# Patient Record
Sex: FEMALE | Race: WHITE | Employment: OTHER | URBAN - METROPOLITAN AREA
[De-identification: names, ages, dates, MRNs, and addresses within clinical notes are randomized per-mention and may not be internally consistent; named-entity substitution may affect disease eponyms.]

---

## 2020-12-03 ENCOUNTER — TRANSFERRED RECORDS (OUTPATIENT)
Dept: HEALTH INFORMATION MANAGEMENT | Facility: CLINIC | Age: 65
End: 2020-12-03

## 2020-12-04 ENCOUNTER — TRANSFERRED RECORDS (OUTPATIENT)
Dept: HEALTH INFORMATION MANAGEMENT | Facility: CLINIC | Age: 65
End: 2020-12-04

## 2020-12-07 ENCOUNTER — TRANSCRIBE ORDERS (OUTPATIENT)
Dept: OTHER | Age: 65
End: 2020-12-07

## 2020-12-10 ENCOUNTER — TRANSCRIBE ORDERS (OUTPATIENT)
Dept: OTHER | Age: 65
End: 2020-12-10

## 2020-12-10 ENCOUNTER — DOCUMENTATION ONLY (OUTPATIENT)
Dept: ONCOLOGY | Facility: CLINIC | Age: 65
End: 2020-12-10

## 2020-12-10 DIAGNOSIS — N83.8 OVARIAN MASS: Primary | ICD-10-CM

## 2020-12-10 DIAGNOSIS — J90 PLEURAL EFFUSION: ICD-10-CM

## 2020-12-10 NOTE — PROGRESS NOTES
Action    Action Taken 12/10/20:    CT & US from [facility] resolved (Reports in Epic) to PACS.    -Faxed request to [facility] for recs  3:15 PM    -12/11/20: Records from [facility] rec'd, sent to HIM for urgent upload. Records included patient demographics. Added pt's address & phone - [facility] Recs now viewable to Epic/CE.  8:42 AM       RECORDS STATUS - ALL OTHER DIAGNOSIS      RECORDS RECEIVED FROM:    DATE RECEIVED:    NOTES STATUS DETAILS   OFFICE NOTE from referring provider [facility] - [facility]: 12/3/20   OFFICE NOTE from medical oncologist     DISCHARGE SUMMARY from hospital     DISCHARGE REPORT from the ER     OPERATIVE REPORT     MEDICATION LIST     CLINICAL TRIAL TREATMENTS TO DATE     LABS     PATHOLOGY REPORTS     ANYTHING RELATED TO DIAGNOSIS     GENONOMIC TESTING     TYPE:     IMAGING (NEED IMAGES & REPORT)     CT SCANS PACS 12/4/20: [facility], Report in CE   MRI     MAMMO     ULTRASOUND PACS 12/3/20: [facility], Report in CE   PET

## 2020-12-14 NOTE — TELEPHONE ENCOUNTER
Oncology/Surgical Oncology Referral Request:     Specialty Requested: Medical Oncology     Referring Provider: Frieda Butler MD    Referring Clinic/Organization: [facility]     Records location: Faxed - Media tab/Scanned     Requested Provider (if specified): Not Specified

## 2020-12-16 ENCOUNTER — PRE VISIT (OUTPATIENT)
Dept: ONCOLOGY | Facility: CLINIC | Age: 65
End: 2020-12-16

## 2020-12-16 ENCOUNTER — VIRTUAL VISIT (OUTPATIENT)
Dept: ONCOLOGY | Facility: CLINIC | Age: 65
End: 2020-12-16
Attending: PHYSICIAN ASSISTANT
Payer: MEDICAID

## 2020-12-16 DIAGNOSIS — N83.8 OVARIAN MASS: ICD-10-CM

## 2020-12-16 DIAGNOSIS — J90 PLEURAL EFFUSION: ICD-10-CM

## 2020-12-16 PROCEDURE — 99204 OFFICE O/P NEW MOD 45 MIN: CPT | Mod: 95 | Performed by: OBSTETRICS & GYNECOLOGY

## 2020-12-16 PROCEDURE — 999N001193 HC VIDEO/TELEPHONE VISIT; NO CHARGE

## 2020-12-16 RX ORDER — AMITRIPTYLINE HYDROCHLORIDE 100 MG/1
50 TABLET ORAL AT BEDTIME
COMMUNITY
Start: 2020-09-01 | End: 2021-06-10

## 2020-12-16 RX ORDER — ONDANSETRON 4 MG/1
TABLET, FILM COATED ORAL
Status: ON HOLD | COMMUNITY
Start: 2020-06-05 | End: 2021-01-13

## 2020-12-16 RX ORDER — SUMATRIPTAN 100 MG/1
100 TABLET, FILM COATED ORAL
COMMUNITY
Start: 2020-07-06 | End: 2021-07-02

## 2020-12-16 RX ORDER — VALACYCLOVIR HYDROCHLORIDE 1 G/1
1000 TABLET, FILM COATED ORAL PRN
COMMUNITY
Start: 2020-12-03

## 2020-12-16 NOTE — PROGRESS NOTES
"Taran Regalado is a 65 year old female who is being evaluated via a billable video visit.      The patient has been notified of following:     \"This video visit will be conducted via a call between you and your physician/provider. We have found that certain health care needs can be provided without the need for an in-person physical exam.  This service lets us provide the care you need with a video conversation.  If a prescription is necessary we can send it directly to your pharmacy.  If lab work is needed we can place an order for that and you can then stop by our lab to have the test done at a later time.    Video visits are billed at different rates depending on your insurance coverage.  Please reach out to your insurance provider with any questions.    If during the course of the call the physician/provider feels a video visit is not appropriate, you will not be charged for this service.\"    Patient has given verbal consent for Video visit? Yes  How would you like to obtain your AVS? Mail a copy  If you are dropped from the video visit, the video invite should be resent to: Text to cell phone: [phone]  Will anyone else be joining your video visit? No      I have reviewed and updated the patient's allergies and medication list.    Concerns: none  Refills: none    Vitals - Patient Reported  Weight (Patient Reported): 68 kg (150 lb)  Height (Patient Reported): 182.9 cm (6')  BMI (Based on Pt Reported Ht/Wt): 20.34  Pain Score: Moderate Pain (4)  Pain Loc: Abdomen      Sherita Hodge CMA                            Consult Notes on Referred Patient    Date: 2020       Dr. Frieda Parrish  [address] [place] [address]       RE: Taran Regalado  : 1955  GRACY: 2020    Dear Dr. Frieda Parrish:    I had the pleasure of seeing your patient Taran Regalado here at the Gynecologic Cancer Clinic at the [facility] on 2020.  As you know she is a very pleasant " 65 year old woman with a recent diagnosis of carcinomatosis and elevated .  Given these findings she was subsequently sent to the Gynecologic Cancer Clinic for new patient consultation.   G3, P3, 3 prior vaginal deliveries, went through menopause in her 40s, has not had any postmenopausal bleeding.  Started to have some abdominal distention over the last month, lost about 25 pounds of weight.  CT scan showed carcinomatosis, possible pleural effusions.  CA-125 is 3098.  She has reduced appetite.  Normal urinary and bowel function.           Past Medical History:  Migraines and restless legs syndrome.         Past Surgical History:  Tubal Ligation and appendectomy.         Health Maintenance:  Health Maintenance Due   Topic Date Due     PREVENTIVE CARE VISIT  1955     ADVANCE CARE PLANNING  1955     MAMMO SCREENING  1955     COLORECTAL CANCER SCREENING  1966     HIV SCREENING  1971     HEPATITIS C SCREENING  1974     PAP  1977     DTAP/TDAP/TD IMMUNIZATION (1 - Tdap) 1981     LIPID  2001     ZOSTER IMMUNIZATION (1 of 2) 2006     PHQ-2  2020     INFLUENZA VACCINE (1) 2020             Current Medications:     has a current medication list which includes the following prescription(s): amitriptyline, ondansetron, sumatriptan, and valacyclovir.       Allergies:     [unfilled]        Social History:     Social History     Tobacco Use     Smoking status: Former Smoker     Smokeless tobacco: Never Used   Substance Use Topics     Alcohol use: Not on file       History   Drug Use Not on file           Family History:   Patient is adopted.  Her biologic mother  of a female cancer at age 36, she does not know which kind.         Physical Exam:     There were no vitals taken for this visit.  There is no height or weight on file to calculate BMI.    General Appearance: healthy and alert, no distress      Psychiatric: appropriate mood and affect                                    Assessment:    Taran Regalado is a 65 year old woman with a new diagnosis of carcinomatosis and elevated .     A total of 45 minutes was spent with the patient, 40 minutes of which were spent in counseling the patient and/or treatment planning.      1.  Carcinomatosis.   2.  Pelvic mass.   3.  Pleural effusions.   4.  CA-125 of 3098.      I had a long discussion with the patient, this is almost certainly ovarian, appears to be advanced ovarian cancer.  We discussed pathophysiology of those as well as treatment.  She lives in [place].  We will have her come down early next week for CT of the chest to further evaluate for chest disease, and then depending on that, probably tap the pleural effusions.  If there is no solid chest disease, we might proceed with a diagnostic laparoscopy to get diagnosis and then possible primary cytoreduction versus neoadjuvant chemotherapy followed by interval cytoreduction.  The patient agrees with this plan.  Will also consider enrolling her in [facility] and/or research programs and studies.  Patient is very appreciative of her care.  All questions were answered.           Thank you for allowing us to participate in the care of your patient.         Sincerely,    Bolivar Juarez MD, MS    Department of Obstetrics and Gynecology   Division of Gynecologic Oncology   [facility]  Phone: [phone]    CC  No care team member to display  PONCHO HODGES      Phone visit 45 minutes.

## 2020-12-23 ENCOUNTER — ANCILLARY PROCEDURE (OUTPATIENT)
Dept: CT IMAGING | Facility: CLINIC | Age: 65
End: 2020-12-23
Attending: OBSTETRICS & GYNECOLOGY
Payer: MEDICAID

## 2020-12-23 ENCOUNTER — ONCOLOGY VISIT (OUTPATIENT)
Dept: ONCOLOGY | Facility: CLINIC | Age: 65
End: 2020-12-23
Attending: OBSTETRICS & GYNECOLOGY
Payer: MEDICAID

## 2020-12-23 VITALS
TEMPERATURE: 97.5 F | DIASTOLIC BLOOD PRESSURE: 77 MMHG | HEART RATE: 85 BPM | SYSTOLIC BLOOD PRESSURE: 155 MMHG | WEIGHT: 157 LBS | RESPIRATION RATE: 18 BRPM | OXYGEN SATURATION: 99 %

## 2020-12-23 DIAGNOSIS — N83.8 OVARIAN MASS: ICD-10-CM

## 2020-12-23 DIAGNOSIS — J90 PLEURAL EFFUSION: ICD-10-CM

## 2020-12-23 DIAGNOSIS — R18.0 MALIGNANT ASCITES (H): ICD-10-CM

## 2020-12-23 DIAGNOSIS — R19.00 PELVIC MASS: Primary | ICD-10-CM

## 2020-12-23 DIAGNOSIS — R19.00 PELVIC MASS: ICD-10-CM

## 2020-12-23 DIAGNOSIS — C56.9 OVARIAN CANCER, UNSPECIFIED LATERALITY (H): ICD-10-CM

## 2020-12-23 LAB
ALBUMIN SERPL-MCNC: 3.2 G/DL (ref 3.4–5)
ALP SERPL-CCNC: 116 U/L (ref 40–150)
ALT SERPL W P-5'-P-CCNC: 12 U/L (ref 0–50)
ANION GAP SERPL CALCULATED.3IONS-SCNC: 6 MMOL/L (ref 3–14)
AST SERPL W P-5'-P-CCNC: 22 U/L (ref 0–45)
BASOPHILS # BLD AUTO: 0 10E9/L (ref 0–0.2)
BASOPHILS NFR BLD AUTO: 0.5 %
BILIRUB SERPL-MCNC: 0.3 MG/DL (ref 0.2–1.3)
BUN SERPL-MCNC: 12 MG/DL (ref 7–30)
CALCIUM SERPL-MCNC: 9.5 MG/DL (ref 8.5–10.1)
CHLORIDE SERPL-SCNC: 104 MMOL/L (ref 94–109)
CO2 SERPL-SCNC: 28 MMOL/L (ref 20–32)
CREAT SERPL-MCNC: 0.65 MG/DL (ref 0.52–1.04)
DIFFERENTIAL METHOD BLD: ABNORMAL
EOSINOPHIL # BLD AUTO: 0.1 10E9/L (ref 0–0.7)
EOSINOPHIL NFR BLD AUTO: 1 %
ERYTHROCYTE [DISTWIDTH] IN BLOOD BY AUTOMATED COUNT: 14.5 % (ref 10–15)
GFR SERPL CREATININE-BSD FRML MDRD: >90 ML/MIN/{1.73_M2}
GLUCOSE SERPL-MCNC: 100 MG/DL (ref 70–99)
HCT VFR BLD AUTO: 40.5 % (ref 35–47)
HGB BLD-MCNC: 12.5 G/DL (ref 11.7–15.7)
IMM GRANULOCYTES # BLD: 0 10E9/L (ref 0–0.4)
IMM GRANULOCYTES NFR BLD: 0.3 %
LYMPHOCYTES # BLD AUTO: 0.9 10E9/L (ref 0.8–5.3)
LYMPHOCYTES NFR BLD AUTO: 16.2 %
MCH RBC QN AUTO: 27.7 PG (ref 26.5–33)
MCHC RBC AUTO-ENTMCNC: 30.9 G/DL (ref 31.5–36.5)
MCV RBC AUTO: 90 FL (ref 78–100)
MONOCYTES # BLD AUTO: 0.5 10E9/L (ref 0–1.3)
MONOCYTES NFR BLD AUTO: 9.1 %
NEUTROPHILS # BLD AUTO: 4.2 10E9/L (ref 1.6–8.3)
NEUTROPHILS NFR BLD AUTO: 72.9 %
NRBC # BLD AUTO: 0 10*3/UL
NRBC BLD AUTO-RTO: 0 /100
PLATELET # BLD AUTO: 482 10E9/L (ref 150–450)
POTASSIUM SERPL-SCNC: 4.6 MMOL/L (ref 3.4–5.3)
PROT SERPL-MCNC: 8.3 G/DL (ref 6.8–8.8)
RBC # BLD AUTO: 4.52 10E12/L (ref 3.8–5.2)
SODIUM SERPL-SCNC: 138 MMOL/L (ref 133–144)
WBC # BLD AUTO: 5.8 10E9/L (ref 4–11)

## 2020-12-23 PROCEDURE — 99215 OFFICE O/P EST HI 40 MIN: CPT | Performed by: OBSTETRICS & GYNECOLOGY

## 2020-12-23 PROCEDURE — 80053 COMPREHEN METABOLIC PANEL: CPT | Performed by: PATHOLOGY

## 2020-12-23 PROCEDURE — G0463 HOSPITAL OUTPT CLINIC VISIT: HCPCS | Performed by: OBSTETRICS & GYNECOLOGY

## 2020-12-23 PROCEDURE — 85025 COMPLETE CBC W/AUTO DIFF WBC: CPT | Performed by: PATHOLOGY

## 2020-12-23 PROCEDURE — 36415 COLL VENOUS BLD VENIPUNCTURE: CPT | Performed by: PATHOLOGY

## 2020-12-23 PROCEDURE — 71260 CT THORAX DX C+: CPT | Mod: GC | Performed by: RADIOLOGY

## 2020-12-23 RX ORDER — OXYCODONE HYDROCHLORIDE 5 MG/1
5 TABLET ORAL EVERY 6 HOURS PRN
Qty: 30 TABLET | Refills: 0 | Status: SHIPPED | OUTPATIENT
Start: 2020-12-23 | End: 2020-12-26

## 2020-12-23 RX ORDER — IOPAMIDOL 755 MG/ML
80 INJECTION, SOLUTION INTRAVASCULAR ONCE
Status: COMPLETED | OUTPATIENT
Start: 2020-12-23 | End: 2020-12-23

## 2020-12-23 RX ORDER — CEFAZOLIN SODIUM 2 G/50ML
2 SOLUTION INTRAVENOUS
Status: CANCELLED | OUTPATIENT
Start: 2020-12-23

## 2020-12-23 RX ORDER — OXYCODONE HYDROCHLORIDE 5 MG/1
5 TABLET ORAL EVERY 6 HOURS PRN
Qty: 30 TABLET | Refills: 0 | Status: SHIPPED | OUTPATIENT
Start: 2020-12-23 | End: 2020-12-23 | Stop reason: ALTCHOICE

## 2020-12-23 RX ORDER — CEFAZOLIN SODIUM 1 G/50ML
1 INJECTION, SOLUTION INTRAVENOUS SEE ADMIN INSTRUCTIONS
Status: CANCELLED | OUTPATIENT
Start: 2020-12-23

## 2020-12-23 RX ADMIN — IOPAMIDOL 80 ML: 755 INJECTION, SOLUTION INTRAVASCULAR at 09:53

## 2020-12-23 ASSESSMENT — PAIN SCALES - GENERAL: PAINLEVEL: EXTREME PAIN (8)

## 2020-12-23 NOTE — PROGRESS NOTES
SURGERY, BLOOD and HYSTERECTOMY FORMS COMPLETED and scanned to HIM    Patient given teaching material that will be reviewed via the telephone at a later date.     Shawna Hinojosa RN

## 2020-12-23 NOTE — Clinical Note
12/23/2020         RE: Taran Regalado  [address] [place] [id number]        Dear Colleague,    Thank you for referring your patient, Taran Regalado, to the [facility]. Please see a copy of my visit note below.    No notes on file    Again, thank you for allowing me to participate in the care of your patient.        Sincerely,        Bolivar Juarez MD

## 2020-12-23 NOTE — NURSING NOTE
Oncology Rooming Note    December 23, 2020 2:31 PM   Taran Regalado is a 65 year old female who presents for:    Chief Complaint   Patient presents with     RECHECK     Pelvic Mass     Initial Vitals: BP (!) 155/77   Pulse 85   Temp 97.5  F (36.4  C) (Oral)   Resp 18   Wt 71.2 kg (157 lb)   SpO2 99%  Estimated body mass index is 20.37 kg/m  as calculated from the following:    Height as of 1/10/21: 1.829 m (6').    Weight as of 1/13/21: 68.1 kg (150 lb 3.2 oz). There is no height or weight on file to calculate BSA.  Extreme Pain (8) Comment: Data Unavailable   No LMP recorded. Patient is postmenopausal.  Allergies reviewed: Yes  Medications reviewed: Yes    Medications: Medication refills not needed today.  Pharmacy name entered into [facility]: [facility] [id number] [place] [address] [address]    Clinical concerns: jose Siu, CMA

## 2020-12-24 ENCOUNTER — TELEPHONE (OUTPATIENT)
Dept: ONCOLOGY | Facility: CLINIC | Age: 65
End: 2020-12-24

## 2020-12-24 ENCOUNTER — PATIENT OUTREACH (OUTPATIENT)
Dept: ONCOLOGY | Facility: CLINIC | Age: 65
End: 2020-12-24

## 2020-12-24 DIAGNOSIS — Z11.59 ENCOUNTER FOR SCREENING FOR OTHER VIRAL DISEASES: Primary | ICD-10-CM

## 2020-12-24 NOTE — PROGRESS NOTES
"Patient daughter reached out to RN to discuss concern over patients symptoms.     Patient took oxycodone last night with no relief. Patient stating she has 8/10 and at times 9/10 pain.     RN was attempting to get paracentesis for patient but soonest appointment at any location was 12/31    RN and daughter discuss pain management options but ultimately it was recommended the patient go to ED for urgent paracentesis due to outside scan reading \" Large volume ascites\" on 12/4.     RN and daughter discussed this at length as it is not ideal to go to the ED for this. However, in the setting of not being able to get patient what she needs in the outpatient setting in a timely fashion it is our only option.     RN answered and addressed all the daughters questions to the best of her ability.     Daughter appreciative of the RN time.     Shawna Hinojosa RN    "

## 2020-12-24 NOTE — TELEPHONE ENCOUNTER
She would like to cancel her paracentesis and COVID test prior because it is so close to the surgery and the pain medication is helping. Message sent to RN.    Surgery is scheduled with Dr. Juarez on 1/7 at [facility].  Scheduled per availability.    H&P: to be completed by PAC.  PAC: 12/30, virtual at 10:15 AM     Additional appointments:   NO ADDITIONAL APPOINTMENTS NEEDED AT THIS TIME    COVID-19 test: 1/4, 11 AM at [facility]  Post-op: 1/27, 11 AM  as a virtual visit .    The RN completed the education regarding the surgery.     Patient will receive surgery arrival and start time from PAC.    The surgery packet was provided by the RN during appointment.    Patient will complete COVID-19 test that was scheduled by surgical coordinator 2-4 days prior to surgery.     I called the patient and was able to confirm the scheduled information above.

## 2020-12-24 NOTE — TELEPHONE ENCOUNTER
FUTURE VISIT INFORMATION      SURGERY INFORMATION:    Date: 1/7/21    Location: [facility] OR    Surgeon:  Bolivar Juarez MD    Anesthesia Type:  General    Procedure: Diagnsotic laparoscopy, biopsies, possible HYSTERECTOMY, TOTAL, ABDOMINAL, WITH BILATERAL SALPINGO-OOPHORECTOMY,  DEBULKING, possible bowel resection, possible stoma    Consult: OV 12/23/20    RECORDS REQUESTED FROM:       Primary Care Provider: Frieda Parrish PA - [facility]

## 2020-12-26 ENCOUNTER — HOSPITAL ENCOUNTER (EMERGENCY)
Facility: CLINIC | Age: 65
Discharge: HOME | End: 2020-12-26
Attending: STUDENT IN AN ORGANIZED HEALTH CARE EDUCATION/TRAINING PROGRAM | Admitting: STUDENT IN AN ORGANIZED HEALTH CARE EDUCATION/TRAINING PROGRAM
Payer: MEDICAID

## 2020-12-26 VITALS
SYSTOLIC BLOOD PRESSURE: 123 MMHG | HEIGHT: 72 IN | WEIGHT: 156.8 LBS | OXYGEN SATURATION: 96 % | TEMPERATURE: 97.8 F | HEART RATE: 79 BPM | BODY MASS INDEX: 21.24 KG/M2 | RESPIRATION RATE: 16 BRPM | DIASTOLIC BLOOD PRESSURE: 72 MMHG

## 2020-12-26 DIAGNOSIS — R14.0 ABDOMINAL DISTENSION: ICD-10-CM

## 2020-12-26 LAB
ALBUMIN FLD-MCNC: 2.5 G/DL
ALBUMIN SERPL-MCNC: 2.9 G/DL (ref 3.4–5)
ALP SERPL-CCNC: 127 U/L (ref 40–150)
ALT SERPL W P-5'-P-CCNC: 11 U/L (ref 0–50)
ANION GAP SERPL CALCULATED.3IONS-SCNC: 5 MMOL/L (ref 3–14)
APPEARANCE FLD: NORMAL
AST SERPL W P-5'-P-CCNC: 24 U/L (ref 0–45)
BASOPHILS # BLD AUTO: 0 10E9/L (ref 0–0.2)
BASOPHILS NFR BLD AUTO: 0.9 %
BILIRUB SERPL-MCNC: 0.4 MG/DL (ref 0.2–1.3)
BUN SERPL-MCNC: 17 MG/DL (ref 7–30)
CALCIUM SERPL-MCNC: 9.2 MG/DL (ref 8.5–10.1)
CHLORIDE SERPL-SCNC: 104 MMOL/L (ref 94–109)
CO2 SERPL-SCNC: 28 MMOL/L (ref 20–32)
COLOR FLD: YELLOW
CREAT SERPL-MCNC: 0.68 MG/DL (ref 0.52–1.04)
DIFFERENTIAL METHOD BLD: ABNORMAL
EOSINOPHIL # BLD AUTO: 0.1 10E9/L (ref 0–0.7)
EOSINOPHIL NFR BLD AUTO: 2.8 %
ERYTHROCYTE [DISTWIDTH] IN BLOOD BY AUTOMATED COUNT: 14.5 % (ref 10–15)
GFR SERPL CREATININE-BSD FRML MDRD: >90 ML/MIN/{1.73_M2}
GLUCOSE SERPL-MCNC: 121 MG/DL (ref 70–99)
GRAM STN SPEC: NORMAL
HCT VFR BLD AUTO: 41.7 % (ref 35–47)
HGB BLD-MCNC: 12.9 G/DL (ref 11.7–15.7)
IMM GRANULOCYTES # BLD: 0 10E9/L (ref 0–0.4)
IMM GRANULOCYTES NFR BLD: 0.2 %
LYMPHOCYTES # BLD AUTO: 0.8 10E9/L (ref 0.8–5.3)
LYMPHOCYTES NFR BLD AUTO: 18.4 %
LYMPHOCYTES NFR FLD MANUAL: 48 %
MCH RBC QN AUTO: 27.9 PG (ref 26.5–33)
MCHC RBC AUTO-ENTMCNC: 30.9 G/DL (ref 31.5–36.5)
MCV RBC AUTO: 90 FL (ref 78–100)
MONOCYTES # BLD AUTO: 0.5 10E9/L (ref 0–1.3)
MONOCYTES NFR BLD AUTO: 10.5 %
NEUTROPHILS # BLD AUTO: 3.1 10E9/L (ref 1.6–8.3)
NEUTROPHILS NFR BLD AUTO: 67.2 %
NEUTS BAND NFR FLD MANUAL: 3 %
NRBC # BLD AUTO: 0 10*3/UL
NRBC BLD AUTO-RTO: 0 /100
OTHER CELLS FLD MANUAL: 49 %
PLATELET # BLD AUTO: 419 10E9/L (ref 150–450)
POTASSIUM SERPL-SCNC: 3.7 MMOL/L (ref 3.4–5.3)
PROT FLD-MCNC: 5.1 G/DL
PROT SERPL-MCNC: 7.9 G/DL (ref 6.8–8.8)
RBC # BLD AUTO: 4.63 10E12/L (ref 3.8–5.2)
SODIUM SERPL-SCNC: 137 MMOL/L (ref 133–144)
SPECIMEN SOURCE FLD: NORMAL
SPECIMEN SOURCE: NORMAL
WBC # BLD AUTO: 4.6 10E9/L (ref 4–11)
WBC # FLD AUTO: 442 /UL

## 2020-12-26 PROCEDURE — 99285 EMERGENCY DEPT VISIT HI MDM: CPT | Mod: 25 | Performed by: STUDENT IN AN ORGANIZED HEALTH CARE EDUCATION/TRAINING PROGRAM

## 2020-12-26 PROCEDURE — 88112 CYTOPATH CELL ENHANCE TECH: CPT | Mod: TC | Performed by: STUDENT IN AN ORGANIZED HEALTH CARE EDUCATION/TRAINING PROGRAM

## 2020-12-26 PROCEDURE — 88305 TISSUE EXAM BY PATHOLOGIST: CPT | Mod: TC | Performed by: STUDENT IN AN ORGANIZED HEALTH CARE EDUCATION/TRAINING PROGRAM

## 2020-12-26 PROCEDURE — 82042 OTHER SOURCE ALBUMIN QUAN EA: CPT | Performed by: STUDENT IN AN ORGANIZED HEALTH CARE EDUCATION/TRAINING PROGRAM

## 2020-12-26 PROCEDURE — 88305 TISSUE EXAM BY PATHOLOGIST: CPT | Mod: 26 | Performed by: PATHOLOGY

## 2020-12-26 PROCEDURE — 88112 CYTOPATH CELL ENHANCE TECH: CPT | Mod: 26 | Performed by: PATHOLOGY

## 2020-12-26 PROCEDURE — 80053 COMPREHEN METABOLIC PANEL: CPT | Performed by: EMERGENCY MEDICINE

## 2020-12-26 PROCEDURE — 89051 BODY FLUID CELL COUNT: CPT | Performed by: STUDENT IN AN ORGANIZED HEALTH CARE EDUCATION/TRAINING PROGRAM

## 2020-12-26 PROCEDURE — 88341 IMHCHEM/IMCYTCHM EA ADD ANTB: CPT | Mod: 26 | Performed by: PATHOLOGY

## 2020-12-26 PROCEDURE — 999N001014 HC STATISTIC CYTO WRIGHT STAIN TC: Performed by: STUDENT IN AN ORGANIZED HEALTH CARE EDUCATION/TRAINING PROGRAM

## 2020-12-26 PROCEDURE — 76705 ECHO EXAM OF ABDOMEN: CPT | Mod: 26 | Performed by: STUDENT IN AN ORGANIZED HEALTH CARE EDUCATION/TRAINING PROGRAM

## 2020-12-26 PROCEDURE — 88341 IMHCHEM/IMCYTCHM EA ADD ANTB: CPT | Mod: TC | Performed by: STUDENT IN AN ORGANIZED HEALTH CARE EDUCATION/TRAINING PROGRAM

## 2020-12-26 PROCEDURE — 87015 SPECIMEN INFECT AGNT CONCNTJ: CPT | Performed by: STUDENT IN AN ORGANIZED HEALTH CARE EDUCATION/TRAINING PROGRAM

## 2020-12-26 PROCEDURE — 49083 ABD PARACENTESIS W/IMAGING: CPT | Performed by: STUDENT IN AN ORGANIZED HEALTH CARE EDUCATION/TRAINING PROGRAM

## 2020-12-26 PROCEDURE — 85025 COMPLETE CBC W/AUTO DIFF WBC: CPT | Performed by: EMERGENCY MEDICINE

## 2020-12-26 PROCEDURE — 87205 SMEAR GRAM STAIN: CPT | Performed by: STUDENT IN AN ORGANIZED HEALTH CARE EDUCATION/TRAINING PROGRAM

## 2020-12-26 PROCEDURE — 999N001018 HC STATISTIC H-CELL BLOCK W/STAIN: Performed by: STUDENT IN AN ORGANIZED HEALTH CARE EDUCATION/TRAINING PROGRAM

## 2020-12-26 PROCEDURE — 88342 IMHCHEM/IMCYTCHM 1ST ANTB: CPT | Mod: TC | Performed by: STUDENT IN AN ORGANIZED HEALTH CARE EDUCATION/TRAINING PROGRAM

## 2020-12-26 PROCEDURE — 84157 ASSAY OF PROTEIN OTHER: CPT | Performed by: STUDENT IN AN ORGANIZED HEALTH CARE EDUCATION/TRAINING PROGRAM

## 2020-12-26 PROCEDURE — 88342 IMHCHEM/IMCYTCHM 1ST ANTB: CPT | Mod: 26 | Performed by: PATHOLOGY

## 2020-12-26 PROCEDURE — 87070 CULTURE OTHR SPECIMN AEROBIC: CPT | Performed by: STUDENT IN AN ORGANIZED HEALTH CARE EDUCATION/TRAINING PROGRAM

## 2020-12-26 PROCEDURE — 250N000013 HC RX MED GY IP 250 OP 250 PS 637: Performed by: STUDENT IN AN ORGANIZED HEALTH CARE EDUCATION/TRAINING PROGRAM

## 2020-12-26 PROCEDURE — 76705 ECHO EXAM OF ABDOMEN: CPT | Performed by: STUDENT IN AN ORGANIZED HEALTH CARE EDUCATION/TRAINING PROGRAM

## 2020-12-26 RX ORDER — OXYCODONE AND ACETAMINOPHEN 5; 325 MG/1; MG/1
1 TABLET ORAL ONCE
Status: COMPLETED | OUTPATIENT
Start: 2020-12-26 | End: 2020-12-26

## 2020-12-26 RX ADMIN — OXYCODONE HYDROCHLORIDE AND ACETAMINOPHEN 1 TABLET: 5; 325 TABLET ORAL at 16:55

## 2020-12-26 ASSESSMENT — MIFFLIN-ST. JEOR: SCORE: 1373.24

## 2020-12-26 NOTE — ED TRIAGE NOTES
Patient presents ambulatory to triage with c/o abdominal pain. Patient reports abdominal pain that is not controlled with prescribed oxycodone. Daughter reports patient is scheduled for surgery r/t ovarian cancer on 1/7 and the soonest patient can get in for paracentesis is 12/31.

## 2020-12-26 NOTE — DISCHARGE INSTRUCTIONS
You were seen here for paracentesis. Return to the ED if you have dizziness, lightheadedness, excessive bleeding from the site, or any other concerning symptoms. Otherwise follow up as scheduled with Dr. Juarez of gynecology.

## 2020-12-26 NOTE — ED PROVIDER NOTES
ED Provider Note    History     Chief Complaint   Patient presents with     Abdominal Pain     HPI  Taran Regalado is a 65 year old female with PMH notable for recently diagnosed ovarian cancer who presents to the ED with abdominal distension.     Patient had a recent diagnosis of ovarian cancer earlier this month and is from [facility] and is down here to stay with her daughter while she is seeing the Gyn onc specialist.  She says that she has had worsening abdominal distention over the past month and has a difficult time eating as well as refractory pain due to the abdominal distention.  She denies any chest pain or shortness of breath.  She says that the abdominal pain is diffuse and not worse or better with anything.  It is gradual and constant in nature.  She describes it as dull.  She has had no difficulties with bowel movements.  She does have a paracentesis scheduled on 31 December but does not feel like she can wait that long.  She is only able to eat a few bites before she is very bloated.    Past Medical History  No past medical history on file.  No past surgical history on file.       oxyCODONE (ROXICODONE) 5 MG tablet       amitriptyline (ELAVIL) 100 MG tablet       ondansetron (ZOFRAN) 4 MG tablet       SUMAtriptan (IMITREX) 100 MG tablet       valACYclovir (VALTREX) 1000 mg tablet      No Known Allergies  Past medical history, past surgical history, medications, and allergies were reviewed with the patient. Additional pertinent items: None    Family History  No family history on file.  Family history was reviewed with the patient. Additional pertinent items: None    Social History  Social History     Tobacco Use     Smoking status: Former Smoker     Smokeless tobacco: Never Used   Substance Use Topics     Alcohol use: Not on file     Drug use: Not on file      Social history was reviewed with the patient. Additional pertinent items: None    Review of Systems  A complete review of systems  was performed with pertinent positives and negatives noted in the HPI, and all other systems negative.     Physical Exam   BP: 101/78  Pulse: 104  Temp: 98  F (36.7  C)  Resp: 18  Height: 182.9 cm (6')  Weight: 71.1 kg (156 lb 12.8 oz)(standing scale)  SpO2: 95 %    Physical Exam  General: no acute distress. Appears stated age.   HENT: MMM, no oropharyngeal lesions  Eyes: PERRL, normal sclerae  Neck: non-tender, supple  Cardio: reg rate. Regular rhythm. Extremities well perfused  Resp: Normal work of breathing, normal respiratory rate.  Chest/Back: no visual signs of trauma, no CVA tenderness  Abdomen: mild diffuse tenderness, globally distended, no rebound, no guarding  Neuro: alert and fully oriented. CN II-XII grossly intact. Grossly normal strength and sensation in all extremities.   MSK: no deformities. Grossly normal ROM in extremities.   Integumentary/Skin: no rash visualized, normal color  Psych: normal affect, normal behavior    ED Course      [facility]     -Paracentesis    Date/Time: 12/26/2020 6:00 PM  Performed by: Donald Rose MD  Authorized by: Donald Rose MD       PRE-PROCEDURE DETAILS     Procedure purpose:  Therapeutic    ANESTHESIA (see MAR for exact dosages):     Anesthesia method:  Local infiltration    Local anesthetic:  Lidocaine 1% w/o epi    PROCEDURE DETAILS     Needle gauge:  18    Ultrasound guidance: yes      Puncture site:  L lower quadrant    Fluid removed amount:  3L    Fluid appearance:  Karuna    Dressing:  Adhesive bandage    PROCEDURE   Patient Tolerance:  Patient tolerated the procedure well with no immediate complications        Results for orders placed during the hospital encounter of 12/26/20   POC US ABDOMEN LIMITED    Impression Limited Bedside Abdominal Ultrasound, performed and interpreted by me.     Indication: Abdominal swelling. Evaluate for Free fluid.     With the patient in Trendelenburg, the RUQ, LUQ, (including the  paracolic gutters) views were examined for free fluid. With the patient in reverse Trendelenburg, the super pubic view was examined for free fluid.     Findings: Free fluid present in LLQ    IMPRESSION: Free fluid present as noted above.                 Labs Ordered and Resulted from Time of ED Arrival Up to the Time of Departure from the ED   CBC WITH PLATELETS DIFFERENTIAL - Abnormal; Notable for the following components:       Result Value    MCHC 30.9 (*)     All other components within normal limits   COMPREHENSIVE METABOLIC PANEL - Abnormal; Notable for the following components:    Glucose 121 (*)     Albumin 2.9 (*)     All other components within normal limits   CYTOLOGY NON GYN   CELL COUNT WITH DIFFERENTIAL FLUID   GRAM STAIN     POC US ABDOMEN LIMITED   Final Result   Limited Bedside Abdominal Ultrasound, performed and interpreted by me.       Indication: Abdominal swelling. Evaluate for Free fluid.       With the patient in Trendelenburg, the RUQ, LUQ, (including the paracolic gutters) views were examined for free fluid. With the patient in reverse Trendelenburg, the super pubic view was examined for free fluid.       Findings: Free fluid present in LLQ      IMPRESSION: Free fluid present as noted above.                Assessments & Plan (with Medical Decision Making)     Taran Regalado is a 65 year old female with recent diagnosis of ovarian cancer presents today with abdominal distention secondary to ascites.    She has no other signs of infection and her vitals are stable so I do not think that she has spontaneous bacterial peritonitis.  I spoke with the gynecology oncology team and gave they gave the okay to perform paracentesis and asked for cytology.    Her CBC is within normal limits and she does not have a history of bleeding.  Paracentesis was done and was uncomplicated and 3 L of ascitic fluid was withdrawn.  She did not have any hemodynamic compromise and was walking around without any  lightheadedness so she did not have any albumin replacement done.  She will follow-up with her gynecology oncology doctor as an outpatient and studies on the ascitic fluid was sent.    Impression:  Abdominal distension    Donald Rose MD  Emergency Medicine     Donald Rose MD  12/26/20 1813

## 2020-12-26 NOTE — ED AVS SNAPSHOT
[facility] Emergency Department  [address] [address]  Phone: [phone]                                    Taran Regalado   MRN: [id number]    Department: [facility] Emergency Department   Date of Visit: 12/26/2020           After Visit Summary Signature Page    I have received my discharge instructions, and my questions have been answered. I have discussed any challenges I see with this plan with the nurse or doctor.    ..........................................................................................................................................  Patient/Patient Representative Signature      ..........................................................................................................................................  Patient Representative Print Name and Relationship to Patient    ..................................................               ................................................  Date                                   Time    ..........................................................................................................................................  Reviewed by Signature/Title    ...................................................              ..............................................  Date                                               Time          22EPIC Rev 08/18

## 2020-12-28 ENCOUNTER — PATIENT OUTREACH (OUTPATIENT)
Dept: ONCOLOGY | Facility: CLINIC | Age: 65
End: 2020-12-28

## 2020-12-28 DIAGNOSIS — R18.0 MALIGNANT ASCITES (H): ICD-10-CM

## 2020-12-28 DIAGNOSIS — R19.00 PELVIC MASS: ICD-10-CM

## 2020-12-28 LAB
SARS-COV-2 RNA SPEC QL NAA+PROBE: NORMAL
SPECIMEN SOURCE: NORMAL

## 2020-12-28 PROCEDURE — U0003 INFECTIOUS AGENT DETECTION BY NUCLEIC ACID (DNA OR RNA); SEVERE ACUTE RESPIRATORY SYNDROME CORONAVIRUS 2 (SARS-COV-2) (CORONAVIRUS DISEASE [COVID-19]), AMPLIFIED PROBE TECHNIQUE, MAKING USE OF HIGH THROUGHPUT TECHNOLOGIES AS DESCRIBED BY CMS-2020-01-R: HCPCS | Performed by: OBSTETRICS & GYNECOLOGY

## 2020-12-28 RX ORDER — OXYCODONE HYDROCHLORIDE 5 MG/1
5 TABLET ORAL EVERY 6 HOURS PRN
Qty: 40 TABLET | Refills: 0 | Status: SHIPPED | OUTPATIENT
Start: 2020-12-28 | End: 2020-12-31

## 2020-12-28 NOTE — TELEPHONE ENCOUNTER
Patient was recently in the ED for relief of abdominal fluid.     Patient had a paracentesis that removed 3L of ascites.     Patient is feeling better but does not feel that they removed all the fluid.     Patient encouraged to keep outpatient paracentesis scheduled for 1/31.    Patient asking for a refill of Oxycodone. She has 7 tablets left. She is needing them on schedule and they are helping.     If approved would like refill sent to [facility].     MD will be updated once out of OR.     Shawna Hinojosa RN

## 2020-12-29 DIAGNOSIS — Z11.59 ENCOUNTER FOR SCREENING FOR OTHER VIRAL DISEASES: Primary | ICD-10-CM

## 2020-12-29 LAB
LABORATORY COMMENT REPORT: NORMAL
SARS-COV-2 RNA SPEC QL NAA+PROBE: NEGATIVE
SPECIMEN SOURCE: NORMAL

## 2020-12-30 ENCOUNTER — TELEPHONE (OUTPATIENT)
Dept: MEDSURG UNIT | Facility: CLINIC | Age: 65
End: 2020-12-30

## 2020-12-30 ENCOUNTER — ANESTHESIA EVENT (OUTPATIENT)
Dept: SURGERY | Facility: CLINIC | Age: 65
End: 2020-12-30
Payer: MEDICAID

## 2020-12-30 ENCOUNTER — VIRTUAL VISIT (OUTPATIENT)
Dept: SURGERY | Facility: CLINIC | Age: 65
End: 2020-12-30
Payer: MEDICAID

## 2020-12-30 ENCOUNTER — PRE VISIT (OUTPATIENT)
Dept: SURGERY | Facility: CLINIC | Age: 65
End: 2020-12-30

## 2020-12-30 VITALS — BODY MASS INDEX: 21.13 KG/M2 | HEIGHT: 72 IN | WEIGHT: 156 LBS

## 2020-12-30 DIAGNOSIS — Z01.818 PRE-OP EXAMINATION: Primary | ICD-10-CM

## 2020-12-30 DIAGNOSIS — R19.00 PELVIC MASS: ICD-10-CM

## 2020-12-30 PROCEDURE — 99203 OFFICE O/P NEW LOW 30 MIN: CPT | Mod: 95 | Performed by: PHYSICIAN ASSISTANT

## 2020-12-30 SDOH — HEALTH STABILITY: MENTAL HEALTH: HOW OFTEN DO YOU HAVE A DRINK CONTAINING ALCOHOL?: NOT ASKED

## 2020-12-30 SDOH — HEALTH STABILITY: MENTAL HEALTH: HOW MANY STANDARD DRINKS CONTAINING ALCOHOL DO YOU HAVE ON A TYPICAL DAY?: NOT ASKED

## 2020-12-30 SDOH — HEALTH STABILITY: MENTAL HEALTH: HOW OFTEN DO YOU HAVE 6 OR MORE DRINKS ON ONE OCCASION?: NOT ASKED

## 2020-12-30 ASSESSMENT — ENCOUNTER SYMPTOMS: SEIZURES: 0

## 2020-12-30 ASSESSMENT — MIFFLIN-ST. JEOR: SCORE: 1369.61

## 2020-12-30 ASSESSMENT — LIFESTYLE VARIABLES: TOBACCO_USE: 1

## 2020-12-30 ASSESSMENT — PAIN SCALES - GENERAL: PAINLEVEL: EXTREME PAIN (8)

## 2020-12-30 NOTE — ANESTHESIA PREPROCEDURE EVALUATION
Anesthesia Pre-Procedure Evaluation    Patient: Taran Regalado   MRN:     [id number] Gender:   female   Age:    65 year old :      1955        Preoperative Diagnosis: Pelvic mass [R19.00]   Procedure(s):  Diagnsotic laparoscopy, biopsies, possible  HYSTERECTOMY, TOTAL, ABDOMINAL, WITH BILATERAL SALPINGO-OOPHORECTOMY,  DEBULKING, possible bowel resection, possible stoma     LABS:  CBC:   Lab Results   Component Value Date    WBC 4.6 2020    WBC 5.8 2020    HGB 12.9 2020    HGB 12.5 2020    HCT 41.7 2020    HCT 40.5 2020     2020     (H) 2020     BMP:   Lab Results   Component Value Date     2020     2020    POTASSIUM 3.7 2020    POTASSIUM 4.6 2020    CHLORIDE 104 2020    CHLORIDE 104 2020    CO2 28 2020    CO2 28 2020    BUN 17 2020    BUN 12 2020    CR 0.68 2020    CR 0.65 2020     (H) 2020     (H) 2020     COAGS: No results found for: PTT, INR, FIBR  POC: No results found for: BGM, HCG, HCGS  OTHER:   Lab Results   Component Value Date    HORACIO 9.2 2020    ALBUMIN 2.9 (L) 2020    PROTTOTAL 7.9 2020    ALT 11 2020    AST 24 2020    ALKPHOS 127 2020    BILITOTAL 0.4 2020        Preop Vitals    BP Readings from Last 3 Encounters:   20 123/72   20 (!) 155/77    Pulse Readings from Last 3 Encounters:   20 79   20 85      Resp Readings from Last 3 Encounters:   20 16   20 18    SpO2 Readings from Last 3 Encounters:   20 96%   20 99%      Temp Readings from Last 1 Encounters:   20 97.8  F (36.6  C) (Oral)    Ht Readings from Last 1 Encounters:   20 1.829 m (6')      Wt Readings from Last 1 Encounters:   20 70.8 kg (156 lb)    Estimated body mass index is 21.16 kg/m  as calculated from the following:    Height as of this encounter: 1.829  m (6').    Weight as of this encounter: 70.8 kg (156 lb).     LDA:        Past Medical History:   Diagnosis Date     Migraine      Pelvic mass      Restless legs syndrome (RLS)       Past Surgical History:   Procedure Laterality Date     APPENDECTOMY       ARTHROSCPY KNEE SURGICAL DEBRIDEMENT SHAVING ARTICULAR CARTILAGE Right      DEBRIDEMENT LEFT UPPER EXTREMITY  2016     EYE SURGERY       LASIK       TUBAL LIGATION        No Known Allergies     Anesthesia Evaluation     . Pt has had prior anesthetic. Type: General           ROS/MED HX    ENT/Pulmonary:     (+)tobacco use (quit 2018 - 20 year pack history), Past use 0.5 packs/day  , . .    Neurologic:     (+)migraines, other neuro RLS   (-) seizures   Cardiovascular:  - neg cardiovascular ROS   (+) ----. : . . . :. . No previous cardiac testing       METS/Exercise Tolerance:  >4 METS   Hematologic:  - neg hematologic  ROS      (-) history of blood clots, anemia and History of Transfusion   Musculoskeletal:   (+)  other musculoskeletal- ostepenia      GI/Hepatic:     (+) Other GI/Hepatic pelvic mass     (-) GERD   Renal/Genitourinary:  - ROS Renal section negative       Endo:  - neg endo ROS       Psychiatric:     (+) psychiatric history other (comment)      Infectious Disease:   (+) Other Infectious Disease history of cold sores      Malignancy:   (+) Malignancy History of Other  Patient found to have carcinomatosis, pleural effusion and pelvic mass on CT scan. She has undergone paracentesis on 12/26/20 and has another one scheduled tomorrow 12/31/20.         Other:    (+) no H/O Chronic Pain,no other significant disability                        PHYSICAL EXAM:   Mental Status/Neuro: A/A/O   Airway: Facies: Feasible  Mallampati: II  Mouth/Opening: Full  TM distance: > 6 cm  Neck ROM: Full   Respiratory: Auscultation: CTAB     Resp. Rate: Normal     Resp. Effort: Normal      CV: Rhythm: Regular  Rate: Age appropriate  Heart: Normal Sounds  Edema: None   Comments:       Dental: Normal Dentition                Assessment:   ASA SCORE: 3    H&P: History and physical reviewed and following examination; no interval change.    NPO Status: NPO Appropriate     Plan:   Anes. Type:  General   Pre-Medication: None   Induction:  IV (Standard)   Airway: ETT; Oral   Access/Monitoring: PIV; 2nd PIV   Maintenance: Balanced     Postop Plan:   Postop Pain: Opioids  Postop Sedation/Airway: Not planned     PONV Management:   Adult Risk Factors: Female, Postop Opioids   Prevention: Ondansetron, Dexamethasone     CONSENT: Direct conversation   Plan and risks discussed with: Patient                   PAC Discussion and Assessment    ASA Classification: 3  Case is suitable for: [place]  Anesthetic techniques and relevant risks discussed: GA  Invasive monitoring and risk discussed:   Types:   Possibility and Risk of blood transfusion discussed:   NPO instructions given:   Additional anesthetic preparation and risks discussed:   Needs early admission to pre-op area:   Other:     PAC Resident/NP Anesthesia Assessment:  Taran is a 65 year old woman who is scheduled for Diagnsotic laparoscopy, biopsies, possible, HYSTERECTOMY, TOTAL, ABDOMINAL, WITH BILATERAL SALPINGO-OOPHORECTOMY,  DEBULKING, possible bowel resection, possible stoma on 1/7/21 by Dr. Juarez in treatment of pelvic mass.  PAC referral for risk assessment and optimization for anesthesia with comorbid conditions of former smoker, migraines, RLS, history of cold sores, insomnia, osteopenia:    Pre-operative considerations:  1.  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 She denies any cardiac symptoms.  High risk surgery with 0.9% (RCRI #) risk of major adverse cardiac event. The patient has no cardiac diagnosis and denies any symptoms with good METS. No further testing indicated.     2.  Pulm:  Airway feasible.  INA risk: Low  ~ Former smoker - 20  year pack history and quit in 2018.   ~ Pleural effusions seen on CT scans - patient denies any shortness of breath or respiratory symptoms.       3. Neuro: Migraine with aura - it starts in her left eye but hasn't had any issues in years. Hold imitrex DOS and PRN zofran.   ~ RLS - on elavil for insomnia.     4. GI:  Risk of PONV score = 3.  If > 2, anti-emetic intervention recommended.    5. : Pelvic mass, carcinomatosis - patient has paracentesis scheduled for tomorrow. She will continue pain medications as needed for discomfort. Procedure as above.     6. Psych: insomnia - continue elavil    7. Musculoskeletal: osteopenia - considreation for careful positioning to minimize discomfort.     8. ID: History of cold sores - PRN valtrex. No ongoing issues.     VTE risk: 4.5% (age, cancer, daughter with factor V leiden and history of DVT)    **Please refer to the physical examination documented by the anesthesiologist in the anesthesia record on the day of surgery*    Patient is optimized and is acceptable candidate for the proposed procedure.  No further diagnostic evaluation is needed.     For further details of assessment, testing, and physical exam please see H and P completed on same date.    Holley Ramos PA-C          Mid-Level Provider/Resident: Holley Ramos PA-C  Date: 12/30/20  Time:     Attending Anesthesiologist Anesthesia Assessment:        Anesthesiologist:   Date:   Time:   Pass/Fail:   Disposition:     PAC Pharmacist Assessment:        Pharmacist:   Date:   Time:    Holley Ramos PA-C     I have seen and evaluated the patient myself and agree with the above assessment and plan.  I have explained the risks/benefits of anesthesia to the patient who understands and agrees to proceed.    Azra Matute MD  Staff Anesthesiologist  Pager [phone]

## 2020-12-30 NOTE — H&P
Pre-Operative H & P     CC:  Preoperative exam to assess for increased cardiopulmonary risk while undergoing surgery and anesthesia.    Date of Encounter: 12/30/2020  Primary Care Physician:  Bolivar Juarez     Reason for visit: pre operative examination, Pelvic mass    HPI  Taran Regalado is a 65 year old female who presents for pre-operative H & P in preparation for Diagnsotic laparoscopy, biopsies, possible, HYSTERECTOMY, TOTAL, ABDOMINAL, WITH BILATERAL SALPINGO-OOPHORECTOMY,  DEBULKING, possible bowel resection, possible stoma with Dr. Juarez on 1/7/21 at [facility].     The patient is a 65-year-old woman with a past medical history significant for migraines, former smoker, restless leg syndrome, cold sores, insomnia and osteopenia.  The patient went to see her primary care on December 3, 2020 for abdominal pain.  She reported that earlier in October she had suffered a bout of food poisoning which resulted in diarrhea, weight loss, nausea and abdominal bloating.  Her symptoms had persisted and she now presented to have further follow-up.  CT abdomen pelvis was obtained on the next day and showed peritoneal carcinomatosis, pleural effusions and a liver lesion.  Given these findings she was referred to Dr. Juarez and seen on 12/16/20. They discussed treatment options and the patient is now scheduled for the procedure as above. She did have a follow up CT scan of the chest and abdominal US which showed increase in her pleural effusions and free fluid in the LLQ. She presented to the ED on 12/26/20 for abdominal distention and underwent a paracentesis for drainage of 3L of fluid. She is scheduled for another paracentesis tomorrow.     History is obtained from the patient, daughter and chart review    Past Medical History  Past Medical History:   Diagnosis Date     History of cold sores      Insomnia      Migraine      Osteopenia      Pelvic mass       Peritoneal carcinomatosis (H)      Restless legs syndrome (RLS)        Past Surgical History  Past Surgical History:   Procedure Laterality Date     APPENDECTOMY       ARTHROSCPY KNEE SURGICAL DEBRIDEMENT SHAVING ARTICULAR CARTILAGE Right      DEBRIDEMENT LEFT UPPER EXTREMITY  2016     LASIK       TUBAL LIGATION         Hx of Blood transfusions/reactions: denies     Hx of abnormal bleeding or anti-platelet use: none    Menstrual history: No LMP recorded. Patient is postmenopausal.:     Steroid use in the last year: none    Personal or FH with difficulty with Anesthesia:  denies    Prior to Admission Medications  Current Outpatient Medications   Medication Sig Dispense Refill     amitriptyline (ELAVIL) 100 MG tablet TAKE 1 TABLET (100 MG) BY MOUTH EVERY NIGHT AT BEDTIME       oxyCODONE (ROXICODONE) 5 MG tablet Take 1 tablet (5 mg) by mouth every 6 hours as needed for pain 40 tablet 0     SUMAtriptan (IMITREX) 100 MG tablet Take 100 mg by mouth at onset of headache        ondansetron (ZOFRAN) 4 MG tablet TAKE 1 TABLET (4 MG) BY MOUTH AS NEEDED FOR NAUSEA       valACYclovir (VALTREX) 1000 mg tablet Take 1,000 mg by mouth as needed          Allergies  No Known Allergies    Social History  Social History     Socioeconomic History     Marital status:      Spouse name: Not on file     Number of children: Not on file     Years of education: Not on file     Highest education level: Not on file   Occupational History     Not on file   Social Needs     Financial resource strain: Not on file     Food insecurity     Worry: Not on file     Inability: Not on file     Transportation needs     Medical: Not on file     Non-medical: Not on file   Tobacco Use     Smoking status: Former Smoker     Packs/day: 0.50     Years: 40.00     Pack years: 20.00     Quit date:      Years since quittin.9     Smokeless tobacco: Never Used   Substance and Sexual Activity     Alcohol use: Not Currently     Drug use: Never     Sexual  activity: Not on file   Lifestyle     Physical activity     Days per week: Not on file     Minutes per session: Not on file     Stress: Not on file   Relationships     Social connections     Talks on phone: Not on file     Gets together: Not on file     Attends Restorationism service: Not on file     Active member of club or organization: Not on file     Attends meetings of clubs or organizations: Not on file     Relationship status: Not on file     Intimate partner violence     Fear of current or ex partner: Not on file     Emotionally abused: Not on file     Physically abused: Not on file     Forced sexual activity: Not on file   Other Topics Concern     Not on file   Social History Narrative     Not on file       Family History  Family History   Adopted: Yes   Problem Relation Age of Onset     Cancer Mother 36     Factor V Leiden deficiency Daughter      Deep Vein Thrombosis Daughter      Diabetes Type 1 Daughter        Review of Systems    ROS/MED HX    ENT/Pulmonary:     (+)tobacco use (quit 2018 - 20 year pack history), Past use 0.5 packs/day  , . .    Neurologic:     (+)migraines, other neuro RLS   (-) seizures   Cardiovascular:  - neg cardiovascular ROS   (+) ----. : . . . :. . No previous cardiac testing       METS/Exercise Tolerance:  >4 METS   Hematologic:  - neg hematologic  ROS      (-) history of blood clots, anemia and History of Transfusion   Musculoskeletal:   (+)  other musculoskeletal- ostepenia      GI/Hepatic:     (+) Other GI/Hepatic pelvic mass     (-) GERD   Renal/Genitourinary:  - ROS Renal section negative       Endo:  - neg endo ROS       Psychiatric:     (+) psychiatric history other (comment)      Infectious Disease:   (+) Other Infectious Disease history of cold sores      Malignancy:   (+) Malignancy History of Other  Patient found to have carcinomatosis, pleural effusion and pelvic mass on CT scan. She has undergone paracentesis on 12/26/20 and has another one scheduled tomorrow 12/31/20.  "        Other:    (+) no H/O Chronic Pain,no other significant disability        The complete review of systems is negative other than noted in the HPI or here.                         156 lbs 0 oz  6' 0\"   Body mass index is 21.16 kg/m .       Physical Exam  Constitutional: Awake, alert, cooperative, no apparent distress, and appears stated age.  Eyes: Glasses  HENT: Normocephalic  Respiratory: non labored breathing     Neurologic: Awake, alert, oriented to name, place and time.   Neuropsychiatric: Calm, cooperative. Normal affect.     Labs: (personally reviewed)    LABS:  CBC:   Lab Results   Component Value Date    WBC 4.6 12/26/2020    WBC 5.8 12/23/2020    HGB 12.9 12/26/2020    HGB 12.5 12/23/2020    HCT 41.7 12/26/2020    HCT 40.5 12/23/2020     12/26/2020     (H) 12/23/2020     BMP:   Lab Results   Component Value Date     12/26/2020     12/23/2020    POTASSIUM 3.7 12/26/2020    POTASSIUM 4.6 12/23/2020    CHLORIDE 104 12/26/2020    CHLORIDE 104 12/23/2020    CO2 28 12/26/2020    CO2 28 12/23/2020    BUN 17 12/26/2020    BUN 12 12/23/2020    CR 0.68 12/26/2020    CR 0.65 12/23/2020     (H) 12/26/2020     (H) 12/23/2020     COAGS: No results found for: PTT, INR, FIBR  POC: No results found for: BGM, HCG, HCGS  OTHER:   Lab Results   Component Value Date    HORACIO 9.2 12/26/2020    ALBUMIN 2.9 (L) 12/26/2020    PROTTOTAL 7.9 12/26/2020    ALT 11 12/26/2020    AST 24 12/26/2020    ALKPHOS 127 12/26/2020    BILITOTAL 0.4 12/26/2020          EKG: Not indicated    CT 12/4/20  IMPRESSION:    Peritoneal carcinomatosis with masslike peritoneal thickening in the lower pelvis which may indicate an adnexal or ovarian primary malignancy. Large volume ascites.    Bilateral pleural effusions. There is potential subtle pleural nodularity in the right hemithorax which could indicate metastatic disease.    Indeterminate 1 cm lesion in the right hepatic lobe suspicious for a metastatic " lesion.    CT CHEST W CONTRAST  12/23/2020 9:58 AM  IMPRESSION:   1. There are few scattered small sub-6 mm pulmonary nodules which are  indeterminate without prior comparisons available. There are a few  slightly larger perifissural nodules which are technically  indeterminate in the setting of malignancy although presumed lymphatic  in nature and of unlikely clinical significance. Attention on  follow-up is recommended.  2. Small to moderate left and small right pleural effusions are  increased in size from prior. No convincing evidence for pleural  nodularity.  3. Partially visualized large volume ascites and peritoneal nodularity  in the upper abdomen similar to 12/4/2020 outside CT    US abdomen 12/26/20    IMPRESSION: Free fluid present as noted above.    The patient's records and results personally reviewed by this provider.     Outside records reviewed from: care everywhere     ASSESSMENT and PLAN  Taran is a 65 year old woman who is scheduled for Diagnsotic laparoscopy, biopsies, possible, HYSTERECTOMY, TOTAL, ABDOMINAL, WITH BILATERAL SALPINGO-OOPHORECTOMY,  DEBULKING, possible bowel resection, possible stoma on 1/7/21 by Dr. Juarez in treatment of pelvic mass.  PAC referral for risk assessment and optimization for anesthesia with comorbid conditions of former smoker, migraines, RLS, history of cold sores, insomnia, osteopenia:    Pre-operative considerations:  1.  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 She denies any cardiac symptoms.  High risk surgery with 0.9% (RCRI #) risk of major adverse cardiac event. The patient has no cardiac diagnosis and denies any symptoms with good METS. No further testing indicated.     2.  Pulm:  Airway feasible.  INA risk: Low  ~ Former smoker - 20 year pack history and quit in 2018.   ~ Pleural effusions seen on CT scans - patient denies any shortness of breath or respiratory  symptoms.       3. Neuro: Migraine with aura - it starts in her left eye but hasn't had any issues in years. Hold imitrex DOS and PRN zofran.   ~ RLS - on elavil for insomnia.     4. GI:  Risk of PONV score = 3.  If > 2, anti-emetic intervention recommended.    5. : Pelvic mass, carcinomatosis - patient has paracentesis scheduled for tomorrow. She will continue pain medications as needed for discomfort. Procedure as above.     6. Psych: insomnia - continue elavil    7. Musculoskeletal: osteopenia - considreation for careful positioning to minimize discomfort.     8. ID: History of cold sores - PRN valtrex. No ongoing issues.     VTE risk: 4.5% (age, cancer, daughter with factor V leiden and history of DVT)    **Please refer to the physical examination documented by the anesthesiologist in the anesthesia record on the day of surgery*    The patient is optimized for their procedure. AVS with information on surgery time/arrival time, meds and NPO status given by nursing staff.          Video-Visit Details    Type of service:  Video Visit    Patient verbally consented to video service today: YES      Video Start Time: 10:00  Video End Time (time video stopped): 10:17    Originating Location (pt. Location): Home    Distant Location (provider location):  [facility] PREOPERATIVE ASSESSMENT CENTER     Mode of Communication:  Video Conference via Papi Ramos PA-C  Preoperative Assessment Center  [facility]  Clinic and Surgery Center  Phone: [phone]  Fax: [phone]

## 2020-12-30 NOTE — PATIENT INSTRUCTIONS
Preparing for Your Surgery      Name:  Taran Regalado   MRN:  [id number]   :  1955   Today's Date:  2020       Arriving for surgery:  Surgery date:  21  Arrival time:  5:30 am    Restrictions due to COVID 19:  No visitors are allowed at this time.    [facility] parking is available for anyone with mobility limitations or disabilities.  ([place]  24 hours/ 7 days a week; [facility]  7 am- 3:30 pm, Mon- Fri)    Please come to:  [facility] [place] [address]  [address] [place]  [address]     -    Please proceed to the Surgery Lounge on the 3rd floor. [phone]?     - ?If you are in need of directions, wheelchair or escort please stop at the Information Desk in the lobby.  Inform the information person that you are here for surgery; a wheelchair and escort will be provided to the Surgery Lounge .?     What can I eat or drink?  -  You may eat and drink normally for up to 8 hours before your surgery. (Until 11:30 pm 21)  -  You may have clear liquids until 2 hours before surgery. (Until 5:30 am)    Examples of clear liquids:  Water  Clear broth  Juices (apple, white grape, white cranberry  and cider) without pulp  Noncarbonated, powder based beverages  (lemonade and Paramjit-Aid)  Sodas (Sprite, 7-Up, ginger ale and seltzer)  Coffee or tea (without milk or cream)  Gatorade    -  No Alcohol for at least 24 hours before surgery     Which medicines can I take?  Hold Aspirin for 7 days before surgery.   Hold Multivitamins for 7 days before surgery.  Hold Supplements for 7 days before surgery.  Hold Ibuprofen (Advil, Motrin) for 1 day before surgery--unless otherwise directed by surgeon.  Hold Naproxen (Aleve) for 4 days before surgery.    -  DO NOT take these medications the day of surgery:  Sumatriptan (Imitrex),     -  PLEASE TAKE these medications the day of surgery:  Oxycodone if needed  Ondansetron (Zofran) if needed    How do I prepare myself?  - Please take 2  showers before surgery using Scrubcare or Hibiclens soap.    Use this soap only from the neck to your toes.     Leave the soap on your skin for one minute--then rinse thoroughly.      You may use your own shampoo and conditioner; no other hair products.   - Please remove all jewelry and body piercings.  - No lotions, deodorants or fragrance.  - No makeup or fingernail polish.   - Bring your ID and insurance card.    - All patients are required to have a Covid-19 test within 4 days of surgery/procedure.      -Patients will be contacted by the [facility] scheduling team within 1 week of surgery to make an appointment.      - Patients may call the Scheduling team at [phone] if they have not been scheduled within 4 days of  surgery.      ALL PATIENTS GOING HOME THE SAME DAY OF SURGERY ARE REQUIRED TO HAVE A RESPONSIBLE ADULT TO DRIVE AND BE IN ATTENDANCE WITH THEM FOR 24 HOURS FOLLOWING SURGERY     Questions or Concerns:    - For any questions regarding the day of surgery or your hospital stay, please contact the Pre Admission Nursing Office at [phone].       - If you have health changes between today and your surgery please call your surgeon.       For questions after surgery please call your surgeons office.     AFTER YOUR SURGERY  Breathing exercises   Breathing exercises help you recover faster. Take deep breaths and let the air out slowly. This will:     Help you wake up after surgery.    Help prevent complications like pneumonia.  Preventing complications will help you go home sooner.   We may give you a breathing device (incentive spirometer) to encourage you to breathe deeply.   Nausea and vomiting   You may feel sick to your stomach after surgery; if so, let your nurse know.    Pain control:  After surgery, you may have pain. Our goal is to help you manage your pain. Pain medicine will help you feel comfortable enough to do activities that will help you heal.  These activities may include  breathing exercises, walking and physical therapy.   To help your health care team treat your pain we will ask: 1) If you have pain  2) where it is located 3) describe your pain in your words  Methods of pain control include medications given by mouth, vein or by nerve block for some surgeries.  Sequential Compression Device (SCD):  You may need to wear SCD S (also called pneumo boots)on your legs or feet. These are wraps connected to a machine that pumps in air and releases it. The repeated pumping helps prevent blood clots from forming.

## 2020-12-30 NOTE — PROGRESS NOTES
"Taran Regalado is a 65 year old female who is being evaluated via a billable video visit.      The patient has been notified of following:     \"This video visit will be conducted via a call between you and your physician/provider. We have found that certain health care needs can be provided without the need for an in-person physical exam.  This service lets us provide the care you need with a video conversation.  If a prescription is necessary we can send it directly to your pharmacy.  If lab work is needed we can place an order for that and you can then stop by our lab to have the test done at a later time.    Video visits are billed at different rates depending on your insurance coverage.  Please reach out to your insurance provider with any questions.    If during the course of the call the physician/provider feels a video visit is not appropriate, you will not be charged for this service.\"    Patient has given verbal consent for Video visit? Yes  How would you like to obtain your AVS? Plovghhart  If you are dropped from the video visit, the video invite should be resent to: Other e-mail: [email or web address]  Will anyone else be joining your video visit? No      Momo Rivera          "

## 2020-12-30 NOTE — TELEPHONE ENCOUNTER
Pre-Procedure Negative COVID Test Results on 12/28/2020    Results Reviewed  The patient has a negative COVID test result within the required timeframe for the scheduled procedure.     No COVID pre-call needed.     Terry Dowell RN

## 2020-12-31 ENCOUNTER — HOSPITAL ENCOUNTER (OUTPATIENT)
Facility: CLINIC | Age: 65
Discharge: HOME | End: 2020-12-31
Admitting: RADIOLOGY
Payer: MEDICAID

## 2020-12-31 ENCOUNTER — HOSPITAL ENCOUNTER (OUTPATIENT)
Dept: ULTRASOUND IMAGING | Facility: CLINIC | Age: 65
End: 2020-12-31
Attending: OBSTETRICS & GYNECOLOGY
Payer: MEDICAID

## 2020-12-31 VITALS
HEART RATE: 75 BPM | RESPIRATION RATE: 16 BRPM | SYSTOLIC BLOOD PRESSURE: 114 MMHG | OXYGEN SATURATION: 95 % | DIASTOLIC BLOOD PRESSURE: 67 MMHG | TEMPERATURE: 98.6 F

## 2020-12-31 DIAGNOSIS — R19.00 PELVIC MASS: ICD-10-CM

## 2020-12-31 DIAGNOSIS — R18.0 MALIGNANT ASCITES (H): ICD-10-CM

## 2020-12-31 LAB
ABO + RH BLD: NORMAL
ABO + RH BLD: NORMAL
BACTERIA SPEC CULT: NO GROWTH
BLD GP AB SCN SERPL QL: NORMAL
BLOOD BANK CMNT PATIENT-IMP: NORMAL
COPATH REPORT: NORMAL
INR PPP: 1.04 (ref 0.86–1.14)
SPECIMEN EXP DATE BLD: NORMAL
SPECIMEN SOURCE: NORMAL

## 2020-12-31 PROCEDURE — 36415 COLL VENOUS BLD VENIPUNCTURE: CPT | Performed by: PATHOLOGY

## 2020-12-31 PROCEDURE — 999N000154 HC STATISTIC RADIOLOGY XRAY, US, CT, MAR, NM

## 2020-12-31 PROCEDURE — 85610 PROTHROMBIN TIME: CPT | Performed by: PHYSICIAN ASSISTANT

## 2020-12-31 PROCEDURE — 86850 RBC ANTIBODY SCREEN: CPT | Performed by: PATHOLOGY

## 2020-12-31 PROCEDURE — 86901 BLOOD TYPING SEROLOGIC RH(D): CPT | Performed by: PATHOLOGY

## 2020-12-31 PROCEDURE — 86900 BLOOD TYPING SEROLOGIC ABO: CPT | Performed by: PATHOLOGY

## 2020-12-31 PROCEDURE — 49083 ABD PARACENTESIS W/IMAGING: CPT

## 2020-12-31 PROCEDURE — 36415 COLL VENOUS BLD VENIPUNCTURE: CPT

## 2020-12-31 RX ORDER — LIDOCAINE HYDROCHLORIDE 10 MG/ML
10 INJECTION, SOLUTION EPIDURAL; INFILTRATION; INTRACAUDAL; PERINEURAL ONCE
Status: COMPLETED | OUTPATIENT
Start: 2020-12-31 | End: 2020-12-31

## 2020-12-31 RX ORDER — DEXTROSE MONOHYDRATE 25 G/50ML
25-50 INJECTION, SOLUTION INTRAVENOUS
Status: DISCONTINUED | OUTPATIENT
Start: 2020-12-31 | End: 2020-12-31 | Stop reason: HOSPADM

## 2020-12-31 RX ORDER — NICOTINE POLACRILEX 4 MG
15-30 LOZENGE BUCCAL
Status: DISCONTINUED | OUTPATIENT
Start: 2020-12-31 | End: 2020-12-31 | Stop reason: HOSPADM

## 2020-12-31 RX ORDER — ALBUMIN (HUMAN) 12.5 G/50ML
12.5 SOLUTION INTRAVENOUS ONCE
Status: DISCONTINUED | OUTPATIENT
Start: 2020-12-31 | End: 2020-12-31 | Stop reason: HOSPADM

## 2020-12-31 RX ORDER — LIDOCAINE 40 MG/G
CREAM TOPICAL
Status: DISCONTINUED | OUTPATIENT
Start: 2020-12-31 | End: 2020-12-31 | Stop reason: HOSPADM

## 2020-12-31 RX ADMIN — LIDOCAINE HYDROCHLORIDE 10 ML: 10 INJECTION, SOLUTION EPIDURAL; INFILTRATION; INTRACAUDAL; PERINEURAL at 12:31

## 2020-12-31 NOTE — PROGRESS NOTES
Care Suites Discharge Nursing Note    Patient Information  Name: Taran Regalado  Age: 65 year old    Discharge Education:  Discharge instructions reviewed: Yes  Additional education/resources provided: n/a  Patient/patient representative verbalizes understanding: Yes  Patient discharging on new medications: No  Medication education completed: N/A    Discharge Plans:   Discharge location: home  Discharge ride contacted: Yes  Approximate discharge time: 1255    Discharge Criteria:  Discharge criteria met and vital signs stable: Yes  Ambulatory, declines food or drink, sipping on own Gatorade, denies pain or lightheadedness. Discharged to daughter's home per ambulatory with daughter driving, in stable condition.     Patient Belongs:  Patient belongings returned to patient: Yes    Marleni Clements RN

## 2020-12-31 NOTE — PROGRESS NOTES
Care Suites Admission Nursing Note    Patient Information  Name: Taran Regalado  Age: 65 year old  Reason for admission: paracentesis  Care Suites arrival time: 1100    Visitor Information  Name: n/a  Informed of visitor restrictions: Yes  1 visitor allowed per patient   Visitor must screen negative for COVID symptoms   Visitor must wear a mask  Waiting rooms closed to visitors    Patient Admission/Assessment   Pre-procedure assessment complete: Yes  If abnormal assessment/labs, provider notified: N/A  NPO: Yes  Medications held per instructions/orders: Yes  Consent: deferred  If applicable, pregnancy test status: deferred  Patient oriented to room: Yes  Education/questions answered: Yes  Plan/other: as planned    Discharge Planning  Discharge name/phone number: daughter waiting by door   Overnight post sedation caregiver: n/a  Discharge location: [place]    Marleni Clements RN

## 2020-12-31 NOTE — IR NOTE
ULTRASOUND GUIDED PARACENTESIS   12/31/2020 12:46 PM     HISTORY:  Malignant ascites    PROCEDURE:   Informed consent was obtained from the patient prior to the procedure with discussion including the possible risks of bleeding, infection and organ injury . Using 5 mL of 1% lidocaine for local anesthesia, sterile technique, and sonographic guidance with permanent image documentation, I placed an 8F paracentesis catheter into the peritoneal fluid collection. This was used to aspirate 900 mL of yellow, serous fluid in vacuum bottles, and some of this was sent for any laboratory studies that had been ordered. There were no immediate complications.  Intravenous albumen replacement was performed according to protocol.    IMPRESSION:  Ultrasound guided paracentesis.

## 2020-12-31 NOTE — PROGRESS NOTES
Care Suites Procedure Nursing Note    Patient Information  Name: Taran Regalado  Age: 65 year old    Procedure  Procedure: paracentesis  Procedure start time: 1230  Procedure complete time: 1242  Concerns/abnormal assessment: none  If abnormal assessment, provider notified: N/A  Plan/Other: observe until discharge  Pt tolerated left paracentesis well. 900 ml yellow fluid removed per Dr. Batres. Denies any c/o.    Marleni Clements RN

## 2020-12-31 NOTE — DISCHARGE INSTRUCTIONS

## 2020-12-31 NOTE — PROGRESS NOTES
PATIENT/VISITOR WELLNESS SCREENING    Step 1 Patient Screening    1. In the last month, have you been in contact with someone who was confirmed or suspected to have Coronavirus/COVID-19? No    2. Do you have the following symptoms?  Fever/Chills? No   Cough? No   Shortness of breath? No   New loss of taste or smell? No  Sore throat? No  Muscle or body aches? No  Headaches? No  Fatigue? No  Vomiting or diarrhea? No    NO SYMPTOM(S)    ACTIONS:  1. Standard rooming process  2. Provider to assess per normal protocol  3. Implement precautions as needed and per guidelines     POSITIVE SYMPTOM(S)  If positive for ANY of the following symptoms: fever, cough, shortness of breath, rash    ACTION:  1. Continue to have the patient wear a mask   2. Room patient as soon as possible  3. Don appropriate PPE when entering room  4. Provider evaluation

## 2021-01-04 DIAGNOSIS — Z11.59 ENCOUNTER FOR SCREENING FOR OTHER VIRAL DISEASES: ICD-10-CM

## 2021-01-04 PROCEDURE — U0005 INFEC AGEN DETEC AMPLI PROBE: HCPCS | Performed by: OBSTETRICS & GYNECOLOGY

## 2021-01-04 PROCEDURE — U0003 INFECTIOUS AGENT DETECTION BY NUCLEIC ACID (DNA OR RNA); SEVERE ACUTE RESPIRATORY SYNDROME CORONAVIRUS 2 (SARS-COV-2) (CORONAVIRUS DISEASE [COVID-19]), AMPLIFIED PROBE TECHNIQUE, MAKING USE OF HIGH THROUGHPUT TECHNOLOGIES AS DESCRIBED BY CMS-2020-01-R: HCPCS | Performed by: OBSTETRICS & GYNECOLOGY

## 2021-01-05 LAB
LABORATORY COMMENT REPORT: NORMAL
SARS-COV-2 RNA SPEC QL NAA+PROBE: NEGATIVE
SARS-COV-2 RNA SPEC QL NAA+PROBE: NORMAL
SPECIMEN SOURCE: NORMAL
SPECIMEN SOURCE: NORMAL

## 2021-01-07 ENCOUNTER — TRANSFERRED RECORDS (OUTPATIENT)
Dept: HEALTH INFORMATION MANAGEMENT | Facility: CLINIC | Age: 66
End: 2021-01-07

## 2021-01-07 ENCOUNTER — HOSPITAL ENCOUNTER (OUTPATIENT)
Facility: CLINIC | Age: 66
Discharge: HOME | End: 2021-01-07
Attending: OBSTETRICS & GYNECOLOGY | Admitting: OBSTETRICS & GYNECOLOGY
Payer: MEDICAID

## 2021-01-07 ENCOUNTER — ANESTHESIA (OUTPATIENT)
Dept: SURGERY | Facility: CLINIC | Age: 66
End: 2021-01-07
Payer: MEDICAID

## 2021-01-07 VITALS
DIASTOLIC BLOOD PRESSURE: 72 MMHG | HEART RATE: 73 BPM | BODY MASS INDEX: 20.08 KG/M2 | OXYGEN SATURATION: 96 % | TEMPERATURE: 97.7 F | SYSTOLIC BLOOD PRESSURE: 126 MMHG | RESPIRATION RATE: 14 BRPM | HEIGHT: 72 IN | WEIGHT: 148.26 LBS

## 2021-01-07 DIAGNOSIS — R19.00 PELVIC MASS: ICD-10-CM

## 2021-01-07 DIAGNOSIS — R19.00 PELVIC MASS: Primary | ICD-10-CM

## 2021-01-07 LAB
ABO + RH BLD: NORMAL
ABO + RH BLD: NORMAL
B-HCG SERPL-ACNC: 4 IU/L (ref 0–5)
BLD GP AB SCN SERPL QL: NORMAL
BLOOD BANK CMNT PATIENT-IMP: NORMAL
GLUCOSE BLDC GLUCOMTR-MCNC: 100 MG/DL (ref 70–99)
SPECIMEN EXP DATE BLD: NORMAL

## 2021-01-07 PROCEDURE — 88341 IMHCHEM/IMCYTCHM EA ADD ANTB: CPT | Mod: TC | Performed by: OBSTETRICS & GYNECOLOGY

## 2021-01-07 PROCEDURE — 88341 IMHCHEM/IMCYTCHM EA ADD ANTB: CPT | Mod: 26 | Performed by: PATHOLOGY

## 2021-01-07 PROCEDURE — 86901 BLOOD TYPING SEROLOGIC RH(D): CPT | Performed by: ANESTHESIOLOGY

## 2021-01-07 PROCEDURE — 272N000001 HC OR GENERAL SUPPLY STERILE: Performed by: OBSTETRICS & GYNECOLOGY

## 2021-01-07 PROCEDURE — 88342 IMHCHEM/IMCYTCHM 1ST ANTB: CPT | Mod: 26 | Performed by: PATHOLOGY

## 2021-01-07 PROCEDURE — 999N000141 HC STATISTIC PRE-PROCEDURE NURSING ASSESSMENT: Performed by: OBSTETRICS & GYNECOLOGY

## 2021-01-07 PROCEDURE — 88305 TISSUE EXAM BY PATHOLOGIST: CPT | Mod: 26 | Performed by: PATHOLOGY

## 2021-01-07 PROCEDURE — 250N000011 HC RX IP 250 OP 636: Performed by: NURSE ANESTHETIST, CERTIFIED REGISTERED

## 2021-01-07 PROCEDURE — 88342 IMHCHEM/IMCYTCHM 1ST ANTB: CPT | Mod: TC | Performed by: OBSTETRICS & GYNECOLOGY

## 2021-01-07 PROCEDURE — 88305 TISSUE EXAM BY PATHOLOGIST: CPT | Mod: TC | Performed by: OBSTETRICS & GYNECOLOGY

## 2021-01-07 PROCEDURE — 49321 LAPAROSCOPY BIOPSY: CPT | Mod: XS | Performed by: OBSTETRICS & GYNECOLOGY

## 2021-01-07 PROCEDURE — 250N000011 HC RX IP 250 OP 636: Performed by: OBSTETRICS & GYNECOLOGY

## 2021-01-07 PROCEDURE — 360N000076 HC SURGERY LEVEL 3, PER MIN: Performed by: OBSTETRICS & GYNECOLOGY

## 2021-01-07 PROCEDURE — 999N001017 HC STATISTIC GLUCOSE BY METER IP

## 2021-01-07 PROCEDURE — 710N000012 HC RECOVERY PHASE 2, PER MINUTE: Performed by: OBSTETRICS & GYNECOLOGY

## 2021-01-07 PROCEDURE — 370N000017 HC ANESTHESIA TECHNICAL FEE, PER MIN: Performed by: OBSTETRICS & GYNECOLOGY

## 2021-01-07 PROCEDURE — 250N000011 HC RX IP 250 OP 636: Performed by: ANESTHESIOLOGY

## 2021-01-07 PROCEDURE — 88331 PATH CONSLTJ SURG 1 BLK 1SPC: CPT | Mod: TC | Performed by: OBSTETRICS & GYNECOLOGY

## 2021-01-07 PROCEDURE — 250N000009 HC RX 250

## 2021-01-07 PROCEDURE — 258N000003 HC RX IP 258 OP 636: Performed by: ANESTHESIOLOGY

## 2021-01-07 PROCEDURE — 250N000011 HC RX IP 250 OP 636

## 2021-01-07 PROCEDURE — 250N000013 HC RX MED GY IP 250 OP 250 PS 637

## 2021-01-07 PROCEDURE — 250N000009 HC RX 250: Performed by: NURSE ANESTHETIST, CERTIFIED REGISTERED

## 2021-01-07 PROCEDURE — 250N000013 HC RX MED GY IP 250 OP 250 PS 637: Performed by: OBSTETRICS & GYNECOLOGY

## 2021-01-07 PROCEDURE — 84702 CHORIONIC GONADOTROPIN TEST: CPT | Performed by: OBSTETRICS & GYNECOLOGY

## 2021-01-07 PROCEDURE — 86900 BLOOD TYPING SEROLOGIC ABO: CPT | Performed by: ANESTHESIOLOGY

## 2021-01-07 PROCEDURE — 710N000010 HC RECOVERY PHASE 1, LEVEL 2, PER MIN: Performed by: OBSTETRICS & GYNECOLOGY

## 2021-01-07 PROCEDURE — 250N000025 HC SEVOFLURANE, PER MIN: Performed by: OBSTETRICS & GYNECOLOGY

## 2021-01-07 PROCEDURE — 86850 RBC ANTIBODY SCREEN: CPT | Performed by: ANESTHESIOLOGY

## 2021-01-07 PROCEDURE — 250N000011 HC RX IP 250 OP 636: Performed by: STUDENT IN AN ORGANIZED HEALTH CARE EDUCATION/TRAINING PROGRAM

## 2021-01-07 PROCEDURE — 58661 LAPAROSCOPY REMOVE ADNEXA: CPT | Mod: GC | Performed by: OBSTETRICS & GYNECOLOGY

## 2021-01-07 PROCEDURE — 36415 COLL VENOUS BLD VENIPUNCTURE: CPT | Performed by: ANESTHESIOLOGY

## 2021-01-07 PROCEDURE — 258N000003 HC RX IP 258 OP 636

## 2021-01-07 PROCEDURE — 36415 COLL VENOUS BLD VENIPUNCTURE: CPT | Performed by: OBSTETRICS & GYNECOLOGY

## 2021-01-07 PROCEDURE — 88331 PATH CONSLTJ SURG 1 BLK 1SPC: CPT | Mod: 26 | Performed by: PATHOLOGY

## 2021-01-07 RX ORDER — NALOXONE HYDROCHLORIDE 0.4 MG/ML
0.2 INJECTION, SOLUTION INTRAMUSCULAR; INTRAVENOUS; SUBCUTANEOUS
Status: DISCONTINUED | OUTPATIENT
Start: 2021-01-07 | End: 2021-01-07 | Stop reason: HOSPADM

## 2021-01-07 RX ORDER — OXYCODONE HYDROCHLORIDE 5 MG/1
5 TABLET ORAL EVERY 6 HOURS PRN
Status: ON HOLD | COMMUNITY
End: 2021-01-07

## 2021-01-07 RX ORDER — KETOROLAC TROMETHAMINE 30 MG/ML
30 INJECTION, SOLUTION INTRAMUSCULAR; INTRAVENOUS EVERY 6 HOURS PRN
Status: DISCONTINUED | OUTPATIENT
Start: 2021-01-07 | End: 2021-01-07 | Stop reason: HOSPADM

## 2021-01-07 RX ORDER — LIDOCAINE 40 MG/G
CREAM TOPICAL
Status: DISCONTINUED | OUTPATIENT
Start: 2021-01-07 | End: 2021-01-07 | Stop reason: HOSPADM

## 2021-01-07 RX ORDER — PROPOFOL 10 MG/ML
INJECTION, EMULSION INTRAVENOUS PRN
Status: DISCONTINUED | OUTPATIENT
Start: 2021-01-07 | End: 2021-01-07

## 2021-01-07 RX ORDER — DEXAMETHASONE SODIUM PHOSPHATE 4 MG/ML
INJECTION, SOLUTION INTRA-ARTICULAR; INTRALESIONAL; INTRAMUSCULAR; INTRAVENOUS; SOFT TISSUE PRN
Status: DISCONTINUED | OUTPATIENT
Start: 2021-01-07 | End: 2021-01-07

## 2021-01-07 RX ORDER — FENTANYL CITRATE 50 UG/ML
INJECTION, SOLUTION INTRAMUSCULAR; INTRAVENOUS PRN
Status: DISCONTINUED | OUTPATIENT
Start: 2021-01-07 | End: 2021-01-07

## 2021-01-07 RX ORDER — AMOXICILLIN 250 MG
1-2 CAPSULE ORAL 2 TIMES DAILY
Qty: 30 TABLET | Refills: 0 | Status: ON HOLD | OUTPATIENT
Start: 2021-01-07 | End: 2021-01-13

## 2021-01-07 RX ORDER — HYDROCODONE BITARTRATE AND ACETAMINOPHEN 5; 325 MG/1; MG/1
1 TABLET ORAL EVERY 6 HOURS PRN
Qty: 8 TABLET | Refills: 0 | Status: SHIPPED | OUTPATIENT
Start: 2021-01-07 | End: 2021-02-01

## 2021-01-07 RX ORDER — LIDOCAINE HYDROCHLORIDE 20 MG/ML
INJECTION, SOLUTION INFILTRATION; PERINEURAL PRN
Status: DISCONTINUED | OUTPATIENT
Start: 2021-01-07 | End: 2021-01-07

## 2021-01-07 RX ORDER — ONDANSETRON 4 MG/1
4 TABLET, ORALLY DISINTEGRATING ORAL EVERY 30 MIN PRN
Status: DISCONTINUED | OUTPATIENT
Start: 2021-01-07 | End: 2021-01-07 | Stop reason: HOSPADM

## 2021-01-07 RX ORDER — NALOXONE HYDROCHLORIDE 0.4 MG/ML
0.4 INJECTION, SOLUTION INTRAMUSCULAR; INTRAVENOUS; SUBCUTANEOUS
Status: DISCONTINUED | OUTPATIENT
Start: 2021-01-07 | End: 2021-01-07 | Stop reason: HOSPADM

## 2021-01-07 RX ORDER — CEFAZOLIN SODIUM 1 G/3ML
1 INJECTION, POWDER, FOR SOLUTION INTRAMUSCULAR; INTRAVENOUS SEE ADMIN INSTRUCTIONS
Status: DISCONTINUED | OUTPATIENT
Start: 2021-01-07 | End: 2021-01-07 | Stop reason: HOSPADM

## 2021-01-07 RX ORDER — FENTANYL CITRATE 50 UG/ML
25-50 INJECTION, SOLUTION INTRAMUSCULAR; INTRAVENOUS
Status: DISCONTINUED | OUTPATIENT
Start: 2021-01-07 | End: 2021-01-07 | Stop reason: HOSPADM

## 2021-01-07 RX ORDER — CEFAZOLIN SODIUM 2 G/100ML
2 INJECTION, SOLUTION INTRAVENOUS
Status: COMPLETED | OUTPATIENT
Start: 2021-01-07 | End: 2021-01-07

## 2021-01-07 RX ORDER — HYDROMORPHONE HYDROCHLORIDE 1 MG/ML
.3-.5 INJECTION, SOLUTION INTRAMUSCULAR; INTRAVENOUS; SUBCUTANEOUS EVERY 5 MIN PRN
Status: DISCONTINUED | OUTPATIENT
Start: 2021-01-07 | End: 2021-01-07 | Stop reason: HOSPADM

## 2021-01-07 RX ORDER — SODIUM CHLORIDE, SODIUM LACTATE, POTASSIUM CHLORIDE, CALCIUM CHLORIDE 600; 310; 30; 20 MG/100ML; MG/100ML; MG/100ML; MG/100ML
INJECTION, SOLUTION INTRAVENOUS CONTINUOUS
Status: DISCONTINUED | OUTPATIENT
Start: 2021-01-07 | End: 2021-01-07 | Stop reason: HOSPADM

## 2021-01-07 RX ORDER — ACETAMINOPHEN 325 MG/1
975 TABLET ORAL ONCE
Status: DISCONTINUED | OUTPATIENT
Start: 2021-01-07 | End: 2021-01-07 | Stop reason: HOSPADM

## 2021-01-07 RX ORDER — ONDANSETRON 2 MG/ML
4 INJECTION INTRAMUSCULAR; INTRAVENOUS EVERY 30 MIN PRN
Status: DISCONTINUED | OUTPATIENT
Start: 2021-01-07 | End: 2021-01-07 | Stop reason: HOSPADM

## 2021-01-07 RX ORDER — IBUPROFEN 600 MG/1
600 TABLET, FILM COATED ORAL EVERY 6 HOURS PRN
Qty: 30 TABLET | Refills: 0 | Status: ON HOLD | OUTPATIENT
Start: 2021-01-07 | End: 2021-02-06

## 2021-01-07 RX ORDER — LABETALOL HYDROCHLORIDE 5 MG/ML
10 INJECTION, SOLUTION INTRAVENOUS
Status: DISCONTINUED | OUTPATIENT
Start: 2021-01-07 | End: 2021-01-07 | Stop reason: HOSPADM

## 2021-01-07 RX ORDER — HYDROCODONE BITARTRATE AND ACETAMINOPHEN 5; 325 MG/1; MG/1
1 TABLET ORAL EVERY 6 HOURS PRN
Status: DISCONTINUED | OUTPATIENT
Start: 2021-01-07 | End: 2021-01-07 | Stop reason: HOSPADM

## 2021-01-07 RX ORDER — IBUPROFEN 200 MG
600 TABLET ORAL
Status: DISCONTINUED | OUTPATIENT
Start: 2021-01-07 | End: 2021-01-07 | Stop reason: CLARIF

## 2021-01-07 RX ORDER — ONDANSETRON 2 MG/ML
INJECTION INTRAMUSCULAR; INTRAVENOUS PRN
Status: DISCONTINUED | OUTPATIENT
Start: 2021-01-07 | End: 2021-01-07

## 2021-01-07 RX ORDER — ACETAMINOPHEN 325 MG/1
650 TABLET ORAL EVERY 6 HOURS PRN
Qty: 50 TABLET | Refills: 0 | Status: SHIPPED | OUTPATIENT
Start: 2021-01-07 | End: 2022-09-21

## 2021-01-07 RX ORDER — OXYCODONE HYDROCHLORIDE 5 MG/1
5 TABLET ORAL EVERY 6 HOURS PRN
Qty: 6 TABLET | Refills: 0 | Status: SHIPPED | OUTPATIENT
Start: 2021-01-07 | End: 2021-01-07

## 2021-01-07 RX ADMIN — KETOROLAC TROMETHAMINE 30 MG: 30 INJECTION, SOLUTION INTRAMUSCULAR at 11:30

## 2021-01-07 RX ADMIN — ONDANSETRON 4 MG: 2 INJECTION INTRAMUSCULAR; INTRAVENOUS at 10:57

## 2021-01-07 RX ADMIN — ACETAMINOPHEN 975 MG: 325 TABLET, FILM COATED ORAL at 11:29

## 2021-01-07 RX ADMIN — LIDOCAINE HYDROCHLORIDE 100 MG: 20 INJECTION, SOLUTION INFILTRATION; PERINEURAL at 07:33

## 2021-01-07 RX ADMIN — FENTANYL CITRATE 50 MCG: 50 INJECTION, SOLUTION INTRAMUSCULAR; INTRAVENOUS at 11:15

## 2021-01-07 RX ADMIN — ROCURONIUM BROMIDE 40 MG: 10 INJECTION INTRAVENOUS at 07:34

## 2021-01-07 RX ADMIN — FENTANYL CITRATE 50 MCG: 50 INJECTION, SOLUTION INTRAMUSCULAR; INTRAVENOUS at 08:36

## 2021-01-07 RX ADMIN — ROCURONIUM BROMIDE 10 MG: 10 INJECTION INTRAVENOUS at 09:09

## 2021-01-07 RX ADMIN — FENTANYL CITRATE 125 MCG: 50 INJECTION, SOLUTION INTRAMUSCULAR; INTRAVENOUS at 07:33

## 2021-01-07 RX ADMIN — FENTANYL CITRATE 25 MCG: 50 INJECTION, SOLUTION INTRAMUSCULAR; INTRAVENOUS at 10:56

## 2021-01-07 RX ADMIN — FENTANYL CITRATE 25 MCG: 50 INJECTION, SOLUTION INTRAMUSCULAR; INTRAVENOUS at 08:24

## 2021-01-07 RX ADMIN — DEXAMETHASONE SODIUM PHOSPHATE 4 MG: 4 INJECTION, SOLUTION INTRA-ARTICULAR; INTRALESIONAL; INTRAMUSCULAR; INTRAVENOUS; SOFT TISSUE at 08:03

## 2021-01-07 RX ADMIN — MIDAZOLAM 1 MG: 1 INJECTION INTRAMUSCULAR; INTRAVENOUS at 07:28

## 2021-01-07 RX ADMIN — SUGAMMADEX 200 MG: 100 INJECTION, SOLUTION INTRAVENOUS at 09:54

## 2021-01-07 RX ADMIN — HYDROCODONE BITARTRATE AND ACETAMINOPHEN 1 TABLET: 5; 325 TABLET ORAL at 12:20

## 2021-01-07 RX ADMIN — Medication 1 G: at 09:43

## 2021-01-07 RX ADMIN — ONDANSETRON 4 MG: 2 INJECTION INTRAMUSCULAR; INTRAVENOUS at 09:45

## 2021-01-07 RX ADMIN — PROPOFOL 100 MG: 10 INJECTION, EMULSION INTRAVENOUS at 07:33

## 2021-01-07 RX ADMIN — SODIUM CHLORIDE, POTASSIUM CHLORIDE, SODIUM LACTATE AND CALCIUM CHLORIDE: 600; 310; 30; 20 INJECTION, SOLUTION INTRAVENOUS at 07:32

## 2021-01-07 RX ADMIN — Medication 2 G: at 07:43

## 2021-01-07 RX ADMIN — PHENYLEPHRINE HYDROCHLORIDE 100 MCG: 10 INJECTION INTRAVENOUS at 07:36

## 2021-01-07 RX ADMIN — ROCURONIUM BROMIDE 20 MG: 10 INJECTION INTRAVENOUS at 08:10

## 2021-01-07 RX ADMIN — ROCURONIUM BROMIDE 10 MG: 10 INJECTION INTRAVENOUS at 08:38

## 2021-01-07 RX ADMIN — SODIUM CHLORIDE, POTASSIUM CHLORIDE, SODIUM LACTATE AND CALCIUM CHLORIDE: 600; 310; 30; 20 INJECTION, SOLUTION INTRAVENOUS at 09:56

## 2021-01-07 RX ADMIN — PROPOFOL 30 MG: 10 INJECTION, EMULSION INTRAVENOUS at 09:54

## 2021-01-07 ASSESSMENT — MIFFLIN-ST. JEOR: SCORE: 1334.5

## 2021-01-07 NOTE — ANESTHESIA PROCEDURE NOTES
Airway   Date/Time: 1/7/2021 7:37 AM   Patient location during procedure: OR    Staff -   Performed By: CRNA    Consent for Airway   Urgency: elective    Indications and Patient Condition  Indications for airway management: angelica-procedural  Induction type:intravenousMask difficulty assessment: 1 - vent by mask    Final Airway Details  Final airway type: endotracheal airway  Successful airway:ETT - single  Endotracheal Airway Details   ETT size (mm): 7.5  Cuffed: yes  Successful intubation technique: direct laryngoscopy  Grade View of Cords: 1  Adjucts: stylet  Measured from: gums/teeth  Secured with: commercial tube sanches  Bite block used: None    Post intubation assessment   Placement verified by: capnometry   Number of attempts at approach: 1  Number of other approaches attempted: 0  Secured with:commercial tube sanches  Ease of procedure: easy  Dentition: Intact

## 2021-01-07 NOTE — ANESTHESIA POSTPROCEDURE EVALUATION
Anesthesia POST Procedure Evaluation    Patient: Taran Regalado   MRN:     [id number] Gender:   female   Age:    65 year old :      1955        Preoperative Diagnosis: Pelvic mass [R19.00]   Procedure(s):  Diagnsotic laparoscopy, biopsies   Postop Comments: No value filed.     Anesthesia Type: General       Disposition: Outpatient   Postop Pain Control: Uneventful            Sign Out: Well controlled pain   PONV: No   Neuro/Psych: Uneventful            Sign Out: Acceptable/Baseline neuro status   Airway/Respiratory: Uneventful            Sign Out: Acceptable/Baseline resp. status   CV/Hemodynamics: Uneventful            Sign Out: Acceptable CV status   Other NRE: NONE   DID A NON-ROUTINE EVENT OCCUR? No         Last Anesthesia Record Vitals:  CRNA VITALS  2021 0940 - 2021 1040      2021             Resp Rate (observed):  (!) 1          Last PACU Vitals:  Vitals Value Taken Time   /74 21 1145   Temp 36.5  C (97.7  F) 21 1145   Pulse 72 21 1145   Resp 16 21 1145   SpO2 93 % 21 1145   Temp src     NIBP     Pulse     SpO2     Resp     Temp     Ht Rate     Temp 2           Electronically Signed By: Azra Matute MD, 2021, 2:43 PM

## 2021-01-07 NOTE — ANESTHESIA CARE TRANSFER NOTE
Patient: Taran Regalado    Procedure(s):  Diagnsotic laparoscopy, biopsies    Diagnosis: Pelvic mass [R19.00]  Diagnosis Additional Information: No value filed.    Anesthesia Type:   General     Note:  Airway :Nasal Cannula  Patient transferred to:PACU  Comments: Pt extubated in the OR without incident or complications. Pt VSS upon arrival to the PACU. Pt has no c/o pain/N/V. Pt care report given to receiving RN, and all questions answered. Handoff Report: Identifed the Patient, Identified the Reponsible Provider, Reviewed the pertinent medical history, Discussed the surgical course, Reviewed Intra-OP anesthesia mangement and issues during anesthesia, Set expectations for post-procedure period and Allowed opportunity for questions and acknowledgement of understanding      Vitals: (Last set prior to Anesthesia Care Transfer)    CRNA VITALS  1/7/2021 0940 - 1/7/2021 1014      1/7/2021             Resp Rate (observed):  (!) 1                Electronically Signed By: JERICHO Porras CRNA  January 7, 2021  10:14 AM

## 2021-01-07 NOTE — OP NOTE
[facility] Gynecologic Operative Note   Taran Regalado  [id number]  1/7/2021    Preoperative Diagnosis:   - Pelvic mass  - Carcinomatosis  - Pleural effusions  - CA-125 of 3098     Postoperative Diagnosis:   - Same     Procedure: Diagnsotic laparoscopy, biopsies     Surgeon: Bolivar Juarez MD  Assistant(s): Richa Petit MD, PGY-1    Anesthesia: General endotracheal     Complications: none apparent     EBL: 10 mL   IVF: 1000 mL crystalloid   UOP: 60 mL clear yellow urine     Specimens:  - Peritoneal biopsies - Bionet only  - Peritoneal biopsies - Frozen  - Peritoneal biopsies  - Left distal fallopian tube    Findings:   - Exam under anesthesia revealed small anterior cervix, significant pelvic mass palpated prominently at the apex of the vagina. Abdomen diffusely hard and nodular.  - Laparoscopic survey revealed no trauma on entry. Copious yellow ascitic fluid aspirated, roughly 250 mL. Diffuse nodularity of small bowel, peritoneum, liver edge, and bilateral hemidiaphragms. Plaques of nodular tissue on peritoneum and hemidiaphragms. Omentum not visualized due to extensive disease. Vesicouterine adhesions present. Masses of multiple cyst-like structures in bilateral adnexa.   - High grade carcinoma on frozen.    Indications: Taran Regalado is a 65 year old female who was referred to Dr. Juarez for a recent diagnosis of carcinomatosis and elevated . A diagnostic laparoscopy with possible biopsies and possible DANAE, BSO, debulking, possible bowel resection, and possible stoma was recommended. All risks, benefits and alternatives were discussed and written informed consent was obtained.     Procedure: The patient was taken to the operating room where she was placed in the dorsal lithotomy position with feet in yellow fin stirrups. General endotracheal anesthesia was administered. An exam under anesthesia was performed with the findings above. The patient was then prepped and draped in the usual sterile  fashion. An 11-blade scalpel was used to make a 12 mm vertical incision superior the umbilicus and S-retractors and electrocautery were used to separate the subcutaneous tissue. The fascia was grasped with Kocher forceps tented up. The scalpel was used to incise the fascia. Two 0-vicryl sutures were placed on either side of the fascia and tagged. A 10 mm port was placed and CO2 gas was attached to the port. Pneumoperitoneum was achieved with good tympany of the abdomen. The 10 mm scope was placed in the port and laparoscopic survey performed, with the findings described above. Attention was turned to the LLQ of the abdomen where a site 2 cm medial and 2 cm superior to the anterior superior iliac crest was noted to be free of major blood vessels and a 5 mm horizontal skin incision made. A 5 mm port was placed under visualization within the abdomen. A 5 mm port was placed in the RLQ of the abdomen with similar technique under visualization. Copious yellow ascitic fluid was aspirated from the pelvis. A blunt grasper was used to take biopsies of the peritoneal nodularities. 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 The left fallopian tube was found to have multiple cyst-like structures. The Ligasure was used to cauterize and cut the distal left fallopian tube in a stepwise fashion, which was then removed and sent to pathology. A final visual sweep of the pelvis and abdomen was performed, with confirmation of hemostasis. The ports were removed under direct visualization, the pneumoperitoneum expelled, and the fascia of the 10 mm port site closed using a running stitch of 0-vicryl.The skin incisions were closed using 3-0 monocryl and Steri-strips. Straight catheterization was performed to empty the bladder.    Instrument, sponge, and needle counts were correct times 2. The patient was extubated in the operating room and transferred to the PACU in stable  condition.          Bolivar Juarez MD, MS    Department of Obstetrics and Gynecology   Division of Gynecologic Oncology   [facility]  Phone: [phone]

## 2021-01-07 NOTE — BRIEF OP NOTE
[facility]     Brief Operative Note    Pre-operative diagnosis: Pelvic mass [R19.00]  Post-operative diagnosis Same as pre-operative diagnosis    Procedure: Procedure(s):  Diagnsotic laparoscopy, biopsies     Surgeon: Surgeon(s) and Role:     * Bolivar Juarez MD - Primary     * Richa Petit MD - Resident - Assisting     Anesthesia: General   Estimated blood loss: 10 mL  UOP: 60 mL yellow urine at end of procedure via straight catheter  Drains: None    Specimens:   ID Type Source Tests Collected by Time Destination   1 : PERITONEAL BIOPSIES-BIONET ONLY Tissue Peritoneum OR DOCUMENTATION ONLY Bolivar Juarez MD 1/7/2021  9:17 AM    A : PERITONEAL BIOPSIES-FROZEN Tissue Peritoneum SURGICAL PATHOLOGY EXAM Bolivar Juarez MD 1/7/2021  8:45 AM    B : PERITONEAL BIOPSIES Tissue Peritoneum SURGICAL PATHOLOGY EXAM Bolivar Juarez MD 1/7/2021  8:51 AM    C : left distal fallopian tube Tissue Fallopian Tube, Left SURGICAL PATHOLOGY EXAM Bolivar Juarez MD 1/7/2021  9:10 AM      Findings:     - Exam under anesthesia revealed small anterior cervix, significant pelvic mass palpated prominently at the apex of the vagina. Abdomen diffusely hard and nodular.  - Laparoscopic survey revealed no trauma on entry. Copious yellow ascitic fluid aspirated, roughly 250 mL. Diffuse nodularity of small bowel, peritoneum, liver edge, and bilateral hemidiaphragms. Plaques of nodular tissue on peritoneum and hemidiaphragms. Omentum not visualized due to extensive disease. Vesicouterine adhesions present. Masses of multiple cyst-like structures in bilateral adnexa.   - High grade carcinoma on frozen.    Complications: None.    Richa Petit MD  Gynecologic Oncology, PGY-1  01/07/2021, 10:11 AM

## 2021-01-07 NOTE — OR NURSING
NICK aware patient has met PACU/phase I discharge criteria NICK gave okay for patient to progress to phase II and will complete sign-out.

## 2021-01-07 NOTE — ANESTHESIA PROCEDURE NOTES
Airway   Date/Time: 1/7/2021 7:37 AM   Patient location during procedure: OR    Staff -   Anesthesiologist:  Azra Matute MD  CRNA: Adryan Levi APRN CRNA  Other Anesthesia Staff: Maria L Grace  Performed By: SRNA    Consent for Airway   Urgency: elective    Indications and Patient Condition  Indications for airway management: angelica-procedural  Induction type:intravenousMask difficulty assessment: 1 - vent by mask    Final Airway Details  Final airway type: endotracheal airway  Successful airway:ETT - single  Endotracheal Airway Details   ETT size (mm): 7.5  Cuffed: yes  Cuff volume (mL): 10  Successful intubation technique: direct laryngoscopy  Grade View of Cords: 1  Adjucts: stylet  Measured from: lips  Secured at (cm): 21  Secured with: pink tape    Post intubation assessment   Placement verified by: capnometry, equal breath sounds and chest rise   Number of attempts at approach: 1  Secured with:pink tape  Ease of procedure: easy  Dentition: Intact and Unchanged

## 2021-01-07 NOTE — OR NURSING
Spoke to GYN Onc resident vasyl Ardnt to give Tordal instead of Ibuprofen. MD aware that patient voided and will come to bedside on 3c to see and speak to her.

## 2021-01-07 NOTE — DISCHARGE INSTRUCTIONS
[facility], [place]  Same-Day Surgery   Adult Discharge Orders & Instructions     For 24 hours after surgery    1. Get plenty of rest.  A responsible adult must stay with you for at least 24 hours after you leave the hospital.   2. Do not drive or use heavy equipment.  If you have weakness or tingling, don't drive or use heavy equipment until this feeling goes away.  3. Do not drink alcohol.  4. Avoid strenuous or risky activities.  Ask for help when climbing stairs.   5. You may feel lightheaded.  IF so, sit for a few minutes before standing.  Have someone help you get up.   6. If you have nausea (feel sick to your stomach): Drink only clear liquids such as apple juice, ginger ale, broth or 7-Up.  Rest may also help.  Be sure to drink enough fluids.  Move to a regular diet as you feel able.  7. You may have a slight fever. Call the doctor if your fever is over 100 F (37.7 C) (taken under the tongue) or lasts longer than 24 hours.  8. You may have a dry mouth, a sore throat, muscle aches or trouble sleeping.  These should go away after 24 hours.  9. Do not make important or legal decisions.   Call your doctor for any of the followin.  Signs of infection (fever, growing tenderness at the surgery site, a large amount of drainage or bleeding, severe pain, foul-smelling drainage, redness, swelling).    2. It has been over 8 to 10 hours since surgery and you are still not able to urinate (pass water).        To contact a doctor, call Dr Juarez at the Gynecology/Oncology clinic at [phone] or:    X   [phone] and ask for the resident on call for Gynecology/Oncology (answered 24 hours a day)  X       Tips for taking pain medications  To get the best pain relief possible , remember these points:      Take pain medications as directed, before pain becomes severe      Pain medication can upset your stomach: taking it with food may help      Constipation is a common side effect  of pain medication. Drink plenty of  Fluids      Eat foods high in fiber. Take a stool softener  if recommended by your doctor or  Pharmacist.        Do not drink alcohol, drive or operate machinery while taking pain medications.    Ask about other ways to control pain, such as with heat, ice or relaxation.   Emergency Department:    [facility]: [phone]       (TTY for hearing impaired: [phone])

## 2021-01-07 NOTE — PROGRESS NOTES
Gynecologic Oncology Postoperative Check Note  1/7/2021    S: Patient reports she is doing well postoperatively. Pain is well controlled with toradol and PO pain meds. Ambulating without pain. Voiding spontaneously. Tolerating crackers and water without nausea or vomiting. Denies chest pain, shortness of breath, dizziness, or other concerns at this time.    O:  Vitals:    01/07/21 1100 01/07/21 1130 01/07/21 1145 01/07/21 1200   BP: 105/84 116/72 124/74 126/72   BP Location:   Right arm    Pulse: 75 75 72 73   Resp: 13 16 16 14   Temp: 97.2  F (36.2  C) 96.8  F (36  C) 97.7  F (36.5  C)    TempSrc:   Oral    SpO2: 95% 93% 93% 96%   Weight:       Height:           Gen: NAD  Cardio: Well-perfused  Resp: Breathing comfortably on room air  Abdomen: Soft, appropriately tender, 3 incisions covered with steri-strips. LLQ steris with small amount of dry blood, other two incisions clean/dry/intact.  Extremities: Non-tender, no edema    A: 65 year old POD#0 s/p diagnostic laparoscopy with peritoneal biopsies.    Dz: peritoneal carcinomatosis, elevated . High grade carcinoma on frozen.  FEN: advance as tolerated  Pain: tylenol, ibuprofen, norco PRN  GI: tolerating PO without nausea/vomiting  : S/p ha, voiding spontaneously    Dispo: To home    Richa Petit MD  Gynecologic Oncology, PGY-1  01/07/2021, 12:19 PM   Pager: [phone]

## 2021-01-07 NOTE — OR NURSING
MDA aware patient has met PACU discharge criteria. MDA would like to see patient in PACU, patient taken to bathroom. Patient assisted with ambulation into bathroom.

## 2021-01-08 ENCOUNTER — PATIENT OUTREACH (OUTPATIENT)
Dept: ONCOLOGY | Facility: CLINIC | Age: 66
End: 2021-01-08

## 2021-01-08 DIAGNOSIS — Z11.59 ENCOUNTER FOR SCREENING FOR OTHER VIRAL DISEASES: Primary | ICD-10-CM

## 2021-01-08 DIAGNOSIS — R18.0 MALIGNANT ASCITES (H): Primary | ICD-10-CM

## 2021-01-08 RX ORDER — OXYCODONE HYDROCHLORIDE 5 MG/1
5 TABLET ORAL EVERY 6 HOURS PRN
Qty: 30 TABLET | Refills: 0 | Status: ON HOLD | OUTPATIENT
Start: 2021-01-08 | End: 2021-01-13

## 2021-01-08 NOTE — PROGRESS NOTES
Patient daughter reached out to RN with many questions and concerns.     These are some but not all of the question:  1. MG versus Bemdji vs U for chemotherapy  2. What does chemotherapy consist of and look like  3. Insurance  4. Pain medication management  5. Paracentesis    RN addressed all the above along with other concerns to the best if her ability.      Daughter very appreciative of RN time    More than 40 minutes spent on above

## 2021-01-09 ENCOUNTER — TELEPHONE (OUTPATIENT)
Dept: ONCOLOGY | Facility: CLINIC | Age: 66
End: 2021-01-09

## 2021-01-09 ENCOUNTER — HEALTH MAINTENANCE LETTER (OUTPATIENT)
Age: 66
End: 2021-01-09

## 2021-01-09 NOTE — TELEPHONE ENCOUNTER
"Starting at 0200 was vomiting multiple times until 7:30 am.  She took a zofran and was able to stop vomiting. She normally doesn't eat much but ate a lot yesterday.  Otherwise appetite has been poor previously.  Is having some abdominal cramping, describes it like \"labor pains\".   Hasn't been able to drink some fluids since then. Per daughter hasn't been able to pass gas or have a bowel movement since at least surgery on Thursday.     Pt has been taking tylenol, oxycodone, ibuprofen for pain control.  She is taking senna-s since surgery.  Taking norco 1 tab every 6 hours.      Spoke with daughter since pt lay down to take a nap and doesn't want to wake her.    - Discussed ambulation, zofran, fluids, can try to miralax as well  - discussed concern for ileus, but concern for bowel obstruction too  - asked daughter to wake patient and see if she is able to tolerate clear liquids. If vomiting persists or isn't passing gas to come to ED to be evaluated.    Amy Schumer, MD  Ob/Gyn PGY-4  01/09/21 11:09 AM    "

## 2021-01-10 ENCOUNTER — DOCUMENTATION ONLY (OUTPATIENT)
Dept: ONCOLOGY | Facility: CLINIC | Age: 66
End: 2021-01-10

## 2021-01-10 ENCOUNTER — HOSPITAL ENCOUNTER (INPATIENT)
Facility: CLINIC | Age: 66
LOS: 3 days | Discharge: HOME | End: 2021-01-13
Attending: EMERGENCY MEDICINE | Admitting: OBSTETRICS & GYNECOLOGY
Payer: MEDICAID

## 2021-01-10 ENCOUNTER — APPOINTMENT (OUTPATIENT)
Dept: CT IMAGING | Facility: CLINIC | Age: 66
End: 2021-01-10
Attending: EMERGENCY MEDICINE
Payer: MEDICAID

## 2021-01-10 DIAGNOSIS — Z20.822 COVID-19 RULED OUT BY LABORATORY TESTING: ICD-10-CM

## 2021-01-10 DIAGNOSIS — R19.00 PELVIC MASS: ICD-10-CM

## 2021-01-10 DIAGNOSIS — C56.9 OVARIAN CANCER, UNSPECIFIED LATERALITY (H): ICD-10-CM

## 2021-01-10 DIAGNOSIS — R11.2 NON-INTRACTABLE VOMITING WITH NAUSEA, UNSPECIFIED VOMITING TYPE: Primary | ICD-10-CM

## 2021-01-10 DIAGNOSIS — R18.8 OTHER ASCITES: ICD-10-CM

## 2021-01-10 LAB
ALBUMIN SERPL-MCNC: 2.8 G/DL (ref 3.4–5)
ALP SERPL-CCNC: 156 U/L (ref 40–150)
ALT SERPL W P-5'-P-CCNC: 12 U/L (ref 0–50)
ANION GAP SERPL CALCULATED.3IONS-SCNC: 6 MMOL/L (ref 3–14)
AST SERPL W P-5'-P-CCNC: 16 U/L (ref 0–45)
BASOPHILS # BLD AUTO: 0 10E9/L (ref 0–0.2)
BASOPHILS NFR BLD AUTO: 0.6 %
BILIRUB SERPL-MCNC: 0.4 MG/DL (ref 0.2–1.3)
BUN SERPL-MCNC: 9 MG/DL (ref 7–30)
CALCIUM SERPL-MCNC: 9.2 MG/DL (ref 8.5–10.1)
CHLORIDE SERPL-SCNC: 100 MMOL/L (ref 94–109)
CO2 SERPL-SCNC: 29 MMOL/L (ref 20–32)
CREAT SERPL-MCNC: 0.6 MG/DL (ref 0.52–1.04)
DIFFERENTIAL METHOD BLD: ABNORMAL
EOSINOPHIL # BLD AUTO: 0 10E9/L (ref 0–0.7)
EOSINOPHIL NFR BLD AUTO: 0.3 %
ERYTHROCYTE [DISTWIDTH] IN BLOOD BY AUTOMATED COUNT: 14.6 % (ref 10–15)
GFR SERPL CREATININE-BSD FRML MDRD: >90 ML/MIN/{1.73_M2}
GLUCOSE SERPL-MCNC: 91 MG/DL (ref 70–99)
HCT VFR BLD AUTO: 42.3 % (ref 35–47)
HGB BLD-MCNC: 12.9 G/DL (ref 11.7–15.7)
IMM GRANULOCYTES # BLD: 0 10E9/L (ref 0–0.4)
IMM GRANULOCYTES NFR BLD: 0.3 %
LABORATORY COMMENT REPORT: NORMAL
LACTATE BLD-SCNC: 1 MMOL/L (ref 0.7–2)
LYMPHOCYTES # BLD AUTO: 0.7 10E9/L (ref 0.8–5.3)
LYMPHOCYTES NFR BLD AUTO: 11.3 %
MCH RBC QN AUTO: 26.8 PG (ref 26.5–33)
MCHC RBC AUTO-ENTMCNC: 30.5 G/DL (ref 31.5–36.5)
MCV RBC AUTO: 88 FL (ref 78–100)
MONOCYTES # BLD AUTO: 0.5 10E9/L (ref 0–1.3)
MONOCYTES NFR BLD AUTO: 7.8 %
NEUTROPHILS # BLD AUTO: 4.9 10E9/L (ref 1.6–8.3)
NEUTROPHILS NFR BLD AUTO: 79.7 %
NRBC # BLD AUTO: 0 10*3/UL
NRBC BLD AUTO-RTO: 0 /100
PLATELET # BLD AUTO: 480 10E9/L (ref 150–450)
POTASSIUM SERPL-SCNC: 4 MMOL/L (ref 3.4–5.3)
PROT SERPL-MCNC: 7.7 G/DL (ref 6.8–8.8)
RBC # BLD AUTO: 4.81 10E12/L (ref 3.8–5.2)
SARS-COV-2 RNA RESP QL NAA+PROBE: NEGATIVE
SODIUM SERPL-SCNC: 135 MMOL/L (ref 133–144)
SPECIMEN SOURCE: NORMAL
WBC # BLD AUTO: 6.2 10E9/L (ref 4–11)

## 2021-01-10 PROCEDURE — 99285 EMERGENCY DEPT VISIT HI MDM: CPT | Performed by: EMERGENCY MEDICINE

## 2021-01-10 PROCEDURE — 250N000011 HC RX IP 250 OP 636: Performed by: RADIOLOGY

## 2021-01-10 PROCEDURE — 96376 TX/PRO/DX INJ SAME DRUG ADON: CPT | Performed by: EMERGENCY MEDICINE

## 2021-01-10 PROCEDURE — 96374 THER/PROPH/DIAG INJ IV PUSH: CPT | Performed by: EMERGENCY MEDICINE

## 2021-01-10 PROCEDURE — 99285 EMERGENCY DEPT VISIT HI MDM: CPT | Mod: 25 | Performed by: EMERGENCY MEDICINE

## 2021-01-10 PROCEDURE — 120N000002 HC R&B MED SURG/OB UMMC

## 2021-01-10 PROCEDURE — 250N000011 HC RX IP 250 OP 636: Performed by: EMERGENCY MEDICINE

## 2021-01-10 PROCEDURE — 74177 CT ABD & PELVIS W/CONTRAST: CPT

## 2021-01-10 PROCEDURE — 74177 CT ABD & PELVIS W/CONTRAST: CPT | Mod: 26 | Performed by: RADIOLOGY

## 2021-01-10 PROCEDURE — 258N000003 HC RX IP 258 OP 636: Performed by: EMERGENCY MEDICINE

## 2021-01-10 PROCEDURE — 96361 HYDRATE IV INFUSION ADD-ON: CPT | Performed by: EMERGENCY MEDICINE

## 2021-01-10 PROCEDURE — 250N000013 HC RX MED GY IP 250 OP 250 PS 637: Performed by: STUDENT IN AN ORGANIZED HEALTH CARE EDUCATION/TRAINING PROGRAM

## 2021-01-10 PROCEDURE — 83605 ASSAY OF LACTIC ACID: CPT | Performed by: EMERGENCY MEDICINE

## 2021-01-10 PROCEDURE — 99223 1ST HOSP IP/OBS HIGH 75: CPT | Mod: AI | Performed by: OBSTETRICS & GYNECOLOGY

## 2021-01-10 PROCEDURE — 85025 COMPLETE CBC W/AUTO DIFF WBC: CPT | Performed by: EMERGENCY MEDICINE

## 2021-01-10 PROCEDURE — C9803 HOPD COVID-19 SPEC COLLECT: HCPCS | Performed by: EMERGENCY MEDICINE

## 2021-01-10 PROCEDURE — U0005 INFEC AGEN DETEC AMPLI PROBE: HCPCS | Performed by: EMERGENCY MEDICINE

## 2021-01-10 PROCEDURE — 258N000003 HC RX IP 258 OP 636: Performed by: STUDENT IN AN ORGANIZED HEALTH CARE EDUCATION/TRAINING PROGRAM

## 2021-01-10 PROCEDURE — U0003 INFECTIOUS AGENT DETECTION BY NUCLEIC ACID (DNA OR RNA); SEVERE ACUTE RESPIRATORY SYNDROME CORONAVIRUS 2 (SARS-COV-2) (CORONAVIRUS DISEASE [COVID-19]), AMPLIFIED PROBE TECHNIQUE, MAKING USE OF HIGH THROUGHPUT TECHNOLOGIES AS DESCRIBED BY CMS-2020-01-R: HCPCS | Performed by: EMERGENCY MEDICINE

## 2021-01-10 PROCEDURE — 80053 COMPREHEN METABOLIC PANEL: CPT | Performed by: EMERGENCY MEDICINE

## 2021-01-10 RX ORDER — AMITRIPTYLINE HYDROCHLORIDE 100 MG/1
100 TABLET ORAL AT BEDTIME
Status: DISCONTINUED | OUTPATIENT
Start: 2021-01-10 | End: 2021-01-13 | Stop reason: HOSPADM

## 2021-01-10 RX ORDER — ONDANSETRON 2 MG/ML
4 INJECTION INTRAMUSCULAR; INTRAVENOUS EVERY 6 HOURS PRN
Status: DISCONTINUED | OUTPATIENT
Start: 2021-01-10 | End: 2021-01-13 | Stop reason: HOSPADM

## 2021-01-10 RX ORDER — NALOXONE HYDROCHLORIDE 0.4 MG/ML
0.4 INJECTION, SOLUTION INTRAMUSCULAR; INTRAVENOUS; SUBCUTANEOUS
Status: DISCONTINUED | OUTPATIENT
Start: 2021-01-10 | End: 2021-01-13 | Stop reason: HOSPADM

## 2021-01-10 RX ORDER — ONDANSETRON 2 MG/ML
4 INJECTION INTRAMUSCULAR; INTRAVENOUS EVERY 30 MIN PRN
Status: COMPLETED | OUTPATIENT
Start: 2021-01-10 | End: 2021-01-10

## 2021-01-10 RX ORDER — ONDANSETRON 4 MG/1
4 TABLET, ORALLY DISINTEGRATING ORAL EVERY 6 HOURS PRN
Status: DISCONTINUED | OUTPATIENT
Start: 2021-01-10 | End: 2021-01-13 | Stop reason: HOSPADM

## 2021-01-10 RX ORDER — SODIUM CHLORIDE 9 MG/ML
INJECTION, SOLUTION INTRAVENOUS CONTINUOUS
Status: DISCONTINUED | OUTPATIENT
Start: 2021-01-10 | End: 2021-01-10

## 2021-01-10 RX ORDER — NALOXONE HYDROCHLORIDE 0.4 MG/ML
0.2 INJECTION, SOLUTION INTRAMUSCULAR; INTRAVENOUS; SUBCUTANEOUS
Status: DISCONTINUED | OUTPATIENT
Start: 2021-01-10 | End: 2021-01-13 | Stop reason: HOSPADM

## 2021-01-10 RX ORDER — SODIUM CHLORIDE 9 MG/ML
INJECTION, SOLUTION INTRAVENOUS CONTINUOUS
Status: DISCONTINUED | OUTPATIENT
Start: 2021-01-10 | End: 2021-01-12

## 2021-01-10 RX ORDER — OXYCODONE HYDROCHLORIDE 5 MG/1
5-10 TABLET ORAL EVERY 4 HOURS PRN
Status: DISCONTINUED | OUTPATIENT
Start: 2021-01-10 | End: 2021-01-13 | Stop reason: HOSPADM

## 2021-01-10 RX ORDER — PROCHLORPERAZINE MALEATE 5 MG
10 TABLET ORAL EVERY 6 HOURS PRN
Status: DISCONTINUED | OUTPATIENT
Start: 2021-01-10 | End: 2021-01-12

## 2021-01-10 RX ORDER — HYDROMORPHONE HYDROCHLORIDE 1 MG/ML
0.3 INJECTION, SOLUTION INTRAMUSCULAR; INTRAVENOUS; SUBCUTANEOUS
Status: DISCONTINUED | OUTPATIENT
Start: 2021-01-10 | End: 2021-01-13 | Stop reason: HOSPADM

## 2021-01-10 RX ORDER — PROCHLORPERAZINE 25 MG
25 SUPPOSITORY, RECTAL RECTAL EVERY 12 HOURS PRN
Status: DISCONTINUED | OUTPATIENT
Start: 2021-01-10 | End: 2021-01-12

## 2021-01-10 RX ORDER — ACETAMINOPHEN 650 MG/1
650 SUPPOSITORY RECTAL EVERY 4 HOURS PRN
Status: DISCONTINUED | OUTPATIENT
Start: 2021-01-10 | End: 2021-01-13 | Stop reason: HOSPADM

## 2021-01-10 RX ORDER — ACETAMINOPHEN 325 MG/1
650 TABLET ORAL EVERY 4 HOURS PRN
Status: DISCONTINUED | OUTPATIENT
Start: 2021-01-10 | End: 2021-01-13 | Stop reason: HOSPADM

## 2021-01-10 RX ORDER — IOPAMIDOL 755 MG/ML
93 INJECTION, SOLUTION INTRAVASCULAR ONCE
Status: COMPLETED | OUTPATIENT
Start: 2021-01-10 | End: 2021-01-10

## 2021-01-10 RX ADMIN — ONDANSETRON 4 MG: 2 INJECTION INTRAMUSCULAR; INTRAVENOUS at 14:50

## 2021-01-10 RX ADMIN — ONDANSETRON 4 MG: 2 INJECTION INTRAMUSCULAR; INTRAVENOUS at 17:45

## 2021-01-10 RX ADMIN — SODIUM CHLORIDE: 9 INJECTION, SOLUTION INTRAVENOUS at 16:28

## 2021-01-10 RX ADMIN — SODIUM CHLORIDE: 9 INJECTION, SOLUTION INTRAVENOUS at 23:23

## 2021-01-10 RX ADMIN — AMITRIPTYLINE HYDROCHLORIDE 100 MG: 100 TABLET, FILM COATED ORAL at 23:23

## 2021-01-10 RX ADMIN — SODIUM CHLORIDE 1000 ML: 9 INJECTION, SOLUTION INTRAVENOUS at 14:50

## 2021-01-10 RX ADMIN — IOPAMIDOL 93 ML: 755 INJECTION, SOLUTION INTRAVENOUS at 15:44

## 2021-01-10 RX ADMIN — ONDANSETRON 4 MG: 2 INJECTION INTRAMUSCULAR; INTRAVENOUS at 16:28

## 2021-01-10 ASSESSMENT — ENCOUNTER SYMPTOMS
EYE REDNESS: 0
NECK STIFFNESS: 0
COLOR CHANGE: 0
CONSTIPATION: 1
ADENOPATHY: 0
DIFFICULTY URINATING: 0
FEVER: 0
CHILLS: 0
VOMITING: 1
ABDOMINAL PAIN: 1
NAUSEA: 1
BRUISES/BLEEDS EASILY: 0
CONFUSION: 0
SHORTNESS OF BREATH: 0
POLYDIPSIA: 0
ARTHRALGIAS: 0
HEADACHES: 0

## 2021-01-10 ASSESSMENT — MIFFLIN-ST. JEOR
SCORE: 1357.82
SCORE: 1348.75

## 2021-01-10 NOTE — ED TRIAGE NOTES
Triage notes provided by daughter and by pt. Pt arrived ambulatory to triage w/ c/o worsening abdominal pain as well as n/v. Hx of lap surgery on 1/7/2021 to remove on possible malignancies. Pt's daughter reports pt had projectile vomiting Friday and subsequently unable to tolerate any PO intake. Pt reports pain to epigastrium radiating downwards to top of pelvis. Pt also reports increase in migraines. Afebrile. Reports worsening jaundice.

## 2021-01-10 NOTE — ED NOTES
Bed: ED12  Expected date: 1/10/21  Expected time:   Means of arrival:   Comments:  Taran Regalado; 65-year-old woman with recently diagnosed ovarian cancer status post laparoscopy, biopsy, paracentesis.  Presenting with nausea/vomiting, no bowel movement or flatulence.  Concern for obstruction.  Sent to ED by GYN/ONC service.     Tony Clemens MD  01/10/21 5764

## 2021-01-10 NOTE — ED PROVIDER NOTES
"    [place] EMERGENCY DEPARTMENT ([facility])  1/10/21  History     Chief Complaint   Patient presents with     Abdominal Pain     The history is provided by the patient, medical records and a relative (daughter).     Taran Regalado is a 65 year old female with a medical history signficant for migraine, osteopenia, hypercholesterolemia, insomnia, peritoneal carcinomatosis s/p diagnostic laparoscopy with peritoneal biopsies (1/7/2021) with worsening post-operative nausea and vomiting.  Patient's daughter reports patient was able to eat and drink after surgery. She states now her nausea has become \"out of control.\" Daughter reports last night patient was dry-heaving and gagging at the thought of taking her medications. She states today patient is unable to hold anything down. Patient reports abdominal pain but states this is chronic, mild, diffuse, nonradiating, and constant. She took oxycodone at 3 am and this allowed her to be able to sleep. Patient had a very small BM this morning. She has been unable to pass gas since 1/6/2021. Patient denies fevers, cough, or shortness of breath.    I have reviewed the Medications, Allergies, Past Medical and Surgical History, and Social History in the [employer] system.  PAST MEDICAL HISTORY:   Past Medical History:   Diagnosis Date     History of cold sores      Insomnia      Migraine      Osteopenia      Pelvic mass      Peritoneal carcinomatosis (H)      Restless legs syndrome (RLS)        PAST SURGICAL HISTORY:   Past Surgical History:   Procedure Laterality Date     APPENDECTOMY       ARTHROSCPY KNEE SURGICAL DEBRIDEMENT SHAVING ARTICULAR CARTILAGE Right      DEBRIDEMENT LEFT UPPER EXTREMITY  2016     LAPAROSCOPY DIAGNOSTIC (GYN) Bilateral 1/7/2021    Procedure: Diagnsotic laparoscopy, biopsies;  Surgeon: Bolivar Juarez MD;  Location: [facility] OR     LASIK       TUBAL LIGATION         Past medical history, past surgical history, medications, and allergies were " reviewed with the patient. Additional pertinent items: None    FAMILY HISTORY:   Family History   Adopted: Yes   Problem Relation Age of Onset     Cancer Mother 36     Factor V Leiden deficiency Daughter      Deep Vein Thrombosis Daughter      Diabetes Type 1 Daughter        SOCIAL HISTORY:   Social History     Tobacco Use     Smoking status: Former Smoker     Packs/day: 0.50     Years: 40.00     Pack years: 20.00     Quit date: 2018     Years since quitting: 3.0     Smokeless tobacco: Never Used   Substance Use Topics     Alcohol use: Not Currently     Social history was reviewed with the patient. Additional pertinent items: None      Patient's Medications   New Prescriptions    No medications on file   Previous Medications    ACETAMINOPHEN (TYLENOL) 325 MG TABLET    Take 2 tablets (650 mg) by mouth every 6 hours as needed for mild pain    AMITRIPTYLINE (ELAVIL) 100 MG TABLET    TAKE 1 TABLET (100 MG) BY MOUTH EVERY NIGHT AT BEDTIME    HYDROCODONE-ACETAMINOPHEN (NORCO) 5-325 MG TABLET    Take 1 tablet by mouth every 6 hours as needed for severe pain    IBUPROFEN (ADVIL/MOTRIN) 600 MG TABLET    Take 1 tablet (600 mg) by mouth every 6 hours as needed for other (mild and/or inflammatory pain)    ONDANSETRON (ZOFRAN) 4 MG TABLET    TAKE 1 TABLET (4 MG) BY MOUTH AS NEEDED FOR NAUSEA    OXYCODONE (ROXICODONE) 5 MG TABLET    Take 1 tablet (5 mg) by mouth every 6 hours as needed for pain    SENNA-DOCUSATE (SENOKOT-S/PERICOLACE) 8.6-50 MG TABLET    Take 1-2 tablets by mouth 2 times daily    SUMATRIPTAN (IMITREX) 100 MG TABLET    Take 100 mg by mouth at onset of headache     VALACYCLOVIR (VALTREX) 1000 MG TABLET    Take 1,000 mg by mouth as needed    Modified Medications    No medications on file   Discontinued Medications    No medications on file        No Known Allergies     Review of Systems   Constitutional: Negative for chills and fever.   HENT: Negative for congestion.    Eyes: Negative for redness.   Respiratory:  Negative for shortness of breath.    Cardiovascular: Negative for chest pain.   Gastrointestinal: Positive for abdominal pain, constipation, nausea and vomiting.   Endocrine: Negative for polydipsia and polyuria.   Genitourinary: Negative for difficulty urinating.   Musculoskeletal: Negative for arthralgias and neck stiffness.   Skin: Negative for color change.   Neurological: Negative for headaches.   Hematological: Negative for adenopathy. Does not bruise/bleed easily.   Psychiatric/Behavioral: Negative for confusion.   All other systems reviewed and are negative.    A complete review of systems was performed with pertinent positives and negatives noted in the HPI, and all other systems negative.    Physical Exam   BP: 135/74  Pulse: 81  Temp: 97.7  F (36.5  C)  Resp: 16  Height: 182.9 cm (6')  Weight: 69.6 kg (153 lb 6.4 oz)  SpO2: 95 %      Physical Exam  Vitals signs and nursing note reviewed.   Constitutional:       General: She is not in acute distress.     Appearance: Normal appearance. She is not diaphoretic.   HENT:      Head: Normocephalic and atraumatic.      Mouth/Throat:      Pharynx: No oropharyngeal exudate.   Eyes:      General: No scleral icterus.     Extraocular Movements: Extraocular movements intact.      Conjunctiva/sclera: Conjunctivae normal.   Neck:      Musculoskeletal: Normal range of motion and neck supple.   Cardiovascular:      Rate and Rhythm: Normal rate.      Pulses: Normal pulses.      Heart sounds: Normal heart sounds.   Pulmonary:      Effort: Pulmonary effort is normal. No respiratory distress.      Breath sounds: Normal breath sounds. No wheezing.   Abdominal:      General: There is distension ( mild).      Palpations: Abdomen is soft.      Tenderness: There is abdominal tenderness ( mild, diffuse).   Musculoskeletal:         General: No tenderness.   Skin:     General: Skin is warm.      Findings: No rash.   Neurological:      General: No focal deficit present.      Mental  Status: She is alert and oriented to person, place, and time.      Coordination: Coordination normal.   Psychiatric:         Mood and Affect: Mood normal.         Behavior: Behavior normal.         Thought Content: Thought content normal.         Judgment: Judgment normal.         ED Course   2:40 PM  The patient was seen and examined by Tony Clemens MD in Room ED12.   ED Course as of Abel 11 1601   Sun Abel 10, 2021   2015 ED Boarder Status          Procedures                           Results for orders placed or performed during the hospital encounter of 01/10/21 (from the past 24 hour(s))   CBC with platelets differential   Result Value Ref Range    WBC 6.2 4.0 - 11.0 10e9/L    RBC Count 4.81 3.8 - 5.2 10e12/L    Hemoglobin 12.9 11.7 - 15.7 g/dL    Hematocrit 42.3 35.0 - 47.0 %    MCV 88 78 - 100 fl    MCH 26.8 26.5 - 33.0 pg    MCHC 30.5 (L) 31.5 - 36.5 g/dL    RDW 14.6 10.0 - 15.0 %    Platelet Count 480 (H) 150 - 450 10e9/L    Diff Method Automated Method     % Neutrophils 79.7 %    % Lymphocytes 11.3 %    % Monocytes 7.8 %    % Eosinophils 0.3 %    % Basophils 0.6 %    % Immature Granulocytes 0.3 %    Nucleated RBCs 0 0 /100    Absolute Neutrophil 4.9 1.6 - 8.3 10e9/L    Absolute Lymphocytes 0.7 (L) 0.8 - 5.3 10e9/L    Absolute Monocytes 0.5 0.0 - 1.3 10e9/L    Absolute Eosinophils 0.0 0.0 - 0.7 10e9/L    Absolute Basophils 0.0 0.0 - 0.2 10e9/L    Abs Immature Granulocytes 0.0 0 - 0.4 10e9/L    Absolute Nucleated RBC 0.0    Comprehensive metabolic panel   Result Value Ref Range    Sodium 135 133 - 144 mmol/L    Potassium 4.0 3.4 - 5.3 mmol/L    Chloride 100 94 - 109 mmol/L    Carbon Dioxide 29 20 - 32 mmol/L    Anion Gap 6 3 - 14 mmol/L    Glucose 91 70 - 99 mg/dL    Urea Nitrogen 9 7 - 30 mg/dL    Creatinine 0.60 0.52 - 1.04 mg/dL    GFR Estimate >90 >60 mL/min/[1.73_m2]    GFR Estimate If Black >90 >60 mL/min/[1.73_m2]    Calcium 9.2 8.5 - 10.1 mg/dL    Bilirubin Total 0.4 0.2 - 1.3 mg/dL    Albumin 2.8  (L) 3.4 - 5.0 g/dL    Protein Total 7.7 6.8 - 8.8 g/dL    Alkaline Phosphatase 156 (H) 40 - 150 U/L    ALT 12 0 - 50 U/L    AST 16 0 - 45 U/L   Lactic acid whole blood   Result Value Ref Range    Lactic Acid 1.0 0.7 - 2.0 mmol/L   CT Abdomen Pelvis w Contrast    Narrative    Abdomen pelvis CT with contrast    INDICATION: Recent surgery, no flatulence or bowel movements,  nausea/vomiting, concern for obstruction    Contrast: 93 mL intravenous Isovue-370    COMPARISON: Outside CT 12/4/2020    FINDINGS: Moderate bilateral pleural effusions are increased from  previous. No significant interstitial edema. There is mild bibasilar  atelectasis associated with effusions.  Within the abdomen and pelvis, extensive abdominal ascites. Scattered  liver hypodensities approximately 1 cm and under for the most part are  slightly increased. There is vague density medially in segment 6 which  is unchanged (series 5 image 129). Spleen appears normal. Portal vein,  SMV and splenic vein are all patent. The gallbladder appears grossly  normal. Diverticulosis of the colon. Subcentimeter left renal cyst  appears similar. Bilateral adrenal glands appear normal. Pancreas  appears grossly normal. Fluid-filled adnexal masses an irregular  appearance of uterus with possible masses appear similar. Interim  peritoneal biopsies. Possible left-sided peritoneal deposit (series 5  image 253) there is approximately 19 mm, similar to previous. Other  soft tissue nodules in the abdomen are noted.  Bones show degenerative changes in the spine. No suspicious sclerotic  or lytic/destructive bony lesions. Advanced L4-5 degenerative disc  disease and moderate L2-3 and L5-S1 degenerative disc disease also  noted. No evidence of small bowel distention. A large amount of  retained fecal material within the colon, suggestive of constipation.  No suspicious air collections.      Impression    IMPRESSION: Extensive ascites which is probably malignant.  Scattered  liver hypodensities of indeterminate etiology comment cannot exclude  metastatic disease. Diverticulosis. Fluid-filled adnexal masses and  irregular appearance of uterus, which may represent primary neoplasm.  Multiple peritoneal nodules. Large amount of fecal material in the  colon with no evidence of small bowel obstruction.    LILIAM GARIBAY MD     Medications   0.9% sodium chloride BOLUS (0 mLs Intravenous Stopped 1/10/21 1622)     Followed by   sodium chloride 0.9% infusion ( Intravenous New Bag 1/10/21 1628)   ondansetron (ZOFRAN) injection 4 mg (4 mg Intravenous Given 1/10/21 1628)   sodium chloride (PF) 0.9% PF flush 73 mL (73 mLs Intravenous Given 1/10/21 1544)   iopamidol (ISOVUE-370) solution 93 mL (93 mLs Intravenous Given 1/10/21 1544)             Assessments & Plan (with Medical Decision Making)     Taran Regalado is a  65-year-old woman with history ovarian cancer research recent surgery presenting with nonbloody, nonbilious vomiting, nausea, and abdominal pain.  Differential diagnosis: Small bowel obstruction, volvulus, dehydration, electrode normalities.    After thorough physical exam patient appears to be no acute distress.  I will treat her nausea with IV Zofran and obtain laboratory studies and CT abdomen/pelvis and consult GYN/oncology service.  She and her daughter agree with plan.    Laboratory studies returned with no leukocytosis, WBC is normal at 6,200.  There is no anemia, hemoglobin is normal at 12.9.  There is thrombocytosis with platelets of 480,000.  Electrolytes show no evidence of dehydration, creatinine is normal 0.6.  LFTs are normal other than minimally elevated alkaline phosphatase.  Lactic acid is normal at 1.0.  I reviewed patient's CT abdomen/pelvis and I read the radiology report; no evidence of small or large bowel obstruction, however, patient does have ascites with possible metastatic disease noted on the scan there is also a large amount of fecal  material in the colon..  Patient was seen and evaluated by GYN/oncology team and they will admit her to their service for observation.  She and her family agree with plan.    This part of the medical record was transcribed by Jaelyn Haq, Medical Scribe, from a dictation done by Tony Clemens MD.     I have reviewed the nursing notes.    I have reviewed the findings, diagnosis, plan and need for follow up with the patient.    New Prescriptions    No medications on file       Final diagnoses:   Non-intractable vomiting with nausea, unspecified vomiting type     I, Edie Laboy, am serving as a trained medical scribe to document services personally performed by Tony Clemens MD, based on the provider's statements to me.      I, Tony Clemens MD, was physically present and have reviewed and verified the accuracy of this note documented by Edie Laboy.     1/10/2021   [facility] EMERGENCY DEPARTMENT     Tony Clemens MD  01/10/21 1707       Alex Weston MD  01/11/21 0701

## 2021-01-10 NOTE — ED NOTES
Emergency Department Patient Sign-out       Brief HPI:  This is a 65 year old female signed out to me by Dr. Clemens.  See initial ED Provider note for details of the presentation.       Significant Events prior to my assuming care:   CT Abd/Pelvis:  IMPRESSION: Extensive ascites which is probably malignant. Scattered  liver hypodensities of indeterminate etiology comment cannot exclude  metastatic disease. Diverticulosis. Fluid-filled adnexal masses and  irregular appearance of uterus, which may represent primary neoplasm.  Multiple peritoneal nodules. Large amount of fecal material in the  colon with no evidence of small bowel obstruction.    Exam:   Patient Vitals for the past 24 hrs:   BP Temp Temp src Pulse Resp SpO2 Height Weight   01/10/21 1620 133/88 -- -- 77 -- 98 % -- --   01/10/21 1530 -- -- -- -- -- 94 % -- --   01/10/21 1515 -- -- -- -- -- 98 % -- --   01/10/21 1500 -- -- -- -- -- 95 % -- --   01/10/21 1445 -- -- -- -- -- 94 % -- --   01/10/21 1433 135/74 97.7  F (36.5  C) Oral 81 16 95 % 1.829 m (6') 69.6 kg (153 lb 6.4 oz)           ED RESULTS:   Results for orders placed or performed during the hospital encounter of 01/10/21 (from the past 24 hour(s))   CBC with platelets differential     Status: Abnormal    Collection Time: 01/10/21  2:44 PM   Result Value Ref Range    WBC 6.2 4.0 - 11.0 10e9/L    RBC Count 4.81 3.8 - 5.2 10e12/L    Hemoglobin 12.9 11.7 - 15.7 g/dL    Hematocrit 42.3 35.0 - 47.0 %    MCV 88 78 - 100 fl    MCH 26.8 26.5 - 33.0 pg    MCHC 30.5 (L) 31.5 - 36.5 g/dL    RDW 14.6 10.0 - 15.0 %    Platelet Count 480 (H) 150 - 450 10e9/L    Diff Method Automated Method     % Neutrophils 79.7 %    % Lymphocytes 11.3 %    % Monocytes 7.8 %    % Eosinophils 0.3 %    % Basophils 0.6 %    % Immature Granulocytes 0.3 %    Nucleated RBCs 0 0 /100    Absolute Neutrophil 4.9 1.6 - 8.3 10e9/L    Absolute Lymphocytes 0.7 (L) 0.8 - 5.3 10e9/L    Absolute Monocytes 0.5 0.0 - 1.3 10e9/L    Absolute  Eosinophils 0.0 0.0 - 0.7 10e9/L    Absolute Basophils 0.0 0.0 - 0.2 10e9/L    Abs Immature Granulocytes 0.0 0 - 0.4 10e9/L    Absolute Nucleated RBC 0.0    Comprehensive metabolic panel     Status: Abnormal    Collection Time: 01/10/21  2:44 PM   Result Value Ref Range    Sodium 135 133 - 144 mmol/L    Potassium 4.0 3.4 - 5.3 mmol/L    Chloride 100 94 - 109 mmol/L    Carbon Dioxide 29 20 - 32 mmol/L    Anion Gap 6 3 - 14 mmol/L    Glucose 91 70 - 99 mg/dL    Urea Nitrogen 9 7 - 30 mg/dL    Creatinine 0.60 0.52 - 1.04 mg/dL    GFR Estimate >90 >60 mL/min/[1.73_m2]    GFR Estimate If Black >90 >60 mL/min/[1.73_m2]    Calcium 9.2 8.5 - 10.1 mg/dL    Bilirubin Total 0.4 0.2 - 1.3 mg/dL    Albumin 2.8 (L) 3.4 - 5.0 g/dL    Protein Total 7.7 6.8 - 8.8 g/dL    Alkaline Phosphatase 156 (H) 40 - 150 U/L    ALT 12 0 - 50 U/L    AST 16 0 - 45 U/L   Lactic acid whole blood     Status: None    Collection Time: 01/10/21  2:44 PM   Result Value Ref Range    Lactic Acid 1.0 0.7 - 2.0 mmol/L   CT Abdomen Pelvis w Contrast     Status: None    Collection Time: 01/10/21  3:58 PM    Narrative    Abdomen pelvis CT with contrast    INDICATION: Recent surgery, no flatulence or bowel movements,  nausea/vomiting, concern for obstruction    Contrast: 93 mL intravenous Isovue-370    COMPARISON: Outside CT 12/4/2020    FINDINGS: Moderate bilateral pleural effusions are increased from  previous. No significant interstitial edema. There is mild bibasilar  atelectasis associated with effusions.  Within the abdomen and pelvis, extensive abdominal ascites. Scattered  liver hypodensities approximately 1 cm and under for the most part are  slightly increased. There is vague density medially in segment 6 which  is unchanged (series 5 image 129). Spleen appears normal. Portal vein,  SMV and splenic vein are all patent. The gallbladder appears grossly  normal. Diverticulosis of the colon. Subcentimeter left renal cyst  appears similar. Bilateral adrenal  glands appear normal. Pancreas  appears grossly normal. Fluid-filled adnexal masses an irregular  appearance of uterus with possible masses appear similar. Interim  peritoneal biopsies. Possible left-sided peritoneal deposit (series 5  image 253) there is approximately 19 mm, similar to previous. Other  soft tissue nodules in the abdomen are noted.  Bones show degenerative changes in the spine. No suspicious sclerotic  or lytic/destructive bony lesions. Advanced L4-5 degenerative disc  disease and moderate L2-3 and L5-S1 degenerative disc disease also  noted. No evidence of small bowel distention. A large amount of  retained fecal material within the colon, suggestive of constipation.  No suspicious air collections.      Impression    IMPRESSION: Extensive ascites which is probably malignant. Scattered  liver hypodensities of indeterminate etiology comment cannot exclude  metastatic disease. Diverticulosis. Fluid-filled adnexal masses and  irregular appearance of uterus, which may represent primary neoplasm.  Multiple peritoneal nodules. Large amount of fecal material in the  colon with no evidence of small bowel obstruction.    LILIAM GARIBAY MD       ED MEDICATIONS:   Medications   0.9% sodium chloride BOLUS (0 mLs Intravenous Stopped 1/10/21 1622)     Followed by   sodium chloride 0.9% infusion ( Intravenous New Bag 1/10/21 1628)   ondansetron (ZOFRAN) injection 4 mg (4 mg Intravenous Given 1/10/21 1628)   sodium chloride (PF) 0.9% PF flush 73 mL (73 mLs Intravenous Given 1/10/21 1544)   iopamidol (ISOVUE-370) solution 93 mL (93 mLs Intravenous Given 1/10/21 1544)         Impression:    ICD-10-CM    1. Non-intractable vomiting with nausea, unspecified vomiting type  R11.2        Plan:    Pending admission to GynOnc service.        MD Enrico Alfaro William A, MD  01/10/21 4472

## 2021-01-10 NOTE — DISCHARGE SUMMARY
Gynecologic Oncology Discharge Summary    Taran Regalado  [id number]    Admit Date: 1/10/2021  Discharge Date: 1/13/2021  Admitting Provider: Bolivar Juarez MD  Discharge Provider: Fatmata Monae MD    Admission Dx:   - High grade carcinoma   - Carcinomatosis   - Pelvic mass   - Nausea  - Vomiting  - Constipation  - Severe malnutrition in the context of acute on chronic illness  - Osteoporosis  - Migraines    Discharge Dx:  - High grade carcinoma  - Carcinomatosis   - Pelvic mass   - Nausea/Vomiting, improved  - Constipation  - Severe malnutrition in the context of acute on chronic illness  - Osteoporosis  - Migraines      Patient Active Problem List   Diagnosis     Pelvic mass     Non-intractable vomiting with nausea, unspecified vomiting type     Ovarian cancer, unspecified laterality (H)       Procedures:   therapeutic paracentesis    Prior to Admission Medications:  Medications Prior to Admission   Medication Sig Dispense Refill Last Dose     acetaminophen (TYLENOL) 325 MG tablet Take 2 tablets (650 mg) by mouth every 6 hours as needed for mild pain 50 tablet 0 1/9/2021 at 1700     amitriptyline (ELAVIL) 100 MG tablet TAKE 1 TABLET (100 MG) BY MOUTH EVERY NIGHT AT BEDTIME   1/9/2021 at PM     ibuprofen (ADVIL/MOTRIN) 600 MG tablet Take 1 tablet (600 mg) by mouth every 6 hours as needed for other (mild and/or inflammatory pain) 30 tablet 0 Past Week     SUMAtriptan (IMITREX) 100 MG tablet Take 100 mg by mouth at onset of headache    1/10/2021 at AM     HYDROcodone-acetaminophen (NORCO) 5-325 MG tablet Take 1 tablet by mouth every 6 hours as needed for severe pain 8 tablet 0 Unknown     valACYclovir (VALTREX) 1000 mg tablet Take 1,000 mg by mouth as needed    More than a month     [DISCONTINUED] ondansetron (ZOFRAN) 4 MG tablet TAKE 1 TABLET (4 MG) BY MOUTH AS NEEDED FOR NAUSEA   1/10/2021     [DISCONTINUED] oxyCODONE (ROXICODONE) 5 MG tablet Take 1 tablet (5 mg) by mouth every 6 hours as needed for pain  30 tablet 0 1/10/2021 at 0300     [DISCONTINUED] senna-docusate (SENOKOT-S/PERICOLACE) 8.6-50 MG tablet Take 1-2 tablets by mouth 2 times daily 30 tablet 0 Past Week       Discharge Medications:   FabricioTaran Taylor   Lolita Medication Instructions FARHANA:[id number]    Printed on:01/13/21 1034   Medication Information                      acetaminophen (TYLENOL) 325 MG tablet  Take 2 tablets (650 mg) by mouth every 6 hours as needed for mild pain             amitriptyline (ELAVIL) 100 MG tablet  TAKE 1 TABLET (100 MG) BY MOUTH EVERY NIGHT AT BEDTIME             HYDROcodone-acetaminophen (NORCO) 5-325 MG tablet  Take 1 tablet by mouth every 6 hours as needed for severe pain             ibuprofen (ADVIL/MOTRIN) 600 MG tablet  Take 1 tablet (600 mg) by mouth every 6 hours as needed for other (mild and/or inflammatory pain)             OLANZapine zydis (ZYPREXA) 5 MG ODT  Take 1 tablet (5 mg) by mouth At Bedtime             ondansetron (ZOFRAN-ODT) 4 MG ODT tab  Take 1 tablet (4 mg) by mouth every 6 hours as needed for nausea or vomiting             polyethylene glycol (MIRALAX) 17 GM/Dose powder  Take 17 g by mouth 2 times daily             prochlorperazine (COMPAZINE) 10 MG tablet  Take 1 tablet (10 mg) by mouth every 6 hours as needed for nausea or vomiting             senna-docusate (SENOKOT-S/PERICOLACE) 8.6-50 MG tablet  Take 2 tablets by mouth 2 times daily             SUMAtriptan (IMITREX) 100 MG tablet  Take 100 mg by mouth at onset of headache              valACYclovir (VALTREX) 1000 mg tablet  Take 1,000 mg by mouth as needed                Consultations:  None    Brief History of Illness:  This patient is a 65 year old female with high grade carcinoma of likely gynecologic origin who is POD#3 s/p diagnostic laparoscopy with peritoneal biopsies who presents with worsening post operative nausea and vomiting.     The first evening after surgery she felt overall well, and was able to tolerate some food. Since, then  things have been getting worse. Her daughter called in for her yesterday for concern for nausea and vomiting multiple times even after taking Zofran. Wasn't able to tolerate much food. Pain was there, mostly located at incision, initially tolerable with medications. Today, nausea has been persistent, she has not vomited but only able to eat 1 orange slice and take sips of water. Has not passed gas since surgery. She had a very small bowel movement yesterday. Has been unable to take pain medications as she is too nauseous. She is having abdominal pain since not being able to tolerate medications and is mostly laying still. She has still been able to ambulate on the stairs a few times every day. She has also been dehydrated which is causing migraines. She has had to take Imitrex the last 2 days. Current nausea does not feel like nausea from her migraines. She has had surgery before and previously had difficulty with pain but not nausea. Also does not feel like she is full like previously when she had ascites. Denies fever/chills, dysuria, CP, SOB.     Hospital Course:  Dz:   - Preoperative diagnosis was peritoneal carcinomatosis, elevated , bilateral pelvic masses, abdominal ascites.  Frozen section at the time of the surgery showed high grade carcinoma. Final pathology showed malignancy in the peritoneal biopsies of Mullerian origin. Therefore, decision was made to start the patient on chemotherapy- carbo/taxol. Patient tolerated chemotherapy without significant difficulty. Noted some increase in her restless legs. She will follow-up with Dr Juarez in the gyn onc clinic as scheduled. She will follow up on 1/15 for scheduled paracentesis as needed.  FEN:   - She was maintained on IVF until POD#1, when her diet was slowly advanced.  By discharge, she was tolerating small amounts of a regular diet without vomiting and able to maintain her hydration without IVF supplementation. She will continue small frequent  meals and drink plenty of fluids. She will attempt to drink protein shakes.  Pain:   - Her pain was initially controlled with tylenol, oxycodone PRN and IV dilaudid PRN. Once tolerating PO pain meds, she was transitioned to a PO pain regimen.  Her pain was well controlled on this and she was discharged home with these medications.  CV:   - She has no history of CV issues.  Her vital signs were stable while in house and she had no acute CV issues.  PULM:   - She has no history of pulmonary issues. She is a former smoker. She had no acute pulmonary issues while in house.  HEME:   - Hemoglobin was 12.9 on admission and decreased to 10.6 likely dilutional. She had no acute heme issues while in house.  GI:   - She was given zofran, compazine, and zyprexa for nausea on admission. She was constipated and was given an enema and senna S. Miralax and mag citrate were ordered but patient refused. She had small bowel movements prior to discharge. She agreed to continue senna S and miralax at time of discharge.  She had extensive ascites, Medicine evaluated patient for a paracentesis but they were unable to drain any fluid.   :    - She had no acute  issues while in house.  ID:   - The patient was AF during her hospitalization.    ENDO:   - No issues  PSYCH/NEURO:   - History of migraines. Given Imitrex PRN. CT Head completed due to increase of HA and persistent nausea Impression: Chronic sinusitis of the right maxillary and right sphenoid sinuses. Incidental presumed calcified meningioma in the right frontal convexity without significant mass effect. No suspicious intracranial enhancing lesion.  PPX:    - She was given SCDs and lovenox during her hospital course.  She tolerated these prophylactic interventions without incident.  They were discontinued at the time of her discharge.      Discharge Instructions and Follow up:  Ms. Taran Regalado was discharged from the hospital with follow up for     Discharge Diet:  Regular  Discharge Activity: Activity as tolerated  Discharge Follow up: 1/15/21 Paracentesis, 1/27/20 Dr Juarez    Discharge Disposition:  Discharged to home    Discharge Staff: Dr Dov England DNP, CNP  1/13/2021 11:02 AM

## 2021-01-10 NOTE — H&P
Gynecology Oncology History and Physical    Taran Regalado MRN# [id number]   Age: 65 year old YOB: 1955     Date of Admission:  1/10/2020             Chief Complaint:   Nausea, vomiting         History of Present Illness:   This patient is a 65 year old female with high grade carcinoma of likely gynecologic origin who is POD#3 s/p diagnostic laparoscopy with peritoneal biopsies presents with worsening post operative nausea and vomiting.    The first evening after surgery she felt overall well, and was able to tolerate some food. Since, then things have been getting worse. Her daughter called in for her yesterday for concern for nausea and vomiting multiple times even after taking Zofran. Wasn't able to tolerate much food. Pain was there, mostly located at incision, initially tolerable with medications. Today, nausea has been persistent, she has not vomited but only able to eat 1 orange slice and take sips of water. Has not passed gas since surgery. She had a very small bowel movement yesterday. Has been unable to take pain medications as she is too nauseous. She is having abdominal pain since not being able to tolerate medications and is mostly laying still. She has still been able to ambulate on the stairs a few times every day. She has also been dehydrated which is causing migraines. She has had to take Imitrex the last 2 days. Current nausea does not feel like nausea from her migraines. She has had surgery before and previously had difficulty with pain but not nausea. Also does not feel like she is full like previously when she had ascites. Denies fever/chills, dysuria, CP, SOB.     Seen in ED after 2L bolus fluid and IV zofran, patient feels like she is even more full, and nausea is not improved. Pain is tolerable currently especially without movement.             Cancer Treatment History:   12/16/2020: Presented with abdominal distention, 25lb weight loss, and CTAP with carcinomatosis, liver  lesion, and bilateral pleural effusions, elevated  3098.     12/31/2020: Paracentesis 900cc. Ascites suspicious for malignancy,     1/7/2020: Diagnostic laparoscopy, peritoneal biopsies. Frozen: High grade carcinoma. Findings: Diffuse nodularity of small bowel, peritoneum, liver edge, and bilateral hemidiaphragms. Plaques of nodular tissue on peritoneum and hemidiaphragms. Omentum not visualized due to extensive disease. Vesicouterine adhesions present. Masses of multiple cyst-like structures in bilateral adnexa           Past Medical History:     Past Medical History:   Diagnosis Date     History of cold sores      Insomnia      Migraine      Osteopenia      Pelvic mass      Peritoneal carcinomatosis (H)      Restless legs syndrome (RLS)                   Past Surgical History:      Past Surgical History:   Procedure Laterality Date     APPENDECTOMY       ARTHROSCPY KNEE SURGICAL DEBRIDEMENT SHAVING ARTICULAR CARTILAGE Right      DEBRIDEMENT LEFT UPPER EXTREMITY  2016     LAPAROSCOPY DIAGNOSTIC (GYN) Bilateral 1/7/2021    Procedure: Diagnsotic laparoscopy, biopsies;  Surgeon: Bolivar Juarez MD;  Location: [facility] OR     LASIK       TUBAL LIGATION              Social History:     Social History     Tobacco Use     Smoking status: Former Smoker     Packs/day: 0.50     Years: 40.00     Pack years: 20.00     Quit date: 2018     Years since quitting: 3.0     Smokeless tobacco: Never Used   Substance Use Topics     Alcohol use: Not Currently            Family History:     Family History   Adopted: Yes   Problem Relation Age of Onset     Cancer Mother 36     Factor V Leiden deficiency Daughter      Deep Vein Thrombosis Daughter      Diabetes Type 1 Daughter             Allergies:     No Known Allergies         Medications:     Current Facility-Administered Medications   Medication     ondansetron (ZOFRAN) injection 4 mg     sodium chloride 0.9% infusion     Current Outpatient Medications   Medication Sig      acetaminophen (TYLENOL) 325 MG tablet Take 2 tablets (650 mg) by mouth every 6 hours as needed for mild pain     oxyCODONE (ROXICODONE) 5 MG tablet Take 1 tablet (5 mg) by mouth every 6 hours as needed for pain     amitriptyline (ELAVIL) 100 MG tablet TAKE 1 TABLET (100 MG) BY MOUTH EVERY NIGHT AT BEDTIME     HYDROcodone-acetaminophen (NORCO) 5-325 MG tablet Take 1 tablet by mouth every 6 hours as needed for severe pain     ibuprofen (ADVIL/MOTRIN) 600 MG tablet Take 1 tablet (600 mg) by mouth every 6 hours as needed for other (mild and/or inflammatory pain)     ondansetron (ZOFRAN) 4 MG tablet TAKE 1 TABLET (4 MG) BY MOUTH AS NEEDED FOR NAUSEA     senna-docusate (SENOKOT-S/PERICOLACE) 8.6-50 MG tablet Take 1-2 tablets by mouth 2 times daily     SUMAtriptan (IMITREX) 100 MG tablet Take 100 mg by mouth at onset of headache      valACYclovir (VALTREX) 1000 mg tablet Take 1,000 mg by mouth as needed             Review of Systems:   Systemic           no fever; no chills; + appetite changes  Skin           no rashes, or lesions  Eye           no changes in vision   Pulmonary    no cough; no shortness of breath  Cardiovascular    no chest pain; no palpitations  Gastrointestinal    no diarrhea;+ constipation; + abdominal pain; + changes in bowel  habits; no blood in stool  Genitourinary   no urinary frequency; no urinary urgency; no dysuria; no pain; no abnormal vaginal discharge; no abnormal vaginal bleeding  Musculoskeletal    no myalgias; no arthralgias; no back pain  Endocrine    no hot flashes; no heat/cold intolerance         Neurological   no tremor; no numbness and tingling; + headaches; +  sleeping         Physical Exam:     Vitals:    01/10/21 1433   BP: 135/74   Pulse: 81   Resp: 16   Temp: 97.7  F (36.5  C)   TempSrc: Oral   SpO2: 95%   Weight: 69.6 kg (153 lb 6.4 oz)   Height: 1.829 m (6')     General: Sitting in bed, appears well overall, mildly tired, NAD  CV: RRR  Resp: CTAB anterior lung fields    Abdomen/GI: Soft, significantly distended, firm, dull to percussion. Nontender throughout. Laparoscopic incisions with steri-strips in place, appear intact, no drainage  : Deferred   Extremities: Nontender, no edema          Labs and Imaging:     Results for orders placed or performed during the hospital encounter of 01/10/21 (from the past 24 hour(s))   CBC with platelets differential   Result Value Ref Range    WBC 6.2 4.0 - 11.0 10e9/L    RBC Count 4.81 3.8 - 5.2 10e12/L    Hemoglobin 12.9 11.7 - 15.7 g/dL    Hematocrit 42.3 35.0 - 47.0 %    MCV 88 78 - 100 fl    MCH 26.8 26.5 - 33.0 pg    MCHC 30.5 (L) 31.5 - 36.5 g/dL    RDW 14.6 10.0 - 15.0 %    Platelet Count 480 (H) 150 - 450 10e9/L    Diff Method Automated Method     % Neutrophils 79.7 %    % Lymphocytes 11.3 %    % Monocytes 7.8 %    % Eosinophils 0.3 %    % Basophils 0.6 %    % Immature Granulocytes 0.3 %    Nucleated RBCs 0 0 /100    Absolute Neutrophil 4.9 1.6 - 8.3 10e9/L    Absolute Lymphocytes 0.7 (L) 0.8 - 5.3 10e9/L    Absolute Monocytes 0.5 0.0 - 1.3 10e9/L    Absolute Eosinophils 0.0 0.0 - 0.7 10e9/L    Absolute Basophils 0.0 0.0 - 0.2 10e9/L    Abs Immature Granulocytes 0.0 0 - 0.4 10e9/L    Absolute Nucleated RBC 0.0    Comprehensive metabolic panel   Result Value Ref Range    Sodium 135 133 - 144 mmol/L    Potassium 4.0 3.4 - 5.3 mmol/L    Chloride 100 94 - 109 mmol/L    Carbon Dioxide 29 20 - 32 mmol/L    Anion Gap 6 3 - 14 mmol/L    Glucose 91 70 - 99 mg/dL    Urea Nitrogen 9 7 - 30 mg/dL    Creatinine 0.60 0.52 - 1.04 mg/dL    GFR Estimate >90 >60 mL/min/[1.73_m2]    GFR Estimate If Black >90 >60 mL/min/[1.73_m2]    Calcium 9.2 8.5 - 10.1 mg/dL    Bilirubin Total 0.4 0.2 - 1.3 mg/dL    Albumin 2.8 (L) 3.4 - 5.0 g/dL    Protein Total 7.7 6.8 - 8.8 g/dL    Alkaline Phosphatase 156 (H) 40 - 150 U/L    ALT 12 0 - 50 U/L    AST 16 0 - 45 U/L   Lactic acid whole blood   Result Value Ref Range    Lactic Acid 1.0 0.7 - 2.0 mmol/L        CTAP  FINDINGS: Moderate bilateral pleural effusions are increased from  previous. No significant interstitial edema. There is mild bibasilar  atelectasis associated with effusions.  Within the abdomen and pelvis, extensive abdominal ascites. Scattered  liver hypodensities approximately 1 cm and under for the most part are  slightly increased. There is vague density medially in segment 6 which  is unchanged (series 5 image 129). Spleen appears normal. Portal vein,  SMV and splenic vein are all patent. The gallbladder appears grossly  normal. Diverticulosis of the colon. Subcentimeter left renal cyst  appears similar. Bilateral adrenal glands appear normal. Pancreas  appears grossly normal. Fluid-filled adnexal masses an irregular  appearance of uterus with possible masses appear similar. Interim  peritoneal biopsies. Possible left-sided peritoneal deposit (series 5  image 253) there is approximately 19 mm, similar to previous. Other  soft tissue nodules in the abdomen are noted.  Bones show degenerative changes in the spine. No suspicious sclerotic  or lytic/destructive bony lesions. Advanced L4-5 degenerative disc  disease and moderate L2-3 and L5-S1 degenerative disc disease also  noted. No evidence of small bowel distention. A large amount of  retained fecal material within the colon, suggestive of constipation.  No suspicious air collections.                                                                      IMPRESSION: Extensive ascites which is probably malignant. Scattered  liver hypodensities of indeterminate etiology comment cannot exclude  metastatic disease. Diverticulosis. Fluid-filled adnexal masses and  irregular appearance of uterus, which may represent primary neoplasm.  Multiple peritoneal nodules. Large amount of fecal material in the  colon with no evidence of small bowel obstruction.            Assessment and Plan:   Assessment: 65 year old with high grade carcinoma of likely gynecologic  mely who presents POD#3 s/p diagnostic laparoscopy with peritoneal biopsies presents with worsening post operative nausea and vomiting. Imaging consistent with large ascites and constipation, no evidence of obstruction. Following discussion with patient of treatment options including possible paracentesis and enema in ED then re-evaluation of symptoms, versus planned admission overnight for paracentesis, treatment of constipation, IV fluids, anti-emetics and slowly advancing diet. She prefers not to stay inpatient if possible due to concern for COVID and inconveniencing family. Will attempt paracentesis and enema now in ED, and follow up symptoms of nausea vomiting. If still unimproved, or unable to perform treatments in ED, plan to admit overnight to observation.      Plan:  Dz: High grade carcinoma. CTAP 12/4: peritoneal carcinomatosis with mass-like peritoneal thickening in lower pelvis, large volume ascites, b/l pleural effusions, pleural nodularity of R hemithorax, 1cm lesion in R hepatic lobe suspicious for metastasis. Para 12/26 3L, 12/31 900ml. 1/7: Dx lsc, peritoneal biopsies, Frozen: High grade carcinoma. CTAP 1/10: Extensive ascites, scattered liver hypodensities, fluid filled adnexal masses and irregular appearance of uterus, large stool burden in colon, no evidence of obstruction.   FEN: Low PO intake - ADAT. Dehydration - S/p 2L NS bolus.   Pain: Currently manageable, IV pain meds now vs tylenol, oxy prn if tolerating PO.  Heme: Hgb 12.9  CV: NI  Pulm: Former smoker.   GI:IV Zofran prn. If admitted consider addition of compazine.   : NI  ID: NI  Endocrine: NI  Psych/Neuro: Migraines - Imitrex prn (held), RLS, Osteopenia  PPX: SCDs  Dispo:  Pending treatment of ascites, nausea, vomiting, and constipation - today vs. Tomorrow.     Discussed with Dr. Darling.     Mimi Mccullough MD  Gyn Oncology Resident  [phone]  1/10/2021 1:11 PM        I spent a total of 70 minutes bedside and on the  inpatient unit today managing the care of Taran Regalado. I reviewed labs, imaging, previous notes.     1. Carcinoma: Suspect advanced ovarian cancer given elevated  but we have not confirmed histology. Will contact pathology to see if we can rush pathology. If ovarian cancer, plan is for neoadjuvant chemotherapy. Would consider starting this as an inpatient given delay of initiating therapy and high symptoms burden.     2. Ascites: Plan paracentesis today    3. N/V: Suspcect this is multifactorial. No obvious postoperative injury. No obvious obstruction on imaging, although possible ileus. Could also be related to disease burden and ascites. Also with large stool burden.   -Cnt zofran, compazine.   -Maintenance IVF  -Plan enema today.   -Add olanzapine    4. Ppx: Lovenox ppx after paracentesis. High risk for DVT/PE    -Fatmata Monae MD  Gynecologic Oncology  Pager [phone]

## 2021-01-11 LAB
ALBUMIN SERPL-MCNC: 2.7 G/DL (ref 3.4–5)
ALP SERPL-CCNC: 134 U/L (ref 40–150)
ALT SERPL W P-5'-P-CCNC: 11 U/L (ref 0–50)
ANION GAP SERPL CALCULATED.3IONS-SCNC: 12 MMOL/L (ref 3–14)
AST SERPL W P-5'-P-CCNC: 18 U/L (ref 0–45)
BILIRUB SERPL-MCNC: 0.9 MG/DL (ref 0.2–1.3)
BUN SERPL-MCNC: 6 MG/DL (ref 7–30)
CALCIUM SERPL-MCNC: 8.8 MG/DL (ref 8.5–10.1)
CHLORIDE SERPL-SCNC: 107 MMOL/L (ref 94–109)
CO2 SERPL-SCNC: 20 MMOL/L (ref 20–32)
COPATH REPORT: NORMAL
CREAT SERPL-MCNC: 0.54 MG/DL (ref 0.52–1.04)
ERYTHROCYTE [DISTWIDTH] IN BLOOD BY AUTOMATED COUNT: 14.7 % (ref 10–15)
GFR SERPL CREATININE-BSD FRML MDRD: >90 ML/MIN/{1.73_M2}
GLUCOSE SERPL-MCNC: 73 MG/DL (ref 70–99)
HCT VFR BLD AUTO: 38.8 % (ref 35–47)
HGB BLD-MCNC: 12.2 G/DL (ref 11.7–15.7)
INR PPP: 1.14 (ref 0.86–1.14)
MAGNESIUM SERPL-MCNC: 1.9 MG/DL (ref 1.6–2.3)
MCH RBC QN AUTO: 27.9 PG (ref 26.5–33)
MCHC RBC AUTO-ENTMCNC: 31.4 G/DL (ref 31.5–36.5)
MCV RBC AUTO: 89 FL (ref 78–100)
PLATELET # BLD AUTO: 438 10E9/L (ref 150–450)
POTASSIUM SERPL-SCNC: 3.5 MMOL/L (ref 3.4–5.3)
PROT SERPL-MCNC: 7 G/DL (ref 6.8–8.8)
RBC # BLD AUTO: 4.37 10E12/L (ref 3.8–5.2)
SODIUM SERPL-SCNC: 139 MMOL/L (ref 133–144)
WBC # BLD AUTO: 5.9 10E9/L (ref 4–11)

## 2021-01-11 PROCEDURE — 120N000002 HC R&B MED SURG/OB UMMC

## 2021-01-11 PROCEDURE — 85610 PROTHROMBIN TIME: CPT | Performed by: STUDENT IN AN ORGANIZED HEALTH CARE EDUCATION/TRAINING PROGRAM

## 2021-01-11 PROCEDURE — 258N000003 HC RX IP 258 OP 636: Performed by: STUDENT IN AN ORGANIZED HEALTH CARE EDUCATION/TRAINING PROGRAM

## 2021-01-11 PROCEDURE — 250N000013 HC RX MED GY IP 250 OP 250 PS 637: Performed by: STUDENT IN AN ORGANIZED HEALTH CARE EDUCATION/TRAINING PROGRAM

## 2021-01-11 PROCEDURE — 250N000013 HC RX MED GY IP 250 OP 250 PS 637: Performed by: NURSE PRACTITIONER

## 2021-01-11 PROCEDURE — 85027 COMPLETE CBC AUTOMATED: CPT | Performed by: STUDENT IN AN ORGANIZED HEALTH CARE EDUCATION/TRAINING PROGRAM

## 2021-01-11 PROCEDURE — 76705 ECHO EXAM OF ABDOMEN: CPT | Performed by: PEDIATRICS

## 2021-01-11 PROCEDURE — 258N000003 HC RX IP 258 OP 636: Performed by: NURSE PRACTITIONER

## 2021-01-11 PROCEDURE — 80053 COMPREHEN METABOLIC PANEL: CPT | Performed by: STUDENT IN AN ORGANIZED HEALTH CARE EDUCATION/TRAINING PROGRAM

## 2021-01-11 PROCEDURE — 36415 COLL VENOUS BLD VENIPUNCTURE: CPT | Performed by: STUDENT IN AN ORGANIZED HEALTH CARE EDUCATION/TRAINING PROGRAM

## 2021-01-11 PROCEDURE — 250N000011 HC RX IP 250 OP 636: Performed by: STUDENT IN AN ORGANIZED HEALTH CARE EDUCATION/TRAINING PROGRAM

## 2021-01-11 PROCEDURE — 83735 ASSAY OF MAGNESIUM: CPT | Performed by: STUDENT IN AN ORGANIZED HEALTH CARE EDUCATION/TRAINING PROGRAM

## 2021-01-11 RX ORDER — OLANZAPINE 2.5 MG/1
2.5 TABLET, FILM COATED ORAL
Status: DISCONTINUED | OUTPATIENT
Start: 2021-01-11 | End: 2021-01-11

## 2021-01-11 RX ORDER — SUMATRIPTAN 100 MG/1
100 TABLET, FILM COATED ORAL ONCE
Status: COMPLETED | OUTPATIENT
Start: 2021-01-11 | End: 2021-01-11

## 2021-01-11 RX ORDER — POLYETHYLENE GLYCOL 3350 17 G/17G
17 POWDER, FOR SOLUTION ORAL DAILY
Status: DISCONTINUED | OUTPATIENT
Start: 2021-01-11 | End: 2021-01-12

## 2021-01-11 RX ORDER — AMOXICILLIN 250 MG
2 CAPSULE ORAL 2 TIMES DAILY
Status: DISCONTINUED | OUTPATIENT
Start: 2021-01-11 | End: 2021-01-13 | Stop reason: HOSPADM

## 2021-01-11 RX ORDER — OLANZAPINE 2.5 MG/1
2.5 TABLET, FILM COATED ORAL AT BEDTIME
Status: DISCONTINUED | OUTPATIENT
Start: 2021-01-11 | End: 2021-01-12

## 2021-01-11 RX ORDER — BISACODYL 10 MG
10 SUPPOSITORY, RECTAL RECTAL DAILY PRN
Status: DISCONTINUED | OUTPATIENT
Start: 2021-01-11 | End: 2021-01-13 | Stop reason: HOSPADM

## 2021-01-11 RX ADMIN — PROCHLORPERAZINE EDISYLATE 10 MG: 5 INJECTION INTRAMUSCULAR; INTRAVENOUS at 06:04

## 2021-01-11 RX ADMIN — SODIUM PHOSPHATE 1 ENEMA: 7; 19 ENEMA RECTAL at 17:42

## 2021-01-11 RX ADMIN — SODIUM CHLORIDE: 9 INJECTION, SOLUTION INTRAVENOUS at 08:37

## 2021-01-11 RX ADMIN — SUMATRIPTAN SUCCINATE 100 MG: 100 TABLET ORAL at 22:34

## 2021-01-11 RX ADMIN — DOCUSATE SODIUM AND SENNOSIDES 2 TABLET: 8.6; 5 TABLET, FILM COATED ORAL at 20:21

## 2021-01-11 RX ADMIN — AMITRIPTYLINE HYDROCHLORIDE 100 MG: 100 TABLET, FILM COATED ORAL at 22:34

## 2021-01-11 RX ADMIN — PROCHLORPERAZINE EDISYLATE 10 MG: 5 INJECTION INTRAMUSCULAR; INTRAVENOUS at 15:46

## 2021-01-11 RX ADMIN — SODIUM CHLORIDE: 9 INJECTION, SOLUTION INTRAVENOUS at 20:25

## 2021-01-11 RX ADMIN — OLANZAPINE 2.5 MG: 2.5 TABLET, FILM COATED ORAL at 20:21

## 2021-01-11 RX ADMIN — SUMATRIPTAN SUCCINATE 100 MG: 100 TABLET ORAL at 03:19

## 2021-01-11 RX ADMIN — ONDANSETRON 4 MG: 2 INJECTION INTRAMUSCULAR; INTRAVENOUS at 03:18

## 2021-01-11 ASSESSMENT — ACTIVITIES OF DAILY LIVING (ADL)
ADLS_ACUITY_SCORE: 17

## 2021-01-11 NOTE — PROGRESS NOTES
"CLINICAL NUTRITION SERVICES - ASSESSMENT NOTE     Nutrition Prescription    RECOMMENDATIONS FOR MDs/PROVIDERS TO ORDER:  None at this time     Malnutrition Status:    Severe malnutrition in the context of acute on chronic illness    Recommendations already ordered by Registered Dietitian (RD):  Boost Breeze Gove at HS snack    Future/Additional Recommendations:  Monitor PO intake and N/V.   Monitor tolerance of Boost Breeze.      REASON FOR ASSESSMENT  Taran Regalado is a/an 65 year old female assessed by the dietitian for Admission Nutrition Risk Screen for positive    PMH: high grade carcinoma of likely gynecologic origin who is HD#2 admitted with nausea and vomiting    NUTRITION HISTORY  Taran reports that she hasn't eaten anything the past 2 days due to N/V.     Per chart review- Pt w/ biopsies on 1/7 and had post-op N/V. She was able to eat and drink after but nausea has been worsening. Per MD note, suspect nausea and vomiting multifactorial due to extensive ascites and postoperative constipation.     CURRENT NUTRITION ORDERS  Diet: Regular  Intake/Tolerance: Has had ice chips.     LABS  Labs reviewed    MEDICATIONS  Medications reviewed  Na chloride 0.9% infusion @ 75 mL/hr   Zofran, compazine PRN- no improvement per chart review    ANTHROPOMETRICS  Height: 182.9 cm (6' 0\")  Most Recent Weight: 68.7 kg (151 lb 6.4 oz)    IBW: 72.7 kg (94%)   BMI: Normal BMI  Weight History: Weight loss of 2.5 kg (3.5%) over the past 3 weeks.   Wt Readings from Last 15 Encounters:   01/10/21 68.7 kg (151 lb 6.4 oz)   01/07/21 67.3 kg (148 lb 4.2 oz)   12/30/20 70.8 kg (156 lb)   12/26/20 71.1 kg (156 lb 12.8 oz)   12/23/20 71.2 kg (157 lb)     Dosing Weight: 69 kg (dry weight)     ASSESSED NUTRITION NEEDS  Estimated Energy Needs: 6615-1680 kcals/day (25 - 30 kcals/kg)  Justification: Maintenance  Estimated Protein Needs:  grams protein/day (1.2 - 1.5 grams of pro/kg)  Justification: Increased needs 2/2 " carcinoma  Estimated Fluid Needs: (1 mL/kcal)   Justification: Maintenance    PHYSICAL FINDINGS  See malnutrition section below.    MALNUTRITION  % Intake: </= 50% for >/= 5 days (severe)  % Weight Loss: Up to 5% in 1 month (non-severe)  Subcutaneous Fat Loss: None observed  Muscle Loss: Temporal, Upper arm (bicep, tricep), Lower arm  (forearm), Upper leg (quadricep, hamstring) and Posterior calf: moderate   Fluid Accumulation/Edema: None noted  Malnutrition Diagnosis: Severe malnutrition in the context of acute on chronic illness    NUTRITION DIAGNOSIS  Inadequate oral intake related to decreased appetite 2/2 N/V as evidenced by pt report, chart review, and 3.5% weight loss over the past 3 weeks .       INTERVENTIONS  Implementation  Nutrition Education: Discussed current PO intake/appetite and role of RD. Discussed trying small bites of bland foods like crackers, fruit ice, and bananas. Also discussed trying Boost Breeze.   Medical food supplement therapy: Boost Breeze trial     Goals  Patient to consume % of nutritionally adequate meal trays TID, or the equivalent with supplements/snacks.     Monitoring/Evaluation  Progress toward goals will be monitored and evaluated per protocol.    Brinda Arroyo, RD, LD  7C RD pager [phone]

## 2021-01-11 NOTE — PHARMACY-ADMISSION MEDICATION HISTORY
Admission Medication History Completed by Pharmacy    See [facility] Admission Navigator for allergy information, preferred outpatient pharmacy, prior to admission medications and immunization status.     Medication History Sources:     Patient    Sure Scripts        Changes made to PTA medication list (reason):    Added: None    Deleted: None    Changed: None    Additional Information:    Patient knew her medication names, strengths, frequencies, and the last time each was taken.     Patient stated that she is not taking the Norco yet and  shows that it was filled 1/7/2021.    Patient denied the use of any other prescription or over the counter medications.     Prior to Admission medications    Medication Sig Last Dose Taking? Auth Provider   acetaminophen (TYLENOL) 325 MG tablet Take 2 tablets (650 mg) by mouth every 6 hours as needed for mild pain 1/9/2021 at 1700 Yes Bolivar Juarez MD   amitriptyline (ELAVIL) 100 MG tablet TAKE 1 TABLET (100 MG) BY MOUTH EVERY NIGHT AT BEDTIME 1/9/2021 at PM Yes Reported, Patient   ibuprofen (ADVIL/MOTRIN) 600 MG tablet Take 1 tablet (600 mg) by mouth every 6 hours as needed for other (mild and/or inflammatory pain) Past Week Yes Bolivar Juarez MD   ondansetron (ZOFRAN) 4 MG tablet TAKE 1 TABLET (4 MG) BY MOUTH AS NEEDED FOR NAUSEA 1/10/2021 Yes Reported, Patient   oxyCODONE (ROXICODONE) 5 MG tablet Take 1 tablet (5 mg) by mouth every 6 hours as needed for pain 1/10/2021 at 0300 Yes Bolivar Juarez MD   senna-docusate (SENOKOT-S/PERICOLACE) 8.6-50 MG tablet Take 1-2 tablets by mouth 2 times daily Past Week Yes Bolivar Juarez MD   SUMAtriptan (IMITREX) 100 MG tablet Take 100 mg by mouth at onset of headache  1/10/2021 at AM Yes Reported, Patient   HYDROcodone-acetaminophen (NORCO) 5-325 MG tablet Take 1 tablet by mouth every 6 hours as needed for severe pain Unknown  Carlita England APRN CNP   valACYclovir (VALTREX) 1000 mg tablet Take 1,000 mg by  mouth as needed  More than a month  Reported, Patient       Date completed: 01/10/21    Medication history completed by: Matthew Wren

## 2021-01-11 NOTE — PROGRESS NOTES
Gynecology Oncology Progress Note    HD#2 with nausea and vomiting    24 hour events:   - Admitted to hospital    Subjective:   Patient reports continued nausea this morning, no improvement with Zofran. Had an episode of diarrhea overnight. No flatus. Tolerating ice chips. Pain is well controlled.     Objective:   Vitals:    01/10/21 1620 01/10/21 2000 01/10/21 2206 01/10/21 2221   BP: 133/88 131/74 131/74 136/72   BP Location:    Left arm   Pulse: 77  77 85   Resp:   18 18   Temp:    98.7  F (37.1  C)   TempSrc:    Oral   SpO2: 98%  98% 95%   Weight:    68.7 kg (151 lb 6.4 oz)   Height:    1.829 m (6')       General: NAD, appears comfortable in bed  CV: RRR  Resp: Non-labored breathing on room air  Abdomen: Abdomen firm due to tumor burden, moderately distended and dull to percussion. Tender to palpation of right side of abdomen.   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 She is POD#4 s/p diagnostic laparoscopy with peritoneal biopsies. Imaging consistent with large ascites and constipation, no evidence of obstruction. Suspect nausea and vomiting multifactorial due to extensive ascites and postoperative constipation.       Plan:    Dz: High grade carcinoma. CTAP 12/4: peritoneal carcinomatosis with mass-like peritoneal thickening in lower pelvis, large volume ascites, b/l pleural effusions, pleural nodularity of R hemithorax, 1cm lesion in R hepatic lobe suspicious for metastasis. Para 12/26 3L, 12/31 900ml. 1/7: Dx lsc, peritoneal biopsies, Frozen: High grade carcinoma. CTAP 1/10: Extensive ascites, scattered liver hypodensities, fluid filled adnexal  masses and irregular appearance of uterus, large stool burden in colon, no evidence of obstruction. Awaiting final pathology.   FEN: Regular diet, s/p 2L NS bolus in ED. mIVF at 125 ml/hr. .   Pain: Tylenol, Oxycodone, Dilaudid prn  Heme: NI stable postoperative hemoglobin  CV: NI  Pulm: NI  GI: Nausea and vomiting as above, Zofran and Compazine prn. Enema ordered for constipation, will begin PO bowel regimen when tolerating PO. Plan to consult procedure medicine team today for paracentesis for symptom management.   : NI  ID: NI, no evidence of infection  Endocrine: NI  Psych/Neuro: Migraines - Imitrex prn (held)  PPX: SCDs, will add Lovenox if remains admitted tomorrow after paracentesis.  Dispo: Pending clinical improvement.         Fany Doshi MD  Ob/Gyn PGY-4    See attestation from progress note today

## 2021-01-11 NOTE — PROGRESS NOTES
Admitted/transferred from: ED  2 RN full  skin assessment completed by Terry Mayer, RN and Yulia Chao, BETH.  Skin assessment finding: 3 lap sites on abdomen w/ steri strips   Interventions/actions: None at this time     Will continue to monitor.

## 2021-01-11 NOTE — PLAN OF CARE
VSS. Imitrex given for migraine overnight, now resolved. Intermittent nausea relieved with IV Zofran and IV Compazine. Pt refused to have 2300 enema. Did end up having episode of diarrhea overnight. On regular diet, only taking in ice. IVMF @ 125 into PIV. Voiding spontaneously. Lap sites on abdomen C/D/I. UAL in room. Possible paracentesis today.

## 2021-01-11 NOTE — PLAN OF CARE
Afebrile, VSS on RA.  Alert and oriented x4.  Denies pain.  Nausea managed with compazine Zofran does not work for Pt.  [place] ordered but pt. Is not ready to do it.  The last time I checked with Pt. About doing the enema, pt. Stated she was going to take a nap and she would let me knnow when she is ready to do it.  Lap sites on abdomen CDI.  LR infusing @ 75 ml/hr.  Voiding.  Up independently in room.  Continue plan of care.

## 2021-01-12 PROBLEM — C56.9 OVARIAN CANCER, UNSPECIFIED LATERALITY (H): Status: ACTIVE | Noted: 2021-01-12

## 2021-01-12 LAB
ANION GAP SERPL CALCULATED.3IONS-SCNC: 9 MMOL/L (ref 3–14)
BUN SERPL-MCNC: 6 MG/DL (ref 7–30)
CALCIUM SERPL-MCNC: 8.6 MG/DL (ref 8.5–10.1)
CHLORIDE SERPL-SCNC: 108 MMOL/L (ref 94–109)
CO2 SERPL-SCNC: 23 MMOL/L (ref 20–32)
CREAT SERPL-MCNC: 0.48 MG/DL (ref 0.52–1.04)
ERYTHROCYTE [DISTWIDTH] IN BLOOD BY AUTOMATED COUNT: 14.7 % (ref 10–15)
GFR SERPL CREATININE-BSD FRML MDRD: >90 ML/MIN/{1.73_M2}
GLUCOSE SERPL-MCNC: 85 MG/DL (ref 70–99)
HCT VFR BLD AUTO: 35.2 % (ref 35–47)
HGB BLD-MCNC: 10.8 G/DL (ref 11.7–15.7)
MAGNESIUM SERPL-MCNC: 1.7 MG/DL (ref 1.6–2.3)
MCH RBC QN AUTO: 27 PG (ref 26.5–33)
MCHC RBC AUTO-ENTMCNC: 30.7 G/DL (ref 31.5–36.5)
MCV RBC AUTO: 88 FL (ref 78–100)
PLATELET # BLD AUTO: 419 10E9/L (ref 150–450)
POTASSIUM SERPL-SCNC: 3.3 MMOL/L (ref 3.4–5.3)
POTASSIUM SERPL-SCNC: 3.8 MMOL/L (ref 3.4–5.3)
RBC # BLD AUTO: 4 10E12/L (ref 3.8–5.2)
SODIUM SERPL-SCNC: 140 MMOL/L (ref 133–144)
WBC # BLD AUTO: 5.7 10E9/L (ref 4–11)

## 2021-01-12 PROCEDURE — 99233 SBSQ HOSP IP/OBS HIGH 50: CPT | Mod: GC | Performed by: OBSTETRICS & GYNECOLOGY

## 2021-01-12 PROCEDURE — 36415 COLL VENOUS BLD VENIPUNCTURE: CPT | Performed by: NURSE PRACTITIONER

## 2021-01-12 PROCEDURE — 250N000013 HC RX MED GY IP 250 OP 250 PS 637: Performed by: STUDENT IN AN ORGANIZED HEALTH CARE EDUCATION/TRAINING PROGRAM

## 2021-01-12 PROCEDURE — 93005 ELECTROCARDIOGRAM TRACING: CPT

## 2021-01-12 PROCEDURE — 258N000003 HC RX IP 258 OP 636: Performed by: OBSTETRICS & GYNECOLOGY

## 2021-01-12 PROCEDURE — 250N000013 HC RX MED GY IP 250 OP 250 PS 637: Performed by: OBSTETRICS & GYNECOLOGY

## 2021-01-12 PROCEDURE — 250N000011 HC RX IP 250 OP 636: Performed by: STUDENT IN AN ORGANIZED HEALTH CARE EDUCATION/TRAINING PROGRAM

## 2021-01-12 PROCEDURE — 80048 BASIC METABOLIC PNL TOTAL CA: CPT | Performed by: STUDENT IN AN ORGANIZED HEALTH CARE EDUCATION/TRAINING PROGRAM

## 2021-01-12 PROCEDURE — 258N000003 HC RX IP 258 OP 636: Performed by: NURSE PRACTITIONER

## 2021-01-12 PROCEDURE — 250N000013 HC RX MED GY IP 250 OP 250 PS 637: Performed by: NURSE PRACTITIONER

## 2021-01-12 PROCEDURE — 83735 ASSAY OF MAGNESIUM: CPT | Performed by: STUDENT IN AN ORGANIZED HEALTH CARE EDUCATION/TRAINING PROGRAM

## 2021-01-12 PROCEDURE — 250N000011 HC RX IP 250 OP 636: Performed by: NURSE PRACTITIONER

## 2021-01-12 PROCEDURE — 36415 COLL VENOUS BLD VENIPUNCTURE: CPT | Performed by: STUDENT IN AN ORGANIZED HEALTH CARE EDUCATION/TRAINING PROGRAM

## 2021-01-12 PROCEDURE — 85027 COMPLETE CBC AUTOMATED: CPT | Performed by: STUDENT IN AN ORGANIZED HEALTH CARE EDUCATION/TRAINING PROGRAM

## 2021-01-12 PROCEDURE — 93010 ELECTROCARDIOGRAM REPORT: CPT | Performed by: INTERNAL MEDICINE

## 2021-01-12 PROCEDURE — 84132 ASSAY OF SERUM POTASSIUM: CPT | Performed by: NURSE PRACTITIONER

## 2021-01-12 PROCEDURE — 3E03305 INTRODUCTION OF OTHER ANTINEOPLASTIC INTO PERIPHERAL VEIN, PERCUTANEOUS APPROACH: ICD-10-PCS | Performed by: OBSTETRICS & GYNECOLOGY

## 2021-01-12 PROCEDURE — 999N000128 HC STATISTIC PERIPHERAL IV START W/O US GUIDANCE

## 2021-01-12 PROCEDURE — 120N000002 HC R&B MED SURG/OB UMMC

## 2021-01-12 PROCEDURE — 250N000011 HC RX IP 250 OP 636: Performed by: OBSTETRICS & GYNECOLOGY

## 2021-01-12 RX ORDER — LORAZEPAM 2 MG/ML
.5-1 INJECTION INTRAMUSCULAR EVERY 6 HOURS PRN
Status: CANCELLED
Start: 2021-01-12

## 2021-01-12 RX ORDER — ALBUTEROL SULFATE 0.83 MG/ML
2.5 SOLUTION RESPIRATORY (INHALATION)
Status: CANCELLED
Start: 2021-01-12

## 2021-01-12 RX ORDER — SODIUM CHLORIDE 9 MG/ML
1000 INJECTION, SOLUTION INTRAVENOUS CONTINUOUS PRN
Status: CANCELLED
Start: 2021-01-12

## 2021-01-12 RX ORDER — OLANZAPINE 5 MG/1
5 TABLET, ORALLY DISINTEGRATING ORAL AT BEDTIME
Status: DISCONTINUED | OUTPATIENT
Start: 2021-01-12 | End: 2021-01-12

## 2021-01-12 RX ORDER — OLANZAPINE 5 MG/1
5 TABLET, ORALLY DISINTEGRATING ORAL EVERY 8 HOURS PRN
Status: DISCONTINUED | OUTPATIENT
Start: 2021-01-12 | End: 2021-01-12

## 2021-01-12 RX ORDER — METHYLPREDNISOLONE SODIUM SUCCINATE 125 MG/2ML
125 INJECTION, POWDER, LYOPHILIZED, FOR SOLUTION INTRAMUSCULAR; INTRAVENOUS
Status: CANCELLED
Start: 2021-01-12

## 2021-01-12 RX ORDER — LORAZEPAM 0.5 MG/1
.5-1 TABLET ORAL EVERY 6 HOURS PRN
Status: DISCONTINUED | OUTPATIENT
Start: 2021-01-12 | End: 2021-01-13 | Stop reason: HOSPADM

## 2021-01-12 RX ORDER — ALBUTEROL SULFATE 0.83 MG/ML
2.5 SOLUTION RESPIRATORY (INHALATION)
Status: DISCONTINUED | OUTPATIENT
Start: 2021-01-12 | End: 2021-01-13

## 2021-01-12 RX ORDER — MAGNESIUM CARB/ALUMINUM HYDROX 105-160MG
296 TABLET,CHEWABLE ORAL ONCE
Status: COMPLETED | OUTPATIENT
Start: 2021-01-12 | End: 2021-01-12

## 2021-01-12 RX ORDER — ALBUTEROL SULFATE 90 UG/1
1-2 AEROSOL, METERED RESPIRATORY (INHALATION)
Status: CANCELLED
Start: 2021-01-12

## 2021-01-12 RX ORDER — ALBUTEROL SULFATE 90 UG/1
1-2 AEROSOL, METERED RESPIRATORY (INHALATION)
Status: DISCONTINUED | OUTPATIENT
Start: 2021-01-12 | End: 2021-01-13

## 2021-01-12 RX ORDER — LORAZEPAM 2 MG/ML
.5-1 INJECTION INTRAMUSCULAR EVERY 6 HOURS PRN
Status: DISCONTINUED | OUTPATIENT
Start: 2021-01-12 | End: 2021-01-13 | Stop reason: HOSPADM

## 2021-01-12 RX ORDER — MEPERIDINE HYDROCHLORIDE 25 MG/ML
25 INJECTION INTRAMUSCULAR; INTRAVENOUS; SUBCUTANEOUS EVERY 30 MIN PRN
Status: CANCELLED
Start: 2021-01-12

## 2021-01-12 RX ORDER — DEXAMETHASONE 4 MG/1
8 TABLET ORAL EVERY MORNING
Status: DISCONTINUED | OUTPATIENT
Start: 2021-01-13 | End: 2021-01-13 | Stop reason: HOSPADM

## 2021-01-12 RX ORDER — POLYETHYLENE GLYCOL 3350 17 G/17G
17 POWDER, FOR SOLUTION ORAL 2 TIMES DAILY
Status: DISCONTINUED | OUTPATIENT
Start: 2021-01-12 | End: 2021-01-13 | Stop reason: HOSPADM

## 2021-01-12 RX ORDER — DIPHENHYDRAMINE HYDROCHLORIDE 50 MG/ML
50 INJECTION INTRAMUSCULAR; INTRAVENOUS
Status: CANCELLED
Start: 2021-01-12

## 2021-01-12 RX ORDER — DEXAMETHASONE SODIUM PHOSPHATE 4 MG/ML
20 INJECTION, SOLUTION INTRA-ARTICULAR; INTRALESIONAL; INTRAMUSCULAR; INTRAVENOUS; SOFT TISSUE ONCE
Status: COMPLETED | OUTPATIENT
Start: 2021-01-12 | End: 2021-01-12

## 2021-01-12 RX ORDER — EPINEPHRINE 1 MG/ML
0.3 INJECTION, SOLUTION INTRAMUSCULAR; SUBCUTANEOUS EVERY 5 MIN PRN
Status: CANCELLED
Start: 2021-01-12

## 2021-01-12 RX ORDER — OLANZAPINE 5 MG/1
10 TABLET, ORALLY DISINTEGRATING ORAL AT BEDTIME
Status: DISCONTINUED | OUTPATIENT
Start: 2021-01-12 | End: 2021-01-13

## 2021-01-12 RX ORDER — OLANZAPINE 5 MG/1
5 TABLET ORAL EVERY 8 HOURS
Status: DISCONTINUED | OUTPATIENT
Start: 2021-01-13 | End: 2021-01-12

## 2021-01-12 RX ORDER — DIPHENHYDRAMINE HYDROCHLORIDE 50 MG/ML
50 INJECTION INTRAMUSCULAR; INTRAVENOUS
Status: DISCONTINUED | OUTPATIENT
Start: 2021-01-12 | End: 2021-01-13

## 2021-01-12 RX ORDER — EPINEPHRINE 1 MG/ML
0.3 INJECTION, SOLUTION, CONCENTRATE INTRAVENOUS EVERY 5 MIN PRN
Status: DISCONTINUED | OUTPATIENT
Start: 2021-01-12 | End: 2021-01-13

## 2021-01-12 RX ORDER — SUMATRIPTAN 100 MG/1
100 TABLET, FILM COATED ORAL ONCE
Status: COMPLETED | OUTPATIENT
Start: 2021-01-12 | End: 2021-01-12

## 2021-01-12 RX ORDER — DIPHENHYDRAMINE HCL 25 MG
50 CAPSULE ORAL ONCE
Status: CANCELLED
Start: 2021-01-12

## 2021-01-12 RX ORDER — PROCHLORPERAZINE MALEATE 10 MG
10 TABLET ORAL EVERY 6 HOURS PRN
Status: CANCELLED
Start: 2021-01-12

## 2021-01-12 RX ORDER — LORAZEPAM 0.5 MG/1
.5-1 TABLET ORAL EVERY 6 HOURS PRN
Status: CANCELLED
Start: 2021-01-12

## 2021-01-12 RX ORDER — SODIUM CHLORIDE 9 MG/ML
1000 INJECTION, SOLUTION INTRAVENOUS CONTINUOUS PRN
Status: DISCONTINUED | OUTPATIENT
Start: 2021-01-12 | End: 2021-01-13

## 2021-01-12 RX ORDER — PROCHLORPERAZINE MALEATE 5 MG
10 TABLET ORAL EVERY 6 HOURS PRN
Status: DISCONTINUED | OUTPATIENT
Start: 2021-01-12 | End: 2021-01-12

## 2021-01-12 RX ORDER — PROCHLORPERAZINE MALEATE 5 MG
10 TABLET ORAL EVERY 6 HOURS
Status: DISCONTINUED | OUTPATIENT
Start: 2021-01-12 | End: 2021-01-12

## 2021-01-12 RX ORDER — DIPHENHYDRAMINE HCL 50 MG
50 CAPSULE ORAL ONCE
Status: COMPLETED | OUTPATIENT
Start: 2021-01-12 | End: 2021-01-12

## 2021-01-12 RX ORDER — POTASSIUM CHLORIDE 7.45 MG/ML
10 INJECTION INTRAVENOUS
Status: ACTIVE | OUTPATIENT
Start: 2021-01-12 | End: 2021-01-12

## 2021-01-12 RX ORDER — DEXAMETHASONE SODIUM PHOSPHATE 10 MG/ML
20 INJECTION, SOLUTION INTRAMUSCULAR; INTRAVENOUS ONCE
Status: CANCELLED
Start: 2021-01-12

## 2021-01-12 RX ORDER — DEXAMETHASONE 4 MG/1
8 TABLET ORAL EVERY MORNING
Status: CANCELLED | OUTPATIENT
Start: 2021-01-13

## 2021-01-12 RX ORDER — METHYLPREDNISOLONE SODIUM SUCCINATE 125 MG/2ML
125 INJECTION, POWDER, LYOPHILIZED, FOR SOLUTION INTRAMUSCULAR; INTRAVENOUS
Status: DISCONTINUED | OUTPATIENT
Start: 2021-01-12 | End: 2021-01-13

## 2021-01-12 RX ORDER — MEPERIDINE HYDROCHLORIDE 25 MG/ML
25 INJECTION INTRAMUSCULAR; INTRAVENOUS; SUBCUTANEOUS EVERY 30 MIN PRN
Status: DISCONTINUED | OUTPATIENT
Start: 2021-01-12 | End: 2021-01-13

## 2021-01-12 RX ORDER — OLANZAPINE 5 MG/1
5 TABLET ORAL AT BEDTIME
Status: DISCONTINUED | OUTPATIENT
Start: 2021-01-12 | End: 2021-01-12

## 2021-01-12 RX ADMIN — CARBOPLATIN 900 MG: 10 INJECTION, SOLUTION INTRAVENOUS at 18:16

## 2021-01-12 RX ADMIN — MAGNESIUM CITRATE 296 ML: 1.75 LIQUID ORAL at 14:41

## 2021-01-12 RX ADMIN — PROCHLORPERAZINE EDISYLATE 10 MG: 5 INJECTION INTRAMUSCULAR; INTRAVENOUS at 08:27

## 2021-01-12 RX ADMIN — DOCUSATE SODIUM 286 ML: 50 LIQUID ORAL at 09:54

## 2021-01-12 RX ADMIN — SODIUM CHLORIDE: 9 INJECTION, SOLUTION INTRAVENOUS at 07:15

## 2021-01-12 RX ADMIN — FAMOTIDINE 20 MG: 20 INJECTION, SOLUTION INTRAVENOUS at 13:58

## 2021-01-12 RX ADMIN — DEXAMETHASONE SODIUM PHOSPHATE 20 MG: 4 INJECTION, SOLUTION INTRA-ARTICULAR; INTRALESIONAL; INTRAMUSCULAR; INTRAVENOUS; SOFT TISSUE at 13:58

## 2021-01-12 RX ADMIN — DOCUSATE SODIUM AND SENNOSIDES 2 TABLET: 8.6; 5 TABLET, FILM COATED ORAL at 08:27

## 2021-01-12 RX ADMIN — AMITRIPTYLINE HYDROCHLORIDE 100 MG: 100 TABLET, FILM COATED ORAL at 22:04

## 2021-01-12 RX ADMIN — OLANZAPINE 10 MG: 5 TABLET, ORALLY DISINTEGRATING ORAL at 22:04

## 2021-01-12 RX ADMIN — PROCHLORPERAZINE EDISYLATE 10 MG: 5 INJECTION INTRAMUSCULAR; INTRAVENOUS at 20:34

## 2021-01-12 RX ADMIN — ENOXAPARIN SODIUM 40 MG: 40 INJECTION SUBCUTANEOUS at 13:50

## 2021-01-12 RX ADMIN — PACLITAXEL 327 MG: 6 INJECTION, SOLUTION INTRAVENOUS at 14:59

## 2021-01-12 RX ADMIN — DIPHENHYDRAMINE HYDROCHLORIDE 50 MG: 50 CAPSULE ORAL at 13:50

## 2021-01-12 RX ADMIN — PROCHLORPERAZINE MALEATE 10 MG: 5 TABLET ORAL at 13:50

## 2021-01-12 RX ADMIN — LORAZEPAM 0.5 MG: 0.5 TABLET ORAL at 18:09

## 2021-01-12 RX ADMIN — SUMATRIPTAN SUCCINATE 100 MG: 100 TABLET ORAL at 23:00

## 2021-01-12 RX ADMIN — FOSAPREPITANT 150 MG: 150 INJECTION, POWDER, LYOPHILIZED, FOR SOLUTION INTRAVENOUS at 13:58

## 2021-01-12 RX ADMIN — DOCUSATE SODIUM AND SENNOSIDES 2 TABLET: 8.6; 5 TABLET, FILM COATED ORAL at 20:39

## 2021-01-12 ASSESSMENT — ACTIVITIES OF DAILY LIVING (ADL)
ADLS_ACUITY_SCORE: 17

## 2021-01-12 NOTE — PROGRESS NOTES
CLINICAL NUTRITION SERVICES - BRIEF NOTE     Nutrition Prescription    Recommendations already ordered by Registered Dietitian (RD):  -Discontinued Boost Breeze - pt dislikes  -Ordered Magic Cup: Vanilla @ 2pm & Chocolate @ 8pm per pt preference    Future/Additional Recommendations:  Monitor tolerance     EVALUATION OF THE PROGRESS TOWARD GOALS   Diet: Regular + Boost Breeze @ HS (peach)    Intake: Still struggling with N/V - compazine and zofran not providing benefit, but pt reports another antiemetic will be trialed. Ate some bites of breakfast this morning. Pt having back pain at time of RD visit.    INTERVENTIONS  Implementation  See above. Encouraged PO as able.     Monitoring/Evaluation  Progress toward goals will be monitored and evaluated per protocol.    Yulia Aguila RD, LD  Pager: [phone]

## 2021-01-12 NOTE — PLAN OF CARE
BP (!) 142/75 (BP Location: Left arm)   Pulse 91   Temp 99.3  F (37.4  C) (Oral)   Resp 16   Ht 1.829 m (6')   Wt 68.7 kg (151 lb 6.4 oz)   SpO2 94%   BMI 20.53 kg/m   AVSS on RA. Patient denies pain. Patient c/o mild nausea but declined any intervention, stating that she is fine. She does not need anything right now. She also went on to say that is is early and she wanted to be left alone to sleep. Urine Output - has not voided yet this shift. Nutrition -regular diet is ordered but she has not had anything to eat or drink this shift . Pt has appeared to be sleeping most of this this shift.

## 2021-01-12 NOTE — PROGRESS NOTES
Gynecology Oncology Progress Note    HD#2 with nausea and vomiting    24 hour events:   - Medicine procedural team unable to perform bedside paracentesis    Subjective:   Patient reports continued nausea this morning, but has had no vomiting since admission. Was able to tolerate a small amount of food and sips of water yesterday. Had another small bowel movement yesterday evening. Pain is well controlled.     Objective:   Vitals:    01/10/21 2221 01/11/21 0727 01/11/21 1524 01/11/21 2219   BP: 136/72 (!) 152/74 129/78 (!) 142/75   BP Location: Left arm Left arm Left arm Left arm   Pulse: 85 79 101 91   Resp: 18 18 20 16   Temp: 98.7  F (37.1  C) 97.5  F (36.4  C) 98  F (36.7  C) 99.3  F (37.4  C)   TempSrc: Oral Oral Oral Oral   SpO2: 95% 94% 95% 94%   Weight: 68.7 kg (151 lb 6.4 oz)      Height: 1.829 m (6')          General: NAD, appears comfortable in bed  CV: RRR  Resp: Non-labored breathing on room air  Abdomen: Abdomen firm due to tumor burden, moderately distended and dull to percussion. Tender to palpation of right side of abdomen.   Incision: Laparoscopic incisions with steri-strips in place, appear intact, no drainage  Extremities: warm, well-perfused, nontender, no edema    24 hours: In - 547 mL IVF, 690 mL PO// OUT - 200mL UOP + 4 unmeasured voids, 1 stool  Since MN: IN - -- ml IVF // OUT -  -- mL UOP    New labs/imaging  CBC RESULTS:   Recent Labs   Lab Test 01/12/21  0531   WBC 5.7   RBC 4.00   HGB 10.8*   HCT 35.2   MCV 88   MCH 27.0   MCHC 30.7*   RDW 14.7           Last Comprehensive Metabolic Panel:  Sodium   Date Value Ref Range Status   01/12/2021 140 133 - 144 mmol/L Final     Potassium   Date Value Ref Range Status   01/12/2021 3.3 (L) 3.4 - 5.3 mmol/L Final     Chloride   Date Value Ref Range Status   01/12/2021 108 94 - 109 mmol/L Final     Carbon Dioxide   Date Value Ref Range Status   01/12/2021 23 20 - 32 mmol/L Final     Anion Gap   Date Value Ref Range Status   01/12/2021 9 3 - 14  mmol/L Final     Glucose   Date Value Ref Range Status   01/12/2021 85 70 - 99 mg/dL Final     Urea Nitrogen   Date Value Ref Range Status   01/12/2021 6 (L) 7 - 30 mg/dL Final     Creatinine   Date Value Ref Range Status   01/12/2021 0.48 (L) 0.52 - 1.04 mg/dL Final     GFR Estimate   Date Value Ref Range Status   01/12/2021 >90 >60 mL/min/[1.73_m2] Final     Comment:     Non  GFR Calc  Starting 12/18/2018, serum creatinine based estimated GFR (eGFR) will be   calculated using the Chronic Kidney Disease Epidemiology Collaboration   (CKD-EPI) equation.       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 She is POD#5 s/p diagnostic laparoscopy with peritoneal biopsies. Imaging consistent with large ascites and constipation, no evidence of obstruction. Suspect nausea and vomiting multifactorial due to extensive ascites and postoperative constipation.     Plan:    Dz: High grade carcinoma. CTAP 12/4: peritoneal carcinomatosis with mass-like peritoneal thickening in lower pelvis, large volume ascites, b/l pleural effusions, pleural nodularity of R hemithorax, 1cm lesion in R hepatic lobe suspicious for metastasis. Para 12/26 3L, 12/31 900ml. 1/7: Dx lsc, peritoneal biopsies, Frozen: High grade carcinoma of Mullerian origin. CTAP 1/10: Extensive ascites, scattered liver hypodensities, fluid filled adnexal masses and irregular appearance of uterus, large stool burden in colon, no evidence of obstruction. Will discuss initiating carbo/taxol chemotherapy with patient's primary oncologist today while patient is in house.  FEN: Regular diet, s/p 2L NS bolus in ED. mIVF at 75 ml/hr, discontinue when patient taking adequate PO. HypoK>ERP ordered.  Pain: Tylenol, Oxycodone, Dilaudid prn  Heme: NI stable postoperative hemoglobin  CV: NI  Pulm:  NI  GI: Nausea and vomiting as above, Zofran and Compazine prn. S/p enema with minimal stool output. Senna, Miralax, and Dulcolax suppository ordered. Procedure medicine team unable to perform paracentesis yesterday, consider IR consult today.  : NI  ID: NI, no evidence of infection  Endocrine: NI  Psych/Neuro: Migraines - Imitrex prn (held)  PPX: SCDs, will add Lovenox after paracentesis.  Dispo: Pending clinical improvement.     Fany Doshi MD  Ob/Gyn PGY-4        I spent a total of 55 minutes bedside and on the inpatient unit today managing the care of Taran Regalado. Over 50% of my time on the unit was spent counseling the patient and/or coordinating care regarding advanced ovarian cancer, nausea. See note for details.       1. High grade serous ovarian cancer: Pathology reviewed with patient today. Needs neoadjuvant therapy. Discussed hospital administration of chemo. Reviewed risks/benefits. Plan Carbo AUC 6, taxol 175 mg/m2. PLan per primary gyn onc is no avastin    -Carbo/taxol today with standard pre-meds    2. Nausea; multifactorial. Likely related to constipation and carcinomatosis. Zofran was not helpful. Plan compazine, olanzapine. Add ativan today plus pre-meds (steroids) from chemo. Could also try phenergen. If no relief consider CT head to rule out intracranial mets    3. Ascites: hold on para, chemo will likely affect this within a week or so    lovenox ppx while in house    Fatmata Monae MD  Gynecologic Oncology  Pager [phone]     -Fatmata Monae MD  Gynecologic Oncology  Pager [phone]

## 2021-01-12 NOTE — CONSULTS
Limited point of care abdominal ultrasound to evaluate for paracentesis.     Indication: Ascites, suspected     Views:   Hepatorenal: Adequate  RLQ: Adequate  Perisplenic: Adequate  LLQ: Adequate  Suprapubic: Adequate     Findings:  Hepatorenal: Small ascites  Perisplenic: Small ascites  RLQ: Trace Ascites  LLQ: Small ascites  Suprapubic: No fluid around bladder     I was asked to perform a focused abdominal ultrasound exclusively to determine whether paracentesis could be performed. After evaluating the areas where a paracentesis would be safe it was determined that while a paracentesis might be possible, there was overall small amount of ascites, and the patient was unsure how much benefit to her nausea would come from small volume removal. Please contact us if symptoms change and more fluid is suspected.     Yossi Nur MD  Internal Medicine Procedure Service  P

## 2021-01-12 NOTE — PLAN OF CARE
HR slightly tachy, all other VSS. Pt reports migraine, relief with Imitrex x1 & rest. Pt reports nausea, little relief with IV Compazine x1, MD aware, scheduled Zyprexa started tonight. Reg diet, only tolerated half of soup and x2 popsicles. BS faint, pt reports passing gas, x1 fleet enema with only mucous/liquid return, no stool. Pt up independently, amb in nazario x1, voiding fine per pt report.

## 2021-01-12 NOTE — PLAN OF CARE
VSS. Pt up ad andriy. Voids spont, not saving. Enema given without much result. Scheduled compazine given for nausea. Plan to start chemo this afternoon. New IV placed with brisk blood return noted. Cont. POC.

## 2021-01-12 NOTE — PROGRESS NOTES
DATE/TIME  (DOT-TD, DOT-NOW) CHEMO CHECK ACTIVITY (REGIMEN & DOSE CHECK, DAY, DOSE #, NAME OF CHEMO #1)  CHEMO DRUG #2  CHEMO DRUG #3 NAME OF RN #1 (USE DOT-ME HERE) NAME OF RN#2 (2ND RN TO LOG IN SEPARATELY)   1/12/2021 11:09 AM Regimen and dose check cycle 1 day 1 Taxol Carboplatin  Venkata Holt, RN PETAR MEDELLIN RN

## 2021-01-12 NOTE — PROVIDER NOTIFICATION
DATE/TIME  (DOT-TD, DOT-NOW) CHEMO CHECK ACTIVITY (REGIMEN & DOSE CHECK, DAY, DOSE #, NAME OF CHEMO #1)  CHEMO DRUG #2  CHEMO DRUG #3 NAME OF RN #1 (USE DOT-ME HERE) NAME OF RN#2 (2ND RN TO LOG IN SEPARATELY)   1/12/2021  11:21 AM   Chemo regimen and dose check taxol carboplatin  PETAR MEDELLIN, RN

## 2021-01-13 ENCOUNTER — APPOINTMENT (OUTPATIENT)
Dept: CT IMAGING | Facility: CLINIC | Age: 66
End: 2021-01-13
Payer: MEDICAID

## 2021-01-13 ENCOUNTER — VIRTUAL VISIT (OUTPATIENT)
Dept: ONCOLOGY | Facility: CLINIC | Age: 66
End: 2021-01-13
Attending: OBSTETRICS & GYNECOLOGY
Payer: MEDICAID

## 2021-01-13 VITALS
TEMPERATURE: 97.4 F | HEIGHT: 72 IN | HEART RATE: 84 BPM | DIASTOLIC BLOOD PRESSURE: 84 MMHG | WEIGHT: 150.2 LBS | OXYGEN SATURATION: 94 % | BODY MASS INDEX: 20.34 KG/M2 | SYSTOLIC BLOOD PRESSURE: 159 MMHG | RESPIRATION RATE: 17 BRPM

## 2021-01-13 DIAGNOSIS — C56.9 OVARIAN CANCER, UNSPECIFIED LATERALITY (H): ICD-10-CM

## 2021-01-13 DIAGNOSIS — C56.9 OVARIAN CANCER, UNSPECIFIED LATERALITY (H): Primary | ICD-10-CM

## 2021-01-13 LAB
ANION GAP SERPL CALCULATED.3IONS-SCNC: 7 MMOL/L (ref 3–14)
BASOPHILS # BLD AUTO: 0 10E9/L (ref 0–0.2)
BASOPHILS NFR BLD AUTO: 0 %
BUN SERPL-MCNC: 6 MG/DL (ref 7–30)
CALCIUM SERPL-MCNC: 8.5 MG/DL (ref 8.5–10.1)
CHLORIDE SERPL-SCNC: 106 MMOL/L (ref 94–109)
CO2 SERPL-SCNC: 26 MMOL/L (ref 20–32)
CREAT SERPL-MCNC: 0.51 MG/DL (ref 0.52–1.04)
DIFFERENTIAL METHOD BLD: ABNORMAL
EOSINOPHIL # BLD AUTO: 0 10E9/L (ref 0–0.7)
EOSINOPHIL NFR BLD AUTO: 0 %
ERYTHROCYTE [DISTWIDTH] IN BLOOD BY AUTOMATED COUNT: 14.4 % (ref 10–15)
GFR SERPL CREATININE-BSD FRML MDRD: >90 ML/MIN/{1.73_M2}
GLUCOSE SERPL-MCNC: 152 MG/DL (ref 70–99)
HCT VFR BLD AUTO: 34.1 % (ref 35–47)
HGB BLD-MCNC: 10.6 G/DL (ref 11.7–15.7)
IMM GRANULOCYTES # BLD: 0 10E9/L (ref 0–0.4)
IMM GRANULOCYTES NFR BLD: 0.8 %
INTERPRETATION ECG - MUSE: NORMAL
LYMPHOCYTES # BLD AUTO: 0.4 10E9/L (ref 0.8–5.3)
LYMPHOCYTES NFR BLD AUTO: 14.7 %
MAGNESIUM SERPL-MCNC: 2 MG/DL (ref 1.6–2.3)
MCH RBC QN AUTO: 26.8 PG (ref 26.5–33)
MCHC RBC AUTO-ENTMCNC: 31.1 G/DL (ref 31.5–36.5)
MCV RBC AUTO: 86 FL (ref 78–100)
MONOCYTES # BLD AUTO: 0.1 10E9/L (ref 0–1.3)
MONOCYTES NFR BLD AUTO: 2 %
NEUTROPHILS # BLD AUTO: 2.1 10E9/L (ref 1.6–8.3)
NEUTROPHILS NFR BLD AUTO: 82.5 %
PLATELET # BLD AUTO: 405 10E9/L (ref 150–450)
POTASSIUM SERPL-SCNC: 3.2 MMOL/L (ref 3.4–5.3)
RBC # BLD AUTO: 3.96 10E12/L (ref 3.8–5.2)
SODIUM SERPL-SCNC: 140 MMOL/L (ref 133–144)
WBC # BLD AUTO: 2.5 10E9/L (ref 4–11)

## 2021-01-13 PROCEDURE — 99214 OFFICE O/P EST MOD 30 MIN: CPT | Mod: 95 | Performed by: OBSTETRICS & GYNECOLOGY

## 2021-01-13 PROCEDURE — 80048 BASIC METABOLIC PNL TOTAL CA: CPT | Performed by: STUDENT IN AN ORGANIZED HEALTH CARE EDUCATION/TRAINING PROGRAM

## 2021-01-13 PROCEDURE — 99233 SBSQ HOSP IP/OBS HIGH 50: CPT | Mod: GC | Performed by: OBSTETRICS & GYNECOLOGY

## 2021-01-13 PROCEDURE — 250N000013 HC RX MED GY IP 250 OP 250 PS 637: Performed by: STUDENT IN AN ORGANIZED HEALTH CARE EDUCATION/TRAINING PROGRAM

## 2021-01-13 PROCEDURE — 250N000013 HC RX MED GY IP 250 OP 250 PS 637: Performed by: OBSTETRICS & GYNECOLOGY

## 2021-01-13 PROCEDURE — 85004 AUTOMATED DIFF WBC COUNT: CPT | Performed by: STUDENT IN AN ORGANIZED HEALTH CARE EDUCATION/TRAINING PROGRAM

## 2021-01-13 PROCEDURE — 70470 CT HEAD/BRAIN W/O & W/DYE: CPT

## 2021-01-13 PROCEDURE — 83735 ASSAY OF MAGNESIUM: CPT | Performed by: STUDENT IN AN ORGANIZED HEALTH CARE EDUCATION/TRAINING PROGRAM

## 2021-01-13 PROCEDURE — 85027 COMPLETE CBC AUTOMATED: CPT | Performed by: STUDENT IN AN ORGANIZED HEALTH CARE EDUCATION/TRAINING PROGRAM

## 2021-01-13 PROCEDURE — 36415 COLL VENOUS BLD VENIPUNCTURE: CPT | Performed by: STUDENT IN AN ORGANIZED HEALTH CARE EDUCATION/TRAINING PROGRAM

## 2021-01-13 PROCEDURE — 999N001193 HC VIDEO/TELEPHONE VISIT; NO CHARGE

## 2021-01-13 PROCEDURE — 70470 CT HEAD/BRAIN W/O & W/DYE: CPT | Mod: 26 | Performed by: STUDENT IN AN ORGANIZED HEALTH CARE EDUCATION/TRAINING PROGRAM

## 2021-01-13 PROCEDURE — 250N000011 HC RX IP 250 OP 636: Performed by: RADIOLOGY

## 2021-01-13 PROCEDURE — 250N000012 HC RX MED GY IP 250 OP 636 PS 637: Performed by: OBSTETRICS & GYNECOLOGY

## 2021-01-13 RX ORDER — ONDANSETRON 4 MG/1
4 TABLET, ORALLY DISINTEGRATING ORAL EVERY 6 HOURS PRN
Qty: 20 TABLET | Refills: 0 | Status: SHIPPED | OUTPATIENT
Start: 2021-01-13 | End: 2021-06-10

## 2021-01-13 RX ORDER — OLANZAPINE 5 MG/1
5 TABLET, ORALLY DISINTEGRATING ORAL AT BEDTIME
Qty: 30 TABLET | Refills: 0 | Status: ON HOLD | OUTPATIENT
Start: 2021-01-13 | End: 2021-02-05

## 2021-01-13 RX ORDER — IOPAMIDOL 755 MG/ML
75 INJECTION, SOLUTION INTRAVASCULAR ONCE
Status: COMPLETED | OUTPATIENT
Start: 2021-01-13 | End: 2021-01-13

## 2021-01-13 RX ORDER — PROCHLORPERAZINE MALEATE 10 MG
10 TABLET ORAL EVERY 6 HOURS PRN
Qty: 30 TABLET | Refills: 0 | Status: SHIPPED | OUTPATIENT
Start: 2021-01-13 | End: 2021-02-06

## 2021-01-13 RX ORDER — POTASSIUM CHLORIDE 750 MG/1
40 TABLET, EXTENDED RELEASE ORAL ONCE
Status: COMPLETED | OUTPATIENT
Start: 2021-01-13 | End: 2021-01-13

## 2021-01-13 RX ORDER — POLYETHYLENE GLYCOL 3350 17 G/17G
17 POWDER, FOR SOLUTION ORAL 2 TIMES DAILY
Qty: 510 G | Refills: 0 | Status: SHIPPED | OUTPATIENT
Start: 2021-01-13 | End: 2021-02-07

## 2021-01-13 RX ORDER — OLANZAPINE 5 MG/1
5 TABLET, ORALLY DISINTEGRATING ORAL AT BEDTIME
Status: DISCONTINUED | OUTPATIENT
Start: 2021-01-13 | End: 2021-01-13 | Stop reason: HOSPADM

## 2021-01-13 RX ORDER — PROCHLORPERAZINE MALEATE 5 MG
10 TABLET ORAL EVERY 6 HOURS
Status: DISCONTINUED | OUTPATIENT
Start: 2021-01-13 | End: 2021-01-13 | Stop reason: HOSPADM

## 2021-01-13 RX ORDER — PROCHLORPERAZINE MALEATE 10 MG
10 TABLET ORAL EVERY 6 HOURS PRN
Qty: 30 TABLET | Refills: 0 | Status: SHIPPED | OUTPATIENT
Start: 2021-01-13 | End: 2021-01-13

## 2021-01-13 RX ORDER — OLANZAPINE 5 MG/1
5 TABLET, ORALLY DISINTEGRATING ORAL AT BEDTIME
Qty: 30 TABLET | Refills: 0 | Status: SHIPPED | OUTPATIENT
Start: 2021-01-13 | End: 2021-01-13

## 2021-01-13 RX ORDER — AMOXICILLIN 250 MG
2 CAPSULE ORAL 2 TIMES DAILY
Qty: 30 TABLET | Refills: 0 | Status: SHIPPED | OUTPATIENT
Start: 2021-01-13 | End: 2021-02-06

## 2021-01-13 RX ORDER — ONDANSETRON 4 MG/1
4 TABLET, ORALLY DISINTEGRATING ORAL EVERY 6 HOURS PRN
Qty: 20 TABLET | Refills: 0 | Status: SHIPPED | OUTPATIENT
Start: 2021-01-13 | End: 2021-01-13

## 2021-01-13 RX ADMIN — IOPAMIDOL 75 ML: 755 INJECTION, SOLUTION INTRAVENOUS at 09:05

## 2021-01-13 RX ADMIN — POTASSIUM CHLORIDE 40 MEQ: 750 TABLET, EXTENDED RELEASE ORAL at 08:51

## 2021-01-13 RX ADMIN — DEXAMETHASONE 8 MG: 4 TABLET ORAL at 08:49

## 2021-01-13 ASSESSMENT — ACTIVITIES OF DAILY LIVING (ADL)
ADLS_ACUITY_SCORE: 17

## 2021-01-13 ASSESSMENT — MIFFLIN-ST. JEOR: SCORE: 1343.3

## 2021-01-13 NOTE — Clinical Note
"    1/13/2021         RE: Taran Regalado  [address] [facility] [address]        Dear Colleague,    Thank you for referring your patient, Taran Regalado, to the [facility]. Please see a copy of my visit note below.    Taran is a 65 year old who is being evaluated via a billable video visit.      How would you like to obtain your AVS? MyChart  If the video visit is dropped, the invitation should be resent by: Text to cell phone: [phone]  Will anyone else be joining your video visit? No  {If patient encounters technical issues they should call [phone] :[id number]}    Video Start Time: {video visit start/end time for provider to select:152948}  Video-Visit Details    Type of service:  Video Visit    Video End Time:{video visit start/end time for provider to select:152948}    Originating Location (pt. Location): {video visit patient location:764550::\"Home\"}    Distant Location (provider location):  [facility]     Platform used for Video Visit: {Virtual Visit Platforms:689252::\"[employer]\"}        Again, thank you for allowing me to participate in the care of your patient.        Sincerely,        Bolivar Juarez MD  "

## 2021-01-13 NOTE — LETTER
Date:February 12, 2021      Patient was self referred, no letter generated. Do not send.        [facility] Health Information

## 2021-01-13 NOTE — PLAN OF CARE
Patient denies nausea or pain, passing gas, no stool, refused laxatives this am. Tolerating regular diet, voiding spont, potassium is 3.2, replacement given, does not have to wait for recheck per Carlita England NP.Head CT done this am,discharge instructions reviewed with patient and discharged home.

## 2021-01-13 NOTE — PLAN OF CARE
Hypertensive, not within notifying parameters, OVSS on room air. Up ad andriy. Aox4 - reports had hallucinations while sleeping, pt thinks due to medication. Denies pain. Tolerating regular diet. Reports passing flatus, no BM overnight. Voids spontaneously with adequate UOP. PIV saline locked. Continue with POC.

## 2021-01-13 NOTE — LETTER
2021      RE: Taran Regalado  [address]  [facility] [address]       Taran is a 65 year old who is being evaluated via a billable video visit.      How would you like to obtain your AVS? MyChart  If the video visit is dropped, the invitation should be resent by: Text to cell phone: [phone]  Will anyone else be joining your video visit? No  {If patient encounters technical issues they should call [phone] :[phone]}    Phone visit: 25 minutes                Follow Up Notes on Referred Patient    Date: 2021       Dr. Bolivar Juarez MD  [address] [place] [id number]       RE: Taran Regalado  : 1955  GRACY: 2021        Past Medical History:    Past Medical History:   Diagnosis Date     History of cold sores      Insomnia      Migraine      Osteopenia      Pelvic mass      Peritoneal carcinomatosis (H)      Restless legs syndrome (RLS)          Past Surgical History:    Past Surgical History:   Procedure Laterality Date     APPENDECTOMY       ARTHROSCPY KNEE SURGICAL DEBRIDEMENT SHAVING ARTICULAR CARTILAGE Right      DEBRIDEMENT LEFT UPPER EXTREMITY  2016     LAPAROSCOPY DIAGNOSTIC (GYN) Bilateral 2021    Procedure: Diagnsotic laparoscopy, biopsies;  Surgeon: Bolivar Juarez MD;  [facility]       TUBAL LIGATION           Health Maintenance Due   Topic Date Due     PREVENTIVE CARE VISIT  1955     ADVANCE CARE PLANNING  1955     MAMMO SCREENING  1955     COLORECTAL CANCER SCREENING  1966     HIV SCREENING  1971     HEPATITIS C SCREENING  1974     DTAP/TDAP/TD IMMUNIZATION (1 - Tdap) 1981     LIPID  2001     ZOSTER IMMUNIZATION (1 of 2) 2006     INFLUENZA VACCINE (1) 2020     PHQ-2  2021       Current Medications:     Current Outpatient Medications   Medication Sig Dispense Refill     acetaminophen (TYLENOL) 325 MG tablet Take 2 tablets (650 mg) by mouth every 6 hours as needed for  mild pain 50 tablet 0     amitriptyline (ELAVIL) 100 MG tablet TAKE 1 TABLET (100 MG) BY MOUTH EVERY NIGHT AT BEDTIME       HYDROcodone-acetaminophen (NORCO) 5-325 MG tablet Take 1 tablet by mouth every 6 hours as needed for severe pain 8 tablet 0     ibuprofen (ADVIL/MOTRIN) 600 MG tablet Take 1 tablet (600 mg) by mouth every 6 hours as needed for other (mild and/or inflammatory pain) 30 tablet 0     OLANZapine zydis (ZYPREXA) 5 MG ODT Take 1 tablet (5 mg) by mouth At Bedtime 30 tablet 0     ondansetron (ZOFRAN-ODT) 4 MG ODT tab Take 1 tablet (4 mg) by mouth every 6 hours as needed for nausea or vomiting 20 tablet 0     polyethylene glycol (MIRALAX) 17 GM/Dose powder Take 17 g by mouth 2 times daily 510 g 0     prochlorperazine (COMPAZINE) 10 MG tablet Take 1 tablet (10 mg) by mouth every 6 hours as needed for nausea or vomiting 30 tablet 0     senna-docusate (SENOKOT-S/PERICOLACE) 8.6-50 MG tablet Take 2 tablets by mouth 2 times daily 30 tablet 0     SUMAtriptan (IMITREX) 100 MG tablet Take 100 mg by mouth at onset of headache        valACYclovir (VALTREX) 1000 mg tablet Take 1,000 mg by mouth as needed            Allergies:      No Known Allergies     Social History:     Social History     Tobacco Use     Smoking status: Former Smoker     Packs/day: 0.50     Years: 40.00     Pack years: 20.00     Quit date: 2018     Years since quitting: 3.0     Smokeless tobacco: Never Used   Substance Use Topics     Alcohol use: Not Currently       History   Drug Use Unknown         Family History:     The patient's family history is notable for ***.    Family History   Adopted: Yes   Problem Relation Age of Onset     Cancer Mother 36     Factor V Leiden deficiency Daughter      Deep Vein Thrombosis Daughter      Diabetes Type 1 Daughter          Physical Exam:     There were no vitals taken for this visit.  There is no height or weight on file to calculate BMI.    General Appearance: healthy and alert, no distress      HEENT:  no thyromegaly, no palpable nodules or masses        Cardiovascular: regular rate and rhythm, no gallops, rubs or murmurs     Respiratory: lungs clear, no rales, rhonchi or wheezes, normal diaphragmatic excursion    Musculoskeletal: extremities non tender and without edema    Skin: no lesions or rashes     Neurological: normal gait, no gross defects     Psychiatric: appropriate mood and affect                               Hematological: normal cervical, supraclavicular and inguinal lymph nodes     Gastrointestinal:       abdomen soft, non-tender, non-distended, no organomegaly or masses    Genitourinary: External genitalia and urethral meatus appears normal.  Vagina is smooth without nodularity or masses.  Cervix appears normal and without lesions.  Bimanual exam reveal no masses, nodularity or fullness.  Recto-vaginal exam confirms these findings.      Assessment:    Taran Regalado is a 65 year old woman with a diagnosis of ***.     A total of *** minutes was spent with the patient, *** minutes of which were spent in counseling the patient and/or treatment planning.      Plan:     1.)   ***     2.) Genetic risk factors were assessed and the patient does not meet the qualifications for a referral.      3.) Labs and/or tests ordered include:  ***.     4.) Health maintenance issues addressed today include ***.      CC  Patient Care Team:  Shira Juarez MD as PCP - General (Gynecologic Oncology)  SHIRA JUAREZ. MD Larry

## 2021-01-13 NOTE — PLAN OF CARE
BPs elevated: 140s-150s/80s-90. All other VSS. Pt denies pain. Chemo completed, tolerated well besides worsening nausea. Reg diet, poor intake. Pt reports increased nausea, Ativan x1, Compazine x1, & Zyprexa x1 with little relief. BS hypoactive, pt reports passing gas less often, still no BM, pt refused suppository tonight. Pt up independently, voiding frequently.

## 2021-01-13 NOTE — PROGRESS NOTES
Taran is a 65 year old who is being evaluated via a billable video visit.      How would you like to obtain your AVS? MyChart  If the video visit is dropped, the invitation should be resent by: Text to cell phone: [phone]  Will anyone else be joining your video visit? No      Phone visit: 25 minutes                Follow Up Notes on Referred Patient    Date: 2021       Dr. Bolivar Juarez MD  [address] [place] [id number]       RE: Taran Regalado  : 1955  GRACY: 2021      Taran Regalado is a 65 year old woman with a diagnosis of stage IV Ovarian Cancer. She is here today for follow up and disease management. She has received her first cycle of carbo and taxol yesterday as an inpatient. She has been doing well since discharge, her pain is better controlled, no nausea, vomiting, fever and chills. Normal urinary and bowel function.        Oncology history:      2020: I had the pleasure of seeing your patient Taran Regalado here at the Gynecologic Cancer Clinic at the [facility] ER on 2020.  As you know she is a very pleasant 65 year old woman with a recent diagnosis of carcinomatosis and elevated .  Given these findings she was subsequently sent to the Gynecologic Cancer Clinic for new patient consultation.   G3, P3, 3 prior vaginal deliveries, went through menopause in her 40s, has not had any postmenopausal bleeding.  Started to have some abdominal distention over the last month, lost about 25 pounds of weight.  CT scan showed carcinomatosis, possible pleural effusions. CA-125 is 3098.  She has reduced appetite.  Normal urinary and bowel function  Past Medical History include: Migraines and restless legs syndrome.         12/3/2020: US Pelvic: IMPRESSION: Limited examination due to acoustic windows. Possible left adnexal mass. A CT scan of the abdomen and pelvis with contrast is recommended for further assessment.  2020: CT A/P:  IMPRESSION:    Peritoneal carcinomatosis with masslike peritoneal thickening in the lower pelvis which may indicate an adnexal or ovarian primary malignancy. Large volume ascites. Bilateral pleural effusions. There is potential subtle pleural nodularity in the right hemithorax which could indicate metastatic disease.  Indeterminate 1 cm lesion in the right hepatic lobe suspicious for a metastatic lesion.    12/16/2020: Presented to [facility] with abdominal distention, 25lb weight loss, and CTAP with carcinomatosis, elevated  3098.   12/23/2020: CT Chest: IMPRESSION:   1. There are few scattered small sub-6 mm pulmonary nodules which are indeterminate without prior comparisons available. There are a few  slightly larger perifissural nodules which are technically  indeterminate in the setting of malignancy although presumed lymphatic in nature and of unlikely clinical significance. Attention on follow-up is recommended.  2. Small to moderate left and small right pleural effusions are increased in size from prior. No convincing evidence for pleural nodularity.  3. Partially visualized large volume ascites and peritoneal nodularity in the upper abdomen similar to 12/4/2020 outside CT   12/26/2020: ED for abdominal distension; 3 L ascites drained with paracentesis    Pelvic US: Findings: Free fluid present in LLQ   12/31/2020: US Paracentesis: 900 mL ascites drained  1/7/2021: Diagnotic laparoscopy, biopsies  Pathology: FINAL DIAGNOSIS:   A. PERITONEUM, BIOPSIES:   - Positive for high grade carcinoma, consistent with metastatic carcinoma of Mullerian origin.  1/10-1/13/2021: Hospital admission for postoperative non-intractable vomiting and nausea.   1/10/2021: CT A/P: IMPRESSION: Extensive ascites which is probably malignant. Scattered liver hypodensities of indeterminate etiology comment cannot exclude metastatic disease. Diverticulosis. Fluid-filled adnexal masses and irregular appearance of uterus, which may  represent primary neoplasm. Multiple peritoneal nodules. Large amount of fecal material in the colon with no evidence of small bowel obstruction.     1/12/2021: Cycle #1 Paclitaxel 175 mg/m2 Carboplatin AUC 6 inpatient      Past Medical History:    Past Medical History:   Diagnosis Date     History of cold sores      Insomnia      Migraine      Osteopenia      Pelvic mass      Peritoneal carcinomatosis (H)      Restless legs syndrome (RLS)          Past Surgical History:    Past Surgical History:   Procedure Laterality Date     APPENDECTOMY       ARTHROSCPY KNEE SURGICAL DEBRIDEMENT SHAVING ARTICULAR CARTILAGE Right      DEBRIDEMENT LEFT UPPER EXTREMITY  2016     LAPAROSCOPY DIAGNOSTIC (GYN) Bilateral 1/7/2021    Procedure: Diagnsotic laparoscopy, biopsies;  Surgeon: Bolivar Juarez MD;  Location:  OR     [facility]       TUBAL LIGATION           Health Maintenance Due   Topic Date Due     PREVENTIVE CARE VISIT  1955     ADVANCE CARE PLANNING  1955     MAMMO SCREENING  1955     COLORECTAL CANCER SCREENING  11/11/1966     HIV SCREENING  11/11/1971     HEPATITIS C SCREENING  11/11/1974     DTAP/TDAP/TD IMMUNIZATION (1 - Tdap) 11/11/1981     LIPID  11/11/2001     ZOSTER IMMUNIZATION (1 of 2) 11/11/2006     INFLUENZA VACCINE (1) 09/01/2020     PHQ-2  01/01/2021       Current Medications:     Current Outpatient Medications   Medication Sig Dispense Refill     acetaminophen (TYLENOL) 325 MG tablet Take 2 tablets (650 mg) by mouth every 6 hours as needed for mild pain 50 tablet 0     amitriptyline (ELAVIL) 100 MG tablet TAKE 1 TABLET (100 MG) BY MOUTH EVERY NIGHT AT BEDTIME       HYDROcodone-acetaminophen (NORCO) 5-325 MG tablet Take 1 tablet by mouth every 6 hours as needed for severe pain 8 tablet 0     ibuprofen (ADVIL/MOTRIN) 600 MG tablet Take 1 tablet (600 mg) by mouth every 6 hours as needed for other (mild and/or inflammatory pain) 30 tablet 0     OLANZapine zydis (ZYPREXA) 5 MG ODT Take 1  tablet (5 mg) by mouth At Bedtime 30 tablet 0     ondansetron (ZOFRAN-ODT) 4 MG ODT tab Take 1 tablet (4 mg) by mouth every 6 hours as needed for nausea or vomiting 20 tablet 0     polyethylene glycol (MIRALAX) 17 GM/Dose powder Take 17 g by mouth 2 times daily 510 g 0     prochlorperazine (COMPAZINE) 10 MG tablet Take 1 tablet (10 mg) by mouth every 6 hours as needed for nausea or vomiting 30 tablet 0     senna-docusate (SENOKOT-S/PERICOLACE) 8.6-50 MG tablet Take 2 tablets by mouth 2 times daily 30 tablet 0     SUMAtriptan (IMITREX) 100 MG tablet Take 100 mg by mouth at onset of headache        valACYclovir (VALTREX) 1000 mg tablet Take 1,000 mg by mouth as needed            Allergies:      No Known Allergies     Social History:     Social History     Tobacco Use     Smoking status: Former Smoker     Packs/day: 0.50     Years: 40.00     Pack years: 20.00     Quit date: 2018     Years since quitting: 3.0     Smokeless tobacco: Never Used   Substance Use Topics     Alcohol use: Not Currently       History   Drug Use Unknown         Family History:       Family History   Adopted: Yes   Problem Relation Age of Onset     Cancer Mother 36     Factor V Leiden deficiency Daughter      Deep Vein Thrombosis Daughter      Diabetes Type 1 Daughter          Physical Exam:     There were no vitals taken for this visit.  There is no height or weight on file to calculate BMI.    General: alert and oriented                Psychiatric:      appropriate mood and affect. Mentation appears normal, affect normal/bright, judgement and insight intact, normal speech              Assessment:     Taran Regalado is a 65 year old woman with a diagnosis of stage IV Ovarian Cancer. She is here today for follow up and disease management. In light of the recent COVID19 pandemic, and in accordance with our group and national guidelines this patients care has been reviewed and it is felt that presenting for her visit would be more likely to  increase rather than decrease her relative risks. Therefore this visit was conducted by telephone.       30 minutes spent on the date of the encounter doing chart review, history and exam, documentation, and further activities as noted above.           Plan:      1.)       We will proceed with 3 cycles of carbo AUC6 and taxol 175mg/m2 q 3 weeks. Plan is for CT CAP after Cycle #3 and MD follow up. At Cycle #3, I will order CT and oral contrast to have this completed three weeks post chemo.      2.)        Genetic risk factors were assessed and the patient does qualify for a genetic evaluation which will be completed as part of the Precision Medicine study. Message sent to Minh Donovan for follow up.       Addendum: Patient will take part in the Precision Medicine study. Will place genetics referral today.      3.)        Labs and/or tests ordered include:  Chemo labs, .                4.)        Health maintenance issues addressed today include annual health maintenance and non-gynecologic issues with PCP.       Shira Estrada MD, MS    Department of Obstetrics and Gynecology   Division of Gynecologic Oncology   [facility]  Phone: [phone]    CC  Patient Care Team:  Shira Estrada MD as PCP - General (Gynecologic Oncology)  SHIRA ESTRADA

## 2021-01-13 NOTE — PROGRESS NOTES
Gynecology Oncology Progress Note    HD#4 with nausea and vomiting, advanced ovarian cancer    24 hour events:   - Received cycle #1 carbo/taxol    Subjective:   Patient reports her nausea improved significantly overnight with IV compazine and Zyprexa. Denies abdominal pain. Had a small bowel movement this morning. She reports her abdomen feels full and bloated. Headache resolved.     Objective:   Vitals:    01/12/21 1750 01/12/21 1828 01/12/21 2200 01/13/21 0329   BP: (!) 156/90 (!) 150/86 (!) 145/67 (!) 144/74   BP Location: Right arm Right arm Left arm Left arm   Pulse: 76 80 77 77   Resp: 16 18 18 16   Temp: 97.1  F (36.2  C)  97.1  F (36.2  C)    TempSrc: Oral  Oral    SpO2: 92% 92% 95% 90%   Weight:       Height:           General: NAD, appears comfortable in bed  CV: RRR  Resp: Non-labored breathing on room air  Abdomen: Abdomen firm due to tumor burden,significantly distended and dull to percussion. Non-tender to palpation.  Incision: Laparoscopic incisions with steri-strips in place, appear intact, no drainage  Extremities: warm, well-perfused, nontender, no edema    24 hours: In - 600 mL IVF, 680 mL PO// OUT - no UOP measured, 16 voids, 1 stool    Labs:  CBC, BMP, Mg pending    Assessment:   65 year old with high grade carcinoma of likely gynecologic origin who is HD#4 admitted with nausea and vomiting. She is POD#6 s/p diagnostic laparoscopy with peritoneal biopsies. Imaging consistent with large ascites and constipation, no evidence of obstruction. Suspect nausea and vomiting multifactorial due to extensive ascites, carcinomatosis, and postoperative constipation, somewhat improved this morning.     Plan:    Dz: High grade carcinoma. CTAP 12/4: peritoneal carcinomatosis with mass-like peritoneal thickening in lower pelvis, large volume ascites, b/l pleural effusions, pleural nodularity of R hemithorax, 1cm lesion in R hepatic lobe suspicious for metastasis. Para 12/26 3L, 12/31 900ml. 1/7: Dx lsc,  peritoneal biopsies, Frozen: High grade carcinoma of Mullerian origin. CTAP 1/10: Extensive ascites, scattered liver hypodensities, fluid filled adnexal masses and irregular appearance of uterus, large stool burden in colon, no evidence of obstruction. Received cycle #1 of carbo/taxol on 1/12.   FEN: Regular diet, HypoK>ERP ordered.  Pain: Tylenol, Oxycodone, Dilaudid prn  Heme: NI stable postoperative hemoglobin  CV: NI  Pulm: NI  GI: Nausea and vomiting as above, IV compazine, zyprexa, and ativan ordered. Nausea improved this AM. Consider head imaging today to rule out intracranial mets or pathology if persistent nausea.   - In regards to patient's constipation, she is s/p enema x2 with minimal stool ouput. Senna, Miralax, magnesium citrate and Dulcolax suppository ordered, patient refusing Miralax and suppository overnight.   : NI  ID: NI, no evidence of infection  Endocrine: NI  Psych/Neuro: Migraines - Imitrex prn  PPX: SCDs, Lovenox ppx  Dispo: Pending clinical improvement.     Fany Doshi MD  Ob/Gyn PGY-4      I spent a total of 35 minutes bedside and on the inpatient unit today managing the care of Taran Regalado. Over 50% of my time on the unit was spent counseling the patient and/or coordinating care regarding ovarian cancer, nausea, HA. See note for details.     Tolerated chemo well yesterday with some AMS that resolved  Plan CT head if patient is agreeable to r/o intracranial mets given ongoing low grade nausea and worsening HA/migraines  Remainder of chemo as outpatient. Likely with discharge today    -Fatmata Monae MD  Gynecologic Oncology  Pager [phone]

## 2021-01-14 ENCOUNTER — PATIENT OUTREACH (OUTPATIENT)
Dept: ONCOLOGY | Facility: CLINIC | Age: 66
End: 2021-01-14

## 2021-01-14 NOTE — PROGRESS NOTES
Follow Up Notes on Referred Patient    Date: 2020       Dr. Ye Vaughn MD  No address on file       RE: Taran Regalado  : 1955  GRACY: 2020    Dear Dr. Referred Self:    Taran Regalado is a 65 year old woman with a diagnosis of carcinomatosis and elevated . No new symptoms since I have talked to her.    Past Medical History:    Past Medical History:   Diagnosis Date     History of cold sores      Insomnia      Migraine      Osteopenia      Pelvic mass      Peritoneal carcinomatosis (H)      Restless legs syndrome (RLS)          Past Surgical History:    Past Surgical History:   Procedure Laterality Date     APPENDECTOMY       ARTHROSCPY KNEE SURGICAL DEBRIDEMENT SHAVING ARTICULAR CARTILAGE Right      DEBRIDEMENT LEFT UPPER EXTREMITY  2016     LAPAROSCOPY DIAGNOSTIC (GYN) Bilateral 2021    Procedure: Diagnsotic laparoscopy, biopsies;  Surgeon: Bolivar Juarez MD;  Location: [facility] OR     LASIK       TUBAL LIGATION           Health Maintenance Due   Topic Date Due     PREVENTIVE CARE VISIT  1955     ADVANCE CARE PLANNING  1955     MAMMO SCREENING  1955     COLORECTAL CANCER SCREENING  1966     HIV SCREENING  1971     HEPATITIS C SCREENING  1974     DTAP/TDAP/TD IMMUNIZATION (1 - Tdap) 1981     LIPID  2001     ZOSTER IMMUNIZATION (1 of 2) 2006     INFLUENZA VACCINE (1) 2020     PHQ-2  2021       Current Medications:     Current Outpatient Medications   Medication Sig Dispense Refill     amitriptyline (ELAVIL) 100 MG tablet TAKE 1 TABLET (100 MG) BY MOUTH EVERY NIGHT AT BEDTIME       SUMAtriptan (IMITREX) 100 MG tablet Take 100 mg by mouth at onset of headache        valACYclovir (VALTREX) 1000 mg tablet Take 1,000 mg by mouth as needed        acetaminophen (TYLENOL) 325 MG tablet Take 2 tablets (650 mg) by mouth every 6 hours as needed for mild pain 50 tablet 0     HYDROcodone-acetaminophen (NORCO)  5-325 MG tablet Take 1 tablet by mouth every 6 hours as needed for severe pain 8 tablet 0     ibuprofen (ADVIL/MOTRIN) 600 MG tablet Take 1 tablet (600 mg) by mouth every 6 hours as needed for other (mild and/or inflammatory pain) 30 tablet 0     OLANZapine zydis (ZYPREXA) 5 MG ODT Take 1 tablet (5 mg) by mouth At Bedtime 30 tablet 0     ondansetron (ZOFRAN-ODT) 4 MG ODT tab Take 1 tablet (4 mg) by mouth every 6 hours as needed for nausea or vomiting 20 tablet 0     polyethylene glycol (MIRALAX) 17 GM/Dose powder Take 17 g by mouth 2 times daily 510 g 0     prochlorperazine (COMPAZINE) 10 MG tablet Take 1 tablet (10 mg) by mouth every 6 hours as needed for nausea or vomiting 30 tablet 0     senna-docusate (SENOKOT-S/PERICOLACE) 8.6-50 MG tablet Take 2 tablets by mouth 2 times daily 30 tablet 0         Allergies:      No Known Allergies     Social History:     Social History     Tobacco Use     Smoking status: Former Smoker     Packs/day: 0.50     Years: 40.00     Pack years: 20.00     Quit date: 2018     Years since quitting: 3.0     Smokeless tobacco: Never Used   Substance Use Topics     Alcohol use: Not Currently       History   Drug Use Unknown         Family History:         Family History   Adopted: Yes   Problem Relation Age of Onset     Cancer Mother 36     Factor V Leiden deficiency Daughter      Deep Vein Thrombosis Daughter      Diabetes Type 1 Daughter          Physical Exam:     BP (!) 155/77   Pulse 85   Temp 97.5  F (36.4  C) (Oral)   Resp 18   Wt 71.2 kg (157 lb)   SpO2 99%   There is no height or weight on file to calculate BMI.    General Appearance: healthy and alert, no distress     Musculoskeletal: extremities non tender and without edema    Skin: no lesions or rashes     Neurological: normal gait, no gross defects     Psychiatric: appropriate mood and affect                               Hematological: normal cervical, supraclavicular and inguinal lymph nodes     Gastrointestinal:        abdomen soft, non-tender, distended, no organomegaly    Genitourinary: External genitalia and urethral meatus appears normal.  Vagina is smooth without nodularity or masses.  Cervix appears normal and without lesions.  Bimanual exam reveal fixed pelvic mass.  Recto-vaginal exam confirms these findings.      Assessment:    Taran Regalado is a 65 year old woman with a diagnosis of carcinomatosis and elevated .     A total of 40 minutes was spent with the patient, 30 minutes of which were spent in counseling the patient and/or treatment planning.      1.  Carcinomatosis.   2.  Pelvic mass.   3.  Pleural effusions.   4.  CA-125 of 3098.   5.  Precision Medicine Program     I had a long discussion with the patient, this is almost certainly ovarian, appears to be advanced ovarian cancer.  We discussed pathophysiology of those as well as treatment. The CT of the chest does not demonstrate definitive solid chest disease, but is highly suspicious for it given also the pleural effusions. We will plan to proceed with a diagnostic laparoscopy to get diagnosis and then possible primary cytoreduction versus neoadjuvant chemotherapy followed by interval cytoreduction if she can not be cytoreduced primarily to RO. We will have her see my colleagues in anesthesia for preoperative optimization. The patient agrees with this plan.  Will also will her in Precision Medicine and/or research programs and studies.  Patient is very appreciative of her care.  All questions were answered.         Risks, benefits and alternatives to proceed discussed in detail with the patient. Risks include but are not limited to bleeding, infection, possible injury to surrounding organs including bowel, bladder, ureter, need for second procedure/surgery related to complications from first procedure, postoperative medical complications such as cardiopulmonary events, lymphedema, lymphocyst, thromboembolic events. Consent for surgery, blood  transfusion signed.  Will arrange appropriate preoperative blood work, CXR, EKG. Patient also advised on need for postoperative surveillance and/or adjuvant therapy. Questions answered.         Bolivar Juarez MD, MS    Department of Obstetrics and Gynecology   Division of Gynecologic Oncology   [facility]  Phone: [phone]         CC  Patient Care Team:  Bolivar Juarez MD as PCP - General (Gynecologic Oncology)  SELF, REFERRED

## 2021-01-14 NOTE — PROGRESS NOTES
Post hospital call not needed. Patient had visit with MD within 24 hours of discharge.     Shawna Hinojosa RN

## 2021-01-15 DIAGNOSIS — Z11.59 ENCOUNTER FOR SCREENING FOR OTHER VIRAL DISEASES: Primary | ICD-10-CM

## 2021-01-18 ENCOUNTER — PATIENT OUTREACH (OUTPATIENT)
Dept: ONCOLOGY | Facility: CLINIC | Age: 66
End: 2021-01-18

## 2021-01-18 DIAGNOSIS — C56.9 OVARIAN CANCER, UNSPECIFIED LATERALITY (H): Primary | ICD-10-CM

## 2021-01-18 NOTE — PROGRESS NOTES
Spoke to Kate about her chemotherapy plan and post chemo bone pain.  Patient is feeling better but days 2-5 her bone pain is very bad - once on oxycodone seems to help but wondering if a higher dose oxycodone might be available day 2-5?  I explained that this might need to be addressed with Dr. Juarez.  I will be emailing them information on IR port placement and getting her set up for total of 3 cycles, CT scan, follow up with Dr. Juarez with a tentative 4th cycle set up.  Will have [facility] scheduling reach out to them with these appointments.

## 2021-01-19 ENCOUNTER — TELEPHONE (OUTPATIENT)
Dept: ONCOLOGY | Facility: CLINIC | Age: 66
End: 2021-01-19

## 2021-01-19 NOTE — TELEPHONE ENCOUNTER
" spoke with the patient's daughter Edie today. She did inform me that they are in the process of getting insurance for the patient.      did explain the waiver the patient would need to sign and that she would need to pay up front for the cost of the treatment/labs, if she does not have insurance by the time of her appointment on 2/5/2021. Ashleyr also informed her that  would need to submit a letter stating the treatment is urgent/life threatening versus waiting until she has insurance.    Edie did say that the patient is undecided about having the port placed at this time, she is going to think about it more.    The family is concerned that waiting until 2/5/2021 would harm the patient, as the patient would be due 2/2/2021 for Cycle 2. Edie stated that they would go anywhere to have the chemo, the patient is currently living in [place] with Edie and her family.      did give Edie the Consumer Price Line number to contact, for information regarding the cost of the services that would be due, without insurance.     Edie would like a call to discuss the slight delay in receiving chemo a few days later then \"what the treatment plan\" would be. Also to discuss the letter from Dr. Juarez if the patient is unable to get insurance.     Please call Edie at [phone] with any updates regarding letter if needed from  and if the appointments scheduled on 2/5/2021 at [place] will need to be cancelled.     sent a message to Ramona Viera RN, GynOnc.  also spoke with Ramon [facility] [place] .    Anabell Lowe  [facility]  Cancer Center   [phone]      "

## 2021-01-20 ENCOUNTER — PATIENT OUTREACH (OUTPATIENT)
Dept: ONCOLOGY | Facility: CLINIC | Age: 66
End: 2021-01-20

## 2021-01-20 NOTE — PROGRESS NOTES
Left message for Edie (Rosalinda's daughter) in regards to her mom not having current insurance.  I explained I would leave her infusion appointment on 2/5 set up in [place] until they tell me otherwise.  Explained that infusion appointments are usually thousands of dollars so if they need help with getting her set up for insurance I could reach out to our financial team to see how we could help.  Waiting for call back.

## 2021-01-21 ENCOUNTER — PATIENT OUTREACH (OUTPATIENT)
Dept: ONCOLOGY | Facility: CLINIC | Age: 66
End: 2021-01-21

## 2021-01-21 NOTE — PROGRESS NOTES
RN reached out to patients daughter to further discuss MyChart.     RN answered all the patients questions and concerns to the best of her ability.     Shawna Hinojosa RN

## 2021-01-22 RX ORDER — OXYCODONE HYDROCHLORIDE 5 MG/1
5 TABLET ORAL EVERY 6 HOURS PRN
Qty: 40 TABLET | Refills: 0 | Status: SHIPPED | OUTPATIENT
Start: 2021-01-22 | End: 2021-01-25

## 2021-01-27 ENCOUNTER — VIRTUAL VISIT (OUTPATIENT)
Dept: ONCOLOGY | Facility: CLINIC | Age: 66
End: 2021-01-27
Attending: OBSTETRICS & GYNECOLOGY
Payer: MEDICAID

## 2021-01-27 DIAGNOSIS — C56.9 OVARIAN CANCER, UNSPECIFIED LATERALITY (H): Primary | ICD-10-CM

## 2021-01-27 PROCEDURE — 99215 OFFICE O/P EST HI 40 MIN: CPT | Mod: 95 | Performed by: OBSTETRICS & GYNECOLOGY

## 2021-01-27 PROCEDURE — 999N001193 HC VIDEO/TELEPHONE VISIT; NO CHARGE

## 2021-01-27 NOTE — PROGRESS NOTES
Taran is a 65 year old who is being evaluated via a billable video visit.      How would you like to obtain your AVS? MyChart  If the video visit is dropped, the invitation should be resent by: Text to cell phone: [phone]  Will anyone else be joining your video visit? No      Vitals - Patient Reported  Weight (Patient Reported): 64.9 kg (143 lb)  Height (Patient Reported): 182.9 cm (6')  BMI (Based on Pt Reported Ht/Wt): 19.39  Pain Score: No Pain (0)      I have reviewed and updated patient's allergy and medication list.    Concerns: NONE  Refills: NONE      Bertha Montana CMA    Phone visit:    40 minutes.                Follow Up Notes on Referred Patient    Date: 2021       Dr. Bolivar Juarez MD  [address] [place] [id number]       RE: Taran Regalado  : 1955  GRACY: 2021    Taran Regalado is a 65 year old woman with a diagnosis of stage IV Ovarian Cancer. She is here today for follow up and disease management. She has received her first cycle of carbo and taxol yesterday as an inpatient. She has been doing well since discharge, her pain is better controlled, no nausea, vomiting, fever and chills. Normal urinary and bowel function.          Oncology history:      2020: I had the pleasure of seeing your patient Taran Regalado here at the Gynecologic Cancer Clinic at the [facility] on 2020.  As you know she is a very pleasant 65 year old woman with a recent diagnosis of carcinomatosis and elevated .  Given these findings she was subsequently sent to the Gynecologic Cancer Clinic for new patient consultation.   G3, P3, 3 prior vaginal deliveries, went through menopause in her 40s, has not had any postmenopausal bleeding.  Started to have some abdominal distention over the last month, lost about 25 pounds of weight.  CT scan showed carcinomatosis, possible pleural effusions. CA-125 is 3098.  She has reduced appetite.  Normal urinary  and bowel function  Past Medical History include: Migraines and restless legs syndrome.         12/3/2020: US Pelvic: IMPRESSION: Limited examination due to acoustic windows. Possible left adnexal mass. A CT scan of the abdomen and pelvis with contrast is recommended for further assessment.  12/4/2020: CT A/P: IMPRESSION:    Peritoneal carcinomatosis with masslike peritoneal thickening in the lower pelvis which may indicate an adnexal or ovarian primary malignancy. Large volume ascites. Bilateral pleural effusions. There is potential subtle pleural nodularity in the right hemithorax which could indicate metastatic disease.  Indeterminate 1 cm lesion in the right hepatic lobe suspicious for a metastatic lesion.    12/16/2020: [facility], 25lb weight loss, and CTAP with carcinomatosis, elevated  3098.   12/23/2020: CT Chest: IMPRESSION:   1. There are few scattered small sub-6 mm pulmonary nodules which are indeterminate without prior comparisons available. There are a few  slightly larger perifissural nodules which are technically  indeterminate in the setting of malignancy although presumed lymphatic in nature and of unlikely clinical significance. Attention on follow-up is recommended.  2. Small to moderate left and small right pleural effusions are increased in size from prior. No convincing evidence for pleural nodularity.  3. Partially visualized large volume ascites and peritoneal nodularity in the upper abdomen similar to 12/4/2020 outside CT   12/26/2020: ED for abdominal distension; 3 L ascites drained with paracentesis    Pelvic US: Findings: Free fluid present in LLQ   12/31/2020: US Paracentesis: 900 mL ascites drained  1/7/2021: Diagnotic laparoscopy, biopsies  Pathology: FINAL DIAGNOSIS:   A. PERITONEUM, BIOPSIES:   - Positive for high grade carcinoma, consistent with metastatic carcinoma of Mullerian origin.  1/10-1/13/2021: Hospital admission for postoperative  non-intractable vomiting and nausea.   1/10/2021: CT A/P: IMPRESSION: Extensive ascites which is probably malignant. Scattered liver hypodensities of indeterminate etiology comment cannot exclude metastatic disease. Diverticulosis. Fluid-filled adnexal masses and irregular appearance of uterus, which may represent primary neoplasm. Multiple peritoneal nodules. Large amount of fecal material in the colon with no evidence of small bowel obstruction.     1/12/2021: Cycle #1 Paclitaxel 175 mg/m2 Carboplatin AUC 6 inpatient    Past Medical History:    Past Medical History:   Diagnosis Date     History of cold sores      Insomnia      Migraine      Osteopenia      Pelvic mass      Peritoneal carcinomatosis (H)      Restless legs syndrome (RLS)          Past Surgical History:    Past Surgical History:   Procedure Laterality Date     APPENDECTOMY       ARTHROSCPY KNEE SURGICAL DEBRIDEMENT SHAVING ARTICULAR CARTILAGE Right      DEBRIDEMENT LEFT UPPER EXTREMITY  2016     LAPAROSCOPY DIAGNOSTIC (GYN) Bilateral 1/7/2021    Procedure: Diagnsotic laparoscopy, biopsies;  Surgeon: Bolivar Juarez MD;  [facility]       TUBAL LIGATION           Health Maintenance Due   Topic Date Due     PREVENTIVE CARE VISIT  1955     ADVANCE CARE PLANNING  1955     MAMMO SCREENING  1955     COLORECTAL CANCER SCREENING  11/11/1966     HIV SCREENING  11/11/1971     HEPATITIS C SCREENING  11/11/1974     DTAP/TDAP/TD IMMUNIZATION (1 - Tdap) 11/11/1981     LIPID  11/11/2001     ZOSTER IMMUNIZATION (1 of 2) 11/11/2006     INFLUENZA VACCINE (1) 09/01/2020     PHQ-2  01/01/2021       Current Medications:     Current Outpatient Medications   Medication Sig Dispense Refill     acetaminophen (TYLENOL) 325 MG tablet Take 2 tablets (650 mg) by mouth every 6 hours as needed for mild pain 50 tablet 0     amitriptyline (ELAVIL) 100 MG tablet TAKE 1 TABLET (100 MG) BY MOUTH EVERY NIGHT AT BEDTIME       ibuprofen (ADVIL/MOTRIN)  600 MG tablet Take 1 tablet (600 mg) by mouth every 6 hours as needed for other (mild and/or inflammatory pain) 30 tablet 0     iohexol (OMNIPAQUE) 140 MG/ML solution for oral use Mix entire bottle (50 ml) of contrast with 600 ml (20 ounces) of water and drink half 2 hrs prior to CT scan and half 1 hr prior to scan 140 mL 0     OLANZapine zydis (ZYPREXA) 5 MG ODT Take 1 tablet (5 mg) by mouth At Bedtime 30 tablet 0     ondansetron (ZOFRAN-ODT) 4 MG ODT tab Take 1 tablet (4 mg) by mouth every 6 hours as needed for nausea or vomiting 20 tablet 0     polyethylene glycol (MIRALAX) 17 GM/Dose powder Take 17 g by mouth 2 times daily 510 g 0     prochlorperazine (COMPAZINE) 10 MG tablet Take 1 tablet (10 mg) by mouth every 6 hours as needed for nausea or vomiting 30 tablet 0     senna-docusate (SENOKOT-S/PERICOLACE) 8.6-50 MG tablet Take 2 tablets by mouth 2 times daily 30 tablet 0     SUMAtriptan (IMITREX) 100 MG tablet Take 100 mg by mouth at onset of headache        valACYclovir (VALTREX) 1000 mg tablet Take 1,000 mg by mouth as needed        HYDROcodone-acetaminophen (NORCO) 5-325 MG tablet Take 1 tablet by mouth every 6 hours as needed for severe pain 8 tablet 0         Allergies:      No Known Allergies     Social History:     Social History     Tobacco Use     Smoking status: Former Smoker     Packs/day: 0.50     Years: 40.00     Pack years: 20.00     Quit date: 2018     Years since quitting: 3.0     Smokeless tobacco: Never Used   Substance Use Topics     Alcohol use: Not Currently       History   Drug Use Unknown         Family History:       Family History   Adopted: Yes   Problem Relation Age of Onset     Cancer Mother 36     Factor V Leiden deficiency Daughter      Deep Vein Thrombosis Daughter      Diabetes Type 1 Daughter          Physical Exam:     There were no vitals taken for this visit.  There is no height or weight on file to calculate BMI.    General: alert and  oriented                Psychiatric:      appropriate mood and affect. Mentation appears normal, affect normal/bright, judgement and insight intact, normal speech              Assessment:     Taran Regalado is a 65 year old woman with a diagnosis of stage IV Ovarian Cancer. She is here today for follow up and disease management. In light of the recent COVID19 pandemic, and in accordance with our group and national guidelines this patients care has been reviewed and it is felt that presenting for her visit would be more likely to increase rather than decrease her relative risks. Therefore this visit was conducted by telephone.       40 minutes spent on the date of the encounter doing chart review, history and exam, documentation, and further activities as noted above.           Plan:      1.)       We will proceed with 3 cycles of carbo AUC6 and taxol 175mg/m2 q 3 weeks. Plan is for CT CAP after Cycle #3 and MD follow up. At Cycle #3, I will order CT and oral contrast to have this completed three weeks post chemo.      2.)        Genetic risk factors were assessed and the patient does qualify for a genetic evaluation which will be completed as part of the Precision Medicine study. Message sent to Minh Donovan for follow up.       Addendum: Patient will take part in the Precision Medicine study. Will place genetics referral today.      3.)        Labs and/or tests ordered include:  Chemo labs, .                4.)        Health maintenance issues addressed today include annual health maintenance and non-gynecologic issues with PCP.        Shira Estrada MD, MS    Department of Obstetrics and Gynecology   Division of Gynecologic Oncology   [facility]  Phone: [phone]         CC  Patient Care Team:  Shira Estrada MD as PCP - General (Gynecologic Oncology)  Ramona Viera RN as Specialty Care Coordinator (Gynecologic Oncology)  SHIRA ESTRADA

## 2021-01-27 NOTE — Clinical Note
"    1/27/2021         RE: Taran Regalado  [address] [facility] [address]        Dear Colleague,    Thank you for referring your patient, Taran Regalado, to the [facility]. Please see a copy of my visit note below.    Taran is a 65 year old who is being evaluated via a billable video visit.      How would you like to obtain your AVS? MyChart  If the video visit is dropped, the invitation should be resent by: Text to cell phone: [phone]  Will anyone else be joining your video visit? No  {If patient encounters technical issues they should call [phone] :[id number]}    Vitals - Patient Reported  Weight (Patient Reported): 64.9 kg (143 lb)  Height (Patient Reported): 182.9 cm (6')  BMI (Based on Pt Reported Ht/Wt): 19.39  Pain Score: No Pain (0)      I have reviewed and updated patient's allergy and medication list.    Concerns: NONE  Refills: NONE      Bertha Montana CMA    Video Start Time: {video visit start/end time for provider to select:438226}  Video-Visit Details    Type of service:  Video Visit    Video End Time:{video visit start/end time for provider to select:152948}    Originating Location (pt. Location): {video visit patient location:906837::\"Home\"}    Distant Location (provider location):  [facility]     Platform used for Video Visit: {Virtual Visit Platforms:651714::\"Mobvoi\"}        Again, thank you for allowing me to participate in the care of your patient.        Sincerely,        Bolivar Juarez MD  "

## 2021-01-27 NOTE — LETTER
2021      RE: Taran Regalado  [address]  [facility] [address]       Taran is a 65 year old who is being evaluated via a billable video visit.      How would you like to obtain your AVS? MyChart  If the video visit is dropped, the invitation should be resent by: Text to cell phone: [phone]  Will anyone else be joining your video visit? No  {If patient encounters technical issues they should call [phone] :150956}    Vitals - Patient Reported  Weight (Patient Reported): 64.9 kg (143 lb)  Height (Patient Reported): 182.9 cm (6')  BMI (Based on Pt Reported Ht/Wt): 19.39  Pain Score: No Pain (0)      I have reviewed and updated patient's allergy and medication list.    Concerns: NONE  Refills: NONE      Bertha Montana CMA    Phone visit:    40 minutes.                Follow Up Notes on Referred Patient    Date: 2021       Dr. Bolivar Juarez MD  [address] [place] [id number]       RE: Taran Regalado  : 1955  GRACY: 2021    Taran Regalado is a 65 year old woman with a diagnosis of stage IV Ovarian Cancer. She is here today for follow up and disease management. She has received her first cycle of carbo and taxol yesterday as an inpatient. She has been doing well since discharge, her pain is better controlled, no nausea, vomiting, fever and chills. Normal urinary and bowel function.          Oncology history:      2020: I had the pleasure of seeing your patient Taran Regalado here at the Gynecologic Cancer Clinic at the [facility] on 2020.  As you know she is a very pleasant 65 year old woman with a recent diagnosis of carcinomatosis and elevated .  Given these findings she was subsequently sent to the Gynecologic Cancer Clinic for new patient consultation.   G3, P3, 3 prior vaginal deliveries, went through menopause in her 40s, has not had any postmenopausal bleeding.  Started to have some abdominal distention over the last  month, lost about 25 pounds of weight.  CT scan showed carcinomatosis, possible pleural effusions. CA-125 is 3098.  She has reduced appetite.  Normal urinary and bowel function  Past Medical History include: Migraines and restless legs syndrome.         12/3/2020: US Pelvic: IMPRESSION: Limited examination due to acoustic windows. Possible left adnexal mass. A CT scan of the abdomen and pelvis with contrast is recommended for further assessment.  12/4/2020: CT A/P: IMPRESSION:    Peritoneal carcinomatosis with masslike peritoneal thickening in the lower pelvis which may indicate an adnexal or ovarian primary malignancy. Large volume ascites. Bilateral pleural effusions. There is potential subtle pleural nodularity in the right hemithorax which could indicate metastatic disease.  Indeterminate 1 cm lesion in the right hepatic lobe suspicious for a metastatic lesion.    [facility]16[facility]2020: [facility], 25lb weight loss, and CTAP with carcinomatosis, elevated  3098.   12/23/2020: CT Chest: IMPRESSION:   1. There are few scattered small sub-6 mm pulmonary nodules which are indeterminate without prior comparisons available. There are a few  slightly larger perifissural nodules which are technically  indeterminate in the setting of malignancy although presumed lymphatic in nature and of unlikely clinical significance. Attention on follow-up is recommended.  2. Small to moderate left and small right pleural effusions are increased in size from prior. No convincing evidence for pleural nodularity.  3. Partially visualized large volume ascites and peritoneal nodularity in the upper abdomen similar to 12/4/2020 outside CT   12/26/2020: ED for abdominal distension; 3 L ascites drained with paracentesis    Pelvic US: Findings: Free fluid present in LLQ   12/31/2020: US Paracentesis: 900 mL ascites drained  1/7/2021: Diagnotic laparoscopy, biopsies  Pathology: FINAL DIAGNOSIS:   A. PERITONEUM,  BIOPSIES:   - Positive for high grade carcinoma, consistent with metastatic carcinoma of Mullerian origin.  1/10-1/13/2021: Hospital admission for postoperative non-intractable vomiting and nausea.   1/10/2021: CT A/P: IMPRESSION: Extensive ascites which is probably malignant. Scattered liver hypodensities of indeterminate etiology comment cannot exclude metastatic disease. Diverticulosis. Fluid-filled adnexal masses and irregular appearance of uterus, which may represent primary neoplasm. Multiple peritoneal nodules. Large amount of fecal material in the colon with no evidence of small bowel obstruction.     1/12/2021: Cycle #1 Paclitaxel 175 mg/m2 Carboplatin AUC 6 inpatient    Past Medical History:    Past Medical History:   Diagnosis Date     History of cold sores      Insomnia      Migraine      Osteopenia      Pelvic mass      Peritoneal carcinomatosis (H)      Restless legs syndrome (RLS)          Past Surgical History:    Past Surgical History:   Procedure Laterality Date     APPENDECTOMY       ARTHROSCPY KNEE SURGICAL DEBRIDEMENT SHAVING ARTICULAR CARTILAGE Right      DEBRIDEMENT LEFT UPPER EXTREMITY  2016     LAPAROSCOPY DIAGNOSTIC (GYN) Bilateral 1/7/2021    Procedure: Diagnsotic laparoscopy, biopsies;  Surgeon: Bolivar Juarez MD;  Location: [facility]     LASIK       TUBAL LIGATION           Health Maintenance Due   Topic Date Due     PREVENTIVE CARE VISIT  1955     ADVANCE CARE PLANNING  1955     MAMMO SCREENING  1955     COLORECTAL CANCER SCREENING  11/11/1966     HIV SCREENING  11/11/1971     HEPATITIS C SCREENING  11/11/1974     DTAP/TDAP/TD IMMUNIZATION (1 - Tdap) 11/11/1981     LIPID  11/11/2001     ZOSTER IMMUNIZATION (1 of 2) 11/11/2006     INFLUENZA VACCINE (1) 09/01/2020     PHQ-2  01/01/2021       Current Medications:     Current Outpatient Medications   Medication Sig Dispense Refill     acetaminophen (TYLENOL) 325 MG tablet Take 2 tablets (650 mg) by mouth every 6 hours as  needed for mild pain 50 tablet 0     amitriptyline (ELAVIL) 100 MG tablet TAKE 1 TABLET (100 MG) BY MOUTH EVERY NIGHT AT BEDTIME       ibuprofen (ADVIL/MOTRIN) 600 MG tablet Take 1 tablet (600 mg) by mouth every 6 hours as needed for other (mild and/or inflammatory pain) 30 tablet 0     iohexol (OMNIPAQUE) 140 MG/ML solution for oral use Mix entire bottle (50 ml) of contrast with 600 ml (20 ounces) of water and drink half 2 hrs prior to CT scan and half 1 hr prior to scan 140 mL 0     OLANZapine zydis (ZYPREXA) 5 MG ODT Take 1 tablet (5 mg) by mouth At Bedtime 30 tablet 0     ondansetron (ZOFRAN-ODT) 4 MG ODT tab Take 1 tablet (4 mg) by mouth every 6 hours as needed for nausea or vomiting 20 tablet 0     polyethylene glycol (MIRALAX) 17 GM/Dose powder Take 17 g by mouth 2 times daily 510 g 0     prochlorperazine (COMPAZINE) 10 MG tablet Take 1 tablet (10 mg) by mouth every 6 hours as needed for nausea or vomiting 30 tablet 0     senna-docusate (SENOKOT-S/PERICOLACE) 8.6-50 MG tablet Take 2 tablets by mouth 2 times daily 30 tablet 0     SUMAtriptan (IMITREX) 100 MG tablet Take 100 mg by mouth at onset of headache        valACYclovir (VALTREX) 1000 mg tablet Take 1,000 mg by mouth as needed        HYDROcodone-acetaminophen (NORCO) 5-325 MG tablet Take 1 tablet by mouth every 6 hours as needed for severe pain 8 tablet 0         Allergies:      No Known Allergies     Social History:     Social History     Tobacco Use     Smoking status: Former Smoker     Packs/day: 0.50     Years: 40.00     Pack years: 20.00     Quit date: 2018     Years since quitting: 3.0     Smokeless tobacco: Never Used   Substance Use Topics     Alcohol use: Not Currently       History   Drug Use Unknown         Family History:       Family History   Adopted: Yes   Problem Relation Age of Onset     Cancer Mother 36     Factor V Leiden deficiency Daughter      Deep Vein Thrombosis Daughter      Diabetes Type 1 Daughter          Physical Exam:      There were no vitals taken for this visit.  There is no height or weight on file to calculate BMI.    General: alert and oriented                Psychiatric:      appropriate mood and affect. Mentation appears normal, affect normal/bright, judgement and insight intact, normal speech              Assessment:     Taran Regalado is a 65 year old woman with a diagnosis of stage IV Ovarian Cancer. She is here today for follow up and disease management. In light of the recent COVID19 pandemic, and in accordance with our group and national guidelines this patients care has been reviewed and it is felt that presenting for her visit would be more likely to increase rather than decrease her relative risks. Therefore this visit was conducted by telephone.       40 minutes spent on the date of the encounter doing chart review, history and exam, documentation, and further activities as noted above.           Plan:      1.)       We will proceed with 3 cycles of carbo AUC6 and taxol 175mg/m2 q 3 weeks. Plan is for CT CAP after Cycle #3 and MD follow up. At Cycle #3, I will order CT and oral contrast to have this completed three weeks post chemo.      2.)        Genetic risk factors were assessed and the patient does qualify for a genetic evaluation which will be completed as part of the Precision Medicine study. Message sent to Minh Donovan for follow up.       Addendum: Patient will take part in the Precision Medicine study. Will place genetics referral today.      3.)        Labs and/or tests ordered include:  Chemo labs, .                4.)        Health maintenance issues addressed today include annual health maintenance and non-gynecologic issues with PCP.        Bolivar Juarez MD, MS    Department of Obstetrics and Gynecology   Division of Gynecologic Oncology   [facility]  Phone: [phone]         CC  Patient Care Team:  Bolivar Juarez MD as PCP - General  (Gynecologic Oncology)  Ramona Viera, RN as Specialty Care Coordinator (Gynecologic Oncology)  SHIRA ESTRADA MD

## 2021-01-29 ENCOUNTER — TELEPHONE (OUTPATIENT)
Dept: ONCOLOGY | Facility: CLINIC | Age: 66
End: 2021-01-29

## 2021-01-29 DIAGNOSIS — C56.9 OVARIAN CANCER, UNSPECIFIED LATERALITY (H): ICD-10-CM

## 2021-01-29 LAB
SARS-COV-2 RNA RESP QL NAA+PROBE: NORMAL
SPECIMEN SOURCE: NORMAL

## 2021-01-29 PROCEDURE — U0003 INFECTIOUS AGENT DETECTION BY NUCLEIC ACID (DNA OR RNA); SEVERE ACUTE RESPIRATORY SYNDROME CORONAVIRUS 2 (SARS-COV-2) (CORONAVIRUS DISEASE [COVID-19]), AMPLIFIED PROBE TECHNIQUE, MAKING USE OF HIGH THROUGHPUT TECHNOLOGIES AS DESCRIBED BY CMS-2020-01-R: HCPCS | Performed by: OBSTETRICS & GYNECOLOGY

## 2021-01-29 PROCEDURE — U0005 INFEC AGEN DETEC AMPLI PROBE: HCPCS | Performed by: OBSTETRICS & GYNECOLOGY

## 2021-01-29 PROCEDURE — 99207 PR NO CHARGE LOS: CPT

## 2021-01-29 NOTE — PROGRESS NOTES
Taran is a 65 year old who is being evaluated via a billable telephone visit.      What phone number would you like to be contacted at? [phone]   How would you like to obtain your AVS? Lora Salomon CMA 2021  10:22 AM     Phone call duration: 22 minutes                Follow Up Notes on Referred Patient    Date: 2021         RE: Taran Regalado  : 1955  GRACY: 2021        Taran Regalado is a 65 year old woman with a diagnosis of stage IV Ovarian Cancer. She is here today for follow up and disease management. In light of the recent COVID19 pandemic, and in accordance with our group and national guidelines this patients care has been reviewed and it is felt that presenting for her visit would be more likely to increase rather than decrease her relative risks. Therefore this visit was conducted by telephone.     Oncology history:     2020: I had the pleasure of seeing your patient Taran Regalado here at the Gynecologic Cancer Clinic at the [facility] on 2020.  As you know she is a very pleasant 65 year old woman with a recent diagnosis of carcinomatosis and elevated .  Given these findings she was subsequently sent to the Gynecologic Cancer Clinic for new patient consultation.   G3, P3, 3 prior vaginal deliveries, went through menopause in her 40s, has not had any postmenopausal bleeding.  Started to have some abdominal distention over the last month, lost about 25 pounds of weight.  CT scan showed carcinomatosis, possible pleural effusions. CA-125 is 3098.  She has reduced appetite.  Normal urinary and bowel function  Past Medical History include: Migraines and restless legs syndrome.       12/3/2020: US Pelvic: IMPRESSION: Limited examination due to acoustic windows. Possible left adnexal mass. A CT scan of the abdomen and pelvis with contrast is recommended for further assessment.  2020: CT A/P: IMPRESSION:     Peritoneal carcinomatosis with masslike peritoneal thickening in the lower pelvis which may indicate an adnexal or ovarian primary malignancy. Large volume ascites. Bilateral pleural effusions. There is potential subtle pleural nodularity in the right hemithorax which could indicate metastatic disease.  Indeterminate 1 cm lesion in the right hepatic lobe suspicious for a metastatic lesion.    12/16/2020: Presented to [facility] with abdominal distention, 25lb weight loss, and CTAP with carcinomatosis, elevated  3098.   12/23/2020: CT Chest: IMPRESSION:   1. There are few scattered small sub-6 mm pulmonary nodules which are indeterminate without prior comparisons available. There are a few  slightly larger perifissural nodules which are technically  indeterminate in the setting of malignancy although presumed lymphatic in nature and of unlikely clinical significance. Attention on follow-up is recommended.  2. Small to moderate left and small right pleural effusions are increased in size from prior. No convincing evidence for pleural nodularity.  3. Partially visualized large volume ascites and peritoneal nodularity in the upper abdomen similar to 12/4/2020 outside CT   12/26/2020: ED for abdominal distension; 3 L ascites drained with paracentesis    Pelvic US: Findings: Free fluid present in LLQ   12/31/2020: US Paracentesis: 900 mL ascites drained  1/7/2021: Diagnotic laparoscopy, biopsies  Pathology: FINAL DIAGNOSIS:   A. PERITONEUM, BIOPSIES:   - Positive for high grade carcinoma, consistent with metastatic carcinoma of Mullerian origin.  1/10-1/13/2021: Hospital admission for postoperative non-intractable vomiting and nausea.   1/10/2021: CT A/P: IMPRESSION: Extensive ascites which is probably malignant. Scattered liver hypodensities of indeterminate etiology comment cannot exclude metastatic disease. Diverticulosis. Fluid-filled adnexal masses and irregular appearance of uterus, which may represent primary  neoplasm. Multiple peritoneal nodules. Large amount of fecal material in the colon with no evidence of small bowel obstruction.    1/12/2021: Cycle #1 Paclitaxel 175 mg/m2 Carboplatin AUC 6 inpatient    1/13/2021: CT Head: Impression:  1. Chronic sinusitis of the right maxillary and right sphenoid sinuses.  2. Incidental presumed calcified meningioma in the right frontal  convexity without significant mass effect.  3. No suspicious intracranial enhancing lesion.    2/1/2021: Cycle #2 Paclitaxel 175 mg/m2 Carboplatin AUC 6,  pending         Today Justen comes to the clinic ahead of her next chemotherapy cycle. She is using Norco 5mg for restless leg one tablet per night. She states that the restless leg is ongoing bilaterally and she has been managed for this issue for years with her PCP. She was taking Amitriptyline 100 mg at night for restless leg for a few years prior to treatment but states that this is not helping anymore. Her primary care prescribes this. She denies neuropathy symptoms. She has Zofran and Compazine at home to use as need for nausea but has not required this yet. She had nausea after the second day of treatment after her first treatment. Her appetite is stable but mildly reduced. She is working on getting in high protein. She is trying to drink 64 oz of water per day. She is having a bowel movement daily. She has Senna and Miralax to use as needed. She states that her migraines are intermittent. Last was three weeks ago. She has Imitrex for prn migraines. She denies bone or muscle discomfort but will use Claritin as needed for this. She started Trazodone Saturday night for sleep and was able to stay asleep longer but disliked having multiple dreams. She is staying active at home. She does not want a portacath at this time. She denies any vaginal bleeding, no changes in her bowel or bladder habits, no emesis, no lower extremity edema, and no difficulties eating. She denies any abdominal  discomfort/bloating, no fevers or chills, and no chest pain or shortness of breath.          Review of Systems:    Systemic           no weight changes; no fever; no chills; no night sweats; no appetite changes  Skin           no rashes, or lesions  Eye           no irritation; no changes in vision  Allen-Laryngeal           no dysphagia; no hoarseness   Pulmonary    no cough; no shortness of breath  Cardiovascular    no chest pain; no palpitations  Gastrointestinal    no diarrhea; no constipation; no abdominal pain; no changes in bowel  habits; no blood in stool  Genitourinary   no urinary frequency; no urinary urgency; no dysuria; no pain; no abnormal vaginal discharge; no abnormal vaginal bleeding  Breast   no breast discharge; no breast changes; no breast pain  Musculoskeletal    no myalgias; no arthralgias; no back pain  Psychiatric           no depressed mood; no anxiety    Hematologic           no tender lymph nodes; no noticeable swellings or lumps   Endocrine    no hot flashes; no heat/cold intolerance         Neurological   no tremor; + numbness and tingling; no headaches; no difficulty sleeping      Past Medical History:    Past Medical History:   Diagnosis Date     History of cold sores      Insomnia      Migraine      Osteopenia      Pelvic mass      Peritoneal carcinomatosis (H)      Restless legs syndrome (RLS)          Past Surgical History:    Past Surgical History:   Procedure Laterality Date     APPENDECTOMY       ARTHROSCPY KNEE SURGICAL DEBRIDEMENT SHAVING ARTICULAR CARTILAGE Right      DEBRIDEMENT LEFT UPPER EXTREMITY  2016     LAPAROSCOPY DIAGNOSTIC (GYN) Bilateral 1/7/2021    Procedure: Diagnsotic laparoscopy, biopsies;  Surgeon: Bolivar Juarez MD;  Location: [facility]       TUBAL LIGATION           Health Maintenance Due   Topic Date Due     PREVENTIVE CARE VISIT  1955     ADVANCE CARE PLANNING  1955     MAMMO SCREENING  1955     COLORECTAL CANCER SCREENING   11/11/1966     HIV SCREENING  11/11/1971     HEPATITIS C SCREENING  11/11/1974     DTAP/TDAP/TD IMMUNIZATION (1 - Tdap) 11/11/1981     LIPID  11/11/2001     ZOSTER IMMUNIZATION (1 of 2) 11/11/2006     INFLUENZA VACCINE (1) 09/01/2020     PHQ-2  01/01/2021       Current Medications:     Current Outpatient Medications   Medication Sig Dispense Refill     acetaminophen (TYLENOL) 325 MG tablet Take 2 tablets (650 mg) by mouth every 6 hours as needed for mild pain 50 tablet 0     ibuprofen (ADVIL/MOTRIN) 600 MG tablet Take 1 tablet (600 mg) by mouth every 6 hours as needed for other (mild and/or inflammatory pain) 30 tablet 0     OLANZapine zydis (ZYPREXA) 5 MG ODT Take 1 tablet (5 mg) by mouth At Bedtime 30 tablet 0     ondansetron (ZOFRAN-ODT) 4 MG ODT tab Take 1 tablet (4 mg) by mouth every 6 hours as needed for nausea or vomiting 20 tablet 0     polyethylene glycol (MIRALAX) 17 GM/Dose powder Take 17 g by mouth 2 times daily 510 g 0     prochlorperazine (COMPAZINE) 10 MG tablet Take 1 tablet (10 mg) by mouth every 6 hours as needed for nausea or vomiting 30 tablet 0     senna-docusate (SENOKOT-S/PERICOLACE) 8.6-50 MG tablet Take 2 tablets by mouth 2 times daily 30 tablet 0     SUMAtriptan (IMITREX) 100 MG tablet Take 100 mg by mouth at onset of headache        valACYclovir (VALTREX) 1000 mg tablet Take 1,000 mg by mouth as needed        amitriptyline (ELAVIL) 100 MG tablet TAKE 1 TABLET (100 MG) BY MOUTH EVERY NIGHT AT BEDTIME       iohexol (OMNIPAQUE) 140 MG/ML solution for oral use Mix entire bottle (50 ml) of contrast with 600 ml (20 ounces) of water and drink half 2 hrs prior to CT scan and half 1 hr prior to scan (Patient not taking: Reported on 2/1/2021) 140 mL 0         Allergies:      No Known Allergies     Social History:     Social History     Tobacco Use     Smoking status: Former Smoker     Packs/day: 0.50     Years: 40.00     Pack years: 20.00     Quit date: 2018     Years since quitting: 3.0      Smokeless tobacco: Never Used   Substance Use Topics     Alcohol use: Not Currently       History   Drug Use Unknown         Family History:     The patient's family history is notable for     Family History   Adopted: Yes   Problem Relation Age of Onset     Cancer Mother 36     Factor V Leiden deficiency Daughter      Deep Vein Thrombosis Daughter      Diabetes Type 1 Daughter          Physical Exam:     /76 (BP Location: Right arm, Patient Position: Sitting, Cuff Size: Adult Regular)   Pulse 78   Temp 98.3  F (36.8  C) (Oral)   Resp 16   Wt 65.9 kg (145 lb 4.8 oz)   SpO2 97%   BMI 19.71 kg/m    Body mass index is 19.71 kg/m .       Respiratory: No audible wheeze, cough.      Psychiatric: appropriate mood and affect. Mentation appears normal, affect normal/bright, judgement and insight intact, normal speech           Assessment:    Taran Regalado is a 65 year old woman with a diagnosis of stage IV Ovarian Cancer. She is here today for follow up and disease management. In light of the recent COVID19 pandemic, and in accordance with our group and national guidelines this patients care has been reviewed and it is felt that presenting for her visit would be more likely to increase rather than decrease her relative risks. Therefore this visit was conducted by telephone.      30 minutes spent on the date of the encounter doing chart review, history and exam, documentation, and further activities as noted above.         Plan:     1.)       Ok to proceed with planned treatment once labs are WNL. Reviewed signs and symptoms for when she should contact the clinic or seek additional care. Patient to contact the clinic with any questions or concerns in the interim. Discontinued IV benadryl from her treatment plan with hopes to reduce her restless leg symptoms. I have advised her to reach out to prescribing provider of Amitriptyline to discuss next steps for better management of her RL given this is an ongoing  issue and has been managed for multiple years. Encouraged high protein diet and 64 oz of water per day. Encouraged her to stay active during the day. Reqeust sent to scheduling to get her next cycle set up. Plan is for CT CAP after Cycle #3 and MD follow up. At Cycle #3, I will order CT and oral contrast to have this completed three weeks post chemo.      2.) Genetic risk factors were assessed and the patient does qualify for a genetic evaluation which will be completed as part of the Precision Medicine study. Message sent to Minh Donovan for follow up.      Addendum: Patient will take part in the Precision Medicine study. Will place genetics referral today.     3.) Labs and/or tests ordered include:  Chemo labs, .     4.) Health maintenance issues addressed today include annual health maintenance and non-gynecologic issues with PCP.        Marta Lao, MSN, [facility]-BC  Women's Health Nurse Practitioner  Division of Gynecologic Oncology  [facility]        CC  Patient Care Team:  Bolivar Juarez MD as PCP - General (Gynecologic Oncology)  Bolivar Juarez MD as Assigned Cancer Care Provider  Ramona Viera RN as Specialty Care Coordinator (Gynecologic Oncology)

## 2021-01-29 NOTE — TELEPHONE ENCOUNTER
Notifying patient that Dr. Juarez has verbally explained that due to her condition and new diagnosis of Metastatic Ovarian Cancer that her treatment cannot be delayed.  This is considered an Emergent/ Urgent treatment and must not be postponed.

## 2021-01-30 ENCOUNTER — NURSE TRIAGE (OUTPATIENT)
Dept: NURSING | Facility: CLINIC | Age: 66
End: 2021-01-30

## 2021-01-30 LAB
LABORATORY COMMENT REPORT: NORMAL
SARS-COV-2 RNA RESP QL NAA+PROBE: NEGATIVE
SPECIMEN SOURCE: NORMAL

## 2021-01-30 NOTE — TELEPHONE ENCOUNTER
S: Can't sleep.  B: Oncology HX Ovarian carcinoma. Over the last 4 nights has been unable to sleep.  Falls asleep around 0300 then awakens around 0500 or 0600.  Tries to nap during the day is unable. Recently disconinued  Amatrixaline for sleep & restless syndrome and was started on Requip.  Per her PCP Dr. LAZARO Parrish with [facility] Requip has not helped with sleep.    A: Advised to call PCP on call.  R: Call writer back if unable to reach PCP on call.    Addendum 5:14 pm  Daughter unable to get a hold of on call provider.  Writer will On call resident for gynecologic oncology call patient at home.    Addendum 6:00 pm  Writer paged on call family medicine provider Dr. Gary to call patient at home.     Marleni Mcclain RN MAN   Triage Nurse Advisor [facility]    Additional Information    Health Information question, no triage required and triager able to answer question    Protocols used: INFORMATION ONLY CALL-A-    COVID 19 Nurse Triage Plan/Patient Instructions    Please be aware that novel coronavirus (COVID-19) may be circulating in the community. If you develop symptoms such as fever, cough, or SOB or if you have concerns about the presence of another infection including coronavirus (COVID-19), please contact your health care provider or visit [email or web address].     Disposition/Instructions    Home care recommended. Follow home care protocol based instructions.    Thank you for taking steps to prevent the spread of this virus.  o Limit your contact with others.  o Wear a simple mask to cover your cough.  o Wash your hands well and often.    Resources    [facility]: About COVID-19: [email or web address]    CDC: What to Do If You're Sick: www.cdc.gov/coronavirus/2019-ncov/about/steps-when-sick.html    CDC: Ending Home Isolation: www.cdc.gov/coronavirus/2019-ncov/hcp/disposition-in-home-patients.html     CDC: Caring for Someone:  www.cdc.gov/coronavirus/2019-ncov/if-you-are-sick/care-for-someone.html     [facility]: Interim Guidance for Hospital Discharge to Home: www.health.[email or web address].[place]/diseases/coronavirus/hcp/hospdischarge.pdf    [facility] clinical trials (COVID-19 research studies): [email or web address].[email or web address]-clinical-trials     Below are the COVID-19 hotlines at the [place] Department of Health ([facility]). Interpreters are available.   o For health questions: Call 132-539-5044 or 1-285.474.8056 (7 a.m. to 7 p.m.)  o For questions about schools and childcare: Call 726-771-6940 or 1-851.656.3853 (7 a.m. to 7 p.m.)

## 2021-02-01 ENCOUNTER — INFUSION THERAPY VISIT (OUTPATIENT)
Dept: ONCOLOGY | Facility: CLINIC | Age: 66
End: 2021-02-01
Attending: OBSTETRICS & GYNECOLOGY
Payer: MEDICAID

## 2021-02-01 ENCOUNTER — APPOINTMENT (OUTPATIENT)
Dept: LAB | Facility: CLINIC | Age: 66
End: 2021-02-01
Attending: OBSTETRICS & GYNECOLOGY
Payer: MEDICAID

## 2021-02-01 VITALS
WEIGHT: 145.3 LBS | OXYGEN SATURATION: 97 % | BODY MASS INDEX: 19.71 KG/M2 | RESPIRATION RATE: 16 BRPM | SYSTOLIC BLOOD PRESSURE: 121 MMHG | DIASTOLIC BLOOD PRESSURE: 76 MMHG | HEART RATE: 78 BPM | TEMPERATURE: 98.3 F

## 2021-02-01 DIAGNOSIS — C56.9 OVARIAN CANCER, UNSPECIFIED LATERALITY (H): Primary | ICD-10-CM

## 2021-02-01 LAB
ALBUMIN SERPL-MCNC: 3.4 G/DL (ref 3.4–5)
ALP SERPL-CCNC: 155 U/L (ref 40–150)
ALT SERPL W P-5'-P-CCNC: 30 U/L (ref 0–50)
ANION GAP SERPL CALCULATED.3IONS-SCNC: 7 MMOL/L (ref 3–14)
AST SERPL W P-5'-P-CCNC: 26 U/L (ref 0–45)
BASOPHILS # BLD AUTO: 0 10E9/L (ref 0–0.2)
BASOPHILS NFR BLD AUTO: 0.5 %
BILIRUB SERPL-MCNC: 0.2 MG/DL (ref 0.2–1.3)
BUN SERPL-MCNC: 15 MG/DL (ref 7–30)
CALCIUM SERPL-MCNC: 9.2 MG/DL (ref 8.5–10.1)
CANCER AG125 SERPL-ACNC: 936 U/ML (ref 0–30)
CHLORIDE SERPL-SCNC: 107 MMOL/L (ref 94–109)
CO2 SERPL-SCNC: 27 MMOL/L (ref 20–32)
CREAT SERPL-MCNC: 0.52 MG/DL (ref 0.52–1.04)
DIFFERENTIAL METHOD BLD: ABNORMAL
EOSINOPHIL # BLD AUTO: 0 10E9/L (ref 0–0.7)
EOSINOPHIL NFR BLD AUTO: 0.5 %
ERYTHROCYTE [DISTWIDTH] IN BLOOD BY AUTOMATED COUNT: 17.1 % (ref 10–15)
GFR SERPL CREATININE-BSD FRML MDRD: >90 ML/MIN/{1.73_M2}
GLUCOSE SERPL-MCNC: 111 MG/DL (ref 70–99)
HCT VFR BLD AUTO: 38.2 % (ref 35–47)
HGB BLD-MCNC: 12 G/DL (ref 11.7–15.7)
IMM GRANULOCYTES # BLD: 0 10E9/L (ref 0–0.4)
IMM GRANULOCYTES NFR BLD: 0.2 %
LYMPHOCYTES # BLD AUTO: 1.2 10E9/L (ref 0.8–5.3)
LYMPHOCYTES NFR BLD AUTO: 29.9 %
MAGNESIUM SERPL-MCNC: 2.2 MG/DL (ref 1.6–2.3)
MCH RBC QN AUTO: 27.7 PG (ref 26.5–33)
MCHC RBC AUTO-ENTMCNC: 31.4 G/DL (ref 31.5–36.5)
MCV RBC AUTO: 88 FL (ref 78–100)
MONOCYTES # BLD AUTO: 0.5 10E9/L (ref 0–1.3)
MONOCYTES NFR BLD AUTO: 12.1 %
NEUTROPHILS # BLD AUTO: 2.3 10E9/L (ref 1.6–8.3)
NEUTROPHILS NFR BLD AUTO: 56.8 %
NRBC # BLD AUTO: 0 10*3/UL
NRBC BLD AUTO-RTO: 0 /100
PLATELET # BLD AUTO: 251 10E9/L (ref 150–450)
POTASSIUM SERPL-SCNC: 3.6 MMOL/L (ref 3.4–5.3)
PROT SERPL-MCNC: 8.2 G/DL (ref 6.8–8.8)
RBC # BLD AUTO: 4.33 10E12/L (ref 3.8–5.2)
SODIUM SERPL-SCNC: 141 MMOL/L (ref 133–144)
WBC # BLD AUTO: 4.1 10E9/L (ref 4–11)

## 2021-02-01 PROCEDURE — 83735 ASSAY OF MAGNESIUM: CPT | Performed by: OBSTETRICS & GYNECOLOGY

## 2021-02-01 PROCEDURE — 96367 TX/PROPH/DG ADDL SEQ IV INF: CPT

## 2021-02-01 PROCEDURE — 96413 CHEMO IV INFUSION 1 HR: CPT

## 2021-02-01 PROCEDURE — 96375 TX/PRO/DX INJ NEW DRUG ADDON: CPT

## 2021-02-01 PROCEDURE — 250N000009 HC RX 250: Performed by: OBSTETRICS & GYNECOLOGY

## 2021-02-01 PROCEDURE — 250N000013 HC RX MED GY IP 250 OP 250 PS 637: Performed by: OBSTETRICS & GYNECOLOGY

## 2021-02-01 PROCEDURE — 86304 IMMUNOASSAY TUMOR CA 125: CPT | Mod: TEL | Performed by: OBSTETRICS & GYNECOLOGY

## 2021-02-01 PROCEDURE — 85025 COMPLETE CBC W/AUTO DIFF WBC: CPT | Performed by: OBSTETRICS & GYNECOLOGY

## 2021-02-01 PROCEDURE — 250N000011 HC RX IP 250 OP 636: Performed by: OBSTETRICS & GYNECOLOGY

## 2021-02-01 PROCEDURE — 96417 CHEMO IV INFUS EACH ADDL SEQ: CPT

## 2021-02-01 PROCEDURE — 80053 COMPREHEN METABOLIC PANEL: CPT | Performed by: OBSTETRICS & GYNECOLOGY

## 2021-02-01 PROCEDURE — 99214 OFFICE O/P EST MOD 30 MIN: CPT | Performed by: OBSTETRICS & GYNECOLOGY

## 2021-02-01 PROCEDURE — 96415 CHEMO IV INFUSION ADDL HR: CPT

## 2021-02-01 PROCEDURE — 258N000003 HC RX IP 258 OP 636: Performed by: OBSTETRICS & GYNECOLOGY

## 2021-02-01 RX ORDER — ALBUTEROL SULFATE 90 UG/1
1-2 AEROSOL, METERED RESPIRATORY (INHALATION)
Status: CANCELLED
Start: 2021-02-01

## 2021-02-01 RX ORDER — HEPARIN SODIUM (PORCINE) LOCK FLUSH IV SOLN 100 UNIT/ML 100 UNIT/ML
5 SOLUTION INTRAVENOUS
Status: DISCONTINUED | OUTPATIENT
Start: 2021-02-01 | End: 2021-02-01 | Stop reason: HOSPADM

## 2021-02-01 RX ORDER — LORAZEPAM 2 MG/ML
1 INJECTION INTRAMUSCULAR EVERY 6 HOURS PRN
Status: CANCELLED
Start: 2021-02-01

## 2021-02-01 RX ORDER — HEPARIN SODIUM,PORCINE 10 UNIT/ML
5 VIAL (ML) INTRAVENOUS
Status: DISCONTINUED | OUTPATIENT
Start: 2021-02-01 | End: 2021-02-01 | Stop reason: HOSPADM

## 2021-02-01 RX ORDER — DIPHENHYDRAMINE HCL 25 MG
50 CAPSULE ORAL ONCE
Status: CANCELLED
Start: 2021-02-01

## 2021-02-01 RX ORDER — DIPHENHYDRAMINE HCL 25 MG
50 CAPSULE ORAL ONCE
Status: COMPLETED | OUTPATIENT
Start: 2021-02-01 | End: 2021-02-01

## 2021-02-01 RX ORDER — SODIUM CHLORIDE 9 MG/ML
1000 INJECTION, SOLUTION INTRAVENOUS CONTINUOUS PRN
Status: CANCELLED
Start: 2021-02-01

## 2021-02-01 RX ORDER — PALONOSETRON 0.05 MG/ML
0.25 INJECTION, SOLUTION INTRAVENOUS ONCE
Status: CANCELLED
Start: 2021-02-01

## 2021-02-01 RX ORDER — ALBUTEROL SULFATE 0.83 MG/ML
2.5 SOLUTION RESPIRATORY (INHALATION)
Status: CANCELLED | OUTPATIENT
Start: 2021-02-01

## 2021-02-01 RX ORDER — PALONOSETRON 0.05 MG/ML
0.25 INJECTION, SOLUTION INTRAVENOUS ONCE
Status: COMPLETED | OUTPATIENT
Start: 2021-02-01 | End: 2021-02-01

## 2021-02-01 RX ORDER — METHYLPREDNISOLONE SODIUM SUCCINATE 125 MG/2ML
125 INJECTION, POWDER, LYOPHILIZED, FOR SOLUTION INTRAMUSCULAR; INTRAVENOUS
Status: CANCELLED
Start: 2021-02-01

## 2021-02-01 RX ORDER — EPINEPHRINE 1 MG/ML
0.3 INJECTION, SOLUTION INTRAMUSCULAR; SUBCUTANEOUS EVERY 5 MIN PRN
Status: CANCELLED | OUTPATIENT
Start: 2021-02-01

## 2021-02-01 RX ORDER — HEPARIN SODIUM,PORCINE 10 UNIT/ML
5 VIAL (ML) INTRAVENOUS
Status: CANCELLED | OUTPATIENT
Start: 2021-02-01

## 2021-02-01 RX ORDER — DIPHENHYDRAMINE HYDROCHLORIDE 50 MG/ML
50 INJECTION INTRAMUSCULAR; INTRAVENOUS
Status: CANCELLED
Start: 2021-02-01

## 2021-02-01 RX ORDER — NALOXONE HYDROCHLORIDE 0.4 MG/ML
.1-.4 INJECTION, SOLUTION INTRAMUSCULAR; INTRAVENOUS; SUBCUTANEOUS
Status: CANCELLED | OUTPATIENT
Start: 2021-02-01

## 2021-02-01 RX ORDER — HEPARIN SODIUM (PORCINE) LOCK FLUSH IV SOLN 100 UNIT/ML 100 UNIT/ML
5 SOLUTION INTRAVENOUS
Status: CANCELLED | OUTPATIENT
Start: 2021-02-01

## 2021-02-01 RX ORDER — MEPERIDINE HYDROCHLORIDE 25 MG/ML
25 INJECTION INTRAMUSCULAR; INTRAVENOUS; SUBCUTANEOUS EVERY 30 MIN PRN
Status: CANCELLED | OUTPATIENT
Start: 2021-02-01

## 2021-02-01 RX ADMIN — SODIUM CHLORIDE 250 ML: 9 INJECTION, SOLUTION INTRAVENOUS at 12:41

## 2021-02-01 RX ADMIN — PACLITAXEL 327 MG: 6 INJECTION, SOLUTION INTRAVENOUS at 13:08

## 2021-02-01 RX ADMIN — CARBOPLATIN 850 MG: 10 INJECTION, SOLUTION INTRAVENOUS at 16:14

## 2021-02-01 RX ADMIN — FAMOTIDINE 40 MG: 10 INJECTION INTRAVENOUS at 12:44

## 2021-02-01 RX ADMIN — DEXAMETHASONE SODIUM PHOSPHATE 20 MG: 10 INJECTION, SOLUTION INTRAMUSCULAR; INTRAVENOUS at 12:49

## 2021-02-01 RX ADMIN — PALONOSETRON 0.25 MG: 0.05 INJECTION, SOLUTION INTRAVENOUS at 12:41

## 2021-02-01 RX ADMIN — DIPHENHYDRAMINE HYDROCHLORIDE 50 MG: 25 CAPSULE ORAL at 12:31

## 2021-02-01 ASSESSMENT — PAIN SCALES - GENERAL: PAINLEVEL: NO PAIN (0)

## 2021-02-01 NOTE — NURSING NOTE
Chief Complaint   Patient presents with     Telephone     OVARIAN CANCER      Blood Draw     Labs drawn from placed PIV by RN in lab. Vitals taken. Checked into next appointment.      Labs drawn by venipuncture by RN in lab.  Vital signs taken.  Pt checked in to next appointment.    Marilia Olguin RN

## 2021-02-01 NOTE — PROGRESS NOTES
Infusion Nursing Note:  Taran Regalado presents today for cycle 2 day 1 Taxol, Carboplatin.    Patient seen by provider today: Yes: Telephone visit with Marta Lao NP   present during visit today: Not Applicable.    Note: Pt arrives feeling well, ok to proceed with treatment.      Intravenous Access:  Peripheral IV placed.    Treatment Conditions:  Lab Results   Component Value Date    HGB 12.0 02/01/2021     Lab Results   Component Value Date    WBC 4.1 02/01/2021      Lab Results   Component Value Date    ANEU 2.3 02/01/2021     Lab Results   Component Value Date     02/01/2021      Lab Results   Component Value Date     02/01/2021                   Lab Results   Component Value Date    POTASSIUM 3.6 02/01/2021           Lab Results   Component Value Date    MAG 2.2 02/01/2021            Lab Results   Component Value Date    CR 0.52 02/01/2021                   Lab Results   Component Value Date    HORACIO 9.2 02/01/2021                Lab Results   Component Value Date    BILITOTAL 0.2 02/01/2021           Lab Results   Component Value Date    ALBUMIN 3.4 02/01/2021                    Lab Results   Component Value Date    ALT 30 02/01/2021           Lab Results   Component Value Date    AST 26 02/01/2021       Results reviewed, labs MET treatment parameters, ok to proceed with treatment.      Post Infusion Assessment:  Patient tolerated infusion without incident.  Blood return noted pre and post infusion.  Site patent and intact, free from redness, edema or discomfort.  No evidence of extravasations.  Access discontinued per protocol.       Discharge Plan:   Patient declined prescription refills.  Discharge instructions reviewed with: Patient.  Patient and/or family verbalized understanding of discharge instructions and all questions answered.  AVS to patient via [employer].  Patient will return in 3 weeks for next appointment.   Patient discharged in stable condition accompanied by:  self.  Departure Mode: Ambulatory.    Lisbeth Hastings RN

## 2021-02-01 NOTE — LETTER
2021         RE: Taran Regalado  [address]  [place] [address]        Dear Colleague,    Thank you for referring your patient, Taran Regalado, to the [facility]. Please see a copy of my visit note below.    Taran is a 65 year old who is being evaluated via a billable telephone visit.      What phone number would you like to be contacted at? [phone]   How would you like to obtain your AVS? Lora Salomon, RU 2021  10:22 AM     Phone call duration: 22 minutes                Follow Up Notes on Referred Patient    Date: 2021         RE: Taran Regalado  : 1955  GRACY: 2021        Taran Regalado is a 65 year old woman with a diagnosis of stage IV Ovarian Cancer. She is here today for follow up and disease management. In light of the recent COVID19 pandemic, and in accordance with our group and national guidelines this patients care has been reviewed and it is felt that presenting for her visit would be more likely to increase rather than decrease her relative risks. Therefore this visit was conducted by telephone.     Oncology history:     2020: I had the pleasure of seeing your patient Taran Regalado here at the Gynecologic Cancer Clinic at the [facility] on 2020.  As you know she is a very pleasant 65 year old woman with a recent diagnosis of carcinomatosis and elevated .  Given these findings she was subsequently sent to the Gynecologic Cancer Clinic for new patient consultation.   G3, P3, 3 prior vaginal deliveries, went through menopause in her 40s, has not had any postmenopausal bleeding.  Started to have some abdominal distention over the last month, lost about 25 pounds of weight.  CT scan showed carcinomatosis, possible pleural effusions. CA-125 is 3098.  She has reduced appetite.  Normal urinary and bowel function  Past Medical History include: Migraines and restless legs  syndrome.       12/3/2020: US Pelvic: IMPRESSION: Limited examination due to acoustic windows. Possible left adnexal mass. A CT scan of the abdomen and pelvis with contrast is recommended for further assessment.  12/4/2020: CT A/P: IMPRESSION:    Peritoneal carcinomatosis with masslike peritoneal thickening in the lower pelvis which may indicate an adnexal or ovarian primary malignancy. Large volume ascites. Bilateral pleural effusions. There is potential subtle pleural nodularity in the right hemithorax which could indicate metastatic disease.  Indeterminate 1 cm lesion in the right hepatic lobe suspicious for a metastatic lesion.    [facility] 25lb weight [facility], and CTAP with carcinomatosis, elevated  3098.   12/23/2020: CT Chest: IMPRESSION:   1. There are few scattered small sub-6 mm pulmonary nodules which are indeterminate without prior comparisons available. There are a few  slightly larger perifissural nodules which are technically  indeterminate in the setting of malignancy although presumed lymphatic in nature and of unlikely clinical significance. Attention on follow-up is recommended.  2. Small to moderate left and small right pleural effusions are increased in size from prior. No convincing evidence for pleural nodularity.  3. Partially visualized large volume ascites and peritoneal nodularity in the upper abdomen similar to 12/4/2020 outside CT   12/26/2020: ED for abdominal distension; 3 L ascites drained with paracentesis    Pelvic US: Findings: Free fluid present in LLQ   12/31/2020: US Paracentesis: 900 mL ascites drained  1/7/2021: Diagnotic laparoscopy, biopsies  Pathology: FINAL DIAGNOSIS:   A. PERITONEUM, BIOPSIES:   - Positive for high grade carcinoma, consistent with metastatic carcinoma of Mullerian origin.  1/10-1/13/2021: Hospital admission for postoperative non-intractable vomiting and nausea.   1/10/2021: CT A/P: IMPRESSION: Extensive  ascites which is probably malignant. Scattered liver hypodensities of indeterminate etiology comment cannot exclude metastatic disease. Diverticulosis. Fluid-filled adnexal masses and irregular appearance of uterus, which may represent primary neoplasm. Multiple peritoneal nodules. Large amount of fecal material in the colon with no evidence of small bowel obstruction.    1/12/2021: Cycle #1 Paclitaxel 175 mg/m2 Carboplatin AUC 6 inpatient    1/13/2021: CT Head: Impression:  1. Chronic sinusitis of the right maxillary and right sphenoid sinuses.  2. Incidental presumed calcified meningioma in the right frontal  convexity without significant mass effect.  3. No suspicious intracranial enhancing lesion.    2/1/2021: Cycle #2 Paclitaxel 175 mg/m2 Carboplatin AUC 6,  pending         Today Justen comes to the clinic ahead of her next chemotherapy cycle. She is using Norco 5mg for restless leg one tablet per night. She states that the restless leg is ongoing bilaterally and she has been managed for this issue for years with her PCP. She was taking Amitriptyline 100 mg at night for restless leg for a few years prior to treatment but states that this is not helping anymore. Her primary care prescribes this. She denies neuropathy symptoms. She has Zofran and Compazine at home to use as need for nausea but has not required this yet. She had nausea after the second day of treatment after her first treatment. Her appetite is stable but mildly reduced. She is working on getting in high protein. She is trying to drink 64 oz of water per day. She is having a bowel movement daily. She has Senna and Miralax to use as needed. She states that her migraines are intermittent. Last was three weeks ago. She has Imitrex for prn migraines. She denies bone or muscle discomfort but will use Claritin as needed for this. She started Trazodone Saturday night for sleep and was able to stay asleep longer but disliked having multiple dreams.  She is staying active at home. She does not want a portacath at this time. She denies any vaginal bleeding, no changes in her bowel or bladder habits, no emesis, no lower extremity edema, and no difficulties eating. She denies any abdominal discomfort/bloating, no fevers or chills, and no chest pain or shortness of breath.          Review of Systems:    Systemic           no weight changes; no fever; no chills; no night sweats; no appetite changes  Skin           no rashes, or lesions  Eye           no irritation; no changes in vision  Allen-Laryngeal           no dysphagia; no hoarseness   Pulmonary    no cough; no shortness of breath  Cardiovascular    no chest pain; no palpitations  Gastrointestinal    no diarrhea; no constipation; no abdominal pain; no changes in bowel  habits; no blood in stool  Genitourinary   no urinary frequency; no urinary urgency; no dysuria; no pain; no abnormal vaginal discharge; no abnormal vaginal bleeding  Breast   no breast discharge; no breast changes; no breast pain  Musculoskeletal    no myalgias; no arthralgias; no back pain  Psychiatric           no depressed mood; no anxiety    Hematologic           no tender lymph nodes; no noticeable swellings or lumps   Endocrine    no hot flashes; no heat/cold intolerance         Neurological   no tremor; + numbness and tingling; no headaches; no difficulty sleeping      Past Medical History:    Past Medical History:   Diagnosis Date     History of cold sores      Insomnia      Migraine      Osteopenia      Pelvic mass      Peritoneal carcinomatosis (H)      Restless legs syndrome (RLS)          Past Surgical History:    Past Surgical History:   Procedure Laterality Date     APPENDECTOMY       ARTHROSCPY KNEE SURGICAL DEBRIDEMENT SHAVING ARTICULAR CARTILAGE Right      DEBRIDEMENT LEFT UPPER EXTREMITY  2016     LAPAROSCOPY DIAGNOSTIC (GYN) Bilateral 1/7/2021    Procedure: Diagnsotic laparoscopy, biopsies;  Surgeon: Bolivar Juarez MD;   Location: [facility]     LASIK       TUBAL LIGATION           Health Maintenance Due   Topic Date Due     PREVENTIVE CARE VISIT  1955     ADVANCE CARE PLANNING  1955     MAMMO SCREENING  1955     COLORECTAL CANCER SCREENING  11/11/1966     HIV SCREENING  11/11/1971     HEPATITIS C SCREENING  11/11/1974     DTAP/TDAP/TD IMMUNIZATION (1 - Tdap) 11/11/1981     LIPID  11/11/2001     ZOSTER IMMUNIZATION (1 of 2) 11/11/2006     INFLUENZA VACCINE (1) 09/01/2020     PHQ-2  01/01/2021       Current Medications:     Current Outpatient Medications   Medication Sig Dispense Refill     acetaminophen (TYLENOL) 325 MG tablet Take 2 tablets (650 mg) by mouth every 6 hours as needed for mild pain 50 tablet 0     ibuprofen (ADVIL/MOTRIN) 600 MG tablet Take 1 tablet (600 mg) by mouth every 6 hours as needed for other (mild and/or inflammatory pain) 30 tablet 0     OLANZapine zydis (ZYPREXA) 5 MG ODT Take 1 tablet (5 mg) by mouth At Bedtime 30 tablet 0     ondansetron (ZOFRAN-ODT) 4 MG ODT tab Take 1 tablet (4 mg) by mouth every 6 hours as needed for nausea or vomiting 20 tablet 0     polyethylene glycol (MIRALAX) 17 GM/Dose powder Take 17 g by mouth 2 times daily 510 g 0     prochlorperazine (COMPAZINE) 10 MG tablet Take 1 tablet (10 mg) by mouth every 6 hours as needed for nausea or vomiting 30 tablet 0     senna-docusate (SENOKOT-S/PERICOLACE) 8.6-50 MG tablet Take 2 tablets by mouth 2 times daily 30 tablet 0     SUMAtriptan (IMITREX) 100 MG tablet Take 100 mg by mouth at onset of headache        valACYclovir (VALTREX) 1000 mg tablet Take 1,000 mg by mouth as needed        amitriptyline (ELAVIL) 100 MG tablet TAKE 1 TABLET (100 MG) BY MOUTH EVERY NIGHT AT BEDTIME       iohexol (OMNIPAQUE) 140 MG/ML solution for oral use Mix entire bottle (50 ml) of contrast with 600 ml (20 ounces) of water and drink half 2 hrs prior to CT scan and half 1 hr prior to scan (Patient not taking: Reported on 2/1/2021) 140 mL 0          Allergies:      No Known Allergies     Social History:     Social History     Tobacco Use     Smoking status: Former Smoker     Packs/day: 0.50     Years: 40.00     Pack years: 20.00     Quit date: 2018     Years since quitting: 3.0     Smokeless tobacco: Never Used   Substance Use Topics     Alcohol use: Not Currently       History   Drug Use Unknown         Family History:     The patient's family history is notable for     Family History   Adopted: Yes   Problem Relation Age of Onset     Cancer Mother 36     Factor V Leiden deficiency Daughter      Deep Vein Thrombosis Daughter      Diabetes Type 1 Daughter          Physical Exam:     /76 (BP Location: Right arm, Patient Position: Sitting, Cuff Size: Adult Regular)   Pulse 78   Temp 98.3  F (36.8  C) (Oral)   Resp 16   Wt 65.9 kg (145 lb 4.8 oz)   SpO2 97%   BMI 19.71 kg/m    Body mass index is 19.71 kg/m .       Respiratory: No audible wheeze, cough.      Psychiatric: appropriate mood and affect. Mentation appears normal, affect normal/bright, judgement and insight intact, normal speech           Assessment:    Taran Regalado is a 65 year old woman with a diagnosis of stage IV Ovarian Cancer. She is here today for follow up and disease management. In light of the recent COVID19 pandemic, and in accordance with our group and national guidelines this patients care has been reviewed and it is felt that presenting for her visit would be more likely to increase rather than decrease her relative risks. Therefore this visit was conducted by telephone.      30 minutes spent on the date of the encounter doing chart review, history and exam, documentation, and further activities as noted above.         Plan:     1.)       Ok to proceed with planned treatment once labs are WNL. Reviewed signs and symptoms for when she should contact the clinic or seek additional care. Patient to contact the clinic with any questions or concerns in the interim.  Discontinued IV benadryl from her treatment plan with hopes to reduce her restless leg symptoms. I have advised her to reach out to prescribing provider of Amitriptyline to discuss next steps for better management of her RL given this is an ongoing issue and has been managed for multiple years. Encouraged high protein diet and 64 oz of water per day. Encouraged her to stay active during the day. Reqeust sent to scheduling to get her next cycle set up. Plan is for CT CAP after Cycle #3 and MD follow up. At Cycle #3, I will order CT and oral contrast to have this completed three weeks post chemo.      2.) Genetic risk factors were assessed and the patient does qualify for a genetic evaluation which will be completed as part of the Precision Medicine study. Message sent to Minh Donovan for follow up.      Addendum: Patient will take part in the Precision Medicine study. Will place genetics referral today.     3.) Labs and/or tests ordered include:  Chemo labs, .     4.) Health maintenance issues addressed today include annual health maintenance and non-gynecologic issues with PCP.        Marta Lao, MSN, [facility]-BC  Women's Health Nurse Practitioner  Division of Gynecologic Oncology  [facility]        CC  Patient Care Team:  Bolivar Juarez MD as PCP - General (Gynecologic Oncology)  Bolivar Juarez MD as Assigned Cancer Care Provider  Ramona Viera RN as Specialty Care Coordinator (Gynecologic Oncology)

## 2021-02-01 NOTE — PATIENT INSTRUCTIONS
Contact Numbers:   [facility] Main Line: [phone]    Call triage to speak with triage if you are experiencing chills and/or temperature greater than or equal to 100.5, uncontrolled nausea/vomiting, diarrhea, constipation, dizziness, shortness of breath, chest pain, bleeding, unexplained bruising, or any new/concerning symptoms, questions/concerns.     If you are having any concerning symptoms or wish to speak to a provider before your next infusion visit, please call your care coordinator or triage to notify them so we can adequately serve you.     If you need a refill on a medication or narcotic prescription, please call triage or your care coordinator before your infusion appointment.                 February 2021 Sunday Monday Tuesday Wednesday Thursday Friday Saturday        1    LAB PERIPHERAL  10:15 AM   (15 min.)   [facility] LAB DRAW   [facility]    TELEPHONE VISIT RETURN  10:50 AM   (40 min.)   Marta Lao APRN CNP   [facility] ONC INFUSION 360  12:30 PM   (360 min.)    ONCOLOGY INFUSION   [facility] 2     3     4     5     6       7     8     9     10     11     12     13       14     15     16     17     18     19     20       21     22     23     24     25     26     27       28                                               March 2021 Sunday Monday Tuesday Wednesday Thursday Friday Saturday        1     2     3     4     5     6       7     8     9     10     11     12     13       14     15     16     17     18     19     20       21     22     23     24     25     26     27       28     29     30     31                                    Lab Results:  Recent Results (from the past 12 hour(s))   CBC with platelets differential    Collection Time: 02/01/21 11:02 AM   Result Value Ref Range    WBC 4.1 4.0 - 11.0 10e9/L    RBC Count 4.33 3.8 - 5.2 10e12/L    Hemoglobin 12.0 11.7 - 15.7 g/dL     Hematocrit 38.2 35.0 - 47.0 %    MCV 88 78 - 100 fl    MCH 27.7 26.5 - 33.0 pg    MCHC 31.4 (L) 31.5 - 36.5 g/dL    RDW 17.1 (H) 10.0 - 15.0 %    Platelet Count 251 150 - 450 10e9/L    Diff Method Automated Method     % Neutrophils 56.8 %    % Lymphocytes 29.9 %    % Monocytes 12.1 %    % Eosinophils 0.5 %    % Basophils 0.5 %    % Immature Granulocytes 0.2 %    Nucleated RBCs 0 0 /100    Absolute Neutrophil 2.3 1.6 - 8.3 10e9/L    Absolute Lymphocytes 1.2 0.8 - 5.3 10e9/L    Absolute Monocytes 0.5 0.0 - 1.3 10e9/L    Absolute Eosinophils 0.0 0.0 - 0.7 10e9/L    Absolute Basophils 0.0 0.0 - 0.2 10e9/L    Abs Immature Granulocytes 0.0 0 - 0.4 10e9/L    Absolute Nucleated RBC 0.0    Comprehensive metabolic panel    Collection Time: 02/01/21 11:02 AM   Result Value Ref Range    Sodium 141 133 - 144 mmol/L    Potassium 3.6 3.4 - 5.3 mmol/L    Chloride 107 94 - 109 mmol/L    Carbon Dioxide 27 20 - 32 mmol/L    Anion Gap 7 3 - 14 mmol/L    Glucose 111 (H) 70 - 99 mg/dL    Urea Nitrogen 15 7 - 30 mg/dL    Creatinine 0.52 0.52 - 1.04 mg/dL    GFR Estimate >90 >60 mL/min/[1.73_m2]    GFR Estimate If Black >90 >60 mL/min/[1.73_m2]    Calcium 9.2 8.5 - 10.1 mg/dL    Bilirubin Total 0.2 0.2 - 1.3 mg/dL    Albumin 3.4 3.4 - 5.0 g/dL    Protein Total 8.2 6.8 - 8.8 g/dL    Alkaline Phosphatase 155 (H) 40 - 150 U/L    ALT 30 0 - 50 U/L    AST 26 0 - 45 U/L   Magnesium    Collection Time: 02/01/21 11:02 AM   Result Value Ref Range    Magnesium 2.2 1.6 - 2.3 mg/dL

## 2021-02-03 ENCOUNTER — HOSPITAL ENCOUNTER (INPATIENT)
Facility: CLINIC | Age: 66
LOS: 3 days | Discharge: HOME | End: 2021-02-06
Attending: EMERGENCY MEDICINE | Admitting: HOSPITALIST
Payer: MEDICAID

## 2021-02-03 ENCOUNTER — APPOINTMENT (OUTPATIENT)
Dept: GENERAL RADIOLOGY | Facility: CLINIC | Age: 66
End: 2021-02-03
Attending: EMERGENCY MEDICINE
Payer: MEDICAID

## 2021-02-03 ENCOUNTER — APPOINTMENT (OUTPATIENT)
Dept: CT IMAGING | Facility: CLINIC | Age: 66
End: 2021-02-03
Attending: EMERGENCY MEDICINE
Payer: MEDICAID

## 2021-02-03 ENCOUNTER — APPOINTMENT (OUTPATIENT)
Dept: CARDIOLOGY | Facility: CLINIC | Age: 66
End: 2021-02-03
Attending: EMERGENCY MEDICINE
Payer: MEDICAID

## 2021-02-03 ENCOUNTER — PATIENT OUTREACH (OUTPATIENT)
Dept: ONCOLOGY | Facility: CLINIC | Age: 66
End: 2021-02-03

## 2021-02-03 DIAGNOSIS — C56.9 OVARIAN CANCER, UNSPECIFIED LATERALITY (H): ICD-10-CM

## 2021-02-03 DIAGNOSIS — I26.99 OTHER ACUTE PULMONARY EMBOLISM WITHOUT ACUTE COR PULMONALE (H): ICD-10-CM

## 2021-02-03 DIAGNOSIS — R11.2 NON-INTRACTABLE VOMITING WITH NAUSEA, UNSPECIFIED VOMITING TYPE: Primary | ICD-10-CM

## 2021-02-03 DIAGNOSIS — Z11.52 ENCOUNTER FOR SCREENING LABORATORY TESTING FOR SEVERE ACUTE RESPIRATORY SYNDROME CORONAVIRUS 2 (SARS-COV-2): ICD-10-CM

## 2021-02-03 DIAGNOSIS — I48.91 ATRIAL FIBRILLATION WITH RAPID VENTRICULAR RESPONSE (H): ICD-10-CM

## 2021-02-03 LAB
ALBUMIN SERPL-MCNC: 3.5 G/DL (ref 3.4–5)
ALP SERPL-CCNC: 144 U/L (ref 40–150)
ALT SERPL W P-5'-P-CCNC: 43 U/L (ref 0–50)
ANION GAP SERPL CALCULATED.3IONS-SCNC: 4 MMOL/L (ref 3–14)
APTT PPP: 30 SEC (ref 22–37)
AST SERPL W P-5'-P-CCNC: 26 U/L (ref 0–45)
BASOPHILS # BLD AUTO: 0 10E9/L (ref 0–0.2)
BASOPHILS NFR BLD AUTO: 0.4 %
BILIRUB SERPL-MCNC: 0.9 MG/DL (ref 0.2–1.3)
BUN SERPL-MCNC: 18 MG/DL (ref 7–30)
CALCIUM SERPL-MCNC: 9.5 MG/DL (ref 8.5–10.1)
CHLORIDE SERPL-SCNC: 100 MMOL/L (ref 94–109)
CO2 SERPL-SCNC: 30 MMOL/L (ref 20–32)
CREAT SERPL-MCNC: 0.86 MG/DL (ref 0.52–1.04)
DIFFERENTIAL METHOD BLD: ABNORMAL
EOSINOPHIL # BLD AUTO: 0 10E9/L (ref 0–0.7)
EOSINOPHIL NFR BLD AUTO: 0 %
ERYTHROCYTE [DISTWIDTH] IN BLOOD BY AUTOMATED COUNT: 18.4 % (ref 10–15)
GFR SERPL CREATININE-BSD FRML MDRD: 71 ML/MIN/{1.73_M2}
GLUCOSE SERPL-MCNC: 121 MG/DL (ref 70–99)
HCT VFR BLD AUTO: 42.2 % (ref 35–47)
HGB BLD-MCNC: 13.2 G/DL (ref 11.7–15.7)
IMM GRANULOCYTES # BLD: 0 10E9/L (ref 0–0.4)
IMM GRANULOCYTES NFR BLD: 0.4 %
INR PPP: 1.06 (ref 0.86–1.14)
INTERPRETATION ECG - MUSE: NORMAL
INTERPRETATION ECG - MUSE: NORMAL
LABORATORY COMMENT REPORT: NORMAL
LYMPHOCYTES # BLD AUTO: 0.9 10E9/L (ref 0.8–5.3)
LYMPHOCYTES NFR BLD AUTO: 17.2 %
MAGNESIUM SERPL-MCNC: 2.1 MG/DL (ref 1.6–2.3)
MCH RBC QN AUTO: 27.7 PG (ref 26.5–33)
MCHC RBC AUTO-ENTMCNC: 31.3 G/DL (ref 31.5–36.5)
MCV RBC AUTO: 89 FL (ref 78–100)
MONOCYTES # BLD AUTO: 0.3 10E9/L (ref 0–1.3)
MONOCYTES NFR BLD AUTO: 5.7 %
NEUTROPHILS # BLD AUTO: 4 10E9/L (ref 1.6–8.3)
NEUTROPHILS NFR BLD AUTO: 76.3 %
NRBC # BLD AUTO: 0 10*3/UL
NRBC BLD AUTO-RTO: 0 /100
NT-PROBNP SERPL-MCNC: 1154 PG/ML (ref 0–900)
PLATELET # BLD AUTO: 240 10E9/L (ref 150–450)
POTASSIUM SERPL-SCNC: 3.2 MMOL/L (ref 3.4–5.3)
POTASSIUM SERPL-SCNC: 3.5 MMOL/L (ref 3.4–5.3)
PROT SERPL-MCNC: 8.6 G/DL (ref 6.8–8.8)
RADIOLOGIST FLAGS: ABNORMAL
RBC # BLD AUTO: 4.77 10E12/L (ref 3.8–5.2)
SARS-COV-2 RNA RESP QL NAA+PROBE: NEGATIVE
SODIUM SERPL-SCNC: 135 MMOL/L (ref 133–144)
SPECIMEN SOURCE: NORMAL
TROPONIN I SERPL-MCNC: <0.015 UG/L (ref 0–0.04)
UFH PPP CHRO-ACNC: 0.41 IU/ML
WBC # BLD AUTO: 5.2 10E9/L (ref 4–11)

## 2021-02-03 PROCEDURE — 85610 PROTHROMBIN TIME: CPT | Performed by: EMERGENCY MEDICINE

## 2021-02-03 PROCEDURE — 93005 ELECTROCARDIOGRAM TRACING: CPT

## 2021-02-03 PROCEDURE — 250N000013 HC RX MED GY IP 250 OP 250 PS 637: Performed by: EMERGENCY MEDICINE

## 2021-02-03 PROCEDURE — C9803 HOPD COVID-19 SPEC COLLECT: HCPCS | Performed by: EMERGENCY MEDICINE

## 2021-02-03 PROCEDURE — 250N000011 HC RX IP 250 OP 636: Performed by: STUDENT IN AN ORGANIZED HEALTH CARE EDUCATION/TRAINING PROGRAM

## 2021-02-03 PROCEDURE — 85027 COMPLETE CBC AUTOMATED: CPT | Performed by: EMERGENCY MEDICINE

## 2021-02-03 PROCEDURE — B24BZZ4 ULTRASONOGRAPHY OF HEART WITH AORTA, TRANSESOPHAGEAL: ICD-10-PCS | Performed by: STUDENT IN AN ORGANIZED HEALTH CARE EDUCATION/TRAINING PROGRAM

## 2021-02-03 PROCEDURE — 71045 X-RAY EXAM CHEST 1 VIEW: CPT

## 2021-02-03 PROCEDURE — 85025 COMPLETE CBC W/AUTO DIFF WBC: CPT | Performed by: EMERGENCY MEDICINE

## 2021-02-03 PROCEDURE — 99223 1ST HOSP IP/OBS HIGH 75: CPT | Mod: 25 | Performed by: INTERNAL MEDICINE

## 2021-02-03 PROCEDURE — 85730 THROMBOPLASTIN TIME PARTIAL: CPT | Performed by: EMERGENCY MEDICINE

## 2021-02-03 PROCEDURE — 36415 COLL VENOUS BLD VENIPUNCTURE: CPT | Performed by: STUDENT IN AN ORGANIZED HEALTH CARE EDUCATION/TRAINING PROGRAM

## 2021-02-03 PROCEDURE — 96365 THER/PROPH/DIAG IV INF INIT: CPT | Mod: 59 | Performed by: EMERGENCY MEDICINE

## 2021-02-03 PROCEDURE — 85520 HEPARIN ASSAY: CPT | Performed by: EMERGENCY MEDICINE

## 2021-02-03 PROCEDURE — 99223 1ST HOSP IP/OBS HIGH 75: CPT | Mod: AI | Performed by: HOSPITALIST

## 2021-02-03 PROCEDURE — 96376 TX/PRO/DX INJ SAME DRUG ADON: CPT | Performed by: EMERGENCY MEDICINE

## 2021-02-03 PROCEDURE — 250N000011 HC RX IP 250 OP 636: Performed by: EMERGENCY MEDICINE

## 2021-02-03 PROCEDURE — U0003 INFECTIOUS AGENT DETECTION BY NUCLEIC ACID (DNA OR RNA); SEVERE ACUTE RESPIRATORY SYNDROME CORONAVIRUS 2 (SARS-COV-2) (CORONAVIRUS DISEASE [COVID-19]), AMPLIFIED PROBE TECHNIQUE, MAKING USE OF HIGH THROUGHPUT TECHNOLOGIES AS DESCRIBED BY CMS-2020-01-R: HCPCS | Performed by: EMERGENCY MEDICINE

## 2021-02-03 PROCEDURE — 80053 COMPREHEN METABOLIC PANEL: CPT | Performed by: EMERGENCY MEDICINE

## 2021-02-03 PROCEDURE — 258N000003 HC RX IP 258 OP 636: Performed by: EMERGENCY MEDICINE

## 2021-02-03 PROCEDURE — 96368 THER/DIAG CONCURRENT INF: CPT | Performed by: EMERGENCY MEDICINE

## 2021-02-03 PROCEDURE — 99285 EMERGENCY DEPT VISIT HI MDM: CPT | Mod: 25 | Performed by: EMERGENCY MEDICINE

## 2021-02-03 PROCEDURE — 96366 THER/PROPH/DIAG IV INF ADDON: CPT | Performed by: EMERGENCY MEDICINE

## 2021-02-03 PROCEDURE — 93010 ELECTROCARDIOGRAM REPORT: CPT | Performed by: INTERNAL MEDICINE

## 2021-02-03 PROCEDURE — 71045 X-RAY EXAM CHEST 1 VIEW: CPT | Mod: 26

## 2021-02-03 PROCEDURE — 83880 ASSAY OF NATRIURETIC PEPTIDE: CPT | Performed by: EMERGENCY MEDICINE

## 2021-02-03 PROCEDURE — 93306 TTE W/DOPPLER COMPLETE: CPT | Mod: 26 | Performed by: STUDENT IN AN ORGANIZED HEALTH CARE EDUCATION/TRAINING PROGRAM

## 2021-02-03 PROCEDURE — 93005 ELECTROCARDIOGRAM TRACING: CPT | Mod: 76 | Performed by: EMERGENCY MEDICINE

## 2021-02-03 PROCEDURE — 83735 ASSAY OF MAGNESIUM: CPT | Performed by: EMERGENCY MEDICINE

## 2021-02-03 PROCEDURE — 93010 ELECTROCARDIOGRAM REPORT: CPT | Mod: 76 | Performed by: EMERGENCY MEDICINE

## 2021-02-03 PROCEDURE — 99207 PR CDG-MDM COMPONENT: MEETS MODERATE - UP CODED: CPT | Performed by: HOSPITALIST

## 2021-02-03 PROCEDURE — 93306 TTE W/DOPPLER COMPLETE: CPT

## 2021-02-03 PROCEDURE — 96375 TX/PRO/DX INJ NEW DRUG ADDON: CPT | Performed by: EMERGENCY MEDICINE

## 2021-02-03 PROCEDURE — 250N000013 HC RX MED GY IP 250 OP 250 PS 637: Performed by: STUDENT IN AN ORGANIZED HEALTH CARE EDUCATION/TRAINING PROGRAM

## 2021-02-03 PROCEDURE — 71275 CT ANGIOGRAPHY CHEST: CPT

## 2021-02-03 PROCEDURE — 71275 CT ANGIOGRAPHY CHEST: CPT | Mod: 26

## 2021-02-03 PROCEDURE — 93010 ELECTROCARDIOGRAM REPORT: CPT | Performed by: EMERGENCY MEDICINE

## 2021-02-03 PROCEDURE — 99151 MOD SED SAME PHYS/QHP <5 YRS: CPT | Performed by: EMERGENCY MEDICINE

## 2021-02-03 PROCEDURE — 120N000003 HC R&B IMCU UMMC

## 2021-02-03 PROCEDURE — 36415 COLL VENOUS BLD VENIPUNCTURE: CPT | Performed by: EMERGENCY MEDICINE

## 2021-02-03 PROCEDURE — 99291 CRITICAL CARE FIRST HOUR: CPT | Mod: 25 | Performed by: EMERGENCY MEDICINE

## 2021-02-03 PROCEDURE — 84484 ASSAY OF TROPONIN QUANT: CPT | Performed by: EMERGENCY MEDICINE

## 2021-02-03 PROCEDURE — U0005 INFEC AGEN DETEC AMPLI PROBE: HCPCS | Performed by: EMERGENCY MEDICINE

## 2021-02-03 PROCEDURE — 84132 ASSAY OF SERUM POTASSIUM: CPT | Performed by: STUDENT IN AN ORGANIZED HEALTH CARE EDUCATION/TRAINING PROGRAM

## 2021-02-03 PROCEDURE — 93005 ELECTROCARDIOGRAM TRACING: CPT | Performed by: EMERGENCY MEDICINE

## 2021-02-03 RX ORDER — NALOXONE HYDROCHLORIDE 0.4 MG/ML
0.4 INJECTION, SOLUTION INTRAMUSCULAR; INTRAVENOUS; SUBCUTANEOUS
Status: DISCONTINUED | OUTPATIENT
Start: 2021-02-03 | End: 2021-02-06 | Stop reason: HOSPADM

## 2021-02-03 RX ORDER — PROCHLORPERAZINE MALEATE 10 MG
10 TABLET ORAL EVERY 6 HOURS PRN
Status: DISCONTINUED | OUTPATIENT
Start: 2021-02-03 | End: 2021-02-06 | Stop reason: HOSPADM

## 2021-02-03 RX ORDER — AMIODARONE HYDROCHLORIDE 200 MG/1
400 TABLET ORAL 2 TIMES DAILY
Status: DISCONTINUED | OUTPATIENT
Start: 2021-02-04 | End: 2021-02-05

## 2021-02-03 RX ORDER — POLYETHYLENE GLYCOL 3350 17 G/17G
17 POWDER, FOR SOLUTION ORAL 2 TIMES DAILY
Status: DISCONTINUED | OUTPATIENT
Start: 2021-02-03 | End: 2021-02-06 | Stop reason: HOSPADM

## 2021-02-03 RX ORDER — OXYCODONE HYDROCHLORIDE 5 MG/1
5 TABLET ORAL ONCE
Status: COMPLETED | OUTPATIENT
Start: 2021-02-03 | End: 2021-02-03

## 2021-02-03 RX ORDER — ONDANSETRON 4 MG/1
4 TABLET, ORALLY DISINTEGRATING ORAL EVERY 6 HOURS PRN
Status: DISCONTINUED | OUTPATIENT
Start: 2021-02-04 | End: 2021-02-06 | Stop reason: HOSPADM

## 2021-02-03 RX ORDER — ROPINIROLE 0.25 MG/1
TABLET, FILM COATED ORAL AT BEDTIME
COMMUNITY
End: 2021-02-19

## 2021-02-03 RX ORDER — IOPAMIDOL 755 MG/ML
55 INJECTION, SOLUTION INTRAVASCULAR ONCE
Status: COMPLETED | OUTPATIENT
Start: 2021-02-03 | End: 2021-02-03

## 2021-02-03 RX ORDER — OXYCODONE HYDROCHLORIDE 5 MG/1
5-10 TABLET ORAL EVERY 6 HOURS PRN
Status: DISCONTINUED | OUTPATIENT
Start: 2021-02-03 | End: 2021-02-04

## 2021-02-03 RX ORDER — NALOXONE HYDROCHLORIDE 0.4 MG/ML
0.2 INJECTION, SOLUTION INTRAMUSCULAR; INTRAVENOUS; SUBCUTANEOUS
Status: DISCONTINUED | OUTPATIENT
Start: 2021-02-03 | End: 2021-02-06 | Stop reason: HOSPADM

## 2021-02-03 RX ORDER — ACETAMINOPHEN 325 MG/1
650 TABLET ORAL EVERY 6 HOURS PRN
Status: DISCONTINUED | OUTPATIENT
Start: 2021-02-03 | End: 2021-02-06 | Stop reason: HOSPADM

## 2021-02-03 RX ORDER — AMITRIPTYLINE HYDROCHLORIDE 50 MG/1
100 TABLET ORAL AT BEDTIME
Status: DISCONTINUED | OUTPATIENT
Start: 2021-02-03 | End: 2021-02-06 | Stop reason: HOSPADM

## 2021-02-03 RX ORDER — ONDANSETRON 2 MG/ML
4 INJECTION INTRAMUSCULAR; INTRAVENOUS EVERY 6 HOURS PRN
Status: DISCONTINUED | OUTPATIENT
Start: 2021-02-04 | End: 2021-02-06 | Stop reason: HOSPADM

## 2021-02-03 RX ORDER — HEPARIN SODIUM 10000 [USP'U]/100ML
0-5000 INJECTION, SOLUTION INTRAVENOUS CONTINUOUS
Status: DISCONTINUED | OUTPATIENT
Start: 2021-02-03 | End: 2021-02-03

## 2021-02-03 RX ORDER — LIDOCAINE 40 MG/G
CREAM TOPICAL
Status: DISCONTINUED | OUTPATIENT
Start: 2021-02-03 | End: 2021-02-06 | Stop reason: HOSPADM

## 2021-02-03 RX ORDER — HYDROMORPHONE HYDROCHLORIDE 1 MG/ML
0.5 INJECTION, SOLUTION INTRAMUSCULAR; INTRAVENOUS; SUBCUTANEOUS ONCE
Status: COMPLETED | OUTPATIENT
Start: 2021-02-03 | End: 2021-02-03

## 2021-02-03 RX ORDER — SODIUM CHLORIDE 9 MG/ML
INJECTION, SOLUTION INTRAVENOUS CONTINUOUS
Status: DISCONTINUED | OUTPATIENT
Start: 2021-02-03 | End: 2021-02-04

## 2021-02-03 RX ORDER — HEPARIN SODIUM 10000 [USP'U]/100ML
0-5000 INJECTION, SOLUTION INTRAVENOUS CONTINUOUS
Status: DISCONTINUED | OUTPATIENT
Start: 2021-02-03 | End: 2021-02-04

## 2021-02-03 RX ORDER — OLANZAPINE 5 MG/1
5 TABLET, ORALLY DISINTEGRATING ORAL AT BEDTIME
Status: DISCONTINUED | OUTPATIENT
Start: 2021-02-03 | End: 2021-02-06 | Stop reason: HOSPADM

## 2021-02-03 RX ORDER — HYDROMORPHONE HCL IN WATER/PF 6 MG/30 ML
0.2 PATIENT CONTROLLED ANALGESIA SYRINGE INTRAVENOUS ONCE
Status: COMPLETED | OUTPATIENT
Start: 2021-02-03 | End: 2021-02-03

## 2021-02-03 RX ORDER — ROPINIROLE 0.25 MG/1
0.5 TABLET, FILM COATED ORAL AT BEDTIME
Status: DISCONTINUED | OUTPATIENT
Start: 2021-02-03 | End: 2021-02-06 | Stop reason: HOSPADM

## 2021-02-03 RX ORDER — HYDROMORPHONE HCL IN WATER/PF 6 MG/30 ML
0.2 PATIENT CONTROLLED ANALGESIA SYRINGE INTRAVENOUS ONCE
Status: DISCONTINUED | OUTPATIENT
Start: 2021-02-03 | End: 2021-02-03

## 2021-02-03 RX ORDER — AMOXICILLIN 250 MG
2 CAPSULE ORAL 2 TIMES DAILY
Status: DISCONTINUED | OUTPATIENT
Start: 2021-02-03 | End: 2021-02-06 | Stop reason: HOSPADM

## 2021-02-03 RX ORDER — OXYCODONE HYDROCHLORIDE 5 MG/1
5 TABLET ORAL EVERY 6 HOURS PRN
Status: ON HOLD | COMMUNITY
End: 2021-02-06

## 2021-02-03 RX ADMIN — IOPAMIDOL 55 ML: 755 INJECTION, SOLUTION INTRAVENOUS at 16:53

## 2021-02-03 RX ADMIN — OXYCODONE HYDROCHLORIDE 5 MG: 5 TABLET ORAL at 21:06

## 2021-02-03 RX ADMIN — HEPARIN SODIUM 1200 UNITS/HR: 10000 INJECTION, SOLUTION INTRAVENOUS at 17:29

## 2021-02-03 RX ADMIN — OLANZAPINE 5 MG: 5 TABLET, ORALLY DISINTEGRATING ORAL at 22:27

## 2021-02-03 RX ADMIN — HYDROMORPHONE HYDROCHLORIDE 0.5 MG: 1 INJECTION, SOLUTION INTRAMUSCULAR; INTRAVENOUS; SUBCUTANEOUS at 21:06

## 2021-02-03 RX ADMIN — HEPARIN SODIUM 800 UNITS/HR: 10000 INJECTION, SOLUTION INTRAVENOUS at 16:02

## 2021-02-03 RX ADMIN — AMIODARONE HYDROCHLORIDE 150 MG: 1.5 INJECTION, SOLUTION INTRAVENOUS at 17:24

## 2021-02-03 RX ADMIN — HYDROMORPHONE HYDROCHLORIDE 0.2 MG: 0.2 INJECTION, SOLUTION INTRAMUSCULAR; INTRAVENOUS; SUBCUTANEOUS at 19:43

## 2021-02-03 RX ADMIN — AMITRIPTYLINE HYDROCHLORIDE 100 MG: 50 TABLET, FILM COATED ORAL at 22:26

## 2021-02-03 RX ADMIN — ROPINIROLE HYDROCHLORIDE 0.5 MG: 0.25 TABLET, FILM COATED ORAL at 22:26

## 2021-02-03 RX ADMIN — DOCUSATE SODIUM 50 MG AND SENNOSIDES 8.6 MG 2 TABLET: 8.6; 5 TABLET, FILM COATED ORAL at 22:27

## 2021-02-03 RX ADMIN — SODIUM CHLORIDE 1000 ML: 9 INJECTION, SOLUTION INTRAVENOUS at 15:45

## 2021-02-03 RX ADMIN — SODIUM CHLORIDE 1000 ML: 900 INJECTION, SOLUTION INTRAVENOUS at 16:35

## 2021-02-03 RX ADMIN — SODIUM CHLORIDE: 9 INJECTION, SOLUTION INTRAVENOUS at 17:57

## 2021-02-03 RX ADMIN — HYDROMORPHONE HYDROCHLORIDE 0.2 MG: 0.2 INJECTION, SOLUTION INTRAMUSCULAR; INTRAVENOUS; SUBCUTANEOUS at 17:57

## 2021-02-03 ASSESSMENT — ENCOUNTER SYMPTOMS
DIFFICULTY URINATING: 0
SHORTNESS OF BREATH: 1
FEVER: 0
CONFUSION: 0
HEADACHES: 0
ARTHRALGIAS: 0
NECK STIFFNESS: 0
COLOR CHANGE: 0
ABDOMINAL PAIN: 0
EYE REDNESS: 0

## 2021-02-03 ASSESSMENT — MIFFLIN-ST. JEOR
SCORE: 1331.51
SCORE: 1342.4

## 2021-02-03 NOTE — CONSULTS
Cardiac Electrophysiology consultation      Reason For Consultation:      HPI:  This is a pleasant 63 year old female with a recent diagnosis of Stage IV ovarian cancer that is presenting to the hospital with chest discomfort and shortness of breath.  She has already begun treatment for cancer and has been dealing with decreased appetite, anxiety, restlessness and poor PO intake.  Yesterday evening, she noted some shortness of breath and chest pain.  Her daughter brought her to the hospital this evening where she was noted to be in atrial fibrillation with rapid ventricular response and hypotensive.  EP is consulted to help with acute management.    She is alert and oriented on presentation to her room.  She reports feeling better with slower heart rates and when laying down and sitting.  Shortness of breath is worsened by movement.  In addition, she reports lightheadedness since yesterday evening.  She has not had syncope.  No prior cardiac history.    Past Medical History:      Past Medical History:   Diagnosis Date     History of cold sores      Insomnia      Migraine      Osteopenia      Pelvic mass      Peritoneal carcinomatosis (H)      Restless legs syndrome (RLS)        Past Surgical  History:      Past Surgical History:   Procedure Laterality Date     APPENDECTOMY       ARTHROSCPY KNEE SURGICAL DEBRIDEMENT SHAVING ARTICULAR CARTILAGE Right      DEBRIDEMENT LEFT UPPER EXTREMITY  2016     LAPAROSCOPY DIAGNOSTIC (GYN) Bilateral 1/7/2021    Procedure: Diagnsotic laparoscopy, biopsies;  Surgeon: Bolivar Juarez MD;  Location: [facility] OR     LASIK       TUBAL LIGATION         Family History:      Family History   Adopted: Yes   Problem Relation Age of Onset     Cancer Mother 36     Factor V Leiden deficiency Daughter      Deep Vein Thrombosis Daughter      Diabetes Type 1 Daughter        Past Medical History:      Social History     Socioeconomic History     Marital status:      Spouse name: Not on  file     Number of children: Not on file     Years of education: Not on file     Highest education level: Not on file   Occupational History     Not on file   Social Needs     Financial resource strain: Not on file     Food insecurity     Worry: Not on file     Inability: Not on file     Transportation needs     Medical: Not on file     Non-medical: Not on file   Tobacco Use     Smoking status: Former Smoker     Packs/day: 0.50     Years: 40.00     Pack years: 20.00     Quit date: 2018     Years since quitting: 3.0     Smokeless tobacco: Never Used   Substance and Sexual Activity     Alcohol use: Not Currently     Drug use: Never     Sexual activity: Not on file   Lifestyle     Physical activity     Days per week: Not on file     Minutes per session: Not on file     Stress: Not on file   Relationships     Social connections     Talks on phone: Not on file     Gets together: Not on file     Attends Tenriism service: Not on file     Active member of club or organization: Not on file     Attends meetings of clubs or organizations: Not on file     Relationship status: Not on file     Intimate partner violence     Fear of current or ex partner: Not on file     Emotionally abused: Not on file     Physically abused: Not on file     Forced sexual activity: Not on file   Other Topics Concern     Not on file   Social History Narrative     Not on file       Past Medical History:    A complete review of systems is negative except as noted above in the HPI.    Physical Exam:   Vitals: BP 99/62   Pulse 94   Temp 98.1  F (36.7  C) (Oral)   Ht 1.829 m (6')   Wt 68 kg (150 lb)   SpO2 95%   BMI 20.34 kg/m    Gen: Thin and frail although no distress  HEENT: NC and AT  Neck:  Supple without JVD  Cardiovascular: Tachycardia with intermittent irregularity  Abd: +BS non distended  Neuro: A&OX4  Skin:Warm and well perfused         Relevant labs, imaging, medications and procedures were reviewed.    Assessment and Recommendations:    This is a 65 year old female with Stage IV ovarian cancer presenting to the hospital with 20 hours of chest discomfort, respiratory distress and found to be in atrial fibrillation.    Atrial Fibrillation may be a driving factor of her symptoms although we agree that a CT/PE should be performed to rule out PE.  She has been started on anticoagulation.    By the time she was seen in the ED, her atrial fibrillation had settled and appeared to be predominantly sinus tachycardia with short burst of atrial fibrillation.  I do not thing her hypotension is being driven by tachcyardia as she is still hypotensive with heart rates in the 110BPM range.  A bedside echo did not demonstrate RV strain or pericardial effusion and her AF is likely secondary to underlying vs acute illness.      Given current sinus rhythm, we would recommend a quick load of amiodarone with 150mg followed by an oral load to prevent atrial fibrillation.  After the IV load, she should receive 400mg twice daily for two weeks followed by 200mg.  Whether this is driven by acute illness, PE, dehydration, her AF will otherwise likely recur.  Please order liver and thyroid enzymes to be performed in the next day.    We would recommend anticoagulation given risk for thrombus formation.  Her [id number] is only 1 for female sex although her underlying malignancy will increase her risk of thromboembolic events.     We are happy to follow along while inpatient and will scheduled Ms. Regalado for outpatient follow up.    Vish Fajardo MD  EP Fellow    I very much appreciated the opportunity to see and assess Taran Regalado in the hospital with CV EP Fellow Dr Fajardo . Review of Chest CT indicates PE as was suspected in the consult text.  I agree with the note above which summarizes my findings and current recommendations.  Please do not hesitate to contact my office if you have any questions or concerns.      Andrea Sears MD  Cardiac Arrhythmia  Service  [facility]  [phone]

## 2021-02-03 NOTE — ED PROVIDER NOTES
ED Provider Note  [facility]      History     Chief Complaint   Patient presents with     Chest Pain     Shortness of Breath     The history is provided by the patient, medical records and a relative (Daughter).     Taran Regalado is a 65 year old female with a medical history significant for stage 4 ovarian cancer on chemotherapy (Taxol, Carboplatin) who presents to the Emergency Department for evaluation of chest pain and shortness of breath that started last night. She states that she has been having a hard time taking a deep breath and having left to center chest pain, describing it as feeling almost like a pinched nerve. Laying down flat makes the pain worse. She notes that she has not experienced these symptoms before. She denies a fever. She reports 2nd round of chemo was on Monday, 2 days ago. She denies a history of atrial fibrillation.     Past Medical History:   Diagnosis Date     History of cold sores      Insomnia      Migraine      Osteopenia      Pelvic mass      Peritoneal carcinomatosis (H)      Restless legs syndrome (RLS)        Past Surgical History:   Procedure Laterality Date     APPENDECTOMY       ARTHROSCPY KNEE SURGICAL DEBRIDEMENT SHAVING ARTICULAR CARTILAGE Right      DEBRIDEMENT LEFT UPPER EXTREMITY  2016     LAPAROSCOPY DIAGNOSTIC (GYN) Bilateral 1/7/2021    Procedure: Diagnsotic laparoscopy, biopsies;  Surgeon: Bolivar Juarez MD;  Location:  OR     [facility]       TUBAL LIGATION         Family History   Adopted: Yes   Problem Relation Age of Onset     Cancer Mother 36     Factor V Leiden deficiency Daughter      Deep Vein Thrombosis Daughter      Diabetes Type 1 Daughter        Social History     Tobacco Use     Smoking status: Former Smoker     Packs/day: 0.50     Years: 40.00     Pack years: 20.00     Quit date: 2018     Years since quitting: 3.0     Smokeless tobacco: Never Used   Substance Use Topics     Alcohol use: Not Currently       Current  Facility-Administered Medications   Medication     acetaminophen (TYLENOL) tablet 650 mg     amiodarone (PACERONE) tablet 400 mg     amitriptyline (ELAVIL) tablet 100 mg     HYDROmorphone (DILAUDID) tablet 2 mg     influenza recomb quadrivalent PF (FLUBLOK) injection 0.5 mL     lidocaine (LMX4) cream     lidocaine 1 % 0.1-1 mL     melatonin tablet 1 mg     naloxone (NARCAN) injection 0.2 mg    Or     naloxone (NARCAN) injection 0.4 mg    Or     naloxone (NARCAN) injection 0.2 mg    Or     naloxone (NARCAN) injection 0.4 mg     OLANZapine zydis (zyPREXA) ODT tab 5 mg     ondansetron (ZOFRAN-ODT) ODT tab 4 mg    Or     ondansetron (ZOFRAN) injection 4 mg     oxyCODONE (ROXICODONE) tablet 5-10 mg     Patient is already receiving anticoagulation with heparin, enoxaparin (LOVENOX), warfarin (COUMADIN)  or other anticoagulant medication     polyethylene glycol (MIRALAX) Packet 17 g     prochlorperazine (COMPAZINE) tablet 10 mg     rivaroxaban ANTICOAGULANT (XARELTO) tablet 15 mg     rOPINIRole (REQUIP) tablet 0.5 mg     senna-docusate (SENOKOT-S/PERICOLACE) 8.6-50 MG per tablet 2 tablet     sodium chloride (PF) 0.9% PF flush 3 mL     sodium chloride (PF) 0.9% PF flush 3 mL      No Known Allergies    Past Medical History  Past Medical History:   Diagnosis Date     History of cold sores      Insomnia      Migraine      Osteopenia      Pelvic mass      Peritoneal carcinomatosis (H)      Restless legs syndrome (RLS)      Past Surgical History:   Procedure Laterality Date     APPENDECTOMY       ARTHROSCPY KNEE SURGICAL DEBRIDEMENT SHAVING ARTICULAR CARTILAGE Right      DEBRIDEMENT LEFT UPPER EXTREMITY  2016     LAPAROSCOPY DIAGNOSTIC (GYN) Bilateral 1/7/2021    Procedure: Diagnsotic laparoscopy, biopsies;  Surgeon: Bolivar Juarez MD;  Location: [facility] OR     LASIK       TUBAL LIGATION            OLANZapine zydis (ZYPREXA) 5 MG ODT      No Known Allergies  Family History  Family History   Adopted: Yes   Problem Relation Age  of Onset     Cancer Mother 36     Factor V Leiden deficiency Daughter      Deep Vein Thrombosis Daughter      Diabetes Type 1 Daughter      Social History   Social History     Tobacco Use     Smoking status: Former Smoker     Packs/day: 0.50     Years: 40.00     Pack years: 20.00     Quit date: 2018     Years since quitting: 3.0     Smokeless tobacco: Never Used   Substance Use Topics     Alcohol use: Not Currently     Drug use: Never      Past medical history, past surgical history, medications, allergies, family history, and social history were reviewed with the patient. No additional pertinent items.       Review of Systems   Constitutional: Negative for fever.   HENT: Negative for congestion.    Eyes: Negative for redness.   Respiratory: Positive for shortness of breath.    Cardiovascular: Positive for chest pain.   Gastrointestinal: Negative for abdominal pain.   Genitourinary: Negative for difficulty urinating.   Musculoskeletal: Negative for arthralgias and neck stiffness.   Skin: Negative for color change.   Neurological: Negative for headaches.   Psychiatric/Behavioral: Negative for confusion.   All other systems reviewed and are negative.    A complete review of systems was performed with pertinent positives and negatives noted in the HPI, and all other systems negative.    Physical Exam   BP: (!) 87/48  Pulse: 73  Temp: 98.1  F (36.7  C)  Resp: 18  Height: 182.9 cm (6')  Weight: 68 kg (150 lb)  SpO2: 96 %  Physical Exam  Vitals signs and nursing note reviewed.   Constitutional:       General: She is not in acute distress.     Appearance: She is well-developed. She is not ill-appearing, toxic-appearing or diaphoretic.      Comments: Patient is awake and alert, she is mentating normally.  She is protecting her airway without difficulty and is able to speak in complete sentences.   HENT:      Head: Normocephalic and atraumatic.      Mouth/Throat:      Lips: Pink.      Mouth: Mucous membranes are moist.       Pharynx: Oropharynx is clear. No oropharyngeal exudate.   Eyes:      General: Lids are normal. No scleral icterus.     Extraocular Movements: Extraocular movements intact.      Right eye: No nystagmus.      Left eye: No nystagmus.      Conjunctiva/sclera: Conjunctivae normal.      Pupils: Pupils are equal, round, and reactive to light.   Neck:      Musculoskeletal: Normal range of motion and neck supple. No erythema or neck rigidity.      Thyroid: No thyromegaly.      Vascular: No JVD.      Trachea: No tracheal deviation.   Cardiovascular:      Rate and Rhythm: Tachycardia present. Rhythm regularly irregular.      Pulses: Normal pulses.      Heart sounds: Normal heart sounds. No murmur. No friction rub. No gallop.    Pulmonary:      Effort: Pulmonary effort is normal. No respiratory distress.      Breath sounds: Normal breath sounds.   Abdominal:      General: Bowel sounds are normal. There is no distension.      Palpations: Abdomen is soft. There is no mass.      Tenderness: There is no abdominal tenderness. There is no guarding or rebound.   Musculoskeletal: Normal range of motion.         General: No tenderness.      Right lower leg: No edema.      Left lower leg: No edema.   Lymphadenopathy:      Cervical: No cervical adenopathy.   Skin:     General: Skin is warm and dry.      Capillary Refill: Capillary refill takes less than 2 seconds.      Coloration: Skin is not pale.      Findings: No erythema or rash.   Neurological:      Mental Status: She is alert and oriented to person, place, and time.      Cranial Nerves: No cranial nerve deficit.      Sensory: No sensory deficit.      Motor: Motor function is intact.   Psychiatric:         Mood and Affect: Mood and affect normal.         Speech: Speech normal.         Behavior: Behavior normal.         ED Course       3:31 PM  The patient was seen and examined by Sulaiman Grossman MD in Room ED06.     Procedures             EKG Interpretation:      Interpreted by  Sulaiman Grossman MD  Time reviewed: 1537  Symptoms at time of EKG: tachycardia, chest pain   Rhythm: atrial fibrillation - rapid  Rate: 145  Ectopy: premature ventricular contractions (unifocal)  Conduction: normal  ST Segments/ T Waves: No acute ischemic changes  Q Waves: nonspecific  Comparison to prior: Now a-fib    Clinical Impression: atrial fibrillation (new onset)           EKG Interpretation:      Interpreted by Sulaiman Grossman MD  Time reviewed:1620   Symptoms at time of EKG: tachycardia with rhythm changes   Rhythm: Sinus tachycardia with runs of a-fib vs PSVCs  Rate: 114  Conduction: Normal  ST Segments/ T Waves: No ST-T wave changes and No acute ischemic changes  Q Waves: None  Comparison to prior: Now with sinus tach    Clinical Impression: Sinus tachycardia with runs of a-fib vs PSVCs               Critical Care Addendum    My initial assessment, based on my review of nursing observations, review of vital signs, focused history, physical exam, review of cardiac rhythm monitor and 12 lead ECG analysis, established that Taran Regalado has a critical arrhythmia, which requires immediate intervention, and therefore she is critically ill.     After the initial assessment, the care team initiated multiple lab tests, initiated IV fluid administration, initiated medication therapy with Heparin, amiodarone and consulted with EP to provide stabilization care. Due to the critical nature of this patient, I reassessed nursing observations, vital signs, physical exam, review of cardiac rhythm monitor, 12 lead ECG analysis, mental status and respiratory status multiple times prior to her disposition.     Time also spent performing documentation, discussion with family to obtain medical information for decision making, reviewing test results, discussion with consultants and coordination of care.     Critical care time (excluding teaching time and procedures): 60 minutes.       Labs Ordered and  Resulted from Time of ED Arrival Up to the Time of Departure from the ED   CBC WITH PLATELETS DIFFERENTIAL - Abnormal; Notable for the following components:       Result Value    MCHC 31.3 (*)     RDW 18.4 (*)     All other components within normal limits   COMPREHENSIVE METABOLIC PANEL - Abnormal; Notable for the following components:    Potassium 3.2 (*)     Glucose 121 (*)     All other components within normal limits   MAGNESIUM   INR   PARTIAL THROMBOPLASTIN TIME   TROPONIN I   SARS-COV-2 (COVID-19) VIRUS RT-PCR   HEPARIN UNFRACTIONATED ANTI XA LEVEL   IP ASSIGN PROVIDER TEAM TO TREATMENT TEAM     US Lower Extremity Venous Duplex Bilateral   Final Result   IMPRESSION:   No evidence of deep venous thrombosis in either lower extremity.      I have personally reviewed the examination and initial interpretation   and I agree with the findings.      LILIAM GARIBAY MD      XR Chest Port 1 View   Final Result   Impression: Moderate-sized left and small right pleural effusion.       I have personally reviewed the examination and initial interpretation   and I agree with the findings.      MICHELLE AMBRIZ MD      CT Chest Pulmonary Embolism w Contrast   Final Result   Abnormal   IMPRESSION:    1. Exam is positive for acute pulmonary embolismNo evidence of right   heart strain or increased right heart pressures.    2. Moderate to large left and small right pleural effusion, unchanged.   3. Bilateral lower lobe and lingular atelectasis.      [Urgent Result: Pulmonary embolus      Finding was identified on 2/3/2021 5:08 PM.       Kemar Grossman MD was contacted by Dr. Nils Wise at 2/3/2021 5:16 PM   and verbalized understanding of the urgent finding.        I have personally reviewed the examination and initial interpretation   and I agree with the findings.      MICHELLE AMBRIZ MD      Echocardiogram Complete   Final Result        Recent Results (from the past 4320 hour(s))   Echocardiogram Complete     [facility]    [id number]  [id number]  [id number]  [id number]^JOHNNIENILAY^DHRUV^[facility],[place]  Echocardiography Laboratory  [address] [place] [id number]     Name: CAROLINE DALAL  MRN: [id number]  : 1955  Study Date: 2021 04:12 PM  Age: 65 yrs  Gender: Female  Patient Location: [facility]  Reason For Study: Atrial Fibrillation  Ordering Physician: DHRUV MEYERS  Performed By: Cindi Montana RDCS     BSA: 1.9 m2  Height: 72 in  Weight: 150 lb  HR: 103  BP: 86/61 mmHg  _____________________________________________________________________________  __        Procedure  Complete Portable Echo Adult. Technically difficult study. Poor acoustic  windows. The patient's rhythm is sinus tachycardia.  _____________________________________________________________________________  __        Interpretation Summary  Technically difficult study. Poor acoustic windows. The patient's rhythm is  sinus tachycardia.  Borderline (EF 50-55%) reduced left ventricular function is present. Mild  diffuse hypokinesis is present. The inferolateral and inferior walls are not  optimally visualized.  Global right ventricular function is normal. The right ventricle is normal  size.  No significant valvular abnormalities.  A left pleural effusion is present.  There is no prior study for direct comparison.  _____________________________________________________________________________  __        Left Ventricle  Left ventricular wall thickness is normal. Left ventricular size is normal.  Borderline (EF 50-55%) reduced left ventricular function is present. Left  ventricular diastolic function is indeterminate. Mild diffuse hypokinesis is  present. The inferolateral and inferior walls are not optimally visualized.     Right Ventricle  Global right ventricular function is normal. The right ventricle is normal  size.     Atria  Mild left atrial enlargement is present.     Mitral  Valve  The mitral valve is normal. Trace mitral insufficiency is present.        Aortic Valve  The valve leaflets are not well visualized. On Doppler interrogation, there is  no significant stenosis or regurgitation.     Tricuspid Valve  The tricuspid valve is normal. Trace tricuspid insufficiency is present. The  right ventricular systolic pressure is approximated at 17.9 mmHg plus the  right atrial pressure.     Pulmonic Valve  The valve leaflets are not well visualized. Trace pulmonic insufficiency is  present.     Vessels  Sinuses of Valsalva 3.7 cm. IVC diameter <2.1 cm collapsing >50% with sniff  suggests a normal RA pressure of 3 mmHg.     Pericardium  No pericardial effusion is present.        Miscellaneous  A left pleural effusion is present.     Compared to Previous Study  There is no prior study for direct comparison.  _____________________________________________________________________________  __     MMode/2D Measurements & Calculations  IVSd: 0.76 cm  LVIDd: 4.0 cm  LVIDs: 3.1 cm  LVPWd: 1.0 cm  FS: 23.8 %  LV mass(C)d: 109.6 grams  LV mass(C)dI: 58.1 grams/m2  Ao root diam: 3.7 cm  LVOT diam: 2.0 cm  LVOT area: 3.1 cm2  RWT: 0.51  TAPSE: 3.1 cm        Doppler Measurements & Calculations  MV E max paige: 66.9 cm/sec  MV A max paige: 64.0 cm/sec  MV E/A: 1.0  MV dec time: 0.24 sec  TR max paige: 211.7 cm/sec  TR max P.9 mmHg     E/E' av.5  Lateral E/e': 5.6  Medial E/e': 9.3     _____________________________________________________________________________  __           Report approved by: Bina Szymanski 2021 04:57 PM          Assessments & Plan (with Medical Decision Making)     This patient had presented to the emergency department with complaints of pleuritic chest pain that had some component suggestive of pericarditis as it seemed to worsen when she laid flat.  She was noted at presentation to be tachycardic with a narrow complex irregular rhythm on monitor and to be hypotensive, but  mentating normally and in no acute distress.  Twelve-lead EKG demonstrated rapid atrial fibrillation.  IV was established and fluid bolus was initiated.  I did not discern a large pericardial effusion on quick bedside ultrasound.  I ordered formal cardiac echo and consulted with electrophysiology as I was reticent to initiate rate control with her low blood pressures, but also was unsure of the chronicity of her atrial fibrillation and risk of stroke with electrocardioversion and the patient that was clinically looking well other than her hypotension.  I was also concerned about her atrial fib being provoked by other pathology, especially pulmonary embolism so I did order a CT scan.  She was started on heparin at low intensity for her atrial fibrillation and cardioversion pads were placed in case we had to progress to rapid electrocardioversion if she deteriorated.  Electrophysiology fellow evaluated the patient and went to speak to staff.  It was noted that with fluid hydration patient seemed to have episodes of sinus tachycardia in the low 100s but then would go back into atrial fibrillation.  On average, heart rate seem to decrease in blood pressure began to increase.  150 mg of amiodarone was recommended by electrophysiology as an attempt at rhythm control.  CT scan came back demonstrating pulmonary embolism which most likely has provoked her atrial fibrillation.  Cardiac echo had demonstrated no signs of right-sided failure or strain and no pericardial effusion of clinical significance.  Heparin drip was increased to high intensity dosing with the help of pharmacy and I spoke with the gynecology oncology service.  They recommended the patient be admitted to internal medicine given her atrial fibrillation and current medical complexity.  I was then able to speak to the triage hospitalist and patient will be admitted to the internal medicine service for further treatment and evaluation.    I have reviewed the  nursing notes. I have reviewed the findings, diagnosis, plan and need for follow up with the patient.    Current Discharge Medication List          Final diagnoses:   Other acute pulmonary embolism without acute cor pulmonale (H)   Atrial fibrillation with rapid ventricular response (H)   I, Serafin Rose, am serving as a trained medical scribe to document services personally performed by Sulaiman Grossman MD, based on the provider's statements to me.      ISulaiman MD, was physically present and have reviewed and verified the accuracy of this note documented by Serafin Rose.     --  Sulaiman Grossman  [facility] EMERGENCY DEPARTMENT  2/3/2021     Sulaiman Grossman MD  02/05/21 1121

## 2021-02-04 ENCOUNTER — TRANSCRIBE ORDERS (OUTPATIENT)
Dept: OTHER | Age: 66
End: 2021-02-04

## 2021-02-04 ENCOUNTER — APPOINTMENT (OUTPATIENT)
Dept: ULTRASOUND IMAGING | Facility: CLINIC | Age: 66
End: 2021-02-04
Attending: STUDENT IN AN ORGANIZED HEALTH CARE EDUCATION/TRAINING PROGRAM
Payer: MEDICAID

## 2021-02-04 DIAGNOSIS — C56.9 OVARIAN CANCER, UNSPECIFIED LATERALITY (H): Primary | ICD-10-CM

## 2021-02-04 DIAGNOSIS — C56.9 OVARIAN CANCER, UNSPECIFIED LATERALITY (H): ICD-10-CM

## 2021-02-04 LAB
ALBUMIN SERPL-MCNC: 2.8 G/DL (ref 3.4–5)
ALP SERPL-CCNC: 109 U/L (ref 40–150)
ALT SERPL W P-5'-P-CCNC: 32 U/L (ref 0–50)
ANION GAP SERPL CALCULATED.3IONS-SCNC: 7 MMOL/L (ref 3–14)
AST SERPL W P-5'-P-CCNC: 22 U/L (ref 0–45)
BASOPHILS # BLD AUTO: 0 10E9/L (ref 0–0.2)
BASOPHILS NFR BLD AUTO: 0.7 %
BILIRUB SERPL-MCNC: 0.6 MG/DL (ref 0.2–1.3)
BUN SERPL-MCNC: 12 MG/DL (ref 7–30)
CALCIUM SERPL-MCNC: 8.5 MG/DL (ref 8.5–10.1)
CHLORIDE SERPL-SCNC: 105 MMOL/L (ref 94–109)
CO2 SERPL-SCNC: 26 MMOL/L (ref 20–32)
CREAT SERPL-MCNC: 0.55 MG/DL (ref 0.52–1.04)
DIFFERENTIAL METHOD BLD: ABNORMAL
EOSINOPHIL # BLD AUTO: 0 10E9/L (ref 0–0.7)
EOSINOPHIL NFR BLD AUTO: 1 %
ERYTHROCYTE [DISTWIDTH] IN BLOOD BY AUTOMATED COUNT: 17.7 % (ref 10–15)
GFR SERPL CREATININE-BSD FRML MDRD: >90 ML/MIN/{1.73_M2}
GLUCOSE SERPL-MCNC: 92 MG/DL (ref 70–99)
HCT VFR BLD AUTO: 35.7 % (ref 35–47)
HGB BLD-MCNC: 11 G/DL (ref 11.7–15.7)
IMM GRANULOCYTES # BLD: 0 10E9/L (ref 0–0.4)
IMM GRANULOCYTES NFR BLD: 0.3 %
INTERPRETATION ECG - MUSE: NORMAL
LACTATE BLD-SCNC: 1.3 MMOL/L (ref 0.7–2)
LYMPHOCYTES # BLD AUTO: 1.1 10E9/L (ref 0.8–5.3)
LYMPHOCYTES NFR BLD AUTO: 36.1 %
MCH RBC QN AUTO: 28.1 PG (ref 26.5–33)
MCHC RBC AUTO-ENTMCNC: 30.8 G/DL (ref 31.5–36.5)
MCV RBC AUTO: 91 FL (ref 78–100)
MONOCYTES # BLD AUTO: 0.1 10E9/L (ref 0–1.3)
MONOCYTES NFR BLD AUTO: 3.4 %
NEUTROPHILS # BLD AUTO: 1.7 10E9/L (ref 1.6–8.3)
NEUTROPHILS NFR BLD AUTO: 58.5 %
NRBC # BLD AUTO: 0 10*3/UL
NRBC BLD AUTO-RTO: 0 /100
PLATELET # BLD AUTO: 171 10E9/L (ref 150–450)
POTASSIUM SERPL-SCNC: 3.2 MMOL/L (ref 3.4–5.3)
PROT SERPL-MCNC: 6.8 G/DL (ref 6.8–8.8)
RBC # BLD AUTO: 3.91 10E12/L (ref 3.8–5.2)
SODIUM SERPL-SCNC: 138 MMOL/L (ref 133–144)
TSH SERPL DL<=0.005 MIU/L-ACNC: 1.03 MU/L (ref 0.4–4)
UFH PPP CHRO-ACNC: 0.41 IU/ML
UFH PPP CHRO-ACNC: 0.43 IU/ML
WBC # BLD AUTO: 3 10E9/L (ref 4–11)

## 2021-02-04 PROCEDURE — 93970 EXTREMITY STUDY: CPT

## 2021-02-04 PROCEDURE — 36415 COLL VENOUS BLD VENIPUNCTURE: CPT | Performed by: STUDENT IN AN ORGANIZED HEALTH CARE EDUCATION/TRAINING PROGRAM

## 2021-02-04 PROCEDURE — 120N000003 HC R&B IMCU UMMC

## 2021-02-04 PROCEDURE — 80053 COMPREHEN METABOLIC PANEL: CPT | Performed by: STUDENT IN AN ORGANIZED HEALTH CARE EDUCATION/TRAINING PROGRAM

## 2021-02-04 PROCEDURE — 250N000013 HC RX MED GY IP 250 OP 250 PS 637: Performed by: STUDENT IN AN ORGANIZED HEALTH CARE EDUCATION/TRAINING PROGRAM

## 2021-02-04 PROCEDURE — 250N000011 HC RX IP 250 OP 636: Performed by: EMERGENCY MEDICINE

## 2021-02-04 PROCEDURE — 36415 COLL VENOUS BLD VENIPUNCTURE: CPT | Performed by: HOSPITALIST

## 2021-02-04 PROCEDURE — 250N000011 HC RX IP 250 OP 636: Performed by: STUDENT IN AN ORGANIZED HEALTH CARE EDUCATION/TRAINING PROGRAM

## 2021-02-04 PROCEDURE — 84443 ASSAY THYROID STIM HORMONE: CPT | Performed by: STUDENT IN AN ORGANIZED HEALTH CARE EDUCATION/TRAINING PROGRAM

## 2021-02-04 PROCEDURE — 93970 EXTREMITY STUDY: CPT | Mod: 26 | Performed by: RADIOLOGY

## 2021-02-04 PROCEDURE — 83605 ASSAY OF LACTIC ACID: CPT | Performed by: HOSPITALIST

## 2021-02-04 PROCEDURE — 85025 COMPLETE CBC W/AUTO DIFF WBC: CPT | Performed by: STUDENT IN AN ORGANIZED HEALTH CARE EDUCATION/TRAINING PROGRAM

## 2021-02-04 PROCEDURE — 85520 HEPARIN ASSAY: CPT | Performed by: HOSPITALIST

## 2021-02-04 PROCEDURE — 99233 SBSQ HOSP IP/OBS HIGH 50: CPT | Mod: GC | Performed by: PEDIATRICS

## 2021-02-04 PROCEDURE — 85520 HEPARIN ASSAY: CPT | Performed by: STUDENT IN AN ORGANIZED HEALTH CARE EDUCATION/TRAINING PROGRAM

## 2021-02-04 PROCEDURE — 258N000003 HC RX IP 258 OP 636: Performed by: STUDENT IN AN ORGANIZED HEALTH CARE EDUCATION/TRAINING PROGRAM

## 2021-02-04 RX ORDER — POTASSIUM CHLORIDE 1.5 G/1.58G
40 POWDER, FOR SOLUTION ORAL ONCE
Status: COMPLETED | OUTPATIENT
Start: 2021-02-04 | End: 2021-02-04

## 2021-02-04 RX ORDER — HYDROMORPHONE HYDROCHLORIDE 1 MG/ML
0.5 INJECTION, SOLUTION INTRAMUSCULAR; INTRAVENOUS; SUBCUTANEOUS
Status: DISCONTINUED | OUTPATIENT
Start: 2021-02-04 | End: 2021-02-04

## 2021-02-04 RX ORDER — OXYCODONE HYDROCHLORIDE 5 MG/1
5-10 TABLET ORAL EVERY 4 HOURS PRN
Status: DISCONTINUED | OUTPATIENT
Start: 2021-02-04 | End: 2021-02-06 | Stop reason: HOSPADM

## 2021-02-04 RX ORDER — HYDROMORPHONE HYDROCHLORIDE 2 MG/1
2 TABLET ORAL
Status: DISCONTINUED | OUTPATIENT
Start: 2021-02-04 | End: 2021-02-05

## 2021-02-04 RX ADMIN — AMIODARONE HYDROCHLORIDE 400 MG: 200 TABLET ORAL at 08:42

## 2021-02-04 RX ADMIN — AMITRIPTYLINE HYDROCHLORIDE 100 MG: 50 TABLET, FILM COATED ORAL at 22:19

## 2021-02-04 RX ADMIN — PROCHLORPERAZINE MALEATE 10 MG: 10 TABLET ORAL at 18:43

## 2021-02-04 RX ADMIN — DOCUSATE SODIUM 50 MG AND SENNOSIDES 8.6 MG 2 TABLET: 8.6; 5 TABLET, FILM COATED ORAL at 20:20

## 2021-02-04 RX ADMIN — OXYCODONE HYDROCHLORIDE 5 MG: 5 TABLET ORAL at 10:23

## 2021-02-04 RX ADMIN — HYDROMORPHONE HYDROCHLORIDE 2 MG: 2 TABLET ORAL at 17:02

## 2021-02-04 RX ADMIN — DOCUSATE SODIUM 50 MG AND SENNOSIDES 8.6 MG 2 TABLET: 8.6; 5 TABLET, FILM COATED ORAL at 08:42

## 2021-02-04 RX ADMIN — APIXABAN 10 MG: 5 TABLET, FILM COATED ORAL at 20:21

## 2021-02-04 RX ADMIN — ROPINIROLE HYDROCHLORIDE 0.5 MG: 0.25 TABLET, FILM COATED ORAL at 22:19

## 2021-02-04 RX ADMIN — HYDROMORPHONE HYDROCHLORIDE 0.5 MG: 1 INJECTION, SOLUTION INTRAMUSCULAR; INTRAVENOUS; SUBCUTANEOUS at 07:27

## 2021-02-04 RX ADMIN — AMIODARONE HYDROCHLORIDE 1 MG/MIN: 50 INJECTION, SOLUTION INTRAVENOUS at 18:53

## 2021-02-04 RX ADMIN — POTASSIUM CHLORIDE 40 MEQ: 1.5 POWDER, FOR SOLUTION ORAL at 22:54

## 2021-02-04 RX ADMIN — OLANZAPINE 5 MG: 5 TABLET, ORALLY DISINTEGRATING ORAL at 22:20

## 2021-02-04 RX ADMIN — HYDROMORPHONE HYDROCHLORIDE 0.5 MG: 1 INJECTION, SOLUTION INTRAMUSCULAR; INTRAVENOUS; SUBCUTANEOUS at 00:43

## 2021-02-04 RX ADMIN — AMIODARONE HYDROCHLORIDE 1 MG/MIN: 50 INJECTION, SOLUTION INTRAVENOUS at 22:54

## 2021-02-04 RX ADMIN — HEPARIN SODIUM 1200 UNITS/HR: 10000 INJECTION, SOLUTION INTRAVENOUS at 08:39

## 2021-02-04 RX ADMIN — AMIODARONE HYDROCHLORIDE 400 MG: 200 TABLET ORAL at 00:39

## 2021-02-04 ASSESSMENT — ACTIVITIES OF DAILY LIVING (ADL)
ADLS_ACUITY_SCORE: 18
ADLS_ACUITY_SCORE: 17
ADLS_ACUITY_SCORE: 17
ADLS_ACUITY_SCORE: 18
ADLS_ACUITY_SCORE: 17
ADLS_ACUITY_SCORE: 18

## 2021-02-04 NOTE — CONSULTS
[facility]  Gynecology Oncology Consult    Taran Regalado MRN# [id number]   Age: 65 year old YOB: 1955     Date of Admission:  2/3/2021    Primary care provider: Bolivar Juarez             Reason for consult   Stage IV serous ovarian cancer, now with new PE and Afib         History of Present Illness:   Taran Regalado is a 65 year old female who has a history of stage IV serous ovarian cancer and is now admitted to medicine for a new PE and atrial fibrillation with RVR. She had a diagnostic laparoscopy with biopsies on 1/7/2021 with Dr. Juarez. She was then admitted 1/10-1/13 for postoperative nausea and vomiting. After her first cycle of chemo, she had some neuropathy symptoms that improved after 5 days. She tolerated her second cycle of chemo on 2/1/21 without difficulty. She was overall feeling well and had no SOB.     Two days ago she then developed pain in her left upper chest that was worse with taking a deep breath. She continues to have this pain now. She doesn't have SOB but notes that she isn't taking deep breaths. Prior to this episode she was not having any SOB or chest pain. She has been eating and drinking without nausea or vomiting. Denies abdominal pain, constipation, diarrhea, LE edema, or fevers. She was ambulating normally without difficulty at home.             Cancer Treatment History:     12/3/2020: US Pelvic: Limited examination due to acoustic windows. Possible left adnexal mass.   12/4/2020: CT A/P: Peritoneal carcinomatosis with masslike peritoneal thickening in the lower pelvis which may indicate an adnexal or ovarian primary malignancy. Large volume ascites. Bilateral pleural effusions. There is potential subtle pleural nodularity in the right hemithorax which could indicate metastatic disease.  Indeterminate 1 cm lesion in the right hepatic lobe suspicious for a metastatic lesion.      12/16/2020: Presented to GYN ONC with  abdominal distention, reduced appetite, 25lb weight loss, and CTAP with carcinomatosis, elevated  3098.     12/23/2020: CT Chest:   1. There are few scattered small sub-6 mm pulmonary nodules which are indeterminate without prior comparisons available. There are a few slightly larger perifissural nodules which are technically indeterminate in the setting of malignancy although presumed lymphatic in nature and of unlikely clinical significance. Attention on follow-up is recommended.  2. Small to moderate left and small right pleural effusions are increased in size from prior. No convincing evidence for pleural nodularity.  3. Partially visualized large volume ascites and peritoneal nodularity in the upper abdomen similar to 12/4/2020 outside CT     12/26/2020: ED for abdominal distension; 3 L ascites drained with paracentesis    Pelvic US: Findings: Free fluid present in LLQ     12/31/2020: US Paracentesis: 900 mL ascites drained    1/7/2021: Diagnotic laparoscopy, biopsies. Pathology:   - Positive for high grade carcinoma, consistent with metastatic carcinoma of Mullerian origin.    1/10-1/13/2021: Hospital admission for postoperative non-intractable vomiting and nausea.     1/10/2021: CT A/P: Extensive ascites which is probably malignant. Scattered liver hypodensities of indeterminate etiology comment cannot exclude metastatic disease. Diverticulosis. Fluid-filled adnexal masses and irregular appearance of uterus, which may represent primary neoplasm. Multiple peritoneal nodules. Large amount of fecal material in the colon with no evidence of small bowel obstruction.     1/12/2021: Cycle #1 Paclitaxel 175 mg/m2 Carboplatin AUC 6 inpatient     1/13/2021: CT Head:   1. Chronic sinusitis of the right maxillary and right sphenoid sinuses.  2. Incidental presumed calcified meningioma in the right frontal convexity without significant mass effect.  3. No suspicious intracranial enhancing lesion.     2/1/2021: Cycle  #2 Paclitaxel 175 mg/m2 Carboplatin AUC 6, : 936.           Past Medical History:     Past Medical History:   Diagnosis Date     History of cold sores      Insomnia      Migraine      Osteopenia      Pelvic mass      Peritoneal carcinomatosis (H)      Restless legs syndrome (RLS)             Past Surgical History:      Past Surgical History:   Procedure Laterality Date     APPENDECTOMY       ARTHROSCPY KNEE SURGICAL DEBRIDEMENT SHAVING ARTICULAR CARTILAGE Right      DEBRIDEMENT LEFT UPPER EXTREMITY  2016     LAPAROSCOPY DIAGNOSTIC (GYN) Bilateral 1/7/2021    Procedure: Diagnsotic laparoscopy, biopsies;  Surgeon: Bolivar Juarez MD;  Location: [facility] OR     LASIK       TUBAL LIGATION              Social History:     Social History     Tobacco Use     Smoking status: Former Smoker     Packs/day: 0.50     Years: 40.00     Pack years: 20.00     Quit date: 2018     Years since quitting: 3.0     Smokeless tobacco: Never Used   Substance Use Topics     Alcohol use: Not Currently            Family History:     Family History   Adopted: Yes   Problem Relation Age of Onset     Cancer Mother 36     Factor V Leiden deficiency Daughter      Deep Vein Thrombosis Daughter      Diabetes Type 1 Daughter             Allergies:   No Known Allergies         Medications:     Current Facility-Administered Medications   Medication     acetaminophen (TYLENOL) tablet 650 mg     amiodarone (PACERONE) tablet 400 mg     amitriptyline (ELAVIL) tablet 100 mg     heparin 25,000 units in 0.45% NaCl 250 mL ANTICOAGULANT  infusion     HYDROmorphone (PF) (DILAUDID) injection 0.5 mg     influenza recomb quadrivalent PF (FLUBLOK) injection 0.5 mL     lidocaine (LMX4) cream     lidocaine 1 % 0.1-1 mL     melatonin tablet 1 mg     naloxone (NARCAN) injection 0.2 mg    Or     naloxone (NARCAN) injection 0.4 mg    Or     naloxone (NARCAN) injection 0.2 mg    Or     naloxone (NARCAN) injection 0.4 mg     OLANZapine zydis (zyPREXA) ODT tab 5 mg      ondansetron (ZOFRAN-ODT) ODT tab 4 mg    Or     ondansetron (ZOFRAN) injection 4 mg     oxyCODONE (ROXICODONE) tablet 5-10 mg     Patient is already receiving anticoagulation with heparin, enoxaparin (LOVENOX), warfarin (COUMADIN)  or other anticoagulant medication     polyethylene glycol (MIRALAX) Packet 17 g     prochlorperazine (COMPAZINE) tablet 10 mg     rOPINIRole (REQUIP) tablet 0.5 mg     senna-docusate (SENOKOT-S/PERICOLACE) 8.6-50 MG per tablet 2 tablet     sodium chloride (PF) 0.9% PF flush 3 mL     sodium chloride (PF) 0.9% PF flush 3 mL            Review of Systems:   CONSTITUTIONAL:  negative for  fevers and chills  RESPIRATORY:  positive for  pleuritic pain  negative for  dyspnea  CARDIOVASCULAR:  positive for  chest pain  negative for  edema  GASTROINTESTINAL:  negative for nausea, vomiting, change in bowel habits and abdominal pain  GENITOURINARY:  negative for frequency, dysuria and nocturia  HEMATOLOGIC/LYMPHATIC: negative for swelling/edema  NEURO: negative for weakness or dizziness         Physical Exam:     Vitals:    02/04/21 0321 02/04/21 0728 02/04/21 0900 02/04/21 1058   BP: 96/70 107/79 93/63 (!) 86/69   BP Location: Right arm   Left arm   Pulse: 80 92 86 87   Resp: 20 16  16   Temp:  99  F (37.2  C)  98.4  F (36.9  C)   TempSrc:  Oral  Oral   SpO2:  94%  96%   Weight:       Height:         General: NAD, laying comfortably in bed  CV: regular rate, some premature beats, no m/g/r  Resp: CTAB  Abdomen: slightly firm LLQ, otherwise soft, nontender, nondistended  : deferred  Extremities: WWP, no edema, no calf tenderness         Data:     Results for orders placed or performed during the hospital encounter of 02/03/21 (from the past 24 hour(s))   EKG 12-lead, tracing only   Result Value Ref Range    Interpretation ECG Click View Image link to view waveform and result    Magnesium   Result Value Ref Range    Magnesium 2.1 1.6 - 2.3 mg/dL   CBC with platelets differential   Result Value Ref  Range    WBC 5.2 4.0 - 11.0 10e9/L    RBC Count 4.77 3.8 - 5.2 10e12/L    Hemoglobin 13.2 11.7 - 15.7 g/dL    Hematocrit 42.2 35.0 - 47.0 %    MCV 89 78 - 100 fl    MCH 27.7 26.5 - 33.0 pg    MCHC 31.3 (L) 31.5 - 36.5 g/dL    RDW 18.4 (H) 10.0 - 15.0 %    Platelet Count 240 150 - 450 10e9/L    Diff Method Automated Method     % Neutrophils 76.3 %    % Lymphocytes 17.2 %    % Monocytes 5.7 %    % Eosinophils 0.0 %    % Basophils 0.4 %    % Immature Granulocytes 0.4 %    Nucleated RBCs 0 0 /100    Absolute Neutrophil 4.0 1.6 - 8.3 10e9/L    Absolute Lymphocytes 0.9 0.8 - 5.3 10e9/L    Absolute Monocytes 0.3 0.0 - 1.3 10e9/L    Absolute Eosinophils 0.0 0.0 - 0.7 10e9/L    Absolute Basophils 0.0 0.0 - 0.2 10e9/L    Abs Immature Granulocytes 0.0 0 - 0.4 10e9/L    Absolute Nucleated RBC 0.0    INR   Result Value Ref Range    INR 1.06 0.86 - 1.14   Comprehensive metabolic panel   Result Value Ref Range    Sodium 135 133 - 144 mmol/L    Potassium 3.2 (L) 3.4 - 5.3 mmol/L    Chloride 100 94 - 109 mmol/L    Carbon Dioxide 30 20 - 32 mmol/L    Anion Gap 4 3 - 14 mmol/L    Glucose 121 (H) 70 - 99 mg/dL    Urea Nitrogen 18 7 - 30 mg/dL    Creatinine 0.86 0.52 - 1.04 mg/dL    GFR Estimate 71 >60 mL/min/[1.73_m2]    GFR Estimate If Black 82 >60 mL/min/[1.73_m2]    Calcium 9.5 8.5 - 10.1 mg/dL    Bilirubin Total 0.9 0.2 - 1.3 mg/dL    Albumin 3.5 3.4 - 5.0 g/dL    Protein Total 8.6 6.8 - 8.8 g/dL    Alkaline Phosphatase 144 40 - 150 U/L    ALT 43 0 - 50 U/L    AST 26 0 - 45 U/L   Partial thromboplastin time   Result Value Ref Range    PTT 30 22 - 37 sec   Troponin I   Result Value Ref Range    Troponin I ES <0.015 0.000 - 0.045 ug/L   EKG 12 lead   Result Value Ref Range    Interpretation ECG Click View Image link to view waveform and result    Echocardiogram Complete    Narrative    [id number]  [id number]  [id number]  [id number]^MIRA^DHRUV^[facility]  Laboratory  [address] [place] [address]     Name: CAROLINE DALAL  MRN: [id number]  : 1955  Study Date: 2021 04:12 PM  Age: 65 yrs  Gender: Female  Patient Location: [facility]  Reason For Study: Atrial Fibrillation  Ordering Physician: DHRUV MEYERS  Performed By: Cindi Montana RDCS     BSA: 1.9 m2  Height: 72 in  Weight: 150 lb  HR: 103  BP: 86/61 mmHg  _____________________________________________________________________________  __        Procedure  Complete Portable Echo Adult. Technically difficult study. Poor acoustic  windows. The patient's rhythm is sinus tachycardia.  _____________________________________________________________________________  __        Interpretation Summary  Technically difficult study. Poor acoustic windows. The patient's rhythm is  sinus tachycardia.  Borderline (EF 50-55%) reduced left ventricular function is present. Mild  diffuse hypokinesis is present. The inferolateral and inferior walls are not  optimally visualized.  Global right ventricular function is normal. The right ventricle is normal  size.  No significant valvular abnormalities.  A left pleural effusion is present.  There is no prior study for direct comparison.  _____________________________________________________________________________  __        Left Ventricle  Left ventricular wall thickness is normal. Left ventricular size is normal.  Borderline (EF 50-55%) reduced left ventricular function is present. Left  ventricular diastolic function is indeterminate. Mild diffuse hypokinesis is  present. The inferolateral and inferior walls are not optimally visualized.     Right Ventricle  Global right ventricular function is normal. The right ventricle is normal  size.     Atria  Mild left atrial enlargement is present.     Mitral Valve  The mitral valve is normal. Trace mitral insufficiency is present.        Aortic Valve  The valve leaflets are not well visualized. On Doppler  interrogation, there is  no significant stenosis or regurgitation.     Tricuspid Valve  The tricuspid valve is normal. Trace tricuspid insufficiency is present. The  right ventricular systolic pressure is approximated at 17.9 mmHg plus the  right atrial pressure.     Pulmonic Valve  The valve leaflets are not well visualized. Trace pulmonic insufficiency is  present.     Vessels  Sinuses of Valsalva 3.7 cm. IVC diameter <2.1 cm collapsing >50% with sniff  suggests a normal RA pressure of 3 mmHg.     Pericardium  No pericardial effusion is present.        Miscellaneous  A left pleural effusion is present.     Compared to Previous Study  There is no prior study for direct comparison.  _____________________________________________________________________________  __     MMode/2D Measurements & Calculations  IVSd: 0.76 cm  LVIDd: 4.0 cm  LVIDs: 3.1 cm  LVPWd: 1.0 cm  FS: 23.8 %  LV mass(C)d: 109.6 grams  LV mass(C)dI: 58.1 grams/m2  Ao root diam: 3.7 cm  LVOT diam: 2.0 cm  LVOT area: 3.1 cm2  RWT: 0.51  TAPSE: 3.1 cm        Doppler Measurements & Calculations  MV E max paige: 66.9 cm/sec  MV A max paige: 64.0 cm/sec  MV E/A: 1.0  MV dec time: 0.24 sec  TR max paige: 211.7 cm/sec  TR max P.9 mmHg     E/E' av.5  Lateral E/e': 5.6  Medial E/e': 9.3     _____________________________________________________________________________  __           Report approved by: Bina Szymanski 2021 04:57 PM      CT Chest Pulmonary Embolism w Contrast   Result Value Ref Range    Radiologist flags Pulmonary embolus (Urgent)     Narrative    EXAM: CTA pulmonary angiogram, 2/3/2021 5:00 PM    HISTORY: PE suspected, high prob    COMPARISON: 2020 CT chest.    TECHNIQUE: Volumetric CT images obtained through the chest with  contrast. Coronal and axial MIP reformatted images obtained.  Three-dimensional (3D) post-processed angiographic images were  reconstructed, archived to PACS and used in interpretation of this  study.      CONTRAST: iopamidol (ISOVUE-370) solution 55 mL ml isovue 370 IV.    FINDINGS:     Vascular: There is good contrast opacification of the pulmonary  arterial vasculature. Multiple segmental emboli within the proximal  right lower lobe pulmonary artery. No paradoxical intraventricular  septal bowing. Proximal pulmonary artery is normal caliber. No  discernible contrast reflux into the IVC.    The central tracheobronchial tree is patent. Heart size is normal.  Aortic root is normal caliber. Mild biapical scarring. Moderate size  bilateral pleural effusions, left greater than right, with overlying  compressive atelectasis in the medial left lower lobe and the lingula.  No pneumothorax. Normal three-vessel aortic arch. No axillary  adenopathy. No mediastinal adenopathy. Mildly patulous esophagus.     There is ascites in the upper abdomen. Likely partially visualized  colon along the tip of the liver.. No suspicious osseous lesions.      Impression    IMPRESSION:   1. Exam is positive for acute pulmonary embolismNo evidence of right  heart strain or increased right heart pressures.   2. Moderate to large left and small right pleural effusion, unchanged.  3. Bilateral lower lobe and lingular atelectasis.    [Urgent Result: Pulmonary embolus    Finding was identified on 2/3/2021 5:08 PM.     Kemar Grossman MD was contacted by Dr. Nils Wise at 2/3/2021 5:16 PM  and verbalized understanding of the urgent finding.      I have personally reviewed the examination and initial interpretation  and I agree with the findings.    MICHELLE AMBRIZ MD   XR Chest Port 1 View    Narrative    Exam: XR CHEST PORT 1 VW, 2/3/2021 5:12 PM    Indication: chest pain, soa    Comparison: 2/3/2021 chest CT    Findings:   Upright, AP view of the chest. Midline trachea. Left heart border is  silhouetted against moderate sized left-sided pleural effusion with  small degree of overlying compressive atelectasis. . Small right  pleural effusion  No focal airspace consolidation or pneumothorax.  Osseous structures and upper abdomen are unremarkable.      Impression    Impression: Moderate-sized left and small right pleural effusion.     I have personally reviewed the examination and initial interpretation  and I agree with the findings.    MICHELLE AMBRIZ MD   Asymptomatic SARS-CoV-2 COVID-19 Virus (Coronavirus) by PCR    Specimen: Nasopharyngeal   Result Value Ref Range    SARS-CoV-2 Virus Specimen Source Nasopharyngeal     SARS-CoV-2 PCR Result NEGATIVE     SARS-CoV-2 PCR Comment       Testing was performed using the Xpert Xpress SARS-CoV-2 Assay on the Cepheid Gene-Xpert   Instrument Systems. Additional information about this Emergency Use Authorization (EUA)   assay can be found via the Lab Guide.     Heparin Unfractionated Anti Xa Level   Result Value Ref Range    Heparin Unfractionated Anti Xa Level 0.41 IU/mL   NT proBNP inpatient and ED   Result Value Ref Range    N-Terminal Pro BNP Inpatient 1,154 (H) 0 - 900 pg/mL   Potassium   Result Value Ref Range    Potassium 3.5 3.4 - 5.3 mmol/L   EKG 12-lead, complete   Result Value Ref Range    Interpretation ECG Click View Image link to view waveform and result    Heparin Unfractionated Anti Xa Level   Result Value Ref Range    Heparin Unfractionated Anti Xa Level 0.43 IU/mL   CBC with platelets differential   Result Value Ref Range    WBC 3.0 (L) 4.0 - 11.0 10e9/L    RBC Count 3.91 3.8 - 5.2 10e12/L    Hemoglobin 11.0 (L) 11.7 - 15.7 g/dL    Hematocrit 35.7 35.0 - 47.0 %    MCV 91 78 - 100 fl    MCH 28.1 26.5 - 33.0 pg    MCHC 30.8 (L) 31.5 - 36.5 g/dL    RDW 17.7 (H) 10.0 - 15.0 %    Platelet Count 171 150 - 450 10e9/L    Diff Method Automated Method     % Neutrophils 58.5 %    % Lymphocytes 36.1 %    % Monocytes 3.4 %    % Eosinophils 1.0 %    % Basophils 0.7 %    % Immature Granulocytes 0.3 %    Nucleated RBCs 0 0 /100    Absolute Neutrophil 1.7 1.6 - 8.3 10e9/L    Absolute Lymphocytes 1.1 0.8 -  5.3 10e9/L    Absolute Monocytes 0.1 0.0 - 1.3 10e9/L    Absolute Eosinophils 0.0 0.0 - 0.7 10e9/L    Absolute Basophils 0.0 0.0 - 0.2 10e9/L    Abs Immature Granulocytes 0.0 0 - 0.4 10e9/L    Absolute Nucleated RBC 0.0    Comprehensive metabolic panel   Result Value Ref Range    Sodium 138 133 - 144 mmol/L    Potassium 3.2 (L) 3.4 - 5.3 mmol/L    Chloride 105 94 - 109 mmol/L    Carbon Dioxide 26 20 - 32 mmol/L    Anion Gap 7 3 - 14 mmol/L    Glucose 92 70 - 99 mg/dL    Urea Nitrogen 12 7 - 30 mg/dL    Creatinine 0.55 0.52 - 1.04 mg/dL    GFR Estimate >90 >60 mL/min/[1.73_m2]    GFR Estimate If Black >90 >60 mL/min/[1.73_m2]    Calcium 8.5 8.5 - 10.1 mg/dL    Bilirubin Total 0.6 0.2 - 1.3 mg/dL    Albumin 2.8 (L) 3.4 - 5.0 g/dL    Protein Total 6.8 6.8 - 8.8 g/dL    Alkaline Phosphatase 109 40 - 150 U/L    ALT 32 0 - 50 U/L    AST 22 0 - 45 U/L   TSH with free T4 reflex   Result Value Ref Range    TSH 1.03 0.40 - 4.00 mU/L   Heparin Unfractionated Anti Xa Level   Result Value Ref Range    Heparin Unfractionated Anti Xa Level 0.41 IU/mL   Lactic acid level STAT   Result Value Ref Range    Lactate for Sepsis Protocol 1.3 0.7 - 2.0 mmol/L             Assessment and Recommendations:   Assessment: 65 year old female with stage IV serous ovarian cancer admitted to medicine for a new PE and atrial fibrillation with RVR. She is 4 weeks post op from a diagnostic laparoscopy with biopsies. She is now S/p 2 cycles of carbo/taxol, last 2/1.     # stage IV serous ovarian cancer  # bilateral pleural effusions  She has recovered well post-operatively. She is currently receiving chemotherapy, next cycle scheduled for 2/26/21. Her pleural effusions are likely malignant, they appeared stable on CT yesterday. As the patient is asymptomatic, no indication for thoracentesis at this time. Her chemotherapy will likely improve there effusions as well. She does have follow up scheduled in the oncology clinic on 2/26/21.     # multiple  segmental pulmonary emboli in the proximal RLL  Patient risk factors include her malignancy and recent surgery. She also has a family history of factor V Leiden. Agree with therapeutic anticoagulation per medicine team and we will follow up on LE US. We would recommend transition to lovenox or a DOAC for outpatient treatment. She will likely need anticoagulation through the duration of her cancer treatment.    # atrial fibrillation  Rate appears to be improving, appreciate medicine and cardiology cares    Gyn onc will continue to follow. Please contact us with any questions: gyn onc pager: [phone]      Zeb Gerber MD  OB/GYN PGY-1  02/04/21 9:08 AM

## 2021-02-04 NOTE — H&P
[facility]     History and Physical - [employer] [facility]        Date of Admission:  2/3/2021    Assessment & Plan   Taran Regalado is a 65 year old female admitted on 2/3/2021. She has a history of stage 4 ovarian carcinoma and carcinomatosis (dx 12/2020) on Taxol,Carboplatin and is admitted for pulmonary embolus and new atrial fibrillation with rapid ventricular response.     # Pulmonary emboli, R segmental   Presented with 1 day h/o pleuritic chest pain, hypotension (80/40s) found to have multiple segmental emboli within the proximal right lower lobe pulmonary artery. BP improving s/p 1L IVF + amiodarone IV load to 104/64, rates down to 70-80s, no hypoxia. TTE without R heart strain, trop negative although BNP elevated to 1150. Suspect 2/2 to malignancy, does report family h/o Factor V leiden.   - high intensity heparin drip for tonight  - gyn/onc consult placed  - dilaudid IV 0.5 mg q3h prn for pleuritic pain   - Bilateral LE US + doppler, will hold on upper extremities for now    # Afib with RVR  Rates 140s, then sinus tachy with bursts of afib, suspect 2/2 to PEs. Cardiology EP saw pt in ED, was loaded on IV amiodarone with recs to transition to orals. TTE revealed 50-55% EF, mild diffuse hypokinesis, R global function and RV size is normal.   - continue telemetry  - anticoagulation as above  - start amiodarone 400 mg BID for 2 weeks (2/17) followed by 200 mg daily  - LFTs and TSH in AM  - EP cardiology following, appreciate assistance     # Ovarian carcinoma   # Bilateral pleural effusions  High grade carcinoma. CTAP 12/4: peritoneal carcinomatosis with mass-like peritoneal thickening in lower pelvis, large volume ascites, b/l pleural effusions, pleural nodularity of R hemithorax, 1cm lesion in R hepatic lobe suspicious for metastasis. Para 12/26 3L, 12/31 900ml. 1/7: Dx lsc, peritoneal biopsies, Frozen: High grade carcinoma. Moderate/large L pleural  effusion, small R but appears unchanged from prior. Does not appear to have been tapped before. Suspect malignant and would expect re-accumulation.  -No respiratory distress so will hold on thoracentesis tongiht   - continue PTA zyprexa at bedtime , zofran and compazine PRN  - continue PTA oxycodone 5-10 mg q6h  - continue PTA miralax + senna    # Restless leg syndrome- continue PTA ropinirole   #Insomnia - continue PTA amitriptyline  #Migraines - hold PTA imitrex     Diet:  Regular  Fluids: mIVF @ 75mL/hr to end at 2/4 0600  DVT Prophylaxis: Heparin   Hickey Catheter: not present  Code Status:  FULL - discussed w/ pt at bedside       Disposition Plan   [place] [facility]    The patient's care was discussed with the Attending Physician, Dr. Edmonds.    Amber Paul MD  [facility]   Contact information available via [facility] Paging/Directory  Please see sign in/sign out for up to date coverage information  ______________________________________________________________________    Chief Complaint   Chest pain     History is obtained from the patient    History of Present Illness   Taran Regalado is a 65 year old female who has a history of stage 4 ovarian carcinoma, RLS, insomnia who presented with chest pain.     Pt reports she developed sudden onset chest pain 2/2 evening that started in her bilateral collarbone area and then localized to her left sternum that was sharp, 6/10, and worsening with deep breaths and laying flat. She was unable to sleep at all. She took up to 30 mg oxycodone without relief so decided to present to EMS after she called her daughter. She denies overt SOB stating that her breathing is only limited 2/2 pain. She does endorse intermittent dizziness, no syncope, no cough/palpitations. No prior MI, HTN, DM, or CHF.     No h/o clots/bleeding but her and her daughter report multiple family members with factor V leiden  mutation.    In ED, she was given 1L IVF, cardiology EP was consulted for Afib w/ RVR (rates 140s). CT chest positive for segmental pulmonary emboli. Started on heparin drip.     Review of Systems    Since starting chemo she endorses anorexia, anxiety, restlessness and poor PO intake, generalized body/bone pain. Otherwise ROS negative. Last BM this AM.     Past Medical History    I have reviewed this patient's medical history and updated it with pertinent information if needed.   Past Medical History:   Diagnosis Date     History of cold sores      Insomnia      Migraine      Osteopenia      Pelvic mass      Peritoneal carcinomatosis (H)      Restless legs syndrome (RLS)       Past Surgical History   I have reviewed this patient's surgical history and updated it with pertinent information if needed.  Past Surgical History:   Procedure Laterality Date     APPENDECTOMY       ARTHROSCPY KNEE SURGICAL DEBRIDEMENT SHAVING ARTICULAR CARTILAGE Right      DEBRIDEMENT LEFT UPPER EXTREMITY  2016     LAPAROSCOPY DIAGNOSTIC (GYN) Bilateral 1/7/2021    Procedure: Diagnsotic laparoscopy, biopsies;  Surgeon: Bolivar Juarez MD;  Location: [facility] OR     LASIK       TUBAL LIGATION        Social History   I have reviewed this patient's social history and updated it with pertinent information if needed. Taran Regalado  reports that she quit smoking about 3 years ago. She has a 20.00 pack-year smoking history. She has never used smokeless tobacco. She reports previous alcohol use. She reports that she does not use drugs.    Family History   I have reviewed this patient's family history and updated it with pertinent information if needed.  Family History   Adopted: Yes   Problem Relation Age of Onset     Cancer Mother 36     Factor V Leiden deficiency Daughter      Deep Vein Thrombosis Daughter      Diabetes Type 1 Daughter        Prior to Admission Medications   Prior to Admission Medications   Prescriptions Last Dose Informant  Patient Reported? Taking?   OLANZapine zydis (ZYPREXA) 5 MG ODT 2/2/2021 at Unknown time Daughter No Yes   Sig: Take 1 tablet (5 mg) by mouth At Bedtime   SUMAtriptan (IMITREX) 100 MG tablet PRN Daughter Yes Yes   Sig: Take 100 mg by mouth at onset of headache    acetaminophen (TYLENOL) 325 MG tablet PRN Daughter No Yes   Sig: Take 2 tablets (650 mg) by mouth every 6 hours as needed for mild pain   amitriptyline (ELAVIL) 100 MG tablet 2/2/2021 at Unknown time Daughter Yes Yes   Sig: TAKE 1 TABLET (100 MG) BY MOUTH EVERY NIGHT AT BEDTIME   ibuprofen (ADVIL/MOTRIN) 600 MG tablet PRN Daughter No Yes   Sig: Take 1 tablet (600 mg) by mouth every 6 hours as needed for other (mild and/or inflammatory pain)   ondansetron (ZOFRAN-ODT) 4 MG ODT tab PRN Daughter No Yes   Sig: Take 1 tablet (4 mg) by mouth every 6 hours as needed for nausea or vomiting   oxyCODONE (ROXICODONE) 5 MG tablet 2/3/2021 at Unknown time Daughter Yes Yes   Sig: Take 5 mg by mouth every 6 hours as needed for severe pain   polyethylene glycol (MIRALAX) 17 GM/Dose powder 2/2/2021 at Unknown time Daughter No Yes   Sig: Take 17 g by mouth 2 times daily   prochlorperazine (COMPAZINE) 10 MG tablet 2/3/2021 at Unknown time Daughter No Yes   Sig: Take 1 tablet (10 mg) by mouth every 6 hours as needed for nausea or vomiting   rOPINIRole (REQUIP) 0.25 MG tablet 2/2/2021 at Unknown time Daughter Yes Yes   Sig: Take by mouth At Bedtime Take 1 tablet 1-3 hour before bedtime. Increase every 2 days by 1 pill until at 1 mg   senna-docusate (SENOKOT-S/PERICOLACE) 8.6-50 MG tablet 2/3/2021 x1 at Unknown time Daughter No Yes   Sig: Take 2 tablets by mouth 2 times daily   valACYclovir (VALTREX) 1000 mg tablet PRN Daughter Yes Yes   Sig: Take 1,000 mg by mouth as needed       Facility-Administered Medications: None     Allergies   No Known Allergies    Physical Exam   Vital Signs: Temp: 98.1  F (36.7  C) Temp src: Oral BP: 94/68 Pulse: 85   Resp: 18 SpO2: 92 % O2 Device:  None (Room air)    Weight: 150 lbs 0 oz    Constitutional: awake, alert, NAD, cachetic appearing, pleasant  Eyes: PERRLA  ENT: MMM, no lymphadenopathy  Respiratory: R lung clear, L lung with significantly decreased basilar lungs sounds and crackles to approximately mid-scapula, L apex clear  Cardiovascular: irregularly irregular rhythm, regular rate, no murmurs  GI: soft, non-tender to palpation, well healed incisions  Skin: warm, +2 distal pulses, no lesions/rashes,   Musculoskeletal: no lower extremity edema/erythema/tenderness  Neurologic: AxO x3, moving all extremities spontaneously, tearful throughout exam     Data   Data reviewed today: I reviewed all medications, new labs and imaging results over the last 24 hours.

## 2021-02-04 NOTE — PROGRESS NOTES
[facility]     Progress Note - [facility]        Date of Admission:  2/3/2021    Assessment & Plan       Taran Regalado is a 65 year old female admitted on 2/3/2021. She has a history of stage IV ovarian carcinoma and carcinomatosis (dx 12/2020) on Taxol, carboplatin and is admitted for PE and new a-fib with RVR.     # Pulmonary emboli, R segmental   Presented with 1 day h/o pleuritic chest pain, hypotension (80/40s) found to have multiple segmental emboli within the proximal right lower lobe pulmonary artery. BP improving s/p 1L IVF + amiodarone IV load to 104/64, rates down to 70-80s, no hypoxia. TTE without R heart strain, trop negative although BNP elevated to 1150. Suspect 2/2 to malignancy/volume overload in setting of pleural effusions, does report family h/o Factor V leiden.   - continue high intensity heparin drip for now, will plan to transition to lovenox/DOAC  - oxycodone 5 mg q6h and dilaudid IV 0.5 mg q3h prn for pleuritic pain   - Bilateral LE US + doppler     # Afib with RVR  Rates 140s, then sinus tachy with bursts of afib, suspect 2/2 to PEs. Cardiology EP saw pt in ED, was loaded on IV amiodarone with recs to transition to orals. TTE revealed 50-55% EF, mild diffuse hypokinesis, R global function and RV size is normal. Now rate controlled and converted to sinus arrhythmia.   - continue telemetry  - anticoagulation as above  - continue amiodarone 400 mg BID for 2 weeks (2/17) followed by 200 mg daily per EP cards  - LFTs and TSH wnl  - EP cardiology following, appreciate assistance      # Stage IV ovarian carcinoma with peritoneal carcinomatosis  # Malignant ascites    # Bilateral pleural effusions  High grade carcinoma. CTAP 12/4: peritoneal carcinomatosis with mass-like peritoneal thickening in lower pelvis, large volume ascites, b/l pleural effusions, pleural nodularity of R hemithorax, 1cm lesion in R hepatic lobe suspicious for  metastasis. Para 12/26 3L, 12/31 900ml. 1/7: Dx lsc, peritoneal biopsies, Frozen: High grade carcinoma. 1st cycle chemo with paclitaxel and carboplatin 1/12, 2nd cycle 2/1. Moderate/large L pleural effusion, small R but appears unchanged from prior. Does not appear to have been tapped before. Suspect malignant and would expect re-accumulation.   - Per gyn/onc, no indication for thora at this time  - continue PTA zyprexa at bedtime, zofran and compazine PRN  - continue PTA oxycodone 5-10 mg q6h  - continue PTA miralax + senna     # Restless leg syndrome - continue PTA ropinirole  # Insomnia - continue PTA amitriptyline  # Migraines - hold PTA imitrex       Diet: Regular Diet Adult    Fluids: none  DVT Prophylaxis: heparin gtt as above   Hickey Catheter: not present  Code Status: Full Code           Disposition Plan   Expected discharge: 2 - 3 days, recommended to prior living arrangement once anticoagulation plan established, discuss with onc.  Entered: Brinda Clark MD 02/04/2021, 6:55 AM       The patient's care was discussed with the Attending Physician, Dr. Cisneros.    Brinda Clark MD  [address]   Please see sign in/sign out for up to date coverage information  ______________________________________________________________________    Interval History   Patient reports continued chest pain with deep inspiration and has thus been taking shallow breaths. States the pain started 2 nights ago. She is sitting up in bed and states that she is concerned her pain will not be adequately controlled once she leaves the hospital. She has never previously been diagnosed with a-fib. Notes that she did feel somewhat lightheaded yesterday in the ED which has since improved.     Data reviewed today: I reviewed all medications, new labs and imaging results over the last 24 hours.       Physical Exam   Vital Signs: Temp: 98.8  F (37.1  C) Temp src: Oral BP: 96/70  Pulse: 80   Resp: 20 SpO2: 95 % O2 Device: None (Room air)    Weight: 147 lbs 9.6 oz  General Appearance: Awake, alert, sitting up in bed and anxious-appearing   Respiratory: no respiratory distress on RA, decreased breath sounds at bases bilaterally   Cardiovascular: irregular rhythm, no murmurs appreciated   GI: soft, non-distended, non-tender  Skin: warm and dry  Other: Extremities warm and well-perfused, trace LE edema      Data   Recent Labs   Lab 02/03/21  2214 02/03/21  1548 02/01/21  1102   WBC  --  5.2 4.1   HGB  --  13.2 12.0   MCV  --  89 88   PLT  --  240 251   INR  --  1.06  --    NA  --  135 141   POTASSIUM 3.5 3.2* 3.6   CHLORIDE  --  100 107   CO2  --  30 27   BUN  --  18 15   CR  --  0.86 0.52   ANIONGAP  --  4 7   HORACIO  --  9.5 9.2   GLC  --  121* 111*   ALBUMIN  --  3.5 3.4   PROTTOTAL  --  8.6 8.2   BILITOTAL  --  0.9 0.2   ALKPHOS  --  144 155*   ALT  --  43 30   AST  --  26 26   TROPI  --  <0.015  --

## 2021-02-04 NOTE — PROGRESS NOTES
Admission          2/3/2021  10:00 PM  -----------------------------------------------------------  Reason for admission: Chest pain, SOB, A fib RVR  Primary team notified of pt arrival.  Admitted from: Home/ED  Via: stretcher  Accompanied by: RN  Belongings: Placed in closet/bedside  Admission Profile: complete  Teaching: orientation to unit and call light- call light within reach, call don't fall, use of console, meal times, when to call for the RN, and enforced importance of safety   Access: PIV x2. Heparin gtt @ 1,200 unit(s)/hr and NS @ 125 mL/hr  Telemetry:Placed on pt  Ht./Wt.: complete  Code Status verified on armband: yes  2 RN Skin Assessment Completed By: Holly GOSS  Med Rec completed: yes - completed by pharmacy in ED  Bed surface reassessed with algorithm and charted: no  New bed surface ordered: no    Pt status: Alert. Flat affect. Heparin gtt and NS infusing. EKG done - sinus tach w/ sinus arrhythmia. PM meds given.

## 2021-02-04 NOTE — PLAN OF CARE
Neuro: A&Ox4. Intermittently flat affect. Slept for most of shift.   Cardiac: Sinus arrhythmia 80-90's. Soft BP's 's/60-70's, MAPs consistently in the 70's. Afebrile.   Respiratory: Sating 90's on RA. Dyspneic w/ activity.   GI/: Did not void overnight. Last BM 2/2, senna and Miralax taken PTA and to be continued while inpatient.   Diet/appetite: Regular diet ordered, poor appetite. Pt states she has lost around 25 lbs within the last month.   Activity:  SBA to bed from stretcher. Independently repositions self in bed.   Pain: Frequent chest pain/discomfort, PRN IV Dilaudid given with improvement. Pt c/o overall body pain/bone pain.   Skin: 2 RN skin check complete - no new deficits.   LDA's: PIV x2. Heparin gtt titrated per RN-managed protocol.     Plan: Continue with POC. Notify primary team with changes.

## 2021-02-04 NOTE — PROGRESS NOTES
CLINICAL NUTRITION SERVICES - ASSESSMENT NOTE     Nutrition Prescription    RECOMMENDATIONS FOR MDs/PROVIDERS TO ORDER:  Recommend starting biotene   Encourage oral intake, consider discussion of nutrition support for patient pending ability to take PO   --May consider appetite stimulant such as remeron, megace, marinol as medically appropriate     Malnutrition Status:    Non-severe malnutrition in the context of acute on chronic illness, malnutrition may be more severe but unable to obtain more detailed history/fully complete NFPE     Recommendations already ordered by Registered Dietitian (RD):  None at this time    Future/Additional Recommendations:  Monitor tolerance of oral intake, willingness to take PO    If enteral nutrition support is needed: Nutren 1.5 @ 55 mL/hr to provide 1980 kcals (30 kcal/kg/day), 90 g PRO (1.3 g/kg/day), 1003 mL H2O, 232 g CHO and no fiber daily.         REASON FOR ASSESSMENT  Taran Regalado is a/an 65 year old female assessed by the dietitian for Admission Nutrition Risk Screen for positive (loss of 24-33 lbs and poor appetite)     CLINICAL HISTORY   history of stage 4 ovarian carcinoma and carcinomatosis (dx 12/2020) on Taxol,Carboplatin and is admitted for pulmonary embolus and new atrial fibrillation with rapid ventricular response.     NUTRITION HISTORY  Patient was laying in bed at time of visit. Reported she does not want to eat anything and is recently having problems with dry mouth. She had not heard of biotene spray/previously used.   When asked how eating was going at home since recent admission she reported good but when asked more specifically about foods she was eating patient provided no detail and stated food. She was not using supplements at home and reported last time she was in the hospital she did not like any of the supplements. Previous RD note 1/12/21 pt seemed to like magic cup but patient did not it ordered.     Hx struggling with nausea/vomiting- did not  "report, reported she was tired.     CURRENT NUTRITION ORDERS  Diet: Regular  Intake/Tolerance: 50%     LABS  K+ 3.2 (L)    MEDICATIONS  Miralax, senokot     ANTHROPOMETRICS  Height: 182.9 cm (6' 0\")  Most Recent Weight: 67 kg (147 lb 9.6 oz)    IBW: 72.7 kg  BMI: Normal BMI  Weight History:   Wt Readings from Last 15 Encounters:   02/03/21 67 kg (147 lb 9.6 oz)   02/01/21 65.9 kg (145 lb 4.8 oz)   01/13/21 68.1 kg (150 lb 3.2 oz)   01/07/21 67.3 kg (148 lb 4.2 oz)   12/30/20 70.8 kg (156 lb)   12/26/20 71.1 kg (156 lb 12.8 oz)   12/23/20 71.2 kg (157 lb)   7.3% weight loss in 2 months     Dosing Weight: 65.9 kg    ASSESSED NUTRITION NEEDS  Estimated Energy Needs: 2145-1495 kcals/day (30 - 35 kcals/kg)  Justification: Increased needs, recommend minimum of 1650 kcal/day (25 kcal/kg) due to increased needs  Estimated Protein Needs:  grams protein/day (1.2 - 1.5 grams of pro/kg)  Justification: Increased needs  Estimated Fluid Needs: 6419-6064 mL/day (25 - 30 mL/kg)   Justification: Maintenance    PHYSICAL FINDINGS  See malnutrition section below.     MALNUTRITION  % Intake: < 75% for >/= 3 months (non-severe), likely more severe but unable to quantify   % Weight Loss: > 7.5% in 3 months (severe)  Subcutaneous Fat Loss: Facial region:  Mild (visual assessment only)   Muscle Loss: Temporal:  mild and Lower arm  (forearm):  Mild (visual assessment only)   Fluid Accumulation/Edema: Does not meet criteria  Malnutrition Diagnosis: Non-severe malnutrition in the context of acute on chronic illness, malnutrition may be more severe but unable to obtain more detailed history/fully complete NFPE     NUTRITION DIAGNOSIS  Inadequate oral intake related to decreased appetite and difficult eating 2/2 dry mouth as evidenced by weight loss of >7% in two months     INTERVENTIONS  Implementation  Nutrition Education: Patient declined   Collaboration with other providers - 6B rounds     Goals  Patient to consume % of " nutritionally adequate meal trays TID, or the equivalent with supplements/snacks.     Monitoring/Evaluation  Progress toward goals will be monitored and evaluated per protocol.    Cherelle Soria RD, [facility]  6B pager: [phone]

## 2021-02-04 NOTE — PROVIDER NOTIFICATION
Notified patient back to AFIB rates 120's. Patient asymptomatic.     Vital signs:  Temp: 97.7  F (36.5  C) Temp src: Oral BP: 105/85 Pulse: 133   Resp: 16 SpO2: 96 % O2 Device: None (Room air)   Height: 182.9 cm (6') Weight: 67 kg (147 lb 9.6 oz)  Estimated body mass index is 20.02 kg/m  as calculated from the following:    Height as of this encounter: 1.829 m (6').    Weight as of this encounter: 67 kg (147 lb 9.6 oz).

## 2021-02-04 NOTE — PROVIDER NOTIFICATION
Ariella palma notifying MD that pt's telemetry displaying wandering atrial pacemaker/sinus arrhythmia. BPs 80's/60's when pt first arrived to unit around 2130, at 2300 's/70's. Previous EKG's did not show either of those rhythms. MD aware and developing a plan.

## 2021-02-04 NOTE — PHARMACY-ADMISSION MEDICATION HISTORY
Admission Medication History Completed by Pharmacy    See [facility] Admission Navigator for allergy information, preferred outpatient pharmacy, prior to admission medications and immunization status.     Medication History Sources:     Patient, daughter and Surescripts    Changes made to PTA medication list (reason):    Added: ropinirole and oxycodone    Deleted: Omnipaque    Changed: None    Additional Information:    Patient is currently taking ropinirole 0.5 mg at bedtime and is to increase every 2 days until 1 mg. Started on 1/27/21.    Recently restarted her amitriptyline 100 mg at bedtime last night.    Prior to Admission medications    Medication Sig Last Dose Taking? Auth Provider   acetaminophen (TYLENOL) 325 MG tablet Take 2 tablets (650 mg) by mouth every 6 hours as needed for mild pain PRN Yes Bolivar Juarez MD   amitriptyline (ELAVIL) 100 MG tablet TAKE 1 TABLET (100 MG) BY MOUTH EVERY NIGHT AT BEDTIME 2/2/2021 at Unknown time Yes Reported, Patient   ibuprofen (ADVIL/MOTRIN) 600 MG tablet Take 1 tablet (600 mg) by mouth every 6 hours as needed for other (mild and/or inflammatory pain) PRN Yes Bolivar Juarez MD   OLANZapine zydis (ZYPREXA) 5 MG ODT Take 1 tablet (5 mg) by mouth At Bedtime 2/2/2021 at Unknown time Yes Bolivar Juarez MD   ondansetron (ZOFRAN-ODT) 4 MG ODT tab Take 1 tablet (4 mg) by mouth every 6 hours as needed for nausea or vomiting PRN Yes Bolivar Juarez MD   oxyCODONE (ROXICODONE) 5 MG tablet Take 5 mg by mouth every 6 hours as needed for severe pain 2/3/2021 at Unknown time Yes Unknown, Entered By History   polyethylene glycol (MIRALAX) 17 GM/Dose powder Take 17 g by mouth 2 times daily 2/2/2021 at Unknown time Yes Carlita England APRN CNP   prochlorperazine (COMPAZINE) 10 MG tablet Take 1 tablet (10 mg) by mouth every 6 hours as needed for nausea or vomiting 2/3/2021 at Unknown time Yes Bolivar Juarez MD   rOPINIRole (REQUIP) 0.25 MG tablet Take by  mouth At Bedtime Take 1 tablet 1-3 hour before bedtime. Increase every 2 days by 1 pill until at 1 mg 2/2/2021 at Unknown time Yes Unknown, Entered By History   senna-docusate (SENOKOT-S/PERICOLACE) 8.6-50 MG tablet Take 2 tablets by mouth 2 times daily 2/3/2021 x1 at Unknown time Yes Carlita England APRN CNP   SUMAtriptan (IMITREX) 100 MG tablet Take 100 mg by mouth at onset of headache  PRN Yes Reported, Patient   valACYclovir (VALTREX) 1000 mg tablet Take 1,000 mg by mouth as needed  PRN Yes Reported, Patient       Date completed: 02/03/21    Medication history completed by: Aletha Washington

## 2021-02-05 LAB
ALBUMIN SERPL-MCNC: 2.6 G/DL (ref 3.4–5)
ALP SERPL-CCNC: 110 U/L (ref 40–150)
ALT SERPL W P-5'-P-CCNC: 24 U/L (ref 0–50)
ANION GAP SERPL CALCULATED.3IONS-SCNC: 4 MMOL/L (ref 3–14)
AST SERPL W P-5'-P-CCNC: 20 U/L (ref 0–45)
BILIRUB SERPL-MCNC: 0.5 MG/DL (ref 0.2–1.3)
BUN SERPL-MCNC: 7 MG/DL (ref 7–30)
CALCIUM SERPL-MCNC: 8.7 MG/DL (ref 8.5–10.1)
CHLORIDE SERPL-SCNC: 105 MMOL/L (ref 94–109)
CO2 SERPL-SCNC: 26 MMOL/L (ref 20–32)
CREAT SERPL-MCNC: 0.56 MG/DL (ref 0.52–1.04)
ERYTHROCYTE [DISTWIDTH] IN BLOOD BY AUTOMATED COUNT: 17 % (ref 10–15)
GFR SERPL CREATININE-BSD FRML MDRD: >90 ML/MIN/{1.73_M2}
GLUCOSE SERPL-MCNC: 107 MG/DL (ref 70–99)
HCT VFR BLD AUTO: 35.3 % (ref 35–47)
HGB BLD-MCNC: 11.2 G/DL (ref 11.7–15.7)
MCH RBC QN AUTO: 28.2 PG (ref 26.5–33)
MCHC RBC AUTO-ENTMCNC: 31.7 G/DL (ref 31.5–36.5)
MCV RBC AUTO: 89 FL (ref 78–100)
PLATELET # BLD AUTO: 171 10E9/L (ref 150–450)
POTASSIUM SERPL-SCNC: 3.4 MMOL/L (ref 3.4–5.3)
POTASSIUM SERPL-SCNC: 4 MMOL/L (ref 3.4–5.3)
PROT SERPL-MCNC: 7 G/DL (ref 6.8–8.8)
RBC # BLD AUTO: 3.97 10E12/L (ref 3.8–5.2)
SODIUM SERPL-SCNC: 136 MMOL/L (ref 133–144)
WBC # BLD AUTO: 2.4 10E9/L (ref 4–11)

## 2021-02-05 PROCEDURE — 99232 SBSQ HOSP IP/OBS MODERATE 35: CPT | Mod: GC | Performed by: PEDIATRICS

## 2021-02-05 PROCEDURE — 250N000013 HC RX MED GY IP 250 OP 250 PS 637: Performed by: PEDIATRICS

## 2021-02-05 PROCEDURE — 250N000013 HC RX MED GY IP 250 OP 250 PS 637: Performed by: STUDENT IN AN ORGANIZED HEALTH CARE EDUCATION/TRAINING PROGRAM

## 2021-02-05 PROCEDURE — 80053 COMPREHEN METABOLIC PANEL: CPT | Performed by: STUDENT IN AN ORGANIZED HEALTH CARE EDUCATION/TRAINING PROGRAM

## 2021-02-05 PROCEDURE — 36415 COLL VENOUS BLD VENIPUNCTURE: CPT | Performed by: STUDENT IN AN ORGANIZED HEALTH CARE EDUCATION/TRAINING PROGRAM

## 2021-02-05 PROCEDURE — 258N000003 HC RX IP 258 OP 636: Performed by: STUDENT IN AN ORGANIZED HEALTH CARE EDUCATION/TRAINING PROGRAM

## 2021-02-05 PROCEDURE — 85027 COMPLETE CBC AUTOMATED: CPT | Performed by: STUDENT IN AN ORGANIZED HEALTH CARE EDUCATION/TRAINING PROGRAM

## 2021-02-05 PROCEDURE — 120N000003 HC R&B IMCU UMMC

## 2021-02-05 PROCEDURE — 36415 COLL VENOUS BLD VENIPUNCTURE: CPT | Performed by: PEDIATRICS

## 2021-02-05 PROCEDURE — 84132 ASSAY OF SERUM POTASSIUM: CPT | Performed by: PEDIATRICS

## 2021-02-05 PROCEDURE — 250N000011 HC RX IP 250 OP 636: Performed by: STUDENT IN AN ORGANIZED HEALTH CARE EDUCATION/TRAINING PROGRAM

## 2021-02-05 RX ORDER — POTASSIUM CHLORIDE 1.5 G/1.58G
40 POWDER, FOR SOLUTION ORAL ONCE
Status: COMPLETED | OUTPATIENT
Start: 2021-02-05 | End: 2021-02-05

## 2021-02-05 RX ORDER — GABAPENTIN 300 MG/1
300 CAPSULE ORAL 2 TIMES DAILY
Status: DISCONTINUED | OUTPATIENT
Start: 2021-02-05 | End: 2021-02-06 | Stop reason: HOSPADM

## 2021-02-05 RX ORDER — OLANZAPINE 5 MG/1
TABLET, ORALLY DISINTEGRATING ORAL
Qty: 30 TABLET | Refills: 0 | Status: SHIPPED | OUTPATIENT
Start: 2021-02-05 | End: 2021-02-20

## 2021-02-05 RX ORDER — AMIODARONE HYDROCHLORIDE 200 MG/1
400 TABLET ORAL 2 TIMES DAILY
Status: DISCONTINUED | OUTPATIENT
Start: 2021-02-05 | End: 2021-02-06 | Stop reason: HOSPADM

## 2021-02-05 RX ADMIN — ACETAMINOPHEN 650 MG: 325 TABLET, FILM COATED ORAL at 04:19

## 2021-02-05 RX ADMIN — POTASSIUM CHLORIDE 40 MEQ: 1.5 POWDER, FOR SOLUTION ORAL at 05:55

## 2021-02-05 RX ADMIN — OLANZAPINE 5 MG: 5 TABLET, ORALLY DISINTEGRATING ORAL at 19:37

## 2021-02-05 RX ADMIN — GABAPENTIN 300 MG: 300 CAPSULE ORAL at 19:36

## 2021-02-05 RX ADMIN — PROCHLORPERAZINE MALEATE 10 MG: 10 TABLET ORAL at 16:52

## 2021-02-05 RX ADMIN — RIVAROXABAN 15 MG: 15 TABLET, FILM COATED ORAL at 17:27

## 2021-02-05 RX ADMIN — ROPINIROLE HYDROCHLORIDE 0.5 MG: 0.25 TABLET, FILM COATED ORAL at 19:36

## 2021-02-05 RX ADMIN — APIXABAN 10 MG: 5 TABLET, FILM COATED ORAL at 07:47

## 2021-02-05 RX ADMIN — DOCUSATE SODIUM 50 MG AND SENNOSIDES 8.6 MG 1 TABLET: 8.6; 5 TABLET, FILM COATED ORAL at 19:36

## 2021-02-05 RX ADMIN — DOCUSATE SODIUM 50 MG AND SENNOSIDES 8.6 MG 2 TABLET: 8.6; 5 TABLET, FILM COATED ORAL at 07:47

## 2021-02-05 RX ADMIN — AMITRIPTYLINE HYDROCHLORIDE 100 MG: 50 TABLET, FILM COATED ORAL at 19:37

## 2021-02-05 RX ADMIN — AMIODARONE HYDROCHLORIDE 400 MG: 200 TABLET ORAL at 19:35

## 2021-02-05 RX ADMIN — PROCHLORPERAZINE MALEATE 10 MG: 10 TABLET ORAL at 07:46

## 2021-02-05 RX ADMIN — OXYCODONE HYDROCHLORIDE 10 MG: 5 TABLET ORAL at 05:31

## 2021-02-05 RX ADMIN — OXYCODONE HYDROCHLORIDE 10 MG: 5 TABLET ORAL at 23:24

## 2021-02-05 RX ADMIN — AMIODARONE HYDROCHLORIDE 0.5 MG/MIN: 50 INJECTION, SOLUTION INTRAVENOUS at 05:31

## 2021-02-05 ASSESSMENT — ACTIVITIES OF DAILY LIVING (ADL)
ADLS_ACUITY_SCORE: 19
ADLS_ACUITY_SCORE: 17
ADLS_ACUITY_SCORE: 19
ADLS_ACUITY_SCORE: 19

## 2021-02-05 ASSESSMENT — MIFFLIN-ST. JEOR: SCORE: 1336

## 2021-02-05 NOTE — PROGRESS NOTES
[facility]     Progress Note - Rodrigo 3 Service        Date of Admission:  2/3/2021    Assessment & Plan    Taran Regalado is a 65 year old female admitted on 2/3/2021. She has a history of stage IV ovarian carcinoma and carcinomatosis (dx 12/2020) on Taxol, carboplatin and is admitted for PE and new a-fib with RVR.      Changes today:  - Switch apixaban to rivaroxaban   - discharge gtt and restart PO amiodarone 400 mg BID  - discharge dilaudid prns   - Start gabapentin 300 mg TID for RLS    # Pulmonary emboli, R segmental   Presented with 1 day h/o pleuritic chest pain, hypotension (80/40s) found to have multiple segmental emboli within the proximal right lower lobe pulmonary artery. BP improving s/p 1L IVF + amiodarone IV load to 104/64, rates down to 70-80s, no hypoxia. TTE without R heart strain, trop negative although BNP elevated to 1150. Suspect 2/2 to malignancy/volume overload in setting of pleural effusions, does report family h/o Factor V leiden.   - started apixaban 2/4 evening, per pharmacy Xarelto is better covered by pt's insurance so switched to rivaroxaban 2/5 with plan for 15 mg BID for 21 days then transitioned to once daily   - oxycodone 5 mg q4h prn for pleuritic pain      # Afib with RVR  Rates 140s, then sinus tachy with bursts of afib, suspect 2/2 to PEs. Cardiology EP saw pt in ED, was loaded on IV amiodarone with recs to transition to orals. TTE revealed 50-55% EF, mild diffuse hypokinesis, R global function and RV size is normal. Went back into a-fib with RVR 2/4 evening requiring amio gtt. Now rate controlled and converted to sinus arrhythmia.   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 CTAP 12/4: peritoneal carcinomatosis with mass-like peritoneal thickening in lower pelvis, large volume ascites, b/l pleural effusions, pleural nodularity of R hemithorax, 1cm lesion in R hepatic lobe suspicious for metastasis. Para 12/26 3L, 12/31 900ml. 1/7: Dx lsc, peritoneal biopsies, Frozen: High grade carcinoma. 1st cycle chemo with paclitaxel and carboplatin 1/12, 2nd cycle 2/1. Moderate/large L pleural effusion, small R but appears unchanged from prior. Does not appear to have been tapped before. Suspect malignant and would expect re-accumulation.   - Per gyn/onc, no indication for thora at this time  - continue PTA zyprexa at bedtime, zofran and compazine PRN  - continue PTA oxycodone 5-10 mg q6h  - continue PTA miralax + senna     # Restless leg syndrome, insomnia - continue PTA ropinirole, amitriptyline; start gabapentin 300 mg TID     # Migraines - hold PTA imitrex       Diet: Regular Diet Adult    Fluids: none  DVT Prophylaxis: heparin gtt as above   Hickey Catheter: not present  Code Status: Full Code           Disposition Plan   Expected discharge: Tomorrow, recommended to prior living arrangement once adequate pain management/ tolerating PO medications and a-fib controllled.  Entered: Brinda Clark MD 02/05/2021, 2:58 PM       The patient's care was discussed with the Attending Physician, Dr. Cisneros.    Brinda Clark MD  [address]   Please see sign in/sign out for up to date coverage information  ______________________________________________________________________    Interval History   Went back into a-fib yesterday evening requiring amio gtt. Early this AM converted to sinus with rates in 80s. Today her main concern is her restless legs which kept her up all night. Pleuritic chest pain has improved significantly. Denies SOB.     Data reviewed today: I reviewed all medications,  new labs and imaging results over the last 24 hours.       Physical Exam   Vital Signs: Temp: 97.7  F (36.5  C) Temp src: Oral BP: (!) 132/90 Pulse: 66   Resp: 16 SpO2: 100 % O2 Device: None (Room air)    Weight: 148 lbs 9.44 oz  General Appearance: Awake, alert, sitting up in bed and anxious-appearing, fidgety  Respiratory: no respiratory distress on RA, decreased breath sounds at bases bilaterally   Cardiovascular: RRR, no murmurs appreciated   Skin: warm and dry  Other: Extremities warm and well-perfused, trace LE edema      Data   Recent Labs   Lab 02/05/21  1000 02/05/21  0438 02/04/21  0726 02/03/21  1548 02/03/21  1548   WBC  --  2.4* 3.0*  --  5.2   HGB  --  11.2* 11.0*  --  13.2   MCV  --  89 91  --  89   PLT  --  171 171  --  240   INR  --   --   --   --  1.06   NA  --  136 138  --  135   POTASSIUM 4.0 3.4 3.2*   < > 3.2*   CHLORIDE  --  105 105  --  100   CO2  --  26 26  --  30   BUN  --  7 12  --  18   CR  --  0.56 0.55  --  0.86   ANIONGAP  --  4 7  --  4   HORACIO  --  8.7 8.5  --  9.5   GLC  --  107* 92  --  121*   ALBUMIN  --  2.6* 2.8*  --  3.5   PROTTOTAL  --  7.0 6.8  --  8.6   BILITOTAL  --  0.5 0.6  --  0.9   ALKPHOS  --  110 109  --  144   ALT  --  24 32  --  43   AST  --  20 22  --  26   TROPI  --   --   --   --  <0.015    < > = values in this interval not displayed.

## 2021-02-05 NOTE — PLAN OF CARE
Neuro: A&Ox4.   Cardiac: a fib RVR converted to SR at 00:12 this AM. Amiodarone drip decreased to 30ml/h at midnight. Denies chest pain.  Respiratory: Sating >95% on RA  GI/: Adequate urine output. No BM this shift.  Diet/appetite: Tolerating regular diet. Poor appetite. Takes compazine before meals at home.  Activity:  Stand by assist up to bathroom.  Pain: Denies chest pain. PRN oxycodone and tylenol for BLE pain.  Skin: No new deficits noted.  LDA's: PIV x2    Amiodarone @ 30ml/h    Plan: Continue with POC. Notify primary team with changes.

## 2021-02-05 NOTE — PROGRESS NOTES
Gynecology Oncology Progress Note  02/05/21    HD#3 admitted for new PE and Afib    Disease:   Stage IV serous ovarian cancer    Subjective: Patient is doing well this morning.  Pain is well controlled.  Tolerating PO, passing flatus, voiding spontaneously, and ambulating without issues. Denies SOB, nausea/vomiting, fevers/chills, dizziness. She doesn't have any questions for us.    Objective:   Patient Vitals for the past 24 hrs:   BP Temp Temp src Pulse Resp SpO2 Weight   02/05/21 0800 114/78 -- -- -- -- -- --   02/05/21 0742 96/78 97.8  F (36.6  C) Oral 85 16 99 % --   02/05/21 0600 119/88 -- -- 80 16 97 % --   02/05/21 0400 109/75 98.4  F (36.9  C) Oral 81 16 97 % --   02/05/21 0200 118/82 -- -- 87 -- 96 % --   02/05/21 0056 -- -- -- 87 -- -- --   02/05/21 0012 -- -- -- 84 -- -- --   02/05/21 0000 -- -- -- -- -- -- 67.4 kg (148 lb 9.4 oz)   02/04/21 2347 121/75 98.8  F (37.1  C) Oral 104 17 98 % --   02/04/21 2227 114/87 -- -- 141 -- 95 % --   02/04/21 2222 114/87 -- -- 150 -- 98 % --   02/04/21 2215 (!) 108/92 -- -- 132 -- 98 % --   02/04/21 2200 104/74 -- -- 144 -- 98 % --   02/04/21 2145 117/77 -- -- 165 -- 97 % --   02/04/21 2130 114/73 -- -- 137 -- 96 % --   02/04/21 2115 101/70 -- -- 145 -- 97 % --   02/04/21 2100 117/64 -- -- 113 -- 95 % --   02/04/21 2045 105/83 -- -- -- -- 94 % --   02/04/21 2030 102/74 -- -- 121 -- 96 % --   02/04/21 2015 99/75 -- -- 131 -- 96 % --   02/04/21 2000 (!) 87/63 -- -- 126 -- 97 % --   02/04/21 1945 109/60 -- -- 98 -- (!) 83 % --   02/04/21 1930 (!) 118/99 -- -- 108 -- 96 % --   02/04/21 1915 96/78 97.7  F (36.5  C) Oral 135 16 96 % --   02/04/21 1859 102/68 -- -- 147 -- -- --   02/04/21 1851 96/83 97.7  F (36.5  C) Oral 123 16 -- --   02/04/21 1543 105/85 97.7  F (36.5  C) Oral 133 16 95 % --   02/04/21 1058 (!) 86/69 98.4  F (36.9  C) Oral 87 16 96 % --     General: NAD, sitting up in bed  CV: well perfused  Resp: breathing comfortably on room air, no cough  Extremities:   well-perfused    I/O last 3 completed shifts:  In: 1407 [P.O.:1100; I.V.:307]  Out: -     Assessment:  65 year old female who has a history of stage IV serous ovarian cancer and is now admitted to medicine for a new PE and atrial fibrillation with RVR. Appreciate medicine cares.     Dz: Stage IV serous ovarian cancer. Dx laparoscopy w/ biopsies (1/7/21). S/p 2 cycles Carbo/Taxol (last 2/1). She will follow up an an outpatient for her next chemo, scheduled for 2/26/2021.   FEN: Regular diet  Pain: dilaudid IV 0.5 q3h, oxycodone 5-10mg q6h  Heme: New acute R subsegmental PE, high intensity heparin gtt. LE Dopplers neg. Plan to transition to lovenox or oral anticoagulant for outpatient anticoagulation  CV: new onset A-fib with RVR. S/p cardiology in ED. Amiodarone IV 150mg> 400mg BID for 2 weeks> 200mg daily. TTE w/o R heart strain, EF 50-55%, mild diffuse hypokinesis, trop neg, BNP 1154.  Pulm: PE as above, moderate L pleural effusion  Dispo: per primary care team    Gyn onc will sign off, please contact us if further questions arise.     Zeb Gerber MD  OB/GYN PGY-1  02/05/21 9:16 AM  Gyn onc pager: [phone]    I have examined this patient personally with the team and agree with the above findings and plan.  She appears acutely stable and her primary concerns now are arrhythmia related,  Will sign off pending oncology concerns.    Jemal Juarez

## 2021-02-05 NOTE — PROVIDER NOTIFICATION
Time of notification: 21:30  Provider notified: Rodrigo  Patient status: afib RVR with HR up to 160.   Temp:  [97.7  F (36.5  C)-99  F (37.2  C)] 98.8  F (37.1  C)  Pulse:  [] 87  Resp:  [16-20] 17  BP: ()/(60-99) 121/75  SpO2:  [83 %-98 %] 98 %  Orders received: Potassium replacement ordered. Amiodarone rate decrease ordered at 2300, MD told RN to wait to decrease. MD approved rate decrease at 00:14. Amiodarone decreased from 60ml/h to 30ml/h at 00:14.    Pt. Converted from afib RVR to SR at 00:12

## 2021-02-05 NOTE — PLAN OF CARE
Temp: 97.6  F (36.4  C) Temp src: Oral BP: 117/81 Pulse: 88   Resp: 18 SpO2: 94 % O2 Device: None (Room air)       Patient A&Ox4, can make needs known. VSS, sinus rhythm. Amio gtt stopped at 1130, PO amio will restart tonight. Up to bathroom with SBA. Voiding adequate amounts of urine, no BM this shift-stool softeners given. Poor appetite, but tolerating regular diet. Nausea intermittent- PRN compazine given this am. Denies pain- oxy and tylenol available. No skin issues noted. PIV SL. Plan to discharge tomorrow if remains vitally stable. Will follow up with onc as outpatient.

## 2021-02-05 NOTE — PLAN OF CARE
Neuro: A&Ox4.   Cardiac: sinus with PAC this morning, then started AFIB at 160 with rates up to 160. Amiodarone drip started at 1800, patient does not have any chest pain.   Respiratory: Sating 100 on RA.  GI/: Adequate urine output. BM X1 ,patient independent up to bathroom  Diet/appetite: Tolerating reg diet.Normally takes compazine before meals.   Activity:  Up ad andriy, up to chair and in halls.  Pain: At acceptable level on current regimen.   Skin: No new deficits noted.  LDA's: R and L PIV    Plan: Continue with POC. Notify primary team with changes.

## 2021-02-06 ENCOUNTER — MYC REFILL (OUTPATIENT)
Dept: ONCOLOGY | Facility: CLINIC | Age: 66
End: 2021-02-06

## 2021-02-06 VITALS
HEART RATE: 78 BPM | DIASTOLIC BLOOD PRESSURE: 73 MMHG | RESPIRATION RATE: 16 BRPM | SYSTOLIC BLOOD PRESSURE: 108 MMHG | BODY MASS INDEX: 19.47 KG/M2 | HEIGHT: 72 IN | OXYGEN SATURATION: 99 % | TEMPERATURE: 97.6 F | WEIGHT: 143.74 LBS

## 2021-02-06 DIAGNOSIS — R19.00 PELVIC MASS: ICD-10-CM

## 2021-02-06 DIAGNOSIS — C56.9 OVARIAN CANCER, UNSPECIFIED LATERALITY (H): ICD-10-CM

## 2021-02-06 LAB
ANION GAP SERPL CALCULATED.3IONS-SCNC: 2 MMOL/L (ref 3–14)
BUN SERPL-MCNC: 6 MG/DL (ref 7–30)
CALCIUM SERPL-MCNC: 9 MG/DL (ref 8.5–10.1)
CHLORIDE SERPL-SCNC: 105 MMOL/L (ref 94–109)
CO2 SERPL-SCNC: 30 MMOL/L (ref 20–32)
CREAT SERPL-MCNC: 0.59 MG/DL (ref 0.52–1.04)
ERYTHROCYTE [DISTWIDTH] IN BLOOD BY AUTOMATED COUNT: 17.2 % (ref 10–15)
GFR SERPL CREATININE-BSD FRML MDRD: >90 ML/MIN/{1.73_M2}
GLUCOSE SERPL-MCNC: 95 MG/DL (ref 70–99)
HCT VFR BLD AUTO: 32 % (ref 35–47)
HGB BLD-MCNC: 10 G/DL (ref 11.7–15.7)
MCH RBC QN AUTO: 27.9 PG (ref 26.5–33)
MCHC RBC AUTO-ENTMCNC: 31.3 G/DL (ref 31.5–36.5)
MCV RBC AUTO: 89 FL (ref 78–100)
PLATELET # BLD AUTO: 180 10E9/L (ref 150–450)
POTASSIUM SERPL-SCNC: 4 MMOL/L (ref 3.4–5.3)
RBC # BLD AUTO: 3.59 10E12/L (ref 3.8–5.2)
SODIUM SERPL-SCNC: 136 MMOL/L (ref 133–144)
WBC # BLD AUTO: 2.2 10E9/L (ref 4–11)

## 2021-02-06 PROCEDURE — 99239 HOSP IP/OBS DSCHRG MGMT >30: CPT | Performed by: PEDIATRICS

## 2021-02-06 PROCEDURE — 999N001117 HC STATISTIC FOUNDATION ONE GENE PANEL: Mod: GT | Performed by: OBSTETRICS & GYNECOLOGY

## 2021-02-06 PROCEDURE — 250N000013 HC RX MED GY IP 250 OP 250 PS 637: Performed by: STUDENT IN AN ORGANIZED HEALTH CARE EDUCATION/TRAINING PROGRAM

## 2021-02-06 PROCEDURE — 80048 BASIC METABOLIC PNL TOTAL CA: CPT | Performed by: STUDENT IN AN ORGANIZED HEALTH CARE EDUCATION/TRAINING PROGRAM

## 2021-02-06 PROCEDURE — 85027 COMPLETE CBC AUTOMATED: CPT | Performed by: EMERGENCY MEDICINE

## 2021-02-06 PROCEDURE — 36415 COLL VENOUS BLD VENIPUNCTURE: CPT | Performed by: EMERGENCY MEDICINE

## 2021-02-06 PROCEDURE — 36415 COLL VENOUS BLD VENIPUNCTURE: CPT | Performed by: STUDENT IN AN ORGANIZED HEALTH CARE EDUCATION/TRAINING PROGRAM

## 2021-02-06 RX ORDER — CYCLOBENZAPRINE HCL 5 MG
5 TABLET ORAL ONCE
Status: COMPLETED | OUTPATIENT
Start: 2021-02-06 | End: 2021-02-06

## 2021-02-06 RX ORDER — OXYCODONE HYDROCHLORIDE 5 MG/1
5-10 TABLET ORAL EVERY 4 HOURS PRN
Qty: 28 TABLET | Refills: 0 | Status: SHIPPED | OUTPATIENT
Start: 2021-02-06 | End: 2021-02-13

## 2021-02-06 RX ORDER — AMIODARONE HYDROCHLORIDE 200 MG/1
TABLET ORAL
Qty: 74 TABLET | Refills: 0 | Status: SHIPPED | OUTPATIENT
Start: 2021-02-06 | End: 2021-02-26

## 2021-02-06 RX ADMIN — PROCHLORPERAZINE MALEATE 10 MG: 10 TABLET ORAL at 07:54

## 2021-02-06 RX ADMIN — DOCUSATE SODIUM 50 MG AND SENNOSIDES 8.6 MG 2 TABLET: 8.6; 5 TABLET, FILM COATED ORAL at 07:54

## 2021-02-06 RX ADMIN — RIVAROXABAN 15 MG: 15 TABLET, FILM COATED ORAL at 07:54

## 2021-02-06 RX ADMIN — AMIODARONE HYDROCHLORIDE 400 MG: 200 TABLET ORAL at 07:54

## 2021-02-06 RX ADMIN — GABAPENTIN 300 MG: 300 CAPSULE ORAL at 07:54

## 2021-02-06 RX ADMIN — CYCLOBENZAPRINE HYDROCHLORIDE 5 MG: 5 TABLET, FILM COATED ORAL at 06:32

## 2021-02-06 ASSESSMENT — MIFFLIN-ST. JEOR: SCORE: 1314

## 2021-02-06 ASSESSMENT — ACTIVITIES OF DAILY LIVING (ADL)
ADLS_ACUITY_SCORE: 17

## 2021-02-06 NOTE — PLAN OF CARE
Major Shift Events:  A&OX4 . Continued to complain for restless leg pain that did not improved with Requip and gabapentin . Slept after midnight . Hemodynamically stable . Afebrile. Tele NSR/ST , started on oral amiodarone.  . On RA . Able to keep her saturation>94% .   Plan: possible discharge to home today .   For vital signs and complete assessments, please see documentation flowsheets.

## 2021-02-06 NOTE — DISCHARGE INSTRUCTIONS
Your admitted to the hospital for shortness of breath, we did many tests and found that you had a blood clot in the lungs ( pulmonary embolism), this is not uncommon in patients who have a diagnosis of cancer.  We started you on a blood thinner called rivaroxaban to prevent blood clots in the lungs.  Be aware than anyone who takes a blood thinner should not take medications like ibuprofen or naproxen. Stop taking your ibuprofen.    We also found that you had small amounts of fluid on your lungs which is also likely related to your cancer, this fluid is typically sent for testing, but because of your blood thinner meds will not be doing testing at this time.      We also found you had an irregular heart rate called atrial fibrillation or A. Fib, and we started a medication to slow your heart rate down called amiodarone. People with Afib have had a history of a blood clot are usually on a blood thinner for the rest of their lives. Do NOT stop taking your blood thinner without first talking to your doctor

## 2021-02-06 NOTE — PLAN OF CARE
DISCHARGE                         2/6/2021 12:04 PM  ----------------------------------------------------------------------------  Discharged to: Home  Via: private transportation  Accompanied by: Family  Discharge Instructions: regular diet, activity as tolerated, medications, follow up appointments, when to call the MD, aftercare instructions.  Prescriptions: To be filled by [facility] pharmacy; medication list reviewed & sent with pt  Follow Up Appointments: arranged; information given  Belongings: All sent with pt  IV: d/c'd  Telemetry: d/c'd  Pt exhibits understanding of above discharge instructions; all questions answered.    Discharge Paperwork: Signed, copied, and sent home with patient.

## 2021-02-07 ENCOUNTER — MYC REFILL (OUTPATIENT)
Dept: ONCOLOGY | Facility: CLINIC | Age: 66
End: 2021-02-07

## 2021-02-07 ENCOUNTER — PATIENT OUTREACH (OUTPATIENT)
Dept: CARE COORDINATION | Facility: CLINIC | Age: 66
End: 2021-02-07

## 2021-02-07 DIAGNOSIS — C56.9 OVARIAN CANCER, UNSPECIFIED LATERALITY (H): ICD-10-CM

## 2021-02-08 PROBLEM — Z51.11 ENCOUNTER FOR ANTINEOPLASTIC CHEMOTHERAPY: Status: ACTIVE | Noted: 2021-02-08

## 2021-02-08 RX ORDER — PROCHLORPERAZINE MALEATE 10 MG
10 TABLET ORAL EVERY 6 HOURS PRN
Qty: 30 TABLET | Refills: 0 | Status: SHIPPED | OUTPATIENT
Start: 2021-02-08 | End: 2021-02-20

## 2021-02-08 RX ORDER — POLYETHYLENE GLYCOL 3350 17 G/17G
17 POWDER, FOR SOLUTION ORAL 2 TIMES DAILY
Qty: 510 G | Refills: 0 | Status: SHIPPED | OUTPATIENT
Start: 2021-02-08 | End: 2021-06-10

## 2021-02-08 RX ORDER — AMOXICILLIN 250 MG
2 CAPSULE ORAL 2 TIMES DAILY
Qty: 30 TABLET | Refills: 0 | Status: SHIPPED | OUTPATIENT
Start: 2021-02-08 | End: 2021-06-10

## 2021-02-08 NOTE — PROGRESS NOTES
Taran is a 65 year old who is being evaluated via a billable video visit.      How would you like to obtain your AVS? MyChart  If the video visit is dropped, the invitation should be resent by: Text to cell phone: [phone]  Will anyone else be joining your video visit? No      Vitals - Patient Reported  Weight (Patient Reported): 67.6 kg (149 lb)  Height (Patient Reported): 182.9 cm (6')  BMI (Based on Pt Reported Ht/Wt): 20.21  Pain Score: No Pain (0)      I have reviewed and updated patient's allergy and medication list.    Concerns: NEED REFILLS  Refills: RIVAROXABAN, PROCHLORPERAZINE, OLANZAPINE, AND OXYCODONE      Bertha Montana CMA    Video-Visit Details    Type of service:  Video Visit    Video Start Time: 8:37 am    Video End Time:9:07 am    Originating Location (pt. 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 She is currently undergoing neoadjuvant chemotherapy.  She is here today for follow up and disease management. In light of the recent COVID19 pandemic, and in accordance with our group and national guidelines this patients care has been reviewed and it is felt that presenting for her visit would be more likely to increase rather than decrease her relative risks. Therefore this visit was conducted by video.      Oncology History:  12/3/2020: US Pelvic: IMPRESSION: Limited examination due to acoustic windows. Possible left adnexal mass. A CT scan of the abdomen and pelvis with contrast is recommended for further assessment.    2020: CT A/P:   IMPRESSION:    Peritoneal  carcinomatosis with masslike peritoneal thickening in the lower pelvis which may indicate an adnexal or ovarian primary malignancy. Large volume ascites. Bilateral pleural effusions. There is potential subtle pleural nodularity in the right hemithorax which could indicate metastatic disease.  Indeterminate 1 cm lesion in the right hepatic lobe suspicious for a metastatic lesion.      12/16/2020: Presented to [facility] with abdominal distention, 25lb weight loss, and CTAP with carcinomatosis, elevated  3098.     12/23/2020: CT Chest: IMPRESSION:   1. There are few scattered small sub-6 mm pulmonary nodules which are indeterminate without prior comparisons available. There are a few  slightly larger perifissural nodules which are technically  indeterminate in the setting of malignancy although presumed lymphatic in nature and of unlikely clinical significance. Attention on follow-up is recommended.  2. Small to moderate left and small right pleural effusions are increased in size from prior. No convincing evidence for pleural nodularity.  3. Partially visualized large volume ascites and peritoneal nodularity in the upper abdomen similar to 12/4/2020 outside CT     12/26/2020: ED for abdominal distension; 3 L ascites drained with paracentesis    Pelvic US: Findings: Free fluid present in LLQ     12/31/2020: US Paracentesis: 900 mL ascites drained    1/7/2021: Diagnotic laparoscopy, biopsies  Pathology: FINAL DIAGNOSIS:   A. PERITONEUM, BIOPSIES:   - Positive for high grade carcinoma, consistent with metastatic carcinoma of Mullerian origin.    1/10-1/13/2021: Hospital admission for postoperative non-intractable vomiting and nausea.     1/10/2021: CT A/P: IMPRESSION: Extensive ascites which is probably malignant. Scattered liver hypodensities of indeterminate etiology comment cannot exclude metastatic disease. Diverticulosis. Fluid-filled adnexal masses and irregular appearance of uterus, which may represent primary  neoplasm. Multiple peritoneal nodules. Large amount of fecal material in the colon with no evidence of small bowel obstruction.    Plan: Paclitaxel 175 mg/m2 and carboplatin AUC 6 x 3 cycles followed by a CT CAP and visit with Dr. Juarez.     1/12/2021: Cycle 1 paclitaxel and carboplatin while inpatient     1/13/2021: CT Head: Impression:  1. Chronic sinusitis of the right maxillary and right sphenoid sinuses.  2. Incidental presumed calcified meningioma in the right frontal  convexity without significant mass effect.  3. No suspicious intracranial enhancing lesion.     2/1/2021: Cycle 2 paclitaxel and carboplatin.  936.    2/3-2/5/21: Admission [facility] for afib w/ RVR and new PE    2/26/21: Cycle 3 paclitaxel and carboplatin planned.  Deferred due to thrombocytopenia.  pending.            Today she reports with her daughter Edie that she is feeling well overall.  She continues to struggle with restless leg symptoms.  Her PCP started her on gabapentin which has helped her sleep some nights but not every night.  About every other night she needs to take 2 oxycodone to help with her symptoms so she can sleep.  They are also concerned that she will have increased pain after chemo because this happened after her first 2 cycles.  She is not having pain now.  They are agreeable to meet with palliative to see if they can be a more centralized person to help them manage her symptoms.  She is also concerned about nausea.  After her first cycle of chemo she had severe nausea and was not able to eat or drink at all.  Since she has been taking compazine twice a day and zyprexa nightly to prevent nausea.  She has not had nausea recently and is afraid that it will return if stops taking the medication.  She is eating small frequent meals high in protein.  She does not like protein shakes.  She has been having lightheadedness, dizziness, low BPs.  As a result Edie called and talked patient's cardiologist about  stopping her amiodarone.  She stopped this about 6 days ago.  She has not noticed a difference in her dizziness since then.  She admits that she does not drink enough water.  She is drinking about one 20 oz Propel water a day but tries to drink two.  She also has one 8-12 oz regular Coke a day.  She will also occasionally drink milk.  She is having regular BMs daily with 2 Senna S twice a day.  She denies any vaginal bleeding, no changes in her bowel or bladder habits, no lower extremity edema, and no abdominal discomfort/bloating, no fevers or chills, and no chest pain or shortness of breath.             Review of Systems     Constitutional:  Negative for fever, chills, weight loss, weight gain, fatigue, decreased appetite, night sweats, recent stressors, height gain, height loss, post-operative complications, incisional pain, hallucinations, increased energy, hyperactivity and confused.   HENT:  Negative for ear pain, hearing loss, tinnitus, nosebleeds, trouble swallowing, hoarse voice, mouth sores, sore throat, ear discharge, tooth pain, gum tenderness, taste disturbance, smell disturbance, hearing aid, bleeding gums, dry mouth, sinus pain, sinus congestion and neck mass.    Eyes:  Negative for double vision, pain, redness, eye pain, decreased vision, eye watering, eye bulging, eye dryness, flashing lights, spots, floaters, strabismus, tunnel vision, jaundice and eye irritation.   Respiratory:   Negative for cough, hemoptysis, sputum production, shortness of breath, wheezing, sleep disturbances due to breathing, snores loudly, respiratory pain, dyspnea on exertion, cough disturbing sleep and postural dyspnea.    Cardiovascular:  Positive for hypotension and light-headedness. Negative for chest pain, dyspnea on exertion, palpitations, orthopnea, fingers/toes turn blue, hypertension, syncope, history of heart murmur, pacemaker, few scattered varicosities, leg pain, sleep disturbances due to breathing, exercise  intolerance and edema.   Gastrointestinal:  Negative for heartburn, nausea, vomiting, abdominal pain, diarrhea, constipation, blood in stool, melena, rectal pain, bloating, hemorrhoids, bowel incontinence, jaundice, rectal bleeding, coffee ground emesis and change in stool.   Genitourinary:  Negative for bladder incontinence, dysuria, urgency, hematuria, flank pain, vaginal discharge, difficulty urinating, genital sores, dyspareunia, decreased libido, nocturia, voiding less frequently, arousal difficulty, abnormal vaginal bleeding, excessive menstruation, menstrual changes, hot flashes, vaginal dryness and postmenopausal bleeding.   Musculoskeletal:  Negative for myalgias, back pain, joint swelling, arthralgias, stiffness, muscle cramps, neck pain, bone pain, muscle weakness and fracture.   Skin:  Negative for nail changes, itching, poor wound healing, rash, hair changes, skin changes, acne, warts, poor wound healing, scarring, flaky skin, Raynaud's phenomenon, sensitivity to sunlight and skin thickening.   Neurological:  Positive for dizziness and light-headedness. Negative for tingling, tremors, speech change, seizures, loss of consciousness, weakness, numbness, headaches, disturbances in coordination, memory loss, difficulty walking and paralysis.   Endo/Heme:  Negative for anemia, swollen glands and bruises/bleeds easily.   Psychiatric/Behavioral:  Negative for depression, hallucinations, memory loss, decreased concentration, mood swings and panic attacks.    Breast:  Negative for breast discharge, breast mass, breast pain and nipple retraction.   Endocrine:  Negative for altered temperature regulation, polyphagia, polydipsia, unwanted hair growth and change in facial hair.        Past Medical History:    Past Medical History:   Diagnosis Date     Atrial fibrillation with rapid ventricular response (H)      History of cold sores      Insomnia      Migraine      Osteopenia      Pelvic mass      Peritoneal  carcinomatosis (H)      Restless legs syndrome (RLS)          Past Surgical History:    Past Surgical History:   Procedure Laterality Date     APPENDECTOMY       ARTHROSCPY KNEE SURGICAL DEBRIDEMENT SHAVING ARTICULAR CARTILAGE Right      BIOPSY  January 2021    Biopsy to confirm ovarian cancer     DEBRIDEMENT LEFT UPPER EXTREMITY  2016     LAPAROSCOPY DIAGNOSTIC (GYN) Bilateral 1/7/2021    Procedure: Diagnsotic laparoscopy, biopsies;  Surgeon: Bolivar Juarez MD;  Location: [facility] OR     LASIK       TUBAL LIGATION           Health Maintenance Due   Topic Date Due     PREVENTIVE CARE VISIT  1955     ADVANCE CARE PLANNING  1955     COLORECTAL CANCER SCREENING  11/11/1966     HIV SCREENING  11/11/1971     HEPATITIS C SCREENING  11/11/1974     LIPID  11/11/2001     ZOSTER IMMUNIZATION (1 of 2) 11/11/2006     INFLUENZA VACCINE (1) 09/01/2020     MAMMO SCREENING  03/04/2021       Current Medications:     Current Outpatient Medications   Medication Sig Dispense Refill     acetaminophen (TYLENOL) 325 MG tablet Take 2 tablets (650 mg) by mouth every 6 hours as needed for mild pain 50 tablet 0     amitriptyline (ELAVIL) 100 MG tablet TAKE 1 TABLET (100 MG) BY MOUTH EVERY NIGHT AT BEDTIME       gabapentin (NEURONTIN) 600 MG tablet Take 1 tablet (600 mg) by mouth At Bedtime 30 tablet 0     OLANZapine zydis (ZYPREXA) 5 MG ODT Take 1 tablet (5 mg) by mouth At Bedtime 30 tablet 0     ondansetron (ZOFRAN-ODT) 4 MG ODT tab Take 1 tablet (4 mg) by mouth every 6 hours as needed for nausea or vomiting 20 tablet 0     polyethylene glycol (MIRALAX) 17 GM/Dose powder Take 17 g by mouth 2 times daily 510 g 0     prochlorperazine (COMPAZINE) 10 MG tablet Take 1 tablet (10 mg) by mouth every 6 hours as needed for nausea or vomiting 30 tablet 0     rivaroxaban ANTICOAGULANT (XARELTO ANTICOAGULANT) 20 MG TABS tablet Take 1 tablet (20 mg) by mouth daily (with dinner) 90 tablet 1     senna-docusate (SENOKOT-S/PERICOLACE) 8.6-50 MG  tablet Take 2 tablets by mouth 2 times daily 30 tablet 0     SUMAtriptan (IMITREX) 100 MG tablet Take 100 mg by mouth at onset of headache        valACYclovir (VALTREX) 1000 mg tablet Take 1,000 mg by mouth as needed        amiodarone (PACERONE) 200 MG tablet Take 2 tablets (400 mg) by mouth 2 times daily for 14 days, THEN 1 tablet (200 mg) daily for 14 days. 74 tablet 0     gabapentin (NEURONTIN) 300 MG capsule Take 300 mg by mouth           Allergies:      No Known Allergies     Social History:     Social History     Tobacco Use     Smoking status: Former Smoker     Packs/day: 0.50     Years: 40.00     Pack years: 20.00     Quit date:      Years since quitting: 3.1     Smokeless tobacco: Never Used   Substance Use Topics     Alcohol use: Not Currently       History   Drug Use Unknown         Family History:     The patient's family history is notable for:    Family History   Adopted: Yes   Problem Relation Age of Onset     Cancer Mother 36     Other Cancer Mother         Bio mother  of  a female cancer  at 36     Factor V Leiden deficiency Daughter      Deep Vein Thrombosis Daughter      Diabetes Type 1 Daughter      Diabetes Daughter      Hypertension Daughter          Physical Exam:     BP 98/62   Pulse 68   Temp 97.2  F (36.2  C) (Tympanic)   Resp 16   Wt 68.5 kg (151 lb 1.6 oz)   SpO2 98%   BMI 20.49 kg/m    Body mass index is 20.49 kg/m .      General Appearance: healthy and alert, no distress    Eyes:  Eyes grossly normal to inspection.  No discharge or erythema, or obvious scleral/conjunctival abnormalities.    Respiratory: No audible wheeze, cough, or visible cyanosis.  No visible retractions or increased work of breathing.     Musculoskeletal: extremities non tender and without edema    Skin: no lesions or rashes on visible skin    Neurological: normal gait, no gross defects     Psychiatric: appropriate mood and affect. Mentation appears normal, affect normal/bright, judgement and insight  intact, normal speech and appearance well-groomed                            The rest of a comprehensive physical examination is deferred due to PHE (public health emergency) video visit restrictions.    Lab Results   Component Value Date    WBC 3.3 02/26/2021     Lab Results   Component Value Date    RBC 3.55 02/26/2021     Lab Results   Component Value Date    HGB 10.4 02/26/2021     Lab Results   Component Value Date    HCT 32.0 02/26/2021     No components found for: MCT  Lab Results   Component Value Date    MCV 90 02/26/2021     Lab Results   Component Value Date    MCH 29.3 02/26/2021     Lab Results   Component Value Date    MCHC 32.5 02/26/2021     Lab Results   Component Value Date    RDW 21.3 02/26/2021     Lab Results   Component Value Date    PLT 88 02/26/2021     % Neutrophils   Date Value Ref Range Status   02/26/2021 46.1 % Final     % Lymphocytes   Date Value Ref Range Status   02/26/2021 47.0 % Final     % Monocytes   Date Value Ref Range Status   02/26/2021 3.5 % Final     % Eosinophils   Date Value Ref Range Status   02/26/2021 1.7 % Final     % Basophils   Date Value Ref Range Status   02/26/2021 1.7 % Final     % Immature Granulocytes   Date Value Ref Range Status   02/04/2021 0.3 % Final     Absolute Neutrophil   Date Value Ref Range Status   02/26/2021 1.5 (L) 1.6 - 8.3 10e9/L Final     Absolute Lymphocytes   Date Value Ref Range Status   02/26/2021 1.6 0.8 - 5.3 10e9/L Final     Absolute Monocytes   Date Value Ref Range Status   02/26/2021 0.1 0.0 - 1.3 10e9/L Final     Absolute Eosinophils   Date Value Ref Range Status   02/26/2021 0.1 0.0 - 0.7 10e9/L Final     Absolute Basophils   Date Value Ref Range Status   02/26/2021 0.1 0.0 - 0.2 10e9/L Final     Abs Immature Granulocytes   Date Value Ref Range Status   02/04/2021 0.0 0 - 0.4 10e9/L Final     Nucleated RBCs   Date Value Ref Range Status   02/04/2021 0 0 /100 Final     Absolute Nucleated RBC   Date Value Ref Range Status   02/04/2021  0.0  Final     Last Comprehensive Metabolic Panel:  Sodium   Date Value Ref Range Status   02/26/2021 140 133 - 144 mmol/L Final     Potassium   Date Value Ref Range Status   02/26/2021 3.7 3.4 - 5.3 mmol/L Final     Chloride   Date Value Ref Range Status   02/26/2021 109 94 - 109 mmol/L Final     Carbon Dioxide   Date Value Ref Range Status   02/26/2021 26 20 - 32 mmol/L Final     Anion Gap   Date Value Ref Range Status   02/26/2021 6 3 - 14 mmol/L Final     Glucose   Date Value Ref Range Status   02/26/2021 112 (H) 70 - 99 mg/dL Final     Urea Nitrogen   Date Value Ref Range Status   02/26/2021 16 7 - 30 mg/dL Final     Creatinine   Date Value Ref Range Status   02/26/2021 0.82 0.52 - 1.04 mg/dL Final     GFR Estimate   Date Value Ref Range Status   02/26/2021 75 >60 mL/min/[1.73_m2] Final     Comment:     Non  GFR Calc  Starting 12/18/2018, serum creatinine based estimated GFR (eGFR) will be   calculated using the Chronic Kidney Disease Epidemiology Collaboration   (CKD-EPI) equation.       Calcium   Date Value Ref Range Status   02/26/2021 8.7 8.5 - 10.1 mg/dL Final     Bilirubin Total   Date Value Ref Range Status   02/26/2021 0.2 0.2 - 1.3 mg/dL Final     Alkaline Phosphatase   Date Value Ref Range Status   02/26/2021 112 40 - 150 U/L Final     ALT   Date Value Ref Range Status   02/26/2021 34 0 - 50 U/L Final     AST   Date Value Ref Range Status   02/26/2021 27 0 - 45 U/L Final     Lab Results   Component Value Date    PROTTOTAL 7.2 02/26/2021     Lab Results   Component Value Date    ALBUMIN 3.3 02/26/2021     Magnesium   Date Value Ref Range Status   02/26/2021 2.1 1.6 - 2.3 mg/dL Final         Assessment:    Taran Regalado is a 65 year old woman with a diagnosis of 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 She is currently undergoing neoadjuvant chemotherapy.  She is here today for follow up and disease management. In light of the recent COVID19 pandemic, and in accordance  with our group and national guidelines this patients care has been reviewed and it is felt that presenting for her visit would be more likely to increase rather than decrease her relative risks. Therefore this visit was conducted by video.      82 minutes spent on the date of the encounter doing chart review, history and exam, documentation, and further activities as noted above.              Plan:     1.) Serous carcinoma:  Will defer chemo x 1 week due to thrombocytopenia.  OK for cycle 3 of chemo next week if labs are WDL.  RTC 3 weeks after cycle 3 for CT CAP and follow up with Dr. Juarez.  Reviewed signs and symptoms for when she should contact the clinic or seek additional care.  Patient to contact the clinic with any questions or concerns in the interim.        Genetic risk factors were assessed and she meets qualifications for referral.  This has been ordered and is scheduled to see them on 3/9/21.      Labs and/or tests ordered include:  CBC. CMP. Mag. .     2.) Health maintenance:  Issues addressed today include following up with PCP for annual health maintenance and non-gynecologic issues.     3.) Chemotherapy induced nausea: Discussed that antiemetics do work best to prevent nausea.  Would recommend that she try to taper off these about a week after she has received chemo to see if she is continuing to have nausea.  Most people do not need antiemetics continuously about a week after chemo.  Her total protein is normal at 7.2 and her albumin has increased to 3.3.  In addition her weight is stable.  Encouraged to continue eating small frequent meals high in protein.   Refills for her zyprexa and compazine sent to her home pharmacy.    4.) Restless legs: This has been improving with the gabapentin but she is still struggling.  Will refill 15 tabs of oxycodone to her home pharmacy and refer her to palliative care.    5.) Constipation:  Continue taking Senna S as she has been  doing.    6.) Dehydration: Discussed that her dizziness, lightheadedness, and low BP are likely from dehydration.  Encouraged at least 64 oz of water a day.  This can be Propel water if she does not like the taste of plain water.  Briefly, discussed that some of her other medications such as gabapentin and amitriptyline can also cause dizziness.    7.) Pulmonary embolus:  Continue taking Xarelto as she has been doing.  Refill sent to her home pharmacy for this.      Ella Murillo, DNP, APRN, FNP-C  Nurse Practitioner   Division of Gynecologic Oncology  Pager: [phone]     CC  Patient Care Team:  Shira Estrada MD as PCP - General (Gynecologic Oncology)  Shira Estrada MD as Assigned Cancer Care Provider  Ramona Viera RN as Specialty Care Coordinator (Gynecologic Oncology)  SHIRA ESTRADA

## 2021-02-08 NOTE — PROGRESS NOTES
[facility]: Post-Discharge Note  SITUATION                                                      Admission:    Admission Date: 02/03/21   Reason for Admission: Atrial fibrillation with rapid ventricular response  Discharge:   Discharge Date: 02/06/21  Discharge Diagnosis: Atrial fibrillation with rapid ventricular response    BACKGROUND                                                      Taran Regalado is a 65 year old female admitted on 2/3/2021. She has a history of stage 4 ovarian carcinoma and carcinomatosis (dx 12/2020) on Taxol,Carboplatin and is admitted for pulmonary embolus and new atrial fibrillation with rapid ventricular response.     ASSESSMENT      Discharge Assessment  Patient reports symptoms are: Improved  Does the patient have all of their medications?: Yes  Does patient know what their new medications are for?: Yes  Does patient have a follow-up appointment scheduled?: Yes  Does patient have any other questions or concerns?: No    Post-op  Did the patient have surgery or a procedure: No  Fever: No  Chills: No  Eating & Drinking: eating and drinking without complaints/concerns  Bowel Function: normal  Urinary Status: voiding without complaint/concerns        PLAN                                                      Outpatient Plan:  Follow up with primary care provider, Bolivar Juarez, within 7 days  to evaluate medication change, for hospital follow- up and regarding  new diagnosis. The following labs/tests are recommended: liver  function tests, pulmonary function tests, thyroid tests    Future Appointments   Date Time Provider Department Center   2/26/2021  7:45 AM  [facility] DRAW [facility]   2/26/2021  8:20 AM Ella Murillo APRN CNP [facility]   2/26/2021  9:30 AM  ONCOLOGY INFUSION [facility]           Meryl Owens

## 2021-02-09 ENCOUNTER — VIRTUAL VISIT (OUTPATIENT)
Dept: ONCOLOGY | Facility: CLINIC | Age: 66
End: 2021-02-09
Attending: NURSE PRACTITIONER
Payer: MEDICAID

## 2021-02-09 DIAGNOSIS — C56.9 OVARIAN CANCER, UNSPECIFIED LATERALITY (H): Primary | ICD-10-CM

## 2021-02-09 PROCEDURE — 99214 OFFICE O/P EST MOD 30 MIN: CPT | Mod: 95 | Performed by: NURSE PRACTITIONER

## 2021-02-09 NOTE — LETTER
2021         RE: Taran Regalado  [address]  [place] [address]        Dear Colleague,    Thank you for referring your patient, Taran Regalado, to the [facility]. Please see a copy of my visit note below.    Taran is a 65 year old who is being evaluated via a billable telephone visit.      What phone number would you like to be contacted at? [phone]  How would you like to obtain your AVS? 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 She is currently undergoing neoadjuvant chemotherapy.  She is presents today for an acute visit. In light of the recent COVID19 pandemic, and in accordance with our group and national guidelines this patients care has been reviewed and it is felt that presenting for her visit would be more likely to increase rather than decrease her relative risks. Therefore this visit was conducted by phone.      Oncology History:  12/3/2020: US Pelvic: IMPRESSION: Limited examination due to acoustic windows. Possible left adnexal mass. A CT scan of the abdomen and pelvis with contrast is recommended for further assessment.    2020: CT A/P:   IMPRESSION:    Peritoneal carcinomatosis with masslike peritoneal thickening in the lower pelvis which may indicate an adnexal or ovarian primary malignancy. Large volume ascites. Bilateral pleural effusions. There is potential subtle pleural nodularity in the right hemithorax  which could indicate metastatic disease.  Indeterminate 1 cm lesion in the right hepatic lobe suspicious for a metastatic lesion.      [facility]16[facility]2020: [facility] 25lb weight loss, and CTAP with carcinomatosis, elevated  3098.     12/23/2020: CT Chest: IMPRESSION:   1. There are few scattered small sub-6 mm pulmonary nodules which are indeterminate without prior comparisons available. There are a few  slightly larger perifissural nodules which are technically  indeterminate in the setting of malignancy although presumed lymphatic in nature and of unlikely clinical significance. Attention on follow-up is recommended.  2. Small to moderate left and small right pleural effusions are increased in size from prior. No convincing evidence for pleural nodularity.  3. Partially visualized large volume ascites and peritoneal nodularity in the upper abdomen similar to 12/4/2020 outside CT     12/26/2020: ED for abdominal distension; 3 L ascites drained with paracentesis    Pelvic US: Findings: Free fluid present in LLQ     12/31/2020: US Paracentesis: 900 mL ascites drained    1/7/2021: Diagnotic laparoscopy, biopsies  Pathology: FINAL DIAGNOSIS:   A. PERITONEUM, BIOPSIES:   - Positive for high grade carcinoma, consistent with metastatic carcinoma of Mullerian origin.    1/10-1/13/2021: Hospital admission for postoperative non-intractable vomiting and nausea.     1/10/2021: CT A/P: IMPRESSION: Extensive ascites which is probably malignant. Scattered liver hypodensities of indeterminate etiology comment cannot exclude metastatic disease. Diverticulosis. Fluid-filled adnexal masses and irregular appearance of uterus, which may represent primary neoplasm. Multiple peritoneal nodules. Large amount of fecal material in the colon with no evidence of small bowel obstruction.    Plan: Paclitaxel 175 mg/m2 and carboplatin AUC 6 x 3 cycles followed by a CT CAP and visit with   Larry.     1/12/2021: Cycle 1 paclitaxel and carboplatin while inpatient     1/13/2021: CT Head: Impression:  1. Chronic sinusitis of the right maxillary and right sphenoid sinuses.  2. Incidental presumed calcified meningioma in the right frontal  convexity without significant mass effect.  3. No suspicious intracranial enhancing lesion.     2/1/2021: Cycle 2 paclitaxel and carboplatin.  936.    2/3-2/5/21: Admission [facility] for afib w/ RVR and new PE              Today she reports with daughter Edie that since starting her chemotherapy her restless leg symptoms have not been controlled.  The symptoms have not changed but her medications are no longer working.  She denies pain or numbness, she just feels like she needs to move her legs.  They have been working with her PCP on this problem and switched from amitriptyline to Trazodone.  She did not like the side effects of the Trazodone so was switched back to amitriptyline and they added Requip.  This dose has since been increased several times without improvement.  Right now the only thing that has been helping her symptoms is taking 2 oxycodone at bedtime.  They would like her to try Mirapex which is something Edie uses for her restless legs.  They do not want to consider making adjustments to her chemo plan to attempt to control her symptoms.  They are wondering if she could take an iron supplement to see if that would help her symptoms.  She has been struggling with constipation since starting her chemo.            Review of Systems:    Systemic            no weight changes; no fever; no chills; no night sweats; no appetite changes  Skin           no rashes, or lesions  Eye           no irritation; no changes in vision  Allen-Laryngeal           no dysphagia; no hoarseness   Pulmonary    no cough; no shortness of breath  Cardiovascular    no chest pain; no palpitations  Gastrointestinal    no diarrhea; no constipation; no abdominal pain; no changes in  bowel habits; no blood in stool  Genitourinary   no urinary frequency; no urinary urgency; no dysuria; no pain; no abnormal vaginal discharge; no abnormal vaginal bleeding  Breast   no breast discharge; no breast changes; no breast pain  Musculoskeletal    no myalgias; no arthralgias; no back pain  Psychiatric           no depressed mood; no anxiety    Hematologic   no tender lymph nodes; no noticeable swellings or lumps   Endocrine    no hot flashes; no heat/cold intolerance         Neurological   no tremor; no numbness and tingling; no headaches; no difficulty sleeping      Past Medical History:    Past Medical History:   Diagnosis Date     History of cold sores      Insomnia      Migraine      Osteopenia      Pelvic mass      Peritoneal carcinomatosis (H)      Restless legs syndrome (RLS)          Past Surgical History:    Past Surgical History:   Procedure Laterality Date     APPENDECTOMY       ARTHROSCPY KNEE SURGICAL DEBRIDEMENT SHAVING ARTICULAR CARTILAGE Right      DEBRIDEMENT LEFT UPPER EXTREMITY  2016     LAPAROSCOPY DIAGNOSTIC (GYN) Bilateral 1/7/2021    Procedure: Diagnsotic laparoscopy, biopsies;  Surgeon: Bolivar Juarez MD;  Location: [facility] OR     LASIK       TUBAL LIGATION           Health Maintenance Due   Topic Date Due     PREVENTIVE CARE VISIT  1955     ADVANCE CARE PLANNING  1955     COLORECTAL CANCER SCREENING  11/11/1966     HIV SCREENING  11/11/1971     HEPATITIS C SCREENING  11/11/1974     DTAP/TDAP/TD IMMUNIZATION (1 - Tdap) 11/11/1981     LIPID  11/11/2001     ZOSTER IMMUNIZATION (1 of 2) 11/11/2006     INFLUENZA VACCINE (1) 09/01/2020     PHQ-2  01/01/2021     MAMMO SCREENING  03/04/2021       Current Medications:     Current Outpatient Medications   Medication Sig Dispense Refill     amiodarone (PACERONE) 200 MG tablet Take 2 tablets (400 mg) by mouth 2 times daily for 14 days, THEN 1 tablet (200 mg) daily for 14 days. 74 tablet 0     amitriptyline (ELAVIL) 100 MG tablet  TAKE 1 TABLET (100 MG) BY MOUTH EVERY NIGHT AT BEDTIME       OLANZapine zydis (ZYPREXA) 5 MG ODT TAKE 1 TABLET BY MOUTH AT BEDTIME 30 tablet 0     oxyCODONE (ROXICODONE) 5 MG tablet Take 1-2 tablets (5-10 mg) by mouth every 4 hours as needed for severe pain 28 tablet 0     polyethylene glycol (MIRALAX) 17 GM/Dose powder Take 17 g by mouth 2 times daily 510 g 0     prochlorperazine (COMPAZINE) 10 MG tablet Take 1 tablet (10 mg) by mouth every 6 hours as needed for nausea or vomiting 30 tablet 0     Rivaroxaban ANTICOAGULANT 15 & 20 MG TBPK Take 15 mg by mouth 2 times daily for 7 days, THEN 20 mg daily for 7 days. 44 each 0     rOPINIRole (REQUIP) 0.25 MG tablet Take by mouth At Bedtime Take 1 tablet 1-3 hour before bedtime. Increase every 2 days by 1 pill until at 1 mg       senna-docusate (SENOKOT-S/PERICOLACE) 8.6-50 MG tablet Take 2 tablets by mouth 2 times daily 30 tablet 0     SUMAtriptan (IMITREX) 100 MG tablet Take 100 mg by mouth at onset of headache        valACYclovir (VALTREX) 1000 mg tablet Take 1,000 mg by mouth as needed        acetaminophen (TYLENOL) 325 MG tablet Take 2 tablets (650 mg) by mouth every 6 hours as needed for mild pain (Patient not taking: Reported on 2/9/2021) 50 tablet 0     ondansetron (ZOFRAN-ODT) 4 MG ODT tab Take 1 tablet (4 mg) by mouth every 6 hours as needed for nausea or vomiting (Patient not taking: Reported on 2/9/2021) 20 tablet 0         Allergies:      No Known Allergies     Social History:     Social History     Tobacco Use     Smoking status: Former Smoker     Packs/day: 0.50     Years: 40.00     Pack years: 20.00     Quit date: 2018     Years since quitting: 3.1     Smokeless tobacco: Never Used   Substance Use Topics     Alcohol use: Not Currently       History   Drug Use Unknown         Family History:     The patient's family history is notable for:    Family History   Adopted: Yes   Problem Relation Age of Onset     Cancer Mother 36     Factor V Leiden deficiency  Daughter      Deep Vein Thrombosis Daughter      Diabetes Type 1 Daughter          Physical Exam:     There were no vitals taken for this visit.  There is no height or weight on file to calculate BMI.    Respiratory: No audible wheeze, cough.      Psychiatric: appropriate mood and affect. Mentation appears normal, affect normal/bright, judgement and insight intact, normal speech       The rest of a comprehensive physical examination is deferred due to PHE (public health emergency) phone visit restrictions.        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 She is currently undergoing neoadjuvant chemotherapy.  She is presents today for an acute visit. In light of the recent COVID19 pandemic, and in accordance with our group and national guidelines this patients care has been reviewed and it is felt that presenting for her visit would be more likely to increase rather than decrease her relative risks. Therefore this visit was conducted by phone.      45 minutes spent on the date of the encounter doing chart review, history and exam, documentation, and further activities as noted above.        No charge for visit.      Plan:     1.) Restless legs: Based on patient and daughter's description suspect that her symptoms are from restless leg and not a side effect from her chemo though the medications she is taking to control her side effects from chemo may be contributing to her restless leg symptoms (benadryl, compazine, decadron).  At this point they would like to continue with her current regimen until she has exhausted other options for her restless leg symptoms.  Discussed with patient and daughter that adding Mirapex and discontinuing Requip as well as titration these medications is something to discuss with her PCP.  They were under the impression that was the purpose of this visit.  Discussed that the intention of this visit  was to assess if her symptoms were related to her chemo and if adjustments in her treatment were needed.  Due to misunderstanding will not charge for this visit.  Reviewed that while her last hgb was slightly low at 10.0 this is likely from bone marrow suppression with her chemo and that iron is generally not needed unless someone has had bleeding.  She could try an iron supplement but she may have increased constipation and GI side effects from this if her iron isn't low.      2.) Serous carcinoma:  RTC on 2/26 for labs, virtual NP visit, and cycle 3 of chemo.  This is already scheduled.  Plan for a CT CAP and follow up with Dr. Juarez prior to cycle 4.  Reviewed signs and symptoms for when she should contact the clinic or seek additional care.  Patient to contact the clinic with any questions or concerns in the interim.        Genetic risk factors were assessed and she meets qualifications for referral.  This has been ordered but is awaiting scheduling.  Will message scheduling about arranging this.      Labs and/or tests ordered include:  None.     3.) Health maintenance:  Issues addressed today include following up with PCP for annual health maintenance and non-gynecologic issues.         Ella Murillo, DNP, APRN, FNP-C  Nurse Practitioner   Division of Gynecologic Oncology  Pager: [phone]     CC  Patient Care Team:  Bolivar Juarez MD as PCP - General (Gynecologic Oncology)  Ramona Viera RN as Specialty Care Coordinator (Gynecologic Oncology)

## 2021-02-09 NOTE — PROGRESS NOTES
Taran is a 65 year old who is being evaluated via a billable telephone visit.      What phone number would you like to be contacted at? [phone]  How would you like to obtain your AVS? 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 She is currently undergoing neoadjuvant chemotherapy.  She is presents today for an acute visit. In light of the recent COVID19 pandemic, and in accordance with our group and national guidelines this patients care has been reviewed and it is felt that presenting for her visit would be more likely to increase rather than decrease her relative risks. Therefore this visit was conducted by phone.      Oncology History:  12/3/2020: US Pelvic: IMPRESSION: Limited examination due to acoustic windows. Possible left adnexal mass. A CT scan of the abdomen and pelvis with contrast is recommended for further assessment.    2020: CT A/P:   IMPRESSION:    Peritoneal carcinomatosis with masslike peritoneal thickening in the lower pelvis which may indicate an adnexal or ovarian primary malignancy. Large volume ascites. Bilateral pleural effusions. There is potential subtle pleural nodularity in the right hemithorax which could indicate metastatic disease.  Indeterminate 1 cm lesion in the right hepatic lobe suspicious for a metastatic lesion.      2020: Presented to [facility] with abdominal distention, 25lb weight loss, and CTAP with carcinomatosis, elevated   3098.     12/23/2020: CT Chest: IMPRESSION:   1. There are few scattered small sub-6 mm pulmonary nodules which are indeterminate without prior comparisons available. There are a few  slightly larger perifissural nodules which are technically  indeterminate in the setting of malignancy although presumed lymphatic in nature and of unlikely clinical significance. Attention on follow-up is recommended.  2. Small to moderate left and small right pleural effusions are increased in size from prior. No convincing evidence for pleural nodularity.  3. Partially visualized large volume ascites and peritoneal nodularity in the upper abdomen similar to 12/4/2020 outside CT     12/26/2020: ED for abdominal distension; 3 L ascites drained with paracentesis    Pelvic US: Findings: Free fluid present in LLQ     12/31/2020: US Paracentesis: 900 mL ascites drained    1/7/2021: Diagnotic laparoscopy, biopsies  Pathology: FINAL DIAGNOSIS:   A. PERITONEUM, BIOPSIES:   - Positive for high grade carcinoma, consistent with metastatic carcinoma of Mullerian origin.    1/10-1/13/2021: Hospital admission for postoperative non-intractable vomiting and nausea.     1/10/2021: CT A/P: IMPRESSION: Extensive ascites which is probably malignant. Scattered liver hypodensities of indeterminate etiology comment cannot exclude metastatic disease. Diverticulosis. Fluid-filled adnexal masses and irregular appearance of uterus, which may represent primary neoplasm. Multiple peritoneal nodules. Large amount of fecal material in the colon with no evidence of small bowel obstruction.    Plan: Paclitaxel 175 mg/m2 and carboplatin AUC 6 x 3 cycles followed by a CT CAP and visit with Dr. Juarez.     1/12/2021: Cycle 1 paclitaxel and carboplatin while inpatient     1/13/2021: CT Head: Impression:  1. Chronic sinusitis of the right maxillary and right sphenoid sinuses.  2. Incidental presumed calcified meningioma in the right frontal  convexity without  significant mass effect.  3. No suspicious intracranial enhancing lesion.     2/1/2021: Cycle 2 paclitaxel and carboplatin.  936.    2/3-2/5/21: Admission [facility] for afib w/ RVR and new PE              Today she reports with daughter Edie that since starting her chemotherapy her restless leg symptoms have not been controlled.  The symptoms have not changed but her medications are no longer working.  She denies pain or numbness, she just feels like she needs to move her legs.  They have been working with her PCP on this problem and switched from amitriptyline to Trazodone.  She did not like the side effects of the Trazodone so was switched back to amitriptyline and they added Requip.  This dose has since been increased several times without improvement.  Right now the only thing that has been helping her symptoms is taking 2 oxycodone at bedtime.  They would like her to try Mirapex which is something Edie uses for her restless legs.  They do not want to consider making adjustments to her chemo plan to attempt to control her symptoms.  They are wondering if she could take an iron supplement to see if that would help her symptoms.  She has been struggling with constipation since starting her chemo.            Review of Systems:    Systemic            no weight changes; no fever; no chills; no night sweats; no appetite changes  Skin           no rashes, or lesions  Eye           no irritation; no changes in vision  Allen-Laryngeal           no dysphagia; no hoarseness   Pulmonary    no cough; no shortness of breath  Cardiovascular    no chest pain; no palpitations  Gastrointestinal    no diarrhea; no constipation; no abdominal pain; no changes in bowel habits; no blood in stool  Genitourinary   no urinary frequency; no urinary urgency; no dysuria; no pain; no abnormal vaginal discharge; no abnormal vaginal bleeding  Breast   no breast discharge; no breast changes; no breast pain  Musculoskeletal    no myalgias; no  arthralgias; no back pain  Psychiatric           no depressed mood; no anxiety    Hematologic   no tender lymph nodes; no noticeable swellings or lumps   Endocrine    no hot flashes; no heat/cold intolerance         Neurological   no tremor; no numbness and tingling; no headaches; no difficulty sleeping      Past Medical History:    Past Medical History:   Diagnosis Date     History of cold sores      Insomnia      Migraine      Osteopenia      Pelvic mass      Peritoneal carcinomatosis (H)      Restless legs syndrome (RLS)          Past Surgical History:    Past Surgical History:   Procedure Laterality Date     APPENDECTOMY       ARTHROSCPY KNEE SURGICAL DEBRIDEMENT SHAVING ARTICULAR CARTILAGE Right      DEBRIDEMENT LEFT UPPER EXTREMITY  2016     LAPAROSCOPY DIAGNOSTIC (GYN) Bilateral 1/7/2021    Procedure: Diagnsotic laparoscopy, biopsies;  Surgeon: Bolivar Juarez MD;  Location: [facility] OR     LASIK       TUBAL LIGATION           Health Maintenance Due   Topic Date Due     PREVENTIVE CARE VISIT  1955     ADVANCE CARE PLANNING  1955     COLORECTAL CANCER SCREENING  11/11/1966     HIV SCREENING  11/11/1971     HEPATITIS C SCREENING  11/11/1974     DTAP/TDAP/TD IMMUNIZATION (1 - Tdap) 11/11/1981     LIPID  11/11/2001     ZOSTER IMMUNIZATION (1 of 2) 11/11/2006     INFLUENZA VACCINE (1) 09/01/2020     PHQ-2  01/01/2021     MAMMO SCREENING  03/04/2021       Current Medications:     Current Outpatient Medications   Medication Sig Dispense Refill     amiodarone (PACERONE) 200 MG tablet Take 2 tablets (400 mg) by mouth 2 times daily for 14 days, THEN 1 tablet (200 mg) daily for 14 days. 74 tablet 0     amitriptyline (ELAVIL) 100 MG tablet TAKE 1 TABLET (100 MG) BY MOUTH EVERY NIGHT AT BEDTIME       OLANZapine zydis (ZYPREXA) 5 MG ODT TAKE 1 TABLET BY MOUTH AT BEDTIME 30 tablet 0     oxyCODONE (ROXICODONE) 5 MG tablet Take 1-2 tablets (5-10 mg) by mouth every 4 hours as needed for severe pain 28 tablet 0      polyethylene glycol (MIRALAX) 17 GM/Dose powder Take 17 g by mouth 2 times daily 510 g 0     prochlorperazine (COMPAZINE) 10 MG tablet Take 1 tablet (10 mg) by mouth every 6 hours as needed for nausea or vomiting 30 tablet 0     Rivaroxaban ANTICOAGULANT 15 & 20 MG TBPK Take 15 mg by mouth 2 times daily for 7 days, THEN 20 mg daily for 7 days. 44 each 0     rOPINIRole (REQUIP) 0.25 MG tablet Take by mouth At Bedtime Take 1 tablet 1-3 hour before bedtime. Increase every 2 days by 1 pill until at 1 mg       senna-docusate (SENOKOT-S/PERICOLACE) 8.6-50 MG tablet Take 2 tablets by mouth 2 times daily 30 tablet 0     SUMAtriptan (IMITREX) 100 MG tablet Take 100 mg by mouth at onset of headache        valACYclovir (VALTREX) 1000 mg tablet Take 1,000 mg by mouth as needed        acetaminophen (TYLENOL) 325 MG tablet Take 2 tablets (650 mg) by mouth every 6 hours as needed for mild pain (Patient not taking: Reported on 2/9/2021) 50 tablet 0     ondansetron (ZOFRAN-ODT) 4 MG ODT tab Take 1 tablet (4 mg) by mouth every 6 hours as needed for nausea or vomiting (Patient not taking: Reported on 2/9/2021) 20 tablet 0         Allergies:      No Known Allergies     Social History:     Social History     Tobacco Use     Smoking status: Former Smoker     Packs/day: 0.50     Years: 40.00     Pack years: 20.00     Quit date: 2018     Years since quitting: 3.1     Smokeless tobacco: Never Used   Substance Use Topics     Alcohol use: Not Currently       History   Drug Use Unknown         Family History:     The patient's family history is notable for:    Family History   Adopted: Yes   Problem Relation Age of Onset     Cancer Mother 36     Factor V Leiden deficiency Daughter      Deep Vein Thrombosis Daughter      Diabetes Type 1 Daughter          Physical Exam:     There were no vitals taken for this visit.  There is no height or weight on file to calculate BMI.    Respiratory: No audible wheeze, cough.      Psychiatric: appropriate  mood and affect. Mentation appears normal, affect normal/bright, judgement and insight intact, normal speech       The rest of a comprehensive physical examination is deferred due to [facility] (public health emergency) phone visit restrictions.        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 She is currently undergoing neoadjuvant chemotherapy.  She is presents today for an acute visit. In light of the recent COVID19 pandemic, and in accordance with our group and national guidelines this patients care has been reviewed and it is felt that presenting for her visit would be more likely to increase rather than decrease her relative risks. Therefore this visit was conducted by phone.      45 minutes spent on the date of the encounter doing chart review, history and exam, documentation, and further activities as noted above.        No charge for visit.      Plan:     1.) Restless legs: Based on patient and daughter's description suspect that her symptoms are from restless leg and not a side effect from her chemo though the medications she is taking to control her side effects from chemo may be contributing to her restless leg symptoms (benadryl, compazine, decadron).  At this point they would like to continue with her current regimen until she has exhausted other options for her restless leg symptoms.  Discussed with patient and daughter that adding Mirapex and discontinuing Requip as well as titration these medications is something to discuss with her PCP.  They were under the impression that was the purpose of this visit.  Discussed that the intention of this visit was to assess if her symptoms were related to her chemo and if adjustments in her treatment were needed.  Due to misunderstanding will not charge for this visit.  Reviewed that while her last hgb was slightly low at 10.0 this is likely from bone marrow suppression with her  chemo and that iron is generally not needed unless someone has had bleeding.  She could try an iron supplement but she may have increased constipation and GI side effects from this if her iron isn't low.      2.) Serous carcinoma:  RTC on 2/26 for labs, virtual NP visit, and cycle 3 of chemo.  This is already scheduled.  Plan for a CT CAP and follow up with Dr. Estrada prior to cycle 4.  Reviewed signs and symptoms for when she should contact the clinic or seek additional care.  Patient to contact the clinic with any questions or concerns in the interim.        Genetic risk factors were assessed and she meets qualifications for referral.  This has been ordered but is awaiting scheduling.  Will message scheduling about arranging this.      Labs and/or tests ordered include:  None.     3.) Health maintenance:  Issues addressed today include following up with PCP for annual health maintenance and non-gynecologic issues.         Ella Murillo, DNP, APRN, FNP-C  Nurse Practitioner   Division of Gynecologic Oncology  Pager: [phone]     CC  Patient Care Team:  Shira Estrada MD as PCP - General (Gynecologic Oncology)  Shira Estrada MD as Assigned Cancer Care Provider  Ramona Viera RN as Specialty Care Coordinator (Gynecologic Oncology)  SHIRA ESTRADA

## 2021-02-16 ENCOUNTER — NURSE TRIAGE (OUTPATIENT)
Dept: NURSING | Facility: CLINIC | Age: 66
End: 2021-02-16

## 2021-02-16 ENCOUNTER — OFFICE VISIT (OUTPATIENT)
Dept: URGENT CARE | Facility: URGENT CARE | Age: 66
End: 2021-02-16
Payer: MEDICAID

## 2021-02-16 VITALS
SYSTOLIC BLOOD PRESSURE: 98 MMHG | HEART RATE: 74 BPM | BODY MASS INDEX: 20.34 KG/M2 | DIASTOLIC BLOOD PRESSURE: 62 MMHG | OXYGEN SATURATION: 99 % | RESPIRATION RATE: 16 BRPM | WEIGHT: 150 LBS | TEMPERATURE: 98 F

## 2021-02-16 DIAGNOSIS — Z92.89 HISTORY OF RECENT HOSPITALIZATION: ICD-10-CM

## 2021-02-16 DIAGNOSIS — I95.9 HYPOTENSION, UNSPECIFIED HYPOTENSION TYPE: Primary | ICD-10-CM

## 2021-02-16 DIAGNOSIS — R42 DIZZINESS: ICD-10-CM

## 2021-02-16 DIAGNOSIS — Z86.711 HISTORY OF PULMONARY EMBOLISM: ICD-10-CM

## 2021-02-16 DIAGNOSIS — Z86.79 HISTORY OF ATRIAL FIBRILLATION: ICD-10-CM

## 2021-02-16 DIAGNOSIS — C56.9 OVARIAN CANCER, UNSPECIFIED LATERALITY (H): ICD-10-CM

## 2021-02-16 PROCEDURE — 99203 OFFICE O/P NEW LOW 30 MIN: CPT | Performed by: PHYSICIAN ASSISTANT

## 2021-02-16 NOTE — TELEPHONE ENCOUNTER
"Daughter Edie informs that Taran has low BP at 89/68. She is sitting right now, no dizziness. Feels lightheaded only when she tries to stand.  No fever. No palpitations. No chest pain.    She is seen for her oncology treatments with [facility], and used to have a PCP in [place]. Both are not able to address her BP concerns and Edie is asking for further advice. Patient currently lives in [place] with daughter Edie.    PLAN:  Per protocol, advised UC today (no clinic visits available at [place] location). Care advice reviewed, drink at least a glass of water and rest with legs elevated for 1 hour. AN UC opens at 5PM. Daughter verbalizes understanding. Can set up appointment with PCP after UC visit. Advised to call back with further questions/concerns.     Jessi Roberson RN/[place] Nurse Advisor      Additional Information    Negative: Systolic BP < 90 and feeling dizzy, lightheaded, or weak    Negative: Started suddenly after an allergic medicine, an allergic food, or bee sting    Negative: Shock suspected (e.g., cold/pale/clammy skin, too weak to stand, low BP, rapid pulse)    Negative: Difficult to awaken or acting confused  (e.g., disoriented, slurred speech)    Negative: Fainted    Negative: Chest pain    Negative: Bleeding (e.g., vomiting blood, rectal bleeding or tarry stools, severe vaginal bleeding)    Negative: Extra heart beats or heart is beating fast  (i.e., \"palpitations\")    Negative: Sounds like a life-threatening emergency to the triager    Negative: Systolic BP < 80 and NOT dizzy, lightheaded or weak (feels normal)    Negative: Abdominal pain    Negative: Major surgery in the past month    Negative: Fever > 100.5 F (38.1 C)    Negative: Drinking very little and has signs of dehydration (e.g., no urine > 12 hours, very dry mouth, very lightheaded)    Negative: Fall in systolic BP > 20 mm Hg from normal and feeling dizzy, lightheaded, or weak    Negative: Patient sounds very sick " or weak to the triager    Systolic BP < 90 and NOT dizzy, lightheaded or weak    Protocols used: LOW BLOOD PRESSURE-A-OH

## 2021-02-16 NOTE — PROGRESS NOTES
Chief Complaint   Patient presents with     Blood Pressure Check     Low blood pressure, pt hospitalized 2/3-2/6 A fib, [facility]         Differential Diagnosis:  Hypotension, anemia, cancer, PE, Atrial Fibrillation        ASSESSMENT:     ICD-10-CM    1. Hypotension, unspecified hypotension type  I95.9    2. Dizziness  R42 CARDIOLOGY EVAL ADULT REFERRAL   3. History of recent hospitalization  Z92.89    4. History of pulmonary embolism  Z86.711    5. History of atrial fibrillation  Z86.79    6. Ovarian cancer, unspecified laterality (H)  C56.9            PLAN: Patient with ovarian cancer, here for hypotension with lightheadedness, likely from the amiodarone. I feel she needs to establish care with a cardiologist. I do not feel comfortable or qualified to adjust the dose of it with very recent a fib/PE. I recommend the patient goes to the ER tonight for evaluation and IV fluids but she declines. I told her I could do basic labs/EKG but still would not change my recommendation and cardiology likely will want some specific additional labs. She declines at this time. Daughter states her mom's EKG has always been normal until recent hospitalization for PE/a fib and symptoms were chest pain and SHORTNESS OF BREATH. She has had neither of theses symptoms since discharge.Given referral and telephone number for cardiology to get in this week. We are not able to call as it is after hours. I apologized for her being told by triage that we were equipped to manage this.  I have discussed clinical findings with patient.  Symptomatic care is discussed.  Patient care instructions are discussed/given at the end of visit.   Lots of rest and fluids.  With regard to the restless leg syndrome I told her 1 week of the Gabapentin is not adequate time and to observe over the next week or two.    Clarisa Vinson PA-C      SUBJECTIVE:  64 yo female with ovarian cancer is here with her daughter for evaluation of dizziness present  since discharge from the hospital 2/6/2021 for PE and atrial fib. Did not require cardioversion. Symptoms upon admit on 2/3/2021 were chest pain and shortness of breath. She declines any chest pain or shortness of breath here tonight. Dizziness described as fainting sensation. Present since discharge and blood pressure has been low since discharge. Discharged on amiodarone and xarelto. Is not on any anti- hypertensive meds. No fever. Nursing triage line told her to come here to Urgent Care. Has appointment to establish care with a Primary on Friday, 2/19/2021.    Also on Gabapentin for 1 week for restless leg syndrome. No help. Chart reviewed- looks like she also tried requip for it in the past.      No Known Allergies    Past Medical History:   Diagnosis Date     History of cold sores      Insomnia      Migraine      Osteopenia      Pelvic mass      Peritoneal carcinomatosis (H)      Restless legs syndrome (RLS)             amiodarone (PACERONE) 200 MG tablet, Take 2 tablets (400 mg) by mouth 2 times daily for 14 days, THEN 1 tablet (200 mg) daily for 14 days.       amitriptyline (ELAVIL) 100 MG tablet, TAKE 1 TABLET (100 MG) BY MOUTH EVERY NIGHT AT BEDTIME       OLANZapine zydis (ZYPREXA) 5 MG ODT, TAKE 1 TABLET BY MOUTH AT BEDTIME       polyethylene glycol (MIRALAX) 17 GM/Dose powder, Take 17 g by mouth 2 times daily       prochlorperazine (COMPAZINE) 10 MG tablet, Take 1 tablet (10 mg) by mouth every 6 hours as needed for nausea or vomiting       Rivaroxaban ANTICOAGULANT 15 & 20 MG TBPK, Take 15 mg by mouth 2 times daily for 7 days, THEN 20 mg daily for 7 days.       rOPINIRole (REQUIP) 0.25 MG tablet, Take by mouth At Bedtime Take 1 tablet 1-3 hour before bedtime. Increase every 2 days by 1 pill until at 1 mg       senna-docusate (SENOKOT-S/PERICOLACE) 8.6-50 MG tablet, Take 2 tablets by mouth 2 times daily       SUMAtriptan (IMITREX) 100 MG tablet, Take 100 mg by mouth at onset of headache        valACYclovir  (VALTREX) 1000 mg tablet, Take 1,000 mg by mouth as needed        acetaminophen (TYLENOL) 325 MG tablet, Take 2 tablets (650 mg) by mouth every 6 hours as needed for mild pain (Patient not taking: Reported on 2/9/2021)       ondansetron (ZOFRAN-ODT) 4 MG ODT tab, Take 1 tablet (4 mg) by mouth every 6 hours as needed for nausea or vomiting (Patient not taking: Reported on 2/9/2021)    No current facility-administered medications on file prior to visit.       Social History     Tobacco Use     Smoking status: Former Smoker     Packs/day: 0.50     Years: 40.00     Pack years: 20.00     Quit date: 2018     Years since quitting: 3.1     Smokeless tobacco: Never Used   Substance Use Topics     Alcohol use: Not Currently       ROS:  CONSTITUTIONAL: Negative for fever.  RESP: Negative for SOB.  CV: Negative for chest pains.  NEUROLOGIC: as above.  SKIN: Negative for rash.  PSYCH: Normal mentation for age.    OBJECTIVE:  BP 98/62   Pulse 74   Temp 98  F (36.7  C) (Oral)   Resp 16   Wt 68 kg (150 lb)   SpO2 99%   BMI 20.34 kg/m    GENERAL APPEARANCE: Healthy, alert and no distress.  EYES:Conjunctiva/sclera clear.  NECK: Moving head and neck freely.  RESP: Lungs clear to auscultation - no rales, rhonchi or wheezes  CV: Good heart sounds, regular rate and rhythm, normal S1 S2, no murmur noted.  NEURO: Awake, alert    SKIN: No rashes  Radial pulse- regular, strong.    Clarisa Vinson PA-C

## 2021-02-17 NOTE — PROGRESS NOTES
Assessment & Plan     1. Hypotension, unspecified hypotension type  - stressed importance of getting enough fluids.  She is actively working on this with drinking more Propel and electrolyte replacement drinks.  Appetite has been good.  Change positions slowly.  Will hold off on ordering labs as she plans to have labs completed next week with Oncology    2. Dizziness  - as above.  Suspect due to lack of fluid intake.     3. PAF (paroxysmal atrial fibrillation) (H)  - stable. Followed by Cardiology  - rivaroxaban ANTICOAGULANT (XARELTO ANTICOAGULANT) 20 MG TABS tablet; Take 1 tablet (20 mg) by mouth daily (with dinner)  Dispense: 90 tablet; Refill: 1    4. Other acute pulmonary embolism without acute cor pulmonale (H)   - Xarelto     5. Ovarian cancer, unspecified laterality (H)  - Followed by GYN Oncology and will have lab work completed next week.     6. Restless legs syndrome  - warned of possible dizziness/lightheadedness as possible s/e with increased dose.   - gabapentin (NEURONTIN) 600 MG tablet; Take 1 tablet (600 mg) by mouth At Bedtime  Dispense: 30 tablet; Refill: 0    7. Atrial fibrillation with rapid ventricular response (H)  - rivaroxaban ANTICOAGULANT (XARELTO ANTICOAGULANT) 20 MG TABS tablet; Take 1 tablet (20 mg) by mouth daily (with dinner)  Dispense: 90 tablet; Refill: 1    Return in about 4 weeks (around 3/19/2021) for RLS.    Marta Okeefe, JERICHO [facility]   Taran is a 65 year old who presents for the following health issues     HPI     Taran Regalado is a 65 year old female with history of stage IV ovarian cancer with peritoneal carcinomatosis/ascites/pleural effusion, recent Pulmonary Embolism and Atrial Fibrillation who presents as a new patient to me with c/o dizziness and restless leg syndrome.      She was admitted to [facility] 2/3/21-2/6/21 with chest pain and dyspnea and was found to ahve new-onset atrial fibrillation and right segmental  pulmonary embolus. She was seen by EP (Dr. Sears) 2/3/21. Of note, she was hypotensive to 90s/60s at this time. As her A.fib with RVR occurred in in only 2 brief episodes, which had already resolved spontaneously.    Regarding ovarian cancer, she initially was seen by primary care in the [facility] system and on 12/3/20, reported abdominal bloating and unintentional weight loss. Pelvis US 12/3/20 showed possible left adnexal mass, resulting in CT A/P 12/4/20 which extensive peritoneal carcinomatosis and ascites as well as bilateral pleural effusions; labs showed elevated CA-125. She established care wt Dr. Juarez of Gynecologic Oncology 12/16/20. Peritoneal fluid from therapeutic paracentesis 12/26/20 showed cytology suspicous for malignancy. She underwent diagnostic laparosopy and peritoneal biopsy 1/7/21; peritoneal biopsy showed high-grade metastatic carcinoma of Mullerian origin. She has had several therapeutic paracenteses and began chemotherapy (paclitaxel/carboplatin) 1/12/21, and has completed 2 of 3 planned cycles to date.    She saw Cardiology yesterday who felt her dizziness and low blood pressures was due to inadequate fluid intake.  Cardiology recommended completing the Amiodarone load (completed today 2/19/21) and then have a follow-up 14 day Zio Patch in 2 months.  She will continue Xarelto for recent PE. .    She has been drinking Propel and has noticed some improvement in blood pressures.   States she notices the dizziness a few seconds after getting up.  She does admit she does not drink near enough fluids and is working on that currently.  She is scheduled to have lab work next week with Oncologist.  Appetite has been good.     RLS-- chronic problem.  Previously took Amitriptyline- once she started Chemo the RLS got worse.  She has tried Requip at multiple doses as well as Mirapax.  She has also tried Trazodone with weird dreams.  She is currently on Gabapentin 300 mg HS x 1 week and has only  had 1 good night of sleep.  She is taking Oxycodone 2 tabs when she has tried everything else and needs to sleep.  RLS only problemsome at night        Review of Systems   Constitutional: Positive for fatigue. Negative for chills and fever.   Respiratory: Negative for cough and shortness of breath.    Cardiovascular: Negative for chest pain.   Gastrointestinal: Negative for abdominal pain, constipation, nausea and vomiting.   Genitourinary: Negative for difficulty urinating, dysuria and frequency.   Neurological: Positive for dizziness, weakness and light-headedness.   Psychiatric/Behavioral: Positive for sleep disturbance.            Objective    /65   Pulse 72   Temp 96.4  F (35.8  C) (Tympanic)   Wt 67.6 kg (149 lb)   SpO2 100%   BMI 20.21 kg/m    Body mass index is 20.21 kg/m .  Physical Exam  Vitals signs reviewed.   Constitutional:       Appearance: She is well-developed.   HENT:      Head: Normocephalic.   Cardiovascular:      Rate and Rhythm: Normal rate and regular rhythm.   Pulmonary:      Effort: Pulmonary effort is normal.      Breath sounds: Normal breath sounds.   Abdominal:      General: Bowel sounds are normal.      Palpations: Abdomen is soft.      Tenderness: There is no abdominal tenderness.   Skin:     General: Skin is warm and dry.   Neurological:      Mental Status: She is alert and oriented to person, place, and time.   Psychiatric:         Mood and Affect: Mood normal.         Behavior: Behavior normal.

## 2021-02-18 ENCOUNTER — VIRTUAL VISIT (OUTPATIENT)
Dept: CARDIOLOGY | Facility: CLINIC | Age: 66
End: 2021-02-18
Payer: MEDICAID

## 2021-02-18 DIAGNOSIS — Z79.899 ENCOUNTER FOR MONITORING AMIODARONE THERAPY: ICD-10-CM

## 2021-02-18 DIAGNOSIS — G25.81 RESTLESS LEGS SYNDROME: ICD-10-CM

## 2021-02-18 DIAGNOSIS — I95.1 ORTHOSTATIC HYPOTENSION: Primary | ICD-10-CM

## 2021-02-18 DIAGNOSIS — I48.0 PAROXYSMAL ATRIAL FIBRILLATION (H): ICD-10-CM

## 2021-02-18 DIAGNOSIS — R42 DIZZINESS: ICD-10-CM

## 2021-02-18 DIAGNOSIS — I26.99 OTHER ACUTE PULMONARY EMBOLISM WITHOUT ACUTE COR PULMONALE (H): ICD-10-CM

## 2021-02-18 DIAGNOSIS — Z51.81 ENCOUNTER FOR MONITORING AMIODARONE THERAPY: ICD-10-CM

## 2021-02-18 PROCEDURE — 99215 OFFICE O/P EST HI 40 MIN: CPT | Mod: 95 | Performed by: INTERNAL MEDICINE

## 2021-02-18 PROCEDURE — 99417 PROLNG OP E/M EACH 15 MIN: CPT | Mod: 95 | Performed by: INTERNAL MEDICINE

## 2021-02-18 RX ORDER — GABAPENTIN 300 MG/1
300 CAPSULE ORAL
COMMUNITY
Start: 2021-02-12 | End: 2021-02-26

## 2021-02-18 NOTE — PROGRESS NOTES
"Justen is a 65 year old who is being evaluated via a billable video visit.        Video-Visit Details    Type of service:  Video Visit    Video Start Time: {video visit start/end time for provider to select:823874}    Video End Time:{video visit start/end time for provider to select:334445}    Originating Location (pt. Location): {video visit patient location:932489::\"Home\"}    Distant Location (provider location):  [facility]     Platform used for Video Visit: {Virtual Visit Platforms:018663::\"Well\"}  "

## 2021-02-18 NOTE — PROGRESS NOTES
"Taran Regalado is a 65 year old female who is being evaluated via a billable video visit.      The patient has been notified of following:   \"This video visit will be conducted via a call between you and your physician/provider. We have found that certain health care needs can be provided without the need for an in-person physical exam.  This service lets us provide the care you need with a video conversation.  If a prescription is necessary we can send it directly to your pharmacy.  If lab work is needed we can place an order for that and you can then stop by our lab to have the test done at a later time. Video visits are billed at different rates depending on your insurance coverage.  Please reach out to your insurance provider with any questions. If during the course of the call the physician/provider feels a video visit is not appropriate, you will not be charged for this service.\"    Patient has given verbal consent for Video visit? Yes    Video-Visit Details  Type of service:  Video Visit  Video start time: 3:41 PM  Video end time: 4:31 PM  Total video time: 50 minutes  Originating Location (pt. Location): Home  Distant Location (provider location):  Provider's home  Mode of Communication: Video Conference via [employer]        Chief complaint: lightheadedness, follow up of atrial fibrillation    HPI:   Taran Regalado is a 65 year old female with history of stage IV ovarian cancer with peritoneal carcinomatosis/ascites/pleural effusion who presents for follow up of atrial fibrillation and for evaluation of lightheadedness/hypotension.    She was admitted to [facility] 2/3/21-2/6/21 with chest pain and dyspnea and was found to ahve new-onset atrial fibrillation and right segmental pulmonary embolus. She was seen by EP (Dr. Sears) 2/3/21. Of note, she was hypotensive to 90s/60s at this time. As her A.fib with RVR occurred in in only 2 brief episodes, which had already resolved spontaneously, plan was made for an " amiodarone load to prevent atrial fibrillation. It was emphasize at that time that the most important measures for preventing recurrent A.fib with RVR were to manage her drivers of increased sympathetic tone, including PE and hypotension.     Regarding ovarian cancer, she initially was seen by primary care in the [facility] and on 12/3/20, reported abdominal bloating and unintentional weight loss. Pelvis US 12/3/20 showed possible left adnexal mass, resulting in CT A/P 12/4/20 which extensive peritoneal carcinomatosis and ascites as well as bilateral pleural effusions; labs showed elevated CA-125. She established care wt Dr. Juarez of Gynecologic Oncology 12/16/20. Peritoneal fluid from therapeutic paracentesis 12/26/20 showed cytology suspicous for malignancy. She underwent diagnostic laparosopy and peritoneal biopsy 1/7/21; peritoneal biopsy showed high-grade metastatic carcinoma of Mullerian origin. She has had several therapeutic paracenteses and began chemotherapy (paclitaxel/carboplatin) 1/12/21, and has completed 2 of 3 planned cycles to date.    She presented to urgent care for hypotension with lightheadedness 2/16/21. She reports that she has had low BPs at home since the time of hospital discharge, with home BPs ranging from 63/44 (while supine) to 118/71, average 80s-100s/60s. She reports orthostatic lightheadedness occurring only with standing up. She reports relatively normal PO food intake but poor PO fluid intake. She just started drinking Propel today and did have a BP of 120s/60s today. She reports that her lightheadedness has not really improved. Of note, she is scheduled to establish care with a PCP on 2/19/21. Most recent BPs today 126/64, 118/64.    She reports that she did not have any palptitations with the A.fib and was unaware that she was in it at all.    She is at the end of her amiodarone load (completd on 2/19/21.) Initial LFTs and TSH obtained in-house (2/4-2/5) were  unremarkable.     Current cardiac medications include: amiodarone 200 mg BID (loading to be completed 2/19, then planned to transition to 200 mg daily.) and rivaroxaban (for VTE.)     She denies any chest pain, dyspnea above baseline, PND, orthopnea, peripheral edema, palpitations, or syncope.   ROS is otherwise positive for chronic fatigue and restless legs.    PAST MEDICAL HISTORY:  Past Medical History:   Diagnosis Date     History of cold sores      Insomnia      Migraine      Osteopenia      Pelvic mass      Peritoneal carcinomatosis (H)      Restless legs syndrome (RLS)        CURRENT MEDICATIONS:  Current Outpatient Medications   Medication Sig Dispense Refill     acetaminophen (TYLENOL) 325 MG tablet Take 2 tablets (650 mg) by mouth every 6 hours as needed for mild pain (Patient not taking: Reported on 2/9/2021) 50 tablet 0     amiodarone (PACERONE) 200 MG tablet Take 2 tablets (400 mg) by mouth 2 times daily for 14 days, THEN 1 tablet (200 mg) daily for 14 days. 74 tablet 0     amitriptyline (ELAVIL) 100 MG tablet TAKE 1 TABLET (100 MG) BY MOUTH EVERY NIGHT AT BEDTIME       OLANZapine zydis (ZYPREXA) 5 MG ODT TAKE 1 TABLET BY MOUTH AT BEDTIME 30 tablet 0     ondansetron (ZOFRAN-ODT) 4 MG ODT tab Take 1 tablet (4 mg) by mouth every 6 hours as needed for nausea or vomiting (Patient not taking: Reported on 2/9/2021) 20 tablet 0     polyethylene glycol (MIRALAX) 17 GM/Dose powder Take 17 g by mouth 2 times daily 510 g 0     prochlorperazine (COMPAZINE) 10 MG tablet Take 1 tablet (10 mg) by mouth every 6 hours as needed for nausea or vomiting 30 tablet 0     Rivaroxaban ANTICOAGULANT 15 & 20 MG TBPK Take 15 mg by mouth 2 times daily for 7 days, THEN 20 mg daily for 7 days. 44 each 0     rOPINIRole (REQUIP) 0.25 MG tablet Take by mouth At Bedtime Take 1 tablet 1-3 hour before bedtime. Increase every 2 days by 1 pill until at 1 mg       senna-docusate (SENOKOT-S/PERICOLACE) 8.6-50 MG tablet Take 2 tablets by mouth  2 times daily 30 tablet 0     SUMAtriptan (IMITREX) 100 MG tablet Take 100 mg by mouth at onset of headache        valACYclovir (VALTREX) 1000 mg tablet Take 1,000 mg by mouth as needed          PAST SURGICAL HISTORY:  Past Surgical History:   Procedure Laterality Date     APPENDECTOMY       ARTHROSCPY KNEE SURGICAL DEBRIDEMENT SHAVING ARTICULAR CARTILAGE Right      DEBRIDEMENT LEFT UPPER EXTREMITY  2016     LAPAROSCOPY DIAGNOSTIC (GYN) Bilateral 1/7/2021    Procedure: Diagnsotic laparoscopy, biopsies;  Surgeon: Boliavr Juarez MD;  Location: [facility] OR     LASIK       TUBAL LIGATION         ALLERGIES:   No Known Allergies    FAMILY HISTORY:  Family History   Adopted: Yes   Problem Relation Age of Onset     Cancer Mother 36     Factor V Leiden deficiency Daughter      Deep Vein Thrombosis Daughter      Diabetes Type 1 Daughter      No family history of premature CAD or sudden death.    SOCIAL HISTORY:  Social History     Tobacco Use     Smoking status: Former Smoker     Packs/day: 0.50     Years: 40.00     Pack years: 20.00     Quit date: 2018     Years since quitting: 3.1     Smokeless tobacco: Never Used   Substance Use Topics     Alcohol use: Not Currently     Drug use: Never       ROS:   A comprehensive 14 point review of systems is negative other than as mentioned in HPI.    Exam:  CONSITUTIONAL: thin, chronically ill-appearing but in no acute distress  HEENT: no icterus, sclerae white  CV: no visible edema of visualized upper extremities, no gross JVD  CHEST: respirations nonlabored, no audible wheezing  NEURO: AA&Ox3, speech fluent/appropriate, motor grossly nonfocal  PSYCH: cooperative, affect appropriate  DERM: no rashes on visualized face/neck/upper extremities    The rest of a comprehensive physical examination is deferred due to PHE (public health emergency) video visit restrictions.    Labs:  Reviewed. Of note:  LFTs 2/5/21: ALT 24 AST 20 TBili 0.5   TSH 2/4/21: 1.03    Testing/Procedures:  I  personally visualized and interpreted:  ECG 2/3/21: sinus with blocked PACs, VR 78,  QRS 82 QTc 437.     TTE 2/3/21:   Borderline-reduced LV function, LVEF=50-55%.   Normal RV size and function.  No significant valvular abnormalities.   Normal PA pressure (18 mmHg+RAP 3 mmHg.)    Outside results of note:  Outside records from [facility] were obtained, and relevant results/notes have been incorporated into HPI.      Assessment and Plan:   1. Orthostatic hypotension/lightheadedness  In the setting of active malignancy with peritoneal carcinomatosis on chemotherapy and notably off any antihypertensive therapy, this is almost certainly a consequence of inadequate PO fluid and electrolyte intake. Notably, after taking fluids with electrolytes today, her BP is greatly improved. I emphasized the importance of maintaining adequate PO fluid and solute intake.    -strongly encouraged adequate PO fluid and electrolyte intake     2. Pulmonary embolus 2/2021 without cor pulmonale:  I did clarify with the patient and her daughter that this is her primary indication for anticoagulation.    -continue rivaroxaban 20 mg daily   -emphasized importance of establishing primary care, planned for tomorrow    3. Paroxysmal atrial fibrillation, new-onset:   CHADS2-VASc=3 (VTE++ female+)  With only 2 brief salvos of AF which were self-limiting and occurred in the setting of acute pulmonary embolus, it is unclear if/when AF will recur. Furthermore, her symptoms (chest pain and dyspnea) were more likely related to the pulmonary embolus than to AF in view of their timing though this is difficult to ascertain with certainty at this point. In any case, given her paucity of symptoms, clear cause for high sympathetic tone driving AV (PE), and the brief and self-limiting nature of AF, Taran does not have any compelling indications for rhythm control at this point. Certainly, it would be difficult at this point to justify the potential  consequences of long-term amiodarone therapy given all of these factors.  Since she has nearly completed her amiodarone load, it would be reasonable to finish this with the goal of potentially reducing the likelihood of recurrent AF (as is sometimes done in the treatment of postoperative AF.) Would plan to stop amiodarone after this and to check a 14-day ZioPatch monitor in ~2 months to reassess for occult recurrence of A.fib once amiodarone has cleared.    -rate control: not indicated currently and relatively contraindicated by intermittent hypotension; consider low-dose beta blocker in the future if indicated   -rhythm control: no indications at this point. Stop amiodarone after load completed (last dose 2/19/21 and stop on 2/20/21)   -thromboembolic prophylaxis: DOAC indicated for VTE, continue as above   -14-day ZioPatch in 2 months to reassess AF burden off amiodarone    Follow up in 3 months.    Chart review time: 44 minutes  Video time: 50 minutes  Total time spent: 94 minutes    The patient states understanding and is agreeable with plan.     Pepe Rdz MD  Cardiology    CC

## 2021-02-19 ENCOUNTER — OFFICE VISIT (OUTPATIENT)
Dept: FAMILY MEDICINE | Facility: CLINIC | Age: 66
End: 2021-02-19
Payer: MEDICAID

## 2021-02-19 VITALS
SYSTOLIC BLOOD PRESSURE: 109 MMHG | OXYGEN SATURATION: 100 % | WEIGHT: 149 LBS | TEMPERATURE: 96.4 F | HEART RATE: 72 BPM | BODY MASS INDEX: 20.21 KG/M2 | DIASTOLIC BLOOD PRESSURE: 65 MMHG

## 2021-02-19 DIAGNOSIS — I95.9 HYPOTENSION, UNSPECIFIED HYPOTENSION TYPE: Primary | ICD-10-CM

## 2021-02-19 DIAGNOSIS — G25.81 RESTLESS LEGS SYNDROME: ICD-10-CM

## 2021-02-19 DIAGNOSIS — I48.0 PAF (PAROXYSMAL ATRIAL FIBRILLATION) (H): ICD-10-CM

## 2021-02-19 DIAGNOSIS — I26.99 OTHER ACUTE PULMONARY EMBOLISM WITHOUT ACUTE COR PULMONALE (H): ICD-10-CM

## 2021-02-19 DIAGNOSIS — R42 DIZZINESS: ICD-10-CM

## 2021-02-19 DIAGNOSIS — I48.91 ATRIAL FIBRILLATION WITH RAPID VENTRICULAR RESPONSE (H): ICD-10-CM

## 2021-02-19 DIAGNOSIS — C56.9 OVARIAN CANCER, UNSPECIFIED LATERALITY (H): ICD-10-CM

## 2021-02-19 PROCEDURE — 99214 OFFICE O/P EST MOD 30 MIN: CPT | Performed by: NURSE PRACTITIONER

## 2021-02-19 RX ORDER — GABAPENTIN 600 MG/1
600 TABLET ORAL AT BEDTIME
Qty: 30 TABLET | Refills: 0 | Status: SHIPPED | OUTPATIENT
Start: 2021-02-19 | End: 2021-03-17

## 2021-02-19 ASSESSMENT — ENCOUNTER SYMPTOMS
NAUSEA: 0
COUGH: 0
FREQUENCY: 0
FATIGUE: 1
DIZZINESS: 1
CHILLS: 0
ABDOMINAL PAIN: 0
CONSTIPATION: 0
DYSURIA: 0
LIGHT-HEADEDNESS: 1
SLEEP DISTURBANCE: 1
FEVER: 0
WEAKNESS: 1
DIFFICULTY URINATING: 0
SHORTNESS OF BREATH: 0
VOMITING: 0

## 2021-02-19 ASSESSMENT — PAIN SCALES - GENERAL: PAINLEVEL: NO PAIN (0)

## 2021-02-19 NOTE — NURSING NOTE
Chief Complaint   Patient presents with     Blood Pressure Check     low blood pressure      Restless legs     Health Maintenance     order pended, PHQ2       Initial /65   Pulse 72   Temp 96.4  F (35.8  C) (Tympanic)   Wt 67.6 kg (149 lb)   SpO2 100%   BMI 20.21 kg/m   Estimated body mass index is 20.21 kg/m  as calculated from the following:    Height as of 2/3/21: 1.829 m (6').    Weight as of this encounter: 67.6 kg (149 lb).  Medication Reconciliation: complete  Aletha Resendiz, CMA

## 2021-02-20 ENCOUNTER — MYC REFILL (OUTPATIENT)
Dept: ONCOLOGY | Facility: CLINIC | Age: 66
End: 2021-02-20

## 2021-02-20 DIAGNOSIS — C56.9 OVARIAN CANCER, UNSPECIFIED LATERALITY (H): ICD-10-CM

## 2021-02-22 LAB
LAB SCANNED RESULT: NORMAL
LAB SCANNED RESULT: NORMAL

## 2021-02-22 RX ORDER — PROCHLORPERAZINE MALEATE 10 MG
10 TABLET ORAL EVERY 6 HOURS PRN
Qty: 30 TABLET | Refills: 0 | Status: SHIPPED | OUTPATIENT
Start: 2021-02-22 | End: 2021-02-26

## 2021-02-22 RX ORDER — OLANZAPINE 5 MG/1
5 TABLET, ORALLY DISINTEGRATING ORAL AT BEDTIME
Qty: 30 TABLET | Refills: 0 | Status: SHIPPED | OUTPATIENT
Start: 2021-02-22 | End: 2021-02-26

## 2021-02-23 DIAGNOSIS — C56.9 OVARIAN CANCER, UNSPECIFIED LATERALITY (H): ICD-10-CM

## 2021-02-23 RX ORDER — OXYCODONE HYDROCHLORIDE 5 MG/1
5-10 TABLET ORAL EVERY 4 HOURS PRN
Qty: 28 TABLET | Refills: 0 | Status: CANCELLED | OUTPATIENT
Start: 2021-02-23

## 2021-02-23 NOTE — TELEPHONE ENCOUNTER
Per [facility]: Could we get a refill of oxycodone for mom before her 3rd treatment Friday?  She uses them every four hours during the week after chemo. [facility] in [place]. Thanks!     Edie Moss)     Routed to NP for review    Shawna Hinojosa RN

## 2021-02-24 ENCOUNTER — PRE VISIT (OUTPATIENT)
Dept: ONCOLOGY | Facility: CLINIC | Age: 66
End: 2021-02-24

## 2021-02-24 NOTE — TELEPHONE ENCOUNTER
ONCOLOGY INTAKE: Records Information      APPT INFORMATION:  Referring provider:  Marta ECHAVARRIA CNP  Referring provider s clinic:  [facility]  Reason for visit/diagnosis:  Ovarian cancer,   Has patient been notified of appointment date and time?: Yes    RECORDS INFORMATION:  Were the records received with the referral (via [employer])? no    Has patient been seen for any external appt for this diagnosis? n/a    If yes, where? no    Has patient had any imaging or procedures outside of [facility] for this condition? n/a      If Yes, where? none    ADDITIONAL INFORMATION:  none

## 2021-02-25 ASSESSMENT — ENCOUNTER SYMPTOMS
LEG PAIN: 0
WEAKNESS: 0
TREMORS: 0
LOSS OF CONSCIOUSNESS: 0
SYNCOPE: 0
EXERCISE INTOLERANCE: 0
NUMBNESS: 0
LIGHT-HEADEDNESS: 1
SEIZURES: 0
HYPOTENSION: 1
DIZZINESS: 1
HEADACHES: 0
SLEEP DISTURBANCES DUE TO BREATHING: 0
DISTURBANCES IN COORDINATION: 0
HYPERTENSION: 0
PALPITATIONS: 0
ORTHOPNEA: 0
TINGLING: 0
PARALYSIS: 0
MEMORY LOSS: 0
SPEECH CHANGE: 0

## 2021-02-26 ENCOUNTER — VIRTUAL VISIT (OUTPATIENT)
Dept: ONCOLOGY | Facility: CLINIC | Age: 66
End: 2021-02-26
Attending: NURSE PRACTITIONER
Payer: MEDICAID

## 2021-02-26 ENCOUNTER — APPOINTMENT (OUTPATIENT)
Dept: LAB | Facility: CLINIC | Age: 66
End: 2021-02-26
Attending: NURSE PRACTITIONER
Payer: MEDICAID

## 2021-02-26 ENCOUNTER — INFUSION THERAPY VISIT (OUTPATIENT)
Dept: ONCOLOGY | Facility: CLINIC | Age: 66
End: 2021-02-26
Attending: OBSTETRICS & GYNECOLOGY
Payer: MEDICAID

## 2021-02-26 VITALS
HEART RATE: 68 BPM | RESPIRATION RATE: 16 BRPM | SYSTOLIC BLOOD PRESSURE: 98 MMHG | TEMPERATURE: 97.2 F | WEIGHT: 151.1 LBS | BODY MASS INDEX: 20.49 KG/M2 | OXYGEN SATURATION: 98 % | DIASTOLIC BLOOD PRESSURE: 62 MMHG

## 2021-02-26 DIAGNOSIS — R11.2 NON-INTRACTABLE VOMITING WITH NAUSEA, UNSPECIFIED VOMITING TYPE: ICD-10-CM

## 2021-02-26 DIAGNOSIS — Z51.11 ENCOUNTER FOR ANTINEOPLASTIC CHEMOTHERAPY: ICD-10-CM

## 2021-02-26 DIAGNOSIS — I48.0 PAF (PAROXYSMAL ATRIAL FIBRILLATION) (H): ICD-10-CM

## 2021-02-26 DIAGNOSIS — G89.3 CANCER ASSOCIATED PAIN: ICD-10-CM

## 2021-02-26 DIAGNOSIS — I48.91 ATRIAL FIBRILLATION WITH RAPID VENTRICULAR RESPONSE (H): ICD-10-CM

## 2021-02-26 DIAGNOSIS — G25.81 RESTLESS LEGS SYNDROME: ICD-10-CM

## 2021-02-26 DIAGNOSIS — C56.9 OVARIAN CANCER, UNSPECIFIED LATERALITY (H): Primary | ICD-10-CM

## 2021-02-26 LAB
ALBUMIN SERPL-MCNC: 3.3 G/DL (ref 3.4–5)
ALP SERPL-CCNC: 112 U/L (ref 40–150)
ALT SERPL W P-5'-P-CCNC: 34 U/L (ref 0–50)
ANION GAP SERPL CALCULATED.3IONS-SCNC: 6 MMOL/L (ref 3–14)
ANISOCYTOSIS BLD QL SMEAR: ABNORMAL
AST SERPL W P-5'-P-CCNC: 27 U/L (ref 0–45)
BASOPHILS # BLD AUTO: 0.1 10E9/L (ref 0–0.2)
BASOPHILS NFR BLD AUTO: 1.7 %
BILIRUB SERPL-MCNC: 0.2 MG/DL (ref 0.2–1.3)
BUN SERPL-MCNC: 16 MG/DL (ref 7–30)
CALCIUM SERPL-MCNC: 8.7 MG/DL (ref 8.5–10.1)
CANCER AG125 SERPL-ACNC: 129 U/ML (ref 0–30)
CHLORIDE SERPL-SCNC: 109 MMOL/L (ref 94–109)
CO2 SERPL-SCNC: 26 MMOL/L (ref 20–32)
CREAT SERPL-MCNC: 0.82 MG/DL (ref 0.52–1.04)
DACRYOCYTES BLD QL SMEAR: SLIGHT
DIFFERENTIAL METHOD BLD: ABNORMAL
EOSINOPHIL # BLD AUTO: 0.1 10E9/L (ref 0–0.7)
EOSINOPHIL NFR BLD AUTO: 1.7 %
ERYTHROCYTE [DISTWIDTH] IN BLOOD BY AUTOMATED COUNT: 21.3 % (ref 10–15)
GFR SERPL CREATININE-BSD FRML MDRD: 75 ML/MIN/{1.73_M2}
GLUCOSE SERPL-MCNC: 112 MG/DL (ref 70–99)
HCT VFR BLD AUTO: 32 % (ref 35–47)
HGB BLD-MCNC: 10.4 G/DL (ref 11.7–15.7)
LYMPHOCYTES # BLD AUTO: 1.6 10E9/L (ref 0.8–5.3)
LYMPHOCYTES NFR BLD AUTO: 47 %
MAGNESIUM SERPL-MCNC: 2.1 MG/DL (ref 1.6–2.3)
MCH RBC QN AUTO: 29.3 PG (ref 26.5–33)
MCHC RBC AUTO-ENTMCNC: 32.5 G/DL (ref 31.5–36.5)
MCV RBC AUTO: 90 FL (ref 78–100)
MONOCYTES # BLD AUTO: 0.1 10E9/L (ref 0–1.3)
MONOCYTES NFR BLD AUTO: 3.5 %
NEUTROPHILS # BLD AUTO: 1.5 10E9/L (ref 1.6–8.3)
NEUTROPHILS NFR BLD AUTO: 46.1 %
PLATELET # BLD AUTO: 88 10E9/L (ref 150–450)
PLATELET # BLD EST: ABNORMAL 10*3/UL
POIKILOCYTOSIS BLD QL SMEAR: SLIGHT
POTASSIUM SERPL-SCNC: 3.7 MMOL/L (ref 3.4–5.3)
PROT SERPL-MCNC: 7.2 G/DL (ref 6.8–8.8)
RBC # BLD AUTO: 3.55 10E12/L (ref 3.8–5.2)
SODIUM SERPL-SCNC: 140 MMOL/L (ref 133–144)
WBC # BLD AUTO: 3.3 10E9/L (ref 4–11)

## 2021-02-26 PROCEDURE — 36415 COLL VENOUS BLD VENIPUNCTURE: CPT

## 2021-02-26 PROCEDURE — 83735 ASSAY OF MAGNESIUM: CPT | Performed by: NURSE PRACTITIONER

## 2021-02-26 PROCEDURE — 85025 COMPLETE CBC W/AUTO DIFF WBC: CPT | Performed by: NURSE PRACTITIONER

## 2021-02-26 PROCEDURE — 86304 IMMUNOASSAY TUMOR CA 125: CPT | Performed by: NURSE PRACTITIONER

## 2021-02-26 PROCEDURE — 80053 COMPREHEN METABOLIC PANEL: CPT | Performed by: NURSE PRACTITIONER

## 2021-02-26 PROCEDURE — 99205 OFFICE O/P NEW HI 60 MIN: CPT | Mod: 95 | Performed by: NURSE PRACTITIONER

## 2021-02-26 RX ORDER — OXYCODONE HYDROCHLORIDE 5 MG/1
5 TABLET ORAL EVERY 6 HOURS PRN
Qty: 15 TABLET | Refills: 0 | Status: ON HOLD | OUTPATIENT
Start: 2021-02-26 | End: 2021-04-27

## 2021-02-26 RX ORDER — OLANZAPINE 5 MG/1
5 TABLET, ORALLY DISINTEGRATING ORAL AT BEDTIME
Qty: 30 TABLET | Refills: 0 | Status: SHIPPED | OUTPATIENT
Start: 2021-02-26 | End: 2021-04-05

## 2021-02-26 RX ORDER — PROCHLORPERAZINE MALEATE 10 MG
10 TABLET ORAL EVERY 6 HOURS PRN
Qty: 30 TABLET | Refills: 0 | Status: ON HOLD | OUTPATIENT
Start: 2021-02-26 | End: 2021-04-27

## 2021-02-26 RX ORDER — OXYCODONE HYDROCHLORIDE 5 MG/1
5 TABLET ORAL EVERY 6 HOURS PRN
Qty: 15 TABLET | Refills: 0 | Status: CANCELLED | OUTPATIENT
Start: 2021-02-26 | End: 2021-03-01

## 2021-02-26 RX ORDER — OXYCODONE HYDROCHLORIDE 5 MG/1
5 TABLET ORAL EVERY 6 HOURS PRN
Qty: 15 TABLET | Refills: 0 | Status: CANCELLED | OUTPATIENT
Start: 2021-02-26

## 2021-02-26 ASSESSMENT — ENCOUNTER SYMPTOMS
HALLUCINATIONS: 0
DYSURIA: 0
MYALGIAS: 0
NECK PAIN: 0
VOMITING: 0
HEMOPTYSIS: 0
EXERCISE INTOLERANCE: 0
SWOLLEN GLANDS: 0
MEMORY LOSS: 0
TINGLING: 0
BLOATING: 0
FEVER: 0
PANIC: 0
LEG PAIN: 0
COUGH DISTURBING SLEEP: 0
SMELL DISTURBANCE: 0
EYE PAIN: 0
MUSCLE CRAMPS: 0
WEAKNESS: 0
NUMBNESS: 0
HEARTBURN: 0
WEIGHT GAIN: 0
BACK PAIN: 0
SINUS CONGESTION: 0
WEIGHT LOSS: 0
BLOOD IN STOOL: 0
NAIL CHANGES: 0
FATIGUE: 0
DISTURBANCES IN COORDINATION: 0
LIGHT-HEADEDNESS: 1
MUSCLE WEAKNESS: 0
SLEEP DISTURBANCES DUE TO BREATHING: 0
RESPIRATORY PAIN: 0
POSTURAL DYSPNEA: 0
SINUS PAIN: 0
DECREASED APPETITE: 0
HYPOTENSION: 1
WHEEZING: 0
INCREASED ENERGY: 0
TROUBLE SWALLOWING: 0
FLANK PAIN: 0
BRUISES/BLEEDS EASILY: 0
SORE THROAT: 0
PARALYSIS: 0
HEMATURIA: 0
JAUNDICE: 0
SNORES LOUDLY: 0
BREAST MASS: 0
SEIZURES: 0
DYSPNEA ON EXERTION: 0
STIFFNESS: 0
PALPITATIONS: 0
SPEECH CHANGE: 0
NECK MASS: 0
RECTAL PAIN: 0
SKIN CHANGES: 0
DECREASED CONCENTRATION: 0
EYE REDNESS: 0
SHORTNESS OF BREATH: 0
NIGHT SWEATS: 0
DIARRHEA: 0
COUGH: 0
TASTE DISTURBANCE: 0
HOARSE VOICE: 0
SYNCOPE: 0
ORTHOPNEA: 0
NAUSEA: 0
ALTERED TEMPERATURE REGULATION: 0
CONSTIPATION: 0
POLYPHAGIA: 0
TREMORS: 0
CHILLS: 0
NERVOUS/ANXIOUS: 0
SPUTUM PRODUCTION: 0
DIZZINESS: 1
DEPRESSION: 0
POOR WOUND HEALING: 0
BREAST PAIN: 0
POLYDIPSIA: 0
DOUBLE VISION: 0
BOWEL INCONTINENCE: 0
EYE IRRITATION: 0
HYPERTENSION: 0
ARTHRALGIAS: 0
RECTAL BLEEDING: 0
INSOMNIA: 0
HEADACHES: 0
DIFFICULTY URINATING: 0
HOT FLASHES: 0
JOINT SWELLING: 0
EYE WATERING: 0
ABDOMINAL PAIN: 0
LOSS OF CONSCIOUSNESS: 0
DECREASED LIBIDO: 0

## 2021-02-26 NOTE — NURSING NOTE
Chief Complaint   Patient presents with     Video Visit     RETURN; OVARIAN CANCER     Blood Draw     Labs drawn via PIV By RN in lab. VS taken.      Labs drawn via peripheral IV. Vital signs taken. Checked into next appointment.   Ella Sanchez RN     Statement Selected

## 2021-02-26 NOTE — LETTER
2021         RE: Taran Regalado  [address] [facility] [address]        Dear Colleague,    Thank you for referring your patient, Taran Regalado, to the [facility]. Please see a copy of my visit note below.    Taran is a 65 year old who is being evaluated via a billable video visit.      How would you like to obtain your AVS? MyChart  If the video visit is dropped, the invitation should be resent by: Text to cell phone: [phone]  Will anyone else be joining your video visit? No      Vitals - Patient Reported  Weight (Patient Reported): 67.6 kg (149 lb)  Height (Patient Reported): 182.9 cm (6')  BMI (Based on Pt Reported Ht/Wt): 20.21  Pain Score: No Pain (0)      I have reviewed and updated patient's allergy and medication list.    Concerns: NEED REFILLS  Refills: RIVAROXABAN, PROCHLORPERAZINE, OLANZAPINE, AND OXYCODONE      Bertha Montana CMA    Video-Visit Details    Type of service:  Video Visit    Video Start Time: 8:37 am    Video End Time:9:07 am    Originating Location (pt. Location): Other [facility] lob    Distant Location (provider location):  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 She is currently undergoing neoadjuvant chemotherapy.  She is here today for follow up and disease management. In light of the recent COVID19 pandemic, and in accordance with our group and national guidelines this patients care has been reviewed and it is felt that presenting for her visit would be more likely to increase rather than decrease her relative risks. Therefore this visit was conducted by video.      Oncology  History:  12/3/2020: US Pelvic: IMPRESSION: Limited examination due to acoustic windows. Possible left adnexal mass. A CT scan of the abdomen and pelvis with contrast is recommended for further assessment.    12/4/2020: CT A/P:   IMPRESSION:    Peritoneal carcinomatosis with masslike peritoneal thickening in the lower pelvis which may indicate an adnexal or ovarian primary malignancy. Large volume ascites. Bilateral pleural effusions. There is potential subtle pleural nodularity in the right hemithorax which could indicate metastatic disease.  Indeterminate 1 cm lesion in the right hepatic lobe suspicious for a metastatic lesion.      [facility] 25[facility], and CTAP with carcinomatosis, elevated  3098.     12/23/2020: CT Chest: IMPRESSION:   1. There are few scattered small sub-6 mm pulmonary nodules which are indeterminate without prior comparisons available. There are a few  slightly larger perifissural nodules which are technically  indeterminate in the setting of malignancy although presumed lymphatic in nature and of unlikely clinical significance. Attention on follow-up is recommended.  2. Small to moderate left and small right pleural effusions are increased in size from prior. No convincing evidence for pleural nodularity.  3. Partially visualized large volume ascites and peritoneal nodularity in the upper abdomen similar to 12/4/2020 outside CT     12/26/2020: ED for abdominal distension; 3 L ascites drained with paracentesis    Pelvic US: Findings: Free fluid present in LLQ     12/31/2020: US Paracentesis: 900 mL ascites drained    1/7/2021: Diagnotic laparoscopy, biopsies  Pathology: FINAL DIAGNOSIS:   A. PERITONEUM, BIOPSIES:   - Positive for high grade carcinoma, consistent with metastatic carcinoma of Mullerian origin.    1/10-1/13/2021: Hospital admission for postoperative non-intractable vomiting and nausea.     1/10/2021: CT A/P: IMPRESSION:  Extensive ascites which is probably malignant. Scattered liver hypodensities of indeterminate etiology comment cannot exclude metastatic disease. Diverticulosis. Fluid-filled adnexal masses and irregular appearance of uterus, which may represent primary neoplasm. Multiple peritoneal nodules. Large amount of fecal material in the colon with no evidence of small bowel obstruction.    Plan: Paclitaxel 175 mg/m2 and carboplatin AUC 6 x 3 cycles followed by a CT CAP and visit with Dr. Juarez.     1/12/2021: Cycle 1 paclitaxel and carboplatin while inpatient     1/13/2021: CT Head: Impression:  1. Chronic sinusitis of the right maxillary and right sphenoid sinuses.  2. Incidental presumed calcified meningioma in the right frontal  convexity without significant mass effect.  3. No suspicious intracranial enhancing lesion.     2/1/2021: Cycle 2 paclitaxel and carboplatin.  936.    2/3-2/5/21: Admission [facility] for afib w/ RVR and new PE    2/26/21: Cycle 3 paclitaxel and carboplatin planned.  Deferred due to thrombocytopenia.  pending.            Today she reports with her daughter Edie that she is feeling well overall.  She continues to struggle with restless leg symptoms.  Her PCP started her on gabapentin which has helped her sleep some nights but not every night.  About every other night she needs to take 2 oxycodone to help with her symptoms so she can sleep.  They are also concerned that she will have increased pain after chemo because this happened after her first 2 cycles.  She is not having pain now.  They are agreeable to meet with palliative to see if they can be a more centralized person to help them manage her symptoms.  She is also concerned about nausea.  After her first cycle of chemo she had severe nausea and was not able to eat or drink at all.  Since she has been taking compazine twice a day and zyprexa nightly to prevent nausea.  She has not had nausea recently and is afraid that it will  return if stops taking the medication.  She is eating small frequent meals high in protein.  She does not like protein shakes.  She has been having lightheadedness, dizziness, low BPs.  As a result Edie called and talked patient's cardiologist about stopping her amiodarone.  She stopped this about 6 days ago.  She has not noticed a difference in her dizziness since then.  She admits that she does not drink enough water.  She is drinking about one 20 oz Propel water a day but tries to drink two.  She also has one 8-12 oz regular Coke a day.  She will also occasionally drink milk.  She is having regular BMs daily with 2 Senna S twice a day.  She denies any vaginal bleeding, no changes in her bowel or bladder habits, no lower extremity edema, and no abdominal discomfort/bloating, no fevers or chills, and no chest pain or shortness of breath.             Review of Systems     Constitutional:  Negative for fever, chills, weight loss, weight gain, fatigue, decreased appetite, night sweats, recent stressors, height gain, height loss, post-operative complications, incisional pain, hallucinations, increased energy, hyperactivity and confused.   HENT:  Negative for ear pain, hearing loss, tinnitus, nosebleeds, trouble swallowing, hoarse voice, mouth sores, sore throat, ear discharge, tooth pain, gum tenderness, taste disturbance, smell disturbance, hearing aid, bleeding gums, dry mouth, sinus pain, sinus congestion and neck mass.    Eyes:  Negative for double vision, pain, redness, eye pain, decreased vision, eye watering, eye bulging, eye dryness, flashing lights, spots, floaters, strabismus, tunnel vision, jaundice and eye irritation.   Respiratory:   Negative for cough, hemoptysis, sputum production, shortness of breath, wheezing, sleep disturbances due to breathing, snores loudly, respiratory pain, dyspnea on exertion, cough disturbing sleep and postural dyspnea.    Cardiovascular:  Positive for hypotension and  light-headedness. Negative for chest pain, dyspnea on exertion, palpitations, orthopnea, fingers/toes turn blue, hypertension, syncope, history of heart murmur, pacemaker, few scattered varicosities, leg pain, sleep disturbances due to breathing, exercise intolerance and edema.   Gastrointestinal:  Negative for heartburn, nausea, vomiting, abdominal pain, diarrhea, constipation, blood in stool, melena, rectal pain, bloating, hemorrhoids, bowel incontinence, jaundice, rectal bleeding, coffee ground emesis and change in stool.   Genitourinary:  Negative for bladder incontinence, dysuria, urgency, hematuria, flank pain, vaginal discharge, difficulty urinating, genital sores, dyspareunia, decreased libido, nocturia, voiding less frequently, arousal difficulty, abnormal vaginal bleeding, excessive menstruation, menstrual changes, hot flashes, vaginal dryness and postmenopausal bleeding.   Musculoskeletal:  Negative for myalgias, back pain, joint swelling, arthralgias, stiffness, muscle cramps, neck pain, bone pain, muscle weakness and fracture.   Skin:  Negative for nail changes, itching, poor wound healing, rash, hair changes, skin changes, acne, warts, poor wound healing, scarring, flaky skin, Raynaud's phenomenon, sensitivity to sunlight and skin thickening.   Neurological:  Positive for dizziness and light-headedness. Negative for tingling, tremors, speech change, seizures, loss of consciousness, weakness, numbness, headaches, disturbances in coordination, memory loss, difficulty walking and paralysis.   Endo/Heme:  Negative for anemia, swollen glands and bruises/bleeds easily.   Psychiatric/Behavioral:  Negative for depression, hallucinations, memory loss, decreased concentration, mood swings and panic attacks.    Breast:  Negative for breast discharge, breast mass, breast pain and nipple retraction.   Endocrine:  Negative for altered temperature regulation, polyphagia, polydipsia, unwanted hair growth and change in  facial hair.        Past Medical History:    Past Medical History:   Diagnosis Date     Atrial fibrillation with rapid ventricular response (H)      History of cold sores      Insomnia      Migraine      Osteopenia      Pelvic mass      Peritoneal carcinomatosis (H)      Restless legs syndrome (RLS)          Past Surgical History:    Past Surgical History:   Procedure Laterality Date     APPENDECTOMY       ARTHROSCPY KNEE SURGICAL DEBRIDEMENT SHAVING ARTICULAR CARTILAGE Right      BIOPSY  January 2021    Biopsy to confirm ovarian cancer     DEBRIDEMENT LEFT UPPER EXTREMITY  2016     LAPAROSCOPY DIAGNOSTIC (GYN) Bilateral 1/7/2021    Procedure: Diagnsotic laparoscopy, biopsies;  Surgeon: Bolivar Juarez MD;  Location: [facility]     LAS       TUBAL LIGATION           Health Maintenance Due   Topic Date Due     PREVENTIVE CARE VISIT  1955     ADVANCE CARE PLANNING  1955     COLORECTAL CANCER SCREENING  11/11/1966     HIV SCREENING  11/11/1971     HEPATITIS C SCREENING  11/11/1974     LIPID  11/11/2001     ZOSTER IMMUNIZATION (1 of 2) 11/11/2006     INFLUENZA VACCINE (1) 09/01/2020     MAMMO SCREENING  03/04/2021       Current Medications:     Current Outpatient Medications   Medication Sig Dispense Refill     acetaminophen (TYLENOL) 325 MG tablet Take 2 tablets (650 mg) by mouth every 6 hours as needed for mild pain 50 tablet 0     amitriptyline (ELAVIL) 100 MG tablet TAKE 1 TABLET (100 MG) BY MOUTH EVERY NIGHT AT BEDTIME       gabapentin (NEURONTIN) 600 MG tablet Take 1 tablet (600 mg) by mouth At Bedtime 30 tablet 0     OLANZapine zydis (ZYPREXA) 5 MG ODT Take 1 tablet (5 mg) by mouth At Bedtime 30 tablet 0     ondansetron (ZOFRAN-ODT) 4 MG ODT tab Take 1 tablet (4 mg) by mouth every 6 hours as needed for nausea or vomiting 20 tablet 0     polyethylene glycol (MIRALAX) 17 GM/Dose powder Take 17 g by mouth 2 times daily 510 g 0     prochlorperazine (COMPAZINE) 10 MG tablet Take 1 tablet (10 mg) by mouth  every 6 hours as needed for nausea or vomiting 30 tablet 0     rivaroxaban ANTICOAGULANT (XARELTO ANTICOAGULANT) 20 MG TABS tablet Take 1 tablet (20 mg) by mouth daily (with dinner) 90 tablet 1     senna-docusate (SENOKOT-S/PERICOLACE) 8.6-50 MG tablet Take 2 tablets by mouth 2 times daily 30 tablet 0     SUMAtriptan (IMITREX) 100 MG tablet Take 100 mg by mouth at onset of headache        valACYclovir (VALTREX) 1000 mg tablet Take 1,000 mg by mouth as needed        amiodarone (PACERONE) 200 MG tablet Take 2 tablets (400 mg) by mouth 2 times daily for 14 days, THEN 1 tablet (200 mg) daily for 14 days. 74 tablet 0     gabapentin (NEURONTIN) 300 MG capsule Take 300 mg by mouth           Allergies:      No Known Allergies     Social History:     Social History     Tobacco Use     Smoking status: Former Smoker     Packs/day: 0.50     Years: 40.00     Pack years: 20.00     Quit date:      Years since quitting: 3.1     Smokeless tobacco: Never Used   Substance Use Topics     Alcohol use: Not Currently       History   Drug Use Unknown         Family History:     The patient's family history is notable for:    Family History   Adopted: Yes   Problem Relation Age of Onset     Cancer Mother 36     Other Cancer Mother         Bio mother  of  a female cancer  at 36     Factor V Leiden deficiency Daughter      Deep Vein Thrombosis Daughter      Diabetes Type 1 Daughter      Diabetes Daughter      Hypertension Daughter          Physical Exam:     BP 98/62   Pulse 68   Temp 97.2  F (36.2  C) (Tympanic)   Resp 16   Wt 68.5 kg (151 lb 1.6 oz)   SpO2 98%   BMI 20.49 kg/m    Body mass index is 20.49 kg/m .      General Appearance: healthy and alert, no distress    Eyes:  Eyes grossly normal to inspection.  No discharge or erythema, or obvious scleral/conjunctival abnormalities.    Respiratory: No audible wheeze, cough, or visible cyanosis.  No visible retractions or increased work of breathing.      Musculoskeletal: extremities non tender and without edema    Skin: no lesions or rashes on visible skin    Neurological: normal gait, no gross defects     Psychiatric: appropriate mood and affect. Mentation appears normal, affect normal/bright, judgement and insight intact, normal speech and appearance well-groomed                            The rest of a comprehensive physical examination is deferred due to PHE (public health emergency) video visit restrictions.    Lab Results   Component Value Date    WBC 3.3 02/26/2021     Lab Results   Component Value Date    RBC 3.55 02/26/2021     Lab Results   Component Value Date    HGB 10.4 02/26/2021     Lab Results   Component Value Date    HCT 32.0 02/26/2021     No components found for: MCT  Lab Results   Component Value Date    MCV 90 02/26/2021     Lab Results   Component Value Date    MCH 29.3 02/26/2021     Lab Results   Component Value Date    MCHC 32.5 02/26/2021     Lab Results   Component Value Date    RDW 21.3 02/26/2021     Lab Results   Component Value Date    PLT 88 02/26/2021     % Neutrophils   Date Value Ref Range Status   02/26/2021 46.1 % Final     % Lymphocytes   Date Value Ref Range Status   02/26/2021 47.0 % Final     % Monocytes   Date Value Ref Range Status   02/26/2021 3.5 % Final     % Eosinophils   Date Value Ref Range Status   02/26/2021 1.7 % Final     % Basophils   Date Value Ref Range Status   02/26/2021 1.7 % Final     % Immature Granulocytes   Date Value Ref Range Status   02/04/2021 0.3 % Final     Absolute Neutrophil   Date Value Ref Range Status   02/26/2021 1.5 (L) 1.6 - 8.3 10e9/L Final     Absolute Lymphocytes   Date Value Ref Range Status   02/26/2021 1.6 0.8 - 5.3 10e9/L Final     Absolute Monocytes   Date Value Ref Range Status   02/26/2021 0.1 0.0 - 1.3 10e9/L Final     Absolute Eosinophils   Date Value Ref Range Status   02/26/2021 0.1 0.0 - 0.7 10e9/L Final     Absolute Basophils   Date Value Ref Range Status   02/26/2021  0.1 0.0 - 0.2 10e9/L Final     Abs Immature Granulocytes   Date Value Ref Range Status   02/04/2021 0.0 0 - 0.4 10e9/L Final     Nucleated RBCs   Date Value Ref Range Status   02/04/2021 0 0 /100 Final     Absolute Nucleated RBC   Date Value Ref Range Status   02/04/2021 0.0  Final     Last Comprehensive Metabolic Panel:  Sodium   Date Value Ref Range Status   02/26/2021 140 133 - 144 mmol/L Final     Potassium   Date Value Ref Range Status   02/26/2021 3.7 3.4 - 5.3 mmol/L Final     Chloride   Date Value Ref Range Status   02/26/2021 109 94 - 109 mmol/L Final     Carbon Dioxide   Date Value Ref Range Status   02/26/2021 26 20 - 32 mmol/L Final     Anion Gap   Date Value Ref Range Status   02/26/2021 6 3 - 14 mmol/L Final     Glucose   Date Value Ref Range Status   02/26/2021 112 (H) 70 - 99 mg/dL Final     Urea Nitrogen   Date Value Ref Range Status   02/26/2021 16 7 - 30 mg/dL Final     Creatinine   Date Value Ref Range Status   02/26/2021 0.82 0.52 - 1.04 mg/dL Final     GFR Estimate   Date Value Ref Range Status   02/26/2021 75 >60 mL/min/[1.73_m2] Final     Comment:     Non  GFR Calc  Starting 12/18/2018, serum creatinine based estimated GFR (eGFR) will be   calculated using the Chronic Kidney Disease Epidemiology Collaboration   (CKD-EPI) equation.       Calcium   Date Value Ref Range Status   02/26/2021 8.7 8.5 - 10.1 mg/dL Final     Bilirubin Total   Date Value Ref Range Status   02/26/2021 0.2 0.2 - 1.3 mg/dL Final     Alkaline Phosphatase   Date Value Ref Range Status   02/26/2021 112 40 - 150 U/L Final     ALT   Date Value Ref Range Status   02/26/2021 34 0 - 50 U/L Final     AST   Date Value Ref Range Status   02/26/2021 27 0 - 45 U/L Final     Lab Results   Component Value Date    PROTTOTAL 7.2 02/26/2021     Lab Results   Component Value Date    ALBUMIN 3.3 02/26/2021     Magnesium   Date Value Ref Range Status   02/26/2021 2.1 1.6 - 2.3 mg/dL Final         Assessment:    Taran Vazquez  Fabricio is a 65 year old woman with a diagnosis of 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 She is currently undergoing neoadjuvant chemotherapy.  She is here today for follow up and disease management. In light of the recent COVID19 pandemic, and in accordance with our group and national guidelines this patients care has been reviewed and it is felt that presenting for her visit would be more likely to increase rather than decrease her relative risks. Therefore this visit was conducted by video.      82 minutes spent on the date of the encounter doing chart review, history and exam, documentation, and further activities as noted above.              Plan:     1.) Serous carcinoma:  Will defer chemo x 1 week due to thrombocytopenia.  OK for cycle 3 of chemo next week if labs are WDL.  RTC 3 weeks after cycle 3 for CT CAP and follow up with Dr. Juarez.  Reviewed signs and symptoms for when she should contact the clinic or seek additional care.  Patient to contact the clinic with any questions or concerns in the interim.        Genetic risk factors were assessed and she meets qualifications for referral.  This has been ordered and is scheduled to see them on 3/9/21.      Labs and/or tests ordered include:  CBC. CMP. Mag. .     2.) Health maintenance:  Issues addressed today include following up with PCP for annual health maintenance and non-gynecologic issues.     3.) Chemotherapy induced nausea: Discussed that antiemetics do work best to prevent nausea.  Would recommend that she try to taper off these about a week after she has received chemo to see if she is continuing to have nausea.  Most people do not need antiemetics continuously about a week after chemo.  Her total protein is normal at 7.2 and her albumin has increased to 3.3.  In addition her weight is stable.  Encouraged to continue eating small frequent meals high in protein.   Refills for her zyprexa and compazine sent to her home  pharmacy.    4.) Restless legs: This has been improving with the gabapentin but she is still struggling.  Will refill 15 tabs of oxycodone to her home pharmacy and refer her to palliative care.    5.) Constipation:  Continue taking Senna S as she has been doing.    6.) Dehydration: Discussed that her dizziness, lightheadedness, and low BP are likely from dehydration.  Encouraged at least 64 oz of water a day.  This can be Propel water if she does not like the taste of plain water.  Briefly, discussed that some of her other medications such as gabapentin and amitriptyline can also cause dizziness.    7.) Pulmonary embolus:  Continue taking Xarelto as she has been doing.  Refill sent to her home pharmacy for this.      Ella Murillo, DNP, APRN, FNP-C  Nurse Practitioner   Division of Gynecologic Oncology  Pager: [phone]     CC  Patient Care Team:  Bolivar Juarez MD as PCP - General (Gynecologic Oncology)  Ramona Viera RN as Specialty Care Coordinator (Gynecologic Oncology)

## 2021-03-03 ENCOUNTER — TELEPHONE (OUTPATIENT)
Dept: ONCOLOGY | Facility: CLINIC | Age: 66
End: 2021-03-03

## 2021-03-03 NOTE — TELEPHONE ENCOUNTER
PRIOR AUTHORIZATION DENIED    Medication: OLANZAPINE ODT 5MG TAB-PA denied    Denial Date: 3/3/2021    Denial Rational:       Appeal Information:

## 2021-03-03 NOTE — TELEPHONE ENCOUNTER
Prior Authorization Retail Medication Request    Medication/Dose:   ICD code (if different than what is on RX):    Previously Tried and Failed:    Rationale:      Insurance Name:  Medicaid [place]  Insurance ID: [id number]      Pharmacy Information (if different than what is on RX)  Name:  [facility]  Phone:       [facility] Smokers Quitline ([id number])

## 2021-03-05 ENCOUNTER — APPOINTMENT (OUTPATIENT)
Dept: LAB | Facility: CLINIC | Age: 66
End: 2021-03-05
Attending: OBSTETRICS & GYNECOLOGY
Payer: MEDICAID

## 2021-03-05 ENCOUNTER — INFUSION THERAPY VISIT (OUTPATIENT)
Dept: ONCOLOGY | Facility: CLINIC | Age: 66
End: 2021-03-05
Attending: OBSTETRICS & GYNECOLOGY
Payer: MEDICAID

## 2021-03-05 VITALS
BODY MASS INDEX: 20.87 KG/M2 | WEIGHT: 153.9 LBS | SYSTOLIC BLOOD PRESSURE: 109 MMHG | DIASTOLIC BLOOD PRESSURE: 70 MMHG | OXYGEN SATURATION: 99 % | TEMPERATURE: 97.6 F | HEART RATE: 71 BPM | RESPIRATION RATE: 18 BRPM

## 2021-03-05 DIAGNOSIS — C56.9 OVARIAN CANCER, UNSPECIFIED LATERALITY (H): Primary | ICD-10-CM

## 2021-03-05 DIAGNOSIS — Z51.11 ENCOUNTER FOR ANTINEOPLASTIC CHEMOTHERAPY: ICD-10-CM

## 2021-03-05 LAB
ALBUMIN SERPL-MCNC: 3.4 G/DL (ref 3.4–5)
ALP SERPL-CCNC: 101 U/L (ref 40–150)
ALT SERPL W P-5'-P-CCNC: 29 U/L (ref 0–50)
ANION GAP SERPL CALCULATED.3IONS-SCNC: 7 MMOL/L (ref 3–14)
AST SERPL W P-5'-P-CCNC: 21 U/L (ref 0–45)
BASOPHILS # BLD AUTO: 0 10E9/L (ref 0–0.2)
BASOPHILS NFR BLD AUTO: 0.8 %
BILIRUB SERPL-MCNC: 0.2 MG/DL (ref 0.2–1.3)
BUN SERPL-MCNC: 16 MG/DL (ref 7–30)
CALCIUM SERPL-MCNC: 8.6 MG/DL (ref 8.5–10.1)
CHLORIDE SERPL-SCNC: 106 MMOL/L (ref 94–109)
CO2 SERPL-SCNC: 28 MMOL/L (ref 20–32)
CREAT SERPL-MCNC: 0.86 MG/DL (ref 0.52–1.04)
DIFFERENTIAL METHOD BLD: ABNORMAL
EOSINOPHIL # BLD AUTO: 0.1 10E9/L (ref 0–0.7)
EOSINOPHIL NFR BLD AUTO: 2.6 %
ERYTHROCYTE [DISTWIDTH] IN BLOOD BY AUTOMATED COUNT: 23.3 % (ref 10–15)
GFR SERPL CREATININE-BSD FRML MDRD: 71 ML/MIN/{1.73_M2}
GLUCOSE SERPL-MCNC: 107 MG/DL (ref 70–99)
HCT VFR BLD AUTO: 33.4 % (ref 35–47)
HGB BLD-MCNC: 10.6 G/DL (ref 11.7–15.7)
IMM GRANULOCYTES # BLD: 0 10E9/L (ref 0–0.4)
IMM GRANULOCYTES NFR BLD: 0 %
LYMPHOCYTES # BLD AUTO: 1.4 10E9/L (ref 0.8–5.3)
LYMPHOCYTES NFR BLD AUTO: 53 %
MAGNESIUM SERPL-MCNC: 2.2 MG/DL (ref 1.6–2.3)
MCH RBC QN AUTO: 29.4 PG (ref 26.5–33)
MCHC RBC AUTO-ENTMCNC: 31.7 G/DL (ref 31.5–36.5)
MCV RBC AUTO: 93 FL (ref 78–100)
MONOCYTES # BLD AUTO: 0.3 10E9/L (ref 0–1.3)
MONOCYTES NFR BLD AUTO: 10.9 %
NEUTROPHILS # BLD AUTO: 0.9 10E9/L (ref 1.6–8.3)
NEUTROPHILS NFR BLD AUTO: 32.7 %
NRBC # BLD AUTO: 0 10*3/UL
NRBC BLD AUTO-RTO: 0 /100
PLATELET # BLD AUTO: 287 10E9/L (ref 150–450)
PLATELET # BLD EST: ABNORMAL 10*3/UL
POTASSIUM SERPL-SCNC: 3.7 MMOL/L (ref 3.4–5.3)
PROT SERPL-MCNC: 7.4 G/DL (ref 6.8–8.8)
RBC # BLD AUTO: 3.6 10E12/L (ref 3.8–5.2)
SODIUM SERPL-SCNC: 141 MMOL/L (ref 133–144)
WBC # BLD AUTO: 2.7 10E9/L (ref 4–11)

## 2021-03-05 PROCEDURE — 36592 COLLECT BLOOD FROM PICC: CPT

## 2021-03-05 PROCEDURE — 80053 COMPREHEN METABOLIC PANEL: CPT | Performed by: NURSE PRACTITIONER

## 2021-03-05 PROCEDURE — 83735 ASSAY OF MAGNESIUM: CPT | Performed by: NURSE PRACTITIONER

## 2021-03-05 PROCEDURE — 85025 COMPLETE CBC W/AUTO DIFF WBC: CPT | Performed by: NURSE PRACTITIONER

## 2021-03-05 ASSESSMENT — PAIN SCALES - GENERAL: PAINLEVEL: NO PAIN (0)

## 2021-03-05 NOTE — NURSING NOTE
Chief Complaint   Patient presents with     Blood Draw     labs drawn via PIV by RN in lab      /70   Pulse 71   Temp 97.6  F (36.4  C)   Resp 18   Wt 69.8 kg (153 lb 14.4 oz)   SpO2 99%   BMI 20.87 kg/m      PIV placed to RFA  by RN in lab. Line flushed with normal saline. Pt tolerated well.  Checked in for next appointment.    Lian Morales RN on 3/5/2021 at 7:26 AM

## 2021-03-05 NOTE — PATIENT INSTRUCTIONS
Contact Numbers    [facility] Main Line (for Scheduling/Triage/After Hours Nurse Line): [phone]    [facility] call [facility] nurse [facility] or the after hours nurse line if you experience a temperature greater than or equal to 100.5, shaking chills, have uncontrolled nausea, vomiting and/or diarrhea, dizziness, lightheadedness, shortness of breath, chest pain, bleeding, unexplained bruising, or if you have any other new/concerning symptoms, questions or concerns.     If you are having any concerning symptoms or wish to speak to a provider before your next infusion visit, please call your care coordinator or triage to notify them so we can adequately serve you.     If you need any refills on medications (narcotics or other medications), please call before your infusion appointment.       March 2021 Sunday Monday Tuesday Wednesday Thursday Friday Saturday        1     2     3     4     5    LAB PERIPHERAL   7:30 AM   (15 min.)   [facility] LAB DRAW   [facility] ONC INFUSION 360   8:00 AM   (360 min.)    ONCOLOGY INFUSION   [facility] 6       7     8     9    VIDEO VISIT NEW  11:00 AM   (75 min.)   Lauren Ibanez GC   [facility] 10     11     12     13       14     15    LAB   1:30 PM   (15 min.)    LAB   [facility]    CT CHEST ABDOMEN PELVIS WWO   1:50 PM   (20 min.)   [email or web address]   [facility] [place] 16     17    TELEPHONE VISIT RETURN  10:00 AM   (20 min.)   Bolivar Juarez MD   [facility] 18     19    LAB PERIPHERAL  10:00 AM   (15 min.)   [facility] LAB DRAW   [facility] ONC INFUSION 360  10:30 AM   (360 min.)    ONCOLOGY INFUSION   [facility] 20       21     22     23     24     25     26     27       28     29     30     31                                April 2021       Sunday Monday Tuesday Wednesday Thursday Friday Saturday                       1     2     3       4     5     6     7     8     9     10       11     12     13     14     15     16     17       18     19     20     21     22     23     24       25     26     27     28     29     30                         Recent Results (from the past 24 hour(s))   CBC with platelets differential    Collection Time: 03/05/21  7:20 AM   Result Value Ref Range    WBC 2.7 (L) 4.0 - 11.0 10e9/L    RBC Count 3.60 (L) 3.8 - 5.2 10e12/L    Hemoglobin 10.6 (L) 11.7 - 15.7 g/dL    Hematocrit 33.4 (L) 35.0 - 47.0 %    MCV 93 78 - 100 fl    MCH 29.4 26.5 - 33.0 pg    MCHC 31.7 31.5 - 36.5 g/dL    RDW 23.3 (H) 10.0 - 15.0 %    Platelet Count 287 150 - 450 10e9/L    Diff Method Automated Method     % Neutrophils 32.7 %    % Lymphocytes 53.0 %    % Monocytes 10.9 %    % Eosinophils 2.6 %    % Basophils 0.8 %    % Immature Granulocytes 0.0 %    Nucleated RBCs 0 0 /100    Absolute Neutrophil 0.9 (L) 1.6 - 8.3 10e9/L    Absolute Lymphocytes 1.4 0.8 - 5.3 10e9/L    Absolute Monocytes 0.3 0.0 - 1.3 10e9/L    Absolute Eosinophils 0.1 0.0 - 0.7 10e9/L    Absolute Basophils 0.0 0.0 - 0.2 10e9/L    Abs Immature Granulocytes 0.0 0 - 0.4 10e9/L    Absolute Nucleated RBC 0.0     Platelet Estimate Confirming automated cell count    Comprehensive metabolic panel    Collection Time: 03/05/21  7:20 AM   Result Value Ref Range    Sodium 141 133 - 144 mmol/L    Potassium 3.7 3.4 - 5.3 mmol/L    Chloride 106 94 - 109 mmol/L    Carbon Dioxide 28 20 - 32 mmol/L    Anion Gap 7 3 - 14 mmol/L    Glucose 107 (H) 70 - 99 mg/dL    Urea Nitrogen 16 7 - 30 mg/dL    Creatinine 0.86 0.52 - 1.04 mg/dL    GFR Estimate 71 >60 mL/min/[1.73_m2]    GFR Estimate If Black 82 >60 mL/min/[1.73_m2]    Calcium 8.6 8.5 - 10.1 mg/dL    Bilirubin Total 0.2 0.2 - 1.3 mg/dL    Albumin 3.4 3.4 - 5.0 g/dL    Protein Total 7.4 6.8 - 8.8 g/dL    Alkaline Phosphatase 101 40 - 150 U/L    ALT 29  0 - 50 U/L    AST 21 0 - 45 U/L   Magnesium    Collection Time: 03/05/21  7:20 AM   Result Value Ref Range    Magnesium 2.2 1.6 - 2.3 mg/dL         Patient Education     Pegfilgrastim Solution for injection  What is this medicine?  PEGFILGRASTIM (PEG anne marie gra stim) is a long-acting granulocyte colony-stimulating factor that stimulates the growth of neutrophils, a type of white blood cell important in the body s fight against infection. It is used to reduce the incidence of fever and infection in patients with certain types of cancer who are receiving chemotherapy that affects the bone marrow, and to increase survival after being exposed to high doses of radiation.  This medicine may be used for other purposes; ask your health care provider or pharmacist if you have questions.  What should I tell my health care provider before I take this medicine?  They need to know if you have any of these conditions:    kidney disease    latex allergy    ongoing radiation therapy    sickle cell disease    skin reactions to acrylic adhesives (On-Body Injector only)    an unusual or allergic reaction to pegfilgrastim, filgrastim, other medicines, foods, dyes, or preservatives    pregnant or trying to get pregnant    breast-feeding  How should I use this medicine?  This medicine is for injection under the skin. If you get this medicine at home, you will be taught how to prepare and give the pre-filled syringe or how to use the On-body Injector. Refer to the patient Instructions for Use for detailed instructions. Use exactly as directed. Take your medicine at regular intervals. Do not take your medicine more often than directed.  It is important that you put your used needles and syringes in a special sharps container. Do not put them in a trash can. If you do not have a sharps container, call your pharmacist or healthcare provider to get one.  Talk to your pediatrician regarding the use of this medicine in children. While this drug  may be prescribed for selected conditions, precautions do apply.  Overdosage: If you think you have taken too much of this medicine contact a poison control center or emergency room at once.  NOTE: This medicine is only for you. Do not share this medicine with others.  What if I miss a dose?  It is important not to miss your dose. Call your doctor or health care professional if you miss your dose. If you miss a dose due to an On-body Injector failure or leakage, a new dose should be administered as soon as possible using a single prefilled syringe for manual use.  What may interact with this medicine?  Interactions have not been studied.  Give your health care provider a list of all the medicines, herbs, non-prescription drugs, or dietary supplements you use. Also tell them if you smoke, drink alcohol, or use illegal drugs. Some items may interact with your medicine.  What should I watch for while using this medicine?  You may need blood work done while you are taking this medicine.  If you are going to need a MRI, CT scan, or other procedure, tell your doctor that you are using this medicine (On-Body Injector only).  What side effects may I notice from receiving this medicine?  Side effects that you should report to your doctor or health care professional as soon as possible:    allergic reactions like skin rash, itching or hives, swelling of the face, lips, or tongue    dizziness    fever    pain, redness, or irritation at site where injected    pinpoint red spots on the skin    red or dark-brown urine    shortness of breath or breathing problems    stomach or side pain, or pain at the shoulder    swelling    tiredness    trouble passing urine or change in the amount of urine  Side effects that usually do not require medical attention (report to your doctor or health care professional if they continue or are bothersome):    bone pain    muscle pain  This list may not describe all possible side effects. Call your  doctor for medical advice about side effects. You may report side effects to FDA at 6-987-FDA-9525.  Where should I keep my medicine?  Keep out of the reach of children.  Store pre-filled syringes in a refrigerator between 2 and 8 degrees C (36 and 46 degrees F). Do not freeze. Keep in carton to protect from light. Throw away this medicine if it is left out of the refrigerator for more than 48 hours. Throw away any unused medicine after the expiration date.  NOTE: This sheet is a summary. It may not cover all possible information. If you have questions about this medicine, talk to your doctor, pharmacist, or health care provider.  NOTE:This sheet is a summary. It may not cover all possible information. If you have questions about this medicine, talk to your doctor, pharmacist, or health care provider. Copyright  2016 [employer]           Patient Education     Coping with Neutropenia   What is neutropenia?  Neutropenia means that you have fewer white blood cells than normal.   Your white blood cells protect you from infection. If your white blood cells become too low, your risk of infection increases.   Signs of an infection include:    Any temperature more than 100.4 F (38 C) (taken under the tongue)    Shaking chills    Pain when urinating    Pain when breathing    Cough or sore throat    A red, swollen or painful cut or wound, or fluid coming from a cut or wound.  How is neutropenia treated?  If you already have an infection, your care team may give you medicine to help fight it. If you do not have an infection, you may receive:     Neulasta (pegfilgrastim)    Neupogen (filgrastim)    Other: ________________________  These medicines can help your body make more white blood cells. Your care team will tell you if medicine is right for you. You will need a number of blood tests to see how well your treatment is working.  What else can I do to prevent infection?    Wash your hands often, including before meals and  after using the toilet. Hand washing is the best way to prevent infections. Remind visitors to wash their hands as well.    Take a warm shower each day and pat your skin dry.    Rinse your mouth with mild salt water four times a day.    Brush your teeth with a soft toothbrush after meals. Floss gently.    Take steps to prevent cuts or scrapes, which can lead to infection. For example: use an electric razor, be careful with sharp objects, wear gloves when possible and don't go barefoot.    If you have a cut or scrape, wash the area with soap and water, then cover it with a clean bandage until it heals.    Use lotion to prevent cracks in your skin.    Do not cut your cuticles. Use cream removers instead. Do not wear fake fingernails.    Try to prevent constipation (hard stools). Drink plenty of fluids. If you can, eat whole grains and fresh fruits and vegetables. Ask your care team if you should take a stool softener (such as Colace).    Wipe yourself from front to back after using the toilet.    Avoid:  ? People who have a cold or the flu  ? Young children who have recently received a live vaccine  ? Large crowds  ? Fresh plants, flowers, dried bryan and dirt.    Do not have surgery or dental work.    Do not have sex until your white blood cells are back to normal. Do not use enemas, suppositories, douches or tampons.    Ask others to clean up after pets.    Always talk to your cancer doctor before receiving shots (immunization) .  When should I call my care team?  Call your care team if:    Your temperature is more than 100.4 F (38 C) (taken under the tongue).    You have chills and are shaking.    You have pain when urinating (using the toilet), or you need to pee more often than normal.    You have itching or unusual drainage from your vagina.    You have pain when breathing, or you are having a hard time breathing.    You have a cough or sore throat.    You have soft white patches on your tongue or the sides of  "your mouth.    You see redness, swelling or fluid draining from a cut or wound.    You suddenly feel very weak and tired.  Comments:  ____________________________________  _______________________________________  _______________________________________  _______________________________________  _______________________________________  _______________________________________  Food Safety Guidelines  Buying food    Buy foods before the expiration date listed on food labels. (also called the \"use by\" date.)    Check that milk, apple cider, egg and cheese products have been pasteurized. This should be printed on their labels.    Buy fruits and vegetables with unbroken skins.  Preparing food    Check the labels on your foods. Throw away any foods that are past their \"use-by\" dates.    Wash your hands before making food.    Use soap and water to wash the surfaces you use to prepare food. Dry the surfaces well. Use only clean utensils.    Wash all raw fruits and vegetables.    Keep uncooked foods separate from cooked foods.    Use one cutting board (non-wood) for raw meats, another for fruits and vegetables, and a third for prepared foods. Wash cutting boards often. This will help prevent cross-contamination.    Thaw frozen food in the refrigerator or in cold water. Do not thaw at room temperature.    Cook raw meat well. The temperature inside the meat should stay at the following for at least 15 seconds:  ? 165 F (74 C) or higher for poultry (whole or ground chicken or turkey)  ? 155 F (68 C) or higher for ground meat (beef, pork) or ground fish  ? 145 F (63 C) for whole beef, pork or fish  Meals and snacks    Wash your hands before eating. Always use clean plates, glasses and utensils.    Avoid raw fish, raw meat, uncooked eggs, raw cookie dough and natural cheese. (This includes moldy cheese, such as blue cheese, as well as cheese made with unpasteurized milk).    Do not drink from a can or bottle. Wash the can or bottle " before opening it, and then pour the drink into a cup.  Storing leftovers    Throw out leftovers that have been at room temperature longer than two hours.    Throw out leftovers older than two days.    When reheating leftovers, the temperature inside should be at 165 F (74 C) for at least 15 seconds.    Refrigerated foods should be stored at of 40 F (4.5 C) or less.  Dining out    Ask staff to make sure there are no raw ingredients (such as raw eggs or meat) in your food.    Ask that egg dishes be fully cooked. Meat should be medium-well or well-done.    Avoid buffets and salad bars.  For informational purposes only. Not to replace the advice of your health care provider.   Copyright   2006 [facility]. All rights reserved. [employer] [id number] - REV 10/15..

## 2021-03-08 ENCOUNTER — TELEPHONE (OUTPATIENT)
Dept: ONCOLOGY | Facility: CLINIC | Age: 66
End: 2021-03-08

## 2021-03-09 ENCOUNTER — VIRTUAL VISIT (OUTPATIENT)
Dept: ONCOLOGY | Facility: CLINIC | Age: 66
End: 2021-03-09
Attending: GENETIC COUNSELOR, MS
Payer: MEDICAID

## 2021-03-09 DIAGNOSIS — Z78.9 FAMILY HISTORY UNKNOWN: ICD-10-CM

## 2021-03-09 DIAGNOSIS — C56.9 OVARIAN CANCER, UNSPECIFIED LATERALITY (H): Primary | ICD-10-CM

## 2021-03-09 PROCEDURE — 96040 HC GENETIC COUNSELING, EACH 30 MINUTES: CPT | Mod: GT | Performed by: GENETIC COUNSELOR, MS

## 2021-03-09 NOTE — PROGRESS NOTES
3/9/2021    Referring Provider:      Presenting Information:   I met with Taran Regalado today for genetic counseling at the Cancer Risk Management Program at the [facility] to discuss her personal history of ovarian cancer. She is here today to review this history and available genetic testing options. Taran is enrolled in the [facility] Ovarian Cancer    Personal History:  Taran is a 65 year old female.  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    Family History: (Please see scanned pedigree for detailed family history information)    Taran has three daughters, ages 36, 39, 41.  She has six grandchildren.      Taran was adopted and does not have known family history information    Her ethnicity is [place]/?.  There is no known Ashkenazi Evangelical ancestry on either side of her family. There is no reported consanguinity.     Discussion:    Taran's personal history of ovarian cancer is suggestive of a possible cancer susceptibility gene in the family. Given her diagnosis of ovarian cancer, we discussed Hereditary Breast and Ovarian Cancer syndrome caused by mutations in the BRCA1/2 genes.    We discussed the natural history and genetics of Hereditary Breast and Ovarian cancer (HBOC) syndrome, due to BRCA1 and BRCA2 gene mutations. A single mutation in one of the copies of BRCA1 or BRCA2 increases the risk for certain cancers, including breast and ovarian.  Men who carry a mutation in one of these genes are at increased risk for male breast cancer and prostate cancer.  The risk for pancreatic cancer and melanoma may also be slightly increased in some families.      Based on her personal history of ovarian cancer, Taran meets current National Comprehensive Cancer Network (NCCN) criteria for genetic testing of BRCA1 and BRCA2. This testing is medically necessary for the medical and surgical management of her ovarian  cancer.    Medical Management: Management guidelines are available for individuals who carry a pathogenic BRCA1 or BRCA2 gene mutation, developed by the National Comprehensive Cancer Network (NCCN). These recommendations may include:    additional cancer screening recommendations (i.e. mammogram and breast MRI, annual dermatologic exams, consideration of pancreatic cancer screening, etc.)     options for risk reducing surgeries for Taran and her relatives.      Targeted chemotherapies for patients who have certain cancers now, or who develop certain cancers in the future.     These recommendations and possible targeted chemotherapies will be discussed in detail once genetic testing is completed.     We discussed that there are additional genes that could cause increased risk of ovarian cancer, such as Davey syndrome. There are currently five genes known to cause Davey Syndrome: MLH1, MSH2, MSH6, PMS2, and EPCAM.  A single mutation in one of these genes increases the risk for colon, endometrial, ovarian, and stomach cancers.  Other cancers that can be seen in some families with Davey Syndrome include pancreatic, bladder, biliary tract, urothelial, small bowel, prostate, breast and brain cancers.    As many of the genes associated with hereditary ovarian cancer present with overlapping features in a family, it would be reasonable for Taran to consider panel genetic testing to analyze multiple genes at once.     A detailed handout regarding hereditary gynecologic cancer risk genes and the information discussed was provided to Taran and can be found in the after visit summary.  Topics included: inheritance pattern, cancer risks, cancer screening recommendations, and also risks, benefits and limitations of testing.    Taran is enrolled in the [facility] Ovarian Cancer, and will be receiving genetic analysis of additional genes related to ovarian cancer as part of this program.  In addition,  Taran elected to receive secondary results, including medically significant genes  unrelated to hereditary ovarian cancer. This study is being conducted by Bolivar Juarez MD, in the Department of Obstetrics, Gynecology and Women's Health at the [facility].      Verbal consent was obtained and genetic testing for BRCA1 and BRCA2 was sent to the [facility]. We discussed the benefits and limitations of pursuing additional genetic testing through the [facility] Ovarian Cancer. Taran expressed understanding, and has elected to receive all primary and secondary results with candidate variants/variants of uncertain clinical significance reported. Taran will be contacted once results are available.          Plan:  1. Today Taran elected to proceed with genetic testing for BRCA1 and BRCA2 through the [facility] of [facility].   2. This information should be available in 4-6 weeks, and Taran will be contacted once results are available.  3. Taran is enrolled in the [facility] Ovarian Cancer and will be receiving genetic analysis of other ovarian cancer-associated genes as part of this program.  Germline genetic test results from this study will be communicated, per patient preference, once available.      Face to face time (video visit): 20 minutes    Lauren Ibanez MS, [facility]  Licensed, Certified Genetic Counselor  [facility]  Phone: [phone]

## 2021-03-09 NOTE — LETTER
3/9/2021         [facility]: [facility]ado  [address] [facility] [address]        Dear Colleague,    Thank you for referring your patient, Taran Regalado, to the [facility]. Please see a copy of my visit note below.    3/9/2021    Referring Provider:      Presenting Information:   I met with Taran Regalado today for genetic counseling at the Cancer Risk Management Program at the [facility] to discuss her personal history of ovarian cancer. She is here today to review this history and available genetic testing options. Taran is enrolled in the [facility] in Ovarian Cancer    Personal History:  Taran is a 65 year old female.  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    Family History: (Please see scanned pedigree for detailed family history information)    Taran has three daughters, ages 36, 39, 41.  She has six grandchildren.      Taran was adopted and does not have known family history information    Her ethnicity is [place]/?.  There is no known Ashkenazi Shinto ancestry on either side of her family. There is no reported consanguinity.     Discussion:    Taran's personal history of ovarian cancer is suggestive of a possible cancer susceptibility gene in the family. Given her diagnosis of ovarian cancer, we discussed Hereditary Breast and Ovarian Cancer syndrome caused by mutations in the BRCA1/2 genes.    We discussed the natural history and genetics of Hereditary Breast and Ovarian cancer (HBOC) syndrome, due to BRCA1 and BRCA2 gene mutations. A single mutation in one of the copies of BRCA1 or BRCA2 increases the risk for certain cancers, including breast and ovarian.  Men who carry a mutation in one of these genes are at increased risk for male breast cancer and prostate cancer.  The risk for pancreatic cancer and melanoma may also be slightly increased in some families.      Based on  her personal history of ovarian cancer, Taran meets current National Comprehensive Cancer Network (NCCN) criteria for genetic testing of BRCA1 and BRCA2. This testing is medically necessary for the medical and surgical management of her ovarian cancer.    Medical Management: Management guidelines are available for individuals who carry a pathogenic BRCA1 or BRCA2 gene mutation, developed by the National Comprehensive Cancer Network (NCCN). These recommendations may include:    additional cancer screening recommendations (i.e. mammogram and breast MRI, annual dermatologic exams, consideration of pancreatic cancer screening, etc.)     options for risk reducing surgeries for Taran and her relatives.      Targeted chemotherapies for patients who have certain cancers now, or who develop certain cancers in the future.     These recommendations and possible targeted chemotherapies will be discussed in detail once genetic testing is completed.     We discussed that there are additional genes that could cause increased risk of ovarian cancer, such as Davey syndrome. There are currently five genes known to cause Davey Syndrome: MLH1, MSH2, MSH6, PMS2, and EPCAM.  A single mutation in one of these genes increases the risk for colon, endometrial, ovarian, and stomach cancers.  Other cancers that can be seen in some families with Davey Syndrome include pancreatic, bladder, biliary tract, urothelial, small bowel, prostate, breast and brain cancers.    As many of the genes associated with hereditary ovarian cancer present with overlapping features in a family, it would be reasonable for Taran to consider panel genetic testing to analyze multiple genes at once.     A detailed handout regarding hereditary gynecologic cancer risk genes and the information discussed was provided to Taran and can be found in the after visit summary.  Topics included: inheritance pattern, cancer risks, cancer screening recommendations, and also  risks, benefits and limitations of testing.    Taran is enrolled in the [facility] in Ovarian Cancer, and will be receiving genetic analysis of additional genes related to ovarian cancer as part of this program.  In addition, Taran elected to receive secondary results, including medically significant genes  unrelated to hereditary ovarian cancer. This study is being conducted by Bolivar Juarez MD, in the Department of Obstetrics, Gynecology and Women's Health at the [facility].      Verbal consent was obtained and genetic testing for BRCA1 and BRCA2 was sent to the [facility]. We discussed the benefits and limitations of pursuing additional genetic testing through the [facility] in Ovarian Cancer. Taran expressed understanding, and has elected to receive all primary and secondary results with candidate variants/variants of uncertain clinical significance reported. Taran will be contacted once results are available.          Plan:  1. Today Taran elected to proceed with genetic testing for BRCA1 and BRCA2 through the [facility] of [facility].   2. This information should be available in 4-6 weeks, and Taran will be contacted once results are available.  3. Taran is enrolled in the [facility] in Ovarian Cancer and will be receiving genetic analysis of other ovarian cancer-associated genes as part of this program.  Germline genetic test results from this study will be communicated, per patient preference, once available.      Face to face time (video visit): 20 minutes    Lauren Ibanez MS, [facility] [place] [facility]  Licensed, Certified Genetic Counselor  [facility]  Phone: [phone]

## 2021-03-11 NOTE — PATIENT INSTRUCTIONS
Assessing Cancer Risk  Only about 5-10% of cancers are thought to be due to an inherited cancer susceptibility gene.    These families often have:    Several people with the same or related types of cancer    Cancers diagnosed at a young age (before age 50)    Individuals with more than one primary cancer    Multiple generations of the family affected with cancer    Some people may be candidates for genetic testing of more than one gene.  For these families, genetic testing using a cancer panel may be offered.  These panels will test different genes known to increase the risk for breast, ovarian, uterine, and/or other cancers. All of the genes discussed below have published clinical management guidelines for individuals who are found to carry a mutation. The purpose of this handout is to serve as a brief summary of the genes analyzed by the panels used to inquire about hereditary breast and gynecologic cancer:  PATRIZIA, BRCA1, BRCA2, BRIP1, CDH1, CHEK2, MLH1, MSH2, MSH6, PMS2, EPCAM, PTEN, PALB2, RAD51C, RAD51D, and TP53.  ______________________________________________________________________________  Hereditary Breast and Ovarian Cancer Syndrome   (BRCA1 and BRCA2)  A single mutation in one of the copies of BRCA1 or BRCA2 increases the risk for breast and ovarian cancer, among others.  The risk for pancreatic cancer and melanoma may also be slightly increased in some families.  The chart below shows the chance that someone with a BRCA mutation would develop cancer in his or her lifetime1,2,3,4.        A person s ethnic background is also important to consider, as individuals of Ashkenazi Judaism ancestry have a higher chance of having a BRCA gene mutation.  There are three BRCA mutations that occur more frequently in this population.    Davey Syndrome   (MLH1, MSH2, MSH6, PMS2, and EPCAM)  Currently five genes are known to cause Davey Syndrome: MLH1, MSH2, MSH6, PMS2, and EPCAM.  A single mutation in one of the  Davey Syndrome genes increases the risk for colon, endometrial, ovarian, and stomach cancers.  Other cancers that occur less commonly in Davey Syndrome include urinary tract, skin, and brain cancers.  The chart below shows the chance that a person with Davey syndrome would develop cancer in his or her lifetime5.      *Cancer risk varies depending on Davey syndrome gene found    Cowden Syndrome   (PTEN)  Cowden syndrome is a hereditary condition that increases the risk for breast, thyroid, endometrial, colon, and kidney cancer.  Cowden syndrome is caused by a mutation in the PTEN gene.  A single mutation in one of the copies of PTEN causes Cowden syndrome and increases cancer risk.  The chart below shows the chance that someone with a PTEN mutation would develop cancer in their lifetime6,7.  Other benign features seen in some individuals with Cowden syndrome include benign skin lesions (facial papules, keratoses, lipomas), learning disability, autism, thyroid nodules, colon polyps, and larger head size.      *One recent study found breast cancer risk to be increased to 85%    Li-Fraumeni Syndrome   (TP53)  Li-Fraumeni Syndrome (LFS) is a cancer predisposition syndrome caused by a mutation in the TP53 gene. A single mutation in one of the copies of TP53 increases the risk for multiple cancers. Individuals with LFS are at an increased risk for developing cancer at a young age. The lifetime risk for development of a LFS-associated cancer is 50% by age 30 and 90% by age 60.   Core Cancers: Sarcomas, Breast, Brain, Lung, Leukemias/Lymphomas, Adrenocortical carcinomas  Other Cancers: Gastrointestinal, Thyroid, Skin, Genitourinary    Hereditary Diffuse Gastric Cancer   (CDH1)  Currently, one gene is known to cause hereditary diffuse gastric cancer (HDGC): CDH1.  Individuals with HDGC are at increased risk for diffuse gastric cancer and lobular breast cancer. Of people diagnosed with HDGC, 30-50% have a mutation in the CDH1  gene.  This suggests there are likely other genes that may cause HDGC that have not been identified yet.      Lifetime Cancer Risks    General Population HDGC    Diffuse Gastric  <1% ~80%   Breast 12% 39-52%         Additional Genes  PATRIZIA  PATRIZIA is a moderate-risk breast cancer gene. Women who have a mutation in PATRIZIA can have between a 2-4 fold increased risk for breast cancer compared to the general population8. PATRIZIA mutations have also been associated with increased risk for pancreatic cancer, however an estimate of this cancer risk is not well understood9. Individuals who inherit two PATRIZIA mutations have a condition called ataxia-telangiectasia (AT).  This rare autosomal recessive condition affects the nervous system and immune system, and is associated with progressive cerebellar ataxia beginning in childhood.  Individuals with ataxia-telangiectasia often have a weakened immune system and have an increased risk for childhood cancers.    PALB2  Mutations in PALB2 have been shown to increase the risk of breast cancer up to 33-58% in some families; where individuals fall within this risk range is dependent upon family [id number]. PALB2 mutations have also been associated with increased risk for pancreatic cancer, although this risk has not been quantified yet.  Individuals who inherit two PALB2 mutations--one from their mother and one from their father--have a condition called Fanconi Anemia.  This rare autosomal recessive condition is associated with short stature, developmental delay, bone marrow failure, and increased risk for childhood cancers.    CHEK2   CHEK2 is a moderate-risk breast cancer gene.  Women who have a mutation in CHEK2 have around a 2-fold increased risk for breast cancer compared to the general population, and this risk may be higher depending upon family history.11,12,13 Mutations in CHEK2 have also been shown to increase the risk of a number of other cancers, including colon and prostate, however  these cancer risks are currently not well understood.    BRIP1, RAD51C and RAD51D  Mutations in BRIP1, RAD51C, and RAD51D have been shown to increase the risk of ovarian cancer and possibly female breast cancer as well14,15 .       Lifetime Cancer Risk    General Population BRIP1 RAD51C RAD51D   Ovarian 1-2% ~5-8% ~5-9% ~7-15%           Inheritance  All of the cancer syndromes reviewed above are inherited in an autosomal dominant pattern.  This means that if a parent has a mutation, each of his or her children will have a 50% chance of inheriting that same mutation.  Therefore, each child--male or female--would have a 50% chance of being at increased risk for developing cancer.      Image obtained from Genetics Home Reference, 2013     Mutations in some genes can occur de kelley, which means that a person s mutation occurred for the first time in them and was not inherited from a parent.  Now that they have the mutation, however, it can be passed on to future generations.    Genetic Testing  Genetic testing involves a blood test and will look at the genetic information in the PATRIZIA, BRCA1, BRCA2, BRIP1, CDH1, CHEK2, MLH1, MSH2, MSH6, PMS2, EPCAM, PTEN, PALB2, RAD51C, RAD51D, and TP53 genes for any harmful mutations that are associated with increased cancer risk.  If possible, it is recommended that the person(s) who has had cancer be tested before other family members.  That person will give us the most useful information about whether or not a specific gene is associated with the cancer in the family.    Results  There are three possible results of genetic testing:    Positive--a harmful mutation was identified in one or more of the genes    Negative--no mutation was identified in any of the genes on this panel    Variant of unknown significance--a variation in one of the genes was identified, but it is unclear how this impacts cancer risk in the family    Advantages and Disadvantages   There are advantages and  disadvantages to genetic testing.    Advantages    May clarify your cancer risk    Can help you make medical decisions    May explain the cancers in your family    May give useful information to your family members (if you share your results)    Disadvantages    Possible negative emotional impact of learning about inherited cancer risk    Uncertainty in interpreting a negative test result in some situations    Possible genetic discrimination concerns (see below)    Genetic Information Nondiscrimination Act (SAMSON)  SAMSON is a federal law that protects individuals from health insurance or employment discrimination based on a genetic test result alone.  Although rare, there are currently no legal discrimination protections in terms of life insurance, long term care, or disability insurances.  Visit the [employer] website to learn more.    Reducing Cancer Risk  All of the genes described above have nationally recognized cancer screening guidelines that would be recommended for individuals who test positive.  In addition to increased cancer screening, surgeries may be offered or recommended to reduce cancer risk.  Recommendations are based upon an individual s genetic test result as well as their personal and family history of cancer.    Questions to Think About Regarding Genetic Testing:    What effect will the test result have on me and my relationship with my family members if I have an inherited gene mutation?  If I don t have a gene mutation?    Should I share my test results, and how will my family react to this news, which may also affect them?    Are my children ready to learn new information that may one day affect their own health?    Hereditary Cancer Resources    FORCE: Facing Our Risk of Cancer Empowered facingourrisk.org   Bright Pink bebrightpink.org   Li-Fraumeni Syndrome Association lfsassociation.org   PTEN World PTENworld.com   No stomach for cancer, Inc.  nostomachforcancer.org   Stomach cancer relief network Scrnet.org   Collaborative Group of the Americas on Inherited Colorectal Cancer (CGA) cgaicc.com    Cancer Care cancercare.org   American Cancer Society (ACS) cancer.org   National Cancer [place] (NCI) cancer.gov     Please call us if you have any questions or concerns.   Cancer Risk Management Program [phone] ([phone])  ? Salvatore Orellana, MS, [facility] [phone]  ? Jannet Woodard, MS, [facility]  [phone]  ? Lauren Ibanez, MS, [facility]  [phone]  ? Eliza No, MS, [facility] [phone]  ? Jossy Shanna, MS, [facility] [phone]  ? Estefania San, MS, [facility]  [phone]    References  1. Dawood A, Bonny PDP, Rolando S, Torres EUBANKS, Adelina JE, Aggie JL, Raad N, Oc H, Johnnie O, Dario A, Parris B, Indu P, Mangypsy S, Vanesa DM, Hill N, Karis E, Giancarlo H, Fox E, Yusuf J, Gronmicheal J, Roberto B, Rocio H, Thorlacius S, Eerola H, Dilan H, Quintin K, Darrel OP. Average risks of breast and ovarian cancer associated with BRCA1 or BRCA2 mutations detected in case series unselected for family history: a combined analysis of 222 studies. Am J Hum Bernarda. 2003;72:1117-30.  2. Darrin CHASE, Grace M, Catherine G.  BRCA1 and BRCA2 Hereditary Breast and Ovarian Cancer. Gene Reviews online. 2013.  3. Carson YC, Frank S, Sean G, Petit S. Breast cancer risk among male BRCA1 and BRCA2 mutation carriers. J Natl Cancer Inst. 2007;99:1811-4.  4. William VENEGAS, Louise I, Asif J, Chapo E, Wyatt ER, Ashli F. Risk of breast cancer in male BRCA2 carriers. J Med Bernarda. 2010;47:710-1.  5. National Comprehensive Cancer Network. Clinical practice guidelines in oncology, colorectal cancer screening. Available online (registration required). 2015.  6. Ruben LUU, Chloe J, Neri J, Amandeep LA, Harley MICHEL, Nayeli C. Lifetime cancer risks in individuals with germline PTEN mutations. Clin Cancer Res. 2012;18:400-7.  7. Lay DEE. Cowden Syndrome: A Critical Review of the  Clinical Literature. J Bernarda . 2009:18:13-27.  8. Jose A, Fab D, Callie S, Marta P, Hoang T, Jacques M, Chi B, Blue H, Yasmin R, Myrtle K, Lg L, William DG, Vanesa D, Tarun DF, Berny MR, The Breast Cancer Susceptibility Collaboration () & Shaista CHASE. PATRIZIA mutations that cause ataxia-telangiectasia are breast cancer susceptibility alleles. Nature Genetics. 2006;38:873-875  9. Arnold N , Dhruv Y, Stefany J, Humza L, Mahi GM , Citlaly ML, Gallinger S, Colunga AG, Syngal S, Solange ML, Vivek J , Carolann R, Renzo SZ, Evie JR, Terence VE, Gemma M, Vocelia B, Jennifer N, Sapphire RH, Myranda KW, and Devora AP. PATRIZIA mutations in patients with hereditary pancreatic cancer. Cancer Discover. 2012;2:41-46  10. Dawood SAAVEDRA, et al. Breast-Cancer Risk in Families with Mutations in PALB2. NEJM. 2014; 371(6):497-506.  11. CHEK2 Breast Cancer Case-Control Consortium. CHEK2*1100delC and susceptibility to breast cancer: A collaborative analysis involving 10,860 breast cancer cases and 9,065 controls from 10 studies. Am J Hum Bernarda, 74 (2004), pp. 5334-6800  12. Conrad T, Alexander S, Kiran K, et al. Spectrum of Mutations in BRCA1, BRCA2, CHEK2, and TP53 in Families at High Risk of Breast Cancer. CORIN. 2006;295(12):3803-3478.   13. Sarbjit C, Annabella D, Phyllis A, et al. Risk of breast cancer in women with a CHEK2 mutation with and without a family history of breast cancer. J Clin Oncol. 2011;29:8208-2146.  14. Lan H, Vivien E, Tali SJ, et al. Contribution of germline mutations in the RAD51B, RAD51C, and RAD51D genes to ovarian cancer in the population. J Clin Oncol. 2015;33(26):7321-9096. Doi:10.1200/JCO.2015.61.2408.  15. Chantell T, Paige MARTINEZ, Samir P, et al. Mutations in BRIP1 confer high risk of ovarian cancer. Nohemi Bernarda. 2011;43(11):6124-6059. doi:10.1038/ng.955.

## 2021-03-12 ENCOUNTER — APPOINTMENT (OUTPATIENT)
Dept: LAB | Facility: CLINIC | Age: 66
End: 2021-03-12
Attending: OBSTETRICS & GYNECOLOGY
Payer: MEDICAID

## 2021-03-12 ENCOUNTER — INFUSION THERAPY VISIT (OUTPATIENT)
Dept: ONCOLOGY | Facility: CLINIC | Age: 66
End: 2021-03-12
Attending: OBSTETRICS & GYNECOLOGY
Payer: MEDICAID

## 2021-03-12 VITALS
WEIGHT: 155.2 LBS | SYSTOLIC BLOOD PRESSURE: 107 MMHG | BODY MASS INDEX: 21.05 KG/M2 | TEMPERATURE: 98 F | RESPIRATION RATE: 17 BRPM | OXYGEN SATURATION: 99 % | HEART RATE: 64 BPM | DIASTOLIC BLOOD PRESSURE: 66 MMHG

## 2021-03-12 DIAGNOSIS — D70.1 CHEMOTHERAPY-INDUCED NEUTROPENIA (H): ICD-10-CM

## 2021-03-12 DIAGNOSIS — T45.1X5A CHEMOTHERAPY-INDUCED NEUTROPENIA (H): ICD-10-CM

## 2021-03-12 DIAGNOSIS — C56.9 OVARIAN CANCER, UNSPECIFIED LATERALITY (H): Primary | ICD-10-CM

## 2021-03-12 DIAGNOSIS — Z78.9 FAMILY HISTORY UNKNOWN: ICD-10-CM

## 2021-03-12 DIAGNOSIS — Z51.11 ENCOUNTER FOR ANTINEOPLASTIC CHEMOTHERAPY: ICD-10-CM

## 2021-03-12 LAB
ALBUMIN SERPL-MCNC: 3.4 G/DL (ref 3.4–5)
ALP SERPL-CCNC: 99 U/L (ref 40–150)
ALT SERPL W P-5'-P-CCNC: 25 U/L (ref 0–50)
ANION GAP SERPL CALCULATED.3IONS-SCNC: 8 MMOL/L (ref 3–14)
AST SERPL W P-5'-P-CCNC: 19 U/L (ref 0–45)
BASOPHILS # BLD AUTO: 0 10E9/L (ref 0–0.2)
BASOPHILS NFR BLD AUTO: 1.3 %
BILIRUB SERPL-MCNC: 0.2 MG/DL (ref 0.2–1.3)
BUN SERPL-MCNC: 17 MG/DL (ref 7–30)
CALCIUM SERPL-MCNC: 8.6 MG/DL (ref 8.5–10.1)
CHLORIDE SERPL-SCNC: 107 MMOL/L (ref 94–109)
CO2 SERPL-SCNC: 28 MMOL/L (ref 20–32)
CREAT SERPL-MCNC: 0.95 MG/DL (ref 0.52–1.04)
DIFFERENTIAL METHOD BLD: ABNORMAL
EOSINOPHIL # BLD AUTO: 0 10E9/L (ref 0–0.7)
EOSINOPHIL NFR BLD AUTO: 1.7 %
ERYTHROCYTE [DISTWIDTH] IN BLOOD BY AUTOMATED COUNT: 23.7 % (ref 10–15)
GFR SERPL CREATININE-BSD FRML MDRD: 63 ML/MIN/{1.73_M2}
GLUCOSE SERPL-MCNC: 123 MG/DL (ref 70–99)
HCT VFR BLD AUTO: 33.8 % (ref 35–47)
HGB BLD-MCNC: 10.5 G/DL (ref 11.7–15.7)
IMM GRANULOCYTES # BLD: 0 10E9/L (ref 0–0.4)
IMM GRANULOCYTES NFR BLD: 0.4 %
LYMPHOCYTES # BLD AUTO: 1.2 10E9/L (ref 0.8–5.3)
LYMPHOCYTES NFR BLD AUTO: 52.8 %
MAGNESIUM SERPL-MCNC: 2.1 MG/DL (ref 1.6–2.3)
MCH RBC QN AUTO: 30.3 PG (ref 26.5–33)
MCHC RBC AUTO-ENTMCNC: 31.1 G/DL (ref 31.5–36.5)
MCV RBC AUTO: 98 FL (ref 78–100)
MONOCYTES # BLD AUTO: 0.3 10E9/L (ref 0–1.3)
MONOCYTES NFR BLD AUTO: 13.3 %
NEUTROPHILS # BLD AUTO: 0.7 10E9/L (ref 1.6–8.3)
NEUTROPHILS NFR BLD AUTO: 30.5 %
NRBC # BLD AUTO: 0 10*3/UL
NRBC BLD AUTO-RTO: 0 /100
PLATELET # BLD AUTO: 241 10E9/L (ref 150–450)
POTASSIUM SERPL-SCNC: 3.8 MMOL/L (ref 3.4–5.3)
PROT SERPL-MCNC: 7.4 G/DL (ref 6.8–8.8)
RBC # BLD AUTO: 3.46 10E12/L (ref 3.8–5.2)
SODIUM SERPL-SCNC: 142 MMOL/L (ref 133–144)
WBC # BLD AUTO: 2.3 10E9/L (ref 4–11)

## 2021-03-12 PROCEDURE — 80053 COMPREHEN METABOLIC PANEL: CPT | Performed by: NURSE PRACTITIONER

## 2021-03-12 PROCEDURE — 250N000011 HC RX IP 250 OP 636: Performed by: NURSE PRACTITIONER

## 2021-03-12 PROCEDURE — 83735 ASSAY OF MAGNESIUM: CPT | Performed by: NURSE PRACTITIONER

## 2021-03-12 PROCEDURE — 96372 THER/PROPH/DIAG INJ SC/IM: CPT | Performed by: NURSE PRACTITIONER

## 2021-03-12 PROCEDURE — 85025 COMPLETE CBC W/AUTO DIFF WBC: CPT | Performed by: NURSE PRACTITIONER

## 2021-03-12 PROCEDURE — 999N001104 HC STATISTIC DNA ISOL HIGH PURITY: Performed by: OBSTETRICS & GYNECOLOGY

## 2021-03-12 RX ADMIN — FILGRASTIM-SNDZ 300 MCG: 300 INJECTION, SOLUTION INTRAVENOUS; SUBCUTANEOUS at 08:10

## 2021-03-12 ASSESSMENT — PAIN SCALES - GENERAL: PAINLEVEL: NO PAIN (0)

## 2021-03-12 NOTE — PROGRESS NOTES
Infusion Nursing Note:  Taran Regalado presents today for Cycle 3 Day 1 Paclitaxel, Carboplatin (Deferred r/t neutropenia) Filgrastim-sndz injection given.    Patient seen by provider today: No   present during visit today: Not Applicable.    Note: Jannie presents to infusion today stating she feels well. She denies any symptoms, concerns, or pain today. No recent fevers or signs of infection. ANC 0.7 today, does not meet treatment parameters for chemotherapy    TORB: Ella Murillo NP/ Candy Escobar RN 3/12/21 0742   - Defer chemo to next week, as early as Monday 3/15 will be OK  - Filgrastim- sndz injection should be given today and tomorrow    Patient did not meet criteria for an asymptomatic covid-19 PCR test in infusion today. Patient declined the covid-19 test.    Intravenous Access:  Peripheral IV placed.    Treatment Conditions:  Lab Results   Component Value Date    HGB 10.5 03/12/2021     Lab Results   Component Value Date    WBC 2.3 03/12/2021      Lab Results   Component Value Date    ANEU 0.7 03/12/2021     Lab Results   Component Value Date     03/12/2021      Lab Results   Component Value Date     03/12/2021                   Lab Results   Component Value Date    POTASSIUM 3.8 03/12/2021           Lab Results   Component Value Date    MAG 2.1 03/12/2021            Lab Results   Component Value Date    CR 0.95 03/12/2021                   Lab Results   Component Value Date    HORACIO 8.6 03/12/2021                Lab Results   Component Value Date    BILITOTAL 0.2 03/12/2021           Lab Results   Component Value Date    ALBUMIN 3.4 03/12/2021                    Lab Results   Component Value Date    ALT 25 03/12/2021           Lab Results   Component Value Date    AST 19 03/12/2021       Results reviewed, labs did NOT meet treatment parameters: See TORB.      Post Infusion Assessment:  Patient tolerated Neupogen injection without incident to RIGHT arm   Access discontinued per  protocol.       Discharge Plan:   Patient declined prescription refills.  AVS to patient via [employer].  Patient not scheduled for next appointment- needs Neupogen tomorrow and chemo next week- Monday 3/15 if possible (IB sent to care coordinator and schedulers--- Mira Hinojosa confirmed she is working on this)  Patient discharged in stable condition accompanied by: self.  Departure Mode: Ambulatory.  Face to Face time: 5.    Maria G Escobar RN

## 2021-03-12 NOTE — PATIENT INSTRUCTIONS
March 2021 Sunday Monday Tuesday Wednesday Thursday Friday Saturday        1     2     3     4     5    LAB PERIPHERAL   7:30 AM   (15 min.)   [facility]   [facility] ONC INFUSION 360   8:00 AM   (360 min.)    ONCOLOGY INFUSION   [facility] 6       7     8     9    VIDEO VISIT NEW  11:00 AM   (75 min.)   Lauren Ibanez GC   [facility] 10     11     12    LAB PERIPHERAL   6:45 AM   (15 min.)   [facility]   [facility]    [facility] ONC INFUSION 360   7:30 AM   (360 min.)    ONCOLOGY INFUSION   [facility] 13       14     15     16     17     18     19    LAB PERIPHERAL  10:00 AM   (15 min.)   [facility]   [facility] ONC INFUSION 360  10:30 AM   (360 min.)    ONCOLOGY INFUSION   [facility] 20       21     22     23     24     25     26     27       28     29     30     31 April 2021 Sunday Monday Tuesday Wednesday Thursday Friday Saturday                       1     2    LAB   1:45 PM   (15 min.)   [facility]   [facility] [place]    CT CHEST ABDOMEN PELVIS WWO   2:10 PM   (20 min.)   [facility]   [facility] [place] 3       4     5     6     7    TELEPHONE VISIT RETURN   5:00 PM   (20 min.)   Bolivar Juarez MD   [facility] 8     9    [facility] ONC INFUSION 360  10:00 AM   (360 min.)   [facility] ONCOL[facility]FUSION   [facility] 10       11     12     13     14     15     16     17       18     19     20     21     22     23     24       25     26     27     28     29     30                          Lab Results:  Recent Results (from the past 12 hour(s))   CBC with platelets differential    Collection Time: 03/12/21  6:44 AM   Result Value Ref Range     WBC 2.3 (L) 4.0 - 11.0 10e9/L    RBC Count 3.46 (L) 3.8 - 5.2 10e12/L    Hemoglobin 10.5 (L) 11.7 - 15.7 g/dL    Hematocrit 33.8 (L) 35.0 - 47.0 %    MCV 98 78 - 100 fl    MCH 30.3 26.5 - 33.0 pg    MCHC 31.1 (L) 31.5 - 36.5 g/dL    RDW 23.7 (H) 10.0 - 15.0 %    Platelet Count 241 150 - 450 10e9/L    Diff Method Automated Method     % Neutrophils 30.5 %    % Lymphocytes 52.8 %    % Monocytes 13.3 %    % Eosinophils 1.7 %    % Basophils 1.3 %    % Immature Granulocytes 0.4 %    Nucleated RBCs 0 0 /100    Absolute Neutrophil 0.7 (L) 1.6 - 8.3 10e9/L    Absolute Lymphocytes 1.2 0.8 - 5.3 10e9/L    Absolute Monocytes 0.3 0.0 - 1.3 10e9/L    Absolute Eosinophils 0.0 0.0 - 0.7 10e9/L    Absolute Basophils 0.0 0.0 - 0.2 10e9/L    Abs Immature Granulocytes 0.0 0 - 0.4 10e9/L    Absolute Nucleated RBC 0.0    Comprehensive metabolic panel    Collection Time: 03/12/21  6:44 AM   Result Value Ref Range    Sodium 142 133 - 144 mmol/L    Potassium 3.8 3.4 - 5.3 mmol/L    Chloride 107 94 - 109 mmol/L    Carbon Dioxide 28 20 - 32 mmol/L    Anion Gap 8 3 - 14 mmol/L    Glucose 123 (H) 70 - 99 mg/dL    Urea Nitrogen 17 7 - 30 mg/dL    Creatinine 0.95 0.52 - 1.04 mg/dL    GFR Estimate 63 >60 mL/min/[1.73_m2]    GFR Estimate If Black 73 >60 mL/min/[1.73_m2]    Calcium 8.6 8.5 - 10.1 mg/dL    Bilirubin Total 0.2 0.2 - 1.3 mg/dL    Albumin 3.4 3.4 - 5.0 g/dL    Protein Total 7.4 6.8 - 8.8 g/dL    Alkaline Phosphatase 99 40 - 150 U/L    ALT 25 0 - 50 U/L    AST 19 0 - 45 U/L   Magnesium    Collection Time: 03/12/21  6:44 AM   Result Value Ref Range    Magnesium 2.1 1.6 - 2.3 mg/dL       [facility] Triage and after hours / weekends / holidays:  [phone]    Please call the triage or after hours line if you experience a temperature greater than or equal to 100.5, shaking chills, have uncontrolled nausea, vomiting and/or diarrhea, dizziness, shortness of breath, chest pain, bleeding, unexplained bruising, or if you have any other  new/concerning symptoms, questions or concerns.      If you are having any concerning symptoms or wish to speak to a provider before your next infusion visit, please call your care coordinator or triage to notify them so we can adequately serve you.     If you need a refill on a narcotic prescription or other medication, please call before your infusion appointment.

## 2021-03-12 NOTE — NURSING NOTE
Chief Complaint   Patient presents with     Blood Draw     IV placed, blood drawn, vitals taken, checked into next appointment.     Labs drawn via IV placed by Myriam Seals MA   in right arm.    Myriam Seals MA

## 2021-03-13 ENCOUNTER — ONCOLOGY VISIT (OUTPATIENT)
Dept: ONCOLOGY | Facility: CLINIC | Age: 66
End: 2021-03-13
Attending: OBSTETRICS & GYNECOLOGY
Payer: MEDICAID

## 2021-03-13 VITALS
OXYGEN SATURATION: 100 % | RESPIRATION RATE: 18 BRPM | HEART RATE: 73 BPM | TEMPERATURE: 97.4 F | SYSTOLIC BLOOD PRESSURE: 100 MMHG | DIASTOLIC BLOOD PRESSURE: 63 MMHG

## 2021-03-13 DIAGNOSIS — T45.1X5A CHEMOTHERAPY-INDUCED NEUTROPENIA (H): ICD-10-CM

## 2021-03-13 DIAGNOSIS — C56.9 OVARIAN CANCER, UNSPECIFIED LATERALITY (H): Primary | ICD-10-CM

## 2021-03-13 DIAGNOSIS — D70.1 CHEMOTHERAPY-INDUCED NEUTROPENIA (H): ICD-10-CM

## 2021-03-13 PROCEDURE — 96372 THER/PROPH/DIAG INJ SC/IM: CPT | Performed by: NURSE PRACTITIONER

## 2021-03-13 PROCEDURE — 250N000011 HC RX IP 250 OP 636: Performed by: NURSE PRACTITIONER

## 2021-03-13 RX ADMIN — FILGRASTIM-SNDZ 300 MCG: 300 INJECTION, SOLUTION INTRAVENOUS; SUBCUTANEOUS at 09:33

## 2021-03-13 ASSESSMENT — PAIN SCALES - GENERAL: PAINLEVEL: NO PAIN (0)

## 2021-03-13 NOTE — PROGRESS NOTES
Infusion Nursing Note:  Taran Regalado presents today for Neupogen.    Patient seen by provider today: No   present during visit today: Not Applicable.    Note: Patient feels well today and has no new conerns.  Denies cough, fever or shortness of breath.    Post Infusion Assessment:  Patient tolerated injection without incident into left arm.       Discharge Plan:   Patient declined prescription refills.  Discharge instructions reviewed with: Patient.  Patient and/or family verbalized understanding of discharge instructions and all questions answered.  AVS to patient via [employer].  Patient will return 3/15/21 for next appointment.   Patient discharged in stable condition accompanied by: self.  Departure Mode: Ambulatory.    Myriam Shaver RN

## 2021-03-13 NOTE — LETTER
3/13/2021         RE: Taran Regalado  [address]  [place] [address]        Dear Colleague,    Thank you for referring your patient, Taran Regalado, to the [facility]. Please see a copy of my visit note below.    Infusion Nursing Note:  Taran Regalado presents today for Neupogen.    Patient seen by provider today: No   present during visit today: Not Applicable.    Note: Patient feels well today and has no new conerns.  Denies cough, fever or shortness of breath.    Post Infusion Assessment:  Patient tolerated injection without incident into left arm.       Discharge Plan:   Patient declined prescription refills.  Discharge instructions reviewed with: Patient.  Patient and/or family verbalized understanding of discharge instructions and all questions answered.  AVS to patient via [employer].  Patient will return 3/15/21 for next appointment.   Patient discharged in stable condition accompanied by: self.  Departure Mode: Ambulatory.    Myriam Shaver RN                           Again, thank you for allowing me to participate in the care of your patient.        Sincerely,        [facility] [place]

## 2021-03-15 ENCOUNTER — APPOINTMENT (OUTPATIENT)
Dept: LAB | Facility: CLINIC | Age: 66
End: 2021-03-15
Attending: OBSTETRICS & GYNECOLOGY
Payer: MEDICAID

## 2021-03-15 ENCOUNTER — INFUSION THERAPY VISIT (OUTPATIENT)
Dept: ONCOLOGY | Facility: CLINIC | Age: 66
End: 2021-03-15
Attending: OBSTETRICS & GYNECOLOGY
Payer: MEDICAID

## 2021-03-15 VITALS
DIASTOLIC BLOOD PRESSURE: 73 MMHG | RESPIRATION RATE: 16 BRPM | BODY MASS INDEX: 21.25 KG/M2 | WEIGHT: 156.7 LBS | OXYGEN SATURATION: 99 % | TEMPERATURE: 97.9 F | HEART RATE: 83 BPM | SYSTOLIC BLOOD PRESSURE: 118 MMHG

## 2021-03-15 DIAGNOSIS — T45.1X5A CHEMOTHERAPY-INDUCED NEUTROPENIA (H): ICD-10-CM

## 2021-03-15 DIAGNOSIS — D70.1 CHEMOTHERAPY-INDUCED NEUTROPENIA (H): ICD-10-CM

## 2021-03-15 DIAGNOSIS — Z51.11 ENCOUNTER FOR ANTINEOPLASTIC CHEMOTHERAPY: ICD-10-CM

## 2021-03-15 DIAGNOSIS — C56.9 OVARIAN CANCER, UNSPECIFIED LATERALITY (H): Primary | ICD-10-CM

## 2021-03-15 LAB
ALBUMIN SERPL-MCNC: 3.4 G/DL (ref 3.4–5)
ALP SERPL-CCNC: 127 U/L (ref 40–150)
ALT SERPL W P-5'-P-CCNC: 30 U/L (ref 0–50)
ANION GAP SERPL CALCULATED.3IONS-SCNC: 7 MMOL/L (ref 3–14)
AST SERPL W P-5'-P-CCNC: 25 U/L (ref 0–45)
BASOPHILS # BLD AUTO: 0.1 10E9/L (ref 0–0.2)
BASOPHILS NFR BLD AUTO: 1 %
BILIRUB SERPL-MCNC: 0.2 MG/DL (ref 0.2–1.3)
BUN SERPL-MCNC: 12 MG/DL (ref 7–30)
CALCIUM SERPL-MCNC: 8.8 MG/DL (ref 8.5–10.1)
CHLORIDE SERPL-SCNC: 107 MMOL/L (ref 94–109)
CO2 SERPL-SCNC: 26 MMOL/L (ref 20–32)
CREAT SERPL-MCNC: 0.84 MG/DL (ref 0.52–1.04)
DIFFERENTIAL METHOD BLD: ABNORMAL
EOSINOPHIL # BLD AUTO: 0 10E9/L (ref 0–0.7)
EOSINOPHIL NFR BLD AUTO: 0.6 %
ERYTHROCYTE [DISTWIDTH] IN BLOOD BY AUTOMATED COUNT: ABNORMAL % (ref 10–15)
GFR SERPL CREATININE-BSD FRML MDRD: 73 ML/MIN/{1.73_M2}
GLUCOSE SERPL-MCNC: 72 MG/DL (ref 70–99)
HCT VFR BLD AUTO: 37.3 % (ref 35–47)
HGB BLD-MCNC: 11.7 G/DL (ref 11.7–15.7)
IMM GRANULOCYTES # BLD: 0.2 10E9/L (ref 0–0.4)
IMM GRANULOCYTES NFR BLD: 2.7 %
LYMPHOCYTES # BLD AUTO: 1.3 10E9/L (ref 0.8–5.3)
LYMPHOCYTES NFR BLD AUTO: 20.3 %
MAGNESIUM SERPL-MCNC: 2 MG/DL (ref 1.6–2.3)
MCH RBC QN AUTO: 30.8 PG (ref 26.5–33)
MCHC RBC AUTO-ENTMCNC: 31.4 G/DL (ref 31.5–36.5)
MCV RBC AUTO: 98 FL (ref 78–100)
MONOCYTES # BLD AUTO: 0.6 10E9/L (ref 0–1.3)
MONOCYTES NFR BLD AUTO: 9.4 %
NEUTROPHILS # BLD AUTO: 4.2 10E9/L (ref 1.6–8.3)
NEUTROPHILS NFR BLD AUTO: 66 %
NRBC # BLD AUTO: 0 10*3/UL
NRBC BLD AUTO-RTO: 0 /100
PLATELET # BLD AUTO: 201 10E9/L (ref 150–450)
POTASSIUM SERPL-SCNC: 3.9 MMOL/L (ref 3.4–5.3)
PROT SERPL-MCNC: 7.5 G/DL (ref 6.8–8.8)
RBC # BLD AUTO: 3.8 10E12/L (ref 3.8–5.2)
SODIUM SERPL-SCNC: 140 MMOL/L (ref 133–144)
WBC # BLD AUTO: 6.3 10E9/L (ref 4–11)

## 2021-03-15 PROCEDURE — 96417 CHEMO IV INFUS EACH ADDL SEQ: CPT

## 2021-03-15 PROCEDURE — 96415 CHEMO IV INFUSION ADDL HR: CPT

## 2021-03-15 PROCEDURE — 250N000013 HC RX MED GY IP 250 OP 250 PS 637: Performed by: NURSE PRACTITIONER

## 2021-03-15 PROCEDURE — 999N000127 HC STATISTIC PERIPHERAL IV START W US GUIDANCE

## 2021-03-15 PROCEDURE — 250N000009 HC RX 250: Performed by: NURSE PRACTITIONER

## 2021-03-15 PROCEDURE — 250N000011 HC RX IP 250 OP 636: Performed by: NURSE PRACTITIONER

## 2021-03-15 PROCEDURE — 96375 TX/PRO/DX INJ NEW DRUG ADDON: CPT

## 2021-03-15 PROCEDURE — 258N000003 HC RX IP 258 OP 636: Performed by: NURSE PRACTITIONER

## 2021-03-15 PROCEDURE — 83735 ASSAY OF MAGNESIUM: CPT | Performed by: NURSE PRACTITIONER

## 2021-03-15 PROCEDURE — 85025 COMPLETE CBC W/AUTO DIFF WBC: CPT | Performed by: NURSE PRACTITIONER

## 2021-03-15 PROCEDURE — 80053 COMPREHEN METABOLIC PANEL: CPT | Performed by: NURSE PRACTITIONER

## 2021-03-15 PROCEDURE — 96413 CHEMO IV INFUSION 1 HR: CPT

## 2021-03-15 RX ORDER — HEPARIN SODIUM (PORCINE) LOCK FLUSH IV SOLN 100 UNIT/ML 100 UNIT/ML
5 SOLUTION INTRAVENOUS
Status: CANCELLED | OUTPATIENT
Start: 2021-03-15

## 2021-03-15 RX ORDER — DIPHENHYDRAMINE HCL 25 MG
50 CAPSULE ORAL ONCE
Status: COMPLETED | OUTPATIENT
Start: 2021-03-15 | End: 2021-03-15

## 2021-03-15 RX ORDER — LORATADINE 10 MG/1
10 TABLET ORAL DAILY PRN
COMMUNITY
End: 2022-09-21

## 2021-03-15 RX ORDER — NALOXONE HYDROCHLORIDE 0.4 MG/ML
.1-.4 INJECTION, SOLUTION INTRAMUSCULAR; INTRAVENOUS; SUBCUTANEOUS
Status: CANCELLED | OUTPATIENT
Start: 2021-03-15

## 2021-03-15 RX ORDER — SODIUM CHLORIDE 9 MG/ML
1000 INJECTION, SOLUTION INTRAVENOUS CONTINUOUS PRN
Status: CANCELLED
Start: 2021-03-15

## 2021-03-15 RX ORDER — METHYLPREDNISOLONE SODIUM SUCCINATE 125 MG/2ML
125 INJECTION, POWDER, LYOPHILIZED, FOR SOLUTION INTRAMUSCULAR; INTRAVENOUS
Status: CANCELLED
Start: 2021-03-15

## 2021-03-15 RX ORDER — PALONOSETRON 0.05 MG/ML
0.25 INJECTION, SOLUTION INTRAVENOUS ONCE
Status: COMPLETED | OUTPATIENT
Start: 2021-03-15 | End: 2021-03-15

## 2021-03-15 RX ORDER — ALBUTEROL SULFATE 90 UG/1
1-2 AEROSOL, METERED RESPIRATORY (INHALATION)
Status: CANCELLED
Start: 2021-03-15

## 2021-03-15 RX ORDER — DIPHENHYDRAMINE HYDROCHLORIDE 50 MG/ML
50 INJECTION INTRAMUSCULAR; INTRAVENOUS
Status: CANCELLED
Start: 2021-03-15

## 2021-03-15 RX ORDER — ALBUTEROL SULFATE 0.83 MG/ML
2.5 SOLUTION RESPIRATORY (INHALATION)
Status: CANCELLED | OUTPATIENT
Start: 2021-03-15

## 2021-03-15 RX ORDER — HEPARIN SODIUM,PORCINE 10 UNIT/ML
5 VIAL (ML) INTRAVENOUS
Status: CANCELLED | OUTPATIENT
Start: 2021-03-15

## 2021-03-15 RX ORDER — EPINEPHRINE 1 MG/ML
0.3 INJECTION, SOLUTION INTRAMUSCULAR; SUBCUTANEOUS EVERY 5 MIN PRN
Status: CANCELLED | OUTPATIENT
Start: 2021-03-15

## 2021-03-15 RX ORDER — LORAZEPAM 2 MG/ML
1 INJECTION INTRAMUSCULAR EVERY 6 HOURS PRN
Status: CANCELLED
Start: 2021-03-15

## 2021-03-15 RX ORDER — MEPERIDINE HYDROCHLORIDE 25 MG/ML
25 INJECTION INTRAMUSCULAR; INTRAVENOUS; SUBCUTANEOUS EVERY 30 MIN PRN
Status: CANCELLED | OUTPATIENT
Start: 2021-03-15

## 2021-03-15 RX ADMIN — CARBOPLATIN 600 MG: 10 INJECTION, SOLUTION INTRAVENOUS at 16:27

## 2021-03-15 RX ADMIN — DIPHENHYDRAMINE HYDROCHLORIDE 50 MG: 25 CAPSULE ORAL at 12:32

## 2021-03-15 RX ADMIN — FAMOTIDINE 40 MG: 10 INJECTION INTRAVENOUS at 12:40

## 2021-03-15 RX ADMIN — PALONOSETRON 0.25 MG: 0.05 INJECTION, SOLUTION INTRAVENOUS at 12:48

## 2021-03-15 RX ADMIN — PACLITAXEL 327 MG: 6 INJECTION, SOLUTION INTRAVENOUS at 13:23

## 2021-03-15 RX ADMIN — DEXAMETHASONE SODIUM PHOSPHATE 20 MG: 10 INJECTION, SOLUTION INTRAMUSCULAR; INTRAVENOUS at 12:51

## 2021-03-15 RX ADMIN — SODIUM CHLORIDE 250 ML: 9 INJECTION, SOLUTION INTRAVENOUS at 12:34

## 2021-03-15 ASSESSMENT — PAIN SCALES - GENERAL: PAINLEVEL: NO PAIN (0)

## 2021-03-15 NOTE — PATIENT INSTRUCTIONS
Contact Numbers  [facility]: [phone] (for symptom and scheduling needs)    Please call the [facility] Triage line if you experience a temperature greater than or equal to 100.4, shaking chills, have uncontrolled nausea, vomiting and/or diarrhea, dizziness, shortness of breath, chest pain, bleeding, unexplained bruising, or if you have any other new/concerning symptoms, questions or concerns.     If you are having any concerning symptoms or wish to speak to a provider before your next infusion visit, please call your care coordinator or triage to notify them so we can adequately serve you.     If you need a refill on a narcotic prescription or other medication, please call triage before your infusion appointment.

## 2021-03-15 NOTE — NURSING NOTE
Chief Complaint   Patient presents with     Blood Draw     Vitals, blood drawn and PIV placed by WENDY Johnson RN. Pt checked into appt.      LUCRETIA Childs LPN

## 2021-03-15 NOTE — PROGRESS NOTES
Infusion Nursing Note:  Taran Regalado presents today for Cycle 3 Day Taxol and Carboplatin.    Patient seen by provider today: No   present during visit today: Not Applicable.    Note: Patient feels well today.  She denies fever, cough, or shortness of breath.    Intravenous Access:  Peripheral IV placed by vascular access.    Treatment Conditions:  Lab Results   Component Value Date    HGB 11.7 03/15/2021     Lab Results   Component Value Date    WBC 6.3 03/15/2021      Lab Results   Component Value Date    ANEU 4.2 03/15/2021     Lab Results   Component Value Date     03/15/2021      Lab Results   Component Value Date     03/15/2021                   Lab Results   Component Value Date    POTASSIUM 3.9 03/15/2021           Lab Results   Component Value Date    MAG 2.0 03/15/2021            Lab Results   Component Value Date    CR 0.84 03/15/2021                   Lab Results   Component Value Date    HORACIO 8.8 03/15/2021                Lab Results   Component Value Date    BILITOTAL 0.2 03/15/2021           Lab Results   Component Value Date    ALBUMIN 3.4 03/15/2021                    Lab Results   Component Value Date    ALT 30 03/15/2021           Lab Results   Component Value Date    AST 25 03/15/2021       Results reviewed, labs MET treatment parameters, ok to proceed with treatment.      Post Infusion Assessment:  Patient tolerated infusion without incident.  Blood return noted pre and post infusion.  Site patent and intact, free from redness, edema or discomfort.  No evidence of extravasations.  Access discontinued per protocol.       Discharge Plan:   Patient declined prescription refills.  Discharge instructions reviewed with: Patient.  Patient and/or family verbalized understanding of discharge instructions and all questions answered.  Copy of AVS reviewed with patient and/or family.  Patient will return 3/16/21 for Fuphila and 4/9/21 for next chemotherapy appointment.  Patient  has scans and provider visit prior to next chemotherapy.  Next chemo appointment is scheduled 4 days late.  Inbasket sent to ANTONY Ceron to confirm that this schedule is OK.  Patient discharged in stable condition accompanied by: self.  Departure Mode: Ambulatory.    Myriam Shaevr RN

## 2021-03-16 ENCOUNTER — INFUSION THERAPY VISIT (OUTPATIENT)
Dept: INFUSION THERAPY | Facility: CLINIC | Age: 66
End: 2021-03-16
Payer: MEDICAID

## 2021-03-16 VITALS
HEART RATE: 63 BPM | TEMPERATURE: 98.3 F | SYSTOLIC BLOOD PRESSURE: 110 MMHG | DIASTOLIC BLOOD PRESSURE: 73 MMHG | RESPIRATION RATE: 16 BRPM | OXYGEN SATURATION: 99 %

## 2021-03-16 DIAGNOSIS — G25.81 RESTLESS LEGS SYNDROME: ICD-10-CM

## 2021-03-16 DIAGNOSIS — D70.1 CHEMOTHERAPY-INDUCED NEUTROPENIA (H): ICD-10-CM

## 2021-03-16 DIAGNOSIS — T45.1X5A CHEMOTHERAPY-INDUCED NEUTROPENIA (H): ICD-10-CM

## 2021-03-16 DIAGNOSIS — Z51.11 ENCOUNTER FOR ANTINEOPLASTIC CHEMOTHERAPY: Primary | ICD-10-CM

## 2021-03-16 DIAGNOSIS — C56.9 OVARIAN CANCER, UNSPECIFIED LATERALITY (H): ICD-10-CM

## 2021-03-16 PROCEDURE — 99207 PR NO CHARGE NURSE ONLY: CPT

## 2021-03-16 PROCEDURE — 96372 THER/PROPH/DIAG INJ SC/IM: CPT | Performed by: INTERNAL MEDICINE

## 2021-03-16 NOTE — TELEPHONE ENCOUNTER
Routing refill request to provider for review/approval because:  Drug not on the [facility] refill protocol     Dede LAMBERTN, RN

## 2021-03-16 NOTE — PROGRESS NOTES
Infusion Nursing Note:  Taran Regalado presents today for Fulphila.    Patient seen by provider today: No   present during visit today: Not Applicable.    Note: Assessment performed by Tiffani FOSTER RN prior to injection today. Patient denies symptoms/concerns following previous injection.    Intravenous Access:  No Intravenous access/labs at this visit.    Treatment Conditions:  Not Applicable.      Post Infusion Assessment:  Patient tolerated injection without incident.  Site patent and intact, free from redness, edema or discomfort.       Discharge Plan:   Patient discharged in stable condition accompanied by: .  Departure Mode: Ambulatory.    Eunice Rosas LPN

## 2021-03-17 ENCOUNTER — TELEPHONE (OUTPATIENT)
Dept: CONSULT | Facility: CLINIC | Age: 66
End: 2021-03-17

## 2021-03-17 RX ORDER — GABAPENTIN 600 MG/1
600 TABLET ORAL AT BEDTIME
Qty: 30 TABLET | Refills: 0 | Status: ON HOLD | OUTPATIENT
Start: 2021-03-17 | End: 2021-04-27

## 2021-03-17 NOTE — TELEPHONE ENCOUNTER
Notified Lolis, patient's daughter, that no prior authorization is required for Taran's genetic testing. Explained there's no option to guarantee coverage of testing upfront by insurance.    Provided Lolis with estimated out of pocket cost if testing were to be covered. However, Lolis was aware that insurance may not cover the cost of testing and would be responsible for the billed amount.     Edie expressed understanding and stated that unless we guarantee testing will be covered by insurance they don't want to proceed with testing.  I told Lolis that it is Medicaid's policy that no prior auth is required for genetic testing and we can not guarantee coverage of testing.  Lolis had no further questions.      Madina Delacruz, FAUSTO  Genomics Billing    [facility]   Molecular Diagnostic Lab  [address] [place] [address] [id number] [phone]

## 2021-03-19 LAB — COPATH REPORT: NORMAL

## 2021-03-24 NOTE — TELEPHONE ENCOUNTER
I spoke with Taran Danielle's daughter, and they are comfortable proceeding with the genetic testing. We will call them when results are available. Lolis had no further questions.     FAUSTO Dobbs   Genomics Billing   [facility]  Molecular Diagnostics Laboratory  [address] [place] [id number]  [email or web address]   Office: [phone]  Fax: [phone]

## 2021-03-26 DIAGNOSIS — C56.9 OVARIAN CANCER, UNSPECIFIED LATERALITY (H): Primary | ICD-10-CM

## 2021-03-26 DIAGNOSIS — Z78.9 FAMILY HISTORY UNKNOWN: ICD-10-CM

## 2021-03-26 PROCEDURE — G0452 MOLECULAR PATHOLOGY INTERPR: HCPCS | Mod: 26 | Performed by: PATHOLOGY

## 2021-03-26 PROCEDURE — 81162 BRCA1&2 GEN FULL SEQ DUP/DEL: CPT | Performed by: OBSTETRICS & GYNECOLOGY

## 2021-04-01 ENCOUNTER — MYC MEDICAL ADVICE (OUTPATIENT)
Dept: FAMILY MEDICINE | Facility: CLINIC | Age: 66
End: 2021-04-01

## 2021-04-01 DIAGNOSIS — G47.00 INSOMNIA, UNSPECIFIED TYPE: Primary | ICD-10-CM

## 2021-04-01 RX ORDER — TRAZODONE HYDROCHLORIDE 50 MG/1
50-100 TABLET, FILM COATED ORAL AT BEDTIME
Qty: 60 TABLET | Refills: 0 | Status: SHIPPED | OUTPATIENT
Start: 2021-04-01 | End: 2021-04-02

## 2021-04-02 ENCOUNTER — APPOINTMENT (OUTPATIENT)
Dept: LAB | Facility: CLINIC | Age: 66
End: 2021-04-02
Payer: COMMERCIAL

## 2021-04-02 ENCOUNTER — ANCILLARY PROCEDURE (OUTPATIENT)
Dept: CT IMAGING | Facility: CLINIC | Age: 66
End: 2021-04-02
Attending: OBSTETRICS & GYNECOLOGY
Payer: COMMERCIAL

## 2021-04-02 DIAGNOSIS — C56.9 OVARIAN CANCER, UNSPECIFIED LATERALITY (H): ICD-10-CM

## 2021-04-02 PROCEDURE — 74177 CT ABD & PELVIS W/CONTRAST: CPT | Mod: GC | Performed by: RADIOLOGY

## 2021-04-02 PROCEDURE — 71260 CT THORAX DX C+: CPT | Mod: GC | Performed by: RADIOLOGY

## 2021-04-02 RX ORDER — IOPAMIDOL 755 MG/ML
96 INJECTION, SOLUTION INTRAVASCULAR ONCE
Status: COMPLETED | OUTPATIENT
Start: 2021-04-02 | End: 2021-04-02

## 2021-04-02 RX ORDER — HYDROXYZINE HYDROCHLORIDE 25 MG/1
25-50 TABLET, FILM COATED ORAL
Qty: 60 TABLET | Refills: 0 | Status: SHIPPED | OUTPATIENT
Start: 2021-04-02 | End: 2021-04-12

## 2021-04-02 RX ADMIN — IOPAMIDOL 96 ML: 755 INJECTION, SOLUTION INTRAVASCULAR at 14:32

## 2021-04-04 NOTE — PROGRESS NOTES
Taran is a 65 year old who is being evaluated via a billable video visit.      How would you like to obtain your AVS? MyChart  If the video visit is dropped, the invitation should be resent by: Text to cell phone: [phone]  Will anyone else be joining your video visit? SOPHIA Gonzalez    Video Start Time: 10:25 AM  Video-Visit Details    Type of service:  Video Visit    Video End Time:11:00    Originating Location (pt. Location): Home    Distant Location (provider location):  [facility] CANCER [facility]     Platform used for Video Visit: [facility]    Palliative Care Outpatient Clinic Consultation Note    Patient:  Taran Regalado    Chief Complaint:   Taran Regalado 65 year old female who is presenting to the palliative medicine clinic today at the request of Ella ECHAVARRIA for a palliative care consultation secondary to ovarian cancer, symptom management.   The patient's primary care provider is:  Bolivar Juarez     History of Present Illness:  Taran Regalado is a 65 year old with ovarian cancer seen to establish palliative care.  Video visit performed due to coronavirus pandemic.  Her daughter Edie joins.    This patient's cancer history was reviewed as per the chart.  In brief, she was diagnosed in 2020 after developing abdominal distention, weight loss, and CT showing carcinomatosis with elevated CA-125.  Peritoneal biopsy positive for high-grade ovarian carcinoma. Started treatment with paclitaxel and carboplatin, has had 2 cycles.    Since starting chemotherapy, her restless leg syndrome and insomnia worsened.  Was previously on amitriptyline 100 mg nightly for restless legs and insomnia and this is controlled for several years, but both of these worsened since chemotherapy.  Says it has trouble quality sleep and staying asleep, trouble turning her mind off to go to sleep for the last several weeks.  Has been taking an oxycodone nightly to help her fall asleep,  "but not having any pain.  Has previously tried melatonin, trazodone-she did not like, it gave her strange dreams, felt more tired when she woke up.  Is now on gabapentin 600 mg at night which is working for the restless legs, continues to take the amitriptyline 100 mg.  Was on olanzapine 5 mg nightly for nausea with her first chemo cycle, but did not notice any change in her sleep.  Try taking one of her daughter's Ambien which worked well and she asks for more of this.  Also mentions recent hypotension, no falls.       Nausea is improved, no longer needing olanzapine or as needed Compazine.  Appetite has improved.  Weight has been about the same or a little up.  Taking senna 2 tabs twice a day for constipation.  Not really having pain, but feels \"miserable\" the week of chemo.  Tried taking oxycodone but wasn't effective.  Has difficulty explaining but feels achy and fatigued. +pins and needles in fingertips and bottoms of feet to ball of the foot.     Patient's Disease Understanding: Says is planned for 3 cycles, then will make decision about surgery.      Coping:  Feels she's doing well.  Mentions sometimes having anxious thoughts at night, before MRI    Social History  Living Situation: Her daughter + her  and children are living with her right now  Occupation: Previously worked in childcare  Social History     Tobacco Use     Smoking status: Former Smoker     Packs/day: 0.50     Years: 40.00     Pack years: 20.00     Quit date:      Years since quitting: 3.2     Smokeless tobacco: Never Used   Substance Use Topics     Alcohol use: Not Currently     Drug use: Never       Family History  Family History   Adopted: Yes   Problem Relation Age of Onset     Cancer Mother 36     Other Cancer Mother         Bio mother  of  a female cancer  at 36     Factor V Leiden deficiency Daughter      Deep Vein Thrombosis Daughter      Diabetes Type 1 Daughter      Diabetes Daughter      Hypertension Daughter  "     Advance Care Planning:  Advance Directive:   None on file    No Known Allergies  Current Outpatient Medications   Medication Sig Dispense Refill     acetaminophen (TYLENOL) 325 MG tablet Take 2 tablets (650 mg) by mouth every 6 hours as needed for mild pain 50 tablet 0     amitriptyline (ELAVIL) 100 MG tablet TAKE 1 TABLET (100 MG) BY MOUTH EVERY NIGHT AT BEDTIME       gabapentin (NEURONTIN) 600 MG tablet Take 1 tablet (600 mg) by mouth At Bedtime 30 tablet 0     hydrOXYzine (ATARAX) 25 MG tablet Take 1-2 tablets (25-50 mg) by mouth nightly as needed for anxiety (or insomnia) 60 tablet 0     loratadine (CLARITIN) 10 MG tablet Take 10 mg by mouth daily With neupogen       OLANZapine zydis (ZYPREXA) 5 MG ODT Take 1 tablet (5 mg) by mouth At Bedtime 30 tablet 0     ondansetron (ZOFRAN-ODT) 4 MG ODT tab Take 1 tablet (4 mg) by mouth every 6 hours as needed for nausea or vomiting 20 tablet 0     oxyCODONE (ROXICODONE) 5 MG tablet Take 1 tablet (5 mg) by mouth every 6 hours as needed for pain 15 tablet 0     polyethylene glycol (MIRALAX) 17 GM/Dose powder Take 17 g by mouth 2 times daily 510 g 0     prochlorperazine (COMPAZINE) 10 MG tablet Take 1 tablet (10 mg) by mouth every 6 hours as needed for nausea or vomiting 30 tablet 0     rivaroxaban ANTICOAGULANT (XARELTO ANTICOAGULANT) 20 MG TABS tablet Take 1 tablet (20 mg) by mouth daily (with dinner) 90 tablet 1     senna-docusate (SENOKOT-S/PERICOLACE) 8.6-50 MG tablet Take 2 tablets by mouth 2 times daily 30 tablet 0     SUMAtriptan (IMITREX) 100 MG tablet Take 100 mg by mouth at onset of headache        valACYclovir (VALTREX) 1000 mg tablet Take 1,000 mg by mouth as needed        Past Medical History:   Diagnosis Date     Atrial fibrillation with rapid ventricular response (H)      History of cold sores      Insomnia      Migraine      Osteopenia      Pelvic mass      Peritoneal carcinomatosis (H)      Restless legs syndrome (RLS)      Past Surgical History:    Procedure Laterality Date     APPENDECTOMY       ARTHROSCPY KNEE SURGICAL DEBRIDEMENT SHAVING ARTICULAR CARTILAGE Right      BIOPSY  January 2021    Biopsy to confirm ovarian cancer     DEBRIDEMENT LEFT UPPER EXTREMITY  2016     LAPAROSCOPY DIAGNOSTIC (GYN) Bilateral 1/7/2021    Procedure: Diagnsotic laparoscopy, biopsies;  Surgeon: Bolivar Juarez MD;  Location: [facility] OR     LASIK       TUBAL LIGATION         Palliative Symptom Review (0=no symptom/no concern, 1=mild, 2=moderate, 3=severe):      Pain: 0      Fatigue: 1      Nausea: 0      Constipation: 1      Diarrhea: 0      Depressive Symptoms: 0      Anxiety: 1      Drowsiness: 0      Poor Appetite: 0      Shortness of Breath: 0      Insomnia: 2      Other: 0      Overall (0 good/no concerns, 3 very poor):  1    No vitals due to video visit.     GENERAL: Comfortable-appearing, alert and no distress  EYES: Eyes grossly normal to inspection, conjunctivae and sclerae normal  Hearing intact.   Alopecia  RESP: no audible wheeze, cough, or visible cyanosis.  No increased work of breathing on RA.  Able to speak easily in complete sentences.  SKIN: no suspicious lesions or rashes on exposed skin  NEURO: Cranial nerves grossly intact, mentation intact and speech normal.  No tremor.   PSYCH: mentation appears normal, affect normal/bright and mood-congruent, judgement and insight intact.  Decreased speech output, lets her daughter answer most of the questions   The rest of a comprehensive physical examination is deferred due to PHE (public health emergency) video visit restrictions    Wt Readings from Last 10 Encounters:   03/15/21 71.1 kg (156 lb 11.2 oz)   03/12/21 70.4 kg (155 lb 3.2 oz)   03/05/21 69.8 kg (153 lb 14.4 oz)   02/26/21 68.5 kg (151 lb 1.6 oz)   02/19/21 67.6 kg (149 lb)   02/16/21 68 kg (150 lb)   02/06/21 65.2 kg (143 lb 11.8 oz)   02/01/21 65.9 kg (145 lb 4.8 oz)   01/13/21 68.1 kg (150 lb 3.2 oz)   01/07/21 67.3 kg (148 lb 4.2 oz)       Data  Reviewed:  LABS:   Recent Labs   Lab Test 03/15/21  1204 03/12/21  0644    142   POTASSIUM 3.9 3.8   CHLORIDE 107 107   CO2 26 28   ANIONGAP 7 8   GLC 72 123*   BUN 12 17   CR 0.84 0.95   HORACIO 8.8 8.6     GFR 73  Alb 3.4  WBC 6.3, Hb 11.7, Plt 201    IMAGING:   CT CAP 4/2/21  IMPRESSION:   In this patient with a history of metastatic serous ovarian cancer,  status post diagnostic laparoscopy and neoadjuvant chemotherapy:  1. Significant improvement in peritoneal carcinomatosis as discussed  above.  2. Resolution of previously seen scattered small hypoattenuating  lesions in the liver. This raises the concern for these lesions  representing metastatic disease, noting excellent response elsewhere  in abdomen and pelvis.  3. Bilateral pleural effusions have resolved.  4. Several small pulmonary nodules in the right lung measuring up to 5  mm. Indeterminate, but likely benign in the setting of their stability  with excellent response elsewhere. Continued attention on follow-up.    CT Head 1/13/21  Impression:  1. Chronic sinusitis of the right maxillary and right sphenoid  sinuses.  2. Incidental presumed calcified meningioma in the right frontal  convexity without significant mass effect.  3. No suspicious intracranial enhancing lesion.    [place]  reviewed and fills consistent with history.     Impressions, Recommendations & Counseling:  Palliative Performance Score:  80  Decision Making Capacity:  Intact    Taran Regalado is a 65 year old with ovarian cancer with peritoneal carcinomatosis getting chemo, with recent improvement on scans, seen for symptom management.  Having worsened insomnia in setting of chronic insomnia + RLS previously controlled on amitriptyline but was not as effective since starting chemo.  Also having body aches with chemo.    Insomnia - long-standing, chronically on amitriptyline but no longer effective.  Previously tried trazodone, melatonin.   -Recommended CBT-I as this is been a  "chronic problem would be the most effective treatment, she declines  - Recommended trial of mirtazapine-would start at 7.5 mg nightly, could go up to 15 mg nightly in 1 week.  This would also help with anxiety at night.  She is in agreement to try this  -Asked to have a supply of Ambien \"just in case\" - discussed concerns - not for chronic use, habit-forming, risk of falls with other sedating medications.  Will prescribe small supply, #14.  Do not take with oxycodone.   - Counseled to stop taking oxycodone at night if not having pain  - If amitriptyline no longer helping with sleep or RLS, would be reasonable to try weaning because of possible its contributing to her fatigue and constipation.  Recommend cutting to 50 mg for 2 weeks, then 25 mg for 2 weeks, then off.     RLS - continue gabapentin 600 mg at night.  We discussed if her neuropathy were worsening, could add a daytime dose as well.  Not bothersome to her right now    Muscle aches with chemo -recommended Tylenol 1000 mg 3 times daily scheduled, can add Claritin 10 mg daily starting the day before CSF shot    RTC in 6 weeks, will ask RN to follow-up next week re: mirtazapine effectiveness and dosing    Brinda Lacey MD  Palliative Medicine  Pager [phone]     65 minutes spent including reviewing record, review of above studies, above visit with patient, and documentation.     (This note was transcribed using voice recognition software. While I review and edit the transcription, I may miss errors, and the software sometimes does unexpected capitalizations and formatting that I miss. Please let me know of any serious mistranscriptions and I will addend this note.)      "

## 2021-04-05 ENCOUNTER — VIRTUAL VISIT (OUTPATIENT)
Dept: PALLIATIVE CARE | Facility: CLINIC | Age: 66
End: 2021-04-05
Attending: INTERNAL MEDICINE
Payer: COMMERCIAL

## 2021-04-05 DIAGNOSIS — F51.01 PRIMARY INSOMNIA: Primary | ICD-10-CM

## 2021-04-05 DIAGNOSIS — C56.9 OVARIAN CANCER, UNSPECIFIED LATERALITY (H): ICD-10-CM

## 2021-04-05 PROCEDURE — 99205 OFFICE O/P NEW HI 60 MIN: CPT | Mod: 95 | Performed by: INTERNAL MEDICINE

## 2021-04-05 RX ORDER — MIRTAZAPINE 7.5 MG/1
7.5 TABLET, FILM COATED ORAL AT BEDTIME
Qty: 30 TABLET | Refills: 3 | Status: SHIPPED | OUTPATIENT
Start: 2021-04-05 | End: 2021-04-09

## 2021-04-05 RX ORDER — ZOLPIDEM TARTRATE 5 MG/1
5 TABLET ORAL
Qty: 14 TABLET | Refills: 0 | Status: SHIPPED | OUTPATIENT
Start: 2021-04-05 | End: 2021-05-25

## 2021-04-05 NOTE — PATIENT INSTRUCTIONS
Thank you for seeing the Palliative Care Clinic today.      1.  Start mirtazapine 7.5 mg at bedtime  2.  Recommend a trial of weaning amitriptyline since you are taking the gabapentin now, would decrease to 50 mg x2 weeks, then 25 mg x 2 weeks, then off  3. Will prescribe a small supply of ambien - use sparingly, not for chronic use  4. For body aches after chemo - can take tylenol 1000 mg three times/day on a schedule, with claritin daily when you get stimulating-factor shots    Return to clinic in 6-8 weeks for a follow-up.      You can reach the Palliative Care Team during business hours at the following numbers:   -For the [facility], call [phone]  -To reach the palliative RN for questions or refills, call [phone]       To reach the Palliative Care Provider on-call After-hours or on holidays and weekends, call: [phone].  Please note that we are not able to provide pain medication refills on evenings or weekends.

## 2021-04-05 NOTE — LETTER
4/5/2021       RE: Taran Regalado  [address] [place] [address]     Dear Colleague,    Thank you for referring your patient, Taran Regalado, to the [facility] CLINIC at [facility]. Please see a copy of my visit note below.    Taran is a 65 year old who is being evaluated via a billable video visit.      How would you like to obtain your AVS? MyChart  If the video visit is dropped, the invitation should be resent by: Text to cell phone: [phone]  Will anyone else be joining your video visit? SOPHIA Gonzalez    Video-Visit Details    Type of service:  Video Visit    Video Start Time: 10:25 AM  Video End Time:11:00    Originating Location (pt. Location): Home    Distant Location (provider location):  [facility] CANCER [facility]     Platform used for Video Visit: [facility]    Palliative Care Outpatient Clinic Consultation Note    Patient:  Taran Regalado    Chief Complaint:   Taran Reglaado 65 year old female who is presenting to the palliative medicine clinic today at the request of Ella ECHAVARRIA for a palliative care consultation secondary to ovarian cancer, symptom management.   The patient's primary care provider is:  Bolivar Juarez     History of Present Illness:  Taran Regalado is a 65 year old with ovarian cancer seen to establish palliative care.  Video visit performed due to coronavirus pandemic.  Her daughter Edie joins.    This patient's cancer history was reviewed as per the chart.  In brief, she was diagnosed in 2020 after developing abdominal distention, weight loss, and CT showing carcinomatosis with elevated CA-125.  Peritoneal biopsy positive for high-grade ovarian carcinoma. Started treatment with paclitaxel and carboplatin, has had 2 cycles.    Since starting chemotherapy, her restless leg syndrome and insomnia worsened.  Was previously on amitriptyline 100 mg nightly  "for restless legs and insomnia and this is controlled for several years, but both of these worsened since chemotherapy.  Says it has trouble quality sleep and staying asleep, trouble turning her mind off to go to sleep for the last several weeks.  Has been taking an oxycodone nightly to help her fall asleep, but not having any pain.  Has previously tried melatonin, trazodone-she did not like, it gave her strange dreams, felt more tired when she woke up.  Is now on gabapentin 600 mg at night which is working for the restless legs, continues to take the amitriptyline 100 mg.  Was on olanzapine 5 mg nightly for nausea with her first chemo cycle, but did not notice any change in her sleep.  Try taking one of her daughter's Ambien which worked well and she asks for more of this.  Also mentions recent hypotension, no falls.       Nausea is improved, no longer needing olanzapine or as needed Compazine.  Appetite has improved.  Weight has been about the same or a little up.  Taking senna 2 tabs twice a day for constipation.  Not really having pain, but feels \"miserable\" the week of chemo.  Tried taking oxycodone but wasn't effective.  Has difficulty explaining but feels achy and fatigued. +pins and needles in fingertips and bottoms of feet to ball of the foot.     Patient's Disease Understanding: Says is planned for 3 cycles, then will make decision about surgery.      Coping:  Feels she's doing well.  Mentions sometimes having anxious thoughts at night, before MRI    Social History  Living Situation: Her daughter + her  and children are living with her right now  Occupation: Previously worked in childcare  Social History     Tobacco Use     Smoking status: Former Smoker     Packs/day: 0.50     Years: 40.00     Pack years: 20.00     Quit date: 2018     Years since quitting: 3.2     Smokeless tobacco: Never Used   Substance Use Topics     Alcohol use: Not Currently     Drug use: Never       Family History  Family " History   Adopted: Yes   Problem Relation Age of Onset     Cancer Mother 36     Other Cancer Mother         Bio mother  of  a female cancer  at 36     Factor V Leiden deficiency Daughter      Deep Vein Thrombosis Daughter      Diabetes Type 1 Daughter      Diabetes Daughter      Hypertension Daughter      Advance Care Planning:  Advance Directive:   None on file    No Known Allergies  Current Outpatient Medications   Medication Sig Dispense Refill     acetaminophen (TYLENOL) 325 MG tablet Take 2 tablets (650 mg) by mouth every 6 hours as needed for mild pain 50 tablet 0     amitriptyline (ELAVIL) 100 MG tablet TAKE 1 TABLET (100 MG) BY MOUTH EVERY NIGHT AT BEDTIME       gabapentin (NEURONTIN) 600 MG tablet Take 1 tablet (600 mg) by mouth At Bedtime 30 tablet 0     hydrOXYzine (ATARAX) 25 MG tablet Take 1-2 tablets (25-50 mg) by mouth nightly as needed for anxiety (or insomnia) 60 tablet 0     loratadine (CLARITIN) 10 MG tablet Take 10 mg by mouth daily With neupogen       OLANZapine zydis (ZYPREXA) 5 MG ODT Take 1 tablet (5 mg) by mouth At Bedtime 30 tablet 0     ondansetron (ZOFRAN-ODT) 4 MG ODT tab Take 1 tablet (4 mg) by mouth every 6 hours as needed for nausea or vomiting 20 tablet 0     oxyCODONE (ROXICODONE) 5 MG tablet Take 1 tablet (5 mg) by mouth every 6 hours as needed for pain 15 tablet 0     polyethylene glycol (MIRALAX) 17 GM/Dose powder Take 17 g by mouth 2 times daily 510 g 0     prochlorperazine (COMPAZINE) 10 MG tablet Take 1 tablet (10 mg) by mouth every 6 hours as needed for nausea or vomiting 30 tablet 0     rivaroxaban ANTICOAGULANT (XARELTO ANTICOAGULANT) 20 MG TABS tablet Take 1 tablet (20 mg) by mouth daily (with dinner) 90 tablet 1     senna-docusate (SENOKOT-S/PERICOLACE) 8.6-50 MG tablet Take 2 tablets by mouth 2 times daily 30 tablet 0     SUMAtriptan (IMITREX) 100 MG tablet Take 100 mg by mouth at onset of headache        valACYclovir (VALTREX) 1000 mg tablet Take 1,000 mg by mouth  as needed        Past Medical History:   Diagnosis Date     Atrial fibrillation with rapid ventricular response (H)      History of cold sores      Insomnia      Migraine      Osteopenia      Pelvic mass      Peritoneal carcinomatosis (H)      Restless legs syndrome (RLS)      Past Surgical History:   Procedure Laterality Date     APPENDECTOMY       ARTHROSCPY KNEE SURGICAL DEBRIDEMENT SHAVING ARTICULAR CARTILAGE Right      BIOPSY  January 2021    Biopsy to confirm ovarian cancer     DEBRIDEMENT LEFT UPPER EXTREMITY  2016     LAPAROSCOPY DIAGNOSTIC (GYN) Bilateral 1/7/2021    Procedure: Diagnsotic laparoscopy, biopsies;  Surgeon: Bolivar Juarez MD;  Location: [facility] OR     LASIK       TUBAL LIGATION         Palliative Symptom Review (0=no symptom/no concern, 1=mild, 2=moderate, 3=severe):      Pain: 0      Fatigue: 1      Nausea: 0      Constipation: 1      Diarrhea: 0      Depressive Symptoms: 0      Anxiety: 1      Drowsiness: 0      Poor Appetite: 0      Shortness of Breath: 0      Insomnia: 2      Other: 0      Overall (0 good/no concerns, 3 very poor):  1    No vitals due to video visit.     GENERAL: Comfortable-appearing, alert and no distress  EYES: Eyes grossly normal to inspection, conjunctivae and sclerae normal  Hearing intact.   Alopecia  RESP: no audible wheeze, cough, or visible cyanosis.  No increased work of breathing on RA.  Able to speak easily in complete sentences.  SKIN: no suspicious lesions or rashes on exposed skin  NEURO: Cranial nerves grossly intact, mentation intact and speech normal.  No tremor.   PSYCH: mentation appears normal, affect normal/bright and mood-congruent, judgement and insight intact.  Decreased speech output, lets her daughter answer most of the questions   The rest of a comprehensive physical examination is deferred due to PHE (public health emergency) video visit restrictions    Wt Readings from Last 10 Encounters:   03/15/21 71.1 kg (156 lb 11.2 oz)   03/12/21 70.4  kg (155 lb 3.2 oz)   03/05/21 69.8 kg (153 lb 14.4 oz)   02/26/21 68.5 kg (151 lb 1.6 oz)   02/19/21 67.6 kg (149 lb)   02/16/21 68 kg (150 lb)   02/06/21 65.2 kg (143 lb 11.8 oz)   02/01/21 65.9 kg (145 lb 4.8 oz)   01/13/21 68.1 kg (150 lb 3.2 oz)   01/07/21 67.3 kg (148 lb 4.2 oz)       Data Reviewed:  LABS:   Recent Labs   Lab Test 03/15/21  1204 03/12/21  0644    142   POTASSIUM 3.9 3.8   CHLORIDE 107 107   CO2 26 28   ANIONGAP 7 8   GLC 72 123*   BUN 12 17   CR 0.84 0.95   HORACIO 8.8 8.6     GFR 73  Alb 3.4  WBC 6.3, Hb 11.7, Plt 201    IMAGING:   CT CAP 4/2/21  IMPRESSION:   In this patient with a history of metastatic serous ovarian cancer,  status post diagnostic laparoscopy and neoadjuvant chemotherapy:  1. Significant improvement in peritoneal carcinomatosis as discussed  above.  2. Resolution of previously seen scattered small hypoattenuating  lesions in the liver. This raises the concern for these lesions  representing metastatic disease, noting excellent response elsewhere  in abdomen and pelvis.  3. Bilateral pleural effusions have resolved.  4. Several small pulmonary nodules in the right lung measuring up to 5  mm. Indeterminate, but likely benign in the setting of their stability  with excellent response elsewhere. Continued attention on follow-up.    CT Head 1/13/21  Impression:  1. Chronic sinusitis of the right maxillary and right sphenoid  sinuses.  2. Incidental presumed calcified meningioma in the right frontal  convexity without significant mass effect.  3. No suspicious intracranial enhancing lesion.    [place]  reviewed and fills consistent with history.     Impressions, Recommendations & Counseling:  Palliative Performance Score:  80  Decision Making Capacity:  Intact    Taran Regalado is a 65 year old with ovarian cancer with peritoneal carcinomatosis getting chemo, with recent improvement on scans, seen for symptom management.  Having worsened insomnia in setting of chronic  "insomnia + RLS previously controlled on amitriptyline but was not as effective since starting chemo.  Also having body aches with chemo.    Insomnia - long-standing, chronically on amitriptyline but no longer effective.  Previously tried trazodone, melatonin.   -Recommended CBT-I as this is been a chronic problem would be the most effective treatment, she declines  - Recommended trial of mirtazapine-would start at 7.5 mg nightly, could go up to 15 mg nightly in 1 week.  This would also help with anxiety at night.  She is in agreement to try this  -Asked to have a supply of Ambien \"just in case\" - discussed concerns - not for chronic use, habit-forming, risk of falls with other sedating medications.  Will prescribe small supply, #14.  Do not take with oxycodone.   - Counseled to stop taking oxycodone at night if not having pain  - If amitriptyline no longer helping with sleep or RLS, would be reasonable to try weaning because of possible its contributing to her fatigue and constipation.  Recommend cutting to 50 mg for 2 weeks, then 25 mg for 2 weeks, then off.     RLS - continue gabapentin 600 mg at night.  We discussed if her neuropathy were worsening, could add a daytime dose as well.  Not bothersome to her right now    Muscle aches with chemo -recommended Tylenol 1000 mg 3 times daily scheduled, can add Claritin 10 mg daily starting the day before CSF shot    RTC in 6 weeks, will ask RN to follow-up next week re: mirtazapine effectiveness and dosing    Brinda Lcaey MD  Palliative Medicine  Pager [phone]     65 minutes spent including reviewing record, review of above studies, above visit with patient, and documentation.     (This note was transcribed using voice recognition software. While I review and edit the transcription, I may miss errors, and the software sometimes does unexpected capitalizations and formatting that I miss. Please let me know of any serious mistranscriptions and I will addend this " note.)

## 2021-04-07 ENCOUNTER — VIRTUAL VISIT (OUTPATIENT)
Dept: ONCOLOGY | Facility: CLINIC | Age: 66
End: 2021-04-07
Attending: OBSTETRICS & GYNECOLOGY
Payer: COMMERCIAL

## 2021-04-07 DIAGNOSIS — C56.9 OVARIAN CANCER, UNSPECIFIED LATERALITY (H): Primary | ICD-10-CM

## 2021-04-07 PROCEDURE — 999N001193 HC VIDEO/TELEPHONE VISIT; NO CHARGE

## 2021-04-07 PROCEDURE — 99215 OFFICE O/P EST HI 40 MIN: CPT | Mod: 95 | Performed by: OBSTETRICS & GYNECOLOGY

## 2021-04-07 RX ORDER — CEFAZOLIN SODIUM 2 G/50ML
2 SOLUTION INTRAVENOUS
Status: CANCELLED | OUTPATIENT
Start: 2021-04-07

## 2021-04-07 RX ORDER — HEPARIN SODIUM 5000 [USP'U]/.5ML
5000 INJECTION, SOLUTION INTRAVENOUS; SUBCUTANEOUS
Status: CANCELLED | OUTPATIENT
Start: 2021-04-07

## 2021-04-07 RX ORDER — PHENAZOPYRIDINE HYDROCHLORIDE 200 MG/1
200 TABLET, FILM COATED ORAL ONCE
Status: CANCELLED | OUTPATIENT
Start: 2021-04-07 | End: 2021-04-07

## 2021-04-07 RX ORDER — CEFAZOLIN SODIUM 2 G/50ML
2 SOLUTION INTRAVENOUS SEE ADMIN INSTRUCTIONS
Status: CANCELLED | OUTPATIENT
Start: 2021-04-07

## 2021-04-07 NOTE — LETTER
2021         RE: Taran Regalado  [address] [facility] [address]        Dear Colleague,    Thank you for referring your patient, Taran Regalado, to the [facility] CAN[facility] CLINIC. Please see a copy of my visit note below.    Taran is a 65 year old who is being evaluated via a billable telephone visit.      What phone number would you like to be contacted at? [phone]  How would you like to obtain your AVS? Lora     I have reviewed and updated the patient's allergies and medication list.    Concerns: none  Refills: none     Vitals - Patient Reported  Weight (Patient Reported): 68 kg (150 lb)  Height (Patient Reported): 182.9 cm (6')  BMI (Based on Pt Reported Ht/Wt): 20.34  Pain Score: No Pain (0)    Sherita Hodge CMA        Phone call duration: 40 minutes                  Follow Up Notes on Referred Patient    Date: 2021       Dr. 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 She is currently undergoing neoadjuvant chemotherapy.  She is here today for follow up and disease management. In light of the recent COVID19 pandemic, and in accordance with our group and national guidelines this patients care has been reviewed and it is felt that presenting for her visit would be more likely to increase rather than decrease her relative risks. Therefore this visit was conducted by video.        Oncology History:  12/3/2020: US Pelvic: IMPRESSION: Limited examination due to acoustic windows. Possible left adnexal mass. A CT scan of the abdomen and pelvis with contrast is recommended for further assessment.     2020: CT A/P:   IMPRESSION:    Peritoneal carcinomatosis with masslike peritoneal thickening in the lower  pelvis which may indicate an adnexal or ovarian primary malignancy. Large volume ascites. Bilateral pleural effusions. There is potential subtle pleural nodularity in the right hemithorax which could indicate metastatic disease.  Indeterminate 1 cm lesion in the right hepatic lobe suspicious for a metastatic lesion.      12/16/2020: Presented to [facility] with abdominal distention, 25lb weight loss, and CTAP with carcinomatosis, elevated  3098.     12/23/2020: CT Chest: IMPRESSION:   1. There are few scattered small sub-6 mm pulmonary nodules which are indeterminate without prior comparisons available. There are a few  slightly larger perifissural nodules which are technically  indeterminate in the setting of malignancy although presumed lymphatic in nature and of unlikely clinical significance. Attention on follow-up is recommended.  2. Small to moderate left and small right pleural effusions are increased in size from prior. No convincing evidence for pleural nodularity.  3. Partially visualized large volume ascites and peritoneal nodularity in the upper abdomen similar to 12/4/2020 outside CT      12/26/2020: ED for abdominal distension; 3 L ascites drained with paracentesis    Pelvic US: Findings: Free fluid present in LLQ      12/31/2020: US Paracentesis: 900 mL ascites drained     1/7/2021: Diagnotic laparoscopy, biopsies  Pathology: FINAL DIAGNOSIS:   A. PERITONEUM, BIOPSIES:   - Positive for high grade carcinoma, consistent with metastatic carcinoma of Mullerian origin.     1/10-1/13/2021: Hospital admission for postoperative non-intractable vomiting and nausea.      1/10/2021: CT A/P: IMPRESSION: Extensive ascites which is probably malignant. Scattered liver hypodensities of indeterminate etiology comment cannot exclude metastatic disease. Diverticulosis. Fluid-filled adnexal masses and irregular appearance of uterus, which may represent primary neoplasm. Multiple peritoneal nodules. Large amount of fecal  material in the colon with no evidence of small bowel obstruction.     Plan: Paclitaxel 175 mg/m2 and carboplatin AUC 6 x 3 cycles followed by a CT CAP and visit with Dr. Juarez.     1/12/2021: Cycle 1 paclitaxel and carboplatin while inpatient     1/13/2021: CT Head: Impression:  1. Chronic sinusitis of the right maxillary and right sphenoid sinuses.  2. Incidental presumed calcified meningioma in the right frontal  convexity without significant mass effect.  3. No suspicious intracranial enhancing lesion.     2/1/2021: Cycle 2 paclitaxel and carboplatin.  936.     2/3-2/5/21: Admission [facility] for afib w/ RVR and new PE     2/26/21: Cycle 3 paclitaxel and carboplatin planned.  Deferred due to thrombocytopenia.  pending.    3/15/21: Cycle 3            Today she reports with her daughter Edie that she is feeling well overall.  She denies any vaginal bleeding, no changes in her bowel or bladder habits, no lower extremity edema, and no abdominal discomfort/bloating, no fevers or chills, and no chest pain or shortness of breath.        Past Medical History:    Past Medical History:   Diagnosis Date     Atrial fibrillation with rapid ventricular response (H)      History of cold sores      Insomnia      Migraine      Osteopenia      Pelvic mass      Peritoneal carcinomatosis (H)      Restless legs syndrome (RLS)          Past Surgical History:    Past Surgical History:   Procedure Laterality Date     APPENDECTOMY       ARTHROSCPY KNEE SURGICAL DEBRIDEMENT SHAVING ARTICULAR CARTILAGE Right      BIOPSY  January 2021    Biopsy to confirm ovarian cancer     DEBRIDEMENT LEFT UPPER EXTREMITY  2016     LAPAROSCOPY DIAGNOSTIC (GYN) Bilateral 1/7/2021    Procedure: Diagnsotic laparoscopy, biopsies;  Surgeon: Bolivar Juarez MD;  Location: [facility] OR     LASIK       TUBAL LIGATION           Health Maintenance Due   Topic Date Due     PREVENTIVE CARE VISIT  Never done     ADVANCE CARE PLANNING  Never done      Pneumococcal Vaccine: Pediatrics (0 to 5 Years) and At-Risk Patients (6 to 64 Years) (1 of 4 - PCV13) Never done     COLORECTAL CANCER SCREENING  Never done     HIV SCREENING  Never done     COVID-19 Vaccine (1) Never done     HEPATITIS C SCREENING  Never done     LIPID  Never done     ZOSTER IMMUNIZATION (1 of 2) Never done     INFLUENZA VACCINE (1) Never done     MAMMO SCREENING  03/04/2021       Current Medications:     Current Outpatient Medications   Medication Sig Dispense Refill     acetaminophen (TYLENOL) 325 MG tablet Take 2 tablets (650 mg) by mouth every 6 hours as needed for mild pain 50 tablet 0     amitriptyline (ELAVIL) 100 MG tablet TAKE 1 TABLET (100 MG) BY MOUTH EVERY NIGHT AT BEDTIME       gabapentin (NEURONTIN) 600 MG tablet Take 1 tablet (600 mg) by mouth At Bedtime 30 tablet 0     hydrOXYzine (ATARAX) 25 MG tablet Take 1-2 tablets (25-50 mg) by mouth nightly as needed for anxiety (or insomnia) 60 tablet 0     loratadine (CLARITIN) 10 MG tablet Take 10 mg by mouth daily With neupogen       mirtazapine (REMERON) 7.5 MG tablet Take 1 tablet (7.5 mg) by mouth At Bedtime 30 tablet 3     ondansetron (ZOFRAN-ODT) 4 MG ODT tab Take 1 tablet (4 mg) by mouth every 6 hours as needed for nausea or vomiting 20 tablet 0     oxyCODONE (ROXICODONE) 5 MG tablet Take 1 tablet (5 mg) by mouth every 6 hours as needed for pain 15 tablet 0     polyethylene glycol (MIRALAX) 17 GM/Dose powder Take 17 g by mouth 2 times daily 510 g 0     prochlorperazine (COMPAZINE) 10 MG tablet Take 1 tablet (10 mg) by mouth every 6 hours as needed for nausea or vomiting 30 tablet 0     rivaroxaban ANTICOAGULANT (XARELTO ANTICOAGULANT) 20 MG TABS tablet Take 1 tablet (20 mg) by mouth daily (with dinner) 90 tablet 1     senna-docusate (SENOKOT-S/PERICOLACE) 8.6-50 MG tablet Take 2 tablets by mouth 2 times daily 30 tablet 0     SUMAtriptan (IMITREX) 100 MG tablet Take 100 mg by mouth at onset of headache        valACYclovir (VALTREX)  1000 mg tablet Take 1,000 mg by mouth as needed        zolpidem (AMBIEN) 5 MG tablet Take 1 tablet (5 mg) by mouth nightly as needed for sleep 14 tablet 0         Allergies:      No Known Allergies     Social History:     Social History     Tobacco Use     Smoking status: Former Smoker     Packs/day: 0.50     Years: 40.00     Pack years: 20.00     Quit date:      Years since quitting: 3.2     Smokeless tobacco: Never Used   Substance Use Topics     Alcohol use: Not Currently       History   Drug Use Unknown         Family History:       Family History   Adopted: Yes   Problem Relation Age of Onset     Cancer Mother 36     Other Cancer Mother         Bio mother  of  a female cancer  at 36     Factor V Leiden deficiency Daughter      Deep Vein Thrombosis Daughter      Diabetes Type 1 Daughter      Diabetes Daughter      Hypertension Daughter          Physical Exam:     There were no vitals taken for this visit.  There is no height or weight on file to calculate BMI.    General Appearance:  healthy and alert, no distress                 Psychiatric:      appropriate mood and affect                                    Assessment:     Taran Regalado is a 65 year old woman with a diagnosis of carcinomatosis and elevated .      A total of 40 minutes was spent with the patient, 30 minutes of which were spent in counseling the patient and/or treatment planning.        1.  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   2.  BRCA 1 germline mutation.   3.  Precision Medicine Program  4. PE resolved on Xarelto     I had a long discussion with the patient about the positive treatment response on the recent CT scan. She will need to stop her Xarelto at least 24h before surgery. We will plan to proceed with interval cytoreduction, DANAE, BSO and debulking. We will have her see my colleagues in anesthesia for preoperative optimization. The patient agrees with this plan.  Will also will her in Precision Medicine  and/or research programs and studies.  Patient is very appreciative of her care.  All questions were answered.         Risks, benefits and alternatives to proceed discussed in detail with the patient. Risks include but are not limited to bleeding, infection, possible injury to surrounding organs including bowel, bladder, ureter, need for second procedure/surgery related to complications from first procedure, postoperative medical complications such as cardiopulmonary events, lymphedema, lymphocyst, thromboembolic events. Consent for surgery, blood transfusion signed.  Will arrange appropriate preoperative blood work, CXR, EKG. Patient also advised on need for postoperative surveillance and/or adjuvant therapy. Questions answered.           Bolivar Juarez MD, MS    Department of Obstetrics and Gynecology   Division of Gynecologic Oncology   [facility]  Phone: [phone]    CC  Patient Care Team:  Marta Okeefe APRN CNP as Assigned PCP  Marta Lao APRN CNP as Assigned OBGYN Provider  Pepe Rdz MD as Assigned Heart and Vascular Provider  Holley Ramos PA-C as Assigned Surgical Provider

## 2021-04-07 NOTE — PROGRESS NOTES
Taran is a 65 year old who is being evaluated via a billable telephone visit.      What phone number would you like to be contacted at? [phone]  How would you like to obtain your AVS? Lora     I have reviewed and updated the patient's allergies and medication list.    Concerns: none  Refills: none     Vitals - Patient Reported  Weight (Patient Reported): 68 kg (150 lb)  Height (Patient Reported): 182.9 cm (6')  BMI (Based on Pt Reported Ht/Wt): 20.34  Pain Score: No Pain (0)    Sherita Hodge CMA        Phone call duration: 40 minutes                  Follow Up Notes on Referred Patient    Date: 2021       Dr. 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 She is currently undergoing neoadjuvant chemotherapy.  She is here today for follow up and disease management. In light of the recent COVID19 pandemic, and in accordance with our group and national guidelines this patients care has been reviewed and it is felt that presenting for her visit would be more likely to increase rather than decrease her relative risks. Therefore this visit was conducted by video.        Oncology History:  12/3/2020: US Pelvic: IMPRESSION: Limited examination due to acoustic windows. Possible left adnexal mass. A CT scan of the abdomen and pelvis with contrast is recommended for further assessment.     2020: CT A/P:   IMPRESSION:    Peritoneal carcinomatosis with masslike peritoneal thickening in the lower pelvis which may indicate an adnexal or ovarian primary malignancy. Large volume ascites. Bilateral pleural effusions. There is potential subtle pleural nodularity in the right hemithorax which could indicate metastatic disease.  Indeterminate 1 cm lesion  in the right hepatic lobe suspicious for a metastatic lesion.      12/16/2020: Presented to [facility] with abdominal distention, 25lb weight loss, and CTAP with carcinomatosis, elevated  3098.     12/23/2020: CT Chest: IMPRESSION:   1. There are few scattered small sub-6 mm pulmonary nodules which are indeterminate without prior comparisons available. There are a few  slightly larger perifissural nodules which are technically  indeterminate in the setting of malignancy although presumed lymphatic in nature and of unlikely clinical significance. Attention on follow-up is recommended.  2. Small to moderate left and small right pleural effusions are increased in size from prior. No convincing evidence for pleural nodularity.  3. Partially visualized large volume ascites and peritoneal nodularity in the upper abdomen similar to 12/4/2020 outside CT      12/26/2020: ED for abdominal distension; 3 L ascites drained with paracentesis    Pelvic US: Findings: Free fluid present in LLQ      12/31/2020: US Paracentesis: 900 mL ascites drained     1/7/2021: Diagnotic laparoscopy, biopsies  Pathology: FINAL DIAGNOSIS:   A. PERITONEUM, BIOPSIES:   - Positive for high grade carcinoma, consistent with metastatic carcinoma of Mullerian origin.     1/10-1/13/2021: Hospital admission for postoperative non-intractable vomiting and nausea.      1/10/2021: CT A/P: IMPRESSION: Extensive ascites which is probably malignant. Scattered liver hypodensities of indeterminate etiology comment cannot exclude metastatic disease. Diverticulosis. Fluid-filled adnexal masses and irregular appearance of uterus, which may represent primary neoplasm. Multiple peritoneal nodules. Large amount of fecal material in the colon with no evidence of small bowel obstruction.     Plan: Paclitaxel 175 mg/m2 and carboplatin AUC 6 x 3 cycles followed by a CT CAP and visit with Dr. Juarez.     1/12/2021: Cycle 1 paclitaxel and carboplatin while  inpatient     1/13/2021: CT Head: Impression:  1. Chronic sinusitis of the right maxillary and right sphenoid sinuses.  2. Incidental presumed calcified meningioma in the right frontal  convexity without significant mass effect.  3. No suspicious intracranial enhancing lesion.     2/1/2021: Cycle 2 paclitaxel and carboplatin.  936.     2/3-2/5/21: Admission [facility] for afib w/ RVR and new PE     2/26/21: Cycle 3 paclitaxel and carboplatin planned.  Deferred due to thrombocytopenia.  pending.    3/15/21: Cycle 3            Today she reports with her daughter Edie that she is feeling well overall.  She denies any vaginal bleeding, no changes in her bowel or bladder habits, no lower extremity edema, and no abdominal discomfort/bloating, no fevers or chills, and no chest pain or shortness of breath.        Past Medical History:    Past Medical History:   Diagnosis Date     Atrial fibrillation with rapid ventricular response (H)      History of cold sores      Insomnia      Migraine      Osteopenia      Pelvic mass      Peritoneal carcinomatosis (H)      Restless legs syndrome (RLS)          Past Surgical History:    Past Surgical History:   Procedure Laterality Date     APPENDECTOMY       ARTHROSCPY KNEE SURGICAL DEBRIDEMENT SHAVING ARTICULAR CARTILAGE Right      BIOPSY  January 2021    Biopsy to confirm ovarian cancer     DEBRIDEMENT LEFT UPPER EXTREMITY  2016     LAPAROSCOPY DIAGNOSTIC (GYN) Bilateral 1/7/2021    Procedure: Diagnsotic laparoscopy, biopsies;  Surgeon: Bolivar Juarez MD;  Location: [facility]       TUBAL LIGATION           Health Maintenance Due   Topic Date Due     PREVENTIVE CARE VISIT  Never done     ADVANCE CARE PLANNING  Never done     Pneumococcal Vaccine: Pediatrics (0 to 5 Years) and At-Risk Patients (6 to 64 Years) (1 of 4 - PCV13) Never done     COLORECTAL CANCER SCREENING  Never done     HIV SCREENING  Never done     COVID-19 Vaccine (1) Never done     HEPATITIS C  SCREENING  Never done     LIPID  Never done     ZOSTER IMMUNIZATION (1 of 2) Never done     INFLUENZA VACCINE (1) Never done     MAMMO SCREENING  03/04/2021       Current Medications:     Current Outpatient Medications   Medication Sig Dispense Refill     acetaminophen (TYLENOL) 325 MG tablet Take 2 tablets (650 mg) by mouth every 6 hours as needed for mild pain 50 tablet 0     amitriptyline (ELAVIL) 100 MG tablet TAKE 1 TABLET (100 MG) BY MOUTH EVERY NIGHT AT BEDTIME       gabapentin (NEURONTIN) 600 MG tablet Take 1 tablet (600 mg) by mouth At Bedtime 30 tablet 0     hydrOXYzine (ATARAX) 25 MG tablet Take 1-2 tablets (25-50 mg) by mouth nightly as needed for anxiety (or insomnia) 60 tablet 0     loratadine (CLARITIN) 10 MG tablet Take 10 mg by mouth daily With neupogen       mirtazapine (REMERON) 7.5 MG tablet Take 1 tablet (7.5 mg) by mouth At Bedtime 30 tablet 3     ondansetron (ZOFRAN-ODT) 4 MG ODT tab Take 1 tablet (4 mg) by mouth every 6 hours as needed for nausea or vomiting 20 tablet 0     oxyCODONE (ROXICODONE) 5 MG tablet Take 1 tablet (5 mg) by mouth every 6 hours as needed for pain 15 tablet 0     polyethylene glycol (MIRALAX) 17 GM/Dose powder Take 17 g by mouth 2 times daily 510 g 0     prochlorperazine (COMPAZINE) 10 MG tablet Take 1 tablet (10 mg) by mouth every 6 hours as needed for nausea or vomiting 30 tablet 0     rivaroxaban ANTICOAGULANT (XARELTO ANTICOAGULANT) 20 MG TABS tablet Take 1 tablet (20 mg) by mouth daily (with dinner) 90 tablet 1     senna-docusate (SENOKOT-S/PERICOLACE) 8.6-50 MG tablet Take 2 tablets by mouth 2 times daily 30 tablet 0     SUMAtriptan (IMITREX) 100 MG tablet Take 100 mg by mouth at onset of headache        valACYclovir (VALTREX) 1000 mg tablet Take 1,000 mg by mouth as needed        zolpidem (AMBIEN) 5 MG tablet Take 1 tablet (5 mg) by mouth nightly as needed for sleep 14 tablet 0         Allergies:      No Known Allergies     Social History:     Social History      Tobacco Use     Smoking status: Former Smoker     Packs/day: 0.50     Years: 40.00     Pack years: 20.00     Quit date:      Years since quitting: 3.2     Smokeless tobacco: Never Used   Substance Use Topics     Alcohol use: Not Currently       History   Drug Use Unknown         Family History:       Family History   Adopted: Yes   Problem Relation Age of Onset     Cancer Mother 36     Other Cancer Mother         Bio mother  of  a female cancer  at 36     Factor V Leiden deficiency Daughter      Deep Vein Thrombosis Daughter      Diabetes Type 1 Daughter      Diabetes Daughter      Hypertension Daughter          Physical Exam:     There were no vitals taken for this visit.  There is no height or weight on file to calculate BMI.    General Appearance:  healthy and alert, no distress                 Psychiatric:      appropriate mood and affect                                    Assessment:     Taran Regalado is a 65 year old woman with a diagnosis of carcinomatosis and elevated .      A total of 40 minutes was spent with the patient, 30 minutes of which were spent in counseling the patient and/or treatment planning.        1.  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   2.  BRCA 1 germline mutation.   3.  Precision Medicine Program  4. PE resolved on Xarelto     I had a long discussion with the patient about the positive treatment response on the recent CT scan. She will need to stop her Xarelto at least 24h before surgery. We will plan to proceed with interval cytoreduction, DANAE, BSO and debulking. We will have her see my colleagues in anesthesia for preoperative optimization. The patient agrees with this plan.  Will also will her in Precision Medicine and/or research programs and studies.  Patient is very appreciative of her care.  All questions were answered.         Risks, benefits and alternatives to proceed discussed in detail with the patient. Risks include but are not limited  to bleeding, infection, possible injury to surrounding organs including bowel, bladder, ureter, need for second procedure/surgery related to complications from first procedure, postoperative medical complications such as cardiopulmonary events, lymphedema, lymphocyst, thromboembolic events. Consent for surgery, blood transfusion signed.  Will arrange appropriate preoperative blood work, CXR, EKG. Patient also advised on need for postoperative surveillance and/or adjuvant therapy. Questions answered.           Shira Estrada MD, MS    Department of Obstetrics and Gynecology   Division of Gynecologic Oncology   [facility]  Phone: [phone]      CC  Patient Care Team:  Shira Estrada MD as PCP - General (Gynecologic Oncology)  Shira Estrada MD as Assigned Cancer Care Provider  Marta Okeefe APRN CNP as Assigned PCP  Marta Lao APRN CNP as Assigned OBGYN Provider  Pepe Rdz MD as Assigned Heart and Vascular Provider  Holley Ramos PA-C as Assigned Surgical Provider  SHIRA ESTRADA

## 2021-04-08 ENCOUNTER — TELEPHONE (OUTPATIENT)
Dept: ONCOLOGY | Facility: CLINIC | Age: 66
End: 2021-04-08

## 2021-04-08 NOTE — TELEPHONE ENCOUNTER
Surgery is scheduled with Dr. Juarez on 5/6 at [place].  Scheduled per orders/availability.    H&P: to be completed by PAC: virtual visit on 4/22    Additional appointments:   NO ADDITIONAL APPOINTMENTS NEEDED AT THIS TIME    COVID-19 test: will be at [facility] or the [facility]    Post-op: 5/26 as a virtual visit .    The RN completed the education regarding the surgery.     Patient will receive surgery arrival and start time from PAC.    The surgery packet was sent via [employer].    Patient will complete COVID-19 test that was scheduled by surgical coordinator 2-4 days prior to surgery.     I called the patient and was able to confirm the scheduled information above.

## 2021-04-09 ENCOUNTER — MYC MEDICAL ADVICE (OUTPATIENT)
Dept: PALLIATIVE CARE | Facility: CLINIC | Age: 66
End: 2021-04-09

## 2021-04-09 DIAGNOSIS — Z11.59 ENCOUNTER FOR SCREENING FOR OTHER VIRAL DISEASES: ICD-10-CM

## 2021-04-09 DIAGNOSIS — F51.01 PRIMARY INSOMNIA: ICD-10-CM

## 2021-04-09 DIAGNOSIS — C56.9 OVARIAN CANCER, UNSPECIFIED LATERALITY (H): ICD-10-CM

## 2021-04-09 RX ORDER — MIRTAZAPINE 7.5 MG/1
15 TABLET, FILM COATED ORAL AT BEDTIME
Qty: 30 TABLET | Refills: 3 | COMMUNITY
Start: 2021-04-09 | End: 2021-04-13

## 2021-04-09 NOTE — TELEPHONE ENCOUNTER
FUTURE VISIT INFORMATION      SURGERY INFORMATION:    Date: 21    Location: [facility] OR    Surgeon:  Larry    Anesthesia Type:  general    Procedure: HYSTERECTOMY, TOTAL, ABDOMINAL, WITH BILATERAL SALPINGO-OOPHORECTOMY, omentectomy, NEOPLASM DEBULKING, possible bowel resection, possible stoma    Consult: 21    RECORDS REQUESTED FROM:       Primary Care Provider:Dr. Juarez    Most recent EKG+ Tracin.3.21    Most recent ECHO: 2.3.21

## 2021-04-09 NOTE — TELEPHONE ENCOUNTER
Surgery is rescheduled with Dr. Juarez on 4/19 at [place].  Recheduled per surgeon requesting sooner than 5/6 d/t Dx.    H&P: to be completed by PAC: virtual visit 4/13    Additional appointments:   NO ADDITIONAL APPOINTMENTS NEEDED AT THIS TIME    COVID-19 test: 4/17 at [place]    Post-op: 5/12 as a virtual visit .    The RN completed the education regarding the surgery.     Patient will receive surgery arrival and start time from PAC.    The surgery packet was sent via [employer].    Patient will complete COVID-19 test that was scheduled by surgical coordinator 2-4 days prior to surgery.     I sent a Metap[employer]art to confirm the information above, will call if the patient requests.Patient confirmed all via [employer].

## 2021-04-09 NOTE — TELEPHONE ENCOUNTER
-Daughter sent message - still having insomnia, has needed rescue ambien last 2 nights.  Will increase mirtazapine to 15 mg QHS.     Brinda Lacey MD  Palliative Medicine  Pager [phone]

## 2021-04-12 ENCOUNTER — MYC MEDICAL ADVICE (OUTPATIENT)
Dept: FAMILY MEDICINE | Facility: CLINIC | Age: 66
End: 2021-04-12

## 2021-04-12 ENCOUNTER — MYC MEDICAL ADVICE (OUTPATIENT)
Dept: PALLIATIVE CARE | Facility: CLINIC | Age: 66
End: 2021-04-12

## 2021-04-12 DIAGNOSIS — F51.01 PRIMARY INSOMNIA: Primary | ICD-10-CM

## 2021-04-12 RX ORDER — ZOLPIDEM TARTRATE 5 MG/1
5-10 TABLET ORAL
Qty: 10 TABLET | Refills: 0 | Status: ON HOLD | OUTPATIENT
Start: 2021-04-12 | End: 2021-04-19

## 2021-04-12 NOTE — PROGRESS NOTES
Preoperative Assessment Center Medication History Note    Medication history completed on April 12, 2021 by this writer. See Epic admission navigator for prior to admission medications. Operating room staff will still need to confirm medications and last dose information on day of surgery.     Medication history interview sources:  patient, patient's daughter, payor information.     Changes made to PTA medication list (reason)  Added: none  Deleted: hydroxyzine,   Changed: amitriptyline, claritin, miralax,     Additional medication history information (including reliability of information, actions taken by pharmacist):    -- No recent (within 30 days) course of antibiotics  -- No recent (within 30 days) course of systemic steroids  -- Patient declines being on any other prescription or over-the-counter medications    Prior to Admission medications    Medication Sig Last Dose Taking? Auth Provider   acetaminophen (TYLENOL) 325 MG tablet Take 2 tablets (650 mg) by mouth every 6 hours as needed for mild pain Taking Yes Bolivar Juarez MD   amitriptyline (ELAVIL) 100 MG tablet Take 50 mg by mouth At Bedtime  Taking Yes Reported, Patient   gabapentin (NEURONTIN) 600 MG tablet Take 1 tablet (600 mg) by mouth At Bedtime Taking Yes Marta Okeefe APRN CNP   loratadine (CLARITIN) 10 MG tablet Take 10 mg by mouth daily as needed (for bone pain related to neupogen)  Taking Yes Reported, Patient   mirtazapine (REMERON) 7.5 MG tablet Take 2 tablets (15 mg) by mouth At Bedtime Taking Yes Brinda Lacey MD   ondansetron (ZOFRAN-ODT) 4 MG ODT tab Take 1 tablet (4 mg) by mouth every 6 hours as needed for nausea or vomiting Taking Yes Bolivra Juarez MD   oxyCODONE (ROXICODONE) 5 MG tablet Take 1 tablet (5 mg) by mouth every 6 hours as needed for pain Taking Yes Ella Murillo APRN CNP   polyethylene glycol (MIRALAX) 17 GM/Dose powder Take 17 g by mouth 2 times daily  Patient taking differently: Take 17 g by mouth  daily as needed  Taking Yes Carlita England APRN CNP   rivaroxaban ANTICOAGULANT (XARELTO ANTICOAGULANT) 20 MG TABS tablet Take 1 tablet (20 mg) by mouth daily (with dinner)  Patient taking differently: Take 20 mg by mouth every morning  Taking Yes Ella Murillo APRN CNP   senna-docusate (SENOKOT-S/PERICOLACE) 8.6-50 MG tablet Take 2 tablets by mouth 2 times daily Taking Yes Carlita Engladn APRN CNP   SUMAtriptan (IMITREX) 100 MG tablet Take 100 mg by mouth at onset of headache  Taking Yes Reported, Patient   zolpidem (AMBIEN) 5 MG tablet Take 1 tablet (5 mg) by mouth nightly as needed for sleep  Patient taking differently: Take 5 mg by mouth At Bedtime  Taking Yes Brinda Lacey MD   prochlorperazine (COMPAZINE) 10 MG tablet Take 1 tablet (10 mg) by mouth every 6 hours as needed for nausea or vomiting  Patient not taking: Reported on 4/12/2021 Not Taking  Ella Murillo APRN CNP   valACYclovir (VALTREX) 1000 mg tablet Take 1,000 mg by mouth as needed  Not Taking  Reported, Patient            Medication history completed by: Raf Ricketts, [facility]

## 2021-04-12 NOTE — PHARMACY - PREOPERATIVE ASSESSMENT CENTER
Anticoagulation Note - Preoperative Assessment Center (PAC) Pharmacist     Patient was interviewed on April 12, 2021 as a part of PAC clinic appointment. The purpose of this note is to document the perioperative anticoagulation plan outlined by the providers caring for Taran Regalado.     Current Regimen  Anticoagulation Regimen as of April 12, 2021: rivaroxaban (Xarelto) 20 mg PO daily in the morning   Indication: PE 2/3/21, also diagnosed with paroxysmal afib at same hospitalization.   Prescriber:  Ella ECHAVARRIA, also follows with Dr. Juarez.   Expected Duration of therapy: undetermined.   Current medications that may interact with this include: none    Creatinine   Date Value Ref Range Status   03/15/2021 0.84 0.52 - 1.04 mg/dL Final       Perioperative plan  Taran Regalado is scheduled for HYSTERECTOMY, TOTAL, ABDOMINAL, WITH BILATERAL SALPINGO-OOPHORECTOMY, omentectomy, NEOPLASM DEBULKING, possible bowel resection, possible stoma on 4/19/21 with Dr. Juarez and the perioperative anticoagulation plan is take last dose of Xarelto on early AM of 4/17/21 (take between 6-7 AM).     Resumption of anticoagulation after procedure will be based on surgery team assessment of bleeding risks and complications.  This plan may require re-assessment and modification by her primary team in the perioperative setting depending on patients clinical situation.        Raf Ricketts RPH  April 12, 2021  11:50 AM

## 2021-04-13 ENCOUNTER — VIRTUAL VISIT (OUTPATIENT)
Dept: SURGERY | Facility: CLINIC | Age: 66
End: 2021-04-13
Payer: COMMERCIAL

## 2021-04-13 ENCOUNTER — PRE VISIT (OUTPATIENT)
Dept: SURGERY | Facility: CLINIC | Age: 66
End: 2021-04-13

## 2021-04-13 ENCOUNTER — ANESTHESIA EVENT (OUTPATIENT)
Dept: SURGERY | Facility: CLINIC | Age: 66
End: 2021-04-13
Payer: COMMERCIAL

## 2021-04-13 DIAGNOSIS — Z01.818 PRE-OP EVALUATION: Primary | ICD-10-CM

## 2021-04-13 PROCEDURE — 99215 OFFICE O/P EST HI 40 MIN: CPT | Mod: 95 | Performed by: PHYSICIAN ASSISTANT

## 2021-04-13 ASSESSMENT — LIFESTYLE VARIABLES: TOBACCO_USE: 0

## 2021-04-13 ASSESSMENT — PAIN SCALES - GENERAL: PAINLEVEL: NO PAIN (0)

## 2021-04-13 NOTE — PATIENT INSTRUCTIONS
Preparing for Your Surgery      Name:  Taran Regalado   MRN:  [id number]   :  1955   Today's Date:  2021       Arriving for surgery:  Surgery date:  21  Arrival time:  5:30 am    Restrictions due to COVID 19:  One consistent visitor per patient is allowed.  The visitor will be allowed in the pre-op area.  Visitors are asked to leave the building during the surgery.  No ill visitors.  All visitors must wear face mask.    [employer] parking is available for anyone with mobility limitations or disabilities.  ([place]  24 hours/ 7 days a week; [place]  7 am- 3:30 pm, Mon- Fri)    Please come to:     [facility] [address]  [address] [place] [address]     -    On arrival to hospital, you will be asked some screening questions and then directed to Patient Registration. Patient Registration will then direct you up to the 3rd floor Surgery Waiting Lounge.  [phone]?     - ?If you are in need of directions, wheelchair or escort please stop at the Information Desk in the lobby.  Inform the information person that you are here for surgery; a wheelchair and escort to [address] will be provided.?     What can I eat or drink?  -  You may eat and drink normally for up to 8 hours before your surgery. (Until 11:30 pm 21)  -  You may have clear liquids until 2 hours before surgery. (Until 5:30 am)    Examples of clear liquids:  Water  Clear broth  Juices (apple, white grape, white cranberry  and cider) without pulp  Noncarbonated, powder based beverages  (lemonade and Paramjit-Aid)  Sodas (Sprite, 7-Up, ginger ale and seltzer)  Coffee or tea (without milk or cream)  Gatorade    -  No Alcohol for at least 24 hours before surgery     Which medicines can I take?  Hold Aspirin for 7 days before surgery.   Hold Multivitamins for 7 days before surgery.  Hold Supplements for 7 days before surgery.  Hold Ibuprofen (Advil, Motrin) for 1 day before  surgery--unless otherwise directed by surgeon.  Hold Naproxen (Aleve) for 4 days before surgery.    Hold Rivaroxaban (Xarelto) for 48 hours prior to surgery. Take the last dose of Rivaroxaban (Xarelto) on 4-17-21 prior to 7 am, and then hold until surgery.    -  DO NOT take these medications the day of surgery:  Senna-Docusate (Senokot), Miralax, Sumatriptan (Imitrex)    -  PLEASE TAKE these medications the day of surgery:  Acetaminophen (Tylenol) if needed  Loratidine (Claritin) if needed  Ondansetron (Zofran) if needed  Oxycodone if needed    How do I prepare myself?  - Please take 2 showers before surgery using Scrubcare or Hibiclens soap.    Use this soap only from the neck to your toes.     Leave the soap on your skin for one minute--then rinse thoroughly.      You may use your own shampoo and conditioner; no other hair products.   - Please remove all jewelry and body piercings.  - No lotions, deodorants or fragrance.  - No makeup or fingernail polish.   - Bring your ID and insurance card.    - All patients are required to have a Covid-19 test within 4 days of surgery/procedure.      -Patients will be contacted by the [facility] scheduling team within 1 week of surgery to make an appointment.      - Patients may call the Scheduling team at [phone] if they have not been scheduled within 4 days of  surgery.      ALL PATIENTS GOING HOME THE SAME DAY OF SURGERY ARE REQUIRED TO HAVE A RESPONSIBLE ADULT TO DRIVE AND BE IN ATTENDANCE WITH THEM FOR 24 HOURS FOLLOWING SURGERY.    IF THE RESPONSIBLE ADULT IS REQUIRED FOR POST OP TEACHING THE POST OP RN WILL ASK THEM TO COME BACK TO THE RECOVERY AREA.    Questions or Concerns:    - For any questions regarding the day of surgery or your hospital stay, please contact the Pre Admission Nursing Office at [phone].       - If you have health changes between today and your surgery please call your surgeon.       For questions after surgery please call your  surgeons office.     AFTER YOUR SURGERY  Breathing exercises   Breathing exercises help you recover faster. Take deep breaths and let the air out slowly. This will:     Help you wake up after surgery.    Help prevent complications like pneumonia.  Preventing complications will help you go home sooner.   We may give you a breathing device (incentive spirometer) to encourage you to breathe deeply.   Nausea and vomiting   You may feel sick to your stomach after surgery; if so, let your nurse know.    Pain control:  After surgery, you may have pain. Our goal is to help you manage your pain. Pain medicine will help you feel comfortable enough to do activities that will help you heal.  These activities may include breathing exercises, walking and physical therapy.   To help your health care team treat your pain we will ask: 1) If you have pain  2) where it is located 3) describe your pain in your words  Methods of pain control include medications given by mouth, vein or by nerve block for some surgeries.  Sequential Compression Device (SCD):  You may need to wear SCD S (also called pneumo boots)on your legs or feet. These are wraps connected to a machine that pumps in air and releases it. The repeated pumping helps prevent blood clots from forming.

## 2021-04-13 NOTE — H&P (VIEW-ONLY)
Pre-Operative H & P         Video-Visit Details    Type of service:  Video Visit    Patient verbally consented to video service today: YES      Video Start Time: 1046  Video End Time (time video stopped): 1103    Originating Location (pt. Location): Home    Distant Location (provider location):  home    Mode of Communication:  Video Conference via Decisionlink        CC:  Preoperative exam to assess for increased cardiopulmonary risk while undergoing surgery and anesthesia.    Date of Encounter: 4/13/2021  Primary Care Physician:  Bolivar Juarez  associated diagnosis: ovarian cancer    HPI  Taran Regalado is a 65 year old female who presents for pre-operative H & P in preparation for HYSTERECTOMY, TOTAL, ABDOMINAL, WITH BILATERAL SALPINGO-OOPHORECTOMY, omentectomy, NEOPLASM DEBULKING, possible bowel resection, possible stoma with Dr. Juarez on 4/19/21 at [facility]. Patient is being evaluated for comorbid conditions of paroxysmal a fib, PE, migraines, RLS, insomnia      Ms. 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 She has been undergoing neoadjuvant chemotherapy. She has been following with Dr. Juarez. She is now scheduled for the above procedure.      History is obtained from the patient and chart review.      Past Medical History  Past Medical History:   Diagnosis Date     Atrial fibrillation with rapid ventricular response (H)      History of cold sores      Insomnia      Migraine      Osteopenia      Pelvic mass      Peritoneal carcinomatosis (H)      Restless legs syndrome (RLS)        Past Surgical History  Past Surgical History:   Procedure Laterality Date     APPENDECTOMY       ARTHROSCPY KNEE SURGICAL DEBRIDEMENT SHAVING ARTICULAR CARTILAGE Right      BIOPSY  January 2021    Biopsy to confirm ovarian cancer     DEBRIDEMENT LEFT UPPER EXTREMITY  2016     LAPAROSCOPY DIAGNOSTIC (GYN) Bilateral  1/7/2021    Procedure: Diagnsotic laparoscopy, biopsies;  Surgeon: Bolivar Juarez MD;  Location: [facility] OR     LASIK       TUBAL LIGATION         Hx of Blood transfusions/reactions: denies     Hx of abnormal bleeding or anti-platelet use: Xarelto    Menstrual history: No LMP recorded. Patient is postmenopausal.    Steroid use in the last year: denies    Personal or FH with difficulty with Anesthesia:  denies    Prior to Admission Medications  Current Outpatient Medications   Medication Sig Dispense Refill     acetaminophen (TYLENOL) 325 MG tablet Take 2 tablets (650 mg) by mouth every 6 hours as needed for mild pain 50 tablet 0     amitriptyline (ELAVIL) 100 MG tablet Take 50 mg by mouth At Bedtime        gabapentin (NEURONTIN) 600 MG tablet Take 1 tablet (600 mg) by mouth At Bedtime 30 tablet 0     loratadine (CLARITIN) 10 MG tablet Take 10 mg by mouth daily as needed (for bone pain related to neupogen)        ondansetron (ZOFRAN-ODT) 4 MG ODT tab Take 1 tablet (4 mg) by mouth every 6 hours as needed for nausea or vomiting 20 tablet 0     oxyCODONE (ROXICODONE) 5 MG tablet Take 1 tablet (5 mg) by mouth every 6 hours as needed for pain 15 tablet 0     polyethylene glycol (MIRALAX) 17 GM/Dose powder Take 17 g by mouth 2 times daily (Patient taking differently: Take 17 g by mouth daily as needed ) 510 g 0     rivaroxaban ANTICOAGULANT (XARELTO ANTICOAGULANT) 20 MG TABS tablet Take 1 tablet (20 mg) by mouth daily (with dinner) (Patient taking differently: Take 20 mg by mouth every morning ) 90 tablet 1     senna-docusate (SENOKOT-S/PERICOLACE) 8.6-50 MG tablet Take 2 tablets by mouth 2 times daily 30 tablet 0     SUMAtriptan (IMITREX) 100 MG tablet Take 100 mg by mouth at onset of headache        zolpidem (AMBIEN) 5 MG tablet Take 1 tablet (5 mg) by mouth nightly as needed for sleep (Patient taking differently: Take 5 mg by mouth At Bedtime ) 14 tablet 0     prochlorperazine (COMPAZINE) 10 MG tablet Take 1 tablet (10  mg) by mouth every 6 hours as needed for nausea or vomiting (Patient not taking: Reported on 4/12/2021) 30 tablet 0     valACYclovir (VALTREX) 1000 mg tablet Take 1,000 mg by mouth as needed        zolpidem (AMBIEN) 5 MG tablet Take 1-2 tablets (5-10 mg) by mouth nightly as needed for sleep 10 tablet 0       Allergies  No Known Allergies    Social History  Social History     Socioeconomic History     Marital status:      Spouse name: Not on file     Number of children: Not on file     Years of education: Not on file     Highest education level: Not on file   Occupational History     Not on file   Social Needs     Financial resource strain: Not on file     Food insecurity     Worry: Not on file     Inability: Not on file     Transportation needs     Medical: Not on file     Non-medical: Not on file   Tobacco Use     Smoking status: Former Smoker     Packs/day: 0.50     Years: 40.00     Pack years: 20.00     Quit date: 2018     Years since quitting: 3.2     Smokeless tobacco: Never Used   Substance and Sexual Activity     Alcohol use: Not Currently     Drug use: Never     Sexual activity: Not Currently     Partners: Male   Lifestyle     Physical activity     Days per week: Not on file     Minutes per session: Not on file     Stress: Not on file   Relationships     Social connections     Talks on phone: Not on file     Gets together: Not on file     Attends [employer]ism service: Not on file     Active member of club or organization: Not on file     Attends meetings of clubs or organizations: Not on file     Relationship status: Not on file     Intimate partner violence     Fear of current or ex partner: Not on file     Emotionally abused: Not on file     Physically abused: Not on file     Forced sexual activity: Not on file   Other Topics Concern     Parent/sibling w/ CABG, MI or angioplasty before 65F 55M? No   Social History Narrative     Not on file       Family History  Family History   Adopted: Yes   Problem  Relation Age of Onset     Cancer Mother 36     Other Cancer Mother         Bio mother  of  a female cancer  at 36     Factor V Leiden deficiency Daughter      Deep Vein Thrombosis Daughter      Diabetes Type 1 Daughter      Diabetes Daughter      Hypertension Daughter      Anesthesia Reaction No family hx of        ROS/MED HX  ENT/Pulmonary:    (-) tobacco use   Neurologic: Comment: RLS    (+) migraines,     Cardiovascular: Comment: Paroxysmal atrial fibrillation     (+) -----Taking blood thinners Previous cardiac testing   Echo: Date: 2021 Results:    Stress Test: Date: Results:    ECG Reviewed: Date: 2021 Results:    Cath: Date: Results:      METS/Exercise Tolerance: 4 - Raking leaves, gardening Comment: Goes for walks, can ascend >1 flight of stairs without LOPEZ   Hematologic:     (+) History of blood clots,  (-) history of blood transfusion   Musculoskeletal:  - neg musculoskeletal ROS     GI/Hepatic:  - neg GI/hepatic ROS     Renal/Genitourinary: Comment: Probable ovarian cancer      Endo:  - neg endo ROS     Psychiatric/Substance Use: Comment: insomnia      Infectious Disease:  - neg infectious disease ROS     Malignancy:   (+) Malignancy, History of Other.Other CA Active status post Chemo.    Other:            The complete review of systems is negative other than noted in the HPI or here.        0 lbs 0 oz  Data Unavailable   There is no height or weight on file to calculate BMI.       Physical Exam  Constitutional: Awake, alert, cooperative, no apparent distress, and appears stated age.  Respiratory: non labored breathing   Neuropsychiatric: Calm, cooperative. Normal affect.     Please refer to the physical examination documented by the anesthesiologist in the anesthesia record on the day of surgery    Labs: (personally reviewed)  Component      Latest Ref Rng & Units 3/15/2021   WBC      4.0 - 11.0 10e9/L 6.3   RBC Count      3.8 - 5.2 10e12/L 3.80   Hemoglobin      11.7 - 15.7 g/dL 11.7    Hematocrit      35.0 - 47.0 % 37.3   MCV      78 - 100 fl 98   MCH      26.5 - 33.0 pg 30.8   MCHC      31.5 - 36.5 g/dL 31.4 (L)   RDW      10.0 - 15.0 % Dimorphic population - unable to calculate   Platelet Count      150 - 450 10e9/L 201   Diff Method       Automated Method   % Neutrophils      % 66.0   % Lymphocytes      % 20.3   % Monocytes      % 9.4   % Eosinophils      % 0.6   % Basophils      % 1.0   % Immature Granulocytes      % 2.7   Nucleated RBCs      0 /100 0   Absolute Neutrophil      1.6 - 8.3 10e9/L 4.2   Absolute Lymphocytes      0.8 - 5.3 10e9/L 1.3   Absolute Monocytes      0.0 - 1.3 10e9/L 0.6   Absolute Eosinophils      0.0 - 0.7 10e9/L 0.0   Absolute Basophils      0.0 - 0.2 10e9/L 0.1   Abs Immature Granulocytes      0 - 0.4 10e9/L 0.2   Absolute Nucleated RBC       0.0   Sodium      133 - 144 mmol/L 140   Potassium      3.4 - 5.3 mmol/L 3.9   Chloride      94 - 109 mmol/L 107   Carbon Dioxide      20 - 32 mmol/L 26   Anion Gap      3 - 14 mmol/L 7   Glucose      70 - 99 mg/dL 72   Urea Nitrogen      7 - 30 mg/dL 12   Creatinine      0.52 - 1.04 mg/dL 0.84   GFR Estimate      >60 mL/min/1.73:m2 73   GFR Estimate If Black      >60 mL/min/1.73:m2 85   Calcium      8.5 - 10.1 mg/dL 8.8   Bilirubin Total      0.2 - 1.3 mg/dL 0.2   Albumin      3.4 - 5.0 g/dL 3.4   Protein Total      6.8 - 8.8 g/dL 7.5   Alkaline Phosphatase      40 - 150 U/L 127   ALT      0 - 50 U/L 30   AST      0 - 45 U/L 25     EKG 2/2021   SR with PACs      ECHO 2/2021    Interpretation Summary   Technically difficult study. Poor acoustic windows. The patient's rhythm is   sinus tachycardia.   Borderline (EF 50-55%) reduced left ventricular function is present. Mild   diffuse hypokinesis is present. The inferolateral and inferior walls are not   optimally visualized.   Global right ventricular function is normal. The right ventricle is normal   size.   No significant valvular abnormalities.   A left pleural effusion is  present.   There is no prior study for direct comparison.         ASSESSMENT and PLAN  Taran Regalado is a 65 year old female scheduled for HYSTERECTOMY, TOTAL, ABDOMINAL, WITH BILATERAL SALPINGO-OOPHORECTOMY, omentectomy, NEOPLASM DEBULKING, possible bowel resection, possible stoma on 4/19/21 by Dr. Juarez in treatment of ovarian cancer.  PAC referral for risk assessment and optimization for anesthesia with comorbid conditions of paroxysmal a fib, PE, migraines, RLS, insomnia:          Pre-operative considerations:     1.  Cardiac:  Functional status- METS 4. denies cardiac symptoms. intermediate risk surgery with 0.9% (RCRI #) risk of major adverse cardiac event.     ~h/o PAF. most recent EKG 2/2021 showed SR with PACs. using Eliquis- will hold 48 hours prior to surgery.    ~previous cardiac testing as above        2.  Pulm:   INA risk: low.    ~non smoker        3.  GI:  Risk of PONV score = 3.  If > 2, anti-emetic intervention recommended.          4. heme: h/p PE 2/3/21. has since resolved. We typically recommend waiting 3 months after PE for surgery. This surgery will be 2 1/2 months out.This is a time sensitive surgery. Per Dr. Juarez's note, hold Xarelto at least 24 hours prior to surgery. Recommend for 48 hour hold, but take last dose 4/17/21 before 7am. Also recommend restarting as soon as possible after surgery.   ~no h/o blood transfusions. T&S completed 1/2021- no antibodies. Will order repeat T&S to be completed in preop DOS     5. neuro: migraines using Imitrex PRN- denies recent migraines  ~RLS      6. Onc: 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 completing neoadjuvant chemotherapy. now with the above procedure now planned.             VTE risk: 4.5%       Patient is optimized and is acceptable candidate for the proposed procedure.  No further diagnostic evaluation is needed.          Patient discussed with Dr. Dinh    **Physical exam and vital signs not completed today as this  visit was scheduled as a virtual visit during Covid 19 pandemic. Physical exam should be completed the DOS in pre-op**    40 minutes were spent completing chart review, seeing the patient, reviewing labs and test results, discussing patient care with anesthesia and completing documentation       Maria D Rosen PA-C  Preoperative Assessment Center  [facility]  Phone: [phone]  Fax: [phone]

## 2021-04-13 NOTE — ANESTHESIA PREPROCEDURE EVALUATION
Anesthesia Pre-Procedure Evaluation    Patient: Taran Regalado   MRN: [id number] : 1955        Preoperative Diagnosis: Ovarian cancer, unspecified laterality (H) [C56.9]   Procedure : Procedure(s):  HYSTERECTOMY, TOTAL, ABDOMINAL, WITH BILATERAL SALPINGO-OOPHORECTOMY, omentectomy, NEOPLASM DEBULKING, possible bowel resection, possible stoma     Past Medical History:   Diagnosis Date     Atrial fibrillation with rapid ventricular response (H)      History of cold sores      Insomnia      Migraine      Osteopenia      Pelvic mass      Peritoneal carcinomatosis (H)      Restless legs syndrome (RLS)       Past Surgical History:   Procedure Laterality Date     APPENDECTOMY       ARTHROSCPY KNEE SURGICAL DEBRIDEMENT SHAVING ARTICULAR CARTILAGE Right      BIOPSY  2021    Biopsy to confirm ovarian cancer     DEBRIDEMENT LEFT UPPER EXTREMITY  2016     LAPAROSCOPY DIAGNOSTIC (GYN) Bilateral 2021    Procedure: Diagnsotic laparoscopy, biopsies;  Surgeon: Bolivar Juarez MD;  Location: [facility] OR     LASIK       TUBAL LIGATION        No Known Allergies   Social History     Tobacco Use     Smoking status: Former Smoker     Packs/day: 0.50     Years: 40.00     Pack years: 20.00     Quit date:      Years since quitting: 3.2     Smokeless tobacco: Never Used   Substance Use Topics     Alcohol use: Not Currently      Wt Readings from Last 1 Encounters:   03/15/21 71.1 kg (156 lb 11.2 oz)        Anesthesia Evaluation   Pt has had prior anesthetic. Type: General.    No history of anesthetic complications       ROS/MED HX  ENT/Pulmonary:    (-) tobacco use   Neurologic: Comment: RLS    (+) migraines,     Cardiovascular: Comment: Paroxysmal atrial fibrillation     (+) -----Taking blood thinners Previous cardiac testing   Echo: Date: 2021 Results:    Stress Test: Date: Results:    ECG Reviewed: Date: 2021 Results:    Cath: Date: Results:      METS/Exercise Tolerance: 4 - Raking leaves, gardening Comment:  Goes for walks, can ascend >1 flight of stairs without LOPEZ   Hematologic:     (+) History of blood clots,  (-) history of blood transfusion   Musculoskeletal:  - neg musculoskeletal ROS     GI/Hepatic:  - neg GI/hepatic ROS     Renal/Genitourinary: Comment: Probable ovarian cancer      Endo:  - neg endo ROS     Psychiatric/Substance Use: Comment: insomnia      Infectious Disease:  - neg infectious disease ROS     Malignancy:   (+) Malignancy, History of Other.Other CA metastatic high grade serous carcinoma, likely ovarian Active status post Chemo.    Other:            Physical Exam    Airway        Mallampati: II   TM distance: > 3 FB   Neck ROM: full   Mouth opening: > 3 cm    Respiratory Devices and Support         Dental  no notable dental history         Cardiovascular   cardiovascular exam normal          Pulmonary   pulmonary exam normal                OUTSIDE LABS:  CBC:   Lab Results   Component Value Date    WBC 6.3 03/15/2021    WBC 2.3 (L) 03/12/2021    HGB 11.7 03/15/2021    HGB 10.5 (L) 03/12/2021    HCT 37.3 03/15/2021    HCT 33.8 (L) 03/12/2021     03/15/2021     03/12/2021     BMP:   Lab Results   Component Value Date     03/15/2021     03/12/2021    POTASSIUM 3.9 03/15/2021    POTASSIUM 3.8 03/12/2021    CHLORIDE 107 03/15/2021    CHLORIDE 107 03/12/2021    CO2 26 03/15/2021    CO2 28 03/12/2021    BUN 12 03/15/2021    BUN 17 03/12/2021    CR 0.84 03/15/2021    CR 0.95 03/12/2021    GLC 72 03/15/2021     (H) 03/12/2021     COAGS:   Lab Results   Component Value Date    PTT 30 02/03/2021    INR 1.06 02/03/2021     POC:   Lab Results   Component Value Date     (H) 01/07/2021     HEPATIC:   Lab Results   Component Value Date    ALBUMIN 3.4 03/15/2021    PROTTOTAL 7.5 03/15/2021    ALT 30 03/15/2021    AST 25 03/15/2021    ALKPHOS 127 03/15/2021    BILITOTAL 0.2 03/15/2021     OTHER:   Lab Results   Component Value Date    LACT 1.0 01/10/2021    HORACIO 8.8 03/15/2021     MAG 2.0 03/15/2021    TSH 1.03 02/04/2021       Anesthesia Plan    ASA Status:  3   NPO Status:  NPO Appropriate    Anesthesia Type: General.     - Airway: ETT   Induction: Intravenous.   Maintenance: Balanced.   Techniques and Equipment:     - Lines/Monitors: 2nd IV     Consents    Anesthesia Plan(s) and associated risks, benefits, and realistic alternatives discussed. Questions answered and patient/representative(s) expressed understanding.     - Discussed with:  Patient      - Extended Intubation/Ventilatory Support Discussed: No.      - Patient is DNR/DNI Status: No    Use of blood products discussed: Yes.     - Discussed with: Patient.     - Consented: consented to blood products     Postoperative Care    Pain management: IV analgesics, Peripheral nerve block (Single Shot), Multi-modal analgesia.   PONV prophylaxis: Ondansetron (or other 5HT-3), Dexamethasone or Solumedrol     Comments:              PAC Discussion and Assessment    ASA Classification: 3  Case is suitable for: [place]  Anesthetic techniques and relevant risks discussed: GA                  PAC Resident/NP Anesthesia Assessment: Taran Regalado is a 65 year old female scheduled for HYSTERECTOMY, TOTAL, ABDOMINAL, WITH BILATERAL SALPINGO-OOPHORECTOMY, omentectomy, NEOPLASM DEBULKING, possible bowel resection, possible stoma on 4/19/21 by Dr. Juarez in treatment of ovarian cancer.  PAC referral for risk assessment and optimization for anesthesia with comorbid conditions of paroxysmal a fib, PE, migraines, RLS, insomnia:          Pre-operative considerations:     1.  Cardiac:  Functional status- METS 4. denies cardiac symptoms. intermediate risk surgery with 0.9% (RCRI #) risk of major adverse cardiac event.     ~h/o PAF. most recent EKG 2/2021 showed SR with PACs. using Eliquis- will hold 48 hours prior to surgery.    ~previous cardiac testing as above        2.  Pulm:   INA risk: low.    ~non smoker        3.  GI:  Risk of PONV score = 3.   If > 2, anti-emetic intervention recommended.          4. heme: h/p PE 2/3/21. has since resolved. We typically recommend waiting 3 months after PE for surgery. This surgery will be 2 1/2 months out.This is a time sensitive surgery. Per Dr. Juarez's note, hold Xarelto at least 24 hours prior to surgery. Recommend for 48 hour hold, but take last dose 4/17/21 before 7am. Also recommend restarting as soon as possible after surgery.   ~no h/o blood transfusions. T&S completed 1/2021- no antibodies. Will order repeat T&S to be completed in preop DOS     5. neuro: migraines using Imitrex PRN- denies recent migraines  ~RLS      6. Onc: 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 completing neoadjuvant chemotherapy. now with the above procedure now planned.             VTE risk: 4.5%       Patient is optimized and is acceptable candidate for the proposed procedure.  No further diagnostic evaluation is needed.          Patient discussed with Dr. Dinh    **Physical exam and vital signs not completed today as this visit was scheduled as a virtual visit during Covid 19 pandemic. Physical exam should be completed the DOS in pre-op**       **For further details of assessment, testing, and physical exam please see H and P completed on same date.               LADONNA Desouza PA-C

## 2021-04-13 NOTE — H&P
Pre-Operative H & P         Video-Visit Details    Type of service:  Video Visit    Patient verbally consented to video service today: YES      Video Start Time: 1046  Video End Time (time video stopped): 1103    Originating Location (pt. Location): Home    Distant Location (provider location):  home    Mode of Communication:  Video Conference via [employer]        CC:  Preoperative exam to assess for increased cardiopulmonary risk while undergoing surgery and anesthesia.    Date of Encounter: 4/13/2021  Primary Care Physician:  Bolivar Juarez  associated diagnosis: ovarian cancer    HPI  Taran Regalado is a 65 year old female who presents for pre-operative H & P in preparation for HYSTERECTOMY, TOTAL, ABDOMINAL, WITH BILATERAL SALPINGO-OOPHORECTOMY, omentectomy, NEOPLASM DEBULKING, possible bowel resection, possible stoma with Dr. Juarez on 4/19/21 at [facility]. Patient is being evaluated for comorbid conditions of paroxysmal a fib, PE, migraines, RLS, insomnia      Ms. 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 She has been undergoing neoadjuvant chemotherapy. She has been following with Dr. Juarez. She is now scheduled for the above procedure.      History is obtained from the patient and chart review.      Past Medical History  Past Medical History:   Diagnosis Date     Atrial fibrillation with rapid ventricular response (H)      History of cold sores      Insomnia      Migraine      Osteopenia      Pelvic mass      Peritoneal carcinomatosis (H)      Restless legs syndrome (RLS)        Past Surgical History  Past Surgical History:   Procedure Laterality Date     APPENDECTOMY       ARTHROSCPY KNEE SURGICAL DEBRIDEMENT SHAVING ARTICULAR CARTILAGE Right      BIOPSY  January 2021    Biopsy to confirm ovarian cancer     DEBRIDEMENT LEFT UPPER EXTREMITY  2016     LAPAROSCOPY DIAGNOSTIC (GYN) Bilateral  1/7/2021    Procedure: Diagnsotic laparoscopy, biopsies;  Surgeon: Bolivar Juarez MD;  Location: [facility] OR     LASIK       TUBAL LIGATION         Hx of Blood transfusions/reactions: denies     Hx of abnormal bleeding or anti-platelet use: Xarelto    Menstrual history: No LMP recorded. Patient is postmenopausal.    Steroid use in the last year: denies    Personal or FH with difficulty with Anesthesia:  denies    Prior to Admission Medications  Current Outpatient Medications   Medication Sig Dispense Refill     acetaminophen (TYLENOL) 325 MG tablet Take 2 tablets (650 mg) by mouth every 6 hours as needed for mild pain 50 tablet 0     amitriptyline (ELAVIL) 100 MG tablet Take 50 mg by mouth At Bedtime        gabapentin (NEURONTIN) 600 MG tablet Take 1 tablet (600 mg) by mouth At Bedtime 30 tablet 0     loratadine (CLARITIN) 10 MG tablet Take 10 mg by mouth daily as needed (for bone pain related to neupogen)        ondansetron (ZOFRAN-ODT) 4 MG ODT tab Take 1 tablet (4 mg) by mouth every 6 hours as needed for nausea or vomiting 20 tablet 0     oxyCODONE (ROXICODONE) 5 MG tablet Take 1 tablet (5 mg) by mouth every 6 hours as needed for pain 15 tablet 0     polyethylene glycol (MIRALAX) 17 GM/Dose powder Take 17 g by mouth 2 times daily (Patient taking differently: Take 17 g by mouth daily as needed ) 510 g 0     rivaroxaban ANTICOAGULANT (XARELTO ANTICOAGULANT) 20 MG TABS tablet Take 1 tablet (20 mg) by mouth daily (with dinner) (Patient taking differently: Take 20 mg by mouth every morning ) 90 tablet 1     senna-docusate (SENOKOT-S/PERICOLACE) 8.6-50 MG tablet Take 2 tablets by mouth 2 times daily 30 tablet 0     SUMAtriptan (IMITREX) 100 MG tablet Take 100 mg by mouth at onset of headache        zolpidem (AMBIEN) 5 MG tablet Take 1 tablet (5 mg) by mouth nightly as needed for sleep (Patient taking differently: Take 5 mg by mouth At Bedtime ) 14 tablet 0     prochlorperazine (COMPAZINE) 10 MG tablet Take 1 tablet (10  mg) by mouth every 6 hours as needed for nausea or vomiting (Patient not taking: Reported on 4/12/2021) 30 tablet 0     valACYclovir (VALTREX) 1000 mg tablet Take 1,000 mg by mouth as needed        zolpidem (AMBIEN) 5 MG tablet Take 1-2 tablets (5-10 mg) by mouth nightly as needed for sleep 10 tablet 0       Allergies  No Known Allergies    Social History  Social History     Socioeconomic History     Marital status:      Spouse name: Not on file     Number of children: Not on file     Years of education: Not on file     Highest education level: Not on file   Occupational History     Not on file   Social Needs     Financial resource strain: Not on file     Food insecurity     Worry: Not on file     Inability: Not on file     Transportation needs     Medical: Not on file     Non-medical: Not on file   Tobacco Use     Smoking status: Former Smoker     Packs/day: 0.50     Years: 40.00     Pack years: 20.00     Quit date: 2018     Years since quitting: 3.2     Smokeless tobacco: Never Used   Substance and Sexual Activity     Alcohol use: Not Currently     Drug use: Never     Sexual activity: Not Currently     Partners: Male   Lifestyle     Physical activity     Days per week: Not on file     Minutes per session: Not on file     Stress: Not on file   Relationships     Social connections     Talks on phone: Not on file     Gets together: Not on file     Attends Sikh service: Not on file     Active member of club or organization: Not on file     Attends meetings of clubs or organizations: Not on file     Relationship status: Not on file     Intimate partner violence     Fear of current or ex partner: Not on file     Emotionally abused: Not on file     Physically abused: Not on file     Forced sexual activity: Not on file   Other Topics Concern     Parent/sibling w/ CABG, MI or angioplasty before 65F 55M? No   Social History Narrative     Not on file       Family History  Family History   Adopted: Yes   Problem  Relation Age of Onset     Cancer Mother 36     Other Cancer Mother         Bio mother  of  a female cancer  at 36     Factor V Leiden deficiency Daughter      Deep Vein Thrombosis Daughter      Diabetes Type 1 Daughter      Diabetes Daughter      Hypertension Daughter      Anesthesia Reaction No family hx of        ROS/MED HX  ENT/Pulmonary:    (-) tobacco use   Neurologic: Comment: RLS    (+) migraines,     Cardiovascular: Comment: Paroxysmal atrial fibrillation     (+) -----Taking blood thinners Previous cardiac testing   Echo: Date: 2021 Results:    Stress Test: Date: Results:    ECG Reviewed: Date: 2021 Results:    Cath: Date: Results:      METS/Exercise Tolerance: 4 - Raking leaves, gardening Comment: Goes for walks, can ascend >1 flight of stairs without LOPEZ   Hematologic:     (+) History of blood clots,  (-) history of blood transfusion   Musculoskeletal:  - neg musculoskeletal ROS     GI/Hepatic:  - neg GI/hepatic ROS     Renal/Genitourinary: Comment: Probable ovarian cancer      Endo:  - neg endo ROS     Psychiatric/Substance Use: Comment: insomnia      Infectious Disease:  - neg infectious disease ROS     Malignancy:   (+) Malignancy, History of Other.Other CA Active status post Chemo.    Other:            The complete review of systems is negative other than noted in the HPI or here.        0 lbs 0 oz  Data Unavailable   There is no height or weight on file to calculate BMI.       Physical Exam  Constitutional: Awake, alert, cooperative, no apparent distress, and appears stated age.  Respiratory: non labored breathing   Neuropsychiatric: Calm, cooperative. Normal affect.     Please refer to the physical examination documented by the anesthesiologist in the anesthesia record on the day of surgery    Labs: (personally reviewed)  Component      Latest Ref Rng & Units 3/15/2021   WBC      4.0 - 11.0 10e9/L 6.3   RBC Count      3.8 - 5.2 10e12/L 3.80   Hemoglobin      11.7 - 15.7 g/dL 11.7    Hematocrit      35.0 - 47.0 % 37.3   MCV      78 - 100 fl 98   MCH      26.5 - 33.0 pg 30.8   MCHC      31.5 - 36.5 g/dL 31.4 (L)   RDW      10.0 - 15.0 % Dimorphic population - unable to calculate   Platelet Count      150 - 450 10e9/L 201   Diff Method       Automated Method   % Neutrophils      % 66.0   % Lymphocytes      % 20.3   % Monocytes      % 9.4   % Eosinophils      % 0.6   % Basophils      % 1.0   % Immature Granulocytes      % 2.7   Nucleated RBCs      0 /100 0   Absolute Neutrophil      1.6 - 8.3 10e9/L 4.2   Absolute Lymphocytes      0.8 - 5.3 10e9/L 1.3   Absolute Monocytes      0.0 - 1.3 10e9/L 0.6   Absolute Eosinophils      0.0 - 0.7 10e9/L 0.0   Absolute Basophils      0.0 - 0.2 10e9/L 0.1   Abs Immature Granulocytes      0 - 0.4 10e9/L 0.2   Absolute Nucleated RBC       0.0   Sodium      133 - 144 mmol/L 140   Potassium      3.4 - 5.3 mmol/L 3.9   Chloride      94 - 109 mmol/L 107   Carbon Dioxide      20 - 32 mmol/L 26   Anion Gap      3 - 14 mmol/L 7   Glucose      70 - 99 mg/dL 72   Urea Nitrogen      7 - 30 mg/dL 12   Creatinine      0.52 - 1.04 mg/dL 0.84   GFR Estimate      >60 mL/min/1.73:m2 73   GFR Estimate If Black      >60 mL/min/1.73:m2 85   Calcium      8.5 - 10.1 mg/dL 8.8   Bilirubin Total      0.2 - 1.3 mg/dL 0.2   Albumin      3.4 - 5.0 g/dL 3.4   Protein Total      6.8 - 8.8 g/dL 7.5   Alkaline Phosphatase      40 - 150 U/L 127   ALT      0 - 50 U/L 30   AST      0 - 45 U/L 25     EKG 2/2021   SR with PACs      ECHO 2/2021    Interpretation Summary   Technically difficult study. Poor acoustic windows. The patient's rhythm is   sinus tachycardia.   Borderline (EF 50-55%) reduced left ventricular function is present. Mild   diffuse hypokinesis is present. The inferolateral and inferior walls are not   optimally visualized.   Global right ventricular function is normal. The right ventricle is normal   size.   No significant valvular abnormalities.   A left pleural effusion is  present.   There is no prior study for direct comparison.         ASSESSMENT and PLAN  Taran Regalado is a 65 year old female scheduled for HYSTERECTOMY, TOTAL, ABDOMINAL, WITH BILATERAL SALPINGO-OOPHORECTOMY, omentectomy, NEOPLASM DEBULKING, possible bowel resection, possible stoma on 4/19/21 by Dr. Juarez in treatment of ovarian cancer.  PAC referral for risk assessment and optimization for anesthesia with comorbid conditions of paroxysmal a fib, PE, migraines, RLS, insomnia:          Pre-operative considerations:     1.  Cardiac:  Functional status- METS 4. denies cardiac symptoms. intermediate risk surgery with 0.9% (RCRI #) risk of major adverse cardiac event.     ~h/o PAF. most recent EKG 2/2021 showed SR with PACs. using Eliquis- will hold 48 hours prior to surgery.    ~previous cardiac testing as above        2.  Pulm:   INA risk: low.    ~non smoker        3.  GI:  Risk of PONV score = 3.  If > 2, anti-emetic intervention recommended.          4. heme: h/p PE 2/3/21. has since resolved. We typically recommend waiting 3 months after PE for surgery. This surgery will be 2 1/2 months out.This is a time sensitive surgery. Per Dr. Juarez's note, hold Xarelto at least 24 hours prior to surgery. Recommend for 48 hour hold, but take last dose 4/17/21 before 7am. Also recommend restarting as soon as possible after surgery.   ~no h/o blood transfusions. T&S completed 1/2021- no antibodies. Will order repeat T&S to be completed in preop DOS     5. neuro: migraines using Imitrex PRN- denies recent migraines  ~RLS      6. Onc: 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 completing neoadjuvant chemotherapy. now with the above procedure now planned.             VTE risk: 4.5%       Patient is optimized and is acceptable candidate for the proposed procedure.  No further diagnostic evaluation is needed.          Patient discussed with Dr. Dinh    **Physical exam and vital signs not completed today as this  visit was scheduled as a virtual visit during Covid 19 pandemic. Physical exam should be completed the DOS in pre-op**    40 minutes were spent completing chart review, seeing the patient, reviewing labs and test results, discussing patient care with anesthesia and completing documentation       Maria D Rosen PA-C  Preoperative Assessment Center  [facility]  Phone: [phone]  Fax: [phone]

## 2021-04-13 NOTE — PROGRESS NOTES
Justen is a 65 year old who is being evaluated via a billable video visit.      How would you like to obtain your AVS? MyChart    Will anyone else be joining your video visit? No    HPI         Review of Systems         Objective    Vitals - Patient Reported  Pain Score: No Pain (0)        Physical Exam     SALVATORE Marroquin LPN

## 2021-04-16 ENCOUNTER — PATIENT OUTREACH (OUTPATIENT)
Dept: ONCOLOGY | Facility: CLINIC | Age: 66
End: 2021-04-16

## 2021-04-16 NOTE — PROGRESS NOTES
Preoperative teaching completed via the telephone:      Relevant Diagnosis: Ovarian cancer     Teaching Topic: HYSTERECTOMY, TOTAL, ABDOMINAL, WITH BILATERAL SALPINGO-OOPHORECTOMY, omentectomy, NEOPLASM DEBULKING, possible bowel resection, possible stoma    Person(s) involved in teaching :  Patient  & daughter  Motivation Level:  Asks Questions:    Yes      Eager to Learn:     Yes     Cooperative:          Yes    Receptive (willing. Able to accept information):    Yes      Patient and those who are listed above demonstrates understanding of the following:   Reason for the appointment, diagnosis and treatment plan:   Yes   Knowledge of proper use of medications and conditions for which they are ordered (with special attention to potential side effects or drug interactions): Yes   Which situations necessitate calling provider and whom to contact: Yes         Nutritional needs and diet plan:  Yes      Pain management techniques:     Yes, Pain Scale   Diet:   Yes, [facility] Diet Instructions    Teaching Concerns addressed: Yes    Infection Prevention:  Patient and those who are listed above demonstrate understanding of the following:  Pre-Op CHG Bathing Instructions: Yes  Surgical procedure site care taught:   Yes   Signs and symptoms of infection taught: Yes       Instructional Materials Reviewed:  Hysterectomy Guidelines  Pain Assessment Tool   Home Care after Major Abdominal or Vaginal Surgery        Comments:  DONAL Hinojosa RN

## 2021-04-17 DIAGNOSIS — Z11.59 ENCOUNTER FOR SCREENING FOR OTHER VIRAL DISEASES: ICD-10-CM

## 2021-04-17 LAB
SARS-COV-2 RNA RESP QL NAA+PROBE: NORMAL
SPECIMEN SOURCE: NORMAL

## 2021-04-17 PROCEDURE — U0005 INFEC AGEN DETEC AMPLI PROBE: HCPCS | Performed by: OBSTETRICS & GYNECOLOGY

## 2021-04-17 PROCEDURE — U0003 INFECTIOUS AGENT DETECTION BY NUCLEIC ACID (DNA OR RNA); SEVERE ACUTE RESPIRATORY SYNDROME CORONAVIRUS 2 (SARS-COV-2) (CORONAVIRUS DISEASE [COVID-19]), AMPLIFIED PROBE TECHNIQUE, MAKING USE OF HIGH THROUGHPUT TECHNOLOGIES AS DESCRIBED BY CMS-2020-01-R: HCPCS | Performed by: OBSTETRICS & GYNECOLOGY

## 2021-04-19 ENCOUNTER — HOSPITAL ENCOUNTER (INPATIENT)
Facility: CLINIC | Age: 66
LOS: 8 days | Discharge: HOME | End: 2021-04-27
Attending: OBSTETRICS & GYNECOLOGY | Admitting: OBSTETRICS & GYNECOLOGY
Payer: COMMERCIAL

## 2021-04-19 ENCOUNTER — ANESTHESIA (OUTPATIENT)
Dept: SURGERY | Facility: CLINIC | Age: 66
End: 2021-04-19
Payer: COMMERCIAL

## 2021-04-19 ENCOUNTER — ANCILLARY PROCEDURE (OUTPATIENT)
Dept: ULTRASOUND IMAGING | Facility: CLINIC | Age: 66
End: 2021-04-19
Payer: COMMERCIAL

## 2021-04-19 DIAGNOSIS — C56.9 OVARIAN CANCER, UNSPECIFIED LATERALITY (H): ICD-10-CM

## 2021-04-19 DIAGNOSIS — G89.3 CANCER ASSOCIATED PAIN: ICD-10-CM

## 2021-04-19 DIAGNOSIS — I48.91 ATRIAL FIBRILLATION WITH RAPID VENTRICULAR RESPONSE (H): Primary | ICD-10-CM

## 2021-04-19 DIAGNOSIS — G25.81 RESTLESS LEGS SYNDROME: ICD-10-CM

## 2021-04-19 LAB
BASE DEFICIT BLDA-SCNC: 1.4 MMOL/L
BASOPHILS # BLD AUTO: 0 10E9/L (ref 0–0.2)
BASOPHILS NFR BLD AUTO: 1 %
CA-I BLD-MCNC: 4.7 MG/DL (ref 4.4–5.2)
DIFFERENTIAL METHOD BLD: ABNORMAL
EOSINOPHIL # BLD AUTO: 0.1 10E9/L (ref 0–0.7)
EOSINOPHIL NFR BLD AUTO: 4.1 %
ERYTHROCYTE [DISTWIDTH] IN BLOOD BY AUTOMATED COUNT: 19.3 % (ref 10–15)
ERYTHROCYTE [DISTWIDTH] IN BLOOD BY AUTOMATED COUNT: 19.9 % (ref 10–15)
GLUCOSE BLD-MCNC: 159 MG/DL (ref 70–99)
GLUCOSE BLDC GLUCOMTR-MCNC: 88 MG/DL (ref 70–99)
HCO3 BLD-SCNC: 23 MMOL/L (ref 21–28)
HCT VFR BLD AUTO: 29.6 % (ref 35–47)
HCT VFR BLD AUTO: 33.1 % (ref 35–47)
HGB BLD-MCNC: 10.2 G/DL (ref 11.7–15.7)
HGB BLD-MCNC: 10.5 G/DL (ref 11.7–15.7)
HGB BLD-MCNC: 9.6 G/DL (ref 11.7–15.7)
IMM GRANULOCYTES # BLD: 0 10E9/L (ref 0–0.4)
IMM GRANULOCYTES NFR BLD: 0.3 %
LACTATE BLD-SCNC: 1.1 MMOL/L (ref 0.7–2)
LYMPHOCYTES # BLD AUTO: 1.4 10E9/L (ref 0.8–5.3)
LYMPHOCYTES NFR BLD AUTO: 48.6 %
MCH RBC QN AUTO: 33 PG (ref 26.5–33)
MCH RBC QN AUTO: 34.2 PG (ref 26.5–33)
MCHC RBC AUTO-ENTMCNC: 31.7 G/DL (ref 31.5–36.5)
MCHC RBC AUTO-ENTMCNC: 32.4 G/DL (ref 31.5–36.5)
MCV RBC AUTO: 104 FL (ref 78–100)
MCV RBC AUTO: 105 FL (ref 78–100)
MONOCYTES # BLD AUTO: 0.4 10E9/L (ref 0–1.3)
MONOCYTES NFR BLD AUTO: 13.2 %
NEUTROPHILS # BLD AUTO: 1 10E9/L (ref 1.6–8.3)
NEUTROPHILS NFR BLD AUTO: 32.8 %
NRBC # BLD AUTO: 0 10*3/UL
NRBC BLD AUTO-RTO: 0 /100
O2/TOTAL GAS SETTING VFR VENT: 50 %
OXYHGB MFR BLD: 98 % (ref 92–100)
PCO2 BLD: 39 MM HG (ref 35–45)
PH BLD: 7.39 PH (ref 7.35–7.45)
PLATELET # BLD AUTO: 133 10E9/L (ref 150–450)
PLATELET # BLD AUTO: 141 10E9/L (ref 150–450)
PO2 BLD: 210 MM HG (ref 80–105)
POTASSIUM BLD-SCNC: 3.3 MMOL/L (ref 3.4–5.3)
POTASSIUM SERPL-SCNC: 3.8 MMOL/L (ref 3.4–5.3)
POTASSIUM SERPL-SCNC: 3.9 MMOL/L (ref 3.4–5.3)
RBC # BLD AUTO: 2.81 10E12/L (ref 3.8–5.2)
RBC # BLD AUTO: 3.18 10E12/L (ref 3.8–5.2)
SODIUM BLD-SCNC: 137 MMOL/L (ref 133–144)
WBC # BLD AUTO: 13.7 10E9/L (ref 4–11)
WBC # BLD AUTO: 3 10E9/L (ref 4–11)

## 2021-04-19 PROCEDURE — 250N000011 HC RX IP 250 OP 636: Performed by: STUDENT IN AN ORGANIZED HEALTH CARE EDUCATION/TRAINING PROGRAM

## 2021-04-19 PROCEDURE — 82803 BLOOD GASES ANY COMBINATION: CPT

## 2021-04-19 PROCEDURE — 82810 BLOOD GASES O2 SAT ONLY: CPT

## 2021-04-19 PROCEDURE — C9290 INJ, BUPIVACAINE LIPOSOME: HCPCS | Performed by: ANESTHESIOLOGY

## 2021-04-19 PROCEDURE — 84132 ASSAY OF SERUM POTASSIUM: CPT | Performed by: OBSTETRICS & GYNECOLOGY

## 2021-04-19 PROCEDURE — 710N000010 HC RECOVERY PHASE 1, LEVEL 2, PER MIN: Performed by: OBSTETRICS & GYNECOLOGY

## 2021-04-19 PROCEDURE — 88309 TISSUE EXAM BY PATHOLOGIST: CPT | Mod: TC | Performed by: OBSTETRICS & GYNECOLOGY

## 2021-04-19 PROCEDURE — 84132 ASSAY OF SERUM POTASSIUM: CPT

## 2021-04-19 PROCEDURE — 82330 ASSAY OF CALCIUM: CPT

## 2021-04-19 PROCEDURE — 88305 TISSUE EXAM BY PATHOLOGIST: CPT | Mod: TC | Performed by: OBSTETRICS & GYNECOLOGY

## 2021-04-19 PROCEDURE — 272N000001 HC OR GENERAL SUPPLY STERILE: Performed by: OBSTETRICS & GYNECOLOGY

## 2021-04-19 PROCEDURE — 250N000011 HC RX IP 250 OP 636: Performed by: OBSTETRICS & GYNECOLOGY

## 2021-04-19 PROCEDURE — 88331 PATH CONSLTJ SURG 1 BLK 1SPC: CPT | Mod: TC | Performed by: OBSTETRICS & GYNECOLOGY

## 2021-04-19 PROCEDURE — 258N000003 HC RX IP 258 OP 636: Performed by: NURSE ANESTHETIST, CERTIFIED REGISTERED

## 2021-04-19 PROCEDURE — 999N000141 HC STATISTIC PRE-PROCEDURE NURSING ASSESSMENT: Performed by: OBSTETRICS & GYNECOLOGY

## 2021-04-19 PROCEDURE — 85027 COMPLETE CBC AUTOMATED: CPT | Performed by: ANESTHESIOLOGY

## 2021-04-19 PROCEDURE — 250N000025 HC SEVOFLURANE, PER MIN: Performed by: OBSTETRICS & GYNECOLOGY

## 2021-04-19 PROCEDURE — 0DBL0ZZ EXCISION OF TRANSVERSE COLON, OPEN APPROACH: ICD-10-PCS | Performed by: OBSTETRICS & GYNECOLOGY

## 2021-04-19 PROCEDURE — 250N000009 HC RX 250: Performed by: OBSTETRICS & GYNECOLOGY

## 2021-04-19 PROCEDURE — 0DJD8ZZ INSPECTION OF LOWER INTESTINAL TRACT, VIA NATURAL OR ARTIFICIAL OPENING ENDOSCOPIC: ICD-10-PCS | Performed by: OBSTETRICS & GYNECOLOGY

## 2021-04-19 PROCEDURE — 36415 COLL VENOUS BLD VENIPUNCTURE: CPT | Performed by: ANESTHESIOLOGY

## 2021-04-19 PROCEDURE — 999N001017 HC STATISTIC GLUCOSE BY METER IP

## 2021-04-19 PROCEDURE — 88331 PATH CONSLTJ SURG 1 BLK 1SPC: CPT | Mod: 26 | Performed by: PATHOLOGY

## 2021-04-19 PROCEDURE — 86850 RBC ANTIBODY SCREEN: CPT | Performed by: OBSTETRICS & GYNECOLOGY

## 2021-04-19 PROCEDURE — 07BB0ZZ EXCISION OF MESENTERIC LYMPHATIC, OPEN APPROACH: ICD-10-PCS | Performed by: OBSTETRICS & GYNECOLOGY

## 2021-04-19 PROCEDURE — 250N000009 HC RX 250: Performed by: NURSE ANESTHETIST, CERTIFIED REGISTERED

## 2021-04-19 PROCEDURE — 0UT90ZZ RESECTION OF UTERUS, OPEN APPROACH: ICD-10-PCS | Performed by: OBSTETRICS & GYNECOLOGY

## 2021-04-19 PROCEDURE — 86901 BLOOD TYPING SEROLOGIC RH(D): CPT | Performed by: OBSTETRICS & GYNECOLOGY

## 2021-04-19 PROCEDURE — 84132 ASSAY OF SERUM POTASSIUM: CPT | Performed by: NURSE PRACTITIONER

## 2021-04-19 PROCEDURE — 82947 ASSAY GLUCOSE BLOOD QUANT: CPT

## 2021-04-19 PROCEDURE — 0DTU0ZZ RESECTION OF OMENTUM, OPEN APPROACH: ICD-10-PCS | Performed by: OBSTETRICS & GYNECOLOGY

## 2021-04-19 PROCEDURE — 999N000157 HC STATISTIC RCP TIME EA 10 MIN

## 2021-04-19 PROCEDURE — 120N000002 HC R&B MED SURG/OB UMMC

## 2021-04-19 PROCEDURE — P9041 ALBUMIN (HUMAN),5%, 50ML: HCPCS | Performed by: NURSE ANESTHETIST, CERTIFIED REGISTERED

## 2021-04-19 PROCEDURE — 370N000017 HC ANESTHESIA TECHNICAL FEE, PER MIN: Performed by: OBSTETRICS & GYNECOLOGY

## 2021-04-19 PROCEDURE — 86923 COMPATIBILITY TEST ELECTRIC: CPT | Performed by: OBSTETRICS & GYNECOLOGY

## 2021-04-19 PROCEDURE — 88307 TISSUE EXAM BY PATHOLOGIST: CPT | Mod: TC | Performed by: OBSTETRICS & GYNECOLOGY

## 2021-04-19 PROCEDURE — 0UTC0ZZ RESECTION OF CERVIX, OPEN APPROACH: ICD-10-PCS | Performed by: OBSTETRICS & GYNECOLOGY

## 2021-04-19 PROCEDURE — 36415 COLL VENOUS BLD VENIPUNCTURE: CPT | Performed by: OBSTETRICS & GYNECOLOGY

## 2021-04-19 PROCEDURE — 999N000015 HC STATISTIC ARTERIAL MONITORING DAILY

## 2021-04-19 PROCEDURE — 36415 COLL VENOUS BLD VENIPUNCTURE: CPT | Performed by: NURSE PRACTITIONER

## 2021-04-19 PROCEDURE — 88309 TISSUE EXAM BY PATHOLOGIST: CPT | Mod: 26 | Performed by: PATHOLOGY

## 2021-04-19 PROCEDURE — P9041 ALBUMIN (HUMAN),5%, 50ML: HCPCS | Performed by: ANESTHESIOLOGY

## 2021-04-19 PROCEDURE — 250N000011 HC RX IP 250 OP 636: Performed by: ANESTHESIOLOGY

## 2021-04-19 PROCEDURE — 258N000003 HC RX IP 258 OP 636: Performed by: OBSTETRICS & GYNECOLOGY

## 2021-04-19 PROCEDURE — 85025 COMPLETE CBC W/AUTO DIFF WBC: CPT | Performed by: ANESTHESIOLOGY

## 2021-04-19 PROCEDURE — 0UT20ZZ RESECTION OF BILATERAL OVARIES, OPEN APPROACH: ICD-10-PCS | Performed by: OBSTETRICS & GYNECOLOGY

## 2021-04-19 PROCEDURE — 0FB00ZZ EXCISION OF LIVER, OPEN APPROACH: ICD-10-PCS | Performed by: OBSTETRICS & GYNECOLOGY

## 2021-04-19 PROCEDURE — 88305 TISSUE EXAM BY PATHOLOGIST: CPT | Mod: 26 | Performed by: PATHOLOGY

## 2021-04-19 PROCEDURE — 88307 TISSUE EXAM BY PATHOLOGIST: CPT | Mod: 26 | Performed by: PATHOLOGY

## 2021-04-19 PROCEDURE — 250N000013 HC RX MED GY IP 250 OP 250 PS 637: Performed by: STUDENT IN AN ORGANIZED HEALTH CARE EDUCATION/TRAINING PROGRAM

## 2021-04-19 PROCEDURE — 0UT70ZZ RESECTION OF BILATERAL FALLOPIAN TUBES, OPEN APPROACH: ICD-10-PCS | Performed by: OBSTETRICS & GYNECOLOGY

## 2021-04-19 PROCEDURE — 86900 BLOOD TYPING SEROLOGIC ABO: CPT | Performed by: OBSTETRICS & GYNECOLOGY

## 2021-04-19 PROCEDURE — 84295 ASSAY OF SERUM SODIUM: CPT

## 2021-04-19 PROCEDURE — 250N000011 HC RX IP 250 OP 636: Performed by: NURSE ANESTHETIST, CERTIFIED REGISTERED

## 2021-04-19 PROCEDURE — 83605 ASSAY OF LACTIC ACID: CPT

## 2021-04-19 PROCEDURE — 250N000013 HC RX MED GY IP 250 OP 250 PS 637: Performed by: OBSTETRICS & GYNECOLOGY

## 2021-04-19 PROCEDURE — 360N000077 HC SURGERY LEVEL 4, PER MIN: Performed by: OBSTETRICS & GYNECOLOGY

## 2021-04-19 RX ORDER — PROPOFOL 10 MG/ML
INJECTION, EMULSION INTRAVENOUS PRN
Status: DISCONTINUED | OUTPATIENT
Start: 2021-04-19 | End: 2021-04-19

## 2021-04-19 RX ORDER — GABAPENTIN 600 MG/1
600 TABLET ORAL AT BEDTIME
Status: DISCONTINUED | OUTPATIENT
Start: 2021-04-19 | End: 2021-04-27 | Stop reason: HOSPADM

## 2021-04-19 RX ORDER — SIMETHICONE 80 MG
80 TABLET,CHEWABLE ORAL 4 TIMES DAILY PRN
Status: DISCONTINUED | OUTPATIENT
Start: 2021-04-19 | End: 2021-04-27 | Stop reason: HOSPADM

## 2021-04-19 RX ORDER — HYDROMORPHONE HYDROCHLORIDE 1 MG/ML
.3-.5 INJECTION, SOLUTION INTRAMUSCULAR; INTRAVENOUS; SUBCUTANEOUS EVERY 5 MIN PRN
Status: DISCONTINUED | OUTPATIENT
Start: 2021-04-19 | End: 2021-04-19 | Stop reason: HOSPADM

## 2021-04-19 RX ORDER — EPHEDRINE SULFATE 50 MG/ML
INJECTION, SOLUTION INTRAMUSCULAR; INTRAVENOUS; SUBCUTANEOUS PRN
Status: DISCONTINUED | OUTPATIENT
Start: 2021-04-19 | End: 2021-04-19

## 2021-04-19 RX ORDER — ONDANSETRON 4 MG/1
4 TABLET, ORALLY DISINTEGRATING ORAL EVERY 30 MIN PRN
Status: DISCONTINUED | OUTPATIENT
Start: 2021-04-19 | End: 2021-04-19 | Stop reason: HOSPADM

## 2021-04-19 RX ORDER — AMOXICILLIN 250 MG
2 CAPSULE ORAL 2 TIMES DAILY PRN
Status: DISCONTINUED | OUTPATIENT
Start: 2021-04-19 | End: 2021-04-21

## 2021-04-19 RX ORDER — NALOXONE HYDROCHLORIDE 0.4 MG/ML
0.4 INJECTION, SOLUTION INTRAMUSCULAR; INTRAVENOUS; SUBCUTANEOUS
Status: DISCONTINUED | OUTPATIENT
Start: 2021-04-19 | End: 2021-04-19 | Stop reason: HOSPADM

## 2021-04-19 RX ORDER — ONDANSETRON 2 MG/ML
4 INJECTION INTRAMUSCULAR; INTRAVENOUS EVERY 30 MIN PRN
Status: DISCONTINUED | OUTPATIENT
Start: 2021-04-19 | End: 2021-04-19 | Stop reason: HOSPADM

## 2021-04-19 RX ORDER — LIDOCAINE 40 MG/G
CREAM TOPICAL
Status: DISCONTINUED | OUTPATIENT
Start: 2021-04-19 | End: 2021-04-27 | Stop reason: HOSPADM

## 2021-04-19 RX ORDER — OXYCODONE HYDROCHLORIDE 5 MG/1
5-10 TABLET ORAL
Status: DISCONTINUED | OUTPATIENT
Start: 2021-04-19 | End: 2021-04-20

## 2021-04-19 RX ORDER — NALOXONE HYDROCHLORIDE 0.4 MG/ML
0.2 INJECTION, SOLUTION INTRAMUSCULAR; INTRAVENOUS; SUBCUTANEOUS
Status: DISCONTINUED | OUTPATIENT
Start: 2021-04-19 | End: 2021-04-19 | Stop reason: HOSPADM

## 2021-04-19 RX ORDER — SODIUM CHLORIDE, SODIUM LACTATE, POTASSIUM CHLORIDE, CALCIUM CHLORIDE 600; 310; 30; 20 MG/100ML; MG/100ML; MG/100ML; MG/100ML
INJECTION, SOLUTION INTRAVENOUS CONTINUOUS PRN
Status: DISCONTINUED | OUTPATIENT
Start: 2021-04-19 | End: 2021-04-19

## 2021-04-19 RX ORDER — HYDRALAZINE HYDROCHLORIDE 20 MG/ML
2.5-5 INJECTION INTRAMUSCULAR; INTRAVENOUS EVERY 10 MIN PRN
Status: DISCONTINUED | OUTPATIENT
Start: 2021-04-19 | End: 2021-04-19 | Stop reason: HOSPADM

## 2021-04-19 RX ORDER — KETOROLAC TROMETHAMINE 30 MG/ML
30 INJECTION, SOLUTION INTRAMUSCULAR; INTRAVENOUS EVERY 6 HOURS PRN
Status: DISCONTINUED | OUTPATIENT
Start: 2021-04-19 | End: 2021-04-21

## 2021-04-19 RX ORDER — LIDOCAINE HYDROCHLORIDE 20 MG/ML
INJECTION, SOLUTION INFILTRATION; PERINEURAL PRN
Status: DISCONTINUED | OUTPATIENT
Start: 2021-04-19 | End: 2021-04-19

## 2021-04-19 RX ORDER — SODIUM CHLORIDE, SODIUM LACTATE, POTASSIUM CHLORIDE, CALCIUM CHLORIDE 600; 310; 30; 20 MG/100ML; MG/100ML; MG/100ML; MG/100ML
INJECTION, SOLUTION INTRAVENOUS CONTINUOUS
Status: DISCONTINUED | OUTPATIENT
Start: 2021-04-19 | End: 2021-04-20

## 2021-04-19 RX ORDER — HEPARIN SODIUM 5000 [USP'U]/.5ML
5000 INJECTION, SOLUTION INTRAVENOUS; SUBCUTANEOUS
Status: COMPLETED | OUTPATIENT
Start: 2021-04-19 | End: 2021-04-19

## 2021-04-19 RX ORDER — ONDANSETRON 2 MG/ML
INJECTION INTRAMUSCULAR; INTRAVENOUS PRN
Status: DISCONTINUED | OUTPATIENT
Start: 2021-04-19 | End: 2021-04-19

## 2021-04-19 RX ORDER — FENTANYL CITRATE 50 UG/ML
25-50 INJECTION, SOLUTION INTRAMUSCULAR; INTRAVENOUS
Status: DISCONTINUED | OUTPATIENT
Start: 2021-04-19 | End: 2021-04-19 | Stop reason: HOSPADM

## 2021-04-19 RX ORDER — AMITRIPTYLINE HYDROCHLORIDE 100 MG/1
100 TABLET ORAL AT BEDTIME
Status: DISCONTINUED | OUTPATIENT
Start: 2021-04-20 | End: 2021-04-27 | Stop reason: HOSPADM

## 2021-04-19 RX ORDER — BISACODYL 10 MG
10 SUPPOSITORY, RECTAL RECTAL DAILY
Status: DISCONTINUED | OUTPATIENT
Start: 2021-04-19 | End: 2021-04-26

## 2021-04-19 RX ORDER — PHENYLEPHRINE HCL IN 0.9% NACL 50MG/250ML
.1-1 PLASTIC BAG, INJECTION (ML) INTRAVENOUS CONTINUOUS
Status: DISCONTINUED | OUTPATIENT
Start: 2021-04-19 | End: 2021-04-19 | Stop reason: HOSPADM

## 2021-04-19 RX ORDER — NALOXONE HYDROCHLORIDE 0.4 MG/ML
0.2 INJECTION, SOLUTION INTRAMUSCULAR; INTRAVENOUS; SUBCUTANEOUS
Status: DISCONTINUED | OUTPATIENT
Start: 2021-04-19 | End: 2021-04-27 | Stop reason: HOSPADM

## 2021-04-19 RX ORDER — DEXAMETHASONE SODIUM PHOSPHATE 4 MG/ML
INJECTION, SOLUTION INTRA-ARTICULAR; INTRALESIONAL; INTRAMUSCULAR; INTRAVENOUS; SOFT TISSUE PRN
Status: DISCONTINUED | OUTPATIENT
Start: 2021-04-19 | End: 2021-04-19

## 2021-04-19 RX ORDER — PHENAZOPYRIDINE HYDROCHLORIDE 200 MG/1
200 TABLET, FILM COATED ORAL ONCE
Status: COMPLETED | OUTPATIENT
Start: 2021-04-19 | End: 2021-04-19

## 2021-04-19 RX ORDER — SODIUM CHLORIDE, SODIUM LACTATE, POTASSIUM CHLORIDE, CALCIUM CHLORIDE 600; 310; 30; 20 MG/100ML; MG/100ML; MG/100ML; MG/100ML
INJECTION, SOLUTION INTRAVENOUS CONTINUOUS
Status: DISCONTINUED | OUTPATIENT
Start: 2021-04-19 | End: 2021-04-19 | Stop reason: HOSPADM

## 2021-04-19 RX ORDER — ZOLPIDEM TARTRATE 5 MG/1
5 TABLET ORAL
Status: DISCONTINUED | OUTPATIENT
Start: 2021-04-19 | End: 2021-04-21

## 2021-04-19 RX ORDER — MAGNESIUM HYDROXIDE 1200 MG/15ML
LIQUID ORAL PRN
Status: DISCONTINUED | OUTPATIENT
Start: 2021-04-19 | End: 2021-04-19 | Stop reason: HOSPADM

## 2021-04-19 RX ORDER — CEFAZOLIN SODIUM 2 G/100ML
2 INJECTION, SOLUTION INTRAVENOUS SEE ADMIN INSTRUCTIONS
Status: DISCONTINUED | OUTPATIENT
Start: 2021-04-19 | End: 2021-04-19 | Stop reason: HOSPADM

## 2021-04-19 RX ORDER — ALBUMIN, HUMAN INJ 5% 5 %
SOLUTION INTRAVENOUS CONTINUOUS PRN
Status: DISCONTINUED | OUTPATIENT
Start: 2021-04-19 | End: 2021-04-19

## 2021-04-19 RX ORDER — ALBUMIN, HUMAN INJ 5% 5 %
500 SOLUTION INTRAVENOUS ONCE
Status: COMPLETED | OUTPATIENT
Start: 2021-04-19 | End: 2021-04-19

## 2021-04-19 RX ORDER — ACETAMINOPHEN 325 MG/1
650 TABLET ORAL EVERY 6 HOURS
Status: DISCONTINUED | OUTPATIENT
Start: 2021-04-19 | End: 2021-04-27 | Stop reason: HOSPADM

## 2021-04-19 RX ORDER — AMOXICILLIN 250 MG
1 CAPSULE ORAL 2 TIMES DAILY PRN
Status: DISCONTINUED | OUTPATIENT
Start: 2021-04-19 | End: 2021-04-21

## 2021-04-19 RX ORDER — METHOCARBAMOL 500 MG/1
500 TABLET, FILM COATED ORAL 3 TIMES DAILY PRN
Status: DISCONTINUED | OUTPATIENT
Start: 2021-04-19 | End: 2021-04-27 | Stop reason: HOSPADM

## 2021-04-19 RX ORDER — CEFAZOLIN SODIUM 2 G/100ML
2 INJECTION, SOLUTION INTRAVENOUS
Status: DISCONTINUED | OUTPATIENT
Start: 2021-04-19 | End: 2021-04-19 | Stop reason: HOSPADM

## 2021-04-19 RX ORDER — MEPERIDINE HYDROCHLORIDE 25 MG/ML
12.5 INJECTION INTRAMUSCULAR; INTRAVENOUS; SUBCUTANEOUS EVERY 5 MIN PRN
Status: DISCONTINUED | OUTPATIENT
Start: 2021-04-19 | End: 2021-04-19 | Stop reason: HOSPADM

## 2021-04-19 RX ORDER — BUPIVACAINE HYDROCHLORIDE 2.5 MG/ML
INJECTION, SOLUTION EPIDURAL; INFILTRATION; INTRACAUDAL
Status: COMPLETED | OUTPATIENT
Start: 2021-04-19 | End: 2021-04-19

## 2021-04-19 RX ORDER — FENTANYL CITRATE 50 UG/ML
INJECTION, SOLUTION INTRAMUSCULAR; INTRAVENOUS PRN
Status: DISCONTINUED | OUTPATIENT
Start: 2021-04-19 | End: 2021-04-19

## 2021-04-19 RX ORDER — GLYCOPYRROLATE 0.2 MG/ML
INJECTION, SOLUTION INTRAMUSCULAR; INTRAVENOUS PRN
Status: DISCONTINUED | OUTPATIENT
Start: 2021-04-19 | End: 2021-04-19

## 2021-04-19 RX ORDER — NALOXONE HYDROCHLORIDE 0.4 MG/ML
0.4 INJECTION, SOLUTION INTRAMUSCULAR; INTRAVENOUS; SUBCUTANEOUS
Status: DISCONTINUED | OUTPATIENT
Start: 2021-04-19 | End: 2021-04-27 | Stop reason: HOSPADM

## 2021-04-19 RX ORDER — METOPROLOL TARTRATE 1 MG/ML
1-2 INJECTION, SOLUTION INTRAVENOUS EVERY 5 MIN PRN
Status: DISCONTINUED | OUTPATIENT
Start: 2021-04-19 | End: 2021-04-19 | Stop reason: HOSPADM

## 2021-04-19 RX ORDER — FLUMAZENIL 0.1 MG/ML
0.2 INJECTION, SOLUTION INTRAVENOUS
Status: DISCONTINUED | OUTPATIENT
Start: 2021-04-19 | End: 2021-04-19 | Stop reason: HOSPADM

## 2021-04-19 RX ADMIN — PHENYLEPHRINE HYDROCHLORIDE 100 MCG: 10 INJECTION INTRAVENOUS at 10:54

## 2021-04-19 RX ADMIN — Medication 5 MG: at 11:37

## 2021-04-19 RX ADMIN — ROCURONIUM BROMIDE 20 MG: 10 INJECTION INTRAVENOUS at 12:02

## 2021-04-19 RX ADMIN — SODIUM CHLORIDE, POTASSIUM CHLORIDE, SODIUM LACTATE AND CALCIUM CHLORIDE: 600; 310; 30; 20 INJECTION, SOLUTION INTRAVENOUS at 11:40

## 2021-04-19 RX ADMIN — ROCURONIUM BROMIDE 30 MG: 10 INJECTION INTRAVENOUS at 11:17

## 2021-04-19 RX ADMIN — SODIUM CHLORIDE, POTASSIUM CHLORIDE, SODIUM LACTATE AND CALCIUM CHLORIDE: 600; 310; 30; 20 INJECTION, SOLUTION INTRAVENOUS at 23:52

## 2021-04-19 RX ADMIN — SUGAMMADEX 200 MG: 100 INJECTION, SOLUTION INTRAVENOUS at 13:25

## 2021-04-19 RX ADMIN — MIDAZOLAM 2 MG: 1 INJECTION INTRAMUSCULAR; INTRAVENOUS at 07:26

## 2021-04-19 RX ADMIN — METHOCARBAMOL 500 MG: 500 TABLET, FILM COATED ORAL at 20:48

## 2021-04-19 RX ADMIN — FENTANYL CITRATE 50 MCG: 50 INJECTION, SOLUTION INTRAMUSCULAR; INTRAVENOUS at 14:46

## 2021-04-19 RX ADMIN — Medication 5 MG: at 08:09

## 2021-04-19 RX ADMIN — AMITRIPTYLINE HYDROCHLORIDE 50 MG: 25 TABLET, FILM COATED ORAL at 21:35

## 2021-04-19 RX ADMIN — PHENYLEPHRINE HYDROCHLORIDE 100 MCG: 10 INJECTION INTRAVENOUS at 08:53

## 2021-04-19 RX ADMIN — PHENYLEPHRINE HYDROCHLORIDE 100 MCG: 10 INJECTION INTRAVENOUS at 11:52

## 2021-04-19 RX ADMIN — ACETAMINOPHEN 650 MG: 325 TABLET, FILM COATED ORAL at 21:35

## 2021-04-19 RX ADMIN — ACETAMINOPHEN 650 MG: 325 TABLET, FILM COATED ORAL at 16:50

## 2021-04-19 RX ADMIN — FENTANYL CITRATE 100 MCG: 50 INJECTION, SOLUTION INTRAMUSCULAR; INTRAVENOUS at 07:36

## 2021-04-19 RX ADMIN — ALBUMIN HUMAN 500 ML: 0.05 INJECTION, SOLUTION INTRAVENOUS at 15:34

## 2021-04-19 RX ADMIN — PHENYLEPHRINE HYDROCHLORIDE 100 MCG: 10 INJECTION INTRAVENOUS at 09:43

## 2021-04-19 RX ADMIN — HYDROMORPHONE HYDROCHLORIDE 0.3 MG: 1 INJECTION, SOLUTION INTRAMUSCULAR; INTRAVENOUS; SUBCUTANEOUS at 14:02

## 2021-04-19 RX ADMIN — KETOROLAC TROMETHAMINE 30 MG: 30 INJECTION, SOLUTION INTRAMUSCULAR; INTRAVENOUS at 14:55

## 2021-04-19 RX ADMIN — AMITRIPTYLINE HYDROCHLORIDE 50 MG: 25 TABLET, FILM COATED ORAL at 21:09

## 2021-04-19 RX ADMIN — HYDROMORPHONE HYDROCHLORIDE 0.5 MG: 1 INJECTION, SOLUTION INTRAMUSCULAR; INTRAVENOUS; SUBCUTANEOUS at 12:04

## 2021-04-19 RX ADMIN — ROCURONIUM BROMIDE 10 MG: 10 INJECTION INTRAVENOUS at 12:27

## 2021-04-19 RX ADMIN — BUPIVACAINE HYDROCHLORIDE 40 ML: 2.5 INJECTION, SOLUTION EPIDURAL; INFILTRATION; INTRACAUDAL; PERINEURAL at 13:29

## 2021-04-19 RX ADMIN — PHENYLEPHRINE HYDROCHLORIDE 100 MCG: 10 INJECTION INTRAVENOUS at 13:24

## 2021-04-19 RX ADMIN — PHENYLEPHRINE HYDROCHLORIDE 100 MCG: 10 INJECTION INTRAVENOUS at 08:29

## 2021-04-19 RX ADMIN — PHENYLEPHRINE HYDROCHLORIDE 50 MCG: 10 INJECTION INTRAVENOUS at 11:01

## 2021-04-19 RX ADMIN — ROCURONIUM BROMIDE 30 MG: 10 INJECTION INTRAVENOUS at 09:50

## 2021-04-19 RX ADMIN — SODIUM CHLORIDE, POTASSIUM CHLORIDE, SODIUM LACTATE AND CALCIUM CHLORIDE: 600; 310; 30; 20 INJECTION, SOLUTION INTRAVENOUS at 07:26

## 2021-04-19 RX ADMIN — Medication 2 G: at 07:57

## 2021-04-19 RX ADMIN — FENTANYL CITRATE 50 MCG: 50 INJECTION, SOLUTION INTRAMUSCULAR; INTRAVENOUS at 15:05

## 2021-04-19 RX ADMIN — PHENYLEPHRINE HYDROCHLORIDE 200 MCG: 10 INJECTION INTRAVENOUS at 08:18

## 2021-04-19 RX ADMIN — Medication 5 MG: at 11:38

## 2021-04-19 RX ADMIN — PHENYLEPHRINE HYDROCHLORIDE 100 MCG: 10 INJECTION INTRAVENOUS at 13:10

## 2021-04-19 RX ADMIN — Medication 1 G: at 09:56

## 2021-04-19 RX ADMIN — HYDROMORPHONE HYDROCHLORIDE 0.5 MG: 1 INJECTION, SOLUTION INTRAMUSCULAR; INTRAVENOUS; SUBCUTANEOUS at 14:51

## 2021-04-19 RX ADMIN — PHENYLEPHRINE HYDROCHLORIDE 100 MCG: 10 INJECTION INTRAVENOUS at 10:53

## 2021-04-19 RX ADMIN — ROCURONIUM BROMIDE 50 MG: 10 INJECTION INTRAVENOUS at 07:38

## 2021-04-19 RX ADMIN — LIDOCAINE HYDROCHLORIDE 100 MG: 20 INJECTION, SOLUTION INFILTRATION; PERINEURAL at 07:36

## 2021-04-19 RX ADMIN — PHENYLEPHRINE HYDROCHLORIDE 150 MCG: 10 INJECTION INTRAVENOUS at 10:52

## 2021-04-19 RX ADMIN — BUPIVACAINE 20 ML: 13.3 INJECTION, SUSPENSION, LIPOSOMAL INFILTRATION at 13:29

## 2021-04-19 RX ADMIN — PHENYLEPHRINE HYDROCHLORIDE 100 MCG: 10 INJECTION INTRAVENOUS at 13:14

## 2021-04-19 RX ADMIN — PHENAZOPYRIDINE HYDROCHLORIDE 200 MG: 200 TABLET, FILM COATED ORAL at 06:50

## 2021-04-19 RX ADMIN — FENTANYL CITRATE 100 MCG: 50 INJECTION, SOLUTION INTRAMUSCULAR; INTRAVENOUS at 08:11

## 2021-04-19 RX ADMIN — PHENYLEPHRINE HYDROCHLORIDE 100 MCG: 10 INJECTION INTRAVENOUS at 08:39

## 2021-04-19 RX ADMIN — Medication 1 G: at 11:58

## 2021-04-19 RX ADMIN — DEXAMETHASONE SODIUM PHOSPHATE 6 MG: 4 INJECTION, SOLUTION INTRA-ARTICULAR; INTRALESIONAL; INTRAMUSCULAR; INTRAVENOUS; SOFT TISSUE at 08:02

## 2021-04-19 RX ADMIN — Medication 0.2 MCG/KG/MIN: at 11:29

## 2021-04-19 RX ADMIN — ALBUMIN (HUMAN): 12.5 SOLUTION INTRAVENOUS at 11:53

## 2021-04-19 RX ADMIN — PROPOFOL 100 MG: 10 INJECTION, EMULSION INTRAVENOUS at 07:37

## 2021-04-19 RX ADMIN — SODIUM CHLORIDE, POTASSIUM CHLORIDE, SODIUM LACTATE AND CALCIUM CHLORIDE: 600; 310; 30; 20 INJECTION, SOLUTION INTRAVENOUS at 10:46

## 2021-04-19 RX ADMIN — PHENYLEPHRINE HYDROCHLORIDE 50 MCG: 10 INJECTION INTRAVENOUS at 10:13

## 2021-04-19 RX ADMIN — ONDANSETRON 4 MG: 2 INJECTION INTRAMUSCULAR; INTRAVENOUS at 12:54

## 2021-04-19 RX ADMIN — GLYCOPYRROLATE 0.2 MG: 0.2 INJECTION, SOLUTION INTRAMUSCULAR; INTRAVENOUS at 08:09

## 2021-04-19 RX ADMIN — ROCURONIUM BROMIDE 30 MG: 10 INJECTION INTRAVENOUS at 08:45

## 2021-04-19 RX ADMIN — FENTANYL CITRATE 50 MCG: 50 INJECTION, SOLUTION INTRAMUSCULAR; INTRAVENOUS at 14:21

## 2021-04-19 RX ADMIN — HEPARIN SODIUM 5000 UNITS: 5000 INJECTION, SOLUTION INTRAVENOUS; SUBCUTANEOUS at 07:03

## 2021-04-19 RX ADMIN — FENTANYL CITRATE 50 MCG: 50 INJECTION, SOLUTION INTRAMUSCULAR; INTRAVENOUS at 14:17

## 2021-04-19 RX ADMIN — PHENYLEPHRINE HYDROCHLORIDE 150 MCG: 10 INJECTION INTRAVENOUS at 11:19

## 2021-04-19 RX ADMIN — PHENYLEPHRINE HYDROCHLORIDE 100 MCG: 10 INJECTION INTRAVENOUS at 09:53

## 2021-04-19 RX ADMIN — GABAPENTIN 600 MG: 600 TABLET, FILM COATED ORAL at 21:09

## 2021-04-19 RX ADMIN — SODIUM CHLORIDE, POTASSIUM CHLORIDE, SODIUM LACTATE AND CALCIUM CHLORIDE: 600; 310; 30; 20 INJECTION, SOLUTION INTRAVENOUS at 08:43

## 2021-04-19 RX ADMIN — PHENYLEPHRINE HYDROCHLORIDE 100 MCG: 10 INJECTION INTRAVENOUS at 11:41

## 2021-04-19 RX ADMIN — PHENYLEPHRINE HYDROCHLORIDE 100 MCG: 10 INJECTION INTRAVENOUS at 12:13

## 2021-04-19 RX ADMIN — OXYCODONE HYDROCHLORIDE 10 MG: 5 TABLET ORAL at 16:50

## 2021-04-19 RX ADMIN — PHENYLEPHRINE HYDROCHLORIDE 100 MCG: 10 INJECTION INTRAVENOUS at 10:11

## 2021-04-19 RX ADMIN — SODIUM CHLORIDE, POTASSIUM CHLORIDE, SODIUM LACTATE AND CALCIUM CHLORIDE: 600; 310; 30; 20 INJECTION, SOLUTION INTRAVENOUS at 14:10

## 2021-04-19 RX ADMIN — ALBUMIN (HUMAN): 12.5 SOLUTION INTRAVENOUS at 11:00

## 2021-04-19 RX ADMIN — PHENYLEPHRINE HYDROCHLORIDE 200 MCG: 10 INJECTION INTRAVENOUS at 13:36

## 2021-04-19 RX ADMIN — ROCURONIUM BROMIDE 20 MG: 10 INJECTION INTRAVENOUS at 09:38

## 2021-04-19 ASSESSMENT — ACTIVITIES OF DAILY LIVING (ADL): ADLS_ACUITY_SCORE: 18

## 2021-04-19 ASSESSMENT — MIFFLIN-ST. JEOR: SCORE: 1370

## 2021-04-19 NOTE — BRIEF OP NOTE
[facility]    Brief Operative Note    Pre-operative diagnosis: Ovarian cancer, unspecified laterality (H) [C56.9]  Post-operative diagnosis Same as pre-operative diagnosis    Procedure: Procedure(s):  HYSTERECTOMY, TOTAL, ABDOMINAL, WITH BILATERAL SALPINGO-OOPHORECTOMY, omentectomy, NEOPLASM DEBULKING,Proctoscocy, RO, Resection of liver nodules, diaphragm stripping, immobilization of liver and colon  Surgeon: Surgeon(s) and Role:     * Bolivar Juarez MD - Primary     * Lisbeth Olvera MD - Resident - Assisting     * Yvette Bustos MD - Fellow - Assisting  Anesthesia: Combined General with Block   Estimated blood loss: 400 ml  Drains:  Hickey catheter    Specimens:   ID Type Source Tests Collected by Time Destination   A : omentectomy Tissue Omentum SURGICAL PATHOLOGY EXAM Bolivar Juarez MD 4/19/2021  8:30 AM    B : Sigmoid nodule Tissue Large Intestine, Sigmoid SURGICAL PATHOLOGY EXAM Bolivar Juraez MD 4/19/2021  9:04 AM    C : Small bowel mesentary nodule Tissue Small Intestine, Ileum SURGICAL PATHOLOGY EXAM Bolivar Juarez MD 4/19/2021  9:05 AM    D : Uterus, Cervix, Bilateral Fallopian Tubes and ovaries Tissue Uterus, Cervix and Bilateral Fallopian Tubes SURGICAL PATHOLOGY EXAM Bolivar Juarez MD 4/19/2021  9:47 AM    E : small bowel mesenteric nodules Tissue Small Intestine, Ileum SURGICAL PATHOLOGY EXAM Bolivar Juarez MD 4/19/2021  9:58 AM    F : Splenic flexure transverse colon nodule  Tissue Large Intestine, Transverse SURGICAL PATHOLOGY EXAM Bolivar Juarez MD 4/19/2021 10:09 AM    G : Omentum Tissue Omentum SURGICAL PATHOLOGY EXAM Bolivar Juarez MD 4/19/2021 10:31 AM    H : Peritoneal Pancreatic nodule  Tissue Peritoneum SURGICAL PATHOLOGY EXAM Bolivar Juarez MD 4/19/2021 10:34 AM    I : Right Anjel diaphram peritoneum Tissue Other SURGICAL PATHOLOGY EXAM Bolivar Juarez MD 4/19/2021 11:03 AM     J : Right Liver Surface nodule Tissue Liver SURGICAL PATHOLOGY EXAM Bolivar Juarez MD 4/19/2021 11:17 AM    K : Left Lower Liver Edge Tissue Liver SURGICAL PATHOLOGY EXAM Bolivar Juarez MD 4/19/2021 11:20 AM    L : small bowel mesentary nodules #3 Tissue Small Intestine, Ileum SURGICAL PATHOLOGY EXAM Bolivar Juarez MD 4/19/2021 11:52 AM      Findings: normal appearing external female genitalia, small mobile uterus, small sub-centimeter areas of miliary disease in small bowel mesentery, small sub-centimeter serosal liver nodule, small 1cm perisplenic/splenic flexure enlarged lymph node. Moderate adhesive disease from uterus to rectosigmoid.     Complications: None.  Implants: * No implants in log *    Lisbeth Olvera MD MSc  OBGYN Resident, PGY2  April 19, 2021, 1:13 PM

## 2021-04-19 NOTE — ANESTHESIA POSTPROCEDURE EVALUATION
Patient: Tarna Regalado    Procedure(s):  HYSTERECTOMY, TOTAL, ABDOMINAL, WITH BILATERAL SALPINGO-OOPHORECTOMY, omentectomy, NEOPLASM DEBULKING,Proctoscocy, RO, Resection of liver nodules, diaphragm stripping, immobilization of liver and colon    Diagnosis:Ovarian cancer, unspecified laterality (H) [C56.9]  Diagnosis Additional Information: No value filed.    Anesthesia Type:  General    Note:  Disposition: Admission   Postop Pain Control: Uneventful            Sign Out: Well controlled pain   PONV: No   Neuro/Psych: Uneventful            Sign Out: Acceptable/Baseline neuro status   Airway/Respiratory: Uneventful            Sign Out: Acceptable/Baseline resp. status   CV/Hemodynamics: Uneventful            Sign Out: Acceptable CV status   Other NRE: NONE   DID A NON-ROUTINE EVENT OCCUR? No         Last vitals:  Vitals:    04/19/21 1342 04/19/21 1345 04/19/21 1400   BP: 99/60 115/68 107/70   Pulse: 80 79 79   Resp: 10 13 14   Temp:  35.5  C (95.9  F) 35.5  C (95.9  F)   SpO2: 100% 100% 100%       Last vitals prior to Anesthesia Care Transfer:  CRNA VITALS  4/19/2021 1301 - 4/19/2021 1401      4/19/2021             Resp Rate (observed):  (!) 1          Electronically Signed By: Martin Morejon MD  April 19, 2021  2:14 PM

## 2021-04-19 NOTE — OR NURSING
B/P: 70/43 (MAP 61), T: 97.6, P: 72, R: 10, Sa02 97% 2L NC    Patient with a couple low Blood pressure readings after pain medication doses. Denies dizziness, lightheadedness, CP or SOB.  Surgery has VS parameters ordered for notify if BP less then 80/40 since patient has baseline low BP's. Low Urine output from ha (see flowsheets). Notified Dr. Morejon ([facility]) who came to bedside and ordered Albumin 500ml and a stat CBC.     Albumin infusing. Vital signs improved   B/P: 85/49, T: 97.6, P: 68, R: 10    Addendum:   Hgb resulted at 9.6, Vital signs improved. Discussed with Dr. Morejon and vasyl to sent to inpatient unit and will place sign out when able.

## 2021-04-19 NOTE — PLAN OF CARE
POD 0. AVSS. A&O x 4. L PIV infusing LR at 125 mL/hr. R PIV SL. Midline and bilateral abdominal incisions covered with primapore and abdominal dressing. Mesh panties and peripad on. Oxycodone PRN for pain. Regular diet, tolerating well. Hickey is patent, adequate output. Not passing gas, LBM yesterday 4/18. Has not dangled at bedside. 2 RN skin assessment completed and no issues found.

## 2021-04-19 NOTE — ANESTHESIA PROCEDURE NOTES
Arterial Line Procedure Note  Pre-Procedure   Staff -        CRNA: Lorenzo Gross APRN CRNA       Performed By: CRNA       Location: OR       Procedure Start/Stop Times: 4/19/2021 8:56 AM and 4/19/2021 9:06 AM  Timeout:       Correct Patient: Yes        Correct Procedure: Yes        Correct Site: Yes        Correct Position: Yes   Procedure   Procedure: arterial line       Laterality: right       Insertion Site: radial.  Sterile Prep        Standard elements of sterile barrier followed       Skin prep: Chloraprep  Insertion/Injection        Technique: Seldinger Technique        Catheter Type/Size: 20 G, 1.75 in/4.5 cm quick cath (integral wire)  Narrative        Tegaderm dressing used.       Complications: None apparent,        Arterial waveform: Yes        IBP within 10% of NIBP: Yes

## 2021-04-19 NOTE — PROGRESS NOTES
Admitted/transferred from: PACU  2 RN full   skin assessment completed by Yissel Pryor, EBTH and Krys ARAUJO RN.  Skin assessment finding: skin intact, no problems   Interventions/actions: other will monitor     Will continue to monitor.

## 2021-04-19 NOTE — ANESTHESIA POSTPROCEDURE EVALUATION
Patient: Taran Regalado    Procedure(s):  HYSTERECTOMY, TOTAL, ABDOMINAL, WITH BILATERAL SALPINGO-OOPHORECTOMY, omentectomy, NEOPLASM DEBULKING,Proctoscocy, RO, Resection of liver nodules, diaphragm stripping, immobilization of liver and colon    Diagnosis:Ovarian cancer, unspecified laterality (H) [C56.9]  Diagnosis Additional Information: No value filed.    Anesthesia Type:  General    Note:  Disposition: Admission   Postop Pain Control: Uneventful            Sign Out: Well controlled pain   PONV: No   Neuro/Psych: Uneventful            Sign Out: Acceptable/Baseline neuro status   Airway/Respiratory: Uneventful            Sign Out: Acceptable/Baseline resp. status   CV/Hemodynamics: Uneventful            Sign Out: Acceptable CV status   Other NRE: NONE   DID A NON-ROUTINE EVENT OCCUR? No         Last vitals:  Vitals:    04/19/21 1600 04/19/21 1610 04/19/21 1627   BP: 102/63 96/61 (!) 87/50   Pulse: 80 75 71   Resp: 12 12 15   Temp: 36.4  C (97.6  F)  36.7  C (98  F)   SpO2: 100% 100% 100%       Last vitals prior to Anesthesia Care Transfer:  CRNA VITALS  4/19/2021 1301 - 4/19/2021 1401      4/19/2021             Resp Rate (observed):  (!) 1          Electronically Signed By: Martin Morejon MD  April 19, 2021  4:33 PM

## 2021-04-19 NOTE — ANESTHESIA PROCEDURE NOTES
TAP Procedure Note  Pre-Procedure   Staff -        Anesthesiologist:  Alfredo Herron MD       Resident/Fellow: Erik Ceja MD       Performed By: fellow       Location: OR       Pre-Anesthestic Checklist: patient identified, IV checked, site marked, risks and benefits discussed, informed consent, monitors and equipment checked, pre-op evaluation, at physician/surgeon's request and post-op pain management  Timeout:       Correct Patient: Yes        Correct Procedure: Yes        Correct Site: Yes        Correct Position: Yes        Correct Laterality: Yes        Site Marked: Yes  Procedure Documentation  Procedure: TAP       Diagnosis: POST OPERATIVE PAIN       Laterality: bilateral       Patient Position: supine       Patient Prep/Sterile Barriers: sterile gloves, mask       Skin prep: Chloraprep       Needle Type: short bevel       Needle Gauge: 21.        Needle Length (millimeters): 110        - Ultrasound guided       - Ultrasound used to identify targeted nerve, plexus, vascular marker, or fascial plane and place a needle adjacent to it in real-time       - Ultrasound was used to visualize the spread of anesthetic in close proximity to the above referenced structure       - A permanent image is entered into the patient's record.       - There were no apparent abnormal pathologic findings    Assessment/Narrative         The placement was negative for: blood aspirated, painful injection and site bleeding       Paresthesias: No.     Bolus given via needle..        Secured via.        Insertion/Infusion Method: Single Shot       Complications: none       Injection made incrementally with aspirations every 5 mL.    Medication(s) Administered   Bupivacaine 0.25% PF (Infiltration), 40 mL  Bupivacaine liposome (Exparel) 1.3% LA inj susp (Infiltration), 20 mL  Medication Administration Time: 4/19/2021 1:29 PM

## 2021-04-19 NOTE — ANESTHESIA CARE TRANSFER NOTE
Patient: Taran Regalado    Procedure(s):  HYSTERECTOMY, TOTAL, ABDOMINAL, WITH BILATERAL SALPINGO-OOPHORECTOMY, omentectomy, NEOPLASM DEBULKING,Proctoscocy, RO, Resection of liver nodules, diaphragm stripping, immobilization of liver and colon    Diagnosis: Ovarian cancer, unspecified laterality (H) [C56.9]  Diagnosis Additional Information: No value filed.    Anesthesia Type:   General     Note:    Oropharynx: oropharynx clear of all foreign objects  Level of Consciousness: awake  Oxygen Supplementation: nasal cannula and room air  Level of Supplemental Oxygen (L/min / FiO2): 3 LPM  Independent Airway: airway patency satisfactory and stable  Dentition: dentition unchanged  Vital Signs Stable: post-procedure vital signs reviewed and stable  Report to RN Given: handoff report given  Patient transferred to: PACU    Handoff Report: Identifed the Patient, Identified the Reponsible Provider, Reviewed the pertinent medical history, Discussed the surgical course, Reviewed Intra-OP anesthesia mangement and issues during anesthesia, Set expectations for post-procedure period and Allowed opportunity for questions and acknowledgement of understanding      Vitals: (Last set prior to Anesthesia Care Transfer)  CRNA VITALS  4/19/2021 1301 - 4/19/2021 1343      4/19/2021             Resp Rate (observed):  (!) 1        Electronically Signed By: JERIHCO Almendarez CRNA  April 19, 2021  1:43 PM

## 2021-04-19 NOTE — ANESTHESIA PROCEDURE NOTES
Airway       Patient location during procedure: OR       Procedure Start/Stop Times: 4/19/2021 7:36 AM and 4/19/2021 7:40 AM  Staff -        CRNA: Lorenzo Gross APRN CRNA       Performed By: CRNA  Consent for Airway        Urgency: elective  Indications and Patient Condition       Indications for airway management: angelica-procedural       Induction type:intravenous       Mask difficulty assessment: 1 - vent by mask    Final Airway Details       Final airway type: endotracheal airway       Successful airway: ETT - single  Endotracheal Airway Details        ETT size (mm): 7.0       Cuffed: yes       Successful intubation technique: direct laryngoscopy       DL Blade Type: Costa 2       Grade View of Cords: 1       Adjucts: stylet       Position: Right       Measured from: gums/teeth       Secured at (cm): 21       Bite block used: None    Post intubation assessment        Placement verified by: capnometry, equal breath sounds and chest rise        Number of attempts at approach: 1       Number of other approaches attempted: 0       Secured with: pink tape       Ease of procedure: easy       Dentition: Intact    Medication(s) Administered   Medication Administration Time: 4/19/2021 7:40 AM

## 2021-04-20 ENCOUNTER — APPOINTMENT (OUTPATIENT)
Dept: OCCUPATIONAL THERAPY | Facility: CLINIC | Age: 66
End: 2021-04-20
Attending: NURSE PRACTITIONER
Payer: COMMERCIAL

## 2021-04-20 LAB
ABO + RH BLD: NORMAL
ABO + RH BLD: NORMAL
ANION GAP SERPL CALCULATED.3IONS-SCNC: 7 MMOL/L (ref 3–14)
BLD GP AB SCN SERPL QL: NORMAL
BLD PROD TYP BPU: NORMAL
BLD PROD TYP BPU: NORMAL
BLD UNIT ID BPU: 0
BLOOD BANK CMNT PATIENT-IMP: NORMAL
BLOOD PRODUCT CODE: NORMAL
BPU ID: NORMAL
BUN SERPL-MCNC: 12 MG/DL (ref 7–30)
CALCIUM SERPL-MCNC: 8 MG/DL (ref 8.5–10.1)
CHLORIDE SERPL-SCNC: 106 MMOL/L (ref 94–109)
CO2 SERPL-SCNC: 25 MMOL/L (ref 20–32)
CREAT SERPL-MCNC: 0.76 MG/DL (ref 0.52–1.04)
CREAT SERPL-MCNC: 1.02 MG/DL (ref 0.52–1.04)
ERYTHROCYTE [DISTWIDTH] IN BLOOD BY AUTOMATED COUNT: 19 % (ref 10–15)
GFR SERPL CREATININE-BSD FRML MDRD: 58 ML/MIN/{1.73_M2}
GFR SERPL CREATININE-BSD FRML MDRD: 83 ML/MIN/{1.73_M2}
GLUCOSE SERPL-MCNC: 100 MG/DL (ref 70–99)
HCT VFR BLD AUTO: 24.3 % (ref 35–47)
HGB BLD-MCNC: 7.7 G/DL (ref 11.7–15.7)
HGB BLD-MCNC: 7.8 G/DL (ref 11.7–15.7)
HGB BLD-MCNC: 8.9 G/DL (ref 11.7–15.7)
MCH RBC QN AUTO: 34.7 PG (ref 26.5–33)
MCHC RBC AUTO-ENTMCNC: 32.1 G/DL (ref 31.5–36.5)
MCV RBC AUTO: 108 FL (ref 78–100)
NUM BPU REQUESTED: 1
PLATELET # BLD AUTO: 119 10E9/L (ref 150–450)
POTASSIUM SERPL-SCNC: 3.8 MMOL/L (ref 3.4–5.3)
RBC # BLD AUTO: 2.25 10E12/L (ref 3.8–5.2)
SODIUM SERPL-SCNC: 139 MMOL/L (ref 133–144)
SPECIMEN EXP DATE BLD: NORMAL
TRANSFUSION STATUS PATIENT QL: NORMAL
TRANSFUSION STATUS PATIENT QL: NORMAL
WBC # BLD AUTO: 4.5 10E9/L (ref 4–11)

## 2021-04-20 PROCEDURE — P9016 RBC LEUKOCYTES REDUCED: HCPCS | Performed by: OBSTETRICS & GYNECOLOGY

## 2021-04-20 PROCEDURE — 97530 THERAPEUTIC ACTIVITIES: CPT | Mod: GO | Performed by: OCCUPATIONAL THERAPIST

## 2021-04-20 PROCEDURE — 250N000013 HC RX MED GY IP 250 OP 250 PS 637: Performed by: STUDENT IN AN ORGANIZED HEALTH CARE EDUCATION/TRAINING PROGRAM

## 2021-04-20 PROCEDURE — 120N000002 HC R&B MED SURG/OB UMMC

## 2021-04-20 PROCEDURE — 250N000011 HC RX IP 250 OP 636: Performed by: STUDENT IN AN ORGANIZED HEALTH CARE EDUCATION/TRAINING PROGRAM

## 2021-04-20 PROCEDURE — 258N000003 HC RX IP 258 OP 636: Performed by: NURSE PRACTITIONER

## 2021-04-20 PROCEDURE — 82565 ASSAY OF CREATININE: CPT | Performed by: STUDENT IN AN ORGANIZED HEALTH CARE EDUCATION/TRAINING PROGRAM

## 2021-04-20 PROCEDURE — 258N000003 HC RX IP 258 OP 636: Performed by: OBSTETRICS & GYNECOLOGY

## 2021-04-20 PROCEDURE — 97165 OT EVAL LOW COMPLEX 30 MIN: CPT | Mod: GO | Performed by: OCCUPATIONAL THERAPIST

## 2021-04-20 PROCEDURE — 250N000013 HC RX MED GY IP 250 OP 250 PS 637: Performed by: OBSTETRICS & GYNECOLOGY

## 2021-04-20 PROCEDURE — 85027 COMPLETE CBC AUTOMATED: CPT | Performed by: STUDENT IN AN ORGANIZED HEALTH CARE EDUCATION/TRAINING PROGRAM

## 2021-04-20 PROCEDURE — 85018 HEMOGLOBIN: CPT | Performed by: STUDENT IN AN ORGANIZED HEALTH CARE EDUCATION/TRAINING PROGRAM

## 2021-04-20 PROCEDURE — 258N000003 HC RX IP 258 OP 636

## 2021-04-20 PROCEDURE — 80048 BASIC METABOLIC PNL TOTAL CA: CPT | Performed by: STUDENT IN AN ORGANIZED HEALTH CARE EDUCATION/TRAINING PROGRAM

## 2021-04-20 PROCEDURE — 99207 PR NO CHARGE LOS: CPT | Performed by: OBSTETRICS & GYNECOLOGY

## 2021-04-20 PROCEDURE — 36415 COLL VENOUS BLD VENIPUNCTURE: CPT | Performed by: STUDENT IN AN ORGANIZED HEALTH CARE EDUCATION/TRAINING PROGRAM

## 2021-04-20 PROCEDURE — 97535 SELF CARE MNGMENT TRAINING: CPT | Mod: GO | Performed by: OCCUPATIONAL THERAPIST

## 2021-04-20 PROCEDURE — 99024 POSTOP FOLLOW-UP VISIT: CPT | Mod: GC | Performed by: OBSTETRICS & GYNECOLOGY

## 2021-04-20 PROCEDURE — 250N000013 HC RX MED GY IP 250 OP 250 PS 637: Performed by: NURSE PRACTITIONER

## 2021-04-20 PROCEDURE — 258N000003 HC RX IP 258 OP 636: Performed by: STUDENT IN AN ORGANIZED HEALTH CARE EDUCATION/TRAINING PROGRAM

## 2021-04-20 RX ORDER — HYDROMORPHONE HYDROCHLORIDE 2 MG/1
2 TABLET ORAL
Status: DISCONTINUED | OUTPATIENT
Start: 2021-04-20 | End: 2021-04-27 | Stop reason: HOSPADM

## 2021-04-20 RX ORDER — SODIUM CHLORIDE 9 MG/ML
INJECTION, SOLUTION INTRAVENOUS
Status: COMPLETED
Start: 2021-04-20 | End: 2021-04-20

## 2021-04-20 RX ADMIN — MAGNESIUM HYDROXIDE 15 ML: 400 SUSPENSION ORAL at 09:36

## 2021-04-20 RX ADMIN — OXYCODONE HYDROCHLORIDE 10 MG: 5 TABLET ORAL at 06:13

## 2021-04-20 RX ADMIN — SODIUM CHLORIDE 500 ML: 9 INJECTION, SOLUTION INTRAVENOUS at 08:37

## 2021-04-20 RX ADMIN — KETOROLAC TROMETHAMINE 30 MG: 30 INJECTION, SOLUTION INTRAMUSCULAR; INTRAVENOUS at 07:47

## 2021-04-20 RX ADMIN — HYDROMORPHONE HYDROCHLORIDE 2 MG: 2 TABLET ORAL at 12:42

## 2021-04-20 RX ADMIN — AMITRIPTYLINE HYDROCHLORIDE 100 MG: 100 TABLET, FILM COATED ORAL at 21:32

## 2021-04-20 RX ADMIN — KETOROLAC TROMETHAMINE 30 MG: 30 INJECTION, SOLUTION INTRAMUSCULAR; INTRAVENOUS at 20:18

## 2021-04-20 RX ADMIN — DOCUSATE SODIUM 50 MG AND SENNOSIDES 8.6 MG 2 TABLET: 8.6; 5 TABLET, FILM COATED ORAL at 21:32

## 2021-04-20 RX ADMIN — METHOCARBAMOL 500 MG: 500 TABLET, FILM COATED ORAL at 14:08

## 2021-04-20 RX ADMIN — ACETAMINOPHEN 650 MG: 325 TABLET, FILM COATED ORAL at 03:39

## 2021-04-20 RX ADMIN — KETOROLAC TROMETHAMINE 30 MG: 30 INJECTION, SOLUTION INTRAMUSCULAR; INTRAVENOUS at 14:08

## 2021-04-20 RX ADMIN — ACETAMINOPHEN 650 MG: 325 TABLET, FILM COATED ORAL at 16:21

## 2021-04-20 RX ADMIN — SODIUM CHLORIDE, POTASSIUM CHLORIDE, SODIUM LACTATE AND CALCIUM CHLORIDE: 600; 310; 30; 20 INJECTION, SOLUTION INTRAVENOUS at 07:47

## 2021-04-20 RX ADMIN — METHOCARBAMOL 500 MG: 500 TABLET, FILM COATED ORAL at 06:13

## 2021-04-20 RX ADMIN — SODIUM CHLORIDE, POTASSIUM CHLORIDE, SODIUM LACTATE AND CALCIUM CHLORIDE 500 ML: 600; 310; 30; 20 INJECTION, SOLUTION INTRAVENOUS at 20:22

## 2021-04-20 RX ADMIN — ACETAMINOPHEN 650 MG: 325 TABLET, FILM COATED ORAL at 21:32

## 2021-04-20 RX ADMIN — ACETAMINOPHEN 650 MG: 325 TABLET, FILM COATED ORAL at 09:36

## 2021-04-20 RX ADMIN — GABAPENTIN 600 MG: 600 TABLET, FILM COATED ORAL at 21:32

## 2021-04-20 RX ADMIN — SODIUM CHLORIDE 500 ML: 9 INJECTION, SOLUTION INTRAVENOUS at 14:40

## 2021-04-20 RX ADMIN — ZOLPIDEM TARTRATE 5 MG: 5 TABLET ORAL at 21:35

## 2021-04-20 RX ADMIN — ENOXAPARIN SODIUM 40 MG: 100 INJECTION SUBCUTANEOUS at 20:17

## 2021-04-20 ASSESSMENT — ACTIVITIES OF DAILY LIVING (ADL)
ADLS_ACUITY_SCORE: 18
PREVIOUS_RESPONSIBILITIES: MEAL PREP;HOUSEKEEPING;LAUNDRY;SHOPPING;MEDICATION MANAGEMENT;FINANCES;DRIVING

## 2021-04-20 NOTE — DISCHARGE SUMMARY
Gynecologic Oncology Discharge Summary    Taran Regalado  [id number]    Admit Date: 4/19/2021  Discharge Date: 4/27/21  Admitting Provider: Bolivar Juarez MD  Discharge Provider: Teri Macias MD    Admission Dx:   - Ovarian cancer  - H/o atrial fibrillation  - H/o pulmonary embolism  - Migraines  - Restless leg syndrome, insomnia  - Anemia of chronic disease/Chemotherapy induced anemia    Discharge Dx:  - Ovarian cancer, s/p procedure below  - H/o atrial fibrillation  - H/o pulmonary embolism  - Migraines  - Restless leg syndrome, insomnia  - Lower extremity edema  - Acute blood loss anemia, concern for intraabdominal bleed, without ongoing bleeding  - Postoperative ileus, resolved  - Intraabdominal infection  - Anemia of chronic disease/Chemotherapy induced anemia    Patient Active Problem List   Diagnosis     Pelvic mass     Non-intractable vomiting with nausea, unspecified vomiting type     Ovarian cancer, unspecified laterality (H)     Atrial fibrillation with rapid ventricular response (H)     Other acute pulmonary embolism without acute cor pulmonale (H)     Encounter for antineoplastic chemotherapy     Restless legs syndrome     Chemotherapy-induced neutropenia (H)     Procedures:   - Exploratory laparotomy, total abdominal hysterectomy, bilateral salpingo-oophorectomy, omentectomy, debulking including resection of liver nodules, diaphragm stripping, proctoscopy  - pRBC transfusion x5, FFP transfusion x1  - Echocardiogram    Prior to Admission Medications:  Medications Prior to Admission   Medication Sig Dispense Refill Last Dose     amitriptyline (ELAVIL) 100 MG tablet Take 50 mg by mouth At Bedtime    4/18/2021 at Unknown time     polyethylene glycol (MIRALAX) 17 GM/Dose powder Take 17 g by mouth 2 times daily (Patient taking differently: Take 17 g by mouth daily as needed ) 510 g 0 Past Month at Unknown time     senna-docusate (SENOKOT-S/PERICOLACE) 8.6-50 MG tablet Take 2 tablets by mouth 2 times  daily 30 tablet 0 4/18/2021 at Unknown time     zolpidem (AMBIEN) 5 MG tablet Take 1 tablet (5 mg) by mouth nightly as needed for sleep (Patient taking differently: Take 5 mg by mouth At Bedtime ) 14 tablet 0 4/18/2021 at Unknown time     acetaminophen (TYLENOL) 325 MG tablet Take 2 tablets (650 mg) by mouth every 6 hours as needed for mild pain 50 tablet 0 More than a month at Unknown time     loratadine (CLARITIN) 10 MG tablet Take 10 mg by mouth daily as needed (for bone pain related to neupogen)    More than a month at Unknown time     ondansetron (ZOFRAN-ODT) 4 MG ODT tab Take 1 tablet (4 mg) by mouth every 6 hours as needed for nausea or vomiting 20 tablet 0 More than a month at Unknown time     SUMAtriptan (IMITREX) 100 MG tablet Take 100 mg by mouth at onset of headache    More than a month at Unknown time     valACYclovir (VALTREX) 1000 mg tablet Take 1,000 mg by mouth as needed    More than a month at Unknown time     [DISCONTINUED] gabapentin (NEURONTIN) 600 MG tablet Take 1 tablet (600 mg) by mouth At Bedtime 30 tablet 0 4/18/2021 at Unknown time     [DISCONTINUED] oxyCODONE (ROXICODONE) 5 MG tablet Take 1 tablet (5 mg) by mouth every 6 hours as needed for pain 15 tablet 0 4/18/2021 at Unknown time     [DISCONTINUED] prochlorperazine (COMPAZINE) 10 MG tablet Take 1 tablet (10 mg) by mouth every 6 hours as needed for nausea or vomiting (Patient not taking: Reported on 4/12/2021) 30 tablet 0 More than a month at Unknown time     [DISCONTINUED] rivaroxaban ANTICOAGULANT (XARELTO ANTICOAGULANT) 20 MG TABS tablet Take 1 tablet (20 mg) by mouth daily (with dinner) (Patient taking differently: Take 20 mg by mouth every morning ) 90 tablet 1 4/17/2021       Discharge Medications:     Review of your medicines      START taking      Dose / Directions   amoxicillin-clavulanate 875-125 MG tablet  Commonly known as: AUGMENTIN  Indication: Infection Within the Abdomen  Used for: Ovarian cancer, unspecified laterality  (H)      Dose: 1 tablet  Take 1 tablet by mouth every 12 hours for 6 days  Quantity: 12 tablet  Refills: 0     enoxaparin ANTICOAGULANT 40 MG/0.4ML syringe  Commonly known as: LOVENOX  Used for: Ovarian cancer, unspecified laterality (H)      Dose: 40 mg  Inject 0.4 mLs (40 mg) Subcutaneous every 24 hours for 14 days  Quantity: 5.6 mL  Refills: 0     HYDROmorphone 2 MG tablet  Commonly known as: DILAUDID  Used for: Ovarian cancer, unspecified laterality (H)      Dose: 1-2 mg  Take 0.5-1 tablets (1-2 mg) by mouth every 12 hours as needed for severe pain  Quantity: 8 tablet  Refills: 0     OLANZapine zydis 5 MG ODT  Commonly known as: zyPREXA  Used for: Ovarian cancer, unspecified laterality (H)      Dose: 5 mg  Take 1 tablet (5 mg) by mouth nightly as needed (insomnia)  Quantity: 14 tablet  Refills: 0        CONTINUE these medicines which may have CHANGED, or have new prescriptions. If we are uncertain of the size of tablets/capsules you have at home, strength may be listed as something that might have changed.      Dose / Directions   polyethylene glycol 17 GM/Dose powder  Commonly known as: MIRALAX  This may have changed:     when to take this    reasons to take this  Used for: Ovarian cancer, unspecified laterality (H)      Dose: 17 g  Take 17 g by mouth 2 times daily  Quantity: 510 g  Refills: 0     zolpidem 5 MG tablet  Commonly known as: Ambien  This may have changed: when to take this  Used for: Primary insomnia      Dose: 5 mg  Take 1 tablet (5 mg) by mouth nightly as needed for sleep  Quantity: 14 tablet  Refills: 0        CONTINUE these medicines which have NOT CHANGED      Dose / Directions   acetaminophen 325 MG tablet  Commonly known as: TYLENOL  Used for: Pelvic mass      Dose: 650 mg  Take 2 tablets (650 mg) by mouth every 6 hours as needed for mild pain  Quantity: 50 tablet  Refills: 0     amitriptyline 100 MG tablet  Commonly known as: ELAVIL      Dose: 50 mg  Take 50 mg by mouth At Bedtime  Refills:  0     gabapentin 600 MG tablet  Commonly known as: NEURONTIN  Used for: Restless legs syndrome      Dose: 600 mg  Take 1 tablet (600 mg) by mouth At Bedtime  Quantity: 60 tablet  Refills: 0     loratadine 10 MG tablet  Commonly known as: CLARITIN      Dose: 10 mg  Take 10 mg by mouth daily as needed (for bone pain related to neupogen)  Refills: 0     ondansetron 4 MG ODT tab  Commonly known as: ZOFRAN-ODT  Used for: Ovarian cancer, unspecified laterality (H)      Dose: 4 mg  Take 1 tablet (4 mg) by mouth every 6 hours as needed for nausea or vomiting  Quantity: 20 tablet  Refills: 0     prochlorperazine 10 MG tablet  Commonly known as: COMPAZINE  Used for: Ovarian cancer, unspecified laterality (H)      Dose: 10 mg  Take 1 tablet (10 mg) by mouth every 6 hours as needed for nausea or vomiting  Quantity: 30 tablet  Refills: 0     senna-docusate 8.6-50 MG tablet  Commonly known as: SENOKOT-S/PERICOLACE  Used for: Pelvic mass      Dose: 2 tablet  Take 2 tablets by mouth 2 times daily  Quantity: 30 tablet  Refills: 0     SUMAtriptan 100 MG tablet  Commonly known as: IMITREX      Dose: 100 mg  Take 100 mg by mouth at onset of headache  Refills: 0     valACYclovir 1000 mg tablet  Commonly known as: VALTREX      Dose: 1,000 mg  Take 1,000 mg by mouth as needed  Refills: 0        STOP taking    oxyCODONE 5 MG tablet  Commonly known as: ROXICODONE        rivaroxaban ANTICOAGULANT 20 MG Tabs tablet  Commonly known as: XARELTO ANTICOAGULANT              Where to get your medicines      These medications were sent to [facility] - [place], [place] - [address]  [address] [address]    Phone: [phone]     amoxicillin-clavulanate 875-125 MG tablet    enoxaparin ANTICOAGULANT 40 MG/0.4ML syringe    gabapentin 600 MG tablet    OLANZapine zydis 5 MG ODT    prochlorperazine 10 MG tablet     These medications were sent to 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 S/p 3C neoadjuvant carbo/taxol presenting for scheduled interval debulking.    Hospital Course:  Dz:   - Preoperative diagnosis was high grade serous ovarian cancer. Final pathology showed metastatic high grade serous carincoma. She will follow-up postoperatively for a care plan.  FEN:   - Her diet was slowly advanced. On POD#4, she reported nausea and vomiting. She was made NPO and given IVF supplementation. She had return of bowel function by POD#5 and her diet was slowly advanced. By discharge, she was tolerating a regular diet without nausea and vomiting and able to maintain her hydration without IVF supplementation.  Pain:   - She received a TAPs block. Her pain was initially treated with a combination of PO and IV meds. Once tolerating PO pain meds, she was transitioned to a PO pain regimen. Her pain was well controlled on this and she was discharged home with these medications.  CV:   - She has a history of atrial fibrillation with RVR but does not take home meds. Patient was persistently hypotensive w/orthostatic hypotension postoperatively that did not improve with fluid boluses and a unit of pRBC on POD#1. She underwent ECHO on POD#2 which showed a normal ejection fraction. Her hypotension was thought to be due to acute blood loss anemia as it ultimately resolved by POD#6 after she received multiple blood transfusions (see below) for an intraabdominal venous bleed. On discharge she was asymptomatic and was not hypotensive. See Heme.  PULM:   - She has a history of PE (see heme). She was initially given O2 supplementation in to maintain her O2 sats in the immediate postop period and was transitioned off of this without difficulty.  By discharge, her O2 sats were greater than 94% on RA.  She was encouraged to use her bedside IS while in house. See Heme.   HEME:   - Her preoperative Hgb was 10.5. Her hgb dropped to 7.7 postoperatively. She received 1u pRBCs with appropriate rise to 8.9 on POD#1. She was started on prophylactic Lovenox on POD#1 and transitioned to therapeutic Lovenox on POD#2 due to her history of PE. She was restarted on home Xarelto on POD#3. On POD#4 her hemoglobin downtrended 7. She received 2u of pRBC with poor response, with hemoglobin of 6.9. Given poor hemoglobin response along with orthostatic hypotension she underwent a CT-A which showed concern for intraperitoneal hemorrhage and active arterial bleeding. A stat IR consult did not find an active target for embolization, with the bleeding suspected to be venous in source. She received an additional 2u of pRBC, 1u of FFP, and her Xarelto was held. Her hemoglobin subsequently john to 9 on POD#5 and her abdominal exams remained stable. Her hemoglobin was stable and she was started on prophylactic lovenox on POD#7 with plan to transition to therapeutic dosing outpatient if clinically stable.    GI:   - She was made NPO prior to the procedure. On POD#0, her diet was advanced to regular. She had nausea/vomiting on POD#4 and was made NPO. Postoperative ileus was suspected. She had return of bowel function and diet was slowly advanced.  At the time of discharge, she was tolerating a regular diet without nausea and vomiting.  She will be discharged with a bowel regimen to prevent constipation in the postoperative period.  She had no acute GI issues while in house.  :    - A ha catheter was placed at the time of the surgery. The patient had low urine output on POD#1 requiring IVF bolus. Her creatine bumped from 0.76 to 1.02 on POD#1 then improved to 0.76. mIVF were discontinued evening of POD#1. She received additional IVF bolus secondary to hypotension. Once ambulating  unassisted with adequate UOP, the ha catheter was removed. Prior to discharge, the patient was voiding spontaneously without difficulty. She had no acute  issues while in house.  ID:   - The patient spiked a low grade fever of 100.7 on POD#7. Blood cultures were collected. UA was unremarkable. She was started on IV zosyn, which was continued for 24 hours when she was transitioned to augmentin. Fever thought to be secondary to infected hematoma. She will complete a 7 day course of antibiotics.    ENDO:   - No acute issues  PSYCH/NEURO:   - She has a history of restless leg syndrome and insomnia. Patient reports she is dependent on gabapentin, amitriptyline, and ambien to sleep since receiving chemotherapy. Ambien was discontinued and she was started on remeron 7.5 mg at HS. Patient reported no significant improvement with remeron. Palliative medicine was consulted on POD#7 and recommended starting zyprexa as well as outpatient CBT-I.  She is scheduled to follow up with [facility] sleep disorder clinic in June.  - The patient had a fall related to orthostasis on POD#4. She reported left ankle pain, which she has experienced in the past. XR demonstrated calcifications of the left ankle with soft tissue swelling of uncertain chronicity. Bilateral lower extremity dopplers demonstrated soft tissue swelling but no DVT. She was given MILA hose. PT were consulted and recommended outpatient PT.   PPX:    - She was given SCDs, IS, during her hospital course. She tolerated these prophylactic interventions without incident. She will continue on ppx lovenox and follow up 2 weeks post op for labs. If labs are stable patient to advance to therapeutic anticoagulation.    Discharge Instructions and Follow up:  Ms. Taran Regalado was discharged from the hospital with follow up for further treatment of her ovarian cancer.     Discharge Diet: Regular  Discharge Activity: Activity as tolerated  Discharge Follow up: scheduled for 5/3  labs 5/12 Dr Juarez    Discharge Disposition:  Discharged to home    Discharge Staff: MD Lisbeth Levi MD MSc  OBGYN Resident, PGY2  April 27, 2021, 4:15 PM    Provider Disclosure:   I agree with above History, Review of Systems, Physical exam and Plan. I have reviewed the content of the documentation and have edited it as needed. I have seen and personally performed the services documented here and the documentation accurately represents those services and the decisions I have made.     Electronically signed by:   Bolivar Juarez MD   Gynecologic Oncology   [facility]    PATHOLOGY DIAGNOSIS ADDENDUM:    FINAL DIAGNOSIS:     A. OMENTUM, BIOPSY:   - Omental adipose tissue with rare viable cells of metastatic high grade   serous carcinoma     C. NODULE, SMALL BOWEL MESENTERY, EXCISION:   - Fibroadipose tissue, positive for metastatic high grade serous carcinoma     D. UTERUS, CERVIX, BILATERAL FALLOPIAN TUBES AND OVARIES, HYSTERECTOMY   WITH BILATERAL SALPINGO-OOPHORECTOMY:   - Atrophic endometrium   - Uterine serosa with rare viable cells consistent with high grade serous   carcinoma   - Cervix with atrophic changes   - Viable cells consistent with high grade serous carcinoma present in the   right ovary, serosa of right   fallopian tube and right periadnexal soft tissue   - Left ovary with atrophic changes   - Left fallopian tube with a rare focus of serous tubal in-situ carcinoma   (STIC)     E. NODULES, SMALL BOWEL MESENTRY, EXCISION:   - Fibroadipose tissue with rare viable cells of metastatic high grade   serous carcinoma     F. NODULE, SPLENIC FLEXURE TRANSVERSE COLON, EXCISION:   - Fibroadipose tissue with rare viable cells of metastatic high grade   serous carcinoma   - Accessory splenule, negative for malignancy     G. OMENTUM, OMENTECTOMY:   - Omental adipose tissue with rare viable cells of metastatic high grade   serous carcinoma      Addendum  added for Bolivar Juarez on 06/15/2021 by Karli Rincon

## 2021-04-20 NOTE — PLAN OF CARE
POD# 1 XL. DANAE BSO omentectomy, debulking, resection of liver, nodule, diaphragm. Stripping, proctoscopy.    Alert and oriented, lethargic this am. Pain medication changed to po dilaudid. Prn IV toradol and po robaxin given. Pt always stated that pain is 7-8 and comfortable level for her. Not passing gas yet, + bowel sounds. Taking regular diet, tolerating it well. Denies nausea, fair appetite. VSS except the BP. Has been having soft BP's, received 500 ml NaCl bolus in my shift, 1U PRBC with not much difference with BP. BP also taken manually with the same result. On RA. OT consult requested and was seen this afternoon. Able to be up to hair with Ax1, dizziness,lightheadedness minimal per patient. Hickey with adequate urine volume. To reassess this afternoon if pt is safe to transfer to [facility] with assist of 1. Midline surgical incision with primapore with dried drainage-marked.   PLAN: Continue with the current plan of care. Monitor BP, VS, pain and urine output.

## 2021-04-20 NOTE — PROGRESS NOTES
04/20/21 1500   Quick Adds   Type of Visit Initial Occupational Therapy Evaluation   Living Environment   People in home alone   Current Living Arrangements house   Home Accessibility stairs to enter home;stairs within home   Number of Stairs, Main Entrance 1   Number of Stairs, Within Home, Primary 7   Living Environment Comments pt planning on living in her daughters house upon DISCH, daughter lives in a split level house.    Self-Care   Usual Activity Tolerance good   Current Activity Tolerance moderate   Regular Exercise No   Disability/Function   Hearing Difficulty or Deaf no   Wear Glasses or Blind no   Concentrating, Remembering or Making Decisions Difficulty no   Difficulty Communicating no   Difficulty Eating/Swallowing no   Walking or Climbing Stairs Difficulty no   Dressing/Bathing Difficulty no   Toileting issues no   Doing Errands Independently Difficulty (such as shopping) no   Change in Functional Status Since Onset of Current Illness/Injury yes   General Information   Referring Physician Carlita England   Patient/Family Therapy Goal Statement (OT) return to PLOF and go on vacation.    Additional Occupational Profile Info/Pertinent History of Current Problem POD#1 s/p XL, DANAE-BSO, omentectomy, debulking, resection of liver nodules, diaphragm stripping, procto    Existing Precautions/Restrictions abdominal;fall   General Observations and Info pt with supportive dasughter at bedside.    Cognitive Status Examination   Orientation Status orientation to person, place and time   Affect/Mental Status (Cognitive) WFL   Cognitive Status Comments no concerns.    Visual Perception   Visual Impairment/Limitations WFL   Sensory   Sensory Quick Adds No deficits were identified   Range of Motion Comprehensive   General Range of Motion no range of motion deficits identified   Strength Comprehensive (MMT)   General Manual Muscle Testing (MMT) Assessment other (see comments)   Comment, General Manual Muscle Testing  (MMT) Assessment overall deconditioning.    Coordination   Upper Extremity Coordination No deficits were identified   Bed Mobility   Bed Mobility supine-sit;sit-supine   Supine-Sit [place] (Bed Mobility)   (education required. )   Sit-Supine [place] (Bed Mobility)   (education required. )   Balance   Balance Assessment no deficits were identified   Activities of Daily Living   BADL Assessment/Intervention lower body dressing   Lower Body Dressing Assessment/Training   [place] Level (Lower Body Dressing)   (education required. )   Instrumental Activities of Daily Living (IADL)   Previous Responsibilities meal prep;housekeeping;laundry;shopping;medication management;finances;driving   IADL Comments daughter can assist as needed.    Clinical Impression   Criteria for Skilled Therapeutic Interventions Met (OT) yes   OT Diagnosis decreased ADL I   Assessment of Occupational Performance 3-5 Performance Deficits   Identified Performance Deficits dressing, bathing, toileting, home making, driving, leisure.    Planned Therapy Interventions (OT) ADL retraining;IADL retraining;bed mobility training;strengthening;transfer training;home program guidelines;progressive activity/exercise;risk factor education   Clinical Decision Making Complexity (OT) low complexity   Therapy Frequency (OT) 5x/week   Predicted Duration of Therapy 1 week   Risk & Benefits of therapy have been explained evaluation/treatment results reviewed;care plan/treatment goals reviewed;risks/benefits reviewed;current/potential barriers reviewed;participants voiced agreement with care plan;participants included;patient;daughter   Comment-Clinical Impression pt presents to OT with post surgical precautions and BP decrease limiting ADL I   OT Discharge Planning    OT Discharge Recommendation (DC Rec) Home with assist;Transitional Care Facility   OT Rationale for DC Rec pending progress.    OT Brief overview of current status  Pt min assist bed  mobility, CGA up to chair, BP low in 80's/40's with MAP in 60's, no orthostatic BP drop.    Total Evaluation Time (Minutes)   Total Evaluation Time (Minutes) 5

## 2021-04-20 NOTE — PLAN OF CARE
Assumed care of Patient from [id number]. VSS. Patient reports good pain control with scheduled tylenol and gabapentin. Patient reports not passing flatus, no complaints of nausea.  Pt ha intact and patent with adequate output. Incision and dressing clean and intact, abd binder on for comfort. Maintenance fluids maintained and capno continued within parameters. Pt refused ambulation tonight, wants to try in the morning. Pt able to self reposition in bed. Will continue plan of care.

## 2021-04-20 NOTE — PROGRESS NOTES
Gynecologic Oncology Postoperative Check Note  4/19/2021    S:   Patient reporting more pain since surgery. Feels like the meds are wearing off. Tolerated pudding without nausea or vomiting. Has not yet dangled or ambulated. Hickey catheter in place. No flatus or BM. Denies chest pain, shortness of breath.    O:  Vitals:    04/19/21 1627 04/19/21 1657 04/19/21 1727 04/19/21 1827   BP: (!) 87/50 95/58 (!) 88/45 97/50   BP Location: Right arm Right arm Right arm Right arm   Pulse: 71 77 75 77   Resp: 15 12 12 11   Temp: 98  F (36.7  C)      TempSrc: Oral      SpO2: 100% 97% 97% 95%   Weight:       Height:         Gen: A&O, resting in bed  Cardio: RRR  Resp: CTAB, no wheezes or crackles  Abdomen: Soft, appropriately tender to palpation  Incision: Bandage in place, minimal shadowing   Extremities: BLEs non-tender with SCDs in place     A: 64yo POD#0 s/p XL, DANAE-BSO, omentectomy, debulking, resection of liver nodules, diaphragm stripping, procto.     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 S/p 3C neoadjuvant carbo/taxol.  FEN: ADAT, LR 125cc/hr. Hypokalemia>ERP ordered.  Pain: S/p TAPs; Tylenol, Toradol, prn oxy. Will add robaxin.   Heme: Hgb 10.5> > 9.5. AM CBC ordered.  CV: Hx Afib w/ RVR, no meds  Pulm: Hx PE, PTA Xarelto [HELD]. Will resume in the coming days but will likely start with prophylactic then therapeutic lovenox.  GI: NI  : Hickey. Plan to remove POD#1.  ID: S/p preop abx  Endo: NI  Psych/Neuro/MSK:   #Migraines, sumatriptan prn [HELD]  #Restless legs/insomnia, gabapentin, amitriptyline. Patient reports she is dependent on ambien to sleep. Has been to many doctors and without it will not sleep at all. Will order at bedtime prn. Reviewed with RN to hold if any sign of sedation.   PPX: SCDs, IS  Dispo: Pending postop goals/recovery  Drains/Lines: dilcia KO MD  OB/GYN Resident, PGY-3  4/19/2021 7:16 PM

## 2021-04-20 NOTE — PROGRESS NOTES
Gynecology Oncology Progress Note  04/22/21    POD#3 s/p XL, DANAE-BSO, omentectomy, debulking, resection of liver nodules, diaphragm stripping, procto     Disease: Stage IV high grade serous ovarian cancer, BRCA 1.     24 hour events:   - Echocardiogram wnl  - Discontinue ambien, start remeron  - Hickey catheter removed    Subjective:   Feeling Ok this AM. Pain control improved. Spontaneously urinating. Tolerating PO. Has not yet passed flatus. Would like to go home today.     Objective:   Patient Vitals for the past 24 hrs:   BP Temp Temp src Pulse Resp SpO2   04/21/21 2213 105/57 98.9  F (37.2  C) Oral 79 18 96 %   04/21/21 1426 96/50 96.3  F (35.7  C) Oral 77 18 97 %   04/21/21 1339 97/48 -- -- 69 18 --   04/21/21 1114 (!) 84/48 -- -- 74 -- --   04/21/21 1110 (!) 73/42 -- -- 90 -- --   04/21/21 1106 (!) 87/52 -- -- 75 -- 98 %   04/21/21 0715 90/47 98.1  F (36.7  C) Oral 68 16 95 %     Gen: A&O, resting in bed  Cardio: RRR  Resp: CTAB, no wheezes or crackles  Abdomen: Soft, appropriately tender to palpation, mildy distended  Incision: C/D/I  Extremities: BLEs non-tender with SCDs in place     Intake/Output:  Last 24 hours//Since midnight  PO: none charted  IV: none charted  U: 1600//2x since midnight    Lines/Drains:   PIV, Hickey    New labs/imaging:  BMP  Recent Labs   Lab 04/21/21  0632 04/20/21  1834 04/20/21  0619 04/19/21  1707 04/19/21  1045     --  139  --  137   POTASSIUM 3.6  --  3.8 3.9 3.3*   CHLORIDE 108  --  106  --   --    HORACIO 8.3*  --  8.0*  --   --    CO2 25  --  25  --   --    BUN 12  --  12  --   --    CR 0.76 1.02 0.76  --   --    *  --  100*  --  159*     CBC  Recent Labs   Lab 04/21/21  0632 04/20/21  1834 04/20/21  1040 04/20/21  0619 04/19/21  1544 04/19/21  0623 04/19/21  0623   WBC 4.5  --   --  4.5 13.7*  --  3.0*   RBC 2.54*  --   --  2.25* 2.81*  --  3.18*   HGB 8.3* 8.9* 7.7* 7.8* 9.6*   < > 10.5*   HCT 26.2*  --   --  24.3* 29.6*  --  33.1*   *  --   --  108* 105*   --  104*   MCH 32.7  --   --  34.7* 34.2*  --  33.0   MCHC 31.7  --   --  32.1 32.4  --  31.7   RDW 22.9*  --   --  19.0* 19.3*  --  19.9*   *  --   --  119* 133*  --  141*    < > = values in this interval not displayed.     Assessment:   66yo POD#3 s/p XL, DANAE-BSO, omentectomy, debulking, resection of liver nodules, diaphragm stripping, procto for stage IV high grade serous ovarian cancer, BRCA 1, s/p 3C neoadjuvant carbo/taxol.    Active Problem list:  - Postoperative state  - Ovarian cancer  - H/o pulmonary embolism  - H/o Afib w/RVR  - Restless leg syndrome, insomnia    Plan:    #Postoperative state  - FEN: Regular diet.  - Pain: S/p TAPs. Scheduled Tylenol. Prn oxycodone, robaxin.   - S/p Hickey, voiding.  - Not yet passing flatus  - Ppx: SCDs, IS. See below for anticoagulation plan.    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 S/p 3C neoadjuvant carbo/taxol.  - Final pathology pending  - Will follow up outpatient for ongoing treatment    #H/o pulmonary embolism  - Home xarelto currently held  - Started prophylactic lovenox 4/20> therapeutic lovenox 4/21    #H/o afib w/RVR  #Postop hypotension  - Stable to improving  - Echo 4/21 wnl    #Restless leg syndrome, insomnia  - Continue home gabapentin, amitriptyline. Ambien discontinued and started remeron 4/21.    Lisbeth Olvera MD MSc  OBGYN Resident, PGY2  April 22, 2021, 7:35 AM    I saw and evaluated the patient. I agree with the findings and the plan of care as documented in Dr. Olvera 's note.   Awaiting bowel function  BPs improving, but still low. Echo reviewed and normal. Will resuscitated  Start xeralto tonight    Fatmata Monae MD  Gynecologic Oncology  Pager [phone]

## 2021-04-20 NOTE — PHARMACY-ADMISSION MEDICATION HISTORY
Admission Medication History Completed by Pharmacy    See [facility] Admission Navigator for allergy information, preferred outpatient pharmacy, prior to admission medications and immunization status.     Medication History Sources:     Pharmacist medication history completed 4/13    Changes made to PTA medication list (reason):    Added: None    Deleted: None    Changed: None    Additional Information:    None    Prior to Admission medications    Medication Sig Last Dose Taking? Auth Provider   amitriptyline (ELAVIL) 100 MG tablet Take 50 mg by mouth At Bedtime  4/18/2021 at Unknown time Yes Reported, Patient   gabapentin (NEURONTIN) 600 MG tablet Take 1 tablet (600 mg) by mouth At Bedtime 4/18/2021 at Unknown time Yes Marta Okeefe APRN CNP   oxyCODONE (ROXICODONE) 5 MG tablet Take 1 tablet (5 mg) by mouth every 6 hours as needed for pain 4/18/2021 at Unknown time Yes Ella Murillo APRN CNP   polyethylene glycol (MIRALAX) 17 GM/Dose powder Take 17 g by mouth 2 times daily  Patient taking differently: Take 17 g by mouth daily as needed  Past Month at Unknown time Yes Carlita England APRN CNP   senna-docusate (SENOKOT-S/PERICOLACE) 8.6-50 MG tablet Take 2 tablets by mouth 2 times daily 4/18/2021 at Unknown time Yes Carlita England APRN CNP   zolpidem (AMBIEN) 5 MG tablet Take 1 tablet (5 mg) by mouth nightly as needed for sleep  Patient taking differently: Take 5 mg by mouth At Bedtime  4/18/2021 at Unknown time Yes Brinda Lacey MD   acetaminophen (TYLENOL) 325 MG tablet Take 2 tablets (650 mg) by mouth every 6 hours as needed for mild pain More than a month at Unknown time  Bolivar Juarez MD   loratadine (CLARITIN) 10 MG tablet Take 10 mg by mouth daily as needed (for bone pain related to neupogen)  More than a month at Unknown time  Reported, Patient   ondansetron (ZOFRAN-ODT) 4 MG ODT tab Take 1 tablet (4 mg) by mouth every 6 hours as needed for nausea or vomiting More than a month at  Unknown time  Bolivar Juarez MD   prochlorperazine (COMPAZINE) 10 MG tablet Take 1 tablet (10 mg) by mouth every 6 hours as needed for nausea or vomiting  Patient not taking: Reported on 4/12/2021 More than a month at Unknown time  Ella Murillo APRN CNP   rivaroxaban ANTICOAGULANT (XARELTO ANTICOAGULANT) 20 MG TABS tablet Take 1 tablet (20 mg) by mouth daily (with dinner)  Patient taking differently: Take 20 mg by mouth every morning  4/17/2021  Ella Murillo APRN CNP   SUMAtriptan (IMITREX) 100 MG tablet Take 100 mg by mouth at onset of headache  More than a month at Unknown time  Reported, Patient   valACYclovir (VALTREX) 1000 mg tablet Take 1,000 mg by mouth as needed  More than a month at Unknown time  Reported, Patient       Date completed: 04/20/21    Medication history completed by: Marta Kamara [facility]

## 2021-04-20 NOTE — PROGRESS NOTES
Gynecology Oncology Progress Note  04/20/21    POD#1 s/p XL, DANAE-BSO, omentectomy, debulking, resection of liver nodules, diaphragm stripping, procto     Disease: Stage IV high grade serous ovarian cancer, BRCA 1.     24 hour events:   - Surgery  - Hypotension in PACU  - Pain meds since PACU: tylenol 650 x3, toradol 30 x1, oxycodone 10 x1, robaxin 500 x1    Subjective:   Patient is feeling okay.  Pain is moderate to poorly controlled. Drinking water and eating crackers and oatmeal w/o N/V. No flatus. Hickey catheter in place.    Objective:   Patient Vitals for the past 24 hrs:   BP Temp Temp src Pulse Resp SpO2   04/19/21 2222 93/49 98.6  F (37  C) Oral 73 19 94 %   04/19/21 1927 90/44 -- -- 80 12 94 %   04/19/21 1827 97/50 -- -- 77 11 95 %   04/19/21 1727 (!) 88/45 -- -- 75 12 97 %   04/19/21 1657 95/58 -- -- 77 12 97 %   04/19/21 1627 (!) 87/50 98  F (36.7  C) Oral 71 15 100 %   04/19/21 1610 96/61 -- -- 75 12 100 %   04/19/21 1600 102/63 97.6  F (36.4  C) Oral 80 12 100 %   04/19/21 1550 95/53 -- -- 77 10 100 %   04/19/21 1545 90/56 -- -- 78 12 99 %   04/19/21 1530 (!) 85/49 -- -- 68 10 97 %   04/19/21 1525 (!) 83/49 -- -- 72 10 99 %   04/19/21 1520 (!) 77/43 -- -- 73 10 97 %   04/19/21 1515 (!) 73/42 97.6  F (36.4  C) Oral 74 10 99 %   04/19/21 1500 98/68 96.6  F (35.9  C) -- 90 10 100 %   04/19/21 1445 94/73 95.9  F (35.5  C) -- 87 12 100 %   04/19/21 1430 92/55 95.9  F (35.5  C) -- 77 12 100 %   04/19/21 1415 109/69 95.5  F (35.3  C) Core 85 14 100 %   04/19/21 1400 107/70 95.9  F (35.5  C) Core 79 14 100 %   04/19/21 1345 115/68 95.9  F (35.5  C) Core 79 13 100 %   04/19/21 1342 99/60 -- -- 80 10 100 %   04/19/21 1333 111/41 96.1  F (35.6  C) Core 88 11 94 %     Gen: A&O, resting in bed  Cardio: RRR  Resp: CTAB, no wheezes or crackles  Abdomen: Soft, appropriately tender to palpation  Incision: Bandage in place, minimal shadowing   Extremities: BLEs non-tender with SCDs in place     Intake/Output:  Last 24  hours//Since midnight  PO: 50//400  IV: 5700//752  U: 845//700    Lines/Drains:   PIV, Hickey    New labs/imaging:  BMP  Recent Labs   Lab 04/19/21  1707 04/19/21  1045 04/19/21  0616   NA  --  137  --    POTASSIUM 3.9 3.3* 3.8   GLC  --  159*  --      CBC  Recent Labs   Lab 04/19/21  1544 04/19/21  1045 04/19/21  0623   WBC 13.7*  --  3.0*   RBC 2.81*  --  3.18*   HGB 9.6* 10.2* 10.5*   HCT 29.6*  --  33.1*   *  --  104*   MCH 34.2*  --  33.0   MCHC 32.4  --  31.7   RDW 19.3*  --  19.9*   *  --  141*     Assessment:   66yo POD#1 s/p XL, DANAE-BSO, omentectomy, debulking, resection of liver nodules, diaphragm stripping, procto for stage IV high grade serous ovarian cancer, BRCA 1, s/p 3C neoadjuvant carbo/taxol.    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 S/p 3C neoadjuvant carbo/taxol.  - Final pathology pending  - Will follow up outpatient for ongoing treatment    #Postoperative state  - FEN: Regular diet. mIVF at 125 ml/hr. Will discontinue fluids today once tolerating more PO.  - Pain: S/p TAPs. Scheduled Tylenol. Prn Toradol, oxycodone, robaxin.   - Hickey catheter in place, plan to remove today  - Ppx: SCDs, IS. See below for anticoagulation plan.    #H/o pulmonary embolism  - Home xarelto currently held  - Will likely start lovenox today at prophylactic dosing then transition to therapeutic dosing    #Restless leg syndrome, insomnia  - Continue home gabapentin, amitriptyline, ambien. Patient reports she is dependent on these medications to sleep.    Stu Okeefe, MS3    I was present with the medical student who participated in the service and in the documentation of this note.  I have verified the history and personally performed the physical exam and medical decision making, and have verified the content of the note, which accurately  reflect my assessment of the patient and the plan of care.    Lisbeth Olvera MD MSc  OBGYN Resident, PGY2  April 20, 2021, 6:13 AM    I saw and evaluated the patient. I agree with the findings and the plan of care as documented in Dr. Olvera 's note. Agree with plan as above. Plan repeat Hg today, transfusion if <8 or symptomatic given need for chemo postop.     Fatmata Monae MD  Gynecologic Oncology  Pager [phone]

## 2021-04-21 ENCOUNTER — APPOINTMENT (OUTPATIENT)
Dept: CARDIOLOGY | Facility: CLINIC | Age: 66
End: 2021-04-21
Attending: NURSE PRACTITIONER
Payer: COMMERCIAL

## 2021-04-21 ENCOUNTER — APPOINTMENT (OUTPATIENT)
Dept: OCCUPATIONAL THERAPY | Facility: CLINIC | Age: 66
End: 2021-04-21
Attending: OBSTETRICS & GYNECOLOGY
Payer: COMMERCIAL

## 2021-04-21 LAB
ANION GAP SERPL CALCULATED.3IONS-SCNC: 5 MMOL/L (ref 3–14)
BUN SERPL-MCNC: 12 MG/DL (ref 7–30)
CALCIUM SERPL-MCNC: 8.3 MG/DL (ref 8.5–10.1)
CHLORIDE SERPL-SCNC: 108 MMOL/L (ref 94–109)
CO2 SERPL-SCNC: 25 MMOL/L (ref 20–32)
CREAT SERPL-MCNC: 0.76 MG/DL (ref 0.52–1.04)
ERYTHROCYTE [DISTWIDTH] IN BLOOD BY AUTOMATED COUNT: 22.9 % (ref 10–15)
GFR SERPL CREATININE-BSD FRML MDRD: 82 ML/MIN/{1.73_M2}
GLUCOSE SERPL-MCNC: 108 MG/DL (ref 70–99)
HCT VFR BLD AUTO: 26.2 % (ref 35–47)
HGB BLD-MCNC: 8.3 G/DL (ref 11.7–15.7)
MCH RBC QN AUTO: 32.7 PG (ref 26.5–33)
MCHC RBC AUTO-ENTMCNC: 31.7 G/DL (ref 31.5–36.5)
MCV RBC AUTO: 103 FL (ref 78–100)
PLATELET # BLD AUTO: 122 10E9/L (ref 150–450)
POTASSIUM SERPL-SCNC: 3.6 MMOL/L (ref 3.4–5.3)
RBC # BLD AUTO: 2.54 10E12/L (ref 3.8–5.2)
SODIUM SERPL-SCNC: 138 MMOL/L (ref 133–144)
WBC # BLD AUTO: 4.5 10E9/L (ref 4–11)

## 2021-04-21 PROCEDURE — 93321 DOPPLER ECHO F-UP/LMTD STD: CPT | Mod: 26 | Performed by: STUDENT IN AN ORGANIZED HEALTH CARE EDUCATION/TRAINING PROGRAM

## 2021-04-21 PROCEDURE — 80048 BASIC METABOLIC PNL TOTAL CA: CPT | Performed by: STUDENT IN AN ORGANIZED HEALTH CARE EDUCATION/TRAINING PROGRAM

## 2021-04-21 PROCEDURE — 250N000013 HC RX MED GY IP 250 OP 250 PS 637: Performed by: OBSTETRICS & GYNECOLOGY

## 2021-04-21 PROCEDURE — 250N000013 HC RX MED GY IP 250 OP 250 PS 637: Performed by: NURSE PRACTITIONER

## 2021-04-21 PROCEDURE — 97535 SELF CARE MNGMENT TRAINING: CPT | Mod: GO

## 2021-04-21 PROCEDURE — 250N000011 HC RX IP 250 OP 636: Performed by: NURSE PRACTITIONER

## 2021-04-21 PROCEDURE — 36415 COLL VENOUS BLD VENIPUNCTURE: CPT | Performed by: STUDENT IN AN ORGANIZED HEALTH CARE EDUCATION/TRAINING PROGRAM

## 2021-04-21 PROCEDURE — 99024 POSTOP FOLLOW-UP VISIT: CPT | Mod: GC | Performed by: OBSTETRICS & GYNECOLOGY

## 2021-04-21 PROCEDURE — 93308 TTE F-UP OR LMTD: CPT | Mod: 26 | Performed by: STUDENT IN AN ORGANIZED HEALTH CARE EDUCATION/TRAINING PROGRAM

## 2021-04-21 PROCEDURE — 250N000011 HC RX IP 250 OP 636: Performed by: STUDENT IN AN ORGANIZED HEALTH CARE EDUCATION/TRAINING PROGRAM

## 2021-04-21 PROCEDURE — 99207 PR NO CHARGE LOS: CPT | Performed by: OBSTETRICS & GYNECOLOGY

## 2021-04-21 PROCEDURE — 250N000013 HC RX MED GY IP 250 OP 250 PS 637: Performed by: STUDENT IN AN ORGANIZED HEALTH CARE EDUCATION/TRAINING PROGRAM

## 2021-04-21 PROCEDURE — 85027 COMPLETE CBC AUTOMATED: CPT | Performed by: STUDENT IN AN ORGANIZED HEALTH CARE EDUCATION/TRAINING PROGRAM

## 2021-04-21 PROCEDURE — 93325 DOPPLER ECHO COLOR FLOW MAPG: CPT | Mod: 26 | Performed by: STUDENT IN AN ORGANIZED HEALTH CARE EDUCATION/TRAINING PROGRAM

## 2021-04-21 PROCEDURE — 120N000002 HC R&B MED SURG/OB UMMC

## 2021-04-21 PROCEDURE — 93321 DOPPLER ECHO F-UP/LMTD STD: CPT

## 2021-04-21 RX ORDER — MIRTAZAPINE 7.5 MG/1
7.5 TABLET, FILM COATED ORAL AT BEDTIME
Status: DISCONTINUED | OUTPATIENT
Start: 2021-04-21 | End: 2021-04-26

## 2021-04-21 RX ORDER — AMOXICILLIN 250 MG
2 CAPSULE ORAL 2 TIMES DAILY
Status: DISCONTINUED | OUTPATIENT
Start: 2021-04-21 | End: 2021-04-27 | Stop reason: HOSPADM

## 2021-04-21 RX ADMIN — ACETAMINOPHEN 650 MG: 325 TABLET, FILM COATED ORAL at 10:17

## 2021-04-21 RX ADMIN — ACETAMINOPHEN 650 MG: 325 TABLET, FILM COATED ORAL at 21:51

## 2021-04-21 RX ADMIN — KETOROLAC TROMETHAMINE 30 MG: 30 INJECTION, SOLUTION INTRAMUSCULAR; INTRAVENOUS at 04:46

## 2021-04-21 RX ADMIN — HYDROMORPHONE HYDROCHLORIDE 2 MG: 2 TABLET ORAL at 16:41

## 2021-04-21 RX ADMIN — ACETAMINOPHEN 650 MG: 325 TABLET, FILM COATED ORAL at 16:09

## 2021-04-21 RX ADMIN — DOCUSATE SODIUM 50 MG AND SENNOSIDES 8.6 MG 2 TABLET: 8.6; 5 TABLET, FILM COATED ORAL at 08:30

## 2021-04-21 RX ADMIN — ENOXAPARIN SODIUM 70 MG: 80 INJECTION SUBCUTANEOUS at 21:26

## 2021-04-21 RX ADMIN — HYDROMORPHONE HYDROCHLORIDE 2 MG: 2 TABLET ORAL at 21:26

## 2021-04-21 RX ADMIN — GABAPENTIN 600 MG: 600 TABLET, FILM COATED ORAL at 21:25

## 2021-04-21 RX ADMIN — DOCUSATE SODIUM 50 MG AND SENNOSIDES 8.6 MG 2 TABLET: 8.6; 5 TABLET, FILM COATED ORAL at 20:08

## 2021-04-21 RX ADMIN — HYDROMORPHONE HYDROCHLORIDE 2 MG: 2 TABLET ORAL at 13:18

## 2021-04-21 RX ADMIN — AMITRIPTYLINE HYDROCHLORIDE 100 MG: 100 TABLET, FILM COATED ORAL at 21:26

## 2021-04-21 RX ADMIN — HYDROMORPHONE HYDROCHLORIDE 2 MG: 2 TABLET ORAL at 08:39

## 2021-04-21 RX ADMIN — ACETAMINOPHEN 650 MG: 325 TABLET, FILM COATED ORAL at 04:45

## 2021-04-21 RX ADMIN — ENOXAPARIN SODIUM 70 MG: 80 INJECTION SUBCUTANEOUS at 10:53

## 2021-04-21 RX ADMIN — MIRTAZAPINE 7.5 MG: 7.5 TABLET, FILM COATED ORAL at 21:26

## 2021-04-21 ASSESSMENT — ACTIVITIES OF DAILY LIVING (ADL)
ADLS_ACUITY_SCORE: 18

## 2021-04-21 NOTE — PROGRESS NOTES
Gynecology Oncology Progress Note  04/21/21    POD#2 s/p XL, DANAE-BSO, omentectomy, debulking, resection of liver nodules, diaphragm stripping, procto     Disease: Stage IV high grade serous ovarian cancer, BRCA 1.     24 hour events:   - Hypotensive in evening, BP improved to 80-90/40-50s after 500 mL bolus  - Creatinine bump to 1.02, 500 mL bolus, UOP improved, mIVF discontinued  - Transfused 1u pRBC, hgb recheck 8.9  - Started prophylactic lovenox overnight  - s/p OT evaluation  - Pain meds past 24h: tylenol 650x3, dilaudid 2mg x1, toradol 30mg x3, robaxin 500mg x2    Subjective:   Patient is feeling okay. Pain is moderately controlled. Drinking water and tolerating regular diet-pancakes w/o N/V. No flatus. Hickey catheter in place.     Objective:   Patient Vitals for the past 24 hrs:   BP Temp Temp src Pulse Resp SpO2   04/20/21 2158 93/49 96.4  F (35.8  C) Oral 71 16 96 %   04/20/21 1700 (!) 86/51 97  F (36.1  C) Oral 71 16 100 %   04/20/21 1459 (!) 84/48 97.1  F (36.2  C) Oral 78 16 95 %   04/20/21 1439 (!) 80/56 -- -- -- -- --   04/20/21 1418 (!) 84/48 98.1  F (36.7  C) Oral 75 11 94 %   04/20/21 1313 91/40 98.3  F (36.8  C) Oral 76 19 95 %   04/20/21 1239 (!) 86/46 97.7  F (36.5  C) Oral 72 13 99 %   04/20/21 1220 (!) 87/45 98.5  F (36.9  C) Oral 71 12 93 %   04/20/21 1133 (!) 81/42 97.1  F (36.2  C) Oral 72 13 98 %   04/20/21 1042 (!) 79/48 -- -- 73 -- --   04/20/21 1041 (!) 83/43 -- -- 73 -- --   04/20/21 0942 (!) 75/41 -- -- -- -- --   04/20/21 0940 (!) 81/40 -- -- -- -- --   04/20/21 0749 (!) 79/44 -- -- -- -- --   04/20/21 0745 (!) 78/41 -- -- -- -- --   04/20/21 0743 (!) 76/37 -- -- 72 13 95 %     Gen: A&O, resting in bed  Cardio: RRR  Resp: CTAB, no wheezes or crackles  Abdomen: Soft, appropriately tender to palpation  Incision: Bandage in place, minimal shadowing  Extremities: BLEs non-tender with SCDs in place     Intake/Output:  Last 24 hours//Since midnight  PO: 1,360//0  IV: 1,315//0  U:  1,550//400    Lines/Drains:   PIV, Hickey, port    New labs/imaging:  BMP  Recent Labs   Lab 04/20/21  1834 04/20/21  0619 04/19/21  1707 04/19/21  1045 04/19/21  0616   NA  --  139  --  137  --    POTASSIUM  --  3.8 3.9 3.3* 3.8   CHLORIDE  --  106  --   --   --    HORACIO  --  8.0*  --   --   --    CO2  --  25  --   --   --    BUN  --  12  --   --   --    CR 1.02 0.76  --   --   --    GLC  --  100*  --  159*  --      CBC  Recent Labs   Lab 04/20/21  1834 04/20/21  1040 04/20/21  0619 04/19/21  1544 04/19/21  0623 04/19/21  0623   WBC  --   --  4.5 13.7*  --  3.0*   RBC  --   --  2.25* 2.81*  --  3.18*   HGB 8.9* 7.7* 7.8* 9.6*   < > 10.5*   HCT  --   --  24.3* 29.6*  --  33.1*   MCV  --   --  108* 105*  --  104*   MCH  --   --  34.7* 34.2*  --  33.0   MCHC  --   --  32.1 32.4  --  31.7   RDW  --   --  19.0* 19.3*  --  19.9*   PLT  --   --  119* 133*  --  141*    < > = values in this interval not displayed.     Assessment:   66yo POD#2 s/p XL, DANAE-BSO, omentectomy, debulking, resection of liver nodules, diaphragm stripping, procto for stage IV high grade serous ovarian cancer, BRCA 1, s/p 3C neoadjuvant carbo/taxol.     Active Problem list:  - Postoperative state  - Ovarian cancer  - H/o pulmonary embolism  - Restless leg syndrome, insomnia    Plan:    #Postoperative state  - FEN: Regular diet. mIVF stopped 4/20.   - Pain: S/p TAPs. Scheduled Tylenol. Prn Toradol, Dilaudid, Robaxin.   - Hickey catheter in place, plan to remove today given adequate UOP  - Ppx: SCDs, IS. See below for anticoagulation plan.    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 S/p 3C neoadjuvant carbo/taxol.  - Final pathology pending  - Will follow up outpatient for ongoing treatment    #H/o pulmonary embolism  - Home xarelto currently held  - Started prophylactic lovenox 4/20 with plan to transition to therapeutic dosing this evening  - Stop NSAIDs when on therapeutic lovenox  - Given ongoing hypotension and hx Afib w/  RVR, will order echocardiogram for today to ensure adequate cardiac output    #Restless leg syndrome, insomnia  - Continue home gabapentin, amitriptyline, ambien. Patient reports she is dependent on these medications to sleep.  - After review of palliative care notes, will transition from Ambien to Mirtazipine    Stu Okeefe, MS3    I was present with the medical student who participated in the service and in the documentation of this note.  I have verified the history and personally performed the physical exam and medical decision making, and have verified the content of the note, which accurately reflect my assessment of the patient and the plan of care.    Lisbeth Olvera MD MSc  OBGYN Resident, PGY2  April 21, 2021, 7:12 AM      I saw and evaluated the patient. I agree with the findings and the plan of care as documented in Dr. Olvera 's note.    Still with postop hypotension. REmainder of vitals stable. Some improvement with 1 U PRBC and 500cc bolus.   Now does seem well resuscitated so I do not think this is a volume issue.   May be related to anesthesia/narcotic effect.   Will consider echo today if persistent or symptomatic hypotension    Remainder of plan per resident note     Fatmata Monae MD  Gynecologic Oncology  Pager [phone]

## 2021-04-21 NOTE — DOWNTIME EVENT NOTE
The EMR was down for 2.5 hours on 4/21/2021.    Azra CLEARY RN was responsible for completing the paper charting during this time period.     The following information was re-entered into the system by Azra Palmer RN: MAR    The following information will remain in the paper chart: ARVIND Palmer RN  4/21/2021

## 2021-04-21 NOTE — PLAN OF CARE
POD 1. Soft Bps (normal for patient), OAVSS. A&O x 4, forgetful at times. L PIV SL. 500 mL Bolus of LR given at 2000 for low urine output. Hickey has adequate output now, not passing gas, no BM, PRN senna given. Ketorolac and Hydromorphone PRN for pain. PRN Zolpidem given. Midline and bilateral abdominal incisions covered with primapore with dried drainage. Patient was up in chair this afternoon, pivoted to bed assist x 1. Did not get up to walk today due to low BP. Regular diet, tolerating well. Blood transfusion done today, Hgb recheck 8.9

## 2021-04-21 NOTE — PROGRESS NOTES
Took over cares of pt @ 2300. Pt is AxO. Pain managed with Tylenol and Toradol overnight, Dilaudid available but not needed. Hickey in place, patent and with adequate UOP. No flatus reported from pt. Midline dressing in place, drainage unchanged. Tolerating diet. Hgb will be rechecked this am. Continue POC.

## 2021-04-21 NOTE — PROGRESS NOTES
A&O x3. Pt remains hypotensive but asymptomatic with positional changes. Hickey removed NOC, pt voiding without complication. Bowel sounds remain distant but active. -flatus per pt. She continues to report pain 8-9/10 despite use of PRN dilaudid and scheduled tylenol. Toradol DC'ed. However, pt continuously sleeping in room and appears comfortable even with movement. Baseline neuropathy in feet unchanged. Abdominal incisions PAT, well-approximated with old shadow drainage. Hgb recheck resulted at 8.3 this AM, no current s/s of active bleeding noted. Started on lovenox injections, will need to eductae pt on use as it's likely she will discontinue with injections. She remains hesitant to get up and walk, declined several times when offered. ECHO completed.

## 2021-04-22 LAB — COPATH REPORT: NORMAL

## 2021-04-22 PROCEDURE — 99024 POSTOP FOLLOW-UP VISIT: CPT | Mod: GC | Performed by: OBSTETRICS & GYNECOLOGY

## 2021-04-22 PROCEDURE — 250N000013 HC RX MED GY IP 250 OP 250 PS 637: Performed by: STUDENT IN AN ORGANIZED HEALTH CARE EDUCATION/TRAINING PROGRAM

## 2021-04-22 PROCEDURE — 258N000003 HC RX IP 258 OP 636: Performed by: STUDENT IN AN ORGANIZED HEALTH CARE EDUCATION/TRAINING PROGRAM

## 2021-04-22 PROCEDURE — 250N000013 HC RX MED GY IP 250 OP 250 PS 637: Performed by: NURSE PRACTITIONER

## 2021-04-22 PROCEDURE — 250N000011 HC RX IP 250 OP 636: Performed by: NURSE PRACTITIONER

## 2021-04-22 PROCEDURE — 250N000013 HC RX MED GY IP 250 OP 250 PS 637: Performed by: OBSTETRICS & GYNECOLOGY

## 2021-04-22 PROCEDURE — 99207 PR NO CHARGE LOS: CPT | Performed by: OBSTETRICS & GYNECOLOGY

## 2021-04-22 PROCEDURE — 120N000002 HC R&B MED SURG/OB UMMC

## 2021-04-22 RX ADMIN — ACETAMINOPHEN 650 MG: 325 TABLET, FILM COATED ORAL at 16:32

## 2021-04-22 RX ADMIN — RIVAROXABAN 20 MG: 20 TABLET, FILM COATED ORAL at 20:42

## 2021-04-22 RX ADMIN — DOCUSATE SODIUM 50 MG AND SENNOSIDES 8.6 MG 2 TABLET: 8.6; 5 TABLET, FILM COATED ORAL at 09:00

## 2021-04-22 RX ADMIN — ACETAMINOPHEN 650 MG: 325 TABLET, FILM COATED ORAL at 22:07

## 2021-04-22 RX ADMIN — HYDROMORPHONE HYDROCHLORIDE 2 MG: 2 TABLET ORAL at 20:45

## 2021-04-22 RX ADMIN — GABAPENTIN 600 MG: 600 TABLET, FILM COATED ORAL at 22:07

## 2021-04-22 RX ADMIN — AMITRIPTYLINE HYDROCHLORIDE 100 MG: 100 TABLET, FILM COATED ORAL at 22:07

## 2021-04-22 RX ADMIN — HYDROMORPHONE HYDROCHLORIDE 2 MG: 2 TABLET ORAL at 16:41

## 2021-04-22 RX ADMIN — SODIUM CHLORIDE 500 ML: 9 INJECTION, SOLUTION INTRAVENOUS at 14:43

## 2021-04-22 RX ADMIN — ACETAMINOPHEN 650 MG: 325 TABLET, FILM COATED ORAL at 04:24

## 2021-04-22 RX ADMIN — HYDROMORPHONE HYDROCHLORIDE 2 MG: 2 TABLET ORAL at 09:00

## 2021-04-22 RX ADMIN — ENOXAPARIN SODIUM 70 MG: 80 INJECTION SUBCUTANEOUS at 11:12

## 2021-04-22 RX ADMIN — ACETAMINOPHEN 650 MG: 325 TABLET, FILM COATED ORAL at 11:12

## 2021-04-22 RX ADMIN — MIRTAZAPINE 7.5 MG: 7.5 TABLET, FILM COATED ORAL at 22:07

## 2021-04-22 RX ADMIN — HYDROMORPHONE HYDROCHLORIDE 2 MG: 2 TABLET ORAL at 04:25

## 2021-04-22 RX ADMIN — DOCUSATE SODIUM 50 MG AND SENNOSIDES 8.6 MG 2 TABLET: 8.6; 5 TABLET, FILM COATED ORAL at 20:42

## 2021-04-22 ASSESSMENT — ACTIVITIES OF DAILY LIVING (ADL)
ADLS_ACUITY_SCORE: 18

## 2021-04-22 ASSESSMENT — MIFFLIN-ST. JEOR: SCORE: 1441.28

## 2021-04-22 NOTE — PROGRESS NOTES
Took over cares of pt @ 2300. Pt is AxO. Pain managed with scheduled Tylenol and PO Dilaudid. Pt is up ad andriy in room. Voiding, not saving. Denies passing flatus overnight. Dressing in place to midline incision, CDI. Abdominal binder on. Continue POC

## 2021-04-22 NOTE — PLAN OF CARE
OT/7C: Cancel. Pt attempted x 2 in AM w/ pt refusing. Upon PM check in, pt up on scale in nazario w/ nursing w/ B knee buckling w/ pt c/o of being lightheaded. Pt with BP of 76/47 per chart. Will reschedule.

## 2021-04-22 NOTE — PROVIDER NOTIFICATION
Notified Gyn Onc Team regarding patient's low BP and patient feeling dizzy. Patient's knees buckled while standing up on scale.     Bolus IV fluids ordered.

## 2021-04-22 NOTE — PROGRESS NOTES
Brief Progress Note    Notified by RN of orthostatic hypotension, 76/47, when getting up to get weighed this afternoon. Patient symptomatic with lightheadedness at that time. BP improves with rest. Patient denies any lightheadedness in bed, CP, SOB, or palpitations. Feels that she's been trying to orally hydrated. RN also notes that patient has a charted increased weight of ~20 lbs, net positive almost 6L for hospitalization, though I&O data not well documented recently.     Discussed with fellow, Dr. Bustos. Possible component of dysautonomia. Will give 500 ml bolus at this time. Encouraged use of compression stockings. Continued observation. Asked that I&Os be charted diligently.      Maria G Villarreal MD PGY-1   [facility] Gynecology Oncology   Gyn Onc Pager: [phone]  04/22/21 4:08 PM

## 2021-04-22 NOTE — PLAN OF CARE
Alert & Orientated. Hypotensive but within defined parameters. Other vitals stable.  Pain managed with scheduled tylenol and prn oral dilaudid. Up ad andriy. Voiding but not saving. Tolerating regular diet with no complaints of N&V. Abdominal midline incision with steri strips and dried drainage. Abdominal binder in place for comfort.  Demonstrated correct use of IS. Requires encouragement to ambulate in hallway. PIV S/L.      Passed gas but not bowel movement yet. Continue with POC

## 2021-04-22 NOTE — PLAN OF CARE
Time: 8058-5430    Reason for Admission: POD #2 XL, DANAE-BSO, omentectomy, debulking, resection of liver nodules, diaphragm stripping, procto.     Activity: SBA, pt ambulated to bathroom x2.     Neuro: A&O x4. Calm and cooperative.     GI/: Voiding spontaneously without difficulty. No BM this shift.     Diet: Regular, tolerating.    Incisions/Drains: Midline incision with liquid bandage, CDI.     IV Access: L PIV saline locked.     Vitals: VSS on RA    Pain: Pt reporting abdominal pain. PRN dilaudid given x2.     New changes this shift: None    Plan: Continue with plan of care.

## 2021-04-23 ENCOUNTER — APPOINTMENT (OUTPATIENT)
Dept: CT IMAGING | Facility: CLINIC | Age: 66
End: 2021-04-23
Attending: OBSTETRICS & GYNECOLOGY
Payer: COMMERCIAL

## 2021-04-23 ENCOUNTER — APPOINTMENT (OUTPATIENT)
Dept: OCCUPATIONAL THERAPY | Facility: CLINIC | Age: 66
End: 2021-04-23
Attending: OBSTETRICS & GYNECOLOGY
Payer: COMMERCIAL

## 2021-04-23 LAB
ANION GAP SERPL CALCULATED.3IONS-SCNC: 5 MMOL/L (ref 3–14)
BLD PROD TYP BPU: NORMAL
BLD UNIT ID BPU: 0
BLOOD PRODUCT CODE: NORMAL
BPU ID: NORMAL
BUN SERPL-MCNC: 15 MG/DL (ref 7–30)
CALCIUM SERPL-MCNC: 8.2 MG/DL (ref 8.5–10.1)
CHLORIDE SERPL-SCNC: 105 MMOL/L (ref 94–109)
CO2 SERPL-SCNC: 26 MMOL/L (ref 20–32)
CREAT SERPL-MCNC: 0.82 MG/DL (ref 0.52–1.04)
ERYTHROCYTE [DISTWIDTH] IN BLOOD BY AUTOMATED COUNT: 19.6 % (ref 10–15)
ERYTHROCYTE [DISTWIDTH] IN BLOOD BY AUTOMATED COUNT: 20.4 % (ref 10–15)
FIBRINOGEN PPP-MCNC: 413 MG/DL (ref 200–420)
GFR SERPL CREATININE-BSD FRML MDRD: 75 ML/MIN/{1.73_M2}
GLUCOSE SERPL-MCNC: 117 MG/DL (ref 70–99)
HCT VFR BLD AUTO: 21.6 % (ref 35–47)
HCT VFR BLD AUTO: 22 % (ref 35–47)
HGB BLD-MCNC: 6.9 G/DL (ref 11.7–15.7)
HGB BLD-MCNC: 7 G/DL (ref 11.7–15.7)
HGB BLD-MCNC: 7.1 G/DL (ref 11.7–15.7)
INR PPP: 1.39 (ref 0.86–1.14)
LACTATE BLD-SCNC: 1.2 MMOL/L (ref 0.7–2)
MCH RBC QN AUTO: 31.2 PG (ref 26.5–33)
MCH RBC QN AUTO: 33.3 PG (ref 26.5–33)
MCHC RBC AUTO-ENTMCNC: 31.8 G/DL (ref 31.5–36.5)
MCHC RBC AUTO-ENTMCNC: 31.9 G/DL (ref 31.5–36.5)
MCV RBC AUTO: 105 FL (ref 78–100)
MCV RBC AUTO: 98 FL (ref 78–100)
PLATELET # BLD AUTO: 120 10E9/L (ref 150–450)
PLATELET # BLD AUTO: 94 10E9/L (ref 150–450)
POTASSIUM SERPL-SCNC: 3.6 MMOL/L (ref 3.4–5.3)
RBC # BLD AUTO: 2.1 10E12/L (ref 3.8–5.2)
RBC # BLD AUTO: 2.21 10E12/L (ref 3.8–5.2)
SODIUM SERPL-SCNC: 136 MMOL/L (ref 133–144)
TRANSFUSION STATUS PATIENT QL: NORMAL
WBC # BLD AUTO: 3.9 10E9/L (ref 4–11)
WBC # BLD AUTO: 4.1 10E9/L (ref 4–11)

## 2021-04-23 PROCEDURE — 85384 FIBRINOGEN ACTIVITY: CPT | Performed by: STUDENT IN AN ORGANIZED HEALTH CARE EDUCATION/TRAINING PROGRAM

## 2021-04-23 PROCEDURE — 85027 COMPLETE CBC AUTOMATED: CPT | Performed by: STUDENT IN AN ORGANIZED HEALTH CARE EDUCATION/TRAINING PROGRAM

## 2021-04-23 PROCEDURE — 250N000011 HC RX IP 250 OP 636: Performed by: STUDENT IN AN ORGANIZED HEALTH CARE EDUCATION/TRAINING PROGRAM

## 2021-04-23 PROCEDURE — 250N000013 HC RX MED GY IP 250 OP 250 PS 637: Performed by: STUDENT IN AN ORGANIZED HEALTH CARE EDUCATION/TRAINING PROGRAM

## 2021-04-23 PROCEDURE — 97535 SELF CARE MNGMENT TRAINING: CPT | Mod: GO

## 2021-04-23 PROCEDURE — 86901 BLOOD TYPING SEROLOGIC RH(D): CPT | Performed by: OBSTETRICS & GYNECOLOGY

## 2021-04-23 PROCEDURE — 36415 COLL VENOUS BLD VENIPUNCTURE: CPT | Performed by: STUDENT IN AN ORGANIZED HEALTH CARE EDUCATION/TRAINING PROGRAM

## 2021-04-23 PROCEDURE — 99207 PR NO CHARGE LOS: CPT | Performed by: OBSTETRICS & GYNECOLOGY

## 2021-04-23 PROCEDURE — 250N000013 HC RX MED GY IP 250 OP 250 PS 637: Performed by: NURSE PRACTITIONER

## 2021-04-23 PROCEDURE — 250N000011 HC RX IP 250 OP 636: Performed by: OBSTETRICS & GYNECOLOGY

## 2021-04-23 PROCEDURE — 74174 CTA ABD&PLVS W/CONTRAST: CPT | Mod: 26 | Performed by: RADIOLOGY

## 2021-04-23 PROCEDURE — 85610 PROTHROMBIN TIME: CPT | Performed by: STUDENT IN AN ORGANIZED HEALTH CARE EDUCATION/TRAINING PROGRAM

## 2021-04-23 PROCEDURE — 86900 BLOOD TYPING SEROLOGIC ABO: CPT | Performed by: OBSTETRICS & GYNECOLOGY

## 2021-04-23 PROCEDURE — 250N000013 HC RX MED GY IP 250 OP 250 PS 637: Performed by: OBSTETRICS & GYNECOLOGY

## 2021-04-23 PROCEDURE — P9016 RBC LEUKOCYTES REDUCED: HCPCS | Performed by: OBSTETRICS & GYNECOLOGY

## 2021-04-23 PROCEDURE — 80048 BASIC METABOLIC PNL TOTAL CA: CPT | Performed by: STUDENT IN AN ORGANIZED HEALTH CARE EDUCATION/TRAINING PROGRAM

## 2021-04-23 PROCEDURE — 86923 COMPATIBILITY TEST ELECTRIC: CPT | Performed by: OBSTETRICS & GYNECOLOGY

## 2021-04-23 PROCEDURE — 86850 RBC ANTIBODY SCREEN: CPT | Performed by: OBSTETRICS & GYNECOLOGY

## 2021-04-23 PROCEDURE — 74174 CTA ABD&PLVS W/CONTRAST: CPT

## 2021-04-23 PROCEDURE — 120N000002 HC R&B MED SURG/OB UMMC

## 2021-04-23 PROCEDURE — 99024 POSTOP FOLLOW-UP VISIT: CPT | Mod: GC | Performed by: OBSTETRICS & GYNECOLOGY

## 2021-04-23 PROCEDURE — 83605 ASSAY OF LACTIC ACID: CPT | Performed by: STUDENT IN AN ORGANIZED HEALTH CARE EDUCATION/TRAINING PROGRAM

## 2021-04-23 PROCEDURE — 999N000128 HC STATISTIC PERIPHERAL IV START W/O US GUIDANCE

## 2021-04-23 PROCEDURE — 85018 HEMOGLOBIN: CPT | Performed by: STUDENT IN AN ORGANIZED HEALTH CARE EDUCATION/TRAINING PROGRAM

## 2021-04-23 RX ORDER — ONDANSETRON 2 MG/ML
4 INJECTION INTRAMUSCULAR; INTRAVENOUS EVERY 6 HOURS PRN
Status: DISCONTINUED | OUTPATIENT
Start: 2021-04-23 | End: 2021-04-27 | Stop reason: HOSPADM

## 2021-04-23 RX ORDER — PROCHLORPERAZINE 25 MG
25 SUPPOSITORY, RECTAL RECTAL EVERY 12 HOURS PRN
Status: DISCONTINUED | OUTPATIENT
Start: 2021-04-23 | End: 2021-04-27 | Stop reason: HOSPADM

## 2021-04-23 RX ORDER — POLYETHYLENE GLYCOL 3350 17 G/17G
17 POWDER, FOR SOLUTION ORAL DAILY
Status: DISCONTINUED | OUTPATIENT
Start: 2021-04-23 | End: 2021-04-24

## 2021-04-23 RX ORDER — IOPAMIDOL 755 MG/ML
105 INJECTION, SOLUTION INTRAVASCULAR ONCE
Status: COMPLETED | OUTPATIENT
Start: 2021-04-23 | End: 2021-04-23

## 2021-04-23 RX ORDER — PROCHLORPERAZINE MALEATE 5 MG
5 TABLET ORAL EVERY 6 HOURS PRN
Status: DISCONTINUED | OUTPATIENT
Start: 2021-04-23 | End: 2021-04-27 | Stop reason: HOSPADM

## 2021-04-23 RX ORDER — ONDANSETRON 4 MG/1
4 TABLET, ORALLY DISINTEGRATING ORAL EVERY 6 HOURS PRN
Status: DISCONTINUED | OUTPATIENT
Start: 2021-04-23 | End: 2021-04-27 | Stop reason: HOSPADM

## 2021-04-23 RX ADMIN — HYDROMORPHONE HYDROCHLORIDE 2 MG: 2 TABLET ORAL at 04:26

## 2021-04-23 RX ADMIN — ACETAMINOPHEN 650 MG: 325 TABLET, FILM COATED ORAL at 23:04

## 2021-04-23 RX ADMIN — ONDANSETRON 4 MG: 4 TABLET, ORALLY DISINTEGRATING ORAL at 18:22

## 2021-04-23 RX ADMIN — DOCUSATE SODIUM 50 MG AND SENNOSIDES 8.6 MG 2 TABLET: 8.6; 5 TABLET, FILM COATED ORAL at 23:04

## 2021-04-23 RX ADMIN — GABAPENTIN 600 MG: 600 TABLET, FILM COATED ORAL at 23:04

## 2021-04-23 RX ADMIN — IOPAMIDOL 105 ML: 755 INJECTION, SOLUTION INTRAVENOUS at 21:56

## 2021-04-23 RX ADMIN — ACETAMINOPHEN 650 MG: 325 TABLET, FILM COATED ORAL at 10:05

## 2021-04-23 RX ADMIN — MIRTAZAPINE 7.5 MG: 7.5 TABLET, FILM COATED ORAL at 23:06

## 2021-04-23 RX ADMIN — AMITRIPTYLINE HYDROCHLORIDE 100 MG: 100 TABLET, FILM COATED ORAL at 23:04

## 2021-04-23 RX ADMIN — DOCUSATE SODIUM 50 MG AND SENNOSIDES 8.6 MG 2 TABLET: 8.6; 5 TABLET, FILM COATED ORAL at 09:14

## 2021-04-23 RX ADMIN — HYDROMORPHONE HYDROCHLORIDE 2 MG: 2 TABLET ORAL at 16:33

## 2021-04-23 RX ADMIN — POLYETHYLENE GLYCOL 3350 17 G: 17 POWDER, FOR SOLUTION ORAL at 12:55

## 2021-04-23 RX ADMIN — ACETAMINOPHEN 650 MG: 325 TABLET, FILM COATED ORAL at 16:33

## 2021-04-23 RX ADMIN — HYDROMORPHONE HYDROCHLORIDE 2 MG: 2 TABLET ORAL at 01:33

## 2021-04-23 RX ADMIN — ACETAMINOPHEN 650 MG: 325 TABLET, FILM COATED ORAL at 04:26

## 2021-04-23 ASSESSMENT — ACTIVITIES OF DAILY LIVING (ADL)
ADLS_ACUITY_SCORE: 18
DEPENDENT_IADLS:: INDEPENDENT

## 2021-04-23 NOTE — PROGRESS NOTES
Brief Progress Note    Notified by RN that patient had an unwitnessed event of lightheadedness/orthostatic hypotension after getting up from toilet. Pt reports she started to feel very lightheaded after getting up from the bathroom, sat down on the ground. Denies any syncope or head injury. No pain at the time or any other symptoms. Frustrated by continued soft BP.    - Hgb dropped to 7 today, 1U pRBC ordered earlier this AM, hasn't been started yet. Abdominal exam remains benign; soft, mild distension, tympanitic, appropriately tender    - Encouraged PO hydration and compression stockings again. Net up 5L for admission, holding additional fluid bolus at this time  - Bedside commode ordered and now in room   - Reviewed importance of slow transitions to sitting and standing, having RN nearby when moving to commode  - Consider component of dysautonomia, can consider midodrine     Discussed with Gyn/Onc Fellow Dr. Bustos.      Maria G Villarreal MD PGY-1   [facility] Gynecology Oncology   Gyn Onc Pager: [phone]  04/23/21 8:33 AM

## 2021-04-23 NOTE — CONSULTS
Care Management Initial Consult    General Information  Assessment completed with: Patient,    Type of CM/SW Visit: Initial Assessment    Primary Care Provider verified and updated as needed: Yes   Readmission within the last 30 days: no previous admission in last 30 days   Reason for Consult: discharge planning  Advance Care Planning: Advance Care Planning Reviewed: no concerns identified       Communication Assessment  Patient's communication style: spoken language (English or Bilingual)    Hearing Difficulty or Deaf: no   Wear Glasses or Blind: no    Cognitive  Cognitive/Neuro/Behavioral: WDL  Level of Consciousness: alert  Arousal Level: opens eyes spontaneously  Orientation: oriented x 4     Best Language: 0 - No aphasia  Speech: logical, clear    Living Environment:   People in home: spouse     Current living Arrangements: house      Able to return to prior arrangements: yes    Family/Social Support:  Care provided by: self  Provides care for: no one  Marital Status:   , Children  Darian       Description of Support System: Supportive, Involved    Support Assessment: Adequate family and caregiver support    Current Resources:   Patient receiving home care services: No  Community Resources: None  Equipment currently used at home: none  Supplies currently used at home: None    Employment/Financial:  Employment Status: retired     Financial Concerns: No concerns identified   Referral to Financial Counselor: No    Lifestyle & Psychosocial Needs:     Socioeconomic History     Marital status:      Spouse name: Not on file     Number of children: Not on file     Years of education: Not on file     Highest education level: Not on file     Tobacco Use     Smoking status: Former Smoker     Packs/day: 0.50     Years: 40.00     Pack years: 20.00     Quit date: 2018     Years since quitting: 3.3     Smokeless tobacco: Never Used   Substance and Sexual Activity     Alcohol use: Not Currently     Drug use:  Never     Sexual activity: Not Currently     Partners: Male     Functional Status:  Prior to admission patient needed assistance:   Dependent ADLs:: Independent  Dependent IADLs:: Independent  Assesssment of Functional Status: Not at baseline with ADL Functioning    Mental Health Status:  Mental Health Status: No Current Concerns       Chemical Dependency Status:  Chemical Dependency Status: No Current Concerns        Values/Beliefs:  Spiritual, Cultural Beliefs, Baptism Practices, Values that affect care: yes         Additional Information:  SW spoke with pt in room to introduce self, role, and to discuss discharge planning. Pt confirmed that she resides in [place] with her spouse, however, has been staying at her daughter's home in [place], [place]. Pt shared that her daughter has a split level home and pt is staying downstairs. Pt needs to go down 7 steps of stairs to get to her bedroom and bathroom. Pt does not have a HCD and did not want to complete one. Prior to hospital stay, pt reports that she was independent and reported no to receiving services, utilizing equipments and/or supplies. Pt shared that her support system consists of her  and daughters. Pt is close to her children and share that they can help as needed. Pt resigned from her job in December and is retired.     SW discussed dispo planning with pt and she understands that at this time, the recs are home vs TCU. Pt would prefer to go home if able and shared that she can get assistance if needed.     SAYRA Aguayo, [facility]  Acute Care Float   [facility]  Phone: [phone]  Pager: [phone]

## 2021-04-23 NOTE — PROGRESS NOTES
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     24 hour events:   - Orthostatic hypotension, received 500 ml bolus, remains orthostatic  - Resumed xarelto    Subjective:   Patient just tried to get up to go to the bathroom and felt dizzy. Orthostatic BP per RN. Is frustrated waiting for a bedside commode, has refused to use bedpan. Abdominal pain is present with decent control. No nausea or vomiting. Tolerating PO. Passed gas x1 yesterday but not since. No bowel movement. Voiding adequately.    Objective:   Patient Vitals for the past 24 hrs:   BP Temp Temp src Pulse Resp SpO2 Weight   04/23/21 0325 106/63 96.8  F (36  C) Oral 84 16 93 % --   04/22/21 2200 97/51 97.1  F (36.2  C) Oral 86 16 98 % --   04/22/21 1843 99/59 -- -- -- -- -- --   04/22/21 1416 97/58 -- -- -- -- -- 77.9 kg (171 lb 12.8 oz)   04/22/21 1358 100/57 96.9  F (36.1  C) Oral 85 18 96 % --     Gen: A&O, resting in bed  Cardio: RRR  Resp: CTAB, no wheezes or crackles  Abdomen: Soft, appropriately tender to palpation, moderately distended and tympanic  Incision: C/D/I  Extremities: BLEs non-tender with SCDs in place     Intake/Output:  Last 24 hours//Since midnight  PO: 950//-  IV: None  U: 825+4x//-    Lines/Drains:   PIV    Labs/imaging:  BMP  Recent Labs   Lab 04/23/21  0502 04/21/21  0632 04/20/21  1834 04/20/21  0619 04/19/21  1707 04/19/21  1045    138  --  139  --  137   POTASSIUM 3.6 3.6  --  3.8 3.9 3.3*   CHLORIDE 105 108  --  106  --   --    HORACIO 8.2* 8.3*  --  8.0*  --   --    CO2 26 25  --  25  --   --    BUN 15 12  --  12  --   --    CR 0.82 0.76 1.02 0.76  --   --    * 108*  --  100*  --  159*     CBC  Recent Labs   Lab 04/23/21  0502 04/21/21  0632 04/20/21  1834 04/20/21  1040 04/20/21  0619 04/19/21  1544   WBC 3.9* 4.5  --   --  4.5 13.7*   RBC 2.10*  2.54*  --   --  2.25* 2.81*   HGB 7.0* 8.3* 8.9* 7.7* 7.8* 9.6*   HCT 22.0* 26.2*  --   --  24.3* 29.6*   * 103*  --   --  108* 105*   MCH 33.3* 32.7  --   --  34.7* 34.2*   MCHC 31.8 31.7  --   --  32.1 32.4   RDW 20.4* 22.9*  --   --  19.0* 19.3*   * 122*  --   --  119* 133*     Assessment:   66yo POD#4 s/p XL, DANAE-BSO, omentectomy, debulking, resection of liver nodules, diaphragm stripping, procto for stage IV high grade serous ovarian cancer, BRCA 1, s/p 3C neoadjuvant carbo/taxol.    Active Problem list:  - Postoperative state  - Ovarian cancer  - H/o pulmonary embolism  - H/o Afib w/RVR  - Restless leg syndrome, insomnia    Plan:    #Postoperative state  - FEN: Regular diet.  - Pain: S/p TAPs. Scheduled Tylenol. Prn PO dilaudid, robaxin.   - Heme: Hgb drop this AM to 7. With orthostatic BP and lightheadedness, will order 1u pRBC. Continue to monitor closely.  - S/p Hickey, voiding.  - GI: Passed gas x1, no BM.  - Ppx: SCDs, IS. See below for anticoagulation plan.    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 S/p 3C neoadjuvant carbo/taxol.  - Final pathology pending   - Will follow up outpatient for ongoing treatment    #H/o pulmonary embolism  - Home xarelto resumed POD#3 (s/p 1 day ppx lovenox>1 day therapeutic lovenox)    #H/o afib w/RVR  #Postop hypotension  - Episode of orthostatic hypotension POD#3 and again this AM. Hgb drop as above, ordered blood transfusion.  - Echo 4/21 wnl    #Restless leg syndrome, insomnia  - Continue home gabapentin, amitriptyline. Ambien discontinued and started remeron 4/21.    Van Christianson MD  OB/GYN Resident, PGY-3  4/23/2021 6:22 AM       I saw and evaluated the patient. I agree with the findings and the plan of care as documented in Dr. Christianson 's note.     Hg 7 this morning. Receivied 1U PRBC. Will recheck hg to ensure appropriate rise. If not, plan to hold xeralto tonight.   Still with some nausea and distention although passing  gas. May be developing an ileus    Fatmata Monae MD  Gynecologic Oncology  Pager [phone]

## 2021-04-23 NOTE — PLAN OF CARE
Orthostatic BP, OAVSS. A&O x 4, forgetful, bed alarm active. Patient had a fall at 0715, no one was in the room, she said she felt dizzy and assisted herself to the floor. Hgb was 7.0, replaced RBCs, recheck scheduled. Patient still getting light headed and dizzy when standing or sitting, did not get up with OT or PT due to BP dropping. Voids spont via commode assist x 1 with GB, adequate output, passing little to no gas, no BM today, refused suppository and started miralax. Abdominal incision with sterie strips, dried drainage. Dilaudid PRN for pain. L PIV SL. Regular diet, tolerating fair, feeling no appetite for lunch. Compression stockings were placed on both legs.

## 2021-04-23 NOTE — PROGRESS NOTES
Pt remains orthostatic with BP's. Pt called to use BR at this time. Ortho check done. Lying= 96/52 102  Sitting=77/41 106, pt stated she was dizzy. Attempted to stand, but unable to get BP due to pt feeling faint. Pt refused bedpan. BSC ordered. MD paged re: orthostatic BP. Orders received to obtain labs-CBC,basemet. Pt otherwise has slept between cares. Abd round, hypo bs, +gas. Abd inc with steristrips intact. Binder on. Pain managed with sched tylenol and oral dilaudid. Lungs clear, dim in bases. Bed alarm on for forgetfulness. PIV saline locked. Cont to monitor BP closely. Await am labs. Enc po fluids.    Addendum: Hgb =7.0 Pt will need a unit of PRBC -order received.

## 2021-04-23 NOTE — PROGRESS NOTES
SPIRITUAL HEALTH SERVICES  [facility]  REFERRAL SOURCE: LOS     Introduced spiritual health services. Pt declined visit.     PLAN: No further follow-up     Rev. Maria Esther Casillas MDiv, [facility]  Staff    Pager [phone]  * [facility] remains available 24/7 for emergent requests/referrals, either by having the switchboard page the on-call  or by entering an ASAP/STAT consult in Epic (this will also page the on-call ).*

## 2021-04-23 NOTE — PLAN OF CARE
Time: 4630-2122    Reason for Admission: POD #3 XL, DANAE-BSO, omentectomy, debulking, resection of liver nodules, diaphragm stripping, procto.     Activity: SBA, pt ambulated to bathroom x2. Bed alarm on for safety. Pt having orthostatic BP's and pt does not always call for help.     Neuro: A&O x4. Calm and cooperative.     GI/: Voiding spontaneously without difficulty. No BM this shift.     Diet: Regular, tolerating.    Incisions/Drains: Midline incision with liquid bandage, CDI.     IV Access: L PIV saline locked.     Vitals: Hypotensive but within parameters. VSS on RA    Pain: Pt reporting abdominal pain. PRN dilaudid given x2.     New changes this shift: None    Plan: Continue with plan of care.

## 2021-04-24 LAB
ABO + RH BLD: NORMAL
ABO + RH BLD: NORMAL
ANION GAP SERPL CALCULATED.3IONS-SCNC: 5 MMOL/L (ref 3–14)
BLD GP AB SCN SERPL QL: NORMAL
BLD PROD TYP BPU: NORMAL
BLD UNIT ID BPU: 0
BLD UNIT ID BPU: 0
BLOOD BANK CMNT PATIENT-IMP: NORMAL
BLOOD PRODUCT CODE: NORMAL
BLOOD PRODUCT CODE: NORMAL
BPU ID: NORMAL
BPU ID: NORMAL
BUN SERPL-MCNC: 11 MG/DL (ref 7–30)
CALCIUM SERPL-MCNC: 8.2 MG/DL (ref 8.5–10.1)
CHLORIDE SERPL-SCNC: 105 MMOL/L (ref 94–109)
CO2 SERPL-SCNC: 27 MMOL/L (ref 20–32)
CREAT SERPL-MCNC: 0.71 MG/DL (ref 0.52–1.04)
ERYTHROCYTE [DISTWIDTH] IN BLOOD BY AUTOMATED COUNT: 19.4 % (ref 10–15)
ERYTHROCYTE [DISTWIDTH] IN BLOOD BY AUTOMATED COUNT: 19.5 % (ref 10–15)
GFR SERPL CREATININE-BSD FRML MDRD: 90 ML/MIN/{1.73_M2}
GLUCOSE SERPL-MCNC: 102 MG/DL (ref 70–99)
HCT VFR BLD AUTO: 27.4 % (ref 35–47)
HCT VFR BLD AUTO: 28 % (ref 35–47)
HGB BLD-MCNC: 8.9 G/DL (ref 11.7–15.7)
HGB BLD-MCNC: 9 G/DL (ref 11.7–15.7)
INR PPP: 1.13 (ref 0.86–1.14)
MCH RBC QN AUTO: 30.5 PG (ref 26.5–33)
MCH RBC QN AUTO: 30.8 PG (ref 26.5–33)
MCHC RBC AUTO-ENTMCNC: 31.8 G/DL (ref 31.5–36.5)
MCHC RBC AUTO-ENTMCNC: 32.8 G/DL (ref 31.5–36.5)
MCV RBC AUTO: 94 FL (ref 78–100)
MCV RBC AUTO: 96 FL (ref 78–100)
NUM BPU REQUESTED: 5
PLATELET # BLD AUTO: 93 10E9/L (ref 150–450)
PLATELET # BLD AUTO: 98 10E9/L (ref 150–450)
POTASSIUM SERPL-SCNC: 4 MMOL/L (ref 3.4–5.3)
RBC # BLD AUTO: 2.92 10E12/L (ref 3.8–5.2)
RBC # BLD AUTO: 2.92 10E12/L (ref 3.8–5.2)
SODIUM SERPL-SCNC: 137 MMOL/L (ref 133–144)
SPECIMEN EXP DATE BLD: NORMAL
TRANSFUSION STATUS PATIENT QL: NORMAL
WBC # BLD AUTO: 4 10E9/L (ref 4–11)
WBC # BLD AUTO: 4.2 10E9/L (ref 4–11)

## 2021-04-24 PROCEDURE — 250N000013 HC RX MED GY IP 250 OP 250 PS 637: Performed by: STUDENT IN AN ORGANIZED HEALTH CARE EDUCATION/TRAINING PROGRAM

## 2021-04-24 PROCEDURE — 250N000013 HC RX MED GY IP 250 OP 250 PS 637: Performed by: OBSTETRICS & GYNECOLOGY

## 2021-04-24 PROCEDURE — 250N000011 HC RX IP 250 OP 636: Performed by: STUDENT IN AN ORGANIZED HEALTH CARE EDUCATION/TRAINING PROGRAM

## 2021-04-24 PROCEDURE — 80048 BASIC METABOLIC PNL TOTAL CA: CPT | Performed by: STUDENT IN AN ORGANIZED HEALTH CARE EDUCATION/TRAINING PROGRAM

## 2021-04-24 PROCEDURE — 85610 PROTHROMBIN TIME: CPT | Performed by: STUDENT IN AN ORGANIZED HEALTH CARE EDUCATION/TRAINING PROGRAM

## 2021-04-24 PROCEDURE — 999N001063 HC STATISTIC THAWING COMPONENT: Performed by: STUDENT IN AN ORGANIZED HEALTH CARE EDUCATION/TRAINING PROGRAM

## 2021-04-24 PROCEDURE — P9059 PLASMA, FRZ BETWEEN 8-24HOUR: HCPCS | Performed by: STUDENT IN AN ORGANIZED HEALTH CARE EDUCATION/TRAINING PROGRAM

## 2021-04-24 PROCEDURE — P9016 RBC LEUKOCYTES REDUCED: HCPCS | Performed by: OBSTETRICS & GYNECOLOGY

## 2021-04-24 PROCEDURE — 120N000002 HC R&B MED SURG/OB UMMC

## 2021-04-24 PROCEDURE — 99024 POSTOP FOLLOW-UP VISIT: CPT | Mod: GC | Performed by: OBSTETRICS & GYNECOLOGY

## 2021-04-24 PROCEDURE — 85027 COMPLETE CBC AUTOMATED: CPT | Performed by: STUDENT IN AN ORGANIZED HEALTH CARE EDUCATION/TRAINING PROGRAM

## 2021-04-24 PROCEDURE — 99207 PR NO CHARGE LOS: CPT | Performed by: OBSTETRICS & GYNECOLOGY

## 2021-04-24 PROCEDURE — 36415 COLL VENOUS BLD VENIPUNCTURE: CPT | Performed by: STUDENT IN AN ORGANIZED HEALTH CARE EDUCATION/TRAINING PROGRAM

## 2021-04-24 PROCEDURE — 250N000013 HC RX MED GY IP 250 OP 250 PS 637: Performed by: NURSE PRACTITIONER

## 2021-04-24 RX ORDER — POLYETHYLENE GLYCOL 3350 17 G/17G
17 POWDER, FOR SOLUTION ORAL 2 TIMES DAILY
Status: DISCONTINUED | OUTPATIENT
Start: 2021-04-24 | End: 2021-04-27 | Stop reason: HOSPADM

## 2021-04-24 RX ADMIN — ACETAMINOPHEN 650 MG: 325 TABLET, FILM COATED ORAL at 04:24

## 2021-04-24 RX ADMIN — ONDANSETRON 4 MG: 2 INJECTION INTRAMUSCULAR; INTRAVENOUS at 02:49

## 2021-04-24 RX ADMIN — PROCHLORPERAZINE MALEATE 5 MG: 5 TABLET ORAL at 04:24

## 2021-04-24 RX ADMIN — PROCHLORPERAZINE MALEATE 5 MG: 5 TABLET ORAL at 11:57

## 2021-04-24 RX ADMIN — METHOCARBAMOL 500 MG: 500 TABLET, FILM COATED ORAL at 08:29

## 2021-04-24 RX ADMIN — PROCHLORPERAZINE MALEATE 5 MG: 5 TABLET ORAL at 18:22

## 2021-04-24 RX ADMIN — ACETAMINOPHEN 650 MG: 325 TABLET, FILM COATED ORAL at 10:00

## 2021-04-24 RX ADMIN — MIRTAZAPINE 7.5 MG: 7.5 TABLET, FILM COATED ORAL at 21:17

## 2021-04-24 RX ADMIN — DOCUSATE SODIUM 50 MG AND SENNOSIDES 8.6 MG 2 TABLET: 8.6; 5 TABLET, FILM COATED ORAL at 19:39

## 2021-04-24 RX ADMIN — GABAPENTIN 600 MG: 600 TABLET, FILM COATED ORAL at 21:17

## 2021-04-24 RX ADMIN — POLYETHYLENE GLYCOL 3350 17 G: 17 POWDER, FOR SOLUTION ORAL at 19:40

## 2021-04-24 RX ADMIN — AMITRIPTYLINE HYDROCHLORIDE 100 MG: 100 TABLET, FILM COATED ORAL at 21:17

## 2021-04-24 RX ADMIN — DOCUSATE SODIUM 50 MG AND SENNOSIDES 8.6 MG 2 TABLET: 8.6; 5 TABLET, FILM COATED ORAL at 08:18

## 2021-04-24 RX ADMIN — ACETAMINOPHEN 650 MG: 325 TABLET, FILM COATED ORAL at 17:13

## 2021-04-24 RX ADMIN — POLYETHYLENE GLYCOL 3350 17 G: 17 POWDER, FOR SOLUTION ORAL at 08:18

## 2021-04-24 RX ADMIN — HYDROMORPHONE HYDROCHLORIDE 2 MG: 2 TABLET ORAL at 17:14

## 2021-04-24 ASSESSMENT — ACTIVITIES OF DAILY LIVING (ADL)
ADLS_ACUITY_SCORE: 18
ADLS_ACUITY_SCORE: 17
ADLS_ACUITY_SCORE: 18
ADLS_ACUITY_SCORE: 17

## 2021-04-24 ASSESSMENT — MIFFLIN-ST. JEOR: SCORE: 1406.81

## 2021-04-24 NOTE — PROGRESS NOTES
"Delayed entry due to pt cares. Evaluated pt at 1730.    Brief Progress Note    Went to check on patient    S:   Patient reports she is feeling \"fine\". She reports nausea has improved w compazine; no more emesis and she did pass stools. Ms. Regalado denies lightheadedness, dizziness, chest pain, SOB. Has been NPO. No flatus that she is aware of. Feels about the same amount of bloated, maybe slightly less. Patient asked about hesitations regarding NG tube she states she would want to know what her primary surgeon would do. She can't really think about food now. She would really like to leave the hospital and notes her back is hurting because of the bed. Ms. Regalado didn't feel like participating in physical therapy this morning but will tomorrow morning as she wants to do things that will move her towards discharge.      O:   Patient Vitals for the past 24 hrs:   BP Temp Temp src Pulse Resp SpO2 Weight   04/24/21 1407 115/65 97.1  F (36.2  C) Oral 79 16 95 % --   04/24/21 0959 -- -- -- -- -- -- 74.5 kg (164 lb 3.2 oz)   04/24/21 0632 110/56 98.2  F (36.8  C) Oral 80 16 96 % --   04/24/21 0530 103/54 98.3  F (36.8  C) Oral 82 16 95 % --   04/24/21 0430 113/56 98.3  F (36.8  C) Oral 82 16 96 % --   04/24/21 0420 115/59 97.4  F (36.3  C) Oral 82 16 93 % --   04/24/21 0353 116/63 97.4  F (36.3  C) Oral 85 16 94 % --   04/24/21 0342 108/60 98.3  F (36.8  C) Oral 87 18 97 % --   04/24/21 0256 118/66 97.7  F (36.5  C) Oral 85 16 95 % --   04/24/21 0220 125/66 98.2  F (36.8  C) Oral 88 16 92 % --   04/24/21 0154 116/64 98  F (36.7  C) Oral 93 16 97 % --   04/24/21 0113 113/61 98.6  F (37  C) Oral 87 16 96 % --   04/24/21 0013 104/59 99  F (37.2  C) Oral 94 16 98 % --   04/23/21 2314 105/57 -- -- 96 -- 97 % --   04/23/21 2228 114/48 98.5  F (36.9  C) Oral 97 16 97 % --   04/23/21 2152 107/52 99.2  F (37.3  C) Oral 96 16 98 % --   04/23/21 2105 100/59 99.3  F (37.4  C) Oral 98 16 96 % --   04/23/21 2032 113/66 -- -- 91 -- -- -- "   04/23/21 2027 109/68 -- -- 93 -- 96 % --   04/23/21 1957 92/55 -- -- 97 16 99 % --   04/23/21 1943 (!) 82/53 -- -- 110 16 -- --   04/23/21 1923 108/55 99.2  F (37.3  C) Oral 94 -- -- --   04/23/21 1852 106/57 99.5  F (37.5  C) Oral 89 -- 96 % --     Gen: A&O, resting in bed  Cardio: Mild tachycardia, regular rhythm   Resp: CTAB, no wheezes or crackles in anterior lung fields  Abdomen: Soft, appropriately tender to palpation, moderately distended and tympanic - stable from this morning exam   Incision: C/D/I  Extremities: BLEs non-tender with Jeancarlos hose in place. trace LE edema      A/P:   66yo POD#6 s/p XL, DANAE-BSO, omentectomy, debulking, resection of liver nodules, diaphragm stripping, procto for stage IV high grade serous ovarian cancer, BRCA 1, s/p 3C neoadjuvant carbo/taxol. Hemoglobin downtrended on POD#5, concern for slow intraabdominal bleeding in the setting of anticoagulation with Xarelto. S/p IR consult, no target for embolization. S/p 4u pRBC and 1u FFP; Hgb initially downtrended, but appropriate and stable over the last two hemoglobin checks. Remains vitally stable. Due to concern for evolving ileus, pt currently NPO, discussed the        Plan:    #Concern for ileus   - Currently nausea has been controlled. Discussed that the staff MDs generate similar plans and that Dr. Monae and Dr. Juarez manage thing based on evidence and best practice. So her primary surgeon would likely recommend an NG tube too in this circumstance when there is concern for ileus  - Discussed that our goal is to get patient out of the hospital as well and support her desire to do so, and discussed placement of the NG tube would likely help to expedite discharge and make her feel better; she prefers not to have it placed now since her symptoms have improved.   - Encouraged patient to get up with PT tomorrow now that she is feeling better from a hypotension/dizziness standpoint   - Continue to monitor; pt aware to tell us if  nausea symptoms return or worsen     #H/o afib w/RVR  #Postop hypotension  - Episode of orthostatic hypotension POD#3 and again this AM. Hgb drop as above, ordered blood transfusion.  - Echo 4/21 wnl     #Restless leg syndrome, insomnia  - Continue home gabapentin, amitriptyline. Ambien discontinued and started remeron 4/21.     Emili Rooney MD  Obstetrics and Gynecology, PGY-2  April 24, 2021 6:11 PM

## 2021-04-24 NOTE — PLAN OF CARE
Blood infusing at start of shift.  MD aware of CT results, talked with IR.  Ordered 1 FFP and 1 RBC overnight, and cont to monitor pt closely.  1 unit FFP given 0240, 1 unit RBC at 0400.  Pt tolerated infusions well.    AVSS.  Pt denies SOB.    Abd midline c/d/intact.  Abd binder in place.  No flatus, no BM.  Nausea continues - PRN zofran and compazine given.  PIV x2, SL between meds.    A&Ox4.  Forgetful at times, bed alarm ON for safety (and recent falls).  Assist 1-2 to BSComm overnight.  Denies dizziness when up.  BLE edema, compression stockings ON.  L ankle swollen, ice pack PRN.  Cont with POC.  Monitor AM labs closely.  Cont to monitor for s/s of bleeding.

## 2021-04-24 NOTE — PROGRESS NOTES
Went to assess Ms Regalado and to discuss more labs and plan for CTA. She notes that she is still somewhat nauseated but has not yet had emesis and nausea has not gotten worse. She is passing flatus, but thinks only once today. Still unable to have a bowel movement and wondering when this will happen. Denies fevers, chills, SOB and chest pain. Not currently lightheaded or dizzy.    Patient Vitals for the past 24 hrs:   BP Temp Temp src Pulse Resp SpO2   04/23/21 2105 100/59 99.3  F (37.4  C) Oral 98 16 96 %   04/23/21 2032 113/66 -- -- 91 -- --   04/23/21 2027 109/68 -- -- 93 -- 96 %   04/23/21 1957 92/55 -- -- 97 16 99 %   04/23/21 1943 (!) 82/53 -- -- 110 16 --   04/23/21 1923 108/55 99.2  F (37.3  C) Oral 94 -- --   04/23/21 1852 106/57 99.5  F (37.5  C) Oral 89 -- 96 %   04/23/21 1548 117/53 96.8  F (36  C) Oral 92 14 97 %   04/23/21 1145 109/67 98.1  F (36.7  C) Oral 94 12 97 %   04/23/21 0915 108/58 96.2  F (35.7  C) Oral 92 12 96 %   04/23/21 0842 102/61 97.6  F (36.4  C) Axillary 91 12 96 %   04/23/21 0819 90/60 96.6  F (35.9  C) -- 85 12 95 %   04/23/21 0715 93/53 96.6  F (35.9  C) Oral 96 16 95 %   04/23/21 0325 106/63 96.8  F (36  C) Oral 84 16 93 %   04/22/21 2200 97/51 97.1  F (36.2  C) Oral 86 16 98 %      CBC RESULTS:   Recent Labs   Lab Test 04/23/21  1601 04/23/21  0502   WBC  --  3.9*   RBC  --  2.10*   HGB 7.1* 7.0*   HCT  --  22.0*   MCV  --  105*   MCH  --  33.3*   MCHC  --  31.8   RDW  --  20.4*   PLT  --  120*       Gen: well-appearing, nontoxic  Card: regular rate and rhythm  Pulm: nonlabored breathing  Abd: soft, nontender, moderately distended and tympanic to percussion. No rebound or guarding. Incision clean, dry and intact  LE: Left ankle swollen with ice, trenton hose in place, no calf tendersness bilaterally, no pitting edema    Did discuss with patient that we are giving another transfusion due to less than expected rise in Hgb since previously draw. I am concerned there may be a slow  intraabdominal bleed. Xarelto held. Plan for 2u pRBC and at this time will go ahead with CTA to look for obvious bleeding.     Also discussed that nausea and slow ROBF consistent with early ileus and that she is at increased risk for this due to large surgery and possible intraabdominal bleeding. Plan NPO with sips/meds overnight and reassess in the AM or with ROBF    Left ankle swollen - patient thinks she rolled it when she was ambulating earlier today. Will continue with elevation and ice.     With permission from pt, did call her daughter Edie to give her an update. She was happy for the call and relieved for more imaging    Emili Lilly MD  Obstetrics and Gynecology, PGY-4  April 23, 2021 , 8:34 PM

## 2021-04-24 NOTE — PLAN OF CARE
POD # 4 s/p DAANE, BSO, omentectomy, Proctoscocy, RO, Resection of liver nodules, diaphragm stripping, immobilization of liver and colon.    Patient is alert, oriented x 4, forgetfull at times. Denies pain, intermittent nausea with relief after intervention, no emesis during the shift. Voiding spontaneously in commode, reports negative flatus, no BM. Stand by assist of 2 and gait belt. Pt declined to use bedpan. Left ankle is swollen and painful to touch, ice pack applied, MD notified. Pt reports feeling weak and lightheaded, MD aware. Hgb 7.1, first unit of PRBC given. No difficulties during transfusion.  Second PIV placed for IV contrast. Pt ready to go down to CT scan.     Update: Pt returned from CT scan at 22:15. Lab called to draw Lactic. Hgb 6.9, MD notified. Bedtime meds given. Second unit of PRBC started. Bed alarm on, continuous pulse oxymetry.

## 2021-04-24 NOTE — PROGRESS NOTES
S:  Patient is feeling tired, and wants to rest.  Feels dizzy upon standing up.  Has some nausea throughout the day.      O/A:  Vital Signs are stable, patient is reporting pain at a 6 out of 10 saying it is sharp and comes and goes.  Pain is being managed with tylenol and methocarbamol.  Voiding regularly with assistance x1 to the commode, no bowel movements, no gas.  Patient is NPO.  Treating nausea with prochlorperazine.  Hemoglobin levels are stable.       P:  Encouraging patient to consider a suppository to promote bowel movements.  Encourage ambulation.

## 2021-04-24 NOTE — PROGRESS NOTES
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     24 hour events:   - Remains orthostatic  - 2u pRBC, rise not appropriate, Xarelto held  - CTA with concern for active bleeding, per IR no target for embolization  - nausea    Subjective:   Patient is doing ok this morning, tired from being awake most of the night. Denies lightheadedness, dizziness, chest pain, SOB. Some nausea, no emesis. No flatus or BM    Objective:   Patient Vitals for the past 24 hrs:   BP Temp Temp src Pulse Resp SpO2   04/24/21 0632 110/56 98.2  F (36.8  C) Oral 80 16 96 %   04/24/21 0530 103/54 98.3  F (36.8  C) Oral 82 16 95 %   04/24/21 0430 113/56 98.3  F (36.8  C) Oral 82 16 96 %   04/24/21 0420 115/59 97.4  F (36.3  C) Oral 82 16 93 %   04/24/21 0353 116/63 97.4  F (36.3  C) Oral 85 16 94 %   04/24/21 0342 108/60 98.3  F (36.8  C) Oral 87 18 97 %   04/24/21 0256 118/66 97.7  F (36.5  C) Oral 85 16 95 %   04/24/21 0220 125/66 98.2  F (36.8  C) Oral 88 16 92 %   04/24/21 0154 116/64 98  F (36.7  C) Oral 93 16 97 %   04/24/21 0113 113/61 98.6  F (37  C) Oral 87 16 96 %   04/24/21 0013 104/59 99  F (37.2  C) Oral 94 16 98 %   04/23/21 2314 105/57 -- -- 96 -- 97 %   04/23/21 2228 114/48 98.5  F (36.9  C) Oral 97 16 97 %   04/23/21 2152 107/52 99.2  F (37.3  C) Oral 96 16 98 %   04/23/21 2105 100/59 99.3  F (37.4  C) Oral 98 16 96 %   04/23/21 2032 113/66 -- -- 91 -- --   04/23/21 2027 109/68 -- -- 93 -- 96 %   04/23/21 1957 92/55 -- -- 97 16 99 %   04/23/21 1943 (!) 82/53 -- -- 110 16 --   04/23/21 1923 108/55 99.2  F (37.3  C) Oral 94 -- --   04/23/21 1852 106/57 99.5  F (37.5  C) Oral 89 -- 96 %   04/23/21 1548 117/53 96.8  F (36  C) Oral 92 14 97 %   04/23/21 1145 109/67 98.1  F (36.7  C) Oral 94 12 97 %   04/23/21 0915 108/58 96.2  F (35.7  C) Oral 92 12 96 %     Gen: A&O,  resting in bed  Cardio: Mild tachycardia, regular rhythm  Resp: CTAB, no wheezes or crackles in anterior lung fields  Abdomen: Soft, appropriately tender to palpation, moderately distended and tympanic, unchanged from overnight  Incision: C/D/I  Extremities: BLEs non-tender with Jeancarlos hose in place. Trace LE edema    Intake/Output:  Last 24 hours//Since midnight  PO: 360//-  IV: none  Blood products: 693 // 798  U: 1875//325    Lines/Drains:   PIV    Labs/imaging:  BMP  Recent Labs   Lab 04/24/21  0814 04/23/21  0502 04/21/21  0632 04/20/21  1834 04/20/21  0619    136 138  --  139   POTASSIUM 4.0 3.6 3.6  --  3.8   CHLORIDE 105 105 108  --  106   HORACIO 8.2* 8.2* 8.3*  --  8.0*   CO2 27 26 25  --  25   BUN 11 15 12  --  12   CR 0.71 0.82 0.76 1.02 0.76   * 117* 108*  --  100*     CBC  Recent Labs   Lab 04/24/21  0814 04/23/21  2221 04/23/21  1601 04/23/21  0502 04/21/21  0632   WBC 4.0 4.1  --  3.9* 4.5   RBC 2.92* 2.21*  --  2.10* 2.54*   HGB 9.0* 6.9* 7.1* 7.0* 8.3*   HCT 27.4* 21.6*  --  22.0* 26.2*   MCV 94 98  --  105* 103*   MCH 30.8 31.2  --  33.3* 32.7   MCHC 32.8 31.9  --  31.8 31.7   RDW 19.4* 19.6*  --  20.4* 22.9*   PLT 93* 94*  --  120* 122*     CTA Abdomen Pelvis with Contrast  Narrative: Exam: Computed tomographic angiography of the abdomen and pelvis  without and with contrast, including 3D reformations dated 4/23/2021  10:10 PM    Clinical information:  Retroperitoneal hematoma suspected; concern for  postoperative intraabdominal bleed    Technique: Helical scans through the abdomen and pelvis obtained  before the administration of intravenous contrast media and following  the injection of contrast media  in the arterial phase. Source images  reviewed as well as 3D and multi-planar reconstructions.    Contrast: iopamidol (ISOVUE-370) solution 105 mL       DLP: 2676 mGy*cm    Comparison study: Abdominal CT 4/2/2021 gadolinium approximately 3  possible site of disease    Findings:      There  is a hyperdense collection in the lower peritoneal space  anterior to the bladder measuring 10.1 x 3.2 cm on image 32 of series  9. There is a small focus of active contrast extravasation into the  collection on image 401 of series 9 appears to arise from an inferior  epigastric artery branch vessel. There are small foci of gas adjacent  to the collection, likely postsurgical in nature. There is an  additional collection measuring 6.9 x 4.4 cm on image 373 of series 13  which contiguous with the larger collection and is abutting the  surgical bed. There is an focus of active contrast extravasation  within the second collection seen on image 332 of series 9. The second  focus of contrast extravasation appears to arise from the inferior  mesenteric artery branch vessel.    Postsurgical changes of hysterectomy and bilateral  salpingo-oophorectomy. There is diffuse fluid density abdominal  ascites and scattered foci of intraabdominal free air, likely  postoperative in nature. There is diffuse subcutaneous stranding of  the upper thighs and abdominal wall consistent with edema.    The abdominal aorta is normal in caliber.    The celiac axis, SMA and JOSE LUIS are patent. The renal arteries are patent  bilaterally.    The splenic vein, portal vein, SMV and renal veins are  patent.    The hepatic veins and IVC are patent.    The spleen, liver, adrenal glands, pancreas, kidneys and bones show no  focal abnormalities.    Lower thorax: Small bilateral pleural effusions with adjacent  compressive atelectasis  Impression: Impression:  1. Intraperitoneal hemorrhage anterior to the bladder with active  extravasation suspected to arise from a right internal epigastric  branch vessel. A second collection of intraperitoneal hemorrhage is  located to the left of the surgical bed with active extravasation  suspected to arise from an inferior mesenteric artery branch vessel.   2. Post surgical changes of hysterectomy and  bilateral  salpingo-oophorectomy.  3. Diffuse abdominal ascites. Extent of peritoneal metastatic disease  burden is not well assessed due to the presence of extensive  postsurgical changes. Interval follow up imaging is suggested.   4. Small bilateral pleural effusions.    [Critical Result: Intraperitoneal hemorrhage with active  extravasation. ]    Finding was identified on 4/23/2021 10:40 PM.     Dr. Lilly was contacted by Dr. Salmeron at 4/23/2021 11:00 PM and  verbalized understanding of the critical finding.       Assessment:   66yo POD#5 s/p XL, DANAE-BSO, omentectomy, debulking, resection of liver nodules, diaphragm stripping, procto for stage IV high grade serous ovarian cancer, BRCA 1, s/p 3C neoadjuvant carbo/taxol. Hemoglobin downtrending, concern for slow intraabdominal bleeding in the setting of anticoagulation with Xarelto. S/p IR consult, no target for embolization. Will plan to transfuse with goal Hgb >7.0. Vitally stable at this time. Also with concern for evolving ileus, pt currently NPO    Active Problem list:  - Postoperative state  - Ovarian cancer  - H/o pulmonary embolism  - H/o Afib w/RVR  - Restless leg syndrome, insomnia    Plan:    #Postoperative state  - FEN: NPO due to nausea and concern for developing ileus  - Pain: S/p TAPs. Scheduled Tylenol. Prn PO dilaudid, robaxin.   - Heme: Hgb drop this AM to 7. With orthostatic BP and lightheadedness, s/p 1u pRBC with inappropriate rise. CTA concerning for blush anterior to bladder, s/p IR who do not believe there no targets for embolization. Now s/p 4u pRBC and 1u FFP. Repeat labs this AM  - S/p Hickey, voiding.  - GI: Passed gas x1, no BM. As above, concern for developing ileus  - Ppx: SCDs, IS. See below for anticoagulation plan.    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 S/p 3C neoadjuvant carbo/taxol.  - Final pathology returned metastatic high grade serous carcinoma  - Will follow up outpatient for ongoing  treatment    #H/o pulmonary embolism  - Xarelto held due to concern for intraabdominal bleeding. If hemoglobin stabilizes will restart, likely transition to Heparin in the setting of bleeding.    #H/o afib w/RVR  #Postop hypotension  - Episode of orthostatic hypotension POD#3 and again this AM. Hgb drop as above, ordered blood transfusion.  - Echo 4/21 wnl    #Restless leg syndrome, insomnia  - Continue home gabapentin, amitriptyline. Ambien discontinued and started remeron 4/21.    Emili Lilly MD  Obstetrics and Gynecology, PGY-4  April 24, 2021 , 8:56 AM       I saw and evaluated the patient. I agree with the findings and the plan of care as documented in Dr. Lilly 's note.   Delayed postop intraabdominal bleeding after initation of anti-coagulation. Has responded well to transfusion (now s/p 4 Units of PRBC and 1 U FFP). Hg stable this morning. Does feel distended and has nausea, suspect evolving ileus from hematomas    -Follow Hg and transfuse as indicated  -No indication for reoperation  At this point  -Hold all anti-coag, may consider restarting ppx dose lovenox prior to discharge home  -NGT if worsening sxs or emesis    Remainder of plan per resident note      Fatmata Monae MD  Gynecologic Oncology  Pager [phone]

## 2021-04-24 NOTE — CONSULTS
INTERVENTIONAL RADIOLOGY CONSULT NOTE    Reason for referral:   Concern for active arterial extravasation on CTA    History/Assessment/Plan:   64yo F with PMHx of stage IV high grade serous ovarian cancer s/p radiation and POD#5 from exploratory laporotomy with DANAE-BSO, omentectomy, tumor debulking, liver nodule resection, and diaphragm stripping with persistent anemia despite transfusion in the setting of resuming [id number] xarelto (for previous pulmonary embolus in February 2021) noted to have area of concern for active arterial extravasation on 4/23/2021 CTA.    Upon review of the CTA, it appears there are two small hematomas in the pelvis anterior and superior to the bladder with tiny adjacent hyperdensities in each that appear similarly on the arterial and portal venous phase images without blooming or other change in characteristic. These most likely represent tortuous early filling veins. No evidence of active arterial extravasation. As such, there is no active target for IR to embolize at this time.      Labs:  Lab Results   Component Value Date    HGB 6.9 04/23/2021     Lab Results   Component Value Date    PLT 94 04/23/2021     Lab Results   Component Value Date    WBC 4.1 04/23/2021       Lab Results   Component Value Date    INR 1.39 04/23/2021       Lab Results   Component Value Date    PROTTOTAL 7.5 03/15/2021      Lab Results   Component Value Date    ALBUMIN 3.4 03/15/2021     Lab Results   Component Value Date    BILITOTAL 0.2 03/15/2021     No results found for: BILICONJ   Lab Results   Component Value Date    ALKPHOS 127 03/15/2021     Lab Results   Component Value Date    AST 25 03/15/2021     Lab Results   Component Value Date    ALT 30 03/15/2021       Lab Results   Component Value Date    CR 0.82 04/23/2021     Lab Results   Component Value Date    BUN 15 04/23/2021       Imaging reviewed:   4/23/2021 CTA abdomen and pelvis  4/2/2021 CT chest abdomen and pelvis      Discussed with   Amalia, IR staff.    Tad Jonas MD.   Diagnostic Radiology PGY-3  IR pass pager: [phone]

## 2021-04-24 NOTE — PROGRESS NOTES
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     24 hour events:   - Orthostatic hypotension resolved  - 4u pRBC, hemoglobin stable  - Xarelto held  - CTA with concern for active bleeding, per IR no target for embolization  - Nausea; currently NPO, concern for ileus  - Small volume bowel movement     Subjective:   Patient appears to be resting comfortably in bed. Per RN she had a hard time sleeping and desired sleeping aides as she normally takes ambien 10mg at home. Per RN, she denied nausea and had no emesis on RN's shift overnight.Last evening patient reported that she does not really feel hungry because trying not to think about food. Patient did not want to work with PT/OT yesterday but told team that she was willing to today     Objective:   Patient Vitals for the past 24 hrs:   BP Temp Temp src Pulse Resp SpO2 Weight   04/25/21 0732 106/59 98.8  F (37.1  C) Oral 81 16 -- --   04/24/21 2218 122/64 98.9  F (37.2  C) Oral 80 16 99 % --   04/24/21 1407 115/65 97.1  F (36.2  C) Oral 79 16 95 % --   04/24/21 0959 -- -- -- -- -- -- 74.5 kg (164 lb 3.2 oz)     Gen: A&O, resting in bed with sleeping mask and white noise   Cardio: well perfused    Resp: resting comfortably on room air   Abdomen: deferred   Extremities: BLEs  Jeancarlos hose in place. trace LE edema    Intake/Output:  Last 24 hours//Since midnight  PO: 150//---  IV: none  Blood products: 798 // --  U: 1375 // 200  Emesis: none over the last 24h  Stool: 1x     Lines/Drains:   PIV    Labs/imaging:  BMP  Recent Labs   Lab 04/24/21  0814 04/23/21  0502 04/21/21  0632 04/20/21  1834 04/20/21  0619    136 138  --  139   POTASSIUM 4.0 3.6 3.6  --  3.8   CHLORIDE 105 105 108  --  106   HORACIO 8.2* 8.2* 8.3*  --  8.0*   CO2 27 26 25  --  25   BUN 11 15 12  --  12   CR 0.71 0.82 0.76 1.02 0.76   GLC  102* 117* 108*  --  100*     CBC  Recent Labs   Lab 04/24/21  0814 04/23/21  2221 04/23/21  1601 04/23/21  0502 04/21/21  0632   WBC 4.0 4.1  --  3.9* 4.5   RBC 2.92* 2.21*  --  2.10* 2.54*   HGB 9.0* 6.9* 7.1* 7.0* 8.3*   HCT 27.4* 21.6*  --  22.0* 26.2*   MCV 94 98  --  105* 103*   MCH 30.8 31.2  --  33.3* 32.7   MCHC 32.8 31.9  --  31.8 31.7   RDW 19.4* 19.6*  --  20.4* 22.9*   PLT 93* 94*  --  120* 122*     CTA Abdomen Pelvis with Contrast  Narrative: Exam: Computed tomographic angiography of the abdomen and pelvis  without and with contrast, including 3D reformations dated 4/23/2021  10:10 PM    Clinical information:  Retroperitoneal hematoma suspected; concern for  postoperative intraabdominal bleed    Technique: Helical scans through the abdomen and pelvis obtained  before the administration of intravenous contrast media and following  the injection of contrast media  in the arterial phase. Source images  reviewed as well as 3D and multi-planar reconstructions.    Contrast: iopamidol (ISOVUE-370) solution 105 mL       DLP: 2676 mGy*cm    Comparison study: Abdominal CT 4/2/2021 gadolinium approximately 3  possible site of disease    Findings:      There is a hyperdense collection in the lower peritoneal space  anterior to the bladder measuring 10.1 x 3.2 cm on image 32 of series  9. There is a small focus of active contrast extravasation into the  collection on image 401 of series 9 appears to arise from an inferior  epigastric artery branch vessel. There are small foci of gas adjacent  to the collection, likely postsurgical in nature. There is an  additional collection measuring 6.9 x 4.4 cm on image 373 of series 13  which contiguous with the larger collection and is abutting the  surgical bed. There is an focus of active contrast extravasation  within the second collection seen on image 332 of series 9. The second  focus of contrast extravasation appears to arise from the inferior  mesenteric artery branch  vessel.    Postsurgical changes of hysterectomy and bilateral  salpingo-oophorectomy. There is diffuse fluid density abdominal  ascites and scattered foci of intraabdominal free air, likely  postoperative in nature. There is diffuse subcutaneous stranding of  the upper thighs and abdominal wall consistent with edema.    The abdominal aorta is normal in caliber.    The celiac axis, SMA and JOSE LUIS are patent. The renal arteries are patent  bilaterally.    The splenic vein, portal vein, SMV and renal veins are  patent.    The hepatic veins and IVC are patent.    The spleen, liver, adrenal glands, pancreas, kidneys and bones show no  focal abnormalities.    Lower thorax: Small bilateral pleural effusions with adjacent  compressive atelectasis  Impression: Impression:  1. Intraperitoneal hemorrhage anterior to the bladder with active  extravasation suspected to arise from a right internal epigastric  branch vessel. A second collection of intraperitoneal hemorrhage is  located to the left of the surgical bed with active extravasation  suspected to arise from an inferior mesenteric artery branch vessel.   2. Post surgical changes of hysterectomy and bilateral  salpingo-oophorectomy.  3. Diffuse abdominal ascites. Extent of peritoneal metastatic disease  burden is not well assessed due to the presence of extensive  postsurgical changes. Interval follow up imaging is suggested.   4. Small bilateral pleural effusions.    [Critical Result: Intraperitoneal hemorrhage with active  extravasation. ]    Finding was identified on 4/23/2021 10:40 PM.     Dr. Lilly was contacted by Dr. Salmeron at 4/23/2021 11:00 PM and  verbalized understanding of the critical finding.       Assessment:   64yo POD#6 s/p XL, DANAE-BSO, omentectomy, debulking, resection of liver nodules, diaphragm stripping, procto for stage IV high grade serous ovarian cancer, BRCA 1, s/p 3C neoadjuvant carbo/taxol. Hemoglobin downtrended on POD#5, concern for slow  intraabdominal bleeding in the setting of anticoagulation with Xarelto. S/p IR consult, no target for embolization. S/p 4u pRBC and 1u FFP; Hgb initially downtrended, but appropriate rise yesterday at 0800 and noon. Remains vitally stable. Also with concern for evolving ileus, pt currently NPO. Nausea improving with compazine.     Active Problem list:  - Postoperative state  - Ovarian cancer  - H/o pulmonary embolism  - H/o Afib w/RVR  - Restless leg syndrome, insomnia  - Concern for postoperative bleeding  - Concern for ileus     Plan:    #Nausea  - Discussed concern for ileus, with patient and recommendation for NG tube if continues to be nauseated/has emesis.  - However, nausea was less yesterday evening and improved this morning and patient did pass bowel movement yesterday morning. Has not passed flatus or BM since.   - Currently NPO.     #Postoperative state  - FEN: NPO due to nausea and concern for developing ileus; will start maintenance IVF today   - Pain: S/p TAPs. Scheduled Tylenol. Prn PO dilaudid, robaxin.   - Heme: Hemoglobin downtrended, received 2u pRBC, rita at 6.9 (4/23), thus received an additional 2u pRBC and 1u FFP on 4/23 and due to inappropriate rise in hgb, and intermittent hypotension, a CTA was ordered and showed intraperitoneal hemorrhage anterior to bladder with active extravasation from R internal epigastric branch vessel, and a second collection possibly from inferior mesenteric branch vessel. Thus, IR consulted who do not believe there no targets for embolization. After transfusions and holding anticoagulation, patient's hemoglobin john appropriately and her abdominal exam and pain remained stable. Suspected venous bleeding which had stabilized.   - Carefully monitor for signs/sxs of fluid overload and diurese as needed   - S/p Hickey, voiding.  - GI: See above.   - Ppx: SCDs, IS. See below for anticoagulation plan.    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  S/p 3C neoadjuvant carbo/taxol.  - Final pathology returned metastatic high grade serous carcinoma  - Will follow up outpatient for ongoing treatment    #H/o pulmonary embolism  - Xarelto held due to concern for intraabdominal bleeding.  - If hemoglobin stabilizes will restart lovenox versus transition to Heparin.     #H/o afib w/RVR  #Postop hypotension  - Episode of orthostatic hypotension POD#3 and morning of POD#4. Hgb drop as above, s/p transfusion x4u pRBC and 1u FFP  - Normotensive yesterday evening and this morning   - Echo 4/21 wnl    #Restless leg syndrome, insomnia  - Continue home gabapentin, amitriptyline. Ambien discontinued and started remeron 4/21.    Emili Rooney MD  Obstetrics and Gynecology, PGY-2  April 25, 2021, 6:56 AM

## 2021-04-24 NOTE — PROGRESS NOTES
Received call from Radiology about CTA with concern for active bleeding anterior to bladder. Urgent IR consult placed, spoke with IR who believe there is a hematoma but no evidence of ongoing bleeding and no target for embolization at this time.     Went to see Justen, she was trying to sleep. Denies lightheadedness or dizziness, no racing heart while in bed. No abdominal pain currently. Receiving third unit pRBC currently (started at 11PM)    Patient Vitals for the past 24 hrs:   BP Temp Temp src Pulse Resp SpO2   04/24/21 0013 104/59 99  F (37.2  C) Oral 94 16 98 %   04/23/21 2314 105/57 -- -- 96 -- 97 %   04/23/21 2228 114/48 98.5  F (36.9  C) Oral 97 16 97 %   04/23/21 2152 107/52 99.2  F (37.3  C) Oral 96 16 98 %   04/23/21 2105 100/59 99.3  F (37.4  C) Oral 98 16 96 %   04/23/21 2032 113/66 -- -- 91 -- --   04/23/21 2027 109/68 -- -- 93 -- 96 %   04/23/21 1957 92/55 -- -- 97 16 99 %   04/23/21 1943 (!) 82/53 -- -- 110 16 --   04/23/21 1923 108/55 99.2  F (37.3  C) Oral 94 -- --   04/23/21 1852 106/57 99.5  F (37.5  C) Oral 89 -- 96 %   04/23/21 1548 117/53 96.8  F (36  C) Oral 92 14 97 %   04/23/21 1145 109/67 98.1  F (36.7  C) Oral 94 12 97 %   04/23/21 0915 108/58 96.2  F (35.7  C) Oral 92 12 96 %   04/23/21 0842 102/61 97.6  F (36.4  C) Axillary 91 12 96 %   04/23/21 0819 90/60 96.6  F (35.9  C) -- 85 12 95 %   04/23/21 0715 93/53 96.6  F (35.9  C) Oral 96 16 95 %   04/23/21 0325 106/63 96.8  F (36  C) Oral 84 16 93 %      I/O last 3 completed shifts:  In: 1053.33 [P.O.:360]  Out: 1875 [Urine:1875]    300mL UOP in last 1 hours    Gen: Well-appearing, though tired  Card: Regular rate and rhythm  Pulm: nonlabored breathing  Abd: Abdomen soft and nontender, nondistended, no rebound or guarding    CBC RESULTS:   Recent Labs   Lab Test 04/23/21  2221   WBC 4.1   RBC 2.21*   HGB 6.9*   HCT 21.6*   MCV 98   MCH 31.2   MCHC 31.9   RDW 19.6*   PLT 94*     INR   Date Value Ref Range Status   04/23/2021 1.39 (H) 0.86  - 1.14 Final        Once this unit of pRBC is finished will plan for 1u FFP and additional 1u pRBC. Full labs in AM. Patient is currently 4.6L fluid positive since surgery and there are new pulmonary effusions on CTA. Patient is currently asymptomatic from this standpoint and SpO2 stable. Will continue to monitor closely for symptoms of fluid overload, anticipate diuresis will be needed over the weekend.     Patient discussed with Gyn Onc Fellow Dr Darling.    Emili Lilly MD  Obstetrics and Gynecology, PGY-4  April 24, 2021 , 12:20 AM

## 2021-04-24 NOTE — PLAN OF CARE
PT 7C: Cancel - PT orders acknowledged and appreciated. Pt not medically appropriate this AM, requiring blood. Pt also with poor sleep overnight. Will assess pt's medical status/needs on 4/25. Thank you for your referral.

## 2021-04-24 NOTE — PLAN OF CARE
POD 5. Soft Bps, OAVSS. A&O x 4, forgetful, bed alarm active. Hgb stable at 8.9. recheck scheduled for morning. Voids spont via commode assist x 1, adequate output, not passing gas, no BM today, refused suppository. Abdominal incision with adhesive strips, dried drainage. Dilaudid and Robaxin PRN for pain. R and L PIV SL. NPO ex ice chips and meds. Compression stockings were placed on both legs.

## 2021-04-24 NOTE — PLAN OF CARE
OT7C: CX: Pt refusing therapy x2 today. Pt refused comperm to be smoothed out on LEs when asked. Did not want therapist to touch/fix her comperm (bunched around ankles).

## 2021-04-25 ENCOUNTER — APPOINTMENT (OUTPATIENT)
Dept: ULTRASOUND IMAGING | Facility: CLINIC | Age: 66
End: 2021-04-25
Attending: OBSTETRICS & GYNECOLOGY
Payer: COMMERCIAL

## 2021-04-25 ENCOUNTER — APPOINTMENT (OUTPATIENT)
Dept: PHYSICAL THERAPY | Facility: CLINIC | Age: 66
End: 2021-04-25
Attending: OBSTETRICS & GYNECOLOGY
Payer: COMMERCIAL

## 2021-04-25 ENCOUNTER — APPOINTMENT (OUTPATIENT)
Dept: GENERAL RADIOLOGY | Facility: CLINIC | Age: 66
End: 2021-04-25
Attending: OBSTETRICS & GYNECOLOGY
Payer: COMMERCIAL

## 2021-04-25 ENCOUNTER — APPOINTMENT (OUTPATIENT)
Dept: OCCUPATIONAL THERAPY | Facility: CLINIC | Age: 66
End: 2021-04-25
Attending: OBSTETRICS & GYNECOLOGY
Payer: COMMERCIAL

## 2021-04-25 LAB
ANION GAP SERPL CALCULATED.3IONS-SCNC: 5 MMOL/L (ref 3–14)
BUN SERPL-MCNC: 11 MG/DL (ref 7–30)
CALCIUM SERPL-MCNC: 8.1 MG/DL (ref 8.5–10.1)
CHLORIDE SERPL-SCNC: 107 MMOL/L (ref 94–109)
CO2 SERPL-SCNC: 28 MMOL/L (ref 20–32)
CREAT SERPL-MCNC: 0.72 MG/DL (ref 0.52–1.04)
ERYTHROCYTE [DISTWIDTH] IN BLOOD BY AUTOMATED COUNT: 19.3 % (ref 10–15)
GFR SERPL CREATININE-BSD FRML MDRD: 88 ML/MIN/{1.73_M2}
GLUCOSE SERPL-MCNC: 85 MG/DL (ref 70–99)
HCT VFR BLD AUTO: 26.9 % (ref 35–47)
HGB BLD-MCNC: 8.7 G/DL (ref 11.7–15.7)
MCH RBC QN AUTO: 32.3 PG (ref 26.5–33)
MCHC RBC AUTO-ENTMCNC: 32.3 G/DL (ref 31.5–36.5)
MCV RBC AUTO: 100 FL (ref 78–100)
PLATELET # BLD AUTO: 92 10E9/L (ref 150–450)
POTASSIUM SERPL-SCNC: 4.2 MMOL/L (ref 3.4–5.3)
RBC # BLD AUTO: 2.69 10E12/L (ref 3.8–5.2)
SODIUM SERPL-SCNC: 140 MMOL/L (ref 133–144)
WBC # BLD AUTO: 3.7 10E9/L (ref 4–11)

## 2021-04-25 PROCEDURE — 97116 GAIT TRAINING THERAPY: CPT | Mod: GP

## 2021-04-25 PROCEDURE — 93970 EXTREMITY STUDY: CPT | Mod: 26 | Performed by: RADIOLOGY

## 2021-04-25 PROCEDURE — 93970 EXTREMITY STUDY: CPT

## 2021-04-25 PROCEDURE — 73610 X-RAY EXAM OF ANKLE: CPT | Mod: 26 | Performed by: RADIOLOGY

## 2021-04-25 PROCEDURE — 97161 PT EVAL LOW COMPLEX 20 MIN: CPT | Mod: GP

## 2021-04-25 PROCEDURE — 250N000013 HC RX MED GY IP 250 OP 250 PS 637: Performed by: STUDENT IN AN ORGANIZED HEALTH CARE EDUCATION/TRAINING PROGRAM

## 2021-04-25 PROCEDURE — 97530 THERAPEUTIC ACTIVITIES: CPT | Mod: GO | Performed by: OCCUPATIONAL THERAPIST

## 2021-04-25 PROCEDURE — 258N000003 HC RX IP 258 OP 636: Performed by: STUDENT IN AN ORGANIZED HEALTH CARE EDUCATION/TRAINING PROGRAM

## 2021-04-25 PROCEDURE — 73610 X-RAY EXAM OF ANKLE: CPT | Mod: LT

## 2021-04-25 PROCEDURE — 85027 COMPLETE CBC AUTOMATED: CPT | Performed by: STUDENT IN AN ORGANIZED HEALTH CARE EDUCATION/TRAINING PROGRAM

## 2021-04-25 PROCEDURE — 250N000013 HC RX MED GY IP 250 OP 250 PS 637: Performed by: OBSTETRICS & GYNECOLOGY

## 2021-04-25 PROCEDURE — 36415 COLL VENOUS BLD VENIPUNCTURE: CPT | Performed by: STUDENT IN AN ORGANIZED HEALTH CARE EDUCATION/TRAINING PROGRAM

## 2021-04-25 PROCEDURE — 250N000013 HC RX MED GY IP 250 OP 250 PS 637: Performed by: NURSE PRACTITIONER

## 2021-04-25 PROCEDURE — 97530 THERAPEUTIC ACTIVITIES: CPT | Mod: GP

## 2021-04-25 PROCEDURE — 80048 BASIC METABOLIC PNL TOTAL CA: CPT | Performed by: STUDENT IN AN ORGANIZED HEALTH CARE EDUCATION/TRAINING PROGRAM

## 2021-04-25 PROCEDURE — 120N000002 HC R&B MED SURG/OB UMMC

## 2021-04-25 RX ORDER — SODIUM CHLORIDE, SODIUM LACTATE, POTASSIUM CHLORIDE, CALCIUM CHLORIDE 600; 310; 30; 20 MG/100ML; MG/100ML; MG/100ML; MG/100ML
INJECTION, SOLUTION INTRAVENOUS CONTINUOUS
Status: DISCONTINUED | OUTPATIENT
Start: 2021-04-25 | End: 2021-04-26

## 2021-04-25 RX ORDER — HYDROXYZINE HYDROCHLORIDE 25 MG/1
50 TABLET, FILM COATED ORAL AT BEDTIME
Status: DISCONTINUED | OUTPATIENT
Start: 2021-04-25 | End: 2021-04-26

## 2021-04-25 RX ADMIN — METHOCARBAMOL 500 MG: 500 TABLET, FILM COATED ORAL at 23:25

## 2021-04-25 RX ADMIN — ACETAMINOPHEN 650 MG: 325 TABLET, FILM COATED ORAL at 01:49

## 2021-04-25 RX ADMIN — MIRTAZAPINE 7.5 MG: 7.5 TABLET, FILM COATED ORAL at 21:06

## 2021-04-25 RX ADMIN — GABAPENTIN 600 MG: 600 TABLET, FILM COATED ORAL at 21:06

## 2021-04-25 RX ADMIN — SODIUM CHLORIDE, POTASSIUM CHLORIDE, SODIUM LACTATE AND CALCIUM CHLORIDE: 600; 310; 30; 20 INJECTION, SOLUTION INTRAVENOUS at 08:17

## 2021-04-25 RX ADMIN — SODIUM CHLORIDE, POTASSIUM CHLORIDE, SODIUM LACTATE AND CALCIUM CHLORIDE: 600; 310; 30; 20 INJECTION, SOLUTION INTRAVENOUS at 19:47

## 2021-04-25 RX ADMIN — PROCHLORPERAZINE MALEATE 5 MG: 5 TABLET ORAL at 11:01

## 2021-04-25 RX ADMIN — POLYETHYLENE GLYCOL 3350 17 G: 17 POWDER, FOR SOLUTION ORAL at 08:17

## 2021-04-25 RX ADMIN — HYDROXYZINE HYDROCHLORIDE 50 MG: 25 TABLET, FILM COATED ORAL at 01:50

## 2021-04-25 RX ADMIN — DOCUSATE SODIUM 50 MG AND SENNOSIDES 8.6 MG 2 TABLET: 8.6; 5 TABLET, FILM COATED ORAL at 08:17

## 2021-04-25 RX ADMIN — ACETAMINOPHEN 650 MG: 325 TABLET, FILM COATED ORAL at 13:58

## 2021-04-25 RX ADMIN — HYDROMORPHONE HYDROCHLORIDE 2 MG: 2 TABLET ORAL at 01:50

## 2021-04-25 RX ADMIN — ACETAMINOPHEN 650 MG: 325 TABLET, FILM COATED ORAL at 19:49

## 2021-04-25 RX ADMIN — HYDROXYZINE HYDROCHLORIDE 50 MG: 25 TABLET, FILM COATED ORAL at 21:06

## 2021-04-25 RX ADMIN — ACETAMINOPHEN 650 MG: 325 TABLET, FILM COATED ORAL at 08:17

## 2021-04-25 RX ADMIN — AMITRIPTYLINE HYDROCHLORIDE 100 MG: 100 TABLET, FILM COATED ORAL at 21:06

## 2021-04-25 ASSESSMENT — ACTIVITIES OF DAILY LIVING (ADL)
ADLS_ACUITY_SCORE: 18
ADLS_ACUITY_SCORE: 17
ADLS_ACUITY_SCORE: 17
ADLS_ACUITY_SCORE: 18
ADLS_ACUITY_SCORE: 18
ADLS_ACUITY_SCORE: 17

## 2021-04-25 NOTE — PLAN OF CARE
POD # 4 s/p DANAE, BSO, omentectomy, Proctoscocy, RO, Resection of liver nodules, diaphragm stripping, immobilization of liver and colon.    Patient is alert, oriented x 4, pain well controlled with Tylenol and PO Dilaudid, intermittent nausea with relief after intervention, no emesis during the shift. Voiding spontaneously in BR, reports negative flatus, had one BM this evening. Stand by assist of 1 and gait belt. Pt feeling more stronger today, denies feeling lightheaded, no dizziness. Patient declined physical therapy today but would like to try tomorrow, Bed alarm on, call light in reach.

## 2021-04-25 NOTE — PLAN OF CARE
AVSS.  99% RA.  Pain well controlled with tylenol and oxycodone.  Requested sleep meds - atarax x1 (MD did not want to give Ambien).  Pt irritable at times.  Forgetful.  Impulsive getting OOB, bed alarm ON for safety.    Up with assist 1 to BR.  Voiding spont.    NPO x meds, ice.  Denies nausea overnight.  PIV x2 SL.    Denies flatus, no BM overnight.  Abd midline incision with steri strips, old dried drainage.  Binder ON.  LE edema, compression stockings ON.    Cont with POC.  Pt declined PT yesterday, but wants it today.

## 2021-04-25 NOTE — PROGRESS NOTES
04/25/21 1000   Quick Adds   Type of Visit Initial PT Evaluation   Living Environment   People in home child(montez), adult  (lives at her daughters)   Current Living Arrangements house   Home Accessibility stairs to enter home;stairs within home   Number of Stairs, Main Entrance 1   Stair Railings, Main Entrance railing on right side (ascending)   Number of Stairs, Within Home, Primary 7   Stair Railings, Within Home, Primary railing on left side (ascending)   Transportation Anticipated car, drives self   Living Environment Comments Pt lives with daughter in split-level home. Pt is retired.    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 Pt is IND in all self-cares   Disability/Function   Hearing Difficulty or Deaf no   Wear Glasses or Blind yes   Vision Management glasses for reading   Concentrating, Remembering or Making Decisions Difficulty no   Difficulty Communicating no   Difficulty Eating/Swallowing no   Walking or Climbing Stairs Difficulty no   Dressing/Bathing Difficulty no   Toileting issues no   Doing Errands Independently Difficulty (such as shopping) no   Fall history within last six months yes   Number of times patient has fallen within last six months 1   Change in Functional Status Since Onset of Current Illness/Injury yes   General Information   Onset of Illness/Injury or Date of Surgery 04/23/21  (date of PT orders)   Referring Physician Maria G Villarreal MD   Patient/Family Therapy Goals Statement (PT) to return to home    Pertinent History of Current Problem (include personal factors and/or comorbidities that impact the  POC) Pt is POD#6 s/p XL, DANAE-BSO, omentectomy, debulking, resection of liver nodules, diaphragm stripping, procto - per MD note   Existing Precautions/Restrictions abdominal;fall   Cognition   Orientation Status (Cognition) oriented x 4   Affect/Mental Status (Cognition) WFL  (pt is tangential in conversation )   Follows Commands (Cognition) WFL  (pt requires redirecting/ rephrasing of questions)   Integumentary/Edema   Integumentary/Edema other (describe)   Integumentary/Edema Comments L LE is slightly more swollen than R LE and pt reports pain in L calf, nurse notified    Range of Motion (ROM)   ROM Quick Adds ROM WFL   Strength Comprehensive (MMT)   General Manual Muscle Testing (MMT) Assessment other (see comments)   Comment, General Manual Muscle Testing (MMT) Assessment pt reports pain in L LE and is deconditioned    Bed Mobility   Bed Mobility other (see comments)   Comment (Bed Mobility) not formally assessed, per OT note pt is SBA   Transfers   Transfers sit-stand transfer   Sit-Stand Transfer   Sit-Stand [place] (Transfers) independent   Gait/Stairs (Locomotion)   [place] Level (Gait) independent   Pattern (Gait) other (see comments)   Negotiation (Stairs) handrail location   Handrail Location (Stairs) left side (ascending)   Comment (Gait/Stairs) pt has step through pattern for gait and ascending stairs, and step to pattern with descending stairs   Sensory Examination   Sensory Perception patient reports no sensory changes   Clinical Impression   Criteria for Skilled Therapeutic Intervention yes, treatment indicated   PT Diagnosis (PT) decreased endurance and strength   Influenced by the following impairments recent major surgery   Functional limitations due to impairments decreased endurance and strength   Clinical Presentation Stable/Uncomplicated   Clinical Presentation Rationale pt is below baseline, but is stable and has good Px for physical recovery with skilled PT services d/t good home  support from daughter    Clinical Decision Making (Complexity) low complexity   Therapy Frequency (PT) Daily   Predicted Duration of Therapy Intervention (days/wks) 1 week   Planned Therapy Interventions (PT) balance training;gait training;home exercise program;neuromuscular re-education;patient/family education;ROM (range of motion);stair training;strengthening;transfer training   Risk & Benefits of therapy have been explained evaluation/treatment results reviewed;care plan/treatment goals reviewed;risks/benefits reviewed;current/potential barriers reviewed;participants voiced agreement with care plan;participants included;patient   PT Discharge Planning    PT Discharge Recommendation (DC Rec) home with home care physical therapy   PT Rationale for DC Rec Pt would benefit from HHPT initially for home safety/set up with anticipated progression to OP PT for conditioning/strengthening   PT Brief overview of current status  Nusring: up A X 1 with gait belt and abdominal binder    Total Evaluation Time   Total Evaluation Time (Minutes) 5

## 2021-04-25 NOTE — PLAN OF CARE
POD 6. AVSS. A&O x 4, call light appropriate so bed alarm has stayed off. Voids spont assist stand-by, watching output closely, encouraged PO fluids, passing gas, 2 large BM today. Abdominal incision with adhesive strips, dried drainage. Dilaudid and Robaxin PRN for pain. R PIV infusing LR at 100 mL/hr. L PIV SL. Clear liquids, tolerating well, would like regular food. Compression stockings maintained on both legs. L ankle and foot swollen, XR taken today, awaiting results, US done as well, r/o sprain vs fracture vs DVT

## 2021-04-25 NOTE — OP NOTE
GYNECOLOGIC ONCOLOGY OPERATION NOTE        PATIENT: Taran Regalado  MRN: [id number]  DATE OF SURGERY: 4/19/2020     PREOPERATIVE DIAGNOSES:   Ovarian cancer s/p neoadjuvant chemotherapy     POSTOPERATIVE DIAGNOSES:      Same s/p complete cytoreduction     OPERATIVE PROCEDURES:   1. Exam under anesthesia  2. Exploratory laparotomy  3. Total abdominal hysterectomy   4. Bilateral salpingo-oophorectomy,   5. Total Omentectomy  6. Mobilization of colon and liver   7. Resection of liver nodules   8. Diaphragm stripping   9. Proctoscopy  10. Optimal tumor debulking to no gross residual disease      SURGEON: Bolivar Juarez MD      ASSISTANTS:      1. Yvette Bustos MD - Assisting     2. Lisbeth Olvera MD - Resident -       Assisting     ANESTHESIA: General endotracheal.      ESTIMATED BLOOD LOSS: 400  mL     TOTAL INTRAVENOUS FLUIDS: 3400mL crystalloid ; 500 ml albumin     TOTAL URINE OUTPUT: 370  mL, clear urine     TRANSFUSIONS: none     DRAIN: ha catheter      SPECIMENS REMOVED:   Specimens:       ID Type Source Tests Collected by Time Destination   A : omentectomy Tissue Omentum SURGICAL PATHOLOGY EXAM Bolivar Juarez MD 4/19/2021  8:30 AM     B : Sigmoid nodule Tissue Large Intestine, Sigmoid SURGICAL PATHOLOGY EXAM Bolivar Juarez MD 4/19/2021  9:04 AM     C : Small bowel mesentary nodule Tissue Small Intestine, Ileum SURGICAL PATHOLOGY EXAM Bolivar Juarez MD 4/19/2021  9:05 AM     D : Uterus, Cervix, Bilateral Fallopian Tubes and ovaries Tissue Uterus, Cervix and Bilateral Fallopian Tubes SURGICAL PATHOLOGY EXAM Bolivar Juarez MD 4/19/2021  9:47 AM     E : small bowel mesenteric nodules Tissue Small Intestine, Ileum SURGICAL PATHOLOGY EXAM Bolivar Juarez MD 4/19/2021  9:58 AM     F : Splenic flexure transverse colon nodule  Tissue Large Intestine, Transverse SURGICAL PATHOLOGY EXAM Bolivar Juarez MD 4/19/2021 10:09 AM     G : Omentum Tissue Omentum SURGICAL  PATHOLOGY EXAM Bolivar Juarez MD 4/19/2021 10:31 AM     H : Peritoneal Pancreatic nodule  Tissue Peritoneum SURGICAL PATHOLOGY EXAM Bolivar Juarez MD 4/19/2021 10:34 AM     I : Right Anjel diaphram peritoneum Tissue Other SURGICAL PATHOLOGY EXAM Bolivar Juarez MD 4/19/2021 11:03 AM     J : Right Liver Surface nodule Tissue Liver SURGICAL PATHOLOGY EXAM Bolivar Juarez MD 4/19/2021 11:17 AM     K : Left Lower Liver Edge Tissue Liver SURGICAL PATHOLOGY EXAM Bolivar Juarez MD 4/19/2021 11:20 AM     L : small bowel mesentary nodules #3 Tissue Small Intestine, Ileum SURGICAL PATHOLOGY EXAM Bolivar Juarez MD 4/19/2021 11:52          OPERATIVE FINDINGS:   Normal appearing external female genitalia, small mobile uterus, small sub-centimeter areas of miliary disease in small bowel mesentery, small sub-centimeter serosal liver nodule, small 1cm perisplenic/splenic flexure enlarged lymph node. Moderate adhesive disease from uterus to rectosigmoid. At the conclusion of the case, the debulking status was R0, no visible residual disease.      COMPLICATIONS: None noted.      CONDITION: Extubated, stable on transfer.      INDICATIONS FOR PROCEDURE: Taran Regalado is a 64 yo female  who initially presented with advanced stage serous ovarian carcinoma. She is s/p 3 cycles of neoadjuvant chemotherapy with good response to chemotherapy . She was determined to be good candidate for interval debulking surgery. Following a thorough discussion of the risks, benefits, indications and alternatives she consented to the above procedure.     OPERATIVE PROCEDURE IN DETAIL: The consent was reviewed with the patient in the preoperative setting and confirmed. She received antibiotic prophylaxis, Heparin for DVT prophylaxis, and had sequential compression devices applied to her calves. She was transferred to the operating room and placed on the operating room table in the supine position. General anesthesia was  administered in the usual manner with difficulty. She was placed in dorsal lithotomy position in [place] stirrups. Hickey catheter was placed under sterile technique. The patient was then prepped and draped in the standard fashion. A timeout was called at which point the patient's name, operative procedure and site was confirmed by the operative team.      Subsequently, attention was turned to the abdomen. The scalpel was used to incise the skin from 6 cm below and 6 cm above the umbilicus. This skin incision was carried through the subcutaneous layer with Bovie cautery. The fascia and peritoneum were incised with the scalpel. Entry was confirmed, the fascial and peritoneal incisions were extended superiorly and inferiorly with the Bovie cautery. Exploration of the pelvis and upper abdomen was performed with the findings described above. The incision was extended to the pubic symphysis and superiorly to the xyphoid process with visualization of underlying structures. The Bookwalter retractor was positioned. The small bowel was run without evidence of disease. There were small sub-centimeter areas of miliary disease in small and large bowel mesentery. The small bowel was packed up into the upper abdomen. A small omental  Biopsy was then obtained for research and sent to Pathology.    Attention was then turned to performing the hysterectomy. First, on the left, the round ligament was identified and transected with cautery. We then opened the retroperitoneum parallel to the infundibulopelvic ligament. The sigmoid colon was attached at the pelvic brim on the left, so these adhesions were taken down with sharp scissors.We then identified the ureter in the pelvis on the left.  The left ovary and fallopian tube were elevated. A window was then made in a nonvascular portion of the broad ligament and the IP ligament isolated, cut and ligated.  Attention was turned back to the right adnexa. On the right, a similar procedure  of transecting the round and peritoneum parallel to the infundibulopelvic ligament. We then identified the right ureter. The right ovary and fallopian tube were elevated. A window was then made in a nonvascular portion of the broad ligament and the IP ligament isolated,cut and ligated.  The bladder was carefully dissected off of the fundus of the uterus.  The Bovie cautery was then used to skeletonize the bilateral uterine vessels and then to develop the bladder flap to the level of the upper vagina. The uterine vessels were then clamped with Hemant clamp, cut and suture ligated. Straight Hemant clamps were then used to transect and suture ligate the cardinal ligament down to the level of the cervicovaginal junction. The bilateral uterosacral ligaments were then grasped with Farfan tenaculums and then cut with Chapis scissors. The corners of the vagina were then grasped and the scissors were then used to enter the vagina and amputate the cervix; the cervix, and uterus were then sent to Pathology. The anterior and posterior surfaces of the vaginal cuff were then grasped Kocher clamps.The vagina was then irrigated with Betadine. The vaginal cuff was closed with 0-Vicryl in an running locked fashion in one direction and imbricating second running layer. There was excellent reapproximation and hemostasis. Bilateral ureters were palpated at their insertion point in the bladder and noted to be lateral to the cuff closure.       Attention was then turned to full mobilization of the ascending and descending colon. We began by incising the peritoneum along the line of Toldt bilaterally. The splenic and hepatic flexure were fully mobilized using blunt dissection and Bovie cautery. The colon was visualized to be free of disease except for a small portion of the distal cecum. Attention was then turned to removing the residual omentum. The Impact Ligasure device was used to coagulate the isolated vascular pedicles and then  the omentum was sent to Pathology.     Upper abdomen was explored again. There were multiple tumor implants, all <1 cm on the surface of right hemidiaphragm. At this point, we decided to extend the incision superiorly upto the xiphisternum for better visualization.We then set up the Bookwalter retractor and started to mobilize the liver.The falciform ligament was resected and peritoneum open up along the anterior liver to the bilateral coronary ligaments. We then started to mobilize the lateral liver edge. We then needed to strip the right hemidiaphragm peritoneum to remove the numerous tumor nodules along its superior and right lateral surface until it was smooth. All  tumor nodules were removed with the diaphragm stripping. There At the conclusion of the case, the debulking status was R0 with no visible residual tumor. There was a small sub-centimeter serosal liver nodule which was also resected. On further exploration, we also noted a small 1cm perisplenic/splenic flexure enlarged lymph node on left side which was also resected.      The bowel was run in its entirety. There were few small and large bowel mesenteric nodules noted measuring 5 mm which were resected. Next, we performed proctoscopy and noted normal bowel integrity with no leak. At the conclusion of the case, the debulking status was R0 with no visible residual tumor.      Next we proceeded with closing the abdomen. The pelvis and abdomen were copiously irrigated with warm water. The bookwalter retractor was removed, and the bowel unpaked. All laparotomy sponges were removed. Fascia was closed with 0-Looped PDS in 2 segments. Subcutaneous tissue was copiously irrigated and hemostasis visualized. The subcutaneous tissue was closed in 2 layers: running layer of the meño's fascia and running subdermal layer. The skin was then closed with 3-0 Monocryl in a subcuticular fashion. Sterile dressing was applied.      The patient tolerated the procedure well.  Sponge, lap, and needle counts were correct x2. Instrument counts were correct x 1. She was extubated and taken to PACU in stable condition.     Bolivar Juarez MD, MS    Department of Obstetrics and Gynecology   Division of Gynecologic Oncology   [facility]  Phone: [phone]

## 2021-04-25 NOTE — PLAN OF CARE
S:  Patient is feeling better, small amounts of nausea, sore leg/ankle.  Pain aching in abdomen.      O/A:  Vital Signs are stable, patient is reporting pain at a 5 out of 10 saying it is aching.  Pain is being managed with tylenol.  Voiding regularly with assistance x1 to the bathroom, one soft bowel movements, no gas.  Patient is tolerating clear liquid diet.  Treating nausea with prochlorperazine.  Hemoglobin levels are stable.  Patient went to PT twice today and talked with OT.  Also went down for ultrasound and Xray for ankle.     P:  Continue to encourage ambulation, monitor pain in ankle/foot.  Continue to work with OT.

## 2021-04-26 ENCOUNTER — APPOINTMENT (OUTPATIENT)
Dept: PHYSICAL THERAPY | Facility: CLINIC | Age: 66
End: 2021-04-26
Attending: OBSTETRICS & GYNECOLOGY
Payer: COMMERCIAL

## 2021-04-26 ENCOUNTER — VIRTUAL VISIT (OUTPATIENT)
Dept: ONCOLOGY | Facility: CLINIC | Age: 66
End: 2021-04-26
Attending: GENETIC COUNSELOR, MS
Payer: COMMERCIAL

## 2021-04-26 ENCOUNTER — APPOINTMENT (OUTPATIENT)
Dept: OCCUPATIONAL THERAPY | Facility: CLINIC | Age: 66
End: 2021-04-26
Attending: OBSTETRICS & GYNECOLOGY
Payer: COMMERCIAL

## 2021-04-26 DIAGNOSIS — Z15.02 HEREDITARY BREAST AND OVARIAN CANCER SYNDROME ASSOCIATED WITH MUTATION IN BRCA1 GENE: Primary | ICD-10-CM

## 2021-04-26 DIAGNOSIS — C56.9 OVARIAN CANCER, UNSPECIFIED LATERALITY (H): ICD-10-CM

## 2021-04-26 DIAGNOSIS — Z15.01 HEREDITARY BREAST AND OVARIAN CANCER SYNDROME ASSOCIATED WITH MUTATION IN BRCA1 GENE: Primary | ICD-10-CM

## 2021-04-26 LAB
ALBUMIN UR-MCNC: NEGATIVE MG/DL
ANION GAP SERPL CALCULATED.3IONS-SCNC: 7 MMOL/L (ref 3–14)
APPEARANCE UR: CLEAR
BASOPHILS # BLD AUTO: 0 10E9/L (ref 0–0.2)
BASOPHILS NFR BLD AUTO: 0.3 %
BILIRUB UR QL STRIP: NEGATIVE
BUN SERPL-MCNC: 7 MG/DL (ref 7–30)
CALCIUM SERPL-MCNC: 8.2 MG/DL (ref 8.5–10.1)
CHLORIDE SERPL-SCNC: 106 MMOL/L (ref 94–109)
CO2 SERPL-SCNC: 26 MMOL/L (ref 20–32)
COLOR UR AUTO: NORMAL
CREAT SERPL-MCNC: 0.65 MG/DL (ref 0.52–1.04)
DIFFERENTIAL METHOD BLD: ABNORMAL
EOSINOPHIL # BLD AUTO: 0.1 10E9/L (ref 0–0.7)
EOSINOPHIL NFR BLD AUTO: 3.7 %
ERYTHROCYTE [DISTWIDTH] IN BLOOD BY AUTOMATED COUNT: 19 % (ref 10–15)
GFR SERPL CREATININE-BSD FRML MDRD: >90 ML/MIN/{1.73_M2}
GLUCOSE SERPL-MCNC: 84 MG/DL (ref 70–99)
GLUCOSE UR STRIP-MCNC: NEGATIVE MG/DL
HCT VFR BLD AUTO: 26.5 % (ref 35–47)
HGB BLD-MCNC: 8.5 G/DL (ref 11.7–15.7)
HGB UR QL STRIP: NEGATIVE
IMM GRANULOCYTES # BLD: 0 10E9/L (ref 0–0.4)
IMM GRANULOCYTES NFR BLD: 0.3 %
KETONES UR STRIP-MCNC: NEGATIVE MG/DL
LEUKOCYTE ESTERASE UR QL STRIP: NEGATIVE
LYMPHOCYTES # BLD AUTO: 0.7 10E9/L (ref 0.8–5.3)
LYMPHOCYTES NFR BLD AUTO: 17.7 %
MCH RBC QN AUTO: 32.6 PG (ref 26.5–33)
MCHC RBC AUTO-ENTMCNC: 32.1 G/DL (ref 31.5–36.5)
MCV RBC AUTO: 102 FL (ref 78–100)
MONOCYTES # BLD AUTO: 0.5 10E9/L (ref 0–1.3)
MONOCYTES NFR BLD AUTO: 12.4 %
NEUTROPHILS # BLD AUTO: 2.5 10E9/L (ref 1.6–8.3)
NEUTROPHILS NFR BLD AUTO: 65.6 %
NITRATE UR QL: NEGATIVE
NRBC # BLD AUTO: 0 10*3/UL
NRBC BLD AUTO-RTO: 0 /100
PH UR STRIP: 7 PH (ref 5–7)
PLATELET # BLD AUTO: 104 10E9/L (ref 150–450)
POTASSIUM SERPL-SCNC: 3.5 MMOL/L (ref 3.4–5.3)
RADIOLOGIST FLAGS: ABNORMAL
RBC # BLD AUTO: 2.61 10E12/L (ref 3.8–5.2)
RBC #/AREA URNS AUTO: <1 /HPF (ref 0–2)
SODIUM SERPL-SCNC: 139 MMOL/L (ref 133–144)
SOURCE: NORMAL
SP GR UR STRIP: 1 (ref 1–1.03)
SQUAMOUS #/AREA URNS AUTO: 0 /HPF (ref 0–1)
UROBILINOGEN UR STRIP-MCNC: NORMAL MG/DL (ref 0–2)
WBC # BLD AUTO: 3.7 10E9/L (ref 4–11)
WBC #/AREA URNS AUTO: 2 /HPF (ref 0–5)

## 2021-04-26 PROCEDURE — 81001 URINALYSIS AUTO W/SCOPE: CPT | Performed by: NURSE PRACTITIONER

## 2021-04-26 PROCEDURE — 97535 SELF CARE MNGMENT TRAINING: CPT | Mod: GO

## 2021-04-26 PROCEDURE — 120N000002 HC R&B MED SURG/OB UMMC

## 2021-04-26 PROCEDURE — 250N000013 HC RX MED GY IP 250 OP 250 PS 637: Performed by: STUDENT IN AN ORGANIZED HEALTH CARE EDUCATION/TRAINING PROGRAM

## 2021-04-26 PROCEDURE — 250N000013 HC RX MED GY IP 250 OP 250 PS 637: Performed by: OBSTETRICS & GYNECOLOGY

## 2021-04-26 PROCEDURE — 250N000013 HC RX MED GY IP 250 OP 250 PS 637: Performed by: NURSE PRACTITIONER

## 2021-04-26 PROCEDURE — 99223 1ST HOSP IP/OBS HIGH 75: CPT | Performed by: CLINICAL NURSE SPECIALIST

## 2021-04-26 PROCEDURE — 85004 AUTOMATED DIFF WBC COUNT: CPT | Performed by: STUDENT IN AN ORGANIZED HEALTH CARE EDUCATION/TRAINING PROGRAM

## 2021-04-26 PROCEDURE — 85027 COMPLETE CBC AUTOMATED: CPT | Performed by: STUDENT IN AN ORGANIZED HEALTH CARE EDUCATION/TRAINING PROGRAM

## 2021-04-26 PROCEDURE — 999N000069 HC STATISTIC GENETIC COUNSELING, < 16 MIN: Mod: GT | Performed by: GENETIC COUNSELOR, MS

## 2021-04-26 PROCEDURE — 97750 PHYSICAL PERFORMANCE TEST: CPT | Mod: GP

## 2021-04-26 PROCEDURE — 97530 THERAPEUTIC ACTIVITIES: CPT | Mod: GP

## 2021-04-26 PROCEDURE — 36415 COLL VENOUS BLD VENIPUNCTURE: CPT | Performed by: STUDENT IN AN ORGANIZED HEALTH CARE EDUCATION/TRAINING PROGRAM

## 2021-04-26 PROCEDURE — 85048 AUTOMATED LEUKOCYTE COUNT: CPT | Performed by: NURSE PRACTITIONER

## 2021-04-26 PROCEDURE — 87040 BLOOD CULTURE FOR BACTERIA: CPT | Performed by: NURSE PRACTITIONER

## 2021-04-26 PROCEDURE — 36415 COLL VENOUS BLD VENIPUNCTURE: CPT | Performed by: NURSE PRACTITIONER

## 2021-04-26 PROCEDURE — 99024 POSTOP FOLLOW-UP VISIT: CPT | Mod: GC | Performed by: OBSTETRICS & GYNECOLOGY

## 2021-04-26 PROCEDURE — 80048 BASIC METABOLIC PNL TOTAL CA: CPT | Performed by: STUDENT IN AN ORGANIZED HEALTH CARE EDUCATION/TRAINING PROGRAM

## 2021-04-26 PROCEDURE — 250N000011 HC RX IP 250 OP 636: Performed by: NURSE PRACTITIONER

## 2021-04-26 RX ORDER — PIPERACILLIN SODIUM, TAZOBACTAM SODIUM 3; .375 G/15ML; G/15ML
3.38 INJECTION, POWDER, LYOPHILIZED, FOR SOLUTION INTRAVENOUS EVERY 6 HOURS
Status: DISCONTINUED | OUTPATIENT
Start: 2021-04-26 | End: 2021-04-27

## 2021-04-26 RX ORDER — OLANZAPINE 5 MG/1
5 TABLET, ORALLY DISINTEGRATING ORAL
Status: DISCONTINUED | OUTPATIENT
Start: 2021-04-26 | End: 2021-04-27 | Stop reason: HOSPADM

## 2021-04-26 RX ADMIN — GABAPENTIN 600 MG: 600 TABLET, FILM COATED ORAL at 22:37

## 2021-04-26 RX ADMIN — OLANZAPINE 5 MG: 5 TABLET, ORALLY DISINTEGRATING ORAL at 22:37

## 2021-04-26 RX ADMIN — AMITRIPTYLINE HYDROCHLORIDE 100 MG: 100 TABLET, FILM COATED ORAL at 22:37

## 2021-04-26 RX ADMIN — ACETAMINOPHEN 650 MG: 325 TABLET, FILM COATED ORAL at 08:37

## 2021-04-26 RX ADMIN — PIPERACILLIN SODIUM AND TAZOBACTAM SODIUM 3.38 G: 3; .375 INJECTION, POWDER, LYOPHILIZED, FOR SOLUTION INTRAVENOUS at 23:56

## 2021-04-26 RX ADMIN — ENOXAPARIN SODIUM 40 MG: 100 INJECTION SUBCUTANEOUS at 13:46

## 2021-04-26 RX ADMIN — PIPERACILLIN SODIUM AND TAZOBACTAM SODIUM 3.38 G: 3; .375 INJECTION, POWDER, LYOPHILIZED, FOR SOLUTION INTRAVENOUS at 12:57

## 2021-04-26 RX ADMIN — PROCHLORPERAZINE MALEATE 5 MG: 5 TABLET ORAL at 08:44

## 2021-04-26 RX ADMIN — PROCHLORPERAZINE MALEATE 5 MG: 5 TABLET ORAL at 22:37

## 2021-04-26 RX ADMIN — PIPERACILLIN SODIUM AND TAZOBACTAM SODIUM 3.38 G: 3; .375 INJECTION, POWDER, LYOPHILIZED, FOR SOLUTION INTRAVENOUS at 18:27

## 2021-04-26 RX ADMIN — HYDROMORPHONE HYDROCHLORIDE 2 MG: 2 TABLET ORAL at 00:14

## 2021-04-26 RX ADMIN — ACETAMINOPHEN 650 MG: 325 TABLET, FILM COATED ORAL at 13:46

## 2021-04-26 RX ADMIN — ACETAMINOPHEN 650 MG: 325 TABLET, FILM COATED ORAL at 19:33

## 2021-04-26 ASSESSMENT — ACTIVITIES OF DAILY LIVING (ADL)
ADLS_ACUITY_SCORE: 17
ADLS_ACUITY_SCORE: 18

## 2021-04-26 NOTE — LETTER
"    Cancer Risk Management  Program Locations    [facility]  [facility]  Mailing Address  Cancer Risk Management Program  [address]  [place] [address]    New patient appointments  [phone]  May 6, 2021    Taran Regalado  [address] [place] [address]  [facility] [id number]      Dear Taran,      It was a pleasure speaking with you on 4/26/2021. Here is a copy of the progress note from our discussion. If you have any additional questions, please feel free to call.    Referring Provider: JERICHO Wilkinson CNP; Bolivar Juarez MD     Presenting Information:   Taran returned to the Cancer Risk Management Program (video visit) to discuss her BRCA1 and BRCA2 genetic testing results. BRCA1/2 testing was ordered from [facility]. This testing was done because of her personal history of ovarian cancer as part of the Precision Medicine Study.     Genetic Testing Results: POSITIVE  Taran is POSITIVE for a BRCA1 mutation.  This mutation is called [id number]. 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 We discussed the impact of this testing on Taran in detail.     BRCA1: [id number]; [id number] ([id number])    Of note, Taran is enrolled in the Precision Medicine Study.  Results from this study should be available in the next few weeks.        A copy of the test report can be found in the Laboratory tab, dated 3/26/2021, and named \"Hereditary Cancer BRCA1 and BRCA2\".     BRCA1 Cancer Risks:  Women with mutations in the BRCA1 gene have a:    60-90% lifetime risk of developing breast cancer. This is much greater than the general population breast cancer risk of 12%    39-58% lifetime risk of developing ovarian " cancer. Some studies suggest that this risk may be as high as 62%. This is much greater than the general population ovarian cancer risk of 1-2%    Men with mutations in BRCA1 have a:      Increased lifetime prostate cancer risk, including a higher likelihood for advanced or metastatic disease.      1.2% lifetime male breast cancer risk. This is greater than the general population risk of 0.1%.      Men and Women with mutations in BRCA1 have a:    Up to 5% lifetime risk of developing pancreatic cancer.    Increased lifetime risk of melanoma.     Also, though no exact lifetime risks are available at this time, there may be increased risks for other cancers.     Cancer Screening and Prevention:  The following screening is recommended for women who have a mutation in the BRCA1 gene, per current National Comprehensive Cancer Network (NCCN) guidelines.    Breast awareness starting at age 18    Clinical breast exams starting at age 25; repeated every 6-12 months    Annual breast MRI from age 25-29    Annual mammograms with consideration of tomosynthesis and breast MRI alternating every 6 months starting at age 30    Consider transvaginal ultrasound and a CA-125 blood test starting at age 30-35, though the benefits of this screening are unclear.    We discussed that prophylactic mastectomy is a surgical option, which reduces breast cancer risk by 90%. Chemoprevention with Tamoxifen can reduce breast cancer risk in some women. Surgical risk reduction options and Tamoxifen use should be discussed with Annae's physician.      We discussed the limitations of screening for ovarian cancer. Thus, prophylactic removal of the ovaries and fallopian tubes is an option and is recommended after women are finished planning their families or between 35 and 40.     Some data suggests that there may be an increased risk for serous uterine cancer in women with BRCA1 mutations. However, further research is needed in this area, and data is  currently limited. Women with BRCA1 mutations are encouraged to discuss the risks and benefits of hysterectomy at the time of oophorectomy with their provider before surgery.    Using oral contraceptives for five years or more has been shown to reduce ovarian cancer risk by around 50%.  The data are still inconclusive as to whether or not using oral contraceptives will increase breast cancer risk in BRCA1 mutation carriers.     Screening for pancreatic cancer is not offered on a routine basis, as the benefits of current screening methods are unknown. Pancreatic cancer screening may be considered based on family history, though. Screening for melanoma may also be considered.  Some chemotherapies for certain cancers may be more effective in individuals with BRCA1 mutations. Taran should discuss this with her physicians.     Of note, the above information is based on our current understanding of Taran's genetic findings. Taran is encouraged to reach out to me regularly regarding any pertinent updates to her personal and/or family history of cancer, as our understanding of the genetic findings in her family may change over time.     Implications for Family Members:  We reviewed that mutations in the BRCA1 gene are inherited in an autosomal dominant pattern. This means that each of Taran's children have a 50% chance of inheriting the same mutation.  I am happy to help her relatives connect with a genetic counselor in their area if they would like to discuss testing.    In very rare situations in which both parents have a mutation in the BRCA1 gene, their children each have a 25% risk for Fanconi anemia. Fanconi anemia is a rare condition with onset in childhood that often results in physical abnormalities, growth retardation, bone marrow failure, and increased risk for cancer. For individuals of childbearing age with BRCA1 mutations, genetic counseling and genetic testing may be advised for their  "partners.    Additional Testing Considerations:  Taran is enrolled in the Precision Medicine Program in Ovarian Cancer and will be receiving genetic analysis of other cancer-associated genes as part of this program. These results will most likely be available in the next couple of weeks. Screening and management recommendations for Taran and her relatives may change based on these results.     Plan:  1.  BRCA1/2 genetic test results were discussed today  2.  I will provide a \"Dear Relative\" letter for Taran to share with her family members.  3.  Taran will be contacted to review her remaining genetic test results as part of the Precision Medicine Study.      If Taran has additional questions in the meantime, I encouraged her to contact me directly at [phone].     Lauren Ibanez MS, [facility]  Licensed, Certified Genetic Counselor  [facility]                                "

## 2021-04-26 NOTE — PROGRESS NOTES
"4/26/2021    Referring Provider: JERICHO Wilkinson CNP; Bolivar Juarez MD     Presenting Information:   Taran returned to the Cancer Risk Management Program (video visit) to discuss her BRCA1 and BRCA2 genetic testing results. BRCA1/2 testing was ordered from [facility]. This testing was done because of her personal history of ovarian cancer as part of the Precision Medicine Study.     Genetic Testing Results: POSITIVE  Taran is POSITIVE for a BRCA1 mutation.  This mutation is called [id number]. 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 We discussed the impact of this testing on Taran in detail.     BRCA1: [id number]; [id number] ([id number])    Of note, Taran is enrolled in the Precision Medicine Study.  Results from this study should be available in the next few weeks.        A copy of the test report can be found in the Laboratory tab, dated 3/26/2021, and named \"Hereditary Cancer BRCA1 and BRCA2\".     BRCA1 Cancer Risks:  Women with mutations in the BRCA1 gene have a:    60-90% lifetime risk of developing breast cancer. This is much greater than the general population breast cancer risk of 12%    39-58% lifetime risk of developing ovarian cancer. Some studies suggest that this risk may be as high as 62%. This is much greater than the general population ovarian cancer risk of 1-2%    Men with mutations in BRCA1 have a:      Increased lifetime prostate cancer risk, including a higher likelihood for advanced or metastatic disease.      1.2% lifetime male breast cancer risk. This is greater than the general population risk of 0.1%.      Men and Women with mutations in BRCA1 have a:    Up to 5% lifetime risk of developing pancreatic cancer.    Increased lifetime risk of melanoma.     Also, though no exact lifetime risks are available at this time, there may be increased risks for other " cancers.     Cancer Screening and Prevention:  The following screening is recommended for women who have a mutation in the BRCA1 gene, per current National Comprehensive Cancer Network (NCCN) guidelines.    Breast awareness starting at age 18    Clinical breast exams starting at age 25; repeated every 6-12 months    Annual breast MRI from age 25-29    Annual mammograms with consideration of tomosynthesis and breast MRI alternating every 6 months starting at age 30    Consider transvaginal ultrasound and a CA-125 blood test starting at age 30-35, though the benefits of this screening are unclear.    We discussed that prophylactic mastectomy is a surgical option, which reduces breast cancer risk by 90%. Chemoprevention with Tamoxifen can reduce breast cancer risk in some women. Surgical risk reduction options and Tamoxifen use should be discussed with Annae's physician.      We discussed the limitations of screening for ovarian cancer. Thus, prophylactic removal of the ovaries and fallopian tubes is an option and is recommended after women are finished planning their families or between 35 and 40.     Some data suggests that there may be an increased risk for serous uterine cancer in women with BRCA1 mutations. However, further research is needed in this area, and data is currently limited. Women with BRCA1 mutations are encouraged to discuss the risks and benefits of hysterectomy at the time of oophorectomy with their provider before surgery.    Using oral contraceptives for five years or more has been shown to reduce ovarian cancer risk by around 50%.  The data are still inconclusive as to whether or not using oral contraceptives will increase breast cancer risk in BRCA1 mutation carriers.     Screening for pancreatic cancer is not offered on a routine basis, as the benefits of current screening methods are unknown. Pancreatic cancer screening may be considered based on family history, though. Screening for  "melanoma may also be considered.  Some chemotherapies for certain cancers may be more effective in individuals with BRCA1 mutations. Taran should discuss this with her physicians.     Of note, the above information is based on our current understanding of Taran's genetic findings. Taran is encouraged to reach out to me regularly regarding any pertinent updates to her personal and/or family history of cancer, as our understanding of the genetic findings in her family may change over time.     Implications for Family Members:  We reviewed that mutations in the BRCA1 gene are inherited in an autosomal dominant pattern. This means that each of Taran's children have a 50% chance of inheriting the same mutation.  I am happy to help her relatives connect with a genetic counselor in their area if they would like to discuss testing.    In very rare situations in which both parents have a mutation in the BRCA1 gene, their children each have a 25% risk for Fanconi anemia. Fanconi anemia is a rare condition with onset in childhood that often results in physical abnormalities, growth retardation, bone marrow failure, and increased risk for cancer. For individuals of childbearing age with BRCA1 mutations, genetic counseling and genetic testing may be advised for their partners.    Additional Testing Considerations:  Taran is enrolled in the Precision Medicine Program in Ovarian Cancer and will be receiving genetic analysis of other cancer-associated genes as part of this program. These results will most likely be available in the next couple of weeks. Screening and management recommendations for Taran and her relatives may change based on these results.     Plan:  1.  BRCA1/2 genetic test results were discussed today  2.  I will provide a \"Dear Relative\" letter for Taran to share with her family members.  3.  Taran will be contacted to review her remaining genetic test results as part of the Precision Medicine " Study.      If Defanny has additional questions in the meantime, I encouraged her to contact me directly at [phone].     Time spent face to face: 15 minutes    Lauren Ibanez MS, [facility]  Licensed, Certified Genetic Counselor  [facility]  Phone: [phone]

## 2021-04-26 NOTE — PLAN OF CARE
Physical Therapy Discharge Summary    Reason for therapy discharge:    All goals and outcomes met, no further needs identified.    Progress towards therapy goal(s). See goals on Care Plan in [facility] electronic health record for goal details.  Goals met    Therapy recommendation(s):    Continued therapy is recommended.  Rationale/Recommendations:  Recommending OP PT for continued post op conditioning and for follow-up on possible L ankle injury post fall on 4/23/21. Also provided recommendation for bracing/stability options for L ankle comfort at time of discharge. .

## 2021-04-26 NOTE — PROGRESS NOTES
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     24 hour events:   - Advanced to CLD  - L ankle XR, bilateral LE doppler  - PT eval, recommend home care PT    Subjective:   Justen is doing well this morning. Her pain is controlled. She has been tolerating a clear liquid diet. Denies nausea or vomiting. Reports a large BM yesterday and passing gas. Ambulating without dizziness. Does continue to have some left leg discomfort. Voiding spontaneously. No chest pain or shortness of breath.    Objective:   Patient Vitals for the past 24 hrs:   BP Temp Temp src Pulse Resp SpO2   04/25/21 2252 114/63 99.1  F (37.3  C) Oral 95 16 97 %   04/25/21 1526 114/55 99.1  F (37.3  C) Oral 91 16 97 %   04/25/21 1018 112/57 -- -- 94 -- 97 %   04/25/21 0732 106/59 98.8  F (37.1  C) Oral 81 16 --     Gen: A&O, resting in bed with sleeping mask and white noise   Cardio: Well perfused    Resp: Resting comfortably on room air   Abdomen: Soft, moderately distended. Steri strips in place.   Extremities: BLEs with Jeancarlos hose in place, 1+ edema L>R    Intake/Output:  Last 24 hours//Since midnight  PO: 910//-  IV: 1334//-    U: 2550+2x//400  Stool: 2x//-    Lines/Drains:   PIV    Labs/imaging:  BMP  Recent Labs   Lab 04/25/21  0626 04/24/21  0814 04/23/21  0502 04/21/21  0632    137 136 138   POTASSIUM 4.2 4.0 3.6 3.6   CHLORIDE 107 105 105 108   HORACIO 8.1* 8.2* 8.2* 8.3*   CO2 28 27 26 25   BUN 11 11 15 12   CR 0.72 0.71 0.82 0.76   GLC 85 102* 117* 108*     CBC  Recent Labs   Lab 04/25/21  0626 04/24/21  1216 04/24/21  0814 04/23/21  2221   WBC 3.7* 4.2 4.0 4.1   RBC 2.69* 2.92* 2.92* 2.21*   HGB 8.7* 8.9* 9.0* 6.9*   HCT 26.9* 28.0* 27.4* 21.6*    96 94 98   MCH 32.3 30.5 30.8 31.2   MCHC 32.3 31.8 32.8 31.9   RDW 19.3* 19.5* 19.4* 19.6*   PLT 92* 98* 93* 94*     US  Lower Extremity Venous Duplex Bilateral  Narrative: EXAMINATION: DOPPLER VENOUS ULTRASOUND OF BILATERAL LOWER EXTREMITIES,  4/25/2021 6:50 PM     COMPARISON: DVT ultrasound 2/4/2021.    HISTORY: Rule out DVT. Left greater than right edema.    TECHNIQUE: Gray-scale evaluation with compression, spectral flow and  color Doppler assessment of the deep venous system of both legs from  groin to knee, and then at the ankles.    FINDINGS:  In both lower extremities, the common femoral, femoral, popliteal and  posterior tibial veins demonstrate normal compressibility and blood  flow.    1 x 0.8 x 1.2 cm avascular hypoechoic cystic structure with posterior  acoustic enhancement located along the left lateral ankle. Soft tissue  edema of the bilateral distal lower extremities.  Impression: IMPRESSION:  1.  No evidence of deep venous thrombosis in either lower extremity.  2.  Small nonspecific avascular hypoechoic cystic structure along the  lateral aspect of the ankle, possibly representing hematoma or  ganglion cyst given location. If there is persistent clinical concern,  consider further evaluation with MRI.  3.  Bilateral distal lower extremity soft tissue edema, left greater  than right.    I have personally reviewed the examination and initial interpretation  and I agree with the findings.    CASA AGUILAR MD      Assessment:   66yo POD#7 s/p XL, DANAE-BSO, omentectomy, debulking, resection of liver nodules, diaphragm stripping, procto for stage IV high grade serous ovarian cancer, BRCA 1, s/p 3C neoadjuvant carbo/taxol. Hemoglobin downtrended on POD#5, concern for slow intraabdominal bleeding in the setting of anticoagulation with Xarelto. S/p IR consult, no target for embolization. S/p 4u pRBC and 1u FFP; Hgb initially downtrended, but most recently with appropriate rise, stable. Also with concern for evolving ileus, pt currently on a CLD.    Active Problem list:  - Postoperative state  - Ovarian cancer  - H/o  pulmonary embolism  - H/o Afib w/RVR  - Restless leg syndrome, insomnia  - Concern for postoperative bleeding  - Concern for ileus   - Left ankle pain, lower extremity edema    Plan:    #Nausea  #Concern for postop ileus  - Advanced to CLD 4/25. Nausea improved. Had a large BM yesterday. Possibly advance diet today given return of bowel function.   - Discontinue mIVF this AM as tolerating more PO.    #Concern for postoperative bleeding  Hemoglobin downtrended, received 2u pRBC, rita at 6.9 (4/23), thus received an additional 2u pRBC and 1u FFP on 4/23. Due to inappropriate rise in Hgb, and intermittent hypotension, a CTA was ordered and showed intraperitoneal hemorrhage anterior to bladder with active extravasation from R internal epigastric branch vessel, and a second collection possibly from inferior mesenteric branch vessel. IR was consulted who do not believe there no targets for embolization.   - Most recently appropriate, stable rise in Hgb following transfusion  - Repeat Hgb pending this AM    #Left ankle pain, LE edema  - Ankle XR 4/25, read pending  - B/l LE doppler with no DVT, b/l soft tissue edema, small cystic structure L ankle c/w hematoma vs ganglion cyst  - Continue to monitor closely  - MILA hose    #Postoperative state  - Pain: S/p TAPs. Scheduled Tylenol. Prn PO dilaudid, robaxin.   - Heme: See below.   - S/p Hickey, voiding.  - GI: Anti-emetics, stool softeners.   - Ppx: SCDs, IS. See below for anticoagulation plan.    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 S/p 3C neoadjuvant carbo/taxol.  - Final pathology returned metastatic high grade serous carcinoma  - Will follow up outpatient for ongoing treatment    #H/o pulmonary embolism  - Xarelto held due to concern for intraabdominal bleeding.  - If hemoglobin stabilizes will start lovenox, will likely start with ppx dosing     #H/o afib w/RVR  #Postop hypotension  - Echo 4/21 wnl   - Episode of orthostatic hypotension  POD#3 and morning of POD#4. Hgb drop as above, s/p transfusion x4u pRBC and 1u FFP.   - Normotensive yesterday evening and this morning    #Restless leg syndrome, insomnia  - Continue home gabapentin, amitriptyline. Ambien discontinued and started remeron 4/21.    Van Christianson MD  OB/GYN Resident, PGY-3  4/26/2021 6:24 AM     Provider Disclosure:   I agree with above History, Review of Systems, Physical exam and Plan. I have reviewed the content of the documentation and have edited it as needed. I have personally performed the services documented here and the documentation accurately represents those services and the decisions I have made.     Electronically signed by:   Teri Macias MD   Gynecologic Oncology   [facility]

## 2021-04-26 NOTE — CONSULTS
[facility]  Palliative Care Consultation Note    Patient: Taran Regalado  Date of Admission:  4/19/2021    Requesting Clinician / Team: Carlita England APRN CNP  Reason for consult: Symptom management    Recommendations:    Olanzapine 5 mg at bedtime PRN for insomina/anxiety    Discontinue mirtazapine as patient stopped it about a week prior to admission as it was not helpful    Highly encouraged CBT-I    Encouraged additional non-pharmacological sleep hygiene interventions    Attempt to minimize sleep interruptions tonight as able.    She is welcome to come and see palliative care in clinic and can call for an appointment to be arranged.     May also be helpful to consider if her main concern is insomnia working with the [facility] clinic     These recommendations have been discussed with primary team.      Thank you for the opportunity to participate in the care of this patient and family. Our team: will continue to follow.     During regular M-F work hours -- if you are not sure who specifically to contact -- please contact us by sending a text page to our team consult pager at [phone].    After regular work hours and on weekends/holidays, you can call our answering service at [phone]. Also, who's on call for us is available in [facility] Smart Web.       Assessments:  Taran Regalado is a 65 year old female with metastatic high grade serous ovarian cancer and presented for surgery for is and now is s/p XL, DANAE-BSO, omentectomy, debulking, resection of liver nodules, diaphragm stripping, and procto. Palliative care was consulted due to patients concerned regarding insomnia. Also has a past medical history of RLS, migraines, afib, and PE.     Today, the patient was seen for:  Insomnia  Metastatic high grade serious ovarian cancer  Anxiety     Prognosis, Goals, & Planning:      Functional Status just prior to hospitalization: 1 (Restricted in  physically strenuous activity but ambulatory and able to carry out work of a light or sedentary nature)      Prognosis, Goals, and/or Advance Care Planning were addressed today: Yes        Summary/Comments: treatment focused      Patient's decision making preferences: shared with support from loved ones          Patient has decision-making capacity today for complex decisions: Partial (needs assistance with complex decisions). Some difficulty today expressing a choice in plan for medications for her sleep and anxiety. Seems to appreciate daughters involvement and trust her to help with decision making at this time.             I have concerns about the patient/family's health literacy today: No           Patient has a completed Health Care Directive: No.       Code status: Full Code    Coping, Meaning, & Spirituality:   Mood, coping, and/or meaning in the context of serious illness were addressed today: Yes  Summary/Comments: she is doing good, does have a catastrophic thought process about sleep. Was also very anxious about the surgery.     Social:     Key family / caregivers: daughter- Edie    Occupational history: worked as a  provider, recently stopped working due to medical condition, does hope to work again    History of Present Illness:  History gathered today from: medical chart    Taran Regalado is a 65 year old female with metastatic high grade serous ovarian cancer and presented for surgery for is and now is s/p XL, DANAE-BSO, omentectomy, debulking, resection of liver nodules, diaphragm stripping, and procto. Palliative care was consulted due to patients concerned regarding insomnia. Also has a past medical history of RLS, migraines, afib, and PE.     Palliative care consulted 4/26 for insomnia     Key Palliative Symptom Data:  We are not managing pain in this patient  We are not managing dyspnea in this patient  We are not managing nausea in this patient  # Anxiety severity the last 12 hours:  moderate    Patient is on opioids: bowels not assessed today.    ROS:  Comprehensive ROS is reviewed and is negative except as here & per HPI:      Past Medical History:  Past Medical History:   Diagnosis Date     Atrial fibrillation with rapid ventricular response (H)      History of cold sores      Insomnia      Migraine      Osteopenia      Pelvic mass      Peritoneal carcinomatosis (H)      Restless legs syndrome (RLS)         Past Surgical History:  Past Surgical History:   Procedure Laterality Date     APPENDECTOMY       ARTHROSCPY KNEE SURGICAL DEBRIDEMENT SHAVING ARTICULAR CARTILAGE Right      BIOPSY  2021    Biopsy to confirm ovarian cancer     DEBRIDEMENT LEFT UPPER EXTREMITY  2016     HYSTERECTOMY TOTAL ABD, LUISITO SALPINGO-OOPHORECTOMY, NODE DISSECTION, TUMOR DEBULKING, COMBINED Bilateral 2021    Procedure: HYSTERECTOMY, TOTAL, ABDOMINAL, WITH BILATERAL SALPINGO-OOPHORECTOMY, omentectomy, NEOPLASM DEBULKING,Proctoscocy, RO, Resection of liver nodules, diaphragm stripping, immobilization of liver and colon;  Surgeon: Bolivar Juarez MD;  Location: [facility] OR     LAPAROSCOPY DIAGNOSTIC (GYN) Bilateral 2021    Procedure: Diagnsotic laparoscopy, biopsies;  Surgeon: Bolivar Juarez MD;  Location: [facility] OR     LASIK       TUBAL LIGATION           Family History:  Family History   Adopted: Yes   Problem Relation Age of Onset     Cancer Mother 36     Other Cancer Mother         Bio mother  of  a female cancer  at 36     Factor V Leiden deficiency Daughter      Deep Vein Thrombosis Daughter      Diabetes Type 1 Daughter      Diabetes Daughter      Hypertension Daughter      Anesthesia Reaction No family hx of         Allergies:  No Known Allergies     Medications:  I have reviewed this patient's medication profile and medications from this hospitalization.   Noted:  Acetaminophen 650 mg q6h  Amitriptyline 100 mg at bedtime  Gabapentin 600 mg at bedtime  Mirtazapine 7.5 mg at bedtime  Miralax  BID  Senna 2 tablets BID  Hydromorphone q3h PRN- x1  Methocarbamol 500 mg TID PRN- x1  Compazine PRN- x2    Physical Exam:  Vital Signs: Temp: 97  F (36.1  C) Temp src: Oral BP: 107/53 Pulse: 82   Resp: 16 SpO2: 96 % O2 Device: None (Room air)    Weight: 164 lbs 3.2 oz    Constitutional: Awake, alert, cooperative, no apparent distress  ENT: Normocephalic, without obvious abnormality, atramatic  Lungs: No increased work of breathing, good air exchange  Abdomen: midline incision with steri strips, non-distended  Musculoskeletal: No redness, warmth, or swelling of the joints.    Neurologic: Awake, alert, oriented to name, place and time.    Neuropsychiatric: guarded, fine mood  Skin: No rashes, erythema    Data reviewed:  Recent imaging reviewed, my comments on pertinents:     IMPRESSION:   1. Intraperitoneal hemorrhage anterior to the bladder with active   extravasation suspected to arise from a right internal epigastric   branch vessel. A second collection of intraperitoneal hemorrhage is   located to the left of the surgical bed with active extravasation   suspected to arise from an inferior mesenteric artery branch vessel.   2. Post surgical changes of hysterectomy and bilateral   salpingo-oophorectomy.   3. Diffuse abdominal ascites. Extent of peritoneal metastatic disease   burden is not well assessed due to the presence of extensive   postsurgical changes. Interval follow up imaging is suggested.   4. Small bilateral pleural effusions.     Recent lab data reviewed, my comments on pertinents:   Sodium 139  Potassium 3.5  Creatinine 0.65  GFR > 90  WBC 3.7  Hemoglobin 8.5  Platelets 104    JERICHO Ceja CNS  Palliative Care Consult Team  Pager: [phone]    80 minutes spent. This includes 45 minutes face to face with patient discussing current complex health conditions and symptom management. Coordination of care with the primary team, palliative  and SW, and RNCC regarding symptom management.

## 2021-04-26 NOTE — PROGRESS NOTES
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     24 hour events:   - Febrile to 100.7  - Started zosyn  - Palliative consult: discontinue remeron, start zyprexa  - Advanced to regular diet  - Low to borderline UOP  - Restlessness overnight    Subjective:   Justen is doing ok this morning. Did not sleep well last night. Denies abdominal pain. Tolerating a regular diet, states she ate pizza yesterday. No nausea or vomiting. Passing gas and had 2 BMs yesterday. Ambulating without dizziness. Voiding without issue. Denies fevers/chills, chest pain, shortness of breath. Reports worsening of her LLE edema overnight.    Objective:   Patient Vitals for the past 24 hrs:   BP Temp Temp src Pulse Resp SpO2   04/26/21 2203 112/56 97.9  F (36.6  C) Oral 82 18 97 %   04/26/21 1656 -- 99.2  F (37.3  C) Oral -- -- --   04/26/21 1356 107/53 -- -- 82 -- 96 %   04/26/21 1200 100/55 97  F (36.1  C) Oral 86 16 95 %   04/26/21 0710 113/61 100.7  F (38.2  C) Oral 84 16 93 %     Gen: A&O, resting in bed with sleeping mask and white noise   Cardio: Well perfused    Resp: Resting comfortably on room air   Abdomen: Soft, moderately distended. Steri strips in place.  Extremities: BLEs with MILA hose in place, 2+ edema LLE.    Intake/Output:  Last 24 hours//Since midnight  PO: 240//-  IV: 800//-    U: 1150//200  Stool: 1x//-    Lines/Drains:   PIV    Labs/imaging:  BMP  Recent Labs   Lab 04/26/21  0646 04/25/21  0626 04/24/21  0814 04/23/21  0502    140 137 136   POTASSIUM 3.5 4.2 4.0 3.6   CHLORIDE 106 107 105 105   HORACIO 8.2* 8.1* 8.2* 8.2*   CO2 26 28 27 26   BUN 7 11 11 15   CR 0.65 0.72 0.71 0.82   GLC 84 85 102* 117*     CBC  Recent Labs   Lab 04/26/21  0646 04/25/21  0626 04/24/21  1216 04/24/21  0814   WBC 3.7* 3.7* 4.2 4.0   RBC 2.61* 2.69* 2.92* 2.92*   HGB 8.5*  8.7* 8.9* 9.0*   HCT 26.5* 26.9* 28.0* 27.4*   * 100 96 94   MCH 32.6 32.3 30.5 30.8   MCHC 32.1 32.3 31.8 32.8   RDW 19.0* 19.3* 19.5* 19.4*   * 92* 98* 93*     Assessment:   66yo POD#8 s/p XL, DANAE-BSO, omentectomy, debulking, resection of liver nodules, diaphragm stripping, procto for stage IV high grade serous ovarian cancer, BRCA 1, s/p 3C neoadjuvant carbo/taxol. Postop course complicated by concern for intraabdominal bleed, s/p IR consult with no target for embolization. Hemoglobin now stable s/p 4u pRBC and 1u FFP. Also with concern for postop ileus, improving. Developed low grade fever POD#7.     Active Problem list:  - Postoperative state  - Ovarian cancer  - H/o pulmonary embolism  - H/o Afib w/RVR  - Restless leg syndrome, insomnia  - Concern for postoperative intraabdominal bleed  - Concern for postoperative ileus   - Left ankle pain, lower extremity edema  - Postop fever, uncertain source    Plan:    #Nausea  #Concern for postop ileus  - Advanced to regular diet 4/26. Continued bowel function.     #Concern for postoperative bleeding  Hemoglobin downtrended, received 2u pRBC, rita at 6.9 (4/23), thus received an additional 2u pRBC and 1u FFP on 4/23. Due to inappropriate rise in Hgb, and intermittent hypotension, a CTA was ordered and showed intraperitoneal hemorrhage anterior to bladder with active extravasation from R internal epigastric branch vessel and a second collection possibly from inferior mesenteric branch vessel. IR was consulted with no targets for embolization.   - Most recently stable Hgb, normal VS  - Repeat Hgb pending this AM    #Postop fever, uncertain source  - Tm/l 100.7 on 4/26  - UA wnl, BCx collected  - No localizing symptoms. Possible superimposed infection of intraperitoneal hematoma.   - D#2 zosyn. If remains afebrile, likely transition to PO Augmentin today.     #Left ankle pain, LE edema  - Ankle XR 4/25 calcifications and soft tissue swelling, chronicity  uncertain. Patient report history of ankle injury in the past and has worn a boot at home.   - B/l LE doppler with no DVT, b/l soft tissue edema, small cystic structure L ankle c/w hematoma vs ganglion cyst  - Ambulating without issue. Continue to monitor closely.  - Continue MILA hose    #H/o pulmonary embolism  - Xarelto held due to concern for intraabdominal bleeding.  - Started ppx Lovenox 4/26 with plan to increase to therapeutic dosing outpatient 2 weeks postop pending clinical stability    #H/o afib w/RVR  #Postop hypotension  - Echo 4/21 wnl   - Episode of orthostatic hypotension POD#3 and morning of POD#4. Hgb drop as above with concern for intraabdominal bleed, s/p transfusion x4u pRBC and 1u FFP.     #Restless leg syndrome, insomnia  - Continue home gabapentin, amitriptyline. Ambien discontinued and started remeron 4/21.  - Palliative consult 4/26: Recommend discontinuing remeron and starting zyprexa. Also recommend CBT-I in outpatient setting.    #Postoperative state  - Pain: S/p TAPs. Scheduled Tylenol. Prn PO dilaudid, robaxin.   - Heme: See above  - : S/p Hickey, voiding.  - GI: See above  - Ppx: SCDs, IS, Lovenox.    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 S/p 3C neoadjuvant carbo/taxol.  - Final pathology returned metastatic high grade serous carcinoma  - Will follow up outpatient for ongoing treatment    Van Christianson MD  OB/GYN Resident, PGY-3  4/27/2021 6:25 AM     Provider Disclosure:   I agree with above History, Review of Systems, Physical exam and Plan. I have reviewed the content of the documentation and have edited it as needed. I have seen and personally performed the services documented here and the documentation accurately represents those services and the decisions I have made.     Electronically signed by:   Bolivar Juarez MD   Gynecologic Oncology   [facility]

## 2021-04-26 NOTE — LETTER
"    4/26/2021         RE: Taran Regalado  [address] [address]        Dear Colleague,    Thank you for referring your patient, Taran Regalado, to the [facility]. Please see a copy of my visit note below.    4/26/2021    Referring Provider: JERICHO Wilkinson CNP; Bolivar Juarez MD     Presenting Information:   Taran returned to the Cancer Risk Management Program (video visit) to discuss her BRCA1 and BRCA2 genetic testing results. BRCA1/2 testing was ordered from [facility]. This testing was done because of her personal history of ovarian cancer as part of the Precision Medicine Study.     Genetic Testing Results: POSITIVE  Taran is POSITIVE for a BRCA1 mutation.  This mutation is called [id number]. 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 We discussed the impact of this testing on Taran in detail.     BRCA1: [id number]; [id number] ([id number])    Of note, Taran is enrolled in the Precision Medicine Study.  Results from this study should be available in the next few weeks.        A copy of the test report can be found in the Laboratory tab, dated 3/26/2021, and named \"Hereditary Cancer BRCA1 and BRCA2\".     BRCA1 Cancer Risks:  Women with mutations in the BRCA1 gene have a:    60-90% lifetime risk of developing breast cancer. This is much greater than the general population breast cancer risk of 12%    39-58% lifetime risk of developing ovarian cancer. Some studies suggest that this risk may be as high as 62%. This is much greater than the general population ovarian cancer risk of 1-2%    Men with mutations in BRCA1 have a:      Increased lifetime prostate cancer risk, including a higher likelihood for advanced or metastatic disease.      1.2% lifetime male breast cancer risk. This is greater than the general population risk of 0.1%.  "     Men and Women with mutations in BRCA1 have a:    Up to 5% lifetime risk of developing pancreatic cancer.    Increased lifetime risk of melanoma.     Also, though no exact lifetime risks are available at this time, there may be increased risks for other cancers.     Cancer Screening and Prevention:  The following screening is recommended for women who have a mutation in the BRCA1 gene, per current National Comprehensive Cancer Network (NCCN) guidelines.    Breast awareness starting at age 18    Clinical breast exams starting at age 25; repeated every 6-12 months    Annual breast MRI from age 25-29    Annual mammograms with consideration of tomosynthesis and breast MRI alternating every 6 months starting at age 30    Consider transvaginal ultrasound and a CA-125 blood test starting at age 30-35, though the benefits of this screening are unclear.    We discussed that prophylactic mastectomy is a surgical option, which reduces breast cancer risk by 90%. Chemoprevention with Tamoxifen can reduce breast cancer risk in some women. Surgical risk reduction options and Tamoxifen use should be discussed with Deeanne's physician.      We discussed the limitations of screening for ovarian cancer. Thus, prophylactic removal of the ovaries and fallopian tubes is an option and is recommended after women are finished planning their families or between 35 and 40.     Some data suggests that there may be an increased risk for serous uterine cancer in women with BRCA1 mutations. However, further research is needed in this area, and data is currently limited. Women with BRCA1 mutations are encouraged to discuss the risks and benefits of hysterectomy at the time of oophorectomy with their provider before surgery.    Using oral contraceptives for five years or more has been shown to reduce ovarian cancer risk by around 50%.  The data are still inconclusive as to whether or not using oral contraceptives will increase breast cancer risk  in BRCA1 mutation carriers.     Screening for pancreatic cancer is not offered on a routine basis, as the benefits of current screening methods are unknown. Pancreatic cancer screening may be considered based on family history, though. Screening for melanoma may also be considered.  Some chemotherapies for certain cancers may be more effective in individuals with BRCA1 mutations. Taran should discuss this with her physicians.     Of note, the above information is based on our current understanding of Taran's genetic findings. Taran is encouraged to reach out to me regularly regarding any pertinent updates to her personal and/or family history of cancer, as our understanding of the genetic findings in her family may change over time.     Implications for Family Members:  We reviewed that mutations in the BRCA1 gene are inherited in an autosomal dominant pattern. This means that each of Taran's children have a 50% chance of inheriting the same mutation.  I am happy to help her relatives connect with a genetic counselor in their area if they would like to discuss testing.    In very rare situations in which both parents have a mutation in the BRCA1 gene, their children each have a 25% risk for Fanconi anemia. Fanconi anemia is a rare condition with onset in childhood that often results in physical abnormalities, growth retardation, bone marrow failure, and increased risk for cancer. For individuals of childbearing age with BRCA1 mutations, genetic counseling and genetic testing may be advised for their partners.    Additional Testing Considerations:  Taran is enrolled in the Precision Medicine Program in Ovarian Cancer and will be receiving genetic analysis of other cancer-associated genes as part of this program. These results will most likely be available in the next couple of weeks. Screening and management recommendations for Taran and her relatives may change based on these results.     Plan:  1.   "BRCA1/2 genetic test results were discussed today  2.  I will provide a \"Dear Relative\" letter for Taran to share with her family members.  3.  Taran will be contacted to review her remaining genetic test results as part of the Precision Medicine Study.      If Taran has additional questions in the meantime, I encouraged her to contact me directly at [phone].     Time spent face to face: 15 minutes    Lauren Ibanez MS, [facility]  Licensed, Certified Genetic Counselor  [facility]  Phone: [phone]                Again, thank you for allowing me to participate in the care of your patient.        Sincerely,        Lauren Ibanez GC    "

## 2021-04-26 NOTE — PLAN OF CARE
POD#7 s/p XL, DANAE-BSO, omentectomy, debulking, resection of liver nodules, diaphragm stripping, procto     AVSS.  97% RA.   Some abd pain, dilaudid PO x1.  Complained of RLS at start of shift, but improved after pain meds.  Denies nausea, tolerating clears.  + BS, + BM yesterday.  Abd midline incision with steri-strips, old dried drainage.  Abd binder ON.  Pt forgetful and impulsive previous nights, bed alarm ON overnight.  LE edema, compression stockings on.  PIV x2, MIV@100/hr.  Up with assist 1 to BR, void spont.  Cont with POC.

## 2021-04-26 NOTE — PROGRESS NOTES
04/26/21 1400   Signing Clinician's Name / Credentials   Signing clinician's name / credentials KELSEY Hernandez   Dynamic Gait Index (Delroy and Martin Newport, 1995)   Gait Level Surface 3   Change in Gait Speed 3   Gait and Horizontal Head Turns 2   Gait with Vertical Head Turns 2   Gait and Pivot Turns 3   Step Over Obstacle 2   Step Around Obstacles 3   Steps 2   Total Dynamic Gait Index Score  (A score of 19 or less has been correlated to an increased risk of falls in community dwelling older adults, patients with vestibular disorders, and patients with MS.)   Total Score (out of 24) 20       Dynamic Gait Index (DGI):The DGI is a measure of balance during gait that is reliable and valid for the elderly and individuals with Parkinson's disease, MS, vestibular disorders, or s/p stroke. Gait assistive device used: None    Patient score: 20/24  Scores ?19/24 indicate an increased risk for falls according to Breann et al 2000  Minimal Detectable Change = 2.9 in community dwelling elderly according to Perry et al 2011    Assessment (rationale for performing, application to patient s function & care plan): To assess home safety and discharge recommendations. Also, pt with recent fall this admission.     Minutes billed as physical performance test: 14 minutes

## 2021-04-26 NOTE — PLAN OF CARE
Patient cares from 19:30 to 23:30  POD # 5 s/p DANAE, BSO, omentectomy, Proctoscocy, RO, Resection of liver nodules, diaphragm stripping, immobilization of liver and colon.    Patient is alert, oriented x 4, pain well controlled with Tylenol and PO Dilaudid, denies nausea. Reports positive  flatus, and + BM. Stand by assist of 1. PIV x 2 in place. Pt is voiding and ambulating without difficulty. Bed alarm on for overnight.

## 2021-04-26 NOTE — LETTER
Cancer Risk Management  Program Locations    [facility]  [facility]  [facility]  Mailing Address  Cancer Risk Management Program  [address]  [place] [address]    New patient appointments  [phone]  May 6, 2021    Taran Regalado  [address] [place] [place] [address]      Dear Taran,        Dear Relative:  The purpose of this letter is to inform you that your relative recently underwent genetic counseling and genetic testing due to the history of ovarian cacer.  The testing done through the [facility] ([facility]) identified a mutation in the BRCA1 gene.  Specifically, the mutation is called [id number] ([id number]).   A mutation (or change in the genetic code) causes a specific gene to stop working properly.  Ultimately, individuals who have a mutation in this BRCA1 gene have a diagnosis of hereditary breast and ovarian cancer syndrome.    BRCA1 mutations increase the lifetime risk of breast cancer up to 60-90%.  They also increase the lifetime risk for ovarian cancer up to 39-58%.  Men with this BRCA1 mutation are at increased risk for male breast cancer and prostate cancer.  Some families with BRCA1 mutations show increased risk of pancreatic cancer or melanoma.  In addition, both men and women have a 50% chance of passing this mutation on to each of their children.  If individuals are found to have a mutation in the BRCA1 gene, we would recommend increased cancer screening at younger ages.  Risk reduction surgeries are also available options that can prevent the development of certain cancers.    In rare situations in which both parents have a mutation in the BRCA1 gene, their children each may have a 25% risk for Fanconi anemia. Fanconi anemia is a  rare condition with onset in childhood.  Fanconi anemia often results in physical abnormalities, growth retardation, bone marrow failure, and increased risk for cancer.  For individuals of childbearing age with BRCA1 mutations, genetic counseling and genetic testing may be advised for their partners.    I understand that this may be surprising, unexpected, and even scary news.  Because this mutation has been identified in your family, you are at risk for having the same mutation.  As mentioned earlier, your children may also be at risk.    Scheduling a genetic counseling appointment does not mean you have to undergo genetic testing.  The decision to pursue such testing is a very personal one that is discussed in more detail during the session.  Indeed, much of cancer genetic counseling is providing valuable information to individuals who are impacted by genetic information such as this.    If you are interested in scheduling a genetic counseling appointment at [facility], please call [phone] to schedule an appointment. You can also find a genetic counselor close to you or at another health system at [facility]

## 2021-04-26 NOTE — PLAN OF CARE
Patient had a large incontinent liquid stool this morning on the way to the bathroom. Tolerating a regular diet. T max 100.7 this morning. Urine and blood cultures ordered, started on IV Zosyn. Daughter demonstrated giving patient a Lovenox injection, feels comfortable doing them at home. Mild pain managed with Tylenol. Left foot continues with mild swelling, is wearing compression stockings. Worked with PT/OT today.

## 2021-04-27 ENCOUNTER — APPOINTMENT (OUTPATIENT)
Dept: OCCUPATIONAL THERAPY | Facility: CLINIC | Age: 66
End: 2021-04-27
Attending: OBSTETRICS & GYNECOLOGY
Payer: COMMERCIAL

## 2021-04-27 VITALS
TEMPERATURE: 95.3 F | SYSTOLIC BLOOD PRESSURE: 114 MMHG | HEIGHT: 72 IN | BODY MASS INDEX: 22.24 KG/M2 | HEART RATE: 82 BPM | RESPIRATION RATE: 16 BRPM | DIASTOLIC BLOOD PRESSURE: 48 MMHG | OXYGEN SATURATION: 95 % | WEIGHT: 164.2 LBS

## 2021-04-27 LAB
ANION GAP SERPL CALCULATED.3IONS-SCNC: 3 MMOL/L (ref 3–14)
BASOPHILS # BLD AUTO: 0 10E9/L (ref 0–0.2)
BASOPHILS NFR BLD AUTO: 0.3 %
BLD PROD TYP BPU: NORMAL
BLD UNIT ID BPU: 0
BLOOD PRODUCT CODE: NORMAL
BPU ID: NORMAL
BUN SERPL-MCNC: 5 MG/DL (ref 7–30)
CALCIUM SERPL-MCNC: 8.5 MG/DL (ref 8.5–10.1)
CHLORIDE SERPL-SCNC: 109 MMOL/L (ref 94–109)
CO2 SERPL-SCNC: 27 MMOL/L (ref 20–32)
CREAT SERPL-MCNC: 0.68 MG/DL (ref 0.52–1.04)
DIFFERENTIAL METHOD BLD: ABNORMAL
EOSINOPHIL # BLD AUTO: 0.1 10E9/L (ref 0–0.7)
EOSINOPHIL NFR BLD AUTO: 2.1 %
ERYTHROCYTE [DISTWIDTH] IN BLOOD BY AUTOMATED COUNT: 18.6 % (ref 10–15)
ERYTHROCYTE [DISTWIDTH] IN BLOOD BY AUTOMATED COUNT: 18.9 % (ref 10–15)
GFR SERPL CREATININE-BSD FRML MDRD: >90 ML/MIN/{1.73_M2}
GLUCOSE SERPL-MCNC: 97 MG/DL (ref 70–99)
HCT VFR BLD AUTO: 25.6 % (ref 35–47)
HCT VFR BLD AUTO: 26.8 % (ref 35–47)
HGB BLD-MCNC: 8.2 G/DL (ref 11.7–15.7)
HGB BLD-MCNC: 8.4 G/DL (ref 11.7–15.7)
IMM GRANULOCYTES # BLD: 0 10E9/L (ref 0–0.4)
IMM GRANULOCYTES NFR BLD: 0.3 %
LYMPHOCYTES # BLD AUTO: 0.5 10E9/L (ref 0.8–5.3)
LYMPHOCYTES NFR BLD AUTO: 16.3 %
MCH RBC QN AUTO: 31.2 PG (ref 26.5–33)
MCH RBC QN AUTO: 32.3 PG (ref 26.5–33)
MCHC RBC AUTO-ENTMCNC: 31.3 G/DL (ref 31.5–36.5)
MCHC RBC AUTO-ENTMCNC: 32 G/DL (ref 31.5–36.5)
MCV RBC AUTO: 100 FL (ref 78–100)
MCV RBC AUTO: 101 FL (ref 78–100)
MONOCYTES # BLD AUTO: 0.3 10E9/L (ref 0–1.3)
MONOCYTES NFR BLD AUTO: 8.2 %
NEUTROPHILS # BLD AUTO: 2.4 10E9/L (ref 1.6–8.3)
NEUTROPHILS NFR BLD AUTO: 72.8 %
NRBC # BLD AUTO: 0 10*3/UL
NRBC BLD AUTO-RTO: 0 /100
PLATELET # BLD AUTO: 109 10E9/L (ref 150–450)
PLATELET # BLD AUTO: 110 10E9/L (ref 150–450)
POTASSIUM SERPL-SCNC: 3.3 MMOL/L (ref 3.4–5.3)
RBC # BLD AUTO: 2.54 10E12/L (ref 3.8–5.2)
RBC # BLD AUTO: 2.69 10E12/L (ref 3.8–5.2)
SODIUM SERPL-SCNC: 139 MMOL/L (ref 133–144)
TRANSFUSION STATUS PATIENT QL: NORMAL
TRANSFUSION STATUS PATIENT QL: NORMAL
WBC # BLD AUTO: 2.8 10E9/L (ref 4–11)
WBC # BLD AUTO: 3.3 10E9/L (ref 4–11)

## 2021-04-27 PROCEDURE — 97530 THERAPEUTIC ACTIVITIES: CPT | Mod: GO

## 2021-04-27 PROCEDURE — 99233 SBSQ HOSP IP/OBS HIGH 50: CPT | Performed by: CLINICAL NURSE SPECIALIST

## 2021-04-27 PROCEDURE — 250N000013 HC RX MED GY IP 250 OP 250 PS 637: Performed by: OBSTETRICS & GYNECOLOGY

## 2021-04-27 PROCEDURE — 85025 COMPLETE CBC W/AUTO DIFF WBC: CPT | Performed by: STUDENT IN AN ORGANIZED HEALTH CARE EDUCATION/TRAINING PROGRAM

## 2021-04-27 PROCEDURE — 250N000013 HC RX MED GY IP 250 OP 250 PS 637: Performed by: NURSE PRACTITIONER

## 2021-04-27 PROCEDURE — 99024 POSTOP FOLLOW-UP VISIT: CPT | Mod: GC | Performed by: OBSTETRICS & GYNECOLOGY

## 2021-04-27 PROCEDURE — 36415 COLL VENOUS BLD VENIPUNCTURE: CPT | Performed by: OBSTETRICS & GYNECOLOGY

## 2021-04-27 PROCEDURE — 250N000013 HC RX MED GY IP 250 OP 250 PS 637: Performed by: STUDENT IN AN ORGANIZED HEALTH CARE EDUCATION/TRAINING PROGRAM

## 2021-04-27 PROCEDURE — 85027 COMPLETE CBC AUTOMATED: CPT | Performed by: OBSTETRICS & GYNECOLOGY

## 2021-04-27 PROCEDURE — 36415 COLL VENOUS BLD VENIPUNCTURE: CPT | Performed by: STUDENT IN AN ORGANIZED HEALTH CARE EDUCATION/TRAINING PROGRAM

## 2021-04-27 PROCEDURE — 250N000011 HC RX IP 250 OP 636: Performed by: NURSE PRACTITIONER

## 2021-04-27 PROCEDURE — 97535 SELF CARE MNGMENT TRAINING: CPT | Mod: GO

## 2021-04-27 PROCEDURE — 80048 BASIC METABOLIC PNL TOTAL CA: CPT | Performed by: STUDENT IN AN ORGANIZED HEALTH CARE EDUCATION/TRAINING PROGRAM

## 2021-04-27 RX ORDER — OLANZAPINE 5 MG/1
5 TABLET, ORALLY DISINTEGRATING ORAL
Qty: 14 TABLET | Refills: 0 | Status: SHIPPED | OUTPATIENT
Start: 2021-04-27 | End: 2021-05-25

## 2021-04-27 RX ORDER — PROCHLORPERAZINE MALEATE 10 MG
10 TABLET ORAL EVERY 6 HOURS PRN
Qty: 30 TABLET | Refills: 0 | Status: SHIPPED | OUTPATIENT
Start: 2021-04-27 | End: 2021-05-10

## 2021-04-27 RX ORDER — OXYCODONE HYDROCHLORIDE 5 MG/1
5 TABLET ORAL EVERY 8 HOURS PRN
Qty: 6 TABLET | Refills: 0 | Status: SHIPPED | OUTPATIENT
Start: 2021-04-27 | End: 2021-04-27

## 2021-04-27 RX ORDER — HYDROMORPHONE HYDROCHLORIDE 2 MG/1
1-2 TABLET ORAL EVERY 12 HOURS PRN
Qty: 8 TABLET | Refills: 0 | Status: SHIPPED | OUTPATIENT
Start: 2021-04-27 | End: 2021-04-30

## 2021-04-27 RX ORDER — GABAPENTIN 600 MG/1
600 TABLET ORAL AT BEDTIME
Qty: 60 TABLET | Refills: 0 | Status: SHIPPED | OUTPATIENT
Start: 2021-04-27 | End: 2021-06-10

## 2021-04-27 RX ADMIN — HYDROMORPHONE HYDROCHLORIDE 2 MG: 2 TABLET ORAL at 00:39

## 2021-04-27 RX ADMIN — PROCHLORPERAZINE MALEATE 5 MG: 5 TABLET ORAL at 15:49

## 2021-04-27 RX ADMIN — ACETAMINOPHEN 650 MG: 325 TABLET, FILM COATED ORAL at 08:18

## 2021-04-27 RX ADMIN — PIPERACILLIN SODIUM AND TAZOBACTAM SODIUM 3.38 G: 3; .375 INJECTION, POWDER, LYOPHILIZED, FOR SOLUTION INTRAVENOUS at 06:10

## 2021-04-27 RX ADMIN — ACETAMINOPHEN 650 MG: 325 TABLET, FILM COATED ORAL at 13:51

## 2021-04-27 RX ADMIN — AMOXICILLIN AND CLAVULANATE POTASSIUM 1 TABLET: 875; 125 TABLET, FILM COATED ORAL at 08:18

## 2021-04-27 RX ADMIN — ENOXAPARIN SODIUM 40 MG: 100 INJECTION SUBCUTANEOUS at 13:51

## 2021-04-27 ASSESSMENT — ACTIVITIES OF DAILY LIVING (ADL)
ADLS_ACUITY_SCORE: 18

## 2021-04-27 NOTE — PLAN OF CARE
Patient has a lab draw at 4pm, if OK will discharge home later this afternoon. Patient has been afebrile today, started on a po antibiotic. Up al andriy, ambulated in halls with PT. Voiding, passing gas, having BM's, tolerating diet. Tylenol managing pain. Daughter demonstrated giving lovenox injections yesterday.

## 2021-04-27 NOTE — PROGRESS NOTES
[facility]  Palliative Care Daily Progress Note       Recommendations & Counseling       Discontinue olanzapine 5 mg at bedtime PRN for insomina/anxiety due to worsening RLS symptoms    Highly encouraged CBT-I    Encouraged additional non-pharmacological sleep hygiene interventions    Attempt to minimize interruptions at night as able.    She is welcome to come and see palliative care in clinic and can call for an appointment to be arranged at any time    May also be helpful to consider if her main concern is insomnia working with a team who specifically specializes in it, such as [facility] clinic           Assessments          Taran Regalado is a 65 year old female with metastatic high grade serous ovarian cancer and presented for surgery for is and now is s/p XL, DANAE-BSO, omentectomy, debulking, resection of liver nodules, diaphragm stripping, and procto. Palliative care was consulted due to patients concerned regarding insomnia. Also has a past medical history of RLS, migraines, afib, and PE.      Today, the patient was seen for:  Insomnia  Metastatic high grade serious ovarian cancer    Discussed with patient and daughter today role of palliative care further and the ways in which we can be helpful. Discussed her current concern is not responding to our therapies and it would be helpful at this point to get her into a more specialized team for insomnia. Patient and daughter worry that would mean she would need to go in and do a sleep test. I am not certain what their recommendations would be but likely one of their providers would conduct their own assessment and make recommendations about how to move forward.     Prognosis, Goals, or Advance Care Planning was addressed today with: Yes.    Hoping the treatments work and knows they said they got all of it they think (meaning they got all the cancer).    Mood, coping, and/or meaning in the  context of serious illness were addressed today: Yes.  Summary/Comments: struggling with insomnia, denies anxiety, feels her life is great.            Interval History:     Chart review/discussion with unit or clinical team members:   Notes reviewed, no acute events.    Per patient or family/caregivers today:   Was restlessness last night after taking olanzapine and RLS became worse, then went away at 3 am.     Key Palliative Symptoms:  We are not helping to manage these symptoms currently in this patient.               Review of Systems:     Besides above, ROS was reviewed and is unremarkable          Medications:     I have reviewed this patient's medication profile and medications during this hospitalization.    Acetaminophen 650 mg q6h  Amitriptyline 100 mg at bedtime  Gabapentin 600 mg at bedtime  Mirtazapine 7.5 mg at bedtime  Miralax BID  Senna 2 tablets BID  Hydromorphone q3h PRN  Methocarbamol 500 mg TID PRN  Compazine PRN    Olanzapine 5 mg at bedtime           Physical Exam:   Vitals were reviewed  Temp: 99  F (37.2  C) Temp src: Oral BP: 106/54 Pulse: 88   Resp: 18 SpO2: 93 % O2 Device: None (Room air)      Intake/Output Summary (Last 24 hours) at 4/27/2021 1029  Last data filed at 4/27/2021 0700  Gross per 24 hour   Intake 540 ml   Output 1850 ml   Net -1310 ml     Constitutional: Awake, alert, cooperative, no apparent distress  ENT: Normocephalic, without obvious abnormality, atramatic  Lungs: No increased work of breathing, good air exchange  Abdomen: midline incision with steri strips, non-distended  Musculoskeletal: No redness, warmth, or swelling of the joints.    Neurologic: Awake, alert, oriented to name, place and time.    Neuropsychiatric: guarded, fine mood  Skin: No rashes, erythema             Data Reviewed:     Reviewed recent pertinent imaging, comments:   IMPRESSION:   1. Intraperitoneal hemorrhage anterior to the bladder with active   extravasation suspected to arise from a right  internal epigastric   branch vessel. A second collection of intraperitoneal hemorrhage is   located to the left of the surgical bed with active extravasation   suspected to arise from an inferior mesenteric artery branch vessel.   2. Post surgical changes of hysterectomy and bilateral   salpingo-oophorectomy.   3. Diffuse abdominal ascites. Extent of peritoneal metastatic disease   burden is not well assessed due to the presence of extensive   postsurgical changes. Interval follow up imaging is suggested.   4. Small bilateral pleural effusions.     Reviewed recent labs, comments:   WBC 2.8  Hemoglobin 8.2  Platelets 110    JERICHO Ceja CNS  Palliative Care Consult Team  Pager: [phone]  35 minutes spent. This includes 25 minutes face to face with patient and on phone with daughter discussing current complex health conditions and symptom management. Coordination of care with the primary team, palliative  and SW regarding symptom management.

## 2021-04-27 NOTE — PLAN OF CARE
POD 8. AVSS. A&O x 4. Regular diet, tolerating well. BM today, voids spont, passing gas. Midline incision, CDI. Up ad andriy. AVS reviewed with patient and her daughter. Both state understanding. Ready to discharge.

## 2021-04-27 NOTE — PLAN OF CARE
"Assumed care of Patient from [id number]. VSS. Patient reports pain and was administered PRN dilaudid. Pt states having bad episodes of \"restless leg syndrome\" pt complaints of a hard time sleeping. Sx was notified and aware of the situation, no new orders at this time. Pt not complaining of any nausea. Patient reports passing flatus.  Pt voiding spontaneously overnight. Incision clean and intact. Up in room with standby assist. Legs elevated overnight. Pt able to self reposition in bed. Will continue plan of care.    "

## 2021-04-27 NOTE — PLAN OF CARE
POD 6. T max 99.2. OAVSS. A&O x 4. Voids spont assist stand-by, passing gas, 2 BM today. Abdominal incision with adhesive strips, dried drainage. Dilaudid and Robaxin PRN for pain. R PIV SL. Regular diet, tolerating well. Compression stockings maintained on both legs. L edema > R. L foot elevated and ice pack applied

## 2021-04-28 ENCOUNTER — TELEPHONE (OUTPATIENT)
Dept: FAMILY MEDICINE | Facility: CLINIC | Age: 66
End: 2021-04-28

## 2021-04-28 ENCOUNTER — PATIENT OUTREACH (OUTPATIENT)
Dept: ONCOLOGY | Facility: CLINIC | Age: 66
End: 2021-04-28

## 2021-04-28 NOTE — PROGRESS NOTES
RN reached out for post operative call.     Patient unavailable. Voicemail with call back number left.     Shawna Hinojosa RN

## 2021-04-28 NOTE — TELEPHONE ENCOUNTER
This pt was Discharged from [facility] on 4-27-21 for Chief complaint: Ovarian cancer, unspecified laterality (H)      Please note this information was received from either [place] or [facility] daily report with Marta ECHAVARRIA CNP identified as the PCP.    A follow-up visit has not been scheduled.    Please follow-up with patient accordingly.

## 2021-04-28 NOTE — PLAN OF CARE
Occupational Therapy Discharge Summary    Reason for therapy discharge:    Discharged to home.    Progress towards therapy goal(s). See goals on Care Plan in [facility] electronic health record for goal details.  Goals partially met.  Barriers to achieving goals:   discharge from facility.    Therapy recommendation(s):    No further therapy is recommended.

## 2021-04-29 ENCOUNTER — TELEPHONE (OUTPATIENT)
Dept: ONCOLOGY | Facility: CLINIC | Age: 66
End: 2021-04-29

## 2021-04-29 NOTE — TELEPHONE ENCOUNTER
FMLA(Family) forms received via docplanner from [facility].      Forms to be completed and put in folder for provider to approve.    Fax #:  [phone]      Chelsey Pedersen MA

## 2021-04-29 NOTE — TELEPHONE ENCOUNTER
Family FMLA forms filled out and put in providers folder for review and signature.      Chelseyjose Pedersen MA

## 2021-04-30 DIAGNOSIS — C56.9 OVARIAN CANCER, UNSPECIFIED LATERALITY (H): ICD-10-CM

## 2021-04-30 RX ORDER — HYDROMORPHONE HYDROCHLORIDE 2 MG/1
1-2 TABLET ORAL EVERY 12 HOURS PRN
Qty: 8 TABLET | Refills: 0 | Status: SHIPPED | OUTPATIENT
Start: 2021-04-30 | End: 2021-05-25

## 2021-04-30 NOTE — TELEPHONE ENCOUNTER
Patient daughter sent MyChart requesting Dilaudid refill.     RN was able to touch base with patient.     Patient denies any signs and symptoms of concern. Eating and drinking well.     Patient is moving around but is somewhat hindered by sprained ankle an injury that happened while inpatient.     Patient taking Tylenol on schedule and using Diluadid in the morning and at night only.     Patient has 2 Tablets left    RN and patient discussed pain and healing process.     RN routed to NP for review and approval.     Shawna Hinojosa RN

## 2021-05-02 LAB
BACTERIA SPEC CULT: NO GROWTH
BACTERIA SPEC CULT: NO GROWTH
SPECIMEN SOURCE: NORMAL
SPECIMEN SOURCE: NORMAL

## 2021-05-03 DIAGNOSIS — I48.91 ATRIAL FIBRILLATION WITH RAPID VENTRICULAR RESPONSE (H): ICD-10-CM

## 2021-05-03 PROCEDURE — 85025 COMPLETE CBC W/AUTO DIFF WBC: CPT | Performed by: OBSTETRICS & GYNECOLOGY

## 2021-05-03 PROCEDURE — 36415 COLL VENOUS BLD VENIPUNCTURE: CPT | Performed by: OBSTETRICS & GYNECOLOGY

## 2021-05-04 ENCOUNTER — PATIENT OUTREACH (OUTPATIENT)
Dept: ONCOLOGY | Facility: CLINIC | Age: 66
End: 2021-05-04

## 2021-05-04 LAB
BASOPHILS # BLD AUTO: 0 10E9/L (ref 0–0.2)
BASOPHILS NFR BLD AUTO: 0.5 %
DIFFERENTIAL METHOD BLD: ABNORMAL
EOSINOPHIL # BLD AUTO: 0.1 10E9/L (ref 0–0.7)
EOSINOPHIL NFR BLD AUTO: 2.9 %
ERYTHROCYTE [DISTWIDTH] IN BLOOD BY AUTOMATED COUNT: 17.4 % (ref 10–15)
HCT VFR BLD AUTO: 31.6 % (ref 35–47)
HGB BLD-MCNC: 10 G/DL (ref 11.7–15.7)
IMM GRANULOCYTES # BLD: 0 10E9/L (ref 0–0.4)
IMM GRANULOCYTES NFR BLD: 0.5 %
LYMPHOCYTES # BLD AUTO: 1 10E9/L (ref 0.8–5.3)
LYMPHOCYTES NFR BLD AUTO: 23.2 %
MCH RBC QN AUTO: 31.3 PG (ref 26.5–33)
MCHC RBC AUTO-ENTMCNC: 31.6 G/DL (ref 31.5–36.5)
MCV RBC AUTO: 99 FL (ref 78–100)
MONOCYTES # BLD AUTO: 0.4 10E9/L (ref 0–1.3)
MONOCYTES NFR BLD AUTO: 9.7 %
NEUTROPHILS # BLD AUTO: 2.6 10E9/L (ref 1.6–8.3)
NEUTROPHILS NFR BLD AUTO: 63.2 %
PLATELET # BLD AUTO: 189 10E9/L (ref 150–450)
RBC # BLD AUTO: 3.2 10E12/L (ref 3.8–5.2)
WBC # BLD AUTO: 4.1 10E9/L (ref 4–11)

## 2021-05-04 NOTE — PROGRESS NOTES
MG updated that needed order was placed. Sample from yesterday (5/3) will be run.     Patient updated on delay.     Shawna Hinojosa RN

## 2021-05-05 ENCOUNTER — VIRTUAL VISIT (OUTPATIENT)
Dept: ONCOLOGY | Facility: CLINIC | Age: 66
End: 2021-05-05
Attending: OBSTETRICS & GYNECOLOGY
Payer: COMMERCIAL

## 2021-05-05 ENCOUNTER — PATIENT OUTREACH (OUTPATIENT)
Dept: ONCOLOGY | Facility: CLINIC | Age: 66
End: 2021-05-05

## 2021-05-05 DIAGNOSIS — C56.9 OVARIAN CANCER, UNSPECIFIED LATERALITY (H): ICD-10-CM

## 2021-05-05 PROCEDURE — 99213 OFFICE O/P EST LOW 20 MIN: CPT | Mod: 95 | Performed by: OBSTETRICS & GYNECOLOGY

## 2021-05-05 NOTE — LETTER
2021         RE: Taran Regalado  [address]  [facility] [address]        Dear Colleague,    Thank you for referring your patient, Taran Regalado, to the [facility]. Please see a copy of my visit note below.    Taran is a 65 year old who is being evaluated via a billable video visit.      How would you like to obtain your AVS? MyChart  If the video visit is dropped, the invitation should be resent by: Text to cell phone: [phone]  Will anyone else be joining your video visit? No     Vitals - Patient Reported  Weight (Patient Reported): 68 kg (150 lb)  Height (Patient Reported): 182.9 cm (6')  BMI (Based on Pt Reported Ht/Wt): 20.34  Pain Score: No Pain (0)    .Julita Hughes, RU on 2021 at 1:43 PM        Taran is a 65 year old who is being evaluated via a billable video visit.      Date: 2021        Dr. 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 She is here today for follow up and disease management. In light of the recent COVID19 pandemic, and in accordance with our group and national guidelines this patients care has been reviewed and it is felt that presenting for her visit would be more likely to increase rather than decrease her relative risks. Therefore this visit was conducted by video.        Oncology History:  12/3/2020: US Pelvic: IMPRESSION: Limited examination due to acoustic windows. Possible left adnexal mass. A CT scan of the abdomen and pelvis with contrast is recommended for further assessment.     2020: CT A/P:   IMPRESSION:    Peritoneal carcinomatosis with masslike peritoneal thickening in the lower pelvis which may indicate an adnexal or ovarian primary  malignancy. Large volume ascites. Bilateral pleural effusions. There is potential subtle pleural nodularity in the right hemithorax which could indicate metastatic disease.  Indeterminate 1 cm lesion in the right hepatic lobe suspicious for a metastatic lesion.      12/16/2020: Presented to [facility] with abdominal distention, 25lb weight loss, and CTAP with carcinomatosis, elevated  3098.     12/23/2020: CT Chest: IMPRESSION:   1. There are few scattered small sub-6 mm pulmonary nodules which are indeterminate without prior comparisons available. There are a few  slightly larger perifissural nodules which are technically  indeterminate in the setting of malignancy although presumed lymphatic in nature and of unlikely clinical significance. Attention on follow-up is recommended.  2. Small to moderate left and small right pleural effusions are increased in size from prior. No convincing evidence for pleural nodularity.  3. Partially visualized large volume ascites and peritoneal nodularity in the upper abdomen similar to 12/4/2020 outside CT      12/26/2020: ED for abdominal distension; 3 L ascites drained with paracentesis    Pelvic US: Findings: Free fluid present in LLQ      12/31/2020: US Paracentesis: 900 mL ascites drained     1/7/2021: Diagnotic laparoscopy, biopsies  Pathology: FINAL DIAGNOSIS:   A. PERITONEUM, BIOPSIES:   - Positive for high grade carcinoma, consistent with metastatic carcinoma of Mullerian origin.     1/10-1/13/2021: Hospital admission for postoperative non-intractable vomiting and nausea.      1/10/2021: CT A/P: IMPRESSION: Extensive ascites which is probably malignant. Scattered liver hypodensities of indeterminate etiology comment cannot exclude metastatic disease. Diverticulosis. Fluid-filled adnexal masses and irregular appearance of uterus, which may represent primary neoplasm. Multiple peritoneal nodules. Large amount of fecal material in the colon with no evidence of small bowel  obstruction.     Plan: Paclitaxel 175 mg/m2 and carboplatin AUC 6 x 3 cycles followed by a CT CAP and visit with Dr. Juarez.     1/12/2021: Cycle 1 paclitaxel and carboplatin while inpatient     1/13/2021: CT Head: Impression:  1. Chronic sinusitis of the right maxillary and right sphenoid sinuses.  2. Incidental presumed calcified meningioma in the right frontal  convexity without significant mass effect.  3. No suspicious intracranial enhancing lesion.     2/1/2021: Cycle 2 paclitaxel and carboplatin.  936.     2/3-2/5/21: Admission [facility] for afib w/ RVR and new PE     2/26/21: Cycle 3 paclitaxel and carboplatin planned.  Deferred due to thrombocytopenia.  pending.     3/15/21: Cycle 3 paclitaxel and carboplatin given     4/19/21: Exploratory laparotomy, total abdominal hysterectomy, bilateral salpingo-oophorectomy, omentectomy, debulking including resection of liver nodules, diaphragm stripping, proctoscopy         Interval history:   Today she reports with her grand daughter Mary in the video visit.   She reports that she is recovering well from surgery, though she is still weak and tired. She denies any vaginal bleeding, no changes in her bowel or bladder habits, no abdominal pain/discomfort/bloating, no fevers or chills, and no chest pain or shortness of breath. She is able to get up and move around by her own, but her left ankle still hurts and it remains swollen.   She has good appetite and has good oral intake. No constipation, no headache, no low back pain. No signs of bleeding. She is still taking prophylactic lovenox subcutaneous, which will need a refill.            Past Medical History:  Past Medical History        Past Medical History:   Diagnosis Date     Atrial fibrillation with rapid ventricular response (H)       History of cold sores       Insomnia       Migraine       Osteopenia       Pelvic mass       Peritoneal carcinomatosis (H)       Restless legs syndrome (RLS)               Past Surgical History:  Past Surgical History         Past Surgical History:   Procedure Laterality Date     APPENDECTOMY         ARTHROSCPY KNEE SURGICAL DEBRIDEMENT SHAVING ARTICULAR CARTILAGE Right       BIOPSY   January 2021     Biopsy to confirm ovarian cancer     DEBRIDEMENT LEFT UPPER EXTREMITY   2016     HYSTERECTOMY TOTAL ABD, LUISITO SALPINGO-OOPHORECTOMY, NODE DISSECTION, TUMOR DEBULKING, COMBINED Bilateral 4/19/2021     Procedure: HYSTERECTOMY, TOTAL, ABDOMINAL, WITH BILATERAL SALPINGO-OOPHORECTOMY, omentectomy, NEOPLASM DEBULKING,Proctoscocy, RO, Resection of liver nodules, diaphragm stripping, immobilization of liver and colon;  Surgeon: Bolivar Juarez MD;  Location: [facility] OR     LAPAROSCOPY DIAGNOSTIC (GYN) Bilateral 1/7/2021     Procedure: Diagnsotic laparoscopy, biopsies;  Surgeon: Bolivar Juarez MD;  Location: [facility] OR     LASIK         TUBAL LIGATION                           Health Maintenance Due   Topic Date Due     PREVENTIVE CARE VISIT  Never done     ADVANCE CARE PLANNING  Never done     Pneumococcal Vaccine: Pediatrics (0 to 5 Years) and At-Risk Patients (6 to 64 Years) (1 of 4 - PCV13) Never done     COLORECTAL CANCER SCREENING  Never done     HIV SCREENING  Never done     COVID-19 Vaccine (1) Never done     HEPATITIS C SCREENING  Never done     LIPID  Never done     ZOSTER IMMUNIZATION (1 of 2) Never done     INFLUENZA VACCINE (1) Never done     MAMMO SCREENING  03/04/2021         Current Medications:       Current Outpatient Medications   Medication     acetaminophen (TYLENOL) 325 MG tablet     amitriptyline (ELAVIL) 100 MG tablet     enoxaparin ANTICOAGULANT (LOVENOX) 40 MG/0.4ML syringe     gabapentin (NEURONTIN) 600 MG tablet     HYDROmorphone (DILAUDID) 2 MG tablet     loratadine (CLARITIN) 10 MG tablet     polyethylene glycol (MIRALAX) 17 GM/Dose powder     prochlorperazine (COMPAZINE) 10 MG tablet     senna-docusate (SENOKOT-S/PERICOLACE) 8.6-50 MG tablet     SUMAtriptan  (IMITREX) 100 MG tablet     zolpidem (AMBIEN) 5 MG tablet     OLANZapine zydis (ZYPREXA) 5 MG ODT     ondansetron (ZOFRAN-ODT) 4 MG ODT tab     valACYclovir (VALTREX) 1000 mg tablet      No current facility-administered medications for this visit.                Allergies:   No Known Allergies      Social History:     Social History                Tobacco Use     Smoking status: Former Smoker       Packs/day: 0.50       Years: 40.00       Pack years: 20.00       Quit date:        Years since quitting: 3.2     Smokeless tobacco: Never Used   Substance Use Topics     Alcohol use: Not Currently             History   Drug Use Unknown            Family History:    Family History             Family History   Adopted: Yes   Problem Relation Age of Onset     Cancer Mother 36     Other Cancer Mother           Bio mother  of  a female cancer  at 36     Factor V Leiden deficiency Daughter       Deep Vein Thrombosis Daughter       Diabetes Type 1 Daughter       Diabetes Daughter       Hypertension Daughter                 Physical Exam:      There were no vitals taken for this visit.  There is no height or weight on file to calculate BMI.     General Appearance:  healthy and alert, no distress  Respiratory: no visible respiratory distress  Neuro: moves both arms freely without limitation       Psychiatric:      appropriate mood and affect                                     Assessment:     Taran Regalado is a 65 year old woman with metastatic ovarian high grade serous carcinoma, she is status post 3 cycles of neoadjuvant chemotherapy, then had exploratory laparotomy, total abdominal hysterectomy, bilateral salpingo-oophorectomy, omentectomy, debulking including resection of liver nodules, diaphragm stripping, proctoscopy on  by Dr Juarez.         1.  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   2.  BRCA 1 germline mutation.   3.  [facility] Program  4.  PE resolved on Lovenox (prophy)  5.   Left ankle injury     Patient is about 2 week post-op, and overall has recovered well after surgery. Denies any fever/chill, or other signs of infection, her strength is better, no further signs of bleeding after switched to lovenox on prophylactic dose. Lab showed Hgb back to 10 (was 8 at time of discharge), normal ANC. We will plan to initiate adjuvant chemotherapy in about 2 weeks, plan to give 3 more cycles of carboplatin + Taxol, then transition to Parp inhibitor for maintenance therapy given her BRCA1 germline mutation.      Given the bleeding complication post-op, will keep her at prophylactic dose of Lovenox for now, plan to increase to therapeutic dose in the next 1-2 weeks.      Recommend to seek for further evaluation of her left ankle injury at a local orthopaedic clinic.            Bolivar Juarez MD, MS    Department of Obstetrics and Gynecology   Division of Gynecologic Oncology   [facility]  Phone: [phone]       A total of 30 minutes was spent with the patient, 20 minutes of which were spent in counseling the patient and/or treatment planning.        Again, thank you for allowing me to participate in the care of your patient.        Sincerely,        Bolivar Juarez MD

## 2021-05-05 NOTE — PROGRESS NOTES
FMLA faxed to patients work    Fax [phone]    Paperwork returned to MD yellow folder with update    Shawna Hinojosa RN

## 2021-05-05 NOTE — PATIENT INSTRUCTIONS
Assessing Cancer Risk  Only about 5-10% of cancers are thought to be due to an inherited cancer susceptibility gene.    These families often have:    Several people with the same or related types of cancer    Cancers diagnosed at a young age (before age 50)    Individuals with more than one primary cancer    Multiple generations of the family affected with cancer    BRCA1 and BRCA2 Genes  We each inherit two copies of every gene in our bodies: one from our mother, and one from our father.  Each gene has a specific job to do.  When a gene has a mistake or  mutation  in it, it does not work like it should.  Everyone has two copies of BRCA1 and two copies of BRCA2.  A single mutation in one of the copies of BRCA1 or BRCA2 increases the risk for breast and ovarian cancer, among others.  The risk for pancreatic cancer and melanoma may also be slightly increased in some families.  The tables below list the chance that someone with a BRCA mutation would develop cancer in his or her lifetime1,2,3,4.      Women   Men    General Population BRCA +   General Population BRCA +   Breast 12% 40-85%  Breast <1% ~7%   Ovarian 1-2% 10-40%  Prostate 16% 20%     Inheriting a mutation does not mean a person will develop cancer, but it does significantly increase his or her risk above the general population risk.    A person s ethnic background is also important to consider, as individuals of Ashkenazi Shinto ancestry have a higher chance of having a BRCA gene mutation.  There are three BRCA mutations that occur more frequently in this population.     Genetic Testing  Genetic testing involves a simple blood test and will look at the genetic information in the BRCA1 and BRCA2 genes for any harmful mutations that are associated with increased cancer risk.  If possible, it is recommended that the person(s) who has had cancer be tested before other family members.  That person will give us the most useful information about whether or not  a specific gene is associated with the cancer in the family.     Results  There are three possible results of BRCA1 and BRCA2 genetic testing:    Positive--a harmful mutation was identified    Negative--no mutation was identified    Variant of unknown significance--a variation in one of the genes was identified, but it is unclear how this impacts cancer risk in the family    Advantages and Disadvantages  There are advantages and disadvantages to genetic testing of these genes.    Advantages    May clarify your cancer risk    Can help you make medical decisions    May explain the cancers in your family    May give useful information to your family members (if you share your results)    Disadvantages    Possible negative emotional impact of learning about inherited cancer risk    Uncertainty in interpreting a negative test result in some situations    Possible genetic discrimination concerns (see below)    Inheritance   BRCA1 and BRCA2 mutations are inherited in an autosomal dominant pattern.  This means that if a parent has a mutation, each of his or her children will have a 50% chance of inheriting that same mutation.  Therefore, each child--male or female--would have a 50% chance of being at increased risk for developing cancer.                                              Image obtained from Genetics Home Reference, 2013     Genetic Information Nondiscrimination Act (SAMSON)  SAMSON is a federal law that protects individuals from health insurance or employment discrimination based on a genetic test result alone.  Although rare, there are currently no legal protections in terms of life insurance, long term care, or disability insurances.  Visit the National Human Genome Research [place] at Genome.gov/71554545 to learn more.    Reducing Cancer Risk  Current screening recommendations for women with a BRCA mutation include1:    Breast:  o Breast awareness starting at age 18  o Clinical breast exams starting at age 25;  repeated every 6-12 months  o Annual breast MRI starting at age 25 (or possibly younger)  o Annual mammogram (consider tomosynthesis) and breast MRI at age 30 (for women with and without a breast cancer history)  o Consideration of preventative mastectomy    Ovarian:   o Consideration of transvaginal ultrasound and CA-125 blood test starting at age 30 (or possibly younger); repeated every 6 months  o Recommendation for surgery to remove the ovaries and fallopian tubes after child bearing or by age 35-40. Women with BRCA2 mutations may delay this surgery until ages 40-45, unless it is warranted to consider sooner based on family history.    Some data suggests that women with BRCA1 mutations may be at slightly higher risk for serous uterine cancer. Information is limited at this time, and further research is needed to better understand this risk. Women with BRCA1 mutations should discuss the risks and benefits of hysterectomy at the time of ovary removal.     Current screening recommendations for men with a BRCA mutation include1:    Breast:  o Self-breast exams starting at age 35  o Annual clinical breast exams starting at age 35    Prostate:   o Recommend prostate cancer screening starting at age 45 for BRCA2 carriers  o Consider prostate cancer screening starting at age 45 for BRCA1 carriers    Both men and women with BRCA mutations should talk to their doctor or genetic counselor about screening for pancreatic cancer and melanoma.  In addition, the age at which to start screening may be modified based on family history of young cancer.    Questions to Think About Regarding Genetic Testing    What effect will the test result have on me and my relationship with my family members if I have an inherited gene mutation?  If I don t have a gene mutation?    Should I share my test results, and how will my family react to this news, which may also affect them?    Are my children ready to learn new information that may one  day affect their own health?    Resources  FORCE: Facing Our Risk of Cancer Empowered facingourrisk.org   Bright Pink bebrightpink.org   American Cancer Society (ACS) cancer.org   National Cancer [place] (NCI) cancer.gov     Please call us if you have any questions or concerns.   Cancer Risk Management Program [phone]-CANCER ([phone])  ? Salvatore Orellana, MS [facility]  [phone]  ? Jannet Woodard, MS, [facility]  [phone]  ? Lauren Ibanez, MS, [facility]  [phone]  ? Eliza No, MS, [facility] [phone]  ? Jossy Otero, MS, [facility] [phone]  ? Estefania San, MS, [facility]  [phone]    References:  1. National Comprehensive Cancer Network. Clinical practice guidelines in oncology, colorectal cancer screening. Available online (registration required). 2019.

## 2021-05-05 NOTE — CONFIDENTIAL NOTE
Follow Up Notes on Referred Patient     Date: 2021        Dr. 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 She is here today for follow up and disease management. In light of the recent COVID19 pandemic, and in accordance with our group and national guidelines this patients care has been reviewed and it is felt that presenting for her visit would be more likely to increase rather than decrease her relative risks. Therefore this visit was conducted by video.        Oncology History:  12/3/2020: US Pelvic: IMPRESSION: Limited examination due to acoustic windows. Possible left adnexal mass. A CT scan of the abdomen and pelvis with contrast is recommended for further assessment.     2020: CT A/P:   IMPRESSION:    Peritoneal carcinomatosis with masslike peritoneal thickening in the lower pelvis which may indicate an adnexal or ovarian primary malignancy. Large volume ascites. Bilateral pleural effusions. There is potential subtle pleural nodularity in the right hemithorax which could indicate metastatic disease.  Indeterminate 1 cm lesion in the right hepatic lobe suspicious for a metastatic lesion.      2020: Presented to [facility] with abdominal distention, 25lb weight loss, and CTAP with carcinomatosis, elevated  3098.     2020: CT Chest: IMPRESSION:   1. There are few scattered small sub-6 mm pulmonary nodules which are indeterminate without prior comparisons available. There are a few  slightly larger perifissural nodules which are technically  indeterminate in the setting of malignancy although presumed lymphatic in nature and of unlikely clinical significance. Attention on follow-up is recommended.  2. Small to moderate  left and small right pleural effusions are increased in size from prior. No convincing evidence for pleural nodularity.  3. Partially visualized large volume ascites and peritoneal nodularity in the upper abdomen similar to 12/4/2020 outside CT      12/26/2020: ED for abdominal distension; 3 L ascites drained with paracentesis    Pelvic US: Findings: Free fluid present in LLQ      12/31/2020: US Paracentesis: 900 mL ascites drained     1/7/2021: Diagnotic laparoscopy, biopsies  Pathology: FINAL DIAGNOSIS:   A. PERITONEUM, BIOPSIES:   - Positive for high grade carcinoma, consistent with metastatic carcinoma of Mullerian origin.     1/10-1/13/2021: Hospital admission for postoperative non-intractable vomiting and nausea.      1/10/2021: CT A/P: IMPRESSION: Extensive ascites which is probably malignant. Scattered liver hypodensities of indeterminate etiology comment cannot exclude metastatic disease. Diverticulosis. Fluid-filled adnexal masses and irregular appearance of uterus, which may represent primary neoplasm. Multiple peritoneal nodules. Large amount of fecal material in the colon with no evidence of small bowel obstruction.     Plan: Paclitaxel 175 mg/m2 and carboplatin AUC 6 x 3 cycles followed by a CT CAP and visit with Dr. Juarez.     1/12/2021: Cycle 1 paclitaxel and carboplatin while inpatient     1/13/2021: CT Head: Impression:  1. Chronic sinusitis of the right maxillary and right sphenoid sinuses.  2. Incidental presumed calcified meningioma in the right frontal  convexity without significant mass effect.  3. No suspicious intracranial enhancing lesion.     2/1/2021: Cycle 2 paclitaxel and carboplatin.  936.     2/3-2/5/21: Admission [facility] for afib w/ RVR and new PE     2/26/21: Cycle 3 paclitaxel and carboplatin planned.  Deferred due to thrombocytopenia.  pending.     3/15/21: Cycle 3 paclitaxel and carboplatin given    4/19/21: Exploratory laparotomy, total abdominal hysterectomy,  bilateral salpingo-oophorectomy, omentectomy, debulking including resection of liver nodules, diaphragm stripping, proctoscopy         Interval history:   Today she reports with her grand daughter Mary in the video visit.   She reports that she is recovering well from surgery, though she is still weak and tired. She denies any vaginal bleeding, no changes in her bowel or bladder habits, no abdominal pain/discomfort/bloating, no fevers or chills, and no chest pain or shortness of breath. She is able to get up and move around by her own, but her left ankle still hurts and it remains swollen.   She has good appetite and has good oral intake. No constipation, no headache, no low back pain. No signs of bleeding. She is still taking prophylactic lovenox subcutaneous, which will need a refill.           Past Medical History:  Past Medical History:   Diagnosis Date     Atrial fibrillation with rapid ventricular response (H)      History of cold sores      Insomnia      Migraine      Osteopenia      Pelvic mass      Peritoneal carcinomatosis (H)      Restless legs syndrome (RLS)          Past Surgical History:  Past Surgical History:   Procedure Laterality Date     APPENDECTOMY       ARTHROSCPY KNEE SURGICAL DEBRIDEMENT SHAVING ARTICULAR CARTILAGE Right      BIOPSY  January 2021    Biopsy to confirm ovarian cancer     DEBRIDEMENT LEFT UPPER EXTREMITY  2016     HYSTERECTOMY TOTAL ABD, LUISITO SALPINGO-OOPHORECTOMY, NODE DISSECTION, TUMOR DEBULKING, COMBINED Bilateral 4/19/2021    Procedure: HYSTERECTOMY, TOTAL, ABDOMINAL, WITH BILATERAL SALPINGO-OOPHORECTOMY, omentectomy, NEOPLASM DEBULKING,Proctoscocy, RO, Resection of liver nodules, diaphragm stripping, immobilization of liver and colon;  Surgeon: Bolivar Juarez MD;  Location: [facility] OR     LAPAROSCOPY DIAGNOSTIC (GYN) Bilateral 1/7/2021    Procedure: Diagnsotic laparoscopy, biopsies;  Surgeon: Bolivar Juarez MD;  Location: [facility] OR     LASIK       TUBAL LIGATION                  Health Maintenance Due   Topic Date Due     PREVENTIVE CARE VISIT  Never done     ADVANCE CARE PLANNING  Never done     Pneumococcal Vaccine: Pediatrics (0 to 5 Years) and At-Risk Patients (6 to 64 Years) (1 of 4 - PCV13) Never done     COLORECTAL CANCER SCREENING  Never done     HIV SCREENING  Never done     COVID-19 Vaccine (1) Never done     HEPATITIS C SCREENING  Never done     LIPID  Never done     ZOSTER IMMUNIZATION (1 of 2) Never done     INFLUENZA VACCINE (1) Never done     MAMMO SCREENING  2021         Current Medications:   Current Outpatient Medications   Medication     acetaminophen (TYLENOL) 325 MG tablet     amitriptyline (ELAVIL) 100 MG tablet     enoxaparin ANTICOAGULANT (LOVENOX) 40 MG/0.4ML syringe     gabapentin (NEURONTIN) 600 MG tablet     HYDROmorphone (DILAUDID) 2 MG tablet     loratadine (CLARITIN) 10 MG tablet     polyethylene glycol (MIRALAX) 17 GM/Dose powder     prochlorperazine (COMPAZINE) 10 MG tablet     senna-docusate (SENOKOT-S/PERICOLACE) 8.6-50 MG tablet     SUMAtriptan (IMITREX) 100 MG tablet     zolpidem (AMBIEN) 5 MG tablet     OLANZapine zydis (ZYPREXA) 5 MG ODT     ondansetron (ZOFRAN-ODT) 4 MG ODT tab     valACYclovir (VALTREX) 1000 mg tablet     No current facility-administered medications for this visit.               Allergies:   No Known Allergies      Social History:     Social History            Tobacco Use     Smoking status: Former Smoker       Packs/day: 0.50       Years: 40.00       Pack years: 20.00       Quit date:        Years since quitting: 3.2     Smokeless tobacco: Never Used   Substance Use Topics     Alcohol use: Not Currently         History   Drug Use Unknown            Family History:    Family History         Family History   Adopted: Yes   Problem Relation Age of Onset     Cancer Mother 36     Other Cancer Mother           Bio mother  of  a female cancer  at 36     Factor V Leiden deficiency Daughter       Deep Vein  Thrombosis Daughter       Diabetes Type 1 Daughter       Diabetes Daughter       Hypertension Daughter                 Physical Exam:      There were no vitals taken for this visit.  There is no height or weight on file to calculate BMI.     General Appearance:  healthy and alert, no distress  Respiratory: no visible respiratory distress  Neuro: moves both arms freely without limitation       Psychiatric:      appropriate mood and affect                                    Assessment:     Taran Regalado is a 65 year old woman with metastatic ovarian high grade serous carcinoma, she is status post 3 cycles of neoadjuvant chemotherapy, then had exploratory laparotomy, total abdominal hysterectomy, bilateral salpingo-oophorectomy, omentectomy, debulking including resection of liver nodules, diaphragm stripping, proctoscopy on 4/19 by Dr Juarez.        1.  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   2.  BRCA 1 germline mutation.   3.  Precision Medicine Program  4.  PE resolved on Lovenox (prophy)  5.  Left ankle injury    Patient is about 2 week post-op, and overall has recovered well after surgery. Denies any fever/chill, or other signs of infection, her strength is better, no further signs of bleeding after switched to lovenox on prophylactic dose. Lab showed Hgb back to 10 (was 8 at time of discharge), normal ANC. We will plan to initiate adjuvant chemotherapy in about 2 weeks, plan to give 3 more cycles of carboplatin + Taxol, then transition to Parp inhibitor for maintenance therapy given her BRCA1 germline mutation.     Given the bleeding complication post-op, will keep her at prophylactic dose of Lovenox for now, plan to increase to therapeutic dose in the next 1-2 weeks.     Recommend to seek for further evaluation of her left ankle injury at a local orthopaedic clinic.         Patient was seen and discussed with Dr Juarez.     My Dowling  Hem/Onco Fellow       A total of 30 minutes was  spent with the patient, 20 minutes of which were spent in counseling the patient and/or treatment planning.

## 2021-05-05 NOTE — PROGRESS NOTES
Taran is a 65 year old who is being evaluated via a billable video visit.      Date: 2021        Dr. 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 She is here today for follow up and disease management. In light of the recent COVID19 pandemic, and in accordance with our group and national guidelines this patients care has been reviewed and it is felt that presenting for her visit would be more likely to increase rather than decrease her relative risks. Therefore this visit was conducted by video.        Oncology History:  12/3/2020: US Pelvic: IMPRESSION: Limited examination due to acoustic windows. Possible left adnexal mass. A CT scan of the abdomen and pelvis with contrast is recommended for further assessment.     2020: CT A/P:   IMPRESSION:    Peritoneal carcinomatosis with masslike peritoneal thickening in the lower pelvis which may indicate an adnexal or ovarian primary malignancy. Large volume ascites. Bilateral pleural effusions. There is potential subtle pleural nodularity in the right hemithorax which could indicate metastatic disease.  Indeterminate 1 cm lesion in the right hepatic lobe suspicious for a metastatic lesion.      2020: Presented to [facility] with abdominal distention, 25lb weight loss, and CTAP with carcinomatosis, elevated  3098.     2020: CT Chest: IMPRESSION:   1. There are few scattered small sub-6 mm pulmonary nodules which are indeterminate without prior comparisons available. There are a few  slightly larger perifissural nodules which are technically  indeterminate in the setting of malignancy although presumed lymphatic in nature and of unlikely clinical significance. Attention on follow-up  is recommended.  2. Small to moderate left and small right pleural effusions are increased in size from prior. No convincing evidence for pleural nodularity.  3. Partially visualized large volume ascites and peritoneal nodularity in the upper abdomen similar to 12/4/2020 outside CT      12/26/2020: ED for abdominal distension; 3 L ascites drained with paracentesis    Pelvic US: Findings: Free fluid present in LLQ      12/31/2020: US Paracentesis: 900 mL ascites drained     1/7/2021: Diagnotic laparoscopy, biopsies  Pathology: FINAL DIAGNOSIS:   A. PERITONEUM, BIOPSIES:   - Positive for high grade carcinoma, consistent with metastatic carcinoma of Mullerian origin.     1/10-1/13/2021: Hospital admission for postoperative non-intractable vomiting and nausea.      1/10/2021: CT A/P: IMPRESSION: Extensive ascites which is probably malignant. Scattered liver hypodensities of indeterminate etiology comment cannot exclude metastatic disease. Diverticulosis. Fluid-filled adnexal masses and irregular appearance of uterus, which may represent primary neoplasm. Multiple peritoneal nodules. Large amount of fecal material in the colon with no evidence of small bowel obstruction.     Plan: Paclitaxel 175 mg/m2 and carboplatin AUC 6 x 3 cycles followed by a CT CAP and visit with Dr. Juarez.     1/12/2021: Cycle 1 paclitaxel and carboplatin while inpatient     1/13/2021: CT Head: Impression:  1. Chronic sinusitis of the right maxillary and right sphenoid sinuses.  2. Incidental presumed calcified meningioma in the right frontal  convexity without significant mass effect.  3. No suspicious intracranial enhancing lesion.     2/1/2021: Cycle 2 paclitaxel and carboplatin.  936.     2/3-2/5/21: Admission [facility] for afib w/ RVR and new PE     2/26/21: Cycle 3 paclitaxel and carboplatin planned.  Deferred due to thrombocytopenia.  pending.     3/15/21: Cycle 3 paclitaxel and carboplatin given     4/19/21: Exploratory  laparotomy, total abdominal hysterectomy, bilateral salpingo-oophorectomy, omentectomy, debulking including resection of liver nodules, diaphragm stripping, proctoscopy         Interval history:   Today she reports with her grand daughter Mary in the video visit.   She reports that she is recovering well from surgery, though she is still weak and tired. She denies any vaginal bleeding, no changes in her bowel or bladder habits, no abdominal pain/discomfort/bloating, no fevers or chills, and no chest pain or shortness of breath. She is able to get up and move around by her own, but her left ankle still hurts and it remains swollen.   She has good appetite and has good oral intake. No constipation, no headache, no low back pain. No signs of bleeding. She is still taking prophylactic lovenox subcutaneous, which will need a refill.            Past Medical History:  Past Medical History   Past Medical History:   Diagnosis Date     Atrial fibrillation with rapid ventricular response (H)       History of cold sores       Insomnia       Migraine       Osteopenia       Pelvic mass       Peritoneal carcinomatosis (H)       Restless legs syndrome (RLS)              Past Surgical History:  Past Surgical History         Past Surgical History:   Procedure Laterality Date     APPENDECTOMY         ARTHROSCPY KNEE SURGICAL DEBRIDEMENT SHAVING ARTICULAR CARTILAGE Right       BIOPSY   January 2021     Biopsy to confirm ovarian cancer     DEBRIDEMENT LEFT UPPER EXTREMITY   2016     HYSTERECTOMY TOTAL ABD, LUISITO SALPINGO-OOPHORECTOMY, NODE DISSECTION, TUMOR DEBULKING, COMBINED Bilateral 4/19/2021     Procedure: HYSTERECTOMY, TOTAL, ABDOMINAL, WITH BILATERAL SALPINGO-OOPHORECTOMY, omentectomy, NEOPLASM DEBULKING,Proctoscocy, RO, Resection of liver nodules, diaphragm stripping, immobilization of liver and colon;  Surgeon: Bolivar Juarez MD;  Location: [facility] OR     LAPAROSCOPY DIAGNOSTIC (GYN) Bilateral 1/7/2021     Procedure: Diagnsotic  laparoscopy, biopsies;  Surgeon: Bolivar Juarez MD;  Location:  OR     [facility]         TUBAL LIGATION                           Health Maintenance Due   Topic Date Due     PREVENTIVE CARE VISIT  Never done     ADVANCE CARE PLANNING  Never done     Pneumococcal Vaccine: Pediatrics (0 to 5 Years) and At-Risk Patients (6 to 64 Years) (1 of 4 - PCV13) Never done     COLORECTAL CANCER SCREENING  Never done     HIV SCREENING  Never done     COVID-19 Vaccine (1) Never done     HEPATITIS C SCREENING  Never done     LIPID  Never done     ZOSTER IMMUNIZATION (1 of 2) Never done     INFLUENZA VACCINE (1) Never done     MAMMO SCREENING  03/04/2021         Current Medications:       Current Outpatient Medications   Medication     acetaminophen (TYLENOL) 325 MG tablet     amitriptyline (ELAVIL) 100 MG tablet     enoxaparin ANTICOAGULANT (LOVENOX) 40 MG/0.4ML syringe     gabapentin (NEURONTIN) 600 MG tablet     HYDROmorphone (DILAUDID) 2 MG tablet     loratadine (CLARITIN) 10 MG tablet     polyethylene glycol (MIRALAX) 17 GM/Dose powder     prochlorperazine (COMPAZINE) 10 MG tablet     senna-docusate (SENOKOT-S/PERICOLACE) 8.6-50 MG tablet     SUMAtriptan (IMITREX) 100 MG tablet     zolpidem (AMBIEN) 5 MG tablet     OLANZapine zydis (ZYPREXA) 5 MG ODT     ondansetron (ZOFRAN-ODT) 4 MG ODT tab     valACYclovir (VALTREX) 1000 mg tablet      No current facility-administered medications for this visit.                Allergies:   No Known Allergies      Social History:     Social History                Tobacco Use     Smoking status: Former Smoker       Packs/day: 0.50       Years: 40.00       Pack years: 20.00       Quit date: 2018       Years since quitting: 3.2     Smokeless tobacco: Never Used   Substance Use Topics     Alcohol use: Not Currently             History   Drug Use Unknown            Family History:    Family History             Family History   Adopted: Yes   Problem Relation Age of Onset     Cancer Mother 36      Other Cancer Mother           Bio mother  of  a female cancer  at 36     Factor V Leiden deficiency Daughter       Deep Vein Thrombosis Daughter       Diabetes Type 1 Daughter       Diabetes Daughter       Hypertension Daughter                 Physical Exam:      There were no vitals taken for this visit.  There is no height or weight on file to calculate BMI.     General Appearance:  healthy and alert, no distress  Respiratory: no visible respiratory distress  Neuro: moves both arms freely without limitation       Psychiatric:      appropriate mood and affect                                     Assessment:     Taran Regalado is a 65 year old woman with metastatic ovarian high grade serous carcinoma, she is status post 3 cycles of neoadjuvant chemotherapy, then had exploratory laparotomy, total abdominal hysterectomy, bilateral salpingo-oophorectomy, omentectomy, debulking including resection of liver nodules, diaphragm stripping, proctoscopy on  by Dr Juarez.         1.  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   2.  BRCA 1 germline mutation.   3.  Precision Medicine Program  4.  PE resolved on Lovenox (prophy)  5.  Left ankle injury     Patient is about 2 week post-op, and overall has recovered well after surgery. Denies any fever/chill, or other signs of infection, her strength is better, no further signs of bleeding after switched to lovenox on prophylactic dose. Lab showed Hgb back to 10 (was 8 at time of discharge), normal ANC. We will plan to initiate adjuvant chemotherapy in about 2 weeks, plan to give 3 more cycles of carboplatin + Taxol, then transition to Parp inhibitor for maintenance therapy given her BRCA1 germline mutation.      Given the bleeding complication post-op, will keep her at prophylactic dose of Lovenox for now, plan to increase to therapeutic dose in the next 1-2 weeks.      Recommend to seek for further evaluation of her left ankle injury at a local  orthopaedic clinic.            Bolivar Juarez MD, MS    Department of Obstetrics and Gynecology   Division of Gynecologic Oncology   [facility]  Phone: [phone]       A total of 30 minutes was spent with the patient, 20 minutes of which were spent in counseling the patient and/or treatment planning.

## 2021-05-05 NOTE — PROGRESS NOTES
Justen is a 65 year old who is being evaluated via a billable video visit.      How would you like to obtain your AVS? MyChart  If the video visit is dropped, the invitation should be resent by: Text to cell phone: [phone]  Will anyone else be joining your video visit? No     Vitals - Patient Reported  Weight (Patient Reported): 68 kg (150 lb)  Height (Patient Reported): 182.9 cm (6')  BMI (Based on Pt Reported Ht/Wt): 20.34  Pain Score: No Pain (0)    .Julita Hughes, RU on 5/5/2021 at 1:43 PM

## 2021-05-06 NOTE — TELEPHONE ENCOUNTER
[place] for family member paperwork completed, checked for accuracy, signed and faxed to  [phone] . A copy was made, sent to scanning and original mailed to patient at home address.    Successful transmission verified in Right Fax.        Kiana Carvalho CMA

## 2021-05-10 ENCOUNTER — TELEPHONE (OUTPATIENT)
Dept: URGENT CARE | Facility: URGENT CARE | Age: 66
End: 2021-05-10

## 2021-05-10 ENCOUNTER — ANCILLARY PROCEDURE (OUTPATIENT)
Dept: GENERAL RADIOLOGY | Facility: CLINIC | Age: 66
End: 2021-05-10
Attending: NURSE PRACTITIONER
Payer: COMMERCIAL

## 2021-05-10 ENCOUNTER — OFFICE VISIT (OUTPATIENT)
Dept: URGENT CARE | Facility: URGENT CARE | Age: 66
End: 2021-05-10
Payer: COMMERCIAL

## 2021-05-10 VITALS
BODY MASS INDEX: 21.4 KG/M2 | TEMPERATURE: 97.8 F | DIASTOLIC BLOOD PRESSURE: 72 MMHG | HEART RATE: 101 BPM | OXYGEN SATURATION: 98 % | WEIGHT: 157.8 LBS | SYSTOLIC BLOOD PRESSURE: 117 MMHG

## 2021-05-10 DIAGNOSIS — S82.52XA CLOSED AVULSION FRACTURE OF MEDIAL MALLEOLUS OF LEFT TIBIA, INITIAL ENCOUNTER: ICD-10-CM

## 2021-05-10 DIAGNOSIS — C56.9 OVARIAN CANCER, UNSPECIFIED LATERALITY (H): ICD-10-CM

## 2021-05-10 DIAGNOSIS — R11.0 CHEMOTHERAPY-INDUCED NAUSEA: Primary | ICD-10-CM

## 2021-05-10 DIAGNOSIS — S93.402D SPRAIN OF LEFT ANKLE, UNSPECIFIED LIGAMENT, SUBSEQUENT ENCOUNTER: ICD-10-CM

## 2021-05-10 DIAGNOSIS — S99.912D INJURY OF LEFT ANKLE, SUBSEQUENT ENCOUNTER: ICD-10-CM

## 2021-05-10 DIAGNOSIS — M25.572 PAIN IN JOINT INVOLVING ANKLE AND FOOT, LEFT: ICD-10-CM

## 2021-05-10 DIAGNOSIS — T45.1X5A CHEMOTHERAPY-INDUCED NAUSEA: Primary | ICD-10-CM

## 2021-05-10 DIAGNOSIS — M79.662 PAIN OF LEFT LOWER LEG: ICD-10-CM

## 2021-05-10 DIAGNOSIS — M79.672 LEFT FOOT PAIN: ICD-10-CM

## 2021-05-10 DIAGNOSIS — S82.832A CLOSED FRACTURE OF NECK OF LEFT FIBULA, INITIAL ENCOUNTER: Primary | ICD-10-CM

## 2021-05-10 DIAGNOSIS — F51.01 PRIMARY INSOMNIA: ICD-10-CM

## 2021-05-10 LAB — COPATH REPORT: NORMAL

## 2021-05-10 PROCEDURE — 99214 OFFICE O/P EST MOD 30 MIN: CPT | Performed by: NURSE PRACTITIONER

## 2021-05-10 PROCEDURE — 73590 X-RAY EXAM OF LOWER LEG: CPT | Mod: LT | Performed by: RADIOLOGY

## 2021-05-10 PROCEDURE — 73630 X-RAY EXAM OF FOOT: CPT | Mod: LT | Performed by: RADIOLOGY

## 2021-05-10 PROCEDURE — 73610 X-RAY EXAM OF ANKLE: CPT | Mod: LT | Performed by: RADIOLOGY

## 2021-05-10 RX ORDER — OLANZAPINE 5 MG/1
5 TABLET, ORALLY DISINTEGRATING ORAL
Qty: 14 TABLET | Refills: 0 | Status: CANCELLED | OUTPATIENT
Start: 2021-05-10

## 2021-05-10 RX ORDER — PROCHLORPERAZINE MALEATE 10 MG
10 TABLET ORAL EVERY 6 HOURS PRN
Qty: 30 TABLET | Refills: 0 | Status: CANCELLED | OUTPATIENT
Start: 2021-05-10

## 2021-05-10 RX ORDER — ZOLPIDEM TARTRATE 5 MG/1
5 TABLET ORAL
Qty: 14 TABLET | Refills: 0 | Status: CANCELLED | OUTPATIENT
Start: 2021-05-10

## 2021-05-10 RX ORDER — PROCHLORPERAZINE MALEATE 10 MG
10 TABLET ORAL EVERY 6 HOURS PRN
Qty: 30 TABLET | Refills: 0 | Status: SHIPPED | OUTPATIENT
Start: 2021-05-10 | End: 2021-07-09

## 2021-05-10 NOTE — PATIENT INSTRUCTIONS
Patient Education     Treating Ankle Sprains  Treatment will depend on how bad your sprain is. For a severe sprain, healing may take 3 months or more.  Right after your injury: Use R.I.C.E.    BIG: Rest: At first, keep weight off the ankle as much as you can. You may be given crutches to help you walk without putting weight on the ankle.    BIG: Ice: Put an ice pack on the ankle for 20 minutes. Remove the pack and wait at least 30 minutes. Repeat for up to 3 days. This helps reduce swelling.    BIG: Compression: To reduce swelling and keep the joint stable, you may need to wrap the ankle with an elastic bandage. For more severe sprains, you may need an ankle brace, a boot, or a cast.    BIG: Elevation: To reduce swelling, keep your ankle raised above your heart when you sit or lie down.  Medicine  Your healthcare provider may suggest oral nonsteroidal anti-inflammatory medicine (NSAIDs), such as ibuprofen. This relieves the pain and helps reduce swelling. Be sure to take your medicine as directed.  Exercises    After about 2 to 3 weeks, you may be given exercises to strengthen the ligaments and muscles in the ankle. Doing these exercises will help prevent another ankle sprain. Exercises may include standing on your toes and then on your heels and doing ankle curls.    Sit on the edge of a sturdy table or lie on your back.    Pull your toes toward you. Then point them away from you. Repeat for 2 to 3 minutes.  [employer] last reviewed this educational content on 1/1/2018 2000-2021 [employer]. All rights reserved. This information is not intended as a substitute for professional medical care. Always follow your healthcare professional's instructions.           Patient Education     Understanding Ankle Sprain    The ankle is the joint where the leg and foot meet. Bones are held in place by connective tissue called ligaments. When ankle ligaments are stretched to the point of pain and injury, it's  called an ankle sprain. A sprain can tear the ligaments. These tears can be very small but still cause pain. Ankle sprains are graded by the amount of ligament damage.     Grade 1 (mild). There is slight stretching and tiny tears to the ligament fibers. You may have mild ankle swelling and tenderness.    Grade 2 (moderate). This is a partial ligament tear and causes moderate ankle swelling and tenderness. There may be abnormal looseness when the healthcare provider moves your joint.    Grade 3 (severe). There is a complete tear to the ligament and a great deal of ankle swelling and tenderness. The ankle joint may be very unstable.  What causes an ankle sprain?  A sprain may occur when you twist your ankle or bend it too far. This can happen when you stumble or fall. Things that can make an ankle sprain more likely include:     Having had an ankle sprain before    Playing sports that involve running and jumping. Or playing contact sports such as football or hockey.    Wearing shoes that don t support your feet and ankles well    Having ankles with poor strength and flexibility  Symptoms of an ankle sprain  Symptoms may include:    Pain or soreness in the ankle    Swelling    Redness or bruising    Not being able to walk or put weight on the affected foot    Reduced range of motion in the ankle    A popping or tearing feeling at the time the sprain occurs    An abnormal or dislocated look to the ankle    Instability or too much range of motion in the ankle  Treatment for an ankle sprain  Treatment focuses on reducing pain and swelling, and preventing further injury. Treatments may include:     Resting the ankle. Avoid putting weight on it. This may mean using crutches until the sprain heals.    Prescription or over-the-counter medicines. These help reduce swelling and pain.    Cold packs. These help reduce pain and swelling.    Raising your ankle above your heart. This helps reduce swelling.    Wrapping the ankle with  an elastic bandage or ankle brace. This helps reduce swelling and gives some support to the ankle. In rare cases, you may need a cast or boot.    Stretching and other exercises. These improve flexibility and strength.    Heat packs. These may be recommended before doing ankle exercises.  Possible complications of an ankle sprain  An ankle that has been weakened by a sprain can be more likely to have repeated sprains afterward. Doing exercises to strengthen your ankle and improve balance can reduce your risk for repeated sprains. Other possible complications are long-term (chronic) pain or an ankle that remains unstable.   When to call your healthcare provider  Call your healthcare provider right away if you have any of these:    Fever of 100.4 F (38 C) or higher, or as directed by your provider    Chills    Pain, numbness, discoloration, or coldness in the foot or toes    Pain that gets worse    Symptoms that don t get better, or get worse    New symptoms  Jocelin last reviewed this educational content on 6/1/2019 2000-2021 [employer]. All rights reserved. This information is not intended as a substitute for professional medical care. Always follow your healthcare professional's instructions.           Patient Education     RICE     Rest an injury, elevate it, and use ice and compression as directed.   RICE stands for rest, ice, compression, and elevation. These can limit pain and swelling after an injury. RICE may be recommended to help treat breaks (fractures), sprains, strains, and bruises or bumps.   Home care  Here are the details of RICE:    Rest. Limit the use of the injured body part. This helps prevent further damage to the body part and gives it time to heal. In some cases, you may need a sling, brace, splint, or cast to help keep the body part still until it has healed.    Ice. Applying ice right after an injury helps relieve pain and swelling. To make an ice pack, put ice cubes in a  plastic bag that seals at the top. Wrap the bag in a clean, thin towel or cloth. Then place it over the injured area. Do this for 10 to 15 minutes every 3 to 4 hours. Continue for the next 1 to 3 days or until your symptoms improve. Never put ice directly on your skin. Don't ice an area longer than 15 minutes at a time.    Compression. Putting pressure on an injury helps reduce swelling and provides support. Wrap the injured area firmly with an elastic bandage or wrap. Make sure not to wrap the bandage too tightly or you will cut off blood flow to the injured area. If your bandage loosens, rewrap it.    Elevation. Keeping an injury raised or elevated above the level of your heart reduces swelling, pain, and throbbing. For instance, if you have a broken leg, it may help to rest your leg on several pillows when sitting or lying down. Try to keep the injured area elevated as often as possible.  Follow-up care  Follow up with your healthcare provider, or as advised.  When to seek medical advice  Call your healthcare provider right away if any of these occur:    Fever of 100.4 F (38 C) or higher, or as directed by your healthcare provider    Chills    Increased pain or swelling in the injured body part    Injured body part becomes cold, blue, numb, or tingly    Signs of infection. These include warmth in the skin, redness, drainage, or bad smell coming from the injured body part.  [employer]H last reviewed this educational content on 6/1/2018 2000-2021 [employer]. All rights reserved. This information is not intended as a substitute for professional medical care. Always follow your healthcare professional's instructions.

## 2021-05-10 NOTE — PROGRESS NOTES
Assessment & Plan     Closed fracture of neck of left fibula, initial encounter    Closed avulsion fracture of medial malleolus of left tibia, initial encounter    Sprain of left ankle, unspecified ligament, subsequent encounter    Injury of left ankle, subsequent encounter    Pain of left lower leg    - XR Tibia & Fibula Left 2 Views    Left foot pain    Pain in joint involving ankle and foot, left    - XR Ankle Left G/E 3 Views  - XR Foot Left G/E 3 Views       Reviewed xray images and results with patient during visit showing no obvious fracture or dislocation. Even though only ankle tender to palpation during exam, since pain is in foot to under knee, requests xrays of lower leg, ankle, and foot which are completed. Discussed lateral ankle sprain causing pain and swelling and healing can take 12 weeks. Recommended rest, ice, compress, elevate, tylenol as needed for pain unless otherwise contraindicated. She is fitted with ACE wrap during visit.     Follow-up with PCP if symptoms persist for 2 weeks, and sooner if symptoms worsen or new symptoms develop.     Discussed red flag symptoms which warrant immediate visit in emergency room    All questions were answered and patient and daughter verbalized understanding. AVS reviewed with patient.     Kath Adame, DNP, APRN, CNP 5/10/2021 1:45 PM  [facility] [place]    Addendum: see telephone note with fractures noted by radiologist urgent orthopedic consult entered and patient advised to go to orthopedic walk-in urgent care now for further evaluation and management and is agreeable.         Kirt Campuzano is a 65 year old female who presents to clinic today with her daughter for the following health issues:  Chief Complaint   Patient presents with     Musculoskeletal Problem     ankle - needs follow up x-ray     MS Injury/Pain    Onset of symptoms was 4/22/21.  Location: left ankle, left foot, left lateral leg just below knee  Context: The  injury happened by twisting ankle      Patient experienced immediate pain, immediate swelling  Course of symptoms is improving.    Severity moderate  Current and Associated symptoms: pain and swelling Pain, Swelling and Decreased range of motion  Denies  Bruising, Warmth and Redness  Aggravating Factors: walking, weight-bearing and movement  Therapies to improve symptoms include: heat and ice  This is the first time this type of problem has occurred for this patient.   She had xrays of left ankle 4/25 that didn't show fracture, but would like to have repeat xrays of her left lower leg to make sure there is no missed fracture now that swelling has improved before she starts another round of chemotherapy.     Problem list, Medication list, Allergies, and Medical history reviewed in EPIC.    ROS:  Review of systems negative except for noted above        Objective    /72   Pulse 101   Temp 97.8  F (36.6  C) (Oral)   Wt 71.6 kg (157 lb 12.8 oz)   SpO2 98%   BMI 21.40 kg/m    Physical Exam  Constitutional:       General: She is not in acute distress.     Appearance: She is not toxic-appearing or diaphoretic.   Pulmonary:      Effort: Pulmonary effort is normal. No respiratory distress.   Musculoskeletal:      Left ankle: She exhibits decreased range of motion and swelling. Tenderness. Lateral malleolus tenderness found.      Left lower leg: She exhibits no tenderness.      Left foot: Swelling present. No tenderness.      Comments: Mild swelling left leg below knee into foot. Tenderness with palpation lateral malleolus. Decreased ROM left ankle inversion and eversion. Foot and left leg nontender to palpation   Skin:     General: Skin is warm and dry.      Findings: No erythema.   Neurological:      Sensory: No sensory deficit.      Gait: Gait abnormal.      Comments: Walking favoring left leg        X-ray left ankle, foot, tibia/fibula was performed and reviewed independently by myself showing no obvious  fracture or dislocation  Radiologist impression:   Results for orders placed or performed in visit on 05/10/21   XR Ankle Left G/E 3 Views     Status: None (Preliminary result)    Narrative    LEFT TIBIA AND FIBULA TWO VIEWS, ANKLE LEFT THREE OR MORE VIEWS,  FOOT  LEFT THREE OR MORE VIEWS   5/10/2021 12:38 PM     HISTORY:  Twisted ankle 2 weeks ago, continued pain and swelling. Pain  of left lower leg.    FINDINGS: There are two knee loose bodies. Mild knee medial  compartment osteophytosis. Plantar calcaneal spurring. Old healed  fracture of the second metatarsal.      Impression    IMPRESSION:   1. Fracture of the fibular neck with mild hypertrophic callus  formation, and slight displacement.  2. Small distracted fracture at the inferior aspect of the medial  malleolus.  3. There are a few small fracture fragments at the inferior aspect of  the lateral malleolus.  4. There are small age-indeterminate avulsion fractures at the dorsal  aspects of the navicular and anterior talus.   XR Tibia & Fibula Left 2 Views     Status: None (Preliminary result)    Narrative    LEFT TIBIA AND FIBULA TWO VIEWS, ANKLE LEFT THREE OR MORE VIEWS,  FOOT  LEFT THREE OR MORE VIEWS   5/10/2021 12:38 PM     HISTORY:  Twisted ankle 2 weeks ago, continued pain and swelling. Pain  of left lower leg.    FINDINGS: There are two knee loose bodies. Mild knee medial  compartment osteophytosis. Plantar calcaneal spurring. Old healed  fracture of the second metatarsal.      Impression    IMPRESSION:   1. Fracture of the fibular neck with mild hypertrophic callus  formation, and slight displacement.  2. Small distracted fracture at the inferior aspect of the medial  malleolus.  3. There are a few small fracture fragments at the inferior aspect of  the lateral malleolus.  4. There are small age-indeterminate avulsion fractures at the dorsal  aspects of the navicular and anterior talus.   XR Foot Left G/E 3 Views     Status: None (Preliminary result)     Narrative    LEFT TIBIA AND FIBULA TWO VIEWS, ANKLE LEFT THREE OR MORE VIEWS,  FOOT  LEFT THREE OR MORE VIEWS   5/10/2021 12:38 PM     HISTORY:  Twisted ankle 2 weeks ago, continued pain and swelling. Pain  of left lower leg.    FINDINGS: There are two knee loose bodies. Mild knee medial  compartment osteophytosis. Plantar calcaneal spurring. Old healed  fracture of the second metatarsal.      Impression    IMPRESSION:   1. Fracture of the fibular neck with mild hypertrophic callus  formation, and slight displacement.  2. Small distracted fracture at the inferior aspect of the medial  malleolus.  3. There are a few small fracture fragments at the inferior aspect of  the lateral malleolus.  4. There are small age-indeterminate avulsion fractures at the dorsal  aspects of the navicular and anterior talus.

## 2021-05-10 NOTE — PROGRESS NOTES
"Taran is a 65 year old who is being evaluated via a billable video visit.      How would you like to obtain your AVS? MyChart     If the video visit is dropped, the invitation should be resent by: Text to cell phone: [phone]     Will anyone else be joining your video visit? No          Vitals - Patient Reported  Weight (Patient Reported): 68 kg (150 lb)  Height (Patient Reported): 182.9 cm (6' 0.01\")  BMI (Based on Pt Reported Ht/Wt): 20.34  Pain Score: No Pain (0)    Jarocho Pinedo LPN    Video-Visit Details    Type of service:  Video Visit    Video Start Time: 10:21 am    Video End Time:10:36 am    Originating Location (pt. Location): Other outside clinic    Distant Location (provider location):  [facility]     Platform used for Video Visit: [facility]      Gynecologic Oncology Return Visit Note    Date: 2021    RE: Taran Regalado  : 1955  GRACY: 2021    CC: Metastatic high grade ovarian serous carcinoma     HPI:  Taran Regalado is a 65 year old woman with a diagnosis of metastatic high grade ovarian serous carcinoma. She is currently undergoing treatment with adjuvant chemotherapy.  She is here today for follow up and disease management. In light of the recent COVID19 pandemic, and in accordance with our group and national guidelines this patients care has been reviewed and it is felt that presenting for her visit would be more likely to increase rather than decrease her relative risks. Therefore this visit was conducted by video.        Oncology History:  12/3/2020: US Pelvic: IMPRESSION: Limited examination due to acoustic windows. Possible left adnexal mass. A CT scan of the abdomen and pelvis with contrast is recommended for further assessment.     2020: CT A/P:   IMPRESSION:    Peritoneal carcinomatosis with masslike peritoneal thickening in the lower pelvis which may indicate an adnexal or ovarian primary malignancy. Large volume " ascites. Bilateral pleural effusions. There is potential subtle pleural nodularity in the right hemithorax which could indicate metastatic disease.  Indeterminate 1 cm lesion in the right hepatic lobe suspicious for a metastatic lesion.      12/16/2020: Presented to [facility] with abdominal distention, 25lb weight loss, and CTAP with carcinomatosis, elevated  3098.     12/23/2020: CT Chest: IMPRESSION:   1. There are few scattered small sub-6 mm pulmonary nodules which are indeterminate without prior comparisons available. There are a few  slightly larger perifissural nodules which are technically  indeterminate in the setting of malignancy although presumed lymphatic in nature and of unlikely clinical significance. Attention on follow-up is recommended.  2. Small to moderate left and small right pleural effusions are increased in size from prior. No convincing evidence for pleural nodularity.  3. Partially visualized large volume ascites and peritoneal nodularity in the upper abdomen similar to 12/4/2020 outside CT      12/26/2020: ED for abdominal distension; 3 L ascites drained with paracentesis    Pelvic US: Findings: Free fluid present in LLQ      12/31/2020: US Paracentesis: 900 mL ascites drained     1/7/2021: Diagnotic laparoscopy, biopsies  Pathology: FINAL DIAGNOSIS:   A. PERITONEUM, BIOPSIES:   - Positive for high grade carcinoma, consistent with metastatic carcinoma of Mullerian origin.     1/10-1/13/2021: Hospital admission for postoperative non-intractable vomiting and nausea.      1/10/2021: CT A/P: IMPRESSION: Extensive ascites which is probably malignant. Scattered liver hypodensities of indeterminate etiology comment cannot exclude metastatic disease. Diverticulosis. Fluid-filled adnexal masses and irregular appearance of uterus, which may represent primary neoplasm. Multiple peritoneal nodules. Large amount of fecal material in the colon with no evidence of small bowel obstruction.     Plan:  Paclitaxel 175 mg/m2 and carboplatin AUC 6 x 3 cycles followed by a CT CAP and visit with Dr. Juarez.     1/12/2021: Cycle 1 paclitaxel and carboplatin while inpatient     1/13/2021: CT Head: Impression:  1. Chronic sinusitis of the right maxillary and right sphenoid sinuses.  2. Incidental presumed calcified meningioma in the right frontal  convexity without significant mass effect.  3. No suspicious intracranial enhancing lesion.     2/1/2021: Cycle 2 paclitaxel and carboplatin.  936.     2/3-2/5/21: Admission [facility] for afib w/ RVR and new PE     2/26/21: Cycle 3 paclitaxel and carboplatin planned.  Deferred due to thrombocytopenia.  Deferred 3/12/21 due to neutropenia.  Given on 3/15/21 after Filgrastim injection x 2.  Add Pegfilgrastim to day 2 of treatment plan.   129.     4/2/21: CT CAP  IMPRESSION:   In this patient with a history of metastatic serous ovarian cancer,  status post diagnostic laparoscopy and neoadjuvant chemotherapy:  1. Significant improvement in peritoneal carcinomatosis as discussed  above.  2. Resolution of previously seen scattered small hypoattenuating  lesions in the liver. This raises the concern for these lesions  representing metastatic disease, noting excellent response elsewhere  in abdomen and pelvis.  3. Bilateral pleural effusions have resolved.  4. Several small pulmonary nodules in the right lung measuring up to 5  mm. Indeterminate, but likely benign in the setting of their stability  with excellent response elsewhere. Continued attention on follow-up.    4/19/21: HYSTERECTOMY, TOTAL, ABDOMINAL, WITH BILATERAL SALPINGO-OOPHORECTOMY, omentectomy, NEOPLASM DEBULKING,Proctoscocy, RO, Resection of liver nodules, diaphragm stripping, immobilization of liver and colon  FINAL DIAGNOSIS:   A. OMENTUM, BIOPSY:   - Omental adipose tissue with rare viable cells of metastatic high grade   serous carcinoma   - One reactive lymph node, negative for malignancy (0/1)   B.  NODULE, SIGMOID, EXCISION:   - Calcified necrotic adipose tissue   - Negative for malignancy   C. NODULE, SMALL BOWEL MESENTERY, EXCISION:   - Fibroadipose tissue, positive for metastatic high grade serous carcinoma   D. UTERUS, CERVIX, BILATERAL FALLOPIAN TUBES AND OVARIES, HYSTERECTOMY   WITH BILATERAL SALPINGO-OOPHORECTOMY:   - Atrophic endometrium   - Uterine serosa with rare viable cells consistent with high grade serous   carcinoma   - Cervix with atrophic changes   - Viable cells consistent with high grade serous carcinoma present in the   right ovary, serosa of right   fallopian tube and right periadnexal soft tissue   - Left ovary with atrophic changes   - Left fallopian tube with a rare focus of serous tubal in-situ carcinoma   (STIC)   E. NODULES, SMALL BOWEL MESENTRY, EXCISION:   - Fibroadipose tissue with rare viable cells of metastatic high grade   serous carcinoma   F. NODULE, SPLENIC FLEXURE TRANSVERSE COLON, EXCISION:   - Fibroadipose tissue with rare viable cells of metastatic high grade   serous carcinoma   - Accessory splenule, negative for malignancy   G. OMENTUM, OMENTECTOMY:   - Omental adipose tissue with rare viable cells of metastatic high grade   serous carcinoma   H. NODULE, PERITONEAL PANCREATIC, EXCISION:   - Fibrous adhesions with inflammation   - Negative for malignancy   I. RIGHT HEMIDIAPHRAGM PERITONEUM, EXCISION:   - Fibrous adhesions with inflammation   - Negative for malignancy   J. RIGHT LIVER SURFACE NODULE:   - Fibrous adhesions with benign inclusion glands   - Negative for malignancy   K. LEFT LOWER LIVER EDGE, BIOPSY:   - Cauterized hepatic parenchyma and capsule   - Negative for malignancy   L. NODULE, SMALL BOWEL MESENTERIC #3, EXCISION:   - Fibroadipose tissue with rare viable cells of metastatic high grade   serous carcinoma      Plan: Carboplatin AUC 6 + Taxol 175 mg/m2 x 3 cycles, then transition to Parp inhibitor for maintenance therapy given her BRCA1 germline  mutation.     5/21/21: Cycle 4 carboplatin and paclitaxel.   pending.                Today she reports feeling well overall.  She continues to struggle with sleep which has been an issue for her for most of her life.  She is wondering if she can increase her amitriptyline dose to 200 mg nightly.  She has an appointment with a sleep specialist in the next few weeks.  She feels like she has recovered from surgery her pain has improved and she is eating and drinking well.  She would like some oxycodone to help manage bone pain after chemo.  She will also start claritin today.  She denies any vaginal bleeding, no changes in her bowel or bladder habits, no nausea/emesis, no lower extremity edema, and no difficulties eating or sleeping. She denies any abdominal discomfort/bloating, no fevers or chills, and no chest pain or shortness of breath.  She has not received her COVID vaccine yet but plans to schedule an appointment.                    Review of Systems     Constitutional:  Negative for fever, chills, weight loss, weight gain, fatigue, decreased appetite, night sweats, recent stressors, height gain, height loss, post-operative complications, incisional pain, hallucinations, increased energy, hyperactivity and confused.   HENT:  Negative for ear pain, hearing loss, tinnitus, nosebleeds, trouble swallowing, hoarse voice, mouth sores, sore throat, ear discharge, tooth pain, gum tenderness, taste disturbance, smell disturbance, hearing aid, bleeding gums, dry mouth, sinus pain, sinus congestion and neck mass.    Eyes:  Negative for double vision, pain, redness, eye pain, decreased vision, eye watering, eye bulging, eye dryness, flashing lights, spots, floaters, strabismus, tunnel vision, jaundice and eye irritation.   Respiratory:   Negative for cough, hemoptysis, sputum production, shortness of breath, wheezing, sleep disturbances due to breathing, snores loudly, respiratory pain, dyspnea on exertion, cough  disturbing sleep and postural dyspnea.    Cardiovascular:  Negative for chest pain, dyspnea on exertion, palpitations, orthopnea, claudication, leg swelling, fingers/toes turn blue, hypertension, hypotension, syncope, history of heart murmur, chest pain on exertion, chest pain at rest, pacemaker, few scattered varicosities, leg pain, sleep disturbances due to breathing, tachycardia, light-headedness, exercise intolerance and edema.   Gastrointestinal:  Negative for heartburn, nausea, vomiting, abdominal pain, diarrhea, constipation, blood in stool, melena, rectal pain, bloating, hemorrhoids, bowel incontinence, jaundice, rectal bleeding, coffee ground emesis and change in stool.   Genitourinary:  Negative for bladder incontinence, dysuria, urgency, hematuria, flank pain, vaginal discharge, difficulty urinating, genital sores, dyspareunia, decreased libido, nocturia, voiding less frequently, arousal difficulty, abnormal vaginal bleeding, excessive menstruation, menstrual changes, hot flashes, vaginal dryness and postmenopausal bleeding.   Musculoskeletal:  Negative for myalgias, back pain, joint swelling, arthralgias, stiffness, muscle cramps, neck pain, bone pain, muscle weakness and fracture.   Skin:  Negative for nail changes, itching, poor wound healing, rash, hair changes, skin changes, acne, warts, poor wound healing, scarring, flaky skin, Raynaud's phenomenon, sensitivity to sunlight and skin thickening.   Neurological:  Negative for dizziness, tingling, tremors, speech change, seizures, loss of consciousness, weakness, light-headedness, numbness, headaches, disturbances in coordination, extremity numbness, memory loss, difficulty walking and paralysis.   Endo/Heme:  Negative for anemia, swollen glands and bruises/bleeds easily.   Psychiatric/Behavioral:  Negative for depression, hallucinations, memory loss, decreased concentration, mood swings and panic attacks.    Breast:  Negative for breast discharge,  breast mass, breast pain and nipple retraction.   Endocrine:  Negative for altered temperature regulation, polyphagia, polydipsia, unwanted hair growth and change in facial hair.        Past Medical History:    Past Medical History:   Diagnosis Date     Atrial fibrillation with rapid ventricular response (H)      History of cold sores      Insomnia      Migraine      Osteopenia      Pelvic mass      Peritoneal carcinomatosis (H)      Restless legs syndrome (RLS)          Past Surgical History:    Past Surgical History:   Procedure Laterality Date     APPENDECTOMY       ARTHROSCPY KNEE SURGICAL DEBRIDEMENT SHAVING ARTICULAR CARTILAGE Right      BIOPSY  January 2021    Biopsy to confirm ovarian cancer     DEBRIDEMENT LEFT UPPER EXTREMITY  2016     HYSTERECTOMY TOTAL ABD, LUISITO SALPINGO-OOPHORECTOMY, NODE DISSECTION, TUMOR DEBULKING, COMBINED Bilateral 4/19/2021    Procedure: HYSTERECTOMY, TOTAL, ABDOMINAL, WITH BILATERAL SALPINGO-OOPHORECTOMY, omentectomy, NEOPLASM DEBULKING,Proctoscocy, RO, Resection of liver nodules, diaphragm stripping, immobilization of liver and colon;  Surgeon: Bolivar Juarez MD;  Location: [facility] OR     LAPAROSCOPY DIAGNOSTIC (GYN) Bilateral 1/7/2021    Procedure: Diagnsotic laparoscopy, biopsies;  Surgeon: Bolivar Juarez MD;  Location: [facility] OR     LASIK       TUBAL LIGATION           Health Maintenance Due   Topic Date Due     PREVENTIVE CARE VISIT  Never done     ADVANCE CARE PLANNING  Never done     Pneumococcal Vaccine: Pediatrics (0 to 5 Years) and At-Risk Patients (6 to 64 Years) (1 of 4 - PCV13) Never done     COLORECTAL CANCER SCREENING  Never done     COVID-19 Vaccine (1) Never done     HIV SCREENING  Never done     HEPATITIS C SCREENING  Never done     LIPID  Never done     ZOSTER IMMUNIZATION (1 of 2) Never done     MAMMO SCREENING  03/04/2021       Current Medications:     Current Outpatient Medications   Medication Sig Dispense Refill     acetaminophen (TYLENOL) 325 MG tablet  Take 2 tablets (650 mg) by mouth every 6 hours as needed for mild pain 50 tablet 0     amitriptyline (ELAVIL) 100 MG tablet Take 50 mg by mouth At Bedtime        enoxaparin ANTICOAGULANT (LOVENOX) 40 MG/0.4ML syringe Inject 0.4 mLs (40 mg) Subcutaneous every 24 hours 5.6 mL 0     gabapentin (NEURONTIN) 600 MG tablet Take 1 tablet (600 mg) by mouth At Bedtime 60 tablet 0     loratadine (CLARITIN) 10 MG tablet Take 10 mg by mouth daily as needed (for bone pain related to neupogen)        oxyCODONE (ROXICODONE) 5 MG tablet Take 1 tablet (5 mg) by mouth every 12 hours 10 tablet 0     prochlorperazine (COMPAZINE) 10 MG tablet Take 1 tablet (10 mg) by mouth every 6 hours as needed for nausea or vomiting 30 tablet 0     SUMAtriptan (IMITREX) 100 MG tablet Take 100 mg by mouth at onset of headache        valACYclovir (VALTREX) 1000 mg tablet Take 1,000 mg by mouth as needed        zolpidem (AMBIEN) 5 MG tablet Take 1 tablet (5 mg) by mouth nightly as needed for sleep (Patient taking differently: Take 5 mg by mouth At Bedtime ) 14 tablet 0     HYDROmorphone (DILAUDID) 2 MG tablet Take 0.5-1 tablets (1-2 mg) by mouth every 12 hours as needed for severe pain (Patient not taking: Reported on 5/10/2021) 8 tablet 0     OLANZapine zydis (ZYPREXA) 5 MG ODT Take 1 tablet (5 mg) by mouth nightly as needed (insomnia) (Patient not taking: Reported on 5/5/2021) 14 tablet 0     ondansetron (ZOFRAN-ODT) 4 MG ODT tab Take 1 tablet (4 mg) by mouth every 6 hours as needed for nausea or vomiting (Patient not taking: Reported on 5/5/2021) 20 tablet 0     polyethylene glycol (MIRALAX) 17 GM/Dose powder Take 17 g by mouth 2 times daily (Patient not taking: Reported on 5/10/2021) 510 g 0     senna-docusate (SENOKOT-S/PERICOLACE) 8.6-50 MG tablet Take 2 tablets by mouth 2 times daily (Patient not taking: Reported on 5/10/2021) 30 tablet 0         Allergies:      No Known Allergies     Social History:     Social History     Tobacco Use     Smoking  status: Former Smoker     Packs/day: 0.50     Years: 40.00     Pack years: 20.00     Quit date:      Years since quitting: 3.3     Smokeless tobacco: Never Used   Substance Use Topics     Alcohol use: Not Currently       History   Drug Use Unknown         Family History:     The patient's family history is notable for:    Family History   Adopted: Yes   Problem Relation Age of Onset     Cancer Mother 36     Other Cancer Mother         Bio mother  of  a female cancer  at 36     Factor V Leiden deficiency Daughter      Deep Vein Thrombosis Daughter      Diabetes Type 1 Daughter      Diabetes Daughter      Hypertension Daughter      Anesthesia Reaction No family hx of          Physical Exam:     Vital Signs 2021   Systolic 119   Diastolic 69   Pulse 70   Temperature 98.3   Respirations 14   Weight (LB) 159 lb 3.2 oz   Height        General Appearance: healthy and alert, no distress    Eyes:  Eyes grossly normal to inspection.  No discharge or erythema, or obvious scleral/conjunctival abnormalities.    Respiratory: No audible wheeze, cough, or visible cyanosis.  No visible retractions or increased work of breathing.     Musculoskeletal: extremities non tender and without edema    Skin: no lesions or rashes on visible skin    Neurological: normal gait, no gross defects     Psychiatric: appropriate mood and affect. Mentation appears normal, affect normal/bright, judgement and insight intact, normal speech and appearance well-groomed                            The rest of a comprehensive physical examination is deferred due to PHE (public health emergency) video visit restrictions.    Lab Results   Component Value Date    WBC 3.5 2021     Lab Results   Component Value Date    RBC 3.81 2021     Lab Results   Component Value Date    HGB 11.5 2021     Lab Results   Component Value Date    HCT 37.4 2021     No components found for: MCT  Lab Results   Component Value Date    MCV 98  05/21/2021     Lab Results   Component Value Date    MCH 30.2 05/21/2021     Lab Results   Component Value Date    MCHC 30.7 05/21/2021     Lab Results   Component Value Date    RDW 16.4 05/21/2021     Lab Results   Component Value Date     05/21/2021     % Neutrophils   Date Value Ref Range Status   05/21/2021 46.8 % Final     % Lymphocytes   Date Value Ref Range Status   05/21/2021 38.7 % Final     % Monocytes   Date Value Ref Range Status   05/21/2021 11.0 % Final     % Eosinophils   Date Value Ref Range Status   05/21/2021 2.3 % Final     % Basophils   Date Value Ref Range Status   05/21/2021 1.2 % Final     % Immature Granulocytes   Date Value Ref Range Status   05/21/2021 0.0 % Final     Absolute Neutrophil   Date Value Ref Range Status   05/21/2021 1.6 1.6 - 8.3 10e9/L Final     Absolute Lymphocytes   Date Value Ref Range Status   05/21/2021 1.3 0.8 - 5.3 10e9/L Final     Absolute Monocytes   Date Value Ref Range Status   05/21/2021 0.4 0.0 - 1.3 10e9/L Final     Absolute Eosinophils   Date Value Ref Range Status   05/21/2021 0.1 0.0 - 0.7 10e9/L Final     Absolute Basophils   Date Value Ref Range Status   05/21/2021 0.0 0.0 - 0.2 10e9/L Final     Abs Immature Granulocytes   Date Value Ref Range Status   05/21/2021 0.0 0 - 0.4 10e9/L Final     Nucleated RBCs   Date Value Ref Range Status   04/27/2021 0 0 /100 Final     Absolute Nucleated RBC   Date Value Ref Range Status   04/27/2021 0.0  Final     Last Comprehensive Metabolic Panel:  Sodium   Date Value Ref Range Status   05/21/2021 140 133 - 144 mmol/L Final     Potassium   Date Value Ref Range Status   05/21/2021 4.2 3.4 - 5.3 mmol/L Final     Chloride   Date Value Ref Range Status   05/21/2021 109 94 - 109 mmol/L Final     Carbon Dioxide   Date Value Ref Range Status   05/21/2021 32 20 - 32 mmol/L Final     Anion Gap   Date Value Ref Range Status   05/21/2021 <1 (L) 3 - 14 mmol/L Final     Glucose   Date Value Ref Range Status   05/21/2021 87 70 -  99 mg/dL Final     Urea Nitrogen   Date Value Ref Range Status   05/21/2021 15 7 - 30 mg/dL Final     Creatinine   Date Value Ref Range Status   05/21/2021 0.82 0.52 - 1.04 mg/dL Final     GFR Estimate   Date Value Ref Range Status   05/21/2021 75 >60 mL/min/[1.73_m2] Final     Comment:     Non  GFR Calc  Starting 12/18/2018, serum creatinine based estimated GFR (eGFR) will be   calculated using the Chronic Kidney Disease Epidemiology Collaboration   (CKD-EPI) equation.       Calcium   Date Value Ref Range Status   05/21/2021 9.0 8.5 - 10.1 mg/dL Final     Bilirubin Total   Date Value Ref Range Status   05/21/2021 0.3 0.2 - 1.3 mg/dL Final     Alkaline Phosphatase   Date Value Ref Range Status   05/21/2021 204 (H) 40 - 150 U/L Final     ALT   Date Value Ref Range Status   05/21/2021 24 0 - 50 U/L Final     AST   Date Value Ref Range Status   05/21/2021 24 0 - 45 U/L Final     Lab Results   Component Value Date    PROTTOTAL 8.1 05/21/2021     Lab Results   Component Value Date    ALBUMIN 3.4 05/21/2021     Magnesium   Date Value Ref Range Status   05/21/2021 2.2 1.6 - 2.3 mg/dL Final         Assessment:    Taran Regalado is a 65 year old woman with a diagnosis of metastatic high grade ovarian serous carcinoma. She is currently undergoing treatment with adjuvant chemotherapy.  She is here today for follow up and disease management. In light of the recent COVID19 pandemic, and in accordance with our group and national guidelines this patients care has been reviewed and it is felt that presenting for her visit would be more likely to increase rather than decrease her relative risks. Therefore this visit was conducted by video.      40 minutes spent on the date of the encounter doing chart review, history and exam, documentation, and further activities as noted above.              Plan:     1.)        Ovarian serous carcinoma:  OK to proceed with cycle 4 of chemo if labs are WDL.  RTC in 3 weeks for  labs, virtual NP visit, and cycle 5.  This is already scheduled.  Reviewed signs and symptoms for when she should contact the clinic or seek additional care.  Patient to contact the clinic with any questions or concerns in the interim.        Genetic risk factors were assessed and she is POSITIVE for a BRCA1 mutation.  This mutation is called [id number].        Labs and/or tests ordered include:  CBC. CMP. Mag. .                2.)        Health maintenance:  Issues addressed today include following up with PCP for annual health maintenance and non-gynecologic issues.      3.)        Chemotherapy induced nausea: Discussed resuming small frequent meals high in protein now that she is resuming chemotherapy.  Refill for compazine sent to her home pharmacy last week.     4.)         Pulmonary embolus:  Continue taking her lovenox until she has completed chemotherapy then can transition back to Xarelto.    5.) Bone pain:  Encouraged resuming claritin and using tylenol for bone pain.  Will send prescription for 10 tablets of oxycodone 5 mg to her home pharmacy if needed.  This should only be taken for severe pain.    6.) Insomnia: Discussed that especially as this is a long standing issue for patient her symptoms will be better managed by a specialist which is scheduled.  I am not comfortable authorizing an increase in her amitriptyline, advised to contact her PCP who prescribes this.  Reviewed sleep hygiene strategies.        Ella Murillo, OTILIO, APRN, FNP-C  Nurse Practitioner   Division of Gynecologic Oncology  Pager: [phone]     CC  Patient Care Team:  Bolivar Juarez MD as PCP - General (Gynecologic Oncology)  Bolivar Juarez MD as Assigned Cancer Care Provider  Marta Okeefe APRN CNP as Assigned PCP  Marta Lao APRN CNP as Assigned OBGYN Provider  Pepe Rdz MD as Assigned Heart and Vascular Provider  Holley Ramos PA-C as Assigned Surgical  Provider  Brinda Lacey MD as Assigned Palliative Care Provider  SHIRA ESTRADA

## 2021-05-10 NOTE — TELEPHONE ENCOUNTER
Per MyChart patient and her daughter are requesting the following refills:  Ambien  Zyprexa  Compazine   Lovenox (this was completed at visit)    RN asked how patient is taking the above medications. Per Roberlg:    She is taking 2 (Ambien) every night for sleep... even that doesn't always work right away but eventually it does.  She is taking the zyprexa at night.  The hospital told her to get back on it.  She would probably be fine taking that out if it meant she could have the ambien cause that the only thing that seems to touch her insomnia.  The compazine she is taking twice a day.  She has zero appetite since she got home from the hospital..... i don't know if it is depression or what... but she has to force herself to eat.  We are taking her in to get her ankle xrayed again today to see what's going on there.  Hopefully that will get better soon so she can be up and walking around more.     RN will route to NP for review.     Shawna Hinojosa RN

## 2021-05-10 NOTE — TELEPHONE ENCOUNTER
Called patient to notify her and daughter of radiologist review of left leg and foot xrays shows:  IMPRESSION:   1. Fracture of the fibular neck with mild hypertrophic callus  formation, and slight displacement.  2. Small distracted fracture at the inferior aspect of the medial  malleolus.  3. There are a few small fracture fragments at the inferior aspect of  the lateral malleolus.  4. There are small age-indeterminate avulsion fractures at the dorsal  aspects of the navicular and anterior talus.    Urgent orthopedic consult entered. Recommended going to orthopedic urgent care now for further evaluation and management and patient is agreeable to [facility] visit. Xray images sent. Questions answered.

## 2021-05-12 ENCOUNTER — PATIENT OUTREACH (OUTPATIENT)
Dept: ONCOLOGY | Facility: CLINIC | Age: 66
End: 2021-05-12

## 2021-05-12 ENCOUNTER — TELEPHONE (OUTPATIENT)
Dept: ONCOLOGY | Facility: CLINIC | Age: 66
End: 2021-05-12

## 2021-05-12 NOTE — TELEPHONE ENCOUNTER
5/12/21 - [facility] to call [phone] opt5, opt2 to schedule return virtual visit with Lauren Ibanez ANYTIME for genetic testing results. -Enrique

## 2021-05-12 NOTE — PROGRESS NOTES
RN reached out to patient to discuss MD recomendations and concerns about Ambien use.     MD does not recommend continued use of Ambien in light of patients recent doubling of prescribed dose.     Patient and RN reviewed the safety concerns and reasons why this medication can't be continued.     Reviewed good bed time habits and OTC things such as melatonin and bedtime tea that may help.     RN used active listening in regards to patients frustrations and need for Ambien.     RN apologized for not being able to get this for her but again reviewed safety concerns.     Patient is very frustrated with this decision. States she has struggled with sleep even prior to diagnosis and start of treatment.     RN and patient reviewed long term things such as counseling that may help in processing the events of the last year. RN acknowledged the difficulties patient has had.     Patient again states frustration with not being able to get Ambien. RN again used active listening and addressed concerns to the best of her ability.     Patient displeased that RN called with this news. RN apologized.       Shawna Hinojosa RN

## 2021-05-18 ENCOUNTER — VIRTUAL VISIT (OUTPATIENT)
Dept: ONCOLOGY | Facility: CLINIC | Age: 66
End: 2021-05-18
Attending: GENETIC COUNSELOR, MS
Payer: COMMERCIAL

## 2021-05-18 DIAGNOSIS — Z15.02 HEREDITARY BREAST AND OVARIAN CANCER SYNDROME ASSOCIATED WITH MUTATION IN BRCA1 GENE: Primary | ICD-10-CM

## 2021-05-18 DIAGNOSIS — Z15.01 HEREDITARY BREAST AND OVARIAN CANCER SYNDROME ASSOCIATED WITH MUTATION IN BRCA1 GENE: Primary | ICD-10-CM

## 2021-05-18 DIAGNOSIS — C56.9 OVARIAN CANCER, UNSPECIFIED LATERALITY (H): ICD-10-CM

## 2021-05-18 PROCEDURE — 999N000069 HC STATISTIC GENETIC COUNSELING, < 16 MIN: Mod: GT | Performed by: GENETIC COUNSELOR, MS

## 2021-05-18 NOTE — LETTER
"    5/18/2021         RE: Taran Regalado  [address] [facility] [address]        Dear Colleague,    Thank you for referring your patient, Taran Regalado, to the [facility]. Please see a copy of my visit note below.    May 18, 2021    Referring Provider: JERICHO Wilkinson CNP; Bolivar Juarez MD      Presenting Information:   I spoke with Taran and her daughter, Edie, to discuss her additional primary and secondary genetic analysis results. This genetic data analysis was done as part of the Precision Medicine Program in Ovarian Cancer.  Testing for BRCA1 and BRCA2 was previously completed and discussed in detail (please see note from 4/26/2021 for details).       EXPANDED OVARIAN CANCER RISK PANEL RESULTS  Taran is POSITIVE for a BRCA1 mutation.  This mutation is called [id number]. 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 We previously discussed the impact of this testing on Taran in detail please see note from 4/26/2021 for details.     No clinically significant sequence or copy number variants were detected in the additional 37 genes are included in the Expanded Cancer Risk panel: APC, PATRIZIA, AXIN2, BARD1, BMPR1A, BRIP1, CDH1, CHEK2, DICER1, EPCAM, FANCC, FANCM, GREM1, MLH1, MRE11, MSH2, MSH3, MSH6, MUTYH, NBN, NF1, NTHL1, PALB2, PMS2, POLD1, POLE, PTEN, RAD50, RAD51, RAD51C, RAD51D, SMAD4, SMARCA4, STK11, TP53, XRCC2, XRCC3.     A copy of the test report can be found in the Laboratory tab, dated 3/26/2021, and named \"HEREDITARY CANCER BRCA1/BRCA2\".       Inheritance:  We reviewed that mutations in the BRCA1 gene are inherited in an autosomal dominant pattern. This means that each of Taran's children have a 50% chance of inheriting the same mutation.  I am happy to help her relatives connect with a genetic counselor in their area if they would like to discuss testing.     In very rare situations in which " both parents have a mutation in the BRCA1 gene, their children each have a 25% risk for Fanconi anemia. Fanconi anemia is a rare condition with onset in childhood that often results in physical abnormalities, growth retardation, bone marrow failure, and increased risk for cancer. For individuals of childbearing age with BRCA1 mutations, genetic counseling and genetic testing may be advised for their partners.      Secondary Results:  As part of the Precision Medicine Program in Ovarian Cancer, 54 medically significant genes were analyzed (sequencing only, copy number variation analysis was not performed). These results were also NEGATIVE. Details can be found in the same report in the Laboratory tab.    The following 54 genes are included in the Medically Significant Secondary Findings panel: ACTA2, ACTC1, APOB, ATP7B, BAP1, UJYNJ7M, CDK4, CDKN2A, COL3A1, DSC2,   DSG2, DSP, FBN1, GLA, HOXB13 (c.251G>A variant only), KCNH2, KCNQ1, LDLR, LMNA, MAX, MEN1, MITF (c.952G>A variant    only), MYBPC3, MYH11, MYH7, MYL2, MYL3, NF2, OTC, PCSK9, PKP2, PRKAG2, RB1, RET, RYR1, RYR2, SCN5A, SDHA,   SDHAF2, SDHB, SDHC, SDHD, SMAD3, TGFBR1, TGFBR2, PMLV485, TMEM43, TNNI3, TNNT2, TPM1, TSC1, TSC2, VHL, WT1    Limitations of Secondary Research Study Results:  We discussed that there are limitations in results from a research study compared to results from a clinical genetic test. For example, certain types of mutations (inversions), or in some regions of low coverage, mutations may not be confidently excluded.      Plan:  1. A copy of her test results are available through [employer]  2. Taran is encouraged to contact me with any questions or concerns at [phone].      Lauren Ibanez MS, [facility]  Licensed, Certified Genetic Counselor  [facility]  Phone: [phone]      Approximate time spent over video visit face to face: 5 minutes        Again, thank you for allowing me to participate in the care of your patient.         Sincerely,        Lauren Ibanez GC

## 2021-05-18 NOTE — PROGRESS NOTES
"May 18, 2021    Referring Provider: JERICHO Wilkinson CNP; Bolivar Juarez MD      Presenting Information:   I spoke with Taran and her daughter, Edie, to discuss her additional primary and secondary genetic analysis results. This genetic data analysis was done as part of the Precision Medicine Program in Ovarian Cancer.  Testing for BRCA1 and BRCA2 was previously completed and discussed in detail (please see note from 4/26/2021 for details).       EXPANDED OVARIAN CANCER RISK PANEL RESULTS  Taran is POSITIVE for a BRCA1 mutation.  This mutation is called [id number]. 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 We previously discussed the impact of this testing on Taran in detail please see note from 4/26/2021 for details.     No clinically significant sequence or copy number variants were detected in the additional 37 genes are included in the Expanded Cancer Risk panel: APC, PATRIZIA, AXIN2, BARD1, BMPR1A, BRIP1, CDH1, CHEK2, DICER1, EPCAM, FANCC, FANCM, GREM1, MLH1, MRE11, MSH2, MSH3, MSH6, MUTYH, NBN, NF1, NTHL1, PALB2, PMS2, POLD1, POLE, PTEN, RAD50, RAD51, RAD51C, RAD51D, SMAD4, SMARCA4, STK11, TP53, XRCC2, XRCC3.     A copy of the test report can be found in the Laboratory tab, dated 3/26/2021, and named \"HEREDITARY CANCER BRCA1/BRCA2\".       Inheritance:  We reviewed that mutations in the BRCA1 gene are inherited in an autosomal dominant pattern. This means that each of Taran's children have a 50% chance of inheriting the same mutation.  I am happy to help her relatives connect with a genetic counselor in their area if they would like to discuss testing.     In very rare situations in which both parents have a mutation in the BRCA1 gene, their children each have a 25% risk for Fanconi anemia. Fanconi anemia is a rare condition with onset in childhood that often results in physical abnormalities, growth retardation, bone marrow failure, and " increased risk for cancer. For individuals of childbearing age with BRCA1 mutations, genetic counseling and genetic testing may be advised for their partners.      Secondary Results:  As part of the Precision Medicine Program in Ovarian Cancer, 54 medically significant genes were analyzed (sequencing only, copy number variation analysis was not performed). These results were also NEGATIVE. Details can be found in the same report in the Laboratory tab.    The following 54 genes are included in the Medically Significant Secondary Findings panel: ACTA2, ACTC1, APOB, ATP7B, BAP1, FBKPV7Y, CDK4, CDKN2A, COL3A1, DSC2,   DSG2, DSP, FBN1, GLA, HOXB13 (c.251G>A variant only), KCNH2, KCNQ1, LDLR, LMNA, MAX, MEN1, MITF (c.952G>A variant    only), MYBPC3, MYH11, MYH7, MYL2, MYL3, NF2, OTC, PCSK9, PKP2, PRKAG2, RB1, RET, RYR1, RYR2, SCN5A, SDHA,   SDHAF2, SDHB, SDHC, SDHD, SMAD3, TGFBR1, TGFBR2, NSBZ163, TMEM43, TNNI3, TNNT2, TPM1, TSC1, TSC2, VHL, WT1    Limitations of Secondary Research Study Results:  We discussed that there are limitations in results from a research study compared to results from a clinical genetic test. For example, certain types of mutations (inversions), or in some regions of low coverage, mutations may not be confidently excluded.      Plan:  1. A copy of her test results are available through [employer]  2. Taran is encouraged to contact me with any questions or concerns at [phone].      Lauren Ibanez MS, [facility]  Licensed, Certified Genetic Counselor  [facility]  Phone: [phone]      Approximate time spent over video visit face to face: 5 minutes

## 2021-05-21 ENCOUNTER — VIRTUAL VISIT (OUTPATIENT)
Dept: ONCOLOGY | Facility: CLINIC | Age: 66
End: 2021-05-21
Attending: NURSE PRACTITIONER
Payer: COMMERCIAL

## 2021-05-21 ENCOUNTER — INFUSION THERAPY VISIT (OUTPATIENT)
Dept: INFUSION THERAPY | Facility: CLINIC | Age: 66
End: 2021-05-21
Payer: COMMERCIAL

## 2021-05-21 VITALS
OXYGEN SATURATION: 97 % | WEIGHT: 159.2 LBS | HEART RATE: 70 BPM | SYSTOLIC BLOOD PRESSURE: 119 MMHG | TEMPERATURE: 98.3 F | RESPIRATION RATE: 14 BRPM | BODY MASS INDEX: 21.59 KG/M2 | DIASTOLIC BLOOD PRESSURE: 69 MMHG

## 2021-05-21 DIAGNOSIS — Z51.11 ENCOUNTER FOR ANTINEOPLASTIC CHEMOTHERAPY: Primary | ICD-10-CM

## 2021-05-21 DIAGNOSIS — C56.9 OVARIAN CANCER, UNSPECIFIED LATERALITY (H): ICD-10-CM

## 2021-05-21 DIAGNOSIS — G89.3 CANCER ASSOCIATED PAIN: ICD-10-CM

## 2021-05-21 DIAGNOSIS — T45.1X5A CHEMOTHERAPY-INDUCED NEUTROPENIA (H): ICD-10-CM

## 2021-05-21 DIAGNOSIS — Z51.11 ENCOUNTER FOR ANTINEOPLASTIC CHEMOTHERAPY: ICD-10-CM

## 2021-05-21 DIAGNOSIS — D70.1 CHEMOTHERAPY-INDUCED NEUTROPENIA (H): ICD-10-CM

## 2021-05-21 DIAGNOSIS — C56.9 OVARIAN CANCER, UNSPECIFIED LATERALITY (H): Primary | ICD-10-CM

## 2021-05-21 LAB
ALBUMIN SERPL-MCNC: 3.4 G/DL (ref 3.4–5)
ALP SERPL-CCNC: 204 U/L (ref 40–150)
ALT SERPL W P-5'-P-CCNC: 24 U/L (ref 0–50)
ANION GAP SERPL CALCULATED.3IONS-SCNC: <1 MMOL/L (ref 3–14)
AST SERPL W P-5'-P-CCNC: 24 U/L (ref 0–45)
BASOPHILS # BLD AUTO: 0 10E9/L (ref 0–0.2)
BASOPHILS NFR BLD AUTO: 1.2 %
BILIRUB SERPL-MCNC: 0.3 MG/DL (ref 0.2–1.3)
BUN SERPL-MCNC: 15 MG/DL (ref 7–30)
CALCIUM SERPL-MCNC: 9 MG/DL (ref 8.5–10.1)
CANCER AG125 SERPL-ACNC: 172 U/ML (ref 0–30)
CHLORIDE SERPL-SCNC: 109 MMOL/L (ref 94–109)
CO2 SERPL-SCNC: 32 MMOL/L (ref 20–32)
CREAT SERPL-MCNC: 0.82 MG/DL (ref 0.52–1.04)
DIFFERENTIAL METHOD BLD: ABNORMAL
EOSINOPHIL # BLD AUTO: 0.1 10E9/L (ref 0–0.7)
EOSINOPHIL NFR BLD AUTO: 2.3 %
ERYTHROCYTE [DISTWIDTH] IN BLOOD BY AUTOMATED COUNT: 16.4 % (ref 10–15)
GFR SERPL CREATININE-BSD FRML MDRD: 75 ML/MIN/{1.73_M2}
GLUCOSE SERPL-MCNC: 87 MG/DL (ref 70–99)
HCT VFR BLD AUTO: 37.4 % (ref 35–47)
HGB BLD-MCNC: 11.5 G/DL (ref 11.7–15.7)
IMM GRANULOCYTES # BLD: 0 10E9/L (ref 0–0.4)
IMM GRANULOCYTES NFR BLD: 0 %
LYMPHOCYTES # BLD AUTO: 1.3 10E9/L (ref 0.8–5.3)
LYMPHOCYTES NFR BLD AUTO: 38.7 %
MAGNESIUM SERPL-MCNC: 2.2 MG/DL (ref 1.6–2.3)
MCH RBC QN AUTO: 30.2 PG (ref 26.5–33)
MCHC RBC AUTO-ENTMCNC: 30.7 G/DL (ref 31.5–36.5)
MCV RBC AUTO: 98 FL (ref 78–100)
MONOCYTES # BLD AUTO: 0.4 10E9/L (ref 0–1.3)
MONOCYTES NFR BLD AUTO: 11 %
NEUTROPHILS # BLD AUTO: 1.6 10E9/L (ref 1.6–8.3)
NEUTROPHILS NFR BLD AUTO: 46.8 %
PLATELET # BLD AUTO: 302 10E9/L (ref 150–450)
POTASSIUM SERPL-SCNC: 4.2 MMOL/L (ref 3.4–5.3)
PROT SERPL-MCNC: 8.1 G/DL (ref 6.8–8.8)
RBC # BLD AUTO: 3.81 10E12/L (ref 3.8–5.2)
SODIUM SERPL-SCNC: 140 MMOL/L (ref 133–144)
WBC # BLD AUTO: 3.5 10E9/L (ref 4–11)

## 2021-05-21 PROCEDURE — 86304 IMMUNOASSAY TUMOR CA 125: CPT | Performed by: INTERNAL MEDICINE

## 2021-05-21 PROCEDURE — 36415 COLL VENOUS BLD VENIPUNCTURE: CPT | Performed by: INTERNAL MEDICINE

## 2021-05-21 PROCEDURE — 99215 OFFICE O/P EST HI 40 MIN: CPT | Mod: 95 | Performed by: NURSE PRACTITIONER

## 2021-05-21 PROCEDURE — 999N001193 HC VIDEO/TELEPHONE VISIT; NO CHARGE

## 2021-05-21 PROCEDURE — 80053 COMPREHEN METABOLIC PANEL: CPT | Performed by: INTERNAL MEDICINE

## 2021-05-21 PROCEDURE — 96413 CHEMO IV INFUSION 1 HR: CPT | Performed by: INTERNAL MEDICINE

## 2021-05-21 PROCEDURE — 96415 CHEMO IV INFUSION ADDL HR: CPT | Performed by: INTERNAL MEDICINE

## 2021-05-21 PROCEDURE — 83735 ASSAY OF MAGNESIUM: CPT | Performed by: INTERNAL MEDICINE

## 2021-05-21 PROCEDURE — 96375 TX/PRO/DX INJ NEW DRUG ADDON: CPT | Performed by: INTERNAL MEDICINE

## 2021-05-21 PROCEDURE — 99207 PR NO CHARGE LOS: CPT

## 2021-05-21 PROCEDURE — 85025 COMPLETE CBC W/AUTO DIFF WBC: CPT | Performed by: INTERNAL MEDICINE

## 2021-05-21 PROCEDURE — 96417 CHEMO IV INFUS EACH ADDL SEQ: CPT | Performed by: INTERNAL MEDICINE

## 2021-05-21 RX ORDER — DIPHENHYDRAMINE HYDROCHLORIDE 50 MG/ML
50 INJECTION INTRAMUSCULAR; INTRAVENOUS
Status: CANCELLED
Start: 2021-05-21

## 2021-05-21 RX ORDER — OXYCODONE HYDROCHLORIDE 5 MG/1
5 TABLET ORAL EVERY 12 HOURS
Qty: 10 TABLET | Refills: 0 | Status: SHIPPED | OUTPATIENT
Start: 2021-05-21 | End: 2021-07-09

## 2021-05-21 RX ORDER — EPINEPHRINE 1 MG/ML
0.3 INJECTION, SOLUTION INTRAMUSCULAR; SUBCUTANEOUS EVERY 5 MIN PRN
Status: CANCELLED | OUTPATIENT
Start: 2021-05-21

## 2021-05-21 RX ORDER — PALONOSETRON 0.05 MG/ML
0.25 INJECTION, SOLUTION INTRAVENOUS ONCE
Status: COMPLETED | OUTPATIENT
Start: 2021-05-21 | End: 2021-05-21

## 2021-05-21 RX ORDER — NALOXONE HYDROCHLORIDE 0.4 MG/ML
.1-.4 INJECTION, SOLUTION INTRAMUSCULAR; INTRAVENOUS; SUBCUTANEOUS
Status: CANCELLED | OUTPATIENT
Start: 2021-05-21

## 2021-05-21 RX ORDER — HEPARIN SODIUM (PORCINE) LOCK FLUSH IV SOLN 100 UNIT/ML 100 UNIT/ML
5 SOLUTION INTRAVENOUS
Status: CANCELLED | OUTPATIENT
Start: 2021-05-21

## 2021-05-21 RX ORDER — SODIUM CHLORIDE 9 MG/ML
1000 INJECTION, SOLUTION INTRAVENOUS CONTINUOUS PRN
Status: CANCELLED
Start: 2021-05-21

## 2021-05-21 RX ORDER — PALONOSETRON 0.05 MG/ML
0.25 INJECTION, SOLUTION INTRAVENOUS ONCE
Status: CANCELLED
Start: 2021-05-21

## 2021-05-21 RX ORDER — METHYLPREDNISOLONE SODIUM SUCCINATE 125 MG/2ML
125 INJECTION, POWDER, LYOPHILIZED, FOR SOLUTION INTRAMUSCULAR; INTRAVENOUS
Status: CANCELLED
Start: 2021-05-21

## 2021-05-21 RX ORDER — DIPHENHYDRAMINE HCL 25 MG
50 CAPSULE ORAL ONCE
Status: CANCELLED
Start: 2021-05-21

## 2021-05-21 RX ORDER — DIPHENHYDRAMINE HCL 25 MG
50 CAPSULE ORAL ONCE
Status: DISCONTINUED | OUTPATIENT
Start: 2021-05-21 | End: 2021-05-21 | Stop reason: HOSPADM

## 2021-05-21 RX ORDER — HEPARIN SODIUM,PORCINE 10 UNIT/ML
5 VIAL (ML) INTRAVENOUS
Status: CANCELLED | OUTPATIENT
Start: 2021-05-21

## 2021-05-21 RX ORDER — ALBUTEROL SULFATE 0.83 MG/ML
2.5 SOLUTION RESPIRATORY (INHALATION)
Status: CANCELLED | OUTPATIENT
Start: 2021-05-21

## 2021-05-21 RX ORDER — ALBUTEROL SULFATE 90 UG/1
1-2 AEROSOL, METERED RESPIRATORY (INHALATION)
Status: CANCELLED
Start: 2021-05-21

## 2021-05-21 RX ORDER — MEPERIDINE HYDROCHLORIDE 25 MG/ML
25 INJECTION INTRAMUSCULAR; INTRAVENOUS; SUBCUTANEOUS EVERY 30 MIN PRN
Status: CANCELLED | OUTPATIENT
Start: 2021-05-21

## 2021-05-21 RX ORDER — LORAZEPAM 2 MG/ML
1 INJECTION INTRAMUSCULAR EVERY 6 HOURS PRN
Status: CANCELLED
Start: 2021-05-21

## 2021-05-21 RX ADMIN — PALONOSETRON 0.25 MG: 0.05 INJECTION, SOLUTION INTRAVENOUS at 12:17

## 2021-05-21 RX ADMIN — Medication 250 ML: at 11:59

## 2021-05-21 ASSESSMENT — ENCOUNTER SYMPTOMS
NAIL CHANGES: 0
EXERCISE INTOLERANCE: 0
HALLUCINATIONS: 0
NIGHT SWEATS: 0
COUGH DISTURBING SLEEP: 0
ABDOMINAL PAIN: 0
SKIN CHANGES: 0
DEPRESSION: 0
CONSTIPATION: 0
DECREASED LIBIDO: 0
POLYDIPSIA: 0
EXTREMITY NUMBNESS: 0
ORTHOPNEA: 0
WEAKNESS: 0
POSTURAL DYSPNEA: 0
SNORES LOUDLY: 0
TACHYCARDIA: 0
EYE REDNESS: 0
LEG SWELLING: 0
BLOOD IN STOOL: 0
HEADACHES: 0
NERVOUS/ANXIOUS: 0
LOSS OF CONSCIOUSNESS: 0
BOWEL INCONTINENCE: 0
SYNCOPE: 0
SPUTUM PRODUCTION: 0
TASTE DISTURBANCE: 0
EYE WATERING: 0
SLEEP DISTURBANCES DUE TO BREATHING: 0
COUGH: 0
DYSPNEA ON EXERTION: 0
NAUSEA: 0
DIZZINESS: 0
POLYPHAGIA: 0
TREMORS: 0
BRUISES/BLEEDS EASILY: 0
BLOATING: 0
BREAST MASS: 0
JOINT SWELLING: 0
SHORTNESS OF BREATH: 0
SEIZURES: 0
SMELL DISTURBANCE: 0
HYPERTENSION: 0
DISTURBANCES IN COORDINATION: 0
LIGHT-HEADEDNESS: 0
WEIGHT GAIN: 0
BREAST PAIN: 0
INCREASED ENERGY: 0
JAUNDICE: 0
CHILLS: 0
HEMOPTYSIS: 0
MYALGIAS: 0
MUSCLE CRAMPS: 0
CLAUDICATION: 0
DECREASED CONCENTRATION: 0
STIFFNESS: 0
POOR WOUND HEALING: 0
FATIGUE: 0
INSOMNIA: 0
MUSCLE WEAKNESS: 0
NECK MASS: 0
SINUS CONGESTION: 0
DOUBLE VISION: 0
PARALYSIS: 0
VOMITING: 0
MEMORY LOSS: 0
RECTAL PAIN: 0
PANIC: 0
TINGLING: 0
FLANK PAIN: 0
NUMBNESS: 0
RESPIRATORY PAIN: 0
DIFFICULTY URINATING: 0
WHEEZING: 0
HEARTBURN: 0
SPEECH CHANGE: 0
NECK PAIN: 0
ARTHRALGIAS: 0
DIARRHEA: 0
DYSURIA: 0
HOT FLASHES: 0
PALPITATIONS: 0
EYE IRRITATION: 0
SINUS PAIN: 0
TROUBLE SWALLOWING: 0
LEG PAIN: 0
SWOLLEN GLANDS: 0
EYE PAIN: 0
HEMATURIA: 0
RECTAL BLEEDING: 0
SORE THROAT: 0
BACK PAIN: 0
HYPOTENSION: 0
DECREASED APPETITE: 0
FEVER: 0
HOARSE VOICE: 0
ALTERED TEMPERATURE REGULATION: 0
WEIGHT LOSS: 0

## 2021-05-21 NOTE — PROGRESS NOTES
Infusion Nursing Note:  Taran Regalado presents today for C4D1 Taxol/Carboplatin.    Patient seen by provider today: Yes: Ella Murillo NP   present during visit today: Not Applicable.    Note: Patient states she took Loratadine 10 mg approximately one hour prior to chemo appointment, thus states she prefers not to take Benadryl/Pepcid.  Discussed with pharmacists, Jeremy and Zunilda, whom state patient can skip Benadryl and Pepcid, and just get Decadron and Aloxi for premeds. Tolerated infusion without any complications.    Intravenous Access:  Peripheral IV placed.    Treatment Conditions:  Lab Results   Component Value Date    HGB 11.5 05/21/2021     Lab Results   Component Value Date    WBC 3.5 05/21/2021      Lab Results   Component Value Date    ANEU 1.6 05/21/2021     Lab Results   Component Value Date     05/21/2021      Lab Results   Component Value Date     05/21/2021                   Lab Results   Component Value Date    POTASSIUM 4.2 05/21/2021           Lab Results   Component Value Date    MAG 2.2 05/21/2021            Lab Results   Component Value Date    CR 0.82 05/21/2021                   Lab Results   Component Value Date    HORACIO 9.0 05/21/2021                Lab Results   Component Value Date    BILITOTAL 0.3 05/21/2021           Lab Results   Component Value Date    ALBUMIN 3.4 05/21/2021                    Lab Results   Component Value Date    ALT 24 05/21/2021           Lab Results   Component Value Date    AST 24 05/21/2021       Results reviewed, labs MET treatment parameters, ok to proceed with treatment.      Post Infusion Assessment:  Patient tolerated infusion without incident.  Blood return noted pre and post infusion.  Site patent and intact, free from redness, edema or discomfort.  No evidence of extravasations.  Access discontinued per protocol.       Discharge Plan:   AVS to patient via [facility] [employer].  Patient will go to the  tomorrow for Neulasta  injection.  Patient discharged in stable condition accompanied by: self. Granddtr is .  Departure Mode: Ambulatory.      Frances Quintana RN

## 2021-05-21 NOTE — LETTER
"    2021         RE: Taran Regalado  [address]  [place] [address]        Dear Colleague,    Thank you for referring your patient, Taran Regalado, to the [facility]. Please see a copy of my visit note below.    Taran is a 65 year old who is being evaluated via a billable video visit.      How would you like to obtain your AVS? MyChart     If the video visit is dropped, the invitation should be resent by: Text to cell phone: [phone]     Will anyone else be joining your video visit? No          Vitals - Patient Reported  Weight (Patient Reported): 68 kg (150 lb)  Height (Patient Reported): 182.9 cm (6' 0.01\")  BMI (Based on Pt Reported Ht/Wt): 20.34  Pain Score: No Pain (0)    Jarocho Pinedo LPN    Video-Visit Details    Type of service:  Video Visit    Video Start Time: 10:21 am    Video End Time:10:36 am    Originating Location (pt. Location): Other outside clinic    Distant Location (provider location):  [facility]     Platform used for Video Visit: [facility]      Gynecologic Oncology Return Visit Note    Date: 2021    RE: Taran Regalado  : 1955  GRACY: 2021    CC: Metastatic high grade ovarian serous carcinoma     HPI:  Taran Regalado is a 65 year old woman with a diagnosis of metastatic high grade ovarian serous carcinoma. She is currently undergoing treatment with adjuvant chemotherapy.  She is here today for follow up and disease management. In light of the recent COVID19 pandemic, and in accordance with our group and national guidelines this patients care has been reviewed and it is felt that presenting for her visit would be more likely to increase rather than decrease her relative risks. Therefore this visit was conducted by video.        Oncology History:  12/3/2020: US Pelvic: IMPRESSION: Limited examination due to acoustic windows. Possible left adnexal mass. A CT scan of the abdomen and pelvis with " contrast is recommended for further assessment.     12/4/2020: CT A/P:   IMPRESSION:    Peritoneal carcinomatosis with masslike peritoneal thickening in the lower pelvis which may indicate an adnexal or ovarian primary malignancy. Large volume ascites. Bilateral pleural effusions. There is potential subtle pleural nodularity in the right hemithorax which could indicate metastatic disease.  Indeterminate 1 cm lesion in the right hepatic lobe suspicious for a metastatic lesion.      12/16/2020: Presented to [facility] with abdominal distention, 25lb weight loss, and CTAP with carcinomatosis, elevated  3098.     12/23/2020: CT Chest: IMPRESSION:   1. There are few scattered small sub-6 mm pulmonary nodules which are indeterminate without prior comparisons available. There are a few  slightly larger perifissural nodules which are technically  indeterminate in the setting of malignancy although presumed lymphatic in nature and of unlikely clinical significance. Attention on follow-up is recommended.  2. Small to moderate left and small right pleural effusions are increased in size from prior. No convincing evidence for pleural nodularity.  3. Partially visualized large volume ascites and peritoneal nodularity in the upper abdomen similar to 12/4/2020 outside CT      12/26/2020: ED for abdominal distension; 3 L ascites drained with paracentesis    Pelvic US: Findings: Free fluid present in LLQ      12/31/2020: US Paracentesis: 900 mL ascites drained     1/7/2021: Diagnotic laparoscopy, biopsies  Pathology: FINAL DIAGNOSIS:   A. PERITONEUM, BIOPSIES:   - Positive for high grade carcinoma, consistent with metastatic carcinoma of Mullerian origin.     1/10-1/13/2021: Hospital admission for postoperative non-intractable vomiting and nausea.      1/10/2021: CT A/P: IMPRESSION: Extensive ascites which is probably malignant. Scattered liver hypodensities of indeterminate etiology comment cannot exclude metastatic disease.  Diverticulosis. Fluid-filled adnexal masses and irregular appearance of uterus, which may represent primary neoplasm. Multiple peritoneal nodules. Large amount of fecal material in the colon with no evidence of small bowel obstruction.     Plan: Paclitaxel 175 mg/m2 and carboplatin AUC 6 x 3 cycles followed by a CT CAP and visit with Dr. Juarez.     1/12/2021: Cycle 1 paclitaxel and carboplatin while inpatient     1/13/2021: CT Head: Impression:  1. Chronic sinusitis of the right maxillary and right sphenoid sinuses.  2. Incidental presumed calcified meningioma in the right frontal  convexity without significant mass effect.  3. No suspicious intracranial enhancing lesion.     2/1/2021: Cycle 2 paclitaxel and carboplatin.  936.     2/3-2/5/21: Admission [facility] for afib w/ RVR and new PE     2/26/21: Cycle 3 paclitaxel and carboplatin planned.  Deferred due to thrombocytopenia.  Deferred 3/12/21 due to neutropenia.  Given on 3/15/21 after Filgrastim injection x 2.  Add Pegfilgrastim to day 2 of treatment plan.   129.     4/2/21: CT CAP  IMPRESSION:   In this patient with a history of metastatic serous ovarian cancer,  status post diagnostic laparoscopy and neoadjuvant chemotherapy:  1. Significant improvement in peritoneal carcinomatosis as discussed  above.  2. Resolution of previously seen scattered small hypoattenuating  lesions in the liver. This raises the concern for these lesions  representing metastatic disease, noting excellent response elsewhere  in abdomen and pelvis.  3. Bilateral pleural effusions have resolved.  4. Several small pulmonary nodules in the right lung measuring up to 5  mm. Indeterminate, but likely benign in the setting of their stability  with excellent response elsewhere. Continued attention on follow-up.    4/19/21: HYSTERECTOMY, TOTAL, ABDOMINAL, WITH BILATERAL SALPINGO-OOPHORECTOMY, omentectomy, NEOPLASM DEBULKING,Proctoscocy, RO, Resection of liver nodules, diaphragm  stripping, immobilization of liver and colon  FINAL DIAGNOSIS:   A. OMENTUM, BIOPSY:   - Omental adipose tissue with rare viable cells of metastatic high grade   serous carcinoma   - One reactive lymph node, negative for malignancy (0/1)   B. NODULE, SIGMOID, EXCISION:   - Calcified necrotic adipose tissue   - Negative for malignancy   C. NODULE, SMALL BOWEL MESENTERY, EXCISION:   - Fibroadipose tissue, positive for metastatic high grade serous carcinoma   D. UTERUS, CERVIX, BILATERAL FALLOPIAN TUBES AND OVARIES, HYSTERECTOMY   WITH BILATERAL SALPINGO-OOPHORECTOMY:   - Atrophic endometrium   - Uterine serosa with rare viable cells consistent with high grade serous   carcinoma   - Cervix with atrophic changes   - Viable cells consistent with high grade serous carcinoma present in the   right ovary, serosa of right   fallopian tube and right periadnexal soft tissue   - Left ovary with atrophic changes   - Left fallopian tube with a rare focus of serous tubal in-situ carcinoma   (STIC)   E. NODULES, SMALL BOWEL MESENTRY, EXCISION:   - Fibroadipose tissue with rare viable cells of metastatic high grade   serous carcinoma   F. NODULE, SPLENIC FLEXURE TRANSVERSE COLON, EXCISION:   - Fibroadipose tissue with rare viable cells of metastatic high grade   serous carcinoma   - Accessory splenule, negative for malignancy   G. OMENTUM, OMENTECTOMY:   - Omental adipose tissue with rare viable cells of metastatic high grade   serous carcinoma   H. NODULE, PERITONEAL PANCREATIC, EXCISION:   - Fibrous adhesions with inflammation   - Negative for malignancy   I. RIGHT HEMIDIAPHRAGM PERITONEUM, EXCISION:   - Fibrous adhesions with inflammation   - Negative for malignancy   J. RIGHT LIVER SURFACE NODULE:   - Fibrous adhesions with benign inclusion glands   - Negative for malignancy   K. LEFT LOWER LIVER EDGE, BIOPSY:   - Cauterized hepatic parenchyma and capsule   - Negative for malignancy   L. NODULE, SMALL BOWEL MESENTERIC #3,  EXCISION:   - Fibroadipose tissue with rare viable cells of metastatic high grade   serous carcinoma      Plan: Carboplatin AUC 6 + Taxol 175 mg/m2 x 3 cycles, then transition to Parp inhibitor for maintenance therapy given her BRCA1 germline mutation.     5/21/21: Cycle 4 carboplatin and paclitaxel.   pending.                Today she reports feeling well overall.  She continues to struggle with sleep which has been an issue for her for most of her life.  She is wondering if she can increase her amitriptyline dose to 200 mg nightly.  She has an appointment with a sleep specialist in the next few weeks.  She feels like she has recovered from surgery her pain has improved and she is eating and drinking well.  She would like some oxycodone to help manage bone pain after chemo.  She will also start claritin today.  She denies any vaginal bleeding, no changes in her bowel or bladder habits, no nausea/emesis, no lower extremity edema, and no difficulties eating or sleeping. She denies any abdominal discomfort/bloating, no fevers or chills, and no chest pain or shortness of breath.  She has not received her COVID vaccine yet but plans to schedule an appointment.                    Review of Systems     Constitutional:  Negative for fever, chills, weight loss, weight gain, fatigue, decreased appetite, night sweats, recent stressors, height gain, height loss, post-operative complications, incisional pain, hallucinations, increased energy, hyperactivity and confused.   HENT:  Negative for ear pain, hearing loss, tinnitus, nosebleeds, trouble swallowing, hoarse voice, mouth sores, sore throat, ear discharge, tooth pain, gum tenderness, taste disturbance, smell disturbance, hearing aid, bleeding gums, dry mouth, sinus pain, sinus congestion and neck mass.    Eyes:  Negative for double vision, pain, redness, eye pain, decreased vision, eye watering, eye bulging, eye dryness, flashing lights, spots, floaters, strabismus,  tunnel vision, jaundice and eye irritation.   Respiratory:   Negative for cough, hemoptysis, sputum production, shortness of breath, wheezing, sleep disturbances due to breathing, snores loudly, respiratory pain, dyspnea on exertion, cough disturbing sleep and postural dyspnea.    Cardiovascular:  Negative for chest pain, dyspnea on exertion, palpitations, orthopnea, claudication, leg swelling, fingers/toes turn blue, hypertension, hypotension, syncope, history of heart murmur, chest pain on exertion, chest pain at rest, pacemaker, few scattered varicosities, leg pain, sleep disturbances due to breathing, tachycardia, light-headedness, exercise intolerance and edema.   Gastrointestinal:  Negative for heartburn, nausea, vomiting, abdominal pain, diarrhea, constipation, blood in stool, melena, rectal pain, bloating, hemorrhoids, bowel incontinence, jaundice, rectal bleeding, coffee ground emesis and change in stool.   Genitourinary:  Negative for bladder incontinence, dysuria, urgency, hematuria, flank pain, vaginal discharge, difficulty urinating, genital sores, dyspareunia, decreased libido, nocturia, voiding less frequently, arousal difficulty, abnormal vaginal bleeding, excessive menstruation, menstrual changes, hot flashes, vaginal dryness and postmenopausal bleeding.   Musculoskeletal:  Negative for myalgias, back pain, joint swelling, arthralgias, stiffness, muscle cramps, neck pain, bone pain, muscle weakness and fracture.   Skin:  Negative for nail changes, itching, poor wound healing, rash, hair changes, skin changes, acne, warts, poor wound healing, scarring, flaky skin, Raynaud's phenomenon, sensitivity to sunlight and skin thickening.   Neurological:  Negative for dizziness, tingling, tremors, speech change, seizures, loss of consciousness, weakness, light-headedness, numbness, headaches, disturbances in coordination, extremity numbness, memory loss, difficulty walking and paralysis.   Endo/Heme:   Negative for anemia, swollen glands and bruises/bleeds easily.   Psychiatric/Behavioral:  Negative for depression, hallucinations, memory loss, decreased concentration, mood swings and panic attacks.    Breast:  Negative for breast discharge, breast mass, breast pain and nipple retraction.   Endocrine:  Negative for altered temperature regulation, polyphagia, polydipsia, unwanted hair growth and change in facial hair.        Past Medical History:    Past Medical History:   Diagnosis Date     Atrial fibrillation with rapid ventricular response (H)      History of cold sores      Insomnia      Migraine      Osteopenia      Pelvic mass      Peritoneal carcinomatosis (H)      Restless legs syndrome (RLS)          Past Surgical History:    Past Surgical History:   Procedure Laterality Date     APPENDECTOMY       ARTHROSCPY KNEE SURGICAL DEBRIDEMENT SHAVING ARTICULAR CARTILAGE Right      BIOPSY  January 2021    Biopsy to confirm ovarian cancer     DEBRIDEMENT LEFT UPPER EXTREMITY  2016     HYSTERECTOMY TOTAL ABD, LUISITO SALPINGO-OOPHORECTOMY, NODE DISSECTION, TUMOR DEBULKING, COMBINED Bilateral 4/19/2021    Procedure: HYSTERECTOMY, TOTAL, ABDOMINAL, WITH BILATERAL SALPINGO-OOPHORECTOMY, omentectomy, NEOPLASM DEBULKING,Proctoscocy, RO, Resection of liver nodules, diaphragm stripping, immobilization of liver and colon;  Surgeon: Bolivar Juarez MD;  Location: [facility] OR     LAPAROSCOPY DIAGNOSTIC (GYN) Bilateral 1/7/2021    Procedure: Diagnsotic laparoscopy, biopsies;  Surgeon: Bolivar Juarez MD;  Location: [facility] OR     LASIK       TUBAL LIGATION           Health Maintenance Due   Topic Date Due     PREVENTIVE CARE VISIT  Never done     ADVANCE CARE PLANNING  Never done     Pneumococcal Vaccine: Pediatrics (0 to 5 Years) and At-Risk Patients (6 to 64 Years) (1 of 4 - PCV13) Never done     COLORECTAL CANCER SCREENING  Never done     COVID-19 Vaccine (1) Never done     HIV SCREENING  Never done     HEPATITIS C SCREENING   Never done     LIPID  Never done     ZOSTER IMMUNIZATION (1 of 2) Never done     MAMMO SCREENING  03/04/2021       Current Medications:     Current Outpatient Medications   Medication Sig Dispense Refill     acetaminophen (TYLENOL) 325 MG tablet Take 2 tablets (650 mg) by mouth every 6 hours as needed for mild pain 50 tablet 0     amitriptyline (ELAVIL) 100 MG tablet Take 50 mg by mouth At Bedtime        enoxaparin ANTICOAGULANT (LOVENOX) 40 MG/0.4ML syringe Inject 0.4 mLs (40 mg) Subcutaneous every 24 hours 5.6 mL 0     gabapentin (NEURONTIN) 600 MG tablet Take 1 tablet (600 mg) by mouth At Bedtime 60 tablet 0     loratadine (CLARITIN) 10 MG tablet Take 10 mg by mouth daily as needed (for bone pain related to neupogen)        oxyCODONE (ROXICODONE) 5 MG tablet Take 1 tablet (5 mg) by mouth every 12 hours 10 tablet 0     prochlorperazine (COMPAZINE) 10 MG tablet Take 1 tablet (10 mg) by mouth every 6 hours as needed for nausea or vomiting 30 tablet 0     SUMAtriptan (IMITREX) 100 MG tablet Take 100 mg by mouth at onset of headache        valACYclovir (VALTREX) 1000 mg tablet Take 1,000 mg by mouth as needed        zolpidem (AMBIEN) 5 MG tablet Take 1 tablet (5 mg) by mouth nightly as needed for sleep (Patient taking differently: Take 5 mg by mouth At Bedtime ) 14 tablet 0     HYDROmorphone (DILAUDID) 2 MG tablet Take 0.5-1 tablets (1-2 mg) by mouth every 12 hours as needed for severe pain (Patient not taking: Reported on 5/10/2021) 8 tablet 0     OLANZapine zydis (ZYPREXA) 5 MG ODT Take 1 tablet (5 mg) by mouth nightly as needed (insomnia) (Patient not taking: Reported on 5/5/2021) 14 tablet 0     ondansetron (ZOFRAN-ODT) 4 MG ODT tab Take 1 tablet (4 mg) by mouth every 6 hours as needed for nausea or vomiting (Patient not taking: Reported on 5/5/2021) 20 tablet 0     polyethylene glycol (MIRALAX) 17 GM/Dose powder Take 17 g by mouth 2 times daily (Patient not taking: Reported on 5/10/2021) 510 g 0      senna-docusate (SENOKOT-S/PERICOLACE) 8.6-50 MG tablet Take 2 tablets by mouth 2 times daily (Patient not taking: Reported on 5/10/2021) 30 tablet 0         Allergies:      No Known Allergies     Social History:     Social History     Tobacco Use     Smoking status: Former Smoker     Packs/day: 0.50     Years: 40.00     Pack years: 20.00     Quit date:      Years since quitting: 3.3     Smokeless tobacco: Never Used   Substance Use Topics     Alcohol use: Not Currently       History   Drug Use Unknown         Family History:     The patient's family history is notable for:    Family History   Adopted: Yes   Problem Relation Age of Onset     Cancer Mother 36     Other Cancer Mother         Bio mother  of  a female cancer  at 36     Factor V Leiden deficiency Daughter      Deep Vein Thrombosis Daughter      Diabetes Type 1 Daughter      Diabetes Daughter      Hypertension Daughter      Anesthesia Reaction No family hx of          Physical Exam:     Vital Signs 2021   Systolic 119   Diastolic 69   Pulse 70   Temperature 98.3   Respirations 14   Weight (LB) 159 lb 3.2 oz   Height        General Appearance: healthy and alert, no distress    Eyes:  Eyes grossly normal to inspection.  No discharge or erythema, or obvious scleral/conjunctival abnormalities.    Respiratory: No audible wheeze, cough, or visible cyanosis.  No visible retractions or increased work of breathing.     Musculoskeletal: extremities non tender and without edema    Skin: no lesions or rashes on visible skin    Neurological: normal gait, no gross defects     Psychiatric: appropriate mood and affect. Mentation appears normal, affect normal/bright, judgement and insight intact, normal speech and appearance well-groomed                            The rest of a comprehensive physical examination is deferred due to PHE (public health emergency) video visit restrictions.    Lab Results   Component Value Date    WBC 3.5 2021     Lab Results    Component Value Date    RBC 3.81 05/21/2021     Lab Results   Component Value Date    HGB 11.5 05/21/2021     Lab Results   Component Value Date    HCT 37.4 05/21/2021     No components found for: MCT  Lab Results   Component Value Date    MCV 98 05/21/2021     Lab Results   Component Value Date    MCH 30.2 05/21/2021     Lab Results   Component Value Date    MCHC 30.7 05/21/2021     Lab Results   Component Value Date    RDW 16.4 05/21/2021     Lab Results   Component Value Date     05/21/2021     % Neutrophils   Date Value Ref Range Status   05/21/2021 46.8 % Final     % Lymphocytes   Date Value Ref Range Status   05/21/2021 38.7 % Final     % Monocytes   Date Value Ref Range Status   05/21/2021 11.0 % Final     % Eosinophils   Date Value Ref Range Status   05/21/2021 2.3 % Final     % Basophils   Date Value Ref Range Status   05/21/2021 1.2 % Final     % Immature Granulocytes   Date Value Ref Range Status   05/21/2021 0.0 % Final     Absolute Neutrophil   Date Value Ref Range Status   05/21/2021 1.6 1.6 - 8.3 10e9/L Final     Absolute Lymphocytes   Date Value Ref Range Status   05/21/2021 1.3 0.8 - 5.3 10e9/L Final     Absolute Monocytes   Date Value Ref Range Status   05/21/2021 0.4 0.0 - 1.3 10e9/L Final     Absolute Eosinophils   Date Value Ref Range Status   05/21/2021 0.1 0.0 - 0.7 10e9/L Final     Absolute Basophils   Date Value Ref Range Status   05/21/2021 0.0 0.0 - 0.2 10e9/L Final     Abs Immature Granulocytes   Date Value Ref Range Status   05/21/2021 0.0 0 - 0.4 10e9/L Final     Nucleated RBCs   Date Value Ref Range Status   04/27/2021 0 0 /100 Final     Absolute Nucleated RBC   Date Value Ref Range Status   04/27/2021 0.0  Final     Last Comprehensive Metabolic Panel:  Sodium   Date Value Ref Range Status   05/21/2021 140 133 - 144 mmol/L Final     Potassium   Date Value Ref Range Status   05/21/2021 4.2 3.4 - 5.3 mmol/L Final     Chloride   Date Value Ref Range Status   05/21/2021 109 94 -  109 mmol/L Final     Carbon Dioxide   Date Value Ref Range Status   05/21/2021 32 20 - 32 mmol/L Final     Anion Gap   Date Value Ref Range Status   05/21/2021 <1 (L) 3 - 14 mmol/L Final     Glucose   Date Value Ref Range Status   05/21/2021 87 70 - 99 mg/dL Final     Urea Nitrogen   Date Value Ref Range Status   05/21/2021 15 7 - 30 mg/dL Final     Creatinine   Date Value Ref Range Status   05/21/2021 0.82 0.52 - 1.04 mg/dL Final     GFR Estimate   Date Value Ref Range Status   05/21/2021 75 >60 mL/min/[1.73_m2] Final     Comment:     Non  GFR Calc  Starting 12/18/2018, serum creatinine based estimated GFR (eGFR) will be   calculated using the Chronic Kidney Disease Epidemiology Collaboration   (CKD-EPI) equation.       Calcium   Date Value Ref Range Status   05/21/2021 9.0 8.5 - 10.1 mg/dL Final     Bilirubin Total   Date Value Ref Range Status   05/21/2021 0.3 0.2 - 1.3 mg/dL Final     Alkaline Phosphatase   Date Value Ref Range Status   05/21/2021 204 (H) 40 - 150 U/L Final     ALT   Date Value Ref Range Status   05/21/2021 24 0 - 50 U/L Final     AST   Date Value Ref Range Status   05/21/2021 24 0 - 45 U/L Final     Lab Results   Component Value Date    PROTTOTAL 8.1 05/21/2021     Lab Results   Component Value Date    ALBUMIN 3.4 05/21/2021     Magnesium   Date Value Ref Range Status   05/21/2021 2.2 1.6 - 2.3 mg/dL Final         Assessment:    Taran Regalado is a 65 year old woman with a diagnosis of metastatic high grade ovarian serous carcinoma. She is currently undergoing treatment with adjuvant chemotherapy.  She is here today for follow up and disease management. In light of the recent COVID19 pandemic, and in accordance with our group and national guidelines this patients care has been reviewed and it is felt that presenting for her visit would be more likely to increase rather than decrease her relative risks. Therefore this visit was conducted by video.      40 minutes spent on the  date of the encounter doing chart review, history and exam, documentation, and further activities as noted above.              Plan:     1.)        Ovarian serous carcinoma:  OK to proceed with cycle 4 of chemo if labs are WDL.  RTC in 3 weeks for labs, virtual NP visit, and cycle 5.  This is already scheduled.  Reviewed signs and symptoms for when she should contact the clinic or seek additional care.  Patient to contact the clinic with any questions or concerns in the interim.        Genetic risk factors were assessed and she is POSITIVE for a BRCA1 mutation.  This mutation is called [id number].        Labs and/or tests ordered include:  CBC. CMP. Mag. .                2.)        Health maintenance:  Issues addressed today include following up with PCP for annual health maintenance and non-gynecologic issues.      3.)        Chemotherapy induced nausea: Discussed resuming small frequent meals high in protein now that she is resuming chemotherapy.  Refill for compazine sent to her home pharmacy last week.     4.)         Pulmonary embolus:  Continue taking her lovenox until she has completed chemotherapy then can transition back to Xarelto.    5.) Bone pain:  Encouraged resuming claritin and using tylenol for bone pain.  Will send prescription for 10 tablets of oxycodone 5 mg to her home pharmacy if needed.  This should only be taken for severe pain.    6.) Insomnia: Discussed that especially as this is a long standing issue for patient her symptoms will be better managed by a specialist which is scheduled.  I am not comfortable authorizing an increase in her amitriptyline, advised to contact her PCP who prescribes this.  Reviewed sleep hygiene strategies.        Ella Murillo, OTILIO, APRN, FNP-C  Nurse Practitioner   Division of Gynecologic Oncology  Pager: [phone]     CC  Patient Care Team:  Bolivar Juarez MD as PCP - General (Gynecologic Oncology)  Marta Okeefe APRN CNP as Assigned  PCP  Marta Lao APRN CNP as Assigned OBGYN Provider  Kendell, Pepe Suarez MD as Assigned Heart and Vascular Provider  Holley Ramos PA-C as Assigned Surgical Provider  Brinda Lacey MD as Assigned Palliative Care Provider

## 2021-05-22 ENCOUNTER — INFUSION THERAPY VISIT (OUTPATIENT)
Dept: ONCOLOGY | Facility: CLINIC | Age: 66
End: 2021-05-22
Attending: OBSTETRICS & GYNECOLOGY
Payer: COMMERCIAL

## 2021-05-22 VITALS
TEMPERATURE: 98.2 F | HEART RATE: 74 BPM | OXYGEN SATURATION: 98 % | DIASTOLIC BLOOD PRESSURE: 59 MMHG | SYSTOLIC BLOOD PRESSURE: 97 MMHG | RESPIRATION RATE: 16 BRPM

## 2021-05-22 DIAGNOSIS — Z51.11 ENCOUNTER FOR ANTINEOPLASTIC CHEMOTHERAPY: Primary | ICD-10-CM

## 2021-05-22 DIAGNOSIS — C56.9 OVARIAN CANCER, UNSPECIFIED LATERALITY (H): ICD-10-CM

## 2021-05-22 PROCEDURE — 96372 THER/PROPH/DIAG INJ SC/IM: CPT | Performed by: NURSE PRACTITIONER

## 2021-05-22 PROCEDURE — 250N000011 HC RX IP 250 OP 636: Performed by: NURSE PRACTITIONER

## 2021-05-22 RX ADMIN — PEGFILGRASTIM 6 MG: 6 INJECTION SUBCUTANEOUS at 13:10

## 2021-05-22 ASSESSMENT — PAIN SCALES - GENERAL: PAINLEVEL: NO PAIN (0)

## 2021-05-22 NOTE — PROGRESS NOTES
Infusion Nursing Note:  Taran Regalado presents today for neulasta injection.    Patient seen by provider today: No   present during visit today: Not Applicable.    Note: Pt arrives feeling well today.  About three weeks ago she twisted her ankle and fractured three bones in her left foot, wearing a boot and denies any pain. No other new complaints/concerns today.      Post Infusion Assessment:  Patient tolerated one injection into RIGHT arm without incident.       Discharge Plan:   Patient declined prescription refills.  Discharge instructions reviewed with: Patient.  Patient and/or family verbalized understanding of discharge instructions and all questions answered.  AVS to patient via [employer].  Patient will return 6/11/21 for next appointment.   Patient discharged in stable condition accompanied by: self.  Departure Mode: Ambulatory.      Lisbeth Hastings RN

## 2021-05-22 NOTE — PATIENT INSTRUCTIONS
Contact Numbers:   [facility] Main Line: [phone]    Call triage to speak with triage if you are experiencing chills and/or temperature greater than or equal to 100.5, uncontrolled nausea/vomiting, diarrhea, constipation, dizziness, shortness of breath, chest pain, bleeding, unexplained bruising, or any new/concerning symptoms, questions/concerns.     If you are having any concerning symptoms or wish to speak to a provider before your next infusion visit, please call your care coordinator or triage to notify them so we can adequately serve you.     If you need a refill on a medication or narcotic prescription, please call triage or your care coordinator before your infusion appointment.                 May 2021      [place] Monday Tuesday Wednesday Thursday Friday Saturday                                 1       2     3    LAB  10:45 AM   (15 min.)   LAB ONC [facility] 4     5    VIDEO VISIT RETURN   1:45 PM   (20 min.)   Bolivar Juarez MD   [facility] 6     7     8       9     10    TEAM SHORT  11:35 AM   (5 min.)   Kath Adame NP   [facility] [place]    XR ANKLE LEFT G/E 3 VIEWS  12:35 PM   (15 min.)   ANDXR1   [facility] [place]    XR TIBIA & FIBULA LEFT 2 VIEWS  12:40 PM   (20 min.)   [id number] [place] [address] [place]    XR FOOT LEFT G/E 3 VIEWS  12:45 PM   (20 min.)   ANDXR1   [facility] [place] 11     12     13     14     15       16     17     18    VIDEO VISIT RETURN  10:00 AM   (60 min.)   Lauren Ibanez GC   [facility] 19     20     21    LAB   9:45 AM   (15 min.)   LAB ONC [facility]    VIDEO VISIT RETURN  10:05 AM   (40 min.)   Ella Murillo APRN CNP   [facility]    INFUSION 5 HR (300 MIN)  11:30 AM   (300 min.)   [place] 5 INFUSION   [facility]  [place] 22    ONC INFUSION 1 HR (60 MIN)   1:00 PM   (60 min.)   [facility] ONC INFUSION NURSE   [facility]   23     24     25     26     27     28     29       30     31                                          June 2021 Sunday Monday Tuesday Wednesday Thursday Friday Saturday             1     2     3     4     5       6     7     8     9     10    VIDEO VISIT RETURN  11:55 AM   (40 min.)   Ella Murillo APRN CNP   [facility] 11    LAB  10:00 AM   (15 min.)   LAB ONC [facility]    INFUSION 5 HR (300 MIN)  10:30 AM   (300 min.)   [place] 6 INFUSION   [facility] 12    ONC INFUSION 1 HR (60 MIN)   1:00 PM   (60 min.)    ONC INFUSION NURSE   [facility]   13     14     15     16    TELEPHONE VISIT NEW   9:30 AM   (60 min.)   Radha Price PA   [facility] [place] 17     18     19       20     21     22     23     24     25     26       27     28     29     30                                    Lab Results:  No results found for this or any previous visit (from the past 12 hour(s)).

## 2021-05-24 ENCOUNTER — TELEPHONE (OUTPATIENT)
Dept: ONCOLOGY | Facility: CLINIC | Age: 66
End: 2021-05-24

## 2021-05-24 NOTE — TELEPHONE ENCOUNTER
Prior Authorization Retail Medication Request    Medication/Dose: OXYCODONE 5MG TABLET  ICD code (if different than what is on RX):    Previously Tried and Failed:    Rationale:      Insurance Name:  [facility]  Insurance ID:  [id number]      Pharmacy Information (if different than what is on RX)  Name:  [facility]  Phone:  [phone]

## 2021-05-24 NOTE — TELEPHONE ENCOUNTER
Prior Authorization Approval    Authorization Effective Date: 5/24/2021  Authorization Expiration Date: 5/23/2022  Medication: OXYCODONE 5MG TABLET-PA approved  Approved Dose/Quantity:   Reference #:     Insurance Company: [employer] - Phone [phone] Fax [phone]  Expected CoPay:       CoPay Card Available:      Foundation Assistance Needed:    Which Pharmacy is filling the prescription (Not needed for infusion/clinic administered): [facility] - [place], [place] - [address] [place] [address]  Pharmacy Notified: Yes  Patient Notified: No-pharmacy will contact

## 2021-05-24 NOTE — TELEPHONE ENCOUNTER
Central Prior Authorization Team   Phone: [phone]      PA Initiation    Medication: OXYCODONE 5MG TABLET-PA initiated  Insurance Company: [facility] - Phone [phone] Fax [phone]  Pharmacy Filling the Rx: [facility] - [place], [place] - [address] [place] [address]  Filling Pharmacy Phone: [phone]  Filling Pharmacy Fax:    Start Date: 5/24/2021

## 2021-05-25 NOTE — PROGRESS NOTES
Taran is a 65 year old who is being evaluated via a billable video visit.      How would you like to obtain your AVS? MyChart  If the video visit is dropped, the invitation should be resent by: Text to cell phone: [phone]  Will anyone else be joining your video visit? No       SOPHIA So        Video-Visit Details    Type of service:  Video Visit    Video Start Time: 12:03 pm    Video End Time:12:08 pm    Originating Location (pt. Location): Home    Distant Location (provider location):  [facility]     Platform used for Video Visit: 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 She is currently undergoing treatment with adjuvant chemotherapy.  She is here today for follow up and disease management. In light of the recent COVID19 pandemic, and in accordance with our group and national guidelines this patients care has been reviewed and it is felt that presenting for her visit would be more likely to increase rather than decrease her relative risks. Therefore this visit was conducted by video.        Oncology History:  12/3/2020: US Pelvic: IMPRESSION: Limited examination due to acoustic windows. Possible left adnexal mass. A CT scan of the abdomen and pelvis with contrast is recommended for further assessment.     2020: CT A/P:   IMPRESSION:    Peritoneal carcinomatosis with masslike peritoneal thickening in the lower pelvis which may indicate an adnexal or ovarian primary malignancy. Large volume ascites. Bilateral pleural effusions. There is potential subtle pleural nodularity in the right hemithorax which could indicate metastatic disease.  Indeterminate 1 cm lesion in the right hepatic lobe suspicious for a  metastatic lesion.      12/16/2020: Presented to [facility] with abdominal distention, 25lb weight loss, and CTAP with carcinomatosis, elevated  3098.     12/23/2020: CT Chest: IMPRESSION:   1. There are few scattered small sub-6 mm pulmonary nodules which are indeterminate without prior comparisons available. There are a few  slightly larger perifissural nodules which are technically  indeterminate in the setting of malignancy although presumed lymphatic in nature and of unlikely clinical significance. Attention on follow-up is recommended.  2. Small to moderate left and small right pleural effusions are increased in size from prior. No convincing evidence for pleural nodularity.  3. Partially visualized large volume ascites and peritoneal nodularity in the upper abdomen similar to 12/4/2020 outside CT      12/26/2020: ED for abdominal distension; 3 L ascites drained with paracentesis    Pelvic US: Findings: Free fluid present in LLQ      12/31/2020: US Paracentesis: 900 mL ascites drained     1/7/2021: Diagnotic laparoscopy, biopsies  Pathology: FINAL DIAGNOSIS:   A. PERITONEUM, BIOPSIES:   - Positive for high grade carcinoma, consistent with metastatic carcinoma of Mullerian origin.     1/10-1/13/2021: Hospital admission for postoperative non-intractable vomiting and nausea.      1/10/2021: CT A/P: IMPRESSION: Extensive ascites which is probably malignant. Scattered liver hypodensities of indeterminate etiology comment cannot exclude metastatic disease. Diverticulosis. Fluid-filled adnexal masses and irregular appearance of uterus, which may represent primary neoplasm. Multiple peritoneal nodules. Large amount of fecal material in the colon with no evidence of small bowel obstruction.     Plan: Paclitaxel 175 mg/m2 and carboplatin AUC 6 x 3 cycles followed by a CT CAP and visit with Dr. Juarez.     1/12/2021: Cycle 1 paclitaxel and carboplatin while inpatient     1/13/2021: CT Head: Impression:  1. Chronic  sinusitis of the right maxillary and right sphenoid sinuses.  2. Incidental presumed calcified meningioma in the right frontal  convexity without significant mass effect.  3. No suspicious intracranial enhancing lesion.     2/1/2021: Cycle 2 paclitaxel and carboplatin.  936.     2/3-2/5/21: Admission [facility] for afib w/ RVR and new PE     2/26/21: Cycle 3 paclitaxel and carboplatin planned.  Deferred due to thrombocytopenia.  Deferred 3/12/21 due to neutropenia.  Given on 3/15/21 after Filgrastim injection x 2.  Add Pegfilgrastim to day 2 of treatment plan.   129.      4/2/21: CT CAP  IMPRESSION:   In this patient with a history of metastatic serous ovarian cancer,  status post diagnostic laparoscopy and neoadjuvant chemotherapy:  1. Significant improvement in peritoneal carcinomatosis as discussed  above.  2. Resolution of previously seen scattered small hypoattenuating  lesions in the liver. This raises the concern for these lesions  representing metastatic disease, noting excellent response elsewhere  in abdomen and pelvis.  3. Bilateral pleural effusions have resolved.  4. Several small pulmonary nodules in the right lung measuring up to 5  mm. Indeterminate, but likely benign in the setting of their stability  with excellent response elsewhere. Continued attention on follow-up.     4/19/21: HYSTERECTOMY, TOTAL, ABDOMINAL, WITH BILATERAL SALPINGO-OOPHORECTOMY, omentectomy, NEOPLASM DEBULKING,Proctoscocy, RO, Resection of liver nodules, diaphragm stripping, immobilization of liver and colon  FINAL DIAGNOSIS:   A. OMENTUM, BIOPSY:   - Omental adipose tissue with rare viable cells of metastatic high grade   serous carcinoma   - One reactive lymph node, negative for malignancy (0/1)   B. NODULE, SIGMOID, EXCISION:   - Calcified necrotic adipose tissue   - Negative for malignancy   C. NODULE, SMALL BOWEL MESENTERY, EXCISION:   - Fibroadipose tissue, positive for metastatic high grade serous carcinoma   D.  UTERUS, CERVIX, BILATERAL FALLOPIAN TUBES AND OVARIES, HYSTERECTOMY   WITH BILATERAL SALPINGO-OOPHORECTOMY:   - Atrophic endometrium   - Uterine serosa with rare viable cells consistent with high grade serous   carcinoma   - Cervix with atrophic changes   - Viable cells consistent with high grade serous carcinoma present in the   right ovary, serosa of right   fallopian tube and right periadnexal soft tissue   - Left ovary with atrophic changes   - Left fallopian tube with a rare focus of serous tubal in-situ carcinoma   (STIC)   E. NODULES, SMALL BOWEL MESENTRY, EXCISION:   - Fibroadipose tissue with rare viable cells of metastatic high grade   serous carcinoma   F. NODULE, SPLENIC FLEXURE TRANSVERSE COLON, EXCISION:   - Fibroadipose tissue with rare viable cells of metastatic high grade   serous carcinoma   - Accessory splenule, negative for malignancy   G. OMENTUM, OMENTECTOMY:   - Omental adipose tissue with rare viable cells of metastatic high grade   serous carcinoma   H. NODULE, PERITONEAL PANCREATIC, EXCISION:   - Fibrous adhesions with inflammation   - Negative for malignancy   I. RIGHT HEMIDIAPHRAGM PERITONEUM, EXCISION:   - Fibrous adhesions with inflammation   - Negative for malignancy   J. RIGHT LIVER SURFACE NODULE:   - Fibrous adhesions with benign inclusion glands   - Negative for malignancy   K. LEFT LOWER LIVER EDGE, BIOPSY:   - Cauterized hepatic parenchyma and capsule   - Negative for malignancy   L. NODULE, SMALL BOWEL MESENTERIC #3, EXCISION:   - Fibroadipose tissue with rare viable cells of metastatic high grade   serous carcinoma       Plan: Carboplatin AUC 6 + Taxol 175 mg/m2 x 3 cycles, then transition to Parp inhibitor for maintenance therapy given her BRCA1 germline mutation.      5/21/21: Cycle 4 carboplatin and paclitaxel.   172.  6/11/21: Cycle 5 carboplatin and paclitaxel.   pending.                Today she reports feeling well and is without concern.  She denies any  vaginal bleeding, no changes in her bowel or bladder habits, no nausea/emesis, no lower extremity edema, and no difficulties eating or sleeping. She denies any abdominal discomfort/bloating, no fevers or chills, and no chest pain or shortness of breath. She had minimal nausea after her last cycle of chemo and was able to eat and drink without difficulty.  She did not have any bone pain after her last cycle and did not need to take any of the provided oxycodone.  She needs a refill on her amitriptyline and her gabapentin.                    Review of Systems     Constitutional:  Negative for fever, chills, weight loss, weight gain, fatigue, decreased appetite, night sweats, recent stressors, height gain, height loss, post-operative complications, incisional pain, hallucinations, increased energy, hyperactivity and confused.   HENT:  Negative for ear pain, hearing loss, tinnitus, nosebleeds, trouble swallowing, hoarse voice, mouth sores, sore throat, ear discharge, tooth pain, gum tenderness, taste disturbance, smell disturbance, hearing aid, bleeding gums, dry mouth, sinus pain, sinus congestion and neck mass.    Eyes:  Negative for double vision, pain, redness, eye pain, decreased vision, eye watering, eye bulging, eye dryness, flashing lights, spots, floaters, strabismus, tunnel vision, jaundice and eye irritation.   Respiratory:   Negative for cough, hemoptysis, sputum production, shortness of breath, wheezing, sleep disturbances due to breathing, snores loudly, respiratory pain, dyspnea on exertion, cough disturbing sleep and postural dyspnea.    Cardiovascular:  Negative for chest pain, dyspnea on exertion, palpitations, orthopnea, claudication, leg swelling, fingers/toes turn blue, hypertension, hypotension, syncope, history of heart murmur, chest pain on exertion, chest pain at rest, pacemaker, few scattered varicosities, leg pain, sleep disturbances due to breathing, tachycardia, light-headedness, exercise  intolerance and edema.   Gastrointestinal:  Negative for heartburn, nausea, vomiting, abdominal pain, diarrhea, constipation, blood in stool, melena, rectal pain, bloating, hemorrhoids, bowel incontinence, jaundice, rectal bleeding, coffee ground emesis and change in stool.   Genitourinary:  Negative for bladder incontinence, dysuria, urgency, hematuria, flank pain, vaginal discharge, difficulty urinating, genital sores, dyspareunia, decreased libido, nocturia, voiding less frequently, arousal difficulty, abnormal vaginal bleeding, excessive menstruation, menstrual changes, hot flashes, vaginal dryness and postmenopausal bleeding.   Musculoskeletal:  Negative for myalgias, back pain, joint swelling, arthralgias, stiffness, muscle cramps, neck pain, bone pain, muscle weakness and fracture.   Skin:  Negative for nail changes, itching, poor wound healing, rash, hair changes, skin changes, acne, warts, poor wound healing, scarring, flaky skin, Raynaud's phenomenon, sensitivity to sunlight and skin thickening.   Neurological:  Negative for dizziness, tingling, tremors, speech change, seizures, loss of consciousness, weakness, light-headedness, numbness, headaches, disturbances in coordination, extremity numbness, memory loss, difficulty walking and paralysis.   Endo/Heme:  Negative for anemia, swollen glands and bruises/bleeds easily.   Psychiatric/Behavioral:  Negative for depression, hallucinations, memory loss, decreased concentration, mood swings and panic attacks.    Breast:  Negative for breast discharge, breast mass, breast pain and nipple retraction.   Endocrine:  Negative for altered temperature regulation, polyphagia, polydipsia, unwanted hair growth and change in facial hair.        Past Medical History:    Past Medical History:   Diagnosis Date     Atrial fibrillation with rapid ventricular response (H)      History of cold sores      Insomnia      Migraine      Osteopenia      Pelvic mass      Peritoneal  carcinomatosis (H)      Restless legs syndrome (RLS)          Past Surgical History:    Past Surgical History:   Procedure Laterality Date     APPENDECTOMY       ARTHROSCPY KNEE SURGICAL DEBRIDEMENT SHAVING ARTICULAR CARTILAGE Right      BIOPSY  January 2021    Biopsy to confirm ovarian cancer     DEBRIDEMENT LEFT UPPER EXTREMITY  2016     HYSTERECTOMY TOTAL ABD, LUISITO SALPINGO-OOPHORECTOMY, NODE DISSECTION, TUMOR DEBULKING, COMBINED Bilateral 4/19/2021    Procedure: HYSTERECTOMY, TOTAL, ABDOMINAL, WITH BILATERAL SALPINGO-OOPHORECTOMY, omentectomy, NEOPLASM DEBULKING,Proctoscocy, RO, Resection of liver nodules, diaphragm stripping, immobilization of liver and colon;  Surgeon: Bolivar Juarez MD;  Location: [facility] OR     LAPAROSCOPY DIAGNOSTIC (GYN) Bilateral 1/7/2021    Procedure: Diagnsotic laparoscopy, biopsies;  Surgeon: Bolivar Juarez MD;  Location: [facility] OR     LASIK       TUBAL LIGATION           Health Maintenance Due   Topic Date Due     PREVENTIVE CARE VISIT  Never done     ADVANCE CARE PLANNING  Never done     Pneumococcal Vaccine: Pediatrics (0 to 5 Years) and At-Risk Patients (6 to 64 Years) (1 of 4 - PCV13) Never done     COLORECTAL CANCER SCREENING  Never done     COVID-19 Vaccine (1) Never done     HIV SCREENING  Never done     HEPATITIS C SCREENING  Never done     LIPID  Never done     ZOSTER IMMUNIZATION (1 of 2) Never done     MAMMO SCREENING  03/04/2021       Current Medications:     Current Outpatient Medications   Medication Sig Dispense Refill     acetaminophen (TYLENOL) 325 MG tablet Take 2 tablets (650 mg) by mouth every 6 hours as needed for mild pain 50 tablet 0     amitriptyline (ELAVIL) 100 MG tablet Take 50 mg by mouth At Bedtime        gabapentin (NEURONTIN) 600 MG tablet Take 1 tablet (600 mg) by mouth At Bedtime 60 tablet 0     hydrOXYzine (ATARAX) 25 MG tablet Take 1-2 tablets (25-50 mg) by mouth nightly as needed for anxiety (or insomnia) 60 tablet 5     loratadine (CLARITIN) 10  MG tablet Take 10 mg by mouth daily as needed (for bone pain related to neupogen)        oxyCODONE (ROXICODONE) 5 MG tablet Take 1 tablet (5 mg) by mouth every 12 hours 10 tablet 0     prochlorperazine (COMPAZINE) 10 MG tablet Take 1 tablet (10 mg) by mouth every 6 hours as needed for nausea or vomiting 30 tablet 0     SUMAtriptan (IMITREX) 100 MG tablet Take 100 mg by mouth at onset of headache        valACYclovir (VALTREX) 1000 mg tablet Take 1,000 mg by mouth as needed        enoxaparin ANTICOAGULANT (LOVENOX) 40 MG/0.4ML syringe Inject 0.4 mLs (40 mg) Subcutaneous every 24 hours 5.6 mL 0     ondansetron (ZOFRAN-ODT) 4 MG ODT tab Take 1 tablet (4 mg) by mouth every 6 hours as needed for nausea or vomiting (Patient not taking: Reported on 2021) 20 tablet 0     polyethylene glycol (MIRALAX) 17 GM/Dose powder Take 17 g by mouth 2 times daily (Patient not taking: Reported on 5/10/2021) 510 g 0     senna-docusate (SENOKOT-S/PERICOLACE) 8.6-50 MG tablet Take 2 tablets by mouth 2 times daily (Patient not taking: Reported on 5/10/2021) 30 tablet 0         Allergies:      No Known Allergies     Social History:     Social History     Tobacco Use     Smoking status: Former Smoker     Packs/day: 0.50     Years: 40.00     Pack years: 20.00     Quit date:      Years since quitting: 3.4     Smokeless tobacco: Never Used   Substance Use Topics     Alcohol use: Not Currently       History   Drug Use Unknown         Family History:     The patient's family history is notable for:    Family History   Adopted: Yes   Problem Relation Age of Onset     Cancer Mother 36     Other Cancer Mother         Bio mother  of  a female cancer  at 36     Factor V Leiden deficiency Daughter      Deep Vein Thrombosis Daughter      Diabetes Type 1 Daughter      Diabetes Daughter      Hypertension Daughter      Anesthesia Reaction No family hx of          Physical Exam:     There were no vitals taken for this visit.  There is no height or  weight on file to calculate BMI.      General Appearance: healthy and alert, no distress    Eyes:  Eyes grossly normal to inspection.  No discharge or erythema, or obvious scleral/conjunctival abnormalities.    Respiratory: No audible wheeze, cough, or visible cyanosis.  No visible retractions or increased work of breathing.     Musculoskeletal: extremities non tender and without edema    Skin: no lesions or rashes on visible skin    Neurological: normal gait, no gross defects     Psychiatric: appropriate mood and affect. Mentation appears normal, affect normal/bright, judgement and insight intact, normal speech and appearance well-groomed                            The rest of a comprehensive physical examination is deferred due to PHE (public health emergency) video visit restrictions.      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 She is currently undergoing treatment with adjuvant chemotherapy.  She is here today for follow up and disease management. In light of the recent COVID19 pandemic, and in accordance with our group and national guidelines this patients care has been reviewed and it is felt that presenting for her visit would be more likely to increase rather than decrease her relative risks. Therefore this visit was conducted by video.      20 minutes spent on the date of the encounter doing chart review, history and exam, documentation, and further activities as noted above.              Plan:     1.)        Ovarian serous carcinoma:  OK to proceed with cycle 5 of chemo tomorrow if labs are WDL.  RTC in 3 weeks for labs, virtual NP visit, and cycle 6.  This is already scheduled.  Will send refill for gabapentin to her home pharmacy at her request as this was originally prescribed by the gyn onc team.  Encouraged her to reach out to her PCP to refill her amitriptyline as they are the ones who manage this.  Reviewed signs  and symptoms for when she should contact the clinic or seek additional care.  Patient to contact the clinic with any questions or concerns in the interim.        Genetic risk factors were assessed and she is POSITIVE for a BRCA1 mutation.  This mutation is called [id number].        Labs and/or tests ordered include:  CBC. CMP. Mag. .                2.)        Health maintenance:  Issues addressed today include following up with PCP for annual health maintenance and non-gynecologic issues.      3.)        Chemotherapy induced nausea: Encouraged small frequent meals high in protein now that she is resuming chemotherapy.  Refill for compazine sent to her home pharmacy last week.     4.)         Pulmonary embolus:  Per Dr. Juarez ok transition back to Xarelto.  Continue Xarelto as prescribed.           Ella Murillo, OTILIO, APRN, FNP-C  Nurse Practitioner   Division of Gynecologic Oncology  Pager: [phone]     CC  Patient Care Team:  Bolivar Juarez MD as PCP - General (Gynecologic Oncology)  Bolivar Juarez MD as Assigned Cancer Care Provider  Marta Okeefe APRN CNP as Assigned PCP  Marta Lao APRN CNP as Assigned OBGYN Provider  Pepe Rdz MD as Assigned Heart and Vascular Provider  Holley Ramos PA-C as Assigned Surgical Provider  Brinda Lacey MD as Assigned Palliative Care Provider  SELF, REFERRED

## 2021-06-10 ENCOUNTER — VIRTUAL VISIT (OUTPATIENT)
Dept: ONCOLOGY | Facility: CLINIC | Age: 66
End: 2021-06-10
Attending: NURSE PRACTITIONER
Payer: COMMERCIAL

## 2021-06-10 ENCOUNTER — MYC MEDICAL ADVICE (OUTPATIENT)
Dept: FAMILY MEDICINE | Facility: CLINIC | Age: 66
End: 2021-06-10

## 2021-06-10 DIAGNOSIS — G25.81 RESTLESS LEGS SYNDROME: ICD-10-CM

## 2021-06-10 DIAGNOSIS — C56.9 OVARIAN CANCER, UNSPECIFIED LATERALITY (H): Primary | ICD-10-CM

## 2021-06-10 DIAGNOSIS — I26.99 OTHER ACUTE PULMONARY EMBOLISM WITHOUT ACUTE COR PULMONALE (H): ICD-10-CM

## 2021-06-10 DIAGNOSIS — Z51.11 ENCOUNTER FOR ANTINEOPLASTIC CHEMOTHERAPY: ICD-10-CM

## 2021-06-10 DIAGNOSIS — G47.00 INSOMNIA, UNSPECIFIED TYPE: Primary | ICD-10-CM

## 2021-06-10 PROCEDURE — 999N001193 HC VIDEO/TELEPHONE VISIT; NO CHARGE

## 2021-06-10 PROCEDURE — 99214 OFFICE O/P EST MOD 30 MIN: CPT | Mod: 24 | Performed by: NURSE PRACTITIONER

## 2021-06-10 RX ORDER — GABAPENTIN 600 MG/1
600 TABLET ORAL AT BEDTIME
Qty: 60 TABLET | Refills: 0 | Status: SHIPPED | OUTPATIENT
Start: 2021-06-10 | End: 2021-08-26

## 2021-06-10 ASSESSMENT — ENCOUNTER SYMPTOMS
MEMORY LOSS: 0
NAUSEA: 0
POOR WOUND HEALING: 0
BREAST PAIN: 0
WEIGHT GAIN: 0
PARALYSIS: 0
NERVOUS/ANXIOUS: 0
LEG SWELLING: 0
COUGH DISTURBING SLEEP: 0
EYE PAIN: 0
BREAST MASS: 0
TINGLING: 0
JAUNDICE: 0
SKIN CHANGES: 0
EYE IRRITATION: 0
ARTHRALGIAS: 0
MUSCLE WEAKNESS: 0
HYPERTENSION: 0
LIGHT-HEADEDNESS: 0
COUGH: 0
HYPOTENSION: 0
EXERCISE INTOLERANCE: 0
POSTURAL DYSPNEA: 0
DIARRHEA: 0
SPUTUM PRODUCTION: 0
NAIL CHANGES: 0
DEPRESSION: 0
INCREASED ENERGY: 0
TACHYCARDIA: 0
BOWEL INCONTINENCE: 0
ABDOMINAL PAIN: 0
WHEEZING: 0
HOARSE VOICE: 0
CLAUDICATION: 0
SNORES LOUDLY: 0
ORTHOPNEA: 0
SYNCOPE: 0
BRUISES/BLEEDS EASILY: 0
HEARTBURN: 0
BACK PAIN: 0
SINUS CONGESTION: 0
CHILLS: 0
SMELL DISTURBANCE: 0
NECK MASS: 0
DYSPNEA ON EXERTION: 0
DYSURIA: 0
EYE WATERING: 0
NECK PAIN: 0
WEIGHT LOSS: 0
FATIGUE: 0
POLYDIPSIA: 0
DIZZINESS: 0
SORE THROAT: 0
CONSTIPATION: 0
STIFFNESS: 0
HOT FLASHES: 0
HEADACHES: 0
SINUS PAIN: 0
FLANK PAIN: 0
DOUBLE VISION: 0
SLEEP DISTURBANCES DUE TO BREATHING: 0
WEAKNESS: 0
DECREASED CONCENTRATION: 0
DIFFICULTY URINATING: 0
NIGHT SWEATS: 0
HEMATURIA: 0
PANIC: 0
RESPIRATORY PAIN: 0
BLOOD IN STOOL: 0
RECTAL PAIN: 0
SWOLLEN GLANDS: 0
DISTURBANCES IN COORDINATION: 0
HALLUCINATIONS: 0
SEIZURES: 0
INSOMNIA: 0
DECREASED LIBIDO: 0
FEVER: 0
SHORTNESS OF BREATH: 0
ALTERED TEMPERATURE REGULATION: 0
PALPITATIONS: 0
TASTE DISTURBANCE: 0
MYALGIAS: 0
POLYPHAGIA: 0
DECREASED APPETITE: 0
TROUBLE SWALLOWING: 0
MUSCLE CRAMPS: 0
BLOATING: 0
JOINT SWELLING: 0
TREMORS: 0
NUMBNESS: 0
VOMITING: 0
RECTAL BLEEDING: 0
EYE REDNESS: 0
LEG PAIN: 0
EXTREMITY NUMBNESS: 0
HEMOPTYSIS: 0
LOSS OF CONSCIOUSNESS: 0
SPEECH CHANGE: 0

## 2021-06-10 NOTE — LETTER
6/10/2021         RE: Taran Regalado  [address]  [place] [address]        Dear Colleague,    Thank you for referring your patient, Taran Regalado, to the [facility]. Please see a copy of my visit note below.    Taran is a 65 year old who is being evaluated via a billable video visit.      How would you like to obtain your AVS? MyChart  If the video visit is dropped, the invitation should be resent by: Text to cell phone: [phone]  Will anyone else be joining your video visit? No       SOPHIA So        Video-Visit Details    Type of service:  Video Visit    Video Start Time: 12:03 pm    Video End Time:12:08 pm    Originating Location (pt. 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 She is currently undergoing treatment with adjuvant chemotherapy.  She is here today for follow up and disease management. In light of the recent COVID19 pandemic, and in accordance with our group and national guidelines this patients care has been reviewed and it is felt that presenting for her visit would be more likely to increase rather than decrease her relative risks. Therefore this visit was conducted by video.        Oncology History:  12/3/2020: US Pelvic: IMPRESSION: Limited examination due to acoustic windows. Possible left adnexal mass. A CT scan of the abdomen and pelvis with contrast is recommended for further assessment.     2020: CT A/P:   IMPRESSION:    Peritoneal carcinomatosis with masslike peritoneal thickening in the lower pelvis which may indicate an adnexal or  ovarian primary malignancy. Large volume ascites. Bilateral pleural effusions. There is potential subtle pleural nodularity in the right hemithorax which could indicate metastatic disease.  Indeterminate 1 cm lesion in the right hepatic lobe suspicious for a metastatic lesion.      12/16/2020: [facility], 25lb weight loss, and CTAP with carcinomatosis, elevated  3098.     12/23/2020: CT Chest: IMPRESSION:   1. There are few scattered small sub-6 mm pulmonary nodules which are indeterminate without prior comparisons available. There are a few  slightly larger perifissural nodules which are technically  indeterminate in the setting of malignancy although presumed lymphatic in nature and of unlikely clinical significance. Attention on follow-up is recommended.  2. Small to moderate left and small right pleural effusions are increased in size from prior. No convincing evidence for pleural nodularity.  3. Partially visualized large volume ascites and peritoneal nodularity in the upper abdomen similar to 12/4/2020 outside CT      12/26/2020: ED for abdominal distension; 3 L ascites drained with paracentesis    Pelvic US: Findings: Free fluid present in LLQ      12/31/2020: US Paracentesis: 900 mL ascites drained     1/7/2021: Diagnotic laparoscopy, biopsies  Pathology: FINAL DIAGNOSIS:   A. PERITONEUM, BIOPSIES:   - Positive for high grade carcinoma, consistent with metastatic carcinoma of Mullerian origin.     1/10-1/13/2021: Hospital admission for postoperative non-intractable vomiting and nausea.      1/10/2021: CT A/P: IMPRESSION: Extensive ascites which is probably malignant. Scattered liver hypodensities of indeterminate etiology comment cannot exclude metastatic disease. Diverticulosis. Fluid-filled adnexal masses and irregular appearance of uterus, which may represent primary neoplasm. Multiple peritoneal nodules. Large amount of fecal material in the colon with no evidence  of small bowel obstruction.     Plan: Paclitaxel 175 mg/m2 and carboplatin AUC 6 x 3 cycles followed by a CT CAP and visit with Dr. Juarez.     1/12/2021: Cycle 1 paclitaxel and carboplatin while inpatient     1/13/2021: CT Head: Impression:  1. Chronic sinusitis of the right maxillary and right sphenoid sinuses.  2. Incidental presumed calcified meningioma in the right frontal  convexity without significant mass effect.  3. No suspicious intracranial enhancing lesion.     2/1/2021: Cycle 2 paclitaxel and carboplatin.  936.     2/3-2/5/21: Admission [facility] for afib w/ RVR and new PE     2/26/21: Cycle 3 paclitaxel and carboplatin planned.  Deferred due to thrombocytopenia.  Deferred 3/12/21 due to neutropenia.  Given on 3/15/21 after Filgrastim injection x 2.  Add Pegfilgrastim to day 2 of treatment plan.   129.      4/2/21: CT CAP  IMPRESSION:   In this patient with a history of metastatic serous ovarian cancer,  status post diagnostic laparoscopy and neoadjuvant chemotherapy:  1. Significant improvement in peritoneal carcinomatosis as discussed  above.  2. Resolution of previously seen scattered small hypoattenuating  lesions in the liver. This raises the concern for these lesions  representing metastatic disease, noting excellent response elsewhere  in abdomen and pelvis.  3. Bilateral pleural effusions have resolved.  4. Several small pulmonary nodules in the right lung measuring up to 5  mm. Indeterminate, but likely benign in the setting of their stability  with excellent response elsewhere. Continued attention on follow-up.     4/19/21: HYSTERECTOMY, TOTAL, ABDOMINAL, WITH BILATERAL SALPINGO-OOPHORECTOMY, omentectomy, NEOPLASM DEBULKING,Proctoscocy, RO, Resection of liver nodules, diaphragm stripping, immobilization of liver and colon  FINAL DIAGNOSIS:   A. OMENTUM, BIOPSY:   - Omental adipose tissue with rare viable cells of metastatic high grade   serous carcinoma   - One reactive lymph node,  negative for malignancy (0/1)   B. NODULE, SIGMOID, EXCISION:   - Calcified necrotic adipose tissue   - Negative for malignancy   C. NODULE, SMALL BOWEL MESENTERY, EXCISION:   - Fibroadipose tissue, positive for metastatic high grade serous carcinoma   D. UTERUS, CERVIX, BILATERAL FALLOPIAN TUBES AND OVARIES, HYSTERECTOMY   WITH BILATERAL SALPINGO-OOPHORECTOMY:   - Atrophic endometrium   - Uterine serosa with rare viable cells consistent with high grade serous   carcinoma   - Cervix with atrophic changes   - Viable cells consistent with high grade serous carcinoma present in the   right ovary, serosa of right   fallopian tube and right periadnexal soft tissue   - Left ovary with atrophic changes   - Left fallopian tube with a rare focus of serous tubal in-situ carcinoma   (STIC)   E. NODULES, SMALL BOWEL MESENTRY, EXCISION:   - Fibroadipose tissue with rare viable cells of metastatic high grade   serous carcinoma   F. NODULE, SPLENIC FLEXURE TRANSVERSE COLON, EXCISION:   - Fibroadipose tissue with rare viable cells of metastatic high grade   serous carcinoma   - Accessory splenule, negative for malignancy   G. OMENTUM, OMENTECTOMY:   - Omental adipose tissue with rare viable cells of metastatic high grade   serous carcinoma   H. NODULE, PERITONEAL PANCREATIC, EXCISION:   - Fibrous adhesions with inflammation   - Negative for malignancy   I. RIGHT HEMIDIAPHRAGM PERITONEUM, EXCISION:   - Fibrous adhesions with inflammation   - Negative for malignancy   J. RIGHT LIVER SURFACE NODULE:   - Fibrous adhesions with benign inclusion glands   - Negative for malignancy   K. LEFT LOWER LIVER EDGE, BIOPSY:   - Cauterized hepatic parenchyma and capsule   - Negative for malignancy   L. NODULE, SMALL BOWEL MESENTERIC #3, EXCISION:   - Fibroadipose tissue with rare viable cells of metastatic high grade   serous carcinoma       Plan: Carboplatin AUC 6 + Taxol 175 mg/m2 x 3 cycles, then transition to Parp inhibitor for maintenance  therapy given her BRCA1 germline mutation.      5/21/21: Cycle 4 carboplatin and paclitaxel.   172.  6/11/21: Cycle 5 carboplatin and paclitaxel.   pending.                Today she reports feeling well and is without concern.  She denies any vaginal bleeding, no changes in her bowel or bladder habits, no nausea/emesis, no lower extremity edema, and no difficulties eating or sleeping. She denies any abdominal discomfort/bloating, no fevers or chills, and no chest pain or shortness of breath. She had minimal nausea after her last cycle of chemo and was able to eat and drink without difficulty.  She did not have any bone pain after her last cycle and did not need to take any of the provided oxycodone.  She needs a refill on her amitriptyline and her gabapentin.                    Review of Systems     Constitutional:  Negative for fever, chills, weight loss, weight gain, fatigue, decreased appetite, night sweats, recent stressors, height gain, height loss, post-operative complications, incisional pain, hallucinations, increased energy, hyperactivity and confused.   HENT:  Negative for ear pain, hearing loss, tinnitus, nosebleeds, trouble swallowing, hoarse voice, mouth sores, sore throat, ear discharge, tooth pain, gum tenderness, taste disturbance, smell disturbance, hearing aid, bleeding gums, dry mouth, sinus pain, sinus congestion and neck mass.    Eyes:  Negative for double vision, pain, redness, eye pain, decreased vision, eye watering, eye bulging, eye dryness, flashing lights, spots, floaters, strabismus, tunnel vision, jaundice and eye irritation.   Respiratory:   Negative for cough, hemoptysis, sputum production, shortness of breath, wheezing, sleep disturbances due to breathing, snores loudly, respiratory pain, dyspnea on exertion, cough disturbing sleep and postural dyspnea.    Cardiovascular:  Negative for chest pain, dyspnea on exertion, palpitations, orthopnea, claudication, leg swelling,  fingers/toes turn blue, hypertension, hypotension, syncope, history of heart murmur, chest pain on exertion, chest pain at rest, pacemaker, few scattered varicosities, leg pain, sleep disturbances due to breathing, tachycardia, light-headedness, exercise intolerance and edema.   Gastrointestinal:  Negative for heartburn, nausea, vomiting, abdominal pain, diarrhea, constipation, blood in stool, melena, rectal pain, bloating, hemorrhoids, bowel incontinence, jaundice, rectal bleeding, coffee ground emesis and change in stool.   Genitourinary:  Negative for bladder incontinence, dysuria, urgency, hematuria, flank pain, vaginal discharge, difficulty urinating, genital sores, dyspareunia, decreased libido, nocturia, voiding less frequently, arousal difficulty, abnormal vaginal bleeding, excessive menstruation, menstrual changes, hot flashes, vaginal dryness and postmenopausal bleeding.   Musculoskeletal:  Negative for myalgias, back pain, joint swelling, arthralgias, stiffness, muscle cramps, neck pain, bone pain, muscle weakness and fracture.   Skin:  Negative for nail changes, itching, poor wound healing, rash, hair changes, skin changes, acne, warts, poor wound healing, scarring, flaky skin, Raynaud's phenomenon, sensitivity to sunlight and skin thickening.   Neurological:  Negative for dizziness, tingling, tremors, speech change, seizures, loss of consciousness, weakness, light-headedness, numbness, headaches, disturbances in coordination, extremity numbness, memory loss, difficulty walking and paralysis.   Endo/Heme:  Negative for anemia, swollen glands and bruises/bleeds easily.   Psychiatric/Behavioral:  Negative for depression, hallucinations, memory loss, decreased concentration, mood swings and panic attacks.    Breast:  Negative for breast discharge, breast mass, breast pain and nipple retraction.   Endocrine:  Negative for altered temperature regulation, polyphagia, polydipsia, unwanted hair growth and change  in facial hair.        Past Medical History:    Past Medical History:   Diagnosis Date     Atrial fibrillation with rapid ventricular response (H)      History of cold sores      Insomnia      Migraine      Osteopenia      Pelvic mass      Peritoneal carcinomatosis (H)      Restless legs syndrome (RLS)          Past Surgical History:    Past Surgical History:   Procedure Laterality Date     APPENDECTOMY       ARTHROSCPY KNEE SURGICAL DEBRIDEMENT SHAVING ARTICULAR CARTILAGE Right      BIOPSY  January 2021    Biopsy to confirm ovarian cancer     DEBRIDEMENT LEFT UPPER EXTREMITY  2016     HYSTERECTOMY TOTAL ABD, LUISITO SALPINGO-OOPHORECTOMY, NODE DISSECTION, TUMOR DEBULKING, COMBINED Bilateral 4/19/2021    Procedure: HYSTERECTOMY, TOTAL, ABDOMINAL, WITH BILATERAL SALPINGO-OOPHORECTOMY, omentectomy, NEOPLASM DEBULKING,Proctoscocy, RO, Resection of liver nodules, diaphragm stripping, immobilization of liver and colon;  Surgeon: Bolivar Juarez MD;  Location: [facility] OR     LAPAROSCOPY DIAGNOSTIC (GYN) Bilateral 1/7/2021    Procedure: Diagnsotic laparoscopy, biopsies;  Surgeon: Bolivar Juarez MD;  Location: [facility] OR     LASIK       TUBAL LIGATION           Health Maintenance Due   Topic Date Due     PREVENTIVE CARE VISIT  Never done     ADVANCE CARE PLANNING  Never done     Pneumococcal Vaccine: Pediatrics (0 to 5 Years) and At-Risk Patients (6 to 64 Years) (1 of 4 - PCV13) Never done     COLORECTAL CANCER SCREENING  Never done     COVID-19 Vaccine (1) Never done     HIV SCREENING  Never done     HEPATITIS C SCREENING  Never done     LIPID  Never done     ZOSTER IMMUNIZATION (1 of 2) Never done     MAMMO SCREENING  03/04/2021       Current Medications:     Current Outpatient Medications   Medication Sig Dispense Refill     acetaminophen (TYLENOL) 325 MG tablet Take 2 tablets (650 mg) by mouth every 6 hours as needed for mild pain 50 tablet 0     amitriptyline (ELAVIL) 100 MG tablet Take 50 mg by mouth At Bedtime         gabapentin (NEURONTIN) 600 MG tablet Take 1 tablet (600 mg) by mouth At Bedtime 60 tablet 0     hydrOXYzine (ATARAX) 25 MG tablet Take 1-2 tablets (25-50 mg) by mouth nightly as needed for anxiety (or insomnia) 60 tablet 5     loratadine (CLARITIN) 10 MG tablet Take 10 mg by mouth daily as needed (for bone pain related to neupogen)        oxyCODONE (ROXICODONE) 5 MG tablet Take 1 tablet (5 mg) by mouth every 12 hours 10 tablet 0     prochlorperazine (COMPAZINE) 10 MG tablet Take 1 tablet (10 mg) by mouth every 6 hours as needed for nausea or vomiting 30 tablet 0     SUMAtriptan (IMITREX) 100 MG tablet Take 100 mg by mouth at onset of headache        valACYclovir (VALTREX) 1000 mg tablet Take 1,000 mg by mouth as needed        enoxaparin ANTICOAGULANT (LOVENOX) 40 MG/0.4ML syringe Inject 0.4 mLs (40 mg) Subcutaneous every 24 hours 5.6 mL 0     ondansetron (ZOFRAN-ODT) 4 MG ODT tab Take 1 tablet (4 mg) by mouth every 6 hours as needed for nausea or vomiting (Patient not taking: Reported on 2021) 20 tablet 0     polyethylene glycol (MIRALAX) 17 GM/Dose powder Take 17 g by mouth 2 times daily (Patient not taking: Reported on 5/10/2021) 510 g 0     senna-docusate (SENOKOT-S/PERICOLACE) 8.6-50 MG tablet Take 2 tablets by mouth 2 times daily (Patient not taking: Reported on 5/10/2021) 30 tablet 0         Allergies:      No Known Allergies     Social History:     Social History     Tobacco Use     Smoking status: Former Smoker     Packs/day: 0.50     Years: 40.00     Pack years: 20.00     Quit date:      Years since quitting: 3.4     Smokeless tobacco: Never Used   Substance Use Topics     Alcohol use: Not Currently       History   Drug Use Unknown         Family History:     The patient's family history is notable for:    Family History   Adopted: Yes   Problem Relation Age of Onset     Cancer Mother 36     Other Cancer Mother         Bio mother  of  a female cancer  at 36     Factor V Leiden deficiency  Daughter      Deep Vein Thrombosis Daughter      Diabetes Type 1 Daughter      Diabetes Daughter      Hypertension Daughter      Anesthesia Reaction No family hx of          Physical Exam:     There were no vitals taken for this visit.  There is no height or weight on file to calculate BMI.      General Appearance: healthy and alert, no distress    Eyes:  Eyes grossly normal to inspection.  No discharge or erythema, or obvious scleral/conjunctival abnormalities.    Respiratory: No audible wheeze, cough, or visible cyanosis.  No visible retractions or increased work of breathing.     Musculoskeletal: extremities non tender and without edema    Skin: no lesions or rashes on visible skin    Neurological: normal gait, no gross defects     Psychiatric: appropriate mood and affect. Mentation appears normal, affect normal/bright, judgement and insight intact, normal speech and appearance well-groomed                            The rest of a comprehensive physical examination is deferred due to PHE (public health emergency) video visit restrictions.      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 She is currently undergoing treatment with adjuvant chemotherapy.  She is here today for follow up and disease management. In light of the recent COVID19 pandemic, and in accordance with our group and national guidelines this patients care has been reviewed and it is felt that presenting for her visit would be more likely to increase rather than decrease her relative risks. Therefore this visit was conducted by video.      20 minutes spent on the date of the encounter doing chart review, history and exam, documentation, and further activities as noted above.              Plan:     1.)        Ovarian serous carcinoma:  OK to proceed with cycle 5 of chemo tomorrow if labs are WDL.  RTC in 3 weeks for labs, virtual NP visit, and cycle 6.  This is already  scheduled.  Will send refill for gabapentin to her home pharmacy at her request as this was originally prescribed by the gyn onc team.  Encouraged her to reach out to her PCP to refill her amitriptyline as they are the ones who manage this.  Reviewed signs and symptoms for when she should contact the clinic or seek additional care.  Patient to contact the clinic with any questions or concerns in the interim.        Genetic risk factors were assessed and she is POSITIVE for a BRCA1 mutation.  This mutation is called [id number].        Labs and/or tests ordered include:  CBC. CMP. Mag. .                2.)        Health maintenance:  Issues addressed today include following up with PCP for annual health maintenance and non-gynecologic issues.      3.)        Chemotherapy induced nausea: Encouraged small frequent meals high in protein now that she is resuming chemotherapy.  Refill for compazine sent to her home pharmacy last week.     4.)         Pulmonary embolus:  Per Dr. Juarez ok transition back to Xarelto.  Continue Xarelto as prescribed.           Ella Murillo, OTILIO, APRN, FNP-C  Nurse Practitioner   Division of Gynecologic Oncology  Pager: [phone]     CC  Patient Care Team:  Bolivar Juarez MD as PCP - General (Gynecologic Oncology)  Marta Okeefe APRN CNP as Assigned PCP  Marta Lao APRN CNP as Assigned OBGYN Provider  Pepe Rdz MD as Assigned Heart and Vascular Provider  Holley Ramos PA-C as Assigned Surgical Provider  Brinda Lacey MD as Assigned Palliative Care Provider

## 2021-06-11 ENCOUNTER — INFUSION THERAPY VISIT (OUTPATIENT)
Dept: INFUSION THERAPY | Facility: CLINIC | Age: 66
End: 2021-06-11
Payer: COMMERCIAL

## 2021-06-11 VITALS
HEART RATE: 73 BPM | SYSTOLIC BLOOD PRESSURE: 101 MMHG | BODY MASS INDEX: 20.82 KG/M2 | TEMPERATURE: 97.9 F | WEIGHT: 153.5 LBS | DIASTOLIC BLOOD PRESSURE: 69 MMHG | RESPIRATION RATE: 18 BRPM | OXYGEN SATURATION: 98 %

## 2021-06-11 DIAGNOSIS — Z51.11 ENCOUNTER FOR ANTINEOPLASTIC CHEMOTHERAPY: ICD-10-CM

## 2021-06-11 DIAGNOSIS — Z51.11 ENCOUNTER FOR ANTINEOPLASTIC CHEMOTHERAPY: Primary | ICD-10-CM

## 2021-06-11 DIAGNOSIS — C56.9 OVARIAN CANCER, UNSPECIFIED LATERALITY (H): Primary | ICD-10-CM

## 2021-06-11 DIAGNOSIS — C56.9 OVARIAN CANCER, UNSPECIFIED LATERALITY (H): ICD-10-CM

## 2021-06-11 LAB
ALBUMIN SERPL-MCNC: 3.6 G/DL (ref 3.4–5)
ALP SERPL-CCNC: 164 U/L (ref 40–150)
ALT SERPL W P-5'-P-CCNC: 33 U/L (ref 0–50)
ANION GAP SERPL CALCULATED.3IONS-SCNC: 6 MMOL/L (ref 3–14)
AST SERPL W P-5'-P-CCNC: 29 U/L (ref 0–45)
BASOPHILS # BLD AUTO: 0 10E9/L (ref 0–0.2)
BASOPHILS NFR BLD AUTO: 1 %
BILIRUB SERPL-MCNC: 0.4 MG/DL (ref 0.2–1.3)
BUN SERPL-MCNC: 19 MG/DL (ref 7–30)
CALCIUM SERPL-MCNC: 8.6 MG/DL (ref 8.5–10.1)
CANCER AG125 SERPL-ACNC: 61 U/ML (ref 0–30)
CHLORIDE SERPL-SCNC: 108 MMOL/L (ref 94–109)
CO2 SERPL-SCNC: 26 MMOL/L (ref 20–32)
CREAT SERPL-MCNC: 0.81 MG/DL (ref 0.52–1.04)
DIFFERENTIAL METHOD BLD: ABNORMAL
EOSINOPHIL # BLD AUTO: 0 10E9/L (ref 0–0.7)
EOSINOPHIL NFR BLD AUTO: 1 %
ERYTHROCYTE [DISTWIDTH] IN BLOOD BY AUTOMATED COUNT: 17.1 % (ref 10–15)
GFR SERPL CREATININE-BSD FRML MDRD: 76 ML/MIN/{1.73_M2}
GLUCOSE SERPL-MCNC: 78 MG/DL (ref 70–99)
HCT VFR BLD AUTO: 35.5 % (ref 35–47)
HGB BLD-MCNC: 11.4 G/DL (ref 11.7–15.7)
LYMPHOCYTES # BLD AUTO: 1.3 10E9/L (ref 0.8–5.3)
LYMPHOCYTES NFR BLD AUTO: 42 %
MAGNESIUM SERPL-MCNC: 2.3 MG/DL (ref 1.6–2.3)
MCH RBC QN AUTO: 31.1 PG (ref 26.5–33)
MCHC RBC AUTO-ENTMCNC: 32.1 G/DL (ref 31.5–36.5)
MCV RBC AUTO: 97 FL (ref 78–100)
MONOCYTES # BLD AUTO: 0.4 10E9/L (ref 0–1.3)
MONOCYTES NFR BLD AUTO: 12 %
NEUTROPHILS # BLD AUTO: 1.5 10E9/L (ref 1.6–8.3)
NEUTROPHILS NFR BLD AUTO: 44 %
PLATELET # BLD AUTO: 112 10E9/L (ref 150–450)
POTASSIUM SERPL-SCNC: 4.1 MMOL/L (ref 3.4–5.3)
PROT SERPL-MCNC: 7.7 G/DL (ref 6.8–8.8)
RBC # BLD AUTO: 3.67 10E12/L (ref 3.8–5.2)
SODIUM SERPL-SCNC: 140 MMOL/L (ref 133–144)
WBC # BLD AUTO: 3.2 10E9/L (ref 4–11)

## 2021-06-11 PROCEDURE — 96415 CHEMO IV INFUSION ADDL HR: CPT | Performed by: INTERNAL MEDICINE

## 2021-06-11 PROCEDURE — 96413 CHEMO IV INFUSION 1 HR: CPT | Performed by: INTERNAL MEDICINE

## 2021-06-11 PROCEDURE — 96375 TX/PRO/DX INJ NEW DRUG ADDON: CPT | Performed by: INTERNAL MEDICINE

## 2021-06-11 PROCEDURE — 99207 PR NO CHARGE LOS: CPT

## 2021-06-11 PROCEDURE — 86304 IMMUNOASSAY TUMOR CA 125: CPT | Performed by: NURSE PRACTITIONER

## 2021-06-11 PROCEDURE — 36415 COLL VENOUS BLD VENIPUNCTURE: CPT | Performed by: NURSE PRACTITIONER

## 2021-06-11 PROCEDURE — 85025 COMPLETE CBC W/AUTO DIFF WBC: CPT | Performed by: NURSE PRACTITIONER

## 2021-06-11 PROCEDURE — 96417 CHEMO IV INFUS EACH ADDL SEQ: CPT | Performed by: INTERNAL MEDICINE

## 2021-06-11 PROCEDURE — S0028 INJECTION, FAMOTIDINE, 20 MG: HCPCS | Performed by: INTERNAL MEDICINE

## 2021-06-11 PROCEDURE — 83735 ASSAY OF MAGNESIUM: CPT | Performed by: NURSE PRACTITIONER

## 2021-06-11 PROCEDURE — 80053 COMPREHEN METABOLIC PANEL: CPT | Performed by: NURSE PRACTITIONER

## 2021-06-11 PROCEDURE — 96367 TX/PROPH/DG ADDL SEQ IV INF: CPT | Performed by: INTERNAL MEDICINE

## 2021-06-11 RX ORDER — EPINEPHRINE 1 MG/ML
0.3 INJECTION, SOLUTION INTRAMUSCULAR; SUBCUTANEOUS EVERY 5 MIN PRN
Status: CANCELLED | OUTPATIENT
Start: 2021-06-11

## 2021-06-11 RX ORDER — ALBUTEROL SULFATE 90 UG/1
1-2 AEROSOL, METERED RESPIRATORY (INHALATION)
Status: CANCELLED
Start: 2021-06-11

## 2021-06-11 RX ORDER — PALONOSETRON 0.05 MG/ML
0.25 INJECTION, SOLUTION INTRAVENOUS ONCE
Status: CANCELLED
Start: 2021-06-11

## 2021-06-11 RX ORDER — HEPARIN SODIUM,PORCINE 10 UNIT/ML
5 VIAL (ML) INTRAVENOUS
Status: CANCELLED | OUTPATIENT
Start: 2021-06-11

## 2021-06-11 RX ORDER — DIPHENHYDRAMINE HCL 25 MG
50 CAPSULE ORAL ONCE
Status: CANCELLED
Start: 2021-06-11

## 2021-06-11 RX ORDER — METHYLPREDNISOLONE SODIUM SUCCINATE 125 MG/2ML
125 INJECTION, POWDER, LYOPHILIZED, FOR SOLUTION INTRAMUSCULAR; INTRAVENOUS
Status: CANCELLED
Start: 2021-06-11

## 2021-06-11 RX ORDER — PALONOSETRON 0.05 MG/ML
0.25 INJECTION, SOLUTION INTRAVENOUS ONCE
Status: COMPLETED | OUTPATIENT
Start: 2021-06-11 | End: 2021-06-11

## 2021-06-11 RX ORDER — AMITRIPTYLINE HYDROCHLORIDE 100 MG/1
100 TABLET ORAL AT BEDTIME
Qty: 90 TABLET | Refills: 0 | Status: ON HOLD | OUTPATIENT
Start: 2021-06-11 | End: 2023-01-01

## 2021-06-11 RX ORDER — ALBUTEROL SULFATE 0.83 MG/ML
2.5 SOLUTION RESPIRATORY (INHALATION)
Status: CANCELLED | OUTPATIENT
Start: 2021-06-11

## 2021-06-11 RX ORDER — HEPARIN SODIUM (PORCINE) LOCK FLUSH IV SOLN 100 UNIT/ML 100 UNIT/ML
5 SOLUTION INTRAVENOUS
Status: CANCELLED | OUTPATIENT
Start: 2021-06-11

## 2021-06-11 RX ORDER — MEPERIDINE HYDROCHLORIDE 25 MG/ML
25 INJECTION INTRAMUSCULAR; INTRAVENOUS; SUBCUTANEOUS EVERY 30 MIN PRN
Status: CANCELLED | OUTPATIENT
Start: 2021-06-11

## 2021-06-11 RX ORDER — DIPHENHYDRAMINE HYDROCHLORIDE 50 MG/ML
50 INJECTION INTRAMUSCULAR; INTRAVENOUS
Status: CANCELLED
Start: 2021-06-11

## 2021-06-11 RX ORDER — SODIUM CHLORIDE 9 MG/ML
1000 INJECTION, SOLUTION INTRAVENOUS CONTINUOUS PRN
Status: CANCELLED
Start: 2021-06-11

## 2021-06-11 RX ORDER — NALOXONE HYDROCHLORIDE 0.4 MG/ML
.1-.4 INJECTION, SOLUTION INTRAMUSCULAR; INTRAVENOUS; SUBCUTANEOUS
Status: CANCELLED | OUTPATIENT
Start: 2021-06-11

## 2021-06-11 RX ORDER — LORAZEPAM 2 MG/ML
1 INJECTION INTRAMUSCULAR EVERY 6 HOURS PRN
Status: CANCELLED
Start: 2021-06-11

## 2021-06-11 RX ADMIN — PALONOSETRON 0.25 MG: 0.05 INJECTION, SOLUTION INTRAVENOUS at 11:14

## 2021-06-11 RX ADMIN — Medication 250 ML: at 11:11

## 2021-06-11 ASSESSMENT — PAIN SCALES - GENERAL: PAINLEVEL: NO PAIN (0)

## 2021-06-11 NOTE — PROGRESS NOTES
"Infusion Nursing Note:  Taran Regalado presents today for C5 of Taxol/Carboplatin   Patient seen by provider today: No - Video Visit on 06/10/21 with :Ella Murillo NP   present during visit today: Not Applicable.    Note: Pt states she is doing well today, denies any problems with her last chemotherapy, would like to hold benadryl as she took a Claritin tablet at 0930 and did not want Benadryl as she did well with her last infusion.  Will give Decadron/Aloxi and Pepcid today.  Pt instructed to call RN staff if she feels bad in any way with dose 5 of Carbo today.  Patient did meet criteria for an asymptomatic covid-19 PCR test in infusion today. Patient declined the covid-19 test. Pt states she is waiting to get her vaccine - \"    Intravenous Access:  Intravenous IV - Right FA 24 gauge    Treatment Conditions:    Magnesium  2.3 today    Lab Results   Component Value Date    HGB 11.4 06/11/2021     Lab Results   Component Value Date    WBC 3.2 06/11/2021      Lab Results   Component Value Date    ANEU 1.5 06/11/2021     Lab Results   Component Value Date     06/11/2021      Lab Results   Component Value Date     06/11/2021                   Lab Results   Component Value Date    POTASSIUM 4.1 06/11/2021           Lab Results   Component Value Date    MAG 2.3 06/11/2021            Lab Results   Component Value Date    CR 0.81 06/11/2021                   Lab Results   Component Value Date    HORACIO 8.6 06/11/2021                Lab Results   Component Value Date    BILITOTAL 0.4 06/11/2021           Lab Results   Component Value Date    ALBUMIN 3.6 06/11/2021                    Lab Results   Component Value Date    ALT 33 06/11/2021           Lab Results   Component Value Date    AST 29 06/11/2021       Hand off/Report given to Liz Mcfarland RN at 13:55 pm.  Crissy Hernandez RN    Patient tolerated infusion without incident.  Access discontinued per protocol, patient discharged in stable " condition.     Liz Mcfarland, RN-BSN, PHN, OCN  [facility]

## 2021-06-11 NOTE — PROGRESS NOTES
Post Infusion Assessment:  Patient tolerated infusion without incident.  Blood return noted post infusion.  Site patent and intact, free from redness, edema or discomfort.  No evidence of extravasations.  Access discontinued per protocol.       Discharge Plan:   AVS to patient via MYCHART.  Patient will return 7/2/21 for next appointment.   Patient discharged in stable condition accompanied by: self.  Departure Mode: Ambulatory.      Frances Quintana RN

## 2021-06-12 ENCOUNTER — INFUSION THERAPY VISIT (OUTPATIENT)
Dept: ONCOLOGY | Facility: CLINIC | Age: 66
End: 2021-06-12
Attending: OBSTETRICS & GYNECOLOGY
Payer: COMMERCIAL

## 2021-06-12 VITALS
RESPIRATION RATE: 16 BRPM | SYSTOLIC BLOOD PRESSURE: 109 MMHG | HEART RATE: 73 BPM | TEMPERATURE: 97.9 F | OXYGEN SATURATION: 98 % | DIASTOLIC BLOOD PRESSURE: 73 MMHG

## 2021-06-12 DIAGNOSIS — C56.9 OVARIAN CANCER, UNSPECIFIED LATERALITY (H): ICD-10-CM

## 2021-06-12 DIAGNOSIS — Z51.11 ENCOUNTER FOR ANTINEOPLASTIC CHEMOTHERAPY: Primary | ICD-10-CM

## 2021-06-12 PROCEDURE — 96372 THER/PROPH/DIAG INJ SC/IM: CPT | Performed by: OBSTETRICS & GYNECOLOGY

## 2021-06-12 PROCEDURE — 250N000011 HC RX IP 250 OP 636: Performed by: OBSTETRICS & GYNECOLOGY

## 2021-06-12 RX ADMIN — PEGFILGRASTIM 6 MG: 6 INJECTION SUBCUTANEOUS at 13:15

## 2021-06-12 ASSESSMENT — PAIN SCALES - GENERAL: PAINLEVEL: NO PAIN (0)

## 2021-06-12 NOTE — PROGRESS NOTES
Infusion Nursing Note:  Jose Juanamee Regalado presents today for Fulphila.    Patient seen by provider today: No   present during visit today: Not Applicable.    Note:     Pt no issues or concerns overnight; takes claritin for side effects.        Intravenous Access:  No Intravenous access/labs at this visit.    Treatment Conditions:  Not Applicable.      Post Infusion Assessment:  Patient tolerated injection without incident to Right Arm.       Discharge Plan:   Discharge instructions reviewed with: Patient.  AVS to patient via [employer].  Patient will return 7-3 for next appointment.       Taylor Lopez RN

## 2021-06-15 PROBLEM — H52.4 PRESBYOPIA: Status: ACTIVE | Noted: 2021-06-15

## 2021-06-15 PROBLEM — Z98.890 HX OF LASIK: Status: ACTIVE | Noted: 2017-12-11

## 2021-06-15 PROBLEM — I48.91 ATRIAL FIBRILLATION WITH RAPID VENTRICULAR RESPONSE (H): Chronic | Status: ACTIVE | Noted: 2021-02-03

## 2021-06-15 PROBLEM — C56.9 OVARIAN CANCER, UNSPECIFIED LATERALITY (H): Chronic | Status: ACTIVE | Noted: 2021-01-12

## 2021-06-15 PROBLEM — Z98.890 HX OF LASIK: Status: RESOLVED | Noted: 2017-12-11 | Resolved: 2021-06-15

## 2021-06-15 NOTE — PROGRESS NOTES
"Taran is a 65 year old who is being evaluated via a billable video visit.      How would you like to obtain your AVS? MyChart  If the video visit is dropped, the invitation should be resent by: Text to cell phone: [phone]  Will anyone else be joining your video visit? No    Vitals - Patient Reported  Weight (Patient Reported): 68 kg (150 lb)  Height (Patient Reported): 167.6 cm (5' 6\")  BMI (Based on Pt Reported Ht/Wt): 24.21  Pain Score: No Pain (0)    Video-Visit Details    Type of service:  Video Visit    Video Start Time: 9:40 am    Video End Time: 9:52 am    Originating Location (pt. 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 She is currently undergoing treatment with adjuvant chemotherapy.  She is here today for follow up and disease management. In light of the recent COVID19 pandemic, and in accordance with our group and national guidelines this patients care has been reviewed and it is felt that presenting for her visit would be more likely to increase rather than decrease her relative risks. Therefore this visit was conducted by video.        Oncology History:  12/3/2020: US Pelvic: IMPRESSION: Limited examination due to acoustic windows. Possible left adnexal mass. A CT scan of the abdomen and pelvis with contrast is recommended for further assessment.     2020: CT A/P:   IMPRESSION:    Peritoneal carcinomatosis with masslike peritoneal thickening in the lower pelvis which may indicate an adnexal or ovarian primary malignancy. Large volume ascites. Bilateral pleural " effusions. There is potential subtle pleural nodularity in the right hemithorax which could indicate metastatic disease.  Indeterminate 1 cm lesion in the right hepatic lobe suspicious for a metastatic lesion.      12/16/2020: Presented to [facility] with abdominal distention, 25lb weight loss, and CTAP with carcinomatosis, elevated  3098.     12/23/2020: CT Chest: IMPRESSION:   1. There are few scattered small sub-6 mm pulmonary nodules which are indeterminate without prior comparisons available. There are a few  slightly larger perifissural nodules which are technically  indeterminate in the setting of malignancy although presumed lymphatic in nature and of unlikely clinical significance. Attention on follow-up is recommended.  2. Small to moderate left and small right pleural effusions are increased in size from prior. No convincing evidence for pleural nodularity.  3. Partially visualized large volume ascites and peritoneal nodularity in the upper abdomen similar to 12/4/2020 outside CT      12/26/2020: ED for abdominal distension; 3 L ascites drained with paracentesis    Pelvic US: Findings: Free fluid present in LLQ      12/31/2020: US Paracentesis: 900 mL ascites drained     1/7/2021: Diagnotic laparoscopy, biopsies  Pathology: FINAL DIAGNOSIS:   A. PERITONEUM, BIOPSIES:   - Positive for high grade carcinoma, consistent with metastatic carcinoma of Mullerian origin.     1/10-1/13/2021: Hospital admission for postoperative non-intractable vomiting and nausea.      1/10/2021: CT A/P: IMPRESSION: Extensive ascites which is probably malignant. Scattered liver hypodensities of indeterminate etiology comment cannot exclude metastatic disease. Diverticulosis. Fluid-filled adnexal masses and irregular appearance of uterus, which may represent primary neoplasm. Multiple peritoneal nodules. Large amount of fecal material in the colon with no evidence of small bowel obstruction.     Plan: Paclitaxel 175 mg/m2 and  carboplatin AUC 6 x 3 cycles followed by a CT CAP and visit with Dr. Juarez.     1/12/2021: Cycle 1 paclitaxel and carboplatin while inpatient     1/13/2021: CT Head: Impression:  1. Chronic sinusitis of the right maxillary and right sphenoid sinuses.  2. Incidental presumed calcified meningioma in the right frontal  convexity without significant mass effect.  3. No suspicious intracranial enhancing lesion.     2/1/2021: Cycle 2 paclitaxel and carboplatin.  936.     2/3-2/5/21: Admission [facility] for afib w/ RVR and new PE     2/26/21: Cycle 3 paclitaxel and carboplatin planned.  Deferred due to thrombocytopenia.  Deferred 3/12/21 due to neutropenia.  Given on 3/15/21 after Filgrastim injection x 2.  Add Pegfilgrastim to day 2 of treatment plan.   129.      4/2/21: CT CAP  IMPRESSION:   In this patient with a history of metastatic serous ovarian cancer,  status post diagnostic laparoscopy and neoadjuvant chemotherapy:  1. Significant improvement in peritoneal carcinomatosis as discussed  above.  2. Resolution of previously seen scattered small hypoattenuating  lesions in the liver. This raises the concern for these lesions  representing metastatic disease, noting excellent response elsewhere  in abdomen and pelvis.  3. Bilateral pleural effusions have resolved.  4. Several small pulmonary nodules in the right lung measuring up to 5  mm. Indeterminate, but likely benign in the setting of their stability  with excellent response elsewhere. Continued attention on follow-up.     4/19/21: HYSTERECTOMY, TOTAL, ABDOMINAL, WITH BILATERAL SALPINGO-OOPHORECTOMY, omentectomy, NEOPLASM DEBULKING,Proctoscocy, RO, Resection of liver nodules, diaphragm stripping, immobilization of liver and colon  FINAL DIAGNOSIS:   A. OMENTUM, BIOPSY:   - Omental adipose tissue with rare viable cells of metastatic high grade   serous carcinoma   - One reactive lymph node, negative for malignancy (0/1)   B. NODULE, SIGMOID, EXCISION:    - Calcified necrotic adipose tissue   - Negative for malignancy   C. NODULE, SMALL BOWEL MESENTERY, EXCISION:   - Fibroadipose tissue, positive for metastatic high grade serous carcinoma   D. UTERUS, CERVIX, BILATERAL FALLOPIAN TUBES AND OVARIES, HYSTERECTOMY   WITH BILATERAL SALPINGO-OOPHORECTOMY:   - Atrophic endometrium   - Uterine serosa with rare viable cells consistent with high grade serous   carcinoma   - Cervix with atrophic changes   - Viable cells consistent with high grade serous carcinoma present in the   right ovary, serosa of right   fallopian tube and right periadnexal soft tissue   - Left ovary with atrophic changes   - Left fallopian tube with a rare focus of serous tubal in-situ carcinoma   (STIC)   E. NODULES, SMALL BOWEL MESENTRY, EXCISION:   - Fibroadipose tissue with rare viable cells of metastatic high grade   serous carcinoma   F. NODULE, SPLENIC FLEXURE TRANSVERSE COLON, EXCISION:   - Fibroadipose tissue with rare viable cells of metastatic high grade   serous carcinoma   - Accessory splenule, negative for malignancy   G. OMENTUM, OMENTECTOMY:   - Omental adipose tissue with rare viable cells of metastatic high grade   serous carcinoma   H. NODULE, PERITONEAL PANCREATIC, EXCISION:   - Fibrous adhesions with inflammation   - Negative for malignancy   I. RIGHT HEMIDIAPHRAGM PERITONEUM, EXCISION:   - Fibrous adhesions with inflammation   - Negative for malignancy   J. RIGHT LIVER SURFACE NODULE:   - Fibrous adhesions with benign inclusion glands   - Negative for malignancy   K. LEFT LOWER LIVER EDGE, BIOPSY:   - Cauterized hepatic parenchyma and capsule   - Negative for malignancy   L. NODULE, SMALL BOWEL MESENTERIC #3, EXCISION:   - Fibroadipose tissue with rare viable cells of metastatic high grade   serous carcinoma       Plan: Carboplatin AUC 6 + Taxol 175 mg/m2 x 3 cycles, then transition to Parp inhibitor for maintenance therapy given her BRCA1 germline mutation.      5/21/21: Cycle 4  carboplatin and paclitaxel.   172.  6/11/21: Cycle 5 carboplatin and paclitaxel.   61.  7/2/21: Cycle 6 carboplatin and paclitaxel.   pending.                  Today she comes to clinic feeling well overall and is without concern.  She felt more tired after her last cycle of chemo but denies nausea, vomiting, or appetite changes.  She does not need any refills on her antiemetics.  She would like a refill on her Imitrex, she reports usually getting a 90 day supply of this at a time.  She denies any vaginal bleeding, no changes in her bowel or bladder habits, no nausea/emesis, no lower extremity edema, and no difficulties eating or sleeping. She denies any abdominal discomfort/bloating, no fevers or chills, and no chest pain or shortness of breath.               Review of Systems     Constitutional:  Negative for fever, chills, weight loss, weight gain, fatigue, decreased appetite, night sweats, recent stressors, height gain, height loss, post-operative complications, incisional pain, hallucinations, increased energy, hyperactivity and confused.   HENT:  Negative for ear pain, hearing loss, tinnitus, nosebleeds, trouble swallowing, hoarse voice, mouth sores, sore throat, ear discharge, tooth pain, gum tenderness, taste disturbance, smell disturbance, hearing aid, bleeding gums, dry mouth, sinus pain, sinus congestion and neck mass.    Eyes:  Negative for double vision, pain, redness, eye pain, decreased vision, eye watering, eye bulging, eye dryness, flashing lights, spots, floaters, strabismus, tunnel vision, jaundice and eye irritation.   Respiratory:   Negative for cough, hemoptysis, sputum production, shortness of breath, wheezing, sleep disturbances due to breathing, snores loudly, respiratory pain, dyspnea on exertion, cough disturbing sleep and postural dyspnea.    Cardiovascular:  Negative for chest pain, dyspnea on exertion, palpitations, orthopnea, claudication, leg swelling, fingers/toes  turn blue, hypertension, hypotension, syncope, history of heart murmur, chest pain on exertion, chest pain at rest, pacemaker, few scattered varicosities, leg pain, sleep disturbances due to breathing, tachycardia, light-headedness, exercise intolerance and edema.   Gastrointestinal:  Negative for heartburn, nausea, vomiting, abdominal pain, diarrhea, constipation, blood in stool, melena, rectal pain, bloating, hemorrhoids, bowel incontinence, jaundice, rectal bleeding, coffee ground emesis and change in stool.   Genitourinary:  Negative for bladder incontinence, dysuria, urgency, hematuria, flank pain, vaginal discharge, difficulty urinating, genital sores, dyspareunia, decreased libido, nocturia, voiding less frequently, arousal difficulty, abnormal vaginal bleeding, excessive menstruation, menstrual changes, hot flashes, vaginal dryness and postmenopausal bleeding.   Musculoskeletal:  Negative for myalgias, back pain, joint swelling, arthralgias, stiffness, muscle cramps, neck pain, bone pain, muscle weakness and fracture.   Skin:  Negative for nail changes, itching, poor wound healing, rash, hair changes, skin changes, acne, warts, poor wound healing, scarring, flaky skin, Raynaud's phenomenon, sensitivity to sunlight and skin thickening.   Neurological:  Negative for dizziness, tingling, tremors, speech change, seizures, loss of consciousness, weakness, light-headedness, numbness, headaches, disturbances in coordination, extremity numbness, memory loss, difficulty walking and paralysis.   Endo/Heme:  Negative for anemia, swollen glands and bruises/bleeds easily.   Psychiatric/Behavioral:  Negative for depression, hallucinations, memory loss, decreased concentration, mood swings and panic attacks.    Breast:  Negative for breast discharge, breast mass, breast pain and nipple retraction.   Endocrine:  Negative for altered temperature regulation, polyphagia, polydipsia, unwanted hair growth and change in facial  hair.        Past Medical History:    Past Medical History:   Diagnosis Date     Atrial fibrillation with rapid ventricular response (H)      History of cold sores      Insomnia      Migraine      Osteopenia      Pelvic mass      Peritoneal carcinomatosis (H)      Restless legs syndrome (RLS)          Past Surgical History:    Past Surgical History:   Procedure Laterality Date     APPENDECTOMY       ARTHROSCPY KNEE SURGICAL DEBRIDEMENT SHAVING ARTICULAR CARTILAGE Right      BIOPSY  January 2021    Biopsy to confirm ovarian cancer     DEBRIDEMENT LEFT UPPER EXTREMITY  2016     HYSTERECTOMY TOTAL ABD, LUISITO SALPINGO-OOPHORECTOMY, NODE DISSECTION, TUMOR DEBULKING, COMBINED Bilateral 4/19/2021    Procedure: HYSTERECTOMY, TOTAL, ABDOMINAL, WITH BILATERAL SALPINGO-OOPHORECTOMY, omentectomy, NEOPLASM DEBULKING,Proctoscocy, RO, Resection of liver nodules, diaphragm stripping, immobilization of liver and colon;  Surgeon: Bolivar Juarez MD;  Location: [facility] OR     LAPAROSCOPY DIAGNOSTIC (GYN) Bilateral 1/7/2021    Procedure: Diagnsotic laparoscopy, biopsies;  Surgeon: Bolivar Juarez MD;  Location: [facility] OR     LASIK       TUBAL LIGATION           Health Maintenance Due   Topic Date Due     PREVENTIVE CARE VISIT  Never done     ADVANCE CARE PLANNING  Never done     Pneumococcal Vaccine: Pediatrics (0 to 5 Years) and At-Risk Patients (6 to 64 Years) (1 of 4 - PCV13) Never done     COLORECTAL CANCER SCREENING  Never done     COVID-19 Vaccine (1) Never done     HIV SCREENING  Never done     HEPATITIS C SCREENING  Never done     LIPID  Never done     ZOSTER IMMUNIZATION (1 of 2) Never done     MAMMO SCREENING  03/04/2021       Current Medications:     Current Outpatient Medications   Medication Sig Dispense Refill     acetaminophen (TYLENOL) 325 MG tablet Take 2 tablets (650 mg) by mouth every 6 hours as needed for mild pain 50 tablet 0     amitriptyline (ELAVIL) 100 MG tablet Take 1 tablet (100 mg) by mouth At Bedtime 90  tablet 0     gabapentin (NEURONTIN) 600 MG tablet Take 1 tablet (600 mg) by mouth At Bedtime 60 tablet 0     hydrOXYzine (ATARAX) 25 MG tablet Take 1-2 tablets (25-50 mg) by mouth nightly as needed for anxiety (or insomnia) 60 tablet 5     loratadine (CLARITIN) 10 MG tablet Take 10 mg by mouth daily as needed (for bone pain related to neupogen)        oxyCODONE (ROXICODONE) 5 MG tablet Take 1 tablet (5 mg) by mouth every 12 hours 10 tablet 0     prochlorperazine (COMPAZINE) 10 MG tablet Take 1 tablet (10 mg) by mouth every 6 hours as needed for nausea or vomiting 30 tablet 0     SUMAtriptan (IMITREX) 100 MG tablet Take 100 mg by mouth at onset of headache        valACYclovir (VALTREX) 1000 mg tablet Take 1,000 mg by mouth as needed            Allergies:      No Known Allergies     Social History:     Social History     Tobacco Use     Smoking status: Former Smoker     Packs/day: 0.50     Years: 40.00     Pack years: 20.00     Quit date:      Years since quitting: 3.4     Smokeless tobacco: Never Used   Substance Use Topics     Alcohol use: Not Currently       History   Drug Use Unknown         Family History:     The patient's family history is notable for:    Family History   Adopted: Yes   Problem Relation Age of Onset     Cancer Mother 36     Other Cancer Mother         Bio mother  of  a female cancer  at 36     Factor V Leiden deficiency Daughter      Deep Vein Thrombosis Daughter      Diabetes Type 1 Daughter      Diabetes Daughter      Hypertension Daughter      Anesthesia Reaction No family hx of          Physical Exam:     There were no vitals taken for this visit.  There is no height or weight on file to calculate BMI.    General Appearance: healthy and alert, no distress    Eyes:  Eyes grossly normal to inspection.  No discharge or erythema, or obvious scleral/conjunctival abnormalities.    Respiratory: No audible wheeze, cough, or visible cyanosis.  No visible retractions or increased work of  breathing.     Musculoskeletal: extremities non tender and without edema    Skin: no lesions or rashes on visible skin    Neurological: normal gait, no gross defects     Psychiatric: appropriate mood and affect. Mentation appears normal, affect normal/bright, judgement and insight intact, normal speech and appearance well-groomed                            The rest of a comprehensive physical examination is deferred due to PHE (public health emergency) video visit restrictions.    Lab Results   Component Value Date    WBC 2.8 07/02/2021     Lab Results   Component Value Date    RBC 3.19 07/02/2021     Lab Results   Component Value Date    HGB 10.0 07/02/2021     Lab Results   Component Value Date    HCT 30.6 07/02/2021     No components found for: MCT  Lab Results   Component Value Date    MCV 96 07/02/2021     Lab Results   Component Value Date    MCH 31.3 07/02/2021     Lab Results   Component Value Date    MCHC 32.7 07/02/2021     Lab Results   Component Value Date    RDW 19.0 07/02/2021     Lab Results   Component Value Date    PLT 50 07/02/2021     % Neutrophils   Date Value Ref Range Status   07/02/2021 27.0 % Final     % Lymphocytes   Date Value Ref Range Status   07/02/2021 62.0 % Final     % Monocytes   Date Value Ref Range Status   07/02/2021 10.0 % Final     % Eosinophils   Date Value Ref Range Status   07/02/2021 1.0 % Final     % Basophils   Date Value Ref Range Status   06/11/2021 1.0 % Final     % Immature Granulocytes   Date Value Ref Range Status   05/21/2021 0.0 % Final     Absolute Neutrophil   Date Value Ref Range Status   07/02/2021 0.8 (L) 1.6 - 8.3 10e9/L Final     Absolute Lymphocytes   Date Value Ref Range Status   07/02/2021 1.7 0.8 - 5.3 10e9/L Final     Absolute Monocytes   Date Value Ref Range Status   07/02/2021 0.3 0.0 - 1.3 10e9/L Final     Absolute Eosinophils   Date Value Ref Range Status   07/02/2021 0.0 0.0 - 0.7 10e9/L Final     Absolute Basophils   Date Value Ref Range Status    06/11/2021 0.0 0.0 - 0.2 10e9/L Final     Abs Immature Granulocytes   Date Value Ref Range Status   05/21/2021 0.0 0 - 0.4 10e9/L Final     Nucleated RBCs   Date Value Ref Range Status   04/27/2021 0 0 /100 Final     Absolute Nucleated RBC   Date Value Ref Range Status   04/27/2021 0.0  Final     Last Comprehensive Metabolic Panel:  Sodium   Date Value Ref Range Status   07/02/2021 140 133 - 144 mmol/L Final     Potassium   Date Value Ref Range Status   07/02/2021 4.3 3.4 - 5.3 mmol/L Final     Chloride   Date Value Ref Range Status   07/02/2021 106 94 - 109 mmol/L Final     Carbon Dioxide   Date Value Ref Range Status   07/02/2021 29 20 - 32 mmol/L Final     Anion Gap   Date Value Ref Range Status   07/02/2021 5 3 - 14 mmol/L Final     Glucose   Date Value Ref Range Status   07/02/2021 87 70 - 99 mg/dL Final     Urea Nitrogen   Date Value Ref Range Status   07/02/2021 14 7 - 30 mg/dL Final     Creatinine   Date Value Ref Range Status   07/02/2021 0.72 0.52 - 1.04 mg/dL Final     GFR Estimate   Date Value Ref Range Status   07/02/2021 88 >60 mL/min/[1.73_m2] Final     Comment:     Non  GFR Calc  Starting 12/18/2018, serum creatinine based estimated GFR (eGFR) will be   calculated using the Chronic Kidney Disease Epidemiology Collaboration   (CKD-EPI) equation.       Calcium   Date Value Ref Range Status   07/02/2021 8.8 8.5 - 10.1 mg/dL Final     Bilirubin Total   Date Value Ref Range Status   07/02/2021 0.2 0.2 - 1.3 mg/dL Final     Alkaline Phosphatase   Date Value Ref Range Status   07/02/2021 174 (H) 40 - 150 U/L Final     ALT   Date Value Ref Range Status   07/02/2021 33 0 - 50 U/L Final     AST   Date Value Ref Range Status   07/02/2021 18 0 - 45 U/L Final     Lab Results   Component Value Date    PROTTOTAL 7.7 07/02/2021     Lab Results   Component Value Date    ALBUMIN 3.6 07/02/2021     Magnesium   Date Value Ref Range Status   07/02/2021 2.0 1.6 - 2.3 mg/dL Final          Assessment:    Taran Regalado is a 65 year old woman with a diagnosis of 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 She is currently undergoing treatment with adjuvant chemotherapy.  She is here today for follow up and disease management. In light of the recent COVID19 pandemic, and in accordance with our group and national guidelines this patients care has been reviewed and it is felt that presenting for her visit would be more likely to increase rather than decrease her relative risks. Therefore this visit was conducted by video.      30 minutes spent on the date of the encounter doing chart review, history and exam, documentation, and further activities as noted above.              Plan:     1.)        Ovarian serous carcinoma:  Due to neutropenia and thrombocytopenia will hold chemo for 1 week.  OK to proceed with cycle 6 of chemo next week if labs are WDL.  RTC 3 weeks after cycle 6 for CT CAP, labs, and follow up with Dr. Juarez.  Reviewed signs and symptoms for when she should contact the clinic or seek additional care.  Patient to contact the clinic with any questions or concerns in the interim.        Genetic risk factors were assessed and she is POSITIVE for a BRCA1 mutation.  This mutation is called [id number].        Labs and/or tests ordered include:  CBC. CMP. Mag. .                2.)        Health maintenance:  Issues addressed today include following up with PCP for annual health maintenance and non-gynecologic issues.      3.)        Pulmonary embolus:  Per Dr. Juarez ok transition back to Xarelto.  Continue Xarelto as prescribed.    4.) Migraine:  Discussed that Imitrex should be renewed per her PCP as they are the provider who would normally order this.  Will send in a script for 15 tabs to get her through the next few weeks as she is staying locally.      Ella Murillo, DNP, APRN, FNP-C  Nurse Practitioner   Division of Gynecologic Oncology  Pager:  [phone]       Patient Care Team:  Bolivar Estrada MD as PCP - General (Gynecologic Oncology)  Bolivar Estrada MD as Assigned Cancer Care Provider  Marta Okeefe APRN CNP as Assigned PCP  Marta Lao APRN CNP as Assigned OBGYN Provider  Pepe Rdz MD as Assigned Heart and Vascular Provider  Holley Ramos PA-C as Assigned Surgical Provider  Brinda Lacey MD as Assigned Palliative Care Provider  BOLIVAR ESTRADA

## 2021-06-16 DIAGNOSIS — R42 DIZZINESS: Primary | ICD-10-CM

## 2021-06-16 DIAGNOSIS — R11.0 NAUSEA: ICD-10-CM

## 2021-06-16 RX ORDER — MECLIZINE HYDROCHLORIDE 25 MG/1
25 TABLET ORAL 3 TIMES DAILY PRN
Qty: 15 TABLET | Refills: 0 | Status: SHIPPED | OUTPATIENT
Start: 2021-06-16

## 2021-06-20 ENCOUNTER — HEALTH MAINTENANCE LETTER (OUTPATIENT)
Age: 66
End: 2021-06-20

## 2021-07-02 ENCOUNTER — INFUSION THERAPY VISIT (OUTPATIENT)
Dept: INFUSION THERAPY | Facility: CLINIC | Age: 66
End: 2021-07-02
Payer: COMMERCIAL

## 2021-07-02 ENCOUNTER — VIRTUAL VISIT (OUTPATIENT)
Dept: ONCOLOGY | Facility: CLINIC | Age: 66
End: 2021-07-02
Attending: NURSE PRACTITIONER
Payer: COMMERCIAL

## 2021-07-02 DIAGNOSIS — Z51.11 ENCOUNTER FOR ANTINEOPLASTIC CHEMOTHERAPY: ICD-10-CM

## 2021-07-02 DIAGNOSIS — G43.909 MIGRAINE WITHOUT STATUS MIGRAINOSUS, NOT INTRACTABLE, UNSPECIFIED MIGRAINE TYPE: ICD-10-CM

## 2021-07-02 DIAGNOSIS — C56.9 OVARIAN CANCER, UNSPECIFIED LATERALITY (H): Primary | ICD-10-CM

## 2021-07-02 LAB
ALBUMIN SERPL-MCNC: 3.6 G/DL (ref 3.4–5)
ALP SERPL-CCNC: 174 U/L (ref 40–150)
ALT SERPL W P-5'-P-CCNC: 33 U/L (ref 0–50)
ANION GAP SERPL CALCULATED.3IONS-SCNC: 5 MMOL/L (ref 3–14)
AST SERPL W P-5'-P-CCNC: 18 U/L (ref 0–45)
BILIRUB SERPL-MCNC: 0.2 MG/DL (ref 0.2–1.3)
BUN SERPL-MCNC: 14 MG/DL (ref 7–30)
CALCIUM SERPL-MCNC: 8.8 MG/DL (ref 8.5–10.1)
CANCER AG125 SERPL-ACNC: 20 U/ML (ref 0–30)
CHLORIDE SERPL-SCNC: 106 MMOL/L (ref 94–109)
CO2 SERPL-SCNC: 29 MMOL/L (ref 20–32)
CREAT SERPL-MCNC: 0.72 MG/DL (ref 0.52–1.04)
DIFFERENTIAL METHOD BLD: ABNORMAL
EOSINOPHIL # BLD AUTO: 0 10E9/L (ref 0–0.7)
EOSINOPHIL NFR BLD AUTO: 1 %
ERYTHROCYTE [DISTWIDTH] IN BLOOD BY AUTOMATED COUNT: 19 % (ref 10–15)
GFR SERPL CREATININE-BSD FRML MDRD: 88 ML/MIN/{1.73_M2}
GLUCOSE SERPL-MCNC: 87 MG/DL (ref 70–99)
HCT VFR BLD AUTO: 30.6 % (ref 35–47)
HGB BLD-MCNC: 10 G/DL (ref 11.7–15.7)
LYMPHOCYTES # BLD AUTO: 1.7 10E9/L (ref 0.8–5.3)
LYMPHOCYTES NFR BLD AUTO: 62 %
MAGNESIUM SERPL-MCNC: 2 MG/DL (ref 1.6–2.3)
MCH RBC QN AUTO: 31.3 PG (ref 26.5–33)
MCHC RBC AUTO-ENTMCNC: 32.7 G/DL (ref 31.5–36.5)
MCV RBC AUTO: 96 FL (ref 78–100)
MONOCYTES # BLD AUTO: 0.3 10E9/L (ref 0–1.3)
MONOCYTES NFR BLD AUTO: 10 %
NEUTROPHILS # BLD AUTO: 0.8 10E9/L (ref 1.6–8.3)
NEUTROPHILS NFR BLD AUTO: 27 %
PLATELET # BLD AUTO: 50 10E9/L (ref 150–450)
PLATELET # BLD EST: ABNORMAL 10*3/UL
POTASSIUM SERPL-SCNC: 4.3 MMOL/L (ref 3.4–5.3)
PROT SERPL-MCNC: 7.7 G/DL (ref 6.8–8.8)
RBC # BLD AUTO: 3.19 10E12/L (ref 3.8–5.2)
RBC MORPH BLD: NORMAL
SODIUM SERPL-SCNC: 140 MMOL/L (ref 133–144)
WBC # BLD AUTO: 2.8 10E9/L (ref 4–11)

## 2021-07-02 PROCEDURE — 99214 OFFICE O/P EST MOD 30 MIN: CPT | Mod: 95 | Performed by: NURSE PRACTITIONER

## 2021-07-02 PROCEDURE — 80053 COMPREHEN METABOLIC PANEL: CPT | Performed by: NURSE PRACTITIONER

## 2021-07-02 PROCEDURE — 83735 ASSAY OF MAGNESIUM: CPT | Performed by: NURSE PRACTITIONER

## 2021-07-02 PROCEDURE — 85025 COMPLETE CBC W/AUTO DIFF WBC: CPT | Performed by: NURSE PRACTITIONER

## 2021-07-02 PROCEDURE — 86304 IMMUNOASSAY TUMOR CA 125: CPT | Performed by: NURSE PRACTITIONER

## 2021-07-02 PROCEDURE — 999N001193 HC VIDEO/TELEPHONE VISIT; NO CHARGE

## 2021-07-02 PROCEDURE — 36415 COLL VENOUS BLD VENIPUNCTURE: CPT | Performed by: NURSE PRACTITIONER

## 2021-07-02 RX ORDER — SUMATRIPTAN 100 MG/1
100 TABLET, FILM COATED ORAL
Qty: 15 TABLET | Refills: 0 | Status: SHIPPED | OUTPATIENT
Start: 2021-07-02

## 2021-07-02 ASSESSMENT — ENCOUNTER SYMPTOMS
HALLUCINATIONS: 0
INCREASED ENERGY: 0
HYPERTENSION: 0
SINUS PAIN: 0
SYNCOPE: 0
NAUSEA: 0
ABDOMINAL PAIN: 0
EYE IRRITATION: 0
DECREASED CONCENTRATION: 0
POSTURAL DYSPNEA: 0
SINUS CONGESTION: 0
COUGH DISTURBING SLEEP: 0
NUMBNESS: 0
BLOOD IN STOOL: 0
HEARTBURN: 0
BRUISES/BLEEDS EASILY: 0
CONSTIPATION: 0
CHILLS: 0
TREMORS: 0
NECK MASS: 0
HYPOTENSION: 0
HEADACHES: 0
LOSS OF CONSCIOUSNESS: 0
ORTHOPNEA: 0
TACHYCARDIA: 0
PARALYSIS: 0
PALPITATIONS: 0
FEVER: 0
WEIGHT GAIN: 0
DOUBLE VISION: 0
LEG PAIN: 0
MYALGIAS: 0
MUSCLE WEAKNESS: 0
LEG SWELLING: 0
BREAST PAIN: 0
WEIGHT LOSS: 0
DECREASED APPETITE: 0
MEMORY LOSS: 0
ARTHRALGIAS: 0
SWOLLEN GLANDS: 0
EYE PAIN: 0
HOARSE VOICE: 0
SPUTUM PRODUCTION: 0
FATIGUE: 0
DIZZINESS: 0
POLYDIPSIA: 0
NECK PAIN: 0
JAUNDICE: 0
DYSPNEA ON EXERTION: 0
HOT FLASHES: 0
HEMOPTYSIS: 0
LIGHT-HEADEDNESS: 0
SEIZURES: 0
BLOATING: 0
SHORTNESS OF BREATH: 0
PANIC: 0
VOMITING: 0
EYE WATERING: 0
SKIN CHANGES: 0
DYSURIA: 0
NIGHT SWEATS: 0
JOINT SWELLING: 0
DEPRESSION: 0
DISTURBANCES IN COORDINATION: 0
EXERCISE INTOLERANCE: 0
POLYPHAGIA: 0
SORE THROAT: 0
TROUBLE SWALLOWING: 0
BREAST MASS: 0
POOR WOUND HEALING: 0
ALTERED TEMPERATURE REGULATION: 0
COUGH: 0
RESPIRATORY PAIN: 0
EYE REDNESS: 0
RECTAL PAIN: 0
INSOMNIA: 0
CLAUDICATION: 0
STIFFNESS: 0
RECTAL BLEEDING: 0
EXTREMITY NUMBNESS: 0
DECREASED LIBIDO: 0
WHEEZING: 0
FLANK PAIN: 0
WEAKNESS: 0
NERVOUS/ANXIOUS: 0
BACK PAIN: 0
TINGLING: 0
SNORES LOUDLY: 0
BOWEL INCONTINENCE: 0
SMELL DISTURBANCE: 0
HEMATURIA: 0
SLEEP DISTURBANCES DUE TO BREATHING: 0
DIFFICULTY URINATING: 0
DIARRHEA: 0
MUSCLE CRAMPS: 0
NAIL CHANGES: 0
SPEECH CHANGE: 0
TASTE DISTURBANCE: 0

## 2021-07-02 NOTE — LETTER
"    2021         RE: Taran Regalado  [address]  [place] [address]        Dear Colleague,    Thank you for referring your patient, Taran Regalado, to the [facility]. Please see a copy of my visit note below.    Taran is a 65 year old who is being evaluated via a billable video visit.      How would you like to obtain your AVS? MyChart  If the video visit is dropped, the invitation should be resent by: Text to cell phone: [phone]  Will anyone else be joining your video visit? No    Vitals - Patient Reported  Weight (Patient Reported): 68 kg (150 lb)  Height (Patient Reported): 167.6 cm (5' 6\")  BMI (Based on Pt Reported Ht/Wt): 24.21  Pain Score: No Pain (0)    Video-Visit Details    Type of service:  Video Visit    Video Start Time: 9:40 am    Video End Time: 9:52 am    Originating Location (pt. 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 She is currently undergoing treatment with adjuvant chemotherapy.  She is here today for follow up and disease management. In light of the recent COVID19 pandemic, and in accordance with our group and national guidelines this patients care has been reviewed and it is felt that presenting for her visit would be more likely to increase rather than decrease her relative risks. Therefore this visit was conducted by video.        Oncology History:  12/3/2020: US Pelvic: IMPRESSION: Limited examination due to acoustic windows. Possible left adnexal mass. A CT scan of the abdomen and pelvis with contrast is " recommended for further assessment.     12/4/2020: CT A/P:   IMPRESSION:    Peritoneal carcinomatosis with masslike peritoneal thickening in the lower pelvis which may indicate an adnexal or ovarian primary malignancy. Large volume ascites. Bilateral pleural effusions. There is potential subtle pleural nodularity in the right hemithorax which could indicate metastatic disease.  Indeterminate 1 cm lesion in the right hepatic lobe suspicious for a metastatic lesion.      [facility]16[facility]2020: [facility], 25lb weight loss, and CTAP with carcinomatosis, elevated  3098.     12/23/2020: CT Chest: IMPRESSION:   1. There are few scattered small sub-6 mm pulmonary nodules which are indeterminate without prior comparisons available. There are a few  slightly larger perifissural nodules which are technically  indeterminate in the setting of malignancy although presumed lymphatic in nature and of unlikely clinical significance. Attention on follow-up is recommended.  2. Small to moderate left and small right pleural effusions are increased in size from prior. No convincing evidence for pleural nodularity.  3. Partially visualized large volume ascites and peritoneal nodularity in the upper abdomen similar to 12/4/2020 outside CT      12/26/2020: ED for abdominal distension; 3 L ascites drained with paracentesis    Pelvic US: Findings: Free fluid present in LLQ      12/31/2020: US Paracentesis: 900 mL ascites drained     1/7/2021: Diagnotic laparoscopy, biopsies  Pathology: FINAL DIAGNOSIS:   A. PERITONEUM, BIOPSIES:   - Positive for high grade carcinoma, consistent with metastatic carcinoma of Mullerian origin.     1/10-1/13/2021: Hospital admission for postoperative non-intractable vomiting and nausea.      1/10/2021: CT A/P: IMPRESSION: Extensive ascites which is probably malignant. Scattered liver hypodensities of indeterminate etiology comment cannot exclude metastatic disease. Diverticulosis.  Fluid-filled adnexal masses and irregular appearance of uterus, which may represent primary neoplasm. Multiple peritoneal nodules. Large amount of fecal material in the colon with no evidence of small bowel obstruction.     Plan: Paclitaxel 175 mg/m2 and carboplatin AUC 6 x 3 cycles followed by a CT CAP and visit with Dr. Juarez.     1/12/2021: Cycle 1 paclitaxel and carboplatin while inpatient     1/13/2021: CT Head: Impression:  1. Chronic sinusitis of the right maxillary and right sphenoid sinuses.  2. Incidental presumed calcified meningioma in the right frontal  convexity without significant mass effect.  3. No suspicious intracranial enhancing lesion.     2/1/2021: Cycle 2 paclitaxel and carboplatin.  936.     2/3-2/5/21: Admission [facility] for afib w/ RVR and new PE     2/26/21: Cycle 3 paclitaxel and carboplatin planned.  Deferred due to thrombocytopenia.  Deferred 3/12/21 due to neutropenia.  Given on 3/15/21 after Filgrastim injection x 2.  Add Pegfilgrastim to day 2 of treatment plan.   129.      4/2/21: CT CAP  IMPRESSION:   In this patient with a history of metastatic serous ovarian cancer,  status post diagnostic laparoscopy and neoadjuvant chemotherapy:  1. Significant improvement in peritoneal carcinomatosis as discussed  above.  2. Resolution of previously seen scattered small hypoattenuating  lesions in the liver. This raises the concern for these lesions  representing metastatic disease, noting excellent response elsewhere  in abdomen and pelvis.  3. Bilateral pleural effusions have resolved.  4. Several small pulmonary nodules in the right lung measuring up to 5  mm. Indeterminate, but likely benign in the setting of their stability  with excellent response elsewhere. Continued attention on follow-up.     4/19/21: HYSTERECTOMY, TOTAL, ABDOMINAL, WITH BILATERAL SALPINGO-OOPHORECTOMY, omentectomy, NEOPLASM DEBULKING,Proctoscocy, RO, Resection of liver nodules, diaphragm stripping,  immobilization of liver and colon  FINAL DIAGNOSIS:   A. OMENTUM, BIOPSY:   - Omental adipose tissue with rare viable cells of metastatic high grade   serous carcinoma   - One reactive lymph node, negative for malignancy (0/1)   B. NODULE, SIGMOID, EXCISION:   - Calcified necrotic adipose tissue   - Negative for malignancy   C. NODULE, SMALL BOWEL MESENTERY, EXCISION:   - Fibroadipose tissue, positive for metastatic high grade serous carcinoma   D. UTERUS, CERVIX, BILATERAL FALLOPIAN TUBES AND OVARIES, HYSTERECTOMY   WITH BILATERAL SALPINGO-OOPHORECTOMY:   - Atrophic endometrium   - Uterine serosa with rare viable cells consistent with high grade serous   carcinoma   - Cervix with atrophic changes   - Viable cells consistent with high grade serous carcinoma present in the   right ovary, serosa of right   fallopian tube and right periadnexal soft tissue   - Left ovary with atrophic changes   - Left fallopian tube with a rare focus of serous tubal in-situ carcinoma   (STIC)   E. NODULES, SMALL BOWEL MESENTRY, EXCISION:   - Fibroadipose tissue with rare viable cells of metastatic high grade   serous carcinoma   F. NODULE, SPLENIC FLEXURE TRANSVERSE COLON, EXCISION:   - Fibroadipose tissue with rare viable cells of metastatic high grade   serous carcinoma   - Accessory splenule, negative for malignancy   G. OMENTUM, OMENTECTOMY:   - Omental adipose tissue with rare viable cells of metastatic high grade   serous carcinoma   H. NODULE, PERITONEAL PANCREATIC, EXCISION:   - Fibrous adhesions with inflammation   - Negative for malignancy   I. RIGHT HEMIDIAPHRAGM PERITONEUM, EXCISION:   - Fibrous adhesions with inflammation   - Negative for malignancy   J. RIGHT LIVER SURFACE NODULE:   - Fibrous adhesions with benign inclusion glands   - Negative for malignancy   K. LEFT LOWER LIVER EDGE, BIOPSY:   - Cauterized hepatic parenchyma and capsule   - Negative for malignancy   L. NODULE, SMALL BOWEL MESENTERIC #3, EXCISION:   -  Fibroadipose tissue with rare viable cells of metastatic high grade   serous carcinoma       Plan: Carboplatin AUC 6 + Taxol 175 mg/m2 x 3 cycles, then transition to Parp inhibitor for maintenance therapy given her BRCA1 germline mutation.      5/21/21: Cycle 4 carboplatin and paclitaxel.   172.  6/11/21: Cycle 5 carboplatin and paclitaxel.   61.  7/2/21: Cycle 6 carboplatin and paclitaxel.   pending.                  Today she comes to clinic feeling well overall and is without concern.  She felt more tired after her last cycle of chemo but denies nausea, vomiting, or appetite changes.  She does not need any refills on her antiemetics.  She would like a refill on her Imitrex, she reports usually getting a 90 day supply of this at a time.  She denies any vaginal bleeding, no changes in her bowel or bladder habits, no nausea/emesis, no lower extremity edema, and no difficulties eating or sleeping. She denies any abdominal discomfort/bloating, no fevers or chills, and no chest pain or shortness of breath.               Review of Systems     Constitutional:  Negative for fever, chills, weight loss, weight gain, fatigue, decreased appetite, night sweats, recent stressors, height gain, height loss, post-operative complications, incisional pain, hallucinations, increased energy, hyperactivity and confused.   HENT:  Negative for ear pain, hearing loss, tinnitus, nosebleeds, trouble swallowing, hoarse voice, mouth sores, sore throat, ear discharge, tooth pain, gum tenderness, taste disturbance, smell disturbance, hearing aid, bleeding gums, dry mouth, sinus pain, sinus congestion and neck mass.    Eyes:  Negative for double vision, pain, redness, eye pain, decreased vision, eye watering, eye bulging, eye dryness, flashing lights, spots, floaters, strabismus, tunnel vision, jaundice and eye irritation.   Respiratory:   Negative for cough, hemoptysis, sputum production, shortness of breath, wheezing, sleep  disturbances due to breathing, snores loudly, respiratory pain, dyspnea on exertion, cough disturbing sleep and postural dyspnea.    Cardiovascular:  Negative for chest pain, dyspnea on exertion, palpitations, orthopnea, claudication, leg swelling, fingers/toes turn blue, hypertension, hypotension, syncope, history of heart murmur, chest pain on exertion, chest pain at rest, pacemaker, few scattered varicosities, leg pain, sleep disturbances due to breathing, tachycardia, light-headedness, exercise intolerance and edema.   Gastrointestinal:  Negative for heartburn, nausea, vomiting, abdominal pain, diarrhea, constipation, blood in stool, melena, rectal pain, bloating, hemorrhoids, bowel incontinence, jaundice, rectal bleeding, coffee ground emesis and change in stool.   Genitourinary:  Negative for bladder incontinence, dysuria, urgency, hematuria, flank pain, vaginal discharge, difficulty urinating, genital sores, dyspareunia, decreased libido, nocturia, voiding less frequently, arousal difficulty, abnormal vaginal bleeding, excessive menstruation, menstrual changes, hot flashes, vaginal dryness and postmenopausal bleeding.   Musculoskeletal:  Negative for myalgias, back pain, joint swelling, arthralgias, stiffness, muscle cramps, neck pain, bone pain, muscle weakness and fracture.   Skin:  Negative for nail changes, itching, poor wound healing, rash, hair changes, skin changes, acne, warts, poor wound healing, scarring, flaky skin, Raynaud's phenomenon, sensitivity to sunlight and skin thickening.   Neurological:  Negative for dizziness, tingling, tremors, speech change, seizures, loss of consciousness, weakness, light-headedness, numbness, headaches, disturbances in coordination, extremity numbness, memory loss, difficulty walking and paralysis.   Endo/Heme:  Negative for anemia, swollen glands and bruises/bleeds easily.   Psychiatric/Behavioral:  Negative for depression, hallucinations, memory loss, decreased  concentration, mood swings and panic attacks.    Breast:  Negative for breast discharge, breast mass, breast pain and nipple retraction.   Endocrine:  Negative for altered temperature regulation, polyphagia, polydipsia, unwanted hair growth and change in facial hair.        Past Medical History:    Past Medical History:   Diagnosis Date     Atrial fibrillation with rapid ventricular response (H)      History of cold sores      Insomnia      Migraine      Osteopenia      Pelvic mass      Peritoneal carcinomatosis (H)      Restless legs syndrome (RLS)          Past Surgical History:    Past Surgical History:   Procedure Laterality Date     APPENDECTOMY       ARTHROSCPY KNEE SURGICAL DEBRIDEMENT SHAVING ARTICULAR CARTILAGE Right      BIOPSY  January 2021    Biopsy to confirm ovarian cancer     DEBRIDEMENT LEFT UPPER EXTREMITY  2016     HYSTERECTOMY TOTAL ABD, LUISITO SALPINGO-OOPHORECTOMY, NODE DISSECTION, TUMOR DEBULKING, COMBINED Bilateral 4/19/2021    Procedure: HYSTERECTOMY, TOTAL, ABDOMINAL, WITH BILATERAL SALPINGO-OOPHORECTOMY, omentectomy, NEOPLASM DEBULKING,Proctoscocy, RO, Resection of liver nodules, diaphragm stripping, immobilization of liver and colon;  Surgeon: Bolivar Juarez MD;  Location: [facility] OR     LAPAROSCOPY DIAGNOSTIC (GYN) Bilateral 1/7/2021    Procedure: Diagnsotic laparoscopy, biopsies;  Surgeon: Bolivar Juarez MD;  Location: [facility] OR     LASIK       TUBAL LIGATION           Health Maintenance Due   Topic Date Due     PREVENTIVE CARE VISIT  Never done     ADVANCE CARE PLANNING  Never done     Pneumococcal Vaccine: Pediatrics (0 to 5 Years) and At-Risk Patients (6 to 64 Years) (1 of 4 - PCV13) Never done     COLORECTAL CANCER SCREENING  Never done     COVID-19 Vaccine (1) Never done     HIV SCREENING  Never done     HEPATITIS C SCREENING  Never done     LIPID  Never done     ZOSTER IMMUNIZATION (1 of 2) Never done     MAMMO SCREENING  03/04/2021       Current Medications:     Current Outpatient  Medications   Medication Sig Dispense Refill     acetaminophen (TYLENOL) 325 MG tablet Take 2 tablets (650 mg) by mouth every 6 hours as needed for mild pain 50 tablet 0     amitriptyline (ELAVIL) 100 MG tablet Take 1 tablet (100 mg) by mouth At Bedtime 90 tablet 0     gabapentin (NEURONTIN) 600 MG tablet Take 1 tablet (600 mg) by mouth At Bedtime 60 tablet 0     hydrOXYzine (ATARAX) 25 MG tablet Take 1-2 tablets (25-50 mg) by mouth nightly as needed for anxiety (or insomnia) 60 tablet 5     loratadine (CLARITIN) 10 MG tablet Take 10 mg by mouth daily as needed (for bone pain related to neupogen)        oxyCODONE (ROXICODONE) 5 MG tablet Take 1 tablet (5 mg) by mouth every 12 hours 10 tablet 0     prochlorperazine (COMPAZINE) 10 MG tablet Take 1 tablet (10 mg) by mouth every 6 hours as needed for nausea or vomiting 30 tablet 0     SUMAtriptan (IMITREX) 100 MG tablet Take 100 mg by mouth at onset of headache        valACYclovir (VALTREX) 1000 mg tablet Take 1,000 mg by mouth as needed            Allergies:      No Known Allergies     Social History:     Social History     Tobacco Use     Smoking status: Former Smoker     Packs/day: 0.50     Years: 40.00     Pack years: 20.00     Quit date:      Years since quitting: 3.4     Smokeless tobacco: Never Used   Substance Use Topics     Alcohol use: Not Currently       History   Drug Use Unknown         Family History:     The patient's family history is notable for:    Family History   Adopted: Yes   Problem Relation Age of Onset     Cancer Mother 36     Other Cancer Mother         Bio mother  of  a female cancer  at 36     Factor V Leiden deficiency Daughter      Deep Vein Thrombosis Daughter      Diabetes Type 1 Daughter      Diabetes Daughter      Hypertension Daughter      Anesthesia Reaction No family hx of          Physical Exam:     There were no vitals taken for this visit.  There is no height or weight on file to calculate BMI.    General  Appearance: healthy and alert, no distress    Eyes:  Eyes grossly normal to inspection.  No discharge or erythema, or obvious scleral/conjunctival abnormalities.    Respiratory: No audible wheeze, cough, or visible cyanosis.  No visible retractions or increased work of breathing.     Musculoskeletal: extremities non tender and without edema    Skin: no lesions or rashes on visible skin    Neurological: normal gait, no gross defects     Psychiatric: appropriate mood and affect. Mentation appears normal, affect normal/bright, judgement and insight intact, normal speech and appearance well-groomed                            The rest of a comprehensive physical examination is deferred due to PHE (public health emergency) video visit restrictions.    Lab Results   Component Value Date    WBC 2.8 07/02/2021     Lab Results   Component Value Date    RBC 3.19 07/02/2021     Lab Results   Component Value Date    HGB 10.0 07/02/2021     Lab Results   Component Value Date    HCT 30.6 07/02/2021     No components found for: MCT  Lab Results   Component Value Date    MCV 96 07/02/2021     Lab Results   Component Value Date    MCH 31.3 07/02/2021     Lab Results   Component Value Date    MCHC 32.7 07/02/2021     Lab Results   Component Value Date    RDW 19.0 07/02/2021     Lab Results   Component Value Date    PLT 50 07/02/2021     % Neutrophils   Date Value Ref Range Status   07/02/2021 27.0 % Final     % Lymphocytes   Date Value Ref Range Status   07/02/2021 62.0 % Final     % Monocytes   Date Value Ref Range Status   07/02/2021 10.0 % Final     % Eosinophils   Date Value Ref Range Status   07/02/2021 1.0 % Final     % Basophils   Date Value Ref Range Status   06/11/2021 1.0 % Final     % Immature Granulocytes   Date Value Ref Range Status   05/21/2021 0.0 % Final     Absolute Neutrophil   Date Value Ref Range Status   07/02/2021 0.8 (L) 1.6 - 8.3 10e9/L Final     Absolute Lymphocytes   Date Value Ref Range Status   07/02/2021  1.7 0.8 - 5.3 10e9/L Final     Absolute Monocytes   Date Value Ref Range Status   07/02/2021 0.3 0.0 - 1.3 10e9/L Final     Absolute Eosinophils   Date Value Ref Range Status   07/02/2021 0.0 0.0 - 0.7 10e9/L Final     Absolute Basophils   Date Value Ref Range Status   06/11/2021 0.0 0.0 - 0.2 10e9/L Final     Abs Immature Granulocytes   Date Value Ref Range Status   05/21/2021 0.0 0 - 0.4 10e9/L Final     Nucleated RBCs   Date Value Ref Range Status   04/27/2021 0 0 /100 Final     Absolute Nucleated RBC   Date Value Ref Range Status   04/27/2021 0.0  Final     Last Comprehensive Metabolic Panel:  Sodium   Date Value Ref Range Status   07/02/2021 140 133 - 144 mmol/L Final     Potassium   Date Value Ref Range Status   07/02/2021 4.3 3.4 - 5.3 mmol/L Final     Chloride   Date Value Ref Range Status   07/02/2021 106 94 - 109 mmol/L Final     Carbon Dioxide   Date Value Ref Range Status   07/02/2021 29 20 - 32 mmol/L Final     Anion Gap   Date Value Ref Range Status   07/02/2021 5 3 - 14 mmol/L Final     Glucose   Date Value Ref Range Status   07/02/2021 87 70 - 99 mg/dL Final     Urea Nitrogen   Date Value Ref Range Status   07/02/2021 14 7 - 30 mg/dL Final     Creatinine   Date Value Ref Range Status   07/02/2021 0.72 0.52 - 1.04 mg/dL Final     GFR Estimate   Date Value Ref Range Status   07/02/2021 88 >60 mL/min/[1.73_m2] Final     Comment:     Non  GFR Calc  Starting 12/18/2018, serum creatinine based estimated GFR (eGFR) will be   calculated using the Chronic Kidney Disease Epidemiology Collaboration   (CKD-EPI) equation.       Calcium   Date Value Ref Range Status   07/02/2021 8.8 8.5 - 10.1 mg/dL Final     Bilirubin Total   Date Value Ref Range Status   07/02/2021 0.2 0.2 - 1.3 mg/dL Final     Alkaline Phosphatase   Date Value Ref Range Status   07/02/2021 174 (H) 40 - 150 U/L Final     ALT   Date Value Ref Range Status   07/02/2021 33 0 - 50 U/L Final     AST   Date Value Ref Range Status    07/02/2021 18 0 - 45 U/L Final     Lab Results   Component Value Date    PROTTOTAL 7.7 07/02/2021     Lab Results   Component Value Date    ALBUMIN 3.6 07/02/2021     Magnesium   Date Value Ref Range Status   07/02/2021 2.0 1.6 - 2.3 mg/dL Final         Assessment:    Taran Regalado is a 65 year old woman with a diagnosis of 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 She is currently undergoing treatment with adjuvant chemotherapy.  She is here today for follow up and disease management. In light of the recent COVID19 pandemic, and in accordance with our group and national guidelines this patients care has been reviewed and it is felt that presenting for her visit would be more likely to increase rather than decrease her relative risks. Therefore this visit was conducted by video.      30 minutes spent on the date of the encounter doing chart review, history and exam, documentation, and further activities as noted above.              Plan:     1.)        Ovarian serous carcinoma:  Due to neutropenia and thrombocytopenia will hold chemo for 1 week.  OK to proceed with cycle 6 of chemo next week if labs are WDL.  RTC 3 weeks after cycle 6 for CT CAP, labs, and follow up with Dr. Juarez.  Reviewed signs and symptoms for when she should contact the clinic or seek additional care.  Patient to contact the clinic with any questions or concerns in the interim.        Genetic risk factors were assessed and she is POSITIVE for a BRCA1 mutation.  This mutation is called [id number].        Labs and/or tests ordered include:  CBC. CMP. Mag. .                2.)        Health maintenance:  Issues addressed today include following up with PCP for annual health maintenance and non-gynecologic issues.      3.)        Pulmonary embolus:  Per Dr. Juarez ok transition back to Xarelto.  Continue Xarelto as prescribed.    4.) Migraine:  Discussed that Imitrex should be renewed per her PCP as they are the  provider who would normally order this.  Will send in a script for 15 tabs to get her through the next few weeks as she is staying locally.      Ella Murillo, OTILIO, APRN, FNP-C  Nurse Practitioner   Division of Gynecologic Oncology  Pager: [phone]     CC  Patient Care Team:  Bolivar Estrada MD as PCP - General (Gynecologic Oncology)  Bolivar Estrada MD as Assigned Cancer Care Provider  Marta Okeefe APRN CNP as Assigned PCP  Marta Lao APRN CNP as Assigned OBGYN Provider  Pepe Rdz MD as Assigned Heart and Vascular Provider  Holley Ramos PA-C as Assigned Surgical Provider  Brinda Lacey MD as Assigned Palliative Care Provider  BOLIVAR ESTRADA        Again, thank you for allowing me to participate in the care of your patient.        Sincerely,        JERICHO Don CNP

## 2021-07-07 RX ORDER — SUMATRIPTAN 100 MG/1
100 TABLET, FILM COATED ORAL
Qty: 15 TABLET | Refills: 0 | Status: CANCELLED | OUTPATIENT
Start: 2021-07-07

## 2021-07-09 ENCOUNTER — INFUSION THERAPY VISIT (OUTPATIENT)
Dept: INFUSION THERAPY | Facility: CLINIC | Age: 66
End: 2021-07-09
Payer: COMMERCIAL

## 2021-07-09 VITALS
HEART RATE: 70 BPM | SYSTOLIC BLOOD PRESSURE: 105 MMHG | BODY MASS INDEX: 21.16 KG/M2 | OXYGEN SATURATION: 100 % | TEMPERATURE: 97.9 F | WEIGHT: 156 LBS | DIASTOLIC BLOOD PRESSURE: 61 MMHG | RESPIRATION RATE: 16 BRPM

## 2021-07-09 DIAGNOSIS — G89.3 CANCER ASSOCIATED PAIN: ICD-10-CM

## 2021-07-09 DIAGNOSIS — T45.1X5A CHEMOTHERAPY-INDUCED NEUTROPENIA (H): ICD-10-CM

## 2021-07-09 DIAGNOSIS — G43.909 MIGRAINE WITHOUT STATUS MIGRAINOSUS, NOT INTRACTABLE, UNSPECIFIED MIGRAINE TYPE: Primary | ICD-10-CM

## 2021-07-09 DIAGNOSIS — C56.9 OVARIAN CANCER, UNSPECIFIED LATERALITY (H): ICD-10-CM

## 2021-07-09 DIAGNOSIS — I48.91 ATRIAL FIBRILLATION WITH RAPID VENTRICULAR RESPONSE (H): ICD-10-CM

## 2021-07-09 DIAGNOSIS — R11.0 CHEMOTHERAPY-INDUCED NAUSEA: ICD-10-CM

## 2021-07-09 DIAGNOSIS — T45.1X5A CHEMOTHERAPY-INDUCED NAUSEA: ICD-10-CM

## 2021-07-09 DIAGNOSIS — Z51.11 ENCOUNTER FOR ANTINEOPLASTIC CHEMOTHERAPY: Primary | ICD-10-CM

## 2021-07-09 DIAGNOSIS — I48.0 PAF (PAROXYSMAL ATRIAL FIBRILLATION) (H): ICD-10-CM

## 2021-07-09 DIAGNOSIS — D70.1 CHEMOTHERAPY-INDUCED NEUTROPENIA (H): ICD-10-CM

## 2021-07-09 LAB
ALBUMIN SERPL-MCNC: 3.7 G/DL (ref 3.4–5)
ALP SERPL-CCNC: 168 U/L (ref 40–150)
ALT SERPL W P-5'-P-CCNC: 26 U/L (ref 0–50)
ANION GAP SERPL CALCULATED.3IONS-SCNC: 4 MMOL/L (ref 3–14)
AST SERPL W P-5'-P-CCNC: 20 U/L (ref 0–45)
BILIRUB SERPL-MCNC: 0.4 MG/DL (ref 0.2–1.3)
BUN SERPL-MCNC: 13 MG/DL (ref 7–30)
CALCIUM SERPL-MCNC: 8.8 MG/DL (ref 8.5–10.1)
CHLORIDE SERPL-SCNC: 107 MMOL/L (ref 94–109)
CO2 SERPL-SCNC: 29 MMOL/L (ref 20–32)
CREAT SERPL-MCNC: 0.86 MG/DL (ref 0.52–1.04)
DIFFERENTIAL METHOD BLD: ABNORMAL
EOSINOPHIL # BLD AUTO: 0 10E9/L (ref 0–0.7)
EOSINOPHIL NFR BLD AUTO: 1 %
ERYTHROCYTE [DISTWIDTH] IN BLOOD BY AUTOMATED COUNT: 20.9 % (ref 10–15)
GFR SERPL CREATININE-BSD FRML MDRD: 71 ML/MIN/{1.73_M2}
GLUCOSE SERPL-MCNC: 94 MG/DL (ref 70–99)
HCT VFR BLD AUTO: 32.3 % (ref 35–47)
HGB BLD-MCNC: 10.4 G/DL (ref 11.7–15.7)
LYMPHOCYTES # BLD AUTO: 1.4 10E9/L (ref 0.8–5.3)
LYMPHOCYTES NFR BLD AUTO: 49 %
MAGNESIUM SERPL-MCNC: 2.2 MG/DL (ref 1.6–2.3)
MCH RBC QN AUTO: 31.1 PG (ref 26.5–33)
MCHC RBC AUTO-ENTMCNC: 32.2 G/DL (ref 31.5–36.5)
MCV RBC AUTO: 97 FL (ref 78–100)
MONOCYTES # BLD AUTO: 0.4 10E9/L (ref 0–1.3)
MONOCYTES NFR BLD AUTO: 16 %
NEUTROPHILS # BLD AUTO: 0.9 10E9/L (ref 1.6–8.3)
NEUTROPHILS NFR BLD AUTO: 34 %
PLATELET # BLD AUTO: 181 10E9/L (ref 150–450)
PLATELET # BLD EST: ABNORMAL 10*3/UL
POTASSIUM SERPL-SCNC: 4.1 MMOL/L (ref 3.4–5.3)
PROT SERPL-MCNC: 7.6 G/DL (ref 6.8–8.8)
RBC # BLD AUTO: 3.34 10E12/L (ref 3.8–5.2)
RBC MORPH BLD: NORMAL
SODIUM SERPL-SCNC: 140 MMOL/L (ref 133–144)
WBC # BLD AUTO: 2.7 10E9/L (ref 4–11)

## 2021-07-09 PROCEDURE — 99207 PR NO CHARGE LOS: CPT

## 2021-07-09 PROCEDURE — 96372 THER/PROPH/DIAG INJ SC/IM: CPT | Performed by: INTERNAL MEDICINE

## 2021-07-09 PROCEDURE — 80053 COMPREHEN METABOLIC PANEL: CPT | Performed by: NURSE PRACTITIONER

## 2021-07-09 PROCEDURE — 83735 ASSAY OF MAGNESIUM: CPT | Performed by: NURSE PRACTITIONER

## 2021-07-09 PROCEDURE — 85025 COMPLETE CBC W/AUTO DIFF WBC: CPT | Performed by: NURSE PRACTITIONER

## 2021-07-09 PROCEDURE — 36415 COLL VENOUS BLD VENIPUNCTURE: CPT | Performed by: NURSE PRACTITIONER

## 2021-07-09 RX ORDER — PROCHLORPERAZINE MALEATE 10 MG
10 TABLET ORAL EVERY 6 HOURS PRN
Qty: 30 TABLET | Refills: 0 | Status: SHIPPED | OUTPATIENT
Start: 2021-07-09 | End: 2021-08-20

## 2021-07-09 RX ORDER — OXYCODONE HYDROCHLORIDE 5 MG/1
5 TABLET ORAL EVERY 12 HOURS
Qty: 10 TABLET | Refills: 0 | Status: ON HOLD | OUTPATIENT
Start: 2021-07-09 | End: 2023-01-01

## 2021-07-09 ASSESSMENT — PAIN SCALES - GENERAL: PAINLEVEL: NO PAIN (0)

## 2021-07-12 ENCOUNTER — ALLIED HEALTH/NURSE VISIT (OUTPATIENT)
Dept: INFUSION THERAPY | Facility: CLINIC | Age: 66
End: 2021-07-12
Payer: COMMERCIAL

## 2021-07-12 VITALS
WEIGHT: 157 LBS | DIASTOLIC BLOOD PRESSURE: 67 MMHG | HEART RATE: 73 BPM | TEMPERATURE: 97.5 F | SYSTOLIC BLOOD PRESSURE: 97 MMHG | RESPIRATION RATE: 14 BRPM | OXYGEN SATURATION: 98 % | BODY MASS INDEX: 21.29 KG/M2

## 2021-07-12 DIAGNOSIS — Z51.11 ENCOUNTER FOR ANTINEOPLASTIC CHEMOTHERAPY: Primary | ICD-10-CM

## 2021-07-12 DIAGNOSIS — T45.1X5A CHEMOTHERAPY-INDUCED NEUTROPENIA (H): ICD-10-CM

## 2021-07-12 DIAGNOSIS — C56.9 OVARIAN CANCER, UNSPECIFIED LATERALITY (H): ICD-10-CM

## 2021-07-12 DIAGNOSIS — D70.1 CHEMOTHERAPY-INDUCED NEUTROPENIA (H): ICD-10-CM

## 2021-07-12 PROCEDURE — 99207 PR NO CHARGE LOS: CPT

## 2021-07-12 PROCEDURE — 96372 THER/PROPH/DIAG INJ SC/IM: CPT | Performed by: NURSE PRACTITIONER

## 2021-07-12 NOTE — PROGRESS NOTES
Infusion Nursing Note:  Defanny Regalado presents today for Zarxio.    Patient seen by provider today: No   present during visit today: Not Applicable.    Note: N/A.   Patient declined the covid-19 test.    Intravenous Access:  No Intravenous access/labs at this visit.    Treatment Conditions:  Not Applicable.      Post Infusion Assessment:  Patient tolerated injection without incident.       Discharge Plan:   Patient discharged in stable condition accompanied by: self.  Departure Mode: Ambulatory.      Cherrie Simon RN

## 2021-07-19 ENCOUNTER — LAB (OUTPATIENT)
Dept: LAB | Facility: CLINIC | Age: 66
End: 2021-07-19
Payer: COMMERCIAL

## 2021-07-19 ENCOUNTER — INFUSION THERAPY VISIT (OUTPATIENT)
Dept: INFUSION THERAPY | Facility: CLINIC | Age: 66
End: 2021-07-19
Payer: COMMERCIAL

## 2021-07-19 VITALS
WEIGHT: 155.4 LBS | RESPIRATION RATE: 16 BRPM | HEART RATE: 61 BPM | OXYGEN SATURATION: 98 % | SYSTOLIC BLOOD PRESSURE: 112 MMHG | DIASTOLIC BLOOD PRESSURE: 72 MMHG | BODY MASS INDEX: 21.08 KG/M2 | TEMPERATURE: 97.7 F

## 2021-07-19 DIAGNOSIS — C56.9 OVARIAN CANCER, UNSPECIFIED LATERALITY (H): ICD-10-CM

## 2021-07-19 DIAGNOSIS — Z51.11 ENCOUNTER FOR ANTINEOPLASTIC CHEMOTHERAPY: Primary | ICD-10-CM

## 2021-07-19 DIAGNOSIS — C56.9 OVARIAN CANCER, UNSPECIFIED LATERALITY (H): Primary | ICD-10-CM

## 2021-07-19 LAB
ALBUMIN SERPL-MCNC: 3.6 G/DL (ref 3.4–5)
ALP SERPL-CCNC: 174 U/L (ref 40–150)
ALT SERPL W P-5'-P-CCNC: 36 U/L (ref 0–50)
ANION GAP SERPL CALCULATED.3IONS-SCNC: 3 MMOL/L (ref 3–14)
AST SERPL W P-5'-P-CCNC: 27 U/L (ref 0–45)
BASOPHILS # BLD AUTO: 0 10E3/UL (ref 0–0.2)
BASOPHILS NFR BLD AUTO: 1 %
BILIRUB SERPL-MCNC: 0.2 MG/DL (ref 0.2–1.3)
BUN SERPL-MCNC: 14 MG/DL (ref 7–30)
CALCIUM SERPL-MCNC: 9.2 MG/DL (ref 8.5–10.1)
CHLORIDE BLD-SCNC: 107 MMOL/L (ref 94–109)
CO2 SERPL-SCNC: 29 MMOL/L (ref 20–32)
CREAT SERPL-MCNC: 0.88 MG/DL (ref 0.52–1.04)
EOSINOPHIL # BLD AUTO: 0.1 10E3/UL (ref 0–0.7)
EOSINOPHIL NFR BLD AUTO: 2 %
ERYTHROCYTE [DISTWIDTH] IN BLOOD BY AUTOMATED COUNT: 21.6 % (ref 10–15)
GFR SERPL CREATININE-BSD FRML MDRD: 70 ML/MIN/1.73M2
GLUCOSE BLD-MCNC: 115 MG/DL (ref 70–99)
HCT VFR BLD AUTO: 33.8 % (ref 35–47)
HGB BLD-MCNC: 10.9 G/DL (ref 11.7–15.7)
HOLD SPECIMEN: NORMAL
IMM GRANULOCYTES # BLD: 0 10E3/UL
IMM GRANULOCYTES NFR BLD: 0 %
LYMPHOCYTES # BLD AUTO: 1.6 10E3/UL (ref 0.8–5.3)
LYMPHOCYTES NFR BLD AUTO: 54 %
MAGNESIUM SERPL-MCNC: 2.3 MG/DL (ref 1.6–2.3)
MCH RBC QN AUTO: 32 PG (ref 26.5–33)
MCHC RBC AUTO-ENTMCNC: 32.2 G/DL (ref 31.5–36.5)
MCV RBC AUTO: 99 FL (ref 78–100)
MONOCYTES # BLD AUTO: 0.4 10E3/UL (ref 0–1.3)
MONOCYTES NFR BLD AUTO: 12 %
NEUTROPHILS # BLD AUTO: 0.9 10E3/UL (ref 1.6–8.3)
NEUTROPHILS NFR BLD AUTO: 31 %
NRBC # BLD AUTO: 0 10E3/UL
NRBC BLD AUTO-RTO: 0 /100
PLAT MORPH BLD: NORMAL
PLATELET # BLD AUTO: 232 10E3/UL (ref 150–450)
POTASSIUM BLD-SCNC: 4.6 MMOL/L (ref 3.4–5.3)
PROT SERPL-MCNC: 7.6 G/DL (ref 6.8–8.8)
RBC # BLD AUTO: 3.41 10E6/UL (ref 3.8–5.2)
RBC MORPH BLD: NORMAL
SODIUM SERPL-SCNC: 139 MMOL/L (ref 133–144)
WBC # BLD AUTO: 3 10E3/UL (ref 4–11)

## 2021-07-19 PROCEDURE — 80053 COMPREHEN METABOLIC PANEL: CPT | Performed by: NURSE PRACTITIONER

## 2021-07-19 PROCEDURE — 83735 ASSAY OF MAGNESIUM: CPT | Performed by: NURSE PRACTITIONER

## 2021-07-19 PROCEDURE — 99207 PR NO CHARGE LOS: CPT

## 2021-07-19 PROCEDURE — 85025 COMPLETE CBC W/AUTO DIFF WBC: CPT | Performed by: NURSE PRACTITIONER

## 2021-07-19 PROCEDURE — 36415 COLL VENOUS BLD VENIPUNCTURE: CPT | Performed by: NURSE PRACTITIONER

## 2021-07-19 ASSESSMENT — PAIN SCALES - GENERAL: PAINLEVEL: NO PAIN (0)

## 2021-07-19 NOTE — PROGRESS NOTES
Infusion Nursing Note:  Taran Regalado presents today for Delayed C6 Taxol/Carbo-NOT GIVEN TODAY    Patient seen by provider today: No   present during visit today: Not Applicable.    Note: Spoke with Ella Murillo NP today regarding ANC of 0.9.   Per Ella and Dr. Juarez, delay treatment 1 week, no zarixo/neupogen as it did not seem to help previously. Patient & daughter Edie verbalized understanding. Scheduling to call patient with her times for next week.       Intravenous Access:  No Intravenous access today, not treatment given.    Treatment Conditions:  Lab Results   Component Value Date    HGB 10.9 07/19/2021    HGB 10.4 07/09/2021     Lab Results   Component Value Date    WBC 3.0 07/19/2021    WBC 2.7 07/09/2021      Lab Results   Component Value Date    ANEU 0.9 07/09/2021     Lab Results   Component Value Date     07/19/2021     07/09/2021      Lab Results   Component Value Date     07/19/2021     07/09/2021                   Lab Results   Component Value Date    POTASSIUM 4.6 07/19/2021    POTASSIUM 4.1 07/09/2021           Lab Results   Component Value Date    MAG 2.3 07/19/2021    MAG 2.2 07/09/2021            Lab Results   Component Value Date    CR 0.88 07/19/2021    CR 0.86 07/09/2021                   Lab Results   Component Value Date    HORACIO 9.2 07/19/2021    HORACIO 8.8 07/09/2021                Lab Results   Component Value Date    BILITOTAL 0.2 07/19/2021    BILITOTAL 0.4 07/09/2021           Lab Results   Component Value Date    ALBUMIN 3.6 07/19/2021    ALBUMIN 3.7 07/09/2021                    Lab Results   Component Value Date    ALT 36 07/19/2021    ALT 26 07/09/2021           Lab Results   Component Value Date    AST 27 07/19/2021    AST 20 07/09/2021       Results reviewed, labs did NOT meet treatment parameters: ANC 0.9.      Post Infusion Assessment:  N/A.       Discharge Plan:   Discharge instructions reviewed with: Patient.  Patient and/or  family verbalized understanding of discharge instructions and all questions answered.  Patient discharged in stable condition accompanied by: daughter.  Departure Mode: Ambulatory.      Jaelyn Daniel RN

## 2021-07-21 DIAGNOSIS — C56.9 OVARIAN CANCER, UNSPECIFIED LATERALITY (H): Primary | ICD-10-CM

## 2021-07-23 ENCOUNTER — LAB (OUTPATIENT)
Dept: LAB | Facility: CLINIC | Age: 66
End: 2021-07-23
Payer: COMMERCIAL

## 2021-07-23 ENCOUNTER — INFUSION THERAPY VISIT (OUTPATIENT)
Dept: INFUSION THERAPY | Facility: CLINIC | Age: 66
End: 2021-07-23
Payer: COMMERCIAL

## 2021-07-23 VITALS
BODY MASS INDEX: 21.29 KG/M2 | SYSTOLIC BLOOD PRESSURE: 95 MMHG | TEMPERATURE: 97.9 F | RESPIRATION RATE: 16 BRPM | OXYGEN SATURATION: 99 % | WEIGHT: 157 LBS | HEART RATE: 66 BPM | DIASTOLIC BLOOD PRESSURE: 62 MMHG

## 2021-07-23 DIAGNOSIS — Z51.11 ENCOUNTER FOR ANTINEOPLASTIC CHEMOTHERAPY: Primary | ICD-10-CM

## 2021-07-23 DIAGNOSIS — C56.9 OVARIAN CANCER, UNSPECIFIED LATERALITY (H): ICD-10-CM

## 2021-07-23 DIAGNOSIS — C56.9 OVARIAN CANCER, UNSPECIFIED LATERALITY (H): Primary | ICD-10-CM

## 2021-07-23 DIAGNOSIS — Z51.11 ENCOUNTER FOR ANTINEOPLASTIC CHEMOTHERAPY: ICD-10-CM

## 2021-07-23 LAB
ALBUMIN SERPL-MCNC: 3.5 G/DL (ref 3.4–5)
ALP SERPL-CCNC: 156 U/L (ref 40–150)
ALT SERPL W P-5'-P-CCNC: 32 U/L (ref 0–50)
ANION GAP SERPL CALCULATED.3IONS-SCNC: 1 MMOL/L (ref 3–14)
AST SERPL W P-5'-P-CCNC: 24 U/L (ref 0–45)
BASOPHILS # BLD MANUAL: 0 10E3/UL (ref 0–0.2)
BASOPHILS NFR BLD MANUAL: 0 %
BILIRUB SERPL-MCNC: 0.2 MG/DL (ref 0.2–1.3)
BUN SERPL-MCNC: 18 MG/DL (ref 7–30)
CALCIUM SERPL-MCNC: 8.7 MG/DL (ref 8.5–10.1)
CHLORIDE BLD-SCNC: 110 MMOL/L (ref 94–109)
CO2 SERPL-SCNC: 30 MMOL/L (ref 20–32)
CREAT SERPL-MCNC: 0.88 MG/DL (ref 0.52–1.04)
EOSINOPHIL # BLD MANUAL: 0.3 10E3/UL (ref 0–0.7)
EOSINOPHIL NFR BLD MANUAL: 7 %
ERYTHROCYTE [DISTWIDTH] IN BLOOD BY AUTOMATED COUNT: 21.5 % (ref 10–15)
GFR SERPL CREATININE-BSD FRML MDRD: 70 ML/MIN/1.73M2
GLUCOSE BLD-MCNC: 94 MG/DL (ref 70–99)
HCT VFR BLD AUTO: 33 % (ref 35–47)
HGB BLD-MCNC: 10.8 G/DL (ref 11.7–15.7)
LYMPHOCYTES # BLD MANUAL: 1.9 10E3/UL (ref 0.8–5.3)
LYMPHOCYTES NFR BLD MANUAL: 51 %
MAGNESIUM SERPL-MCNC: 2.1 MG/DL (ref 1.6–2.3)
MCH RBC QN AUTO: 32.9 PG (ref 26.5–33)
MCHC RBC AUTO-ENTMCNC: 32.7 G/DL (ref 31.5–36.5)
MCV RBC AUTO: 101 FL (ref 78–100)
MONOCYTES # BLD MANUAL: 0.2 10E3/UL (ref 0–1.3)
MONOCYTES NFR BLD MANUAL: 6 %
NEUTROPHILS # BLD MANUAL: 1.3 10E3/UL (ref 1.6–8.3)
NEUTROPHILS NFR BLD MANUAL: 36 %
PLAT MORPH BLD: ABNORMAL
PLATELET # BLD AUTO: 188 10E3/UL (ref 150–450)
POTASSIUM BLD-SCNC: 4.6 MMOL/L (ref 3.4–5.3)
PROT SERPL-MCNC: 7.4 G/DL (ref 6.8–8.8)
RBC # BLD AUTO: 3.28 10E6/UL (ref 3.8–5.2)
RBC MORPH BLD: ABNORMAL
SODIUM SERPL-SCNC: 141 MMOL/L (ref 133–144)
WBC # BLD AUTO: 3.7 10E3/UL (ref 4–11)

## 2021-07-23 PROCEDURE — 96415 CHEMO IV INFUSION ADDL HR: CPT | Performed by: INTERNAL MEDICINE

## 2021-07-23 PROCEDURE — 36415 COLL VENOUS BLD VENIPUNCTURE: CPT | Performed by: NURSE PRACTITIONER

## 2021-07-23 PROCEDURE — S0028 INJECTION, FAMOTIDINE, 20 MG: HCPCS | Performed by: INTERNAL MEDICINE

## 2021-07-23 PROCEDURE — 80053 COMPREHEN METABOLIC PANEL: CPT | Performed by: NURSE PRACTITIONER

## 2021-07-23 PROCEDURE — 96375 TX/PRO/DX INJ NEW DRUG ADDON: CPT | Performed by: INTERNAL MEDICINE

## 2021-07-23 PROCEDURE — 96417 CHEMO IV INFUS EACH ADDL SEQ: CPT | Performed by: INTERNAL MEDICINE

## 2021-07-23 PROCEDURE — 83735 ASSAY OF MAGNESIUM: CPT | Performed by: NURSE PRACTITIONER

## 2021-07-23 PROCEDURE — 85027 COMPLETE CBC AUTOMATED: CPT | Performed by: NURSE PRACTITIONER

## 2021-07-23 PROCEDURE — 96367 TX/PROPH/DG ADDL SEQ IV INF: CPT | Performed by: INTERNAL MEDICINE

## 2021-07-23 PROCEDURE — 96413 CHEMO IV INFUSION 1 HR: CPT | Performed by: INTERNAL MEDICINE

## 2021-07-23 PROCEDURE — 99207 PR NO CHARGE LOS: CPT

## 2021-07-23 RX ORDER — HEPARIN SODIUM,PORCINE 10 UNIT/ML
5 VIAL (ML) INTRAVENOUS
Status: CANCELLED | OUTPATIENT
Start: 2021-07-23

## 2021-07-23 RX ORDER — DIPHENHYDRAMINE HYDROCHLORIDE 50 MG/ML
50 INJECTION INTRAMUSCULAR; INTRAVENOUS
Status: CANCELLED
Start: 2021-07-23

## 2021-07-23 RX ORDER — LORAZEPAM 2 MG/ML
1 INJECTION INTRAMUSCULAR EVERY 6 HOURS PRN
Status: CANCELLED
Start: 2021-07-23

## 2021-07-23 RX ORDER — PALONOSETRON 0.05 MG/ML
0.25 INJECTION, SOLUTION INTRAVENOUS ONCE
Status: CANCELLED
Start: 2021-07-23

## 2021-07-23 RX ORDER — MEPERIDINE HYDROCHLORIDE 25 MG/ML
25 INJECTION INTRAMUSCULAR; INTRAVENOUS; SUBCUTANEOUS EVERY 30 MIN PRN
Status: CANCELLED | OUTPATIENT
Start: 2021-07-23

## 2021-07-23 RX ORDER — EPINEPHRINE 1 MG/ML
0.3 INJECTION, SOLUTION INTRAMUSCULAR; SUBCUTANEOUS EVERY 5 MIN PRN
Status: CANCELLED | OUTPATIENT
Start: 2021-07-23

## 2021-07-23 RX ORDER — HEPARIN SODIUM (PORCINE) LOCK FLUSH IV SOLN 100 UNIT/ML 100 UNIT/ML
5 SOLUTION INTRAVENOUS
Status: CANCELLED | OUTPATIENT
Start: 2021-07-23

## 2021-07-23 RX ORDER — PALONOSETRON 0.05 MG/ML
0.25 INJECTION, SOLUTION INTRAVENOUS ONCE
Status: COMPLETED | OUTPATIENT
Start: 2021-07-23 | End: 2021-07-23

## 2021-07-23 RX ORDER — METHYLPREDNISOLONE SODIUM SUCCINATE 125 MG/2ML
125 INJECTION, POWDER, LYOPHILIZED, FOR SOLUTION INTRAMUSCULAR; INTRAVENOUS
Status: CANCELLED
Start: 2021-07-23

## 2021-07-23 RX ORDER — ALBUTEROL SULFATE 90 UG/1
1-2 AEROSOL, METERED RESPIRATORY (INHALATION)
Status: CANCELLED
Start: 2021-07-23

## 2021-07-23 RX ORDER — ALBUTEROL SULFATE 0.83 MG/ML
2.5 SOLUTION RESPIRATORY (INHALATION)
Status: CANCELLED | OUTPATIENT
Start: 2021-07-23

## 2021-07-23 RX ORDER — DIPHENHYDRAMINE HCL 25 MG
50 CAPSULE ORAL ONCE
Status: CANCELLED
Start: 2021-07-23

## 2021-07-23 RX ORDER — SODIUM CHLORIDE 9 MG/ML
1000 INJECTION, SOLUTION INTRAVENOUS CONTINUOUS PRN
Status: CANCELLED
Start: 2021-07-23

## 2021-07-23 RX ORDER — NALOXONE HYDROCHLORIDE 0.4 MG/ML
.1-.4 INJECTION, SOLUTION INTRAMUSCULAR; INTRAVENOUS; SUBCUTANEOUS
Status: CANCELLED | OUTPATIENT
Start: 2021-07-23

## 2021-07-23 RX ORDER — DIPHENHYDRAMINE HCL 25 MG
50 CAPSULE ORAL ONCE
Status: DISCONTINUED | OUTPATIENT
Start: 2021-07-23 | End: 2021-07-23 | Stop reason: HOSPADM

## 2021-07-23 RX ADMIN — PALONOSETRON 0.25 MG: 0.05 INJECTION, SOLUTION INTRAVENOUS at 09:13

## 2021-07-23 RX ADMIN — Medication 250 ML: at 09:11

## 2021-07-23 ASSESSMENT — PAIN SCALES - GENERAL: PAINLEVEL: NO PAIN (0)

## 2021-07-23 NOTE — RESULT ENCOUNTER NOTE
Treatment labs reviewed and ok to proceed with ANC 1.3 and getting Neulasta. Dr. Hernandez is aware.

## 2021-07-23 NOTE — PROGRESS NOTES
Infusion Nursing Note:  Taran Regalado presents today for C6 Taxol/Carbo.    Patient seen by provider today: No   present during visit today: Not Applicable.    Note: Per Dr. Juarez ok to proceed with treatment today.    Intravenous Access:  Peripheral IV placed.    Treatment Conditions:  Lab Results   Component Value Date    HGB 10.8 07/23/2021    HGB 10.4 07/09/2021     Lab Results   Component Value Date    WBC 3.7 07/23/2021    WBC 2.7 07/09/2021      Lab Results   Component Value Date    ANEU 1.3 07/23/2021    ANEU 0.9 07/09/2021     Lab Results   Component Value Date     07/23/2021     07/09/2021      Lab Results   Component Value Date     07/23/2021     07/09/2021                   Lab Results   Component Value Date    POTASSIUM 4.6 07/23/2021    POTASSIUM 4.1 07/09/2021           Lab Results   Component Value Date    MAG 2.1 07/23/2021    MAG 2.2 07/09/2021            Lab Results   Component Value Date    CR 0.88 07/23/2021    CR 0.86 07/09/2021                   Lab Results   Component Value Date    HORACIO 8.7 07/23/2021    HORACIO 8.8 07/09/2021                Lab Results   Component Value Date    BILITOTAL 0.2 07/23/2021    BILITOTAL 0.4 07/09/2021           Lab Results   Component Value Date    ALBUMIN 3.5 07/23/2021    ALBUMIN 3.7 07/09/2021                    Lab Results   Component Value Date    ALT 32 07/23/2021    ALT 26 07/09/2021           Lab Results   Component Value Date    AST 24 07/23/2021    AST 20 07/09/2021       Post Infusion Assessment:  Patient tolerated infusion without incident.  Blood return noted pre and post infusion.  Site patent and intact, free from redness, edema or discomfort.  No evidence of extravasations.  Access discontinued per protocol.       Discharge Plan:   Patient discharged in stable condition accompanied by: self.  Departure Mode: Ambulatory.      Jossy Schwartz RN

## 2021-07-26 ENCOUNTER — INFUSION THERAPY VISIT (OUTPATIENT)
Dept: INFUSION THERAPY | Facility: CLINIC | Age: 66
End: 2021-07-26
Payer: COMMERCIAL

## 2021-07-26 VITALS
RESPIRATION RATE: 18 BRPM | OXYGEN SATURATION: 100 % | BODY MASS INDEX: 20.87 KG/M2 | TEMPERATURE: 97.7 F | HEART RATE: 71 BPM | SYSTOLIC BLOOD PRESSURE: 113 MMHG | WEIGHT: 153.9 LBS | DIASTOLIC BLOOD PRESSURE: 74 MMHG

## 2021-07-26 DIAGNOSIS — Z51.11 ENCOUNTER FOR ANTINEOPLASTIC CHEMOTHERAPY: Primary | ICD-10-CM

## 2021-07-26 DIAGNOSIS — C56.9 OVARIAN CANCER, UNSPECIFIED LATERALITY (H): ICD-10-CM

## 2021-07-26 PROCEDURE — 96372 THER/PROPH/DIAG INJ SC/IM: CPT | Performed by: NURSE PRACTITIONER

## 2021-07-26 PROCEDURE — 99207 PR NO CHARGE LOS: CPT | Performed by: NURSE PRACTITIONER

## 2021-07-26 NOTE — PROGRESS NOTES
Infusion Nursing Note:  Taran Regalado presents today for Fulphila.    Patient seen by provider today: No   present during visit today: Not Applicable.    Note: N/A.  Patient declined the covid-19 test.    Intravenous Access:  No Intravenous access/labs at this visit.    Treatment Conditions:  Not Applicable.      Post Infusion Assessment:  Patient tolerated injection without incident.       Discharge Plan:   Patient discharged in stable condition accompanied by: self.      Connie Doherty RN

## 2021-07-28 ENCOUNTER — VIRTUAL VISIT (OUTPATIENT)
Dept: ONCOLOGY | Facility: CLINIC | Age: 66
End: 2021-07-28
Attending: OBSTETRICS & GYNECOLOGY
Payer: COMMERCIAL

## 2021-07-28 DIAGNOSIS — C56.9 OVARIAN CANCER, UNSPECIFIED LATERALITY (H): Primary | ICD-10-CM

## 2021-07-28 PROCEDURE — 99215 OFFICE O/P EST HI 40 MIN: CPT | Mod: 95 | Performed by: OBSTETRICS & GYNECOLOGY

## 2021-07-28 NOTE — PROGRESS NOTES
Taran is a 65 year old who is being evaluated via a billable video visit.      How would you like to obtain your AVS? MyChart  If the video visit is dropped, the invitation should be resent by: Text to cell phone: [phone]  Will anyone else be joining your video visit? No     Vitals - Patient Reported  Weight (Patient Reported): 68 kg (150 lb)  Height (Patient Reported): 182.9 cm (6')  BMI (Based on Pt Reported Ht/Wt): 20.34  Pain Score: No Pain (0)    Genesisraheem Ye SOPHIA          Video-Visit Details    40 minutes.                Follow Up Notes on Referred Patient    Date: 2021       Dr. Ye Vaughn MD  No address on file       RE: Taran Regalado  : 1955  GRACY: 2021    Taran Regalado is a 65 year old woman with a diagnosis of metastatic ovarian high grade serous carcinoma. She is here today for follow up and disease management. In light of the recent COVID19 pandemic, and in accordance with our group and national guidelines this patients care has been reviewed and it is felt that presenting for her visit would be more likely to increase rather than decrease her relative risks. Therefore this visit was conducted by video. She has been doing well since finishing her adjuvant chemotherapy. She is eating an drinking well, no nausea vomiting, fever or chills, normal urinary and bowel function.        Oncology History:  12/3/2020: US Pelvic: IMPRESSION: Limited examination due to acoustic windows. Possible left adnexal mass. A CT scan of the abdomen and pelvis with contrast is recommended for further assessment.     2020: CT A/P:   IMPRESSION:    Peritoneal carcinomatosis with masslike peritoneal thickening in the lower pelvis which may indicate an adnexal or ovarian primary malignancy. Large volume ascites. Bilateral pleural effusions. There is potential subtle pleural nodularity in the right hemithorax which could indicate metastatic disease.  Indeterminate 1 cm lesion in the right  hepatic lobe suspicious for a metastatic lesion.      12/16/2020: Presented to [facility] with abdominal distention, 25lb weight loss, and CTAP with carcinomatosis, elevated  3098.     12/23/2020: CT Chest: IMPRESSION:   1. There are few scattered small sub-6 mm pulmonary nodules which are indeterminate without prior comparisons available. There are a few  slightly larger perifissural nodules which are technically  indeterminate in the setting of malignancy although presumed lymphatic in nature and of unlikely clinical significance. Attention on follow-up is recommended.  2. Small to moderate left and small right pleural effusions are increased in size from prior. No convincing evidence for pleural nodularity.  3. Partially visualized large volume ascites and peritoneal nodularity in the upper abdomen similar to 12/4/2020 outside CT      12/26/2020: ED for abdominal distension; 3 L ascites drained with paracentesis    Pelvic US: Findings: Free fluid present in LLQ      12/31/2020: US Paracentesis: 900 mL ascites drained     1/7/2021: Diagnotic laparoscopy, biopsies  Pathology: FINAL DIAGNOSIS:   A. PERITONEUM, BIOPSIES:   - Positive for high grade carcinoma, consistent with metastatic carcinoma of Mullerian origin.     1/10-1/13/2021: Hospital admission for postoperative non-intractable vomiting and nausea.      1/10/2021: CT A/P: IMPRESSION: Extensive ascites which is probably malignant. Scattered liver hypodensities of indeterminate etiology comment cannot exclude metastatic disease. Diverticulosis. Fluid-filled adnexal masses and irregular appearance of uterus, which may represent primary neoplasm. Multiple peritoneal nodules. Large amount of fecal material in the colon with no evidence of small bowel obstruction.     Plan: Paclitaxel 175 mg/m2 and carboplatin AUC 6 x 3 cycles followed by a CT CAP and visit with Dr. Juarez.     1/12/2021: Cycle 1 paclitaxel and carboplatin while inpatient     1/13/2021: CT  Head: Impression:  1. Chronic sinusitis of the right maxillary and right sphenoid sinuses.  2. Incidental presumed calcified meningioma in the right frontal  convexity without significant mass effect.  3. No suspicious intracranial enhancing lesion.     2/1/2021: Cycle 2 paclitaxel and carboplatin.  936.     2/3-2/5/21: Admission [facility] for afib w/ RVR and new PE     2/26/21: Cycle 3 paclitaxel and carboplatin planned.  Deferred due to thrombocytopenia.  pending.     3/15/21: Cycle 3 paclitaxel and carboplatin given     4/19/21: HYSTERECTOMY, TOTAL, ABDOMINAL, WITH BILATERAL SALPINGO-OOPHORECTOMY, omentectomy, NEOPLASM DEBULKING,Proctoscocy, RO, Resection of liver nodules, diaphragm stripping, immobilization of liver and colon  FINAL DIAGNOSIS:   A. OMENTUM, BIOPSY:   - Omental adipose tissue with rare viable cells of metastatic high grade   serous carcinoma   - One reactive lymph node, negative for malignancy (0/1)   B. NODULE, SIGMOID, EXCISION:   - Calcified necrotic adipose tissue   - Negative for malignancy   C. NODULE, SMALL BOWEL MESENTERY, EXCISION:   - Fibroadipose tissue, positive for metastatic high grade serous carcinoma   D. UTERUS, CERVIX, BILATERAL FALLOPIAN TUBES AND OVARIES, HYSTERECTOMY   WITH BILATERAL SALPINGO-OOPHORECTOMY:   - Atrophic endometrium   - Uterine serosa with rare viable cells consistent with high grade serous   carcinoma   - Cervix with atrophic changes   - Viable cells consistent with high grade serous carcinoma present in the   right ovary, serosa of right   fallopian tube and right periadnexal soft tissue   - Left ovary with atrophic changes   - Left fallopian tube with a rare focus of serous tubal in-situ carcinoma   (STIC)   E. NODULES, SMALL BOWEL MESENTRY, EXCISION:   - Fibroadipose tissue with rare viable cells of metastatic high grade   serous carcinoma   F. NODULE, SPLENIC FLEXURE TRANSVERSE COLON, EXCISION:   - Fibroadipose tissue with rare viable cells of  metastatic high grade   serous carcinoma   - Accessory splenule, negative for malignancy   G. OMENTUM, OMENTECTOMY:   - Omental adipose tissue with rare viable cells of metastatic high grade   serous carcinoma   H. NODULE, PERITONEAL PANCREATIC, EXCISION:   - Fibrous adhesions with inflammation   - Negative for malignancy   I. RIGHT HEMIDIAPHRAGM PERITONEUM, EXCISION:   - Fibrous adhesions with inflammation   - Negative for malignancy   J. RIGHT LIVER SURFACE NODULE:   - Fibrous adhesions with benign inclusion glands   - Negative for malignancy   K. LEFT LOWER LIVER EDGE, BIOPSY:   - Cauterized hepatic parenchyma and capsule   - Negative for malignancy   L. NODULE, SMALL BOWEL MESENTERIC #3, EXCISION:   - Fibroadipose tissue with rare viable cells of metastatic high grade   serous carcinoma       Plan: Carboplatin AUC 6 + Taxol 175 mg/m2 x 3 cycles, then transition to Parp inhibitor for maintenance therapy given her BRCA1 germline mutation.      5/21/21: Cycle 4 carboplatin and paclitaxel.   172.  6/11/21: Cycle 5 carboplatin and paclitaxel.   61.  7/2/21: Cycle 6 carboplatin and paclitaxel.   20.       Past Medical History:    Past Medical History:   Diagnosis Date     Atrial fibrillation with rapid ventricular response (H)      History of cold sores      Hx of LASIK 12/11/2017     Insomnia      Migraine      Osteopenia      Pelvic mass      Peritoneal carcinomatosis (H)      Restless legs syndrome (RLS)          Past Surgical History:    Past Surgical History:   Procedure Laterality Date     APPENDECTOMY       ARTHROSCPY KNEE SURGICAL DEBRIDEMENT SHAVING ARTICULAR CARTILAGE Right      BIOPSY  January 2021    Biopsy to confirm ovarian cancer     DEBRIDEMENT LEFT UPPER EXTREMITY  2016     HYSTERECTOMY TOTAL ABD, LUISITO SALPINGO-OOPHORECTOMY, NODE DISSECTION, TUMOR DEBULKING, COMBINED Bilateral 4/19/2021    Procedure: HYSTERECTOMY, TOTAL, ABDOMINAL, WITH BILATERAL SALPINGO-OOPHORECTOMY, omentectomy,  NEOPLASM DEBULKING,Proctoscocy, RO, Resection of liver nodules, diaphragm stripping, immobilization of liver and colon;  Surgeon: Bolivar Juarez MD;  Location: [facility] OR     LAPAROSCOPY DIAGNOSTIC (GYN) Bilateral 1/7/2021    Procedure: Diagnsotic laparoscopy, biopsies;  Surgeon: Bolivar Juarez MD;  Location: [facility] OR     LASIK       TUBAL LIGATION           Health Maintenance Due   Topic Date Due     PREVENTIVE CARE VISIT  Never done     ADVANCE CARE PLANNING  Never done     Pneumococcal Vaccine: Pediatrics (0 to 5 Years) and At-Risk Patients (6 to 64 Years) (1 of 4 - PCV13) Never done     COLORECTAL CANCER SCREENING  Never done     HIV SCREENING  Never done     HEPATITIS C SCREENING  Never done     LIPID  Never done     ZOSTER IMMUNIZATION (1 of 2) Never done     MAMMO SCREENING  03/04/2021     COVID-19 Vaccine (2 - Pfizer 2-dose series) 08/23/2021     INFLUENZA VACCINE (1) 09/01/2021     PAP  02/25/2022       Current Medications:     Current Outpatient Medications   Medication Sig Dispense Refill     acetaminophen (TYLENOL) 325 MG tablet Take 2 tablets (650 mg) by mouth every 6 hours as needed for mild pain 50 tablet 0     amitriptyline (ELAVIL) 100 MG tablet Take 1 tablet (100 mg) by mouth At Bedtime 90 tablet 0     hydrOXYzine (ATARAX) 25 MG tablet Take 1-2 tablets (25-50 mg) by mouth nightly as needed for anxiety (or insomnia) 60 tablet 5     loratadine (CLARITIN) 10 MG tablet Take 10 mg by mouth daily as needed (for bone pain related to neupogen)        meclizine (ANTIVERT) 25 MG tablet Take 1 tablet (25 mg) by mouth 3 times daily as needed for dizziness or nausea 15 tablet 0     oxyCODONE (ROXICODONE) 5 MG tablet Take 1 tablet (5 mg) by mouth every 12 hours 10 tablet 0     SUMAtriptan (IMITREX) 100 MG tablet Take 1 tablet (100 mg) by mouth at onset of headache for migraine 15 tablet 0     valACYclovir (VALTREX) 1000 mg tablet Take 1,000 mg by mouth as needed        gabapentin (NEURONTIN) 600 MG tablet  Take 1 tablet (600 mg) by mouth At Bedtime 90 tablet 0     olaparib (LYNPARZA) 150 MG tablet Take 2 tablets (300 mg) by mouth 2 times daily 120 tablet 0     prochlorperazine (COMPAZINE) 10 MG tablet Take 1 tablet (10 mg) by mouth every 6 hours as needed (Nausea/Vomiting) 30 tablet 2     rivaroxaban ANTICOAGULANT (XARELTO ANTICOAGULANT) 20 MG TABS tablet Take 1 tablet (20 mg) by mouth every morning 90 tablet 1         Allergies:      No Known Allergies     Social History:     Social History     Tobacco Use     Smoking status: Former Smoker     Packs/day: 0.50     Years: 40.00     Pack years: 20.00     Quit date:      Years since quitting: 3.6     Smokeless tobacco: Never Used   Substance Use Topics     Alcohol use: Not Currently       History   Drug Use Unknown         Family History:       Family History   Adopted: Yes   Problem Relation Age of Onset     Cancer Mother 36     Other Cancer Mother         Bio mother  of  a female cancer  at 36     Factor V Leiden deficiency Daughter      Deep Vein Thrombosis Daughter      Diabetes Type 1 Daughter      Diabetes Daughter      Hypertension Daughter      Anesthesia Reaction No family hx of          Physical Exam:     There were no vitals taken for this visit.  There is no height or weight on file to calculate BMI.    General Appearance:  healthy and alert, no distress  Respiratory: no visible respiratory distress  Neuro: moves both arms freely without limitation       Psychiatric:      appropriate mood and affect                                     Assessment:     Taran Regalado is a 65 year old woman with metastatic ovarian high grade serous carcinoma.        1.  Suspected stage IV high grade serous ovarian cancer.   2.  BRCA 1 germline mutation.   3.  [facility] Program  4.  PE resolved on Lovenox (prophy)  5.  Left ankle injury     We discussed to proceed with Parp inhibitor for maintenance therapy given her BRCA1 germline mutation, using Olaparib  300mg bid as starting dose. Risks and alternatives were discussed, that patient agrees with the plan. We will obtain CT scan if raising  or symptomatic.           Bolivar Juarez MD, MS    Department of Obstetrics and Gynecology   Division of Gynecologic Oncology   [facility]  Phone: [phone]       CC  Patient Care Team:  Bolivar Juarez MD as PCP - General (Gynecologic Oncology)  Bolivar Juarez MD as Assigned Cancer Care Provider  Marta Okeefe, JERICHO CNP as Assigned PCP  Pepe Rdz MD as Assigned Heart and Vascular Provider  Holley Ramos PA-C as Assigned Surgical Provider  Brinda Lacey MD as Assigned Palliative Care Provider  SELF, REFERRED       Declines

## 2021-07-28 NOTE — LETTER
2021     RE: Taran Regalado  [address] [facility] [address]    Dear Colleague,    Thank you for referring your patient, Taran Regalado, to the [facility]. Please see a copy of my visit note below.    Taran is a 65 year old who is being evaluated via a billable video visit.      How would you like to obtain your AVS? MyChart  If the video visit is dropped, the invitation should be resent by: Text to cell phone: [phone]  Will anyone else be joining your video visit? No     Vitals - Patient Reported  Weight (Patient Reported): 68 kg (150 lb)  Height (Patient Reported): 182.9 cm (6')  BMI (Based on Pt Reported Ht/Wt): 20.34  Pain Score: No Pain (0)  Genesis CORTES      Video-Visit Details  40 minutes.                Follow Up Notes on Referred Patient    Date: 2021     Dr. Ye Vaughn MD  No address on file     RE: Taran Regalado  : 1955  GRACY: 2021    Taran Regalado is a 65 year old woman with a diagnosis of metastatic ovarian high grade serous carcinoma. She is here today for follow up and disease management. In light of the recent COVID19 pandemic, and in accordance with our group and national guidelines this patients care has been reviewed and it is felt that presenting for her visit would be more likely to increase rather than decrease her relative risks. Therefore this visit was conducted by video. She has been doing well since finishing her adjuvant chemotherapy. She is eating an drinking well, no nausea vomiting, fever or chills, normal urinary and bowel function.        Oncology History:  12/3/2020: US Pelvic: IMPRESSION: Limited examination due to acoustic windows. Possible left adnexal mass. A CT scan of the abdomen and pelvis with contrast is recommended for further assessment.     2020: CT A/P:   IMPRESSION:    Peritoneal carcinomatosis with masslike peritoneal thickening in the lower pelvis which may indicate an  adnexal or ovarian primary malignancy. Large volume ascites. Bilateral pleural effusions. There is potential subtle pleural nodularity in the right hemithorax which could indicate metastatic disease.  Indeterminate 1 cm lesion in the right hepatic lobe suspicious for a metastatic lesion.      12/16/2020: [facility]ed to [facility] with abdominal distention, 25lb weight loss, and CTAP with carcinomatosis, elevated  3098.     12/23/2020: CT Chest: IMPRESSION:   1. There are few scattered small sub-6 mm pulmonary nodules which are indeterminate without prior comparisons available. There are a few  slightly larger perifissural nodules which are technically  indeterminate in the setting of malignancy although presumed lymphatic in nature and of unlikely clinical significance. Attention on follow-up is recommended.  2. Small to moderate left and small right pleural effusions are increased in size from prior. No convincing evidence for pleural nodularity.  3. Partially visualized large volume ascites and peritoneal nodularity in the upper abdomen similar to 12/4/2020 outside CT      12/26/2020: ED for abdominal distension; 3 L ascites drained with paracentesis    Pelvic US: Findings: Free fluid present in LLQ      12/31/2020: US Paracentesis: 900 mL ascites drained     1/7/2021: Diagnotic laparoscopy, biopsies  Pathology: FINAL DIAGNOSIS:   A. PERITONEUM, BIOPSIES:   - Positive for high grade carcinoma, consistent with metastatic carcinoma of Mullerian origin.     1/10-1/13/2021: Hospital admission for postoperative non-intractable vomiting and nausea.      1/10/2021: CT A/P: IMPRESSION: Extensive ascites which is probably malignant. Scattered liver hypodensities of indeterminate etiology comment cannot exclude metastatic disease. Diverticulosis. Fluid-filled adnexal masses and irregular appearance of uterus, which may represent primary neoplasm. Multiple peritoneal nodules. Large amount of fecal material in the colon with  no evidence of small bowel obstruction.     Plan: Paclitaxel 175 mg/m2 and carboplatin AUC 6 x 3 cycles followed by a CT CAP and visit with Dr. Juarez.     1/12/2021: Cycle 1 paclitaxel and carboplatin while inpatient     1/13/2021: CT Head: Impression:  1. Chronic sinusitis of the right maxillary and right sphenoid sinuses.  2. Incidental presumed calcified meningioma in the right frontal  convexity without significant mass effect.  3. No suspicious intracranial enhancing lesion.     2/1/2021: Cycle 2 paclitaxel and carboplatin.  936.     2/3-2/5/21: Admission [facility] for afib w/ RVR and new PE     2/26/21: Cycle 3 paclitaxel and carboplatin planned.  Deferred due to thrombocytopenia.  pending.     3/15/21: Cycle 3 paclitaxel and carboplatin given     4/19/21: HYSTERECTOMY, TOTAL, ABDOMINAL, WITH BILATERAL SALPINGO-OOPHORECTOMY, omentectomy, NEOPLASM DEBULKING,Proctoscocy, RO, Resection of liver nodules, diaphragm stripping, immobilization of liver and colon  FINAL DIAGNOSIS:   A. OMENTUM, BIOPSY:   - Omental adipose tissue with rare viable cells of metastatic high grade   serous carcinoma   - One reactive lymph node, negative for malignancy (0/1)   B. NODULE, SIGMOID, EXCISION:   - Calcified necrotic adipose tissue   - Negative for malignancy   C. NODULE, SMALL BOWEL MESENTERY, EXCISION:   - Fibroadipose tissue, positive for metastatic high grade serous carcinoma   D. UTERUS, CERVIX, BILATERAL FALLOPIAN TUBES AND OVARIES, HYSTERECTOMY   WITH BILATERAL SALPINGO-OOPHORECTOMY:   - Atrophic endometrium   - Uterine serosa with rare viable cells consistent with high grade serous   carcinoma   - Cervix with atrophic changes   - Viable cells consistent with high grade serous carcinoma present in the   right ovary, serosa of right   fallopian tube and right periadnexal soft tissue   - Left ovary with atrophic changes   - Left fallopian tube with a rare focus of serous tubal in-situ carcinoma   (STIC)   E.  NODULES, SMALL BOWEL MESENTRY, EXCISION:   - Fibroadipose tissue with rare viable cells of metastatic high grade   serous carcinoma   F. NODULE, SPLENIC FLEXURE TRANSVERSE COLON, EXCISION:   - Fibroadipose tissue with rare viable cells of metastatic high grade   serous carcinoma   - Accessory splenule, negative for malignancy   G. OMENTUM, OMENTECTOMY:   - Omental adipose tissue with rare viable cells of metastatic high grade   serous carcinoma   H. NODULE, PERITONEAL PANCREATIC, EXCISION:   - Fibrous adhesions with inflammation   - Negative for malignancy   I. RIGHT HEMIDIAPHRAGM PERITONEUM, EXCISION:   - Fibrous adhesions with inflammation   - Negative for malignancy   J. RIGHT LIVER SURFACE NODULE:   - Fibrous adhesions with benign inclusion glands   - Negative for malignancy   K. LEFT LOWER LIVER EDGE, BIOPSY:   - Cauterized hepatic parenchyma and capsule   - Negative for malignancy   L. NODULE, SMALL BOWEL MESENTERIC #3, EXCISION:   - Fibroadipose tissue with rare viable cells of metastatic high grade   serous carcinoma       Plan: Carboplatin AUC 6 + Taxol 175 mg/m2 x 3 cycles, then transition to Parp inhibitor for maintenance therapy given her BRCA1 germline mutation.      5/21/21: Cycle 4 carboplatin and paclitaxel.   172.  6/11/21: Cycle 5 carboplatin and paclitaxel.   61.  7/2/21: Cycle 6 carboplatin and paclitaxel.   20.       Past Medical History:  Past Medical History:   Diagnosis Date     Atrial fibrillation with rapid ventricular response (H)      History of cold sores      Hx of LASIK 12/11/2017     Insomnia      Migraine      Osteopenia      Pelvic mass      Peritoneal carcinomatosis (H)      Restless legs syndrome (RLS)        Past Surgical History:  Past Surgical History:   Procedure Laterality Date     APPENDECTOMY       ARTHROSCPY KNEE SURGICAL DEBRIDEMENT SHAVING ARTICULAR CARTILAGE Right      BIOPSY  January 2021    Biopsy to confirm ovarian cancer     DEBRIDEMENT LEFT UPPER  EXTREMITY  2016     HYSTERECTOMY TOTAL ABD, LUISITO SALPINGO-OOPHORECTOMY, NODE DISSECTION, TUMOR DEBULKING, COMBINED Bilateral 4/19/2021    Procedure: HYSTERECTOMY, TOTAL, ABDOMINAL, WITH BILATERAL SALPINGO-OOPHORECTOMY, omentectomy, NEOPLASM DEBULKING,Proctoscocy, RO, Resection of liver nodules, diaphragm stripping, immobilization of liver and colon;  Surgeon: Bolivar Juarez MD;  Location: [facility] OR     LAPAROSCOPY DIAGNOSTIC (GYN) Bilateral 1/7/2021    Procedure: Diagnsotic laparoscopy, biopsies;  Surgeon: Bolivar Juarez MD;  Location: [facility] OR     LASIK       TUBAL LIGATION         Health Maintenance Due   Topic Date Due     PREVENTIVE CARE VISIT  Never done     ADVANCE CARE PLANNING  Never done     Pneumococcal Vaccine: Pediatrics (0 to 5 Years) and At-Risk Patients (6 to 64 Years) (1 of 4 - PCV13) Never done     COLORECTAL CANCER SCREENING  Never done     HIV SCREENING  Never done     HEPATITIS C SCREENING  Never done     LIPID  Never done     ZOSTER IMMUNIZATION (1 of 2) Never done     MAMMO SCREENING  03/04/2021     COVID-19 Vaccine (2 - Pfizer 2-dose series) 08/23/2021     INFLUENZA VACCINE (1) 09/01/2021     PAP  02/25/2022       Current Medications:     Current Outpatient Medications   Medication Sig Dispense Refill     acetaminophen (TYLENOL) 325 MG tablet Take 2 tablets (650 mg) by mouth every 6 hours as needed for mild pain 50 tablet 0     amitriptyline (ELAVIL) 100 MG tablet Take 1 tablet (100 mg) by mouth At Bedtime 90 tablet 0     hydrOXYzine (ATARAX) 25 MG tablet Take 1-2 tablets (25-50 mg) by mouth nightly as needed for anxiety (or insomnia) 60 tablet 5     loratadine (CLARITIN) 10 MG tablet Take 10 mg by mouth daily as needed (for bone pain related to neupogen)        meclizine (ANTIVERT) 25 MG tablet Take 1 tablet (25 mg) by mouth 3 times daily as needed for dizziness or nausea 15 tablet 0     oxyCODONE (ROXICODONE) 5 MG tablet Take 1 tablet (5 mg) by mouth every 12 hours 10 tablet 0      SUMAtriptan (IMITREX) 100 MG tablet Take 1 tablet (100 mg) by mouth at onset of headache for migraine 15 tablet 0     valACYclovir (VALTREX) 1000 mg tablet Take 1,000 mg by mouth as needed        gabapentin (NEURONTIN) 600 MG tablet Take 1 tablet (600 mg) by mouth At Bedtime 90 tablet 0     olaparib (LYNPARZA) 150 MG tablet Take 2 tablets (300 mg) by mouth 2 times daily 120 tablet 0     prochlorperazine (COMPAZINE) 10 MG tablet Take 1 tablet (10 mg) by mouth every 6 hours as needed (Nausea/Vomiting) 30 tablet 2     rivaroxaban ANTICOAGULANT (XARELTO ANTICOAGULANT) 20 MG TABS tablet Take 1 tablet (20 mg) by mouth every morning 90 tablet 1       Allergies:      No Known Allergies     Social History:     Social History     Tobacco Use     Smoking status: Former Smoker     Packs/day: 0.50     Years: 40.00     Pack years: 20.00     Quit date:      Years since quitting: 3.6     Smokeless tobacco: Never Used   Substance Use Topics     Alcohol use: Not Currently       History   Drug Use Unknown         Family History:       Family History   Adopted: Yes   Problem Relation Age of Onset     Cancer Mother 36     Other Cancer Mother         Bio mother  of  a female cancer  at 36     Factor V Leiden deficiency Daughter      Deep Vein Thrombosis Daughter      Diabetes Type 1 Daughter      Diabetes Daughter      Hypertension Daughter      Anesthesia Reaction No family hx of        Physical Exam:     There were no vitals taken for this visit.  There is no height or weight on file to calculate BMI.    General Appearance:  healthy and alert, no distress  Respiratory: no visible respiratory distress  Neuro: moves both arms freely without limitation       Psychiatric:      appropriate mood and affect                                   Assessment:     Taran Regalado is a 65 year old woman with metastatic ovarian high grade serous carcinoma.        1.  Suspected stage IV high grade serous ovarian cancer.   2.  BRCA 1 germline  mutation.   3.  Precision Medicine Program  4.  PE resolved on Lovenox (prophy)  5.  Left ankle injury     We discussed to proceed with Parp inhibitor for maintenance therapy given her BRCA1 germline mutation, using Olaparib 300mg bid as starting dose. Risks and alternatives were discussed, that patient agrees with the plan. We will obtain CT scan if raising  or symptomatic.         Bolivar Juarez MD, MS    Department of Obstetrics and Gynecology   Division of Gynecologic Oncology   [facility]  Phone: [phone]  CC  Patient Care Team:  Bolivar Juarez MD as PCP - General (Gynecologic Oncology)  Bolivar Juarez MD as Assigned Cancer Care Provider  Marta Okeefe APRN CNP as Assigned PCP  Pepe Rdz MD as Assigned Heart and Vascular Provider  Holley Ramos PA-C as Assigned Surgical Provider  Brinda Lacey MD as Assigned Palliative Care Provider  SELF, REFERRED

## 2021-07-29 ENCOUNTER — DOCUMENTATION ONLY (OUTPATIENT)
Dept: ONCOLOGY | Facility: CLINIC | Age: 66
End: 2021-07-29

## 2021-07-29 ENCOUNTER — TELEPHONE (OUTPATIENT)
Dept: ONCOLOGY | Facility: CLINIC | Age: 66
End: 2021-07-29

## 2021-07-29 NOTE — TELEPHONE ENCOUNTER
Prior Authorization Approval    Authorization Effective Date: 7/29/2021  Authorization Expiration Date: 7/28/2022  Medication: Lynparza 1150mg PA Approved  Approved Dose/Quantity: 120/30  Reference #: [id number]    Insurance Company: [email or web address] - Phone [phone] Fax [phone]  Expected CoPay: $0     CoPay Card Available:      Foundation Assistance Needed:    Which Pharmacy is filling the prescription (Not needed for infusion/clinic administered): [facility] [address]  Pharmacy Notified:    Patient Notified:        Panda Schofield CPhT  [facility]  Oncology Pharmacy Liaison   [email or web address]  [phone] (phone  [phone] (fax

## 2021-07-29 NOTE — TELEPHONE ENCOUNTER
PA Initiation    Medication: Lynparza 1150mg PA pending  Insurance Company: [employer] - Phone [phone] Fax [phone]  Pharmacy Filling the Rx: [facility] [address]  Filling Pharmacy Phone:    Filling Pharmacy Fax:    Start Date: 7/29/2021      Panda Schofield CPhT  [facility]  Oncology Pharmacy [email or web address]  [phone] (phone  [phone] (fax

## 2021-07-30 ENCOUNTER — TELEPHONE (OUTPATIENT)
Dept: ONCOLOGY | Facility: CLINIC | Age: 66
End: 2021-07-30

## 2021-07-30 ENCOUNTER — LAB (OUTPATIENT)
Dept: LAB | Facility: CLINIC | Age: 66
End: 2021-07-30

## 2021-07-30 DIAGNOSIS — C56.9 OVARIAN CANCER, UNSPECIFIED LATERALITY (H): ICD-10-CM

## 2021-07-30 LAB
ALBUMIN SERPL-MCNC: 3.7 G/DL (ref 3.4–5)
ALP SERPL-CCNC: 179 U/L (ref 40–150)
ALT SERPL W P-5'-P-CCNC: 59 U/L (ref 0–50)
ANION GAP SERPL CALCULATED.3IONS-SCNC: 5 MMOL/L (ref 3–14)
AST SERPL W P-5'-P-CCNC: 32 U/L (ref 0–45)
BASOPHILS # BLD MANUAL: 0.1 10E3/UL (ref 0–0.2)
BASOPHILS NFR BLD MANUAL: 1 %
BILIRUB SERPL-MCNC: 0.4 MG/DL (ref 0.2–1.3)
BUN SERPL-MCNC: 16 MG/DL (ref 7–30)
CALCIUM SERPL-MCNC: 8.9 MG/DL (ref 8.5–10.1)
CANCER AG125 SERPL-ACNC: 14 U/ML (ref 0–30)
CHLORIDE BLD-SCNC: 107 MMOL/L (ref 94–109)
CO2 SERPL-SCNC: 29 MMOL/L (ref 20–32)
CREAT SERPL-MCNC: 0.78 MG/DL (ref 0.52–1.04)
EOSINOPHIL # BLD MANUAL: 0 10E3/UL (ref 0–0.7)
EOSINOPHIL NFR BLD MANUAL: 0 %
ERYTHROCYTE [DISTWIDTH] IN BLOOD BY AUTOMATED COUNT: 20.9 % (ref 10–15)
GFR SERPL CREATININE-BSD FRML MDRD: 81 ML/MIN/1.73M2
GLUCOSE BLD-MCNC: 90 MG/DL (ref 70–99)
HCT VFR BLD AUTO: 31.2 % (ref 35–47)
HGB BLD-MCNC: 10.2 G/DL (ref 11.7–15.7)
LYMPHOCYTES # BLD MANUAL: 2 10E3/UL (ref 0.8–5.3)
LYMPHOCYTES NFR BLD MANUAL: 28 %
MCH RBC QN AUTO: 32.8 PG (ref 26.5–33)
MCHC RBC AUTO-ENTMCNC: 32.7 G/DL (ref 31.5–36.5)
MCV RBC AUTO: 100 FL (ref 78–100)
METAMYELOCYTES # BLD MANUAL: 0.2 10E3/UL
METAMYELOCYTES NFR BLD MANUAL: 3 %
MONOCYTES # BLD MANUAL: 0.6 10E3/UL (ref 0–1.3)
MONOCYTES NFR BLD MANUAL: 8 %
NEUTROPHILS # BLD MANUAL: 4.2 10E3/UL (ref 1.6–8.3)
NEUTROPHILS NFR BLD MANUAL: 60 %
PLAT MORPH BLD: ABNORMAL
PLATELET # BLD AUTO: 105 10E3/UL (ref 150–450)
POTASSIUM BLD-SCNC: 4.3 MMOL/L (ref 3.4–5.3)
PROT SERPL-MCNC: 7.6 G/DL (ref 6.8–8.8)
RBC # BLD AUTO: 3.11 10E6/UL (ref 3.8–5.2)
RBC MORPH BLD: ABNORMAL
SODIUM SERPL-SCNC: 141 MMOL/L (ref 133–144)
WBC # BLD AUTO: 7 10E3/UL (ref 4–11)

## 2021-07-30 PROCEDURE — 86304 IMMUNOASSAY TUMOR CA 125: CPT

## 2021-07-30 PROCEDURE — 85027 COMPLETE CBC AUTOMATED: CPT

## 2021-07-30 PROCEDURE — 36415 COLL VENOUS BLD VENIPUNCTURE: CPT

## 2021-07-30 PROCEDURE — 80053 COMPREHEN METABOLIC PANEL: CPT

## 2021-07-30 NOTE — ORAL ONC MGMT
Oral Chemotherapy Monitoring Program     Placed call to patient to discuss initiation of Lynparza oral chemotherapy and to provide patient education on the medication. Left message request call back. Left OC Program phone [phone], no medication names mentioned. Inbasket set to care team to clarify lab plan.     Matt Barajas, PharmD  Hematology/Oncology Clinical Pharmacist  [facility]  [facility]

## 2021-08-02 ENCOUNTER — TELEPHONE (OUTPATIENT)
Dept: ONCOLOGY | Facility: CLINIC | Age: 66
End: 2021-08-02

## 2021-08-02 ENCOUNTER — DOCUMENTATION ONLY (OUTPATIENT)
Dept: ONCOLOGY | Facility: CLINIC | Age: 66
End: 2021-08-02

## 2021-08-02 DIAGNOSIS — Z79.899 ENCOUNTER FOR LONG-TERM (CURRENT) USE OF MEDICATIONS: ICD-10-CM

## 2021-08-02 DIAGNOSIS — C56.9 OVARIAN CANCER, UNSPECIFIED LATERALITY (H): Primary | ICD-10-CM

## 2021-08-02 NOTE — TELEPHONE ENCOUNTER
Oral Chemotherapy Monitoring Program     Justen called back regarding her Lynparza oral chemotherapy.      Relayed to Justen that Dr. Juarez wants to hold off on starting Lynparza to let her blood counts recover a little bit.    Let her know that scheduling would be reaching out to her to schedule repeat labs the week of 8/16, and then OC pharmacy would be reaching out to her to start the Lynparza based on those labs.      Pt verbalized understanding and was grateful for the update. Pt would like to wait to complete her new teach until closer to her start date.    Grace Myhre, PharmD  Hematology/Oncology Clinical Pharmacist  [facility]  [facility]  [phone]

## 2021-08-04 NOTE — PROGRESS NOTES
"    August 4, 2021       TO: Taran Regalado  [address] [facility] [address]         Dear Ms. Regalado,    Our records indicate that you have not scheduled an appointment for a consult in our genetic counseling clinic, as recommended by Dr. Bolivar Juarez. If you wish to schedule within ealth, we have several options to help you schedule your appointment:      Call [phone]    Visit our website at [email or web address] and click \"I Want To\" (in upper right) and select Request an Appointment    Request an appointment via ImageSpike at [email or web address]     If you have chosen to schedule elsewhere or if you have already made an appointment, please disregard this letter.    If you have any questions or concerns regarding the information above, please contact the [facility] at [phone].      Sincerely,      [facility]    "

## 2021-08-20 ENCOUNTER — TELEPHONE (OUTPATIENT)
Dept: ONCOLOGY | Facility: CLINIC | Age: 66
End: 2021-08-20

## 2021-08-20 ENCOUNTER — LAB (OUTPATIENT)
Dept: LAB | Facility: CLINIC | Age: 66
End: 2021-08-20
Payer: COMMERCIAL

## 2021-08-20 DIAGNOSIS — C56.9 OVARIAN CANCER, UNSPECIFIED LATERALITY (H): ICD-10-CM

## 2021-08-20 DIAGNOSIS — C56.9 OVARIAN CANCER, UNSPECIFIED LATERALITY (H): Primary | ICD-10-CM

## 2021-08-20 DIAGNOSIS — Z79.899 ENCOUNTER FOR LONG-TERM (CURRENT) USE OF MEDICATIONS: ICD-10-CM

## 2021-08-20 LAB
ALBUMIN SERPL-MCNC: 3.7 G/DL (ref 3.4–5)
ALP SERPL-CCNC: 126 U/L (ref 40–150)
ALT SERPL W P-5'-P-CCNC: 26 U/L (ref 0–50)
ANION GAP SERPL CALCULATED.3IONS-SCNC: 5 MMOL/L (ref 3–14)
AST SERPL W P-5'-P-CCNC: 22 U/L (ref 0–45)
BASOPHILS # BLD AUTO: 0 10E3/UL (ref 0–0.2)
BASOPHILS NFR BLD AUTO: 1 %
BILIRUB SERPL-MCNC: 0.3 MG/DL (ref 0.2–1.3)
BUN SERPL-MCNC: 13 MG/DL (ref 7–30)
CALCIUM SERPL-MCNC: 9 MG/DL (ref 8.5–10.1)
CANCER AG125 SERPL-ACNC: 12 U/ML (ref 0–30)
CHLORIDE BLD-SCNC: 108 MMOL/L (ref 94–109)
CO2 SERPL-SCNC: 28 MMOL/L (ref 20–32)
CREAT SERPL-MCNC: 0.91 MG/DL (ref 0.52–1.04)
EOSINOPHIL # BLD AUTO: 0.1 10E3/UL (ref 0–0.7)
EOSINOPHIL NFR BLD AUTO: 2 %
ERYTHROCYTE [DISTWIDTH] IN BLOOD BY AUTOMATED COUNT: 19.3 % (ref 10–15)
GFR SERPL CREATININE-BSD FRML MDRD: 67 ML/MIN/1.73M2
GLUCOSE BLD-MCNC: 74 MG/DL (ref 70–99)
HCT VFR BLD AUTO: 31.6 % (ref 35–47)
HGB BLD-MCNC: 10.2 G/DL (ref 11.7–15.7)
IMM GRANULOCYTES # BLD: 0 10E3/UL
IMM GRANULOCYTES NFR BLD: 0 %
LYMPHOCYTES # BLD AUTO: 1.2 10E3/UL (ref 0.8–5.3)
LYMPHOCYTES NFR BLD AUTO: 45 %
MCH RBC QN AUTO: 34.3 PG (ref 26.5–33)
MCHC RBC AUTO-ENTMCNC: 32.3 G/DL (ref 31.5–36.5)
MCV RBC AUTO: 106 FL (ref 78–100)
MONOCYTES # BLD AUTO: 0.4 10E3/UL (ref 0–1.3)
MONOCYTES NFR BLD AUTO: 13 %
NEUTROPHILS # BLD AUTO: 1.1 10E3/UL (ref 1.6–8.3)
NEUTROPHILS NFR BLD AUTO: 39 %
NRBC # BLD AUTO: 0 10E3/UL
NRBC BLD AUTO-RTO: 1 /100
PLATELET # BLD AUTO: 129 10E3/UL (ref 150–450)
POTASSIUM BLD-SCNC: 4.2 MMOL/L (ref 3.4–5.3)
PROT SERPL-MCNC: 7.4 G/DL (ref 6.8–8.8)
RBC # BLD AUTO: 2.97 10E6/UL (ref 3.8–5.2)
SODIUM SERPL-SCNC: 141 MMOL/L (ref 133–144)
WBC # BLD AUTO: 2.7 10E3/UL (ref 4–11)

## 2021-08-20 PROCEDURE — 80053 COMPREHEN METABOLIC PANEL: CPT | Performed by: PATHOLOGY

## 2021-08-20 PROCEDURE — 85025 COMPLETE CBC W/AUTO DIFF WBC: CPT | Performed by: PATHOLOGY

## 2021-08-20 PROCEDURE — 86304 IMMUNOASSAY TUMOR CA 125: CPT | Performed by: OBSTETRICS & GYNECOLOGY

## 2021-08-20 PROCEDURE — 36415 COLL VENOUS BLD VENIPUNCTURE: CPT | Performed by: PATHOLOGY

## 2021-08-20 RX ORDER — PROCHLORPERAZINE MALEATE 10 MG
10 TABLET ORAL EVERY 6 HOURS PRN
Qty: 30 TABLET | Refills: 2 | Status: SHIPPED | OUTPATIENT
Start: 2021-08-20 | End: 2022-09-21

## 2021-08-20 NOTE — TELEPHONE ENCOUNTER
Oral Chemotherapy Monitoring Program     Placed call to Taran Regalado to discuss lab work from today as well as initiation of Lynparza oral chemotherapy to provide patient education on the medication.     Left a message requesting a call back. No drug names were mentioned in the voicemail. Will update when response is received.      Joelle Ndiaye, Leonel  Oral Chemotherapy Monitoring Program  [facility]  [phone]  August 20, 2021

## 2021-08-20 NOTE — TELEPHONE ENCOUNTER
Oral Chemotherapy Monitoring Program    Lab Monitoring Plan  CBC weekly for the first month, then monthly  CMP monthly  Labs drawn outside of [place]: Yes, [facility]. Phone: [phone]. Fax: [phone].  Subjective/Objective:  Taran Regalado is a 65 year old female contacted by phone for an initial visit for oral chemotherapy education.     ORAL CHEMOTHERAPY 7/29/2021 7/30/2021 8/2/2021 8/20/2021 8/20/2021   Assessment Type Other Other Incoming phone call Left Voicemail New Teach   Diagnosis Code Ovarian Cancer Ovarian Cancer Ovarian Cancer Ovarian Cancer Ovarian Cancer   Providers Dr. Larry Juarez   [facility]   Drug Name Lynparza (olaparib) Lynparza (olaparib) Lynparza (olaparib) Lynparza (olaparib) Lynparza (olaparib)   Dose 300 mg 300 mg 300 mg 300 mg 300 mg   Current Schedule BID BID BID BID BID   Cycle Details Continuous Continuous Continuous Continuous Continuous   Any new drug interactions? No - - - No   Is the dose as ordered appropriate for the patient? Yes - - - Yes       Last PHQ-2 Score on record:   PHQ-2 ( 1999 Pfizer) 4/13/2021 2/18/2021   Q1: Little interest or pleasure in doing things 0 0   Q2: Feeling down, depressed or hopeless 0 0   PHQ-2 Score 0 0       Vitals:  BP:   BP Readings from Last 1 Encounters:   07/26/21 113/74     Wt Readings from Last 1 Encounters:   07/26/21 69.8 kg (153 lb 14.4 oz)     Estimated body surface area is 1.88 meters squared as calculated from the following:    Height as of 4/19/21: 1.829 m (6').    Weight as of 7/26/21: 69.8 kg (153 lb 14.4 oz).    Labs:  _  Result Component Current Result Ref Range   Sodium 141 (8/20/2021) 133 - 144 mmol/L     _  Result Component Current Result Ref Range   Potassium 4.2 (8/20/2021) 3.4 - 5.3 mmol/L     _  Result Component Current Result Ref Range   Calcium 9.0 (8/20/2021) 8.5 - 10.1 mg/dL      _  Result Component Current Result Ref Range   Magnesium 2.1 (7/23/2021) 1.6 - 2.3 mg/dL     No results found for Phos within last 30 days.     _  Result Component Current Result Ref Range   Albumin 3.7 (8/20/2021) 3.4 - 5.0 g/dL     _  Result Component Current Result Ref Range   Urea Nitrogen 13 (8/20/2021) 7 - 30 mg/dL     _  Result Component Current Result Ref Range   Creatinine 0.91 (8/20/2021) 0.52 - 1.04 mg/dL     _  Result Component Current Result Ref Range   AST 22 (8/20/2021) 0 - 45 U/L     _  Result Component Current Result Ref Range   ALT 26 (8/20/2021) 0 - 50 U/L     _  Result Component Current Result Ref Range   Bilirubin Total 0.3 (8/20/2021) 0.2 - 1.3 mg/dL     _  Result Component Current Result Ref Range   WBC Count 2.7 (L) (8/20/2021) 4.0 - 11.0 10e3/uL     _  Result Component Current Result Ref Range   Hemoglobin 10.2 (L) (8/20/2021) 11.7 - 15.7 g/dL     _  Result Component Current Result Ref Range   Platelet Count 129 (L) (8/20/2021) 150 - 450 10e3/uL     _  Result Component Current Result Ref Range   Absolute Neutrophils 4.2 (7/30/2021) 1.6 - 8.3 10e3/uL       Assessment:  Patient is appropriate to start therapy.    Plan:  Basic chemotherapy teaching was reviewed with the patient including indication, start date of therapy, dose, administration, adverse effects, missed doses, food and drug interactions, monitoring, side effect management, office contact information, and safe handling. Written materials were provided and all questions answered.    Follow-Up:  1 week following the start of Lynparza therapy.        Joelle Ndiaye, PharmD, BCACP  Oral Chemotherapy Monitoring Program  [facility]  [phone]

## 2021-08-25 ENCOUNTER — MYC MEDICAL ADVICE (OUTPATIENT)
Dept: FAMILY MEDICINE | Facility: CLINIC | Age: 66
End: 2021-08-25

## 2021-08-25 DIAGNOSIS — G25.81 RESTLESS LEGS SYNDROME: ICD-10-CM

## 2021-08-25 NOTE — TELEPHONE ENCOUNTER
Routing refill request to provider for review/approval because:  Drug not on the [facility] refill protocol   Ella Sampson BSN, RN

## 2021-08-26 RX ORDER — GABAPENTIN 600 MG/1
600 TABLET ORAL AT BEDTIME
Qty: 90 TABLET | Refills: 0 | Status: SHIPPED | OUTPATIENT
Start: 2021-08-26 | End: 2023-01-01

## 2021-09-01 ENCOUNTER — TELEPHONE (OUTPATIENT)
Dept: ONCOLOGY | Facility: CLINIC | Age: 66
End: 2021-09-01

## 2021-09-01 NOTE — TELEPHONE ENCOUNTER
Oral Chemotherapy Monitoring Program     Placed call to patient in follow up of Lynparza oral chemotherapy. Left message requesting call back. No drug names were mentioned. Will update when response received.     Grace Myhre, PharmD  Hematology/Oncology Clinical Pharmacist  [facility]  [phone]

## 2021-09-03 ENCOUNTER — TELEPHONE (OUTPATIENT)
Dept: ONCOLOGY | Facility: CLINIC | Age: 66
End: 2021-09-03

## 2021-09-03 NOTE — TELEPHONE ENCOUNTER
Oral Chemotherapy Monitoring Program     Placed call to patient in follow up of oral chemotherapy. Left message requesting call back. No drug names were mentioned. Will update when response received.     Grace Myhre, PharmD  Hematology/Oncology Clinical Pharmacist  [facility]  [phone]

## 2021-09-08 ENCOUNTER — PATIENT OUTREACH (OUTPATIENT)
Dept: ONCOLOGY | Facility: CLINIC | Age: 66
End: 2021-09-08

## 2021-09-08 ENCOUNTER — TELEPHONE (OUTPATIENT)
Dept: ONCOLOGY | Facility: CLINIC | Age: 66
End: 2021-09-08

## 2021-09-08 DIAGNOSIS — C56.9 OVARIAN CANCER, UNSPECIFIED LATERALITY (H): Primary | ICD-10-CM

## 2021-09-08 NOTE — PROGRESS NOTES
Fax # [phone]   Attn: Reji BAILEY 125 standing order faxed and should be drawn with parpi labs.    Shawna Hinojosa RN

## 2021-09-08 NOTE — ORAL ONC MGMT
Oral Chemotherapy Monitoring Program     Placed call to patient in follow up of Lynparza therapy. Taran said she only started on 9/3/2021. She said things are going well so far with no side effects she has seen. She would like a callback after she has had at least a week on the medication. She will continue Lynparza and call if she has any concerns in the interim. She thanked me for the call.     Donald CortesD  [facility]  [phone]  September 8, 2021

## 2021-09-13 ENCOUNTER — TELEPHONE (OUTPATIENT)
Dept: PHARMACY | Facility: CLINIC | Age: 66
End: 2021-09-13

## 2021-09-13 NOTE — ORAL ONC MGMT
Oral Chemotherapy Monitoring Program    Subjective/Objective:  Taran Regalado is a 65 year old female contacted by phone for a follow-up visit for oral chemotherapy.  She confirmed the correct dose and schedule of lynparza. Patient denies any new/worsening side effects, missed doses, or medication changes. She is doing quite well on the medication at this time. She had labs drawn today locally.     ORAL CHEMOTHERAPY 8/20/2021 8/20/2021 9/1/2021 9/3/2021 9/8/2021 9/13/2021 9/13/2021   Assessment Type Left Voicemail New Teach Left Voicemail Left Voicemail Other Left Voicemail Initial Follow up;Lab Monitoring   Diagnosis Code Ovarian Cancer Ovarian Cancer Ovarian Cancer Ovarian Cancer Ovarian Cancer Ovarian Cancer Ovarian Cancer   Providers Dr. Larry Juarez   [facility]   Drug Name Lynparza (olaparib) Lynparza (olaparib) Lynparza (olaparib) Lynparza (olaparib) Lynparza (olaparib) Lynparza (olaparib) Lynparza (olaparib)   Dose 300 mg 300 mg 300 mg 300 mg 300 mg 300 mg 300 mg   Current Schedule BID BID BID BID BID BID BID   Cycle Details Continuous Continuous Continuous Continuous Continuous Continuous Continuous   Start Date of Last Cycle - - - - 9/3/2021 9/3/2021 9/3/2021   Planned next cycle start date - - - - 10/3/2021 10/3/2021 10/3/2021   Doses missed in last 2 weeks - - - - 0 - 0   Adherence Assessment - - - - Adherent - Adherent   Adverse Effects - - - - No AE identified during assessment - No AE identified during assessment   Any new drug interactions? - No - - - - No   Is the dose as ordered appropriate for the patient? - Yes - - - - Yes   Has the patient missed any days of school, work, or other routine activity? - - - - - - No   Since the last time we talked, have you been hospitalized or used the emergency room? - - - - - - No       Last PHQ-2 Score on  record:   PHQ-2 ( 1999 Pfizer) 4/13/2021 2/18/2021   Q1: Little interest or pleasure in doing things 0 0   Q2: Feeling down, depressed or hopeless 0 0   PHQ-2 Score 0 0       Vitals:  BP:   BP Readings from Last 1 Encounters:   07/26/21 113/74     Wt Readings from Last 1 Encounters:   07/26/21 69.8 kg (153 lb 14.4 oz)     Estimated body surface area is 1.88 meters squared as calculated from the following:    Height as of 4/19/21: 1.829 m (6').    Weight as of 7/26/21: 69.8 kg (153 lb 14.4 oz).    Labs:  _  Result Component Current Result Ref Range   Sodium 141 (8/20/2021) 133 - 144 mmol/L     _  Result Component Current Result Ref Range   Potassium 4.2 (8/20/2021) 3.4 - 5.3 mmol/L     _  Result Component Current Result Ref Range   Calcium 9.0 (8/20/2021) 8.5 - 10.1 mg/dL     No results found for Mag within last 30 days.     No results found for Phos within last 30 days.     _  Result Component Current Result Ref Range   Albumin 3.7 (8/20/2021) 3.4 - 5.0 g/dL     _  Result Component Current Result Ref Range   Urea Nitrogen 13 (8/20/2021) 7 - 30 mg/dL     _  Result Component Current Result Ref Range   Creatinine 0.91 (8/20/2021) 0.52 - 1.04 mg/dL     _  Result Component Current Result Ref Range   AST 22 (8/20/2021) 0 - 45 U/L     _  Result Component Current Result Ref Range   ALT 26 (8/20/2021) 0 - 50 U/L     _  Result Component Current Result Ref Range   Bilirubin Total 0.3 (8/20/2021) 0.2 - 1.3 mg/dL     _  Result Component Current Result Ref Range   WBC Count 2.7 (L) (8/20/2021) 4.0 - 11.0 10e3/uL     _  Result Component Current Result Ref Range   Hemoglobin 10.2 (L) (8/20/2021) 11.7 - 15.7 g/dL     _  Result Component Current Result Ref Range   Platelet Count 129 (L) (8/20/2021) 150 - 450 10e3/uL     No results found for ANC within last 30 days.         Assessment/Plan:  Tolerating therapy well. Labs reviewed from outside facility. WBC (2.8) and ANC (1.4) on the lower end of normal but no intervention needed at  this time. Will continue to monitor closely.     Follow-Up:  9/20: weekly labs    Refill Due:  Prior to 10/3    Michael Cunningham PharmD, MS  Hematology/Oncology Clinical Pharmacist  [facility]  [phone] (oral chemotherapy)  [phone] (infusion)

## 2021-09-13 NOTE — ORAL ONC MGMT
Oral Chemotherapy Monitoring Program     Placed call to patient in follow up of oral chemotherapy. Left message requesting call back. No drug names were mentioned. Will update when response received.     Michael Cunningham, PharmD, MS  Hematology/Oncology Clinical Pharmacist  [facility]  [facility]

## 2021-09-27 ENCOUNTER — MYC MEDICAL ADVICE (OUTPATIENT)
Dept: ONCOLOGY | Facility: CLINIC | Age: 66
End: 2021-09-27

## 2021-09-29 DIAGNOSIS — C56.9 OVARIAN CANCER, UNSPECIFIED LATERALITY (H): Primary | ICD-10-CM

## 2021-09-30 DIAGNOSIS — C56.9 OVARIAN CANCER, UNSPECIFIED LATERALITY (H): Primary | ICD-10-CM

## 2021-10-04 ENCOUNTER — TELEPHONE (OUTPATIENT)
Dept: ONCOLOGY | Facility: CLINIC | Age: 66
End: 2021-10-04

## 2021-10-04 NOTE — ORAL ONC MGMT
Oral Chemotherapy Monitoring Program  Lab Follow Up    Reviewed lab results from 10/4.            Assessment & Plan:  No concerning abnormalities.    Left message for patient with this information.    Follow-Up:  Labs monthly    Edelmira Carlos, PharmD, [facility]  Hematology/Oncology Clinical Pharmacist  [facility]  [facility]  [phone]

## 2021-10-06 ENCOUNTER — PATIENT OUTREACH (OUTPATIENT)
Dept: ONCOLOGY | Facility: CLINIC | Age: 66
End: 2021-10-06

## 2021-10-11 ENCOUNTER — HEALTH MAINTENANCE LETTER (OUTPATIENT)
Age: 66
End: 2021-10-11

## 2021-10-26 ENCOUNTER — TELEPHONE (OUTPATIENT)
Dept: ONCOLOGY | Facility: CLINIC | Age: 66
End: 2021-10-26

## 2021-10-26 NOTE — TELEPHONE ENCOUNTER
Enrollment Dates: 10/21/2021 - 12/31/2021 ID# [id number]    Daughter -Zita Argueta  [phone]  submitted  Bolivar Arnold - Lynparza 150mg 120/30 - Written on 10/20/2021 - one fill - was not filled out correctly.    Rx needed per [place]&Me 10/26/2021- electronically to [facility].     for Zita and talked to Taran about re-enrollment for 2022    Panda Schofield CPhT  [facility]  Oncology Pharmacy Liaison   [email or web address]  [phone] (phone  [phone] (fax

## 2021-10-27 DIAGNOSIS — C56.9 OVARIAN CANCER, UNSPECIFIED LATERALITY (H): Primary | ICD-10-CM

## 2021-10-29 DIAGNOSIS — C56.9 OVARIAN CANCER, UNSPECIFIED LATERALITY (H): Primary | ICD-10-CM

## 2021-11-02 ENCOUNTER — PATIENT OUTREACH (OUTPATIENT)
Dept: ONCOLOGY | Facility: CLINIC | Age: 66
End: 2021-11-02

## 2021-11-02 NOTE — TELEPHONE ENCOUNTER
Patient daughter returned RN.    RN and daughter reviewed  and Dr Juarez's thoughts.     Causes of  elevation discussed.    Review of patients  complete.     Signs and symptoms of concern reviewed     Daughter appreciative of RN time    Shawna Hinojosa RN

## 2021-11-02 NOTE — TELEPHONE ENCOUNTER
RN attempted to reach out to patient daughter to address MyChart message.    Daughter unavailable. Voicemail with call back number left.     Shawna Hinojosa RN

## 2021-11-03 ENCOUNTER — PATIENT OUTREACH (OUTPATIENT)
Dept: ONCOLOGY | Facility: CLINIC | Age: 66
End: 2021-11-03

## 2021-11-03 NOTE — TELEPHONE ENCOUNTER
MD reviewed PET scan.     Continue current plan with no plans of repeat scan unless new concerning symptoms arise.     RN answered all questions to the best of her ability.     Patient/daughter offered appt with MD to review and discuss plan ans scan. They decline appt at this time     Patient/daughter appreciative of RN time     Shawna Hinojosa RN

## 2021-11-08 ENCOUNTER — TELEPHONE (OUTPATIENT)
Dept: ONCOLOGY | Facility: CLINIC | Age: 66
End: 2021-11-08
Payer: COMMERCIAL

## 2021-11-08 NOTE — TELEPHONE ENCOUNTER
10/28 consent given to apply  11/2 emailed Mira Schofield CPhT  [facility]  Oncology Pharmacy Liaison   [email or web address]  [phone] (phone  [phone] (fax

## 2021-11-12 NOTE — TELEPHONE ENCOUNTER
Panda Schofield CPhT  [facility]  Oncology Pharmacy Liaison   [email or web address]  [phone] (phone  [phone] (fax

## 2021-11-12 NOTE — TELEPHONE ENCOUNTER
Prescription verified.    Shanda Matias, PharmD, BCACP  Oral Chemotherapy Monitoring Program  [facility]  [phone]  November 12, 2021

## 2021-11-17 DIAGNOSIS — C56.9 OVARIAN CANCER, UNSPECIFIED LATERALITY (H): Primary | ICD-10-CM

## 2021-11-18 ENCOUNTER — TELEPHONE (OUTPATIENT)
Dept: ONCOLOGY | Facility: CLINIC | Age: 66
End: 2021-11-18
Payer: COMMERCIAL

## 2021-11-18 NOTE — TELEPHONE ENCOUNTER
Oral Chemotherapy Monitoring Program    Subjective/Objective:  Taran Regalado is a 66 year old female contacted by phone for a follow-up visit for oral chemotherapy. Taran confirms she is taking olaparib 300mg twice daily and denies any missed doses. She reports that she is doing well and denies experiencing any side effects from olaparib, specifically nausea, diarrhea and constipation. Taran confirms that she has plans to have monthly labs drawn at a local clinic at the start of December.    ORAL CHEMOTHERAPY 9/13/2021 9/13/2021 9/27/2021 9/30/2021 10/27/2021 10/29/2021 11/18/2021   Assessment Type Left Voicemail Initial Follow up;Lab Monitoring Lab Monitoring Refill Refill Refill Monthly Follow up   Diagnosis Code Ovarian Cancer Ovarian Cancer Ovarian Cancer Ovarian Cancer Ovarian Cancer Ovarian Cancer Ovarian Cancer   Providers Dr. Larry Juarez   [facility]   Drug Name Lynparza (olaparib) Lynparza (olaparib) Lynparza (olaparib) Lynparza (olaparib) Lynparza (olaparib) Lynparza (olaparib) Lynparza (olaparib)   Dose 300 mg 300 mg 300 mg 300 mg 300 mg 300 mg 300 mg   Current Schedule BID BID BID BID BID BID BID   Cycle Details Continuous Continuous Continuous Continuous Continuous Continuous Continuous   Start Date of Last Cycle 9/3/2021 9/3/2021 - - - - -   Planned next cycle start date 10/3/2021 10/3/2021 - - - - -   Doses missed in last 2 weeks - 0 - - - - 0   Adherence Assessment - Adherent - - - - Adherent   Adverse Effects - No AE identified during assessment - - - - No AE identified during assessment   Any new drug interactions? - No - - - - No   Is the dose as ordered appropriate for the patient? - Yes - - - - Yes   Has the patient missed any days of school, work, or other routine activity? - No - - - - -   Since the last time we talked, have you  been hospitalized or used the emergency room? - No - - - - -       Last PHQ-2 Score on record:   PHQ-2 ( 1999 Pfizer) 4/13/2021 2/18/2021   Q1: Little interest or pleasure in doing things 0 0   Q2: Feeling down, depressed or hopeless 0 0   PHQ-2 Score 0 0       Vitals:  BP:   BP Readings from Last 1 Encounters:   07/26/21 113/74     Wt Readings from Last 1 Encounters:   07/26/21 69.8 kg (153 lb 14.4 oz)     Estimated body surface area is 1.88 meters squared as calculated from the following:    Height as of 4/19/21: 1.829 m (6').    Weight as of 7/26/21: 69.8 kg (153 lb 14.4 oz).     Labs        Assessment/Plan:  Taran is adherent and tolerating therapy well. In labs from 11/1, Taran has grade 2 neutropenia. Her ANC was up to 3.0 last month but was 1.0-1.5 throughout the month of September. We will continue to monitor her white blood cell counts closely with her next labs being ~12/1. Continue therapy as planned.    Follow-Up:  Review 12/1 labs in [place]  Pharmacy Intern  [facility]  [phone]

## 2021-11-19 DIAGNOSIS — C56.9 OVARIAN CANCER, UNSPECIFIED LATERALITY (H): Primary | ICD-10-CM

## 2021-12-01 ENCOUNTER — DOCUMENTATION ONLY (OUTPATIENT)
Dept: PHARMACY | Facility: CLINIC | Age: 66
End: 2021-12-01
Payer: COMMERCIAL

## 2021-12-01 NOTE — PROGRESS NOTES
Oral Chemotherapy Monitoring Program  Lab Follow Up    Reviewed lab results from 12/1/21.    ORAL CHEMOTHERAPY 9/13/2021 9/27/2021 9/30/2021 10/27/2021 10/29/2021 11/18/2021 11/19/2021   Assessment Type Initial Follow up;Lab Monitoring Lab Monitoring Refill Refill Refill Monthly Follow up Refill   Diagnosis Code Ovarian Cancer Ovarian Cancer Ovarian Cancer Ovarian Cancer Ovarian Cancer Ovarian Cancer Ovarian Cancer   Providers Dr. Larry Juarez   Clinic Name/Location [facility]   Drug Name Lynparza (olaparib) Lynparza (olaparib) Lynparza (olaparib) Lynparza (olaparib) Lynparza (olaparib) Lynparza (olaparib) Lynparza (olaparib)   Dose 300 mg 300 mg 300 mg 300 mg 300 mg 300 mg 300 mg   Current Schedule BID BID BID BID BID BID BID   Cycle Details Continuous Continuous Continuous Continuous Continuous Continuous Continuous   Start Date of Last Cycle 9/3/2021 - - - - - -   Planned next cycle start date 10/3/2021 - - - - - -   Doses missed in last 2 weeks 0 - - - - 0 -   Adherence Assessment Adherent - - - - Adherent -   Adverse Effects No AE identified during assessment - - - - No AE identified during assessment -   Any new drug interactions? No - - - - No -   Is the dose as ordered appropriate for the patient? Yes - - - - Yes -   Has the patient missed any days of school, work, or other routine activity? No - - - - - -   Since the last time we talked, have you been hospitalized or used the emergency room? No - - - - - -       Labs:              Assessment & Plan:  No concerning abnormalities. [employer] message sent with results.     Follow-Up:  1/3: monthly labs    Michael Cunningham PharmD, MS  Hematology/Oncology Clinical Pharmacist  [facility]  [phone] (oral chemotherapy)  [phone] (infusion)

## 2021-12-05 ENCOUNTER — TELEPHONE (OUTPATIENT)
Dept: OBGYN | Facility: CLINIC | Age: 66
End: 2021-12-05
Payer: COMMERCIAL

## 2021-12-06 ENCOUNTER — TELEPHONE (OUTPATIENT)
Dept: ONCOLOGY | Facility: CLINIC | Age: 66
End: 2021-12-06
Payer: COMMERCIAL

## 2021-12-06 NOTE — TELEPHONE ENCOUNTER
Oral Chemotherapy Monitoring Program     Taran Regalado's daughter, Edie, called in follow up of Lynparza oral chemotherapy. Caller stated her mom was suppose to have received her Lynparza refill last Friday; however, this did not happen. Her mother now has missed her Saturday, Sunday and this morning's dose. She is calling to see if we can get her medication refilled ASAP. Caller also notes that mom is under a lot of stress. She states her other daughter (caller's sister) was airlifted to [place] due to COVID and has been on a ventilator for the past two days. Caller is worried about her mom and the stress all of this is going to do to her. I apologized for the issues with the medication and provided empathy to the caller. Caller was appreciative of this.     Shawna Conde was notified of the refill/delivery issue and will follow-up with the pt and her daughter in regards to the delivery. Pharmacy was made aware of the issue and requested delivery asap.Caller was encouraged to call if she has not heard back in a few hours. Caller verbalized understanding and agrees to plan. Encouraged pt to call with any issues or concerns.      Shanda Matias, PharmD, BCACP  Oral Chemotherapy Monitoring Program  [facility]  [phone]  December 6, 2021

## 2021-12-06 NOTE — TELEPHONE ENCOUNTER
Received a page that the patient is out of her oral chemo medication (Lynparza).     Called the patient and spoke with her daughter, Edie. She says that Taran last took her Lynparza on 12/3 PM. She was supposed to receive a delivery on 12/3 or 12/4, however, still hasn't received it. She is wondering if we can help her get more. Taran is feeling well.     Discussed with Dr. Darling, who said that we can't do anything tonight as the meds need to be dispensed by a cancer center pharmacist. I explained this to the patient's daughter. She will call the gyn onc clinic tomorrow morning. I also sent a message to Mira Hinojosa, who is the RN on Dr. Juarez's clinic team, to help facilitate getting more Lynparza to the patient. The patient and her daughter agree with the plan.     Discussed with Dr. Darling.     Zeb Gerber MD  OB/GYN PGY-2  12/05/2021 6:35 PM

## 2021-12-08 ENCOUNTER — TELEPHONE (OUTPATIENT)
Dept: ONCOLOGY | Facility: CLINIC | Age: 66
End: 2021-12-08
Payer: COMMERCIAL

## 2021-12-08 DIAGNOSIS — C56.9 OVARIAN CANCER, UNSPECIFIED LATERALITY (H): ICD-10-CM

## 2021-12-08 NOTE — TELEPHONE ENCOUNTER
12/2 Taran Irizarry's daughter called, mom going to [place], need med asap.  Called [facility], set up order for next day delivery. Was told next day or Saturday at the latest. It was delivered cold on Monday around 10:30. I called Reji KNOX our field , she called me back with the following;  Medication went through route risk assessment for stability and say it is fine.  If the patient did not feel comfortable with that response she can call and ask for product replacement to be expedited.  12/8 Edie called with complaint #[id number] - [facility] refused the product replacement without a new rx- was initially told one was not needed.  Rx was [place] to [facility] - they had no stock and passed it along to SP.  Talked to [facility] and he agreed that his team will send out product for Friday delivery at the latest.  I will send email on product replacement process to [facility].  Updated Edie. She expressed her thanks to me and to [employer] for making this happen.    Panda

## 2021-12-08 NOTE — PROGRESS NOTES
Reordering Lynparza prescription to allow the pharmacy to send out new supply of medication as the order was exposed to extreme cold.

## 2021-12-22 DIAGNOSIS — C56.9 OVARIAN CANCER, UNSPECIFIED LATERALITY (H): Primary | ICD-10-CM

## 2022-01-03 ENCOUNTER — DOCUMENTATION ONLY (OUTPATIENT)
Dept: ONCOLOGY | Facility: CLINIC | Age: 67
End: 2022-01-03
Payer: COMMERCIAL

## 2022-01-03 ENCOUNTER — TELEPHONE (OUTPATIENT)
Dept: ONCOLOGY | Facility: CLINIC | Age: 67
End: 2022-01-03
Payer: COMMERCIAL

## 2022-01-03 NOTE — TELEPHONE ENCOUNTER
Oral Chemotherapy Monitoring Program    Subjective/Objective:  Taran Regalado is a 66 year old female contacted by phone for a follow-up visit for oral chemotherapy.  Taran confirms her dosing regimen for olaparib therapy: 300 mg (2 x 150 mg) BID. She denies any missed doses or new medications. Taran reports that she is doing well and denies any side effects from olaparib, specifically nausea, diarrhea and constipation. She states that she had labs drawn in [place] this morning.    ORAL CHEMOTHERAPY 10/27/2021 10/29/2021 11/18/2021 11/19/2021 12/6/2021 12/22/2021 1/3/2022   Assessment Type Refill Refill Monthly Follow up Refill Incoming phone call Refill;Other Monthly Follow up   Diagnosis Code Ovarian Cancer Ovarian Cancer Ovarian Cancer Ovarian Cancer Ovarian Cancer Ovarian Cancer Ovarian Cancer   Providers Dr. Larry Juarez   [facility]   Drug Name Lynparza (olaparib) Lynparza (olaparib) Lynparza (olaparib) Lynparza (olaparib) Lynparza (olaparib) Lynparza (olaparib) Lynparza (olaparib)   Dose 300 mg 300 mg 300 mg 300 mg 300 mg - 300 mg   Current Schedule BID BID BID BID BID - BID   Cycle Details Continuous Continuous Continuous Continuous Continuous - Continuous   Start Date of Last Cycle - - - - - - -   Planned next cycle start date - - - - - - -   Doses missed in last 2 weeks - - 0 - more - 0   Adherence Assessment - - Adherent - - - Adherent   Adverse Effects - - No AE identified during assessment - - - No AE identified during assessment   Any new drug interactions? - - No - - - No   Is the dose as ordered appropriate for the patient? - - Yes - - - Yes   Has the patient missed any days of school, work, or other routine activity? - - - - - - -   Since the last time we talked, have you been hospitalized or used the emergency room? - - - - - - -        Last PHQ-2 Score on record:   PHQ-2 ( 1999 Pfizer) 4/13/2021 2/18/2021   Q1: Little interest or pleasure in doing things 0 0   Q2: Feeling down, depressed or hopeless 0 0   PHQ-2 Score 0 0   PHQ-2 Total Score (12-17 Years)- Positive if 3 or more points; Administer PHQ-A if positive 0 0       Vitals:  BP:   BP Readings from Last 1 Encounters:   07/26/21 113/74     Wt Readings from Last 1 Encounters:   07/26/21 69.8 kg (153 lb 14.4 oz)     Estimated body surface area is 1.88 meters squared as calculated from the following:    Height as of 4/19/21: 1.829 m (6').    Weight as of 7/26/21: 69.8 kg (153 lb 14.4 oz).    Assessment/Plan:  Taran is adherent and tolerating olaparib therapy well with no concerns. She had monthly labs drawn this morning, so we will review these in [facility] later today. Continue therapy as planned.    Follow-Up:  -Review labs in [facility] today  -Monthly labs and assessment ~2/3    Refill Due:  1/18/22    Keli Bolton  Pharmacy Intern  [facility]  [phone]

## 2022-01-03 NOTE — PROGRESS NOTES
Oral Chemotherapy Monitoring Program  Lab Follow-up:    Sent [employer] message to patient in follow up of lab work completed 1/3 for olaparib oral chemotherapy.      Labs:        Assessment/Plan:  There are no concerning abnormalities with lab results from this morning. Continue therapy as planned.    Follow-Up:  Monthly labs and assessment ~2/3    Keli Bolton  Pharmacy Intern  [facility]  [phone]     Patient called regarding message about his blood work and he stated he will be going on 02/07/19   Patient sees Jeaneth Tracy

## 2022-01-06 ENCOUNTER — TELEPHONE (OUTPATIENT)
Dept: ONCOLOGY | Facility: CLINIC | Age: 67
End: 2022-01-06
Payer: COMMERCIAL

## 2022-01-07 DIAGNOSIS — C56.9 OVARIAN CANCER, UNSPECIFIED LATERALITY (H): Primary | ICD-10-CM

## 2022-01-10 ENCOUNTER — TELEPHONE (OUTPATIENT)
Dept: ONCOLOGY | Facility: CLINIC | Age: 67
End: 2022-01-10
Payer: COMMERCIAL

## 2022-01-10 NOTE — TELEPHONE ENCOUNTER
Panda Schofield CPhT  [facility]  Oncology Pharmacy Liaison   [email or web address]  [phone] (phone  [phone] (fax

## 2022-01-30 ENCOUNTER — HEALTH MAINTENANCE LETTER (OUTPATIENT)
Age: 67
End: 2022-01-30

## 2022-02-03 ENCOUNTER — MYC MEDICAL ADVICE (OUTPATIENT)
Dept: ONCOLOGY | Facility: CLINIC | Age: 67
End: 2022-02-03
Payer: COMMERCIAL

## 2022-02-03 DIAGNOSIS — C56.9 OVARIAN CANCER, UNSPECIFIED LATERALITY (H): Primary | ICD-10-CM

## 2022-02-03 NOTE — TELEPHONE ENCOUNTER
Oral Chemotherapy Monitoring Program  Lab Follow Up    Reviewed lab results from 2/1/22.    ORAL CHEMOTHERAPY 11/18/2021 11/19/2021 12/6/2021 12/22/2021 1/3/2022 1/3/2022 2/3/2022   Assessment Type Monthly Follow up Refill Incoming phone call Refill;Other Monthly Follow up Lab Monitoring Lab Monitoring   Diagnosis Code Ovarian Cancer Ovarian Cancer Ovarian Cancer Ovarian Cancer Ovarian Cancer Ovarian Cancer Ovarian Cancer   Providers Dr. Larry Juarez   [facility]   Drug Name Lynparza (olaparib) Lynparza (olaparib) Lynparza (olaparib) Lynparza (olaparib) Lynparza (olaparib) Lynparza (olaparib) Lynparza (olaparib)   Dose 300 mg 300 mg 300 mg - 300 mg 300 mg 300 mg   Current Schedule BID BID BID - BID BID BID   Cycle Details Continuous Continuous Continuous - Continuous Continuous Continuous   Start Date of Last Cycle - - - - - - -   Planned next cycle start date - - - - - - -   Doses missed in last 2 weeks 0 - more - 0 - -   Adherence Assessment Adherent - - - Adherent - -   Adverse Effects No AE identified during assessment - - - No AE identified during assessment - -   Any new drug interactions? No - - - No - -   Is the dose as ordered appropriate for the patient? Yes - - - Yes - -   Has the patient missed any days of school, work, or other routine activity? - - - - - - -   Since the last time we talked, have you been hospitalized or used the emergency room? - - - - - - -       Labs:            Assessment & Plan:  Results show no concerning abnormalities. [employer] message sent to the patient on the results. Continue Lynparza therapy as planned.     Follow-Up:  3/3: labs      Joelle Ndiaye PharmD, BCACP  Oral Chemotherapy Monitoring Program  [facility]  [phone]

## 2022-02-28 DIAGNOSIS — C56.9 OVARIAN CANCER, UNSPECIFIED LATERALITY (H): Primary | ICD-10-CM

## 2022-03-03 ENCOUNTER — TELEPHONE (OUTPATIENT)
Dept: ONCOLOGY | Facility: CLINIC | Age: 67
End: 2022-03-03
Payer: COMMERCIAL

## 2022-03-03 NOTE — TELEPHONE ENCOUNTER
Oral Chemotherapy Monitoring Program    Subjective/Objective:  Taran Regalado is a 66 year old female contacted by phone for a follow-up visit for oral chemotherapy.  Taran confirms taking the appropriate dose of olaparib (Lynparza) 300mg (2 x 150mg tabs) BID at 10:30am and 10:30pm. Denies new or worsening side effects, missed doses, and recent hospital or ED visits. Patient has not had any recent medication changes. Patient requests medication delivered as soon as we are able.    CMP/CBC labs from 3/1/22 in Care Everywhere reviewed and discussed with patient.    ORAL CHEMOTHERAPY 12/22/2021 1/3/2022 1/3/2022 2/3/2022 2/3/2022 2/28/2022 3/3/2022   Assessment Type Refill;Other Monthly Follow up Lab Monitoring Lab Monitoring Refill Refill Lab Monitoring;Monthly Follow up   Diagnosis Code Ovarian Cancer Ovarian Cancer Ovarian Cancer Ovarian Cancer Ovarian Cancer Ovarian Cancer Ovarian Cancer   Providers Dr. Larry Juarez   [facility]   Drug Name Lynparza (olaparib) Lynparza (olaparib) Lynparza (olaparib) Lynparza (olaparib) Lynparza (olaparib) Lynparza (olaparib) Lynparza (olaparib)   Dose - 300 mg 300 mg 300 mg 300 mg 300 mg 300 mg   Current Schedule - BID BID BID BID BID BID   Cycle Details - Continuous Continuous Continuous Continuous Continuous Continuous   Start Date of Last Cycle - - - - - - -   Planned next cycle start date - - - - - - 3/4/2022   Doses missed in last 2 weeks - 0 - - - - 0   Adherence Assessment - Adherent - - - - Adherent   Adverse Effects - No AE identified during assessment - - - - No AE identified during assessment   Any new drug interactions? - No - - - - No   Is the dose as ordered appropriate for the patient? - Yes - - - - Yes   Has the patient missed any days of school, work, or other routine activity? - - - - - - -   Since the last  time we talked, have you been hospitalized or used the emergency room? - - - - - - No       Last PHQ-2 Score on record:   PHQ-2 ( 1999 [employer]) 4/13/2021 2/18/2021   Q1: Little interest or pleasure in doing things 0 0   Q2: Feeling down, depressed or hopeless 0 0   PHQ-2 Score 0 0   PHQ-2 Total Score (12-17 Years)- Positive if 3 or more points; Administer PHQ-A if positive 0 0       Vitals:  BP:   BP Readings from Last 1 Encounters:   07/26/21 113/74     Wt Readings from Last 1 Encounters:   07/26/21 69.8 kg (153 lb 14.4 oz)     Estimated body surface area is 1.88 meters squared as calculated from the following:    Height as of 4/19/21: 1.829 m (6').    Weight as of 7/26/21: 69.8 kg (153 lb 14.4 oz).    Labs:  No results found for NA within last 30 days.     No results found for K within last 30 days.     No results found for CA within last 30 days.     No results found for Mag within last 30 days.     No results found for Phos within last 30 days.     No results found for ALBUMIN within last 30 days.     No results found for BUN within last 30 days.     No results found for CR within last 30 days.     No results found for AST within last 30 days.     No results found for ALT within last 30 days.     No results found for BILITOTAL within last 30 days.     No results found for WBC within last 30 days.     No results found for HGB within last 30 days.     No results found for PLT within last 30 days.     No results found for ANC within last 30 days.     No results found for ANC within last 30 days.          Assessment/Plan:  Patient is stable on Lynparza without concerns. Labs show no concerning abnormalities. Continue olaparib (Lynparza) as planned.    Follow-Up:  3/23/22: appointment with ANTONY Reeves  3/28/22: check for monthly labs in Care Everywhere; phone call for next monthly assessment and refill    Refill Due:  [facility] to deliver Lynparza tomorrow, 3/4/22, per patient preference.    Moisés Ovalle, PharmD  PGY2  Oncology Pharmacy Resident  [facility] - [place], [place]

## 2022-03-28 ENCOUNTER — TELEPHONE (OUTPATIENT)
Dept: ONCOLOGY | Facility: CLINIC | Age: 67
End: 2022-03-28
Payer: COMMERCIAL

## 2022-03-28 DIAGNOSIS — C56.9 OVARIAN CANCER, UNSPECIFIED LATERALITY (H): Primary | ICD-10-CM

## 2022-03-28 NOTE — TELEPHONE ENCOUNTER
Oral Chemotherapy Monitoring Program    Subjective/Objective:  Taran Regalado is a 66 year old female contacted by phone for a follow-up visit for oral chemotherapy.  Pt confirms taking the appropriate dose of Lynparza, 300mg ([id number]) twice daily. Denies new or worsening side effects, missed doses, and recent hospital or ED visits. Patient has not had any recent medication changes.     ORAL CHEMOTHERAPY 1/3/2022 2/3/2022 2/3/2022 2/28/2022 3/3/2022 3/28/2022 3/28/2022   Assessment Type Lab Monitoring Lab Monitoring Refill Refill Lab Monitoring;Monthly Follow up Refill Monthly Follow up   Diagnosis Code Ovarian Cancer Ovarian Cancer Ovarian Cancer Ovarian Cancer Ovarian Cancer Ovarian Cancer Ovarian Cancer   Providers Dr. Larry Juarez   [facility]   Drug Name Lynparza (olaparib) Lynparza (olaparib) Lynparza (olaparib) Lynparza (olaparib) Lynparza (olaparib) Lynparza (olaparib) Lynparza (olaparib)   Dose 300 mg 300 mg 300 mg 300 mg 300 mg 300 mg 300 mg   Current Schedule BID BID BID BID BID BID BID   Cycle Details Continuous Continuous Continuous Continuous Continuous Continuous Continuous   Start Date of Last Cycle - - - - - - -   Planned next cycle start date - - - - 3/4/2022 - -   Doses missed in last 2 weeks - - - - 0 - -   Adherence Assessment - - - - Adherent - -   Adverse Effects - - - - No AE identified during assessment - -   Any new drug interactions? - - - - No - -   Is the dose as ordered appropriate for the patient? - - - - Yes - -   Has the patient missed any days of school, work, or other routine activity? - - - - - - -   Since the last time we talked, have you been hospitalized or used the emergency room? - - - - No - -       Last PHQ-2 Score on record:   PHQ-2 ( 1999 [employer]) 4/13/2021 2/18/2021   Q1: Little interest or pleasure in doing things 0  0   Q2: Feeling down, depressed or hopeless 0 0   PHQ-2 Score 0 0   PHQ-2 Total Score (12-17 Years)- Positive if 3 or more points; Administer PHQ-A if positive 0 0       Vitals:  BP:   BP Readings from Last 1 Encounters:   07/26/21 113/74     Wt Readings from Last 1 Encounters:   07/26/21 69.8 kg (153 lb 14.4 oz)     Estimated body surface area is 1.88 meters squared as calculated from the following:    Height as of 4/19/21: 1.829 m (6').    Weight as of 7/26/21: 69.8 kg (153 lb 14.4 oz).    Labs:  No results found for NA within last 30 days.     No results found for K within last 30 days.     No results found for CA within last 30 days.     No results found for Mag within last 30 days.     No results found for Phos within last 30 days.     No results found for ALBUMIN within last 30 days.     No results found for BUN within last 30 days.     No results found for CR within last 30 days.     No results found for AST within last 30 days.     No results found for ALT within last 30 days.     No results found for BILITOTAL within last 30 days.     No results found for WBC within last 30 days.     No results found for HGB within last 30 days.     No results found for PLT within last 30 days.     No results found for ANC within last 30 days.     No results found for ANC within last 30 days.      Assessment/Plan:  Pt continues to tolerate Lynparza well. Continue current therapy as planned.    Follow-Up:  4/1: look for labs in [facility]    Refill Due:  [facility] to deliver Lynparza 3/31    Grace Myhre, PharmD  Hematology/Oncology Pharmacist  [facility]  [facility]  [phone]

## 2022-04-04 ENCOUNTER — MYC MEDICAL ADVICE (OUTPATIENT)
Dept: ONCOLOGY | Facility: CLINIC | Age: 67
End: 2022-04-04
Payer: COMMERCIAL

## 2022-04-04 NOTE — TELEPHONE ENCOUNTER
Oral Chemotherapy Monitoring Program  Lab Follow Up    Reviewed CBC and CMP lab results from 4/1/2022.    ORAL CHEMOTHERAPY 2/3/2022 2/3/2022 2/28/2022 3/3/2022 3/28/2022 3/28/2022 4/4/2022   Assessment Type Lab Monitoring Refill Refill Lab Monitoring;Monthly Follow up Refill Monthly Follow up Lab Monitoring   Diagnosis Code Ovarian Cancer Ovarian Cancer Ovarian Cancer Ovarian Cancer Ovarian Cancer Ovarian Cancer Ovarian Cancer   Providers Dr. Larry Juarez   [facility]   Drug Name Lynparza (olaparib) Lynparza (olaparib) Lynparza (olaparib) Lynparza (olaparib) Lynparza (olaparib) Lynparza (olaparib) Lynparza (olaparib)   Dose 300 mg 300 mg 300 mg 300 mg 300 mg 300 mg 300 mg   Current Schedule BID BID BID BID BID BID BID   Cycle Details Continuous Continuous Continuous Continuous Continuous Continuous Continuous   Start Date of Last Cycle - - - - - - -   Planned next cycle start date - - - 3/4/2022 - - -   Doses missed in last 2 weeks - - - 0 - - -   Adherence Assessment - - - Adherent - - -   Adverse Effects - - - No AE identified during assessment - - -   Any new drug interactions? - - - No - - -   Is the dose as ordered appropriate for the patient? - - - Yes - - -   Has the patient missed any days of school, work, or other routine activity? - - - - - - -   Since the last time we talked, have you been hospitalized or used the emergency room? - - - No - - -       Labs:          Assessment & Plan:  Labs are stable with no concerning abnormalities. Continue Lynparza as scheduled.     Follow-Up:  4/22/2022: Monthly assessment and refills  ~5/2/2022: Look for monthly labs    Jannette Crowe  4th Year Pharmacy Student  [employer]

## 2022-04-25 DIAGNOSIS — C56.9 OVARIAN CANCER, UNSPECIFIED LATERALITY (H): Primary | ICD-10-CM

## 2022-04-26 DIAGNOSIS — C56.9 OVARIAN CANCER, UNSPECIFIED LATERALITY (H): Primary | ICD-10-CM

## 2022-05-06 ASSESSMENT — ENCOUNTER SYMPTOMS
HOARSE VOICE: 0
INSOMNIA: 0
DYSPNEA ON EXERTION: 0
WHEEZING: 0
MYALGIAS: 0
WEIGHT LOSS: 0
COUGH: 0
MUSCLE WEAKNESS: 0
SORE THROAT: 0
HYPERTENSION: 0
HEMATURIA: 0
SMELL DISTURBANCE: 0
ARTHRALGIAS: 0
WEAKNESS: 0
JOINT SWELLING: 0
HOT FLASHES: 0
PANIC: 0
JAUNDICE: 0
NAUSEA: 0
ORTHOPNEA: 0
DIARRHEA: 0
SPEECH CHANGE: 0
TACHYCARDIA: 0
PARALYSIS: 0
SHORTNESS OF BREATH: 0
SWOLLEN GLANDS: 0
EYE REDNESS: 0
POLYPHAGIA: 0
EYE PAIN: 0
BOWEL INCONTINENCE: 0
NIGHT SWEATS: 0
CHILLS: 0
NECK MASS: 0
CONSTIPATION: 0
FATIGUE: 0
DIFFICULTY URINATING: 0
NUMBNESS: 0
NECK PAIN: 0
BREAST PAIN: 0
LOSS OF CONSCIOUSNESS: 0
SINUS PAIN: 0
DECREASED APPETITE: 0
SEIZURES: 0
INCREASED ENERGY: 0
TROUBLE SWALLOWING: 0
MEMORY LOSS: 0
TREMORS: 0
POOR WOUND HEALING: 0
EYE WATERING: 0
WEIGHT GAIN: 0
DOUBLE VISION: 0
RECTAL BLEEDING: 0
NAIL CHANGES: 0
POLYDIPSIA: 0
DISTURBANCES IN COORDINATION: 0
BRUISES/BLEEDS EASILY: 0
FLANK PAIN: 0
DECREASED LIBIDO: 0
BLOOD IN STOOL: 0
TINGLING: 0
HALLUCINATIONS: 0
SYNCOPE: 0
HEADACHES: 0
SKIN CHANGES: 0
SLEEP DISTURBANCES DUE TO BREATHING: 0
HEMOPTYSIS: 0
COUGH DISTURBING SLEEP: 0
BREAST MASS: 0
RESPIRATORY PAIN: 0
FEVER: 0
TASTE DISTURBANCE: 0
PALPITATIONS: 0
HYPOTENSION: 0
EXERCISE INTOLERANCE: 0
ABDOMINAL PAIN: 0
DYSURIA: 0
POSTURAL DYSPNEA: 0
CLAUDICATION: 0
SNORES LOUDLY: 0
NERVOUS/ANXIOUS: 0
DIZZINESS: 0
LEG SWELLING: 0
EXTREMITY NUMBNESS: 0
BACK PAIN: 0
SINUS CONGESTION: 0
LEG PAIN: 0
SPUTUM PRODUCTION: 0
BLOATING: 0
VOMITING: 0
RECTAL PAIN: 0
HEARTBURN: 0
EYE IRRITATION: 0
MUSCLE CRAMPS: 0
LIGHT-HEADEDNESS: 0
DEPRESSION: 0
ALTERED TEMPERATURE REGULATION: 0
STIFFNESS: 0
DECREASED CONCENTRATION: 0

## 2022-05-06 NOTE — PROGRESS NOTES
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 She is here today for follow up and surveillance.      Oncology History:  12/3/2020: US Pelvic: IMPRESSION: Limited examination due to acoustic windows. Possible left adnexal mass. A CT scan of the abdomen and pelvis with contrast is recommended for further assessment.     2020: CT A/P:   IMPRESSION:    Peritoneal carcinomatosis with masslike peritoneal thickening in the lower pelvis which may indicate an adnexal or ovarian primary malignancy. Large volume ascites. Bilateral pleural effusions. There is potential subtle pleural nodularity in the right hemithorax which could indicate metastatic disease.  Indeterminate 1 cm lesion in the right hepatic lobe suspicious for a metastatic lesion.      2020: Presented to [facility] with abdominal distention, 25lb weight loss, and CTAP with carcinomatosis, elevated  3098.     2020: CT Chest: IMPRESSION:   1. There are few scattered small sub-6 mm pulmonary nodules which are indeterminate without prior comparisons available. There are a few  slightly larger perifissural nodules which are technically  indeterminate in the setting of malignancy although presumed lymphatic in nature and of unlikely clinical significance. Attention on follow-up is recommended.  2. Small to moderate left and small right pleural effusions are increased in size from prior. No convincing evidence for pleural nodularity.  3. Partially visualized large volume ascites and peritoneal nodularity in the upper abdomen similar to 2020 outside CT      2020: ED for abdominal distension; 3 L ascites drained with paracentesis    Pelvic US: Findings: Free fluid present in LLQ      2020: US Paracentesis: 900 mL ascites drained     2021: Diagnotic  laparoscopy, biopsies  Pathology: FINAL DIAGNOSIS:   A. PERITONEUM, BIOPSIES:   - Positive for high grade carcinoma, consistent with metastatic carcinoma of Mullerian origin.     1/10-1/13/2021: Hospital admission for postoperative non-intractable vomiting and nausea.      1/10/2021: CT A/P: IMPRESSION: Extensive ascites which is probably malignant. Scattered liver hypodensities of indeterminate etiology comment cannot exclude metastatic disease. Diverticulosis. Fluid-filled adnexal masses and irregular appearance of uterus, which may represent primary neoplasm. Multiple peritoneal nodules. Large amount of fecal material in the colon with no evidence of small bowel obstruction.     Plan: Paclitaxel 175 mg/m2 and carboplatin AUC 6 x 3 cycles followed by a CT CAP and visit with Dr. Juarez.     1/12/2021: Cycle 1 paclitaxel and carboplatin while inpatient     1/13/2021: CT Head: Impression:  1. Chronic sinusitis of the right maxillary and right sphenoid sinuses.  2. Incidental presumed calcified meningioma in the right frontal  convexity without significant mass effect.  3. No suspicious intracranial enhancing lesion.     2/1/2021: Cycle 2 paclitaxel and carboplatin.  936.     2/3-2/5/21: Admission [facility] for afib w/ RVR and new PE     2/26/21: Cycle 3 paclitaxel and carboplatin planned.  Deferred due to thrombocytopenia.  Deferred 3/12/21 due to neutropenia.  Given on 3/15/21 after Filgrastim injection x 2.  Add Pegfilgrastim to day 2 of treatment plan.   129.      4/2/21: CT CAP  IMPRESSION:   In this patient with a history of metastatic serous ovarian cancer,  status post diagnostic laparoscopy and neoadjuvant chemotherapy:  1. Significant improvement in peritoneal carcinomatosis as discussed  above.  2. Resolution of previously seen scattered small hypoattenuating  lesions in the liver. This raises the concern for these lesions  representing metastatic disease, noting excellent response elsewhere  in  abdomen and pelvis.  3. Bilateral pleural effusions have resolved.  4. Several small pulmonary nodules in the right lung measuring up to 5  mm. Indeterminate, but likely benign in the setting of their stability  with excellent response elsewhere. Continued attention on follow-up.     4/19/21: HYSTERECTOMY, TOTAL, ABDOMINAL, WITH BILATERAL SALPINGO-OOPHORECTOMY, omentectomy, NEOPLASM DEBULKING,Proctoscocy, RO, Resection of liver nodules, diaphragm stripping, immobilization of liver and colon  FINAL DIAGNOSIS:   A. OMENTUM, BIOPSY:   - Omental adipose tissue with rare viable cells of metastatic high grade   serous carcinoma   - One reactive lymph node, negative for malignancy (0/1)   B. NODULE, SIGMOID, EXCISION:   - Calcified necrotic adipose tissue   - Negative for malignancy   C. NODULE, SMALL BOWEL MESENTERY, EXCISION:   - Fibroadipose tissue, positive for metastatic high grade serous carcinoma   D. UTERUS, CERVIX, BILATERAL FALLOPIAN TUBES AND OVARIES, HYSTERECTOMY   WITH BILATERAL SALPINGO-OOPHORECTOMY:   - Atrophic endometrium   - Uterine serosa with rare viable cells consistent with high grade serous   carcinoma   - Cervix with atrophic changes   - Viable cells consistent with high grade serous carcinoma present in the   right ovary, serosa of right   fallopian tube and right periadnexal soft tissue   - Left ovary with atrophic changes   - Left fallopian tube with a rare focus of serous tubal in-situ carcinoma   (STIC)   E. NODULES, SMALL BOWEL MESENTRY, EXCISION:   - Fibroadipose tissue with rare viable cells of metastatic high grade   serous carcinoma   F. NODULE, SPLENIC FLEXURE TRANSVERSE COLON, EXCISION:   - Fibroadipose tissue with rare viable cells of metastatic high grade   serous carcinoma   - Accessory splenule, negative for malignancy   G. OMENTUM, OMENTECTOMY:   - Omental adipose tissue with rare viable cells of metastatic high grade   serous carcinoma   H. NODULE, PERITONEAL PANCREATIC, EXCISION:    - Fibrous adhesions with inflammation   - Negative for malignancy   I. RIGHT HEMIDIAPHRAGM PERITONEUM, EXCISION:   - Fibrous adhesions with inflammation   - Negative for malignancy   J. RIGHT LIVER SURFACE NODULE:   - Fibrous adhesions with benign inclusion glands   - Negative for malignancy   K. LEFT LOWER LIVER EDGE, BIOPSY:   - Cauterized hepatic parenchyma and capsule   - Negative for malignancy   L. NODULE, SMALL BOWEL MESENTERIC #3, EXCISION:   - Fibroadipose tissue with rare viable cells of metastatic high grade   serous carcinoma       Plan: Carboplatin AUC 6 + Taxol 175 mg/m2 x 3 cycles, then transition to Parp inhibitor for maintenance therapy given her BRCA1 germline mutation.      5/21/21: Cycle 4 carboplatin and paclitaxel.   172.  6/11/21: Cycle 5 carboplatin and paclitaxel.   61.  7/2/21: Cycle 6 carboplatin and paclitaxel.   20.     7/28/21 plan: Olaparib 300mg bid as starting dose,  14  8/20/21: start date olaparib 300 mg BID,  12  9/13/21:  22  10/4/21:  23  11/1/21:  26    11/02/2021: PET CT: IMPRESSION:   Findings compatible with interval surgery and posttreatment change.  No gross definitive FDG avid disease.  Potential foci of tumor deposits along the anterior dome of the liver and midline abdominal wall surgical scar.  Colonic activity is not necessarily abnormal, however, given the previous carcinomatosis the colonic activity is indeterminant.       12/1/21:  23  1/3/22:  21  2/1/22:  20  3/1/22:  21  4/1/22:  23  5/4/22:  28          Daughter, Edie with patient today during visit. Edie reports that Justen has fatigue with short walks while carrying objects which is new. Has gone home from work early due to fatigue twice over the last month. 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 Pt reports that she walks five miles per day without LOPEZ or fatigue. She denies lightheadedness or  dizziness. Reports that eating a few meals with protein throughout the day. Weight has stabilized.     Gabapentin has decreased to 300 mg and reports that neuropathy with tingling and reports this is stable at this dose of Gabapentin. She denies any vaginal bleeding, no changes in her bowel or bladder habits, no nausea/emesis, no lower extremity edema, and no difficulties eating or sleeping. She denies any abdominal discomfort/bloating, no fevers or chills, and no chest pain. Pt would like to keep follow up visits in [place] but labs at [facility] in [facility]. Pt would like to know if she should continue Xarelto for her PE hx.           Health Maintenance  Colonoscopy- due for this   Mammogram- 10/2021  DEXA- due for this   Annual physical- 1/2022          Review of Systems     Constitutional:  Negative for fever, chills, weight loss, weight gain, fatigue, decreased appetite, night sweats, recent stressors, height gain, height loss, post-operative complications, incisional pain, hallucinations, increased energy, hyperactivity and confused.   HENT:  Negative for ear pain, hearing loss, tinnitus, nosebleeds, trouble swallowing, hoarse voice, mouth sores, sore throat, ear discharge, tooth pain, gum tenderness, taste disturbance, smell disturbance, hearing aid, bleeding gums, dry mouth, sinus pain, sinus congestion and neck mass.    Eyes:  Negative for double vision, pain, redness, eye pain, decreased vision, eye watering, eye bulging, eye dryness, flashing lights, spots, floaters, strabismus, tunnel vision, jaundice and eye irritation.   Respiratory:   Negative for cough, hemoptysis, sputum production, shortness of breath, wheezing, sleep disturbances due to breathing, snores loudly, respiratory pain, dyspnea on exertion, cough disturbing sleep and postural dyspnea.    Cardiovascular:  Negative for chest pain, dyspnea on exertion, palpitations, orthopnea, claudication, leg swelling, fingers/toes turn blue,  hypertension, hypotension, syncope, history of heart murmur, chest pain on exertion, chest pain at rest, pacemaker, few scattered varicosities, leg pain, sleep disturbances due to breathing, tachycardia, light-headedness, exercise intolerance and edema.   Gastrointestinal:  Negative for heartburn, nausea, vomiting, abdominal pain, diarrhea, constipation, blood in stool, melena, rectal pain, bloating, hemorrhoids, bowel incontinence, jaundice, rectal bleeding, coffee ground emesis and change in stool.   Genitourinary:  Negative for bladder incontinence, dysuria, urgency, hematuria, flank pain, vaginal discharge, difficulty urinating, genital sores, dyspareunia, decreased libido, nocturia, voiding less frequently, arousal difficulty, abnormal vaginal bleeding, excessive menstruation, menstrual changes, hot flashes, vaginal dryness and postmenopausal bleeding.   Musculoskeletal:  Negative for myalgias, back pain, joint swelling, arthralgias, stiffness, muscle cramps, neck pain, bone pain, muscle weakness and fracture.   Skin:  Negative for nail changes, itching, poor wound healing, rash, hair changes, skin changes, acne, warts, poor wound healing, scarring, flaky skin, Raynaud's phenomenon, sensitivity to sunlight and skin thickening.   Neurological:  Negative for dizziness, tingling, tremors, speech change, seizures, loss of consciousness, weakness, light-headedness, numbness, headaches, disturbances in coordination, extremity numbness, memory loss, difficulty walking and paralysis.   Endo/Heme:  Negative for anemia, swollen glands and bruises/bleeds easily.   Psychiatric/Behavioral:  Negative for depression, hallucinations, memory loss, decreased concentration, mood swings and panic attacks.    Breast:  Negative for breast discharge, breast mass, breast pain and nipple retraction.   Endocrine:  Negative for altered temperature regulation, polyphagia, polydipsia, unwanted hair growth and change in facial  hair.        Past Medical History:    Past Medical History:   Diagnosis Date     Atrial fibrillation with rapid ventricular response (H)      History of cold sores      Hx of LASIK 12/11/2017     Insomnia      Migraine      Osteopenia      Pelvic mass      Peritoneal carcinomatosis (H)      Restless legs syndrome (RLS)          Past Surgical History:    Past Surgical History:   Procedure Laterality Date     APPENDECTOMY       ARTHROSCPY KNEE SURGICAL DEBRIDEMENT SHAVING ARTICULAR CARTILAGE Right      BIOPSY  January 2021    Biopsy to confirm ovarian cancer     DEBRIDEMENT LEFT UPPER EXTREMITY  2016     HYSTERECTOMY TOTAL ABD, LUISITO SALPINGO-OOPHORECTOMY, NODE DISSECTION, TUMOR DEBULKING, COMBINED Bilateral 4/19/2021    Procedure: HYSTERECTOMY, TOTAL, ABDOMINAL, WITH BILATERAL SALPINGO-OOPHORECTOMY, omentectomy, NEOPLASM DEBULKING,Proctoscocy, RO, Resection of liver nodules, diaphragm stripping, immobilization of liver and colon;  Surgeon: Bolivar Juarez MD;  Location: [facility] OR     LAPAROSCOPY DIAGNOSTIC (GYN) Bilateral 1/7/2021    Procedure: Diagnsotic laparoscopy, biopsies;  Surgeon: Bolivar Juarez MD;  Location: [facility] OR     LASIK       TUBAL LIGATION           Health Maintenance Due   Topic Date Due     DEXA  Never done     ADVANCE CARE PLANNING  Never done     COLORECTAL CANCER SCREENING  Never done     HIV SCREENING  Never done     HEPATITIS C SCREENING  Never done     LIPID  Never done     MEDICARE ANNUAL WELLNESS VISIT  Never done     FALL RISK ASSESSMENT  Never done     COVID-19 Vaccine (4 - Booster for Pfizer series) 12/20/2021     PHQ-2 (once per calendar year)  01/01/2022     LUNG CANCER SCREENING  04/02/2022       Current Medications:     Current Outpatient Medications   Medication Sig Dispense Refill     amitriptyline (ELAVIL) 100 MG tablet Take 1 tablet (100 mg) by mouth At Bedtime 90 tablet 0     fluticasone (FLONASE) 50 MCG/ACT nasal spray SPRAY 1 SPRAY INTO EACH NOSTRIL 2 TIMES A DAY        gabapentin (NEURONTIN) 600 MG tablet Take 1 tablet (600 mg) by mouth At Bedtime 90 tablet 0     meclizine (ANTIVERT) 25 MG tablet Take 1 tablet (25 mg) by mouth 3 times daily as needed for dizziness or nausea 15 tablet 0     olaparib (LYNPARZA) 150 MG tablet Take 2 tablets (300 mg) by mouth 2 times daily 120 tablet 0     rivaroxaban ANTICOAGULANT (XARELTO ANTICOAGULANT) 20 MG TABS tablet Take 1 tablet (20 mg) by mouth every morning 90 tablet 1     SUMAtriptan (IMITREX) 100 MG tablet Take 1 tablet (100 mg) by mouth at onset of headache for migraine 15 tablet 0     valACYclovir (VALTREX) 1000 mg tablet Take 1,000 mg by mouth as needed        zolpidem (AMBIEN) 10 MG tablet Take 10 mg by mouth       acetaminophen (TYLENOL) 325 MG tablet Take 2 tablets (650 mg) by mouth every 6 hours as needed for mild pain (Patient not taking: Reported on 2022) 50 tablet 0     hydrOXYzine (ATARAX) 25 MG tablet Take 1-2 tablets (25-50 mg) by mouth nightly as needed for anxiety (or insomnia) (Patient not taking: Reported on 2022) 60 tablet 5     loratadine (CLARITIN) 10 MG tablet Take 10 mg by mouth daily as needed (for bone pain related to neupogen)  (Patient not taking: Reported on 2022)       oxyCODONE (ROXICODONE) 5 MG tablet Take 1 tablet (5 mg) by mouth every 12 hours (Patient not taking: Reported on 2022) 10 tablet 0     prochlorperazine (COMPAZINE) 10 MG tablet Take 1 tablet (10 mg) by mouth every 6 hours as needed (Nausea/Vomiting) (Patient not taking: Reported on 2022) 30 tablet 2         Allergies:      No Known Allergies     Social History:     Social History     Tobacco Use     Smoking status: Former Smoker     Packs/day: 0.50     Years: 40.00     Pack years: 20.00     Quit date:      Years since quittin.3     Smokeless tobacco: Never Used   Substance Use Topics     Alcohol use: Not Currently       History   Drug Use Unknown         Family History:     The patient's family history is notable  for:    Family History   Adopted: Yes   Problem Relation Age of Onset     Cancer Mother 36     Other Cancer Mother         Bio mother  of  a female cancer  at 36     Factor V Leiden deficiency Daughter      Deep Vein Thrombosis Daughter      Diabetes Type 1 Daughter      Diabetes Daughter      Hypertension Daughter      Anesthesia Reaction No family hx of          Physical Exam:     /71   Pulse 72   Temp 98.1  F (36.7  C) (Oral)   Wt 78.6 kg (173 lb 4.8 oz)   SpO2 99%   BMI 23.50 kg/m    Body mass index is 23.5 kg/m .    General Appearance: healthy and alert, no distress     HEENT: no thyromegaly, no palpable nodules or masses        Cardiovascular: regular rate and rhythm, no gallops, rubs or murmurs     Respiratory: lungs clear, no rales, rhonchi or wheezes    Musculoskeletal: extremities non tender and without edema    Skin: no lesions or rashes     Neurological: normal gait, no gross defects     Psychiatric: appropriate mood and affect                               Hematological: normal cervical, supraclavicular and inguinal lymph nodes     Gastrointestinal:       abdomen soft, non-tender, non-distended, no organomegaly or masses    Genitourinary: External genitalia and urethral meatus appears normal. Vagina is smooth without nodularity or masses. Cervix surgically absent. Bimanual exam reveal no masses, nodularity or fullness. Recto-vaginal exam confirms these findings.      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 She is here today for follow up and surveillance.    45 minutes spent on the date of the encounter doing chart review, history and exam, documentation, and further activities as noted above.          Plan:     1.)       NURY on exam.  largely stable however has increased this month ( 28, 23, 21, 20, 21, 23, 26). Continue monthly labs with repeat  in one month to monitor trends. Pt  "largely asymptomatic and exam without concerning s/s. Message sent to Dr. Juarez regarding last PET CT 11/2021 given findings of \"Potential foci of tumor deposits along the anterior dome of the liver and midline abdominal wall surgical scar.\" Will determine if Dr. Juarez would like any surveillance follow up imaging. Reviewed signs and symptoms for when she should contact the clinic or seek additional care.  Patient to contact the clinic with any questions or concerns in the interim. RTC in person in three months with NP, monthly labs through August 2022 scheduled. In August 2022, pt will go to every three month labs and NP visits. Continue PARPi as labs are stable.     2.)      Genetic risk factors were assessed and she is POSITIVE for a BRCA1 mutation.  This mutation is called [id number]. New referral for Waleska Zamorano with Cancer Risk management placed today.     3.)       Labs and/or tests ordered include:  , CBC, CMP all reviewed today.                 4.)       Health maintenance:  Issues addressed today include following up with PCP for annual health maintenance and non-gynecologic issues. Encouraged colonoscopy and DEXA with PCP.      5.)        Pulmonary embolus: pt continues Xarelto and has been on current dose since diagnosis of PE 2/3/2021. Message sent to Dr. Juarez to determine any follow up imaging needs and if pt needs to continue Xarelto. I dicussed that usually we continue Xarelto x6 months however if persistent disease we usually continue patient on drug given active cancer.     6.)       Fatigue: pt and daughter, Edie have differing opinions regarding the extent of fatigue Justen is experiencing. Discussed with pt and she states that she walks five miles per day without LOPEZ or fatigue. Pt is sleeping well. H/h stable, appetite normal. Discussed PT as an option with Cancer Rehab but patient declined referral.     7.)       CBC: reviewed CBC from July 2021 through May 2022. " Hgb increased from 10.8 to 12.0. HCV, RDW, MCH, and RBC changes. Will add on iron studies, Vitamin B12 and folate to next month's labs. Pharmacy to coordinate this. Likely Parpi related but will rule out other causes. Message sent to patient.       5/11/2022 Addendum: Per Mira IGNACIO, [facility] and Dr. Estrada, no scans until symptomatic or the  doubles (over 40). MD prefers pt stay on Xarelto. Will ensure that pt is aware.           JERICHO Chin, NP-BC  Women's Health Nurse Practitioner  Division of Gynecologic Oncology  [facility]      CC  Patient Care Team:  Bolivar Estrada MD as PCP - General (Gynecologic Oncology)  Bolivar Estrada MD as Assigned Cancer Care Provider  Marta Okeefe APRN CNP as Assigned PCP  Pepe Rdz MD as Assigned Heart and Vascular Provider  Holley Ramos PA-C as Assigned Surgical Provider  Brinda Lacey MD as Assigned Palliative Care Provider  BOLIVAR ESTRADA

## 2022-05-09 ENCOUNTER — ONCOLOGY VISIT (OUTPATIENT)
Dept: ONCOLOGY | Facility: CLINIC | Age: 67
End: 2022-05-09
Attending: OBSTETRICS & GYNECOLOGY
Payer: COMMERCIAL

## 2022-05-09 ENCOUNTER — PATIENT OUTREACH (OUTPATIENT)
Dept: ONCOLOGY | Facility: CLINIC | Age: 67
End: 2022-05-09
Payer: COMMERCIAL

## 2022-05-09 VITALS
WEIGHT: 173.3 LBS | BODY MASS INDEX: 23.5 KG/M2 | HEART RATE: 72 BPM | SYSTOLIC BLOOD PRESSURE: 112 MMHG | TEMPERATURE: 98.1 F | DIASTOLIC BLOOD PRESSURE: 71 MMHG | OXYGEN SATURATION: 99 %

## 2022-05-09 DIAGNOSIS — C56.9 OVARIAN CANCER, UNSPECIFIED LATERALITY (H): Primary | ICD-10-CM

## 2022-05-09 PROCEDURE — 99215 OFFICE O/P EST HI 40 MIN: CPT | Performed by: OBSTETRICS & GYNECOLOGY

## 2022-05-09 PROCEDURE — G0463 HOSPITAL OUTPT CLINIC VISIT: HCPCS

## 2022-05-09 RX ORDER — FLUTICASONE PROPIONATE 50 MCG
1 SPRAY, SUSPENSION (ML) NASAL PRN
COMMUNITY
Start: 2022-02-02 | End: 2024-01-01

## 2022-05-09 RX ORDER — ZOLPIDEM TARTRATE 10 MG/1
10 TABLET ORAL EVERY EVENING
COMMUNITY
Start: 2022-02-08 | End: 2024-01-01

## 2022-05-09 ASSESSMENT — PAIN SCALES - GENERAL: PAINLEVEL: NO PAIN (0)

## 2022-05-09 NOTE — PATIENT INSTRUCTIONS
Please follow up with PCP regarding colonoscopy and DEXA bone scan which are both due.       JERICHO Chin, NP-BC  Women's Health Nurse Practitioner  Division of Gynecologic Oncology  [facility]

## 2022-05-09 NOTE — PROGRESS NOTES
Writer reviewed referral to Waleska Zamorano. Appropriate for scheduling. Sent to New Patient Scheduling for completion.

## 2022-05-09 NOTE — LETTER
2022         RE: Taran Regalado  [address]  [facility] [address]        Dear Colleague,    Thank you for referring your patient, Taran Regalado, to the [facility]. Please see a copy of my visit note below.    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 She is here today for follow up and surveillance.      Oncology History:  12/3/2020: US Pelvic: IMPRESSION: Limited examination due to acoustic windows. Possible left adnexal mass. A CT scan of the abdomen and pelvis with contrast is recommended for further assessment.     2020: CT A/P:   IMPRESSION:    Peritoneal carcinomatosis with masslike peritoneal thickening in the lower pelvis which may indicate an adnexal or ovarian primary malignancy. Large volume ascites. Bilateral pleural effusions. There is potential subtle pleural nodularity in the right hemithorax which could indicate metastatic disease.  Indeterminate 1 cm lesion in the right hepatic lobe suspicious for a metastatic lesion.      2020: Presented to [facility] with abdominal distention, 25lb weight loss, and CTAP with carcinomatosis, elevated  3098.     2020: CT Chest: IMPRESSION:   1. There are few scattered small sub-6 mm pulmonary nodules which are indeterminate without prior comparisons available. There are a few  slightly larger perifissural nodules which are technically  indeterminate in the setting of malignancy although presumed lymphatic in nature and of unlikely clinical significance. Attention on follow-up is recommended.  2. Small to moderate left and small right pleural effusions are increased in size from prior. No convincing evidence for pleural nodularity.  3. Partially visualized large volume ascites and peritoneal nodularity in the upper abdomen  similar to 12/4/2020 outside CT      12/26/2020: ED for abdominal distension; 3 L ascites drained with paracentesis    Pelvic US: Findings: Free fluid present in LLQ      12/31/2020: US Paracentesis: 900 mL ascites drained     1/7/2021: Diagnotic laparoscopy, biopsies  Pathology: FINAL DIAGNOSIS:   A. PERITONEUM, BIOPSIES:   - Positive for high grade carcinoma, consistent with metastatic carcinoma of Mullerian origin.     1/10-1/13/2021: Hospital admission for postoperative non-intractable vomiting and nausea.      1/10/2021: CT A/P: IMPRESSION: Extensive ascites which is probably malignant. Scattered liver hypodensities of indeterminate etiology comment cannot exclude metastatic disease. Diverticulosis. Fluid-filled adnexal masses and irregular appearance of uterus, which may represent primary neoplasm. Multiple peritoneal nodules. Large amount of fecal material in the colon with no evidence of small bowel obstruction.     Plan: Paclitaxel 175 mg/m2 and carboplatin AUC 6 x 3 cycles followed by a CT CAP and visit with Dr. Juarez.     1/12/2021: Cycle 1 paclitaxel and carboplatin while inpatient     1/13/2021: CT Head: Impression:  1. Chronic sinusitis of the right maxillary and right sphenoid sinuses.  2. Incidental presumed calcified meningioma in the right frontal  convexity without significant mass effect.  3. No suspicious intracranial enhancing lesion.     2/1/2021: Cycle 2 paclitaxel and carboplatin.  936.     2/3-2/5/21: Admission [facility] for afib w/ RVR and new PE     2/26/21: Cycle 3 paclitaxel and carboplatin planned.  Deferred due to thrombocytopenia.  Deferred 3/12/21 due to neutropenia.  Given on 3/15/21 after Filgrastim injection x 2.  Add Pegfilgrastim to day 2 of treatment plan.   129.      4/2/21: CT CAP  IMPRESSION:   In this patient with a history of metastatic serous ovarian cancer,  status post diagnostic laparoscopy and neoadjuvant chemotherapy:  1. Significant improvement in  peritoneal carcinomatosis as discussed  above.  2. Resolution of previously seen scattered small hypoattenuating  lesions in the liver. This raises the concern for these lesions  representing metastatic disease, noting excellent response elsewhere  in abdomen and pelvis.  3. Bilateral pleural effusions have resolved.  4. Several small pulmonary nodules in the right lung measuring up to 5  mm. Indeterminate, but likely benign in the setting of their stability  with excellent response elsewhere. Continued attention on follow-up.     4/19/21: HYSTERECTOMY, TOTAL, ABDOMINAL, WITH BILATERAL SALPINGO-OOPHORECTOMY, omentectomy, NEOPLASM DEBULKING,Proctoscocy, RO, Resection of liver nodules, diaphragm stripping, immobilization of liver and colon  FINAL DIAGNOSIS:   A. OMENTUM, BIOPSY:   - Omental adipose tissue with rare viable cells of metastatic high grade   serous carcinoma   - One reactive lymph node, negative for malignancy (0/1)   B. NODULE, SIGMOID, EXCISION:   - Calcified necrotic adipose tissue   - Negative for malignancy   C. NODULE, SMALL BOWEL MESENTERY, EXCISION:   - Fibroadipose tissue, positive for metastatic high grade serous carcinoma   D. UTERUS, CERVIX, BILATERAL FALLOPIAN TUBES AND OVARIES, HYSTERECTOMY   WITH BILATERAL SALPINGO-OOPHORECTOMY:   - Atrophic endometrium   - Uterine serosa with rare viable cells consistent with high grade serous   carcinoma   - Cervix with atrophic changes   - Viable cells consistent with high grade serous carcinoma present in the   right ovary, serosa of right   fallopian tube and right periadnexal soft tissue   - Left ovary with atrophic changes   - Left fallopian tube with a rare focus of serous tubal in-situ carcinoma   (STIC)   E. NODULES, SMALL BOWEL MESENTRY, EXCISION:   - Fibroadipose tissue with rare viable cells of metastatic high grade   serous carcinoma   F. NODULE, SPLENIC FLEXURE TRANSVERSE COLON, EXCISION:   - Fibroadipose tissue with rare viable cells of  metastatic high grade   serous carcinoma   - Accessory splenule, negative for malignancy   G. OMENTUM, OMENTECTOMY:   - Omental adipose tissue with rare viable cells of metastatic high grade   serous carcinoma   H. NODULE, PERITONEAL PANCREATIC, EXCISION:   - Fibrous adhesions with inflammation   - Negative for malignancy   I. RIGHT HEMIDIAPHRAGM PERITONEUM, EXCISION:   - Fibrous adhesions with inflammation   - Negative for malignancy   J. RIGHT LIVER SURFACE NODULE:   - Fibrous adhesions with benign inclusion glands   - Negative for malignancy   K. LEFT LOWER LIVER EDGE, BIOPSY:   - Cauterized hepatic parenchyma and capsule   - Negative for malignancy   L. NODULE, SMALL BOWEL MESENTERIC #3, EXCISION:   - Fibroadipose tissue with rare viable cells of metastatic high grade   serous carcinoma       Plan: Carboplatin AUC 6 + Taxol 175 mg/m2 x 3 cycles, then transition to Parp inhibitor for maintenance therapy given her BRCA1 germline mutation.      5/21/21: Cycle 4 carboplatin and paclitaxel.   172.  6/11/21: Cycle 5 carboplatin and paclitaxel.   61.  7/2/21: Cycle 6 carboplatin and paclitaxel.   20.     7/28/21 plan: Olaparib 300mg bid as starting dose,  14  8/20/21: start date olaparib 300 mg BID,  12  9/13/21:  22  10/4/21:  23  11/1/21:  26    11/02/2021: PET CT: IMPRESSION:   Findings compatible with interval surgery and posttreatment change.  No gross definitive FDG avid disease.  Potential foci of tumor deposits along the anterior dome of the liver and midline abdominal wall surgical scar.  Colonic activity is not necessarily abnormal, however, given the previous carcinomatosis the colonic activity is indeterminant.       12/1/21:  23  1/3/22:  21  2/1/22:  20  3/1/22:  21  4/1/22:  23  5/4/22:  28          DaughterEdie with patient today during visit. Edie reports that Justen has fatigue with short walks while carrying  objects which is new. Has gone home from work early due to fatigue twice over the last month. 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 Pt reports that she walks five miles per day without LOPEZ or fatigue. She denies lightheadedness or dizziness. Reports that eating a few meals with protein throughout the day. Weight has stabilized.     Gabapentin has decreased to 300 mg and reports that neuropathy with tingling and reports this is stable at this dose of Gabapentin. She denies any vaginal bleeding, no changes in her bowel or bladder habits, no nausea/emesis, no lower extremity edema, and no difficulties eating or sleeping. She denies any abdominal discomfort/bloating, no fevers or chills, and no chest pain. Pt would like to keep follow up visits in [place] but labs at [facility] in [facility]. Pt would like to know if she should continue Xarelto for her PE hx.           Health Maintenance  Colonoscopy- due for this   Mammogram- 10/2021  DEXA- due for this   Annual physical- 1/2022          Review of Systems     Constitutional:  Negative for fever, chills, weight loss, weight gain, fatigue, decreased appetite, night sweats, recent stressors, height gain, height loss, post-operative complications, incisional pain, hallucinations, increased energy, hyperactivity and confused.   HENT:  Negative for ear pain, hearing loss, tinnitus, nosebleeds, trouble swallowing, hoarse voice, mouth sores, sore throat, ear discharge, tooth pain, gum tenderness, taste disturbance, smell disturbance, hearing aid, bleeding gums, dry mouth, sinus pain, sinus congestion and neck mass.    Eyes:  Negative for double vision, pain, redness, eye pain, decreased vision, eye watering, eye bulging, eye dryness, flashing lights, spots, floaters, strabismus, tunnel vision, jaundice and eye irritation.   Respiratory:   Negative for cough, hemoptysis, sputum production, shortness of breath, wheezing, sleep disturbances due to  breathing, snores loudly, respiratory pain, dyspnea on exertion, cough disturbing sleep and postural dyspnea.    Cardiovascular:  Negative for chest pain, dyspnea on exertion, palpitations, orthopnea, claudication, leg swelling, fingers/toes turn blue, hypertension, hypotension, syncope, history of heart murmur, chest pain on exertion, chest pain at rest, pacemaker, few scattered varicosities, leg pain, sleep disturbances due to breathing, tachycardia, light-headedness, exercise intolerance and edema.   Gastrointestinal:  Negative for heartburn, nausea, vomiting, abdominal pain, diarrhea, constipation, blood in stool, melena, rectal pain, bloating, hemorrhoids, bowel incontinence, jaundice, rectal bleeding, coffee ground emesis and change in stool.   Genitourinary:  Negative for bladder incontinence, dysuria, urgency, hematuria, flank pain, vaginal discharge, difficulty urinating, genital sores, dyspareunia, decreased libido, nocturia, voiding less frequently, arousal difficulty, abnormal vaginal bleeding, excessive menstruation, menstrual changes, hot flashes, vaginal dryness and postmenopausal bleeding.   Musculoskeletal:  Negative for myalgias, back pain, joint swelling, arthralgias, stiffness, muscle cramps, neck pain, bone pain, muscle weakness and fracture.   Skin:  Negative for nail changes, itching, poor wound healing, rash, hair changes, skin changes, acne, warts, poor wound healing, scarring, flaky skin, Raynaud's phenomenon, sensitivity to sunlight and skin thickening.   Neurological:  Negative for dizziness, tingling, tremors, speech change, seizures, loss of consciousness, weakness, light-headedness, numbness, headaches, disturbances in coordination, extremity numbness, memory loss, difficulty walking and paralysis.   Endo/Heme:  Negative for anemia, swollen glands and bruises/bleeds easily.   Psychiatric/Behavioral:  Negative for depression, hallucinations, memory loss, decreased concentration, mood  swings and panic attacks.    Breast:  Negative for breast discharge, breast mass, breast pain and nipple retraction.   Endocrine:  Negative for altered temperature regulation, polyphagia, polydipsia, unwanted hair growth and change in facial hair.        Past Medical History:    Past Medical History:   Diagnosis Date     Atrial fibrillation with rapid ventricular response (H)      History of cold sores      Hx of LASIK 12/11/2017     Insomnia      Migraine      Osteopenia      Pelvic mass      Peritoneal carcinomatosis (H)      Restless legs syndrome (RLS)          Past Surgical History:    Past Surgical History:   Procedure Laterality Date     APPENDECTOMY       ARTHROSCPY KNEE SURGICAL DEBRIDEMENT SHAVING ARTICULAR CARTILAGE Right      BIOPSY  January 2021    Biopsy to confirm ovarian cancer     DEBRIDEMENT LEFT UPPER EXTREMITY  2016     HYSTERECTOMY TOTAL ABD, LUISITO SALPINGO-OOPHORECTOMY, NODE DISSECTION, TUMOR DEBULKING, COMBINED Bilateral 4/19/2021    Procedure: HYSTERECTOMY, TOTAL, ABDOMINAL, WITH BILATERAL SALPINGO-OOPHORECTOMY, omentectomy, NEOPLASM DEBULKING,Proctoscocy, RO, Resection of liver nodules, diaphragm stripping, immobilization of liver and colon;  Surgeon: Bolivar Juarez MD;  Location: [facility] OR     LAPAROSCOPY DIAGNOSTIC (GYN) Bilateral 1/7/2021    Procedure: Diagnsotic laparoscopy, biopsies;  Surgeon: Bolivar Juarez MD;  Location: [facility] OR     LASIK       TUBAL LIGATION           Health Maintenance Due   Topic Date Due     DEXA  Never done     ADVANCE CARE PLANNING  Never done     COLORECTAL CANCER SCREENING  Never done     HIV SCREENING  Never done     HEPATITIS C SCREENING  Never done     LIPID  Never done     MEDICARE ANNUAL WELLNESS VISIT  Never done     FALL RISK ASSESSMENT  Never done     COVID-19 Vaccine (4 - Booster for Pfizer series) 12/20/2021     PHQ-2 (once per calendar year)  01/01/2022     LUNG CANCER SCREENING  04/02/2022       Current Medications:     Current Outpatient  Medications   Medication Sig Dispense Refill     amitriptyline (ELAVIL) 100 MG tablet Take 1 tablet (100 mg) by mouth At Bedtime 90 tablet 0     fluticasone (FLONASE) 50 MCG/ACT nasal spray SPRAY 1 SPRAY INTO EACH NOSTRIL 2 TIMES A DAY       gabapentin (NEURONTIN) 600 MG tablet Take 1 tablet (600 mg) by mouth At Bedtime 90 tablet 0     meclizine (ANTIVERT) 25 MG tablet Take 1 tablet (25 mg) by mouth 3 times daily as needed for dizziness or nausea 15 tablet 0     olaparib (LYNPARZA) 150 MG tablet Take 2 tablets (300 mg) by mouth 2 times daily 120 tablet 0     rivaroxaban ANTICOAGULANT (XARELTO ANTICOAGULANT) 20 MG TABS tablet Take 1 tablet (20 mg) by mouth every morning 90 tablet 1     SUMAtriptan (IMITREX) 100 MG tablet Take 1 tablet (100 mg) by mouth at onset of headache for migraine 15 tablet 0     valACYclovir (VALTREX) 1000 mg tablet Take 1,000 mg by mouth as needed        zolpidem (AMBIEN) 10 MG tablet Take 10 mg by mouth       acetaminophen (TYLENOL) 325 MG tablet Take 2 tablets (650 mg) by mouth every 6 hours as needed for mild pain (Patient not taking: Reported on 5/9/2022) 50 tablet 0     hydrOXYzine (ATARAX) 25 MG tablet Take 1-2 tablets (25-50 mg) by mouth nightly as needed for anxiety (or insomnia) (Patient not taking: Reported on 5/9/2022) 60 tablet 5     loratadine (CLARITIN) 10 MG tablet Take 10 mg by mouth daily as needed (for bone pain related to neupogen)  (Patient not taking: Reported on 5/9/2022)       oxyCODONE (ROXICODONE) 5 MG tablet Take 1 tablet (5 mg) by mouth every 12 hours (Patient not taking: Reported on 5/9/2022) 10 tablet 0     prochlorperazine (COMPAZINE) 10 MG tablet Take 1 tablet (10 mg) by mouth every 6 hours as needed (Nausea/Vomiting) (Patient not taking: Reported on 5/9/2022) 30 tablet 2         Allergies:      No Known Allergies     Social History:     Social History     Tobacco Use     Smoking status: Former Smoker     Packs/day: 0.50     Years: 40.00     Pack years: 20.00      Quit date: 2018     Years since quittin.3     Smokeless tobacco: Never Used   Substance Use Topics     Alcohol use: Not Currently       History   Drug Use Unknown         Family History:     The patient's family history is notable for:    Family History   Adopted: Yes   Problem Relation Age of Onset     Cancer Mother 36     Other Cancer Mother         Bio mother  of  a female cancer  at 36     Factor V Leiden deficiency Daughter      Deep Vein Thrombosis Daughter      Diabetes Type 1 Daughter      Diabetes Daughter      Hypertension Daughter      Anesthesia Reaction No family hx of          Physical Exam:     /71   Pulse 72   Temp 98.1  F (36.7  C) (Oral)   Wt 78.6 kg (173 lb 4.8 oz)   SpO2 99%   BMI 23.50 kg/m    Body mass index is 23.5 kg/m .    General Appearance: healthy and alert, no distress     HEENT: no thyromegaly, no palpable nodules or masses        Cardiovascular: regular rate and rhythm, no gallops, rubs or murmurs     Respiratory: lungs clear, no rales, rhonchi or wheezes    Musculoskeletal: extremities non tender and without edema    Skin: no lesions or rashes     Neurological: normal gait, no gross defects     Psychiatric: appropriate mood and affect                               Hematological: normal cervical, supraclavicular and inguinal lymph nodes     Gastrointestinal:       abdomen soft, non-tender, non-distended, no organomegaly or masses    Genitourinary: External genitalia and urethral meatus appears normal. Vagina is smooth without nodularity or masses. Cervix surgically absent. Bimanual exam reveal no masses, nodularity or fullness. Recto-vaginal exam confirms these findings.      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 She is here today for follow up and surveillance.    45 minutes spent on the date of the encounter doing chart review, history and exam, documentation, and further  "activities as noted above.          Plan:     1.)       NURY on exam.  largely stable however has increased this month ( 28, 23, 21, 20, 21, 23, 26). Continue monthly labs with repeat  in one month to monitor trends. Pt largely asymptomatic and exam without concerning s/s. Message sent to Dr. Juarez regarding last PET CT 11/2021 given findings of \"Potential foci of tumor deposits along the anterior dome of the liver and midline abdominal wall surgical scar.\" Will determine if Dr. Juarez would like any surveillance follow up imaging. Reviewed signs and symptoms for when she should contact the clinic or seek additional care.  Patient to contact the clinic with any questions or concerns in the interim. RTC in person in three months with NP, monthly labs through August 2022 scheduled. In August 2022, pt will go to every three month labs and NP visits. Continue PARPi as labs are stable.     2.)      Genetic risk factors were assessed and she is POSITIVE for a BRCA1 mutation.  This mutation is called [id number]. New referral for Waleska Zamorano with Cancer Risk management placed today.     3.)       Labs and/or tests ordered include:  , CBC, CMP all reviewed today.                 4.)       Health maintenance:  Issues addressed today include following up with PCP for annual health maintenance and non-gynecologic issues. Encouraged colonoscopy and DEXA with PCP.      5.)        Pulmonary embolus: pt continues Xarelto and has been on current dose since diagnosis of PE 2/3/2021. Message sent to Dr. Juarez to determine any follow up imaging needs and if pt needs to continue Xarelto. I dicussed that usually we continue Xarelto x6 months however if persistent disease we usually continue patient on drug given active cancer.     6.)       Fatigue: pt and daughter, Edie have differing opinions regarding the extent of fatigue Justen is experiencing. Discussed with pt and she states that she " walks five miles per day without LOPEZ or fatigue. Pt is sleeping well. H/h stable, appetite normal. Discussed PT as an option with Cancer Rehab but patient declined referral.     7.)       CBC: reviewed CBC from July 2021 through May 2022. Hgb increased from 10.8 to 12.0. HCV, RDW, MCH, and RBC changes. Will add on iron studies, Vitamin B12 and folate to next month's labs. Pharmacy to coordinate this. Likely Parpi related but will rule out other causes. Message sent to patient.         JERICHO Chin, NP-BC  Women's Health Nurse Practitioner  Division of Gynecologic Oncology  [facility]      CC  Patient Care Team:  Bolivar Juarez MD as PCP - General (Gynecologic Oncology)  Bolivar Juarez MD as Assigned Cancer Care Provider  Marta Okeefe APRN CNP as Assigned PCP  Pepe Rdz MD as Assigned Heart and Vascular Provider  Holley Ramos PA-C as Assigned Surgical Provider  Brinda Lacey MD as Assigned Palliative Care Provider

## 2022-05-20 DIAGNOSIS — C56.9 OVARIAN CANCER, UNSPECIFIED LATERALITY (H): Primary | ICD-10-CM

## 2022-06-16 DIAGNOSIS — C56.9 OVARIAN CANCER, UNSPECIFIED LATERALITY (H): Primary | ICD-10-CM

## 2022-06-20 DIAGNOSIS — C56.9 OVARIAN CANCER, UNSPECIFIED LATERALITY (H): Primary | ICD-10-CM

## 2022-06-22 ENCOUNTER — TELEPHONE (OUTPATIENT)
Dept: ONCOLOGY | Facility: CLINIC | Age: 67
End: 2022-06-22

## 2022-06-22 NOTE — TELEPHONE ENCOUNTER
Oral Chemotherapy Monitoring Program    Subjective/Objective:  Justen Regalado is a 66 year old female contacted by phone for a follow-up visit for oral chemotherapy.  Justen confirms taking the appropriate dose of Lynparza 300 mg (2 tablets) by mouth twice daily. She answered the phone call at work and seemed optimistic and upbeat today. Justen reported that she gets her monthly labs drawn as a walk-in service at [facility] during the first week of each month. Denies new or worsening side effects, missed doses, and recent hospital or ED visits. Patient has not had any recent medication changes.       ORAL CHEMOTHERAPY 3/28/2022 4/4/2022 4/26/2022 5/20/2022 6/6/2022 6/20/2022 6/22/2022   Assessment Type Monthly Follow up Lab Monitoring Refill Refill Lab Monitoring Refill Quarterly Follow up   Diagnosis Code Ovarian Cancer Ovarian Cancer - - Ovarian Cancer Ovarian Cancer Ovarian Cancer   Providers Dr. Larry Juarez   [facility]   Drug Name Lynparza (olaparib) Lynparza (olaparib) Lynparza (olaparib) Lynparza (olaparib) Lynparza (olaparib) Lynparza (olaparib) Lynparza (olaparib)   Dose 300 mg 300 mg 300 mg 300 mg 300 mg 300 mg 300 mg   Current Schedule BID BID BID BID BID BID BID   Cycle Details Continuous Continuous Continuous Continuous Continuous Continuous Continuous   Start Date of Last Cycle - - - - - - -   Planned next cycle start date - - - - - - -   Doses missed in last 2 weeks - - - - - - 0   Adherence Assessment - - - - - - Adherent   Adverse Effects - - - - - - No AE identified during assessment   Any new drug interactions? - - - - - - No   Is the dose as ordered appropriate for the patient? - - - - - - Yes   Has the patient missed any days of school, work, or other routine activity? - - - - - - -   Since the last time we talked, have you been  hospitalized or used the emergency room? - - - - - - No       Last PHQ-2 Score on record:   PHQ-2 ( 1999 Pfizer) 4/13/2021 2/18/2021   Q1: Little interest or pleasure in doing things 0 0   Q2: Feeling down, depressed or hopeless 0 0   PHQ-2 Score 0 0   PHQ-2 Total Score (12-17 Years)- Positive if 3 or more points; Administer PHQ-A if positive 0 0       Vitals:  BP:   BP Readings from Last 1 Encounters:   05/09/22 112/71     Wt Readings from Last 1 Encounters:   05/09/22 78.6 kg (173 lb 4.8 oz)     Estimated body surface area is 2 meters squared as calculated from the following:    Height as of 4/19/21: 1.829 m (6').    Weight as of 5/9/22: 78.6 kg (173 lb 4.8 oz).    Labs:  No results found for NA within last 30 days.     No results found for K within last 30 days.     No results found for CA within last 30 days.     No results found for Mag within last 30 days.     No results found for Phos within last 30 days.     No results found for ALBUMIN within last 30 days.     No results found for BUN within last 30 days.     No results found for CR within last 30 days.     No results found for AST within last 30 days.     No results found for ALT within last 30 days.     No results found for BILITOTAL within last 30 days.     No results found for WBC within last 30 days.     No results found for HGB within last 30 days.     No results found for PLT within last 30 days.     No results found for ANC within last 30 days.     No results found for ANC within last 30 days.          Assessment/Plan:  Justen is tolerating Lynparza well. Continue therapy as planned.    Follow-Up:  7/5/22 - Check to see if labs have been completed as she gets walk-in labs during the first week of each month.    9/20/22 - Quarterly assessment    Refill Due:  [facility] to deliver 6/30/22    Donald Rios  4th Year Pharmacy Student  [employer]

## 2022-07-05 ENCOUNTER — PATIENT OUTREACH (OUTPATIENT)
Dept: ONCOLOGY | Facility: CLINIC | Age: 67
End: 2022-07-05

## 2022-07-05 DIAGNOSIS — C56.9 OVARIAN CANCER, UNSPECIFIED LATERALITY (H): ICD-10-CM

## 2022-07-05 DIAGNOSIS — R97.1 ELEVATED CA-125: Primary | ICD-10-CM

## 2022-07-05 NOTE — PROGRESS NOTES
Patient/daughter reached out as  has risen again.     RN reviewed option per MD. This includes repeating a  in 4-6 weeks as we did last time or doing a      Pro's and con's reviewed with doing a scan before patient has symptoms. Potential reasons for rise in  also reviewed.     Patient is under a lot of stress and really can't wait another few weeks as she feels she needs to know if her cancer is back.     RN placed order and will fax this locally per patient/daughter request.     Shawna Hinojosa RN

## 2022-07-08 ENCOUNTER — PATIENT OUTREACH (OUTPATIENT)
Dept: ONCOLOGY | Facility: CLINIC | Age: 67
End: 2022-07-08

## 2022-07-08 NOTE — PROGRESS NOTES
Patient needs CT scan for the following:   Steady rise of . Now more than double her baseline and well out or normal range.   New abdominal pain and bloating    Shawna Hinojosa RN

## 2022-07-11 ENCOUNTER — TELEPHONE (OUTPATIENT)
Dept: ONCOLOGY | Facility: CLINIC | Age: 67
End: 2022-07-11

## 2022-07-11 ENCOUNTER — ANCILLARY PROCEDURE (OUTPATIENT)
Dept: CT IMAGING | Facility: CLINIC | Age: 67
End: 2022-07-11
Attending: NURSE PRACTITIONER
Payer: COMMERCIAL

## 2022-07-11 DIAGNOSIS — C56.9 OVARIAN CANCER, UNSPECIFIED LATERALITY (H): ICD-10-CM

## 2022-07-11 DIAGNOSIS — R97.1 ELEVATED CA-125: ICD-10-CM

## 2022-07-11 LAB — RADIOLOGIST FLAGS: ABNORMAL

## 2022-07-11 PROCEDURE — 74177 CT ABD & PELVIS W/CONTRAST: CPT | Performed by: RADIOLOGY

## 2022-07-11 PROCEDURE — 71260 CT THORAX DX C+: CPT | Performed by: RADIOLOGY

## 2022-07-11 RX ORDER — IOPAMIDOL 755 MG/ML
105 INJECTION, SOLUTION INTRAVASCULAR ONCE
Status: COMPLETED | OUTPATIENT
Start: 2022-07-11 | End: 2022-07-11

## 2022-07-11 RX ADMIN — IOPAMIDOL 105 ML: 755 INJECTION, SOLUTION INTRAVASCULAR at 15:07

## 2022-07-11 NOTE — TELEPHONE ENCOUNTER
"URGENT IMAGE FINDING    Received 7/11/22 at 1614    \"Punctate nodule in the gastrosplenic ligament which is minimally more plump relative to the preop exam, this will need to be followed.    \"Minimal nodular changes to the left of the midline scar, in the subcutaneius fat will have to be followed, unclear if this simply reflects post-operative scarring or could reflect an early incisional recurrence.\"    \"Extensive distal clonic diverticulosis.\"    1626 Per Marta ECHAVARRIA, is aware, recommend pt need to be evaluated by Dr. Juarez sometimes this week to review results with patient.       "

## 2022-07-12 ENCOUNTER — PATIENT OUTREACH (OUTPATIENT)
Dept: ONCOLOGY | Facility: CLINIC | Age: 67
End: 2022-07-12

## 2022-07-12 DIAGNOSIS — R97.1 ELEVATED CA-125: Primary | ICD-10-CM

## 2022-07-12 NOTE — PROGRESS NOTES
MD reviewed CT scan. Has no concerns at this time. No visit needed unless patient would like this.     Recommendation:  Follow up  in 6-8 weeks   Patient should call with any concerns or new symptoms   Continue on parpi    RN updated patient. Patient declines a clinic visit at this time     Patient agrees with the plan    Shawna Hinojosa RN

## 2022-07-15 ENCOUNTER — TELEPHONE (OUTPATIENT)
Dept: ONCOLOGY | Facility: CLINIC | Age: 67
End: 2022-07-15

## 2022-07-15 NOTE — TELEPHONE ENCOUNTER
PA Initiation    Medication: Lynparza 150mg PA Pending  Insurance Company: [employer] - Phone [phone] Fax [phone]  Pharmacy Filling the Rx:    Filling Pharmacy Phone:    Filling Pharmacy Fax:    Start Date: 7/15/2022

## 2022-07-18 NOTE — TELEPHONE ENCOUNTER
Prior Authorization Not Needed per Insurance    Medication: Lynparza 150mg PA Not Needed  Insurance Company: [employer] - Phone [phone] Fax [phone]  Expected CoPay:      Pharmacy Filling the Rx:    Pharmacy Notified:    Patient Notified:          Panda Schofield CPhT  [facility]  Oncology Pharmacy Liaison   [email or web address]  [phone] (phone  [phone] (fax

## 2022-09-16 DIAGNOSIS — R97.1 ELEVATED CA-125: Primary | ICD-10-CM

## 2022-09-21 ENCOUNTER — ONCOLOGY VISIT (OUTPATIENT)
Dept: ONCOLOGY | Facility: CLINIC | Age: 67
End: 2022-09-21
Attending: OBSTETRICS & GYNECOLOGY
Payer: COMMERCIAL

## 2022-09-21 VITALS
TEMPERATURE: 98 F | WEIGHT: 174.13 LBS | HEIGHT: 72 IN | RESPIRATION RATE: 16 BRPM | BODY MASS INDEX: 23.58 KG/M2 | HEART RATE: 71 BPM | SYSTOLIC BLOOD PRESSURE: 124 MMHG | OXYGEN SATURATION: 98 % | DIASTOLIC BLOOD PRESSURE: 64 MMHG

## 2022-09-21 DIAGNOSIS — C56.9 OVARIAN CANCER, UNSPECIFIED LATERALITY (H): Primary | ICD-10-CM

## 2022-09-21 PROCEDURE — G0463 HOSPITAL OUTPT CLINIC VISIT: HCPCS

## 2022-09-21 PROCEDURE — 99215 OFFICE O/P EST HI 40 MIN: CPT | Performed by: OBSTETRICS & GYNECOLOGY

## 2022-09-21 RX ORDER — ONDANSETRON 4 MG/1
TABLET, FILM COATED ORAL EVERY 8 HOURS PRN
COMMUNITY
End: 2023-01-01

## 2022-09-21 ASSESSMENT — PAIN SCALES - GENERAL: PAINLEVEL: NO PAIN (0)

## 2022-09-21 NOTE — LETTER
2022         RE: Taran Regalado  [address]  [place] [address]        Dear Colleague,    Thank you for referring your patient, Taran Regalado, to the [facility]. Please see a copy of my visit note below.                Follow Up Notes on Referred Patient    Date: 2022       Dr. Ye Vaughn MD  No address on file       RE: Taran Regalado  : 1955  GRACY: 2022    Taran Regalado is a 66 year old woman with a diagnosis of metastatic ovarian high grade serous carcinoma. She is here today for follow up and disease management. She has been doing well since finishing her adjuvant chemotherapy. She is eating an drinking well, no nausea vomiting, fever or chills, normal urinary and bowel function.        Oncology History:  12/3/2020: US Pelvic: IMPRESSION: Limited examination due to acoustic windows. Possible left adnexal mass. A CT scan of the abdomen and pelvis with contrast is recommended for further assessment.     2020: CT A/P:   IMPRESSION:    Peritoneal carcinomatosis with masslike peritoneal thickening in the lower pelvis which may indicate an adnexal or ovarian primary malignancy. Large volume ascites. Bilateral pleural effusions. There is potential subtle pleural nodularity in the right hemithorax which could indicate metastatic disease.  Indeterminate 1 cm lesion in the right hepatic lobe suspicious for a metastatic lesion.      2020: Presented to [facility] with abdominal distention, 25lb weight loss, and CTAP with carcinomatosis, elevated  3098.     2020: CT Chest: IMPRESSION:   1. There are few scattered small sub-6 mm pulmonary nodules which are indeterminate without prior comparisons available. There are a few  slightly larger perifissural nodules which are technically  indeterminate in the setting of malignancy although presumed lymphatic in nature and of unlikely clinical significance. Attention on follow-up is  recommended.  2. Small to moderate left and small right pleural effusions are increased in size from prior. No convincing evidence for pleural nodularity.  3. Partially visualized large volume ascites and peritoneal nodularity in the upper abdomen similar to 12/4/2020 outside CT      12/26/2020: ED for abdominal distension; 3 L ascites drained with paracentesis    Pelvic US: Findings: Free fluid present in LLQ      12/31/2020: US Paracentesis: 900 mL ascites drained     1/7/2021: Diagnotic laparoscopy, biopsies  Pathology: FINAL DIAGNOSIS:   A. PERITONEUM, BIOPSIES:   - Positive for high grade carcinoma, consistent with metastatic carcinoma of Mullerian origin.     1/10-1/13/2021: Hospital admission for postoperative non-intractable vomiting and nausea.      1/10/2021: CT A/P: IMPRESSION: Extensive ascites which is probably malignant. Scattered liver hypodensities of indeterminate etiology comment cannot exclude metastatic disease. Diverticulosis. Fluid-filled adnexal masses and irregular appearance of uterus, which may represent primary neoplasm. Multiple peritoneal nodules. Large amount of fecal material in the colon with no evidence of small bowel obstruction.     Plan: Paclitaxel 175 mg/m2 and carboplatin AUC 6 x 3 cycles followed by a CT CAP and visit with Dr. Juarez.     1/12/2021: Cycle 1 paclitaxel and carboplatin while inpatient     1/13/2021: CT Head: Impression:  1. Chronic sinusitis of the right maxillary and right sphenoid sinuses.  2. Incidental presumed calcified meningioma in the right frontal  convexity without significant mass effect.  3. No suspicious intracranial enhancing lesion.     2/1/2021: Cycle 2 paclitaxel and carboplatin.  936.     2/3-2/5/21: Admission [facility] for afib w/ RVR and new PE     2/26/21: Cycle 3 paclitaxel and carboplatin planned.  Deferred due to thrombocytopenia.  pending.     3/15/21: Cycle 3 paclitaxel and carboplatin given     4/19/21: HYSTERECTOMY, TOTAL,  ABDOMINAL, WITH BILATERAL SALPINGO-OOPHORECTOMY, omentectomy, NEOPLASM DEBULKING,Proctoscocy, RO, Resection of liver nodules, diaphragm stripping, immobilization of liver and colon  FINAL DIAGNOSIS:   A. OMENTUM, BIOPSY:   - Omental adipose tissue with rare viable cells of metastatic high grade   serous carcinoma   - One reactive lymph node, negative for malignancy (0/1)   B. NODULE, SIGMOID, EXCISION:   - Calcified necrotic adipose tissue   - Negative for malignancy   C. NODULE, SMALL BOWEL MESENTERY, EXCISION:   - Fibroadipose tissue, positive for metastatic high grade serous carcinoma   D. UTERUS, CERVIX, BILATERAL FALLOPIAN TUBES AND OVARIES, HYSTERECTOMY   WITH BILATERAL SALPINGO-OOPHORECTOMY:   - Atrophic endometrium   - Uterine serosa with rare viable cells consistent with high grade serous   carcinoma   - Cervix with atrophic changes   - Viable cells consistent with high grade serous carcinoma present in the   right ovary, serosa of right   fallopian tube and right periadnexal soft tissue   - Left ovary with atrophic changes   - Left fallopian tube with a rare focus of serous tubal in-situ carcinoma   (STIC)   E. NODULES, SMALL BOWEL MESENTRY, EXCISION:   - Fibroadipose tissue with rare viable cells of metastatic high grade   serous carcinoma   F. NODULE, SPLENIC FLEXURE TRANSVERSE COLON, EXCISION:   - Fibroadipose tissue with rare viable cells of metastatic high grade   serous carcinoma   - Accessory splenule, negative for malignancy   G. OMENTUM, OMENTECTOMY:   - Omental adipose tissue with rare viable cells of metastatic high grade   serous carcinoma   H. NODULE, PERITONEAL PANCREATIC, EXCISION:   - Fibrous adhesions with inflammation   - Negative for malignancy   I. RIGHT HEMIDIAPHRAGM PERITONEUM, EXCISION:   - Fibrous adhesions with inflammation   - Negative for malignancy   J. RIGHT LIVER SURFACE NODULE:   - Fibrous adhesions with benign inclusion glands   - Negative for malignancy   K. LEFT LOWER  LIVER EDGE, BIOPSY:   - Cauterized hepatic parenchyma and capsule   - Negative for malignancy   L. NODULE, SMALL BOWEL MESENTERIC #3, EXCISION:   - Fibroadipose tissue with rare viable cells of metastatic high grade   serous carcinoma       Plan: Carboplatin AUC 6 + Taxol 175 mg/m2 x 3 cycles, then transition to Parp inhibitor for maintenance therapy given her BRCA1 germline mutation.      5/21/21: Cycle 4 carboplatin and paclitaxel.   172.  6/11/21: Cycle 5 carboplatin and paclitaxel.   61.  7/2/21: Cycle 6 carboplatin and paclitaxel.   20.       7/28/21 plan: Olaparib 300mg bid as starting dose,  14  8/20/21: start date olaparib 300 mg BID,  12  9/13/21:  22  10/4/21:  23  11/1/21:  26     11/02/2021: PET CT: IMPRESSION:   Findings compatible with interval surgery and posttreatment change.  No gross definitive FDG avid disease.  Potential foci of tumor deposits along the anterior dome of the liver and midline abdominal wall surgical scar.  Colonic activity is not necessarily abnormal, however, given the previous carcinomatosis the colonic activity is indeterminant.         12/1/21:  23  1/3/22:  21  2/1/22:  20  3/1/22:  21  4/1/22:  23  5/4/22:  28    EXAM: CT CHEST/ABDOMEN/PELVIS W CONTRAST  LOCATION: [facility]  DATE/TIME: 7/11/2022 1:25 PM     INDICATION: Stage III B high-grade ovarian carcinoma diagnosed Jan 2021. Posttreatment surveillance.  COMPARISON: CTA AP 04/23/2021, CT CAP 04/02/2021  TECHNIQUE: CT scan of the chest, abdomen, and pelvis was performed following injection of IV contrast. Multiplanar reformats were obtained. Dose reduction techniques were used.   CONTRAST: isovue 370 105mL IV; 50mL omni 140 oral     FINDINGS:   LUNGS AND PLEURA: No suspicious pulmonary nodules. Minimal right apical pleural thickening and a few punctate tiny nodules 2 of which are subpleural on the right are  stable. No pleural effusions.     MEDIASTINUM/AXILLAE: No adenopathy. No central pulmonary emboli.     CORONARY ARTERY CALCIFICATION: Cannot evaluate.     HEPATOBILIARY: Normal.     PANCREAS: Normal.     SPLEEN: Normal.     ADRENAL GLANDS: Normal.     KIDNEYS/BLADDER: Normal.     BOWEL: Sliver of ascites adjacent to the spleen noted, decreased from prior study. There is a 4 mm nodule in the gastrosplenic ligament (image 352). On the preop study it appears as a smaller punctate nodule (prior image 341). Sliver of ascites in the   gastrohepatic ligament also noted. No peritoneal tumor nodules. No bowel obstruction. Quite redundant colon with mild large stool burden. Extensive distal colonic diverticulosis.     LYMPH NODES: Normal.     VASCULATURE: Mild arterial calcifications. Circumaortic left renal vein.     PELVIC ORGANS: Hysterectomy. Vaginal cuff is normal.     MUSCULOSKELETAL: Diastases of the midline rectus sheath above the umbilicus. Minimal nodular scarring to the left of midline in the midabdomen (image 419). There is a shallow broad-based supraumbilical ventral hernia containing only fat. No implants   within the hernia or abdominal incision. No suspicious bone lesions.                                                                      IMPRESSION:  1.  Sliver of ascites in the upper abdomen has decreased since interval debulking surgery.  2.  There is a punctate nodule in the gastrosplenic ligament which is minimally more plump relative to the preop exam. This will need to be followed.  3.  Minimal nodular changes to the left of the midline scar in the subcutaneous fat will have to be followed as well. Unclear if this simply reflects postoperative scarring or could reflect an early incisional recurrence.  4.  No other sites to suggest recurrent tumor. Vaginal cuff is normal.  5.  Extensive distal colonic diverticulosis.  6.  Other noncritical findings as noted above.     9/16/22  98           Past  Medical History:    Past Medical History:   Diagnosis Date     Atrial fibrillation with rapid ventricular response (H)      History of cold sores      Hx of LASIK 12/11/2017     Insomnia      Migraine      Osteopenia      Pelvic mass      Peritoneal carcinomatosis (H)      Restless legs syndrome (RLS)          Past Surgical History:    Past Surgical History:   Procedure Laterality Date     APPENDECTOMY       ARTHROSCPY KNEE SURGICAL DEBRIDEMENT SHAVING ARTICULAR CARTILAGE Right      BIOPSY  January 2021    Biopsy to confirm ovarian cancer     DEBRIDEMENT LEFT UPPER EXTREMITY  2016     HYSTERECTOMY TOTAL ABD, LUISITO SALPINGO-OOPHORECTOMY, NODE DISSECTION, TUMOR DEBULKING, COMBINED Bilateral 4/19/2021    Procedure: HYSTERECTOMY, TOTAL, ABDOMINAL, WITH BILATERAL SALPINGO-OOPHORECTOMY, omentectomy, NEOPLASM DEBULKING,Proctoscocy, RO, Resection of liver nodules, diaphragm stripping, immobilization of liver and colon;  Surgeon: Bolivar Juarez MD;  Location[facility]     LAPAROSCOPY DIAGNOSTIC (GYN) Bilateral 1/7/2021    Procedure: Diagnsotic laparoscopy, biopsies;  Surgeon: Bolivar Juarez MD;  [facility]     [facility]       TUBAL LIGATION           Health Maintenance Due   Topic Date Due     DEXA  Never done     ADVANCE CARE PLANNING  Never done     HEPATITIS B IMMUNIZATION (1 of 3 - 3-dose series) Never done     COLORECTAL CANCER SCREENING  Never done     HIV SCREENING  Never done     HEPATITIS C SCREENING  Never done     LIPID  Never done     MEDICARE ANNUAL WELLNESS VISIT  11/11/2021     FALL RISK ASSESSMENT  Never done     COVID-19 Vaccine (4 - Booster for Pfizer series) 11/15/2021     PHQ-2 (once per calendar year)  01/01/2022     INFLUENZA VACCINE (1) Never done       Current Medications:     Current Outpatient Medications   Medication Sig Dispense Refill     amitriptyline (ELAVIL) 100 MG tablet Take 1 tablet (100 mg) by mouth At Bedtime 90 tablet 0     fluticasone (FLONASE) 50 MCG/ACT nasal spray SPRAY 1  SPRAY INTO EACH NOSTRIL 2 TIMES A DAY       gabapentin (NEURONTIN) 600 MG tablet Take 1 tablet (600 mg) by mouth At Bedtime (Patient taking differently: Take 600 mg by mouth At Bedtime HALF TAB) 90 tablet 0     meclizine (ANTIVERT) 25 MG tablet Take 1 tablet (25 mg) by mouth 3 times daily as needed for dizziness or nausea 15 tablet 0     ondansetron (ZOFRAN) 4 MG tablet Take by mouth every 8 hours as needed for nausea       rivaroxaban ANTICOAGULANT (XARELTO ANTICOAGULANT) 20 MG TABS tablet Take 1 tablet (20 mg) by mouth every morning 90 tablet 1     SUMAtriptan (IMITREX) 100 MG tablet Take 1 tablet (100 mg) by mouth at onset of headache for migraine 15 tablet 0     valACYclovir (VALTREX) 1000 mg tablet Take 1,000 mg by mouth as needed        zolpidem (AMBIEN) 10 MG tablet Take 10 mg by mouth       olaparib (LYNPARZA) 150 MG tablet Take 2 tablets (300 mg) by mouth 2 times daily 120 tablet 2     oxyCODONE (ROXICODONE) 5 MG tablet Take 1 tablet (5 mg) by mouth every 12 hours (Patient not taking: No sig reported) 10 tablet 0         Allergies:      No Known Allergies     Social History:     Social History     Tobacco Use     Smoking status: Former     Packs/day: 0.50     Years: 40.00     Pack years: 20.00     Types: Cigarettes     Quit date:      Years since quittin.8     Smokeless tobacco: Never   Substance Use Topics     Alcohol use: Not Currently       History   Drug Use Unknown         Family History:     Family History   Adopted: Yes   Problem Relation Age of Onset     Cancer Mother 36     Other Cancer Mother         Bio mother  of  a female cancer  at 36     Factor V Leiden deficiency Daughter      Deep Vein Thrombosis Daughter      Diabetes Type 1 Daughter      Diabetes Daughter      Hypertension Daughter      Anesthesia Reaction No family hx of          Physical Exam:     /64   Pulse 71   Temp 98  F (36.7  C)   Resp 16   Ht 1.829 m (6')   Wt 79 kg (174 lb 2.1 oz)   SpO2 98%   BMI 23.62  kg/m    Body mass index is 23.62 kg/m .    General Appearance: healthy and alert, no distress     Musculoskeletal: extremities non tender and without edema    Skin: no lesions or rashes     Neurological: normal gait, no gross defects     Psychiatric: appropriate mood and affect                               Hematological: normal cervical, supraclavicular and inguinal lymph nodes     Gastrointestinal:       abdomen soft, non-tender, non-distended, no organomegaly or masses    Genitourinary: External genitalia and urethral meatus appears normal.  Vagina is smooth without nodularity or masses, well healed vaginal cuff. Bimanual exam reveal no masses, nodularity or fullness.  Recto-vaginal exam confirms these findings.      Assessment:    Taran Regalado is a 66 year old woman with metastatic ovarian high grade serous carcinoma.    40 minute visit with 30 minutes counseling.        1.  Suspected stage IV high grade serous ovarian cancer.   2.  BRCA 1 germline mutation.   3.  Precision Medicine Program  4.  PE resolved on Lovenox (prophy)  5.  Left ankle injury     We did review the CT scan from July given elevated  that has been slightly increasing.  There is no clear evidence of definitive recurrence.  Patient has been fairly asymptomatic this point.  We discussed we will continue with Parp inhibitor for maintenance therapy given her BRCA1 germline mutation, using Olaparib 300mg bid as starting dose. Risks and alternatives were discussed, that patient agrees with the plan. We will obtain CT scan if raising  or symptomatic.           Bolivar Juarez MD, MS    Department of Obstetrics and Gynecology   Division of Gynecologic Oncology   [facility]  Phone: [phone]       CC  Patient Care Team:  Bolivar Juarez MD as PCP - General (Gynecologic Oncology)  Marta Okeefe APRN CNP as Assigned PCP  Brinda Lacey MD as Assigned Palliative Care Provider

## 2022-09-21 NOTE — NURSING NOTE
Oncology Rooming Note    September 21, 2022 2:36 PM   Taran Regalado is a 66 year old female who presents for:    Chief Complaint   Patient presents with     Oncology Clinic Visit     [facility] RETURN - OVARIAN CANCER     Initial Vitals: /64   Pulse 71   Temp 98  F (36.7  C)   Resp 16   Ht 1.829 m (6')   Wt 79 kg (174 lb 2.1 oz)   SpO2 98%   BMI 23.62 kg/m   Estimated body mass index is 23.62 kg/m  as calculated from the following:    Height as of this encounter: 1.829 m (6').    Weight as of this encounter: 79 kg (174 lb 2.1 oz). Body surface area is 2 meters squared.  No Pain (0) Comment: Data Unavailable   No LMP recorded. Patient is postmenopausal.  Allergies reviewed: Yes  Medications reviewed: Yes    Medications: Medication refills not needed today.  Pharmacy name entered into EPIC:    [facility] - [place], [place] - [address] [facility] - [place], [place] - [address] [facility] [address] [place] - [address] [facility]  [place] [address] [facility] [address] [place] [address]  [place], [place] - [address] [place] [address]  [facility] [place], [place] - [address]  [facility] - [place], [place] - [address]    Clinical concerns: No new concerns. Larry was notified.      Jarocho Pinedo LPN

## 2022-09-21 NOTE — PROGRESS NOTES
"Infusion Nursing Note:  Taran Regalado presents today for Cycle 3 Day 1 Taxol and Carboplatin (DEFERRED).    Patient seen by provider today: No   present during visit today: Not Applicable.    Note: Patient states she has been \"feeling pretty good.\"  She offers no new concerns at this time.  Patient did meet criteria for an asymptomatic covid-19 PCR test in infusion today. Patient declined the covid-19 test.    Intravenous Access:  Peripheral IV placed in lab today.    Treatment Conditions:  Lab Results   Component Value Date    HGB 10.6 03/05/2021     Lab Results   Component Value Date    WBC 2.7 03/05/2021      Lab Results   Component Value Date    ANEU 0.9 03/05/2021     Lab Results   Component Value Date     03/05/2021      Lab Results   Component Value Date     03/05/2021                   Lab Results   Component Value Date    POTASSIUM 3.7 03/05/2021           Lab Results   Component Value Date    MAG 2.2 03/05/2021            Lab Results   Component Value Date    CR 0.86 03/05/2021                   Lab Results   Component Value Date    HORACIO 8.6 03/05/2021                Lab Results   Component Value Date    BILITOTAL 0.2 03/05/2021           Lab Results   Component Value Date    ALBUMIN 3.4 03/05/2021                    Lab Results   Component Value Date    ALT 29 03/05/2021           Lab Results   Component Value Date    AST 21 03/05/2021       Results reviewed, labs did NOT meet treatment parameters: Ella Murillo NP notified of patient's neutrophil count today.    3/5/21 0801 TORB: Ella Murillo NP/Adonay Monae RN  - Defer chemotherapy for 1 week.    Relayed above plan to patient.  Gave patient a handout on neutropenia and when to notify triage/MD.  Reviewed with patient careful handwashing and infection prevention.  Patient confirmed she has a thermometer at her daughter's (where she is staying).  Also gave patient information on Neulasta, as Ella will add this to her plan " for her next infusion.  Patient verbalized understanding.    Post Infusion Assessment:  Access discontinued per protocol.     Discharge Plan:   Patient declined prescription refills.  Discharge instructions reviewed with: Patient.  Patient and/or family verbalized understanding of discharge instructions and all questions answered.  AVS to patient via [employer].  Patient will return next week for next appointment (appointment to be scheduled).   Patient discharged in stable condition accompanied by: self.  Departure Mode: Ambulatory.  Face to Face time: 5 minutes.    ALINE BLUNT RN                       Taltz Counseling: I discussed with the patient the risks of ixekizumab including but not limited to immunosuppression, serious infections, worsening of inflammatory bowel disease and drug reactions.  The patient understands that monitoring is required including a PPD at baseline and must alert us or the primary physician if symptoms of infection or other concerning signs are noted.

## 2022-09-21 NOTE — PATIENT INSTRUCTIONS
Follow up with MD in 3 months    prior to visit (can be done locally)    It was great seeing you    Mike

## 2022-09-23 DIAGNOSIS — C56.9 OVARIAN CANCER, UNSPECIFIED LATERALITY (H): Primary | ICD-10-CM

## 2022-09-24 ENCOUNTER — HEALTH MAINTENANCE LETTER (OUTPATIENT)
Age: 67
End: 2022-09-24

## 2022-10-20 ENCOUNTER — TELEPHONE (OUTPATIENT)
Dept: ONCOLOGY | Facility: CLINIC | Age: 67
End: 2022-10-20

## 2022-10-20 NOTE — ORAL ONC MGMT
Oral Chemotherapy Monitoring Program    Subjective/Objective:  Taran Regalado is a 66 year old female contacted by phone for a follow-up visit for oral chemotherapy.  Taran confirms taking the appropriate dose of olaparib. Denies new or worsening side effects, missed doses, and recent hospital or ED visits. Patient has not had any recent medication changes.     ORAL CHEMOTHERAPY 4/26/2022 5/20/2022 6/6/2022 6/20/2022 6/22/2022 9/23/2022 10/20/2022   Assessment Type Refill Refill Lab Monitoring Refill Quarterly Follow up Refill Quarterly Follow up   Diagnosis Code - - Ovarian Cancer Ovarian Cancer Ovarian Cancer Ovarian Cancer Ovarian Cancer   Providers Dr. Larry Juarez   [facility]   Drug Name Lynparza (olaparib) Lynparza (olaparib) Lynparza (olaparib) Lynparza (olaparib) Lynparza (olaparib) Lynparza (olaparib) Lynparza (olaparib)   Dose 300 mg 300 mg 300 mg 300 mg 300 mg 300 mg 300 mg   Current Schedule BID BID BID BID BID BID BID   Cycle Details Continuous Continuous Continuous Continuous Continuous Continuous Continuous   Start Date of Last Cycle - - - - - - -   Planned next cycle start date - - - - - - -   Doses missed in last 2 weeks - - - - 0 - 0   Adherence Assessment - - - - Adherent - Adherent   Adverse Effects - - - - No AE identified during assessment - No AE identified during assessment   Any new drug interactions? - - - - No - No   Is the dose as ordered appropriate for the patient? - - - - Yes - Yes   Has the patient missed any days of school, work, or other routine activity? - - - - - - -   Since the last time we talked, have you been hospitalized or used the emergency room? - - - - No - No       Last PHQ-2 Score on record:   PHQ-2 ( 1999 Pfizer) 4/13/2021 2/18/2021   Q1: Little interest or pleasure in doing things 0 0   Q2: Feeling down, depressed  or hopeless 0 0   PHQ-2 Score 0 0   PHQ-2 Total Score (12-17 Years)- Positive if 3 or more points; Administer PHQ-A if positive 0 0       Vitals:  BP:   BP Readings from Last 1 Encounters:   09/21/22 124/64     Wt Readings from Last 1 Encounters:   09/21/22 79 kg (174 lb 2.1 oz)     Estimated body surface area is 2 meters squared as calculated from the following:    Height as of 9/21/22: 1.829 m (6').    Weight as of 9/21/22: 79 kg (174 lb 2.1 oz).      Assessment/Plan:  Denies new or worse AE's. Continue plan of care.     Adherence: PDC= 1, refills on time each month     Follow-Up:  Next appointment and labs December 2022     Refill Due:  [facility] will deliver refill 10/25      Matt Barajas, PharmD  Hematology/Oncology Clinical Pharmacist  [facility]  [facility]  [phone]

## 2022-10-24 NOTE — PROGRESS NOTES
Follow Up Notes on Referred Patient    Date: 2022       Dr. Ye Vaughn MD  No address on file       RE: Taran Regalado  : 1955  GRACY: 2022    Taran Regalado is a 66 year old woman with a diagnosis of metastatic ovarian high grade serous carcinoma. She is here today for follow up and disease management. She has been doing well since finishing her adjuvant chemotherapy. She is eating an drinking well, no nausea vomiting, fever or chills, normal urinary and bowel function.        Oncology History:  12/3/2020: US Pelvic: IMPRESSION: Limited examination due to acoustic windows. Possible left adnexal mass. A CT scan of the abdomen and pelvis with contrast is recommended for further assessment.     2020: CT A/P:   IMPRESSION:    Peritoneal carcinomatosis with masslike peritoneal thickening in the lower pelvis which may indicate an adnexal or ovarian primary malignancy. Large volume ascites. Bilateral pleural effusions. There is potential subtle pleural nodularity in the right hemithorax which could indicate metastatic disease.  Indeterminate 1 cm lesion in the right hepatic lobe suspicious for a metastatic lesion.      2020: Presented to [facility] with abdominal distention, 25lb weight loss, and CTAP with carcinomatosis, elevated  3098.     2020: CT Chest: IMPRESSION:   1. There are few scattered small sub-6 mm pulmonary nodules which are indeterminate without prior comparisons available. There are a few  slightly larger perifissural nodules which are technically  indeterminate in the setting of malignancy although presumed lymphatic in nature and of unlikely clinical significance. Attention on follow-up is recommended.  2. Small to moderate left and small right pleural effusions are increased in size from prior. No convincing evidence for pleural nodularity.  3. Partially visualized large volume ascites and peritoneal nodularity in the upper abdomen similar to  12/4/2020 outside CT      12/26/2020: ED for abdominal distension; 3 L ascites drained with paracentesis    Pelvic US: Findings: Free fluid present in LLQ      12/31/2020: US Paracentesis: 900 mL ascites drained     1/7/2021: Diagnotic laparoscopy, biopsies  Pathology: FINAL DIAGNOSIS:   A. PERITONEUM, BIOPSIES:   - Positive for high grade carcinoma, consistent with metastatic carcinoma of Mullerian origin.     1/10-1/13/2021: Hospital admission for postoperative non-intractable vomiting and nausea.      1/10/2021: CT A/P: IMPRESSION: Extensive ascites which is probably malignant. Scattered liver hypodensities of indeterminate etiology comment cannot exclude metastatic disease. Diverticulosis. Fluid-filled adnexal masses and irregular appearance of uterus, which may represent primary neoplasm. Multiple peritoneal nodules. Large amount of fecal material in the colon with no evidence of small bowel obstruction.     Plan: Paclitaxel 175 mg/m2 and carboplatin AUC 6 x 3 cycles followed by a CT CAP and visit with Dr. Juarez.     1/12/2021: Cycle 1 paclitaxel and carboplatin while inpatient     1/13/2021: CT Head: Impression:  1. Chronic sinusitis of the right maxillary and right sphenoid sinuses.  2. Incidental presumed calcified meningioma in the right frontal  convexity without significant mass effect.  3. No suspicious intracranial enhancing lesion.     2/1/2021: Cycle 2 paclitaxel and carboplatin.  936.     2/3-2/5/21: Admission [facility] for afib w/ RVR and new PE     2/26/21: Cycle 3 paclitaxel and carboplatin planned.  Deferred due to thrombocytopenia.  pending.     3/15/21: Cycle 3 paclitaxel and carboplatin given     4/19/21: HYSTERECTOMY, TOTAL, ABDOMINAL, WITH BILATERAL SALPINGO-OOPHORECTOMY, omentectomy, NEOPLASM DEBULKING,Proctoscocy, RO, Resection of liver nodules, diaphragm stripping, immobilization of liver and colon  FINAL DIAGNOSIS:   A. OMENTUM, BIOPSY:   - Omental adipose tissue with rare  viable cells of metastatic high grade   serous carcinoma   - One reactive lymph node, negative for malignancy (0/1)   B. NODULE, SIGMOID, EXCISION:   - Calcified necrotic adipose tissue   - Negative for malignancy   C. NODULE, SMALL BOWEL MESENTERY, EXCISION:   - Fibroadipose tissue, positive for metastatic high grade serous carcinoma   D. UTERUS, CERVIX, BILATERAL FALLOPIAN TUBES AND OVARIES, HYSTERECTOMY   WITH BILATERAL SALPINGO-OOPHORECTOMY:   - Atrophic endometrium   - Uterine serosa with rare viable cells consistent with high grade serous   carcinoma   - Cervix with atrophic changes   - Viable cells consistent with high grade serous carcinoma present in the   right ovary, serosa of right   fallopian tube and right periadnexal soft tissue   - Left ovary with atrophic changes   - Left fallopian tube with a rare focus of serous tubal in-situ carcinoma   (STIC)   E. NODULES, SMALL BOWEL MESENTRY, EXCISION:   - Fibroadipose tissue with rare viable cells of metastatic high grade   serous carcinoma   F. NODULE, SPLENIC FLEXURE TRANSVERSE COLON, EXCISION:   - Fibroadipose tissue with rare viable cells of metastatic high grade   serous carcinoma   - Accessory splenule, negative for malignancy   G. OMENTUM, OMENTECTOMY:   - Omental adipose tissue with rare viable cells of metastatic high grade   serous carcinoma   H. NODULE, PERITONEAL PANCREATIC, EXCISION:   - Fibrous adhesions with inflammation   - Negative for malignancy   I. RIGHT HEMIDIAPHRAGM PERITONEUM, EXCISION:   - Fibrous adhesions with inflammation   - Negative for malignancy   J. RIGHT LIVER SURFACE NODULE:   - Fibrous adhesions with benign inclusion glands   - Negative for malignancy   K. LEFT LOWER LIVER EDGE, BIOPSY:   - Cauterized hepatic parenchyma and capsule   - Negative for malignancy   L. NODULE, SMALL BOWEL MESENTERIC #3, EXCISION:   - Fibroadipose tissue with rare viable cells of metastatic high grade   serous carcinoma       Plan: Carboplatin AUC  6 + Taxol 175 mg/m2 x 3 cycles, then transition to Parp inhibitor for maintenance therapy given her BRCA1 germline mutation.      5/21/21: Cycle 4 carboplatin and paclitaxel.   172.  6/11/21: Cycle 5 carboplatin and paclitaxel.   61.  7/2/21: Cycle 6 carboplatin and paclitaxel.   20.       7/28/21 plan: Olaparib 300mg bid as starting dose,  14  8/20/21: start date olaparib 300 mg BID,  12  9/13/21:  22  10/4/21:  23  11/1/21:  26     11/02/2021: PET CT: IMPRESSION:   Findings compatible with interval surgery and posttreatment change.  No gross definitive FDG avid disease.  Potential foci of tumor deposits along the anterior dome of the liver and midline abdominal wall surgical scar.  Colonic activity is not necessarily abnormal, however, given the previous carcinomatosis the colonic activity is indeterminant.         12/1/21:  23  1/3/22:  21  2/1/22:  20  3/1/22:  21  4/1/22:  23  5/4/22:  28    EXAM: CT CHEST/ABDOMEN/PELVIS W CONTRAST  LOCATION: [facility]  DATE/TIME: 7/11/2022 1:25 PM     INDICATION: Stage III B high-grade ovarian carcinoma diagnosed Jan 2021. Posttreatment surveillance.  COMPARISON: CTA AP 04/23/2021, CT CAP 04/02/2021  TECHNIQUE: CT scan of the chest, abdomen, and pelvis was performed following injection of IV contrast. Multiplanar reformats were obtained. Dose reduction techniques were used.   CONTRAST: isovue 370 105mL IV; 50mL omni 140 oral     FINDINGS:   LUNGS AND PLEURA: No suspicious pulmonary nodules. Minimal right apical pleural thickening and a few punctate tiny nodules 2 of which are subpleural on the right are stable. No pleural effusions.     MEDIASTINUM/AXILLAE: No adenopathy. No central pulmonary emboli.     CORONARY ARTERY CALCIFICATION: Cannot evaluate.     HEPATOBILIARY: Normal.     PANCREAS: Normal.     SPLEEN: Normal.     ADRENAL GLANDS: Normal.     KIDNEYS/BLADDER:  Normal.     BOWEL: Sliver of ascites adjacent to the spleen noted, decreased from prior study. There is a 4 mm nodule in the gastrosplenic ligament (image 352). On the preop study it appears as a smaller punctate nodule (prior image 341). Sliver of ascites in the   gastrohepatic ligament also noted. No peritoneal tumor nodules. No bowel obstruction. Quite redundant colon with mild large stool burden. Extensive distal colonic diverticulosis.     LYMPH NODES: Normal.     VASCULATURE: Mild arterial calcifications. Circumaortic left renal vein.     PELVIC ORGANS: Hysterectomy. Vaginal cuff is normal.     MUSCULOSKELETAL: Diastases of the midline rectus sheath above the umbilicus. Minimal nodular scarring to the left of midline in the midabdomen (image 419). There is a shallow broad-based supraumbilical ventral hernia containing only fat. No implants   within the hernia or abdominal incision. No suspicious bone lesions.                                                                      IMPRESSION:  1.  Sliver of ascites in the upper abdomen has decreased since interval debulking surgery.  2.  There is a punctate nodule in the gastrosplenic ligament which is minimally more plump relative to the preop exam. This will need to be followed.  3.  Minimal nodular changes to the left of the midline scar in the subcutaneous fat will have to be followed as well. Unclear if this simply reflects postoperative scarring or could reflect an early incisional recurrence.  4.  No other sites to suggest recurrent tumor. Vaginal cuff is normal.  5.  Extensive distal colonic diverticulosis.  6.  Other noncritical findings as noted above.     9/16/22  98           Past Medical History:    Past Medical History:   Diagnosis Date     Atrial fibrillation with rapid ventricular response (H)      History of cold sores      Hx of LASIK 12/11/2017     Insomnia      Migraine      Osteopenia      Pelvic mass      Peritoneal carcinomatosis (H)       Restless legs syndrome (RLS)          Past Surgical History:    Past Surgical History:   Procedure Laterality Date     APPENDECTOMY       ARTHROSCPY KNEE SURGICAL DEBRIDEMENT SHAVING ARTICULAR CARTILAGE Right      BIOPSY  January 2021    Biopsy to confirm ovarian cancer     DEBRIDEMENT LEFT UPPER EXTREMITY  2016     HYSTERECTOMY TOTAL ABD, LUISITO SALPINGO-OOPHORECTOMY, NODE DISSECTION, TUMOR DEBULKING, COMBINED Bilateral 4/19/2021    Procedure: HYSTERECTOMY, TOTAL, ABDOMINAL, WITH BILATERAL SALPINGO-OOPHORECTOMY, omentectomy, NEOPLASM DEBULKING,Proctoscocy, RO, Resection of liver nodules, diaphragm stripping, immobilization of liver and colon;  Surgeon: Bolivar Juarez MD;  Location: [facility] OR     LAPAROSCOPY DIAGNOSTIC (GYN) Bilateral 1/7/2021    Procedure: Diagnsotic laparoscopy, biopsies;  Surgeon: Bolivar Juarez MD;  Location: [facility] OR     LASIK       TUBAL LIGATION           Health Maintenance Due   Topic Date Due     DEXA  Never done     ADVANCE CARE PLANNING  Never done     HEPATITIS B IMMUNIZATION (1 of 3 - 3-dose series) Never done     COLORECTAL CANCER SCREENING  Never done     HIV SCREENING  Never done     HEPATITIS C SCREENING  Never done     LIPID  Never done     MEDICARE ANNUAL WELLNESS VISIT  11/11/2021     FALL RISK ASSESSMENT  Never done     COVID-19 Vaccine (4 - Booster for Pfizer series) 11/15/2021     PHQ-2 (once per calendar year)  01/01/2022     INFLUENZA VACCINE (1) Never done       Current Medications:     Current Outpatient Medications   Medication Sig Dispense Refill     amitriptyline (ELAVIL) 100 MG tablet Take 1 tablet (100 mg) by mouth At Bedtime 90 tablet 0     fluticasone (FLONASE) 50 MCG/ACT nasal spray SPRAY 1 SPRAY INTO EACH NOSTRIL 2 TIMES A DAY       gabapentin (NEURONTIN) 600 MG tablet Take 1 tablet (600 mg) by mouth At Bedtime (Patient taking differently: Take 600 mg by mouth At Bedtime HALF TAB) 90 tablet 0     meclizine (ANTIVERT) 25 MG tablet Take 1 tablet (25 mg) by  mouth 3 times daily as needed for dizziness or nausea 15 tablet 0     ondansetron (ZOFRAN) 4 MG tablet Take by mouth every 8 hours as needed for nausea       rivaroxaban ANTICOAGULANT (XARELTO ANTICOAGULANT) 20 MG TABS tablet Take 1 tablet (20 mg) by mouth every morning 90 tablet 1     SUMAtriptan (IMITREX) 100 MG tablet Take 1 tablet (100 mg) by mouth at onset of headache for migraine 15 tablet 0     valACYclovir (VALTREX) 1000 mg tablet Take 1,000 mg by mouth as needed        zolpidem (AMBIEN) 10 MG tablet Take 10 mg by mouth       olaparib (LYNPARZA) 150 MG tablet Take 2 tablets (300 mg) by mouth 2 times daily 120 tablet 2     oxyCODONE (ROXICODONE) 5 MG tablet Take 1 tablet (5 mg) by mouth every 12 hours (Patient not taking: No sig reported) 10 tablet 0         Allergies:      No Known Allergies     Social History:     Social History     Tobacco Use     Smoking status: Former     Packs/day: 0.50     Years: 40.00     Pack years: 20.00     Types: Cigarettes     Quit date:      Years since quittin.8     Smokeless tobacco: Never   Substance Use Topics     Alcohol use: Not Currently       History   Drug Use Unknown         Family History:     Family History   Adopted: Yes   Problem Relation Age of Onset     Cancer Mother 36     Other Cancer Mother         Bio mother  of  a female cancer  at 36     Factor V Leiden deficiency Daughter      Deep Vein Thrombosis Daughter      Diabetes Type 1 Daughter      Diabetes Daughter      Hypertension Daughter      Anesthesia Reaction No family hx of          Physical Exam:     /64   Pulse 71   Temp 98  F (36.7  C)   Resp 16   Ht 1.829 m (6')   Wt 79 kg (174 lb 2.1 oz)   SpO2 98%   BMI 23.62 kg/m    Body mass index is 23.62 kg/m .    General Appearance: healthy and alert, no distress     Musculoskeletal: extremities non tender and without edema    Skin: no lesions or rashes     Neurological: normal gait, no gross defects     Psychiatric: appropriate mood  and affect                               Hematological: normal cervical, supraclavicular and inguinal lymph nodes     Gastrointestinal:       abdomen soft, non-tender, non-distended, no organomegaly or masses    Genitourinary: External genitalia and urethral meatus appears normal.  Vagina is smooth without nodularity or masses, well healed vaginal cuff. Bimanual exam reveal no masses, nodularity or fullness.  Recto-vaginal exam confirms these findings.      Assessment:    Taran Regalado is a 66 year old woman with metastatic ovarian high grade serous carcinoma.    40 minute visit with 30 minutes counseling.        1.  Suspected stage IV high grade serous ovarian cancer.   2.  BRCA 1 germline mutation.   3.  Precision Medicine Program  4.  PE resolved on Lovenox (prophy)  5.  Left ankle injury     We did review the CT scan from July given elevated  that has been slightly increasing.  There is no clear evidence of definitive recurrence.  Patient has been fairly asymptomatic this point.  We discussed we will continue with Parp inhibitor for maintenance therapy given her BRCA1 germline mutation, using Olaparib 300mg bid as starting dose. Risks and alternatives were discussed, that patient agrees with the plan. We will obtain CT scan if raising  or symptomatic.           Bolivar Juarez MD, MS    Department of Obstetrics and Gynecology   Division of Gynecologic Oncology   [facility]  Phone: [phone]       CC  Patient Care Team:  Bolivar Juarez MD as PCP - General (Gynecologic Oncology)  Marta Okeefe APRN CNP as Assigned PCP  Deepthi, Brinda Frias MD as Assigned Palliative Care Provider  Bolivar Juarez MD as Assigned Cancer Care Provider  SELF, REFERRED

## 2022-12-06 ENCOUNTER — TELEPHONE (OUTPATIENT)
Dept: ONCOLOGY | Facility: CLINIC | Age: 67
End: 2022-12-06

## 2022-12-06 NOTE — TELEPHONE ENCOUNTER
Panda Schofield CPhT  [facility]  Oncology Pharmacy Liaison   [email or web address]  [phone] (phone  [phone] (fax

## 2022-12-18 DIAGNOSIS — C56.9 OVARIAN CANCER, UNSPECIFIED LATERALITY (H): Primary | ICD-10-CM

## 2022-12-22 DIAGNOSIS — C56.9 OVARIAN CANCER, UNSPECIFIED LATERALITY (H): Primary | ICD-10-CM

## 2023-01-01 ENCOUNTER — INFUSION THERAPY VISIT (OUTPATIENT)
Dept: INFUSION THERAPY | Facility: CLINIC | Age: 68
End: 2023-01-01
Attending: NURSE PRACTITIONER
Payer: COMMERCIAL

## 2023-01-01 ENCOUNTER — PATIENT OUTREACH (OUTPATIENT)
Dept: ONCOLOGY | Facility: CLINIC | Age: 68
End: 2023-01-01
Payer: COMMERCIAL

## 2023-01-01 ENCOUNTER — PATIENT OUTREACH (OUTPATIENT)
Dept: CARE COORDINATION | Facility: CLINIC | Age: 68
End: 2023-01-01
Payer: COMMERCIAL

## 2023-01-01 ENCOUNTER — VIRTUAL VISIT (OUTPATIENT)
Dept: ONCOLOGY | Facility: HOSPITAL | Age: 68
End: 2023-01-01
Attending: OBSTETRICS & GYNECOLOGY
Payer: COMMERCIAL

## 2023-01-01 ENCOUNTER — VIRTUAL VISIT (OUTPATIENT)
Dept: ONCOLOGY | Facility: CLINIC | Age: 68
End: 2023-01-01
Attending: STUDENT IN AN ORGANIZED HEALTH CARE EDUCATION/TRAINING PROGRAM
Payer: COMMERCIAL

## 2023-01-01 ENCOUNTER — HOSPITAL ENCOUNTER (INPATIENT)
Facility: CLINIC | Age: 68
LOS: 3 days | Discharge: HOME | DRG: 444 | End: 2023-12-17
Attending: EMERGENCY MEDICINE | Admitting: OBSTETRICS & GYNECOLOGY
Payer: COMMERCIAL

## 2023-01-01 ENCOUNTER — PATIENT OUTREACH (OUTPATIENT)
Dept: ONCOLOGY | Facility: CLINIC | Age: 68
End: 2023-01-01

## 2023-01-01 ENCOUNTER — APPOINTMENT (OUTPATIENT)
Dept: GENERAL RADIOLOGY | Facility: CLINIC | Age: 68
DRG: 444 | End: 2023-01-01
Attending: OBSTETRICS & GYNECOLOGY
Payer: COMMERCIAL

## 2023-01-01 ENCOUNTER — VIRTUAL VISIT (OUTPATIENT)
Dept: ONCOLOGY | Facility: CLINIC | Age: 68
End: 2023-01-01
Attending: SOCIAL WORKER
Payer: COMMERCIAL

## 2023-01-01 ENCOUNTER — INFUSION THERAPY VISIT (OUTPATIENT)
Dept: INFUSION THERAPY | Facility: CLINIC | Age: 68
End: 2023-01-01
Payer: COMMERCIAL

## 2023-01-01 ENCOUNTER — ANESTHESIA EVENT (OUTPATIENT)
Dept: SURGERY | Facility: CLINIC | Age: 68
DRG: 444 | End: 2023-01-01
Payer: COMMERCIAL

## 2023-01-01 ENCOUNTER — ALLIED HEALTH/NURSE VISIT (OUTPATIENT)
Dept: TRANSPLANT | Facility: CLINIC | Age: 68
End: 2023-01-01
Payer: COMMERCIAL

## 2023-01-01 ENCOUNTER — INFUSION THERAPY VISIT (OUTPATIENT)
Dept: ONCOLOGY | Facility: CLINIC | Age: 68
End: 2023-01-01
Attending: OBSTETRICS & GYNECOLOGY
Payer: COMMERCIAL

## 2023-01-01 ENCOUNTER — ANESTHESIA (OUTPATIENT)
Dept: SURGERY | Facility: CLINIC | Age: 68
DRG: 444 | End: 2023-01-01
Payer: COMMERCIAL

## 2023-01-01 ENCOUNTER — PRE VISIT (OUTPATIENT)
Dept: ONCOLOGY | Facility: CLINIC | Age: 68
End: 2023-01-01

## 2023-01-01 VITALS — HEIGHT: 72 IN | WEIGHT: 170 LBS | BODY MASS INDEX: 23.03 KG/M2

## 2023-01-01 VITALS
TEMPERATURE: 97.9 F | DIASTOLIC BLOOD PRESSURE: 68 MMHG | OXYGEN SATURATION: 100 % | SYSTOLIC BLOOD PRESSURE: 100 MMHG | HEART RATE: 107 BPM

## 2023-01-01 VITALS
OXYGEN SATURATION: 98 % | WEIGHT: 177.7 LBS | TEMPERATURE: 98.6 F | SYSTOLIC BLOOD PRESSURE: 110 MMHG | DIASTOLIC BLOOD PRESSURE: 72 MMHG | RESPIRATION RATE: 12 BRPM | HEART RATE: 73 BPM | BODY MASS INDEX: 24.1 KG/M2

## 2023-01-01 VITALS — WEIGHT: 170 LBS | HEIGHT: 72 IN | BODY MASS INDEX: 23.03 KG/M2

## 2023-01-01 VITALS
DIASTOLIC BLOOD PRESSURE: 80 MMHG | BODY MASS INDEX: 23.03 KG/M2 | SYSTOLIC BLOOD PRESSURE: 120 MMHG | WEIGHT: 170 LBS | HEIGHT: 72 IN

## 2023-01-01 VITALS
WEIGHT: 167.3 LBS | DIASTOLIC BLOOD PRESSURE: 73 MMHG | BODY MASS INDEX: 22.69 KG/M2 | TEMPERATURE: 97.9 F | HEART RATE: 108 BPM | RESPIRATION RATE: 16 BRPM | SYSTOLIC BLOOD PRESSURE: 108 MMHG | OXYGEN SATURATION: 99 %

## 2023-01-01 VITALS
WEIGHT: 177.2 LBS | BODY MASS INDEX: 24.03 KG/M2 | HEART RATE: 68 BPM | RESPIRATION RATE: 16 BRPM | DIASTOLIC BLOOD PRESSURE: 76 MMHG | TEMPERATURE: 97.9 F | OXYGEN SATURATION: 100 % | SYSTOLIC BLOOD PRESSURE: 124 MMHG

## 2023-01-01 VITALS
HEIGHT: 72 IN | OXYGEN SATURATION: 97 % | SYSTOLIC BLOOD PRESSURE: 140 MMHG | HEART RATE: 107 BPM | WEIGHT: 165 LBS | BODY MASS INDEX: 22.35 KG/M2 | RESPIRATION RATE: 16 BRPM | TEMPERATURE: 97.8 F | DIASTOLIC BLOOD PRESSURE: 88 MMHG

## 2023-01-01 VITALS
DIASTOLIC BLOOD PRESSURE: 81 MMHG | WEIGHT: 179.4 LBS | TEMPERATURE: 97.7 F | BODY MASS INDEX: 24.33 KG/M2 | RESPIRATION RATE: 18 BRPM | SYSTOLIC BLOOD PRESSURE: 125 MMHG | OXYGEN SATURATION: 99 % | HEART RATE: 72 BPM

## 2023-01-01 VITALS — BODY MASS INDEX: 23.3 KG/M2 | HEIGHT: 72 IN | WEIGHT: 172 LBS

## 2023-01-01 VITALS — BODY MASS INDEX: 23.06 KG/M2 | WEIGHT: 170 LBS

## 2023-01-01 DIAGNOSIS — C56.9 OVARIAN CANCER, UNSPECIFIED LATERALITY (H): ICD-10-CM

## 2023-01-01 DIAGNOSIS — T45.1X5S ADVERSE EFFECT OF ANTINEOPLASTIC AND IMMUNOSUPPRESSIVE DRUGS, SEQUELA: ICD-10-CM

## 2023-01-01 DIAGNOSIS — I48.0 PAF (PAROXYSMAL ATRIAL FIBRILLATION) (H): ICD-10-CM

## 2023-01-01 DIAGNOSIS — D70.8 OTHER NEUTROPENIA (H): Primary | ICD-10-CM

## 2023-01-01 DIAGNOSIS — T45.1X5S ADVERSE EFFECT OF ANTINEOPLASTIC AND IMMUNOSUPPRESSIVE DRUGS, SEQUELA: Primary | ICD-10-CM

## 2023-01-01 DIAGNOSIS — T45.1X5A CHEMOTHERAPY-INDUCED NEUTROPENIA (H): Primary | ICD-10-CM

## 2023-01-01 DIAGNOSIS — C56.1 OVARIAN CANCER, RIGHT (H): ICD-10-CM

## 2023-01-01 DIAGNOSIS — D70.1 CHEMOTHERAPY-INDUCED NEUTROPENIA (H): Primary | ICD-10-CM

## 2023-01-01 DIAGNOSIS — C56.9 OVARIAN CANCER, UNSPECIFIED LATERALITY (H): Primary | ICD-10-CM

## 2023-01-01 DIAGNOSIS — T45.1X5A CHEMOTHERAPY-INDUCED NEUTROPENIA (H): ICD-10-CM

## 2023-01-01 DIAGNOSIS — C56.9 MALIGNANT NEOPLASM OF OVARY, UNSPECIFIED LATERALITY (H): ICD-10-CM

## 2023-01-01 DIAGNOSIS — Z96.89 PRESENCE OF PANCREATIC DUCT STENT: Primary | ICD-10-CM

## 2023-01-01 DIAGNOSIS — K83.1 BILIARY OBSTRUCTION (H): ICD-10-CM

## 2023-01-01 DIAGNOSIS — I48.91 ATRIAL FIBRILLATION WITH RAPID VENTRICULAR RESPONSE (H): ICD-10-CM

## 2023-01-01 DIAGNOSIS — R10.11 RUQ ABDOMINAL PAIN: ICD-10-CM

## 2023-01-01 DIAGNOSIS — D69.6 THROMBOCYTOPENIA (H): ICD-10-CM

## 2023-01-01 DIAGNOSIS — Z85.43 HISTORY OF OVARIAN CANCER: Primary | ICD-10-CM

## 2023-01-01 DIAGNOSIS — Z00.6 EXAMINATION OF PARTICIPANT IN CLINICAL TRIAL: Primary | ICD-10-CM

## 2023-01-01 DIAGNOSIS — D70.1 CHEMOTHERAPY-INDUCED NEUTROPENIA (H): ICD-10-CM

## 2023-01-01 LAB
ABO/RH(D): NORMAL
ALBUMIN SERPL BCG-MCNC: 2.5 G/DL (ref 3.5–5.2)
ALBUMIN SERPL BCG-MCNC: 2.8 G/DL (ref 3.5–5.2)
ALBUMIN SERPL BCG-MCNC: 3 G/DL (ref 3.5–5.2)
ALBUMIN SERPL BCG-MCNC: 3.3 G/DL (ref 3.5–5.2)
ALP SERPL-CCNC: 509 U/L (ref 40–150)
ALP SERPL-CCNC: 558 U/L (ref 40–150)
ALP SERPL-CCNC: 576 U/L (ref 40–150)
ALP SERPL-CCNC: 656 U/L (ref 40–150)
ALT SERPL W P-5'-P-CCNC: 115 U/L (ref 0–50)
ALT SERPL W P-5'-P-CCNC: 68 U/L (ref 0–50)
ALT SERPL W P-5'-P-CCNC: 77 U/L (ref 0–50)
ALT SERPL W P-5'-P-CCNC: 88 U/L (ref 0–50)
ANION GAP SERPL CALCULATED.3IONS-SCNC: 12 MMOL/L (ref 7–15)
ANION GAP SERPL CALCULATED.3IONS-SCNC: 6 MMOL/L (ref 7–15)
ANION GAP SERPL CALCULATED.3IONS-SCNC: 8 MMOL/L (ref 7–15)
ANION GAP SERPL CALCULATED.3IONS-SCNC: 9 MMOL/L (ref 7–15)
ANTIBODY SCREEN: NEGATIVE
AST SERPL W P-5'-P-CCNC: 111 U/L (ref 0–45)
AST SERPL W P-5'-P-CCNC: 126 U/L (ref 0–45)
AST SERPL W P-5'-P-CCNC: 144 U/L (ref 0–45)
AST SERPL W P-5'-P-CCNC: 198 U/L (ref 0–45)
BACTERIA BLD CULT: NO GROWTH
BACTERIA BLD CULT: NO GROWTH
BASOPHILS # BLD AUTO: 0 10E3/UL (ref 0–0.2)
BASOPHILS # BLD AUTO: 0 10E3/UL (ref 0–0.2)
BASOPHILS # BLD AUTO: NORMAL 10*3/UL
BASOPHILS # BLD MANUAL: 0 10E3/UL (ref 0–0.2)
BASOPHILS NFR BLD AUTO: 0 %
BASOPHILS NFR BLD AUTO: 0 %
BASOPHILS NFR BLD AUTO: NORMAL %
BASOPHILS NFR BLD MANUAL: 0 %
BILIRUB DIRECT SERPL-MCNC: 2.92 MG/DL (ref 0–0.3)
BILIRUB DIRECT SERPL-MCNC: 4.24 MG/DL (ref 0–0.3)
BILIRUB SERPL-MCNC: 4.1 MG/DL
BILIRUB SERPL-MCNC: 5.3 MG/DL
BILIRUB SERPL-MCNC: 5.7 MG/DL
BILIRUB SERPL-MCNC: 5.8 MG/DL
BUN SERPL-MCNC: 10 MG/DL (ref 8–23)
BUN SERPL-MCNC: 6.7 MG/DL (ref 8–23)
BUN SERPL-MCNC: 6.9 MG/DL (ref 8–23)
BUN SERPL-MCNC: 8.5 MG/DL (ref 8–23)
CA-I BLD-MCNC: 4.7 MG/DL (ref 4.4–5.2)
CALCIUM SERPL-MCNC: 8.2 MG/DL (ref 8.8–10.2)
CALCIUM SERPL-MCNC: 8.2 MG/DL (ref 8.8–10.2)
CALCIUM SERPL-MCNC: 8.5 MG/DL (ref 8.8–10.2)
CALCIUM SERPL-MCNC: 8.7 MG/DL (ref 8.8–10.2)
CHLORIDE SERPL-SCNC: 100 MMOL/L (ref 98–107)
CHLORIDE SERPL-SCNC: 101 MMOL/L (ref 98–107)
CHLORIDE SERPL-SCNC: 105 MMOL/L (ref 98–107)
CHLORIDE SERPL-SCNC: 97 MMOL/L (ref 98–107)
CPB POCT: NO
CREAT SERPL-MCNC: 0.51 MG/DL (ref 0.51–0.95)
CREAT SERPL-MCNC: 0.56 MG/DL (ref 0.51–0.95)
CREAT SERPL-MCNC: 0.57 MG/DL (ref 0.51–0.95)
CREAT SERPL-MCNC: 0.61 MG/DL (ref 0.51–0.95)
DEPRECATED HCO3 PLAS-SCNC: 22 MMOL/L (ref 22–29)
DEPRECATED HCO3 PLAS-SCNC: 22 MMOL/L (ref 22–29)
DEPRECATED HCO3 PLAS-SCNC: 23 MMOL/L (ref 22–29)
DEPRECATED HCO3 PLAS-SCNC: 24 MMOL/L (ref 22–29)
EGFRCR SERPLBLD CKD-EPI 2021: >90 ML/MIN/1.73M2
EOSINOPHIL # BLD AUTO: 0 10E3/UL (ref 0–0.7)
EOSINOPHIL # BLD AUTO: 0 10E3/UL (ref 0–0.7)
EOSINOPHIL # BLD AUTO: NORMAL 10*3/UL
EOSINOPHIL # BLD MANUAL: 0 10E3/UL (ref 0–0.7)
EOSINOPHIL NFR BLD AUTO: 0 %
EOSINOPHIL NFR BLD AUTO: 1 %
EOSINOPHIL NFR BLD AUTO: NORMAL %
EOSINOPHIL NFR BLD MANUAL: 0 %
ERCP: NORMAL
ERYTHROCYTE [DISTWIDTH] IN BLOOD BY AUTOMATED COUNT: 14.5 % (ref 10–15)
ERYTHROCYTE [DISTWIDTH] IN BLOOD BY AUTOMATED COUNT: 14.9 % (ref 10–15)
ERYTHROCYTE [DISTWIDTH] IN BLOOD BY AUTOMATED COUNT: 15 % (ref 10–15)
ERYTHROCYTE [DISTWIDTH] IN BLOOD BY AUTOMATED COUNT: 15.1 % (ref 10–15)
ERYTHROCYTE [DISTWIDTH] IN BLOOD BY AUTOMATED COUNT: 15.1 % (ref 10–15)
ERYTHROCYTE [DISTWIDTH] IN BLOOD BY AUTOMATED COUNT: NORMAL %
GLUCOSE BLD-MCNC: 85 MG/DL (ref 70–99)
GLUCOSE BLDC GLUCOMTR-MCNC: 113 MG/DL (ref 70–99)
GLUCOSE SERPL-MCNC: 100 MG/DL (ref 70–99)
GLUCOSE SERPL-MCNC: 114 MG/DL (ref 70–99)
GLUCOSE SERPL-MCNC: 78 MG/DL (ref 70–99)
GLUCOSE SERPL-MCNC: 97 MG/DL (ref 70–99)
HCO3 BLDV-SCNC: 25 MMOL/L (ref 21–28)
HCT VFR BLD AUTO: 32.1 % (ref 35–47)
HCT VFR BLD AUTO: 33.3 % (ref 35–47)
HCT VFR BLD AUTO: 33.6 % (ref 35–47)
HCT VFR BLD AUTO: 34.3 % (ref 35–47)
HCT VFR BLD AUTO: 34.9 % (ref 35–47)
HCT VFR BLD AUTO: NORMAL %
HCT VFR BLD CALC: 36 % (ref 35–47)
HGB BLD-MCNC: 10.4 G/DL (ref 11.7–15.7)
HGB BLD-MCNC: 11.1 G/DL (ref 11.7–15.7)
HGB BLD-MCNC: 11.4 G/DL (ref 11.7–15.7)
HGB BLD-MCNC: 11.6 G/DL (ref 11.7–15.7)
HGB BLD-MCNC: 11.6 G/DL (ref 11.7–15.7)
HGB BLD-MCNC: 12.2 G/DL (ref 11.7–15.7)
HGB BLD-MCNC: NORMAL G/DL
HOLD SPECIMEN: NORMAL
HOLD SPECIMEN: NORMAL
IMM GRANULOCYTES # BLD: 0 10E3/UL
IMM GRANULOCYTES # BLD: 0 10E3/UL
IMM GRANULOCYTES # BLD: NORMAL 10*3/UL
IMM GRANULOCYTES NFR BLD: 0 %
IMM GRANULOCYTES NFR BLD: 0 %
IMM GRANULOCYTES NFR BLD: NORMAL %
INR PPP: 1.35 (ref 0.85–1.15)
INR PPP: 1.4 (ref 0.85–1.15)
LACTATE SERPL-SCNC: 2 MMOL/L (ref 0.7–2)
LIPASE SERPL-CCNC: 27 U/L (ref 13–60)
LYMPHOCYTES # BLD AUTO: 0.7 10E3/UL (ref 0.8–5.3)
LYMPHOCYTES # BLD AUTO: 0.8 10E3/UL (ref 0.8–5.3)
LYMPHOCYTES # BLD AUTO: NORMAL 10*3/UL
LYMPHOCYTES # BLD MANUAL: 1.2 10E3/UL (ref 0.8–5.3)
LYMPHOCYTES NFR BLD AUTO: 25 %
LYMPHOCYTES NFR BLD AUTO: 26 %
LYMPHOCYTES NFR BLD AUTO: NORMAL %
LYMPHOCYTES NFR BLD MANUAL: 6 %
MAGNESIUM SERPL-MCNC: 1.7 MG/DL (ref 1.7–2.3)
MCH RBC QN AUTO: 32.9 PG (ref 26.5–33)
MCH RBC QN AUTO: 33.2 PG (ref 26.5–33)
MCH RBC QN AUTO: 33.5 PG (ref 26.5–33)
MCH RBC QN AUTO: 33.7 PG (ref 26.5–33)
MCH RBC QN AUTO: 36.5 PG (ref 26.5–33)
MCH RBC QN AUTO: NORMAL PG
MCHC RBC AUTO-ENTMCNC: 32.4 G/DL (ref 31.5–36.5)
MCHC RBC AUTO-ENTMCNC: 33.2 G/DL (ref 31.5–36.5)
MCHC RBC AUTO-ENTMCNC: 33.3 G/DL (ref 31.5–36.5)
MCHC RBC AUTO-ENTMCNC: 33.8 G/DL (ref 31.5–36.5)
MCHC RBC AUTO-ENTMCNC: 33.9 G/DL (ref 31.5–36.5)
MCHC RBC AUTO-ENTMCNC: NORMAL G/DL
MCV RBC AUTO: 104 FL (ref 78–100)
MCV RBC AUTO: 110 FL (ref 78–100)
MCV RBC AUTO: 98 FL (ref 78–100)
MCV RBC AUTO: 99 FL (ref 78–100)
MCV RBC AUTO: 99 FL (ref 78–100)
MCV RBC AUTO: NORMAL FL
METAMYELOCYTES # BLD MANUAL: 0.4 10E3/UL
METAMYELOCYTES NFR BLD MANUAL: 2 %
MONOCYTES # BLD AUTO: 0.1 10E3/UL (ref 0–1.3)
MONOCYTES # BLD AUTO: 0.3 10E3/UL (ref 0–1.3)
MONOCYTES # BLD AUTO: NORMAL 10*3/UL
MONOCYTES # BLD MANUAL: 0.4 10E3/UL (ref 0–1.3)
MONOCYTES NFR BLD AUTO: 10 %
MONOCYTES NFR BLD AUTO: 3 %
MONOCYTES NFR BLD AUTO: NORMAL %
MONOCYTES NFR BLD MANUAL: 2 %
NEUTROPHILS # BLD AUTO: 2 10E3/UL (ref 1.6–8.3)
NEUTROPHILS # BLD AUTO: 2.1 10E3/UL (ref 1.6–8.3)
NEUTROPHILS # BLD AUTO: NORMAL 10*3/UL
NEUTROPHILS # BLD MANUAL: 18.3 10E3/UL (ref 1.6–8.3)
NEUTROPHILS NFR BLD AUTO: 64 %
NEUTROPHILS NFR BLD AUTO: 71 %
NEUTROPHILS NFR BLD AUTO: NORMAL %
NEUTROPHILS NFR BLD MANUAL: 90 %
NRBC # BLD AUTO: 0 10E3/UL
NRBC # BLD AUTO: 0 10E3/UL
NRBC BLD AUTO-RTO: 0 /100
NRBC BLD AUTO-RTO: 0 /100
PATH REPORT.COMMENTS IMP SPEC: NORMAL
PATH REPORT.COMMENTS IMP SPEC: NORMAL
PATH REPORT.FINAL DX SPEC: NORMAL
PATH REPORT.MICROSCOPIC SPEC OTHER STN: NORMAL
PATH REPORT.MICROSCOPIC SPEC OTHER STN: NORMAL
PATH REPORT.RELEVANT HX SPEC: NORMAL
PCO2 BLDV: 45 MM HG (ref 40–50)
PH BLDV: 7.35 [PH] (ref 7.32–7.43)
PHOSPHATE SERPL-MCNC: 2.8 MG/DL (ref 2.5–4.5)
PLAT MORPH BLD: ABNORMAL
PLATELET # BLD AUTO: 103 10E3/UL (ref 150–450)
PLATELET # BLD AUTO: 106 10E3/UL (ref 150–450)
PLATELET # BLD AUTO: 89 10E3/UL (ref 150–450)
PLATELET # BLD AUTO: 93 10E3/UL (ref 150–450)
PLATELET # BLD AUTO: 98 10E3/UL (ref 150–450)
PLATELET # BLD AUTO: NORMAL 10*3/UL
PO2 BLDV: 26 MM HG (ref 25–47)
POTASSIUM BLD-SCNC: 3.7 MMOL/L (ref 3.4–5.3)
POTASSIUM SERPL-SCNC: 3.8 MMOL/L (ref 3.4–5.3)
POTASSIUM SERPL-SCNC: 4 MMOL/L (ref 3.4–5.3)
POTASSIUM SERPL-SCNC: 4.2 MMOL/L (ref 3.4–5.3)
POTASSIUM SERPL-SCNC: 4.2 MMOL/L (ref 3.4–5.3)
PROT SERPL-MCNC: 6 G/DL (ref 6.4–8.3)
PROT SERPL-MCNC: 6.5 G/DL (ref 6.4–8.3)
PROT SERPL-MCNC: 6.9 G/DL (ref 6.4–8.3)
PROT SERPL-MCNC: 7.4 G/DL (ref 6.4–8.3)
RBC # BLD AUTO: 3.04 10E6/UL (ref 3.8–5.2)
RBC # BLD AUTO: 3.09 10E6/UL (ref 3.8–5.2)
RBC # BLD AUTO: 3.4 10E6/UL (ref 3.8–5.2)
RBC # BLD AUTO: 3.49 10E6/UL (ref 3.8–5.2)
RBC # BLD AUTO: 3.53 10E6/UL (ref 3.8–5.2)
RBC # BLD AUTO: NORMAL 10*6/UL
RBC MORPH BLD: ABNORMAL
RETICS # AUTO: 0.03 10E6/UL (ref 0.03–0.1)
RETICS/RBC NFR AUTO: 0.9 % (ref 0.5–2)
SAO2 % BLDV: 44 % (ref 94–100)
SODIUM BLD-SCNC: 137 MMOL/L (ref 133–144)
SODIUM SERPL-SCNC: 130 MMOL/L (ref 135–145)
SODIUM SERPL-SCNC: 132 MMOL/L (ref 135–145)
SODIUM SERPL-SCNC: 133 MMOL/L (ref 135–145)
SODIUM SERPL-SCNC: 134 MMOL/L (ref 135–145)
SPECIMEN EXPIRATION DATE: NORMAL
WBC # BLD AUTO: 2.2 10E3/UL (ref 4–11)
WBC # BLD AUTO: 2.6 10E3/UL (ref 4–11)
WBC # BLD AUTO: 2.9 10E3/UL (ref 4–11)
WBC # BLD AUTO: 20.3 10E3/UL (ref 4–11)
WBC # BLD AUTO: 3.1 10E3/UL (ref 4–11)
WBC # BLD AUTO: NORMAL 10*3/UL

## 2023-01-01 PROCEDURE — 36415 COLL VENOUS BLD VENIPUNCTURE: CPT | Performed by: DIETITIAN, REGISTERED

## 2023-01-01 PROCEDURE — 99207 BLOOD MORPHOLOGY PATHOLOGIST REVIEW: CPT | Performed by: PATHOLOGY

## 2023-01-01 PROCEDURE — 86850 RBC ANTIBODY SCREEN: CPT | Performed by: EMERGENCY MEDICINE

## 2023-01-01 PROCEDURE — C1769 GUIDE WIRE: HCPCS | Performed by: INTERNAL MEDICINE

## 2023-01-01 PROCEDURE — 85610 PROTHROMBIN TIME: CPT | Performed by: DIETITIAN, REGISTERED

## 2023-01-01 PROCEDURE — 96374 THER/PROPH/DIAG INJ IV PUSH: CPT

## 2023-01-01 PROCEDURE — 96415 CHEMO IV INFUSION ADDL HR: CPT

## 2023-01-01 PROCEDURE — 99207 PR NO CHARGE LOS: CPT

## 2023-01-01 PROCEDURE — 99214 OFFICE O/P EST MOD 30 MIN: CPT | Mod: 95 | Performed by: STUDENT IN AN ORGANIZED HEALTH CARE EDUCATION/TRAINING PROGRAM

## 2023-01-01 PROCEDURE — 96377 APPLICATON ON-BODY INJECTOR: CPT | Mod: 59

## 2023-01-01 PROCEDURE — 96372 THER/PROPH/DIAG INJ SC/IM: CPT | Performed by: NURSE PRACTITIONER

## 2023-01-01 PROCEDURE — 250N000009 HC RX 250

## 2023-01-01 PROCEDURE — 85027 COMPLETE CBC AUTOMATED: CPT | Performed by: STUDENT IN AN ORGANIZED HEALTH CARE EDUCATION/TRAINING PROGRAM

## 2023-01-01 PROCEDURE — 272N000001 HC OR GENERAL SUPPLY STERILE: Performed by: INTERNAL MEDICINE

## 2023-01-01 PROCEDURE — 96413 CHEMO IV INFUSION 1 HR: CPT | Performed by: NURSE PRACTITIONER

## 2023-01-01 PROCEDURE — 250N000011 HC RX IP 250 OP 636: Mod: JZ | Performed by: INTERNAL MEDICINE

## 2023-01-01 PROCEDURE — G0463 HOSPITAL OUTPT CLINIC VISIT: HCPCS | Performed by: STUDENT IN AN ORGANIZED HEALTH CARE EDUCATION/TRAINING PROGRAM

## 2023-01-01 PROCEDURE — 85025 COMPLETE CBC W/AUTO DIFF WBC: CPT | Performed by: EMERGENCY MEDICINE

## 2023-01-01 PROCEDURE — 120N000002 HC R&B MED SURG/OB UMMC

## 2023-01-01 PROCEDURE — 258N000003 HC RX IP 258 OP 636: Performed by: INTERNAL MEDICINE

## 2023-01-01 PROCEDURE — 82962 GLUCOSE BLOOD TEST: CPT

## 2023-01-01 PROCEDURE — 250N000013 HC RX MED GY IP 250 OP 250 PS 637: Performed by: INTERNAL MEDICINE

## 2023-01-01 PROCEDURE — 250N000011 HC RX IP 250 OP 636: Mod: JZ | Performed by: NURSE PRACTITIONER

## 2023-01-01 PROCEDURE — 36415 COLL VENOUS BLD VENIPUNCTURE: CPT | Performed by: EMERGENCY MEDICINE

## 2023-01-01 PROCEDURE — 82248 BILIRUBIN DIRECT: CPT | Performed by: INTERNAL MEDICINE

## 2023-01-01 PROCEDURE — 250N000013 HC RX MED GY IP 250 OP 250 PS 637: Performed by: STUDENT IN AN ORGANIZED HEALTH CARE EDUCATION/TRAINING PROGRAM

## 2023-01-01 PROCEDURE — 250N000013 HC RX MED GY IP 250 OP 250 PS 637

## 2023-01-01 PROCEDURE — 0F768DZ DILATION OF LEFT HEPATIC DUCT WITH INTRALUMINAL DEVICE, VIA NATURAL OR ARTIFICIAL OPENING ENDOSCOPIC: ICD-10-PCS | Performed by: INTERNAL MEDICINE

## 2023-01-01 PROCEDURE — 0F778DZ DILATION OF COMMON HEPATIC DUCT WITH INTRALUMINAL DEVICE, VIA NATURAL OR ARTIFICIAL OPENING ENDOSCOPIC: ICD-10-PCS | Performed by: INTERNAL MEDICINE

## 2023-01-01 PROCEDURE — 999N000181 XR SURGERY CARM FLUORO GREATER THAN 5 MIN W STILLS: Mod: TC

## 2023-01-01 PROCEDURE — 86901 BLOOD TYPING SEROLOGIC RH(D): CPT | Performed by: EMERGENCY MEDICINE

## 2023-01-01 PROCEDURE — BF151ZZ FLUOROSCOPY OF LIVER USING LOW OSMOLAR CONTRAST: ICD-10-PCS | Performed by: INTERNAL MEDICINE

## 2023-01-01 PROCEDURE — 85007 BL SMEAR W/DIFF WBC COUNT: CPT | Performed by: STUDENT IN AN ORGANIZED HEALTH CARE EDUCATION/TRAINING PROGRAM

## 2023-01-01 PROCEDURE — 85610 PROTHROMBIN TIME: CPT | Performed by: EMERGENCY MEDICINE

## 2023-01-01 PROCEDURE — 99215 OFFICE O/P EST HI 40 MIN: CPT | Mod: 95 | Performed by: OBSTETRICS & GYNECOLOGY

## 2023-01-01 PROCEDURE — 99285 EMERGENCY DEPT VISIT HI MDM: CPT | Performed by: EMERGENCY MEDICINE

## 2023-01-01 PROCEDURE — 710N000009 HC RECOVERY PHASE 1, LEVEL 1, PER MIN: Performed by: INTERNAL MEDICINE

## 2023-01-01 PROCEDURE — 0FHD8DZ INSERTION OF INTRALUMINAL DEVICE INTO PANCREATIC DUCT, VIA NATURAL OR ARTIFICIAL OPENING ENDOSCOPIC: ICD-10-PCS | Performed by: INTERNAL MEDICINE

## 2023-01-01 PROCEDURE — 80053 COMPREHEN METABOLIC PANEL: CPT | Performed by: NURSE PRACTITIONER

## 2023-01-01 PROCEDURE — 250N000011 HC RX IP 250 OP 636: Mod: JZ

## 2023-01-01 PROCEDURE — 83735 ASSAY OF MAGNESIUM: CPT | Performed by: STUDENT IN AN ORGANIZED HEALTH CARE EDUCATION/TRAINING PROGRAM

## 2023-01-01 PROCEDURE — C1876 STENT, NON-COA/NON-COV W/DEL: HCPCS | Performed by: INTERNAL MEDICINE

## 2023-01-01 PROCEDURE — 250N000011 HC RX IP 250 OP 636

## 2023-01-01 PROCEDURE — 360N000082 HC SURGERY LEVEL 2 W/ FLUORO, PER MIN: Performed by: INTERNAL MEDICINE

## 2023-01-01 PROCEDURE — 250N000011 HC RX IP 250 OP 636: Performed by: OBSTETRICS & GYNECOLOGY

## 2023-01-01 PROCEDURE — 87040 BLOOD CULTURE FOR BACTERIA: CPT | Performed by: EMERGENCY MEDICINE

## 2023-01-01 PROCEDURE — 36415 COLL VENOUS BLD VENIPUNCTURE: CPT | Performed by: STUDENT IN AN ORGANIZED HEALTH CARE EDUCATION/TRAINING PROGRAM

## 2023-01-01 PROCEDURE — 0F758DZ DILATION OF RIGHT HEPATIC DUCT WITH INTRALUMINAL DEVICE, VIA NATURAL OR ARTIFICIAL OPENING ENDOSCOPIC: ICD-10-PCS | Performed by: INTERNAL MEDICINE

## 2023-01-01 PROCEDURE — 36415 COLL VENOUS BLD VENIPUNCTURE: CPT | Performed by: NURSE PRACTITIONER

## 2023-01-01 PROCEDURE — 80053 COMPREHEN METABOLIC PANEL: CPT | Performed by: EMERGENCY MEDICINE

## 2023-01-01 PROCEDURE — 96417 CHEMO IV INFUS EACH ADDL SEQ: CPT

## 2023-01-01 PROCEDURE — 96372 THER/PROPH/DIAG INJ SC/IM: CPT | Performed by: OBSTETRICS & GYNECOLOGY

## 2023-01-01 PROCEDURE — 96377 APPLICATON ON-BODY INJECTOR: CPT | Mod: 59 | Performed by: NURSE PRACTITIONER

## 2023-01-01 PROCEDURE — 82330 ASSAY OF CALCIUM: CPT

## 2023-01-01 PROCEDURE — 258N000003 HC RX IP 258 OP 636: Performed by: NURSE PRACTITIONER

## 2023-01-01 PROCEDURE — 250N000013 HC RX MED GY IP 250 OP 250 PS 637: Performed by: NURSE PRACTITIONER

## 2023-01-01 PROCEDURE — 999N000141 HC STATISTIC PRE-PROCEDURE NURSING ASSESSMENT: Performed by: INTERNAL MEDICINE

## 2023-01-01 PROCEDURE — 255N000002 HC RX 255 OP 636: Mod: JZ | Performed by: INTERNAL MEDICINE

## 2023-01-01 PROCEDURE — 80053 COMPREHEN METABOLIC PANEL: CPT | Performed by: STUDENT IN AN ORGANIZED HEALTH CARE EDUCATION/TRAINING PROGRAM

## 2023-01-01 PROCEDURE — 36415 COLL VENOUS BLD VENIPUNCTURE: CPT | Performed by: INTERNAL MEDICINE

## 2023-01-01 PROCEDURE — 250N000009 HC RX 250: Performed by: INTERNAL MEDICINE

## 2023-01-01 PROCEDURE — 99204 OFFICE O/P NEW MOD 45 MIN: CPT | Mod: GC | Performed by: STUDENT IN AN ORGANIZED HEALTH CARE EDUCATION/TRAINING PROGRAM

## 2023-01-01 PROCEDURE — C1726 CATH, BAL DIL, NON-VASCULAR: HCPCS | Performed by: INTERNAL MEDICINE

## 2023-01-01 PROCEDURE — 258N000003 HC RX IP 258 OP 636: Performed by: OBSTETRICS & GYNECOLOGY

## 2023-01-01 PROCEDURE — 99214 OFFICE O/P EST MOD 30 MIN: CPT | Mod: 95 | Performed by: OBSTETRICS & GYNECOLOGY

## 2023-01-01 PROCEDURE — 250N000011 HC RX IP 250 OP 636: Performed by: ANESTHESIOLOGY

## 2023-01-01 PROCEDURE — 96415 CHEMO IV INFUSION ADDL HR: CPT | Performed by: NURSE PRACTITIONER

## 2023-01-01 PROCEDURE — 99223 1ST HOSP IP/OBS HIGH 75: CPT | Performed by: DIETITIAN, REGISTERED

## 2023-01-01 PROCEDURE — 258N000003 HC RX IP 258 OP 636

## 2023-01-01 PROCEDURE — C1877 STENT, NON-COAT/COV W/O DEL: HCPCS | Performed by: INTERNAL MEDICINE

## 2023-01-01 PROCEDURE — 80053 COMPREHEN METABOLIC PANEL: CPT

## 2023-01-01 PROCEDURE — 250N000011 HC RX IP 250 OP 636: Performed by: NURSE PRACTITIONER

## 2023-01-01 PROCEDURE — 83605 ASSAY OF LACTIC ACID: CPT | Performed by: EMERGENCY MEDICINE

## 2023-01-01 PROCEDURE — 96367 TX/PROPH/DG ADDL SEQ IV INF: CPT

## 2023-01-01 PROCEDURE — 84100 ASSAY OF PHOSPHORUS: CPT | Performed by: STUDENT IN AN ORGANIZED HEALTH CARE EDUCATION/TRAINING PROGRAM

## 2023-01-01 PROCEDURE — 96367 TX/PROPH/DG ADDL SEQ IV INF: CPT | Performed by: NURSE PRACTITIONER

## 2023-01-01 PROCEDURE — 96417 CHEMO IV INFUS EACH ADDL SEQ: CPT | Performed by: NURSE PRACTITIONER

## 2023-01-01 PROCEDURE — 250N000011 HC RX IP 250 OP 636: Performed by: EMERGENCY MEDICINE

## 2023-01-01 PROCEDURE — 36415 COLL VENOUS BLD VENIPUNCTURE: CPT

## 2023-01-01 PROCEDURE — 96413 CHEMO IV INFUSION 1 HR: CPT

## 2023-01-01 PROCEDURE — 85045 AUTOMATED RETICULOCYTE COUNT: CPT | Performed by: STUDENT IN AN ORGANIZED HEALTH CARE EDUCATION/TRAINING PROGRAM

## 2023-01-01 PROCEDURE — 96375 TX/PRO/DX INJ NEW DRUG ADDON: CPT | Performed by: NURSE PRACTITIONER

## 2023-01-01 PROCEDURE — 85025 COMPLETE CBC W/AUTO DIFF WBC: CPT | Performed by: NURSE PRACTITIONER

## 2023-01-01 PROCEDURE — 250N000025 HC SEVOFLURANE, PER MIN: Performed by: INTERNAL MEDICINE

## 2023-01-01 PROCEDURE — 999N000248 HC STATISTIC IV INSERT WITH US BY RN

## 2023-01-01 PROCEDURE — 82248 BILIRUBIN DIRECT: CPT | Performed by: NURSE PRACTITIONER

## 2023-01-01 PROCEDURE — 85027 COMPLETE CBC AUTOMATED: CPT

## 2023-01-01 PROCEDURE — 370N000017 HC ANESTHESIA TECHNICAL FEE, PER MIN: Performed by: INTERNAL MEDICINE

## 2023-01-01 PROCEDURE — 83690 ASSAY OF LIPASE: CPT | Performed by: EMERGENCY MEDICINE

## 2023-01-01 PROCEDURE — 82947 ASSAY GLUCOSE BLOOD QUANT: CPT

## 2023-01-01 PROCEDURE — 96375 TX/PRO/DX INJ NEW DRUG ADDON: CPT

## 2023-01-01 PROCEDURE — 99285 EMERGENCY DEPT VISIT HI MDM: CPT | Mod: 25

## 2023-01-01 DEVICE — A STERILE NON-BIOABSORBABLE TUBULAR DEVICE INTENDED TO BE IMPLANTED IN AN OBSTRUCTED PANCREATIC DUCT (E.G., DUE TO STRICTURE OR MALIGNANCY) TO MAINTAIN LUMINAL PATENCY; IT MAY ALSO BE INTENDED TO TREAT A WIDE VARIETY OF CONDITIONS IN PANCREATIC ENDOSCOPY (E.G., DRAIN PSEUDOCYST, TREAT FISTULA OR DUCT DISRUPTION). IT IS MADE ENTIRELY OF A SYNTHETIC POLYMER AND HAS A CONTINUOUS TUBE DESIGN WITH OR WITHOUT RETENTION FLANGES. THIS IS A SINGLE-USE DEVICE.
Type: IMPLANTABLE DEVICE | Site: MOUTH | Status: FUNCTIONAL
Brand: FREEMAN PANCREATIC FLEXI-STENT

## 2023-01-01 DEVICE — STENT BILIARY SELF-EXPAND ZILVER 635 10MMX8CM: Type: IMPLANTABLE DEVICE | Site: BILE DUCT | Status: FUNCTIONAL

## 2023-01-01 RX ORDER — HYDROMORPHONE HYDROCHLORIDE 1 MG/ML
0.5 INJECTION, SOLUTION INTRAMUSCULAR; INTRAVENOUS; SUBCUTANEOUS ONCE
Status: COMPLETED | OUTPATIENT
Start: 2023-01-01 | End: 2023-01-01

## 2023-01-01 RX ORDER — SIMETHICONE 80 MG
80 TABLET,CHEWABLE ORAL EVERY 6 HOURS PRN
Status: DISCONTINUED | OUTPATIENT
Start: 2023-01-01 | End: 2023-01-01 | Stop reason: HOSPADM

## 2023-01-01 RX ORDER — PALONOSETRON 0.05 MG/ML
0.25 INJECTION, SOLUTION INTRAVENOUS ONCE
Status: COMPLETED | OUTPATIENT
Start: 2023-01-01 | End: 2023-01-01

## 2023-01-01 RX ORDER — ONDANSETRON 4 MG/1
4 TABLET, ORALLY DISINTEGRATING ORAL EVERY 30 MIN PRN
Status: CANCELLED | OUTPATIENT
Start: 2023-01-01

## 2023-01-01 RX ORDER — HYDROMORPHONE HCL IN WATER/PF 6 MG/30 ML
0.2 PATIENT CONTROLLED ANALGESIA SYRINGE INTRAVENOUS EVERY 5 MIN PRN
Status: DISCONTINUED | OUTPATIENT
Start: 2023-01-01 | End: 2023-01-01 | Stop reason: HOSPADM

## 2023-01-01 RX ORDER — HEPARIN SODIUM,PORCINE 10 UNIT/ML
5-20 VIAL (ML) INTRAVENOUS DAILY PRN
Status: CANCELLED | OUTPATIENT
Start: 2023-01-01

## 2023-01-01 RX ORDER — ALBUTEROL SULFATE 0.83 MG/ML
2.5 SOLUTION RESPIRATORY (INHALATION)
Status: CANCELLED | OUTPATIENT
Start: 2023-01-01

## 2023-01-01 RX ORDER — MEPERIDINE HYDROCHLORIDE 25 MG/ML
25 INJECTION INTRAMUSCULAR; INTRAVENOUS; SUBCUTANEOUS EVERY 30 MIN PRN
Status: CANCELLED | OUTPATIENT
Start: 2023-01-01

## 2023-01-01 RX ORDER — DIPHENHYDRAMINE HYDROCHLORIDE 50 MG/ML
50 INJECTION INTRAMUSCULAR; INTRAVENOUS
Status: CANCELLED
Start: 2023-01-01

## 2023-01-01 RX ORDER — SODIUM CHLORIDE 9 MG/ML
INJECTION, SOLUTION INTRAVENOUS CONTINUOUS
Status: DISCONTINUED | OUTPATIENT
Start: 2023-01-01 | End: 2023-01-01

## 2023-01-01 RX ORDER — LORAZEPAM 2 MG/ML
0.5 INJECTION INTRAMUSCULAR EVERY 4 HOURS PRN
Status: CANCELLED | OUTPATIENT
Start: 2023-01-01

## 2023-01-01 RX ORDER — IBUPROFEN 200 MG
600 TABLET ORAL EVERY 6 HOURS PRN
Status: DISCONTINUED | OUTPATIENT
Start: 2023-01-01 | End: 2023-01-01 | Stop reason: HOSPADM

## 2023-01-01 RX ORDER — SODIUM CHLORIDE, SODIUM LACTATE, POTASSIUM CHLORIDE, CALCIUM CHLORIDE 600; 310; 30; 20 MG/100ML; MG/100ML; MG/100ML; MG/100ML
INJECTION, SOLUTION INTRAVENOUS CONTINUOUS PRN
Status: DISCONTINUED | OUTPATIENT
Start: 2023-01-01 | End: 2023-01-01

## 2023-01-01 RX ORDER — ALBUTEROL SULFATE 90 UG/1
1-2 AEROSOL, METERED RESPIRATORY (INHALATION)
Status: CANCELLED
Start: 2023-01-01

## 2023-01-01 RX ORDER — METHYLPREDNISOLONE SODIUM SUCCINATE 125 MG/2ML
125 INJECTION, POWDER, LYOPHILIZED, FOR SOLUTION INTRAMUSCULAR; INTRAVENOUS
Status: CANCELLED
Start: 2023-01-01

## 2023-01-01 RX ORDER — HEPARIN SODIUM,PORCINE 10 UNIT/ML
5 VIAL (ML) INTRAVENOUS
Status: CANCELLED | OUTPATIENT
Start: 2023-01-01

## 2023-01-01 RX ORDER — CALCIUM CARBONATE 500 MG/1
1000 TABLET, CHEWABLE ORAL 4 TIMES DAILY PRN
Status: DISCONTINUED | OUTPATIENT
Start: 2023-01-01 | End: 2023-01-01 | Stop reason: HOSPADM

## 2023-01-01 RX ORDER — FENTANYL CITRATE 50 UG/ML
INJECTION, SOLUTION INTRAMUSCULAR; INTRAVENOUS PRN
Status: DISCONTINUED | OUTPATIENT
Start: 2023-01-01 | End: 2023-01-01

## 2023-01-01 RX ORDER — NALOXONE HYDROCHLORIDE 0.4 MG/ML
0.2 INJECTION, SOLUTION INTRAMUSCULAR; INTRAVENOUS; SUBCUTANEOUS
Status: DISCONTINUED | OUTPATIENT
Start: 2023-01-01 | End: 2023-01-01 | Stop reason: HOSPADM

## 2023-01-01 RX ORDER — NALOXONE HYDROCHLORIDE 0.4 MG/ML
0.4 INJECTION, SOLUTION INTRAMUSCULAR; INTRAVENOUS; SUBCUTANEOUS
Status: DISCONTINUED | OUTPATIENT
Start: 2023-01-01 | End: 2023-01-01 | Stop reason: HOSPADM

## 2023-01-01 RX ORDER — OXYCODONE HYDROCHLORIDE 10 MG/1
10 TABLET ORAL
Status: CANCELLED | OUTPATIENT
Start: 2023-01-01

## 2023-01-01 RX ORDER — LIDOCAINE 40 MG/G
CREAM TOPICAL
Status: DISCONTINUED | OUTPATIENT
Start: 2023-01-01 | End: 2023-01-01 | Stop reason: HOSPADM

## 2023-01-01 RX ORDER — HYDROMORPHONE HYDROCHLORIDE 1 MG/ML
0.3 INJECTION, SOLUTION INTRAMUSCULAR; INTRAVENOUS; SUBCUTANEOUS
Status: DISCONTINUED | OUTPATIENT
Start: 2023-01-01 | End: 2023-01-01

## 2023-01-01 RX ORDER — AMITRIPTYLINE HYDROCHLORIDE 50 MG/1
100 TABLET ORAL AT BEDTIME
Status: DISCONTINUED | OUTPATIENT
Start: 2023-01-01 | End: 2023-01-01 | Stop reason: HOSPADM

## 2023-01-01 RX ORDER — INDOMETHACIN 50 MG/1
100 SUPPOSITORY RECTAL
Status: DISCONTINUED | OUTPATIENT
Start: 2023-01-01 | End: 2023-01-01 | Stop reason: HOSPADM

## 2023-01-01 RX ORDER — PALONOSETRON 0.05 MG/ML
0.25 INJECTION, SOLUTION INTRAVENOUS ONCE
Status: CANCELLED | OUTPATIENT
Start: 2023-01-01

## 2023-01-01 RX ORDER — POLYETHYLENE GLYCOL 3350 17 G/17G
17 POWDER, FOR SOLUTION ORAL 2 TIMES DAILY PRN
Status: DISCONTINUED | OUTPATIENT
Start: 2023-01-01 | End: 2023-01-01 | Stop reason: HOSPADM

## 2023-01-01 RX ORDER — EPINEPHRINE 1 MG/ML
0.3 INJECTION, SOLUTION, CONCENTRATE INTRAVENOUS EVERY 5 MIN PRN
Status: CANCELLED | OUTPATIENT
Start: 2023-01-01

## 2023-01-01 RX ORDER — ONDANSETRON 4 MG/1
4 TABLET, ORALLY DISINTEGRATING ORAL EVERY 30 MIN PRN
Status: DISCONTINUED | OUTPATIENT
Start: 2023-01-01 | End: 2023-01-01 | Stop reason: HOSPADM

## 2023-01-01 RX ORDER — IOPAMIDOL 510 MG/ML
INJECTION, SOLUTION INTRAVASCULAR PRN
Status: DISCONTINUED | OUTPATIENT
Start: 2023-01-01 | End: 2023-01-01 | Stop reason: HOSPADM

## 2023-01-01 RX ORDER — CYCLOBENZAPRINE HCL 5 MG
5 TABLET ORAL 3 TIMES DAILY PRN
Qty: 10 TABLET | Refills: 0 | Status: SHIPPED | OUTPATIENT
Start: 2023-01-01 | End: 2023-01-01

## 2023-01-01 RX ORDER — ONDANSETRON 2 MG/ML
4 INJECTION INTRAMUSCULAR; INTRAVENOUS EVERY 30 MIN PRN
Status: DISCONTINUED | OUTPATIENT
Start: 2023-01-01 | End: 2023-01-01 | Stop reason: HOSPADM

## 2023-01-01 RX ORDER — DIPHENHYDRAMINE HCL 25 MG
50 CAPSULE ORAL ONCE
Status: CANCELLED
Start: 2023-01-01

## 2023-01-01 RX ORDER — CYCLOBENZAPRINE HCL 5 MG
5 TABLET ORAL 3 TIMES DAILY
Status: DISCONTINUED | OUTPATIENT
Start: 2023-01-01 | End: 2023-01-01 | Stop reason: HOSPADM

## 2023-01-01 RX ORDER — EPINEPHRINE 1 MG/ML
0.3 INJECTION, SOLUTION INTRAMUSCULAR; SUBCUTANEOUS EVERY 5 MIN PRN
Status: CANCELLED | OUTPATIENT
Start: 2023-01-01

## 2023-01-01 RX ORDER — PROPOFOL 10 MG/ML
INJECTION, EMULSION INTRAVENOUS PRN
Status: DISCONTINUED | OUTPATIENT
Start: 2023-01-01 | End: 2023-01-01

## 2023-01-01 RX ORDER — SUMATRIPTAN 100 MG/1
100 TABLET, FILM COATED ORAL
Status: DISCONTINUED | OUTPATIENT
Start: 2023-01-01 | End: 2023-01-01 | Stop reason: HOSPADM

## 2023-01-01 RX ORDER — INDOMETHACIN 50 MG/1
SUPPOSITORY RECTAL PRN
Status: DISCONTINUED | OUTPATIENT
Start: 2023-01-01 | End: 2023-01-01 | Stop reason: HOSPADM

## 2023-01-01 RX ORDER — TRAMADOL HYDROCHLORIDE 50 MG/1
TABLET ORAL
COMMUNITY
Start: 2023-01-01 | End: 2024-01-01

## 2023-01-01 RX ORDER — AMOXICILLIN 250 MG
1 CAPSULE ORAL 2 TIMES DAILY
Status: DISCONTINUED | OUTPATIENT
Start: 2023-01-01 | End: 2023-01-01 | Stop reason: HOSPADM

## 2023-01-01 RX ORDER — HEPARIN SODIUM (PORCINE) LOCK FLUSH IV SOLN 100 UNIT/ML 100 UNIT/ML
5 SOLUTION INTRAVENOUS
Status: CANCELLED | OUTPATIENT
Start: 2023-01-01

## 2023-01-01 RX ORDER — ONDANSETRON 4 MG/1
8 TABLET, FILM COATED ORAL EVERY 8 HOURS PRN
Qty: 30 TABLET | Refills: 3 | Status: SHIPPED | OUTPATIENT
Start: 2023-01-01 | End: 2024-01-01

## 2023-01-01 RX ORDER — MECLIZINE HYDROCHLORIDE 25 MG/1
25 TABLET ORAL 3 TIMES DAILY PRN
Status: DISCONTINUED | OUTPATIENT
Start: 2023-01-01 | End: 2023-01-01 | Stop reason: HOSPADM

## 2023-01-01 RX ORDER — PROCHLORPERAZINE MALEATE 5 MG
5 TABLET ORAL EVERY 6 HOURS PRN
Status: DISCONTINUED | OUTPATIENT
Start: 2023-01-01 | End: 2023-01-01 | Stop reason: HOSPADM

## 2023-01-01 RX ORDER — ONDANSETRON 8 MG/1
8 TABLET, FILM COATED ORAL EVERY 8 HOURS PRN
Status: DISCONTINUED | OUTPATIENT
Start: 2023-01-01 | End: 2023-01-01 | Stop reason: HOSPADM

## 2023-01-01 RX ORDER — OXYCODONE HYDROCHLORIDE 5 MG/1
5 TABLET ORAL
Status: CANCELLED | OUTPATIENT
Start: 2023-01-01

## 2023-01-01 RX ORDER — ONDANSETRON 4 MG/1
4 TABLET, ORALLY DISINTEGRATING ORAL EVERY 6 HOURS PRN
Status: ACTIVE | OUTPATIENT
Start: 2023-01-01 | End: 2023-01-01

## 2023-01-01 RX ORDER — METOCLOPRAMIDE 5 MG/1
5 TABLET ORAL 3 TIMES DAILY PRN
Qty: 30 TABLET | Refills: 0 | Status: SHIPPED | OUTPATIENT
Start: 2023-01-01 | End: 2024-01-01

## 2023-01-01 RX ORDER — ONDANSETRON 2 MG/ML
INJECTION INTRAMUSCULAR; INTRAVENOUS PRN
Status: DISCONTINUED | OUTPATIENT
Start: 2023-01-01 | End: 2023-01-01

## 2023-01-01 RX ORDER — ONDANSETRON 2 MG/ML
4 INJECTION INTRAMUSCULAR; INTRAVENOUS EVERY 6 HOURS PRN
Status: ACTIVE | OUTPATIENT
Start: 2023-01-01 | End: 2023-01-01

## 2023-01-01 RX ORDER — SODIUM CHLORIDE, SODIUM LACTATE, POTASSIUM CHLORIDE, CALCIUM CHLORIDE 600; 310; 30; 20 MG/100ML; MG/100ML; MG/100ML; MG/100ML
INJECTION, SOLUTION INTRAVENOUS CONTINUOUS
Status: DISCONTINUED | OUTPATIENT
Start: 2023-01-01 | End: 2023-01-01 | Stop reason: HOSPADM

## 2023-01-01 RX ORDER — LEVOFLOXACIN 5 MG/ML
INJECTION, SOLUTION INTRAVENOUS PRN
Status: DISCONTINUED | OUTPATIENT
Start: 2023-01-01 | End: 2023-01-01

## 2023-01-01 RX ORDER — LIDOCAINE 4 G/G
2 PATCH TOPICAL
Status: DISCONTINUED | OUTPATIENT
Start: 2023-01-01 | End: 2023-01-01 | Stop reason: HOSPADM

## 2023-01-01 RX ORDER — ALBUTEROL SULFATE 90 UG/1
1-2 AEROSOL, METERED RESPIRATORY (INHALATION)
Start: 2023-01-01

## 2023-01-01 RX ORDER — CYCLOBENZAPRINE HCL 5 MG
5 TABLET ORAL 3 TIMES DAILY PRN
Qty: 10 TABLET | Refills: 0 | Status: SHIPPED | OUTPATIENT
Start: 2023-01-01

## 2023-01-01 RX ORDER — HYDROMORPHONE HYDROCHLORIDE 2 MG/1
1-2 TABLET ORAL EVERY 6 HOURS PRN
Qty: 10 TABLET | Refills: 0 | Status: SHIPPED | OUTPATIENT
Start: 2023-01-01 | End: 2023-01-01

## 2023-01-01 RX ORDER — DIPHENHYDRAMINE HYDROCHLORIDE 50 MG/ML
50 INJECTION INTRAMUSCULAR; INTRAVENOUS
Start: 2023-01-01

## 2023-01-01 RX ORDER — HYDROMORPHONE HCL IN WATER/PF 6 MG/30 ML
0.4 PATIENT CONTROLLED ANALGESIA SYRINGE INTRAVENOUS EVERY 5 MIN PRN
Status: DISCONTINUED | OUTPATIENT
Start: 2023-01-01 | End: 2023-01-01 | Stop reason: HOSPADM

## 2023-01-01 RX ORDER — AMOXICILLIN 250 MG
2 CAPSULE ORAL 2 TIMES DAILY
Status: DISCONTINUED | OUTPATIENT
Start: 2023-01-01 | End: 2023-01-01 | Stop reason: HOSPADM

## 2023-01-01 RX ORDER — EPINEPHRINE 1 MG/ML
0.3 INJECTION, SOLUTION INTRAMUSCULAR; SUBCUTANEOUS EVERY 5 MIN PRN
OUTPATIENT
Start: 2023-01-01

## 2023-01-01 RX ORDER — ONDANSETRON 8 MG/1
8 TABLET, FILM COATED ORAL EVERY 8 HOURS PRN
Status: DISCONTINUED | OUTPATIENT
Start: 2023-01-01 | End: 2023-01-01

## 2023-01-01 RX ORDER — METOCLOPRAMIDE 5 MG/1
5 TABLET ORAL
Status: DISCONTINUED | OUTPATIENT
Start: 2023-01-01 | End: 2023-01-01 | Stop reason: HOSPADM

## 2023-01-01 RX ORDER — MEPERIDINE HYDROCHLORIDE 25 MG/ML
25 INJECTION INTRAMUSCULAR; INTRAVENOUS; SUBCUTANEOUS EVERY 30 MIN PRN
OUTPATIENT
Start: 2023-01-01

## 2023-01-01 RX ORDER — PACLITAXEL 6 MG/ML
INJECTION, SOLUTION INTRAVENOUS
Status: ON HOLD | COMMUNITY
End: 2023-01-01

## 2023-01-01 RX ORDER — METHYLPREDNISOLONE SODIUM SUCCINATE 125 MG/2ML
125 INJECTION, POWDER, LYOPHILIZED, FOR SOLUTION INTRAMUSCULAR; INTRAVENOUS
Start: 2023-01-01

## 2023-01-01 RX ORDER — FENTANYL CITRATE 50 UG/ML
25 INJECTION, SOLUTION INTRAMUSCULAR; INTRAVENOUS EVERY 5 MIN PRN
Status: DISCONTINUED | OUTPATIENT
Start: 2023-01-01 | End: 2023-01-01 | Stop reason: HOSPADM

## 2023-01-01 RX ORDER — METOCLOPRAMIDE HYDROCHLORIDE 5 MG/ML
5 INJECTION INTRAMUSCULAR; INTRAVENOUS EVERY 6 HOURS PRN
Status: DISCONTINUED | OUTPATIENT
Start: 2023-01-01 | End: 2023-01-01 | Stop reason: HOSPADM

## 2023-01-01 RX ORDER — LIDOCAINE HYDROCHLORIDE 20 MG/ML
INJECTION, SOLUTION INFILTRATION; PERINEURAL PRN
Status: DISCONTINUED | OUTPATIENT
Start: 2023-01-01 | End: 2023-01-01

## 2023-01-01 RX ORDER — PROCHLORPERAZINE 25 MG
12.5 SUPPOSITORY, RECTAL RECTAL EVERY 12 HOURS PRN
Status: DISCONTINUED | OUTPATIENT
Start: 2023-01-01 | End: 2023-01-01 | Stop reason: HOSPADM

## 2023-01-01 RX ORDER — ALBUTEROL SULFATE 0.83 MG/ML
2.5 SOLUTION RESPIRATORY (INHALATION)
OUTPATIENT
Start: 2023-01-01

## 2023-01-01 RX ORDER — FLUTICASONE PROPIONATE 50 MCG
1 SPRAY, SUSPENSION (ML) NASAL DAILY PRN
Status: DISCONTINUED | OUTPATIENT
Start: 2023-01-01 | End: 2023-01-01 | Stop reason: HOSPADM

## 2023-01-01 RX ORDER — PROCHLORPERAZINE MALEATE 10 MG
TABLET ORAL
COMMUNITY
Start: 2023-01-01 | End: 2024-01-01

## 2023-01-01 RX ORDER — FLUORIDE TOOTHPASTE
10 TOOTHPASTE DENTAL 4 TIMES DAILY
Status: DISCONTINUED | OUTPATIENT
Start: 2023-01-01 | End: 2023-01-01 | Stop reason: HOSPADM

## 2023-01-01 RX ORDER — FLUMAZENIL 0.1 MG/ML
0.2 INJECTION, SOLUTION INTRAVENOUS
Status: ACTIVE | OUTPATIENT
Start: 2023-01-01 | End: 2023-01-01

## 2023-01-01 RX ORDER — METOCLOPRAMIDE 10 MG/1
TABLET ORAL
COMMUNITY
Start: 2023-01-01 | End: 2024-01-01

## 2023-01-01 RX ORDER — DIPHENHYDRAMINE HCL 25 MG
50 CAPSULE ORAL ONCE
Status: DISCONTINUED | OUTPATIENT
Start: 2023-01-01 | End: 2023-01-01 | Stop reason: HOSPADM

## 2023-01-01 RX ORDER — FENTANYL CITRATE 50 UG/ML
50 INJECTION, SOLUTION INTRAMUSCULAR; INTRAVENOUS EVERY 5 MIN PRN
Status: DISCONTINUED | OUTPATIENT
Start: 2023-01-01 | End: 2023-01-01 | Stop reason: HOSPADM

## 2023-01-01 RX ORDER — FAMOTIDINE 10 MG
10 TABLET ORAL 2 TIMES DAILY
Status: DISCONTINUED | OUTPATIENT
Start: 2023-01-01 | End: 2023-01-01 | Stop reason: HOSPADM

## 2023-01-01 RX ORDER — DEXAMETHASONE SODIUM PHOSPHATE 4 MG/ML
INJECTION, SOLUTION INTRA-ARTICULAR; INTRALESIONAL; INTRAMUSCULAR; INTRAVENOUS; SOFT TISSUE PRN
Status: DISCONTINUED | OUTPATIENT
Start: 2023-01-01 | End: 2023-01-01

## 2023-01-01 RX ORDER — ONDANSETRON 2 MG/ML
4 INJECTION INTRAMUSCULAR; INTRAVENOUS EVERY 30 MIN PRN
Status: CANCELLED | OUTPATIENT
Start: 2023-01-01

## 2023-01-01 RX ORDER — HYDROMORPHONE HYDROCHLORIDE 2 MG/1
2 TABLET ORAL EVERY 4 HOURS PRN
Status: DISCONTINUED | OUTPATIENT
Start: 2023-01-01 | End: 2023-01-01

## 2023-01-01 RX ADMIN — SODIUM CHLORIDE, POTASSIUM CHLORIDE, SODIUM LACTATE AND CALCIUM CHLORIDE: 600; 310; 30; 20 INJECTION, SOLUTION INTRAVENOUS at 15:01

## 2023-01-01 RX ADMIN — DOCUSATE SODIUM 50 MG AND SENNOSIDES 8.6 MG 2 TABLET: 8.6; 5 TABLET, FILM COATED ORAL at 19:39

## 2023-01-01 RX ADMIN — HYDROMORPHONE HYDROCHLORIDE 0.3 MG: 1 INJECTION, SOLUTION INTRAMUSCULAR; INTRAVENOUS; SUBCUTANEOUS at 20:15

## 2023-01-01 RX ADMIN — HYDROMORPHONE HYDROCHLORIDE 0.3 MG: 1 INJECTION, SOLUTION INTRAMUSCULAR; INTRAVENOUS; SUBCUTANEOUS at 14:35

## 2023-01-01 RX ADMIN — FAMOTIDINE 20 MG: 10 INJECTION, SOLUTION INTRAVENOUS at 11:32

## 2023-01-01 RX ADMIN — HYDROMORPHONE HYDROCHLORIDE 0.3 MG: 1 INJECTION, SOLUTION INTRAMUSCULAR; INTRAVENOUS; SUBCUTANEOUS at 18:22

## 2023-01-01 RX ADMIN — SODIUM CHLORIDE 1000 ML: 9 INJECTION, SOLUTION INTRAVENOUS at 23:38

## 2023-01-01 RX ADMIN — FAMOTIDINE 20 MG: 10 INJECTION, SOLUTION INTRAVENOUS at 21:10

## 2023-01-01 RX ADMIN — DOCUSATE SODIUM 50 MG AND SENNOSIDES 8.6 MG 2 TABLET: 8.6; 5 TABLET, FILM COATED ORAL at 08:40

## 2023-01-01 RX ADMIN — ANTACID TABLETS 1000 MG: 500 TABLET, CHEWABLE ORAL at 17:45

## 2023-01-01 RX ADMIN — FAMOTIDINE 20 MG: 10 INJECTION INTRAVENOUS at 10:18

## 2023-01-01 RX ADMIN — FAMOTIDINE 10 MG: 10 TABLET, FILM COATED ORAL at 10:37

## 2023-01-01 RX ADMIN — ONDANSETRON 4 MG: 2 INJECTION INTRAMUSCULAR; INTRAVENOUS at 15:36

## 2023-01-01 RX ADMIN — SODIUM CHLORIDE 1200 MG: 9 INJECTION, SOLUTION INTRAVENOUS at 15:07

## 2023-01-01 RX ADMIN — HYDROMORPHONE HYDROCHLORIDE 0.5 MG: 1 INJECTION, SOLUTION INTRAMUSCULAR; INTRAVENOUS; SUBCUTANEOUS at 05:57

## 2023-01-01 RX ADMIN — PHENYLEPHRINE HYDROCHLORIDE 150 MCG: 10 INJECTION INTRAVENOUS at 14:21

## 2023-01-01 RX ADMIN — CARBOPLATIN 425 MG: 10 INJECTION INTRAVENOUS at 14:32

## 2023-01-01 RX ADMIN — HYDROMORPHONE HYDROCHLORIDE 2 MG: 2 TABLET ORAL at 12:03

## 2023-01-01 RX ADMIN — METOCLOPRAMIDE 5 MG: 5 INJECTION, SOLUTION INTRAMUSCULAR; INTRAVENOUS at 09:10

## 2023-01-01 RX ADMIN — HYDROMORPHONE HYDROCHLORIDE 0.3 MG: 1 INJECTION, SOLUTION INTRAMUSCULAR; INTRAVENOUS; SUBCUTANEOUS at 18:54

## 2023-01-01 RX ADMIN — HYDROMORPHONE HYDROCHLORIDE 0.3 MG: 1 INJECTION, SOLUTION INTRAMUSCULAR; INTRAVENOUS; SUBCUTANEOUS at 10:21

## 2023-01-01 RX ADMIN — FAMOTIDINE 20 MG: 10 INJECTION, SOLUTION INTRAVENOUS at 22:00

## 2023-01-01 RX ADMIN — AMITRIPTYLINE HYDROCHLORIDE 100 MG: 50 TABLET, FILM COATED ORAL at 21:50

## 2023-01-01 RX ADMIN — ONDANSETRON 4 MG: 2 INJECTION INTRAMUSCULAR; INTRAVENOUS at 17:08

## 2023-01-01 RX ADMIN — LEVOFLOXACIN 500 MG: 5 INJECTION, SOLUTION INTRAVENOUS at 14:26

## 2023-01-01 RX ADMIN — HYDROMORPHONE HYDROCHLORIDE 0.3 MG: 1 INJECTION, SOLUTION INTRAMUSCULAR; INTRAVENOUS; SUBCUTANEOUS at 22:00

## 2023-01-01 RX ADMIN — HYDROMORPHONE HYDROCHLORIDE 0.3 MG: 1 INJECTION, SOLUTION INTRAMUSCULAR; INTRAVENOUS; SUBCUTANEOUS at 05:03

## 2023-01-01 RX ADMIN — HYDROMORPHONE HYDROCHLORIDE 0.3 MG: 1 INJECTION, SOLUTION INTRAMUSCULAR; INTRAVENOUS; SUBCUTANEOUS at 11:23

## 2023-01-01 RX ADMIN — SUGAMMADEX 200 MG: 100 INJECTION, SOLUTION INTRAVENOUS at 15:37

## 2023-01-01 RX ADMIN — DEXAMETHASONE SODIUM PHOSPHATE: 10 INJECTION, SOLUTION INTRAMUSCULAR; INTRAVENOUS at 10:19

## 2023-01-01 RX ADMIN — FENTANYL CITRATE 50 MCG: 50 INJECTION INTRAMUSCULAR; INTRAVENOUS at 14:45

## 2023-01-01 RX ADMIN — LIDOCAINE 2 PATCH: 4 PATCH TOPICAL at 09:14

## 2023-01-01 RX ADMIN — SODIUM CHLORIDE 250 ML: 9 INJECTION, SOLUTION INTRAVENOUS at 10:14

## 2023-01-01 RX ADMIN — PACLITAXEL 271 MG: 6 INJECTION, SOLUTION INTRAVENOUS at 11:00

## 2023-01-01 RX ADMIN — HYDROMORPHONE HYDROCHLORIDE 0.3 MG: 1 INJECTION, SOLUTION INTRAMUSCULAR; INTRAVENOUS; SUBCUTANEOUS at 00:28

## 2023-01-01 RX ADMIN — FILGRASTIM-AAFI 300 MCG: 480 INJECTION, SOLUTION SUBCUTANEOUS at 09:14

## 2023-01-01 RX ADMIN — Medication 10 ML: at 10:36

## 2023-01-01 RX ADMIN — DOCUSATE SODIUM 50 MG AND SENNOSIDES 8.6 MG 2 TABLET: 8.6; 5 TABLET, FILM COATED ORAL at 20:00

## 2023-01-01 RX ADMIN — HYDROMORPHONE HYDROCHLORIDE 0.3 MG: 1 INJECTION, SOLUTION INTRAMUSCULAR; INTRAVENOUS; SUBCUTANEOUS at 19:39

## 2023-01-01 RX ADMIN — PROPOFOL 150 MG: 10 INJECTION, EMULSION INTRAVENOUS at 14:21

## 2023-01-01 RX ADMIN — Medication 10 ML: at 09:12

## 2023-01-01 RX ADMIN — HYDROMORPHONE HYDROCHLORIDE 0.3 MG: 1 INJECTION, SOLUTION INTRAMUSCULAR; INTRAVENOUS; SUBCUTANEOUS at 08:44

## 2023-01-01 RX ADMIN — FAMOTIDINE 20 MG: 10 INJECTION, SOLUTION INTRAVENOUS at 21:50

## 2023-01-01 RX ADMIN — FAMOTIDINE 20 MG: 10 INJECTION, SOLUTION INTRAVENOUS at 09:12

## 2023-01-01 RX ADMIN — PROPOFOL 30 MG: 10 INJECTION, EMULSION INTRAVENOUS at 15:44

## 2023-01-01 RX ADMIN — PHENYLEPHRINE HYDROCHLORIDE 200 MCG: 10 INJECTION INTRAVENOUS at 14:39

## 2023-01-01 RX ADMIN — SENNOSIDES AND DOCUSATE SODIUM 1 TABLET: 8.6; 5 TABLET ORAL at 21:09

## 2023-01-01 RX ADMIN — DIPHENHYDRAMINE HYDROCHLORIDE 50 MG: 50 INJECTION, SOLUTION INTRAMUSCULAR; INTRAVENOUS at 10:41

## 2023-01-01 RX ADMIN — SODIUM CHLORIDE: 9 INJECTION, SOLUTION INTRAVENOUS at 19:53

## 2023-01-01 RX ADMIN — HYDROMORPHONE HYDROCHLORIDE 0.5 MG: 1 INJECTION, SOLUTION INTRAMUSCULAR; INTRAVENOUS; SUBCUTANEOUS at 15:47

## 2023-01-01 RX ADMIN — FAMOTIDINE 20 MG: 10 INJECTION, SOLUTION INTRAVENOUS at 10:20

## 2023-01-01 RX ADMIN — POLYETHYLENE GLYCOL 3350 17 G: 17 POWDER, FOR SOLUTION ORAL at 08:40

## 2023-01-01 RX ADMIN — METOCLOPRAMIDE 5 MG: 5 TABLET ORAL at 10:36

## 2023-01-01 RX ADMIN — FENTANYL CITRATE 50 MCG: 50 INJECTION INTRAMUSCULAR; INTRAVENOUS at 14:21

## 2023-01-01 RX ADMIN — Medication 10 ML: at 19:55

## 2023-01-01 RX ADMIN — DOCUSATE SODIUM 50 MG AND SENNOSIDES 8.6 MG 2 TABLET: 8.6; 5 TABLET, FILM COATED ORAL at 09:13

## 2023-01-01 RX ADMIN — Medication 250 ML: at 10:28

## 2023-01-01 RX ADMIN — FILGRASTIM-AAFI 300 MCG: 300 INJECTION, SOLUTION SUBCUTANEOUS at 09:37

## 2023-01-01 RX ADMIN — PHENYLEPHRINE HYDROCHLORIDE 200 MCG: 10 INJECTION INTRAVENOUS at 15:03

## 2023-01-01 RX ADMIN — PEGFILGRASTIM 6 MG: KIT SUBCUTANEOUS at 15:08

## 2023-01-01 RX ADMIN — POLYETHYLENE GLYCOL 3350 17 G: 17 POWDER, FOR SOLUTION ORAL at 18:22

## 2023-01-01 RX ADMIN — HYDROMORPHONE HYDROCHLORIDE 0.3 MG: 1 INJECTION, SOLUTION INTRAMUSCULAR; INTRAVENOUS; SUBCUTANEOUS at 08:20

## 2023-01-01 RX ADMIN — CYCLOBENZAPRINE HYDROCHLORIDE 5 MG: 5 TABLET, FILM COATED ORAL at 09:13

## 2023-01-01 RX ADMIN — METOCLOPRAMIDE 5 MG: 5 INJECTION, SOLUTION INTRAMUSCULAR; INTRAVENOUS at 09:08

## 2023-01-01 RX ADMIN — PALONOSETRON 0.25 MG: 0.05 INJECTION, SOLUTION INTRAVENOUS at 10:31

## 2023-01-01 RX ADMIN — DEXAMETHASONE SODIUM PHOSPHATE 4 MG: 4 INJECTION, SOLUTION INTRA-ARTICULAR; INTRALESIONAL; INTRAMUSCULAR; INTRAVENOUS; SOFT TISSUE at 14:39

## 2023-01-01 RX ADMIN — Medication 50 MG: at 14:21

## 2023-01-01 RX ADMIN — FENTANYL CITRATE 50 MCG: 50 INJECTION INTRAMUSCULAR; INTRAVENOUS at 15:22

## 2023-01-01 RX ADMIN — LIDOCAINE HYDROCHLORIDE 80 MG: 20 INJECTION, SOLUTION INFILTRATION; PERINEURAL at 14:21

## 2023-01-01 RX ADMIN — SIMETHICONE 80 MG: 80 TABLET, CHEWABLE ORAL at 20:01

## 2023-01-01 RX ADMIN — METOCLOPRAMIDE 5 MG: 5 INJECTION, SOLUTION INTRAMUSCULAR; INTRAVENOUS at 17:45

## 2023-01-01 RX ADMIN — CYCLOBENZAPRINE HYDROCHLORIDE 5 MG: 5 TABLET, FILM COATED ORAL at 14:37

## 2023-01-01 RX ADMIN — PALONOSETRON HYDROCHLORIDE 0.25 MG: 0.25 INJECTION INTRAVENOUS at 10:16

## 2023-01-01 ASSESSMENT — ACTIVITIES OF DAILY LIVING (ADL)
ADLS_ACUITY_SCORE: 21
ADLS_ACUITY_SCORE: 35
ADLS_ACUITY_SCORE: 21
ADLS_ACUITY_SCORE: 35
ADLS_ACUITY_SCORE: 20
ADLS_ACUITY_SCORE: 35
ADLS_ACUITY_SCORE: 35
ADLS_ACUITY_SCORE: 20
ADLS_ACUITY_SCORE: 35
ADLS_ACUITY_SCORE: 35
ADLS_ACUITY_SCORE: 20
ADLS_ACUITY_SCORE: 21
ADLS_ACUITY_SCORE: 21
ADLS_ACUITY_SCORE: 35
ADLS_ACUITY_SCORE: 21
ADLS_ACUITY_SCORE: 21
ADLS_ACUITY_SCORE: 35
ADLS_ACUITY_SCORE: 20
ADLS_ACUITY_SCORE: 20
ADLS_ACUITY_SCORE: 21
ADLS_ACUITY_SCORE: 20
ADLS_ACUITY_SCORE: 35
ADLS_ACUITY_SCORE: 35
ADLS_ACUITY_SCORE: 21
ADLS_ACUITY_SCORE: 35
ADLS_ACUITY_SCORE: 35
ADLS_ACUITY_SCORE: 20
ADLS_ACUITY_SCORE: 21
ADLS_ACUITY_SCORE: 35
ADLS_ACUITY_SCORE: 21
ADLS_ACUITY_SCORE: 21
ADLS_ACUITY_SCORE: 20
ADLS_ACUITY_SCORE: 35
ADLS_ACUITY_SCORE: 21
ADLS_ACUITY_SCORE: 21
ADLS_ACUITY_SCORE: 35
ADLS_ACUITY_SCORE: 20

## 2023-01-01 ASSESSMENT — PAIN SCALES - GENERAL
PAINLEVEL: NO PAIN (0)

## 2023-01-01 ASSESSMENT — LIFESTYLE VARIABLES: TOBACCO_USE: 0

## 2023-01-06 ENCOUNTER — PATIENT OUTREACH (OUTPATIENT)
Dept: ONCOLOGY | Facility: CLINIC | Age: 68
End: 2023-01-06

## 2023-01-06 NOTE — PROGRESS NOTES
Per scheduling     I spoke to this pt, she wanted to wait until after meron to schedule this. I just spoke to her again today and she wasn't home. She said shed have her dtr send you a mychart regarding an appt.      RN reached out to daughter and left voicemail with scheduling number     Shawna Hinojosa RN

## 2023-01-17 ENCOUNTER — TELEPHONE (OUTPATIENT)
Dept: ONCOLOGY | Facility: CLINIC | Age: 68
End: 2023-01-17
Payer: COMMERCIAL

## 2023-01-17 NOTE — TELEPHONE ENCOUNTER
Oral Chemotherapy Monitoring Program    Subjective/Objective:  Taran Regalado is a 67 year old female contacted by phone for a follow-up visit for oral chemotherapy. Taran confirms taking the appropriate dose of Lynparza 300mg (2x 150mg tablet) twice daily. Denies new or worsening side effects, missed doses, medication changes or recent hospital or ED visits. She shares that things are continuing to go well and that she has enough medication to last until nearly the end of the month.     ORAL CHEMOTHERAPY 5/20/2022 6/6/2022 6/20/2022 6/22/2022 9/23/2022 10/20/2022 1/17/2023   Assessment Type Refill Lab Monitoring Refill Quarterly Follow up Refill Quarterly Follow up Quarterly Follow up   Diagnosis Code - Ovarian Cancer Ovarian Cancer Ovarian Cancer Ovarian Cancer Ovarian Cancer Ovarian Cancer   Providers Dr. Larry Juarez   [facility]   Drug Name Lynparza (olaparib) Lynparza (olaparib) Lynparza (olaparib) Lynparza (olaparib) Lynparza (olaparib) Lynparza (olaparib) Lynparza (olaparib)   Dose 300 mg 300 mg 300 mg 300 mg 300 mg 300 mg 300 mg   Current Schedule BID BID BID BID BID BID BID   Cycle Details Continuous Continuous Continuous Continuous Continuous Continuous Continuous   Start Date of Last Cycle - - - - - - -   Planned next cycle start date - - - - - - -   Doses missed in last 2 weeks - - - 0 - 0 0   Adherence Assessment - - - Adherent - Adherent Adherent   Adverse Effects - - - No AE identified during assessment - No AE identified during assessment No AE identified during assessment   Any new drug interactions? - - - No - No No   Is the dose as ordered appropriate for the patient? - - - Yes - Yes Yes   Has the patient missed any days of school, work, or other routine activity? - - - - - - -   Since the last time we talked, have you been hospitalized or  used the emergency room? - - - No - No No       Last PHQ-2 Score on record:   PHQ-2 ( 1999 Pfizer) 4/13/2021 2/18/2021   Q1: Little interest or pleasure in doing things 0 0   Q2: Feeling down, depressed or hopeless 0 0   PHQ-2 Score 0 0   PHQ-2 Total Score (12-17 Years)- Positive if 3 or more points; Administer PHQ-A if positive 0 0       Vitals:  BP:   BP Readings from Last 1 Encounters:   09/21/22 124/64     Wt Readings from Last 1 Encounters:   09/21/22 79 kg (174 lb 2.1 oz)     Estimated body surface area is 2 meters squared as calculated from the following:    Height as of 9/21/22: 1.829 m (6').    Weight as of 9/21/22: 79 kg (174 lb 2.1 oz).    Labs:  No results found for NA within last 30 days.     No results found for K within last 30 days.     No results found for CA within last 30 days.     No results found for Mag within last 30 days.     No results found for Phos within last 30 days.     No results found for ALBUMIN within last 30 days.     No results found for BUN within last 30 days.     No results found for CR within last 30 days.     No results found for AST within last 30 days.     No results found for ALT within last 30 days.     No results found for BILITOTAL within last 30 days.     No results found for WBC within last 30 days.     No results found for HGB within last 30 days.     No results found for PLT within last 30 days.     No results found for ANC within last 30 days.     No results found for ANC within last 30 days.        Assessment/Plan:  Justen is tolerating therapy well. Continue Lynparza therapy as planned.     Follow-Up:  1/30: appt with Dr. Juarez  4/20: quarterly assessment    Refill Due:  [facility] to deliver on 1/27      Joelle Ndiaye, PharmD, BCACP  Hematology/Oncology Clinical Pharmacist  [facility]  [phone]

## 2023-01-20 DIAGNOSIS — C56.9 OVARIAN CANCER, UNSPECIFIED LATERALITY (H): Primary | ICD-10-CM

## 2023-01-20 DIAGNOSIS — Z79.899 ENCOUNTER FOR LONG-TERM (CURRENT) USE OF MEDICATIONS: ICD-10-CM

## 2023-01-25 ENCOUNTER — PATIENT OUTREACH (OUTPATIENT)
Dept: ONCOLOGY | Facility: CLINIC | Age: 68
End: 2023-01-25
Payer: COMMERCIAL

## 2023-01-25 DIAGNOSIS — C56.9 OVARIAN CANCER, UNSPECIFIED LATERALITY (H): Primary | ICD-10-CM

## 2023-01-25 NOTE — PROGRESS NOTES
Patient/daughter concerned as patient  doubled again and is now 89    Patient is having new symptoms and the fatigue is making everyday life harder.     Patient was wondering since MD stated at last visit if  kept climbing and new symptoms arise that would warrant a CT scan     Since patient comes from so far away was wondering if we could get a scan prior to visit to fully assess the situation     CT order is in and scheduling request was sent to try and get a scan Monday morning prior to visit with MD Shawna Hinojosa RN

## 2023-01-30 ENCOUNTER — ONCOLOGY VISIT (OUTPATIENT)
Dept: ONCOLOGY | Facility: CLINIC | Age: 68
End: 2023-01-30
Attending: OBSTETRICS & GYNECOLOGY
Payer: COMMERCIAL

## 2023-01-30 ENCOUNTER — ANCILLARY PROCEDURE (OUTPATIENT)
Dept: CT IMAGING | Facility: CLINIC | Age: 68
End: 2023-01-30
Attending: OBSTETRICS & GYNECOLOGY
Payer: COMMERCIAL

## 2023-01-30 VITALS
HEART RATE: 67 BPM | TEMPERATURE: 98.2 F | WEIGHT: 175.2 LBS | SYSTOLIC BLOOD PRESSURE: 125 MMHG | DIASTOLIC BLOOD PRESSURE: 74 MMHG | OXYGEN SATURATION: 97 % | RESPIRATION RATE: 16 BRPM | BODY MASS INDEX: 23.76 KG/M2

## 2023-01-30 DIAGNOSIS — C56.9 OVARIAN CANCER, UNSPECIFIED LATERALITY (H): Primary | ICD-10-CM

## 2023-01-30 DIAGNOSIS — C56.9 OVARIAN CANCER, UNSPECIFIED LATERALITY (H): ICD-10-CM

## 2023-01-30 LAB
CREAT BLD-MCNC: 1 MG/DL (ref 0.5–1)
GFR SERPL CREATININE-BSD FRML MDRD: >60 ML/MIN/1.73M2

## 2023-01-30 PROCEDURE — 82565 ASSAY OF CREATININE: CPT | Performed by: PATHOLOGY

## 2023-01-30 PROCEDURE — G0463 HOSPITAL OUTPT CLINIC VISIT: HCPCS | Performed by: OBSTETRICS & GYNECOLOGY

## 2023-01-30 PROCEDURE — 71260 CT THORAX DX C+: CPT | Performed by: STUDENT IN AN ORGANIZED HEALTH CARE EDUCATION/TRAINING PROGRAM

## 2023-01-30 PROCEDURE — G0463 HOSPITAL OUTPT CLINIC VISIT: HCPCS

## 2023-01-30 PROCEDURE — 99215 OFFICE O/P EST HI 40 MIN: CPT | Performed by: OBSTETRICS & GYNECOLOGY

## 2023-01-30 PROCEDURE — 74177 CT ABD & PELVIS W/CONTRAST: CPT | Performed by: STUDENT IN AN ORGANIZED HEALTH CARE EDUCATION/TRAINING PROGRAM

## 2023-01-30 RX ORDER — DIPHENHYDRAMINE HYDROCHLORIDE 50 MG/ML
50 INJECTION INTRAMUSCULAR; INTRAVENOUS
Status: CANCELLED
Start: 2023-03-01

## 2023-01-30 RX ORDER — IOPAMIDOL 755 MG/ML
98 INJECTION, SOLUTION INTRAVASCULAR ONCE
Status: COMPLETED | OUTPATIENT
Start: 2023-01-30 | End: 2023-01-30

## 2023-01-30 RX ORDER — PALONOSETRON 0.05 MG/ML
0.25 INJECTION, SOLUTION INTRAVENOUS ONCE
Status: CANCELLED | OUTPATIENT
Start: 2023-03-01

## 2023-01-30 RX ORDER — HEPARIN SODIUM,PORCINE 10 UNIT/ML
5 VIAL (ML) INTRAVENOUS
Status: CANCELLED | OUTPATIENT
Start: 2023-03-01

## 2023-01-30 RX ORDER — LORAZEPAM 2 MG/ML
0.5 INJECTION INTRAMUSCULAR EVERY 4 HOURS PRN
Status: CANCELLED | OUTPATIENT
Start: 2023-03-01

## 2023-01-30 RX ORDER — MEPERIDINE HYDROCHLORIDE 25 MG/ML
25 INJECTION INTRAMUSCULAR; INTRAVENOUS; SUBCUTANEOUS EVERY 30 MIN PRN
Status: CANCELLED | OUTPATIENT
Start: 2023-03-01

## 2023-01-30 RX ORDER — EPINEPHRINE 1 MG/ML
0.3 INJECTION, SOLUTION INTRAMUSCULAR; SUBCUTANEOUS EVERY 5 MIN PRN
Status: CANCELLED | OUTPATIENT
Start: 2023-03-01

## 2023-01-30 RX ORDER — ALBUTEROL SULFATE 0.83 MG/ML
2.5 SOLUTION RESPIRATORY (INHALATION)
Status: CANCELLED | OUTPATIENT
Start: 2023-03-01

## 2023-01-30 RX ORDER — DIPHENHYDRAMINE HCL 25 MG
50 CAPSULE ORAL ONCE
Status: CANCELLED
Start: 2023-03-01

## 2023-01-30 RX ORDER — HEPARIN SODIUM (PORCINE) LOCK FLUSH IV SOLN 100 UNIT/ML 100 UNIT/ML
5 SOLUTION INTRAVENOUS
Status: CANCELLED | OUTPATIENT
Start: 2023-03-01

## 2023-01-30 RX ORDER — METHYLPREDNISOLONE SODIUM SUCCINATE 125 MG/2ML
125 INJECTION, POWDER, LYOPHILIZED, FOR SOLUTION INTRAMUSCULAR; INTRAVENOUS
Status: CANCELLED
Start: 2023-03-01

## 2023-01-30 RX ORDER — ALBUTEROL SULFATE 90 UG/1
1-2 AEROSOL, METERED RESPIRATORY (INHALATION)
Status: CANCELLED
Start: 2023-03-01

## 2023-01-30 RX ADMIN — IOPAMIDOL 98 ML: 755 INJECTION, SOLUTION INTRAVASCULAR at 08:51

## 2023-01-30 ASSESSMENT — PAIN SCALES - GENERAL: PAINLEVEL: NO PAIN (0)

## 2023-01-30 NOTE — LETTER
2023     RE: Taran Regalado  [address]  [facility] [address]    Dear Colleague,    Thank you for referring your patient, Taran Regalado, to the [facility]. Please see a copy of my visit note below.                Follow Up Notes on Referred Patient    Date: 2023       Dr. Ye Vaughn MD  No address on file       RE: Taran Regalado  : 1955  GRACY: 2023    Taran Regalado is a 67 year old woman with a diagnosis of metastatic ovarian high grade serous carcinoma. She is here today for follow up and disease management. She has been doing well since finishing her adjuvant chemotherapy. She is eating an drinking well, no nausea vomiting, fever or chills, normal urinary and bowel function.        Oncology History:  12/3/2020: US Pelvic: IMPRESSION: Limited examination due to acoustic windows. Possible left adnexal mass. A CT scan of the abdomen and pelvis with contrast is recommended for further assessment.     2020: CT A/P:   IMPRESSION:    Peritoneal carcinomatosis with masslike peritoneal thickening in the lower pelvis which may indicate an adnexal or ovarian primary malignancy. Large volume ascites. Bilateral pleural effusions. There is potential subtle pleural nodularity in the right hemithorax which could indicate metastatic disease.  Indeterminate 1 cm lesion in the right hepatic lobe suspicious for a metastatic lesion.      2020: Presented to [facility] with abdominal distention, 25lb weight loss, and CTAP with carcinomatosis, elevated  3098.     2020: CT Chest: IMPRESSION:   1. There are few scattered small sub-6 mm pulmonary nodules which are indeterminate without prior comparisons available. There are a few  slightly larger perifissural nodules which are technically  indeterminate in the setting of malignancy although presumed lymphatic in nature and of unlikely clinical significance. Attention on follow-up is recommended.  2. Small  to moderate left and small right pleural effusions are increased in size from prior. No convincing evidence for pleural nodularity.  3. Partially visualized large volume ascites and peritoneal nodularity in the upper abdomen similar to 12/4/2020 outside CT      12/26/2020: ED for abdominal distension; 3 L ascites drained with paracentesis    Pelvic US: Findings: Free fluid present in LLQ      12/31/2020: US Paracentesis: 900 mL ascites drained     1/7/2021: Diagnotic laparoscopy, biopsies  Pathology: FINAL DIAGNOSIS:   A. PERITONEUM, BIOPSIES:   - Positive for high grade carcinoma, consistent with metastatic carcinoma of Mullerian origin.     1/10-1/13/2021: Hospital admission for postoperative non-intractable vomiting and nausea.      1/10/2021: CT A/P: IMPRESSION: Extensive ascites which is probably malignant. Scattered liver hypodensities of indeterminate etiology comment cannot exclude metastatic disease. Diverticulosis. Fluid-filled adnexal masses and irregular appearance of uterus, which may represent primary neoplasm. Multiple peritoneal nodules. Large amount of fecal material in the colon with no evidence of small bowel obstruction.     Plan: Paclitaxel 175 mg/m2 and carboplatin AUC 6 x 3 cycles followed by a CT CAP and visit with Dr. Juarez.     1/12/2021: Cycle 1 paclitaxel and carboplatin while inpatient     1/13/2021: CT Head: Impression:  1. Chronic sinusitis of the right maxillary and right sphenoid sinuses.  2. Incidental presumed calcified meningioma in the right frontal  convexity without significant mass effect.  3. No suspicious intracranial enhancing lesion.     2/1/2021: Cycle 2 paclitaxel and carboplatin.  936.     2/3-2/5/21: Admission [facility] for afib w/ RVR and new PE     2/26/21: Cycle 3 paclitaxel and carboplatin planned.  Deferred due to thrombocytopenia.  pending.     3/15/21: Cycle 3 paclitaxel and carboplatin given     4/19/21: HYSTERECTOMY, TOTAL, ABDOMINAL, WITH  BILATERAL SALPINGO-OOPHORECTOMY, omentectomy, NEOPLASM DEBULKING,Proctoscocy, RO, Resection of liver nodules, diaphragm stripping, immobilization of liver and colon  FINAL DIAGNOSIS:   A. OMENTUM, BIOPSY:   - Omental adipose tissue with rare viable cells of metastatic high grade   serous carcinoma   - One reactive lymph node, negative for malignancy (0/1)   B. NODULE, SIGMOID, EXCISION:   - Calcified necrotic adipose tissue   - Negative for malignancy   C. NODULE, SMALL BOWEL MESENTERY, EXCISION:   - Fibroadipose tissue, positive for metastatic high grade serous carcinoma   D. UTERUS, CERVIX, BILATERAL FALLOPIAN TUBES AND OVARIES, HYSTERECTOMY   WITH BILATERAL SALPINGO-OOPHORECTOMY:   - Atrophic endometrium   - Uterine serosa with rare viable cells consistent with high grade serous   carcinoma   - Cervix with atrophic changes   - Viable cells consistent with high grade serous carcinoma present in the   right ovary, serosa of right   fallopian tube and right periadnexal soft tissue   - Left ovary with atrophic changes   - Left fallopian tube with a rare focus of serous tubal in-situ carcinoma   (STIC)   E. NODULES, SMALL BOWEL MESENTRY, EXCISION:   - Fibroadipose tissue with rare viable cells of metastatic high grade   serous carcinoma   F. NODULE, SPLENIC FLEXURE TRANSVERSE COLON, EXCISION:   - Fibroadipose tissue with rare viable cells of metastatic high grade   serous carcinoma   - Accessory splenule, negative for malignancy   G. OMENTUM, OMENTECTOMY:   - Omental adipose tissue with rare viable cells of metastatic high grade   serous carcinoma   H. NODULE, PERITONEAL PANCREATIC, EXCISION:   - Fibrous adhesions with inflammation   - Negative for malignancy   I. RIGHT HEMIDIAPHRAGM PERITONEUM, EXCISION:   - Fibrous adhesions with inflammation   - Negative for malignancy   J. RIGHT LIVER SURFACE NODULE:   - Fibrous adhesions with benign inclusion glands   - Negative for malignancy   K. LEFT LOWER LIVER EDGE, BIOPSY:    - Cauterized hepatic parenchyma and capsule   - Negative for malignancy   L. NODULE, SMALL BOWEL MESENTERIC #3, EXCISION:   - Fibroadipose tissue with rare viable cells of metastatic high grade   serous carcinoma       Plan: Carboplatin AUC 6 + Taxol 175 mg/m2 x 3 cycles, then transition to Parp inhibitor for maintenance therapy given her BRCA1 germline mutation.      5/21/21: Cycle 4 carboplatin and paclitaxel.   172.  6/11/21: Cycle 5 carboplatin and paclitaxel.   61.  7/2/21: Cycle 6 carboplatin and paclitaxel.   20.       7/28/21 plan: Olaparib 300mg bid as starting dose,  14  8/20/21: start date olaparib 300 mg BID,  12  9/13/21:  22  10/4/21:  23  11/1/21:  26     11/02/2021: PET CT: IMPRESSION:   Findings compatible with interval surgery and posttreatment change.  No gross definitive FDG avid disease.  Potential foci of tumor deposits along the anterior dome of the liver and midline abdominal wall surgical scar.  Colonic activity is not necessarily abnormal, however, given the previous carcinomatosis the colonic activity is indeterminant.         12/1/21:  23  1/3/22:  21  2/1/22:  20  3/1/22:  21  4/1/22:  23  5/4/22:  28    EXAM: CT CHEST/ABDOMEN/PELVIS W CONTRAST  LOCATION: [facility]  DATE/TIME: 7/11/2022 1:25 PM     INDICATION: Stage III B high-grade ovarian carcinoma diagnosed Jan 2021. Posttreatment surveillance.  COMPARISON: CTA AP 04/23/2021, CT CAP 04/02/2021  TECHNIQUE: CT scan of the chest, abdomen, and pelvis was performed following injection of IV contrast. Multiplanar reformats were obtained. Dose reduction techniques were used.   CONTRAST: isovue 370 105mL IV; 50mL omni 140 oral     FINDINGS:   LUNGS AND PLEURA: No suspicious pulmonary nodules. Minimal right apical pleural thickening and a few punctate tiny nodules 2 of which are subpleural on the right are stable. No pleural  effusions.     MEDIASTINUM/AXILLAE: No adenopathy. No central pulmonary emboli.     CORONARY ARTERY CALCIFICATION: Cannot evaluate.     HEPATOBILIARY: Normal.     PANCREAS: Normal.     SPLEEN: Normal.     ADRENAL GLANDS: Normal.     KIDNEYS/BLADDER: Normal.     BOWEL: Sliver of ascites adjacent to the spleen noted, decreased from prior study. There is a 4 mm nodule in the gastrosplenic ligament (image 352). On the preop study it appears as a smaller punctate nodule (prior image 341). Sliver of ascites in the   gastrohepatic ligament also noted. No peritoneal tumor nodules. No bowel obstruction. Quite redundant colon with mild large stool burden. Extensive distal colonic diverticulosis.     LYMPH NODES: Normal.     VASCULATURE: Mild arterial calcifications. Circumaortic left renal vein.     PELVIC ORGANS: Hysterectomy. Vaginal cuff is normal.     MUSCULOSKELETAL: Diastases of the midline rectus sheath above the umbilicus. Minimal nodular scarring to the left of midline in the midabdomen (image 419). There is a shallow broad-based supraumbilical ventral hernia containing only fat. No implants   within the hernia or abdominal incision. No suspicious bone lesions.                                                                      IMPRESSION:  1.  Sliver of ascites in the upper abdomen has decreased since interval debulking surgery.  2.  There is a punctate nodule in the gastrosplenic ligament which is minimally more plump relative to the preop exam. This will need to be followed.  3.  Minimal nodular changes to the left of the midline scar in the subcutaneous fat will have to be followed as well. Unclear if this simply reflects postoperative scarring or could reflect an early incisional recurrence.  4.  No other sites to suggest recurrent tumor. Vaginal cuff is normal.  5.  Extensive distal colonic diverticulosis.  6.  Other noncritical findings as noted above.     9/16/22  98    1/30/23 CT CAP:     IMPRESSION:  1.  Multiple new, hypoattenuating lesions in the liver, suspicious for hepatic metastatic disease.  2.  Necrotic mario hepatic lymphadenopathy, concerning for richard metastatic disease.  3.  There is a new or increasingly conspicuous 6 mm soft tissue nodule in the right lower quadrant. This is indeterminate.  4.  There is a new 3 mm solid nodule in the right upper lobe, indeterminate.  5.  Stable approximate 4 mm punctate nodule along the gastrosplenic ligament.  6.  Similar area of linear free fluid in the upper abdomen anterior to the stomach.      Past Medical History:  Past Medical History:   Diagnosis Date     Atrial fibrillation with rapid ventricular response (H)      History of cold sores      Hx of LASIK 12/11/2017     Insomnia      Migraine      Osteopenia      Pelvic mass      Peritoneal carcinomatosis (H)      Restless legs syndrome (RLS)        Past Surgical History:  Past Surgical History:   Procedure Laterality Date     APPENDECTOMY       ARTHROSCPY KNEE SURGICAL DEBRIDEMENT SHAVING ARTICULAR CARTILAGE Right      BIOPSY  January 2021    Biopsy to confirm ovarian cancer     DEBRIDEMENT LEFT UPPER EXTREMITY  2016     HYSTERECTOMY TOTAL ABD, LUISITO SALPINGO-OOPHORECTOMY, NODE DISSECTION, TUMOR DEBULKING, COMBINED Bilateral 4/19/2021    Procedure: HYSTERECTOMY, TOTAL, ABDOMINAL, WITH BILATERAL SALPINGO-OOPHORECTOMY, omentectomy, NEOPLASM DEBULKING,Proctoscocy, RO, Resection of liver nodules, diaphragm stripping, immobilization of liver and colon;  Surgeon: Bolivar Juarez MD;  Location: [facility] OR     LAPAROSCOPY DIAGNOSTIC (GYN) Bilateral 1/7/2021    Procedure: Diagnsotic laparoscopy, biopsies;  Surgeon: Bolivar Juarez MD;  Location:  OR     [facility]       TUBAL LIGATION           Health Maintenance Due   Topic Date Due     DEXA  Never done     ADVANCE CARE PLANNING  Never done     COLORECTAL CANCER SCREENING  Never done     HEPATITIS C SCREENING  Never done     LIPID  Never done      MEDICARE ANNUAL WELLNESS VISIT  2021     FALL RISK ASSESSMENT  Never done     COVID-19 Vaccine (4 - Booster for Pfizer series) 11/15/2021     INFLUENZA VACCINE (1) Never done     PHQ-2 (once per calendar year)  2023     Pneumococcal Vaccine: 65+ Years (2 - PPSV23 if available, else PCV20) 2023       Current Medications:   Current Outpatient Medications   Medication Sig Dispense Refill     amitriptyline (ELAVIL) 100 MG tablet Take 1 tablet (100 mg) by mouth At Bedtime 90 tablet 0     fluticasone (FLONASE) 50 MCG/ACT nasal spray SPRAY 1 SPRAY INTO EACH NOSTRIL 2 TIMES A DAY       gabapentin (NEURONTIN) 600 MG tablet Take 1 tablet (600 mg) by mouth At Bedtime (Patient taking differently: Take 600 mg by mouth At Bedtime HALF TAB) 90 tablet 0     meclizine (ANTIVERT) 25 MG tablet Take 1 tablet (25 mg) by mouth 3 times daily as needed for dizziness or nausea 15 tablet 0     olaparib (LYNPARZA) 150 MG tablet Take 2 tablets (300 mg) by mouth 2 times daily 120 tablet 2     ondansetron (ZOFRAN) 4 MG tablet Take by mouth every 8 hours as needed for nausea       oxyCODONE (ROXICODONE) 5 MG tablet Take 1 tablet (5 mg) by mouth every 12 hours (Patient not taking: No sig reported) 10 tablet 0     rivaroxaban ANTICOAGULANT (XARELTO ANTICOAGULANT) 20 MG TABS tablet Take 1 tablet (20 mg) by mouth every morning 90 tablet 1     SUMAtriptan (IMITREX) 100 MG tablet Take 1 tablet (100 mg) by mouth at onset of headache for migraine 15 tablet 0     valACYclovir (VALTREX) 1000 mg tablet Take 1,000 mg by mouth as needed        zolpidem (AMBIEN) 10 MG tablet Take 10 mg by mouth           Allergies:      No Known Allergies     Social History:     Social History     Tobacco Use     Smoking status: Former     Packs/day: 0.50     Years: 40.00     Pack years: 20.00     Types: Cigarettes     Quit date:      Years since quittin.0     Smokeless tobacco: Never   Substance Use Topics     Alcohol use: Not Currently       History    Drug Use Unknown       Family History:   Family History   Adopted: Yes   Problem Relation Age of Onset     Cancer Mother 36     Other Cancer Mother         Bio mother  of  a female cancer  at 36     Factor V Leiden deficiency Daughter      Deep Vein Thrombosis Daughter      Diabetes Type 1 Daughter      Diabetes Daughter      Hypertension Daughter      Anesthesia Reaction No family hx of        Physical Exam:   There were no vitals taken for this visit.  There is no height or weight on file to calculate BMI.    General Appearance: healthy and alert, no distress     Musculoskeletal: extremities non tender and without edema    Skin: no lesions or rashes     Neurological: normal gait, no gross defects     Psychiatric: appropriate mood and affect                               Hematological: normal cervical, supraclavicular and inguinal lymph nodes     Gastrointestinal:       abdomen soft, non-tender, non-distended, no organomegaly or masses    Genitourinary: External genitalia and urethral meatus appears normal.  Vagina is smooth without nodularity or masses, well healed vaginal cuff. Bimanual exam reveal no masses, nodularity or fullness.  Recto-vaginal exam confirms these findings.      Assessment:  Taran Regalado is a 67 year old woman with recurrent ovarian high grade serous carcinoma.    40 minute visit with 30 minutes counseling.        1.  Recurrent high grade serous ovarian cancer.   2.  BRCA 1 germline mutation.   3.  Precision [facility] Program  4.  PE resolved on Lovenox (prophy)  5.  Left ankle injury  6.  675     We did review the CT scan from today given elevated  that has been slightly increasing.  There is likely recurrent disease especially new disease in the liver.  We did discuss I would recommend to switch from her PARP maintenance therapy to chemotherapy again we will plan to do carboplatin and Doxil every 28 days for 3 cycles followed by CT scan of the chest abdomen pelvis to  evaluate for response.  Side effects risk alternatives were discussed.  She has gotten an echocardiogram in the past and is asymptomatic from a cardiac standpoint.  Patient and her family agree with this plan of action for care all questions were answered.           Bolivar Juarez MD, MS    Department of Obstetrics and Gynecology   Division of Gynecologic Oncology   [facility]  Phone: [phone]       CC  Patient Care Team:  Bolivar Juarez MD as PCP - General (Gynecologic Oncology)  Bolivar Juarez MD as Assigned Cancer Care Provider  SELF, REFERRED

## 2023-01-30 NOTE — PROGRESS NOTES
Follow Up Notes on Referred Patient    Date: 2023       Dr. Ye Vaughn MD  No address on file       RE: Taran Regalado  : 1955  GRACY: 2023    Taran Regalado is a 67 year old woman with a diagnosis of metastatic ovarian high grade serous carcinoma. She is here today for follow up and disease management. She has been doing well since finishing her adjuvant chemotherapy. She is eating an drinking well, no nausea vomiting, fever or chills, normal urinary and bowel function.        Oncology History:  12/3/2020: US Pelvic: IMPRESSION: Limited examination due to acoustic windows. Possible left adnexal mass. A CT scan of the abdomen and pelvis with contrast is recommended for further assessment.     2020: CT A/P:   IMPRESSION:    Peritoneal carcinomatosis with masslike peritoneal thickening in the lower pelvis which may indicate an adnexal or ovarian primary malignancy. Large volume ascites. Bilateral pleural effusions. There is potential subtle pleural nodularity in the right hemithorax which could indicate metastatic disease.  Indeterminate 1 cm lesion in the right hepatic lobe suspicious for a metastatic lesion.      2020: Presented to [facility] with abdominal distention, 25lb weight loss, and CTAP with carcinomatosis, elevated  3098.     2020: CT Chest: IMPRESSION:   1. There are few scattered small sub-6 mm pulmonary nodules which are indeterminate without prior comparisons available. There are a few  slightly larger perifissural nodules which are technically  indeterminate in the setting of malignancy although presumed lymphatic in nature and of unlikely clinical significance. Attention on follow-up is recommended.  2. Small to moderate left and small right pleural effusions are increased in size from prior. No convincing evidence for pleural nodularity.  3. Partially visualized large volume ascites and peritoneal nodularity in the upper abdomen similar to  12/4/2020 outside CT      12/26/2020: ED for abdominal distension; 3 L ascites drained with paracentesis    Pelvic US: Findings: Free fluid present in LLQ      12/31/2020: US Paracentesis: 900 mL ascites drained     1/7/2021: Diagnotic laparoscopy, biopsies  Pathology: FINAL DIAGNOSIS:   A. PERITONEUM, BIOPSIES:   - Positive for high grade carcinoma, consistent with metastatic carcinoma of Mullerian origin.     1/10-1/13/2021: Hospital admission for postoperative non-intractable vomiting and nausea.      1/10/2021: CT A/P: IMPRESSION: Extensive ascites which is probably malignant. Scattered liver hypodensities of indeterminate etiology comment cannot exclude metastatic disease. Diverticulosis. Fluid-filled adnexal masses and irregular appearance of uterus, which may represent primary neoplasm. Multiple peritoneal nodules. Large amount of fecal material in the colon with no evidence of small bowel obstruction.     Plan: Paclitaxel 175 mg/m2 and carboplatin AUC 6 x 3 cycles followed by a CT CAP and visit with Dr. Juarez.     1/12/2021: Cycle 1 paclitaxel and carboplatin while inpatient     1/13/2021: CT Head: Impression:  1. Chronic sinusitis of the right maxillary and right sphenoid sinuses.  2. Incidental presumed calcified meningioma in the right frontal  convexity without significant mass effect.  3. No suspicious intracranial enhancing lesion.     2/1/2021: Cycle 2 paclitaxel and carboplatin.  936.     2/3-2/5/21: Admission [facility] for afib w/ RVR and new PE     2/26/21: Cycle 3 paclitaxel and carboplatin planned.  Deferred due to thrombocytopenia.  pending.     3/15/21: Cycle 3 paclitaxel and carboplatin given     4/19/21: HYSTERECTOMY, TOTAL, ABDOMINAL, WITH BILATERAL SALPINGO-OOPHORECTOMY, omentectomy, NEOPLASM DEBULKING,Proctoscocy, RO, Resection of liver nodules, diaphragm stripping, immobilization of liver and colon  FINAL DIAGNOSIS:   A. OMENTUM, BIOPSY:   - Omental adipose tissue with rare  viable cells of metastatic high grade   serous carcinoma   - One reactive lymph node, negative for malignancy (0/1)   B. NODULE, SIGMOID, EXCISION:   - Calcified necrotic adipose tissue   - Negative for malignancy   C. NODULE, SMALL BOWEL MESENTERY, EXCISION:   - Fibroadipose tissue, positive for metastatic high grade serous carcinoma   D. UTERUS, CERVIX, BILATERAL FALLOPIAN TUBES AND OVARIES, HYSTERECTOMY   WITH BILATERAL SALPINGO-OOPHORECTOMY:   - Atrophic endometrium   - Uterine serosa with rare viable cells consistent with high grade serous   carcinoma   - Cervix with atrophic changes   - Viable cells consistent with high grade serous carcinoma present in the   right ovary, serosa of right   fallopian tube and right periadnexal soft tissue   - Left ovary with atrophic changes   - Left fallopian tube with a rare focus of serous tubal in-situ carcinoma   (STIC)   E. NODULES, SMALL BOWEL MESENTRY, EXCISION:   - Fibroadipose tissue with rare viable cells of metastatic high grade   serous carcinoma   F. NODULE, SPLENIC FLEXURE TRANSVERSE COLON, EXCISION:   - Fibroadipose tissue with rare viable cells of metastatic high grade   serous carcinoma   - Accessory splenule, negative for malignancy   G. OMENTUM, OMENTECTOMY:   - Omental adipose tissue with rare viable cells of metastatic high grade   serous carcinoma   H. NODULE, PERITONEAL PANCREATIC, EXCISION:   - Fibrous adhesions with inflammation   - Negative for malignancy   I. RIGHT HEMIDIAPHRAGM PERITONEUM, EXCISION:   - Fibrous adhesions with inflammation   - Negative for malignancy   J. RIGHT LIVER SURFACE NODULE:   - Fibrous adhesions with benign inclusion glands   - Negative for malignancy   K. LEFT LOWER LIVER EDGE, BIOPSY:   - Cauterized hepatic parenchyma and capsule   - Negative for malignancy   L. NODULE, SMALL BOWEL MESENTERIC #3, EXCISION:   - Fibroadipose tissue with rare viable cells of metastatic high grade   serous carcinoma       Plan: Carboplatin AUC  6 + Taxol 175 mg/m2 x 3 cycles, then transition to Parp inhibitor for maintenance therapy given her BRCA1 germline mutation.      5/21/21: Cycle 4 carboplatin and paclitaxel.   172.  6/11/21: Cycle 5 carboplatin and paclitaxel.   61.  7/2/21: Cycle 6 carboplatin and paclitaxel.   20.       7/28/21 plan: Olaparib 300mg bid as starting dose,  14  8/20/21: start date olaparib 300 mg BID,  12  9/13/21:  22  10/4/21:  23  11/1/21:  26     11/02/2021: PET CT: IMPRESSION:   Findings compatible with interval surgery and posttreatment change.  No gross definitive FDG avid disease.  Potential foci of tumor deposits along the anterior dome of the liver and midline abdominal wall surgical scar.  Colonic activity is not necessarily abnormal, however, given the previous carcinomatosis the colonic activity is indeterminant.         12/1/21:  23  1/3/22:  21  2/1/22:  20  3/1/22:  21  4/1/22:  23  5/4/22:  28    EXAM: CT CHEST/ABDOMEN/PELVIS W CONTRAST  LOCATION: [facility]  DATE/TIME: 7/11/2022 1:25 PM     INDICATION: Stage III B high-grade ovarian carcinoma diagnosed Jan 2021. Posttreatment surveillance.  COMPARISON: CTA AP 04/23/2021, CT CAP 04/02/2021  TECHNIQUE: CT scan of the chest, abdomen, and pelvis was performed following injection of IV contrast. Multiplanar reformats were obtained. Dose reduction techniques were used.   CONTRAST: isovue 370 105mL IV; 50mL omni 140 oral     FINDINGS:   LUNGS AND PLEURA: No suspicious pulmonary nodules. Minimal right apical pleural thickening and a few punctate tiny nodules 2 of which are subpleural on the right are stable. No pleural effusions.     MEDIASTINUM/AXILLAE: No adenopathy. No central pulmonary emboli.     CORONARY ARTERY CALCIFICATION: Cannot evaluate.     HEPATOBILIARY: Normal.     PANCREAS: Normal.     SPLEEN: Normal.     ADRENAL GLANDS: Normal.     KIDNEYS/BLADDER:  Normal.     BOWEL: Sliver of ascites adjacent to the spleen noted, decreased from prior study. There is a 4 mm nodule in the gastrosplenic ligament (image 352). On the preop study it appears as a smaller punctate nodule (prior image 341). Sliver of ascites in the   gastrohepatic ligament also noted. No peritoneal tumor nodules. No bowel obstruction. Quite redundant colon with mild large stool burden. Extensive distal colonic diverticulosis.     LYMPH NODES: Normal.     VASCULATURE: Mild arterial calcifications. Circumaortic left renal vein.     PELVIC ORGANS: Hysterectomy. Vaginal cuff is normal.     MUSCULOSKELETAL: Diastases of the midline rectus sheath above the umbilicus. Minimal nodular scarring to the left of midline in the midabdomen (image 419). There is a shallow broad-based supraumbilical ventral hernia containing only fat. No implants   within the hernia or abdominal incision. No suspicious bone lesions.                                                                      IMPRESSION:  1.  Sliver of ascites in the upper abdomen has decreased since interval debulking surgery.  2.  There is a punctate nodule in the gastrosplenic ligament which is minimally more plump relative to the preop exam. This will need to be followed.  3.  Minimal nodular changes to the left of the midline scar in the subcutaneous fat will have to be followed as well. Unclear if this simply reflects postoperative scarring or could reflect an early incisional recurrence.  4.  No other sites to suggest recurrent tumor. Vaginal cuff is normal.  5.  Extensive distal colonic diverticulosis.  6.  Other noncritical findings as noted above.     9/16/22  98    1/30/23 CT CAP:    IMPRESSION:  1.  Multiple new, hypoattenuating lesions in the liver, suspicious for hepatic metastatic disease.  2.  Necrotic mario hepatic lymphadenopathy, concerning for richard metastatic disease.  3.  There is a new or increasingly conspicuous 6 mm soft tissue  nodule in the right lower quadrant. This is indeterminate.  4.  There is a new 3 mm solid nodule in the right upper lobe, indeterminate.  5.  Stable approximate 4 mm punctate nodule along the gastrosplenic ligament.  6.  Similar area of linear free fluid in the upper abdomen anterior to the stomach.      Past Medical History:    Past Medical History:   Diagnosis Date     Atrial fibrillation with rapid ventricular response (H)      History of cold sores      Hx of LASIK 12/11/2017     Insomnia      Migraine      Osteopenia      Pelvic mass      Peritoneal carcinomatosis (H)      Restless legs syndrome (RLS)          Past Surgical History:    Past Surgical History:   Procedure Laterality Date     APPENDECTOMY       ARTHROSCPY KNEE SURGICAL DEBRIDEMENT SHAVING ARTICULAR CARTILAGE Right      BIOPSY  January 2021    Biopsy to confirm ovarian cancer     DEBRIDEMENT LEFT UPPER EXTREMITY  2016     HYSTERECTOMY TOTAL ABD, LUISITO SALPINGO-OOPHORECTOMY, NODE DISSECTION, TUMOR DEBULKING, COMBINED Bilateral 4/19/2021    Procedure: HYSTERECTOMY, TOTAL, ABDOMINAL, WITH BILATERAL SALPINGO-OOPHORECTOMY, omentectomy, NEOPLASM DEBULKING,Proctoscocy, RO, Resection of liver nodules, diaphragm stripping, immobilization of liver and colon;  Surgeon: Bolivar Juarez MD;  Location: [facility] OR     LAPAROSCOPY DIAGNOSTIC (GYN) Bilateral 1/7/2021    Procedure: Diagnsotic laparoscopy, biopsies;  Surgeon: Bolivar Juarez MD;  Location:  OR     [facility]       TUBAL LIGATION           Health Maintenance Due   Topic Date Due     DEXA  Never done     ADVANCE CARE PLANNING  Never done     COLORECTAL CANCER SCREENING  Never done     HEPATITIS C SCREENING  Never done     LIPID  Never done     MEDICARE ANNUAL WELLNESS VISIT  11/11/2021     FALL RISK ASSESSMENT  Never done     COVID-19 Vaccine (4 - Booster for Pfizer series) 11/15/2021     INFLUENZA VACCINE (1) Never done     PHQ-2 (once per calendar year)  01/01/2023     Pneumococcal Vaccine: 65+ Years  (2 - PPSV23 if available, else PCV20) 2023       Current Medications:     Current Outpatient Medications   Medication Sig Dispense Refill     amitriptyline (ELAVIL) 100 MG tablet Take 1 tablet (100 mg) by mouth At Bedtime 90 tablet 0     fluticasone (FLONASE) 50 MCG/ACT nasal spray SPRAY 1 SPRAY INTO EACH NOSTRIL 2 TIMES A DAY       gabapentin (NEURONTIN) 600 MG tablet Take 1 tablet (600 mg) by mouth At Bedtime (Patient taking differently: Take 600 mg by mouth At Bedtime HALF TAB) 90 tablet 0     meclizine (ANTIVERT) 25 MG tablet Take 1 tablet (25 mg) by mouth 3 times daily as needed for dizziness or nausea 15 tablet 0     olaparib (LYNPARZA) 150 MG tablet Take 2 tablets (300 mg) by mouth 2 times daily 120 tablet 2     ondansetron (ZOFRAN) 4 MG tablet Take by mouth every 8 hours as needed for nausea       oxyCODONE (ROXICODONE) 5 MG tablet Take 1 tablet (5 mg) by mouth every 12 hours (Patient not taking: No sig reported) 10 tablet 0     rivaroxaban ANTICOAGULANT (XARELTO ANTICOAGULANT) 20 MG TABS tablet Take 1 tablet (20 mg) by mouth every morning 90 tablet 1     SUMAtriptan (IMITREX) 100 MG tablet Take 1 tablet (100 mg) by mouth at onset of headache for migraine 15 tablet 0     valACYclovir (VALTREX) 1000 mg tablet Take 1,000 mg by mouth as needed        zolpidem (AMBIEN) 10 MG tablet Take 10 mg by mouth           Allergies:      No Known Allergies     Social History:     Social History     Tobacco Use     Smoking status: Former     Packs/day: 0.50     Years: 40.00     Pack years: 20.00     Types: Cigarettes     Quit date:      Years since quittin.0     Smokeless tobacco: Never   Substance Use Topics     Alcohol use: Not Currently       History   Drug Use Unknown         Family History:     Family History   Adopted: Yes   Problem Relation Age of Onset     Cancer Mother 36     Other Cancer Mother         Bio mother  of  a female cancer  at 36     Factor V Leiden deficiency Daughter      Deep Vein  Thrombosis Daughter      Diabetes Type 1 Daughter      Diabetes Daughter      Hypertension Daughter      Anesthesia Reaction No family hx of          Physical Exam:     There were no vitals taken for this visit.  There is no height or weight on file to calculate BMI.    General Appearance: healthy and alert, no distress     Musculoskeletal: extremities non tender and without edema    Skin: no lesions or rashes     Neurological: normal gait, no gross defects     Psychiatric: appropriate mood and affect                               Hematological: normal cervical, supraclavicular and inguinal lymph nodes     Gastrointestinal:       abdomen soft, non-tender, non-distended, no organomegaly or masses    Genitourinary: External genitalia and urethral meatus appears normal.  Vagina is smooth without nodularity or masses, well healed vaginal cuff. Bimanual exam reveal no masses, nodularity or fullness.  Recto-vaginal exam confirms these findings.      Assessment:    Taran Regalado is a 67 year old woman with recurrent ovarian high grade serous carcinoma.    40 minute visit with 30 minutes counseling.        1.  Recurrent high grade serous ovarian cancer.   2.  BRCA 1 germline mutation.   3.  Prec[facility] Program  4.  PE resolved on Lovenox (prophy)  5.  Left ankle injury  6.  675     We did review the CT scan from today given elevated  that has been slightly increasing.  There is likely recurrent disease especially new disease in the liver.  We did discuss I would recommend to switch from her PARP maintenance therapy to chemotherapy again we will plan to do carboplatin and Doxil every 28 days for 3 cycles followed by CT scan of the chest abdomen pelvis to evaluate for response.  Side effects risk alternatives were discussed.  She has gotten an echocardiogram in the past and is asymptomatic from a cardiac standpoint.  Patient and her family agree with this plan of action for care all questions were  answered.           Bolivar Juarez MD, MS    Department of Obstetrics and Gynecology   Division of Gynecologic Oncology   [facility]  Phone: [phone]       CC  Patient Care Team:  Bolivar Juarez MD as PCP - General (Gynecologic Oncology)  Bolivar Juarez MD as Assigned Cancer Care Provider  SELF, REFERRED

## 2023-01-30 NOTE — NURSING NOTE
Oncology Rooming Note    January 30, 2023 2:56 PM   Taran Regalado is a 67 year old female who presents for:    Chief Complaint   Patient presents with     Oncology Clinic Visit     Ovarian cancer, unspecified laterality     Initial Vitals: /74   Pulse 67   Temp 98.2  F (36.8  C) (Oral)   Resp 16   Wt 79.5 kg (175 lb 3.2 oz)   SpO2 97%   BMI 23.76 kg/m   Estimated body mass index is 23.76 kg/m  as calculated from the following:    Height as of 9/21/22: 1.829 m (6').    Weight as of this encounter: 79.5 kg (175 lb 3.2 oz). Body surface area is 2.01 meters squared.  No Pain (0) Comment: Data Unavailable   No LMP recorded. Patient is postmenopausal.  Allergies reviewed: Yes  Medications reviewed: Yes    Medications: Medication refills not needed today.  Pharmacy name entered into EPIC:    [facility] - [place], [place] - [address] [facility] - [place], [place] - [address]  [id number] [place] [address]  [facility] [place] [address]  [id number] [place] - [address] [place], [place] [address] [place] [address]  [facility], [place] - [address]  [facility] - [place], [place] - [address]    Clinical concerns: No new clinical concerns other than reason for visit today.       Terrie Muse, EMT

## 2023-01-31 ENCOUNTER — DOCUMENTATION ONLY (OUTPATIENT)
Dept: ONCOLOGY | Facility: CLINIC | Age: 68
End: 2023-01-31
Payer: COMMERCIAL

## 2023-01-31 NOTE — PROGRESS NOTES
[facility] Oral Chemotherapy Monitoring Program    Thank you for the opportunity to be a part in the care of this patient's oral chemotherapy. The oncology pharmacy will no longer be following this patient for oral chemotherapy. If there are any questions or the plan changes, feel free to contact us.    ORAL CHEMOTHERAPY 6/6/2022 6/20/2022 6/22/2022 9/23/2022 10/20/2022 1/17/2023 1/31/2023   Assessment Type Lab Monitoring Refill Quarterly Follow up Refill Quarterly Follow up Quarterly Follow up Discontinuation   Stop Date - - - - - - 1/30/2023   Reason for Discontinuation - - - - - - Disease progression   Diagnosis Code Ovarian Cancer Ovarian Cancer Ovarian Cancer Ovarian Cancer Ovarian Cancer Ovarian Cancer Ovarian Cancer   Providers Dr. Larry Juarez   [facility]   Drug Name Lynparza (olaparib) Lynparza (olaparib) Lynparza (olaparib) Lynparza (olaparib) Lynparza (olaparib) Lynparza (olaparib) Lynparza (olaparib)   Dose 300 mg 300 mg 300 mg 300 mg 300 mg 300 mg 300 mg   Current Schedule BID BID BID BID BID BID BID   Cycle Details Continuous Continuous Continuous Continuous Continuous Continuous Continuous   Start Date of Last Cycle - - - - - - -   Planned next cycle start date - - - - - - -   Doses missed in last 2 weeks - - 0 - 0 0 -   Adherence Assessment - - Adherent - Adherent Adherent -   Adverse Effects - - No AE identified during assessment - No AE identified during assessment No AE identified during assessment -   Any new drug interactions? - - No - No No -   Is the dose as ordered appropriate for the patient? - - Yes - Yes Yes -   Has the patient missed any days of school, work, or other routine activity? - - - - - - -   Since the last time we talked, have you been hospitalized or used the emergency room? - - No -  No Yara -       Joelle Ndiaye, PharmD, BCACP  Hematology/Oncology Clinical Pharmacist  [facility]  [phone]

## 2023-02-14 ENCOUNTER — PATIENT OUTREACH (OUTPATIENT)
Dept: ONCOLOGY | Facility: CLINIC | Age: 68
End: 2023-02-14
Payer: COMMERCIAL

## 2023-02-14 NOTE — PROGRESS NOTES
RN spoke with patients daughter to update next two cycles were requested for Fridays per their preference.    RN answered all daughters questions to the best of her ability     Appointments reviewed    Shawna Hinojosa RN

## 2023-02-14 NOTE — PROGRESS NOTES
Video-Visit Details    Type of service:  Video Visit    Video Start Time (time video started): 1357    Video End Time (time video stopped): 1412    Originating Location (pt. 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 She is here today for a disease management visit by video.     Oncology History:  12/3/2020: US Pelvic: IMPRESSION: Limited examination due to acoustic windows. Possible left adnexal mass. A CT scan of the abdomen and pelvis with contrast is recommended for further assessment.     2020: CT A/P:   IMPRESSION:    Peritoneal carcinomatosis with masslike peritoneal thickening in the lower pelvis which may indicate an adnexal or ovarian primary malignancy. Large volume ascites. Bilateral pleural effusions. There is potential subtle pleural nodularity in the right hemithorax which could indicate metastatic disease.  Indeterminate 1 cm lesion in the right hepatic lobe suspicious for a metastatic lesion.      2020: Presented to [facility] with abdominal distention, 25lb weight loss, and CTAP with carcinomatosis, elevated  3098.     2020: CT Chest: IMPRESSION:   1. There are few scattered small sub-6 mm pulmonary nodules which are indeterminate without prior comparisons available. There are a few  slightly larger perifissural nodules which are technically  indeterminate in the setting of malignancy although presumed lymphatic in nature and of unlikely clinical significance. Attention on follow-up is recommended.  2. Small to moderate left and small right pleural effusions are increased in size from prior. No convincing evidence for pleural nodularity.  3.  Partially visualized large volume ascites and peritoneal nodularity in the upper abdomen similar to 12/4/2020 outside CT      12/26/2020: ED for abdominal distension; 3 L ascites drained with paracentesis    Pelvic US: Findings: Free fluid present in LLQ      12/31/2020: US Paracentesis: 900 mL ascites drained     1/7/2021: Diagnotic laparoscopy, biopsies  Pathology: FINAL DIAGNOSIS:   A. PERITONEUM, BIOPSIES:   - Positive for high grade carcinoma, consistent with metastatic carcinoma of Mullerian origin.     1/10-1/13/2021: Hospital admission for postoperative non-intractable vomiting and nausea.      1/10/2021: CT A/P: IMPRESSION: Extensive ascites which is probably malignant. Scattered liver hypodensities of indeterminate etiology comment cannot exclude metastatic disease. Diverticulosis. Fluid-filled adnexal masses and irregular appearance of uterus, which may represent primary neoplasm. Multiple peritoneal nodules. Large amount of fecal material in the colon with no evidence of small bowel obstruction.     Plan: Paclitaxel 175 mg/m2 and carboplatin AUC 6 x 3 cycles followed by a CT CAP and visit with Dr. Juarez.     1/12/2021: Cycle 1 paclitaxel and carboplatin while inpatient     1/13/2021: CT Head: Impression:  1. Chronic sinusitis of the right maxillary and right sphenoid sinuses.  2. Incidental presumed calcified meningioma in the right frontal  convexity without significant mass effect.  3. No suspicious intracranial enhancing lesion.     2/1/2021: Cycle 2 paclitaxel and carboplatin.  936.     2/3-2/5/21: Admission [facility] for afib w/ RVR and new PE     2/26/21: Cycle 3 paclitaxel and carboplatin planned.  Deferred due to thrombocytopenia.  pending.     3/15/21: Cycle 3 paclitaxel and carboplatin given     4/19/21: HYSTERECTOMY, TOTAL, ABDOMINAL, WITH BILATERAL SALPINGO-OOPHORECTOMY, omentectomy, NEOPLASM DEBULKING,Proctoscocy, RO, Resection of liver nodules, diaphragm stripping,  immobilization of liver and colon  FINAL DIAGNOSIS:   A. OMENTUM, BIOPSY:   - Omental adipose tissue with rare viable cells of metastatic high grade   serous carcinoma   - One reactive lymph node, negative for malignancy (0/1)   B. NODULE, SIGMOID, EXCISION:   - Calcified necrotic adipose tissue   - Negative for malignancy   C. NODULE, SMALL BOWEL MESENTERY, EXCISION:   - Fibroadipose tissue, positive for metastatic high grade serous carcinoma   D. UTERUS, CERVIX, BILATERAL FALLOPIAN TUBES AND OVARIES, HYSTERECTOMY   WITH BILATERAL SALPINGO-OOPHORECTOMY:   - Atrophic endometrium   - Uterine serosa with rare viable cells consistent with high grade serous   carcinoma   - Cervix with atrophic changes   - Viable cells consistent with high grade serous carcinoma present in the   right ovary, serosa of right   fallopian tube and right periadnexal soft tissue   - Left ovary with atrophic changes   - Left fallopian tube with a rare focus of serous tubal in-situ carcinoma   (STIC)   E. NODULES, SMALL BOWEL MESENTRY, EXCISION:   - Fibroadipose tissue with rare viable cells of metastatic high grade   serous carcinoma   F. NODULE, SPLENIC FLEXURE TRANSVERSE COLON, EXCISION:   - Fibroadipose tissue with rare viable cells of metastatic high grade   serous carcinoma   - Accessory splenule, negative for malignancy   G. OMENTUM, OMENTECTOMY:   - Omental adipose tissue with rare viable cells of metastatic high grade   serous carcinoma   H. NODULE, PERITONEAL PANCREATIC, EXCISION:   - Fibrous adhesions with inflammation   - Negative for malignancy   I. RIGHT HEMIDIAPHRAGM PERITONEUM, EXCISION:   - Fibrous adhesions with inflammation   - Negative for malignancy   J. RIGHT LIVER SURFACE NODULE:   - Fibrous adhesions with benign inclusion glands   - Negative for malignancy   K. LEFT LOWER LIVER EDGE, BIOPSY:   - Cauterized hepatic parenchyma and capsule   - Negative for malignancy   L. NODULE, SMALL BOWEL MESENTERIC #3, EXCISION:   -  Fibroadipose tissue with rare viable cells of metastatic high grade   serous carcinoma       Plan: Carboplatin AUC 6 + Taxol 175 mg/m2 x 3 cycles, then transition to Parp inhibitor for maintenance therapy given her BRCA1 germline mutation.      5/21/21: Cycle 4 carboplatin and paclitaxel.   172.  6/11/21: Cycle 5 carboplatin and paclitaxel.   61.  7/2/21: Cycle 6 carboplatin and paclitaxel.   20.        7/28/21 plan: Olaparib 300mg bid as starting dose,  14  8/20/21: start date olaparib 300 mg BID,  12  9/13/21:  22  10/4/21:  23  11/1/21:  26     11/02/2021: PET CT: IMPRESSION:   Findings compatible with interval surgery and posttreatment change.  No gross definitive FDG avid disease.  Potential foci of tumor deposits along the anterior dome of the liver and midline abdominal wall surgical scar.  Colonic activity is not necessarily abnormal, however, given the previous carcinomatosis the colonic activity is indeterminant.         12/1/21:  23  1/3/22:  21  2/1/22:  20  3/1/22:  21  4/1/22:  23  5/4/22:  28     EXAM: CT CHEST/ABDOMEN/PELVIS W CONTRAST  LOCATION: [facility]  DATE/TIME: 7/11/2022 1:25 PM                                                                   IMPRESSION:  1.  Sliver of ascites in the upper abdomen has decreased since interval debulking surgery.  2.  There is a punctate nodule in the gastrosplenic ligament which is minimally more plump relative to the preop exam. This will need to be followed.  3.  Minimal nodular changes to the left of the midline scar in the subcutaneous fat will have to be followed as well. Unclear if this simply reflects postoperative scarring or could reflect an early incisional recurrence.  4.  No other sites to suggest recurrent tumor. Vaginal cuff is normal.  5.  Extensive distal colonic diverticulosis.  6.  Other noncritical findings as noted  above.      9/16/22  98     1/30/23 CT CAP:    IMPRESSION:  1.  Multiple new, hypoattenuating lesions in the liver, suspicious for hepatic metastatic disease.  2.  Necrotic mario hepatic lymphadenopathy, concerning for richard metastatic disease.  3.  There is a new or increasingly conspicuous 6 mm soft tissue nodule in the right lower quadrant. This is indeterminate.  4.  There is a new 3 mm solid nodule in the right upper lobe, indeterminate.  5.  Stable approximate 4 mm punctate nodule along the gastrosplenic ligament.  6.  Similar area of linear free fluid in the upper abdomen anterior to the stomach.    Plan: Paclitaxel 175 mg/m2, Carboplatin AUC 6, bevacizumab 7.5 mg/kg        Today Justen presents overall well. She denies any vaginal bleeding, no changes in her bowel or bladder habits, no nausea/emesis, no lower extremity edema, and no difficulties eating or sleeping. She denies any abdominal discomfort/bloating, no fevers or chills, and no chest pain or shortness of breath. She has lack of appetite but this has been ongoing and she continues to eat multiple meals throughout the day. Regular bowel movements daily. Pt continues on Xarleto for prior PE. 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 Tingling without pain. None in the hands. Left ankle injury resolved. Pt just returned from [place] this morning.                         Review of Systems:    Systemic           no weight changes; no fever; no chills; no night sweats; no appetite changes  Skin           no rashes, or lesions  Eye           no irritation; no changes in vision  Allen-Laryngeal           no dysphagia; no hoarseness   Pulmonary    no cough; no shortness of breath  Cardiovascular    no chest pain; no palpitations  Gastrointestinal    no diarrhea; no constipation; no abdominal pain; no changes in bowel  habits; no blood in stool  Genitourinary   no urinary frequency; no urinary urgency; no dysuria; no pain; no abnormal vaginal discharge; no  abnormal vaginal bleeding  Breast   no breast discharge; no breast changes; no breast pain  Musculoskeletal    no myalgias; no arthralgias; no back pain  Psychiatric           no depressed mood; no anxiety    Hematologic           no tender lymph nodes; no noticeable swellings or lumps   Endocrine    no hot flashes; no heat/cold intolerance         Neurological   no tremor; + numbness and tingling; no headaches; no difficulty sleeping      Past Medical History:    Past Medical History:   Diagnosis Date     Atrial fibrillation with rapid ventricular response (H)      History of cold sores      Hx of LASIK 12/11/2017     Insomnia      Migraine      Osteopenia      Pelvic mass      Peritoneal carcinomatosis (H)      Restless legs syndrome (RLS)          Past Surgical History:    Past Surgical History:   Procedure Laterality Date     APPENDECTOMY       ARTHROSCPY KNEE SURGICAL DEBRIDEMENT SHAVING ARTICULAR CARTILAGE Right      BIOPSY  January 2021    Biopsy to confirm ovarian cancer     DEBRIDEMENT LEFT UPPER EXTREMITY  2016     HYSTERECTOMY TOTAL ABD, LUISITO SALPINGO-OOPHORECTOMY, NODE DISSECTION, TUMOR DEBULKING, COMBINED Bilateral 4/19/2021    Procedure: HYSTERECTOMY, TOTAL, ABDOMINAL, WITH BILATERAL SALPINGO-OOPHORECTOMY, omentectomy, NEOPLASM DEBULKING,Proctoscocy, RO, Resection of liver nodules, diaphragm stripping, immobilization of liver and colon;  Surgeon: Bolivar Juarez MD;  Location: [facility] OR     LAPAROSCOPY DIAGNOSTIC (GYN) Bilateral 1/7/2021    Procedure: Diagnsotic laparoscopy, biopsies;  Surgeon: Bolivar Juarez MD;  Location:  OR     [facility]       TUBAL LIGATION           Health Maintenance Due   Topic Date Due     DEXA  Never done     ADVANCE CARE PLANNING  Never done     COLORECTAL CANCER SCREENING  Never done     HEPATITIS C SCREENING  Never done     LIPID  Never done     MEDICARE ANNUAL WELLNESS VISIT  11/11/2021     FALL RISK ASSESSMENT  Never done     COVID-19 Vaccine (4 - Booster for Pfizer  series) 11/15/2021     INFLUENZA VACCINE (1) Never done     PHQ-2 (once per calendar year)  2023     Pneumococcal Vaccine: 65+ Years (2 - PPSV23 if available, else PCV20) 2023       Current Medications:     Current Outpatient Medications   Medication Sig Dispense Refill     amitriptyline (ELAVIL) 100 MG tablet Take 1 tablet (100 mg) by mouth At Bedtime 90 tablet 0     fluticasone (FLONASE) 50 MCG/ACT nasal spray SPRAY 1 SPRAY INTO EACH NOSTRIL 2 TIMES A DAY       gabapentin (NEURONTIN) 600 MG tablet Take 1 tablet (600 mg) by mouth At Bedtime (Patient taking differently: Take 600 mg by mouth At Bedtime HALF TAB) 90 tablet 0     meclizine (ANTIVERT) 25 MG tablet Take 1 tablet (25 mg) by mouth 3 times daily as needed for dizziness or nausea 15 tablet 0     ondansetron (ZOFRAN) 4 MG tablet Take by mouth every 8 hours as needed for nausea       oxyCODONE (ROXICODONE) 5 MG tablet Take 1 tablet (5 mg) by mouth every 12 hours (Patient not taking: Reported on 2022) 10 tablet 0     rivaroxaban ANTICOAGULANT (XARELTO ANTICOAGULANT) 20 MG TABS tablet Take 1 tablet (20 mg) by mouth every morning 90 tablet 1     SUMAtriptan (IMITREX) 100 MG tablet Take 1 tablet (100 mg) by mouth at onset of headache for migraine 15 tablet 0     valACYclovir (VALTREX) 1000 mg tablet Take 1,000 mg by mouth as needed        zolpidem (AMBIEN) 10 MG tablet Take 10 mg by mouth           Allergies:      No Known Allergies     Social History:     Social History     Tobacco Use     Smoking status: Former     Packs/day: 0.50     Years: 40.00     Pack years: 20.00     Types: Cigarettes     Quit date:      Years since quittin.1     Smokeless tobacco: Never   Substance Use Topics     Alcohol use: Not Currently       History   Drug Use Unknown         Family History:     The patient's family history is notable for     Family History   Adopted: Yes   Problem Relation Age of Onset     Cancer Mother 36     Other Cancer Mother         Bio  mother  of  a female cancer  at 36     Factor V Leiden deficiency Daughter      Deep Vein Thrombosis Daughter      Diabetes Type 1 Daughter      Diabetes Daughter      Hypertension Daughter      Anesthesia Reaction No family hx of          Physical Exam:     There were no vitals taken for this visit.  There is no height or weight on file to calculate BMI.    General Appearance:  healthy and alert, no distress     Eyes:  Eyes grossly normal to inspection.  No discharge or erythema, or obvious scleral/conjunctival abnormalities.     Respiratory:     No audible wheeze, cough, or visible cyanosis.  No visible retractions or increased work of breathing.      Musculoskeletal:         extremities non tender and without edema     Skin:    no lesions or rashes on visible skin     Neurological:   normal gait, no gross defects                Psychiatric:      appropriate mood and affect. Mentation appears normal, affect normal/bright, judgement and insight intact, normal speech and appearance well-groomed                            The rest of a comprehensive physical examination is deferred due to [facility] (public health emergency) video visit restrictions.         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 She is here today for a disease management visit by video.     20 minutes spent on the date of the encounter doing chart review, history and exam, documentation, and further activities as noted above.         Plan:     1.)   Recurrent metastatic ovarian high grade serous carcinoma. Reviewed patient s diagnosis and treatment plan (Carboplatin, Paclitaxel, Avastin) as recommended by Dr. Juarez.      Discussed that the administration of chemotherapy can carry certain risks, including failure to obtain the desired result, discomforts, injury, and the need for additional therapy. Reviewed possible side effects of these drugs including:  Allergic reaction; Pancytopenia including Anemia causing weakness/fatigue, Low WBC causing infection, Low platelets causing bruising/bleeding; Mouth sores; Hair loss; Fatigue; Brief periods of forgetfulness; Nausea and vomiting; Neuropathy; Diarrhea/Constipation; Hair loss; Skin and nail changes; Liver effects; Heart effects; Kidney/Bladder effects; Lung effects; Loss of appetite; Weight gain or loss; Visual/Auditory changes; Sexual effects; Mood effects; Menopausal symptoms; Reproductive/Fertility Effects; HTN and bowel perforation, wound healing delays. Discussed that the patient may have side effects from chemotherapy that have not been discussed today because each patient may respond differently to chemotherapy and could have side effects that have not been previously reported by others. These side effects will be monitored via discussion with the patient and labs prior to each chemotherapy cycle per protocol.       Discussed ways to manage certain side effects at home and when to call the clinic or go to the emergency department.       Patient was provided with a copy of [facility] drug information regarding (Carboplatin, Paclitaxel, Avastin)); [employer]'s Cancer Care packet including: Getting Ready for Chemotherapy, Comparing Chemotherapy, Immunotherapy, and targeted Therapy, Your Cancer Care team and MyChart, Understanding Clinical Trials, Honoring Choices, Cancer Care Services, and Integrative Therapies; and contact information of the Gynecologic Oncology Clinic. Reviewed resources available including nutrition services, rehabilitation and exercise, pharmacy services, tobacco cessation programs, social work services, spiritual health, cancer risk management program, cancer survivorship program, and palliative care program.       Discussed nutrition and the importance of eating small frequent meal, high protein, and drinking 8-10 glasses of noncaffeinated and nonalcoholic beverages.          2.) Genetic risk factors were assessed and she is POSITIVE for a BRCA1 mutation.  This mutation is called [id number]. New referral for Waleska Zamorano with Cancer Risk management placed today.     3.) Labs and/or tests ordered include:  none     4.) Health maintenance issues addressed today include annual health maintenance and non-gynecologic issues with PCP.    5)       PE: pt continues on Xarelto    6.)      Dry mouth: ongoing while on PARPi. Pt has seen dentist and has a prescribed toothpaste for this. Reviewed Biotene as an option. Pt will follow up with her dentist if this continues. Per uptodate, dry mouth is not a potential side effect of Olaparib.         JERICHO Chin, NP-BC  Women's Health Nurse Practitioner  Division of Gynecologic Oncology  [facility]        CC  Patient Care Team:  Bolivar Juarez MD as PCP - General (Gynecologic Oncology)  Bolivar Juarez MD as Assigned Cancer Care Provider  SELF, REFERRED

## 2023-02-14 NOTE — PROGRESS NOTES
First cycle of chemotherapy scheduled for 3/1. NP visit re-requested     Next two cycles requested for Fridays per patient/daughter preference     Shawna Hinojosa RN

## 2023-02-16 ENCOUNTER — VIRTUAL VISIT (OUTPATIENT)
Dept: ONCOLOGY | Facility: CLINIC | Age: 68
End: 2023-02-16
Attending: OBSTETRICS & GYNECOLOGY
Payer: COMMERCIAL

## 2023-02-16 DIAGNOSIS — C56.9 OVARIAN CANCER, UNSPECIFIED LATERALITY (H): Primary | ICD-10-CM

## 2023-02-16 PROCEDURE — 99213 OFFICE O/P EST LOW 20 MIN: CPT | Mod: VID | Performed by: OBSTETRICS & GYNECOLOGY

## 2023-02-16 NOTE — LETTER
2023         RE: Taran Regalado  [address]  [place] [address]        Dear Colleague,    Thank you for referring your patient, Taran Regalado, to the [facility]. Please see a copy of my visit note below.                      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 She is here today for a disease management visit by video.     Oncology History:  12/3/2020: US Pelvic: IMPRESSION: Limited examination due to acoustic windows. Possible left adnexal mass. A CT scan of the abdomen and pelvis with contrast is recommended for further assessment.     2020: CT A/P:   IMPRESSION:    Peritoneal carcinomatosis with masslike peritoneal thickening in the lower pelvis which may indicate an adnexal or ovarian primary malignancy. Large volume ascites. Bilateral pleural effusions. There is potential subtle pleural nodularity in the right hemithorax which could indicate metastatic disease.  Indeterminate 1 cm lesion in the right hepatic lobe suspicious for a metastatic lesion.      2020: Presented to [facility] with abdominal distention, 25lb weight loss, and CTAP with carcinomatosis, elevated  3098.     2020: CT Chest: IMPRESSION:   1. There are few scattered small sub-6 mm pulmonary nodules which are indeterminate without prior comparisons available. There are a few  slightly larger perifissural nodules which are technically  indeterminate in the setting of malignancy although presumed lymphatic in nature and of unlikely clinical significance. Attention on follow-up is recommended.  2. Small to moderate left and small right pleural effusions are increased in size from prior. No convincing evidence for pleural nodularity.  3. Partially visualized large volume ascites and peritoneal  nodularity in the upper abdomen similar to 12/4/2020 outside CT      12/26/2020: ED for abdominal distension; 3 L ascites drained with paracentesis    Pelvic US: Findings: Free fluid present in LLQ      12/31/2020: US Paracentesis: 900 mL ascites drained     1/7/2021: Diagnotic laparoscopy, biopsies  Pathology: FINAL DIAGNOSIS:   A. PERITONEUM, BIOPSIES:   - Positive for high grade carcinoma, consistent with metastatic carcinoma of Mullerian origin.     1/10-1/13/2021: Hospital admission for postoperative non-intractable vomiting and nausea.      1/10/2021: CT A/P: IMPRESSION: Extensive ascites which is probably malignant. Scattered liver hypodensities of indeterminate etiology comment cannot exclude metastatic disease. Diverticulosis. Fluid-filled adnexal masses and irregular appearance of uterus, which may represent primary neoplasm. Multiple peritoneal nodules. Large amount of fecal material in the colon with no evidence of small bowel obstruction.     Plan: Paclitaxel 175 mg/m2 and carboplatin AUC 6 x 3 cycles followed by a CT CAP and visit with Dr. Juarez.     1/12/2021: Cycle 1 paclitaxel and carboplatin while inpatient     1/13/2021: CT Head: Impression:  1. Chronic sinusitis of the right maxillary and right sphenoid sinuses.  2. Incidental presumed calcified meningioma in the right frontal  convexity without significant mass effect.  3. No suspicious intracranial enhancing lesion.     2/1/2021: Cycle 2 paclitaxel and carboplatin.  936.     2/3-2/5/21: Admission [facility] for afib w/ RVR and new PE     2/26/21: Cycle 3 paclitaxel and carboplatin planned.  Deferred due to thrombocytopenia.  pending.     3/15/21: Cycle 3 paclitaxel and carboplatin given     4/19/21: HYSTERECTOMY, TOTAL, ABDOMINAL, WITH BILATERAL SALPINGO-OOPHORECTOMY, omentectomy, NEOPLASM DEBULKING,Proctoscocy, RO, Resection of liver nodules, diaphragm stripping, immobilization of liver and colon  FINAL DIAGNOSIS:   A. OMENTUM,  BIOPSY:   - Omental adipose tissue with rare viable cells of metastatic high grade   serous carcinoma   - One reactive lymph node, negative for malignancy (0/1)   B. NODULE, SIGMOID, EXCISION:   - Calcified necrotic adipose tissue   - Negative for malignancy   C. NODULE, SMALL BOWEL MESENTERY, EXCISION:   - Fibroadipose tissue, positive for metastatic high grade serous carcinoma   D. UTERUS, CERVIX, BILATERAL FALLOPIAN TUBES AND OVARIES, HYSTERECTOMY   WITH BILATERAL SALPINGO-OOPHORECTOMY:   - Atrophic endometrium   - Uterine serosa with rare viable cells consistent with high grade serous   carcinoma   - Cervix with atrophic changes   - Viable cells consistent with high grade serous carcinoma present in the   right ovary, serosa of right   fallopian tube and right periadnexal soft tissue   - Left ovary with atrophic changes   - Left fallopian tube with a rare focus of serous tubal in-situ carcinoma   (STIC)   E. NODULES, SMALL BOWEL MESENTRY, EXCISION:   - Fibroadipose tissue with rare viable cells of metastatic high grade   serous carcinoma   F. NODULE, SPLENIC FLEXURE TRANSVERSE COLON, EXCISION:   - Fibroadipose tissue with rare viable cells of metastatic high grade   serous carcinoma   - Accessory splenule, negative for malignancy   G. OMENTUM, OMENTECTOMY:   - Omental adipose tissue with rare viable cells of metastatic high grade   serous carcinoma   H. NODULE, PERITONEAL PANCREATIC, EXCISION:   - Fibrous adhesions with inflammation   - Negative for malignancy   I. RIGHT HEMIDIAPHRAGM PERITONEUM, EXCISION:   - Fibrous adhesions with inflammation   - Negative for malignancy   J. RIGHT LIVER SURFACE NODULE:   - Fibrous adhesions with benign inclusion glands   - Negative for malignancy   K. LEFT LOWER LIVER EDGE, BIOPSY:   - Cauterized hepatic parenchyma and capsule   - Negative for malignancy   L. NODULE, SMALL BOWEL MESENTERIC #3, EXCISION:   - Fibroadipose tissue with rare viable cells of metastatic high grade    serous carcinoma       Plan: Carboplatin AUC 6 + Taxol 175 mg/m2 x 3 cycles, then transition to Parp inhibitor for maintenance therapy given her BRCA1 germline mutation.      5/21/21: Cycle 4 carboplatin and paclitaxel.   172.  6/11/21: Cycle 5 carboplatin and paclitaxel.   61.  7/2/21: Cycle 6 carboplatin and paclitaxel.   20.        7/28/21 plan: Olaparib 300mg bid as starting dose,  14  8/20/21: start date olaparib 300 mg BID,  12  9/13/21:  22  10/4/21:  23  11/1/21:  26     11/02/2021: PET CT: IMPRESSION:   Findings compatible with interval surgery and posttreatment change.  No gross definitive FDG avid disease.  Potential foci of tumor deposits along the anterior dome of the liver and midline abdominal wall surgical scar.  Colonic activity is not necessarily abnormal, however, given the previous carcinomatosis the colonic activity is indeterminant.         12/1/21:  23  1/3/22:  21  2/1/22:  20  3/1/22:  21  4/1/22:  23  5/4/22:  28     EXAM: CT CHEST/ABDOMEN/PELVIS W CONTRAST  LOCATION: [facility]  DATE/TIME: 7/11/2022 1:25 PM                                                                   IMPRESSION:  1.  Sliver of ascites in the upper abdomen has decreased since interval debulking surgery.  2.  There is a punctate nodule in the gastrosplenic ligament which is minimally more plump relative to the preop exam. This will need to be followed.  3.  Minimal nodular changes to the left of the midline scar in the subcutaneous fat will have to be followed as well. Unclear if this simply reflects postoperative scarring or could reflect an early incisional recurrence.  4.  No other sites to suggest recurrent tumor. Vaginal cuff is normal.  5.  Extensive distal colonic diverticulosis.  6.  Other noncritical findings as noted above.      9/16/22  98     1/30/23 CT CAP:    IMPRESSION:  1.  Multiple new,  hypoattenuating lesions in the liver, suspicious for hepatic metastatic disease.  2.  Necrotic mario hepatic lymphadenopathy, concerning for richard metastatic disease.  3.  There is a new or increasingly conspicuous 6 mm soft tissue nodule in the right lower quadrant. This is indeterminate.  4.  There is a new 3 mm solid nodule in the right upper lobe, indeterminate.  5.  Stable approximate 4 mm punctate nodule along the gastrosplenic ligament.  6.  Similar area of linear free fluid in the upper abdomen anterior to the stomach.    Plan: Paclitaxel 175 mg/m2, Carboplatin AUC 6, bevacizumab 7.5 mg/kg        Today Justen presents overall well. She denies any vaginal bleeding, no changes in her bowel or bladder habits, no nausea/emesis, no lower extremity edema, and no difficulties eating or sleeping. She denies any abdominal discomfort/bloating, no fevers or chills, and no chest pain or shortness of breath. She has lack of appetite but this has been ongoing and she continues to eat multiple meals throughout the day. Regular bowel movements daily. Pt continues on Xarleto for prior PE. 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 Tingling without pain. None in the hands. Left ankle injury resolved. Pt just returned from [place] this morning.                         Review of Systems:    Systemic           no weight changes; no fever; no chills; no night sweats; no appetite changes  Skin           no rashes, or lesions  Eye           no irritation; no changes in vision  Allen-Laryngeal           no dysphagia; no hoarseness   Pulmonary    no cough; no shortness of breath  Cardiovascular    no chest pain; no palpitations  Gastrointestinal    no diarrhea; no constipation; no abdominal pain; no changes in bowel  habits; no blood in stool  Genitourinary   no urinary frequency; no urinary urgency; no dysuria; no pain; no abnormal vaginal discharge; no abnormal vaginal bleeding  Breast   no breast discharge; no breast changes; no  breast pain  Musculoskeletal    no myalgias; no arthralgias; no back pain  Psychiatric           no depressed mood; no anxiety    Hematologic           no tender lymph nodes; no noticeable swellings or lumps   Endocrine    no hot flashes; no heat/cold intolerance         Neurological   no tremor; + numbness and tingling; no headaches; no difficulty sleeping      Past Medical History:    Past Medical History:   Diagnosis Date     Atrial fibrillation with rapid ventricular response (H)      History of cold sores      Hx of LASIK 12/11/2017     Insomnia      Migraine      Osteopenia      Pelvic mass      Peritoneal carcinomatosis (H)      Restless legs syndrome (RLS)          Past Surgical History:    Past Surgical History:   Procedure Laterality Date     APPENDECTOMY       ARTHROSCPY KNEE SURGICAL DEBRIDEMENT SHAVING ARTICULAR CARTILAGE Right      BIOPSY  January 2021    Biopsy to confirm ovarian cancer     DEBRIDEMENT LEFT UPPER EXTREMITY  2016     HYSTERECTOMY TOTAL ABD, LUISITO SALPINGO-OOPHORECTOMY, NODE DISSECTION, TUMOR DEBULKING, COMBINED Bilateral 4/19/2021    Procedure: HYSTERECTOMY, TOTAL, ABDOMINAL, WITH BILATERAL SALPINGO-OOPHORECTOMY, omentectomy, NEOPLASM DEBULKING,Proctoscocy, RO, Resection of liver nodules, diaphragm stripping, immobilization of liver and colon;  Surgeon: Bolivar Juarez MD;  Location: [facility] OR     LAPAROSCOPY DIAGNOSTIC (GYN) Bilateral 1/7/2021    Procedure: Diagnsotic laparoscopy, biopsies;  Surgeon: Bolivar Juarez MD;  Location:  OR     [facility]       TUBAL LIGATION           Health Maintenance Due   Topic Date Due     DEXA  Never done     ADVANCE CARE PLANNING  Never done     COLORECTAL CANCER SCREENING  Never done     HEPATITIS C SCREENING  Never done     LIPID  Never done     MEDICARE ANNUAL WELLNESS VISIT  11/11/2021     FALL RISK ASSESSMENT  Never done     COVID-19 Vaccine (4 - Booster for Pfizer series) 11/15/2021     INFLUENZA VACCINE (1) Never done     PHQ-2 (once per  calendar year)  2023     Pneumococcal Vaccine: 65+ Years (2 - PPSV23 if available, else PCV20) 2023       Current Medications:     Current Outpatient Medications   Medication Sig Dispense Refill     amitriptyline (ELAVIL) 100 MG tablet Take 1 tablet (100 mg) by mouth At Bedtime 90 tablet 0     fluticasone (FLONASE) 50 MCG/ACT nasal spray SPRAY 1 SPRAY INTO EACH NOSTRIL 2 TIMES A DAY       gabapentin (NEURONTIN) 600 MG tablet Take 1 tablet (600 mg) by mouth At Bedtime (Patient taking differently: Take 600 mg by mouth At Bedtime HALF TAB) 90 tablet 0     meclizine (ANTIVERT) 25 MG tablet Take 1 tablet (25 mg) by mouth 3 times daily as needed for dizziness or nausea 15 tablet 0     ondansetron (ZOFRAN) 4 MG tablet Take by mouth every 8 hours as needed for nausea       oxyCODONE (ROXICODONE) 5 MG tablet Take 1 tablet (5 mg) by mouth every 12 hours (Patient not taking: Reported on 2022) 10 tablet 0     rivaroxaban ANTICOAGULANT (XARELTO ANTICOAGULANT) 20 MG TABS tablet Take 1 tablet (20 mg) by mouth every morning 90 tablet 1     SUMAtriptan (IMITREX) 100 MG tablet Take 1 tablet (100 mg) by mouth at onset of headache for migraine 15 tablet 0     valACYclovir (VALTREX) 1000 mg tablet Take 1,000 mg by mouth as needed        zolpidem (AMBIEN) 10 MG tablet Take 10 mg by mouth           Allergies:      No Known Allergies     Social History:     Social History     Tobacco Use     Smoking status: Former     Packs/day: 0.50     Years: 40.00     Pack years: 20.00     Types: Cigarettes     Quit date:      Years since quittin.1     Smokeless tobacco: Never   Substance Use Topics     Alcohol use: Not Currently       History   Drug Use Unknown         Family History:     The patient's family history is notable for     Family History   Adopted: Yes   Problem Relation Age of Onset     Cancer Mother 36     Other Cancer Mother         Bio mother  of  a female cancer  at 36     Factor V Leiden deficiency  Daughter      Deep Vein Thrombosis Daughter      Diabetes Type 1 Daughter      Diabetes Daughter      Hypertension Daughter      Anesthesia Reaction No family hx of          Physical Exam:     There were no vitals taken for this visit.  There is no height or weight on file to calculate BMI.    General Appearance:  healthy and alert, no distress     Eyes:  Eyes grossly normal to inspection.  No discharge or erythema, or obvious scleral/conjunctival abnormalities.     Respiratory:     No audible wheeze, cough, or visible cyanosis.  No visible retractions or increased work of breathing.      Musculoskeletal:         extremities non tender and without edema     Skin:    no lesions or rashes on visible skin     Neurological:   normal gait, no gross defects                Psychiatric:      appropriate mood and affect. Mentation appears normal, affect normal/bright, judgement and insight intact, normal speech and appearance well-groomed                            The rest of a comprehensive physical examination is deferred due to PHE (public health emergency) video visit restrictions.         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 She is here today for a disease management visit by video.     20 minutes spent on the date of the encounter doing chart review, history and exam, documentation, and further activities as noted above.         Plan:     1.)   Recurrent metastatic ovarian high grade serous carcinoma. Reviewed patient s diagnosis and treatment plan (Carboplatin, Paclitaxel, Avastin) as recommended by Dr. Juarez.      Discussed that the administration of chemotherapy can carry certain risks, including failure to obtain the desired result, discomforts, injury, and the need for additional therapy. Reviewed possible side effects of these drugs including: Allergic reaction; Pancytopenia including Anemia causing  weakness/fatigue, Low WBC causing infection, Low platelets causing bruising/bleeding; Mouth sores; Hair loss; Fatigue; Brief periods of forgetfulness; Nausea and vomiting; Neuropathy; Diarrhea/Constipation; Hair loss; Skin and nail changes; Liver effects; Heart effects; Kidney/Bladder effects; Lung effects; Loss of appetite; Weight gain or loss; Visual/Auditory changes; Sexual effects; Mood effects; Menopausal symptoms; Reproductive/Fertility Effects; HTN and bowel perforation, wound healing delays. Discussed that the patient may have side effects from chemotherapy that have not been discussed today because each patient may respond differently to chemotherapy and could have side effects that have not been previously reported by others. These side effects will be monitored via discussion with the patient and labs prior to each chemotherapy cycle per protocol.       Discussed ways to manage certain side effects at home and when to call the clinic or go to the emergency department.       Patient was provided with a copy of [facility] drug information regarding (Carboplatin, Paclitaxel, Avastin)); [employer] Cancer Care packet including: Getting Ready for Chemotherapy, Comparing Chemotherapy, Immunotherapy, and targeted Therapy, Your Cancer Care team and MyChart, Understanding Clinical Trials, Honoring Choices, Cancer Care Services, and Integrative Therapies; and contact information of the Gynecologic Oncology Clinic. Reviewed resources available including nutrition services, rehabilitation and exercise, pharmacy services, tobacco cessation programs, social work services, spiritual health, cancer risk management program, cancer survivorship program, and palliative care program.       Discussed nutrition and the importance of eating small frequent meal, high protein, and drinking 8-10 glasses of noncaffeinated and nonalcoholic beverages.         2.) Genetic risk factors were assessed and she is POSITIVE for a  BRCA1 mutation.  This mutation is called [id number]. New referral for Waleska Zamorano with Cancer Risk management placed today.     3.) Labs and/or tests ordered include:  none     4.) Health maintenance issues addressed today include annual health maintenance and non-gynecologic issues with PCP.    5)       PE: pt continues on Xarelto    6.)      Dry mouth: ongoing while on PARPi. Pt has seen dentist and has a prescribed toothpaste for this. Reviewed Biotene as an option. Pt will follow up with her dentist if this continues. Per uptodate, dry mouth is not a potential side effect of Olaparib.         JERICHO Chin, NP-BC  Women's Health Nurse Practitioner  Division of Gynecologic Oncology  [facility]      CC  Patient Care Team:  Bolivar Juarez MD as PCP - General (Gynecologic Oncology)

## 2023-02-16 NOTE — NURSING NOTE
Is the patient currently in the state of [place]? YES    Visit mode:VIDEO    If the visit is dropped, the patient can be reconnected by: VIDEO VISIT: Text to cell phone: [phone]    Will anyone else be joining the visit? NO      How would you like to obtain your AVS? MyChart    Are changes needed to the allergy or medication list? NO    Comments or concerns regarding today's visit: no

## 2023-02-22 ENCOUNTER — VIRTUAL VISIT (OUTPATIENT)
Dept: ONCOLOGY | Facility: HOSPITAL | Age: 68
End: 2023-02-22
Attending: OBSTETRICS & GYNECOLOGY
Payer: COMMERCIAL

## 2023-02-22 DIAGNOSIS — C56.9 OVARIAN CANCER, UNSPECIFIED LATERALITY (H): Primary | ICD-10-CM

## 2023-02-22 PROCEDURE — G0463 HOSPITAL OUTPT CLINIC VISIT: HCPCS | Mod: PN,GT | Performed by: OBSTETRICS & GYNECOLOGY

## 2023-02-22 PROCEDURE — 99215 OFFICE O/P EST HI 40 MIN: CPT | Mod: VID | Performed by: OBSTETRICS & GYNECOLOGY

## 2023-02-22 NOTE — NURSING NOTE
Patient confirms medications and allergies are accurate via patients echeck in completion, and or denies any changes since last reviewed/verified.     Patient declined individual allergy and medication review by support staff because     Gabbie Thomas, Virtual Facilitator    Is the patient currently in the state of [place]? YES    Visit mode:VIDEO    If the visit is dropped, the patient can be reconnected by: VIDEO VISIT: Text to cell phone: [phone]    Will anyone else be joining the visit? NO      How would you like to obtain your AVS? MyChart    Are changes needed to the allergy or medication list? NO    Reason for visit: follow up

## 2023-02-22 NOTE — PROGRESS NOTES
Video-Visit Details    Type of service:  Video Visit    40 minutes    Originating Location (pt. Location): Home        Distant Location (provider location):  On-site    Mode of Communication:  Video Conference via [facility]                Follow Up Notes on Referred Patient    Date: 2023       Dr. Livingston referring provider defined for this encounter.       RE: Taran Regalado  : 1955  GRACY: 2023    Taran Regalado is a 67 year old woman with a diagnosis of metastatic ovarian high grade serous carcinoma. She is here today for follow up and disease management. She has been doing well since finishing her adjuvant chemotherapy. She is eating an drinking well, no nausea vomiting, fever or chills, normal urinary and bowel function.        Oncology History:  12/3/2020: US Pelvic: IMPRESSION: Limited examination due to acoustic windows. Possible left adnexal mass. A CT scan of the abdomen and pelvis with contrast is recommended for further assessment.     2020: CT A/P:   IMPRESSION:    Peritoneal carcinomatosis with masslike peritoneal thickening in the lower pelvis which may indicate an adnexal or ovarian primary malignancy. Large volume ascites. Bilateral pleural effusions. There is potential subtle pleural nodularity in the right hemithorax which could indicate metastatic disease.  Indeterminate 1 cm lesion in the right hepatic lobe suspicious for a metastatic lesion.      2020: Presented to [facility] with abdominal distention, 25lb weight loss, and CTAP with carcinomatosis, elevated  3098.     2020: CT Chest: IMPRESSION:   1. There are few scattered small sub-6 mm pulmonary nodules which are indeterminate without prior comparisons available. There are a few  slightly larger perifissural nodules which are technically  indeterminate in the setting of malignancy although presumed lymphatic in nature and of unlikely clinical significance. Attention on follow-up is recommended.  2. Small  to moderate left and small right pleural effusions are increased in size from prior. No convincing evidence for pleural nodularity.  3. Partially visualized large volume ascites and peritoneal nodularity in the upper abdomen similar to 12/4/2020 outside CT      12/26/2020: ED for abdominal distension; 3 L ascites drained with paracentesis    Pelvic US: Findings: Free fluid present in LLQ      12/31/2020: US Paracentesis: 900 mL ascites drained     1/7/2021: Diagnotic laparoscopy, biopsies  Pathology: FINAL DIAGNOSIS:   A. PERITONEUM, BIOPSIES:   - Positive for high grade carcinoma, consistent with metastatic carcinoma of Mullerian origin.     1/10-1/13/2021: Hospital admission for postoperative non-intractable vomiting and nausea.      1/10/2021: CT A/P: IMPRESSION: Extensive ascites which is probably malignant. Scattered liver hypodensities of indeterminate etiology comment cannot exclude metastatic disease. Diverticulosis. Fluid-filled adnexal masses and irregular appearance of uterus, which may represent primary neoplasm. Multiple peritoneal nodules. Large amount of fecal material in the colon with no evidence of small bowel obstruction.     Plan: Paclitaxel 175 mg/m2 and carboplatin AUC 6 x 3 cycles followed by a CT CAP and visit with Dr. Juarez.     1/12/2021: Cycle 1 paclitaxel and carboplatin while inpatient     1/13/2021: CT Head: Impression:  1. Chronic sinusitis of the right maxillary and right sphenoid sinuses.  2. Incidental presumed calcified meningioma in the right frontal  convexity without significant mass effect.  3. No suspicious intracranial enhancing lesion.     2/1/2021: Cycle 2 paclitaxel and carboplatin.  936.     2/3-2/5/21: Admission [facility] for afib w/ RVR and new PE     2/26/21: Cycle 3 paclitaxel and carboplatin planned.  Deferred due to thrombocytopenia.  pending.     3/15/21: Cycle 3 paclitaxel and carboplatin given     4/19/21: HYSTERECTOMY, TOTAL, ABDOMINAL, WITH  BILATERAL SALPINGO-OOPHORECTOMY, omentectomy, NEOPLASM DEBULKING,Proctoscocy, RO, Resection of liver nodules, diaphragm stripping, immobilization of liver and colon  FINAL DIAGNOSIS:   A. OMENTUM, BIOPSY:   - Omental adipose tissue with rare viable cells of metastatic high grade   serous carcinoma   - One reactive lymph node, negative for malignancy (0/1)   B. NODULE, SIGMOID, EXCISION:   - Calcified necrotic adipose tissue   - Negative for malignancy   C. NODULE, SMALL BOWEL MESENTERY, EXCISION:   - Fibroadipose tissue, positive for metastatic high grade serous carcinoma   D. UTERUS, CERVIX, BILATERAL FALLOPIAN TUBES AND OVARIES, HYSTERECTOMY   WITH BILATERAL SALPINGO-OOPHORECTOMY:   - Atrophic endometrium   - Uterine serosa with rare viable cells consistent with high grade serous   carcinoma   - Cervix with atrophic changes   - Viable cells consistent with high grade serous carcinoma present in the   right ovary, serosa of right   fallopian tube and right periadnexal soft tissue   - Left ovary with atrophic changes   - Left fallopian tube with a rare focus of serous tubal in-situ carcinoma   (STIC)   E. NODULES, SMALL BOWEL MESENTRY, EXCISION:   - Fibroadipose tissue with rare viable cells of metastatic high grade   serous carcinoma   F. NODULE, SPLENIC FLEXURE TRANSVERSE COLON, EXCISION:   - Fibroadipose tissue with rare viable cells of metastatic high grade   serous carcinoma   - Accessory splenule, negative for malignancy   G. OMENTUM, OMENTECTOMY:   - Omental adipose tissue with rare viable cells of metastatic high grade   serous carcinoma   H. NODULE, PERITONEAL PANCREATIC, EXCISION:   - Fibrous adhesions with inflammation   - Negative for malignancy   I. RIGHT HEMIDIAPHRAGM PERITONEUM, EXCISION:   - Fibrous adhesions with inflammation   - Negative for malignancy   J. RIGHT LIVER SURFACE NODULE:   - Fibrous adhesions with benign inclusion glands   - Negative for malignancy   K. LEFT LOWER LIVER EDGE, BIOPSY:    - Cauterized hepatic parenchyma and capsule   - Negative for malignancy   L. NODULE, SMALL BOWEL MESENTERIC #3, EXCISION:   - Fibroadipose tissue with rare viable cells of metastatic high grade   serous carcinoma       Plan: Carboplatin AUC 6 + Taxol 175 mg/m2 x 3 cycles, then transition to Parp inhibitor for maintenance therapy given her BRCA1 germline mutation.      5/21/21: Cycle 4 carboplatin and paclitaxel.   172.  6/11/21: Cycle 5 carboplatin and paclitaxel.   61.  7/2/21: Cycle 6 carboplatin and paclitaxel.   20.        7/28/21 plan: Olaparib 300mg bid as starting dose,  14  8/20/21: start date olaparib 300 mg BID,  12  9/13/21:  22  10/4/21:  23  11/1/21:  26     11/02/2021: PET CT: IMPRESSION:   Findings compatible with interval surgery and posttreatment change.  No gross definitive FDG avid disease.  Potential foci of tumor deposits along the anterior dome of the liver and midline abdominal wall surgical scar.  Colonic activity is not necessarily abnormal, however, given the previous carcinomatosis the colonic activity is indeterminant.         12/1/21:  23  1/3/22:  21  2/1/22:  20  3/1/22:  21  4/1/22:  23  5/4/22:  28     EXAM: CT CHEST/ABDOMEN/PELVIS W CONTRAST  LOCATION: [facility]  DATE/TIME: 7/11/2022 1:25 PM     INDICATION: Stage III B high-grade ovarian carcinoma diagnosed Jan 2021. Posttreatment surveillance.  COMPARISON: CTA AP 04/23/2021, CT CAP 04/02/2021  TECHNIQUE: CT scan of the chest, abdomen, and pelvis was performed following injection of IV contrast. Multiplanar reformats were obtained. Dose reduction techniques were used.   CONTRAST: isovue 370 105mL IV; 50mL omni 140 oral     FINDINGS:   LUNGS AND PLEURA: No suspicious pulmonary nodules. Minimal right apical pleural thickening and a few punctate tiny nodules 2 of which are subpleural on the right are stable. No pleural  effusions.     MEDIASTINUM/AXILLAE: No adenopathy. No central pulmonary emboli.     CORONARY ARTERY CALCIFICATION: Cannot evaluate.     HEPATOBILIARY: Normal.     PANCREAS: Normal.     SPLEEN: Normal.     ADRENAL GLANDS: Normal.     KIDNEYS/BLADDER: Normal.     BOWEL: Sliver of ascites adjacent to the spleen noted, decreased from prior study. There is a 4 mm nodule in the gastrosplenic ligament (image 352). On the preop study it appears as a smaller punctate nodule (prior image 341). Sliver of ascites in the   gastrohepatic ligament also noted. No peritoneal tumor nodules. No bowel obstruction. Quite redundant colon with mild large stool burden. Extensive distal colonic diverticulosis.     LYMPH NODES: Normal.     VASCULATURE: Mild arterial calcifications. Circumaortic left renal vein.     PELVIC ORGANS: Hysterectomy. Vaginal cuff is normal.     MUSCULOSKELETAL: Diastases of the midline rectus sheath above the umbilicus. Minimal nodular scarring to the left of midline in the midabdomen (image 419). There is a shallow broad-based supraumbilical ventral hernia containing only fat. No implants   within the hernia or abdominal incision. No suspicious bone lesions.                                                                      IMPRESSION:  1.  Sliver of ascites in the upper abdomen has decreased since interval debulking surgery.  2.  There is a punctate nodule in the gastrosplenic ligament which is minimally more plump relative to the preop exam. This will need to be followed.  3.  Minimal nodular changes to the left of the midline scar in the subcutaneous fat will have to be followed as well. Unclear if this simply reflects postoperative scarring or could reflect an early incisional recurrence.  4.  No other sites to suggest recurrent tumor. Vaginal cuff is normal.  5.  Extensive distal colonic diverticulosis.  6.  Other noncritical findings as noted above.      9/16/22  98     1/30/23 CT CAP:     IMPRESSION:  1.  Multiple new, hypoattenuating lesions in the liver, suspicious for hepatic metastatic disease.  2.  Necrotic mario hepatic lymphadenopathy, concerning for richard metastatic disease.  3.  There is a new or increasingly conspicuous 6 mm soft tissue nodule in the right lower quadrant. This is indeterminate.  4.  There is a new 3 mm solid nodule in the right upper lobe, indeterminate.  5.  Stable approximate 4 mm punctate nodule along the gastrosplenic ligament.  6.  Similar area of linear free fluid in the upper abdomen anterior to the stomach.    Past Medical History:    Past Medical History:   Diagnosis Date     Atrial fibrillation with rapid ventricular response (H)      History of cold sores      Hx of LASIK 12/11/2017     Insomnia      Migraine      Osteopenia      Pelvic mass      Peritoneal carcinomatosis (H)      Restless legs syndrome (RLS)          Past Surgical History:    Past Surgical History:   Procedure Laterality Date     APPENDECTOMY       ARTHROSCPY KNEE SURGICAL DEBRIDEMENT SHAVING ARTICULAR CARTILAGE Right      BIOPSY  January 2021    Biopsy to confirm ovarian cancer     DEBRIDEMENT LEFT UPPER EXTREMITY  2016     HYSTERECTOMY TOTAL ABD, LUISITO SALPINGO-OOPHORECTOMY, NODE DISSECTION, TUMOR DEBULKING, COMBINED Bilateral 4/19/2021    Procedure: HYSTERECTOMY, TOTAL, ABDOMINAL, WITH BILATERAL SALPINGO-OOPHORECTOMY, omentectomy, NEOPLASM DEBULKING,Proctoscocy, RO, Resection of liver nodules, diaphragm stripping, immobilization of liver and colon;  Surgeon: Bolivar Juarez MD;  Location: [facility] OR     LAPAROSCOPY DIAGNOSTIC (GYN) Bilateral 1/7/2021    Procedure: Diagnsotic laparoscopy, biopsies;  Surgeon: Bolivar Juarez MD;  Location:  OR     [facility]       TUBAL LIGATION           Health Maintenance Due   Topic Date Due     DEXA  Never done     ADVANCE CARE PLANNING  Never done     COLORECTAL CANCER SCREENING  Never done     HEPATITIS C SCREENING  Never done     LIPID  Never done      MEDICARE ANNUAL WELLNESS VISIT  2021     FALL RISK ASSESSMENT  Never done     COVID-19 Vaccine (4 - Booster for Pfizer series) 11/15/2021     Pneumococcal Vaccine: 65+ Years (2 - PPSV23 if available, else PCV20) 2022     INFLUENZA VACCINE (1) Never done     PHQ-2 (once per calendar year)  2023       Current Medications:     Current Outpatient Medications   Medication Sig Dispense Refill     amitriptyline (ELAVIL) 100 MG tablet Take 1 tablet (100 mg) by mouth At Bedtime 90 tablet 0     fluticasone (FLONASE) 50 MCG/ACT nasal spray SPRAY 1 SPRAY INTO EACH NOSTRIL 2 TIMES A DAY       gabapentin (NEURONTIN) 600 MG tablet Take 1 tablet (600 mg) by mouth At Bedtime (Patient taking differently: Take 600 mg by mouth At Bedtime HALF TAB) 90 tablet 0     meclizine (ANTIVERT) 25 MG tablet Take 1 tablet (25 mg) by mouth 3 times daily as needed for dizziness or nausea 15 tablet 0     ondansetron (ZOFRAN) 4 MG tablet Take by mouth every 8 hours as needed for nausea       oxyCODONE (ROXICODONE) 5 MG tablet Take 1 tablet (5 mg) by mouth every 12 hours 10 tablet 0     rivaroxaban ANTICOAGULANT (XARELTO ANTICOAGULANT) 20 MG TABS tablet Take 1 tablet (20 mg) by mouth every morning 90 tablet 1     SUMAtriptan (IMITREX) 100 MG tablet Take 1 tablet (100 mg) by mouth at onset of headache for migraine 15 tablet 0     valACYclovir (VALTREX) 1000 mg tablet Take 1,000 mg by mouth as needed        zolpidem (AMBIEN) 10 MG tablet Take 10 mg by mouth           Allergies:      No Known Allergies     Social History:     Social History     Tobacco Use     Smoking status: Former     Packs/day: 0.50     Years: 40.00     Pack years: 20.00     Types: Cigarettes     Quit date:      Years since quittin.1     Smokeless tobacco: Never   Substance Use Topics     Alcohol use: Not Currently       History   Drug Use Unknown         Family History:       Family History   Adopted: Yes   Problem Relation Age of Onset     Cancer Mother 36      Other Cancer Mother         Bio mother  of  a female cancer  at 36     Factor V Leiden deficiency Daughter      Deep Vein Thrombosis Daughter      Diabetes Type 1 Daughter      Diabetes Daughter      Hypertension Daughter      Anesthesia Reaction No family hx of          Physical Exam:     There were no vitals taken for this visit.  There is no height or weight on file to calculate BMI.    General Appearance:  healthy and alert, no distress                 Psychiatric:      appropriate mood and affect                                     Assessment:     Taran Regalado is a 67 year old woman with recurrent ovarian high grade serous carcinoma.     40 minute visit with 30 minutes counseling.        1.  Recurrent high grade serous ovarian cancer.   2.  BRCA 1 germline mutation.   3.  [facility]  4.  PE resolved on Lovenox (prophy)  5.  Left ankle injury  6.   675     We did review the CT scan from today given elevated  that has been slightly increasing.  There is likely recurrent disease especially new disease in the liver.  We did discuss I would recommend to switch from her PARP maintenance therapy to chemotherapy again we will plan to do carboplatin and Taxol and bevacizumab every 21 days for 3 cycles followed by CT scan of the chest abdomen pelvis to evaluate for response.  Side effects risk alternatives were discussed.  She has gotten an echocardiogram in the past and is asymptomatic from a cardiac standpoint.  Patient and her family agree with this plan of action for care all questions were answered.           Bolivar Juarez MD, MS    Department of Obstetrics and Gynecology   Division of Gynecologic Oncology   [facility]  Phone: [phone]      CC  Patient Care Team:  Bolivar Juarez MD as PCP - General (Gynecologic Oncology)  Bolivar Juarez MD as Assigned Cancer Care Provider

## 2023-02-23 NOTE — PROGRESS NOTES
Video-Visit Details    Type of service:  Video Visit    Video Start Time (time video started): 1050    Video End Time (time video stopped): 1103    Originating Location (pt. Location): home    Distant Location (provider location):  [place] location     Mode of Communication:  Video Conference via 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 She is here today for follow up and disease management by video.     Oncology History:  12/3/2020: US Pelvic: IMPRESSION: Limited examination due to acoustic windows. Possible left adnexal mass. A CT scan of the abdomen and pelvis with contrast is recommended for further assessment.     2020: CT A/P:   IMPRESSION:    Peritoneal carcinomatosis with masslike peritoneal thickening in the lower pelvis which may indicate an adnexal or ovarian primary malignancy. Large volume ascites. Bilateral pleural effusions. There is potential subtle pleural nodularity in the right hemithorax which could indicate metastatic disease.  Indeterminate 1 cm lesion in the right hepatic lobe suspicious for a metastatic lesion.      2020: Presented to [facility] with abdominal distention, 25lb weight loss, and CTAP with carcinomatosis, elevated  3098.     2020: CT Chest: IMPRESSION:   1. There are few scattered small sub-6 mm pulmonary nodules which are indeterminate without prior comparisons available. There are a few  slightly larger perifissural nodules which are technically  indeterminate in the setting of malignancy although presumed lymphatic in nature and of unlikely clinical significance. Attention on follow-up is recommended.  2. Small to moderate left and small right pleural effusions are increased in size from prior. No convincing evidence for pleural  nodularity.  3. Partially visualized large volume ascites and peritoneal nodularity in the upper abdomen similar to 12/4/2020 outside CT      12/26/2020: ED for abdominal distension; 3 L ascites drained with paracentesis    Pelvic US: Findings: Free fluid present in LLQ      12/31/2020: US Paracentesis: 900 mL ascites drained     1/7/2021: Diagnotic laparoscopy, biopsies  Pathology: FINAL DIAGNOSIS:   A. PERITONEUM, BIOPSIES:   - Positive for high grade carcinoma, consistent with metastatic carcinoma of Mullerian origin.     1/10-1/13/2021: Hospital admission for postoperative non-intractable vomiting and nausea.      1/10/2021: CT A/P: IMPRESSION: Extensive ascites which is probably malignant. Scattered liver hypodensities of indeterminate etiology comment cannot exclude metastatic disease. Diverticulosis. Fluid-filled adnexal masses and irregular appearance of uterus, which may represent primary neoplasm. Multiple peritoneal nodules. Large amount of fecal material in the colon with no evidence of small bowel obstruction.     Plan: Paclitaxel 175 mg/m2 and carboplatin AUC 6 x 3 cycles followed by a CT CAP and visit with Dr. Juarez.     1/12/2021: Cycle 1 paclitaxel and carboplatin while inpatient     1/13/2021: CT Head: Impression:  1. Chronic sinusitis of the right maxillary and right sphenoid sinuses.  2. Incidental presumed calcified meningioma in the right frontal  convexity without significant mass effect.  3. No suspicious intracranial enhancing lesion.     2/1/2021: Cycle 2 paclitaxel and carboplatin.  936.     2/3-2/5/21: Admission [facility] for afib w/ RVR and new PE     2/26/21: Cycle 3 paclitaxel and carboplatin planned.  Deferred due to thrombocytopenia.  pending.     3/15/21: Cycle 3 paclitaxel and carboplatin given     4/19/21: HYSTERECTOMY, TOTAL, ABDOMINAL, WITH BILATERAL SALPINGO-OOPHORECTOMY, omentectomy, NEOPLASM DEBULKING,Proctoscocy, RO, Resection of liver nodules, diaphragm  stripping, immobilization of liver and colon  FINAL DIAGNOSIS:   A. OMENTUM, BIOPSY:   - Omental adipose tissue with rare viable cells of metastatic high grade   serous carcinoma   - One reactive lymph node, negative for malignancy (0/1)   B. NODULE, SIGMOID, EXCISION:   - Calcified necrotic adipose tissue   - Negative for malignancy   C. NODULE, SMALL BOWEL MESENTERY, EXCISION:   - Fibroadipose tissue, positive for metastatic high grade serous carcinoma   D. UTERUS, CERVIX, BILATERAL FALLOPIAN TUBES AND OVARIES, HYSTERECTOMY   WITH BILATERAL SALPINGO-OOPHORECTOMY:   - Atrophic endometrium   - Uterine serosa with rare viable cells consistent with high grade serous   carcinoma   - Cervix with atrophic changes   - Viable cells consistent with high grade serous carcinoma present in the   right ovary, serosa of right   fallopian tube and right periadnexal soft tissue   - Left ovary with atrophic changes   - Left fallopian tube with a rare focus of serous tubal in-situ carcinoma   (STIC)   E. NODULES, SMALL BOWEL MESENTRY, EXCISION:   - Fibroadipose tissue with rare viable cells of metastatic high grade   serous carcinoma   F. NODULE, SPLENIC FLEXURE TRANSVERSE COLON, EXCISION:   - Fibroadipose tissue with rare viable cells of metastatic high grade   serous carcinoma   - Accessory splenule, negative for malignancy   G. OMENTUM, OMENTECTOMY:   - Omental adipose tissue with rare viable cells of metastatic high grade   serous carcinoma   H. NODULE, PERITONEAL PANCREATIC, EXCISION:   - Fibrous adhesions with inflammation   - Negative for malignancy   I. RIGHT HEMIDIAPHRAGM PERITONEUM, EXCISION:   - Fibrous adhesions with inflammation   - Negative for malignancy   J. RIGHT LIVER SURFACE NODULE:   - Fibrous adhesions with benign inclusion glands   - Negative for malignancy   K. LEFT LOWER LIVER EDGE, BIOPSY:   - Cauterized hepatic parenchyma and capsule   - Negative for malignancy   L. NODULE, SMALL BOWEL MESENTERIC #3,  EXCISION:   - Fibroadipose tissue with rare viable cells of metastatic high grade   serous carcinoma       Plan: Carboplatin AUC 6 + Taxol 175 mg/m2 x 3 cycles, then transition to Parp inhibitor for maintenance therapy given her BRCA1 germline mutation.      5/21/21: Cycle 4 carboplatin and paclitaxel.   172.  6/11/21: Cycle 5 carboplatin and paclitaxel.   61.  7/2/21: Cycle 6 carboplatin and paclitaxel.   20.        7/28/21 plan: Olaparib 300mg bid as starting dose,  14  8/20/21: start date olaparib 300 mg BID,  12  9/13/21:  22  10/4/21:  23  11/1/21:  26     11/02/2021: PET CT: IMPRESSION:   Findings compatible with interval surgery and posttreatment change.  No gross definitive FDG avid disease.  Potential foci of tumor deposits along the anterior dome of the liver and midline abdominal wall surgical scar.  Colonic activity is not necessarily abnormal, however, given the previous carcinomatosis the colonic activity is indeterminant.         12/1/21:  23  1/3/22:  21  2/1/22:  20  3/1/22:  21  4/1/22:  23  5/4/22:  28     EXAM: CT CHEST/ABDOMEN/PELVIS W CONTRAST  LOCATION: [facility]  DATE/TIME: 7/11/2022 1:25 PM                                                                   IMPRESSION:  1.  Sliver of ascites in the upper abdomen has decreased since interval debulking surgery.  2.  There is a punctate nodule in the gastrosplenic ligament which is minimally more plump relative to the preop exam. This will need to be followed.  3.  Minimal nodular changes to the left of the midline scar in the subcutaneous fat will have to be followed as well. Unclear if this simply reflects postoperative scarring or could reflect an early incisional recurrence.  4.  No other sites to suggest recurrent tumor. Vaginal cuff is normal.  5.  Extensive distal colonic diverticulosis.  6.  Other noncritical findings as noted  above.      9/16/22  98     1/30/23 CT CAP:    IMPRESSION:  1.  Multiple new, hypoattenuating lesions in the liver, suspicious for hepatic metastatic disease.  2.  Necrotic mario hepatic lymphadenopathy, concerning for richard metastatic disease.  3.  There is a new or increasingly conspicuous 6 mm soft tissue nodule in the right lower quadrant. This is indeterminate.  4.  There is a new 3 mm solid nodule in the right upper lobe, indeterminate.  5.  Stable approximate 4 mm punctate nodule along the gastrosplenic ligament.  6.  Similar area of linear free fluid in the upper abdomen anterior to the stomach.    Plan: Paclitaxel 175 mg/m2, Carboplatin AUC 6, bevacizumab 7.5 mg/kg    3/1/23: Cycle #1 Paclitaxel 175 mg/m2, Carboplatin AUC 6, bevacizumab 7.5 mg/kg planned.  pending              Today Justen presents overall well. She denies any vaginal bleeding, no changes in her bowel or bladder habits, no nausea/emesis, no lower extremity edema, and no difficulties eating or sleeping. She denies any abdominal discomfort/bloating, no fevers or chills, and no chest pain or shortness of breath. Regular bowel movements daily. Pt continues on Xarleto for prior PE. 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 Tingling without pain. None in the hands. Pt would like to take Claritin prior to chemo.                   Review of Systems:    Systemic           no weight changes; no fever; no chills; no night sweats; no appetite changes  Skin           no rashes, or lesions  Eye           no irritation; no changes in vision  Allen-Laryngeal           no dysphagia; no hoarseness   Pulmonary    no cough; no shortness of breath  Cardiovascular    no chest pain; no palpitations  Gastrointestinal    no diarrhea; no constipation; no abdominal pain; no changes in bowel  habits; no blood in stool  Genitourinary   no urinary frequency; no urinary urgency; no dysuria; no pain; no abnormal vaginal discharge; no abnormal vaginal  bleeding  Breast   no breast discharge; no breast changes; no breast pain  Musculoskeletal    no myalgias; no arthralgias; no back pain  Psychiatric           no depressed mood; no anxiety    Hematologic           no tender lymph nodes; no noticeable swellings or lumps   Endocrine    no hot flashes; no heat/cold intolerance         Neurological   no tremor; + numbness and tingling; no headaches; no difficulty sleeping      Past Medical History:    Past Medical History:   Diagnosis Date     Atrial fibrillation with rapid ventricular response (H)      History of cold sores      Hx of LASIK 12/11/2017     Insomnia      Migraine      Osteopenia      Pelvic mass      Peritoneal carcinomatosis (H)      Restless legs syndrome (RLS)          Past Surgical History:    Past Surgical History:   Procedure Laterality Date     APPENDECTOMY       ARTHROSCPY KNEE SURGICAL DEBRIDEMENT SHAVING ARTICULAR CARTILAGE Right      BIOPSY  January 2021    Biopsy to confirm ovarian cancer     DEBRIDEMENT LEFT UPPER EXTREMITY  2016     HYSTERECTOMY TOTAL ABD, LUISITO SALPINGO-OOPHORECTOMY, NODE DISSECTION, TUMOR DEBULKING, COMBINED Bilateral 4/19/2021    Procedure: HYSTERECTOMY, TOTAL, ABDOMINAL, WITH BILATERAL SALPINGO-OOPHORECTOMY, omentectomy, NEOPLASM DEBULKING,Proctoscocy, RO, Resection of liver nodules, diaphragm stripping, immobilization of liver and colon;  Surgeon: Bolivar Juarez MD;  Location: [facility] OR     LAPAROSCOPY DIAGNOSTIC (GYN) Bilateral 1/7/2021    Procedure: Diagnsotic laparoscopy, biopsies;  Surgeon: Bolivar Juarez MD;  Location:  OR     [facility]       TUBAL LIGATION           Health Maintenance Due   Topic Date Due     DEXA  Never done     ADVANCE CARE PLANNING  Never done     COLORECTAL CANCER SCREENING  Never done     HEPATITIS C SCREENING  Never done     LIPID  Never done     MEDICARE ANNUAL WELLNESS VISIT  11/11/2021     FALL RISK ASSESSMENT  Never done     COVID-19 Vaccine (4 - Booster for Pfizer series)  11/15/2021     Pneumococcal Vaccine: 65+ Years (2 - PPSV23 if available, else PCV20) 04/05/2022     INFLUENZA VACCINE (1) Never done     PHQ-2 (once per calendar year)  01/01/2023       Current Medications:     Current Outpatient Medications   Medication Sig Dispense Refill     amitriptyline (ELAVIL) 100 MG tablet Take 1 tablet (100 mg) by mouth At Bedtime 90 tablet 0     [START ON 3/1/2023] dexamethasone (DECADRON) 4 MG tablet Take 2 tablets (8 mg) by mouth daily Take for 3 days, starting the day after chemo. Take with food. 6 tablet 5     fluticasone (FLONASE) 50 MCG/ACT nasal spray SPRAY 1 SPRAY INTO EACH NOSTRIL 2 TIMES A DAY       gabapentin (NEURONTIN) 600 MG tablet Take 1 tablet (600 mg) by mouth At Bedtime (Patient taking differently: Take 600 mg by mouth At Bedtime HALF TAB) 90 tablet 0     meclizine (ANTIVERT) 25 MG tablet Take 1 tablet (25 mg) by mouth 3 times daily as needed for dizziness or nausea 15 tablet 0     ondansetron (ZOFRAN) 4 MG tablet Take by mouth every 8 hours as needed for nausea       [START ON 3/1/2023] ondansetron (ZOFRAN) 8 MG tablet Take 1 tablet (8 mg) by mouth every 8 hours as needed for nausea (vomiting) 30 tablet 2     oxyCODONE (ROXICODONE) 5 MG tablet Take 1 tablet (5 mg) by mouth every 12 hours 10 tablet 0     [START ON 3/1/2023] prochlorperazine (COMPAZINE) 10 MG tablet Take 1 tablet (10 mg) by mouth every 6 hours as needed for nausea or vomiting 30 tablet 2     rivaroxaban ANTICOAGULANT (XARELTO ANTICOAGULANT) 20 MG TABS tablet Take 1 tablet (20 mg) by mouth every morning 90 tablet 1     SUMAtriptan (IMITREX) 100 MG tablet Take 1 tablet (100 mg) by mouth at onset of headache for migraine 15 tablet 0     valACYclovir (VALTREX) 1000 mg tablet Take 1,000 mg by mouth as needed        zolpidem (AMBIEN) 10 MG tablet Take 10 mg by mouth           Allergies:      No Known Allergies     Social History:     Social History     Tobacco Use     Smoking status: Former     Packs/day: 0.50      Years: 40.00     Pack years: 20.00     Types: Cigarettes     Quit date:      Years since quittin.1     Smokeless tobacco: Never   Substance Use Topics     Alcohol use: Not Currently       History   Drug Use Unknown         Family History:     The patient's family history is notable for     Family History   Adopted: Yes   Problem Relation Age of Onset     Cancer Mother 36     Other Cancer Mother         Bio mother  of  a female cancer  at 36     Factor V Leiden deficiency Daughter      Deep Vein Thrombosis Daughter      Diabetes Type 1 Daughter      Diabetes Daughter      Hypertension Daughter      Anesthesia Reaction No family hx of          Physical Exam:     Ht 1.829 m (6')   Wt 77.1 kg (170 lb)   BMI 23.06 kg/m    Body mass index is 23.06 kg/m .    General Appearance:  healthy and alert, no distress     Eyes:  Eyes grossly normal to inspection.  No discharge or erythema, or obvious scleral/conjunctival abnormalities.     Respiratory:     No audible wheeze, cough, or visible cyanosis.  No visible retractions or increased work of breathing.      Musculoskeletal:         extremities non tender and without edema     Skin:    no lesions or rashes on visible skin     Neurological:   normal gait, no gross defects                Psychiatric:      appropriate mood and affect. 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 She is here today for a disease management visit by video.     20 minutes spent on the date of the encounter doing chart review, history and exam, documentation, and further activities as noted above.         Plan:     1.)   Recurrent metastatic ovarian high grade serous carcinoma. Ok to proceed with planned treatment given labs have met parameters. MD  to sign cycle #1. Encouraged 5-6 small high protein meals a day and discussed nausea management and control. Encouraged 64 oz of fluids per day. Discussed neutropenic precautions and rita period. Reviewed signs and symptoms for when she should contact the clinic or seek additional care. Patient to contact the clinic with any questions or concerns in the interim. Reviewed with pt that prescriptions for Meclizine, Dilaudid, Tramadol, Trazodone (all rx'd in 2021 for symptoms can be prescribed IF needed). At this point, pt is not in pain and I explained to pt that we do not send in rx for meds to have on hand if pt current asymptomatic especially for controlled substances. Reviewed ok to take Claritin prior to treatment to prevent myalgias. Reviewed anti-emetic regimen. Released all anti-emetic meds from tx plan today and reviewed.     -Message sent to ISRRAEL Trinh to attempt to set pt up for labs at [facility] for cycles 2-3.      2.) Genetic risk factors were assessed and she is POSITIVE for a BRCA1 mutation.  This mutation is called [id number]. Referral for Waleska Zamorano with Cancer Risk management placed 5/2022.     3.) Labs and/or tests ordered include:  Labs to be collected 3/1/23     4.) Health maintenance issues addressed today include annual health maintenance and non-gynecologic issues with PCP.    5)       PE: pt continues on JERICHO Hampton, SNEHAL-BC  Women's Health Nurse Practitioner  Division of Gynecologic Oncology  [facility]        CC  Patient Care Team:  Bolivar Juarez MD as PCP - General (Gynecologic Oncology)  Marta Lao APRN CNP as Assigned Cancer Care Provider  Marta Okeefe APRN CNP as Assigned PCP  SELF, REFERRED

## 2023-02-28 ENCOUNTER — VIRTUAL VISIT (OUTPATIENT)
Dept: ONCOLOGY | Facility: CLINIC | Age: 68
End: 2023-02-28
Attending: OBSTETRICS & GYNECOLOGY
Payer: COMMERCIAL

## 2023-02-28 VITALS — WEIGHT: 170 LBS | HEIGHT: 72 IN | BODY MASS INDEX: 23.03 KG/M2

## 2023-02-28 DIAGNOSIS — Z79.899 ENCOUNTER FOR LONG-TERM (CURRENT) USE OF MEDICATIONS: ICD-10-CM

## 2023-02-28 DIAGNOSIS — C56.9 OVARIAN CANCER, UNSPECIFIED LATERALITY (H): Primary | ICD-10-CM

## 2023-02-28 PROCEDURE — G0463 HOSPITAL OUTPT CLINIC VISIT: HCPCS | Mod: GN,GT | Performed by: OBSTETRICS & GYNECOLOGY

## 2023-02-28 PROCEDURE — 99213 OFFICE O/P EST LOW 20 MIN: CPT | Mod: VID | Performed by: OBSTETRICS & GYNECOLOGY

## 2023-02-28 RX ORDER — DEXAMETHASONE 4 MG/1
8 TABLET ORAL DAILY
Qty: 6 TABLET | Refills: 5 | Status: CANCELLED | OUTPATIENT
Start: 2023-03-01

## 2023-02-28 RX ORDER — PROCHLORPERAZINE MALEATE 10 MG
10 TABLET ORAL EVERY 6 HOURS PRN
Qty: 30 TABLET | Refills: 2 | Status: CANCELLED | OUTPATIENT
Start: 2023-03-01

## 2023-02-28 RX ORDER — ONDANSETRON 8 MG/1
8 TABLET, FILM COATED ORAL EVERY 8 HOURS PRN
Qty: 30 TABLET | Refills: 2 | Status: SHIPPED | OUTPATIENT
Start: 2023-03-01 | End: 2023-01-01

## 2023-02-28 RX ORDER — DEXAMETHASONE 4 MG/1
8 TABLET ORAL DAILY
Qty: 6 TABLET | Refills: 5 | Status: SHIPPED | OUTPATIENT
Start: 2023-03-01 | End: 2023-01-01

## 2023-02-28 RX ORDER — ONDANSETRON 8 MG/1
8 TABLET, FILM COATED ORAL EVERY 8 HOURS PRN
Qty: 30 TABLET | Refills: 2 | Status: CANCELLED | OUTPATIENT
Start: 2023-03-01

## 2023-02-28 RX ORDER — PROCHLORPERAZINE MALEATE 10 MG
10 TABLET ORAL EVERY 6 HOURS PRN
Qty: 30 TABLET | Refills: 2 | Status: SHIPPED | OUTPATIENT
Start: 2023-03-01 | End: 2023-01-01

## 2023-02-28 ASSESSMENT — PAIN SCALES - GENERAL: PAINLEVEL: NO PAIN (0)

## 2023-02-28 NOTE — LETTER
2023         RE: Taran Regalado  [address] [place] [address]        Dear Colleague,    Thank you for referring your patient, Taran Regalado, to the [facility]. Please see a copy of my visit note below.    Video-Visit Details    Type of service:  Video Visit    Video Start Time (time video started): 1050    Video End Time (time video stopped): 1103    Originating Location (pt. 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 She is here today for follow up and disease management by video.     Oncology History:  12/3/2020: US Pelvic: IMPRESSION: Limited examination due to acoustic windows. Possible left adnexal mass. A CT scan of the abdomen and pelvis with contrast is recommended for further assessment.     2020: CT A/P:   IMPRESSION:    Peritoneal carcinomatosis with masslike peritoneal thickening in the lower pelvis which may indicate an adnexal or ovarian primary malignancy. Large volume ascites. Bilateral pleural effusions. There is potential subtle pleural nodularity in the right hemithorax which could indicate metastatic disease.  Indeterminate 1 cm lesion in the right hepatic lobe suspicious for a metastatic lesion.      2020: Presented to [facility] with abdominal distention, 25lb weight loss, and CTAP with carcinomatosis, elevated  3098.     2020: CT Chest: IMPRESSION:   1. There are few scattered small sub-6 mm pulmonary nodules which are indeterminate without prior comparisons available. There are a few  slightly larger perifissural nodules which are technically  indeterminate in the setting of malignancy  although presumed lymphatic in nature and of unlikely clinical significance. Attention on follow-up is recommended.  2. Small to moderate left and small right pleural effusions are increased in size from prior. No convincing evidence for pleural nodularity.  3. Partially visualized large volume ascites and peritoneal nodularity in the upper abdomen similar to 12/4/2020 outside CT      12/26/2020: ED for abdominal distension; 3 L ascites drained with paracentesis    Pelvic US: Findings: Free fluid present in LLQ      12/31/2020: US Paracentesis: 900 mL ascites drained     1/7/2021: Diagnotic laparoscopy, biopsies  Pathology: FINAL DIAGNOSIS:   A. PERITONEUM, BIOPSIES:   - Positive for high grade carcinoma, consistent with metastatic carcinoma of Mullerian origin.     1/10-1/13/2021: Hospital admission for postoperative non-intractable vomiting and nausea.      1/10/2021: CT A/P: IMPRESSION: Extensive ascites which is probably malignant. Scattered liver hypodensities of indeterminate etiology comment cannot exclude metastatic disease. Diverticulosis. Fluid-filled adnexal masses and irregular appearance of uterus, which may represent primary neoplasm. Multiple peritoneal nodules. Large amount of fecal material in the colon with no evidence of small bowel obstruction.     Plan: Paclitaxel 175 mg/m2 and carboplatin AUC 6 x 3 cycles followed by a CT CAP and visit with Dr. Juarez.     1/12/2021: Cycle 1 paclitaxel and carboplatin while inpatient     1/13/2021: CT Head: Impression:  1. Chronic sinusitis of the right maxillary and right sphenoid sinuses.  2. Incidental presumed calcified meningioma in the right frontal  convexity without significant mass effect.  3. No suspicious intracranial enhancing lesion.     2/1/2021: Cycle 2 paclitaxel and carboplatin.  936.     2/3-2/5/21: Admission [facility] for afib w/ RVR and new PE     2/26/21: Cycle 3 paclitaxel and carboplatin planned.  Deferred due to thrombocytopenia. CA  125 pending.     3/15/21: Cycle 3 paclitaxel and carboplatin given     4/19/21: HYSTERECTOMY, TOTAL, ABDOMINAL, WITH BILATERAL SALPINGO-OOPHORECTOMY, omentectomy, NEOPLASM DEBULKING,Proctoscocy, RO, Resection of liver nodules, diaphragm stripping, immobilization of liver and colon  FINAL DIAGNOSIS:   A. OMENTUM, BIOPSY:   - Omental adipose tissue with rare viable cells of metastatic high grade   serous carcinoma   - One reactive lymph node, negative for malignancy (0/1)   B. NODULE, SIGMOID, EXCISION:   - Calcified necrotic adipose tissue   - Negative for malignancy   C. NODULE, SMALL BOWEL MESENTERY, EXCISION:   - Fibroadipose tissue, positive for metastatic high grade serous carcinoma   D. UTERUS, CERVIX, BILATERAL FALLOPIAN TUBES AND OVARIES, HYSTERECTOMY   WITH BILATERAL SALPINGO-OOPHORECTOMY:   - Atrophic endometrium   - Uterine serosa with rare viable cells consistent with high grade serous   carcinoma   - Cervix with atrophic changes   - Viable cells consistent with high grade serous carcinoma present in the   right ovary, serosa of right   fallopian tube and right periadnexal soft tissue   - Left ovary with atrophic changes   - Left fallopian tube with a rare focus of serous tubal in-situ carcinoma   (STIC)   E. NODULES, SMALL BOWEL MESENTRY, EXCISION:   - Fibroadipose tissue with rare viable cells of metastatic high grade   serous carcinoma   F. NODULE, SPLENIC FLEXURE TRANSVERSE COLON, EXCISION:   - Fibroadipose tissue with rare viable cells of metastatic high grade   serous carcinoma   - Accessory splenule, negative for malignancy   G. OMENTUM, OMENTECTOMY:   - Omental adipose tissue with rare viable cells of metastatic high grade   serous carcinoma   H. NODULE, PERITONEAL PANCREATIC, EXCISION:   - Fibrous adhesions with inflammation   - Negative for malignancy   I. RIGHT HEMIDIAPHRAGM PERITONEUM, EXCISION:   - Fibrous adhesions with inflammation   - Negative for malignancy   J. RIGHT LIVER SURFACE  NODULE:   - Fibrous adhesions with benign inclusion glands   - Negative for malignancy   K. LEFT LOWER LIVER EDGE, BIOPSY:   - Cauterized hepatic parenchyma and capsule   - Negative for malignancy   L. NODULE, SMALL BOWEL MESENTERIC #3, EXCISION:   - Fibroadipose tissue with rare viable cells of metastatic high grade   serous carcinoma       Plan: Carboplatin AUC 6 + Taxol 175 mg/m2 x 3 cycles, then transition to Parp inhibitor for maintenance therapy given her BRCA1 germline mutation.      5/21/21: Cycle 4 carboplatin and paclitaxel.   172.  6/11/21: Cycle 5 carboplatin and paclitaxel.   61.  7/2/21: Cycle 6 carboplatin and paclitaxel.   20.        7/28/21 plan: Olaparib 300mg bid as starting dose,  14  8/20/21: start date olaparib 300 mg BID,  12  9/13/21:  22  10/4/21:  23  11/1/21:  26     11/02/2021: PET CT: IMPRESSION:   Findings compatible with interval surgery and posttreatment change.  No gross definitive FDG avid disease.  Potential foci of tumor deposits along the anterior dome of the liver and midline abdominal wall surgical scar.  Colonic activity is not necessarily abnormal, however, given the previous carcinomatosis the colonic activity is indeterminant.         12/1/21:  23  1/3/22:  21  2/1/22:  20  3/1/22:  21  4/1/22:  23  5/4/22:  28     EXAM: CT CHEST/ABDOMEN/PELVIS W CONTRAST  LOCATION: [facility]  DATE/TIME: 7/11/2022 1:25 PM                                                                   IMPRESSION:  1.  Sliver of ascites in the upper abdomen has decreased since interval debulking surgery.  2.  There is a punctate nodule in the gastrosplenic ligament which is minimally more plump relative to the preop exam. This will need to be followed.  3.  Minimal nodular changes to the left of the midline scar in the subcutaneous fat will have to be followed as well. Unclear if this simply  reflects postoperative scarring or could reflect an early incisional recurrence.  4.  No other sites to suggest recurrent tumor. Vaginal cuff is normal.  5.  Extensive distal colonic diverticulosis.  6.  Other noncritical findings as noted above.      9/16/22  98     1/30/23 CT CAP:    IMPRESSION:  1.  Multiple new, hypoattenuating lesions in the liver, suspicious for hepatic metastatic disease.  2.  Necrotic mario hepatic lymphadenopathy, concerning for richard metastatic disease.  3.  There is a new or increasingly conspicuous 6 mm soft tissue nodule in the right lower quadrant. This is indeterminate.  4.  There is a new 3 mm solid nodule in the right upper lobe, indeterminate.  5.  Stable approximate 4 mm punctate nodule along the gastrosplenic ligament.  6.  Similar area of linear free fluid in the upper abdomen anterior to the stomach.    Plan: Paclitaxel 175 mg/m2, Carboplatin AUC 6, bevacizumab 7.5 mg/kg    3/1/23: Cycle #1 Paclitaxel 175 mg/m2, Carboplatin AUC 6, bevacizumab 7.5 mg/kg planned.  pending              Today Justen presents overall well. She denies any vaginal bleeding, no changes in her bowel or bladder habits, no nausea/emesis, no lower extremity edema, and no difficulties eating or sleeping. She denies any abdominal discomfort/bloating, no fevers or chills, and no chest pain or shortness of breath. Regular bowel movements daily. Pt continues on Xarleto for prior PE. 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 Tingling without pain. None in the hands. Pt would like to take Claritin prior to chemo.                   Review of Systems:    Systemic           no weight changes; no fever; no chills; no night sweats; no appetite changes  Skin           no rashes, or lesions  Eye           no irritation; no changes in vision  Allen-Laryngeal           no dysphagia; no hoarseness   Pulmonary    no cough; no shortness of breath  Cardiovascular    no chest pain; no palpitations  Gastrointestinal     no diarrhea; no constipation; no abdominal pain; no changes in bowel  habits; no blood in stool  Genitourinary   no urinary frequency; no urinary urgency; no dysuria; no pain; no abnormal vaginal discharge; no abnormal vaginal bleeding  Breast   no breast discharge; no breast changes; no breast pain  Musculoskeletal    no myalgias; no arthralgias; no back pain  Psychiatric           no depressed mood; no anxiety    Hematologic           no tender lymph nodes; no noticeable swellings or lumps   Endocrine    no hot flashes; no heat/cold intolerance         Neurological   no tremor; + numbness and tingling; no headaches; no difficulty sleeping      Past Medical History:    Past Medical History:   Diagnosis Date     Atrial fibrillation with rapid ventricular response (H)      History of cold sores      Hx of LASIK 12/11/2017     Insomnia      Migraine      Osteopenia      Pelvic mass      Peritoneal carcinomatosis (H)      Restless legs syndrome (RLS)          Past Surgical History:    Past Surgical History:   Procedure Laterality Date     APPENDECTOMY       ARTHROSCPY KNEE SURGICAL DEBRIDEMENT SHAVING ARTICULAR CARTILAGE Right      BIOPSY  January 2021    Biopsy to confirm ovarian cancer     DEBRIDEMENT LEFT UPPER EXTREMITY  2016     HYSTERECTOMY TOTAL ABD, ULISITO SALPINGO-OOPHORECTOMY, NODE DISSECTION, TUMOR DEBULKING, COMBINED Bilateral 4/19/2021    Procedure: HYSTERECTOMY, TOTAL, ABDOMINAL, WITH BILATERAL SALPINGO-OOPHORECTOMY, omentectomy, NEOPLASM DEBULKING,Proctoscocy, RO, Resection of liver nodules, diaphragm stripping, immobilization of liver and colon;  Surgeon: Bolivar Juarez MD;  Location: [facility] OR     LAPAROSCOPY DIAGNOSTIC (GYN) Bilateral 1/7/2021    Procedure: Diagnsotic laparoscopy, biopsies;  Surgeon: Bolivar Juarez MD;  Location:  OR     [facility]       TUBAL LIGATION           Health Maintenance Due   Topic Date Due     DEXA  Never done     ADVANCE CARE PLANNING  Never done     COLORECTAL  CANCER SCREENING  Never done     HEPATITIS C SCREENING  Never done     LIPID  Never done     MEDICARE ANNUAL WELLNESS VISIT  11/11/2021     FALL RISK ASSESSMENT  Never done     COVID-19 Vaccine (4 - Booster for Pfizer series) 11/15/2021     Pneumococcal Vaccine: 65+ Years (2 - PPSV23 if available, else PCV20) 04/05/2022     INFLUENZA VACCINE (1) Never done     PHQ-2 (once per calendar year)  01/01/2023       Current Medications:     Current Outpatient Medications   Medication Sig Dispense Refill     amitriptyline (ELAVIL) 100 MG tablet Take 1 tablet (100 mg) by mouth At Bedtime 90 tablet 0     [START ON 3/1/2023] dexamethasone (DECADRON) 4 MG tablet Take 2 tablets (8 mg) by mouth daily Take for 3 days, starting the day after chemo. Take with food. 6 tablet 5     fluticasone (FLONASE) 50 MCG/ACT nasal spray SPRAY 1 SPRAY INTO EACH NOSTRIL 2 TIMES A DAY       gabapentin (NEURONTIN) 600 MG tablet Take 1 tablet (600 mg) by mouth At Bedtime (Patient taking differently: Take 600 mg by mouth At Bedtime HALF TAB) 90 tablet 0     meclizine (ANTIVERT) 25 MG tablet Take 1 tablet (25 mg) by mouth 3 times daily as needed for dizziness or nausea 15 tablet 0     ondansetron (ZOFRAN) 4 MG tablet Take by mouth every 8 hours as needed for nausea       [START ON 3/1/2023] ondansetron (ZOFRAN) 8 MG tablet Take 1 tablet (8 mg) by mouth every 8 hours as needed for nausea (vomiting) 30 tablet 2     oxyCODONE (ROXICODONE) 5 MG tablet Take 1 tablet (5 mg) by mouth every 12 hours 10 tablet 0     [START ON 3/1/2023] prochlorperazine (COMPAZINE) 10 MG tablet Take 1 tablet (10 mg) by mouth every 6 hours as needed for nausea or vomiting 30 tablet 2     rivaroxaban ANTICOAGULANT (XARELTO ANTICOAGULANT) 20 MG TABS tablet Take 1 tablet (20 mg) by mouth every morning 90 tablet 1     SUMAtriptan (IMITREX) 100 MG tablet Take 1 tablet (100 mg) by mouth at onset of headache for migraine 15 tablet 0     valACYclovir (VALTREX) 1000 mg tablet Take 1,000 mg  by mouth as needed        zolpidem (AMBIEN) 10 MG tablet Take 10 mg by mouth           Allergies:      No Known Allergies     Social History:     Social History     Tobacco Use     Smoking status: Former     Packs/day: 0.50     Years: 40.00     Pack years: 20.00     Types: Cigarettes     Quit date:      Years since quittin.1     Smokeless tobacco: Never   Substance Use Topics     Alcohol use: Not Currently       History   Drug Use Unknown         Family History:     The patient's family history is notable for     Family History   Adopted: Yes   Problem Relation Age of Onset     Cancer Mother 36     Other Cancer Mother         Bio mother  of  a female cancer  at 36     Factor V Leiden deficiency Daughter      Deep Vein Thrombosis Daughter      Diabetes Type 1 Daughter      Diabetes Daughter      Hypertension Daughter      Anesthesia Reaction No family hx of          Physical Exam:     Ht 1.829 m (6')   Wt 77.1 kg (170 lb)   BMI 23.06 kg/m    Body mass index is 23.06 kg/m .    General Appearance:  healthy and alert, no distress     Eyes:  Eyes grossly normal to inspection.  No discharge or erythema, or obvious scleral/conjunctival abnormalities.     Respiratory:     No audible wheeze, cough, or visible cyanosis.  No visible retractions or increased work of breathing.      Musculoskeletal:         extremities non tender and without edema     Skin:    no lesions or rashes on visible skin     Neurological:   normal gait, no gross defects                Psychiatric:      appropriate mood and affect. 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 She is here today for a disease management visit by video.     20 minutes spent on the date of the encounter doing chart review,  history and exam, documentation, and further activities as noted above.         Plan:     1.)   Recurrent metastatic ovarian high grade serous carcinoma. Ok to proceed with planned treatment given labs have met parameters. MD to sign cycle #1. Encouraged 5-6 small high protein meals a day and discussed nausea management and control. Encouraged 64 oz of fluids per day. Discussed neutropenic precautions and rita period. Reviewed signs and symptoms for when she should contact the clinic or seek additional care. Patient to contact the clinic with any questions or concerns in the interim. Reviewed with pt that prescriptions for Meclizine, Dilaudid, Tramadol, Trazodone (all rx'd in 2021 for symptoms can be prescribed IF needed). At this point, pt is not in pain and I explained to pt that we do not send in rx for meds to have on hand if pt current asymptomatic especially for controlled substances. Reviewed ok to take Claritin prior to treatment to prevent myalgias. Reviewed anti-emetic regimen. Released all anti-emetic meds from tx plan today and reviewed.     -Message sent to ISRRAEL Trinh to attempt to set pt up for labs at [facility] for cycles 2-3.      2.) Genetic risk factors were assessed and she is POSITIVE for a BRCA1 mutation.  This mutation is called [id number]. Referral for Waleska Zamorano with Cancer Risk management placed 5/2022.     3.) Labs and/or tests ordered include:  Labs to be collected 3/1/23     4.) Health maintenance issues addressed today include annual health maintenance and non-gynecologic issues with PCP.    5)       PE: pt continues on JERICHO Hampton, NP-BC  Women's Health Nurse Practitioner  Division of Gynecologic Oncology  [facility]        CC  Patient Care Team:  Bolivar Juarez MD as PCP - General (Gynecologic Oncology)  Marta Lao APRN CNP as Assigned Cancer Care Provider  Marta Okeefe APRN CNP as Assigned  PCP  SELF, REFERRED        Again, thank you for allowing me to participate in the care of your patient.        Sincerely,        JERICHO Kitchen CNP

## 2023-02-28 NOTE — LETTER
2023         RE: Taran Regalado  [address] [place] [address]        Dear Colleague,    Thank you for referring your patient, Taran Regalado, to the [facility]. Please see a copy of my visit note below.    Video-Visit Details    Type of service:  Video Visit    Video Start Time (time video started): 1050    Video End Time (time video stopped): 1103    Originating Location (pt. 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 She is here today for follow up and disease management by video.     Oncology History:  12/3/2020: US Pelvic: IMPRESSION: Limited examination due to acoustic windows. Possible left adnexal mass. A CT scan of the abdomen and pelvis with contrast is recommended for further assessment.     2020: CT A/P:   IMPRESSION:    Peritoneal carcinomatosis with masslike peritoneal thickening in the lower pelvis which may indicate an adnexal or ovarian primary malignancy. Large volume ascites. Bilateral pleural effusions. There is potential subtle pleural nodularity in the right hemithorax which could indicate metastatic disease.  Indeterminate 1 cm lesion in the right hepatic lobe suspicious for a metastatic lesion.      2020: Presented to [facility] with abdominal distention, 25lb weight loss, and CTAP with carcinomatosis, elevated  3098.     2020: CT Chest: IMPRESSION:   1. There are few scattered small sub-6 mm pulmonary nodules which are indeterminate without prior comparisons available. There are a few  slightly larger perifissural nodules which are technically  indeterminate in the setting of malignancy  although presumed lymphatic in nature and of unlikely clinical significance. Attention on follow-up is recommended.  2. Small to moderate left and small right pleural effusions are increased in size from prior. No convincing evidence for pleural nodularity.  3. Partially visualized large volume ascites and peritoneal nodularity in the upper abdomen similar to 12/4/2020 outside CT      12/26/2020: ED for abdominal distension; 3 L ascites drained with paracentesis    Pelvic US: Findings: Free fluid present in LLQ      12/31/2020: US Paracentesis: 900 mL ascites drained     1/7/2021: Diagnotic laparoscopy, biopsies  Pathology: FINAL DIAGNOSIS:   A. PERITONEUM, BIOPSIES:   - Positive for high grade carcinoma, consistent with metastatic carcinoma of Mullerian origin.     1/10-1/13/2021: Hospital admission for postoperative non-intractable vomiting and nausea.      1/10/2021: CT A/P: IMPRESSION: Extensive ascites which is probably malignant. Scattered liver hypodensities of indeterminate etiology comment cannot exclude metastatic disease. Diverticulosis. Fluid-filled adnexal masses and irregular appearance of uterus, which may represent primary neoplasm. Multiple peritoneal nodules. Large amount of fecal material in the colon with no evidence of small bowel obstruction.     Plan: Paclitaxel 175 mg/m2 and carboplatin AUC 6 x 3 cycles followed by a CT CAP and visit with Dr. Juarez.     1/12/2021: Cycle 1 paclitaxel and carboplatin while inpatient     1/13/2021: CT Head: Impression:  1. Chronic sinusitis of the right maxillary and right sphenoid sinuses.  2. Incidental presumed calcified meningioma in the right frontal  convexity without significant mass effect.  3. No suspicious intracranial enhancing lesion.     2/1/2021: Cycle 2 paclitaxel and carboplatin.  936.     2/3-2/5/21: Admission [facility] for afib w/ RVR and new PE     2/26/21: Cycle 3 paclitaxel and carboplatin planned.  Deferred due to thrombocytopenia. CA  125 pending.     3/15/21: Cycle 3 paclitaxel and carboplatin given     4/19/21: HYSTERECTOMY, TOTAL, ABDOMINAL, WITH BILATERAL SALPINGO-OOPHORECTOMY, omentectomy, NEOPLASM DEBULKING,Proctoscocy, RO, Resection of liver nodules, diaphragm stripping, immobilization of liver and colon  FINAL DIAGNOSIS:   A. OMENTUM, BIOPSY:   - Omental adipose tissue with rare viable cells of metastatic high grade   serous carcinoma   - One reactive lymph node, negative for malignancy (0/1)   B. NODULE, SIGMOID, EXCISION:   - Calcified necrotic adipose tissue   - Negative for malignancy   C. NODULE, SMALL BOWEL MESENTERY, EXCISION:   - Fibroadipose tissue, positive for metastatic high grade serous carcinoma   D. UTERUS, CERVIX, BILATERAL FALLOPIAN TUBES AND OVARIES, HYSTERECTOMY   WITH BILATERAL SALPINGO-OOPHORECTOMY:   - Atrophic endometrium   - Uterine serosa with rare viable cells consistent with high grade serous   carcinoma   - Cervix with atrophic changes   - Viable cells consistent with high grade serous carcinoma present in the   right ovary, serosa of right   fallopian tube and right periadnexal soft tissue   - Left ovary with atrophic changes   - Left fallopian tube with a rare focus of serous tubal in-situ carcinoma   (STIC)   E. NODULES, SMALL BOWEL MESENTRY, EXCISION:   - Fibroadipose tissue with rare viable cells of metastatic high grade   serous carcinoma   F. NODULE, SPLENIC FLEXURE TRANSVERSE COLON, EXCISION:   - Fibroadipose tissue with rare viable cells of metastatic high grade   serous carcinoma   - Accessory splenule, negative for malignancy   G. OMENTUM, OMENTECTOMY:   - Omental adipose tissue with rare viable cells of metastatic high grade   serous carcinoma   H. NODULE, PERITONEAL PANCREATIC, EXCISION:   - Fibrous adhesions with inflammation   - Negative for malignancy   I. RIGHT HEMIDIAPHRAGM PERITONEUM, EXCISION:   - Fibrous adhesions with inflammation   - Negative for malignancy   J. RIGHT LIVER SURFACE  NODULE:   - Fibrous adhesions with benign inclusion glands   - Negative for malignancy   K. LEFT LOWER LIVER EDGE, BIOPSY:   - Cauterized hepatic parenchyma and capsule   - Negative for malignancy   L. NODULE, SMALL BOWEL MESENTERIC #3, EXCISION:   - Fibroadipose tissue with rare viable cells of metastatic high grade   serous carcinoma       Plan: Carboplatin AUC 6 + Taxol 175 mg/m2 x 3 cycles, then transition to Parp inhibitor for maintenance therapy given her BRCA1 germline mutation.      5/21/21: Cycle 4 carboplatin and paclitaxel.   172.  6/11/21: Cycle 5 carboplatin and paclitaxel.   61.  7/2/21: Cycle 6 carboplatin and paclitaxel.   20.        7/28/21 plan: Olaparib 300mg bid as starting dose,  14  8/20/21: start date olaparib 300 mg BID,  12  9/13/21:  22  10/4/21:  23  11/1/21:  26     11/02/2021: PET CT: IMPRESSION:   Findings compatible with interval surgery and posttreatment change.  No gross definitive FDG avid disease.  Potential foci of tumor deposits along the anterior dome of the liver and midline abdominal wall surgical scar.  Colonic activity is not necessarily abnormal, however, given the previous carcinomatosis the colonic activity is indeterminant.         12/1/21:  23  1/3/22:  21  2/1/22:  20  3/1/22:  21  4/1/22:  23  5/4/22:  28     EXAM: CT CHEST/ABDOMEN/PELVIS W CONTRAST  LOCATION: [facility]  DATE/TIME: 7/11/2022 1:25 PM                                                                   IMPRESSION:  1.  Sliver of ascites in the upper abdomen has decreased since interval debulking surgery.  2.  There is a punctate nodule in the gastrosplenic ligament which is minimally more plump relative to the preop exam. This will need to be followed.  3.  Minimal nodular changes to the left of the midline scar in the subcutaneous fat will have to be followed as well. Unclear if this simply  reflects postoperative scarring or could reflect an early incisional recurrence.  4.  No other sites to suggest recurrent tumor. Vaginal cuff is normal.  5.  Extensive distal colonic diverticulosis.  6.  Other noncritical findings as noted above.      9/16/22  98     1/30/23 CT CAP:    IMPRESSION:  1.  Multiple new, hypoattenuating lesions in the liver, suspicious for hepatic metastatic disease.  2.  Necrotic mario hepatic lymphadenopathy, concerning for richard metastatic disease.  3.  There is a new or increasingly conspicuous 6 mm soft tissue nodule in the right lower quadrant. This is indeterminate.  4.  There is a new 3 mm solid nodule in the right upper lobe, indeterminate.  5.  Stable approximate 4 mm punctate nodule along the gastrosplenic ligament.  6.  Similar area of linear free fluid in the upper abdomen anterior to the stomach.    Plan: Paclitaxel 175 mg/m2, Carboplatin AUC 6, bevacizumab 7.5 mg/kg    3/1/23: Cycle #1 Paclitaxel 175 mg/m2, Carboplatin AUC 6, bevacizumab 7.5 mg/kg planned.  pending              Today Justen presents overall well. She denies any vaginal bleeding, no changes in her bowel or bladder habits, no nausea/emesis, no lower extremity edema, and no difficulties eating or sleeping. She denies any abdominal discomfort/bloating, no fevers or chills, and no chest pain or shortness of breath. Regular bowel movements daily. Pt continues on Xarleto for prior PE. 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 Tingling without pain. None in the hands. Pt would like to take Claritin prior to chemo.                   Review of Systems:    Systemic           no weight changes; no fever; no chills; no night sweats; no appetite changes  Skin           no rashes, or lesions  Eye           no irritation; no changes in vision  Allen-Laryngeal           no dysphagia; no hoarseness   Pulmonary    no cough; no shortness of breath  Cardiovascular    no chest pain; no palpitations  Gastrointestinal     no diarrhea; no constipation; no abdominal pain; no changes in bowel  habits; no blood in stool  Genitourinary   no urinary frequency; no urinary urgency; no dysuria; no pain; no abnormal vaginal discharge; no abnormal vaginal bleeding  Breast   no breast discharge; no breast changes; no breast pain  Musculoskeletal    no myalgias; no arthralgias; no back pain  Psychiatric           no depressed mood; no anxiety    Hematologic           no tender lymph nodes; no noticeable swellings or lumps   Endocrine    no hot flashes; no heat/cold intolerance         Neurological   no tremor; + numbness and tingling; no headaches; no difficulty sleeping      Past Medical History:    Past Medical History:   Diagnosis Date     Atrial fibrillation with rapid ventricular response (H)      History of cold sores      Hx of LASIK 12/11/2017     Insomnia      Migraine      Osteopenia      Pelvic mass      Peritoneal carcinomatosis (H)      Restless legs syndrome (RLS)          Past Surgical History:    Past Surgical History:   Procedure Laterality Date     APPENDECTOMY       ARTHROSCPY KNEE SURGICAL DEBRIDEMENT SHAVING ARTICULAR CARTILAGE Right      BIOPSY  January 2021    Biopsy to confirm ovarian cancer     DEBRIDEMENT LEFT UPPER EXTREMITY  2016     HYSTERECTOMY TOTAL ABD, LUISITO SALPINGO-OOPHORECTOMY, NODE DISSECTION, TUMOR DEBULKING, COMBINED Bilateral 4/19/2021    Procedure: HYSTERECTOMY, TOTAL, ABDOMINAL, WITH BILATERAL SALPINGO-OOPHORECTOMY, omentectomy, NEOPLASM DEBULKING,Proctoscocy, RO, Resection of liver nodules, diaphragm stripping, immobilization of liver and colon;  Surgeon: Bolivar Juarez MD;  Location: [facility] OR     LAPAROSCOPY DIAGNOSTIC (GYN) Bilateral 1/7/2021    Procedure: Diagnsotic laparoscopy, biopsies;  Surgeon: Bolivar Juarez MD;  Location:  OR     [facility]       TUBAL LIGATION           Health Maintenance Due   Topic Date Due     DEXA  Never done     ADVANCE CARE PLANNING  Never done     COLORECTAL  CANCER SCREENING  Never done     HEPATITIS C SCREENING  Never done     LIPID  Never done     MEDICARE ANNUAL WELLNESS VISIT  11/11/2021     FALL RISK ASSESSMENT  Never done     COVID-19 Vaccine (4 - Booster for Pfizer series) 11/15/2021     Pneumococcal Vaccine: 65+ Years (2 - PPSV23 if available, else PCV20) 04/05/2022     INFLUENZA VACCINE (1) Never done     PHQ-2 (once per calendar year)  01/01/2023       Current Medications:     Current Outpatient Medications   Medication Sig Dispense Refill     amitriptyline (ELAVIL) 100 MG tablet Take 1 tablet (100 mg) by mouth At Bedtime 90 tablet 0     [START ON 3/1/2023] dexamethasone (DECADRON) 4 MG tablet Take 2 tablets (8 mg) by mouth daily Take for 3 days, starting the day after chemo. Take with food. 6 tablet 5     fluticasone (FLONASE) 50 MCG/ACT nasal spray SPRAY 1 SPRAY INTO EACH NOSTRIL 2 TIMES A DAY       gabapentin (NEURONTIN) 600 MG tablet Take 1 tablet (600 mg) by mouth At Bedtime (Patient taking differently: Take 600 mg by mouth At Bedtime HALF TAB) 90 tablet 0     meclizine (ANTIVERT) 25 MG tablet Take 1 tablet (25 mg) by mouth 3 times daily as needed for dizziness or nausea 15 tablet 0     ondansetron (ZOFRAN) 4 MG tablet Take by mouth every 8 hours as needed for nausea       [START ON 3/1/2023] ondansetron (ZOFRAN) 8 MG tablet Take 1 tablet (8 mg) by mouth every 8 hours as needed for nausea (vomiting) 30 tablet 2     oxyCODONE (ROXICODONE) 5 MG tablet Take 1 tablet (5 mg) by mouth every 12 hours 10 tablet 0     [START ON 3/1/2023] prochlorperazine (COMPAZINE) 10 MG tablet Take 1 tablet (10 mg) by mouth every 6 hours as needed for nausea or vomiting 30 tablet 2     rivaroxaban ANTICOAGULANT (XARELTO ANTICOAGULANT) 20 MG TABS tablet Take 1 tablet (20 mg) by mouth every morning 90 tablet 1     SUMAtriptan (IMITREX) 100 MG tablet Take 1 tablet (100 mg) by mouth at onset of headache for migraine 15 tablet 0     valACYclovir (VALTREX) 1000 mg tablet Take 1,000 mg  by mouth as needed        zolpidem (AMBIEN) 10 MG tablet Take 10 mg by mouth           Allergies:      No Known Allergies     Social History:     Social History     Tobacco Use     Smoking status: Former     Packs/day: 0.50     Years: 40.00     Pack years: 20.00     Types: Cigarettes     Quit date:      Years since quittin.1     Smokeless tobacco: Never   Substance Use Topics     Alcohol use: Not Currently       History   Drug Use Unknown         Family History:     The patient's family history is notable for     Family History   Adopted: Yes   Problem Relation Age of Onset     Cancer Mother 36     Other Cancer Mother         Bio mother  of  a female cancer  at 36     Factor V Leiden deficiency Daughter      Deep Vein Thrombosis Daughter      Diabetes Type 1 Daughter      Diabetes Daughter      Hypertension Daughter      Anesthesia Reaction No family hx of          Physical Exam:     Ht 1.829 m (6')   Wt 77.1 kg (170 lb)   BMI 23.06 kg/m    Body mass index is 23.06 kg/m .    General Appearance:  healthy and alert, no distress     Eyes:  Eyes grossly normal to inspection.  No discharge or erythema, or obvious scleral/conjunctival abnormalities.     Respiratory:     No audible wheeze, cough, or visible cyanosis.  No visible retractions or increased work of breathing.      Musculoskeletal:         extremities non tender and without edema     Skin:    no lesions or rashes on visible skin     Neurological:   normal gait, no gross defects                Psychiatric:      appropriate mood and affect. 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 She is here today for a disease management visit by video.     20 minutes spent on the date of the encounter doing chart review,  history and exam, documentation, and further activities as noted above.         Plan:     1.)   Recurrent metastatic ovarian high grade serous carcinoma. Ok to proceed with planned treatment given labs have met parameters. MD to sign cycle #1. Encouraged 5-6 small high protein meals a day and discussed nausea management and control. Encouraged 64 oz of fluids per day. Discussed neutropenic precautions and rita period. Reviewed signs and symptoms for when she should contact the clinic or seek additional care. Patient to contact the clinic with any questions or concerns in the interim. Reviewed with pt that prescriptions for Meclizine, Dilaudid, Tramadol, Trazodone (all rx'd in 2021 for symptoms can be prescribed IF needed). At this point, pt is not in pain and I explained to pt that we do not send in rx for meds to have on hand if pt current asymptomatic especially for controlled substances. Reviewed ok to take Claritin prior to treatment to prevent myalgias. Reviewed anti-emetic regimen. Released all anti-emetic meds from tx plan today and reviewed.     -Message sent to ISRRAEL Trinh to attempt to set pt up for labs at [facility] for cycles 2-3.      2.) Genetic risk factors were assessed and she is POSITIVE for a BRCA1 mutation.  This mutation is called [id number]. Referral for Waleska Zamorano with Cancer Risk management placed 5/2022.     3.) Labs and/or tests ordered include:  Labs to be collected 3/1/23     4.) Health maintenance issues addressed today include annual health maintenance and non-gynecologic issues with PCP.    5)       PE: pt continues on JERICHO Hampton, NP-BC  Women's Health Nurse Practitioner  Division of Gynecologic Oncology  [facility]        CC  Patient Care Team:  Bolivar Juarez MD as PCP - General (Gynecologic Oncology)  Marta Lao APRN CNP as Assigned Cancer Care Provider  Marta Okeefe APRN CNP as Assigned  PCP  SELF, REFERRED        Again, thank you for allowing me to participate in the care of your patient.        Sincerely,        JERICHO Kitchen CNP

## 2023-02-28 NOTE — NURSING NOTE
Patient reviewed medications and allergies in Mychart during e-check in and said everything looked correct.      Is the patient currently in the state of [place]? YES    Visit mode:VIDEO    If the visit is dropped, the patient can be reconnected by: VIDEO VISIT: Text to cell phone: [phone]    Will anyone else be joining the visit? NO      How would you like to obtain your AVS? MyChart    Are changes needed to the allergy or medication list? NO    Reason for visit:    Follow up      Estefani Wilson, VF

## 2023-03-01 ENCOUNTER — LAB (OUTPATIENT)
Dept: LAB | Facility: CLINIC | Age: 68
End: 2023-03-01
Payer: COMMERCIAL

## 2023-03-01 ENCOUNTER — INFUSION THERAPY VISIT (OUTPATIENT)
Dept: INFUSION THERAPY | Facility: CLINIC | Age: 68
End: 2023-03-01
Attending: OBSTETRICS & GYNECOLOGY
Payer: COMMERCIAL

## 2023-03-01 ENCOUNTER — PATIENT OUTREACH (OUTPATIENT)
Dept: CARE COORDINATION | Facility: CLINIC | Age: 68
End: 2023-03-01

## 2023-03-01 VITALS
OXYGEN SATURATION: 98 % | WEIGHT: 175.6 LBS | HEIGHT: 71 IN | BODY MASS INDEX: 24.58 KG/M2 | TEMPERATURE: 97.9 F | DIASTOLIC BLOOD PRESSURE: 74 MMHG | HEART RATE: 70 BPM | SYSTOLIC BLOOD PRESSURE: 112 MMHG | RESPIRATION RATE: 16 BRPM

## 2023-03-01 DIAGNOSIS — C56.9 OVARIAN CANCER, UNSPECIFIED LATERALITY (H): Primary | ICD-10-CM

## 2023-03-01 LAB
ALBUMIN SERPL-MCNC: 3.7 G/DL (ref 3.4–5)
ALBUMIN UR-MCNC: NEGATIVE MG/DL
ALP SERPL-CCNC: 109 U/L (ref 40–150)
ALT SERPL W P-5'-P-CCNC: 27 U/L (ref 0–50)
ANION GAP SERPL CALCULATED.3IONS-SCNC: 3 MMOL/L (ref 3–14)
AST SERPL W P-5'-P-CCNC: 23 U/L (ref 0–45)
BASOPHILS # BLD AUTO: 0 10E3/UL (ref 0–0.2)
BASOPHILS NFR BLD AUTO: 1 %
BILIRUB SERPL-MCNC: 0.3 MG/DL (ref 0.2–1.3)
BUN SERPL-MCNC: 14 MG/DL (ref 7–30)
CALCIUM SERPL-MCNC: 9.9 MG/DL (ref 8.5–10.1)
CANCER AG125 SERPL-ACNC: 1196 U/ML
CHLORIDE BLD-SCNC: 108 MMOL/L (ref 94–109)
CO2 SERPL-SCNC: 29 MMOL/L (ref 20–32)
CREAT SERPL-MCNC: 0.86 MG/DL (ref 0.52–1.04)
EOSINOPHIL # BLD AUTO: 0.1 10E3/UL (ref 0–0.7)
EOSINOPHIL NFR BLD AUTO: 3 %
ERYTHROCYTE [DISTWIDTH] IN BLOOD BY AUTOMATED COUNT: 13.9 % (ref 10–15)
GFR SERPL CREATININE-BSD FRML MDRD: 74 ML/MIN/1.73M2
GLUCOSE BLD-MCNC: 110 MG/DL (ref 70–99)
HCT VFR BLD AUTO: 37.7 % (ref 35–47)
HGB BLD-MCNC: 12.5 G/DL (ref 11.7–15.7)
HOLD SPECIMEN: NORMAL
IMM GRANULOCYTES # BLD: 0 10E3/UL
IMM GRANULOCYTES NFR BLD: 0 %
LYMPHOCYTES # BLD AUTO: 1 10E3/UL (ref 0.8–5.3)
LYMPHOCYTES NFR BLD AUTO: 29 %
MAGNESIUM SERPL-MCNC: 2 MG/DL (ref 1.6–2.3)
MCH RBC QN AUTO: 36.9 PG (ref 26.5–33)
MCHC RBC AUTO-ENTMCNC: 33.2 G/DL (ref 31.5–36.5)
MCV RBC AUTO: 111 FL (ref 78–100)
MONOCYTES # BLD AUTO: 0.4 10E3/UL (ref 0–1.3)
MONOCYTES NFR BLD AUTO: 11 %
NEUTROPHILS # BLD AUTO: 1.9 10E3/UL (ref 1.6–8.3)
NEUTROPHILS NFR BLD AUTO: 56 %
NRBC # BLD AUTO: 0 10E3/UL
NRBC BLD AUTO-RTO: 0 /100
PLATELET # BLD AUTO: 169 10E3/UL (ref 150–450)
POTASSIUM BLD-SCNC: 4.4 MMOL/L (ref 3.4–5.3)
PROT SERPL-MCNC: 7.8 G/DL (ref 6.8–8.8)
RBC # BLD AUTO: 3.39 10E6/UL (ref 3.8–5.2)
SODIUM SERPL-SCNC: 140 MMOL/L (ref 133–144)
WBC # BLD AUTO: 3.4 10E3/UL (ref 4–11)

## 2023-03-01 PROCEDURE — 96413 CHEMO IV INFUSION 1 HR: CPT | Performed by: INTERNAL MEDICINE

## 2023-03-01 PROCEDURE — 81003 URINALYSIS AUTO W/O SCOPE: CPT | Performed by: OBSTETRICS & GYNECOLOGY

## 2023-03-01 PROCEDURE — 99207 PR NO CHARGE LOS: CPT

## 2023-03-01 PROCEDURE — 36415 COLL VENOUS BLD VENIPUNCTURE: CPT | Performed by: OBSTETRICS & GYNECOLOGY

## 2023-03-01 PROCEDURE — 80053 COMPREHEN METABOLIC PANEL: CPT | Performed by: OBSTETRICS & GYNECOLOGY

## 2023-03-01 PROCEDURE — 85025 COMPLETE CBC W/AUTO DIFF WBC: CPT | Performed by: OBSTETRICS & GYNECOLOGY

## 2023-03-01 PROCEDURE — 86304 IMMUNOASSAY TUMOR CA 125: CPT | Performed by: OBSTETRICS & GYNECOLOGY

## 2023-03-01 PROCEDURE — 96375 TX/PRO/DX INJ NEW DRUG ADDON: CPT | Performed by: INTERNAL MEDICINE

## 2023-03-01 PROCEDURE — 96367 TX/PROPH/DG ADDL SEQ IV INF: CPT | Performed by: INTERNAL MEDICINE

## 2023-03-01 PROCEDURE — 96417 CHEMO IV INFUS EACH ADDL SEQ: CPT | Performed by: INTERNAL MEDICINE

## 2023-03-01 PROCEDURE — 83735 ASSAY OF MAGNESIUM: CPT | Performed by: OBSTETRICS & GYNECOLOGY

## 2023-03-01 PROCEDURE — 96415 CHEMO IV INFUSION ADDL HR: CPT | Performed by: INTERNAL MEDICINE

## 2023-03-01 RX ORDER — PALONOSETRON 0.05 MG/ML
0.25 INJECTION, SOLUTION INTRAVENOUS ONCE
Status: COMPLETED | OUTPATIENT
Start: 2023-03-01 | End: 2023-03-01

## 2023-03-01 RX ADMIN — PALONOSETRON 0.25 MG: 0.05 INJECTION, SOLUTION INTRAVENOUS at 08:54

## 2023-03-01 RX ADMIN — Medication 250 ML: at 08:47

## 2023-03-01 NOTE — PROGRESS NOTES
Take Home Medication Teaching    Patient was educated on the following oral medications: ondansetron, prochlorperazine and dexamethasone on March 1, 2023. Teaching provided to patient included indication, dose, administration, adverse effects and side effect management. Written materials were provided and patient was given the opportunity to ask questions. Patient verbalized understanding of the information presented.     Logan Cabrera, SebastianD

## 2023-03-01 NOTE — PROGRESS NOTES
"Infusion Nursing Note:  Taran Regalado presents today for C1 D1 Taxol/Carbo/Avastin  Patient seen by provider today: No   present during visit today: Not Applicable.    Note: Patient not new to infusion center or chemo as this is a recurrence for her. Reviewed Avastin as this is new to patient and she verbalized understanding. Patient stated that she battles with constipation as a \"normal occurrence.\" Reviewed alternatives and options with patient  And daughter. Both verbalized understanding.     Verified with patient & daughter Laure that she has her take home medications already.  Logan, our pharmacist met with patient to review anti- nausea meds and how to use them.     Intravenous Access:  Peripheral IV placed.    Treatment Conditions:  Lab Results   Component Value Date    HGB 12.5 03/01/2023    WBC 3.4 (L) 03/01/2023    ANEU 4.2 07/30/2021    ANEUTAUTO 1.9 03/01/2023     03/01/2023      Lab Results   Component Value Date     03/01/2023    POTASSIUM 4.4 03/01/2023    MAG 2.0 03/01/2023    CR 0.86 03/01/2023    HORACIO 9.9 03/01/2023    BILITOTAL 0.3 03/01/2023    ALBUMIN 3.7 03/01/2023    ALT 27 03/01/2023    AST 23 03/01/2023     Results reviewed, labs MET treatment parameters, ok to proceed with treatment.    Post Infusion Assessment:  Patient tolerated infusion without incident.  Blood return noted pre and post infusion.  Site patent and intact, free from redness, edema or discomfort.  No evidence of extravasations.  Access discontinued per protocol.     Discharge Plan:   Discharge instructions reviewed with: Patient.  Patient and/or family verbalized understanding of discharge instructions and all questions answered.  Patient discharged in stable condition accompanied by: daughter.  Departure Mode: Ambulatory.      Jaelyn Daniel RN                      "

## 2023-03-01 NOTE — PROGRESS NOTES
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    Intervention/Education/Resources Provided:  RADHA met with Taran in the infusion center this morning. RADHA introduced self and SW services. Taran was pleasant and open to discussion. She states she is doing ok, though understandably wishes that she didn't have to be here going through treatment again.    RADHA and Jose Juanamee spent some time speaking about her previous experience with cancer care. She has a long drive to get to treatment, but appreciates the team here and is hesitant to get chemo locally where everyone would know her. Taran did stay with her daughter last time she went through treatment and that is an option again, though she is trying to stay at home.     Jannie is BRCA positive and RADHA and Jose Juanamee spoke about that as well. She has three daughters and six grandchildren, two of her daughters are also positive for BRCA. She worries about her grandchildren. Her daughters have made some decisions to have prophylactic surgeries based on their genetic test results. Taran has not pursued mastectomy. She understands why they have made some of these choices, but has mixed feelings about it. She also carries guilt for passing these genes to her children.     Taran does not have specific SW needs today. She feels she is doing ok overall, but agreed to take SW card and reach out if any needs or questions arise.       Assessment/Plan:  SW will continue to remain available as needed and appropriate.     Provided patient/family with contact information and availability.    Adilene  SAYRA Lacy, LICSW, OSW-C (she/her)  Clinical , Adult Oncology  Phone: [phone]    [place] (BOBBI, W, F)  Angélica (Thu)

## 2023-03-04 ENCOUNTER — TELEPHONE (OUTPATIENT)
Dept: ONCOLOGY | Facility: CLINIC | Age: 68
End: 2023-03-04
Payer: COMMERCIAL

## 2023-03-04 DIAGNOSIS — G89.3 CANCER RELATED PAIN: Primary | ICD-10-CM

## 2023-03-04 RX ORDER — HYDROMORPHONE HYDROCHLORIDE 2 MG/1
2 TABLET ORAL EVERY 6 HOURS PRN
Qty: 10 TABLET | Refills: 0 | Status: SHIPPED | OUTPATIENT
Start: 2023-03-04 | End: 2023-05-04

## 2023-03-04 NOTE — TELEPHONE ENCOUNTER
On-call G2 resident returned Care Line call from patient's daughter regarding chemo related bone pain not relieved with OTC Tylenol, ibuprofen, and claritin (s/p carbo/taxol/avastin 3/1). Requesting prescription medication to help with pain. Oxycodone has reportedly help some in the past, but they are wondering if there is something else to try. Dilaudid sent to preferred pharmacy per fellow.    Jossy Skinner MD, PGY-2  2:27 PM  [facility]

## 2023-03-21 NOTE — PROGRESS NOTES
Virtual Visit Details    Type of service:  Video Visit   Video Start Time: 1517  Video End Time:1531    Originating Location (pt. 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 She is here today for follow up and disease management by video.     Oncology History:  12/3/2020: US Pelvic: IMPRESSION: Limited examination due to acoustic windows. Possible left adnexal mass. A CT scan of the abdomen and pelvis with contrast is recommended for further assessment.     2020: CT A/P:   IMPRESSION:    Peritoneal carcinomatosis with masslike peritoneal thickening in the lower pelvis which may indicate an adnexal or ovarian primary malignancy. Large volume ascites. Bilateral pleural effusions. There is potential subtle pleural nodularity in the right hemithorax which could indicate metastatic disease.  Indeterminate 1 cm lesion in the right hepatic lobe suspicious for a metastatic lesion.      2020: Presented to [facility] with abdominal distention, 25lb weight loss, and CTAP with carcinomatosis, elevated  3098.     2020: CT Chest: IMPRESSION:   1. There are few scattered small sub-6 mm pulmonary nodules which are indeterminate without prior comparisons available. There are a few  slightly larger perifissural nodules which are technically  indeterminate in the setting of malignancy although presumed lymphatic in nature and of unlikely clinical significance. Attention on follow-up is recommended.  2. Small to moderate left and small right pleural effusions are increased in size from prior. No convincing evidence for pleural nodularity.  3. Partially visualized large volume ascites and  peritoneal nodularity in the upper abdomen similar to 12/4/2020 outside CT      12/26/2020: ED for abdominal distension; 3 L ascites drained with paracentesis    Pelvic US: Findings: Free fluid present in LLQ      12/31/2020: US Paracentesis: 900 mL ascites drained     1/7/2021: Diagnotic laparoscopy, biopsies  Pathology: FINAL DIAGNOSIS:   A. PERITONEUM, BIOPSIES:   - Positive for high grade carcinoma, consistent with metastatic carcinoma of Mullerian origin.     1/10-1/13/2021: Hospital admission for postoperative non-intractable vomiting and nausea.      1/10/2021: CT A/P: IMPRESSION: Extensive ascites which is probably malignant. Scattered liver hypodensities of indeterminate etiology comment cannot exclude metastatic disease. Diverticulosis. Fluid-filled adnexal masses and irregular appearance of uterus, which may represent primary neoplasm. Multiple peritoneal nodules. Large amount of fecal material in the colon with no evidence of small bowel obstruction.     Plan: Paclitaxel 175 mg/m2 and carboplatin AUC 6 x 3 cycles followed by a CT CAP and visit with Dr. Juarez.     1/12/2021: Cycle 1 paclitaxel and carboplatin while inpatient     1/13/2021: CT Head: Impression:  1. Chronic sinusitis of the right maxillary and right sphenoid sinuses.  2. Incidental presumed calcified meningioma in the right frontal  convexity without significant mass effect.  3. No suspicious intracranial enhancing lesion.     2/1/2021: Cycle 2 paclitaxel and carboplatin.  936.     2/3-2/5/21: Admission [facility] for afib w/ RVR and new PE     2/26/21: Cycle 3 paclitaxel and carboplatin planned.  Deferred due to thrombocytopenia.  pending.     3/15/21: Cycle 3 paclitaxel and carboplatin given     4/19/21: HYSTERECTOMY, TOTAL, ABDOMINAL, WITH BILATERAL SALPINGO-OOPHORECTOMY, omentectomy, NEOPLASM DEBULKING,Proctoscocy, RO, Resection of liver nodules, diaphragm stripping, immobilization of liver and colon  FINAL DIAGNOSIS:   A.  OMENTUM, BIOPSY:   - Omental adipose tissue with rare viable cells of metastatic high grade   serous carcinoma   - One reactive lymph node, negative for malignancy (0/1)   B. NODULE, SIGMOID, EXCISION:   - Calcified necrotic adipose tissue   - Negative for malignancy   C. NODULE, SMALL BOWEL MESENTERY, EXCISION:   - Fibroadipose tissue, positive for metastatic high grade serous carcinoma   D. UTERUS, CERVIX, BILATERAL FALLOPIAN TUBES AND OVARIES, HYSTERECTOMY   WITH BILATERAL SALPINGO-OOPHORECTOMY:   - Atrophic endometrium   - Uterine serosa with rare viable cells consistent with high grade serous   carcinoma   - Cervix with atrophic changes   - Viable cells consistent with high grade serous carcinoma present in the   right ovary, serosa of right   fallopian tube and right periadnexal soft tissue   - Left ovary with atrophic changes   - Left fallopian tube with a rare focus of serous tubal in-situ carcinoma   (STIC)   E. NODULES, SMALL BOWEL MESENTRY, EXCISION:   - Fibroadipose tissue with rare viable cells of metastatic high grade   serous carcinoma   F. NODULE, SPLENIC FLEXURE TRANSVERSE COLON, EXCISION:   - Fibroadipose tissue with rare viable cells of metastatic high grade   serous carcinoma   - Accessory splenule, negative for malignancy   G. OMENTUM, OMENTECTOMY:   - Omental adipose tissue with rare viable cells of metastatic high grade   serous carcinoma   H. NODULE, PERITONEAL PANCREATIC, EXCISION:   - Fibrous adhesions with inflammation   - Negative for malignancy   I. RIGHT HEMIDIAPHRAGM PERITONEUM, EXCISION:   - Fibrous adhesions with inflammation   - Negative for malignancy   J. RIGHT LIVER SURFACE NODULE:   - Fibrous adhesions with benign inclusion glands   - Negative for malignancy   K. LEFT LOWER LIVER EDGE, BIOPSY:   - Cauterized hepatic parenchyma and capsule   - Negative for malignancy   L. NODULE, SMALL BOWEL MESENTERIC #3, EXCISION:   - Fibroadipose tissue with rare viable cells of metastatic  high grade   serous carcinoma       Plan: Carboplatin AUC 6 + Taxol 175 mg/m2 x 3 cycles, then transition to Parp inhibitor for maintenance therapy given her BRCA1 germline mutation.      5/21/21: Cycle 4 carboplatin and paclitaxel.   172.  6/11/21: Cycle 5 carboplatin and paclitaxel.   61.  7/2/21: Cycle 6 carboplatin and paclitaxel.   20.        7/28/21 plan: Olaparib 300mg bid as starting dose,  14  8/20/21: start date olaparib 300 mg BID,  12  9/13/21:  22  10/4/21:  23  11/1/21:  26     11/02/2021: PET CT: IMPRESSION:   Findings compatible with interval surgery and posttreatment change.  No gross definitive FDG avid disease.  Potential foci of tumor deposits along the anterior dome of the liver and midline abdominal wall surgical scar.  Colonic activity is not necessarily abnormal, however, given the previous carcinomatosis the colonic activity is indeterminant.         12/1/21:  23  1/3/22:  21  2/1/22:  20  3/1/22:  21  4/1/22:  23  5/4/22:  28     EXAM: CT CHEST/ABDOMEN/PELVIS W CONTRAST  LOCATION: [facility]  DATE/TIME: 7/11/2022 1:25 PM                                                                   IMPRESSION:  1.  Sliver of ascites in the upper abdomen has decreased since interval debulking surgery.  2.  There is a punctate nodule in the gastrosplenic ligament which is minimally more plump relative to the preop exam. This will need to be followed.  3.  Minimal nodular changes to the left of the midline scar in the subcutaneous fat will have to be followed as well. Unclear if this simply reflects postoperative scarring or could reflect an early incisional recurrence.  4.  No other sites to suggest recurrent tumor. Vaginal cuff is normal.  5.  Extensive distal colonic diverticulosis.  6.  Other noncritical findings as noted above.      9/16/22  98     1/30/23 CT CAP:    IMPRESSION:  1.   Multiple new, hypoattenuating lesions in the liver, suspicious for hepatic metastatic disease.  2.  Necrotic mario hepatic lymphadenopathy, concerning for richard metastatic disease.  3.  There is a new or increasingly conspicuous 6 mm soft tissue nodule in the right lower quadrant. This is indeterminate.  4.  There is a new 3 mm solid nodule in the right upper lobe, indeterminate.  5.  Stable approximate 4 mm punctate nodule along the gastrosplenic ligament.  6.  Similar area of linear free fluid in the upper abdomen anterior to the stomach.    Plan: Paclitaxel 175 mg/m2, Carboplatin AUC 6, bevacizumab 7.5 mg/kg    3/1/23: Cycle #1 Paclitaxel 175 mg/m2, Carboplatin AUC 6 (C7), bevacizumab 7.5 mg/kg planned.  1,196  3/24/23: Cycle #2 Paclitaxel 150 mg/m2, Carboplatin AUC 5 (C8), bevacizumab 15 mg/kg planned.  pending            DeeAnne is seen via video call today feeling well. Reports that she had bone pain and myalgias for about three days post chemo that was not well controlled with Claritin and Tylenol. She called in for opoid rx and was prescribed Dilaudid which improved her symptoms. Still has 8 mg PO Dilaudid on hand. Reports migraines that are controlled with Imitrex. Denies abdominal pain or bloating. Tolerating PO intake. Denies nausea and vomiting. Denies SOB/CP. No fevers or chills. Does report tingling in the feet that is chronic and not changed. Denies neuropathy pain at this time. She denies any vaginal bleeding, no changes in her bowel or bladder habits, no lower extremity edema, and no difficulties eating or sleeping. She denies  fevers or chills, and no chest pain or shortness of breath. Regular bowel movements daily. Pt continues on Xarleto for prior PE.                   Review of Systems:    Systemic           no weight changes; no fever; no chills; no night sweats; no appetite changes  Skin           no rashes, or lesions  Eye           no irritation; no changes in  vision  Allen-Laryngeal           no dysphagia; no hoarseness   Pulmonary    no cough; no shortness of breath  Cardiovascular    no chest pain; no palpitations  Gastrointestinal    no diarrhea; no constipation; no abdominal pain; no changes in bowel  habits; no blood in stool  Genitourinary   no urinary frequency; no urinary urgency; no dysuria; no pain; no abnormal vaginal discharge; no abnormal vaginal bleeding  Breast   no breast discharge; no breast changes; no breast pain  Musculoskeletal    + myalgias; no arthralgias; no back pain  Psychiatric           no depressed mood; no anxiety    Hematologic           no tender lymph nodes; no noticeable swellings or lumps   Endocrine    no hot flashes; no heat/cold intolerance         Neurological   no tremor; + numbness and tingling; no headaches; no difficulty sleeping      Past Medical History:    Past Medical History:   Diagnosis Date     Atrial fibrillation with rapid ventricular response (H)      History of cold sores      Hx of LASIK 12/11/2017     Insomnia      Migraine      Osteopenia      Pelvic mass      Peritoneal carcinomatosis (H)      Restless legs syndrome (RLS)          Past Surgical History:    Past Surgical History:   Procedure Laterality Date     APPENDECTOMY       ARTHROSCPY KNEE SURGICAL DEBRIDEMENT SHAVING ARTICULAR CARTILAGE Right      BIOPSY  January 2021    Biopsy to confirm ovarian cancer     DEBRIDEMENT LEFT UPPER EXTREMITY  2016     HYSTERECTOMY TOTAL ABD, LUISITO SALPINGO-OOPHORECTOMY, NODE DISSECTION, TUMOR DEBULKING, COMBINED Bilateral 4/19/2021    Procedure: HYSTERECTOMY, TOTAL, ABDOMINAL, WITH BILATERAL SALPINGO-OOPHORECTOMY, omentectomy, NEOPLASM DEBULKING,Proctoscocy, RO, Resection of liver nodules, diaphragm stripping, immobilization of liver and colon;  Surgeon: Bolivar Juarez MD;  Location: [facility] OR     LAPAROSCOPY DIAGNOSTIC (GYN) Bilateral 1/7/2021    Procedure: Diagnsotic laparoscopy, biopsies;  Surgeon: Bolivar Juarez MD;   Location:  OR     LASIK       TUBAL LIGATION           Health Maintenance Due   Topic Date Due     DEXA  Never done     ADVANCE CARE PLANNING  Never done     COLORECTAL CANCER SCREENING  Never done     HEPATITIS C SCREENING  Never done     LIPID  Never done     MEDICARE ANNUAL WELLNESS VISIT  11/11/2021     FALL RISK ASSESSMENT  Never done     COVID-19 Vaccine (4 - Booster for Pfizer series) 11/15/2021     Pneumococcal Vaccine: 65+ Years (2 - PPSV23 if available, else PCV20) 04/05/2022     INFLUENZA VACCINE (1) Never done     PHQ-2 (once per calendar year)  01/01/2023       Current Medications:     Current Outpatient Medications   Medication Sig Dispense Refill     amitriptyline (ELAVIL) 100 MG tablet Take 1 tablet (100 mg) by mouth At Bedtime 90 tablet 0     dexamethasone (DECADRON) 4 MG tablet Take 2 tablets (8 mg) by mouth daily Take for 3 days, starting the day after chemo. Take with food. 6 tablet 5     fluticasone (FLONASE) 50 MCG/ACT nasal spray SPRAY 1 SPRAY INTO EACH NOSTRIL 2 TIMES A DAY (Patient not taking: Reported on 3/1/2023)       gabapentin (NEURONTIN) 600 MG tablet Take 1 tablet (600 mg) by mouth At Bedtime (Patient taking differently: Take 600 mg by mouth At Bedtime HALF TAB) 90 tablet 0     meclizine (ANTIVERT) 25 MG tablet Take 1 tablet (25 mg) by mouth 3 times daily as needed for dizziness or nausea (Patient not taking: Reported on 3/1/2023) 15 tablet 0     ondansetron (ZOFRAN) 4 MG tablet Take by mouth every 8 hours as needed for nausea (Patient not taking: Reported on 3/1/2023)       ondansetron (ZOFRAN) 8 MG tablet Take 1 tablet (8 mg) by mouth every 8 hours as needed for nausea (vomiting) (Patient not taking: Reported on 3/1/2023) 30 tablet 2     oxyCODONE (ROXICODONE) 5 MG tablet Take 1 tablet (5 mg) by mouth every 12 hours (Patient not taking: Reported on 3/1/2023) 10 tablet 0     prochlorperazine (COMPAZINE) 10 MG tablet Take 1 tablet (10 mg) by mouth every 6 hours as needed for nausea  or vomiting (Patient not taking: Reported on 3/1/2023) 30 tablet 2     rivaroxaban ANTICOAGULANT (XARELTO ANTICOAGULANT) 20 MG TABS tablet Take 1 tablet (20 mg) by mouth every morning 90 tablet 1     SUMAtriptan (IMITREX) 100 MG tablet Take 1 tablet (100 mg) by mouth at onset of headache for migraine (Patient not taking: Reported on 3/1/2023) 15 tablet 0     valACYclovir (VALTREX) 1000 mg tablet Take 1,000 mg by mouth as needed  (Patient not taking: Reported on 3/1/2023)       zolpidem (AMBIEN) 10 MG tablet Take 10 mg by mouth           Allergies:      No Known Allergies     Social History:     Social History     Tobacco Use     Smoking status: Former     Packs/day: 0.50     Years: 40.00     Pack years: 20.00     Types: Cigarettes     Quit date:      Years since quittin.2     Smokeless tobacco: Never   Substance Use Topics     Alcohol use: Not Currently       History   Drug Use Unknown         Family History:     The patient's family history is notable for     Family History   Adopted: Yes   Problem Relation Age of Onset     Cancer Mother 36     Other Cancer Mother         Bio mother  of  a female cancer  at 36     Factor V Leiden deficiency Daughter      Deep Vein Thrombosis Daughter      Diabetes Type 1 Daughter      Diabetes Daughter      Hypertension Daughter      Anesthesia Reaction No family hx of          Physical Exam:     There were no vitals taken for this visit.  There is no height or weight on file to calculate BMI.    General Appearance:  healthy and alert, no distress     Eyes:  Eyes grossly normal to inspection.  No discharge or erythema, or obvious scleral/conjunctival abnormalities.     Respiratory:     No audible wheeze, cough, or visible cyanosis.  No visible retractions or increased work of breathing.      Musculoskeletal:         extremities non tender and without edema     Skin:    no lesions or rashes on visible skin     Neurological:   normal gait, no gross defects                 Psychiatric:      appropriate mood and affect. 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 She is here today for a disease management visit by video.     30 minutes spent on the date of the encounter doing chart review, history and exam, documentation, and further activities as noted above.         Plan:     1.)   Recurrent metastatic ovarian high grade serous carcinoma. Ok to proceed with planned treatment with ANC 1.2 per Dr. Arnold. No Neulasta, dose reduction of Carbo and Taxol completed in treatment plan per MD. Pt aware. Ella/Kinsey to sign chemo for Friday 3/24 once CMP completed tomorrow as this was not originally collected.  Encouraged 5-6 small high protein meals a day and discussed nausea management and control. Encouraged 64 oz of fluids per day. Discussed neutropenic precautions and rita period. Reviewed signs and symptoms for when she should contact the clinic or seek additional care. Patient to contact the clinic with any questions or concerns in the interim.  Pt has labs at [facility] 2-3 days prior to infusion.    Disease/Treatment related SE:     - Neutropenia: ANC 1.2. See plan above. Precautions reviewed. Dose reduction of Carbo and Taxol and no Neulasta at this point per MD. If persistent neutropenia despite chemo dose reduction, will add Neulasta.  - Bone pain:  Reviewed ok to take Claritin prior to and a few days after treatment to prevent/treat myalgias. Encouraged APAP/Ibuprofen prn. Pt has 8 tablets of Dilaudid on hand and will call for refills prn.   - Neuropathy: stable and not worsening s/p C7 Taxol. Continue to monitor. No pain.        2.) Genetic risk factors were assessed and she is POSITIVE for a BRCA1 mutation.  This mutation is called  [id number]. Referral for Waleska Zamorano with Cancer Risk management placed 5/2022.     3.) Labs and/or tests ordered include: CBC, Mag reviewed with pt today     4.) Health maintenance issues addressed today include annual health maintenance and non-gynecologic issues with PCP.    5)        PE: pt continues on JERICHO Hampton, NP-BC  Women's Health Nurse Practitioner  Division of Gynecologic Oncology  [facility]        CC  Patient Care Team:  Bolivar Juarez MD as PCP - General (Gynecologic Oncology)  Marta Lao APRN CNP as Assigned Cancer Care Provider  Marta Okeefe APRN CNP as Assigned PCP  SELF, REFERRED

## 2023-03-22 ENCOUNTER — VIRTUAL VISIT (OUTPATIENT)
Dept: ONCOLOGY | Facility: CLINIC | Age: 68
End: 2023-03-22
Payer: COMMERCIAL

## 2023-03-22 DIAGNOSIS — C56.9 OVARIAN CANCER, UNSPECIFIED LATERALITY (H): Primary | ICD-10-CM

## 2023-03-22 PROCEDURE — 99213 OFFICE O/P EST LOW 20 MIN: CPT | Mod: VID | Performed by: OBSTETRICS & GYNECOLOGY

## 2023-03-22 NOTE — LETTER
3/22/2023         RE: Taran Regalado  [address]  [place] [address]        Dear Colleague,    Thank you for referring your patient, Taran Regalado, to the [facility]. Please see a copy of my visit note below.    Virtual Visit Details    Type of service:  Video Visit   Video Start Time: 1517  Video End Time:1531    Originating Location (pt. 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 She is here today for follow up and disease management by video.     Oncology History:  12/3/2020: US Pelvic: IMPRESSION: Limited examination due to acoustic windows. Possible left adnexal mass. A CT scan of the abdomen and pelvis with contrast is recommended for further assessment.     2020: CT A/P:   IMPRESSION:    Peritoneal carcinomatosis with masslike peritoneal thickening in the lower pelvis which may indicate an adnexal or ovarian primary malignancy. Large volume ascites. Bilateral pleural effusions. There is potential subtle pleural nodularity in the right hemithorax which could indicate metastatic disease.  Indeterminate 1 cm lesion in the right hepatic lobe suspicious for a metastatic lesion.      2020: Presented to [facility] with abdominal distention, 25lb weight loss, and CTAP with carcinomatosis, elevated  3098.     2020: CT Chest: IMPRESSION:   1. There are few scattered small sub-6 mm pulmonary nodules which are indeterminate without prior comparisons available. There are a few  slightly larger perifissural nodules which are technically  indeterminate in the setting of malignancy although presumed lymphatic in nature and of unlikely  clinical significance. Attention on follow-up is recommended.  2. Small to moderate left and small right pleural effusions are increased in size from prior. No convincing evidence for pleural nodularity.  3. Partially visualized large volume ascites and peritoneal nodularity in the upper abdomen similar to 12/4/2020 outside CT      12/26/2020: ED for abdominal distension; 3 L ascites drained with paracentesis    Pelvic US: Findings: Free fluid present in LLQ      12/31/2020: US Paracentesis: 900 mL ascites drained     1/7/2021: Diagnotic laparoscopy, biopsies  Pathology: FINAL DIAGNOSIS:   A. PERITONEUM, BIOPSIES:   - Positive for high grade carcinoma, consistent with metastatic carcinoma of Mullerian origin.     1/10-1/13/2021: Hospital admission for postoperative non-intractable vomiting and nausea.      1/10/2021: CT A/P: IMPRESSION: Extensive ascites which is probably malignant. Scattered liver hypodensities of indeterminate etiology comment cannot exclude metastatic disease. Diverticulosis. Fluid-filled adnexal masses and irregular appearance of uterus, which may represent primary neoplasm. Multiple peritoneal nodules. Large amount of fecal material in the colon with no evidence of small bowel obstruction.     Plan: Paclitaxel 175 mg/m2 and carboplatin AUC 6 x 3 cycles followed by a CT CAP and visit with Dr. Juarez.     1/12/2021: Cycle 1 paclitaxel and carboplatin while inpatient     1/13/2021: CT Head: Impression:  1. Chronic sinusitis of the right maxillary and right sphenoid sinuses.  2. Incidental presumed calcified meningioma in the right frontal  convexity without significant mass effect.  3. No suspicious intracranial enhancing lesion.     2/1/2021: Cycle 2 paclitaxel and carboplatin.  936.     2/3-2/5/21: Admission [facility] for afib w/ RVR and new PE     2/26/21: Cycle 3 paclitaxel and carboplatin planned.  Deferred due to thrombocytopenia.  pending.     3/15/21: Cycle 3 paclitaxel and  carboplatin given     4/19/21: HYSTERECTOMY, TOTAL, ABDOMINAL, WITH BILATERAL SALPINGO-OOPHORECTOMY, omentectomy, NEOPLASM DEBULKING,Proctoscocy, RO, Resection of liver nodules, diaphragm stripping, immobilization of liver and colon  FINAL DIAGNOSIS:   A. OMENTUM, BIOPSY:   - Omental adipose tissue with rare viable cells of metastatic high grade   serous carcinoma   - One reactive lymph node, negative for malignancy (0/1)   B. NODULE, SIGMOID, EXCISION:   - Calcified necrotic adipose tissue   - Negative for malignancy   C. NODULE, SMALL BOWEL MESENTERY, EXCISION:   - Fibroadipose tissue, positive for metastatic high grade serous carcinoma   D. UTERUS, CERVIX, BILATERAL FALLOPIAN TUBES AND OVARIES, HYSTERECTOMY   WITH BILATERAL SALPINGO-OOPHORECTOMY:   - Atrophic endometrium   - Uterine serosa with rare viable cells consistent with high grade serous   carcinoma   - Cervix with atrophic changes   - Viable cells consistent with high grade serous carcinoma present in the   right ovary, serosa of right   fallopian tube and right periadnexal soft tissue   - Left ovary with atrophic changes   - Left fallopian tube with a rare focus of serous tubal in-situ carcinoma   (STIC)   E. NODULES, SMALL BOWEL MESENTRY, EXCISION:   - Fibroadipose tissue with rare viable cells of metastatic high grade   serous carcinoma   F. NODULE, SPLENIC FLEXURE TRANSVERSE COLON, EXCISION:   - Fibroadipose tissue with rare viable cells of metastatic high grade   serous carcinoma   - Accessory splenule, negative for malignancy   G. OMENTUM, OMENTECTOMY:   - Omental adipose tissue with rare viable cells of metastatic high grade   serous carcinoma   H. NODULE, PERITONEAL PANCREATIC, EXCISION:   - Fibrous adhesions with inflammation   - Negative for malignancy   I. RIGHT HEMIDIAPHRAGM PERITONEUM, EXCISION:   - Fibrous adhesions with inflammation   - Negative for malignancy   J. RIGHT LIVER SURFACE NODULE:   - Fibrous adhesions with benign inclusion  glands   - Negative for malignancy   K. LEFT LOWER LIVER EDGE, BIOPSY:   - Cauterized hepatic parenchyma and capsule   - Negative for malignancy   L. NODULE, SMALL BOWEL MESENTERIC #3, EXCISION:   - Fibroadipose tissue with rare viable cells of metastatic high grade   serous carcinoma       Plan: Carboplatin AUC 6 + Taxol 175 mg/m2 x 3 cycles, then transition to Parp inhibitor for maintenance therapy given her BRCA1 germline mutation.      5/21/21: Cycle 4 carboplatin and paclitaxel.   172.  6/11/21: Cycle 5 carboplatin and paclitaxel.   61.  7/2/21: Cycle 6 carboplatin and paclitaxel.   20.        7/28/21 plan: Olaparib 300mg bid as starting dose,  14  8/20/21: start date olaparib 300 mg BID,  12  9/13/21:  22  10/4/21:  23  11/1/21:  26     11/02/2021: PET CT: IMPRESSION:   Findings compatible with interval surgery and posttreatment change.  No gross definitive FDG avid disease.  Potential foci of tumor deposits along the anterior dome of the liver and midline abdominal wall surgical scar.  Colonic activity is not necessarily abnormal, however, given the previous carcinomatosis the colonic activity is indeterminant.         12/1/21:  23  1/3/22:  21  2/1/22:  20  3/1/22:  21  4/1/22:  23  5/4/22:  28     EXAM: CT CHEST/ABDOMEN/PELVIS W CONTRAST  LOCATION: [facility]  DATE/TIME: 7/11/2022 1:25 PM                                                                   IMPRESSION:  1.  Sliver of ascites in the upper abdomen has decreased since interval debulking surgery.  2.  There is a punctate nodule in the gastrosplenic ligament which is minimally more plump relative to the preop exam. This will need to be followed.  3.  Minimal nodular changes to the left of the midline scar in the subcutaneous fat will have to be followed as well. Unclear if this simply reflects postoperative scarring or could reflect an early  incisional recurrence.  4.  No other sites to suggest recurrent tumor. Vaginal cuff is normal.  5.  Extensive distal colonic diverticulosis.  6.  Other noncritical findings as noted above.      9/16/22  98     1/30/23 CT CAP:    IMPRESSION:  1.  Multiple new, hypoattenuating lesions in the liver, suspicious for hepatic metastatic disease.  2.  Necrotic mario hepatic lymphadenopathy, concerning for richard metastatic disease.  3.  There is a new or increasingly conspicuous 6 mm soft tissue nodule in the right lower quadrant. This is indeterminate.  4.  There is a new 3 mm solid nodule in the right upper lobe, indeterminate.  5.  Stable approximate 4 mm punctate nodule along the gastrosplenic ligament.  6.  Similar area of linear free fluid in the upper abdomen anterior to the stomach.    Plan: Paclitaxel 175 mg/m2, Carboplatin AUC 6, bevacizumab 7.5 mg/kg    3/1/23: Cycle #1 Paclitaxel 175 mg/m2, Carboplatin AUC 6 (C7), bevacizumab 7.5 mg/kg planned.  1,196  3/24/23: Cycle #2 Paclitaxel 150 mg/m2, Carboplatin AUC 5 (C8), bevacizumab 15 mg/kg planned.  pending            DeeAnne is seen via video call today feeling well. Reports that she had bone pain and myalgias for about three days post chemo that was not well controlled with Claritin and Tylenol. She called in for opoid rx and was prescribed Dilaudid which improved her symptoms. Still has 8 mg PO Dilaudid on hand. Reports migraines that are controlled with Imitrex. Denies abdominal pain or bloating. Tolerating PO intake. Denies nausea and vomiting. Denies SOB/CP. No fevers or chills. Does report tingling in the feet that is chronic and not changed. Denies neuropathy pain at this time. She denies any vaginal bleeding, no changes in her bowel or bladder habits, no lower extremity edema, and no difficulties eating or sleeping. She denies  fevers or chills, and no chest pain or shortness of breath. Regular bowel movements daily. Pt continues on Xarleto  for prior PE.                   Review of Systems:    Systemic           no weight changes; no fever; no chills; no night sweats; no appetite changes  Skin           no rashes, or lesions  Eye           no irritation; no changes in vision  Allen-Laryngeal           no dysphagia; no hoarseness   Pulmonary    no cough; no shortness of breath  Cardiovascular    no chest pain; no palpitations  Gastrointestinal    no diarrhea; no constipation; no abdominal pain; no changes in bowel  habits; no blood in stool  Genitourinary   no urinary frequency; no urinary urgency; no dysuria; no pain; no abnormal vaginal discharge; no abnormal vaginal bleeding  Breast   no breast discharge; no breast changes; no breast pain  Musculoskeletal    + myalgias; no arthralgias; no back pain  Psychiatric           no depressed mood; no anxiety    Hematologic           no tender lymph nodes; no noticeable swellings or lumps   Endocrine    no hot flashes; no heat/cold intolerance         Neurological   no tremor; + numbness and tingling; no headaches; no difficulty sleeping      Past Medical History:    Past Medical History:   Diagnosis Date     Atrial fibrillation with rapid ventricular response (H)      History of cold sores      Hx of LASIK 12/11/2017     Insomnia      Migraine      Osteopenia      Pelvic mass      Peritoneal carcinomatosis (H)      Restless legs syndrome (RLS)          Past Surgical History:    Past Surgical History:   Procedure Laterality Date     APPENDECTOMY       ARTHROSCPY KNEE SURGICAL DEBRIDEMENT SHAVING ARTICULAR CARTILAGE Right      BIOPSY  January 2021    Biopsy to confirm ovarian cancer     DEBRIDEMENT LEFT UPPER EXTREMITY  2016     HYSTERECTOMY TOTAL ABD, LUISITO SALPINGO-OOPHORECTOMY, NODE DISSECTION, TUMOR DEBULKING, COMBINED Bilateral 4/19/2021    Procedure: HYSTERECTOMY, TOTAL, ABDOMINAL, WITH BILATERAL SALPINGO-OOPHORECTOMY, omentectomy, NEOPLASM DEBULKING,Proctoscocy, RO, Resection of liver nodules, diaphragm  stripping, immobilization of liver and colon;  Surgeon: Bolivar Juarez MD;  Location: [facility] OR     LAPAROSCOPY DIAGNOSTIC (GYN) Bilateral 1/7/2021    Procedure: Diagnsotic laparoscopy, biopsies;  Surgeon: Bolivar Juarez MD;  Location: [facility] OR     LASIK       TUBAL LIGATION           Health Maintenance Due   Topic Date Due     DEXA  Never done     ADVANCE CARE PLANNING  Never done     COLORECTAL CANCER SCREENING  Never done     HEPATITIS C SCREENING  Never done     LIPID  Never done     MEDICARE ANNUAL WELLNESS VISIT  11/11/2021     FALL RISK ASSESSMENT  Never done     COVID-19 Vaccine (4 - Booster for Pfizer series) 11/15/2021     Pneumococcal Vaccine: 65+ Years (2 - PPSV23 if available, else PCV20) 04/05/2022     INFLUENZA VACCINE (1) Never done     PHQ-2 (once per calendar year)  01/01/2023       Current Medications:     Current Outpatient Medications   Medication Sig Dispense Refill     amitriptyline (ELAVIL) 100 MG tablet Take 1 tablet (100 mg) by mouth At Bedtime 90 tablet 0     dexamethasone (DECADRON) 4 MG tablet Take 2 tablets (8 mg) by mouth daily Take for 3 days, starting the day after chemo. Take with food. 6 tablet 5     fluticasone (FLONASE) 50 MCG/ACT nasal spray SPRAY 1 SPRAY INTO EACH NOSTRIL 2 TIMES A DAY (Patient not taking: Reported on 3/1/2023)       gabapentin (NEURONTIN) 600 MG tablet Take 1 tablet (600 mg) by mouth At Bedtime (Patient taking differently: Take 600 mg by mouth At Bedtime HALF TAB) 90 tablet 0     meclizine (ANTIVERT) 25 MG tablet Take 1 tablet (25 mg) by mouth 3 times daily as needed for dizziness or nausea (Patient not taking: Reported on 3/1/2023) 15 tablet 0     ondansetron (ZOFRAN) 4 MG tablet Take by mouth every 8 hours as needed for nausea (Patient not taking: Reported on 3/1/2023)       ondansetron (ZOFRAN) 8 MG tablet Take 1 tablet (8 mg) by mouth every 8 hours as needed for nausea (vomiting) (Patient not taking: Reported on 3/1/2023) 30 tablet 2     oxyCODONE  (ROXICODONE) 5 MG tablet Take 1 tablet (5 mg) by mouth every 12 hours (Patient not taking: Reported on 3/1/2023) 10 tablet 0     prochlorperazine (COMPAZINE) 10 MG tablet Take 1 tablet (10 mg) by mouth every 6 hours as needed for nausea or vomiting (Patient not taking: Reported on 3/1/2023) 30 tablet 2     rivaroxaban ANTICOAGULANT (XARELTO ANTICOAGULANT) 20 MG TABS tablet Take 1 tablet (20 mg) by mouth every morning 90 tablet 1     SUMAtriptan (IMITREX) 100 MG tablet Take 1 tablet (100 mg) by mouth at onset of headache for migraine (Patient not taking: Reported on 3/1/2023) 15 tablet 0     valACYclovir (VALTREX) 1000 mg tablet Take 1,000 mg by mouth as needed  (Patient not taking: Reported on 3/1/2023)       zolpidem (AMBIEN) 10 MG tablet Take 10 mg by mouth           Allergies:      No Known Allergies     Social History:     Social History     Tobacco Use     Smoking status: Former     Packs/day: 0.50     Years: 40.00     Pack years: 20.00     Types: Cigarettes     Quit date:      Years since quittin.2     Smokeless tobacco: Never   Substance Use Topics     Alcohol use: Not Currently       History   Drug Use Unknown         Family History:     The patient's family history is notable for     Family History   Adopted: Yes   Problem Relation Age of Onset     Cancer Mother 36     Other Cancer Mother         Bio mother  of  a female cancer  at 36     Factor V Leiden deficiency Daughter      Deep Vein Thrombosis Daughter      Diabetes Type 1 Daughter      Diabetes Daughter      Hypertension Daughter      Anesthesia Reaction No family hx of          Physical Exam:     There were no vitals taken for this visit.  There is no height or weight on file to calculate BMI.    General Appearance:  healthy and alert, no distress     Eyes:  Eyes grossly normal to inspection.  No discharge or erythema, or obvious scleral/conjunctival abnormalities.     Respiratory:     No audible wheeze, cough, or visible cyanosis.  No  visible retractions or increased work of breathing.      Musculoskeletal:         extremities non tender and without edema     Skin:    no lesions or rashes on visible skin     Neurological:   normal gait, no gross defects                Psychiatric:      appropriate mood and affect. 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 She is here today for a disease management visit by video.     30 minutes spent on the date of the encounter doing chart review, history and exam, documentation, and further activities as noted above.         Plan:     1.)   Recurrent metastatic ovarian high grade serous carcinoma. Ok to proceed with planned treatment with ANC 1.2 per Dr. Arnold. No Neulasta, dose reduction of Carbo and Taxol completed in treatment plan per MD. Pt aware. Ella/Kinsey to sign chemo for Friday 3/24 once CMP completed tomorrow as this was not originally collected.  Encouraged 5-6 small high protein meals a day and discussed nausea management and control. Encouraged 64 oz of fluids per day. Discussed neutropenic precautions and rita period. Reviewed signs and symptoms for when she should contact the clinic or seek additional care. Patient to contact the clinic with any questions or concerns in the interim.  Pt has labs at [facility] 2-3 days prior to infusion.    Disease/Treatment related SE:     - Neutropenia: ANC 1.2. See plan above. Precautions reviewed. Dose reduction of Carbo and Taxol and no Neulasta at this point per MD. If persistent neutropenia despite chemo dose reduction, will add Neulasta.  - Bone pain:  Reviewed ok to take Claritin prior to and a few days after treatment to prevent/treat myalgias. Encouraged APAP/Ibuprofen prn. Pt has 8 tablets of Dilaudid on  hand and will call for refills prn.   - Neuropathy: stable and not worsening s/p C7 Taxol. Continue to monitor. No pain.        2.) Genetic risk factors were assessed and she is POSITIVE for a BRCA1 mutation.  This mutation is called [id number]. Referral for Waleska Zamorano with Cancer Risk management placed 5/2022.     3.) Labs and/or tests ordered include: CBC, Mag reviewed with pt today     4.) Health maintenance issues addressed today include annual health maintenance and non-gynecologic issues with PCP.    5)        PE: pt continues on JERICHO Hampton, NP-BC  Women's Health Nurse Practitioner  Division of Gynecologic Oncology  [facility]        CC  Patient Care Team:  Bolivar Juarez MD as PCP - General (Gynecologic Oncology)  Marta Lao APRN CNP as Assigned Cancer Care Provider  Marta Okeefe APRN CNP as Assigned PCP  SELF, REFERRED        Again, thank you for allowing me to participate in the care of your patient.        Sincerely,        JERICHO Kitchen CNP

## 2023-03-22 NOTE — NURSING NOTE
Patient declined individual allergy and medication review by support staff because pt states there has been no changes.    Zunilda Trinidad, VF

## 2023-03-22 NOTE — NURSING NOTE
Is the patient currently in the state of [place]? YES    Visit mode:VIDEO    If the visit is dropped, the patient can be reconnected by: VIDEO VISIT: Text to cell phone: [phone]    Will anyone else be joining the visit? NO      How would you like to obtain your AVS? MyChart    Are changes needed to the allergy or medication list? NO    Reason for visit: return video visit    Zunilda Trinidad, АНДРЕЙ

## 2023-03-23 ENCOUNTER — PATIENT OUTREACH (OUTPATIENT)
Dept: ONCOLOGY | Facility: CLINIC | Age: 68
End: 2023-03-23
Payer: COMMERCIAL

## 2023-03-23 DIAGNOSIS — C56.9 OVARIAN CANCER, UNSPECIFIED LATERALITY (H): Primary | ICD-10-CM

## 2023-03-23 DIAGNOSIS — Z51.11 ENCOUNTER FOR ANTINEOPLASTIC CHEMOTHERAPY: ICD-10-CM

## 2023-03-23 RX ORDER — DIPHENHYDRAMINE HCL 25 MG
50 CAPSULE ORAL ONCE
Status: CANCELLED
Start: 2023-03-23

## 2023-03-23 RX ORDER — HEPARIN SODIUM (PORCINE) LOCK FLUSH IV SOLN 100 UNIT/ML 100 UNIT/ML
5 SOLUTION INTRAVENOUS
Status: CANCELLED | OUTPATIENT
Start: 2023-03-23

## 2023-03-23 RX ORDER — EPINEPHRINE 1 MG/ML
0.3 INJECTION, SOLUTION INTRAMUSCULAR; SUBCUTANEOUS EVERY 5 MIN PRN
Status: CANCELLED | OUTPATIENT
Start: 2023-03-23

## 2023-03-23 RX ORDER — MEPERIDINE HYDROCHLORIDE 25 MG/ML
25 INJECTION INTRAMUSCULAR; INTRAVENOUS; SUBCUTANEOUS EVERY 30 MIN PRN
Status: CANCELLED | OUTPATIENT
Start: 2023-03-23

## 2023-03-23 RX ORDER — HEPARIN SODIUM,PORCINE 10 UNIT/ML
5 VIAL (ML) INTRAVENOUS
Status: CANCELLED | OUTPATIENT
Start: 2023-03-23

## 2023-03-23 RX ORDER — DIPHENHYDRAMINE HYDROCHLORIDE 50 MG/ML
50 INJECTION INTRAMUSCULAR; INTRAVENOUS
Status: CANCELLED
Start: 2023-03-23

## 2023-03-23 RX ORDER — PALONOSETRON 0.05 MG/ML
0.25 INJECTION, SOLUTION INTRAVENOUS ONCE
Status: CANCELLED | OUTPATIENT
Start: 2023-03-23

## 2023-03-23 RX ORDER — ALBUTEROL SULFATE 90 UG/1
1-2 AEROSOL, METERED RESPIRATORY (INHALATION)
Status: CANCELLED
Start: 2023-03-23

## 2023-03-23 RX ORDER — LORAZEPAM 2 MG/ML
0.5 INJECTION INTRAMUSCULAR EVERY 4 HOURS PRN
Status: CANCELLED | OUTPATIENT
Start: 2023-03-23

## 2023-03-23 RX ORDER — METHYLPREDNISOLONE SODIUM SUCCINATE 125 MG/2ML
125 INJECTION, POWDER, LYOPHILIZED, FOR SOLUTION INTRAMUSCULAR; INTRAVENOUS
Status: CANCELLED
Start: 2023-03-23

## 2023-03-23 RX ORDER — ALBUTEROL SULFATE 0.83 MG/ML
2.5 SOLUTION RESPIRATORY (INHALATION)
Status: CANCELLED | OUTPATIENT
Start: 2023-03-23

## 2023-03-23 NOTE — PROGRESS NOTES
[facility] has faxed needed labs to PCP/Lab 4 times.     [facility] has received confirmation that these faxes have gone through.     Last fax was sent to lab per patient request     Shawna Hinojosa RN

## 2023-03-24 ENCOUNTER — INFUSION THERAPY VISIT (OUTPATIENT)
Dept: INFUSION THERAPY | Facility: CLINIC | Age: 68
End: 2023-03-24
Payer: COMMERCIAL

## 2023-03-24 ENCOUNTER — LAB (OUTPATIENT)
Dept: LAB | Facility: CLINIC | Age: 68
End: 2023-03-24
Payer: COMMERCIAL

## 2023-03-24 VITALS
TEMPERATURE: 97.4 F | WEIGHT: 181.4 LBS | RESPIRATION RATE: 18 BRPM | OXYGEN SATURATION: 99 % | HEART RATE: 78 BPM | BODY MASS INDEX: 24.98 KG/M2 | SYSTOLIC BLOOD PRESSURE: 124 MMHG | DIASTOLIC BLOOD PRESSURE: 85 MMHG

## 2023-03-24 DIAGNOSIS — C56.9 OVARIAN CANCER, UNSPECIFIED LATERALITY (H): Primary | ICD-10-CM

## 2023-03-24 DIAGNOSIS — C56.9 OVARIAN CANCER, UNSPECIFIED LATERALITY (H): ICD-10-CM

## 2023-03-24 DIAGNOSIS — Z51.11 ENCOUNTER FOR ANTINEOPLASTIC CHEMOTHERAPY: ICD-10-CM

## 2023-03-24 LAB — ALBUMIN UR-MCNC: 10 MG/DL

## 2023-03-24 PROCEDURE — 81003 URINALYSIS AUTO W/O SCOPE: CPT

## 2023-03-24 PROCEDURE — 99207 PR NO CHARGE LOS: CPT

## 2023-03-24 PROCEDURE — 96375 TX/PRO/DX INJ NEW DRUG ADDON: CPT | Performed by: INTERNAL MEDICINE

## 2023-03-24 PROCEDURE — 96367 TX/PROPH/DG ADDL SEQ IV INF: CPT | Performed by: INTERNAL MEDICINE

## 2023-03-24 PROCEDURE — 96417 CHEMO IV INFUS EACH ADDL SEQ: CPT | Performed by: INTERNAL MEDICINE

## 2023-03-24 PROCEDURE — 96413 CHEMO IV INFUSION 1 HR: CPT | Performed by: INTERNAL MEDICINE

## 2023-03-24 PROCEDURE — 96415 CHEMO IV INFUSION ADDL HR: CPT | Performed by: INTERNAL MEDICINE

## 2023-03-24 RX ORDER — PALONOSETRON 0.05 MG/ML
0.25 INJECTION, SOLUTION INTRAVENOUS ONCE
Status: COMPLETED | OUTPATIENT
Start: 2023-03-24 | End: 2023-03-24

## 2023-03-24 RX ADMIN — PALONOSETRON 0.25 MG: 0.05 INJECTION, SOLUTION INTRAVENOUS at 08:47

## 2023-03-24 RX ADMIN — Medication 250 ML: at 08:39

## 2023-03-24 NOTE — PROGRESS NOTES
Infusion Nursing Note:  Taran Regalado presents today for C2D1 Taxol/Carboplatin/Zirabev.    Patient seen by provider today: No   present during visit today: Not Applicable.    Note: Patient expressed some bone pain and migraines post chemotherapy last time. Patient stated that she did have a cold last week but is back to baseline. Otherwise no new medical concerns.     Intravenous Access:  Peripheral IV placed.    Treatment Conditions:  Lab Results   Component Value Date    HGB 12.5 03/01/2023    WBC 3.4 (L) 03/01/2023    ANEU 4.2 07/30/2021    ANEUTAUTO 1.9 03/01/2023     03/01/2023      Lab Results   Component Value Date     03/01/2023    POTASSIUM 4.4 03/01/2023    MAG 2.0 03/01/2023    CR 0.86 03/01/2023    HORACIO 9.9 03/01/2023    BILITOTAL 0.3 03/01/2023    ALBUMIN 3.7 03/01/2023    ALT 27 03/01/2023    AST 23 03/01/2023     Results reviewed, labs MET treatment parameters, ok to proceed with treatment.  Urine protein- 10.    Post Infusion Assessment:  Patient tolerated infusion without incident.  Blood return noted pre and post infusion.  Site patent and intact, free from redness, edema or discomfort.  No evidence of extravasations.  Access discontinued per protocol.     Discharge Plan:   AVS to patient via MYCHART.  Patient will return 4/14/23 for next appointment.   Patient discharged in stable condition accompanied by: self and daughter.  Departure Mode: Ambulatory.      Susan Finch RN

## 2023-03-29 ENCOUNTER — PATIENT OUTREACH (OUTPATIENT)
Dept: ONCOLOGY | Facility: CLINIC | Age: 68
End: 2023-03-29
Payer: COMMERCIAL

## 2023-03-29 NOTE — PROGRESS NOTES
Patient reached out with concerns as patient blood pressure is 86/66    Patient was getting an error reading over the weekend when checking B/P    On 3/24/2023 B/P was 124/85      Patient is weak and getting weaker and blood pressure has been dropping     Patient is currently in [place]     RN and patient daughter reviewed symptoms and options since patient is not local     Patient/daughter was advised for patient to go to ED      Patient agrees to the plan     Shawna Hinojosa RN

## 2023-04-07 NOTE — PROGRESS NOTES
Virtual Visit Details    Type of service:  Video Visit   Video Start Time: 1425  Video End Time: 1442    Originating Location (pt. 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 She is here today for follow up and disease management by video.     Oncology History:  12/3/2020: US Pelvic: IMPRESSION: Limited examination due to acoustic windows. Possible left adnexal mass. A CT scan of the abdomen and pelvis with contrast is recommended for further assessment.     2020: CT A/P:   IMPRESSION:    Peritoneal carcinomatosis with masslike peritoneal thickening in the lower pelvis which may indicate an adnexal or ovarian primary malignancy. Large volume ascites. Bilateral pleural effusions. There is potential subtle pleural nodularity in the right hemithorax which could indicate metastatic disease.  Indeterminate 1 cm lesion in the right hepatic lobe suspicious for a metastatic lesion.      2020: Presented to [facility] with abdominal distention, 25lb weight loss, and CTAP with carcinomatosis, elevated  3098.     2020: CT Chest: IMPRESSION:   1. There are few scattered small sub-6 mm pulmonary nodules which are indeterminate without prior comparisons available. There are a few  slightly larger perifissural nodules which are technically  indeterminate in the setting of malignancy although presumed lymphatic in nature and of unlikely clinical significance. Attention on follow-up is recommended.  2. Small to moderate left and small right pleural effusions are increased in size from prior. No convincing evidence for pleural nodularity.  3. Partially visualized large volume ascites and  peritoneal nodularity in the upper abdomen similar to 12/4/2020 outside CT      12/26/2020: ED for abdominal distension; 3 L ascites drained with paracentesis    Pelvic US: Findings: Free fluid present in LLQ      12/31/2020: US Paracentesis: 900 mL ascites drained     1/7/2021: Diagnotic laparoscopy, biopsies  Pathology: FINAL DIAGNOSIS:   A. PERITONEUM, BIOPSIES:   - Positive for high grade carcinoma, consistent with metastatic carcinoma of Mullerian origin.     1/10-1/13/2021: Hospital admission for postoperative non-intractable vomiting and nausea.      1/10/2021: CT A/P: IMPRESSION: Extensive ascites which is probably malignant. Scattered liver hypodensities of indeterminate etiology comment cannot exclude metastatic disease. Diverticulosis. Fluid-filled adnexal masses and irregular appearance of uterus, which may represent primary neoplasm. Multiple peritoneal nodules. Large amount of fecal material in the colon with no evidence of small bowel obstruction.     Plan: Paclitaxel 175 mg/m2 and carboplatin AUC 6 x 3 cycles followed by a CT CAP and visit with Dr. Juarez.     1/12/2021: Cycle 1 paclitaxel and carboplatin while inpatient     1/13/2021: CT Head: Impression:  1. Chronic sinusitis of the right maxillary and right sphenoid sinuses.  2. Incidental presumed calcified meningioma in the right frontal  convexity without significant mass effect.  3. No suspicious intracranial enhancing lesion.     2/1/2021: Cycle 2 paclitaxel and carboplatin.  936.     2/3-2/5/21: Admission [facility] for afib w/ RVR and new PE     2/26/21: Cycle 3 paclitaxel and carboplatin planned.  Deferred due to thrombocytopenia.  pending.     3/15/21: Cycle 3 paclitaxel and carboplatin given     4/19/21: HYSTERECTOMY, TOTAL, ABDOMINAL, WITH BILATERAL SALPINGO-OOPHORECTOMY, omentectomy, NEOPLASM DEBULKING,Proctoscocy, RO, Resection of liver nodules, diaphragm stripping, immobilization of liver and colon  FINAL DIAGNOSIS:   A.  OMENTUM, BIOPSY:   - Omental adipose tissue with rare viable cells of metastatic high grade   serous carcinoma   - One reactive lymph node, negative for malignancy (0/1)   B. NODULE, SIGMOID, EXCISION:   - Calcified necrotic adipose tissue   - Negative for malignancy   C. NODULE, SMALL BOWEL MESENTERY, EXCISION:   - Fibroadipose tissue, positive for metastatic high grade serous carcinoma   D. UTERUS, CERVIX, BILATERAL FALLOPIAN TUBES AND OVARIES, HYSTERECTOMY   WITH BILATERAL SALPINGO-OOPHORECTOMY:   - Atrophic endometrium   - Uterine serosa with rare viable cells consistent with high grade serous   carcinoma   - Cervix with atrophic changes   - Viable cells consistent with high grade serous carcinoma present in the   right ovary, serosa of right   fallopian tube and right periadnexal soft tissue   - Left ovary with atrophic changes   - Left fallopian tube with a rare focus of serous tubal in-situ carcinoma   (STIC)   E. NODULES, SMALL BOWEL MESENTRY, EXCISION:   - Fibroadipose tissue with rare viable cells of metastatic high grade   serous carcinoma   F. NODULE, SPLENIC FLEXURE TRANSVERSE COLON, EXCISION:   - Fibroadipose tissue with rare viable cells of metastatic high grade   serous carcinoma   - Accessory splenule, negative for malignancy   G. OMENTUM, OMENTECTOMY:   - Omental adipose tissue with rare viable cells of metastatic high grade   serous carcinoma   H. NODULE, PERITONEAL PANCREATIC, EXCISION:   - Fibrous adhesions with inflammation   - Negative for malignancy   I. RIGHT HEMIDIAPHRAGM PERITONEUM, EXCISION:   - Fibrous adhesions with inflammation   - Negative for malignancy   J. RIGHT LIVER SURFACE NODULE:   - Fibrous adhesions with benign inclusion glands   - Negative for malignancy   K. LEFT LOWER LIVER EDGE, BIOPSY:   - Cauterized hepatic parenchyma and capsule   - Negative for malignancy   L. NODULE, SMALL BOWEL MESENTERIC #3, EXCISION:   - Fibroadipose tissue with rare viable cells of metastatic  high grade   serous carcinoma       Plan: Carboplatin AUC 6 + Taxol 175 mg/m2 x 3 cycles, then transition to Parp inhibitor for maintenance therapy given her BRCA1 germline mutation.      5/21/21: Cycle 4 carboplatin and paclitaxel.   172.  6/11/21: Cycle 5 carboplatin and paclitaxel.   61.  7/2/21: Cycle 6 carboplatin and paclitaxel.   20.        7/28/21 plan: Olaparib 300mg bid as starting dose,  14  8/20/21: start date olaparib 300 mg BID,  12  9/13/21:  22  10/4/21:  23  11/1/21:  26     11/02/2021: PET CT: IMPRESSION:   Findings compatible with interval surgery and posttreatment change.  No gross definitive FDG avid disease.  Potential foci of tumor deposits along the anterior dome of the liver and midline abdominal wall surgical scar.  Colonic activity is not necessarily abnormal, however, given the previous carcinomatosis the colonic activity is indeterminant.         12/1/21:  23  1/3/22:  21  2/1/22:  20  3/1/22:  21  4/1/22:  23  5/4/22:  28     EXAM: CT CHEST/ABDOMEN/PELVIS W CONTRAST  LOCATION: [facility]  DATE/TIME: 7/11/2022 1:25 PM                                                                   IMPRESSION:  1.  Sliver of ascites in the upper abdomen has decreased since interval debulking surgery.  2.  There is a punctate nodule in the gastrosplenic ligament which is minimally more plump relative to the preop exam. This will need to be followed.  3.  Minimal nodular changes to the left of the midline scar in the subcutaneous fat will have to be followed as well. Unclear if this simply reflects postoperative scarring or could reflect an early incisional recurrence.  4.  No other sites to suggest recurrent tumor. Vaginal cuff is normal.  5.  Extensive distal colonic diverticulosis.  6.  Other noncritical findings as noted above.      9/16/22  98     1/30/23 CT CAP:    IMPRESSION:  1.   Multiple new, hypoattenuating lesions in the liver, suspicious for hepatic metastatic disease.  2.  Necrotic mario hepatic lymphadenopathy, concerning for richard metastatic disease.  3.  There is a new or increasingly conspicuous 6 mm soft tissue nodule in the right lower quadrant. This is indeterminate.  4.  There is a new 3 mm solid nodule in the right upper lobe, indeterminate.  5.  Stable approximate 4 mm punctate nodule along the gastrosplenic ligament.  6.  Similar area of linear free fluid in the upper abdomen anterior to the stomach.    Plan: Paclitaxel 175 mg/m2, Carboplatin AUC 6, bevacizumab 7.5 mg/kg    3/1/23: Cycle #1 Paclitaxel 175 mg/m2, Carboplatin AUC 6 (C7), bevacizumab 7.5 mg/kg planned.  1,196  3/24/23: Cycle #2 Paclitaxel 150 mg/m2, Carboplatin AUC 5 (C8), bevacizumab 15 mg/kg planned.  558    3/29/23: ER for dehydration and hypotension. Normotensive in ER. IV fluids given. Discharged.     4/14/23: Cycle #3 Paclitaxel 150 mg/m2, Carboplatin AUC 5 (C9), bevacizumab 15 mg/kg planned.  pending              Since the ER, pt reports that she is well. Pt states that she  has had long term lifelong low BP. Eating and drinking well. Pt reports cycle 2 was the same as cycle 1. Took Claritin and APAP for bone pain. Still has Dilaudid at home to use prn. Has numbness at top of left hand and left thumb intermittent usually at nights. Resolves after five minutes. Has tingling at toes. No pain.     Having dry mouth. Has tried Biotin over the counter without improvement. Has a tooth paste from dentist   Drinking 64 oz per day. Reports migraines that are controlled with Imitrex. Denies abdominal pain or bloating. Tolerating PO intake. Denies nausea and vomiting. Denies SOB/CP. No fevers or chills. Does report tingling in the feet that is chronic and not changed. She denies any vaginal bleeding, no changes in her bowel or bladder habits, no lower extremity edema, and no difficulties eating or  sleeping. She denies  fevers or chills, and no chest pain or shortness of breath. Regular bowel movements daily. Pt continues on Xarleto for prior PE.                 Review of Systems:    Systemic           no weight changes; no fever; no chills; no night sweats; no appetite changes  Skin           no rashes, or lesions  Eye           no irritation; no changes in vision  Allen-Laryngeal           no dysphagia; no hoarseness   Pulmonary    no cough; no shortness of breath  Cardiovascular    no chest pain; no palpitations  Gastrointestinal    no diarrhea; no constipation; no abdominal pain; no changes in bowel  habits; no blood in stool  Genitourinary   no urinary frequency; no urinary urgency; no dysuria; no pain; no abnormal vaginal discharge; no abnormal vaginal bleeding  Breast   no breast discharge; no breast changes; no breast pain  Musculoskeletal    + myalgias; no arthralgias; no back pain  Psychiatric           no depressed mood; no anxiety    Hematologic           no tender lymph nodes; no noticeable swellings or lumps   Endocrine    no hot flashes; no heat/cold intolerance         Neurological   no tremor; + numbness and tingling; no headaches; no difficulty sleeping      Past Medical History:    Past Medical History:   Diagnosis Date     Atrial fibrillation with rapid ventricular response (H)      History of cold sores       of LASIK 12/11/2017     Insomnia      Migraine      Osteopenia      Pelvic mass      Peritoneal carcinomatosis (H)      Restless legs syndrome (RLS)          Past Surgical History:    Past Surgical History:   Procedure Laterality Date     APPENDECTOMY       ARTHROSCPY KNEE SURGICAL DEBRIDEMENT SHAVING ARTICULAR CARTILAGE Right      BIOPSY  January 2021    Biopsy to confirm ovarian cancer     DEBRIDEMENT LEFT UPPER EXTREMITY  2016     HYSTERECTOMY TOTAL ABD, LUISITO SALPINGO-OOPHORECTOMY, NODE DISSECTION, TUMOR DEBULKING, COMBINED Bilateral 4/19/2021    Procedure: HYSTERECTOMY, TOTAL,  ABDOMINAL, WITH BILATERAL SALPINGO-OOPHORECTOMY, omentectomy, NEOPLASM DEBULKING,Proctoscocy, RO, Resection of liver nodules, diaphragm stripping, immobilization of liver and colon;  Surgeon: Bolivar Juarez MD;  Location: [facility] OR     LAPAROSCOPY DIAGNOSTIC (GYN) Bilateral 1/7/2021    Procedure: Diagnsotic laparoscopy, biopsies;  Surgeon: Bolivar Juarez MD;  Location: [facility] OR     LASIK       TUBAL LIGATION           Health Maintenance Due   Topic Date Due     DEXA  Never done     ADVANCE CARE PLANNING  Never done     COLORECTAL CANCER SCREENING  Never done     HEPATITIS C SCREENING  Never done     LIPID  Never done     MEDICARE ANNUAL WELLNESS VISIT  11/11/2021     FALL RISK ASSESSMENT  Never done     COVID-19 Vaccine (4 - Booster for Pfizer series) 11/15/2021     Pneumococcal Vaccine: 65+ Years (2 - PPSV23 if available, else PCV20) 04/05/2022     INFLUENZA VACCINE (1) Never done     PHQ-2 (once per calendar year)  01/01/2023       Current Medications:     Current Outpatient Medications   Medication Sig Dispense Refill     amitriptyline (ELAVIL) 100 MG tablet Take 1 tablet (100 mg) by mouth At Bedtime 90 tablet 0     dexamethasone (DECADRON) 4 MG tablet Take 2 tablets (8 mg) by mouth daily Take for 3 days, starting the day after chemo. Take with food. 6 tablet 5     fluticasone (FLONASE) 50 MCG/ACT nasal spray        gabapentin (NEURONTIN) 600 MG tablet Take 1 tablet (600 mg) by mouth At Bedtime 90 tablet 0     meclizine (ANTIVERT) 25 MG tablet Take 1 tablet (25 mg) by mouth 3 times daily as needed for dizziness or nausea 15 tablet 0     ondansetron (ZOFRAN) 4 MG tablet Take by mouth every 8 hours as needed for nausea       ondansetron (ZOFRAN) 8 MG tablet Take 1 tablet (8 mg) by mouth every 8 hours as needed for nausea (vomiting) 30 tablet 2     oxyCODONE (ROXICODONE) 5 MG tablet Take 1 tablet (5 mg) by mouth every 12 hours 10 tablet 0     prochlorperazine (COMPAZINE) 10 MG tablet Take 1 tablet (10 mg) by  mouth every 6 hours as needed for nausea or vomiting 30 tablet 2     rivaroxaban ANTICOAGULANT (XARELTO ANTICOAGULANT) 20 MG TABS tablet Take 1 tablet (20 mg) by mouth every morning 90 tablet 1     SUMAtriptan (IMITREX) 100 MG tablet Take 1 tablet (100 mg) by mouth at onset of headache for migraine 15 tablet 0     valACYclovir (VALTREX) 1000 mg tablet Take 1,000 mg by mouth as needed       zolpidem (AMBIEN) 10 MG tablet Take 10 mg by mouth           Allergies:      No Known Allergies     Social History:     Social History     Tobacco Use     Smoking status: Former     Packs/day: 0.50     Years: 40.00     Pack years: 20.00     Types: Cigarettes     Quit date:      Years since quittin.2     Smokeless tobacco: Never   Vaping Use     Vaping status: Not on file   Substance Use Topics     Alcohol use: Not Currently       History   Drug Use Unknown         Family History:     The patient's family history is notable for     Family History   Adopted: Yes   Problem Relation Age of Onset     Cancer Mother 36     Other Cancer Mother         Bio mother  of  a female cancer  at 36     Factor V Leiden deficiency Daughter      Deep Vein Thrombosis Daughter      Diabetes Type 1 Daughter      Diabetes Daughter      Hypertension Daughter      Anesthesia Reaction No family hx of          Physical Exam:     There were no vitals taken for this visit.  There is no height or weight on file to calculate BMI.    General Appearance:  healthy and alert, no distress     Eyes:  Eyes grossly normal to inspection.  No discharge or erythema, or obvious scleral/conjunctival abnormalities.     Respiratory:     No audible wheeze, cough, or visible cyanosis.  No visible retractions or increased work of breathing.      Musculoskeletal:         extremities non tender and without edema     Skin:    no lesions or rashes on visible skin     Neurological:   normal gait, no gross defects                Psychiatric:      appropriate mood and  affect. 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 She is here today for a disease management visit by video.     45 minutes spent on the date of the encounter doing chart review, history and exam, documentation, and further activities as noted above.         Plan:     1.)   Recurrent metastatic ovarian high grade serous carcinoma. Encouraged 5-6 small high protein meals a day and discussed nausea management and control. Encouraged 64 oz of fluids per day. Discussed neutropenic precautions and rita period. Reviewed signs and symptoms for when she should contact the clinic or seek additional care. Patient to contact the clinic with any questions or concerns in the interim.  Pt has labs at [facility] 2-3 days prior to infusion.    Message sent to ISRRAEL Trinh to coordinate follow up needs.   CT CAP at [place] in about two weeks  3 weeks, lab and virtual [facility], infusion at [facility] (just in case infusion needed)      Disease/Treatment related SE:     - Neutropenia: previous ANC 1.2. Dr. Arnold decreased chemo doses and held Neulasta. C3 labs pending. If persistent neutropenia despite chemo dose reduction, will add Neulasta. Message sent to Jessica to sign treatment once outside labs reviewed 4/12/2023.  - Bone pain:  Reviewed ok to take Claritin prior to and a few days after treatment to prevent/treat myalgias. Encouraged APAP/Ibuprofen prn. Pt has 8 tablets of Dilaudid on hand and will call for refills prn.   - Neuropathy: increased tingling and numbness. Reviewed vitamin B 6 and L-glutamine today along with massage and acupuncture. No pain or issues with dexterity. Continue to monitor.   -Dry mouth: provided pt with options for treatment in her wrap up.         2.) Genetic risk factors were assessed and she is POSITIVE for a BRCA1 mutation.  This mutation is called [id number]. Referral for Waleska Lona with Cancer Risk management placed 5/2022.     3.) Labs and/or tests ordered include: labs pending      4.) Health maintenance issues addressed today include annual health maintenance and non-gynecologic issues with PCP.    5)        PE: pt continues on JERICHO Hampton, NP-BC  Women's Health Nurse Practitioner  Division of Gynecologic Oncology  [facility]        CC  Patient Care Team:  Bolivar Juarez MD as PCP - General (Gynecologic Oncology)  Marta Okeefe APRN CNP as Assigned PCP  Marta Lao APRN CNP as Assigned Cancer Care Provider  SELF, REFERRED

## 2023-04-11 ENCOUNTER — VIRTUAL VISIT (OUTPATIENT)
Dept: ONCOLOGY | Facility: CLINIC | Age: 68
End: 2023-04-11
Attending: OBSTETRICS & GYNECOLOGY
Payer: COMMERCIAL

## 2023-04-11 ENCOUNTER — PATIENT OUTREACH (OUTPATIENT)
Dept: ONCOLOGY | Facility: CLINIC | Age: 68
End: 2023-04-11

## 2023-04-11 DIAGNOSIS — Z51.11 ENCOUNTER FOR ANTINEOPLASTIC CHEMOTHERAPY: ICD-10-CM

## 2023-04-11 DIAGNOSIS — C56.9 OVARIAN CANCER, UNSPECIFIED LATERALITY (H): Primary | ICD-10-CM

## 2023-04-11 PROCEDURE — 99215 OFFICE O/P EST HI 40 MIN: CPT | Mod: VID | Performed by: OBSTETRICS & GYNECOLOGY

## 2023-04-11 PROCEDURE — G0463 HOSPITAL OUTPT CLINIC VISIT: HCPCS | Mod: PN,GT | Performed by: OBSTETRICS & GYNECOLOGY

## 2023-04-11 NOTE — LETTER
2023         RE: Taran Regalado  [address]  [place] [address]        Dear Colleague,    Thank you for referring your patient, Taran Regalado, to the [facility]. Please see a copy of my visit note below.    Virtual Visit Details    Type of service:  Video Visit   Video Start Time: 1425  Video End Time: 1442    Originating Location (pt. 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 She is here today for follow up and disease management by video.     Oncology History:  12/3/2020: US Pelvic: IMPRESSION: Limited examination due to acoustic windows. Possible left adnexal mass. A CT scan of the abdomen and pelvis with contrast is recommended for further assessment.     2020: CT A/P:   IMPRESSION:    Peritoneal carcinomatosis with masslike peritoneal thickening in the lower pelvis which may indicate an adnexal or ovarian primary malignancy. Large volume ascites. Bilateral pleural effusions. There is potential subtle pleural nodularity in the right hemithorax which could indicate metastatic disease.  Indeterminate 1 cm lesion in the right hepatic lobe suspicious for a metastatic lesion.      2020: Presented to [facility] with abdominal distention, 25lb weight loss, and CTAP with carcinomatosis, elevated  3098.     2020: CT Chest: IMPRESSION:   1. There are few scattered small sub-6 mm pulmonary nodules which are indeterminate without prior comparisons available. There are a few  slightly larger perifissural nodules which are technically  indeterminate in the setting of malignancy although presumed lymphatic in nature and of unlikely clinical  significance. Attention on follow-up is recommended.  2. Small to moderate left and small right pleural effusions are increased in size from prior. No convincing evidence for pleural nodularity.  3. Partially visualized large volume ascites and peritoneal nodularity in the upper abdomen similar to 12/4/2020 outside CT      12/26/2020: ED for abdominal distension; 3 L ascites drained with paracentesis    Pelvic US: Findings: Free fluid present in LLQ      12/31/2020: US Paracentesis: 900 mL ascites drained     1/7/2021: Diagnotic laparoscopy, biopsies  Pathology: FINAL DIAGNOSIS:   A. PERITONEUM, BIOPSIES:   - Positive for high grade carcinoma, consistent with metastatic carcinoma of Mullerian origin.     1/10-1/13/2021: Hospital admission for postoperative non-intractable vomiting and nausea.      1/10/2021: CT A/P: IMPRESSION: Extensive ascites which is probably malignant. Scattered liver hypodensities of indeterminate etiology comment cannot exclude metastatic disease. Diverticulosis. Fluid-filled adnexal masses and irregular appearance of uterus, which may represent primary neoplasm. Multiple peritoneal nodules. Large amount of fecal material in the colon with no evidence of small bowel obstruction.     Plan: Paclitaxel 175 mg/m2 and carboplatin AUC 6 x 3 cycles followed by a CT CAP and visit with Dr. Juarez.     1/12/2021: Cycle 1 paclitaxel and carboplatin while inpatient     1/13/2021: CT Head: Impression:  1. Chronic sinusitis of the right maxillary and right sphenoid sinuses.  2. Incidental presumed calcified meningioma in the right frontal  convexity without significant mass effect.  3. No suspicious intracranial enhancing lesion.     2/1/2021: Cycle 2 paclitaxel and carboplatin.  936.     2/3-2/5/21: Admission [facility] for afib w/ RVR and new PE     2/26/21: Cycle 3 paclitaxel and carboplatin planned.  Deferred due to thrombocytopenia.  pending.     3/15/21: Cycle 3 paclitaxel and  carboplatin given     4/19/21: HYSTERECTOMY, TOTAL, ABDOMINAL, WITH BILATERAL SALPINGO-OOPHORECTOMY, omentectomy, NEOPLASM DEBULKING,Proctoscocy, RO, Resection of liver nodules, diaphragm stripping, immobilization of liver and colon  FINAL DIAGNOSIS:   A. OMENTUM, BIOPSY:   - Omental adipose tissue with rare viable cells of metastatic high grade   serous carcinoma   - One reactive lymph node, negative for malignancy (0/1)   B. NODULE, SIGMOID, EXCISION:   - Calcified necrotic adipose tissue   - Negative for malignancy   C. NODULE, SMALL BOWEL MESENTERY, EXCISION:   - Fibroadipose tissue, positive for metastatic high grade serous carcinoma   D. UTERUS, CERVIX, BILATERAL FALLOPIAN TUBES AND OVARIES, HYSTERECTOMY   WITH BILATERAL SALPINGO-OOPHORECTOMY:   - Atrophic endometrium   - Uterine serosa with rare viable cells consistent with high grade serous   carcinoma   - Cervix with atrophic changes   - Viable cells consistent with high grade serous carcinoma present in the   right ovary, serosa of right   fallopian tube and right periadnexal soft tissue   - Left ovary with atrophic changes   - Left fallopian tube with a rare focus of serous tubal in-situ carcinoma   (STIC)   E. NODULES, SMALL BOWEL MESENTRY, EXCISION:   - Fibroadipose tissue with rare viable cells of metastatic high grade   serous carcinoma   F. NODULE, SPLENIC FLEXURE TRANSVERSE COLON, EXCISION:   - Fibroadipose tissue with rare viable cells of metastatic high grade   serous carcinoma   - Accessory splenule, negative for malignancy   G. OMENTUM, OMENTECTOMY:   - Omental adipose tissue with rare viable cells of metastatic high grade   serous carcinoma   H. NODULE, PERITONEAL PANCREATIC, EXCISION:   - Fibrous adhesions with inflammation   - Negative for malignancy   I. RIGHT HEMIDIAPHRAGM PERITONEUM, EXCISION:   - Fibrous adhesions with inflammation   - Negative for malignancy   J. RIGHT LIVER SURFACE NODULE:   - Fibrous adhesions with benign inclusion  glands   - Negative for malignancy   K. LEFT LOWER LIVER EDGE, BIOPSY:   - Cauterized hepatic parenchyma and capsule   - Negative for malignancy   L. NODULE, SMALL BOWEL MESENTERIC #3, EXCISION:   - Fibroadipose tissue with rare viable cells of metastatic high grade   serous carcinoma       Plan: Carboplatin AUC 6 + Taxol 175 mg/m2 x 3 cycles, then transition to Parp inhibitor for maintenance therapy given her BRCA1 germline mutation.      5/21/21: Cycle 4 carboplatin and paclitaxel.   172.  6/11/21: Cycle 5 carboplatin and paclitaxel.   61.  7/2/21: Cycle 6 carboplatin and paclitaxel.   20.        7/28/21 plan: Olaparib 300mg bid as starting dose,  14  8/20/21: start date olaparib 300 mg BID,  12  9/13/21:  22  10/4/21:  23  11/1/21:  26     11/02/2021: PET CT: IMPRESSION:   Findings compatible with interval surgery and posttreatment change.  No gross definitive FDG avid disease.  Potential foci of tumor deposits along the anterior dome of the liver and midline abdominal wall surgical scar.  Colonic activity is not necessarily abnormal, however, given the previous carcinomatosis the colonic activity is indeterminant.         12/1/21:  23  1/3/22:  21  2/1/22:  20  3/1/22:  21  4/1/22:  23  5/4/22:  28     EXAM: CT CHEST/ABDOMEN/PELVIS W CONTRAST  LOCATION: [facility]  DATE/TIME: 7/11/2022 1:25 PM                                                                   IMPRESSION:  1.  Sliver of ascites in the upper abdomen has decreased since interval debulking surgery.  2.  There is a punctate nodule in the gastrosplenic ligament which is minimally more plump relative to the preop exam. This will need to be followed.  3.  Minimal nodular changes to the left of the midline scar in the subcutaneous fat will have to be followed as well. Unclear if this simply reflects postoperative scarring or could reflect an early  incisional recurrence.  4.  No other sites to suggest recurrent tumor. Vaginal cuff is normal.  5.  Extensive distal colonic diverticulosis.  6.  Other noncritical findings as noted above.      9/16/22  98     1/30/23 CT CAP:    IMPRESSION:  1.  Multiple new, hypoattenuating lesions in the liver, suspicious for hepatic metastatic disease.  2.  Necrotic mario hepatic lymphadenopathy, concerning for richard metastatic disease.  3.  There is a new or increasingly conspicuous 6 mm soft tissue nodule in the right lower quadrant. This is indeterminate.  4.  There is a new 3 mm solid nodule in the right upper lobe, indeterminate.  5.  Stable approximate 4 mm punctate nodule along the gastrosplenic ligament.  6.  Similar area of linear free fluid in the upper abdomen anterior to the stomach.    Plan: Paclitaxel 175 mg/m2, Carboplatin AUC 6, bevacizumab 7.5 mg/kg    3/1/23: Cycle #1 Paclitaxel 175 mg/m2, Carboplatin AUC 6 (C7), bevacizumab 7.5 mg/kg planned.  1,196  3/24/23: Cycle #2 Paclitaxel 150 mg/m2, Carboplatin AUC 5 (C8), bevacizumab 15 mg/kg planned.  558    3/29/23: ER for dehydration and hypotension. Normotensive in ER. IV fluids given. Discharged.     4/14/23: Cycle #3 Paclitaxel 150 mg/m2, Carboplatin AUC 5 (C9), bevacizumab 15 mg/kg planned.  pending              Since the ER, pt reports that she is well. Pt states that she  has had long term lifelong low BP. Eating and drinking well. Pt reports cycle 2 was the same as cycle 1. Took Claritin and APAP for bone pain. Still has Dilaudid at home to use prn. Has numbness at top of left hand and left thumb intermittent usually at nights. Resolves after five minutes. Has tingling at toes. No pain.     Having dry mouth. Has tried Biotin over the counter without improvement. Has a tooth paste from dentist   Drinking 64 oz per day. Reports migraines that are controlled with Imitrex. Denies abdominal pain or bloating. Tolerating PO intake. Denies  nausea and vomiting. Denies SOB/CP. No fevers or chills. Does report tingling in the feet that is chronic and not changed. She denies any vaginal bleeding, no changes in her bowel or bladder habits, no lower extremity edema, and no difficulties eating or sleeping. She denies  fevers or chills, and no chest pain or shortness of breath. Regular bowel movements daily. Pt continues on Xarleto for prior PE.                 Review of Systems:    Systemic           no weight changes; no fever; no chills; no night sweats; no appetite changes  Skin           no rashes, or lesions  Eye           no irritation; no changes in vision  Allen-Laryngeal           no dysphagia; no hoarseness   Pulmonary    no cough; no shortness of breath  Cardiovascular    no chest pain; no palpitations  Gastrointestinal    no diarrhea; no constipation; no abdominal pain; no changes in bowel  habits; no blood in stool  Genitourinary   no urinary frequency; no urinary urgency; no dysuria; no pain; no abnormal vaginal discharge; no abnormal vaginal bleeding  Breast   no breast discharge; no breast changes; no breast pain  Musculoskeletal    + myalgias; no arthralgias; no back pain  Psychiatric           no depressed mood; no anxiety    Hematologic           no tender lymph nodes; no noticeable swellings or lumps   Endocrine    no hot flashes; no heat/cold intolerance         Neurological   no tremor; + numbness and tingling; no headaches; no difficulty sleeping      Past Medical History:    Past Medical History:   Diagnosis Date     Atrial fibrillation with rapid ventricular response (H)      History of cold sores      Hx of LASIK 12/11/2017     Insomnia      Migraine      Osteopenia      Pelvic mass      Peritoneal carcinomatosis (H)      Restless legs syndrome (RLS)          Past Surgical History:    Past Surgical History:   Procedure Laterality Date     APPENDECTOMY       ARTHROSCPY KNEE SURGICAL DEBRIDEMENT SHAVING ARTICULAR CARTILAGE Right       BIOPSY  January 2021    Biopsy to confirm ovarian cancer     DEBRIDEMENT LEFT UPPER EXTREMITY  2016     HYSTERECTOMY TOTAL ABD, LUISITO SALPINGO-OOPHORECTOMY, NODE DISSECTION, TUMOR DEBULKING, COMBINED Bilateral 4/19/2021    Procedure: HYSTERECTOMY, TOTAL, ABDOMINAL, WITH BILATERAL SALPINGO-OOPHORECTOMY, omentectomy, NEOPLASM DEBULKING,Proctoscocy, RO, Resection of liver nodules, diaphragm stripping, immobilization of liver and colon;  Surgeon: Bolivar Juarez MD;  Location: [facility] OR     LAPAROSCOPY DIAGNOSTIC (GYN) Bilateral 1/7/2021    Procedure: Diagnsotic laparoscopy, biopsies;  Surgeon: Bolivar Juarez MD;  Location: [facility] OR     LASIK       TUBAL LIGATION           Health Maintenance Due   Topic Date Due     DEXA  Never done     ADVANCE CARE PLANNING  Never done     COLORECTAL CANCER SCREENING  Never done     HEPATITIS C SCREENING  Never done     LIPID  Never done     MEDICARE ANNUAL WELLNESS VISIT  11/11/2021     FALL RISK ASSESSMENT  Never done     COVID-19 Vaccine (4 - Booster for Pfizer series) 11/15/2021     Pneumococcal Vaccine: 65+ Years (2 - PPSV23 if available, else PCV20) 04/05/2022     INFLUENZA VACCINE (1) Never done     PHQ-2 (once per calendar year)  01/01/2023       Current Medications:     Current Outpatient Medications   Medication Sig Dispense Refill     amitriptyline (ELAVIL) 100 MG tablet Take 1 tablet (100 mg) by mouth At Bedtime 90 tablet 0     dexamethasone (DECADRON) 4 MG tablet Take 2 tablets (8 mg) by mouth daily Take for 3 days, starting the day after chemo. Take with food. 6 tablet 5     fluticasone (FLONASE) 50 MCG/ACT nasal spray        gabapentin (NEURONTIN) 600 MG tablet Take 1 tablet (600 mg) by mouth At Bedtime 90 tablet 0     meclizine (ANTIVERT) 25 MG tablet Take 1 tablet (25 mg) by mouth 3 times daily as needed for dizziness or nausea 15 tablet 0     ondansetron (ZOFRAN) 4 MG tablet Take by mouth every 8 hours as needed for nausea       ondansetron (ZOFRAN) 8 MG tablet Take  1 tablet (8 mg) by mouth every 8 hours as needed for nausea (vomiting) 30 tablet 2     oxyCODONE (ROXICODONE) 5 MG tablet Take 1 tablet (5 mg) by mouth every 12 hours 10 tablet 0     prochlorperazine (COMPAZINE) 10 MG tablet Take 1 tablet (10 mg) by mouth every 6 hours as needed for nausea or vomiting 30 tablet 2     rivaroxaban ANTICOAGULANT (XARELTO ANTICOAGULANT) 20 MG TABS tablet Take 1 tablet (20 mg) by mouth every morning 90 tablet 1     SUMAtriptan (IMITREX) 100 MG tablet Take 1 tablet (100 mg) by mouth at onset of headache for migraine 15 tablet 0     valACYclovir (VALTREX) 1000 mg tablet Take 1,000 mg by mouth as needed       zolpidem (AMBIEN) 10 MG tablet Take 10 mg by mouth           Allergies:      No Known Allergies     Social History:     Social History     Tobacco Use     Smoking status: Former     Packs/day: 0.50     Years: 40.00     Pack years: 20.00     Types: Cigarettes     Quit date:      Years since quittin.2     Smokeless tobacco: Never   Vaping Use     Vaping status: Not on file   Substance Use Topics     Alcohol use: Not Currently       History   Drug Use Unknown         Family History:     The patient's family history is notable for     Family History   Adopted: Yes   Problem Relation Age of Onset     Cancer Mother 36     Other Cancer Mother         Bio mother  of  a female cancer  at 36     Factor V Leiden deficiency Daughter      Deep Vein Thrombosis Daughter      Diabetes Type 1 Daughter      Diabetes Daughter      Hypertension Daughter      Anesthesia Reaction No family hx of          Physical Exam:     There were no vitals taken for this visit.  There is no height or weight on file to calculate BMI.    General Appearance:  healthy and alert, no distress     Eyes:  Eyes grossly normal to inspection.  No discharge or erythema, or obvious scleral/conjunctival abnormalities.     Respiratory:     No audible wheeze, cough, or visible cyanosis.  No visible retractions or increased  work of breathing.      Musculoskeletal:         extremities non tender and without edema     Skin:    no lesions or rashes on visible skin     Neurological:   normal gait, no gross defects                Psychiatric:      appropriate mood and affect. 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 She is here today for a disease management visit by video.     45 minutes spent on the date of the encounter doing chart review, history and exam, documentation, and further activities as noted above.         Plan:     1.)   Recurrent metastatic ovarian high grade serous carcinoma. Encouraged 5-6 small high protein meals a day and discussed nausea management and control. Encouraged 64 oz of fluids per day. Discussed neutropenic precautions and rita period. Reviewed signs and symptoms for when she should contact the clinic or seek additional care. Patient to contact the clinic with any questions or concerns in the interim.  Pt has labs at [facility] 2-3 days prior to infusion.    Message sent to ISRRAEL Trinh to coordinate follow up needs.   CT CAP at [facility] in about two weeks  3 weeks, lab and virtual [facility], infusion at [facility] (just in case infusion needed)      Disease/Treatment related SE:     - Neutropenia: previous ANC 1.2. Dr. Arnold decreased chemo doses and held Neulasta. C3 labs pending. If persistent neutropenia despite chemo dose reduction, will add Neulasta. Message sent to Jessica to sign treatment once outside labs reviewed 4/12/2023.  - Bone pain:  Reviewed ok to take Claritin prior to and a few days after treatment to prevent/treat myalgias. Encouraged APAP/Ibuprofen prn. Pt has 8 tablets of Dilaudid on hand and will call for refills prn.   - Neuropathy:  increased tingling and numbness. Reviewed vitamin B 6 and L-glutamine today along with massage and acupuncture. No pain or issues with dexterity. Continue to monitor.   -Dry mouth: provided pt with options for treatment in her wrap up.        2.) Genetic risk factors were assessed and she is POSITIVE for a BRCA1 mutation.  This mutation is called [id number]. Referral for Waleska Zamorano with Cancer Risk management placed 5/2022.     3.) Labs and/or tests ordered include: labs pending      4.) Health maintenance issues addressed today include annual health maintenance and non-gynecologic issues with PCP.    5)        PE: pt continues on JERICHO Hampton NP-BC  Women's Health Nurse Practitioner  Division of Gynecologic Oncology  [facility]        CC  Patient Care Team:  Bolivar Juarez MD as PCP - General (Gynecologic Oncology)  Marta Okeefe APRN CNP as Assigned PCP  Marta Lao APRN CNP as Assigned Cancer Care Provider  SELF, REFERRED        Again, thank you for allowing me to participate in the care of your patient.        Sincerely,        JERICHO Kitchen CNP

## 2023-04-11 NOTE — NURSING NOTE
Is the patient currently in the state of [place]? YES    Visit mode:VIDEO    If the visit is dropped, the patient can be reconnected by: VIDEO VISIT: Text to cell phone: [phone]    Will anyone else be joining the visit? NO      How would you like to obtain your AVS? MyChart    Are changes needed to the allergy or medication list? NO    Reason for visit: return video visit    Zunilda Trinidad, АНДРЕЙ

## 2023-04-11 NOTE — LETTER
2023         RE: Taran Regalado  [address]  [place] [address]        Dear Colleague,    Thank you for referring your patient, Taran Regalado, to the [facility]. Please see a copy of my visit note below.    Virtual Visit Details    Type of service:  Video Visit   Video Start Time: 1425  Video End Time: 1442    Originating Location (pt. Location): home     Distant Location (provider location):  [facility]  Platform used for Video Visit: 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 She is here today for follow up and disease management by video.     Oncology History:  12/3/2020: US Pelvic: IMPRESSION: Limited examination due to acoustic windows. Possible left adnexal mass. A CT scan of the abdomen and pelvis with contrast is recommended for further assessment.     2020: CT A/P:   IMPRESSION:    Peritoneal carcinomatosis with masslike peritoneal thickening in the lower pelvis which may indicate an adnexal or ovarian primary malignancy. Large volume ascites. Bilateral pleural effusions. There is potential subtle pleural nodularity in the right hemithorax which could indicate metastatic disease.  Indeterminate 1 cm lesion in the right hepatic lobe suspicious for a metastatic lesion.      2020: Presented to [facility] with abdominal distention, 25lb weight loss, and CTAP with carcinomatosis, elevated  3098.     2020: CT Chest: IMPRESSION:   1. There are few scattered small sub-6 mm pulmonary nodules which are indeterminate without prior comparisons available. There are a few  slightly larger perifissural nodules which are technically  indeterminate in the setting of malignancy although presumed lymphatic in nature and of unlikely clinical  significance. Attention on follow-up is recommended.  2. Small to moderate left and small right pleural effusions are increased in size from prior. No convincing evidence for pleural nodularity.  3. Partially visualized large volume ascites and peritoneal nodularity in the upper abdomen similar to 12/4/2020 outside CT      12/26/2020: ED for abdominal distension; 3 L ascites drained with paracentesis    Pelvic US: Findings: Free fluid present in LLQ      12/31/2020: US Paracentesis: 900 mL ascites drained     1/7/2021: Diagnotic laparoscopy, biopsies  Pathology: FINAL DIAGNOSIS:   A. PERITONEUM, BIOPSIES:   - Positive for high grade carcinoma, consistent with metastatic carcinoma of Mullerian origin.     1/10-1/13/2021: Hospital admission for postoperative non-intractable vomiting and nausea.      1/10/2021: CT A/P: IMPRESSION: Extensive ascites which is probably malignant. Scattered liver hypodensities of indeterminate etiology comment cannot exclude metastatic disease. Diverticulosis. Fluid-filled adnexal masses and irregular appearance of uterus, which may represent primary neoplasm. Multiple peritoneal nodules. Large amount of fecal material in the colon with no evidence of small bowel obstruction.     Plan: Paclitaxel 175 mg/m2 and carboplatin AUC 6 x 3 cycles followed by a CT CAP and visit with Dr. Juarez.     1/12/2021: Cycle 1 paclitaxel and carboplatin while inpatient     1/13/2021: CT Head: Impression:  1. Chronic sinusitis of the right maxillary and right sphenoid sinuses.  2. Incidental presumed calcified meningioma in the right frontal  convexity without significant mass effect.  3. No suspicious intracranial enhancing lesion.     2/1/2021: Cycle 2 paclitaxel and carboplatin.  936.     2/3-2/5/21: Admission [facility] for afib w/ RVR and new PE     2/26/21: Cycle 3 paclitaxel and carboplatin planned.  Deferred due to thrombocytopenia.  pending.     3/15/21: Cycle 3 paclitaxel and  carboplatin given     4/19/21: HYSTERECTOMY, TOTAL, ABDOMINAL, WITH BILATERAL SALPINGO-OOPHORECTOMY, omentectomy, NEOPLASM DEBULKING,Proctoscocy, RO, Resection of liver nodules, diaphragm stripping, immobilization of liver and colon  FINAL DIAGNOSIS:   A. OMENTUM, BIOPSY:   - Omental adipose tissue with rare viable cells of metastatic high grade   serous carcinoma   - One reactive lymph node, negative for malignancy (0/1)   B. NODULE, SIGMOID, EXCISION:   - Calcified necrotic adipose tissue   - Negative for malignancy   C. NODULE, SMALL BOWEL MESENTERY, EXCISION:   - Fibroadipose tissue, positive for metastatic high grade serous carcinoma   D. UTERUS, CERVIX, BILATERAL FALLOPIAN TUBES AND OVARIES, HYSTERECTOMY   WITH BILATERAL SALPINGO-OOPHORECTOMY:   - Atrophic endometrium   - Uterine serosa with rare viable cells consistent with high grade serous   carcinoma   - Cervix with atrophic changes   - Viable cells consistent with high grade serous carcinoma present in the   right ovary, serosa of right   fallopian tube and right periadnexal soft tissue   - Left ovary with atrophic changes   - Left fallopian tube with a rare focus of serous tubal in-situ carcinoma   (STIC)   E. NODULES, SMALL BOWEL MESENTRY, EXCISION:   - Fibroadipose tissue with rare viable cells of metastatic high grade   serous carcinoma   F. NODULE, SPLENIC FLEXURE TRANSVERSE COLON, EXCISION:   - Fibroadipose tissue with rare viable cells of metastatic high grade   serous carcinoma   - Accessory splenule, negative for malignancy   G. OMENTUM, OMENTECTOMY:   - Omental adipose tissue with rare viable cells of metastatic high grade   serous carcinoma   H. NODULE, PERITONEAL PANCREATIC, EXCISION:   - Fibrous adhesions with inflammation   - Negative for malignancy   I. RIGHT HEMIDIAPHRAGM PERITONEUM, EXCISION:   - Fibrous adhesions with inflammation   - Negative for malignancy   J. RIGHT LIVER SURFACE NODULE:   - Fibrous adhesions with benign inclusion  glands   - Negative for malignancy   K. LEFT LOWER LIVER EDGE, BIOPSY:   - Cauterized hepatic parenchyma and capsule   - Negative for malignancy   L. NODULE, SMALL BOWEL MESENTERIC #3, EXCISION:   - Fibroadipose tissue with rare viable cells of metastatic high grade   serous carcinoma       Plan: Carboplatin AUC 6 + Taxol 175 mg/m2 x 3 cycles, then transition to Parp inhibitor for maintenance therapy given her BRCA1 germline mutation.      5/21/21: Cycle 4 carboplatin and paclitaxel.   172.  6/11/21: Cycle 5 carboplatin and paclitaxel.   61.  7/2/21: Cycle 6 carboplatin and paclitaxel.   20.        7/28/21 plan: Olaparib 300mg bid as starting dose,  14  8/20/21: start date olaparib 300 mg BID,  12  9/13/21:  22  10/4/21:  23  11/1/21:  26     11/02/2021: PET CT: IMPRESSION:   Findings compatible with interval surgery and posttreatment change.  No gross definitive FDG avid disease.  Potential foci of tumor deposits along the anterior dome of the liver and midline abdominal wall surgical scar.  Colonic activity is not necessarily abnormal, however, given the previous carcinomatosis the colonic activity is indeterminant.         12/1/21:  23  1/3/22:  21  2/1/22:  20  3/1/22:  21  4/1/22:  23  5/4/22:  28     EXAM: CT CHEST/ABDOMEN/PELVIS W CONTRAST  LOCATION: [facility]  DATE/TIME: 7/11/2022 1:25 PM                                                                   IMPRESSION:  1.  Sliver of ascites in the upper abdomen has decreased since interval debulking surgery.  2.  There is a punctate nodule in the gastrosplenic ligament which is minimally more plump relative to the preop exam. This will need to be followed.  3.  Minimal nodular changes to the left of the midline scar in the subcutaneous fat will have to be followed as well. Unclear if this simply reflects postoperative scarring or could reflect an early  incisional recurrence.  4.  No other sites to suggest recurrent tumor. Vaginal cuff is normal.  5.  Extensive distal colonic diverticulosis.  6.  Other noncritical findings as noted above.      9/16/22  98     1/30/23 CT CAP:    IMPRESSION:  1.  Multiple new, hypoattenuating lesions in the liver, suspicious for hepatic metastatic disease.  2.  Necrotic mario hepatic lymphadenopathy, concerning for richard metastatic disease.  3.  There is a new or increasingly conspicuous 6 mm soft tissue nodule in the right lower quadrant. This is indeterminate.  4.  There is a new 3 mm solid nodule in the right upper lobe, indeterminate.  5.  Stable approximate 4 mm punctate nodule along the gastrosplenic ligament.  6.  Similar area of linear free fluid in the upper abdomen anterior to the stomach.    Plan: Paclitaxel 175 mg/m2, Carboplatin AUC 6, bevacizumab 7.5 mg/kg    3/1/23: Cycle #1 Paclitaxel 175 mg/m2, Carboplatin AUC 6 (C7), bevacizumab 7.5 mg/kg planned.  1,196  3/24/23: Cycle #2 Paclitaxel 150 mg/m2, Carboplatin AUC 5 (C8), bevacizumab 15 mg/kg planned.  558    3/29/23: ER for dehydration and hypotension. Normotensive in ER. IV fluids given. Discharged.     4/14/23: Cycle #3 Paclitaxel 150 mg/m2, Carboplatin AUC 5 (C9), bevacizumab 15 mg/kg planned.  pending              Since the ER, pt reports that she is well. Pt states that she  has had long term lifelong low BP. Eating and drinking well. Pt reports cycle 2 was the same as cycle 1. Took Claritin and APAP for bone pain. Still has Dilaudid at home to use prn. Has numbness at top of left hand and left thumb intermittent usually at nights. Resolves after five minutes. Has tingling at toes. No pain.     Having dry mouth. Has tried Biotin over the counter without improvement. Has a tooth paste from dentist   Drinking 64 oz per day. Reports migraines that are controlled with Imitrex. Denies abdominal pain or bloating. Tolerating PO intake. Denies  nausea and vomiting. Denies SOB/CP. No fevers or chills. Does report tingling in the feet that is chronic and not changed. She denies any vaginal bleeding, no changes in her bowel or bladder habits, no lower extremity edema, and no difficulties eating or sleeping. She denies  fevers or chills, and no chest pain or shortness of breath. Regular bowel movements daily. Pt continues on Xarleto for prior PE.                 Review of Systems:    Systemic           no weight changes; no fever; no chills; no night sweats; no appetite changes  Skin           no rashes, or lesions  Eye           no irritation; no changes in vision  Allen-Laryngeal           no dysphagia; no hoarseness   Pulmonary    no cough; no shortness of breath  Cardiovascular    no chest pain; no palpitations  Gastrointestinal    no diarrhea; no constipation; no abdominal pain; no changes in bowel  habits; no blood in stool  Genitourinary   no urinary frequency; no urinary urgency; no dysuria; no pain; no abnormal vaginal discharge; no abnormal vaginal bleeding  Breast   no breast discharge; no breast changes; no breast pain  Musculoskeletal    + myalgias; no arthralgias; no back pain  Psychiatric           no depressed mood; no anxiety    Hematologic           no tender lymph nodes; no noticeable swellings or lumps   Endocrine    no hot flashes; no heat/cold intolerance         Neurological   no tremor; + numbness and tingling; no headaches; no difficulty sleeping      Past Medical History:    Past Medical History:   Diagnosis Date     Atrial fibrillation with rapid ventricular response (H)      History of cold sores      Hx of LASIK 12/11/2017     Insomnia      Migraine      Osteopenia      Pelvic mass      Peritoneal carcinomatosis (H)      Restless legs syndrome (RLS)          Past Surgical History:    Past Surgical History:   Procedure Laterality Date     APPENDECTOMY       ARTHROSCPY KNEE SURGICAL DEBRIDEMENT SHAVING ARTICULAR CARTILAGE Right       BIOPSY  January 2021    Biopsy to confirm ovarian cancer     DEBRIDEMENT LEFT UPPER EXTREMITY  2016     HYSTERECTOMY TOTAL ABD, LUISITO SALPINGO-OOPHORECTOMY, NODE DISSECTION, TUMOR DEBULKING, COMBINED Bilateral 4/19/2021    Procedure: HYSTERECTOMY, TOTAL, ABDOMINAL, WITH BILATERAL SALPINGO-OOPHORECTOMY, omentectomy, NEOPLASM DEBULKING,Proctoscocy, RO, Resection of liver nodules, diaphragm stripping, immobilization of liver and colon;  Surgeon: Bolivar Juarez MD;  Location: [facility] OR     LAPAROSCOPY DIAGNOSTIC (GYN) Bilateral 1/7/2021    Procedure: Diagnsotic laparoscopy, biopsies;  Surgeon: Bolivar Juarez MD;  Location: [facility] OR     LASIK       TUBAL LIGATION           Health Maintenance Due   Topic Date Due     DEXA  Never done     ADVANCE CARE PLANNING  Never done     COLORECTAL CANCER SCREENING  Never done     HEPATITIS C SCREENING  Never done     LIPID  Never done     MEDICARE ANNUAL WELLNESS VISIT  11/11/2021     FALL RISK ASSESSMENT  Never done     COVID-19 Vaccine (4 - Booster for Pfizer series) 11/15/2021     Pneumococcal Vaccine: 65+ Years (2 - PPSV23 if available, else PCV20) 04/05/2022     INFLUENZA VACCINE (1) Never done     PHQ-2 (once per calendar year)  01/01/2023       Current Medications:     Current Outpatient Medications   Medication Sig Dispense Refill     amitriptyline (ELAVIL) 100 MG tablet Take 1 tablet (100 mg) by mouth At Bedtime 90 tablet 0     dexamethasone (DECADRON) 4 MG tablet Take 2 tablets (8 mg) by mouth daily Take for 3 days, starting the day after chemo. Take with food. 6 tablet 5     fluticasone (FLONASE) 50 MCG/ACT nasal spray        gabapentin (NEURONTIN) 600 MG tablet Take 1 tablet (600 mg) by mouth At Bedtime 90 tablet 0     meclizine (ANTIVERT) 25 MG tablet Take 1 tablet (25 mg) by mouth 3 times daily as needed for dizziness or nausea 15 tablet 0     ondansetron (ZOFRAN) 4 MG tablet Take by mouth every 8 hours as needed for nausea       ondansetron (ZOFRAN) 8 MG tablet Take  1 tablet (8 mg) by mouth every 8 hours as needed for nausea (vomiting) 30 tablet 2     oxyCODONE (ROXICODONE) 5 MG tablet Take 1 tablet (5 mg) by mouth every 12 hours 10 tablet 0     prochlorperazine (COMPAZINE) 10 MG tablet Take 1 tablet (10 mg) by mouth every 6 hours as needed for nausea or vomiting 30 tablet 2     rivaroxaban ANTICOAGULANT (XARELTO ANTICOAGULANT) 20 MG TABS tablet Take 1 tablet (20 mg) by mouth every morning 90 tablet 1     SUMAtriptan (IMITREX) 100 MG tablet Take 1 tablet (100 mg) by mouth at onset of headache for migraine 15 tablet 0     valACYclovir (VALTREX) 1000 mg tablet Take 1,000 mg by mouth as needed       zolpidem (AMBIEN) 10 MG tablet Take 10 mg by mouth           Allergies:      No Known Allergies     Social History:     Social History     Tobacco Use     Smoking status: Former     Packs/day: 0.50     Years: 40.00     Pack years: 20.00     Types: Cigarettes     Quit date:      Years since quittin.2     Smokeless tobacco: Never   Vaping Use     Vaping status: Not on file   Substance Use Topics     Alcohol use: Not Currently       History   Drug Use Unknown         Family History:     The patient's family history is notable for     Family History   Adopted: Yes   Problem Relation Age of Onset     Cancer Mother 36     Other Cancer Mother         Bio mother  of  a female cancer  at 36     Factor V Leiden deficiency Daughter      Deep Vein Thrombosis Daughter      Diabetes Type 1 Daughter      Diabetes Daughter      Hypertension Daughter      Anesthesia Reaction No family hx of          Physical Exam:     There were no vitals taken for this visit.  There is no height or weight on file to calculate BMI.    General Appearance:  healthy and alert, no distress     Eyes:  Eyes grossly normal to inspection.  No discharge or erythema, or obvious scleral/conjunctival abnormalities.     Respiratory:     No audible wheeze, cough, or visible cyanosis.  No visible retractions or increased  work of breathing.      Musculoskeletal:         extremities non tender and without edema     Skin:    no lesions or rashes on visible skin     Neurological:   normal gait, no gross defects                Psychiatric:      appropriate mood and affect. 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 She is here today for a disease management visit by video.     45 minutes spent on the date of the encounter doing chart review, history and exam, documentation, and further activities as noted above.         Plan:     1.)   Recurrent metastatic ovarian high grade serous carcinoma. Encouraged 5-6 small high protein meals a day and discussed nausea management and control. Encouraged 64 oz of fluids per day. Discussed neutropenic precautions and rita period. Reviewed signs and symptoms for when she should contact the clinic or seek additional care. Patient to contact the clinic with any questions or concerns in the interim.  Pt has labs at [facility] 2-3 days prior to infusion.    Message sent to ISRRAEL Trinh to coordinate follow up needs.   CT CAP at [place] in about two weeks  3 weeks, lab and virtual [facility], infusion at [facility] (just in case infusion needed)      Disease/Treatment related SE:     - Neutropenia: previous ANC 1.2. Dr. Arnold decreased chemo doses and held Neulasta. C3 labs pending. If persistent neutropenia despite chemo dose reduction, will add Neulasta. Message sent to Jessica to sign treatment once outside labs reviewed 4/12/2023.  - Bone pain:  Reviewed ok to take Claritin prior to and a few days after treatment to prevent/treat myalgias. Encouraged APAP/Ibuprofen prn. Pt has 8 tablets of Dilaudid on hand and will call for refills prn.   - Neuropathy:  increased tingling and numbness. Reviewed vitamin B 6 and L-glutamine today along with massage and acupuncture. No pain or issues with dexterity. Continue to monitor.   -Dry mouth: provided pt with options for treatment in her wrap up.        2.) Genetic risk factors were assessed and she is POSITIVE for a BRCA1 mutation.  This mutation is called [id number]. Referral for Waleska Zamorano with Cancer Risk management placed 5/2022.     3.) Labs and/or tests ordered include: labs pending      4.) Health maintenance issues addressed today include annual health maintenance and non-gynecologic issues with PCP.    5)        PE: pt continues on JERICHO Hampton NP-BC  Women's Health Nurse Practitioner  Division of Gynecologic Oncology  [facility]        CC  Patient Care Team:  Bolivar Juarez MD as PCP - General (Gynecologic Oncology)  Marta Okeefe APRN CNP as Assigned PCP  Marta Lao APRN CNP as Assigned Cancer Care Provider  SELF, REFERRED        Again, thank you for allowing me to participate in the care of your patient.        Sincerely,        JERICHO Kitchen CNP

## 2023-04-11 NOTE — PATIENT INSTRUCTIONS
Neuropathy: Vitamin B6 50 mg by mouth twice per day and L-glutamine 2000 mg by mouth twice per day, massage, soaking feet for 10 minutes in warm water followed by massage, acupuncture.       How You Can Treat Dry Mouth:      I found oral Xylimelt which is an adhesive to your gums to help stimulate saliva:   OraCoat XyliMelts Dry Mouth Relief Oral Adhering Discs Slightly Sweet with Xylitol, for Dry Mouth, Stimulates Saliva, Non-Acidic, Day and Night Use, Time Release for up to 8 Hours, 230 Count (amazon.com)         Frequent oral hygiene is the first step in treating xerostomia. Rinsing with cold water, sucking on ice chips, and chewing sugarless gum to increase salivation may provide comfort. For more severe cases, artificial saliva or other oral moisturizers (solution, spray, or gel) may also be useful.          Keep mouth and lips moist:  Rinse mouth with water frequently (every 2 hrs while awake & when awake during the night). May add salt or baking soda (1/2 to 1 teaspoon in 8 ounces of water).  Use saliva substitute (commercially available).  Biotene  products can be purchased without a prescription. Products available for treating dry mouth are; mouthwash, toothpaste, as well as, chewing gum that has the pH of saliva.  Oralbalance  moisturizing gel can be applied to the mouth or tongue and acts as a moisturizing coat.  Apply lip moisturizer often (i.e. Chapstick ).  Suck on tart hard candies (lemon drops, Jolly Ranchers ). Watch sugar content with candy - can produce cavities.  Use cool mist room humidifier at night in the bedroom. A humidifier on the furnace doesn't provide enough humidity for treating dry mouth.  Keep mouth & teeth clean  Use soft-bristle toothbrush (can soften even more by placing brush in very warm water), cotton swabs, mouth swabs (popsicle stick covered with gauze) to clean teeth after each meal and at bedtime (3 or more times a day).  Clean dentures and/or bridge after eating. Leave out  dentures if experiencing any discomfort caused by xerostomia.  Floss gently with unwaxed floss (if platelet count not too low).  May use Water-Pik   Avoid mouthwashes with alcohol base. Use non-alcohol based mouthwashes.  Avoid lemon glycerin swabs - contribute to dryness.  Increase fluids:  Drink plenty of liquids at least 8-12 glasses of fluid a day, unless advised not to by your doctor. This helps to thin and loosen mucous.  Carry a water bottle with you and sip frequently during the day to help alleviate dry mouth.  Limit coffee, tea and alcohol. These contribute to dry mouth. Caffeine products as coffee, tea and rony act as diuretics.  Try Ovaltine  and Postum  drinks - each has calories and vitamins - as a substitute for tea and coffee.  Diet:  Eat a soft, high protein moist diet.  Substitute moist fish, eggs, cheese for red meat.  Serve food lukewarm, hot food can burn mouth.  Avoid dry foods (bread, dry meat, pastries, toast and crackers, snack foods that are dry and salty).  Soak bread and or rolls in milk or sauces.  Eat moistened casseroles and meats with gravies, sauces, soups, stews.  Use sour cream, and half & half cream as sauce bases (adds calories).  Avoid citric foods, juices such as tomato, orange, grapefruit based products and sauces.  Blenderize food and drink.  Yogurt, fresh fruit, powdered milk  Fruit slushies  Milk shakes with or without fresh fruit.  Avoid sodas that are fizzy. May try letting the soda go flat and then drink.  Milk is high in protein but may produce thick saliva. If this is true for you try soy or rice milk.              JERICHO Chin, [facility]-BC  Women's Health Nurse Practitioner  Division of Gynecologic Oncology  [facility]

## 2023-04-11 NOTE — PROGRESS NOTES
[facility] was contacted by NP with scheduling follow up needs of the patient     CT scan was faxed to local team/PCP as patient wants scan in [place]   Local clinic already has standing labs. No need to fax new ones   Request for MD follow up and next infusion requested     Patient updated via Lora Hinojosa RN     Clear bilaterally, pupils equal, round and reactive to light.

## 2023-04-12 NOTE — PROGRESS NOTES
[facility]  Plan discussed with Dr. Juarez.  Patient's WBC 2.1 and ANC 0.3.  Other labs stable and not concerning.  Plan to defer patient's scheduled chemotherapy from 4/14 to 4/21 and neulasta added to her treatment plan.  Called patient and discussed plan.  All her questions were answered to the best of my ability.  Message sent to infusion pharmacy at  requesting assistance verifying coverage for Neulasta on pro.  Appears she needed to use [id number] (fulphila)with treatment previously (starting on cycle 3).  Message sent to  scheduling requesting assistance moving patient's scheduled treatment.    Ella Murillo, APRN CNP         WBC 4.0 - 11.0 K/uL 2.1 Low      RBC 3.80 - 5.30 M/uL 3.03 Low      Hemoglobin 11.5 - 15.8 g/dL 10.7 Low      Hematocrit 35.0 - 45.0 % 33.7 Low      MCV 80.0 - 98.0 fL 111.2 High   Macrocytosis present.   MCH 25.5 - 34.0 pg 35.3 High      MCHC 31.5 - 36.5 g/dL 31.8     RDW-CV 11.5 - 15.5 % 14.5     RDW-SD 35.5 - 50.0 fl 56.7 High      Platelet Count 140 - 400 K/uL 115 Low      MPV 8.5 - 12.0 fL 9.4     Seg Neut Absolute 1.8 - 8.0 K/uL 0.3 Low      Lymphocytes Absolute 0.8 - 4.1 K/uL 1.4     Monocytes Absolute 0.0 - 1.0 K/uL 0.3     Eosinophils Absolute 0.0 - 0.7 K/uL 0.0     Basophil Absolute 0.0 - 0.2 K/uL 0.0     Immature Granulocyte Absolute 0.00 - 0.06 K/uL 0.00     Neutrophils Abs. (Segs and Bands) /uL 300     Neutrophils Percent % 16.5     Lymphocytes Percent % 69.3     Monocytes Percent % 13.2     Immature Granulocyte Percent % 0.0     Eosinophils Percent % 0.5     Basophil Percent % 0.5        Ref Range & Units Today   Glucose 70 - 99 mg/dL 101 High     BUN 6 - 22 mg/dL 12    Creatinine 0.55 - 1.02 mg/dL 0.82    BUN/Creatinine Ratio 10.0 - 25.0 14.6    Sodium 136 - 145 meq/L 141    Potassium 3.5 - 5.1 meq/L 4.5    Chloride 98 - 109 meq/L 108    CO2 22 - 31 meq/L 25    Anion Gap with K 6 - 20 meq/L 13    Calcium 8.5 - 10.5  mg/dL 9.3    Protein Total 6.0 - 8.3 g/dL 7.2    Albumin 3.2 - 4.6 g/dL 3.8    Alkaline Phosphatase 40 - 150 U/L 118    AST - SGOT 5 - 45 U/L 24    ALT - SGPT 0 - 55 U/L 22    Bilirubin Total 0.2 - 1.2 mg/dL 0.2    Corrected Calcium 8.5 - 10.5 mg/dL 9.5    Age Years 67    eGFRcr(AS) >=60 mL/min/1.73m2 79      Magnesium 1.6 - 2.6 mg/dL 2.0

## 2023-04-13 DIAGNOSIS — T45.1X5S ADVERSE EFFECT OF ANTINEOPLASTIC AND IMMUNOSUPPRESSIVE DRUGS, SEQUELA: ICD-10-CM

## 2023-04-19 ENCOUNTER — PATIENT OUTREACH (OUTPATIENT)
Dept: ONCOLOGY | Facility: CLINIC | Age: 68
End: 2023-04-19
Payer: COMMERCIAL

## 2023-04-19 NOTE — PROGRESS NOTES
MD reviewed recent labs and will defer another week     Should continue to try and get Neulasta approved as patient will need this     Patient/daughter updated     Shawna Hinojosa RN

## 2023-04-24 ENCOUNTER — PATIENT OUTREACH (OUTPATIENT)
Dept: ONCOLOGY | Facility: CLINIC | Age: 68
End: 2023-04-24
Payer: COMMERCIAL

## 2023-04-24 NOTE — PROGRESS NOTES
Patient and her daughters sent MyChart this weekend and RNCC replied to this and spoke with daughter on the phone     RNCC answered and clarified all questions and concerns     Shawna Hinojosa RN

## 2023-04-26 NOTE — PROGRESS NOTES
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 She is here today for follow up and disease management by video.     Oncology History:  12/3/2020: US Pelvic: IMPRESSION: Limited examination due to acoustic windows. Possible left adnexal mass. A CT scan of the abdomen and pelvis with contrast is recommended for further assessment.     2020: CT A/P:   IMPRESSION:    Peritoneal carcinomatosis with masslike peritoneal thickening in the lower pelvis which may indicate an adnexal or ovarian primary malignancy. Large volume ascites. Bilateral pleural effusions. There is potential subtle pleural nodularity in the right hemithorax which could indicate metastatic disease.  Indeterminate 1 cm lesion in the right hepatic lobe suspicious for a metastatic lesion.      [facility] 25[facility], and CTAP with carcinomatosis, elevated  3098.     2020: CT Chest: IMPRESSION:   1. There are few scattered small sub-6 mm pulmonary nodules which are indeterminate without prior comparisons available. There are a few  slightly larger perifissural nodules which are technically  indeterminate in the setting of malignancy although presumed lymphatic in nature and of unlikely clinical significance. Attention on follow-up is recommended.  2. Small to moderate left and small right pleural effusions are increased in size from prior. No convincing evidence for pleural nodularity.  3. Partially visualized large volume ascites and peritoneal nodularity in the upper abdomen similar to 2020 outside CT      2020: ED for abdominal distension; 3 L ascites drained with paracentesis    Pelvic US: Findings: Free fluid present in LLQ      2020: US Paracentesis: 900 mL  ascites drained     1/7/2021: Diagnotic laparoscopy, biopsies  Pathology: FINAL DIAGNOSIS:   A. PERITONEUM, BIOPSIES:   - Positive for high grade carcinoma, consistent with metastatic carcinoma of Mullerian origin.     1/10-1/13/2021: Hospital admission for postoperative non-intractable vomiting and nausea.      1/10/2021: CT A/P: IMPRESSION: Extensive ascites which is probably malignant. Scattered liver hypodensities of indeterminate etiology comment cannot exclude metastatic disease. Diverticulosis. Fluid-filled adnexal masses and irregular appearance of uterus, which may represent primary neoplasm. Multiple peritoneal nodules. Large amount of fecal material in the colon with no evidence of small bowel obstruction.     Plan: Paclitaxel 175 mg/m2 and carboplatin AUC 6 x 3 cycles followed by a CT CAP and visit with Dr. Juarez.     1/12/2021: Cycle 1 paclitaxel and carboplatin while inpatient     1/13/2021: CT Head: Impression:  1. Chronic sinusitis of the right maxillary and right sphenoid sinuses.  2. Incidental presumed calcified meningioma in the right frontal  convexity without significant mass effect.  3. No suspicious intracranial enhancing lesion.     2/1/2021: Cycle 2 paclitaxel and carboplatin.  936.     2/3-2/5/21: Admission [facility] for afib w/ RVR and new PE     2/26/21: Cycle 3 paclitaxel and carboplatin planned.  Deferred due to thrombocytopenia.  pending.     3/15/21: Cycle 3 paclitaxel and carboplatin given     4/19/21: HYSTERECTOMY, TOTAL, ABDOMINAL, WITH BILATERAL SALPINGO-OOPHORECTOMY, omentectomy, NEOPLASM DEBULKING,Proctoscocy, RO, Resection of liver nodules, diaphragm stripping, immobilization of liver and colon  FINAL DIAGNOSIS:   A. OMENTUM, BIOPSY:   - Omental adipose tissue with rare viable cells of metastatic high grade   serous carcinoma   - One reactive lymph node, negative for malignancy (0/1)   B. NODULE, SIGMOID, EXCISION:   - Calcified necrotic adipose tissue   - Negative  for malignancy   C. NODULE, SMALL BOWEL MESENTERY, EXCISION:   - Fibroadipose tissue, positive for metastatic high grade serous carcinoma   D. UTERUS, CERVIX, BILATERAL FALLOPIAN TUBES AND OVARIES, HYSTERECTOMY   WITH BILATERAL SALPINGO-OOPHORECTOMY:   - Atrophic endometrium   - Uterine serosa with rare viable cells consistent with high grade serous   carcinoma   - Cervix with atrophic changes   - Viable cells consistent with high grade serous carcinoma present in the   right ovary, serosa of right   fallopian tube and right periadnexal soft tissue   - Left ovary with atrophic changes   - Left fallopian tube with a rare focus of serous tubal in-situ carcinoma   (STIC)   E. NODULES, SMALL BOWEL MESENTRY, EXCISION:   - Fibroadipose tissue with rare viable cells of metastatic high grade   serous carcinoma   F. NODULE, SPLENIC FLEXURE TRANSVERSE COLON, EXCISION:   - Fibroadipose tissue with rare viable cells of metastatic high grade   serous carcinoma   - Accessory splenule, negative for malignancy   G. OMENTUM, OMENTECTOMY:   - Omental adipose tissue with rare viable cells of metastatic high grade   serous carcinoma   H. NODULE, PERITONEAL PANCREATIC, EXCISION:   - Fibrous adhesions with inflammation   - Negative for malignancy   I. RIGHT HEMIDIAPHRAGM PERITONEUM, EXCISION:   - Fibrous adhesions with inflammation   - Negative for malignancy   J. RIGHT LIVER SURFACE NODULE:   - Fibrous adhesions with benign inclusion glands   - Negative for malignancy   K. LEFT LOWER LIVER EDGE, BIOPSY:   - Cauterized hepatic parenchyma and capsule   - Negative for malignancy   L. NODULE, SMALL BOWEL MESENTERIC #3, EXCISION:   - Fibroadipose tissue with rare viable cells of metastatic high grade   serous carcinoma       Plan: Carboplatin AUC 6 + Taxol 175 mg/m2 x 3 cycles, then transition to Parp inhibitor for maintenance therapy given her BRCA1 germline mutation.      5/21/21: Cycle 4 carboplatin and paclitaxel.   172.  6/11/21:  Cycle 5 carboplatin and paclitaxel.   61.  7/2/21: Cycle 6 carboplatin and paclitaxel.   20.        7/28/21 plan: Olaparib 300mg bid as starting dose,  14  8/20/21: start date olaparib 300 mg BID,  12  9/13/21:  22  10/4/21:  23  11/1/21:  26     11/02/2021: PET CT: IMPRESSION:   Findings compatible with interval surgery and posttreatment change.  No gross definitive FDG avid disease.  Potential foci of tumor deposits along the anterior dome of the liver and midline abdominal wall surgical scar.  Colonic activity is not necessarily abnormal, however, given the previous carcinomatosis the colonic activity is indeterminant.         12/1/21:  23  1/3/22:  21  2/1/22:  20  3/1/22:  21  4/1/22:  23  5/4/22:  28     EXAM: CT CHEST/ABDOMEN/PELVIS W CONTRAST  LOCATION: [facility]  DATE/TIME: 7/11/2022 1:25 PM                                                                   IMPRESSION:  1.  Sliver of ascites in the upper abdomen has decreased since interval debulking surgery.  2.  There is a punctate nodule in the gastrosplenic ligament which is minimally more plump relative to the preop exam. This will need to be followed.  3.  Minimal nodular changes to the left of the midline scar in the subcutaneous fat will have to be followed as well. Unclear if this simply reflects postoperative scarring or could reflect an early incisional recurrence.  4.  No other sites to suggest recurrent tumor. Vaginal cuff is normal.  5.  Extensive distal colonic diverticulosis.  6.  Other noncritical findings as noted above.      9/16/22  98     1/30/23 CT CAP:    IMPRESSION:  1.  Multiple new, hypoattenuating lesions in the liver, suspicious for hepatic metastatic disease.  2.  Necrotic mario hepatic lymphadenopathy, concerning for richard metastatic disease.  3.  There is a new or increasingly conspicuous 6 mm soft tissue nodule in the  "right lower quadrant. This is indeterminate.  4.  There is a new 3 mm solid nodule in the right upper lobe, indeterminate.  5.  Stable approximate 4 mm punctate nodule along the gastrosplenic ligament.  6.  Similar area of linear free fluid in the upper abdomen anterior to the stomach.    Plan: Paclitaxel 175 mg/m2, Carboplatin AUC 6, bevacizumab 7.5 mg/kg    3/1/23: Cycle #1 Paclitaxel 175 mg/m2, Carboplatin AUC 6 (C7), bevacizumab 7.5 mg/kg.  1,196  3/24/23: Cycle #2 Paclitaxel 150 mg/m2, Carboplatin AUC 5 (C8), bevacizumab 15 mg/kg.  558    3/29/23: ER for dehydration and hypotension. Normotensive in ER. IV fluids given. Discharged.     4/14/23: Cycle #3 Paclitaxel 150 mg/m2, Carboplatin AUC 5 (C9), bevacizumab 15 mg/kg deferred neutropenia ANC 0.3   273  4/21/23: treatment deferred ANC 0.8  4/28/23: Cycle #3 Paclitaxel 150 mg/m2, Carboplatin AUC 5 (C9), bevacizumab 15 mg/kg, + Neulasta deferred ANC 1.0,  297                Today Justen presents via video. She expresses concern and disappointment regarding low counts and not being able to receive treatment again this week. She has questions regarding this and why we continue to defer her treatment.     She denies fevers or chills. Reports eating well multiple times through the week and that her blood glucose \"runs lower\". She is asymptomatic from this. Pt continues plan to take Claritin and APAP for chemo/Neulasta bone pain. Has Dilaudid at home if needed for pain. Has tingling at toes. No pain. Drinking 64 oz per day. Reports migraines that are controlled with Imitrex. Denies abdominal pain or bloating. Tolerating PO intake. Denies nausea and vomiting. Denies SOB/CP. No fevers or chills. She denies any vaginal bleeding, no changes in her bowel or bladder habits, no lower extremity edema, and no difficulties eating or sleeping. She denies  fevers or chills, and no chest pain or shortness of breath. Regular bowel movements daily. Pt " continues on Xarleto for prior PE.       Works at local [employer].                     Review of Systems:    Systemic           no weight changes; no fever; no chills; no night sweats; no appetite changes  Skin           no rashes, or lesions  Eye           no irritation; no changes in vision  Allen-Laryngeal           no dysphagia; no hoarseness   Pulmonary    no cough; no shortness of breath  Cardiovascular    no chest pain; no palpitations  Gastrointestinal    no diarrhea; no constipation; no abdominal pain; no changes in bowel  habits; no blood in stool  Genitourinary   no urinary frequency; no urinary urgency; no dysuria; no pain; no abnormal vaginal discharge; no abnormal vaginal bleeding  Breast   no breast discharge; no breast changes; no breast pain  Musculoskeletal    + myalgias; no arthralgias; no back pain  Psychiatric           no depressed mood; no anxiety    Hematologic           no tender lymph nodes; no noticeable swellings or lumps   Endocrine    no hot flashes; no heat/cold intolerance         Neurological   no tremor; + numbness and tingling; no headaches; no difficulty sleeping      Past Medical History:    Past Medical History:   Diagnosis Date     Atrial fibrillation with rapid ventricular response (H)      History of cold sores       of LASIK 12/11/2017     Insomnia      Migraine      Osteopenia      Pelvic mass      Peritoneal carcinomatosis (H)      Restless legs syndrome (RLS)          Past Surgical History:    Past Surgical History:   Procedure Laterality Date     APPENDECTOMY       ARTHROSCPY KNEE SURGICAL DEBRIDEMENT SHAVING ARTICULAR CARTILAGE Right      BIOPSY  January 2021    Biopsy to confirm ovarian cancer     DEBRIDEMENT LEFT UPPER EXTREMITY  2016     HYSTERECTOMY TOTAL ABD, LUISITO SALPINGO-OOPHORECTOMY, NODE DISSECTION, TUMOR DEBULKING, COMBINED Bilateral 4/19/2021    Procedure: HYSTERECTOMY, TOTAL, ABDOMINAL, WITH BILATERAL SALPINGO-OOPHORECTOMY, omentectomy, NEOPLASM  DEBULKING,Proctoscocy, RO, Resection of liver nodules, diaphragm stripping, immobilization of liver and colon;  Surgeon: Bolivar Juarez MD;  Location: [facility] OR     LAPAROSCOPY DIAGNOSTIC (GYN) Bilateral 1/7/2021    Procedure: Diagnsotic laparoscopy, biopsies;  Surgeon: Bolivar Juarez MD;  Location: [facility] OR     LASIK       TUBAL LIGATION           Health Maintenance Due   Topic Date Due     DEXA  Never done     ADVANCE CARE PLANNING  Never done     COLORECTAL CANCER SCREENING  Never done     HEPATITIS C SCREENING  Never done     LIPID  Never done     MEDICARE ANNUAL WELLNESS VISIT  11/11/2021     FALL RISK ASSESSMENT  Never done     COVID-19 Vaccine (4 - Booster for Pfizer series) 11/15/2021     Pneumococcal Vaccine: 65+ Years (2 - PPSV23 if available, else PCV20) 04/05/2022     INFLUENZA VACCINE (1) Never done     PHQ-2 (once per calendar year)  01/01/2023       Current Medications:     Current Outpatient Medications   Medication Sig Dispense Refill     amitriptyline (ELAVIL) 100 MG tablet Take 1 tablet (100 mg) by mouth At Bedtime 90 tablet 0     dexamethasone (DECADRON) 4 MG tablet Take 2 tablets (8 mg) by mouth daily Take for 3 days, starting the day after chemo. Take with food. 6 tablet 5     fluticasone (FLONASE) 50 MCG/ACT nasal spray        gabapentin (NEURONTIN) 600 MG tablet Take 1 tablet (600 mg) by mouth At Bedtime 90 tablet 0     meclizine (ANTIVERT) 25 MG tablet Take 1 tablet (25 mg) by mouth 3 times daily as needed for dizziness or nausea 15 tablet 0     ondansetron (ZOFRAN) 4 MG tablet Take by mouth every 8 hours as needed for nausea       ondansetron (ZOFRAN) 8 MG tablet Take 1 tablet (8 mg) by mouth every 8 hours as needed for nausea (vomiting) 30 tablet 2     oxyCODONE (ROXICODONE) 5 MG tablet Take 1 tablet (5 mg) by mouth every 12 hours 10 tablet 0     prochlorperazine (COMPAZINE) 10 MG tablet Take 1 tablet (10 mg) by mouth every 6 hours as needed for nausea or vomiting 30 tablet 2      rivaroxaban ANTICOAGULANT (XARELTO ANTICOAGULANT) 20 MG TABS tablet Take 1 tablet (20 mg) by mouth every morning 90 tablet 1     SUMAtriptan (IMITREX) 100 MG tablet Take 1 tablet (100 mg) by mouth at onset of headache for migraine 15 tablet 0     valACYclovir (VALTREX) 1000 mg tablet Take 1,000 mg by mouth as needed       zolpidem (AMBIEN) 10 MG tablet Take 10 mg by mouth           Allergies:      No Known Allergies     Social History:     Social History     Tobacco Use     Smoking status: Former     Packs/day: 0.50     Years: 40.00     Pack years: 20.00     Types: Cigarettes     Quit date:      Years since quittin.3     Smokeless tobacco: Never   Vaping Use     Vaping status: Not on file   Substance Use Topics     Alcohol use: Not Currently       History   Drug Use Unknown         Family History:     The patient's family history is notable for     Family History   Adopted: Yes   Problem Relation Age of Onset     Cancer Mother 36     Other Cancer Mother         Bio mother  of  a female cancer  at 36     Factor V Leiden deficiency Daughter      Deep Vein Thrombosis Daughter      Diabetes Type 1 Daughter      Diabetes Daughter      Hypertension Daughter      Anesthesia Reaction No family hx of          Physical Exam:     /70   Ht 1.829 m (6')   Wt 77.1 kg (170 lb)   BMI 23.06 kg/m    Body mass index is 23.06 kg/m .    General Appearance:  healthy and alert, no distress     Eyes:  Eyes grossly normal to inspection.  No discharge or erythema, or obvious scleral/conjunctival abnormalities.     Respiratory:     No audible wheeze, cough, or visible cyanosis.  No visible retractions or increased work of breathing.      Musculoskeletal:         extremities non tender and without edema     Skin:    no lesions or rashes on visible skin     Neurological:   normal gait, no gross defects                Psychiatric:      appropriate mood and affect. 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 She is here today for a disease management visit by video.     30 minutes spent on the date of the encounter doing chart review, history and exam, documentation, and further activities as noted above.         Plan:     1.)   Recurrent metastatic ovarian high grade serous carcinoma. Encouraged 5-6 small high protein meals a day and discussed nausea management and control. Encouraged 64 oz of fluids per day. Discussed neutropenic precautions and rita period. Reviewed signs and symptoms for when she should contact the clinic or seek additional care. Patient to contact the clinic with any questions or concerns in the interim.  Pt has labs at [facility] 2-3 days prior to infusion.    Disease/Treatment related SE:     - Neutropenia: previous ANC 1.0. Defer additional week per Dr. Juarez. Neutropenic precautions reviewed with pt. Neulasta in tx plan and has been approved per finance. Pt treatment plan at dose reduction of Taxol 150 mg/m2 and Carbo AUC 5. Mira IGNACIO, RNCC to reschedule labs and infusion.   - Bone pain:  Reviewed ok to take Claritin prior to and a few days after treatment to prevent/treat myalgias. Encouraged APAP/Ibuprofen prn. Pt has Dilaudid on hand and will call for refills prn.   - Neuropathy: tingling and numbness. Reviewed vitamin B 6 and L-glutamine today along with massage and acupuncture during prior appt. No pain or issues with dexterity. Continue to monitor.      2.) Genetic risk factors were assessed and she is POSITIVE for a BRCA1 mutation.  This mutation is called [id number]. Referral for Waleska Zamorano with Cancer Risk management placed 5/2022.     3.) Labs and/or tests ordered include: CBC, protein urine, CMP, Mag,  reviewed today from Care Everywhere with  pt     4.) Health maintenance issues addressed today include annual health maintenance and non-gynecologic issues with PCP.    5)        PE: pt continues on JERICHO Hampton, NP-BC  Women's Health Nurse Practitioner  Division of Gynecologic Oncology  [facility]        CC  Patient Care Team:  Bolivar Juarze MD as PCP - General (Gynecologic Oncology)  Marta Okeefe APRN CNP as Assigned PCP  Marta Lao APRN CNP as Assigned Cancer Care Provider  SELF, REFERRED

## 2023-04-27 ENCOUNTER — PATIENT OUTREACH (OUTPATIENT)
Dept: ONCOLOGY | Facility: CLINIC | Age: 68
End: 2023-04-27

## 2023-04-27 ENCOUNTER — VIRTUAL VISIT (OUTPATIENT)
Dept: ONCOLOGY | Facility: CLINIC | Age: 68
End: 2023-04-27
Attending: OBSTETRICS & GYNECOLOGY
Payer: COMMERCIAL

## 2023-04-27 ENCOUNTER — PATIENT OUTREACH (OUTPATIENT)
Dept: ONCOLOGY | Facility: CLINIC | Age: 68
End: 2023-04-27
Payer: COMMERCIAL

## 2023-04-27 VITALS
HEIGHT: 72 IN | WEIGHT: 170 LBS | SYSTOLIC BLOOD PRESSURE: 120 MMHG | BODY MASS INDEX: 23.03 KG/M2 | DIASTOLIC BLOOD PRESSURE: 70 MMHG

## 2023-04-27 DIAGNOSIS — C56.9 OVARIAN CANCER, UNSPECIFIED LATERALITY (H): Primary | ICD-10-CM

## 2023-04-27 DIAGNOSIS — Z51.11 ENCOUNTER FOR ANTINEOPLASTIC CHEMOTHERAPY: ICD-10-CM

## 2023-04-27 PROCEDURE — 99214 OFFICE O/P EST MOD 30 MIN: CPT | Mod: VID | Performed by: OBSTETRICS & GYNECOLOGY

## 2023-04-27 ASSESSMENT — PAIN SCALES - GENERAL: PAINLEVEL: NO PAIN (0)

## 2023-04-27 NOTE — TELEPHONE ENCOUNTER
Per MD delay Infusion because of low counts     URGENT scheduling request sent for cancel/reschedule of all needed appointments/Infusions     Shawna Hinojosa RN

## 2023-04-27 NOTE — PROGRESS NOTES
RNCC faxed lab orders to both local lab and PCP as there have been problems with faxes going through     RNCC did verify fax number with lab. Lab stated here is walk in appointments so they will not call patient for an appointment     Shawna Hinojosa RN

## 2023-04-27 NOTE — LETTER
2023         RE: Taran Regalado  [address]  [place] [address]        Dear Colleague,    Thank you for referring your patient, Taran Regalado, to the [facility]. Please see a copy of my visit note below.                    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 She is here today for follow up and disease management by video.     Oncology History:  12/3/2020: US Pelvic: IMPRESSION: Limited examination due to acoustic windows. Possible left adnexal mass. A CT scan of the abdomen and pelvis with contrast is recommended for further assessment.     2020: CT A/P:   IMPRESSION:    Peritoneal carcinomatosis with masslike peritoneal thickening in the lower pelvis which may indicate an adnexal or ovarian primary malignancy. Large volume ascites. Bilateral pleural effusions. There is potential subtle pleural nodularity in the right hemithorax which could indicate metastatic disease.  Indeterminate 1 cm lesion in the right hepatic lobe suspicious for a metastatic lesion.      2020: Presented to [facility] with abdominal distention, 25lb weight loss, and CTAP with carcinomatosis, elevated  3098.     2020: CT Chest: IMPRESSION:   1. There are few scattered small sub-6 mm pulmonary nodules which are indeterminate without prior comparisons available. There are a few  slightly larger perifissural nodules which are technically  indeterminate in the setting of malignancy although presumed lymphatic in nature and of unlikely clinical significance. Attention on follow-up is recommended.  2. Small to moderate left and small right pleural effusions are increased in size from prior. No convincing evidence for pleural nodularity.  3. Partially visualized large volume ascites and  peritoneal nodularity in the upper abdomen similar to 12/4/2020 outside CT      12/26/2020: ED for abdominal distension; 3 L ascites drained with paracentesis    Pelvic US: Findings: Free fluid present in LLQ      12/31/2020: US Paracentesis: 900 mL ascites drained     1/7/2021: Diagnotic laparoscopy, biopsies  Pathology: FINAL DIAGNOSIS:   A. PERITONEUM, BIOPSIES:   - Positive for high grade carcinoma, consistent with metastatic carcinoma of Mullerian origin.     1/10-1/13/2021: Hospital admission for postoperative non-intractable vomiting and nausea.      1/10/2021: CT A/P: IMPRESSION: Extensive ascites which is probably malignant. Scattered liver hypodensities of indeterminate etiology comment cannot exclude metastatic disease. Diverticulosis. Fluid-filled adnexal masses and irregular appearance of uterus, which may represent primary neoplasm. Multiple peritoneal nodules. Large amount of fecal material in the colon with no evidence of small bowel obstruction.     Plan: Paclitaxel 175 mg/m2 and carboplatin AUC 6 x 3 cycles followed by a CT CAP and visit with Dr. Juarez.     1/12/2021: Cycle 1 paclitaxel and carboplatin while inpatient     1/13/2021: CT Head: Impression:  1. Chronic sinusitis of the right maxillary and right sphenoid sinuses.  2. Incidental presumed calcified meningioma in the right frontal  convexity without significant mass effect.  3. No suspicious intracranial enhancing lesion.     2/1/2021: Cycle 2 paclitaxel and carboplatin.  936.     2/3-2/5/21: Admission [facility] for afib w/ RVR and new PE     2/26/21: Cycle 3 paclitaxel and carboplatin planned.  Deferred due to thrombocytopenia.  pending.     3/15/21: Cycle 3 paclitaxel and carboplatin given     4/19/21: HYSTERECTOMY, TOTAL, ABDOMINAL, WITH BILATERAL SALPINGO-OOPHORECTOMY, omentectomy, NEOPLASM DEBULKING,Proctoscocy, RO, Resection of liver nodules, diaphragm stripping, immobilization of liver and colon  FINAL DIAGNOSIS:   A.  OMENTUM, BIOPSY:   - Omental adipose tissue with rare viable cells of metastatic high grade   serous carcinoma   - One reactive lymph node, negative for malignancy (0/1)   B. NODULE, SIGMOID, EXCISION:   - Calcified necrotic adipose tissue   - Negative for malignancy   C. NODULE, SMALL BOWEL MESENTERY, EXCISION:   - Fibroadipose tissue, positive for metastatic high grade serous carcinoma   D. UTERUS, CERVIX, BILATERAL FALLOPIAN TUBES AND OVARIES, HYSTERECTOMY   WITH BILATERAL SALPINGO-OOPHORECTOMY:   - Atrophic endometrium   - Uterine serosa with rare viable cells consistent with high grade serous   carcinoma   - Cervix with atrophic changes   - Viable cells consistent with high grade serous carcinoma present in the   right ovary, serosa of right   fallopian tube and right periadnexal soft tissue   - Left ovary with atrophic changes   - Left fallopian tube with a rare focus of serous tubal in-situ carcinoma   (STIC)   E. NODULES, SMALL BOWEL MESENTRY, EXCISION:   - Fibroadipose tissue with rare viable cells of metastatic high grade   serous carcinoma   F. NODULE, SPLENIC FLEXURE TRANSVERSE COLON, EXCISION:   - Fibroadipose tissue with rare viable cells of metastatic high grade   serous carcinoma   - Accessory splenule, negative for malignancy   G. OMENTUM, OMENTECTOMY:   - Omental adipose tissue with rare viable cells of metastatic high grade   serous carcinoma   H. NODULE, PERITONEAL PANCREATIC, EXCISION:   - Fibrous adhesions with inflammation   - Negative for malignancy   I. RIGHT HEMIDIAPHRAGM PERITONEUM, EXCISION:   - Fibrous adhesions with inflammation   - Negative for malignancy   J. RIGHT LIVER SURFACE NODULE:   - Fibrous adhesions with benign inclusion glands   - Negative for malignancy   K. LEFT LOWER LIVER EDGE, BIOPSY:   - Cauterized hepatic parenchyma and capsule   - Negative for malignancy   L. NODULE, SMALL BOWEL MESENTERIC #3, EXCISION:   - Fibroadipose tissue with rare viable cells of metastatic  high grade   serous carcinoma       Plan: Carboplatin AUC 6 + Taxol 175 mg/m2 x 3 cycles, then transition to Parp inhibitor for maintenance therapy given her BRCA1 germline mutation.      5/21/21: Cycle 4 carboplatin and paclitaxel.   172.  6/11/21: Cycle 5 carboplatin and paclitaxel.   61.  7/2/21: Cycle 6 carboplatin and paclitaxel.   20.        7/28/21 plan: Olaparib 300mg bid as starting dose,  14  8/20/21: start date olaparib 300 mg BID,  12  9/13/21:  22  10/4/21:  23  11/1/21:  26     11/02/2021: PET CT: IMPRESSION:   Findings compatible with interval surgery and posttreatment change.  No gross definitive FDG avid disease.  Potential foci of tumor deposits along the anterior dome of the liver and midline abdominal wall surgical scar.  Colonic activity is not necessarily abnormal, however, given the previous carcinomatosis the colonic activity is indeterminant.         12/1/21:  23  1/3/22:  21  2/1/22:  20  3/1/22:  21  4/1/22:  23  5/4/22:  28     EXAM: CT CHEST/ABDOMEN/PELVIS W CONTRAST  LOCATION: [facility]  DATE/TIME: 7/11/2022 1:25 PM                                                                   IMPRESSION:  1.  Sliver of ascites in the upper abdomen has decreased since interval debulking surgery.  2.  There is a punctate nodule in the gastrosplenic ligament which is minimally more plump relative to the preop exam. This will need to be followed.  3.  Minimal nodular changes to the left of the midline scar in the subcutaneous fat will have to be followed as well. Unclear if this simply reflects postoperative scarring or could reflect an early incisional recurrence.  4.  No other sites to suggest recurrent tumor. Vaginal cuff is normal.  5.  Extensive distal colonic diverticulosis.  6.  Other noncritical findings as noted above.      9/16/22  98     1/30/23 CT CAP:    IMPRESSION:  1.  " Multiple new, hypoattenuating lesions in the liver, suspicious for hepatic metastatic disease.  2.  Necrotic mario hepatic lymphadenopathy, concerning for richard metastatic disease.  3.  There is a new or increasingly conspicuous 6 mm soft tissue nodule in the right lower quadrant. This is indeterminate.  4.  There is a new 3 mm solid nodule in the right upper lobe, indeterminate.  5.  Stable approximate 4 mm punctate nodule along the gastrosplenic ligament.  6.  Similar area of linear free fluid in the upper abdomen anterior to the stomach.    Plan: Paclitaxel 175 mg/m2, Carboplatin AUC 6, bevacizumab 7.5 mg/kg    3/1/23: Cycle #1 Paclitaxel 175 mg/m2, Carboplatin AUC 6 (C7), bevacizumab 7.5 mg/kg.  1,196  3/24/23: Cycle #2 Paclitaxel 150 mg/m2, Carboplatin AUC 5 (C8), bevacizumab 15 mg/kg.  558    3/29/23: ER for dehydration and hypotension. Normotensive in ER. IV fluids given. Discharged.     4/14/23: Cycle #3 Paclitaxel 150 mg/m2, Carboplatin AUC 5 (C9), bevacizumab 15 mg/kg deferred neutropenia ANC 0.3   273  4/21/23: treatment deferred ANC 0.8  4/28/23: Cycle #3 Paclitaxel 150 mg/m2, Carboplatin AUC 5 (C9), bevacizumab 15 mg/kg, + Neulasta deferred ANC 1.0,  297                Today Justen presents via video. She expresses concern and disappointment regarding low counts and not being able to receive treatment again this week. She has questions regarding this and why we continue to defer her treatment.     She denies fevers or chills. Reports eating well multiple times through the week and that her blood glucose \"runs lower\". She is asymptomatic from this. Pt continues plan to take Claritin and APAP for chemo/Neulasta bone pain. Has Dilaudid at home if needed for pain. Has tingling at toes. No pain. Drinking 64 oz per day. Reports migraines that are controlled with Imitrex. Denies abdominal pain or bloating. Tolerating PO intake. Denies nausea and vomiting. Denies SOB/CP. No fevers or " chills. She denies any vaginal bleeding, no changes in her bowel or bladder habits, no lower extremity edema, and no difficulties eating or sleeping. She denies  fevers or chills, and no chest pain or shortness of breath. Regular bowel movements daily. Pt continues on Xarleto for prior PE.       Works at local [employer].                     Review of Systems:    Systemic           no weight changes; no fever; no chills; no night sweats; no appetite changes  Skin           no rashes, or lesions  Eye           no irritation; no changes in vision  Allen-Laryngeal           no dysphagia; no hoarseness   Pulmonary    no cough; no shortness of breath  Cardiovascular    no chest pain; no palpitations  Gastrointestinal    no diarrhea; no constipation; no abdominal pain; no changes in bowel  habits; no blood in stool  Genitourinary   no urinary frequency; no urinary urgency; no dysuria; no pain; no abnormal vaginal discharge; no abnormal vaginal bleeding  Breast   no breast discharge; no breast changes; no breast pain  Musculoskeletal    + myalgias; no arthralgias; no back pain  Psychiatric           no depressed mood; no anxiety    Hematologic           no tender lymph nodes; no noticeable swellings or lumps   Endocrine    no hot flashes; no heat/cold intolerance         Neurological   no tremor; + numbness and tingling; no headaches; no difficulty sleeping      Past Medical History:    Past Medical History:   Diagnosis Date     Atrial fibrillation with rapid ventricular response (H)      History of cold sores      Hx of LASIK 12/11/2017     Insomnia      Migraine      Osteopenia      Pelvic mass      Peritoneal carcinomatosis (H)      Restless legs syndrome (RLS)          Past Surgical History:    Past Surgical History:   Procedure Laterality Date     APPENDECTOMY       ARTHROSCPY KNEE SURGICAL DEBRIDEMENT SHAVING ARTICULAR CARTILAGE Right      BIOPSY  January 2021    Biopsy to confirm ovarian cancer     DEBRIDEMENT  LEFT UPPER EXTREMITY  2016     HYSTERECTOMY TOTAL ABD, LUISITO SALPINGO-OOPHORECTOMY, NODE DISSECTION, TUMOR DEBULKING, COMBINED Bilateral 4/19/2021    Procedure: HYSTERECTOMY, TOTAL, ABDOMINAL, WITH BILATERAL SALPINGO-OOPHORECTOMY, omentectomy, NEOPLASM DEBULKING,Proctoscocy, RO, Resection of liver nodules, diaphragm stripping, immobilization of liver and colon;  Surgeon: Bolivar Juarez MD;  Location: [facility] OR     LAPAROSCOPY DIAGNOSTIC (GYN) Bilateral 1/7/2021    Procedure: Diagnsotic laparoscopy, biopsies;  Surgeon: Bolivar Juarez MD;  Location: [facility] OR     LASIK       TUBAL LIGATION           Health Maintenance Due   Topic Date Due     DEXA  Never done     ADVANCE CARE PLANNING  Never done     COLORECTAL CANCER SCREENING  Never done     HEPATITIS C SCREENING  Never done     LIPID  Never done     MEDICARE ANNUAL WELLNESS VISIT  11/11/2021     FALL RISK ASSESSMENT  Never done     COVID-19 Vaccine (4 - Booster for Pfizer series) 11/15/2021     Pneumococcal Vaccine: 65+ Years (2 - PPSV23 if available, else PCV20) 04/05/2022     INFLUENZA VACCINE (1) Never done     PHQ-2 (once per calendar year)  01/01/2023       Current Medications:     Current Outpatient Medications   Medication Sig Dispense Refill     amitriptyline (ELAVIL) 100 MG tablet Take 1 tablet (100 mg) by mouth At Bedtime 90 tablet 0     dexamethasone (DECADRON) 4 MG tablet Take 2 tablets (8 mg) by mouth daily Take for 3 days, starting the day after chemo. Take with food. 6 tablet 5     fluticasone (FLONASE) 50 MCG/ACT nasal spray        gabapentin (NEURONTIN) 600 MG tablet Take 1 tablet (600 mg) by mouth At Bedtime 90 tablet 0     meclizine (ANTIVERT) 25 MG tablet Take 1 tablet (25 mg) by mouth 3 times daily as needed for dizziness or nausea 15 tablet 0     ondansetron (ZOFRAN) 4 MG tablet Take by mouth every 8 hours as needed for nausea       ondansetron (ZOFRAN) 8 MG tablet Take 1 tablet (8 mg) by mouth every 8 hours as needed for nausea (vomiting)  30 tablet 2     oxyCODONE (ROXICODONE) 5 MG tablet Take 1 tablet (5 mg) by mouth every 12 hours 10 tablet 0     prochlorperazine (COMPAZINE) 10 MG tablet Take 1 tablet (10 mg) by mouth every 6 hours as needed for nausea or vomiting 30 tablet 2     rivaroxaban ANTICOAGULANT (XARELTO ANTICOAGULANT) 20 MG TABS tablet Take 1 tablet (20 mg) by mouth every morning 90 tablet 1     SUMAtriptan (IMITREX) 100 MG tablet Take 1 tablet (100 mg) by mouth at onset of headache for migraine 15 tablet 0     valACYclovir (VALTREX) 1000 mg tablet Take 1,000 mg by mouth as needed       zolpidem (AMBIEN) 10 MG tablet Take 10 mg by mouth           Allergies:      No Known Allergies     Social History:     Social History     Tobacco Use     Smoking status: Former     Packs/day: 0.50     Years: 40.00     Pack years: 20.00     Types: Cigarettes     Quit date:      Years since quittin.3     Smokeless tobacco: Never   Vaping Use     Vaping status: Not on file   Substance Use Topics     Alcohol use: Not Currently       History   Drug Use Unknown         Family History:     The patient's family history is notable for     Family History   Adopted: Yes   Problem Relation Age of Onset     Cancer Mother 36     Other Cancer Mother         Bio mother  of  a female cancer  at 36     Factor V Leiden deficiency Daughter      Deep Vein Thrombosis Daughter      Diabetes Type 1 Daughter      Diabetes Daughter      Hypertension Daughter      Anesthesia Reaction No family hx of          Physical Exam:     /70   Ht 1.829 m (6')   Wt 77.1 kg (170 lb)   BMI 23.06 kg/m    Body mass index is 23.06 kg/m .    General Appearance:  healthy and alert, no distress     Eyes:  Eyes grossly normal to inspection.  No discharge or erythema, or obvious scleral/conjunctival abnormalities.     Respiratory:     No audible wheeze, cough, or visible cyanosis.  No visible retractions or increased work of breathing.      Musculoskeletal:         extremities non  tender and without edema     Skin:    no lesions or rashes on visible skin     Neurological:   normal gait, no gross defects                Psychiatric:      appropriate mood and affect. 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 She is here today for a disease management visit by video.     30 minutes spent on the date of the encounter doing chart review, history and exam, documentation, and further activities as noted above.         Plan:     1.)   Recurrent metastatic ovarian high grade serous carcinoma. Encouraged 5-6 small high protein meals a day and discussed nausea management and control. Encouraged 64 oz of fluids per day. Discussed neutropenic precautions and rita period. Reviewed signs and symptoms for when she should contact the clinic or seek additional care. Patient to contact the clinic with any questions or concerns in the interim.  Pt has labs at [facility] 2-3 days prior to infusion.    Disease/Treatment related SE:     - Neutropenia: previous ANC 1.0. Defer additional week per Dr. Juarez. Neutropenic precautions reviewed with pt. Neulasta in tx plan and has been approved per finance. Pt treatment plan at dose reduction of Taxol 150 mg/m2 and Carbo AUC 5. Mira IGNACIO, RNCC to reschedule labs and infusion.   - Bone pain:  Reviewed ok to take Claritin prior to and a few days after treatment to prevent/treat myalgias. Encouraged APAP/Ibuprofen prn. Pt has Dilaudid on hand and will call for refills prn.   - Neuropathy: tingling and numbness. Reviewed vitamin B 6 and L-glutamine today along with massage and acupuncture during prior appt. No pain or issues with dexterity. Continue to monitor.      2.) Genetic risk factors were assessed and she is POSITIVE for a  BRCA1 mutation.  This mutation is called [id number]. Referral for Waleska Lona with Cancer Risk management placed 5/2022.     3.) Labs and/or tests ordered include: CBC, protein urine, CMP, Mag,  reviewed today from Care Everywhere with pt     4.) Health maintenance issues addressed today include annual health maintenance and non-gynecologic issues with PCP.    5)        PE: pt continues on JERICHO Hampton NP-BC  Women's Health Nurse Practitioner  Division of Gynecologic Oncology  [facility]        CC  Patient Care Team:  Bolivar Juarez MD as PCP - General (Gynecologic Oncology)  Marta Okeefe APRN CNP as Assigned PCP  Marta Lao APRN CNP as Assigned Cancer Care Provider  SELF, REFERRED      Virtual Visit Details    Type of service:  Video Visit     Start time: 1303  End time: 1319    Originating Location (pt. Location): \home    Distant Location (provider location):  [facility]  Platform used for Video Visit: amwell      Again, thank you for allowing me to participate in the care of your patient.        Sincerely,        JERICHO Kitchen CNP

## 2023-04-27 NOTE — PROGRESS NOTES
Virtual Visit Details    Type of service:  Video Visit     Start time: 1303  End time: 1319    Originating Location (pt. Location): \home    Distant Location (provider location):  [facility]  Platform used for Video Visit: RayV

## 2023-04-27 NOTE — NURSING NOTE
Is the patient currently in the state of [place]? YES    Visit mode:VIDEO    If the visit is dropped, the patient can be reconnected by: VIDEO VISIT: Send to e-mail at: [email or web address]    Will anyone else be joining the visit? NO      How would you like to obtain your AVS? MyChart    Are changes needed to the allergy or medication list? NO    Reason for visit: Video Visit (Follow Up )      Kassi Rose

## 2023-05-03 NOTE — PROGRESS NOTES
Virtual Visit Details    Type of service:  Video Visit   Video Start Time: 1454  Video End Time:1515    Originating Location (pt. 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 She is here today for follow up and disease management by video.     Oncology History:  12/3/2020: US Pelvic: IMPRESSION: Limited examination due to acoustic windows. Possible left adnexal mass. A CT scan of the abdomen and pelvis with contrast is recommended for further assessment.     2020: CT A/P:   IMPRESSION:    Peritoneal carcinomatosis with masslike peritoneal thickening in the lower pelvis which may indicate an adnexal or ovarian primary malignancy. Large volume ascites. Bilateral pleural effusions. There is potential subtle pleural nodularity in the right hemithorax which could indicate metastatic disease.  Indeterminate 1 cm lesion in the right hepatic lobe suspicious for a metastatic lesion.      2020: Presented to [facility] with abdominal distention, 25lb weight loss, and CTAP with carcinomatosis, elevated  3098.     2020: CT Chest: IMPRESSION:   1. There are few scattered small sub-6 mm pulmonary nodules which are indeterminate without prior comparisons available. There are a few  slightly larger perifissural nodules which are technically  indeterminate in the setting of malignancy although presumed lymphatic in nature and of unlikely clinical significance. Attention on follow-up is recommended.  2. Small to moderate left and small right pleural effusions are increased in size from prior. No convincing evidence for pleural nodularity.  3. Partially visualized large volume ascites and peritoneal nodularity  in the upper abdomen similar to 12/4/2020 outside CT      12/26/2020: ED for abdominal distension; 3 L ascites drained with paracentesis    Pelvic US: Findings: Free fluid present in LLQ      12/31/2020: US Paracentesis: 900 mL ascites drained     1/7/2021: Diagnotic laparoscopy, biopsies  Pathology: FINAL DIAGNOSIS:   A. PERITONEUM, BIOPSIES:   - Positive for high grade carcinoma, consistent with metastatic carcinoma of Mullerian origin.     1/10-1/13/2021: Hospital admission for postoperative non-intractable vomiting and nausea.      1/10/2021: CT A/P: IMPRESSION: Extensive ascites which is probably malignant. Scattered liver hypodensities of indeterminate etiology comment cannot exclude metastatic disease. Diverticulosis. Fluid-filled adnexal masses and irregular appearance of uterus, which may represent primary neoplasm. Multiple peritoneal nodules. Large amount of fecal material in the colon with no evidence of small bowel obstruction.     Plan: Paclitaxel 175 mg/m2 and carboplatin AUC 6 x 3 cycles followed by a CT CAP and visit with Dr. Juarez.     1/12/2021: Cycle 1 paclitaxel and carboplatin while inpatient     1/13/2021: CT Head: Impression:  1. Chronic sinusitis of the right maxillary and right sphenoid sinuses.  2. Incidental presumed calcified meningioma in the right frontal  convexity without significant mass effect.  3. No suspicious intracranial enhancing lesion.     2/1/2021: Cycle 2 paclitaxel and carboplatin.  936.     2/3-2/5/21: Admission [facility] for afib w/ RVR and new PE     2/26/21: Cycle 3 paclitaxel and carboplatin planned.  Deferred due to thrombocytopenia.  pending.     3/15/21: Cycle 3 paclitaxel and carboplatin given     4/19/21: HYSTERECTOMY, TOTAL, ABDOMINAL, WITH BILATERAL SALPINGO-OOPHORECTOMY, omentectomy, NEOPLASM DEBULKING,Proctoscocy, RO, Resection of liver nodules, diaphragm stripping, immobilization of liver and colon  FINAL DIAGNOSIS:   A. OMENTUM, BIOPSY:   -  Omental adipose tissue with rare viable cells of metastatic high grade   serous carcinoma   - One reactive lymph node, negative for malignancy (0/1)   B. NODULE, SIGMOID, EXCISION:   - Calcified necrotic adipose tissue   - Negative for malignancy   C. NODULE, SMALL BOWEL MESENTERY, EXCISION:   - Fibroadipose tissue, positive for metastatic high grade serous carcinoma   D. UTERUS, CERVIX, BILATERAL FALLOPIAN TUBES AND OVARIES, HYSTERECTOMY   WITH BILATERAL SALPINGO-OOPHORECTOMY:   - Atrophic endometrium   - Uterine serosa with rare viable cells consistent with high grade serous   carcinoma   - Cervix with atrophic changes   - Viable cells consistent with high grade serous carcinoma present in the   right ovary, serosa of right   fallopian tube and right periadnexal soft tissue   - Left ovary with atrophic changes   - Left fallopian tube with a rare focus of serous tubal in-situ carcinoma   (STIC)   E. NODULES, SMALL BOWEL MESENTRY, EXCISION:   - Fibroadipose tissue with rare viable cells of metastatic high grade   serous carcinoma   F. NODULE, SPLENIC FLEXURE TRANSVERSE COLON, EXCISION:   - Fibroadipose tissue with rare viable cells of metastatic high grade   serous carcinoma   - Accessory splenule, negative for malignancy   G. OMENTUM, OMENTECTOMY:   - Omental adipose tissue with rare viable cells of metastatic high grade   serous carcinoma   H. NODULE, PERITONEAL PANCREATIC, EXCISION:   - Fibrous adhesions with inflammation   - Negative for malignancy   I. RIGHT HEMIDIAPHRAGM PERITONEUM, EXCISION:   - Fibrous adhesions with inflammation   - Negative for malignancy   J. RIGHT LIVER SURFACE NODULE:   - Fibrous adhesions with benign inclusion glands   - Negative for malignancy   K. LEFT LOWER LIVER EDGE, BIOPSY:   - Cauterized hepatic parenchyma and capsule   - Negative for malignancy   L. NODULE, SMALL BOWEL MESENTERIC #3, EXCISION:   - Fibroadipose tissue with rare viable cells of metastatic high grade   serous  carcinoma       Plan: Carboplatin AUC 6 + Taxol 175 mg/m2 x 3 cycles, then transition to Parp inhibitor for maintenance therapy given her BRCA1 germline mutation.      5/21/21: Cycle 4 carboplatin and paclitaxel.   172.  6/11/21: Cycle 5 carboplatin and paclitaxel.   61.  7/2/21: Cycle 6 carboplatin and paclitaxel.   20.        7/28/21 plan: Olaparib 300mg bid as starting dose,  14  8/20/21: start date olaparib 300 mg BID,  12  9/13/21:  22  10/4/21:  23  11/1/21:  26     11/02/2021: PET CT: IMPRESSION:   Findings compatible with interval surgery and posttreatment change.  No gross definitive FDG avid disease.  Potential foci of tumor deposits along the anterior dome of the liver and midline abdominal wall surgical scar.  Colonic activity is not necessarily abnormal, however, given the previous carcinomatosis the colonic activity is indeterminant.         12/1/21:  23  1/3/22:  21  2/1/22:  20  3/1/22:  21  4/1/22:  23  5/4/22:  28     EXAM: CT CHEST/ABDOMEN/PELVIS W CONTRAST  LOCATION: [facility] [place]  DATE/TIME: 7/11/2022 1:25 PM                                                                   IMPRESSION:  1.  Sliver of ascites in the upper abdomen has decreased since interval debulking surgery.  2.  There is a punctate nodule in the gastrosplenic ligament which is minimally more plump relative to the preop exam. This will need to be followed.  3.  Minimal nodular changes to the left of the midline scar in the subcutaneous fat will have to be followed as well. Unclear if this simply reflects postoperative scarring or could reflect an early incisional recurrence.  4.  No other sites to suggest recurrent tumor. Vaginal cuff is normal.  5.  Extensive distal colonic diverticulosis.  6.  Other noncritical findings as noted above.      9/16/22  98     1/30/23 CT CAP:    IMPRESSION:  1.  Multiple new,  "hypoattenuating lesions in the liver, suspicious for hepatic metastatic disease.  2.  Necrotic mario hepatic lymphadenopathy, concerning for richard metastatic disease.  3.  There is a new or increasingly conspicuous 6 mm soft tissue nodule in the right lower quadrant. This is indeterminate.  4.  There is a new 3 mm solid nodule in the right upper lobe, indeterminate.  5.  Stable approximate 4 mm punctate nodule along the gastrosplenic ligament.  6.  Similar area of linear free fluid in the upper abdomen anterior to the stomach.    Plan: Paclitaxel 175 mg/m2, Carboplatin AUC 6, bevacizumab 7.5 mg/kg    3/1/23: Cycle #1 Paclitaxel 175 mg/m2, Carboplatin AUC 6 (C7), bevacizumab 7.5 mg/kg.  1,196  3/24/23: Cycle #2 Paclitaxel 150 mg/m2, Carboplatin AUC 5 (C8), bevacizumab 15 mg/kg.  558    3/29/23: ER for dehydration and hypotension. Normotensive in ER. IV fluids given. Discharged.     4/14/23: Cycle #3 Paclitaxel 150 mg/m2, Carboplatin AUC 5 (C9), bevacizumab 15 mg/kg deferred neutropenia ANC 0.3   273  4/21/23: treatment deferred ANC 0.8  4/28/23: Cycle #3 Paclitaxel 150 mg/m2, Carboplatin AUC 5 (C9), bevacizumab 15 mg/kg, + Neulasta deferred ANC 1.0,  297  5/5/23: Cycle #3 Paclitaxel 150 mg/m2, Carboplatin AUC 5 (C9), bevacizumab 15 mg/kg, + Neulasta deferred ANC 1.0,  401            Today Justen presents via video. She denies fevers or chills.  Pt continues plan to take Claritin and APAP for chemo/Neulasta bone pain. She reports that she has a \"few tablets left of Dilaudid\". She would like a refill of this ahead of treatment tomorrow. Has tingling at toes. No pain with her neuropathy. She has read about a TENS unit and has questions regarding this. Eating and drinking well. Drinking 64 oz per day. Reports migraines that are controlled with Imitrex. Denies abdominal pain or bloating. Tolerating PO intake. Denies nausea and vomiting. Denies SOB/CP. No fevers or chills. She denies any " vaginal bleeding, no changes in her bowel or bladder habits, no lower extremity edema, and no difficulties eating or sleeping. She denies  fevers or chills, and no chest pain or shortness of breath. Regular bowel movements daily. Pt continues on Xarleto for prior PE.                 Review of Systems:    Systemic           no weight changes; no fever; no chills; no night sweats; no appetite changes  Skin           no rashes, or lesions  Eye           no irritation; no changes in vision  Allen-Laryngeal           no dysphagia; no hoarseness   Pulmonary    no cough; no shortness of breath  Cardiovascular    no chest pain; no palpitations  Gastrointestinal    no diarrhea; no constipation; no abdominal pain; no changes in bowel  habits; no blood in stool  Genitourinary   no urinary frequency; no urinary urgency; no dysuria; no pain; no abnormal vaginal discharge; no abnormal vaginal bleeding  Breast   no breast discharge; no breast changes; no breast pain  Musculoskeletal    + myalgias; no arthralgias; no back pain  Psychiatric           no depressed mood; no anxiety    Hematologic           no tender lymph nodes; no noticeable swellings or lumps   Endocrine    no hot flashes; no heat/cold intolerance         Neurological   no tremor; + numbness and tingling; no headaches; no difficulty sleeping      Past Medical History:    Past Medical History:   Diagnosis Date     Atrial fibrillation with rapid ventricular response (H)      History of cold sores      Hx of LASIK 12/11/2017     Insomnia      Migraine      Osteopenia      Pelvic mass      Peritoneal carcinomatosis (H)      Restless legs syndrome (RLS)          Past Surgical History:    Past Surgical History:   Procedure Laterality Date     APPENDECTOMY       ARTHROSCPY KNEE SURGICAL DEBRIDEMENT SHAVING ARTICULAR CARTILAGE Right      BIOPSY  January 2021    Biopsy to confirm ovarian cancer     DEBRIDEMENT LEFT UPPER EXTREMITY  2016     HYSTERECTOMY TOTAL ABD, LUISITO  SALPINGO-OOPHORECTOMY, NODE DISSECTION, TUMOR DEBULKING, COMBINED Bilateral 4/19/2021    Procedure: HYSTERECTOMY, TOTAL, ABDOMINAL, WITH BILATERAL SALPINGO-OOPHORECTOMY, omentectomy, NEOPLASM DEBULKING,Proctoscocy, RO, Resection of liver nodules, diaphragm stripping, immobilization of liver and colon;  Surgeon: Bolivar Juarez MD;  Location: [facility] OR     LAPAROSCOPY DIAGNOSTIC (GYN) Bilateral 1/7/2021    Procedure: Diagnsotic laparoscopy, biopsies;  Surgeon: Bolivar Juarez MD;  Location: [facility] OR     LASIK       TUBAL LIGATION           Health Maintenance Due   Topic Date Due     DEXA  Never done     ADVANCE CARE PLANNING  Never done     COLORECTAL CANCER SCREENING  Never done     HEPATITIS C SCREENING  Never done     LIPID  Never done     MEDICARE ANNUAL WELLNESS VISIT  11/11/2021     FALL RISK ASSESSMENT  Never done     COVID-19 Vaccine (4 - Booster for Pfizer series) 11/15/2021     Pneumococcal Vaccine: 65+ Years (2 - PPSV23 if available, else PCV20) 04/05/2022     INFLUENZA VACCINE (1) Never done     PHQ-2 (once per calendar year)  01/01/2023       Current Medications:     Current Outpatient Medications   Medication Sig Dispense Refill     HYDROmorphone (DILAUDID) 2 MG tablet Take 1 tablet (2 mg) by mouth every 6 hours as needed for pain 10 tablet 0     amitriptyline (ELAVIL) 100 MG tablet Take 1 tablet (100 mg) by mouth At Bedtime 90 tablet 0     dexamethasone (DECADRON) 4 MG tablet Take 2 tablets (8 mg) by mouth daily Take for 3 days, starting the day after chemo. Take with food. 6 tablet 5     fluticasone (FLONASE) 50 MCG/ACT nasal spray        gabapentin (NEURONTIN) 600 MG tablet Take 1 tablet (600 mg) by mouth At Bedtime 90 tablet 0     meclizine (ANTIVERT) 25 MG tablet Take 1 tablet (25 mg) by mouth 3 times daily as needed for dizziness or nausea 15 tablet 0     ondansetron (ZOFRAN) 4 MG tablet Take by mouth every 8 hours as needed for nausea       ondansetron (ZOFRAN) 8 MG tablet Take 1 tablet (8 mg)  by mouth every 8 hours as needed for nausea (vomiting) 30 tablet 2     oxyCODONE (ROXICODONE) 5 MG tablet Take 1 tablet (5 mg) by mouth every 12 hours 10 tablet 0     prochlorperazine (COMPAZINE) 10 MG tablet Take 1 tablet (10 mg) by mouth every 6 hours as needed for nausea or vomiting 30 tablet 2     rivaroxaban ANTICOAGULANT (XARELTO ANTICOAGULANT) 20 MG TABS tablet Take 1 tablet (20 mg) by mouth every morning 90 tablet 1     SUMAtriptan (IMITREX) 100 MG tablet Take 1 tablet (100 mg) by mouth at onset of headache for migraine 15 tablet 0     valACYclovir (VALTREX) 1000 mg tablet Take 1,000 mg by mouth as needed       zolpidem (AMBIEN) 10 MG tablet Take 10 mg by mouth           Allergies:      No Known Allergies     Social History:     Social History     Tobacco Use     Smoking status: Former     Packs/day: 0.50     Years: 40.00     Pack years: 20.00     Types: Cigarettes     Quit date:      Years since quittin.3     Smokeless tobacco: Never   Vaping Use     Vaping status: Not on file   Substance Use Topics     Alcohol use: Not Currently       History   Drug Use Unknown         Family History:     The patient's family history is notable for     Family History   Adopted: Yes   Problem Relation Age of Onset     Cancer Mother 36     Other Cancer Mother         Bio mother  of  a female cancer  at 36     Factor V Leiden deficiency Daughter      Deep Vein Thrombosis Daughter      Diabetes Type 1 Daughter      Diabetes Daughter      Hypertension Daughter      Anesthesia Reaction No family hx of          Physical Exam:     There were no vitals taken for this visit.  There is no height or weight on file to calculate BMI.    General Appearance:  healthy and alert, no distress     Eyes:  Eyes grossly normal to inspection.  No discharge or erythema, or obvious scleral/conjunctival abnormalities.     Respiratory:     No audible wheeze, cough, or visible cyanosis.  No visible retractions or increased work of  breathing.      Musculoskeletal:         extremities non tender and without edema     Skin:    no lesions or rashes on visible skin     Neurological:   normal gait, no gross defects                Psychiatric:      appropriate mood and affect. 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 She is here today for a disease management visit by video.     20 minutes spent on the date of the encounter doing chart review, history and exam, documentation, and further activities as noted above.         Plan:     1.)   Recurrent metastatic ovarian high grade serous carcinoma. Reviewed reasoning behind decreasing chemotherapy doses and the purpose of Neulasta after chemotherapy administration. Encouraged 5-6 small high protein meals a day and discussed nausea management and control. Encouraged 64 oz of fluids per day. Discussed neutropenic precautions and rita period. Reviewed signs and symptoms for when she should contact the clinic or seek additional care. Patient to contact the clinic with any questions or concerns in the interim.  Pt has [facility] 2-3 days prior to infusion. Repeat CBC tomorrow 5/5/23 ahead of treatment as ANC 1.0 at outside lab.     Pt is aware that she has a 0900 lab and 0930 chemotherapy tomorrow 5/5/2023. PT has MD follow up scheduled.     Disease/Treatment related SE:     - Neutropenia: ANC 1.0. Plan per Dr. Juarez for 5/5/23 is to redraw CBC tomorrow and if ANC 1.0 or higher OK to proceed with chemotherapy with Neulasta. If ANC lower than 1.0, hold chemotherapy and give Avastin only. Neulasta in tx plan and has been approved per finance. Pt treatment plan at dose reduction of Taxol 150 mg/m2 and Carbo AUC 5.   - Bone pain:  Reviewed ok to take  Claritin prior to and a few days after treatment to prevent/treat myalgias. Encouraged APAP/Ibuprofen prn. Dilaudid refill provided for patient today.   - Neuropathy: tingling and numbness. Reviewed vitamin B6 and L-glutamine today along with massage and acupuncture during prior appt. No pain or issues with dexterity. Reviewed what a TENS unit is and ok to try this otc if pt prefers. Continue to monitor.     2.) Genetic risk factors were assessed and she is POSITIVE for a BRCA1 mutation.  This mutation is called [id number]. Referral for Waleska Zamorano with Cancer Risk management placed 5/2022.     3.) Labs and/or tests ordered include: CBC, protein urine, CMP, Mag,  reviewed today from Care Everywhere with pt     4.) Health maintenance issues addressed today include annual health maintenance and non-gynecologic issues with PCP.    5)        PE: pt continues on JERICHO Hampton, SNEHAL-BC  Women's Health Nurse Practitioner  Division of Gynecologic Oncology  [facility]        CC  Patient Care Team:  Bolivar Juarez MD as PCP - General (Gynecologic Oncology)  Marta Okeefe APRN CNP as Assigned PCP  Marta Lao APRN CNP as Assigned Cancer Care Provider  SELF, REFERRED

## 2023-05-04 ENCOUNTER — VIRTUAL VISIT (OUTPATIENT)
Dept: ONCOLOGY | Facility: CLINIC | Age: 68
End: 2023-05-04
Attending: OBSTETRICS & GYNECOLOGY
Payer: COMMERCIAL

## 2023-05-04 DIAGNOSIS — Z51.11 ENCOUNTER FOR ANTINEOPLASTIC CHEMOTHERAPY: ICD-10-CM

## 2023-05-04 DIAGNOSIS — G89.3 CANCER RELATED PAIN: ICD-10-CM

## 2023-05-04 DIAGNOSIS — C56.9 OVARIAN CANCER, UNSPECIFIED LATERALITY (H): Primary | ICD-10-CM

## 2023-05-04 DIAGNOSIS — T45.1X5S ADVERSE EFFECT OF ANTINEOPLASTIC AND IMMUNOSUPPRESSIVE DRUGS, SEQUELA: ICD-10-CM

## 2023-05-04 PROCEDURE — 99213 OFFICE O/P EST LOW 20 MIN: CPT | Mod: VID | Performed by: OBSTETRICS & GYNECOLOGY

## 2023-05-04 PROCEDURE — G0463 HOSPITAL OUTPT CLINIC VISIT: HCPCS | Mod: PN,GT | Performed by: OBSTETRICS & GYNECOLOGY

## 2023-05-04 RX ORDER — HYDROMORPHONE HYDROCHLORIDE 2 MG/1
2 TABLET ORAL EVERY 6 HOURS PRN
Qty: 10 TABLET | Refills: 0 | Status: SHIPPED | OUTPATIENT
Start: 2023-05-04 | End: 2024-01-01

## 2023-05-04 NOTE — NURSING NOTE
Is the patient currently in the state of [place]? YES    Visit mode:VIDEO    If the visit is dropped, the patient can be reconnected by: VIDEO VISIT: Text to cell phone: [phone]    Will anyone else be joining the visit? NO      How would you like to obtain your AVS? MyChart    Are changes needed to the allergy or medication list? NO  Patient verified medications and allergies are correct via eCheck-in. Patient confirms no changes at this time and/or since last reviewed by clinic staff.    Reason for visit: Oncology Video Visit Return (Ovarian cancerOvarian cancer, f/u)  Taran Regalado 67 year old female presents today for f/u on ovarian cancer.  Myriam Gregory, Virtual Facilitator

## 2023-05-04 NOTE — LETTER
2023         RE: Taran Regalado  [address]  [place] [address]        Dear Colleague,    Thank you for referring your patient, Taran Regalado, to the [facility]. Please see a copy of my visit note below.                        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 She is here today for follow up and disease management by video.     Oncology History:  12/3/2020: US Pelvic: IMPRESSION: Limited examination due to acoustic windows. Possible left adnexal mass. A CT scan of the abdomen and pelvis with contrast is recommended for further assessment.     2020: CT A/P:   IMPRESSION:    Peritoneal carcinomatosis with masslike peritoneal thickening in the lower pelvis which may indicate an adnexal or ovarian primary malignancy. Large volume ascites. Bilateral pleural effusions. There is potential subtle pleural nodularity in the right hemithorax which could indicate metastatic disease.  Indeterminate 1 cm lesion in the right hepatic lobe suspicious for a metastatic lesion.      2020: Presented to [facility] with abdominal distention, 25lb weight loss, and CTAP with carcinomatosis, elevated  3098.     2020: CT Chest: IMPRESSION:   1. There are few scattered small sub-6 mm pulmonary nodules which are indeterminate without prior comparisons available. There are a few  slightly larger perifissural nodules which are technically  indeterminate in the setting of malignancy although presumed lymphatic in nature and of unlikely clinical significance. Attention on follow-up is recommended.  2. Small to moderate left and small right pleural effusions are increased in size from prior. No convincing evidence for pleural nodularity.  3. Partially visualized large volume ascites and  peritoneal nodularity in the upper abdomen similar to 12/4/2020 outside CT      12/26/2020: ED for abdominal distension; 3 L ascites drained with paracentesis    Pelvic US: Findings: Free fluid present in LLQ      12/31/2020: US Paracentesis: 900 mL ascites drained     1/7/2021: Diagnotic laparoscopy, biopsies  Pathology: FINAL DIAGNOSIS:   A. PERITONEUM, BIOPSIES:   - Positive for high grade carcinoma, consistent with metastatic carcinoma of Mullerian origin.     1/10-1/13/2021: Hospital admission for postoperative non-intractable vomiting and nausea.      1/10/2021: CT A/P: IMPRESSION: Extensive ascites which is probably malignant. Scattered liver hypodensities of indeterminate etiology comment cannot exclude metastatic disease. Diverticulosis. Fluid-filled adnexal masses and irregular appearance of uterus, which may represent primary neoplasm. Multiple peritoneal nodules. Large amount of fecal material in the colon with no evidence of small bowel obstruction.     Plan: Paclitaxel 175 mg/m2 and carboplatin AUC 6 x 3 cycles followed by a CT CAP and visit with Dr. Juarez.     1/12/2021: Cycle 1 paclitaxel and carboplatin while inpatient     1/13/2021: CT Head: Impression:  1. Chronic sinusitis of the right maxillary and right sphenoid sinuses.  2. Incidental presumed calcified meningioma in the right frontal  convexity without significant mass effect.  3. No suspicious intracranial enhancing lesion.     2/1/2021: Cycle 2 paclitaxel and carboplatin.  936.     2/3-2/5/21: Admission [facility] for afib w/ RVR and new PE     2/26/21: Cycle 3 paclitaxel and carboplatin planned.  Deferred due to thrombocytopenia.  pending.     3/15/21: Cycle 3 paclitaxel and carboplatin given     4/19/21: HYSTERECTOMY, TOTAL, ABDOMINAL, WITH BILATERAL SALPINGO-OOPHORECTOMY, omentectomy, NEOPLASM DEBULKING,Proctoscocy, RO, Resection of liver nodules, diaphragm stripping, immobilization of liver and colon  FINAL DIAGNOSIS:   A.  OMENTUM, BIOPSY:   - Omental adipose tissue with rare viable cells of metastatic high grade   serous carcinoma   - One reactive lymph node, negative for malignancy (0/1)   B. NODULE, SIGMOID, EXCISION:   - Calcified necrotic adipose tissue   - Negative for malignancy   C. NODULE, SMALL BOWEL MESENTERY, EXCISION:   - Fibroadipose tissue, positive for metastatic high grade serous carcinoma   D. UTERUS, CERVIX, BILATERAL FALLOPIAN TUBES AND OVARIES, HYSTERECTOMY   WITH BILATERAL SALPINGO-OOPHORECTOMY:   - Atrophic endometrium   - Uterine serosa with rare viable cells consistent with high grade serous   carcinoma   - Cervix with atrophic changes   - Viable cells consistent with high grade serous carcinoma present in the   right ovary, serosa of right   fallopian tube and right periadnexal soft tissue   - Left ovary with atrophic changes   - Left fallopian tube with a rare focus of serous tubal in-situ carcinoma   (STIC)   E. NODULES, SMALL BOWEL MESENTRY, EXCISION:   - Fibroadipose tissue with rare viable cells of metastatic high grade   serous carcinoma   F. NODULE, SPLENIC FLEXURE TRANSVERSE COLON, EXCISION:   - Fibroadipose tissue with rare viable cells of metastatic high grade   serous carcinoma   - Accessory splenule, negative for malignancy   G. OMENTUM, OMENTECTOMY:   - Omental adipose tissue with rare viable cells of metastatic high grade   serous carcinoma   H. NODULE, PERITONEAL PANCREATIC, EXCISION:   - Fibrous adhesions with inflammation   - Negative for malignancy   I. RIGHT HEMIDIAPHRAGM PERITONEUM, EXCISION:   - Fibrous adhesions with inflammation   - Negative for malignancy   J. RIGHT LIVER SURFACE NODULE:   - Fibrous adhesions with benign inclusion glands   - Negative for malignancy   K. LEFT LOWER LIVER EDGE, BIOPSY:   - Cauterized hepatic parenchyma and capsule   - Negative for malignancy   L. NODULE, SMALL BOWEL MESENTERIC #3, EXCISION:   - Fibroadipose tissue with rare viable cells of metastatic  high grade   serous carcinoma       Plan: Carboplatin AUC 6 + Taxol 175 mg/m2 x 3 cycles, then transition to Parp inhibitor for maintenance therapy given her BRCA1 germline mutation.      5/21/21: Cycle 4 carboplatin and paclitaxel.   172.  6/11/21: Cycle 5 carboplatin and paclitaxel.   61.  7/2/21: Cycle 6 carboplatin and paclitaxel.   20.        7/28/21 plan: Olaparib 300mg bid as starting dose,  14  8/20/21: start date olaparib 300 mg BID,  12  9/13/21:  22  10/4/21:  23  11/1/21:  26     11/02/2021: PET CT: IMPRESSION:   Findings compatible with interval surgery and posttreatment change.  No gross definitive FDG avid disease.  Potential foci of tumor deposits along the anterior dome of the liver and midline abdominal wall surgical scar.  Colonic activity is not necessarily abnormal, however, given the previous carcinomatosis the colonic activity is indeterminant.         12/1/21:  23  1/3/22:  21  2/1/22:  20  3/1/22:  21  4/1/22:  23  5/4/22:  28     EXAM: CT CHEST/ABDOMEN/PELVIS W CONTRAST  LOCATION: [facility]  DATE/TIME: 7/11/2022 1:25 PM                                                                   IMPRESSION:  1.  Sliver of ascites in the upper abdomen has decreased since interval debulking surgery.  2.  There is a punctate nodule in the gastrosplenic ligament which is minimally more plump relative to the preop exam. This will need to be followed.  3.  Minimal nodular changes to the left of the midline scar in the subcutaneous fat will have to be followed as well. Unclear if this simply reflects postoperative scarring or could reflect an early incisional recurrence.  4.  No other sites to suggest recurrent tumor. Vaginal cuff is normal.  5.  Extensive distal colonic diverticulosis.  6.  Other noncritical findings as noted above.      9/16/22  98     1/30/23 CT CAP:    IMPRESSION:  1.   "Multiple new, hypoattenuating lesions in the liver, suspicious for hepatic metastatic disease.  2.  Necrotic mario hepatic lymphadenopathy, concerning for richard metastatic disease.  3.  There is a new or increasingly conspicuous 6 mm soft tissue nodule in the right lower quadrant. This is indeterminate.  4.  There is a new 3 mm solid nodule in the right upper lobe, indeterminate.  5.  Stable approximate 4 mm punctate nodule along the gastrosplenic ligament.  6.  Similar area of linear free fluid in the upper abdomen anterior to the stomach.    Plan: Paclitaxel 175 mg/m2, Carboplatin AUC 6, bevacizumab 7.5 mg/kg    3/1/23: Cycle #1 Paclitaxel 175 mg/m2, Carboplatin AUC 6 (C7), bevacizumab 7.5 mg/kg.  1,196  3/24/23: Cycle #2 Paclitaxel 150 mg/m2, Carboplatin AUC 5 (C8), bevacizumab 15 mg/kg.  558    3/29/23: ER for dehydration and hypotension. Normotensive in ER. IV fluids given. Discharged.     4/14/23: Cycle #3 Paclitaxel 150 mg/m2, Carboplatin AUC 5 (C9), bevacizumab 15 mg/kg deferred neutropenia ANC 0.3   273  4/21/23: treatment deferred ANC 0.8  4/28/23: Cycle #3 Paclitaxel 150 mg/m2, Carboplatin AUC 5 (C9), bevacizumab 15 mg/kg, + Neulasta deferred ANC 1.0,  297  5/5/23: Cycle #3 Paclitaxel 150 mg/m2, Carboplatin AUC 5 (C9), bevacizumab 15 mg/kg, + Neulasta deferred ANC 1.0,  401            Today Justen presents via video. She denies fevers or chills.  Pt continues plan to take Claritin and APAP for chemo/Neulasta bone pain. She reports that she has a \"few tablets left of Dilaudid\". She would like a refill of this ahead of treatment tomorrow. Has tingling at toes. No pain with her neuropathy. She has read about a TENS unit and has questions regarding this. Eating and drinking well. Drinking 64 oz per day. Reports migraines that are controlled with Imitrex. Denies abdominal pain or bloating. Tolerating PO intake. Denies nausea and vomiting. Denies SOB/CP. No fevers or chills. She " denies any vaginal bleeding, no changes in her bowel or bladder habits, no lower extremity edema, and no difficulties eating or sleeping. She denies  fevers or chills, and no chest pain or shortness of breath. Regular bowel movements daily. Pt continues on Xarleto for prior PE.                 Review of Systems:    Systemic           no weight changes; no fever; no chills; no night sweats; no appetite changes  Skin           no rashes, or lesions  Eye           no irritation; no changes in vision  Allen-Laryngeal           no dysphagia; no hoarseness   Pulmonary    no cough; no shortness of breath  Cardiovascular    no chest pain; no palpitations  Gastrointestinal    no diarrhea; no constipation; no abdominal pain; no changes in bowel  habits; no blood in stool  Genitourinary   no urinary frequency; no urinary urgency; no dysuria; no pain; no abnormal vaginal discharge; no abnormal vaginal bleeding  Breast   no breast discharge; no breast changes; no breast pain  Musculoskeletal    + myalgias; no arthralgias; no back pain  Psychiatric           no depressed mood; no anxiety    Hematologic           no tender lymph nodes; no noticeable swellings or lumps   Endocrine    no hot flashes; no heat/cold intolerance         Neurological   no tremor; + numbness and tingling; no headaches; no difficulty sleeping      Past Medical History:    Past Medical History:   Diagnosis Date    Atrial fibrillation with rapid ventricular response (H)     History of cold sores     Hx of LASIK 12/11/2017    Insomnia     Migraine     Osteopenia     Pelvic mass     Peritoneal carcinomatosis (H)     Restless legs syndrome (RLS)          Past Surgical History:    Past Surgical History:   Procedure Laterality Date    APPENDECTOMY      ARTHROSCPY KNEE SURGICAL DEBRIDEMENT SHAVING ARTICULAR CARTILAGE Right     BIOPSY  January 2021    Biopsy to confirm ovarian cancer    DEBRIDEMENT LEFT UPPER EXTREMITY  2016    HYSTERECTOMY TOTAL ABD, LUISITO  SALPINGO-OOPHORECTOMY, NODE DISSECTION, TUMOR DEBULKING, COMBINED Bilateral 4/19/2021    Procedure: HYSTERECTOMY, TOTAL, ABDOMINAL, WITH BILATERAL SALPINGO-OOPHORECTOMY, omentectomy, NEOPLASM DEBULKING,Proctoscocy, RO, Resection of liver nodules, diaphragm stripping, immobilization of liver and colon;  Surgeon: Bolivar Juarez MD;  Location: [facility] OR    LAPAROSCOPY DIAGNOSTIC (GYN) Bilateral 1/7/2021    Procedure: Diagnsotic laparoscopy, biopsies;  Surgeon: Bolivar Juarez MD;  Location: [facility] OR    LASIK      TUBAL LIGATION           Health Maintenance Due   Topic Date Due    DEXA  Never done    ADVANCE CARE PLANNING  Never done    COLORECTAL CANCER SCREENING  Never done    HEPATITIS C SCREENING  Never done    LIPID  Never done    MEDICARE ANNUAL WELLNESS VISIT  11/11/2021    FALL RISK ASSESSMENT  Never done    COVID-19 Vaccine (4 - Booster for Pfizer series) 11/15/2021    Pneumococcal Vaccine: 65+ Years (2 - PPSV23 if available, else PCV20) 04/05/2022    INFLUENZA VACCINE (1) Never done    PHQ-2 (once per calendar year)  01/01/2023       Current Medications:     Current Outpatient Medications   Medication Sig Dispense Refill    HYDROmorphone (DILAUDID) 2 MG tablet Take 1 tablet (2 mg) by mouth every 6 hours as needed for pain 10 tablet 0    amitriptyline (ELAVIL) 100 MG tablet Take 1 tablet (100 mg) by mouth At Bedtime 90 tablet 0    dexamethasone (DECADRON) 4 MG tablet Take 2 tablets (8 mg) by mouth daily Take for 3 days, starting the day after chemo. Take with food. 6 tablet 5    fluticasone (FLONASE) 50 MCG/ACT nasal spray       gabapentin (NEURONTIN) 600 MG tablet Take 1 tablet (600 mg) by mouth At Bedtime 90 tablet 0    meclizine (ANTIVERT) 25 MG tablet Take 1 tablet (25 mg) by mouth 3 times daily as needed for dizziness or nausea 15 tablet 0    ondansetron (ZOFRAN) 4 MG tablet Take by mouth every 8 hours as needed for nausea      ondansetron (ZOFRAN) 8 MG tablet Take 1 tablet (8 mg) by mouth every 8  hours as needed for nausea (vomiting) 30 tablet 2    oxyCODONE (ROXICODONE) 5 MG tablet Take 1 tablet (5 mg) by mouth every 12 hours 10 tablet 0    prochlorperazine (COMPAZINE) 10 MG tablet Take 1 tablet (10 mg) by mouth every 6 hours as needed for nausea or vomiting 30 tablet 2    rivaroxaban ANTICOAGULANT (XARELTO ANTICOAGULANT) 20 MG TABS tablet Take 1 tablet (20 mg) by mouth every morning 90 tablet 1    SUMAtriptan (IMITREX) 100 MG tablet Take 1 tablet (100 mg) by mouth at onset of headache for migraine 15 tablet 0    valACYclovir (VALTREX) 1000 mg tablet Take 1,000 mg by mouth as needed      zolpidem (AMBIEN) 10 MG tablet Take 10 mg by mouth           Allergies:      No Known Allergies     Social History:     Social History     Tobacco Use    Smoking status: Former     Packs/day: 0.50     Years: 40.00     Pack years: 20.00     Types: Cigarettes     Quit date:      Years since quittin.3    Smokeless tobacco: Never   Vaping Use    Vaping status: Not on file   Substance Use Topics    Alcohol use: Not Currently       History   Drug Use Unknown         Family History:     The patient's family history is notable for     Family History   Adopted: Yes   Problem Relation Age of Onset    Cancer Mother 36    Other Cancer Mother         Bio mother  of  a female cancer  at 36    Factor V Leiden deficiency Daughter     Deep Vein Thrombosis Daughter     Diabetes Type 1 Daughter     Diabetes Daughter     Hypertension Daughter     Anesthesia Reaction No family hx of          Physical Exam:     There were no vitals taken for this visit.  There is no height or weight on file to calculate BMI.    General Appearance:  healthy and alert, no distress     Eyes:  Eyes grossly normal to inspection.  No discharge or erythema, or obvious scleral/conjunctival abnormalities.     Respiratory:     No audible wheeze, cough, or visible cyanosis.  No visible retractions or increased work of breathing.      Musculoskeletal:          extremities non tender and without edema     Skin:    no lesions or rashes on visible skin     Neurological:   normal gait, no gross defects                Psychiatric:      appropriate mood and affect. 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 She is here today for a disease management visit by video.     20 minutes spent on the date of the encounter doing chart review, history and exam, documentation, and further activities as noted above.         Plan:     1.)   Recurrent metastatic ovarian high grade serous carcinoma. Reviewed reasoning behind decreasing chemotherapy doses and the purpose of Neulasta after chemotherapy administration. Encouraged 5-6 small high protein meals a day and discussed nausea management and control. Encouraged 64 oz of fluids per day. Discussed neutropenic precautions and rita period. Reviewed signs and symptoms for when she should contact the clinic or seek additional care. Patient to contact the clinic with any questions or concerns in the interim.  Pt has labs at [facility] 2-3 days prior to infusion. Repeat CBC tomorrow 5/5/23 ahead of treatment as ANC 1.0 at outside lab.     Pt is aware that she has a 0900 lab and 0930 chemotherapy tomorrow 5/5/2023. PT has MD follow up scheduled.     Disease/Treatment related SE:     - Neutropenia: ANC 1.0. Plan per Dr. Juarez for 5/5/23 is to redraw CBC tomorrow and if ANC 1.0 or higher OK to proceed with chemotherapy with Neulasta. If ANC lower than 1.0, hold chemotherapy and give Avastin only. Neulasta in tx plan and has been approved per finance. Pt treatment plan at dose reduction of Taxol 150 mg/m2 and Carbo AUC 5.   - Bone pain:  Reviewed ok to take Claritin prior to and a few days after  treatment to prevent/treat myalgias. Encouraged APAP/Ibuprofen prn. Dilaudid refill provided for patient today.   - Neuropathy: tingling and numbness. Reviewed vitamin B6 and L-glutamine today along with massage and acupuncture during prior appt. No pain or issues with dexterity. Reviewed what a TENS unit is and ok to try this otc if pt prefers. Continue to monitor.     2.) Genetic risk factors were assessed and she is POSITIVE for a BRCA1 mutation.  This mutation is called [id number]. Referral for Waleska Zamorano with Cancer Risk management placed 5/2022.     3.) Labs and/or tests ordered include: CBC, protein urine, CMP, Mag,  reviewed today from Care Everywhere with pt     4.) Health maintenance issues addressed today include annual health maintenance and non-gynecologic issues with PCP.    5)        PE: pt continues on JERICHO Hampton, SNEHAL-BC  Women's Health Nurse Practitioner  Division of Gynecologic Oncology  [facility]        CC  Patient Care Team:  Bolivar Juarez MD as PCP - General (Gynecologic Oncology)

## 2023-05-05 ENCOUNTER — INFUSION THERAPY VISIT (OUTPATIENT)
Dept: ONCOLOGY | Facility: CLINIC | Age: 68
End: 2023-05-05
Attending: OBSTETRICS & GYNECOLOGY
Payer: COMMERCIAL

## 2023-05-05 ENCOUNTER — APPOINTMENT (OUTPATIENT)
Dept: LAB | Facility: CLINIC | Age: 68
End: 2023-05-05
Attending: OBSTETRICS & GYNECOLOGY
Payer: COMMERCIAL

## 2023-05-05 VITALS
RESPIRATION RATE: 16 BRPM | HEART RATE: 78 BPM | OXYGEN SATURATION: 99 % | WEIGHT: 180.2 LBS | BODY MASS INDEX: 24.44 KG/M2 | DIASTOLIC BLOOD PRESSURE: 76 MMHG | TEMPERATURE: 97.6 F | SYSTOLIC BLOOD PRESSURE: 126 MMHG

## 2023-05-05 DIAGNOSIS — C56.9 OVARIAN CANCER, UNSPECIFIED LATERALITY (H): Primary | ICD-10-CM

## 2023-05-05 DIAGNOSIS — T45.1X5S ADVERSE EFFECT OF ANTINEOPLASTIC AND IMMUNOSUPPRESSIVE DRUGS, SEQUELA: ICD-10-CM

## 2023-05-05 LAB
BASOPHILS # BLD AUTO: 0 10E3/UL (ref 0–0.2)
BASOPHILS NFR BLD AUTO: 1 %
EOSINOPHIL # BLD AUTO: 0.1 10E3/UL (ref 0–0.7)
EOSINOPHIL NFR BLD AUTO: 2 %
ERYTHROCYTE [DISTWIDTH] IN BLOOD BY AUTOMATED COUNT: 14.4 % (ref 10–15)
HCT VFR BLD AUTO: 35.7 % (ref 35–47)
HGB BLD-MCNC: 11.6 G/DL (ref 11.7–15.7)
IMM GRANULOCYTES # BLD: 0 10E3/UL
IMM GRANULOCYTES NFR BLD: 0 %
LYMPHOCYTES # BLD AUTO: 1.4 10E3/UL (ref 0.8–5.3)
LYMPHOCYTES NFR BLD AUTO: 48 %
MCH RBC QN AUTO: 35.5 PG (ref 26.5–33)
MCHC RBC AUTO-ENTMCNC: 32.5 G/DL (ref 31.5–36.5)
MCV RBC AUTO: 109 FL (ref 78–100)
MONOCYTES # BLD AUTO: 0.4 10E3/UL (ref 0–1.3)
MONOCYTES NFR BLD AUTO: 13 %
NEUTROPHILS # BLD AUTO: 1 10E3/UL (ref 1.6–8.3)
NEUTROPHILS NFR BLD AUTO: 36 %
NRBC # BLD AUTO: 0 10E3/UL
NRBC BLD AUTO-RTO: 0 /100
PLATELET # BLD AUTO: 202 10E3/UL (ref 150–450)
RBC # BLD AUTO: 3.27 10E6/UL (ref 3.8–5.2)
WBC # BLD AUTO: 2.9 10E3/UL (ref 4–11)

## 2023-05-05 PROCEDURE — 36415 COLL VENOUS BLD VENIPUNCTURE: CPT | Performed by: OBSTETRICS & GYNECOLOGY

## 2023-05-05 PROCEDURE — 96377 APPLICATON ON-BODY INJECTOR: CPT | Mod: 59 | Performed by: OBSTETRICS & GYNECOLOGY

## 2023-05-05 PROCEDURE — 96413 CHEMO IV INFUSION 1 HR: CPT

## 2023-05-05 PROCEDURE — 250N000011 HC RX IP 250 OP 636: Performed by: OBSTETRICS & GYNECOLOGY

## 2023-05-05 PROCEDURE — 258N000003 HC RX IP 258 OP 636: Performed by: OBSTETRICS & GYNECOLOGY

## 2023-05-05 PROCEDURE — 96375 TX/PRO/DX INJ NEW DRUG ADDON: CPT

## 2023-05-05 PROCEDURE — 96417 CHEMO IV INFUS EACH ADDL SEQ: CPT

## 2023-05-05 PROCEDURE — 96415 CHEMO IV INFUSION ADDL HR: CPT

## 2023-05-05 PROCEDURE — 85025 COMPLETE CBC W/AUTO DIFF WBC: CPT | Performed by: OBSTETRICS & GYNECOLOGY

## 2023-05-05 PROCEDURE — 96367 TX/PROPH/DG ADDL SEQ IV INF: CPT

## 2023-05-05 PROCEDURE — 999N000248 HC STATISTIC IV INSERT WITH US BY RN

## 2023-05-05 PROCEDURE — 96372 THER/PROPH/DIAG INJ SC/IM: CPT | Performed by: OBSTETRICS & GYNECOLOGY

## 2023-05-05 RX ORDER — HEPARIN SODIUM (PORCINE) LOCK FLUSH IV SOLN 100 UNIT/ML 100 UNIT/ML
5 SOLUTION INTRAVENOUS
Status: CANCELLED | OUTPATIENT
Start: 2023-05-05

## 2023-05-05 RX ORDER — DIPHENHYDRAMINE HCL 25 MG
50 CAPSULE ORAL ONCE
Status: CANCELLED
Start: 2023-05-05

## 2023-05-05 RX ORDER — ALBUTEROL SULFATE 90 UG/1
1-2 AEROSOL, METERED RESPIRATORY (INHALATION)
Status: CANCELLED
Start: 2023-05-05

## 2023-05-05 RX ORDER — MEPERIDINE HYDROCHLORIDE 25 MG/ML
25 INJECTION INTRAMUSCULAR; INTRAVENOUS; SUBCUTANEOUS EVERY 30 MIN PRN
Status: CANCELLED | OUTPATIENT
Start: 2023-05-05

## 2023-05-05 RX ORDER — PALONOSETRON 0.05 MG/ML
0.25 INJECTION, SOLUTION INTRAVENOUS ONCE
Status: CANCELLED | OUTPATIENT
Start: 2023-05-05

## 2023-05-05 RX ORDER — EPINEPHRINE 1 MG/ML
0.3 INJECTION, SOLUTION INTRAMUSCULAR; SUBCUTANEOUS EVERY 5 MIN PRN
Status: CANCELLED | OUTPATIENT
Start: 2023-05-05

## 2023-05-05 RX ORDER — PALONOSETRON 0.05 MG/ML
0.25 INJECTION, SOLUTION INTRAVENOUS ONCE
Status: COMPLETED | OUTPATIENT
Start: 2023-05-05 | End: 2023-05-05

## 2023-05-05 RX ORDER — HEPARIN SODIUM,PORCINE 10 UNIT/ML
5 VIAL (ML) INTRAVENOUS
Status: CANCELLED | OUTPATIENT
Start: 2023-05-05

## 2023-05-05 RX ORDER — ALBUTEROL SULFATE 0.83 MG/ML
2.5 SOLUTION RESPIRATORY (INHALATION)
Status: CANCELLED | OUTPATIENT
Start: 2023-05-05

## 2023-05-05 RX ORDER — DIPHENHYDRAMINE HYDROCHLORIDE 50 MG/ML
50 INJECTION INTRAMUSCULAR; INTRAVENOUS
Status: CANCELLED
Start: 2023-05-05

## 2023-05-05 RX ORDER — METHYLPREDNISOLONE SODIUM SUCCINATE 125 MG/2ML
125 INJECTION, POWDER, LYOPHILIZED, FOR SOLUTION INTRAMUSCULAR; INTRAVENOUS
Status: CANCELLED
Start: 2023-05-05

## 2023-05-05 RX ORDER — LORAZEPAM 2 MG/ML
0.5 INJECTION INTRAMUSCULAR EVERY 4 HOURS PRN
Status: CANCELLED | OUTPATIENT
Start: 2023-05-05

## 2023-05-05 RX ADMIN — PACLITAXEL 302 MG: 6 INJECTION, SOLUTION INTRAVENOUS at 11:49

## 2023-05-05 RX ADMIN — DEXAMETHASONE SODIUM PHOSPHATE: 10 INJECTION, SOLUTION INTRAMUSCULAR; INTRAVENOUS at 11:12

## 2023-05-05 RX ADMIN — PEGFILGRASTIM 6 MG: KIT SUBCUTANEOUS at 15:55

## 2023-05-05 RX ADMIN — CARBOPLATIN 550 MG: 10 INJECTION, SOLUTION INTRAVENOUS at 14:59

## 2023-05-05 RX ADMIN — DIPHENHYDRAMINE HYDROCHLORIDE 50 MG: 50 INJECTION, SOLUTION INTRAMUSCULAR; INTRAVENOUS at 10:43

## 2023-05-05 RX ADMIN — SODIUM CHLORIDE 250 ML: 9 INJECTION, SOLUTION INTRAVENOUS at 10:36

## 2023-05-05 RX ADMIN — FAMOTIDINE 20 MG: 10 INJECTION INTRAVENOUS at 10:39

## 2023-05-05 RX ADMIN — SODIUM CHLORIDE 1200 MG: 9 INJECTION, SOLUTION INTRAVENOUS at 15:47

## 2023-05-05 RX ADMIN — PALONOSETRON HYDROCHLORIDE 0.25 MG: 0.25 INJECTION INTRAVENOUS at 10:36

## 2023-05-05 ASSESSMENT — PAIN SCALES - GENERAL: PAINLEVEL: NO PAIN (0)

## 2023-05-05 NOTE — PATIENT INSTRUCTIONS
[facility] Main Line: [phone]    Call triage nurse with chills and/or temperature greater than or equal to 100.4, uncontrolled nausea/vomiting, diarrhea, constipation, dizziness, shortness of breath, chest pain, bleeding, unexplained bruising, or any new/concerning symptoms, questions/concerns.   If you are having any concerning symptoms or wish to speak to a provider before your next infusion visit, please call your care coordinator or triage to notify them so we can adequately serve you.   Triage Nurse Line: [phone]    If after hours, weekends, or holidays [phone]    Neulasta injection will start on Saturday at 7pm, approximately 27 hours after application applied today.   When the dose delivery starts, it will take about 45 minutes to complete.  You may remove the On-Body Neulasta on Saturday at 5pm.  Neulasta Onpro On-Body should have green flashing light.  Call triage or on-call MD if injector flashes red or appears to be leaking.  Keep Onpro On-Body Neulasta 4 inches away from electrical equipment and to avoid showering 4 hours prior to injection.

## 2023-05-05 NOTE — NURSING NOTE
Chief Complaint   Patient presents with     Blood Draw     IV placement with blood draw by lab RN. Vitals taken and appointment arrived     Only CBC drawn today per appointment notes - patient had labs drawn a couple days ago.  No research lab order for today and no information on what to draw, so no research labs drawn. Also, no research bag dropped off. Infusion nurse notified.    IV placed by vascular ultrasound.    Laure Morgan RN

## 2023-05-05 NOTE — PROGRESS NOTES
Infusion Nursing Note:  Taran Regalado presents today for Cycle 3 Day 1 Taxol, Carboplatin, Avastin.  (Carbo dose #9)  Patient seen by provider today: No   present during visit today: Not Applicable.    Note: Evaluated by Marta Lao NOAM yesterday.  Taran reports no changes over night.    CISCO Dejesus/Marta Lao NOAM per Dr. Juarez @ 1320  OK to treat today with entire chemo regimen despite ANC of 1.0  Will get Neulasta      Intravenous Access:  Peripheral IV placed in lab by vascular using U/S.  This infiltrated in infusion when just check line prior to hook up.  Vascular Access placed a second PIV using U/S which infiltrated with premeds.  Discussed with Matt Garcia pharmacist.  He is not concerned about vesicant properties (Aloxi, Pepcid, Benadryl) and he is not concerned about Taxol reaction if these meds were given into tissue as they will be absorbed.  Vascular access placed a 3rd PIV in left hand, 24g, which lasted through remainder of infusion.    Treatment Conditions:  Lab Results   Component Value Date    HGB 11.6 (L) 05/05/2023    WBC 2.9 (L) 05/05/2023    ANEU 4.2 07/30/2021    ANEUTAUTO 1.0 (L) 05/05/2023     05/05/2023      Lab Results   Component Value Date     03/01/2023    POTASSIUM 4.4 03/01/2023    MAG 2.0 03/01/2023    CR 0.86 03/01/2023    HORACIO 9.9 03/01/2023    BILITOTAL 0.3 03/01/2023    ALBUMIN 3.7 03/01/2023    ALT 27 03/01/2023    AST 23 03/01/2023     Results reviewed, labs did NOT meet treatment parameters: see TORB.      Post Infusion Assessment:  Patient tolerated infusion without incident.  Blood return noted pre and post infusion.  Site patent and intact, free from redness, edema or discomfort.  No evidence of extravasations.  Access discontinued per protocol.     Neulasta Onpro On-Body injector applied to Right Arm at 1600.  Writer discussed Neulasta injection would start tomorrow Saturday at 1900, approximately 27 hours after application applied  today.    Written and Verbal instruction reviewed with patient.  Pt instructed when the dose delivery starts, it will take about 45 minutes to complete.  Pt aware Neulasta Onpro On-Body should have green flashing light and to call triage or on-call MD if injector flashes red or appears to be leaking.   Pt aware to keep Onpro On-Body Neulasta 4 inches away from electrical equipment and to avoid showering 4 hours prior to injection.       Discharge Plan:   Patient declined prescription refills.  AVS to patient via "[employer]".  Patient will return 5/24/23 video visit with Dr. Juarez, 5/26/23 labs/chemo for next appointment.   Patient discharged in stable condition accompanied by: self.  Departure Mode: Ambulatory.  Face to Face time: 0.      Jessenia Dejesus RN

## 2023-05-08 ENCOUNTER — HEALTH MAINTENANCE LETTER (OUTPATIENT)
Age: 68
End: 2023-05-08

## 2023-05-24 ENCOUNTER — PATIENT OUTREACH (OUTPATIENT)
Dept: ONCOLOGY | Facility: CLINIC | Age: 68
End: 2023-05-24
Payer: COMMERCIAL

## 2023-05-24 ENCOUNTER — VIRTUAL VISIT (OUTPATIENT)
Dept: ONCOLOGY | Facility: HOSPITAL | Age: 68
End: 2023-05-24
Attending: OBSTETRICS & GYNECOLOGY
Payer: COMMERCIAL

## 2023-05-24 DIAGNOSIS — C56.9 OVARIAN CANCER, UNSPECIFIED LATERALITY (H): ICD-10-CM

## 2023-05-24 DIAGNOSIS — T45.1X5S ADVERSE EFFECT OF ANTINEOPLASTIC AND IMMUNOSUPPRESSIVE DRUGS, SEQUELA: ICD-10-CM

## 2023-05-24 PROCEDURE — 99215 OFFICE O/P EST HI 40 MIN: CPT | Mod: 95 | Performed by: OBSTETRICS & GYNECOLOGY

## 2023-05-24 NOTE — PROGRESS NOTES
Virtual Visit Details    Type of service:  Video Visit   40 minutes    Originating Location (pt. Location): Home    Distant Location (provider location):  On-site  Platform used for Video Visit: Wanda                 Follow Up Notes on Referred Patient    Date: 2023       Dr. Ye Vaughn MD  No address on file       RE: Taran Regalado  : 1955  GRACY: 2023    Taran Regalado is a 67 year old woman with a diagnosis of metastatic ovarian high grade serous carcinoma. She is here today for follow up and disease management. She has been tolerating chemotherapy well overall.  However she has had some issues with neutropenia requiring additional Neulasta at this reduction in place.  We will continue manage accordingly, however if persistent consider hematology referral. She is eating an drinking well, no nausea vomiting, fever or chills, normal urinary and bowel function.        Oncology History:  12/3/2020: US Pelvic: IMPRESSION: Limited examination due to acoustic windows. Possible left adnexal mass. A CT scan of the abdomen and pelvis with contrast is recommended for further assessment.     2020: CT A/P:   IMPRESSION:    Peritoneal carcinomatosis with masslike peritoneal thickening in the lower pelvis which may indicate an adnexal or ovarian primary malignancy. Large volume ascites. Bilateral pleural effusions. There is potential subtle pleural nodularity in the right hemithorax which could indicate metastatic disease.  Indeterminate 1 cm lesion in the right hepatic lobe suspicious for a metastatic lesion.      2020: Presented to [facility] with abdominal distention, 25lb weight loss, and CTAP with carcinomatosis, elevated  3098.     2020: CT Chest: IMPRESSION:   1. There are few scattered small sub-6 mm pulmonary nodules which are indeterminate without prior comparisons available. There are a few  slightly larger perifissural nodules which are technically  indeterminate in the  setting of malignancy although presumed lymphatic in nature and of unlikely clinical significance. Attention on follow-up is recommended.  2. Small to moderate left and small right pleural effusions are increased in size from prior. No convincing evidence for pleural nodularity.  3. Partially visualized large volume ascites and peritoneal nodularity in the upper abdomen similar to 12/4/2020 outside CT      12/26/2020: ED for abdominal distension; 3 L ascites drained with paracentesis    Pelvic US: Findings: Free fluid present in LLQ      12/31/2020: US Paracentesis: 900 mL ascites drained     1/7/2021: Diagnotic laparoscopy, biopsies  Pathology: FINAL DIAGNOSIS:   A. PERITONEUM, BIOPSIES:   - Positive for high grade carcinoma, consistent with metastatic carcinoma of Mullerian origin.     1/10-1/13/2021: Hospital admission for postoperative non-intractable vomiting and nausea.      1/10/2021: CT A/P: IMPRESSION: Extensive ascites which is probably malignant. Scattered liver hypodensities of indeterminate etiology comment cannot exclude metastatic disease. Diverticulosis. Fluid-filled adnexal masses and irregular appearance of uterus, which may represent primary neoplasm. Multiple peritoneal nodules. Large amount of fecal material in the colon with no evidence of small bowel obstruction.     Plan: Paclitaxel 175 mg/m2 and carboplatin AUC 6 x 3 cycles followed by a CT CAP and visit with Dr. Juarez.     1/12/2021: Cycle 1 paclitaxel and carboplatin while inpatient     1/13/2021: CT Head: Impression:  1. Chronic sinusitis of the right maxillary and right sphenoid sinuses.  2. Incidental presumed calcified meningioma in the right frontal  convexity without significant mass effect.  3. No suspicious intracranial enhancing lesion.     2/1/2021: Cycle 2 paclitaxel and carboplatin.  936.     2/3-2/5/21: Admission [facility] for afib w/ RVR and new PE     2/26/21: Cycle 3 paclitaxel and carboplatin planned.  Deferred due  to thrombocytopenia.  pending.     3/15/21: Cycle 3 paclitaxel and carboplatin given     4/19/21: HYSTERECTOMY, TOTAL, ABDOMINAL, WITH BILATERAL SALPINGO-OOPHORECTOMY, omentectomy, NEOPLASM DEBULKING,Proctoscocy, RO, Resection of liver nodules, diaphragm stripping, immobilization of liver and colon  FINAL DIAGNOSIS:   A. OMENTUM, BIOPSY:   - Omental adipose tissue with rare viable cells of metastatic high grade   serous carcinoma   - One reactive lymph node, negative for malignancy (0/1)   B. NODULE, SIGMOID, EXCISION:   - Calcified necrotic adipose tissue   - Negative for malignancy   C. NODULE, SMALL BOWEL MESENTERY, EXCISION:   - Fibroadipose tissue, positive for metastatic high grade serous carcinoma   D. UTERUS, CERVIX, BILATERAL FALLOPIAN TUBES AND OVARIES, HYSTERECTOMY   WITH BILATERAL SALPINGO-OOPHORECTOMY:   - Atrophic endometrium   - Uterine serosa with rare viable cells consistent with high grade serous   carcinoma   - Cervix with atrophic changes   - Viable cells consistent with high grade serous carcinoma present in the   right ovary, serosa of right   fallopian tube and right periadnexal soft tissue   - Left ovary with atrophic changes   - Left fallopian tube with a rare focus of serous tubal in-situ carcinoma   (STIC)   E. NODULES, SMALL BOWEL MESENTRY, EXCISION:   - Fibroadipose tissue with rare viable cells of metastatic high grade   serous carcinoma   F. NODULE, SPLENIC FLEXURE TRANSVERSE COLON, EXCISION:   - Fibroadipose tissue with rare viable cells of metastatic high grade   serous carcinoma   - Accessory splenule, negative for malignancy   G. OMENTUM, OMENTECTOMY:   - Omental adipose tissue with rare viable cells of metastatic high grade   serous carcinoma   H. NODULE, PERITONEAL PANCREATIC, EXCISION:   - Fibrous adhesions with inflammation   - Negative for malignancy   I. RIGHT HEMIDIAPHRAGM PERITONEUM, EXCISION:   - Fibrous adhesions with inflammation   - Negative for malignancy   J.  RIGHT LIVER SURFACE NODULE:   - Fibrous adhesions with benign inclusion glands   - Negative for malignancy   K. LEFT LOWER LIVER EDGE, BIOPSY:   - Cauterized hepatic parenchyma and capsule   - Negative for malignancy   L. NODULE, SMALL BOWEL MESENTERIC #3, EXCISION:   - Fibroadipose tissue with rare viable cells of metastatic high grade   serous carcinoma       Plan: Carboplatin AUC 6 + Taxol 175 mg/m2 x 3 cycles, then transition to Parp inhibitor for maintenance therapy given her BRCA1 germline mutation.      5/21/21: Cycle 4 carboplatin and paclitaxel.   172.  6/11/21: Cycle 5 carboplatin and paclitaxel.   61.  7/2/21: Cycle 6 carboplatin and paclitaxel.   20.        7/28/21 plan: Olaparib 300mg bid as starting dose,  14  8/20/21: start date olaparib 300 mg BID,  12  9/13/21:  22  10/4/21:  23  11/1/21:  26     11/02/2021: PET CT: IMPRESSION:   Findings compatible with interval surgery and posttreatment change.  No gross definitive FDG avid disease.  Potential foci of tumor deposits along the anterior dome of the liver and midline abdominal wall surgical scar.  Colonic activity is not necessarily abnormal, however, given the previous carcinomatosis the colonic activity is indeterminant.         12/1/21:  23  1/3/22:  21  2/1/22:  20  3/1/22:  21  4/1/22:  23  5/4/22:  28     EXAM: CT CHEST/ABDOMEN/PELVIS W CONTRAST  LOCATION: [facility]  DATE/TIME: 7/11/2022 1:25 PM     INDICATION: Stage III B high-grade ovarian carcinoma diagnosed Jan 2021. Posttreatment surveillance.  COMPARISON: CTA AP 04/23/2021, CT CAP 04/02/2021  TECHNIQUE: CT scan of the chest, abdomen, and pelvis was performed following injection of IV contrast. Multiplanar reformats were obtained. Dose reduction techniques were used.   CONTRAST: isovue 370 105mL IV; 50mL omni 140 oral     FINDINGS:   LUNGS AND PLEURA: No suspicious pulmonary  nodules. Minimal right apical pleural thickening and a few punctate tiny nodules 2 of which are subpleural on the right are stable. No pleural effusions.     MEDIASTINUM/AXILLAE: No adenopathy. No central pulmonary emboli.     CORONARY ARTERY CALCIFICATION: Cannot evaluate.     HEPATOBILIARY: Normal.     PANCREAS: Normal.     SPLEEN: Normal.     ADRENAL GLANDS: Normal.     KIDNEYS/BLADDER: Normal.     BOWEL: Sliver of ascites adjacent to the spleen noted, decreased from prior study. There is a 4 mm nodule in the gastrosplenic ligament (image 352). On the preop study it appears as a smaller punctate nodule (prior image 341). Sliver of ascites in the   gastrohepatic ligament also noted. No peritoneal tumor nodules. No bowel obstruction. Quite redundant colon with mild large stool burden. Extensive distal colonic diverticulosis.     LYMPH NODES: Normal.     VASCULATURE: Mild arterial calcifications. Circumaortic left renal vein.     PELVIC ORGANS: Hysterectomy. Vaginal cuff is normal.     MUSCULOSKELETAL: Diastases of the midline rectus sheath above the umbilicus. Minimal nodular scarring to the left of midline in the midabdomen (image 419). There is a shallow broad-based supraumbilical ventral hernia containing only fat. No implants   within the hernia or abdominal incision. No suspicious bone lesions.                                                                      IMPRESSION:  1.  Sliver of ascites in the upper abdomen has decreased since interval debulking surgery.  2.  There is a punctate nodule in the gastrosplenic ligament which is minimally more plump relative to the preop exam. This will need to be followed.  3.  Minimal nodular changes to the left of the midline scar in the subcutaneous fat will have to be followed as well. Unclear if this simply reflects postoperative scarring or could reflect an early incisional recurrence.  4.  No other sites to suggest recurrent tumor. Vaginal cuff is normal.  5.   Extensive distal colonic diverticulosis.  6.  Other noncritical findings as noted above.      9/16/22  98     1/30/23 CT CAP:    IMPRESSION:  1.  Multiple new, hypoattenuating lesions in the liver, suspicious for hepatic metastatic disease.  2.  Necrotic mario hepatic lymphadenopathy, concerning for richard metastatic disease.  3.  There is a new or increasingly conspicuous 6 mm soft tissue nodule in the right lower quadrant. This is indeterminate.  4.  There is a new 3 mm solid nodule in the right upper lobe, indeterminate.  5.  Stable approximate 4 mm punctate nodule along the gastrosplenic ligament.  6.  Similar area of linear free fluid in the upper abdomen anterior to the stomach.    Plan: Paclitaxel 175 mg/m2, Carboplatin AUC 6, bevacizumab 7.5 mg/kg     3/1/23: Cycle #1 Paclitaxel 175 mg/m2, Carboplatin AUC 6 (C7), bevacizumab 7.5 mg/kg.  1,196  3/24/23: Cycle #2 Paclitaxel 150 mg/m2, Carboplatin AUC 5 (C8), bevacizumab 15 mg/kg.  558     3/29/23: ER for dehydration and hypotension. Normotensive in ER. IV fluids given. Discharged.      4/14/23: Cycle #3 Paclitaxel 150 mg/m2, Carboplatin AUC 5 (C9), bevacizumab 15 mg/kg deferred neutropenia ANC 0.3   273  4/21/23: treatment deferred ANC 0.8  4/28/23: Cycle #3 Paclitaxel 150 mg/m2, Carboplatin AUC 5 (C9), bevacizumab 15 mg/kg, + Neulasta deferred ANC 1.0,  297        Past Medical History:    Past Medical History:   Diagnosis Date     Atrial fibrillation with rapid ventricular response (H)      History of cold sores      Hx of LASIK 12/11/2017     Insomnia      Migraine      Osteopenia      Pelvic mass      Peritoneal carcinomatosis (H)      Restless legs syndrome (RLS)          Past Surgical History:    Past Surgical History:   Procedure Laterality Date     APPENDECTOMY       ARTHROSCPY KNEE SURGICAL DEBRIDEMENT SHAVING ARTICULAR CARTILAGE Right      BIOPSY  January 2021    Biopsy to confirm ovarian cancer     DEBRIDEMENT LEFT UPPER  EXTREMITY  2016     HYSTERECTOMY TOTAL ABD, LUISITO SALPINGO-OOPHORECTOMY, NODE DISSECTION, TUMOR DEBULKING, COMBINED Bilateral 4/19/2021    Procedure: HYSTERECTOMY, TOTAL, ABDOMINAL, WITH BILATERAL SALPINGO-OOPHORECTOMY, omentectomy, NEOPLASM DEBULKING,Proctoscocy, RO, Resection of liver nodules, diaphragm stripping, immobilization of liver and colon;  Surgeon: Bolivar Juarez MD;  Location: [facility] OR     LAPAROSCOPY DIAGNOSTIC (GYN) Bilateral 1/7/2021    Procedure: Diagnsotic laparoscopy, biopsies;  Surgeon: Bolivar Juarez MD;  Location: [facility] OR     LASIK       TUBAL LIGATION           Health Maintenance Due   Topic Date Due     DEXA  Never done     ADVANCE CARE PLANNING  Never done     COLORECTAL CANCER SCREENING  Never done     HEPATITIS C SCREENING  Never done     LIPID  Never done     MEDICARE ANNUAL WELLNESS VISIT  11/11/2021     FALL RISK ASSESSMENT  Never done     COVID-19 Vaccine (4 - Booster for Pfizer series) 11/15/2021     Pneumococcal Vaccine: 65+ Years (2 - PPSV23 if available, else PCV20) 04/05/2022     PHQ-2 (once per calendar year)  01/01/2023       Current Medications:     Current Outpatient Medications   Medication Sig Dispense Refill     amitriptyline (ELAVIL) 100 MG tablet Take 1 tablet (100 mg) by mouth At Bedtime 90 tablet 0     dexamethasone (DECADRON) 4 MG tablet Take 2 tablets (8 mg) by mouth daily Take for 3 days, starting the day after chemo. Take with food. (Patient not taking: Reported on 6/22/2023) 6 tablet 5     fluticasone (FLONASE) 50 MCG/ACT nasal spray  (Patient not taking: Reported on 6/22/2023)       gabapentin (NEURONTIN) 600 MG tablet Take 1 tablet (600 mg) by mouth At Bedtime (Patient not taking: Reported on 6/22/2023) 90 tablet 0     HYDROmorphone (DILAUDID) 2 MG tablet Take 1 tablet (2 mg) by mouth every 6 hours as needed for pain (Patient not taking: Reported on 6/22/2023) 10 tablet 0     meclizine (ANTIVERT) 25 MG tablet Take 1 tablet (25 mg) by mouth 3 times daily as  needed for dizziness or nausea (Patient not taking: Reported on 2023) 15 tablet 0     ondansetron (ZOFRAN) 4 MG tablet Take by mouth every 8 hours as needed for nausea (Patient not taking: Reported on 2023)       ondansetron (ZOFRAN) 8 MG tablet Take 1 tablet (8 mg) by mouth every 8 hours as needed for nausea (vomiting) (Patient not taking: Reported on 2023) 30 tablet 2     oxyCODONE (ROXICODONE) 5 MG tablet Take 1 tablet (5 mg) by mouth every 12 hours (Patient not taking: Reported on 2023) 10 tablet 0     prochlorperazine (COMPAZINE) 10 MG tablet Take 1 tablet (10 mg) by mouth every 6 hours as needed for nausea or vomiting (Patient not taking: Reported on 2023) 30 tablet 2     rivaroxaban ANTICOAGULANT (XARELTO ANTICOAGULANT) 20 MG TABS tablet Take 1 tablet (20 mg) by mouth every morning 90 tablet 1     SUMAtriptan (IMITREX) 100 MG tablet Take 1 tablet (100 mg) by mouth at onset of headache for migraine (Patient not taking: Reported on 2023) 15 tablet 0     valACYclovir (VALTREX) 1000 mg tablet Take 1,000 mg by mouth as needed (Patient not taking: Reported on 2023)       zolpidem (AMBIEN) 10 MG tablet Take 10 mg by mouth           Allergies:      No Known Allergies     Social History:     Social History     Tobacco Use     Smoking status: Former     Packs/day: 0.50     Years: 40.00     Pack years: 20.00     Types: Cigarettes     Quit date:      Years since quittin.4     Smokeless tobacco: Never   Substance Use Topics     Alcohol use: Not Currently       History   Drug Use Unknown         Family History:       Family History   Adopted: Yes   Problem Relation Age of Onset     Cancer Mother 36     Other Cancer Mother         Bio mother  of  a female cancer  at 36     Factor V Leiden deficiency Daughter      Deep Vein Thrombosis Daughter      Diabetes Type 1 Daughter      Diabetes Daughter      Hypertension Daughter      Anesthesia Reaction No family hx of           Physical Exam:     There were no vitals taken for this visit.  There is no height or weight on file to calculate BMI.    General Appearance:  healthy and alert, no distress                 Psychiatric:      appropriate mood and affect                                     Assessment:     Taran Regalado is a 67 year old woman with recurrent ovarian high grade serous carcinoma.     40 minute visit with 30 minutes counseling.        1.  Recurrent high grade serous ovarian cancer.   2.  BRCA 1 germline mutation.   3.  Prec[facility] Program  4.  PE resolved on Lovenox (prophy)  5.  Left ankle injury  6.   128      We did review the CT scan from today given elevated  that has been decreased.  We will need a CT scan.  She does have a positive response.  We will plan to continue with 3 more cycles with carboplatin AUC of 5, Taxol at 175 mg per metered square, bevacizumab at 15 mg/kg as well as neulasta for bone marrow support.  Again if she is persistent of neutropenia will consider referral to hematology to rule out MDS.  Side effects risk alternatives were discussed.  She has gotten an echocardiogram in the past and is asymptomatic from a cardiac standpoint.  Patient and her family agree with this plan of action for care all questions were answered.           Bolivar Juarez MD, MS    Department of Obstetrics and Gynecology   Division of Gynecologic Oncology   [facility]  Phone: [phone]       CC  Patient Care Team:  Bolivar Juarez MD as PCP - General (Gynecologic Oncology)  Marta Okeefe APRN CNP as Assigned PCP  Marta Lao APRN CNP as Assigned Cancer Care Provider  SELF, REFERRED

## 2023-05-24 NOTE — NURSING NOTE
Is the patient currently in the state of [place]? YES    Visit mode:VIDEO    If the visit is dropped, the patient can be reconnected by: VIDEO VISIT: Text to cell phone: [phone]    Will anyone else be joining the visit? YES: Daughters, America and Zita will be joining the visit. Links sent to their mobile device  [place] [phone]  Zita [phone]      How would you like to obtain your AVS? MyChart    Are changes needed to the allergy or medication list?     Patient declined individual medication review by support staff because patient denies any changes since echeck-in completion and states all information entered during echeck-in remains accurate.    Reason for visit: RECHWOJCIECH Argueta VF

## 2023-05-26 ENCOUNTER — APPOINTMENT (OUTPATIENT)
Dept: LAB | Facility: CLINIC | Age: 68
End: 2023-05-26
Attending: OBSTETRICS & GYNECOLOGY
Payer: COMMERCIAL

## 2023-05-26 ENCOUNTER — DOCUMENTATION ONLY (OUTPATIENT)
Dept: ONCOLOGY | Facility: CLINIC | Age: 68
End: 2023-05-26

## 2023-05-26 ENCOUNTER — PATIENT OUTREACH (OUTPATIENT)
Dept: ONCOLOGY | Facility: CLINIC | Age: 68
End: 2023-05-26
Payer: COMMERCIAL

## 2023-05-26 ENCOUNTER — INFUSION THERAPY VISIT (OUTPATIENT)
Dept: ONCOLOGY | Facility: CLINIC | Age: 68
End: 2023-05-26
Attending: OBSTETRICS & GYNECOLOGY
Payer: COMMERCIAL

## 2023-05-26 VITALS
TEMPERATURE: 98 F | SYSTOLIC BLOOD PRESSURE: 129 MMHG | DIASTOLIC BLOOD PRESSURE: 70 MMHG | BODY MASS INDEX: 24.11 KG/M2 | HEART RATE: 81 BPM | RESPIRATION RATE: 16 BRPM | WEIGHT: 177.8 LBS | OXYGEN SATURATION: 98 %

## 2023-05-26 DIAGNOSIS — T45.1X5S ADVERSE EFFECT OF ANTINEOPLASTIC AND IMMUNOSUPPRESSIVE DRUGS, SEQUELA: ICD-10-CM

## 2023-05-26 DIAGNOSIS — C56.9 OVARIAN CANCER, UNSPECIFIED LATERALITY (H): Primary | ICD-10-CM

## 2023-05-26 LAB
ALBUMIN SERPL BCG-MCNC: 3.8 G/DL (ref 3.5–5.2)
ALP SERPL-CCNC: 148 U/L (ref 35–104)
ALT SERPL W P-5'-P-CCNC: 26 U/L (ref 10–35)
ANION GAP SERPL CALCULATED.3IONS-SCNC: 9 MMOL/L (ref 7–15)
AST SERPL W P-5'-P-CCNC: 22 U/L (ref 10–35)
BASOPHILS # BLD AUTO: 0 10E3/UL (ref 0–0.2)
BASOPHILS NFR BLD AUTO: 1 %
BILIRUB SERPL-MCNC: 0.2 MG/DL
BUN SERPL-MCNC: 19.1 MG/DL (ref 8–23)
CALCIUM SERPL-MCNC: 9.4 MG/DL (ref 8.8–10.2)
CANCER AG125 SERPL-ACNC: 188 U/ML
CHLORIDE SERPL-SCNC: 105 MMOL/L (ref 98–107)
CREAT SERPL-MCNC: 0.8 MG/DL (ref 0.51–0.95)
DEPRECATED HCO3 PLAS-SCNC: 24 MMOL/L (ref 22–29)
EOSINOPHIL # BLD AUTO: 0 10E3/UL (ref 0–0.7)
EOSINOPHIL NFR BLD AUTO: 1 %
ERYTHROCYTE [DISTWIDTH] IN BLOOD BY AUTOMATED COUNT: 15.5 % (ref 10–15)
GFR SERPL CREATININE-BSD FRML MDRD: 81 ML/MIN/1.73M2
GLUCOSE SERPL-MCNC: 100 MG/DL (ref 70–99)
HCT VFR BLD AUTO: 32.3 % (ref 35–47)
HGB BLD-MCNC: 10.8 G/DL (ref 11.7–15.7)
IMM GRANULOCYTES # BLD: 0 10E3/UL
IMM GRANULOCYTES NFR BLD: 1 %
LYMPHOCYTES # BLD AUTO: 1.1 10E3/UL (ref 0.8–5.3)
LYMPHOCYTES NFR BLD AUTO: 53 %
MAGNESIUM SERPL-MCNC: 1.8 MG/DL (ref 1.7–2.3)
MCH RBC QN AUTO: 35.5 PG (ref 26.5–33)
MCHC RBC AUTO-ENTMCNC: 33.4 G/DL (ref 31.5–36.5)
MCV RBC AUTO: 106 FL (ref 78–100)
MONOCYTES # BLD AUTO: 0.3 10E3/UL (ref 0–1.3)
MONOCYTES NFR BLD AUTO: 13 %
NEUTROPHILS # BLD AUTO: 0.6 10E3/UL (ref 1.6–8.3)
NEUTROPHILS NFR BLD AUTO: 31 %
NRBC # BLD AUTO: 0 10E3/UL
NRBC BLD AUTO-RTO: 0 /100
PLATELET # BLD AUTO: 99 10E3/UL (ref 150–450)
POTASSIUM SERPL-SCNC: 4.3 MMOL/L (ref 3.4–5.3)
PROT SERPL-MCNC: 7.2 G/DL (ref 6.4–8.3)
RBC # BLD AUTO: 3.04 10E6/UL (ref 3.8–5.2)
SODIUM SERPL-SCNC: 138 MMOL/L (ref 136–145)
WBC # BLD AUTO: 2 10E3/UL (ref 4–11)

## 2023-05-26 PROCEDURE — 85025 COMPLETE CBC W/AUTO DIFF WBC: CPT | Performed by: OBSTETRICS & GYNECOLOGY

## 2023-05-26 PROCEDURE — 86304 IMMUNOASSAY TUMOR CA 125: CPT | Performed by: OBSTETRICS & GYNECOLOGY

## 2023-05-26 PROCEDURE — 83735 ASSAY OF MAGNESIUM: CPT | Performed by: OBSTETRICS & GYNECOLOGY

## 2023-05-26 PROCEDURE — 80053 COMPREHEN METABOLIC PANEL: CPT | Performed by: OBSTETRICS & GYNECOLOGY

## 2023-05-26 PROCEDURE — G0463 HOSPITAL OUTPT CLINIC VISIT: HCPCS

## 2023-05-26 PROCEDURE — 36592 COLLECT BLOOD FROM PICC: CPT | Performed by: OBSTETRICS & GYNECOLOGY

## 2023-05-26 PROCEDURE — 36415 COLL VENOUS BLD VENIPUNCTURE: CPT | Performed by: OBSTETRICS & GYNECOLOGY

## 2023-05-26 ASSESSMENT — PAIN SCALES - GENERAL: PAINLEVEL: NO PAIN (0)

## 2023-05-26 NOTE — PROGRESS NOTES
MD updated that patient can now receive carbo    MD requested plan be changed back to carbo and not to move forward with cisplatin as its replacement     Orders changed/updated by BJ Hinojosa RN

## 2023-05-26 NOTE — PROGRESS NOTES
Infusion Nursing Note:  Taran Regalado presents today for Cycle 4 Day 1 Taxol/Carboplatin/Bevacizumab-bvzr (Zirabev) - DEFERRED.    Patient seen by provider today: No; Seen by Dr. Juarez yesterday   present during visit today: Not Applicable.    Note: Patient arrives to infusion feeling okay. Reviewed labs upon arrival and ANC is well below parameters and platelets were <100 as well. Pt states she was able to get treatment when ANC was 1.0 on 5/5 but informed patient that it was unlikely that provider would be okay with pt receiving treatment today. She inquired about still receiving Neulasta regardless.    Team has been unable to get a hold of Dr. Juarez this morning. Spoke with Mira IGNACIO RNCC and she states that Ella Murillo NP is involved with patient as chemo orders were still not signed this morning.    Per [facility] Ella Murillo NP/Shauna Campos RN @1109 5/26/23:  - Need to defer chemo a week with ANC of 0.6.  - No Neulasta/Neupogen - Dr. uJarez has discussed this previously with patient  - I called Dr. Juarez and he agrees with deferring chemo    Patient updated with information and was displeased with not receiving Neulasta today but agreeable with plan.    Current parameters for treatment state OK to proceed with ANC >/= 1.5. Inquired NP if parameters should get changed to treat if ANC is >/= 1.    Per written communication Ella Murillo NP/Shauna Campos RN @1109 5/26/23:  -I think we still want to be notified. I didn't actually see her last time it was Marta but I'm aware of the situation. We briefly talked about a heme consult. I'll put in a note.    Intravenous Access:  Peripheral IV placed.    Treatment Conditions:  Lab Results   Component Value Date    HGB 10.8 (L) 05/26/2023    WBC 2.0 (L) 05/26/2023    ANEU 4.2 07/30/2021    ANEUTAUTO 0.6 (L) 05/26/2023    PLT 99 (L) 05/26/2023      Lab Results   Component Value Date     05/26/2023    POTASSIUM 4.3 05/26/2023     MAG 1.8 05/26/2023    CR 0.80 05/26/2023    HORACIO 9.4 05/26/2023    BILITOTAL 0.2 05/26/2023    ALBUMIN 3.8 05/26/2023    ALT 26 05/26/2023    AST 22 05/26/2023     Results reviewed, labs did NOT meet treatment parameters: ANC <1.5 and platelets <100k.      Post Infusion Assessment:  Access discontinued per protocol.       Discharge Plan:   Patient declined prescription refills.  Discharge instructions reviewed with: Patient and family member.  Patient and/or family verbalized understanding of discharge instructions and all questions answered.  AVS to patient via [employer].  Patient will return ~6/2 for next appointment. IB sent to scheduling team/RNCC to get patient rescheduled.  Patient discharged in stable condition accompanied by: daughter.  Departure Mode: Ambulatory.      Shauna Campos RN

## 2023-05-26 NOTE — NURSING NOTE
Chief Complaint   Patient presents with     Blood Draw     Labs drawn with piv start by rn.  VS taken.     Labs drawn with PIV start by rn.  Pt tolerated well.  VS taken and pt checked in for next appt.    Patricia Apple RN

## 2023-05-26 NOTE — PROGRESS NOTES
Paged by infusion RN about pt's blood counts, ANC 0.6.  Called and discussed with Dr. Juarez who recommends deferring treatment for a week.  Briefly discussed that patient may need a hematology referral but Dr. Juarez would like to hold off for now.  Verified dose recommendations for carboplatin AUC 5 (prefers carboplatin over cisplatin) and paclitaxel 150 mg/m2, continue neulasta.    Ella Murillo, DNP, APRN, FNP-C  Nurse Practitioner   Division of Gynecologic Oncology  Pager: [phone]       Lab Results   Component Value Date    WBC 2.0 05/26/2023    WBC 2.7 07/09/2021     Lab Results   Component Value Date    RBC 3.04 05/26/2023    RBC 3.34 07/09/2021     Lab Results   Component Value Date    HGB 10.8 05/26/2023    HGB 10.4 07/09/2021     Lab Results   Component Value Date    HCT 32.3 05/26/2023    HCT 32.3 07/09/2021     No components found for: MCT  Lab Results   Component Value Date     05/26/2023    MCV 97 07/09/2021     Lab Results   Component Value Date    MCH 35.5 05/26/2023    MCH 31.1 07/09/2021     Lab Results   Component Value Date    MCHC 33.4 05/26/2023    MCHC 32.2 07/09/2021     Lab Results   Component Value Date    RDW 15.5 05/26/2023    RDW 20.9 07/09/2021     Lab Results   Component Value Date    PLT 99 05/26/2023     07/09/2021     % Neutrophils   Date Value Ref Range Status   05/26/2023 31 % Final   07/09/2021 34.0 % Final     % Lymphocytes   Date Value Ref Range Status   05/26/2023 53 % Final   07/09/2021 49.0 % Final     % Monocytes   Date Value Ref Range Status   05/26/2023 13 % Final   07/09/2021 16.0 % Final     % Eosinophils   Date Value Ref Range Status   05/26/2023 1 % Final   07/09/2021 1.0 % Final     % Basophils   Date Value Ref Range Status   05/26/2023 1 % Final   06/11/2021 1.0 % Final     % Metamyelocytes   Date Value Ref Range Status   07/30/2021 3 % Final     % Immature Granulocytes   Date Value Ref Range Status   05/21/2021 0.0 % Final     Absolute  Neutrophil   Date Value Ref Range Status   07/09/2021 0.9 (L) 1.6 - 8.3 10e9/L Final     Absolute Neutrophils   Date Value Ref Range Status   07/30/2021 4.2 1.6 - 8.3 10e3/uL Final     Absolute Lymphocytes   Date Value Ref Range Status   07/30/2021 2.0 0.8 - 5.3 10e3/uL Final   07/09/2021 1.4 0.8 - 5.3 10e9/L Final     Absolute Monocytes   Date Value Ref Range Status   07/30/2021 0.6 0.0 - 1.3 10e3/uL Final   07/09/2021 0.4 0.0 - 1.3 10e9/L Final     Absolute Eosinophils   Date Value Ref Range Status   07/30/2021 0.0 0.0 - 0.7 10e3/uL Final   07/09/2021 0.0 0.0 - 0.7 10e9/L Final     Absolute Basophils   Date Value Ref Range Status   07/30/2021 0.1 0.0 - 0.2 10e3/uL Final   06/11/2021 0.0 0.0 - 0.2 10e9/L Final     Absolute Metamyelocytes   Date Value Ref Range Status   07/30/2021 0.2 (H) <=0.0 10e3/uL Final     Abs Immature Granulocytes   Date Value Ref Range Status   05/21/2021 0.0 0 - 0.4 10e9/L Final     Absolute Immature Granulocytes   Date Value Ref Range Status   05/26/2023 0.0 <=0.4 10e3/uL Final     Nucleated RBCs   Date Value Ref Range Status   04/27/2021 0 0 /100 Final     Absolute Nucleated RBC   Date Value Ref Range Status   04/27/2021 0.0  Final     Last Comprehensive Metabolic Panel:  Sodium   Date Value Ref Range Status   05/26/2023 138 136 - 145 mmol/L Final   07/09/2021 140 133 - 144 mmol/L Final     Potassium   Date Value Ref Range Status   05/26/2023 4.3 3.4 - 5.3 mmol/L Final   03/01/2023 4.4 3.4 - 5.3 mmol/L Final   07/09/2021 4.1 3.4 - 5.3 mmol/L Final     Chloride   Date Value Ref Range Status   05/26/2023 105 98 - 107 mmol/L Final   03/01/2023 108 94 - 109 mmol/L Final   07/09/2021 107 94 - 109 mmol/L Final     Carbon Dioxide   Date Value Ref Range Status   07/09/2021 29 20 - 32 mmol/L Final     Carbon Dioxide (CO2)   Date Value Ref Range Status   05/26/2023 24 22 - 29 mmol/L Final   03/01/2023 29 20 - 32 mmol/L Final     Anion Gap   Date Value Ref Range Status   05/26/2023 9 7 - 15 mmol/L  Final   03/01/2023 3 3 - 14 mmol/L Final   07/09/2021 4 3 - 14 mmol/L Final     Glucose   Date Value Ref Range Status   05/26/2023 100 (H) 70 - 99 mg/dL Final   03/01/2023 110 (H) 70 - 99 mg/dL Final   07/09/2021 94 70 - 99 mg/dL Final     Urea Nitrogen   Date Value Ref Range Status   05/26/2023 19.1 8.0 - 23.0 mg/dL Final   03/01/2023 14 7 - 30 mg/dL Final   07/09/2021 13 7 - 30 mg/dL Final     Creatinine   Date Value Ref Range Status   05/26/2023 0.80 0.51 - 0.95 mg/dL Final   07/09/2021 0.86 0.52 - 1.04 mg/dL Final     GFR Estimate   Date Value Ref Range Status   05/26/2023 81 >60 mL/min/1.73m2 Final     Comment:     eGFR calculated using 2021 CKD-EPI equation.   07/09/2021 71 >60 mL/min/[1.73_m2] Final     Comment:     Non  GFR Calc  Starting 12/18/2018, serum creatinine based estimated GFR (eGFR) will be   calculated using the Chronic Kidney Disease Epidemiology Collaboration   (CKD-EPI) equation.       GFR, ESTIMATED POCT   Date Value Ref Range Status   01/30/2023 >60 >60 mL/min/1.73m2 Final     Calcium   Date Value Ref Range Status   05/26/2023 9.4 8.8 - 10.2 mg/dL Final   07/09/2021 8.8 8.5 - 10.1 mg/dL Final     Bilirubin Total   Date Value Ref Range Status   05/26/2023 0.2 <=1.2 mg/dL Final   07/09/2021 0.4 0.2 - 1.3 mg/dL Final     Alkaline Phosphatase   Date Value Ref Range Status   05/26/2023 148 (H) 35 - 104 U/L Final   07/09/2021 168 (H) 40 - 150 U/L Final     ALT   Date Value Ref Range Status   05/26/2023 26 10 - 35 U/L Final   07/09/2021 26 0 - 50 U/L Final     AST   Date Value Ref Range Status   05/26/2023 22 10 - 35 U/L Final   07/09/2021 20 0 - 45 U/L Final     Lab Results   Component Value Date    ALBUMIN 3.8 05/26/2023    ALBUMIN 3.7 03/01/2023    ALBUMIN 3.7 07/09/2021     Lab Results   Component Value Date    PROTTOTAL 7.2 05/26/2023    PROTTOTAL 7.6 07/09/2021     Magnesium   Date Value Ref Range Status   05/26/2023 1.8 1.7 - 2.3 mg/dL Final   07/09/2021 2.2 1.6 - 2.3 mg/dL  Final

## 2023-05-30 DIAGNOSIS — C56.9 OVARIAN CANCER, UNSPECIFIED LATERALITY (H): Primary | ICD-10-CM

## 2023-05-31 ENCOUNTER — PATIENT OUTREACH (OUTPATIENT)
Dept: ONCOLOGY | Facility: CLINIC | Age: 68
End: 2023-05-31
Payer: COMMERCIAL

## 2023-06-02 ENCOUNTER — APPOINTMENT (OUTPATIENT)
Dept: LAB | Facility: CLINIC | Age: 68
End: 2023-06-02
Attending: OBSTETRICS & GYNECOLOGY
Payer: COMMERCIAL

## 2023-06-02 ENCOUNTER — INFUSION THERAPY VISIT (OUTPATIENT)
Dept: ONCOLOGY | Facility: CLINIC | Age: 68
End: 2023-06-02
Attending: OBSTETRICS & GYNECOLOGY
Payer: COMMERCIAL

## 2023-06-02 VITALS
RESPIRATION RATE: 16 BRPM | OXYGEN SATURATION: 100 % | DIASTOLIC BLOOD PRESSURE: 70 MMHG | SYSTOLIC BLOOD PRESSURE: 120 MMHG | HEART RATE: 78 BPM | TEMPERATURE: 97.6 F | BODY MASS INDEX: 23.73 KG/M2 | WEIGHT: 175 LBS

## 2023-06-02 DIAGNOSIS — C56.9 OVARIAN CANCER, UNSPECIFIED LATERALITY (H): Primary | ICD-10-CM

## 2023-06-02 DIAGNOSIS — C56.9 OVARIAN CANCER, UNSPECIFIED LATERALITY (H): ICD-10-CM

## 2023-06-02 DIAGNOSIS — T45.1X5S ADVERSE EFFECT OF ANTINEOPLASTIC AND IMMUNOSUPPRESSIVE DRUGS, SEQUELA: Primary | ICD-10-CM

## 2023-06-02 DIAGNOSIS — T45.1X5S ADVERSE EFFECT OF ANTINEOPLASTIC AND IMMUNOSUPPRESSIVE DRUGS, SEQUELA: ICD-10-CM

## 2023-06-02 PROCEDURE — 96415 CHEMO IV INFUSION ADDL HR: CPT

## 2023-06-02 PROCEDURE — 96413 CHEMO IV INFUSION 1 HR: CPT

## 2023-06-02 PROCEDURE — 96367 TX/PROPH/DG ADDL SEQ IV INF: CPT

## 2023-06-02 PROCEDURE — 250N000011 HC RX IP 250 OP 636: Performed by: OBSTETRICS & GYNECOLOGY

## 2023-06-02 PROCEDURE — 96417 CHEMO IV INFUS EACH ADDL SEQ: CPT

## 2023-06-02 PROCEDURE — 96372 THER/PROPH/DIAG INJ SC/IM: CPT | Performed by: OBSTETRICS & GYNECOLOGY

## 2023-06-02 PROCEDURE — 258N000003 HC RX IP 258 OP 636: Performed by: OBSTETRICS & GYNECOLOGY

## 2023-06-02 PROCEDURE — 999N000127 HC STATISTIC PERIPHERAL IV START W US GUIDANCE

## 2023-06-02 PROCEDURE — 96377 APPLICATON ON-BODY INJECTOR: CPT | Performed by: OBSTETRICS & GYNECOLOGY

## 2023-06-02 PROCEDURE — 96375 TX/PRO/DX INJ NEW DRUG ADDON: CPT

## 2023-06-02 RX ORDER — ALBUTEROL SULFATE 90 UG/1
1-2 AEROSOL, METERED RESPIRATORY (INHALATION)
Status: CANCELLED
Start: 2023-06-02

## 2023-06-02 RX ORDER — HEPARIN SODIUM (PORCINE) LOCK FLUSH IV SOLN 100 UNIT/ML 100 UNIT/ML
5 SOLUTION INTRAVENOUS
Status: CANCELLED | OUTPATIENT
Start: 2023-06-02

## 2023-06-02 RX ORDER — DIPHENHYDRAMINE HCL 25 MG
50 CAPSULE ORAL ONCE
Status: CANCELLED
Start: 2023-06-02

## 2023-06-02 RX ORDER — DIPHENHYDRAMINE HYDROCHLORIDE 50 MG/ML
50 INJECTION INTRAMUSCULAR; INTRAVENOUS
Status: CANCELLED
Start: 2023-06-02

## 2023-06-02 RX ORDER — MEPERIDINE HYDROCHLORIDE 25 MG/ML
25 INJECTION INTRAMUSCULAR; INTRAVENOUS; SUBCUTANEOUS EVERY 30 MIN PRN
Status: CANCELLED | OUTPATIENT
Start: 2023-06-02

## 2023-06-02 RX ORDER — METHYLPREDNISOLONE SODIUM SUCCINATE 125 MG/2ML
125 INJECTION, POWDER, LYOPHILIZED, FOR SOLUTION INTRAMUSCULAR; INTRAVENOUS
Status: CANCELLED
Start: 2023-06-02

## 2023-06-02 RX ORDER — ALBUTEROL SULFATE 0.83 MG/ML
2.5 SOLUTION RESPIRATORY (INHALATION)
Status: CANCELLED | OUTPATIENT
Start: 2023-06-02

## 2023-06-02 RX ORDER — PALONOSETRON 0.05 MG/ML
0.25 INJECTION, SOLUTION INTRAVENOUS ONCE
Status: CANCELLED | OUTPATIENT
Start: 2023-06-02

## 2023-06-02 RX ORDER — PALONOSETRON 0.05 MG/ML
0.25 INJECTION, SOLUTION INTRAVENOUS ONCE
Status: COMPLETED | OUTPATIENT
Start: 2023-06-02 | End: 2023-06-02

## 2023-06-02 RX ORDER — EPINEPHRINE 1 MG/ML
0.3 INJECTION, SOLUTION INTRAMUSCULAR; SUBCUTANEOUS EVERY 5 MIN PRN
Status: CANCELLED | OUTPATIENT
Start: 2023-06-02

## 2023-06-02 RX ORDER — HEPARIN SODIUM,PORCINE 10 UNIT/ML
5 VIAL (ML) INTRAVENOUS
Status: CANCELLED | OUTPATIENT
Start: 2023-06-02

## 2023-06-02 RX ORDER — LORAZEPAM 2 MG/ML
0.5 INJECTION INTRAMUSCULAR EVERY 4 HOURS PRN
Status: CANCELLED | OUTPATIENT
Start: 2023-06-02

## 2023-06-02 RX ADMIN — PEGFILGRASTIM 6 MG: KIT SUBCUTANEOUS at 13:30

## 2023-06-02 RX ADMIN — CARBOPLATIN 535 MG: 10 INJECTION, SOLUTION INTRAVENOUS at 12:48

## 2023-06-02 RX ADMIN — DIPHENHYDRAMINE HYDROCHLORIDE 50 MG: 50 INJECTION, SOLUTION INTRAMUSCULAR; INTRAVENOUS at 08:37

## 2023-06-02 RX ADMIN — DEXAMETHASONE SODIUM PHOSPHATE: 10 INJECTION, SOLUTION INTRAMUSCULAR; INTRAVENOUS at 08:02

## 2023-06-02 RX ADMIN — SODIUM CHLORIDE 250 ML: 9 INJECTION, SOLUTION INTRAVENOUS at 07:44

## 2023-06-02 RX ADMIN — PACLITAXEL 302 MG: 6 INJECTION, SOLUTION INTRAVENOUS at 09:07

## 2023-06-02 RX ADMIN — PALONOSETRON HYDROCHLORIDE 0.25 MG: 0.25 INJECTION INTRAVENOUS at 07:45

## 2023-06-02 RX ADMIN — FAMOTIDINE 20 MG: 10 INJECTION INTRAVENOUS at 07:50

## 2023-06-02 RX ADMIN — SODIUM CHLORIDE 1200 MG: 9 INJECTION, SOLUTION INTRAVENOUS at 13:25

## 2023-06-02 ASSESSMENT — PAIN SCALES - GENERAL: PAINLEVEL: NO PAIN (0)

## 2023-06-02 NOTE — NURSING NOTE
Chief Complaint   Patient presents with     Blood Draw     Labs done at local clinic. Patient requested vascular for PIV placement. VS taken and checked in for infusion.     Jaguar Saxena RN

## 2023-06-02 NOTE — PROGRESS NOTES
MD reviewed labs from [place]/[facility] Everywhere     MD APPROVED patient moving forward with Chemotherapy     Patient updated via [facility]     Shawna Hinojosa RN

## 2023-06-02 NOTE — PROGRESS NOTES
Infusion Nursing Note:  Taran Regalado presents today for Cycle 4 Day 1 Taxol Carboplatin, Zirabev (delayed).    Patient seen by provider today: No   present during visit today: Not Applicable.    Note: Patient had labs done locally in [place] on 5/31. Per written communication from [facility] and Dr. Larry GALE, ok to treat today with ANC of 1.1, which is up from 0.6.      Intravenous Access:  Peripheral IV placed by vascular access.    Treatment Conditions:  See treatment plan     Recent Data from [facility] and [facility]  Related to LAB ONLY-COMPLETE BLOOD COUNT WITH DIFFERENTIAL  Component 05/31/23 05/23/23 05/03/23 04/26/23 04/19/23 04/12/23   WBC 3.0 Low  3.5 Low  2.7 Low  2.5 Low  2.5 Low  2.1 Low    RBC 3.27 Low  3.12 Low  3.19 Low  3.17 Low  3.18 Low  3.03 Low    Hemoglobin 11.5 11.0 Low  11.2 Low  11.2 Low  11.3 Low  10.7 Low    Hematocrit 35.8 33.8 Low  35.0 34.9 Low  34.4 Low  33.7 Low    .5 High   108.3 High   109.7 High   110.1 High   108.2 High   111.2 High     MCH 35.2 High  35.3 High  35.1 High  35.3 High  35.5 High  35.3 High    MCHC 32.1 32.5 32.0 32.1 32.8 31.8   RDW-CV 15.4 15.7 High  14.4 15.0 14.1 14.5   RDW-SD 60.3 High  60.8 High  56.2 High  58.4 High  54.2 High  56.7 High    Platelet Count 113 Low  96 Low  192 125 Low  96 Low  115 Low    MPV 10.2 10.4 9.7 9.7 9.9 9.4   Seg Neut Absolute 1.1 Low  2.1 1.0 Low  1.0 Low  0.8 Low  0.3 Low    Lymphocytes Absolute 1.6 1.0 1.3 1.1 1.3 1.4   Monocytes Absolute 0.3 0.4 0.3 0.3 0.3 0.3   Eosinophils Absolute 0.1 0.0 0.1 0.1 0.0 0.0   Basophil Absolute 0.0 0.0 0.0 0.0 0.0 0.0   Neutrophils Abs. (Segs and Bands) 1,100 2,100 1,000 1,000 800 300   Neutrophils Percent 35.0 60.5 36.1 40.6 32.4 16.5   Lymphocytes Percent 53.7 27.0 50.2 44.2 52.2 69.3   Monocytes Percent 9.3 11.6 10.6 11.6 13.4 13.2   Eosinophils Percent 1.7 0.3 2.3 3.2 1.6 0.5   Basophil Percent 0.3 0.3 0.8 0.4 0.4 0.5   View all related data         BP:  120/70  Urine protein: <6.8    Post Infusion Assessment:  Patient tolerated infusion without incident.  Blood return noted pre and post infusion.  Access discontinued per protocol.     Neulasta Onpro On-Body injector applied to right arm at 1330.  Writer discussed Neulasta injection would start tomorrow 6/3 at 1635, approximately 27 hours after application applied today.    Written and Verbal instruction reviewed with patient.  Pt instructed when the dose delivery starts, it will take about 45 minutes to complete.  Pt aware Neulasta Onpro On-Body should have green flashing light and to call triage or on-call MD if injector flashes red or appears to be leaking.   Pt aware to keep Onpro On-Body Neulasta 4 inches away from electrical equipment and to avoid showering 4 hours prior to injection.   Neulasta Onpro Lot number: see MAR    Discharge Plan:   Patient declined prescription refills. Pt confirms she has decadron at home to start tomorrow.  AVS to patient via [facility].  Patient will return 6/26 for next appointment.   Patient discharged in stable condition accompanied by: daughter.  Departure Mode: Ambulatory.    Stormy Costa RN

## 2023-06-12 ENCOUNTER — PATIENT OUTREACH (OUTPATIENT)
Dept: ONCOLOGY | Facility: CLINIC | Age: 68
End: 2023-06-12
Payer: COMMERCIAL

## 2023-06-12 NOTE — PROGRESS NOTES
Patients daughter calling stating patient has a new on-set of SOB.     Patient I unable to walk without being winded and has to sit. Patient also seems to struggle with long conversations      Patient advised to go to ED     Patient has had this before off and on during treatment but even with that RN recommended ED     RN answered all daughters questions to the best of her ability     Shawna Hinojosa RN

## 2023-06-21 DIAGNOSIS — C56.9 OVARIAN CANCER, UNSPECIFIED LATERALITY (H): Primary | ICD-10-CM

## 2023-06-21 NOTE — PROGRESS NOTES
Virtual Visit Details    Type of service:  Video Visit   Video Start Time: 1705  Video End Time: 1724    Originating Location (pt. 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 She is here today for follow up and disease management by video.     Oncology History:  12/3/2020: US Pelvic: IMPRESSION: Limited examination due to acoustic windows. Possible left adnexal mass. A CT scan of the abdomen and pelvis with contrast is recommended for further assessment.     2020: CT A/P:   IMPRESSION:    Peritoneal carcinomatosis with masslike peritoneal thickening in the lower pelvis which may indicate an adnexal or ovarian primary malignancy. Large volume ascites. Bilateral pleural effusions. There is potential subtle pleural nodularity in the right hemithorax which could indicate metastatic disease.  Indeterminate 1 cm lesion in the right hepatic lobe suspicious for a metastatic lesion.      2020: Presented to [facility] with abdominal distention, 25lb weight loss, and CTAP with carcinomatosis, elevated  3098.     2020: CT Chest: IMPRESSION:   1. There are few scattered small sub-6 mm pulmonary nodules which are indeterminate without prior comparisons available. There are a few  slightly larger perifissural nodules which are technically  indeterminate in the setting of malignancy although presumed lymphatic in nature and of unlikely clinical significance. Attention on follow-up is recommended.  2. Small to moderate left and small right pleural effusions are increased in size from prior. No convincing evidence for pleural nodularity.  3. Partially visualized large volume ascites and peritoneal  nodularity in the upper abdomen similar to 12/4/2020 outside CT      12/26/2020: ED for abdominal distension; 3 L ascites drained with paracentesis    Pelvic US: Findings: Free fluid present in LLQ      12/31/2020: US Paracentesis: 900 mL ascites drained     1/7/2021: Diagnotic laparoscopy, biopsies  Pathology: FINAL DIAGNOSIS:   A. PERITONEUM, BIOPSIES:   - Positive for high grade carcinoma, consistent with metastatic carcinoma of Mullerian origin.     1/10-1/13/2021: Hospital admission for postoperative non-intractable vomiting and nausea.      1/10/2021: CT A/P: IMPRESSION: Extensive ascites which is probably malignant. Scattered liver hypodensities of indeterminate etiology comment cannot exclude metastatic disease. Diverticulosis. Fluid-filled adnexal masses and irregular appearance of uterus, which may represent primary neoplasm. Multiple peritoneal nodules. Large amount of fecal material in the colon with no evidence of small bowel obstruction.     Plan: Paclitaxel 175 mg/m2 and carboplatin AUC 6 x 3 cycles followed by a CT CAP and visit with Dr. Juarez.     1/12/2021: Cycle 1 paclitaxel and carboplatin while inpatient     1/13/2021: CT Head: Impression:  1. Chronic sinusitis of the right maxillary and right sphenoid sinuses.  2. Incidental presumed calcified meningioma in the right frontal  convexity without significant mass effect.  3. No suspicious intracranial enhancing lesion.     2/1/2021: Cycle 2 paclitaxel and carboplatin.  936.     2/3-2/5/21: Admission [facility] for afib w/ RVR and new PE     2/26/21: Cycle 3 paclitaxel and carboplatin planned.  Deferred due to thrombocytopenia.  pending.     3/15/21: Cycle 3 paclitaxel and carboplatin given     4/19/21: HYSTERECTOMY, TOTAL, ABDOMINAL, WITH BILATERAL SALPINGO-OOPHORECTOMY, omentectomy, NEOPLASM DEBULKING,Proctoscocy, RO, Resection of liver nodules, diaphragm stripping, immobilization of liver and colon  FINAL DIAGNOSIS:   A. OMENTUM,  BIOPSY:   - Omental adipose tissue with rare viable cells of metastatic high grade   serous carcinoma   - One reactive lymph node, negative for malignancy (0/1)   B. NODULE, SIGMOID, EXCISION:   - Calcified necrotic adipose tissue   - Negative for malignancy   C. NODULE, SMALL BOWEL MESENTERY, EXCISION:   - Fibroadipose tissue, positive for metastatic high grade serous carcinoma   D. UTERUS, CERVIX, BILATERAL FALLOPIAN TUBES AND OVARIES, HYSTERECTOMY   WITH BILATERAL SALPINGO-OOPHORECTOMY:   - Atrophic endometrium   - Uterine serosa with rare viable cells consistent with high grade serous   carcinoma   - Cervix with atrophic changes   - Viable cells consistent with high grade serous carcinoma present in the   right ovary, serosa of right   fallopian tube and right periadnexal soft tissue   - Left ovary with atrophic changes   - Left fallopian tube with a rare focus of serous tubal in-situ carcinoma   (STIC)   E. NODULES, SMALL BOWEL MESENTRY, EXCISION:   - Fibroadipose tissue with rare viable cells of metastatic high grade   serous carcinoma   F. NODULE, SPLENIC FLEXURE TRANSVERSE COLON, EXCISION:   - Fibroadipose tissue with rare viable cells of metastatic high grade   serous carcinoma   - Accessory splenule, negative for malignancy   G. OMENTUM, OMENTECTOMY:   - Omental adipose tissue with rare viable cells of metastatic high grade   serous carcinoma   H. NODULE, PERITONEAL PANCREATIC, EXCISION:   - Fibrous adhesions with inflammation   - Negative for malignancy   I. RIGHT HEMIDIAPHRAGM PERITONEUM, EXCISION:   - Fibrous adhesions with inflammation   - Negative for malignancy   J. RIGHT LIVER SURFACE NODULE:   - Fibrous adhesions with benign inclusion glands   - Negative for malignancy   K. LEFT LOWER LIVER EDGE, BIOPSY:   - Cauterized hepatic parenchyma and capsule   - Negative for malignancy   L. NODULE, SMALL BOWEL MESENTERIC #3, EXCISION:   - Fibroadipose tissue with rare viable cells of metastatic high grade    serous carcinoma       Plan: Carboplatin AUC 6 + Taxol 175 mg/m2 x 3 cycles, then transition to Parp inhibitor for maintenance therapy given her BRCA1 germline mutation.      5/21/21: Cycle 4 carboplatin and paclitaxel.   172.  6/11/21: Cycle 5 carboplatin and paclitaxel.   61.  7/2/21: Cycle 6 carboplatin and paclitaxel.   20.        7/28/21 plan: Olaparib 300mg bid as starting dose,  14  8/20/21: start date olaparib 300 mg BID,  12  9/13/21:  22  10/4/21:  23  11/1/21:  26     11/02/2021: PET CT: IMPRESSION:   Findings compatible with interval surgery and posttreatment change.  No gross definitive FDG avid disease.  Potential foci of tumor deposits along the anterior dome of the liver and midline abdominal wall surgical scar.  Colonic activity is not necessarily abnormal, however, given the previous carcinomatosis the colonic activity is indeterminant.         12/1/21:  23  1/3/22:  21  2/1/22:  20  3/1/22:  21  4/1/22:  23  5/4/22:  28     EXAM: CT CHEST/ABDOMEN/PELVIS W CONTRAST  LOCATION: [facility]  DATE/TIME: 7/11/2022 1:25 PM                                                                   IMPRESSION:  1.  Sliver of ascites in the upper abdomen has decreased since interval debulking surgery.  2.  There is a punctate nodule in the gastrosplenic ligament which is minimally more plump relative to the preop exam. This will need to be followed.  3.  Minimal nodular changes to the left of the midline scar in the subcutaneous fat will have to be followed as well. Unclear if this simply reflects postoperative scarring or could reflect an early incisional recurrence.  4.  No other sites to suggest recurrent tumor. Vaginal cuff is normal.  5.  Extensive distal colonic diverticulosis.  6.  Other noncritical findings as noted above.      9/16/22  98     1/30/23 CT CAP:    IMPRESSION:  1.  Multiple new,  hypoattenuating lesions in the liver, suspicious for hepatic metastatic disease.  2.  Necrotic mario hepatic lymphadenopathy, concerning for richard metastatic disease.  3.  There is a new or increasingly conspicuous 6 mm soft tissue nodule in the right lower quadrant. This is indeterminate.  4.  There is a new 3 mm solid nodule in the right upper lobe, indeterminate.  5.  Stable approximate 4 mm punctate nodule along the gastrosplenic ligament.  6.  Similar area of linear free fluid in the upper abdomen anterior to the stomach.    Plan: Paclitaxel 175 mg/m2, Carboplatin AUC 6, bevacizumab 7.5 mg/kg    3/1/23: Cycle #1 Paclitaxel 175 mg/m2, Carboplatin AUC 6 (C7), bevacizumab 7.5 mg/kg.  1,196  3/24/23: Cycle #2 Paclitaxel 150 mg/m2, Carboplatin AUC 5 (C8), bevacizumab 15 mg/kg.  558    3/29/23: ER for dehydration and hypotension. Normotensive in ER. IV fluids given. Discharged.     4/14/23: Cycle #3 Paclitaxel 150 mg/m2, Carboplatin AUC 5 (C9), bevacizumab 15 mg/kg deferred neutropenia ANC 0.3   273  4/21/23: treatment deferred ANC 0.8  4/28/23: Cycle #3 Paclitaxel 150 mg/m2, Carboplatin AUC 5 (C9), bevacizumab 15 mg/kg, + Neulasta deferred ANC 1.0,  297  5/5/23: Cycle #3 Paclitaxel 150 mg/m2, Carboplatin AUC 5 (C9), bevacizumab 15 mg/kg, + Neulasta deferred ANC 1.0,  401    5/22/23 CT CAP: IMPRESSION:   1. Decreased size of hepatic metastases and mario hepatis lymphadenopathy.   2. Right upper lobe pulmonary nodule has resolved and other nodules bilaterally are unchanged. New small region of groundglass opacity in the right lower lobe may represent a low-grade infectious or inflammatory process.   3. New 4 mm sclerotic focus of the T8 vertebral body is indeterminate.     5/26/23: Cycle #4  Paclitaxel 150 mg/m2, Carboplatin AUC 5 (C10), bevacizumab 15 mg/kg, + Neulasta, deferred ANC 0.6  188. No hematology consult per MD.     6/2/23: Cycle #4 Paclitaxel 150 mg/m2, Carboplatin AUC 5  (C9), bevacizumab 15 mg/kg, + Neulasta   6/23/23: Cycle #5 deferred neutropenia ANC 0.5 thrombocytopenia platelets 85    6/26/2023 Addendum: per Dr. Juarez reduce both Carboplatin and Paclitaxel due to thrombocytopenia and persistent neutropenia. REFER to Hematology. Message sent to pt. Orders placed.         Today Justen reports disappointment is labs this week. She reports that she will be traveling over the next week but will return on 7/2. Would like treatment in [place] on 7/3 if possible. Prior SOB resolved per pt. No CP/SOB since ER visit.     Pt denies fever/chills, denies bruising hematuria or hematochezia.  Pt continues plan to take Claritin and APAP for chemo/Neulasta bone pain. Has tingling at toes. No pain with her neuropathy.  Eating and drinking well. Drinking 64 oz per day. Reports migraines that are controlled with Imitrex. Denies abdominal pain or bloating. Tolerating PO intake. Denies nausea and vomiting. Denies SOB/CP. No fevers or chills. She denies any vaginal bleeding, no changes in her bowel or bladder habits, no lower extremity edema, and no difficulties eating or sleeping. She denies  fevers or chills, and no chest pain or shortness of breath. Regular bowel movements daily. Pt continues on Xarleto for prior PE.                 Review of Systems:    Systemic           no weight changes; no fever; no chills; no night sweats; no appetite changes  Skin           no rashes, or lesions  Eye           no irritation; no changes in vision  Allen-Laryngeal           no dysphagia; no hoarseness   Pulmonary    no cough; no shortness of breath  Cardiovascular    no chest pain; no palpitations  Gastrointestinal    no diarrhea; no constipation; no abdominal pain; no changes in bowel  habits; no blood in stool  Genitourinary   no urinary frequency; no urinary urgency; no dysuria; no pain; no abnormal vaginal discharge; no abnormal vaginal bleeding  Breast   no breast discharge; no breast changes; no  breast pain  Musculoskeletal    + myalgias; no arthralgias; no back pain  Psychiatric           no depressed mood; no anxiety    Hematologic           no tender lymph nodes; no noticeable swellings or lumps   Endocrine    no hot flashes; no heat/cold intolerance         Neurological   no tremor; + numbness and tingling; no headaches; no difficulty sleeping      Past Medical History:    Past Medical History:   Diagnosis Date     Atrial fibrillation with rapid ventricular response (H)      History of cold sores      Hx of LASIK 12/11/2017     Insomnia      Migraine      Osteopenia      Pelvic mass      Peritoneal carcinomatosis (H)      Restless legs syndrome (RLS)          Past Surgical History:    Past Surgical History:   Procedure Laterality Date     APPENDECTOMY       ARTHROSCPY KNEE SURGICAL DEBRIDEMENT SHAVING ARTICULAR CARTILAGE Right      BIOPSY  January 2021    Biopsy to confirm ovarian cancer     DEBRIDEMENT LEFT UPPER EXTREMITY  2016     HYSTERECTOMY TOTAL ABD, LUISITO SALPINGO-OOPHORECTOMY, NODE DISSECTION, TUMOR DEBULKING, COMBINED Bilateral 4/19/2021    Procedure: HYSTERECTOMY, TOTAL, ABDOMINAL, WITH BILATERAL SALPINGO-OOPHORECTOMY, omentectomy, NEOPLASM DEBULKING,Proctoscocy, RO, Resection of liver nodules, diaphragm stripping, immobilization of liver and colon;  Surgeon: Bolivar Juarez MD;  Location: [facility] OR     LAPAROSCOPY DIAGNOSTIC (GYN) Bilateral 1/7/2021    Procedure: Diagnsotic laparoscopy, biopsies;  Surgeon: Bolivar Juarez MD;  Location:  OR     [facility]       TUBAL LIGATION           Health Maintenance Due   Topic Date Due     DEXA  Never done     ADVANCE CARE PLANNING  Never done     COLORECTAL CANCER SCREENING  Never done     HEPATITIS C SCREENING  Never done     LIPID  Never done     MEDICARE ANNUAL WELLNESS VISIT  11/11/2021     FALL RISK ASSESSMENT  Never done     COVID-19 Vaccine (4 - Booster for Pfizer series) 11/15/2021     Pneumococcal Vaccine: 65+ Years (2 - PPSV23 if available,  else PCV20) 04/05/2022     PHQ-2 (once per calendar year)  01/01/2023       Current Medications:     Current Outpatient Medications   Medication Sig Dispense Refill     amitriptyline (ELAVIL) 100 MG tablet Take 1 tablet (100 mg) by mouth At Bedtime 90 tablet 0     rivaroxaban ANTICOAGULANT (XARELTO ANTICOAGULANT) 20 MG TABS tablet Take 1 tablet (20 mg) by mouth every morning 90 tablet 1     zolpidem (AMBIEN) 10 MG tablet Take 10 mg by mouth       dexamethasone (DECADRON) 4 MG tablet Take 2 tablets (8 mg) by mouth daily Take for 3 days, starting the day after chemo. Take with food. (Patient not taking: Reported on 6/22/2023) 6 tablet 5     fluticasone (FLONASE) 50 MCG/ACT nasal spray  (Patient not taking: Reported on 6/22/2023)       gabapentin (NEURONTIN) 600 MG tablet Take 1 tablet (600 mg) by mouth At Bedtime (Patient not taking: Reported on 6/22/2023) 90 tablet 0     HYDROmorphone (DILAUDID) 2 MG tablet Take 1 tablet (2 mg) by mouth every 6 hours as needed for pain (Patient not taking: Reported on 6/22/2023) 10 tablet 0     meclizine (ANTIVERT) 25 MG tablet Take 1 tablet (25 mg) by mouth 3 times daily as needed for dizziness or nausea (Patient not taking: Reported on 6/22/2023) 15 tablet 0     ondansetron (ZOFRAN) 4 MG tablet Take by mouth every 8 hours as needed for nausea (Patient not taking: Reported on 6/22/2023)       ondansetron (ZOFRAN) 8 MG tablet Take 1 tablet (8 mg) by mouth every 8 hours as needed for nausea (vomiting) (Patient not taking: Reported on 6/22/2023) 30 tablet 2     oxyCODONE (ROXICODONE) 5 MG tablet Take 1 tablet (5 mg) by mouth every 12 hours (Patient not taking: Reported on 6/22/2023) 10 tablet 0     prochlorperazine (COMPAZINE) 10 MG tablet Take 1 tablet (10 mg) by mouth every 6 hours as needed for nausea or vomiting (Patient not taking: Reported on 6/22/2023) 30 tablet 2     SUMAtriptan (IMITREX) 100 MG tablet Take 1 tablet (100 mg) by mouth at onset of headache for migraine (Patient  not taking: Reported on 2023) 15 tablet 0     valACYclovir (VALTREX) 1000 mg tablet Take 1,000 mg by mouth as needed (Patient not taking: Reported on 2023)           Allergies:      No Known Allergies     Social History:     Social History     Tobacco Use     Smoking status: Former     Packs/day: 0.50     Years: 40.00     Pack years: 20.00     Types: Cigarettes     Quit date:      Years since quittin.4     Smokeless tobacco: Never   Substance Use Topics     Alcohol use: Not Currently       History   Drug Use Unknown         Family History:     The patient's family history is notable for     Family History   Adopted: Yes   Problem Relation Age of Onset     Cancer Mother 36     Other Cancer Mother         Bio mother  of  a female cancer  at 36     Factor V Leiden deficiency Daughter      Deep Vein Thrombosis Daughter      Diabetes Type 1 Daughter      Diabetes Daughter      Hypertension Daughter      Anesthesia Reaction No family hx of          Physical Exam:     There were no vitals taken for this visit.  There is no height or weight on file to calculate BMI.    General Appearance:  healthy and alert, no distress     Eyes:  Eyes grossly normal to inspection.  No discharge or erythema, or obvious scleral/conjunctival abnormalities.     Respiratory:     No audible wheeze, cough, or visible cyanosis.  No visible retractions or increased work of breathing.      Musculoskeletal:         extremities non tender and without edema     Skin:    no lesions or rashes on visible skin     Neurological:   normal gait, no gross defects                Psychiatric:      appropriate mood and affect. 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 She is here today for a disease  management visit by video.     30 minutes spent on the date of the encounter doing chart review, history and exam, documentation, and further activities as noted above.         Plan:     1.)   Recurrent metastatic ovarian high grade serous carcinoma. Reviewed reasoning behind decreasing chemotherapy doses and the purpose of Neulasta after chemotherapy administration. Encouraged 5-6 small high protein meals a day and discussed nausea management and control. Encouraged 64 oz of fluids per day. Discussed neutropenic precautions and rita period. Reviewed signs and symptoms for when she should contact the clinic or seek additional care. Patient to contact the clinic with any questions or concerns in the interim.  Pt has labs at [facility] 2-3 days prior to infusion.    Disease/Treatment related SE:     - Neutropenia and thrombocytopenia: reviewed precautions with patient. Defer chemotherapy one week. Pt traveling and requested next chemo be 7/3/23 at [facility]. Message sent to Dr. Juarez to determine if chemotherapy can be dose reduced and/ or if patient should see Hematology.    - Bone pain:  Reviewed ok to take Claritin prior to and a few days after treatment to prevent/treat myalgias. Encouraged APAP/Ibuprofen prn. Pt has Dilaudid at home to use prn.   - Neuropathy: tingling and numbness without pain. Reviewed supportive measures, massage and acupuncture. Reviewed reasoning behind not prescribing Cymbalta or Gabapentin as pt does not have neuropathy pain.      2.) Genetic risk factors were assessed and she is POSITIVE for a BRCA1 mutation.  This mutation is called [id number]. Referral for Waleska Zamorano with Cancer Risk management placed 5/2022.     3.) Labs and/or tests ordered include: CBC, protein urine, CMP, Mag,  reviewed today from Care Everywhere with patient     4.) Health maintenance issues addressed today include annual health maintenance and non-gynecologic issues with PCP.    5)        PE: pt  continues on Xarelto          6/26/2023 Addendum: per Dr. Juarez reduce both Carboplatin and Paclitaxel due to thrombocytopenia and persistent neutropenia. REFER to Hematology. Message sent to pt. Orders placed.       JERICHO Chin WHNP-BC  Women's Health Nurse Practitioner  Division of Gynecologic Oncology  [facility]        CC  Patient Care Team:  Bolivar Juarez MD as PCP - General (Gynecologic Oncology)  Marta Okeefe APRN CNP as Assigned PCP  Marta Lao APRN CNP as Assigned Cancer Care Provider  SELF, REFERRED

## 2023-06-22 ENCOUNTER — VIRTUAL VISIT (OUTPATIENT)
Dept: ONCOLOGY | Facility: CLINIC | Age: 68
End: 2023-06-22
Attending: OBSTETRICS & GYNECOLOGY
Payer: COMMERCIAL

## 2023-06-22 DIAGNOSIS — T45.1X5A CHEMOTHERAPY-INDUCED NEUTROPENIA (H): ICD-10-CM

## 2023-06-22 DIAGNOSIS — C56.9 OVARIAN CANCER, UNSPECIFIED LATERALITY (H): Primary | ICD-10-CM

## 2023-06-22 DIAGNOSIS — D70.1 CHEMOTHERAPY-INDUCED NEUTROPENIA (H): ICD-10-CM

## 2023-06-22 DIAGNOSIS — G89.3 CANCER RELATED PAIN: ICD-10-CM

## 2023-06-22 DIAGNOSIS — Z51.11 ENCOUNTER FOR ANTINEOPLASTIC CHEMOTHERAPY: ICD-10-CM

## 2023-06-22 DIAGNOSIS — D69.6 THROMBOCYTOPENIA (H): ICD-10-CM

## 2023-06-22 PROCEDURE — 99214 OFFICE O/P EST MOD 30 MIN: CPT | Mod: 95 | Performed by: OBSTETRICS & GYNECOLOGY

## 2023-06-22 NOTE — NURSING NOTE
Is the patient currently in the state of [place]? YES    Visit mode:VIDEO    If the visit is dropped, the patient can be reconnected by: VIDEO VISIT: Text to cell phone: [phone]    Will anyone else be joining the visit? NO      How would you like to obtain your AVS? MyChart    Are changes needed to the allergy or medication list? NO    Reason for visit: KONG Crane VF

## 2023-06-22 NOTE — LETTER
2023         RE: Taran Regalado  [address]  [place] [address]        Dear Colleague,    Thank you for referring your patient, Taran Regalado, to the [facility]. Please see a copy of my visit note below.    Virtual Visit Details    Type of service:  Video Visit   Video Start Time: 1705  Video End Time: 1724    Originating Location (pt. 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 She is here today for follow up and disease management by video.     Oncology History:  12/3/2020: US Pelvic: IMPRESSION: Limited examination due to acoustic windows. Possible left adnexal mass. A CT scan of the abdomen and pelvis with contrast is recommended for further assessment.     2020: CT A/P:   IMPRESSION:    Peritoneal carcinomatosis with masslike peritoneal thickening in the lower pelvis which may indicate an adnexal or ovarian primary malignancy. Large volume ascites. Bilateral pleural effusions. There is potential subtle pleural nodularity in the right hemithorax which could indicate metastatic disease.  Indeterminate 1 cm lesion in the right hepatic lobe suspicious for a metastatic lesion.      2020: Presented to [facility] with abdominal distention, 25lb weight loss, and CTAP with carcinomatosis, elevated  3098.     2020: CT Chest: IMPRESSION:   1. There are few scattered small sub-6 mm pulmonary nodules which are indeterminate without prior comparisons available. There are a few  slightly larger perifissural nodules which are technically  indeterminate in the setting of malignancy although presumed lymphatic in nature and of unlikely clinical significance.  Attention on follow-up is recommended.  2. Small to moderate left and small right pleural effusions are increased in size from prior. No convincing evidence for pleural nodularity.  3. Partially visualized large volume ascites and peritoneal nodularity in the upper abdomen similar to 12/4/2020 outside CT      12/26/2020: ED for abdominal distension; 3 L ascites drained with paracentesis    Pelvic US: Findings: Free fluid present in LLQ      12/31/2020: US Paracentesis: 900 mL ascites drained     1/7/2021: Diagnotic laparoscopy, biopsies  Pathology: FINAL DIAGNOSIS:   A. PERITONEUM, BIOPSIES:   - Positive for high grade carcinoma, consistent with metastatic carcinoma of Mullerian origin.     1/10-1/13/2021: Hospital admission for postoperative non-intractable vomiting and nausea.      1/10/2021: CT A/P: IMPRESSION: Extensive ascites which is probably malignant. Scattered liver hypodensities of indeterminate etiology comment cannot exclude metastatic disease. Diverticulosis. Fluid-filled adnexal masses and irregular appearance of uterus, which may represent primary neoplasm. Multiple peritoneal nodules. Large amount of fecal material in the colon with no evidence of small bowel obstruction.     Plan: Paclitaxel 175 mg/m2 and carboplatin AUC 6 x 3 cycles followed by a CT CAP and visit with Dr. Juarez.     1/12/2021: Cycle 1 paclitaxel and carboplatin while inpatient     1/13/2021: CT Head: Impression:  1. Chronic sinusitis of the right maxillary and right sphenoid sinuses.  2. Incidental presumed calcified meningioma in the right frontal  convexity without significant mass effect.  3. No suspicious intracranial enhancing lesion.     2/1/2021: Cycle 2 paclitaxel and carboplatin.  936.     2/3-2/5/21: Admission [facility] for afib w/ RVR and new PE     2/26/21: Cycle 3 paclitaxel and carboplatin planned.  Deferred due to thrombocytopenia.  pending.     3/15/21: Cycle 3 paclitaxel and carboplatin given      4/19/21: HYSTERECTOMY, TOTAL, ABDOMINAL, WITH BILATERAL SALPINGO-OOPHORECTOMY, omentectomy, NEOPLASM DEBULKING,Proctoscocy, RO, Resection of liver nodules, diaphragm stripping, immobilization of liver and colon  FINAL DIAGNOSIS:   A. OMENTUM, BIOPSY:   - Omental adipose tissue with rare viable cells of metastatic high grade   serous carcinoma   - One reactive lymph node, negative for malignancy (0/1)   B. NODULE, SIGMOID, EXCISION:   - Calcified necrotic adipose tissue   - Negative for malignancy   C. NODULE, SMALL BOWEL MESENTERY, EXCISION:   - Fibroadipose tissue, positive for metastatic high grade serous carcinoma   D. UTERUS, CERVIX, BILATERAL FALLOPIAN TUBES AND OVARIES, HYSTERECTOMY   WITH BILATERAL SALPINGO-OOPHORECTOMY:   - Atrophic endometrium   - Uterine serosa with rare viable cells consistent with high grade serous   carcinoma   - Cervix with atrophic changes   - Viable cells consistent with high grade serous carcinoma present in the   right ovary, serosa of right   fallopian tube and right periadnexal soft tissue   - Left ovary with atrophic changes   - Left fallopian tube with a rare focus of serous tubal in-situ carcinoma   (STIC)   E. NODULES, SMALL BOWEL MESENTRY, EXCISION:   - Fibroadipose tissue with rare viable cells of metastatic high grade   serous carcinoma   F. NODULE, SPLENIC FLEXURE TRANSVERSE COLON, EXCISION:   - Fibroadipose tissue with rare viable cells of metastatic high grade   serous carcinoma   - Accessory splenule, negative for malignancy   G. OMENTUM, OMENTECTOMY:   - Omental adipose tissue with rare viable cells of metastatic high grade   serous carcinoma   H. NODULE, PERITONEAL PANCREATIC, EXCISION:   - Fibrous adhesions with inflammation   - Negative for malignancy   I. RIGHT HEMIDIAPHRAGM PERITONEUM, EXCISION:   - Fibrous adhesions with inflammation   - Negative for malignancy   J. RIGHT LIVER SURFACE NODULE:   - Fibrous adhesions with benign inclusion glands   - Negative for  malignancy   K. LEFT LOWER LIVER EDGE, BIOPSY:   - Cauterized hepatic parenchyma and capsule   - Negative for malignancy   L. NODULE, SMALL BOWEL MESENTERIC #3, EXCISION:   - Fibroadipose tissue with rare viable cells of metastatic high grade   serous carcinoma       Plan: Carboplatin AUC 6 + Taxol 175 mg/m2 x 3 cycles, then transition to Parp inhibitor for maintenance therapy given her BRCA1 germline mutation.      5/21/21: Cycle 4 carboplatin and paclitaxel.   172.  6/11/21: Cycle 5 carboplatin and paclitaxel.   61.  7/2/21: Cycle 6 carboplatin and paclitaxel.   20.        7/28/21 plan: Olaparib 300mg bid as starting dose,  14  8/20/21: start date olaparib 300 mg BID,  12  9/13/21:  22  10/4/21:  23  11/1/21:  26     11/02/2021: PET CT: IMPRESSION:   Findings compatible with interval surgery and posttreatment change.  No gross definitive FDG avid disease.  Potential foci of tumor deposits along the anterior dome of the liver and midline abdominal wall surgical scar.  Colonic activity is not necessarily abnormal, however, given the previous carcinomatosis the colonic activity is indeterminant.         12/1/21:  23  1/3/22:  21  2/1/22:  20  3/1/22:  21  4/1/22:  23  5/4/22:  28     EXAM: CT CHEST/ABDOMEN/PELVIS W CONTRAST  LOCATION: [facility]  DATE/TIME: 7/11/2022 1:25 PM                                                                   IMPRESSION:  1.  Sliver of ascites in the upper abdomen has decreased since interval debulking surgery.  2.  There is a punctate nodule in the gastrosplenic ligament which is minimally more plump relative to the preop exam. This will need to be followed.  3.  Minimal nodular changes to the left of the midline scar in the subcutaneous fat will have to be followed as well. Unclear if this simply reflects postoperative scarring or could reflect an early incisional  recurrence.  4.  No other sites to suggest recurrent tumor. Vaginal cuff is normal.  5.  Extensive distal colonic diverticulosis.  6.  Other noncritical findings as noted above.      9/16/22  98     1/30/23 CT CAP:    IMPRESSION:  1.  Multiple new, hypoattenuating lesions in the liver, suspicious for hepatic metastatic disease.  2.  Necrotic mario hepatic lymphadenopathy, concerning for richard metastatic disease.  3.  There is a new or increasingly conspicuous 6 mm soft tissue nodule in the right lower quadrant. This is indeterminate.  4.  There is a new 3 mm solid nodule in the right upper lobe, indeterminate.  5.  Stable approximate 4 mm punctate nodule along the gastrosplenic ligament.  6.  Similar area of linear free fluid in the upper abdomen anterior to the stomach.    Plan: Paclitaxel 175 mg/m2, Carboplatin AUC 6, bevacizumab 7.5 mg/kg    3/1/23: Cycle #1 Paclitaxel 175 mg/m2, Carboplatin AUC 6 (C7), bevacizumab 7.5 mg/kg.  1,196  3/24/23: Cycle #2 Paclitaxel 150 mg/m2, Carboplatin AUC 5 (C8), bevacizumab 15 mg/kg.  558    3/29/23: ER for dehydration and hypotension. Normotensive in ER. IV fluids given. Discharged.     4/14/23: Cycle #3 Paclitaxel 150 mg/m2, Carboplatin AUC 5 (C9), bevacizumab 15 mg/kg deferred neutropenia ANC 0.3   273  4/21/23: treatment deferred ANC 0.8  4/28/23: Cycle #3 Paclitaxel 150 mg/m2, Carboplatin AUC 5 (C9), bevacizumab 15 mg/kg, + Neulasta deferred ANC 1.0,  297  5/5/23: Cycle #3 Paclitaxel 150 mg/m2, Carboplatin AUC 5 (C9), bevacizumab 15 mg/kg, + Neulasta deferred ANC 1.0,  401    5/22/23 CT CAP: IMPRESSION:   1. Decreased size of hepatic metastases and mario hepatis lymphadenopathy.   2. Right upper lobe pulmonary nodule has resolved and other nodules bilaterally are unchanged. New small region of groundglass opacity in the right lower lobe may represent a low-grade infectious or inflammatory process.   3. New 4 mm sclerotic focus of the T8  vertebral body is indeterminate.     5/26/23: Cycle #4  Paclitaxel 150 mg/m2, Carboplatin AUC 5 (C10), bevacizumab 15 mg/kg, + Neulasta, deferred ANC 0.6  188. No hematology consult per MD.     6/2/23: Cycle #4 Paclitaxel 150 mg/m2, Carboplatin AUC 5 (C9), bevacizumab 15 mg/kg, + Neulasta   6/23/23: Cycle #5 deferred neutropenia ANC 0.5 thrombocytopenia platelets 85    6/26/2023 Addendum: per Dr. Juarez reduce both Carboplatin and Paclitaxel due to thrombocytopenia and persistent neutropenia. REFER to Hematology. Message sent to pt. Orders placed.         Today Justen reports disappointment is labs this week. She reports that she will be traveling over the next week but will return on 7/2. Would like treatment in [place] on 7/3 if possible. Prior SOB resolved per pt. No CP/SOB since ER visit.     Pt denies fever/chills, denies bruising hematuria or hematochezia.  Pt continues plan to take Claritin and APAP for chemo/Neulasta bone pain. Has tingling at toes. No pain with her neuropathy.  Eating and drinking well. Drinking 64 oz per day. Reports migraines that are controlled with Imitrex. Denies abdominal pain or bloating. Tolerating PO intake. Denies nausea and vomiting. Denies SOB/CP. No fevers or chills. She denies any vaginal bleeding, no changes in her bowel or bladder habits, no lower extremity edema, and no difficulties eating or sleeping. She denies  fevers or chills, and no chest pain or shortness of breath. Regular bowel movements daily. Pt continues on Xarleto for prior PE.                 Review of Systems:    Systemic           no weight changes; no fever; no chills; no night sweats; no appetite changes  Skin           no rashes, or lesions  Eye           no irritation; no changes in vision  Allen-Laryngeal           no dysphagia; no hoarseness   Pulmonary    no cough; no shortness of breath  Cardiovascular    no chest pain; no palpitations  Gastrointestinal    no diarrhea; no constipation;  no abdominal pain; no changes in bowel  habits; no blood in stool  Genitourinary   no urinary frequency; no urinary urgency; no dysuria; no pain; no abnormal vaginal discharge; no abnormal vaginal bleeding  Breast   no breast discharge; no breast changes; no breast pain  Musculoskeletal    + myalgias; no arthralgias; no back pain  Psychiatric           no depressed mood; no anxiety    Hematologic           no tender lymph nodes; no noticeable swellings or lumps   Endocrine    no hot flashes; no heat/cold intolerance         Neurological   no tremor; + numbness and tingling; no headaches; no difficulty sleeping      Past Medical History:    Past Medical History:   Diagnosis Date    Atrial fibrillation with rapid ventricular response (H)     History of cold sores     Hx of LASIK 12/11/2017    Insomnia     Migraine     Osteopenia     Pelvic mass     Peritoneal carcinomatosis (H)     Restless legs syndrome (RLS)          Past Surgical History:    Past Surgical History:   Procedure Laterality Date    APPENDECTOMY      ARTHROSCPY KNEE SURGICAL DEBRIDEMENT SHAVING ARTICULAR CARTILAGE Right     BIOPSY  January 2021    Biopsy to confirm ovarian cancer    DEBRIDEMENT LEFT UPPER EXTREMITY  2016    HYSTERECTOMY TOTAL ABD, LUISITO SALPINGO-OOPHORECTOMY, NODE DISSECTION, TUMOR DEBULKING, COMBINED Bilateral 4/19/2021    Procedure: HYSTERECTOMY, TOTAL, ABDOMINAL, WITH BILATERAL SALPINGO-OOPHORECTOMY, omentectomy, NEOPLASM DEBULKING,Proctoscocy, RO, Resection of liver nodules, diaphragm stripping, immobilization of liver and colon;  Surgeon: Bolivar Juarez MD;  Location: [facility] OR    LAPAROSCOPY DIAGNOSTIC (GYN) Bilateral 1/7/2021    Procedure: Diagnsotic laparoscopy, biopsies;  Surgeon: Bolivar Juarez MD;  Location:  OR    [facility]      TUBAL LIGATION           Health Maintenance Due   Topic Date Due    DEXA  Never done    ADVANCE CARE PLANNING  Never done    COLORECTAL CANCER SCREENING  Never done    HEPATITIS C SCREENING   Never done    LIPID  Never done    MEDICARE ANNUAL WELLNESS VISIT  11/11/2021    FALL RISK ASSESSMENT  Never done    COVID-19 Vaccine (4 - Booster for Pfizer series) 11/15/2021    Pneumococcal Vaccine: 65+ Years (2 - PPSV23 if available, else PCV20) 04/05/2022    PHQ-2 (once per calendar year)  01/01/2023       Current Medications:     Current Outpatient Medications   Medication Sig Dispense Refill    amitriptyline (ELAVIL) 100 MG tablet Take 1 tablet (100 mg) by mouth At Bedtime 90 tablet 0    rivaroxaban ANTICOAGULANT (XARELTO ANTICOAGULANT) 20 MG TABS tablet Take 1 tablet (20 mg) by mouth every morning 90 tablet 1    zolpidem (AMBIEN) 10 MG tablet Take 10 mg by mouth      dexamethasone (DECADRON) 4 MG tablet Take 2 tablets (8 mg) by mouth daily Take for 3 days, starting the day after chemo. Take with food. (Patient not taking: Reported on 6/22/2023) 6 tablet 5    fluticasone (FLONASE) 50 MCG/ACT nasal spray  (Patient not taking: Reported on 6/22/2023)      gabapentin (NEURONTIN) 600 MG tablet Take 1 tablet (600 mg) by mouth At Bedtime (Patient not taking: Reported on 6/22/2023) 90 tablet 0    HYDROmorphone (DILAUDID) 2 MG tablet Take 1 tablet (2 mg) by mouth every 6 hours as needed for pain (Patient not taking: Reported on 6/22/2023) 10 tablet 0    meclizine (ANTIVERT) 25 MG tablet Take 1 tablet (25 mg) by mouth 3 times daily as needed for dizziness or nausea (Patient not taking: Reported on 6/22/2023) 15 tablet 0    ondansetron (ZOFRAN) 4 MG tablet Take by mouth every 8 hours as needed for nausea (Patient not taking: Reported on 6/22/2023)      ondansetron (ZOFRAN) 8 MG tablet Take 1 tablet (8 mg) by mouth every 8 hours as needed for nausea (vomiting) (Patient not taking: Reported on 6/22/2023) 30 tablet 2    oxyCODONE (ROXICODONE) 5 MG tablet Take 1 tablet (5 mg) by mouth every 12 hours (Patient not taking: Reported on 6/22/2023) 10 tablet 0    prochlorperazine (COMPAZINE) 10 MG tablet Take 1 tablet (10 mg) by  mouth every 6 hours as needed for nausea or vomiting (Patient not taking: Reported on 2023) 30 tablet 2    SUMAtriptan (IMITREX) 100 MG tablet Take 1 tablet (100 mg) by mouth at onset of headache for migraine (Patient not taking: Reported on 2023) 15 tablet 0    valACYclovir (VALTREX) 1000 mg tablet Take 1,000 mg by mouth as needed (Patient not taking: Reported on 2023)           Allergies:      No Known Allergies     Social History:     Social History     Tobacco Use    Smoking status: Former     Packs/day: 0.50     Years: 40.00     Pack years: 20.00     Types: Cigarettes     Quit date:      Years since quittin.4    Smokeless tobacco: Never   Substance Use Topics    Alcohol use: Not Currently       History   Drug Use Unknown         Family History:     The patient's family history is notable for     Family History   Adopted: Yes   Problem Relation Age of Onset    Cancer Mother 36    Other Cancer Mother         Bio mother  of  a female cancer  at 36    Factor V Leiden deficiency Daughter     Deep Vein Thrombosis Daughter     Diabetes Type 1 Daughter     Diabetes Daughter     Hypertension Daughter     Anesthesia Reaction No family hx of          Physical Exam:     There were no vitals taken for this visit.  There is no height or weight on file to calculate BMI.    General Appearance:  healthy and alert, no distress     Eyes:  Eyes grossly normal to inspection.  No discharge or erythema, or obvious scleral/conjunctival abnormalities.     Respiratory:     No audible wheeze, cough, or visible cyanosis.  No visible retractions or increased work of breathing.      Musculoskeletal:         extremities non tender and without edema     Skin:    no lesions or rashes on visible skin     Neurological:   normal gait, no gross defects                Psychiatric:      appropriate mood and affect. 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 She is here today for a disease management visit by video.     30 minutes spent on the date of the encounter doing chart review, history and exam, documentation, and further activities as noted above.         Plan:     1.)   Recurrent metastatic ovarian high grade serous carcinoma. Reviewed reasoning behind decreasing chemotherapy doses and the purpose of Neulasta after chemotherapy administration. Encouraged 5-6 small high protein meals a day and discussed nausea management and control. Encouraged 64 oz of fluids per day. Discussed neutropenic precautions and rita period. Reviewed signs and symptoms for when she should contact the clinic or seek additional care. Patient to contact the clinic with any questions or concerns in the interim.  Pt has labs at [facility] 2-3 days prior to infusion.    Disease/Treatment related SE:     - Neutropenia and thrombocytopenia: reviewed precautions with patient. Defer chemotherapy one week. Pt traveling and requested next chemo be 7/3/23 at [facility]. Message sent to Dr. Juarez to determine if chemotherapy can be dose reduced and/ or if patient should see Hematology.    - Bone pain:  Reviewed ok to take Claritin prior to and a few days after treatment to prevent/treat myalgias. Encouraged APAP/Ibuprofen prn. Pt has Dilaudid at home to use prn.   - Neuropathy: tingling and numbness without pain. Reviewed supportive measures, massage and acupuncture. Reviewed reasoning behind not prescribing Cymbalta or Gabapentin as pt does not have neuropathy pain.      2.) Genetic risk factors were assessed and she is POSITIVE for a BRCA1 mutation.  This mutation is called [id number]. Referral for Waleska Zamorano with Cancer Risk management placed 5/2022.     3.) Labs and/or tests ordered include: CBC, protein urine,  CMP, Mag,  reviewed today from Care Everywhere with patient     4.) Health maintenance issues addressed today include annual health maintenance and non-gynecologic issues with PCP.    5)        PE: pt continues on Xarelto          6/26/2023 Addendum: per Dr. Juarez reduce both Carboplatin and Paclitaxel due to thrombocytopenia and persistent neutropenia. REFER to Hematology. Message sent to pt. Orders placed.       JERICHO Chin, NP-BC  Women's Health Nurse Practitioner  Division of Gynecologic Oncology  [facility]        CC  Patient Care Team:  Bolivar Juarez MD as PCP - General (Gynecologic Oncology)

## 2023-07-03 NOTE — NURSING NOTE
Is the patient currently in the state of [place]? YES    Visit mode:VIDEO    If the visit is dropped, the patient can be reconnected by: VIDEO VISIT: Text to cell phone: [phone]    Will anyone else be joining the visit? NO      How would you like to obtain your AVS? MyChart    Are changes needed to the allergy or medication list? NO    Reason for visit: No chief complaint on file.

## 2023-07-03 NOTE — PROGRESS NOTES
Virtual Visit Details    Type of service:  Video Visit   30 minutes    Originating Location (pt. Location): Home    Distant Location (provider location):  Off-site  Platform used for Video Visit: Wanda                 Follow Up Notes on Referred Patient    Date: 7/3/2023       Dr. Ye Vaughn MD  No address on file       RE: Taran Regalado  : 1955  GRACY: 7/3/2023    Taran Regalado is a 67 year old woman with a diagnosis of metastatic ovarian high grade serous carcinoma. She is here today for follow up and disease management. She has been tolerating chemotherapy well overall.  However she has had some issues with neutropenia requiring additional Neulasta at this reduction in place.  We will continue manage accordingly, however if persistent consider hematology referral. She is eating an drinking well, no nausea vomiting, fever or chills, normal urinary and bowel function.        Oncology History:  12/3/2020: US Pelvic: IMPRESSION: Limited examination due to acoustic windows. Possible left adnexal mass. A CT scan of the abdomen and pelvis with contrast is recommended for further assessment.     2020: CT A/P:   IMPRESSION:    Peritoneal carcinomatosis with masslike peritoneal thickening in the lower pelvis which may indicate an adnexal or ovarian primary malignancy. Large volume ascites. Bilateral pleural effusions. There is potential subtle pleural nodularity in the right hemithorax which could indicate metastatic disease.  Indeterminate 1 cm lesion in the right hepatic lobe suspicious for a metastatic lesion.      2020: Presented to [facility] with abdominal distention, 25lb weight loss, and CTAP with carcinomatosis, elevated  3098.     2020: CT Chest: IMPRESSION:   1. There are few scattered small sub-6 mm pulmonary nodules which are indeterminate without prior comparisons available. There are a few  slightly larger perifissural nodules which are technically  indeterminate in the  setting of malignancy although presumed lymphatic in nature and of unlikely clinical significance. Attention on follow-up is recommended.  2. Small to moderate left and small right pleural effusions are increased in size from prior. No convincing evidence for pleural nodularity.  3. Partially visualized large volume ascites and peritoneal nodularity in the upper abdomen similar to 12/4/2020 outside CT      12/26/2020: ED for abdominal distension; 3 L ascites drained with paracentesis    Pelvic US: Findings: Free fluid present in LLQ      12/31/2020: US Paracentesis: 900 mL ascites drained     1/7/2021: Diagnotic laparoscopy, biopsies  Pathology: FINAL DIAGNOSIS:   A. PERITONEUM, BIOPSIES:   - Positive for high grade carcinoma, consistent with metastatic carcinoma of Mullerian origin.     1/10-1/13/2021: Hospital admission for postoperative non-intractable vomiting and nausea.      1/10/2021: CT A/P: IMPRESSION: Extensive ascites which is probably malignant. Scattered liver hypodensities of indeterminate etiology comment cannot exclude metastatic disease. Diverticulosis. Fluid-filled adnexal masses and irregular appearance of uterus, which may represent primary neoplasm. Multiple peritoneal nodules. Large amount of fecal material in the colon with no evidence of small bowel obstruction.     Plan: Paclitaxel 175 mg/m2 and carboplatin AUC 6 x 3 cycles followed by a CT CAP and visit with Dr. Juarez.     1/12/2021: Cycle 1 paclitaxel and carboplatin while inpatient     1/13/2021: CT Head: Impression:  1. Chronic sinusitis of the right maxillary and right sphenoid sinuses.  2. Incidental presumed calcified meningioma in the right frontal  convexity without significant mass effect.  3. No suspicious intracranial enhancing lesion.     2/1/2021: Cycle 2 paclitaxel and carboplatin.  936.     2/3-2/5/21: Admission [facility] for afib w/ RVR and new PE     2/26/21: Cycle 3 paclitaxel and carboplatin planned.  Deferred due  to thrombocytopenia.  pending.     3/15/21: Cycle 3 paclitaxel and carboplatin given     4/19/21: HYSTERECTOMY, TOTAL, ABDOMINAL, WITH BILATERAL SALPINGO-OOPHORECTOMY, omentectomy, NEOPLASM DEBULKING,Proctoscocy, RO, Resection of liver nodules, diaphragm stripping, immobilization of liver and colon  FINAL DIAGNOSIS:   A. OMENTUM, BIOPSY:   - Omental adipose tissue with rare viable cells of metastatic high grade   serous carcinoma   - One reactive lymph node, negative for malignancy (0/1)   B. NODULE, SIGMOID, EXCISION:   - Calcified necrotic adipose tissue   - Negative for malignancy   C. NODULE, SMALL BOWEL MESENTERY, EXCISION:   - Fibroadipose tissue, positive for metastatic high grade serous carcinoma   D. UTERUS, CERVIX, BILATERAL FALLOPIAN TUBES AND OVARIES, HYSTERECTOMY   WITH BILATERAL SALPINGO-OOPHORECTOMY:   - Atrophic endometrium   - Uterine serosa with rare viable cells consistent with high grade serous   carcinoma   - Cervix with atrophic changes   - Viable cells consistent with high grade serous carcinoma present in the   right ovary, serosa of right   fallopian tube and right periadnexal soft tissue   - Left ovary with atrophic changes   - Left fallopian tube with a rare focus of serous tubal in-situ carcinoma   (STIC)   E. NODULES, SMALL BOWEL MESENTRY, EXCISION:   - Fibroadipose tissue with rare viable cells of metastatic high grade   serous carcinoma   F. NODULE, SPLENIC FLEXURE TRANSVERSE COLON, EXCISION:   - Fibroadipose tissue with rare viable cells of metastatic high grade   serous carcinoma   - Accessory splenule, negative for malignancy   G. OMENTUM, OMENTECTOMY:   - Omental adipose tissue with rare viable cells of metastatic high grade   serous carcinoma   H. NODULE, PERITONEAL PANCREATIC, EXCISION:   - Fibrous adhesions with inflammation   - Negative for malignancy   I. RIGHT HEMIDIAPHRAGM PERITONEUM, EXCISION:   - Fibrous adhesions with inflammation   - Negative for malignancy   J.  RIGHT LIVER SURFACE NODULE:   - Fibrous adhesions with benign inclusion glands   - Negative for malignancy   K. LEFT LOWER LIVER EDGE, BIOPSY:   - Cauterized hepatic parenchyma and capsule   - Negative for malignancy   L. NODULE, SMALL BOWEL MESENTERIC #3, EXCISION:   - Fibroadipose tissue with rare viable cells of metastatic high grade   serous carcinoma       Plan: Carboplatin AUC 6 + Taxol 175 mg/m2 x 3 cycles, then transition to Parp inhibitor for maintenance therapy given her BRCA1 germline mutation.      5/21/21: Cycle 4 carboplatin and paclitaxel.   172.  6/11/21: Cycle 5 carboplatin and paclitaxel.   61.  7/2/21: Cycle 6 carboplatin and paclitaxel.   20.        7/28/21 plan: Olaparib 300mg bid as starting dose,  14  8/20/21: start date olaparib 300 mg BID,  12  9/13/21:  22  10/4/21:  23  11/1/21:  26     11/02/2021: PET CT: IMPRESSION:   Findings compatible with interval surgery and posttreatment change.  No gross definitive FDG avid disease.  Potential foci of tumor deposits along the anterior dome of the liver and midline abdominal wall surgical scar.  Colonic activity is not necessarily abnormal, however, given the previous carcinomatosis the colonic activity is indeterminant.         12/1/21:  23  1/3/22:  21  2/1/22:  20  3/1/22:  21  4/1/22:  23  5/4/22:  28     EXAM: CT CHEST/ABDOMEN/PELVIS W CONTRAST  LOCATION: [facility]  DATE/TIME: 7/11/2022 1:25 PM     INDICATION: Stage III B high-grade ovarian carcinoma diagnosed Jan 2021. Posttreatment surveillance.  COMPARISON: CTA AP 04/23/2021, CT CAP 04/02/2021  TECHNIQUE: CT scan of the chest, abdomen, and pelvis was performed following injection of IV contrast. Multiplanar reformats were obtained. Dose reduction techniques were used.   CONTRAST: isovue 370 105mL IV; 50mL omni 140 oral     FINDINGS:   LUNGS AND PLEURA: No suspicious pulmonary  nodules. Minimal right apical pleural thickening and a few punctate tiny nodules 2 of which are subpleural on the right are stable. No pleural effusions.     MEDIASTINUM/AXILLAE: No adenopathy. No central pulmonary emboli.     CORONARY ARTERY CALCIFICATION: Cannot evaluate.     HEPATOBILIARY: Normal.     PANCREAS: Normal.     SPLEEN: Normal.     ADRENAL GLANDS: Normal.     KIDNEYS/BLADDER: Normal.     BOWEL: Sliver of ascites adjacent to the spleen noted, decreased from prior study. There is a 4 mm nodule in the gastrosplenic ligament (image 352). On the preop study it appears as a smaller punctate nodule (prior image 341). Sliver of ascites in the   gastrohepatic ligament also noted. No peritoneal tumor nodules. No bowel obstruction. Quite redundant colon with mild large stool burden. Extensive distal colonic diverticulosis.     LYMPH NODES: Normal.     VASCULATURE: Mild arterial calcifications. Circumaortic left renal vein.     PELVIC ORGANS: Hysterectomy. Vaginal cuff is normal.     MUSCULOSKELETAL: Diastases of the midline rectus sheath above the umbilicus. Minimal nodular scarring to the left of midline in the midabdomen (image 419). There is a shallow broad-based supraumbilical ventral hernia containing only fat. No implants   within the hernia or abdominal incision. No suspicious bone lesions.                                                                      IMPRESSION:  1.  Sliver of ascites in the upper abdomen has decreased since interval debulking surgery.  2.  There is a punctate nodule in the gastrosplenic ligament which is minimally more plump relative to the preop exam. This will need to be followed.  3.  Minimal nodular changes to the left of the midline scar in the subcutaneous fat will have to be followed as well. Unclear if this simply reflects postoperative scarring or could reflect an early incisional recurrence.  4.  No other sites to suggest recurrent tumor. Vaginal cuff is normal.  5.   Extensive distal colonic diverticulosis.  6.  Other noncritical findings as noted above.      9/16/22  98     1/30/23 CT CAP:    IMPRESSION:  1.  Multiple new, hypoattenuating lesions in the liver, suspicious for hepatic metastatic disease.  2.  Necrotic mario hepatic lymphadenopathy, concerning for richard metastatic disease.  3.  There is a new or increasingly conspicuous 6 mm soft tissue nodule in the right lower quadrant. This is indeterminate.  4.  There is a new 3 mm solid nodule in the right upper lobe, indeterminate.  5.  Stable approximate 4 mm punctate nodule along the gastrosplenic ligament.  6.  Similar area of linear free fluid in the upper abdomen anterior to the stomach.     Plan: Paclitaxel 175 mg/m2, Carboplatin AUC 6, bevacizumab 7.5 mg/kg     3/1/23: Cycle #1 Paclitaxel 175 mg/m2, Carboplatin AUC 6 (C7), bevacizumab 7.5 mg/kg.  1,196  3/24/23: Cycle #2 Paclitaxel 150 mg/m2, Carboplatin AUC 5 (C8), bevacizumab 15 mg/kg.  558     3/29/23: ER for dehydration and hypotension. Normotensive in ER. IV fluids given. Discharged.      4/14/23: Cycle #3 Paclitaxel 150 mg/m2, Carboplatin AUC 5 (C9), bevacizumab 15 mg/kg deferred neutropenia ANC 0.3   273  4/21/23: treatment deferred ANC 0.8  4/28/23: Cycle #3 Paclitaxel 150 mg/m2, Carboplatin AUC 5 (C9), bevacizumab 15 mg/kg, + Neulasta deferred ANC 1.0,  297    5/26/23: Cycle #4  Paclitaxel 150 mg/m2, Carboplatin AUC 5 (C10), bevacizumab 15 mg/kg, + Neulasta, deferred ANC 0.6  188. No hematology consult per MD.      6/2/23: Cycle #4 Paclitaxel 150 mg/m2, Carboplatin AUC 5 (C9), bevacizumab 15 mg/kg, + Neulasta   6/23/23: Cycle #5 deferred neutropenia ANC 0.5 thrombocytopenia platelets 85     6/26/2023 Addendum: Reduce both Carboplatin and Paclitaxel due to thrombocytopenia and persistent neutropenia. REFER to Hematology to rule out MDS. Message sent to pt. Orders placed.       Past Medical History:    Past Medical History:    Diagnosis Date     Atrial fibrillation with rapid ventricular response (H)      History of cold sores      Hx of LASIK 12/11/2017     Insomnia      Migraine      Osteopenia      Pelvic mass      Peritoneal carcinomatosis (H)      Restless legs syndrome (RLS)          Past Surgical History:    Past Surgical History:   Procedure Laterality Date     APPENDECTOMY       ARTHROSCPY KNEE SURGICAL DEBRIDEMENT SHAVING ARTICULAR CARTILAGE Right      BIOPSY  January 2021    Biopsy to confirm ovarian cancer     DEBRIDEMENT LEFT UPPER EXTREMITY  2016     HYSTERECTOMY TOTAL ABD, LUISITO SALPINGO-OOPHORECTOMY, NODE DISSECTION, TUMOR DEBULKING, COMBINED Bilateral 4/19/2021    Procedure: HYSTERECTOMY, TOTAL, ABDOMINAL, WITH BILATERAL SALPINGO-OOPHORECTOMY, omentectomy, NEOPLASM DEBULKING,Proctoscocy, RO, Resection of liver nodules, diaphragm stripping, immobilization of liver and colon;  Surgeon: Bolivar Juarez MD;  Location: [facility] OR     LAPAROSCOPY DIAGNOSTIC (GYN) Bilateral 1/7/2021    Procedure: Diagnsotic laparoscopy, biopsies;  Surgeon: Bolivar Juarez MD;  Location: [facility] OR     LASIK       TUBAL LIGATION           Health Maintenance Due   Topic Date Due     DEXA  Never done     ADVANCE CARE PLANNING  Never done     COLORECTAL CANCER SCREENING  Never done     HEPATITIS C SCREENING  Never done     LIPID  Never done     MEDICARE ANNUAL WELLNESS VISIT  11/11/2021     FALL RISK ASSESSMENT  Never done     COVID-19 Vaccine (4 - Booster for Pfizer series) 11/15/2021     Pneumococcal Vaccine: 65+ Years (2 - PPSV23 if available, else PCV20) 04/05/2022     PHQ-2 (once per calendar year)  01/01/2023       Current Medications:     Current Outpatient Medications   Medication Sig Dispense Refill     amitriptyline (ELAVIL) 100 MG tablet Take 1 tablet (100 mg) by mouth At Bedtime 90 tablet 0     dexamethasone (DECADRON) 4 MG tablet Take 2 tablets (8 mg) by mouth daily Take for 3 days, starting the day after chemo. Take with food. (Patient  not taking: Reported on 6/22/2023) 6 tablet 5     fluticasone (FLONASE) 50 MCG/ACT nasal spray  (Patient not taking: Reported on 6/22/2023)       gabapentin (NEURONTIN) 600 MG tablet Take 1 tablet (600 mg) by mouth At Bedtime (Patient not taking: Reported on 6/22/2023) 90 tablet 0     HYDROmorphone (DILAUDID) 2 MG tablet Take 1 tablet (2 mg) by mouth every 6 hours as needed for pain (Patient not taking: Reported on 6/22/2023) 10 tablet 0     meclizine (ANTIVERT) 25 MG tablet Take 1 tablet (25 mg) by mouth 3 times daily as needed for dizziness or nausea (Patient not taking: Reported on 6/22/2023) 15 tablet 0     ondansetron (ZOFRAN) 4 MG tablet Take by mouth every 8 hours as needed for nausea (Patient not taking: Reported on 6/22/2023)       ondansetron (ZOFRAN) 8 MG tablet Take 1 tablet (8 mg) by mouth every 8 hours as needed for nausea (vomiting) (Patient not taking: Reported on 6/22/2023) 30 tablet 2     oxyCODONE (ROXICODONE) 5 MG tablet Take 1 tablet (5 mg) by mouth every 12 hours (Patient not taking: Reported on 6/22/2023) 10 tablet 0     prochlorperazine (COMPAZINE) 10 MG tablet Take 1 tablet (10 mg) by mouth every 6 hours as needed for nausea or vomiting (Patient not taking: Reported on 6/22/2023) 30 tablet 2     rivaroxaban ANTICOAGULANT (XARELTO ANTICOAGULANT) 20 MG TABS tablet Take 1 tablet (20 mg) by mouth every morning 90 tablet 1     SUMAtriptan (IMITREX) 100 MG tablet Take 1 tablet (100 mg) by mouth at onset of headache for migraine (Patient not taking: Reported on 6/22/2023) 15 tablet 0     valACYclovir (VALTREX) 1000 mg tablet Take 1,000 mg by mouth as needed (Patient not taking: Reported on 6/22/2023)       zolpidem (AMBIEN) 10 MG tablet Take 10 mg by mouth           Allergies:      No Known Allergies     Social History:     Social History     Tobacco Use     Smoking status: Former     Packs/day: 0.50     Years: 40.00     Pack years: 20.00     Types: Cigarettes     Quit date: 2018     Years since  quittin.5     Smokeless tobacco: Never   Substance Use Topics     Alcohol use: Not Currently       History   Drug Use Unknown         Family History:       Family History   Adopted: Yes   Problem Relation Age of Onset     Cancer Mother 36     Other Cancer Mother         Bio mother  of  a female cancer  at 36     Factor V Leiden deficiency Daughter      Deep Vein Thrombosis Daughter      Diabetes Type 1 Daughter      Diabetes Daughter      Hypertension Daughter      Anesthesia Reaction No family hx of          Physical Exam:     There were no vitals taken for this visit.  There is no height or weight on file to calculate BMI.    General Appearance:  healthy and alert, no distress                 Psychiatric:      appropriate mood and affect                                     Assessment:     Taran Regalado is a 67 year old woman with recurrent ovarian high grade serous carcinoma.     40 minute visit with 30 minutes counseling.        1.  Recurrent high grade serous ovarian cancer.   2.  BRCA 1 germline mutation.   3.  [facility] Program  4.  PE resolved on Lovenox (prophy)  5.  Left ankle injury  6.   375      ANC was 0.9 on , ok to proceed today with reduced dose and Neulasta.  We will plan to continue with 2 more cycles with carboplatin AUC of 4, Taxol at 135 mg per metered square, bevacizumab at 15 mg/kg as well as neulasta for bone marrow support.  Again if she is persistent of neutropenia we have referred he to hematology to rule out MDS.  Side effects risk alternatives were discussed.  She has gotten an echocardiogram in the past and is asymptomatic from a cardiac standpoint.  Patient and her family agree with this plan of action for care all questions were answered.           Bolivar Juarez MD, MS    Department of Obstetrics and Gynecology   Division of Gynecologic Oncology   [facility]  Phone: [phone]      CC  Patient Care  Team:  Bolivar Juarez MD as PCP - General (Gynecologic Oncology)  Marta Okeefe APRN CNP as Assigned PCP  Marta Lao APRN CNP as Assigned Cancer Care Provider  Cogan, Jacob, MD as Physician (Hematology & Oncology)  SELF, REFERRED

## 2023-07-03 NOTE — PROGRESS NOTES
Infusion Nursing Note:  Taran Regalado presents today for C5D1 Taxol/carboplatin/Avastin/OnBody Neualsta.    Patient seen by provider today: Yes: Dr. Juarez   present during visit today: Not Applicable.    Note: No complaints.      Intravenous Access:  Peripheral IV placed by Vascular access.    Treatment Conditions:  Lab Results   Component Value Date    HGB 10.8 (L) 05/26/2023    WBC 2.0 (L) 05/26/2023    ANEU 4.2 07/30/2021    ANEUTAUTO 0.6 (L) 05/26/2023    PLT 99 (L) 05/26/2023      Lab Results   Component Value Date     05/26/2023    POTASSIUM 4.3 05/26/2023    MAG 1.8 05/26/2023    CR 0.80 05/26/2023    HORACIO 9.4 05/26/2023    BILITOTAL 0.2 05/26/2023    ALBUMIN 3.8 05/26/2023    ALT 26 05/26/2023    AST 22 05/26/2023     Results reviewed, labs MET treatment parameters, ok to proceed with treatment.  Urine Protein <6.8 6/29    Blood test 6/29    HB 11.7  Platelets 113  ANC 0.9  Potassium 4.9  Magnesium 1.8    TORB: 6/30/29/Marta Lao NP/ Inbasket: Per Dr. Juarez we are good to go for ANC 0.9. Parameters updated and carbo calculated. ANC 4 and Taxol reduced also. Neulasta in tx plan.     Neulasta Onpro On-Body injector applied to left upper arm 7/3/23 at 1510 with light facing elbow.  Writer discussed Neulasta injection would start on 7/4/23 at 1810h, approximately 27 hours after application applied today.  Written and Verbal instruction reviewed with patient.  Pt instructed when the dose delivery starts, it will take about 45 minutes to complete.  Pt aware Neulasta Onpro On-Body should have green flashing light and to call triage or on-call MD if injector flashes red or appears to be leaking. Pt aware to keep Onpro On-Body Neulasta 4 inches away from electrical equipment and to avoid showering 4 hours prior to injection.   Neulasta Onpro Lot number: See MAR        Post Infusion Assessment:  Patient tolerated infusion without incident.  Blood return noted pre and post infusion.  Site  patent and intact, free from redness, edema or discomfort.  No evidence of extravasations.  Access discontinued per protocol.       Discharge Plan:   Patient declined prescription refills.  Discharge instructions reviewed with: Patient.  Patient and/or family verbalized understanding of discharge instructions and all questions answered.  AVS to patient via [employer].  Patient will return 7/27/23 for next appointment.   Patient discharged in stable condition accompanied by: self.  Departure Mode: Ambulatory.      LUIS FELIPE FERNANDEZ RN

## 2023-07-07 NOTE — PROGRESS NOTES
[facility]  Medical Hematology   Initial Visit Note      PATIENT NAME: Taran Regalado  MRN: [id number]  : 1955  ENCOUNTER DATE: 2023     REFERRING PROVIDER: Dr. Juarez    REASON FOR CURRENT VISIT: Persisent thrombocytopenia and neutropenia with history of recurrent ovarian cancer on third line treatment.       HISTORY OF PRESENTING ILLNESS:    Ms. Taran Regalado is a 67 year old  female referred by Dr. Juarez for the persisent thrombocytopenia and neutropenia on chemotherapy for recurrent Metastatic ovarian cancer. 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 She has since undergone treatment with 6 cycles of Carboplatin & Paclitaxel and was transitioned to maintenance Olaparib due to BRCA1 germline mutation with improvement in tumor markers. She later was found to have disease progression and recently started on Carboplatin, Paclitaxel and Bevacizumab on 3/1/23.     23, Cycle 3 of Paclitaxel 150 mg/m2, Carboplatin AUC 5, bevacizumab 15 mg/kg deferred due to neutropenia ANC 0.3.  23 treatment deferred a second time due to ANC 0.8.   23 treatment was deferred for the third time due to ANC 1.0.   23: treatment deferred due to ANC 1.0,   2023 Cycle 4 day 1 treatment was deferred due to ANC of 0.6.  2023 Received Cycle #4 Paclitaxel 150 mg/m2, Carboplatin AUC 5 (C9), bevacizumab 15 mg/kg, + Neulasta   23: Cycle #5 deferred neutropenia ANC 0.5 thrombocytopenia platelets 85  7/3/2023: Received treatment with carbo calculated. ANC 4 and Taxol reduced also, ANC 0.9. Parameters updated and Neulasta.    Patient referred to us for persistent thrombocytopenia and neutropenia with concern for MDS.    Patient reports feeling fine otherwise. Has her usual fatigue but no recent weight loss, night sweats, or fevers. She does not know her family history as she is adopted. No recent infections  or bleeding. She normally bruises since childhood. Uses alcohol very rarely. Quit smoking several years ago. Is currently on Xarelto for Afib and a PE.     Review of Systems:  A full 14 point ROS was negative other than the symptoms noted above in the HPI.      PAST MEDICAL HISTORY     Active Ambulatory Problems     Diagnosis Date Noted     Pelvic mass 12/23/2020     Non-intractable vomiting with nausea, unspecified vomiting type 01/10/2021     Ovarian cancer, unspecified laterality (H) 01/12/2021     Atrial fibrillation with rapid ventricular response (H) 02/03/2021     Other acute pulmonary embolism without acute cor pulmonale (H) 02/03/2021     Encounter for antineoplastic chemotherapy 02/08/2021     Restless legs syndrome 02/18/2021     Chemotherapy-induced neutropenia (H) 03/12/2021     Insomnia 04/12/2010     Hypercholesterolemia 10/24/2014     Migraine 04/12/2010     Postmenopausal atrophic vaginitis 02/13/2012     Osteopenia 10/23/2013     Nuclear sclerosis of both eyes 12/11/2017     Presbyopia 06/15/2021     Adverse effect of antineoplastic and immunosuppressive drugs, sequela 04/13/2023     Resolved Ambulatory Problems     Diagnosis Date Noted     Hx of LASIK 12/11/2017     Past Medical History:   Diagnosis Date     History of cold sores      Peritoneal carcinomatosis (H)      Restless legs syndrome (RLS)          PAST SURGICAL HISTORY:      Past Surgical History:   Procedure Laterality Date     APPENDECTOMY       ARTHROSCPY KNEE SURGICAL DEBRIDEMENT SHAVING ARTICULAR CARTILAGE Right      BIOPSY  January 2021    Biopsy to confirm ovarian cancer     DEBRIDEMENT LEFT UPPER EXTREMITY  2016     HYSTERECTOMY TOTAL ABD, LUISITO SALPINGO-OOPHORECTOMY, NODE DISSECTION, TUMOR DEBULKING, COMBINED Bilateral 4/19/2021    Procedure: HYSTERECTOMY, TOTAL, ABDOMINAL, WITH BILATERAL SALPINGO-OOPHORECTOMY, omentectomy, NEOPLASM DEBULKING,Proctoscocy, RO, Resection of liver nodules, diaphragm stripping, immobilization of liver  and colon;  Surgeon: Bolivar Juarez MD;  Location: [facility] OR     LAPAROSCOPY DIAGNOSTIC (GYN) Bilateral 2021    Procedure: Diagnsotic laparoscopy, biopsies;  Surgeon: Bolivar Juarez MD;  Location: [facility] OR     LASIK       TUBAL LIGATION            SOCIAL HISTORY:     Social History     Tobacco Use     Smoking status: Former     Packs/day: 0.50     Years: 40.00     Pack years: 20.00     Types: Cigarettes     Quit date:      Years since quittin.5     Smokeless tobacco: Never   Vaping Use     Vaping Use: Never used   Substance Use Topics     Alcohol use: Not Currently     Drug use: Never         FAMILY HISTORY:     Family History   Adopted: Yes   Problem Relation Age of Onset     Cancer Mother 36     Other Cancer Mother         Bio mother  of  a female cancer  at 36     Factor V Leiden deficiency Daughter      Deep Vein Thrombosis Daughter      Diabetes Type 1 Daughter      Diabetes Daughter      Hypertension Daughter      Anesthesia Reaction No family hx of          ALLEGIES:     No Known Allergies    CURRENT MEDICATIONS:        Current Outpatient Medications:      amitriptyline (ELAVIL) 100 MG tablet, Take 1 tablet (100 mg) by mouth At Bedtime, Disp: 90 tablet, Rfl: 0     dexamethasone (DECADRON) 4 MG tablet, Take 2 tablets (8 mg) by mouth daily Take for 3 days, starting the day after chemo. Take with food. (Patient not taking: Reported on 2023), Disp: 6 tablet, Rfl: 5     fluticasone (FLONASE) 50 MCG/ACT nasal spray, , Disp: , Rfl:      gabapentin (NEURONTIN) 600 MG tablet, Take 1 tablet (600 mg) by mouth At Bedtime (Patient not taking: Reported on 2023), Disp: 90 tablet, Rfl: 0     HYDROmorphone (DILAUDID) 2 MG tablet, Take 1 tablet (2 mg) by mouth every 6 hours as needed for pain (Patient not taking: Reported on 2023), Disp: 10 tablet, Rfl: 0     meclizine (ANTIVERT) 25 MG tablet, Take 1 tablet (25 mg) by mouth 3 times daily as needed for dizziness or nausea (Patient not taking:  Reported on 6/22/2023), Disp: 15 tablet, Rfl: 0     ondansetron (ZOFRAN) 4 MG tablet, Take by mouth every 8 hours as needed for nausea (Patient not taking: Reported on 6/22/2023), Disp: , Rfl:      ondansetron (ZOFRAN) 8 MG tablet, Take 1 tablet (8 mg) by mouth every 8 hours as needed for nausea (vomiting) (Patient not taking: Reported on 6/22/2023), Disp: 30 tablet, Rfl: 2     oxyCODONE (ROXICODONE) 5 MG tablet, Take 1 tablet (5 mg) by mouth every 12 hours (Patient not taking: Reported on 6/22/2023), Disp: 10 tablet, Rfl: 0     prochlorperazine (COMPAZINE) 10 MG tablet, Take 1 tablet (10 mg) by mouth every 6 hours as needed for nausea or vomiting (Patient not taking: Reported on 6/22/2023), Disp: 30 tablet, Rfl: 2     rivaroxaban ANTICOAGULANT (XARELTO ANTICOAGULANT) 20 MG TABS tablet, Take 1 tablet (20 mg) by mouth every morning, Disp: 90 tablet, Rfl: 1     SUMAtriptan (IMITREX) 100 MG tablet, Take 1 tablet (100 mg) by mouth at onset of headache for migraine (Patient not taking: Reported on 6/22/2023), Disp: 15 tablet, Rfl: 0     valACYclovir (VALTREX) 1000 mg tablet, Take 1,000 mg by mouth as needed (Patient not taking: Reported on 6/22/2023), Disp: , Rfl:      zolpidem (AMBIEN) 10 MG tablet, Take 10 mg by mouth, Disp: , Rfl:       PHYSICAL EXAMINATION:      Vital signs: There were no vitals taken for this visit.  ECOG performance status of 1  GENERAL: Well-nourished healthy-appearing, seated in chair, no acute distress.   HEENT: No icterus, no pallor. Dry tongue and mucous membranes. Oropharynx is clear.   NECK: Supple, no JVD/LAD.  LUNGS: Clear to ausculation bilaterally, normal work of breathing.   CARDIOVASCULAR: Regular rate and rhythm, no murmurs, gallops or rubs.   ABDOMEN: Soft, non-tender and non-distended, no palpable masses, no hepatosplenogally, bowel sounds present.  EXTREMITIES: No cyanosis, no clubbing, no edema.   NEUROLOGIC: Alert and oriented. No focal deficits.  LYMPH NODE EXAM: No palpable  adenopathy - cervical, axillary.      LABS:      Recent Labs   Lab Test 05/26/23  1021 05/05/23  0920 03/01/23  0800   WBC 2.0* 2.9* 3.4*   RBC 3.04* 3.27* 3.39*   HGB 10.8* 11.6* 12.5   HCT 32.3* 35.7 37.7   * 109* 111*   MCH 35.5* 35.5* 36.9*   MCHC 33.4 32.5 33.2   RDW 15.5* 14.4 13.9   PLT 99* 202 169   NEUTROPHIL 31 36 56     Recent Labs   Lab Test 05/26/23  1021 03/01/23  0800 01/30/23  0851 08/20/21  1033 07/30/21  0716 07/23/21  0757    140  --  141   < > 141   POTASSIUM 4.3 4.4  --  4.2   < > 4.6   CHLORIDE 105 108  --  108   < > 110*   CO2 24 29  --  28   < > 30   ANIONGAP 9 3  --  5   < > 1*   * 110*  --  74   < > 94   BUN 19.1 14  --  13   < > 18   CR 0.80 0.86 1.0 0.91   < > 0.88   HORACIO 9.4 9.9  --  9.0   < > 8.7   MAG 1.8 2.0  --   --   --  2.1    < > = values in this interval not displayed.     Recent Labs   Lab Test 05/26/23  1021 03/01/23  0800 08/20/21  1033   BILITOTAL 0.2 0.3 0.3   ALKPHOS 148* 109 126   AST 22 23 22   ALT 26 27 26   PROTTOTAL 7.2 7.8 7.4   ALBUMIN 3.8 3.7 3.7     Recent Labs   Lab Test 04/24/21  0814 04/23/21  2221 02/03/21  1548   INR 1.13 1.39* 1.06   PTT  --   --  30       PATHOLOGY:    Blood Morphology pending.    ASSESSMENT AND PLAN:      1. Afebrile Neutropenia   2. Thrombocytopenia   3. Anemia  4. Metastatic Ovarian Cancer    Patient with persistent Neutropenia and Thrombocytopenia with ongoing chemotherapy for ovarian cancer. Differentials can be broad with most probable etiology being Chemotherapy induced. Some patient's especially with prolonged chemotherapy exposure tend to recover counts slower than usual due to bone marrow suppression from chemo. Patient is still actively receiving chemo which can continue to impact counts. Other possible contributing factors could include an underlying MDS or other hematologic malignancies which are less likely in her case given that her counts go up and down with chemo,  but cannot be completely ruled out at  this time. A Bone Marrow biopsy can help rule this out however, the results may be hard to interprete while on active chemotherapy. Another possibility is an associated  bone marrow infiltration by her ovarian cancer. Bone metastasis of ovarian cancer is not too common, however patient was noted to have a 4mm sclerotic focus on the T8 vertebral body on her last CT scan 5/24/2023 and tumor markers remain elevated, this too can be confirmed via a bone marrow biopsy however a negative bone marrow biopsy does not necessarily rule this out given it is a spot biopsy.     In the meantime, We will obtain peripheral smear today to assess for dacryocytes or hyposegmented neutrophils which could be suggestive of some bone marrow involvement.     Given that patient is on active chemotherapy at this time which can contribute significantly to low counts, we will defer consideration for BM biopsy to later if low blood counts remain persistent after patient has been off of Chemo for a few weeks to allow for count recovery. Will discuss with patient's GYN ONC Dr. Juarez regarding plan for ongoing chemotherapy to determine if and when a BM biopsy can be pursued if deemed worthwhile.     Recommend continuing with G-CSF support as needed. This can be given without chemo as well if need. Continue to monitor CBC with diff routinely.     Recommend avoiding crowded places and sick people as patient is at risk for severe infection. Patient states that she works at a  and is adamant about not stopping work but understands the risk.     Thrombocytopenia and anemia in mild to moderate range, not requiring any acute intervention at this time. Cont. to monitor.     The patient was seen and discussed with attending physician Dr. Jacob Cogan who agreed with the above history, physical and assessment/plan.     Beverley Veliz MD   PGY-5 Fellow, Division of Hematology, Oncology and Transplantation  [facility]

## 2023-07-07 NOTE — NURSING NOTE
Oncology Rooming Note    July 7, 2023 11:54 AM   Taran Regalado is a 67 year old female who presents for:    Chief Complaint   Patient presents with     Oncology Clinic Visit     Ovarian cancer     Initial Vitals: /72 (BP Location: Right arm, Patient Position: Sitting, Cuff Size: Adult Regular)   Pulse 73   Temp 98.6  F (37  C) (Oral)   Resp 12   Wt 80.6 kg (177 lb 11.2 oz)   SpO2 98%   BMI 24.10 kg/m   Estimated body mass index is 24.1 kg/m  as calculated from the following:    Height as of 4/27/23: 1.829 m (6').    Weight as of this encounter: 80.6 kg (177 lb 11.2 oz). Body surface area is 2.02 meters squared.  No Pain (0) Comment: Data Unavailable   No LMP recorded. Patient is postmenopausal.  Allergies reviewed: Yes  Medications reviewed: Yes    Medications: Medication refills not needed today.  Pharmacy name entered into [facility]:    [facility] - [place], [place] - [address] [facility] - [place], [place] - [address] [facility]  [id number] [place], [place] - [address] [facility]  [facility] [place], [place] - [address]  [id number] [place], [place] - [address]  [place], [place] - [address] [place] [address]  [facility] [place], [place] - [address]  [facility] - [place], [place] - [address]    Clinical concerns: Pt has concerns for the provider.      Corby Hanna

## 2023-07-07 NOTE — LETTER
2023         RE: Taran Regalado  [address]  [place] [address]        Dear Colleague,    Thank you for referring your patient, Taran Regalado, to the [facility]. Please see a copy of my visit note below.        [facility]  Medical Hematology   Initial Visit Note      PATIENT NAME: Taran Regalado  MRN: [id number]  : 1955  ENCOUNTER DATE: 2023     REFERRING PROVIDER: Dr. Juarez    REASON FOR CURRENT VISIT: Persisent thrombocytopenia and neutropenia with history of recurrent ovarian cancer on third line treatment.       HISTORY OF PRESENTING ILLNESS:    Ms. Taran Regalado is a 67 year old  female referred by Dr. Juarez for the persisent thrombocytopenia and neutropenia on chemotherapy for recurrent Metastatic ovarian cancer. 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 She has since undergone treatment with 6 cycles of Carboplatin & Paclitaxel and was transitioned to maintenance Olaparib due to BRCA1 germline mutation with improvement in tumor markers. She later was found to have disease progression and recently started on Carboplatin, Paclitaxel and Bevacizumab on 3/1/23.     23, Cycle 3 of Paclitaxel 150 mg/m2, Carboplatin AUC 5, bevacizumab 15 mg/kg deferred due to neutropenia ANC 0.3.  23 treatment deferred a second time due to ANC 0.8.   23 treatment was deferred for the third time due to ANC 1.0.   23: treatment deferred due to ANC 1.0,   2023 Cycle 4 day 1 treatment was deferred due to ANC of 0.6.  2023 Received Cycle #4 Paclitaxel 150 mg/m2, Carboplatin AUC 5 (C9), bevacizumab 15 mg/kg, + Neulasta   23: Cycle #5 deferred neutropenia ANC 0.5 thrombocytopenia platelets 85  7/3/2023: Received treatment with carbo calculated. ANC 4 and Taxol reduced also, ANC 0.9. Parameters updated and Neulasta.    Patient referred to us for persistent  thrombocytopenia and neutropenia with concern for MDS.    Patient reports feeling fine otherwise. Has her usual fatigue but no recent weight loss, night sweats, or fevers. She does not know her family history as she is adopted. No recent infections or bleeding. She normally bruises since childhood. Uses alcohol very rarely. Quit smoking several years ago. Is currently on Xarelto for Afib and a PE.     Review of Systems:  A full 14 point ROS was negative other than the symptoms noted above in the HPI.      PAST MEDICAL HISTORY     Active Ambulatory Problems     Diagnosis Date Noted    Pelvic mass 12/23/2020    Non-intractable vomiting with nausea, unspecified vomiting type 01/10/2021    Ovarian cancer, unspecified laterality (H) 01/12/2021    Atrial fibrillation with rapid ventricular response (H) 02/03/2021    Other acute pulmonary embolism without acute cor pulmonale (H) 02/03/2021    Encounter for antineoplastic chemotherapy 02/08/2021    Restless legs syndrome 02/18/2021    Chemotherapy-induced neutropenia (H) 03/12/2021    Insomnia 04/12/2010    Hypercholesterolemia 10/24/2014    Migraine 04/12/2010    Postmenopausal atrophic vaginitis 02/13/2012    Osteopenia 10/23/2013    Nuclear sclerosis of both eyes 12/11/2017    Presbyopia 06/15/2021    Adverse effect of antineoplastic and immunosuppressive drugs, sequela 04/13/2023     Resolved Ambulatory Problems     Diagnosis Date Noted    Hx of LASIK 12/11/2017     Past Medical History:   Diagnosis Date    History of cold sores     Peritoneal carcinomatosis (H)     Restless legs syndrome (RLS)          PAST SURGICAL HISTORY:      Past Surgical History:   Procedure Laterality Date    APPENDECTOMY      ARTHROSCPY KNEE SURGICAL DEBRIDEMENT SHAVING ARTICULAR CARTILAGE Right     BIOPSY  January 2021    Biopsy to confirm ovarian cancer    DEBRIDEMENT LEFT UPPER EXTREMITY  2016    HYSTERECTOMY TOTAL ABD, LUISITO SALPINGO-OOPHORECTOMY, NODE DISSECTION, TUMOR DEBULKING, COMBINED  Bilateral 2021    Procedure: HYSTERECTOMY, TOTAL, ABDOMINAL, WITH BILATERAL SALPINGO-OOPHORECTOMY, omentectomy, NEOPLASM DEBULKING,Proctoscocy, RO, Resection of liver nodules, diaphragm stripping, immobilization of liver and colon;  Surgeon: Bolivar Juarez MD;  Location: [facility] OR    LAPAROSCOPY DIAGNOSTIC (GYN) Bilateral 2021    Procedure: Diagnsotic laparoscopy, biopsies;  Surgeon: Bolivar Juarez MD;  Location: [facility] OR    LASIK      TUBAL LIGATION            SOCIAL HISTORY:     Social History     Tobacco Use    Smoking status: Former     Packs/day: 0.50     Years: 40.00     Pack years: 20.00     Types: Cigarettes     Quit date:      Years since quittin.5    Smokeless tobacco: Never   Vaping Use    Vaping Use: Never used   Substance Use Topics    Alcohol use: Not Currently    Drug use: Never         FAMILY HISTORY:     Family History   Adopted: Yes   Problem Relation Age of Onset    Cancer Mother 36    Other Cancer Mother         Bio mother  of  a female cancer  at 36    Factor V Leiden deficiency Daughter     Deep Vein Thrombosis Daughter     Diabetes Type 1 Daughter     Diabetes Daughter     Hypertension Daughter     Anesthesia Reaction No family hx of          ALLEGIES:     No Known Allergies    CURRENT MEDICATIONS:        Current Outpatient Medications:     amitriptyline (ELAVIL) 100 MG tablet, Take 1 tablet (100 mg) by mouth At Bedtime, Disp: 90 tablet, Rfl: 0    dexamethasone (DECADRON) 4 MG tablet, Take 2 tablets (8 mg) by mouth daily Take for 3 days, starting the day after chemo. Take with food. (Patient not taking: Reported on 2023), Disp: 6 tablet, Rfl: 5    fluticasone (FLONASE) 50 MCG/ACT nasal spray, , Disp: , Rfl:     gabapentin (NEURONTIN) 600 MG tablet, Take 1 tablet (600 mg) by mouth At Bedtime (Patient not taking: Reported on 2023), Disp: 90 tablet, Rfl: 0    HYDROmorphone (DILAUDID) 2 MG tablet, Take 1 tablet (2 mg) by mouth every 6 hours as needed for pain  (Patient not taking: Reported on 6/22/2023), Disp: 10 tablet, Rfl: 0    meclizine (ANTIVERT) 25 MG tablet, Take 1 tablet (25 mg) by mouth 3 times daily as needed for dizziness or nausea (Patient not taking: Reported on 6/22/2023), Disp: 15 tablet, Rfl: 0    ondansetron (ZOFRAN) 4 MG tablet, Take by mouth every 8 hours as needed for nausea (Patient not taking: Reported on 6/22/2023), Disp: , Rfl:     ondansetron (ZOFRAN) 8 MG tablet, Take 1 tablet (8 mg) by mouth every 8 hours as needed for nausea (vomiting) (Patient not taking: Reported on 6/22/2023), Disp: 30 tablet, Rfl: 2    oxyCODONE (ROXICODONE) 5 MG tablet, Take 1 tablet (5 mg) by mouth every 12 hours (Patient not taking: Reported on 6/22/2023), Disp: 10 tablet, Rfl: 0    prochlorperazine (COMPAZINE) 10 MG tablet, Take 1 tablet (10 mg) by mouth every 6 hours as needed for nausea or vomiting (Patient not taking: Reported on 6/22/2023), Disp: 30 tablet, Rfl: 2    rivaroxaban ANTICOAGULANT (XARELTO ANTICOAGULANT) 20 MG TABS tablet, Take 1 tablet (20 mg) by mouth every morning, Disp: 90 tablet, Rfl: 1    SUMAtriptan (IMITREX) 100 MG tablet, Take 1 tablet (100 mg) by mouth at onset of headache for migraine (Patient not taking: Reported on 6/22/2023), Disp: 15 tablet, Rfl: 0    valACYclovir (VALTREX) 1000 mg tablet, Take 1,000 mg by mouth as needed (Patient not taking: Reported on 6/22/2023), Disp: , Rfl:     zolpidem (AMBIEN) 10 MG tablet, Take 10 mg by mouth, Disp: , Rfl:       PHYSICAL EXAMINATION:      Vital signs: There were no vitals taken for this visit.  ECOG performance status of 1  GENERAL: Well-nourished healthy-appearing, seated in chair, no acute distress.   HEENT: No icterus, no pallor. Dry tongue and mucous membranes. Oropharynx is clear.   NECK: Supple, no JVD/LAD.  LUNGS: Clear to ausculation bilaterally, normal work of breathing.   CARDIOVASCULAR: Regular rate and rhythm, no murmurs, gallops or rubs.   ABDOMEN: Soft, non-tender and non-distended,  no palpable masses, no hepatosplenogally, bowel sounds present.  EXTREMITIES: No cyanosis, no clubbing, no edema.   NEUROLOGIC: Alert and oriented. No focal deficits.  LYMPH NODE EXAM: No palpable adenopathy - cervical, axillary.      LABS:      Recent Labs   Lab Test 05/26/23  1021 05/05/23  0920 03/01/23  0800   WBC 2.0* 2.9* 3.4*   RBC 3.04* 3.27* 3.39*   HGB 10.8* 11.6* 12.5   HCT 32.3* 35.7 37.7   * 109* 111*   MCH 35.5* 35.5* 36.9*   MCHC 33.4 32.5 33.2   RDW 15.5* 14.4 13.9   PLT 99* 202 169   NEUTROPHIL 31 36 56     Recent Labs   Lab Test 05/26/23  1021 03/01/23  0800 01/30/23  0851 08/20/21  1033 07/30/21  0716 07/23/21  0757    140  --  141   < > 141   POTASSIUM 4.3 4.4  --  4.2   < > 4.6   CHLORIDE 105 108  --  108   < > 110*   CO2 24 29  --  28   < > 30   ANIONGAP 9 3  --  5   < > 1*   * 110*  --  74   < > 94   BUN 19.1 14  --  13   < > 18   CR 0.80 0.86 1.0 0.91   < > 0.88   HORACIO 9.4 9.9  --  9.0   < > 8.7   MAG 1.8 2.0  --   --   --  2.1    < > = values in this interval not displayed.     Recent Labs   Lab Test 05/26/23  1021 03/01/23  0800 08/20/21  1033   BILITOTAL 0.2 0.3 0.3   ALKPHOS 148* 109 126   AST 22 23 22   ALT 26 27 26   PROTTOTAL 7.2 7.8 7.4   ALBUMIN 3.8 3.7 3.7     Recent Labs   Lab Test 04/24/21  0814 04/23/21  2221 02/03/21  1548   INR 1.13 1.39* 1.06   PTT  --   --  30       PATHOLOGY:    Blood Morphology pending.    ASSESSMENT AND PLAN:      1. Afebrile Neutropenia   2. Thrombocytopenia   3. Anemia  4. Metastatic Ovarian Cancer    Patient with persistent Neutropenia and Thrombocytopenia with ongoing chemotherapy for ovarian cancer. Differentials can be broad with most probable etiology being Chemotherapy induced. Some patient's especially with prolonged chemotherapy exposure tend to recover counts slower than usual due to bone marrow suppression from chemo. Patient is still actively receiving chemo which can continue to impact counts. Other possible contributing  factors could include an underlying MDS or other hematologic malignancies which are less likely in her case given that her counts go up and down with chemo,  but cannot be completely ruled out at this time. A Bone Marrow biopsy can help rule this out however, the results may be hard to interprete while on active chemotherapy. Another possibility is an associated  bone marrow infiltration by her ovarian cancer. Bone metastasis of ovarian cancer is not too common, however patient was noted to have a 4mm sclerotic focus on the T8 vertebral body on her last CT scan 5/24/2023 and tumor markers remain elevated, this too can be confirmed via a bone marrow biopsy however a negative bone marrow biopsy does not necessarily rule this out given it is a spot biopsy.     In the meantime, We will obtain peripheral smear today to assess for dacryocytes or hyposegmented neutrophils which could be suggestive of some bone marrow involvement.     Given that patient is on active chemotherapy at this time which can contribute significantly to low counts, we will defer consideration for BM biopsy to later if low blood counts remain persistent after patient has been off of Chemo for a few weeks to allow for count recovery. Will discuss with patient's GYN ONC Dr. Juarez regarding plan for ongoing chemotherapy to determine if and when a BM biopsy can be pursued if deemed worthwhile.     Recommend continuing with G-CSF support as needed. This can be given without chemo as well if need. Continue to monitor CBC with diff routinely.     Recommend avoiding crowded places and sick people as patient is at risk for severe infection. Patient states that she works at a  and is adamant about not stopping work but understands the risk.     Thrombocytopenia and anemia in mild to moderate range, not requiring any acute intervention at this time. Cont. to monitor.     The patient was seen and discussed with attending physician Dr. Jacob Cogan  who agreed with the above history, physical and assessment/plan.     Beverley Veliz MD   PGY-5 Fellow, Division of Hematology, Oncology and Transplantation  [facility]           Attestation signed by Cogan, Jacob, MD at 7/7/2023  5:16 PM:  Physician Attestation  I saw this patient with the resident and agree with the resident s findings and plan of care as documented in the resident s note.      Briefly, 67-year-old woman with a history of metastatic ovarian cancer pending initiation of third line therapy presenting for evaluation of pancytopenia.    On her most recent CBC at the end of June, she had a white count of 2900/mcL with an ANC of 900/mcL, a hemoglobin of 11.7 g/dL with an MCV of 110, and a platelet count of 133,000/mcL.    She is currently on her third line treatment of chemotherapy, with her overall treatment beginning in 2021 at the time of diagnosis.  She is currently receiving paclitaxel, carboplatin and bevacizumab, which began in March of this year.  She has received 5 cycles, requiring deferral's and dose reductions due to cytopenias.  Last treatment appears to be July 3.    Assessment: Pancytopenia with prominent neutropenia in a woman with metastatic ovarian cancer on third line chemotherapy.  Her cytopenias are most likely due to chemotherapy induced myelosuppression and/or tumor infiltration of the bone marrow.  MDS is possible but less likely.  We will obtain a peripheral smear today to evaluate for any evidence of dysplasia, though this in and of itself does not necessarily confirm MDS, as this sort of dysplasia can be seen in solid tumor marrow infiltration.  I will discuss the case with her oncologist, and come up with a plan.  If she will complete her chemotherapy treatment soon, then I would prefer to wait for a few weeks and see what her counts look like off of chemotherapy and growth factors.  However if chemotherapy must continue, then perhaps we can discuss doing a bone  marrow biopsy sooner.  We will set a follow-up appointment at 3 months, but can adjust as needed.  We will set a follow-up appointment at about 3 months, but can adjust as needed based on my discussions with the oncologist.    30 minutes spent by me on the date of the encounter doing chart review, history and exam, documentation and further activities per the note    Jacob Cogan, MD  Hematology

## 2023-07-26 NOTE — PROGRESS NOTES
Virtual Visit Details    Type of service:  Video Visit   Video Start Time: 1131  Video End Time:1144    Originating Location (pt. 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 She is here today for follow up and disease management by video.     Oncology History:  12/3/2020: US Pelvic: IMPRESSION: Limited examination due to acoustic windows. Possible left adnexal mass. A CT scan of the abdomen and pelvis with contrast is recommended for further assessment.     2020: CT A/P:   IMPRESSION:    Peritoneal carcinomatosis with masslike peritoneal thickening in the lower pelvis which may indicate an adnexal or ovarian primary malignancy. Large volume ascites. Bilateral pleural effusions. There is potential subtle pleural nodularity in the right hemithorax which could indicate metastatic disease.  Indeterminate 1 cm lesion in the right hepatic lobe suspicious for a metastatic lesion.      2020: Presented to [facility] with abdominal distention, 25lb weight loss, and CTAP with carcinomatosis, elevated  3098.     2020: CT Chest: IMPRESSION:   1. There are few scattered small sub-6 mm pulmonary nodules which are indeterminate without prior comparisons available. There are a few  slightly larger perifissural nodules which are technically  indeterminate in the setting of malignancy although presumed lymphatic in nature and of unlikely clinical significance. Attention on follow-up is recommended.  2. Small to moderate left and small right pleural effusions are increased in size from prior. No convincing evidence for pleural nodularity.  3. Partially visualized large volume ascites and peritoneal  nodularity in the upper abdomen similar to 12/4/2020 outside CT      12/26/2020: ED for abdominal distension; 3 L ascites drained with paracentesis    Pelvic US: Findings: Free fluid present in LLQ      12/31/2020: US Paracentesis: 900 mL ascites drained     1/7/2021: Diagnotic laparoscopy, biopsies  Pathology: FINAL DIAGNOSIS:   A. PERITONEUM, BIOPSIES:   - Positive for high grade carcinoma, consistent with metastatic carcinoma of Mullerian origin.     1/10-1/13/2021: Hospital admission for postoperative non-intractable vomiting and nausea.      1/10/2021: CT A/P: IMPRESSION: Extensive ascites which is probably malignant. Scattered liver hypodensities of indeterminate etiology comment cannot exclude metastatic disease. Diverticulosis. Fluid-filled adnexal masses and irregular appearance of uterus, which may represent primary neoplasm. Multiple peritoneal nodules. Large amount of fecal material in the colon with no evidence of small bowel obstruction.     Plan: Paclitaxel 175 mg/m2 and carboplatin AUC 6 x 3 cycles followed by a CT CAP and visit with Dr. Juarez.     1/12/2021: Cycle 1 paclitaxel and carboplatin while inpatient     1/13/2021: CT Head: Impression:  1. Chronic sinusitis of the right maxillary and right sphenoid sinuses.  2. Incidental presumed calcified meningioma in the right frontal  convexity without significant mass effect.  3. No suspicious intracranial enhancing lesion.     2/1/2021: Cycle 2 paclitaxel and carboplatin.  936.     2/3-2/5/21: Admission [facility] for afib w/ RVR and new PE     2/26/21: Cycle 3 paclitaxel and carboplatin planned.  Deferred due to thrombocytopenia.  pending.     3/15/21: Cycle 3 paclitaxel and carboplatin given     4/19/21: HYSTERECTOMY, TOTAL, ABDOMINAL, WITH BILATERAL SALPINGO-OOPHORECTOMY, omentectomy, NEOPLASM DEBULKING,Proctoscocy, RO, Resection of liver nodules, diaphragm stripping, immobilization of liver and colon  FINAL DIAGNOSIS:   A. OMENTUM,  BIOPSY:   - Omental adipose tissue with rare viable cells of metastatic high grade   serous carcinoma   - One reactive lymph node, negative for malignancy (0/1)   B. NODULE, SIGMOID, EXCISION:   - Calcified necrotic adipose tissue   - Negative for malignancy   C. NODULE, SMALL BOWEL MESENTERY, EXCISION:   - Fibroadipose tissue, positive for metastatic high grade serous carcinoma   D. UTERUS, CERVIX, BILATERAL FALLOPIAN TUBES AND OVARIES, HYSTERECTOMY   WITH BILATERAL SALPINGO-OOPHORECTOMY:   - Atrophic endometrium   - Uterine serosa with rare viable cells consistent with high grade serous   carcinoma   - Cervix with atrophic changes   - Viable cells consistent with high grade serous carcinoma present in the   right ovary, serosa of right   fallopian tube and right periadnexal soft tissue   - Left ovary with atrophic changes   - Left fallopian tube with a rare focus of serous tubal in-situ carcinoma   (STIC)   E. NODULES, SMALL BOWEL MESENTRY, EXCISION:   - Fibroadipose tissue with rare viable cells of metastatic high grade   serous carcinoma   F. NODULE, SPLENIC FLEXURE TRANSVERSE COLON, EXCISION:   - Fibroadipose tissue with rare viable cells of metastatic high grade   serous carcinoma   - Accessory splenule, negative for malignancy   G. OMENTUM, OMENTECTOMY:   - Omental adipose tissue with rare viable cells of metastatic high grade   serous carcinoma   H. NODULE, PERITONEAL PANCREATIC, EXCISION:   - Fibrous adhesions with inflammation   - Negative for malignancy   I. RIGHT HEMIDIAPHRAGM PERITONEUM, EXCISION:   - Fibrous adhesions with inflammation   - Negative for malignancy   J. RIGHT LIVER SURFACE NODULE:   - Fibrous adhesions with benign inclusion glands   - Negative for malignancy   K. LEFT LOWER LIVER EDGE, BIOPSY:   - Cauterized hepatic parenchyma and capsule   - Negative for malignancy   L. NODULE, SMALL BOWEL MESENTERIC #3, EXCISION:   - Fibroadipose tissue with rare viable cells of metastatic high grade    serous carcinoma       Plan: Carboplatin AUC 6 + Taxol 175 mg/m2 x 3 cycles, then transition to Parp inhibitor for maintenance therapy given her BRCA1 germline mutation.      5/21/21: Cycle 4 carboplatin and paclitaxel.   172.  6/11/21: Cycle 5 carboplatin and paclitaxel.   61.  7/2/21: Cycle 6 carboplatin and paclitaxel.   20.        7/28/21 plan: Olaparib 300mg bid as starting dose,  14  8/20/21: start date olaparib 300 mg BID,  12  9/13/21:  22  10/4/21:  23  11/1/21:  26     11/02/2021: PET CT: IMPRESSION:   Findings compatible with interval surgery and posttreatment change.  No gross definitive FDG avid disease.  Potential foci of tumor deposits along the anterior dome of the liver and midline abdominal wall surgical scar.  Colonic activity is not necessarily abnormal, however, given the previous carcinomatosis the colonic activity is indeterminant.         12/1/21:  23  1/3/22:  21  2/1/22:  20  3/1/22:  21  4/1/22:  23  5/4/22:  28     EXAM: CT CHEST/ABDOMEN/PELVIS W CONTRAST  LOCATION: [facility]  DATE/TIME: 7/11/2022 1:25 PM                                                                   IMPRESSION:  1.  Sliver of ascites in the upper abdomen has decreased since interval debulking surgery.  2.  There is a punctate nodule in the gastrosplenic ligament which is minimally more plump relative to the preop exam. This will need to be followed.  3.  Minimal nodular changes to the left of the midline scar in the subcutaneous fat will have to be followed as well. Unclear if this simply reflects postoperative scarring or could reflect an early incisional recurrence.  4.  No other sites to suggest recurrent tumor. Vaginal cuff is normal.  5.  Extensive distal colonic diverticulosis.  6.  Other noncritical findings as noted above.      9/16/22  98     1/30/23 CT CAP:    IMPRESSION:  1.  Multiple new,  hypoattenuating lesions in the liver, suspicious for hepatic metastatic disease.  2.  Necrotic mario hepatic lymphadenopathy, concerning for richard metastatic disease.  3.  There is a new or increasingly conspicuous 6 mm soft tissue nodule in the right lower quadrant. This is indeterminate.  4.  There is a new 3 mm solid nodule in the right upper lobe, indeterminate.  5.  Stable approximate 4 mm punctate nodule along the gastrosplenic ligament.  6.  Similar area of linear free fluid in the upper abdomen anterior to the stomach.    Plan: Paclitaxel 175 mg/m2, Carboplatin AUC 6, bevacizumab 7.5 mg/kg    3/1/23: Cycle #1 Paclitaxel 175 mg/m2, Carboplatin AUC 6 (C7), bevacizumab 7.5 mg/kg.  1,196  3/24/23: Cycle #2 Paclitaxel 150 mg/m2, Carboplatin AUC 5 (C8), bevacizumab 15 mg/kg.  558    3/29/23: ER for dehydration and hypotension. Normotensive in ER. IV fluids given. Discharged.     4/14/23: Cycle #3 Paclitaxel 150 mg/m2, Carboplatin AUC 5 (C9), bevacizumab 15 mg/kg deferred neutropenia ANC 0.3   273  4/21/23: treatment deferred ANC 0.8  4/28/23: Cycle #3 Paclitaxel 150 mg/m2, Carboplatin AUC 5 (C9), bevacizumab 15 mg/kg, + Neulasta deferred ANC 1.0,  297  5/5/23: Cycle #3 Paclitaxel 150 mg/m2, Carboplatin AUC 5 (C9), bevacizumab 15 mg/kg, + Neulasta deferred ANC 1.0,  401    5/22/23 CT CAP: IMPRESSION:   1. Decreased size of hepatic metastases and mario hepatis lymphadenopathy.   2. Right upper lobe pulmonary nodule has resolved and other nodules bilaterally are unchanged. New small region of groundglass opacity in the right lower lobe may represent a low-grade infectious or inflammatory process.   3. New 4 mm sclerotic focus of the T8 vertebral body is indeterminate.     5/26/23: Cycle #4  Paclitaxel 150 mg/m2, Carboplatin AUC 5 (C10), bevacizumab 15 mg/kg, + Neulasta, deferred ANC 0.6  188. No hematology consult per MD.     6/2/23: Cycle #4 Paclitaxel 150 mg/m2, Carboplatin AUC 5  (C9), bevacizumab 15 mg/kg, + Neulasta   6/23/23: Cycle #5 deferred neutropenia ANC 0.5 thrombocytopenia platelets 85    6/26/2023 reduce both Carboplatin and Paclitaxel due to thrombocytopenia and persistent neutropenia. Refer to Hematology.      7/3/23 plan: continue with 2 more cycles with carboplatin AUC of 4, Taxol at 135 mg per metered square, bevacizumab at 15 mg/kg as well as neulasta for bone marrow support.     7/3/23: Cycle #5 Paclitaxel 135 mg/m2, Carboplatin AUC 4, bevacizumab 15 mg/kg, +Neulasta.  375    7/11/23: per chart review Dr. Cogan with Hematology plan one more cycle of chemotherapy then maintenance Avastin    7/28/23: Cycle #6 Paclitaxel 135 mg/m2, Carboplatin AUC 4, bevacizumab 15 mg/kg, +Neulasta.  370               Continues to have tingling in the toes to mid-foot. Improves with walking. Worse with sitting. This has increased some since 7/3/23. No pain in the feet. No neuropathy in the hands.     Reports no further bone pain with Cycle 5. No fevers or chills. Feeling well. Eating and drinking well. Pt feels like her appetite comes and goes. Poor appetite but she eating. No nausea or vomiting.     She denies any vaginal bleeding, no changes in her bowel or bladder habits, no nausea/emesis, no lower extremity edema, and no difficulties eating or sleeping. She denies any abdominal discomfort/bloating, no fevers or chills, and no chest pain or shortness of breath.    Pt continues on Xarleto for prior PE.   Reports migraines that are controlled with Imitrex.              Review of Systems:    Systemic           no weight changes; no fever; no chills; no night sweats; no appetite changes  Skin           no rashes, or lesions  Eye           no irritation; no changes in vision  Allen-Laryngeal           no dysphagia; no hoarseness   Pulmonary    no cough; no shortness of breath  Cardiovascular    no chest pain; no palpitations  Gastrointestinal    no diarrhea; no constipation; no  abdominal pain; no changes in bowel  habits; no blood in stool  Genitourinary   no urinary frequency; no urinary urgency; no dysuria; no pain; no abnormal vaginal discharge; no abnormal vaginal bleeding  Breast   no breast discharge; no breast changes; no breast pain  Musculoskeletal    + myalgias; no arthralgias; no back pain  Psychiatric           no depressed mood; no anxiety    Hematologic           no tender lymph nodes; no noticeable swellings or lumps   Endocrine    no hot flashes; no heat/cold intolerance         Neurological   no tremor; + numbness and tingling; no headaches; no difficulty sleeping      Past Medical History:    Past Medical History:   Diagnosis Date    Atrial fibrillation with rapid ventricular response (H)     History of cold sores     Hx of LASIK 12/11/2017    Insomnia     Migraine     Osteopenia     Pelvic mass     Peritoneal carcinomatosis (H)     Restless legs syndrome (RLS)          Past Surgical History:    Past Surgical History:   Procedure Laterality Date    APPENDECTOMY      ARTHROSCPY KNEE SURGICAL DEBRIDEMENT SHAVING ARTICULAR CARTILAGE Right     BIOPSY  January 2021    Biopsy to confirm ovarian cancer    DEBRIDEMENT LEFT UPPER EXTREMITY  2016    HYSTERECTOMY TOTAL ABD, LUISITO SALPINGO-OOPHORECTOMY, NODE DISSECTION, TUMOR DEBULKING, COMBINED Bilateral 4/19/2021    Procedure: HYSTERECTOMY, TOTAL, ABDOMINAL, WITH BILATERAL SALPINGO-OOPHORECTOMY, omentectomy, NEOPLASM DEBULKING,Proctoscocy, RO, Resection of liver nodules, diaphragm stripping, immobilization of liver and colon;  Surgeon: Bolivar Juarez MD;  Location: [facility] OR    LAPAROSCOPY DIAGNOSTIC (GYN) Bilateral 1/7/2021    Procedure: Diagnsotic laparoscopy, biopsies;  Surgeon: Bolivar Juarez MD;  Location:  OR    [facility]      TUBAL LIGATION           Health Maintenance Due   Topic Date Due    DEXA  Never done    ADVANCE CARE PLANNING  Never done    COLORECTAL CANCER SCREENING  Never done    HEPATITIS C SCREENING  Never  done    LIPID  Never done    MEDICARE ANNUAL WELLNESS VISIT  11/11/2021    COVID-19 Vaccine (4 - Booster for Pfizer series) 11/15/2021    Pneumococcal Vaccine: 65+ Years (2 - PPSV23 if available, else PCV20) 04/05/2022    PHQ-2 (once per calendar year)  01/01/2023       Current Medications:     Current Outpatient Medications   Medication Sig Dispense Refill    amitriptyline (ELAVIL) 100 MG tablet Take 1 tablet (100 mg) by mouth At Bedtime 90 tablet 0    dexamethasone (DECADRON) 4 MG tablet Take 2 tablets (8 mg) by mouth daily Take for 3 days, starting the day after chemo. Take with food. (Patient not taking: Reported on 6/22/2023) 6 tablet 5    fluticasone (FLONASE) 50 MCG/ACT nasal spray       gabapentin (NEURONTIN) 600 MG tablet Take 1 tablet (600 mg) by mouth At Bedtime (Patient not taking: Reported on 6/22/2023) 90 tablet 0    HYDROmorphone (DILAUDID) 2 MG tablet Take 1 tablet (2 mg) by mouth every 6 hours as needed for pain 10 tablet 0    meclizine (ANTIVERT) 25 MG tablet Take 1 tablet (25 mg) by mouth 3 times daily as needed for dizziness or nausea 15 tablet 0    ondansetron (ZOFRAN) 4 MG tablet Take by mouth every 8 hours as needed for nausea      ondansetron (ZOFRAN) 8 MG tablet Take 1 tablet (8 mg) by mouth every 8 hours as needed for nausea (vomiting) 30 tablet 2    oxyCODONE (ROXICODONE) 5 MG tablet Take 1 tablet (5 mg) by mouth every 12 hours 10 tablet 0    prochlorperazine (COMPAZINE) 10 MG tablet Take 1 tablet (10 mg) by mouth every 6 hours as needed for nausea or vomiting 30 tablet 2    rivaroxaban ANTICOAGULANT (XARELTO ANTICOAGULANT) 20 MG TABS tablet Take 1 tablet (20 mg) by mouth every morning 90 tablet 1    SUMAtriptan (IMITREX) 100 MG tablet Take 1 tablet (100 mg) by mouth at onset of headache for migraine 15 tablet 0    valACYclovir (VALTREX) 1000 mg tablet Take 1,000 mg by mouth as needed      zolpidem (AMBIEN) 10 MG tablet Take 10 mg by mouth           Allergies:      No Known Allergies      Social History:     Social History     Tobacco Use    Smoking status: Former     Packs/day: 0.50     Years: 40.00     Pack years: 20.00     Types: Cigarettes     Quit date:      Years since quittin.5    Smokeless tobacco: Never   Substance Use Topics    Alcohol use: Not Currently       History   Drug Use Unknown         Family History:     The patient's family history is notable for     Family History   Adopted: Yes   Problem Relation Age of Onset    Cancer Mother 36    Other Cancer Mother         Bio mother  of  a female cancer  at 36    Factor V Leiden deficiency Daughter     Deep Vein Thrombosis Daughter     Diabetes Type 1 Daughter     Diabetes Daughter     Hypertension Daughter     Anesthesia Reaction No family hx of          Physical Exam:     Ht 1.829 m (6')   Wt 77.1 kg (170 lb)   BMI 23.06 kg/m    Body mass index is 23.06 kg/m .    General Appearance:  healthy and alert, no distress     Eyes:  Eyes grossly normal to inspection.  No discharge or erythema, or obvious scleral/conjunctival abnormalities.     Respiratory:     No audible wheeze, cough, or visible cyanosis.  No visible retractions or increased work of breathing.      Musculoskeletal:         extremities non tender and without edema     Skin:    no lesions or rashes on visible skin     Neurological:   normal gait, no gross defects                Psychiatric:      appropriate mood and affect. 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 She is here today for a disease management visit by video.     30 minutes spent on the date of the encounter doing chart review, history and exam, documentation, and further activities as noted above.         Plan:     1.)   Recurrent metastatic ovarian high grade  serous carcinoma. Ok to proceed with treatment with ANC 1 with Neulasta and decreased chemo dosing per MD. Encouraged 5-6 small high protein meals a day and discussed nausea management and control. Encouraged 64 oz of fluids per day. Discussed neutropenic precautions and rita period. Reviewed signs and symptoms for when she should contact the clinic or seek additional care. Patient to contact the clinic with any questions or concerns in the interim.  Pt has labs at [facility] 2-3 days prior to infusion.    - Pt adamant that she needs more chemo and not avastin only. She needs her next cycle with Larry dutta. Her 8/18 would need pushing to the following week that he is back to review a treatment plan with her.Message sent to ISRRAEL Trinh.       Disease/Treatment related SE:     - Neutropenia and thrombocytopenia: pt has had chemotherapy reduced (carbo auc 4 and taxol 135 mg/m2 + neulasta). Pt has had consultation with hematology. Plan for this to be the last chemotherapy and for pt to start Avastin only however pt would like more chemo. Message sent to BETH Meyers to attempt to schedule pt with Dr. Juarez to review treatment plan at next cycle. Plan follow up with Hematology early October.   - Bone pain:  Reviewed ok to take Claritin prior to and a few days after treatment to prevent/treat myalgias. Encouraged APAP/Ibuprofen prn. Pt has Dilaudid at home to use prn.   - Neuropathy: tingling and numbness without pain. Reviewed supportive measures, massage and acupuncture. Reviewed reasoning behind not prescribing Cymbalta or Gabapentin as pt does not have neuropathy pain.      2.) Genetic risk factors were assessed and she is POSITIVE for a BRCA1 mutation.  This mutation is called [id number]. Referral for Waleska Zamorano with Cancer Risk management placed 5/2022.     3.) Labs and/or tests ordered include: CBC, protein urine, CMP, Mag,  reviewed today from Care Everywhere with patient     4.) Health  maintenance issues addressed today include annual health maintenance and non-gynecologic issues with PCP.    5)        PE: pt continues on JERICHO Hampton, SNEHAL-BC  Women's Health Nurse Practitioner  Division of Gynecologic Oncology  [facility]        CC  Patient Care Team:  Bolivar Juarez MD as PCP - General (Gynecologic Oncology)  Marta Okeefe APRN CNP as Assigned PCP  Marta Lao APRN CNP as Assigned Cancer Care Provider  Cogan, Jacob, MD as Physician (Hematology & Oncology)  SELF, REFERRED

## 2023-07-27 NOTE — LETTER
2023         RE: Taran Regalado  [address]  [place] [address]        Dear Colleague,    Thank you for referring your patient, Taran Regalado, to the [facility]. Please see a copy of my visit note below.    Virtual Visit Details    Type of service:  Video Visit   Video Start Time: 1131  Video End Time:1144    Originating Location (pt. Location): home  {PROVIDER LOCATION On-site should be selected for visits conducted from your clinic location or adjoining [facility] hospital, academic office, or other nearby [facility] building. 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 She is here today for follow up and disease management by video.     Oncology History:  12/3/2020: US Pelvic: IMPRESSION: Limited examination due to acoustic windows. Possible left adnexal mass. A CT scan of the abdomen and pelvis with contrast is recommended for further assessment.     2020: CT A/P:   IMPRESSION:    Peritoneal carcinomatosis with masslike peritoneal thickening in the lower pelvis which may indicate an adnexal or ovarian primary malignancy. Large volume ascites. Bilateral pleural effusions. There is potential subtle pleural nodularity in the right hemithorax which could indicate metastatic disease.  Indeterminate 1 cm lesion in the right hepatic lobe suspicious for a metastatic lesion.      2020: Presented to [facility] with abdominal distention, 25lb weight loss, and CTAP with carcinomatosis, elevated  3098.     2020: CT Chest: IMPRESSION:   1. There are few scattered small sub-6 mm pulmonary nodules  which are indeterminate without prior comparisons available. There are a few  slightly larger perifissural nodules which are technically  indeterminate in the setting of malignancy although presumed lymphatic in nature and of unlikely clinical significance. Attention on follow-up is recommended.  2. Small to moderate left and small right pleural effusions are increased in size from prior. No convincing evidence for pleural nodularity.  3. Partially visualized large volume ascites and peritoneal nodularity in the upper abdomen similar to 12/4/2020 outside CT      12/26/2020: ED for abdominal distension; 3 L ascites drained with paracentesis    Pelvic US: Findings: Free fluid present in LLQ      12/31/2020: US Paracentesis: 900 mL ascites drained     1/7/2021: Diagnotic laparoscopy, biopsies  Pathology: FINAL DIAGNOSIS:   A. PERITONEUM, BIOPSIES:   - Positive for high grade carcinoma, consistent with metastatic carcinoma of Mullerian origin.     1/10-1/13/2021: Hospital admission for postoperative non-intractable vomiting and nausea.      1/10/2021: CT A/P: IMPRESSION: Extensive ascites which is probably malignant. Scattered liver hypodensities of indeterminate etiology comment cannot exclude metastatic disease. Diverticulosis. Fluid-filled adnexal masses and irregular appearance of uterus, which may represent primary neoplasm. Multiple peritoneal nodules. Large amount of fecal material in the colon with no evidence of small bowel obstruction.     Plan: Paclitaxel 175 mg/m2 and carboplatin AUC 6 x 3 cycles followed by a CT CAP and visit with Dr. Juarez.     1/12/2021: Cycle 1 paclitaxel and carboplatin while inpatient     1/13/2021: CT Head: Impression:  1. Chronic sinusitis of the right maxillary and right sphenoid sinuses.  2. Incidental presumed calcified meningioma in the right frontal  convexity without significant mass effect.  3. No suspicious intracranial enhancing lesion.     2/1/2021: Cycle 2 paclitaxel  and carboplatin.  936.     2/3-2/5/21: Admission [facility] for afib w/ RVR and new PE     2/26/21: Cycle 3 paclitaxel and carboplatin planned.  Deferred due to thrombocytopenia.  pending.     3/15/21: Cycle 3 paclitaxel and carboplatin given     4/19/21: HYSTERECTOMY, TOTAL, ABDOMINAL, WITH BILATERAL SALPINGO-OOPHORECTOMY, omentectomy, NEOPLASM DEBULKING,Proctoscocy, RO, Resection of liver nodules, diaphragm stripping, immobilization of liver and colon  FINAL DIAGNOSIS:   A. OMENTUM, BIOPSY:   - Omental adipose tissue with rare viable cells of metastatic high grade   serous carcinoma   - One reactive lymph node, negative for malignancy (0/1)   B. NODULE, SIGMOID, EXCISION:   - Calcified necrotic adipose tissue   - Negative for malignancy   C. NODULE, SMALL BOWEL MESENTERY, EXCISION:   - Fibroadipose tissue, positive for metastatic high grade serous carcinoma   D. UTERUS, CERVIX, BILATERAL FALLOPIAN TUBES AND OVARIES, HYSTERECTOMY   WITH BILATERAL SALPINGO-OOPHORECTOMY:   - Atrophic endometrium   - Uterine serosa with rare viable cells consistent with high grade serous   carcinoma   - Cervix with atrophic changes   - Viable cells consistent with high grade serous carcinoma present in the   right ovary, serosa of right   fallopian tube and right periadnexal soft tissue   - Left ovary with atrophic changes   - Left fallopian tube with a rare focus of serous tubal in-situ carcinoma   (STIC)   E. NODULES, SMALL BOWEL MESENTRY, EXCISION:   - Fibroadipose tissue with rare viable cells of metastatic high grade   serous carcinoma   F. NODULE, SPLENIC FLEXURE TRANSVERSE COLON, EXCISION:   - Fibroadipose tissue with rare viable cells of metastatic high grade   serous carcinoma   - Accessory splenule, negative for malignancy   G. OMENTUM, OMENTECTOMY:   - Omental adipose tissue with rare viable cells of metastatic high grade   serous carcinoma   H. NODULE, PERITONEAL PANCREATIC, EXCISION:   - Fibrous adhesions with  inflammation   - Negative for malignancy   I. RIGHT HEMIDIAPHRAGM PERITONEUM, EXCISION:   - Fibrous adhesions with inflammation   - Negative for malignancy   J. RIGHT LIVER SURFACE NODULE:   - Fibrous adhesions with benign inclusion glands   - Negative for malignancy   K. LEFT LOWER LIVER EDGE, BIOPSY:   - Cauterized hepatic parenchyma and capsule   - Negative for malignancy   L. NODULE, SMALL BOWEL MESENTERIC #3, EXCISION:   - Fibroadipose tissue with rare viable cells of metastatic high grade   serous carcinoma       Plan: Carboplatin AUC 6 + Taxol 175 mg/m2 x 3 cycles, then transition to Parp inhibitor for maintenance therapy given her BRCA1 germline mutation.      5/21/21: Cycle 4 carboplatin and paclitaxel.   172.  6/11/21: Cycle 5 carboplatin and paclitaxel.   61.  7/2/21: Cycle 6 carboplatin and paclitaxel.   20.        7/28/21 plan: Olaparib 300mg bid as starting dose,  14  8/20/21: start date olaparib 300 mg BID,  12  9/13/21:  22  10/4/21:  23  11/1/21:  26     11/02/2021: PET CT: IMPRESSION:   Findings compatible with interval surgery and posttreatment change.  No gross definitive FDG avid disease.  Potential foci of tumor deposits along the anterior dome of the liver and midline abdominal wall surgical scar.  Colonic activity is not necessarily abnormal, however, given the previous carcinomatosis the colonic activity is indeterminant.         12/1/21:  23  1/3/22:  21  2/1/22:  20  3/1/22:  21  4/1/22:  23  5/4/22:  28     EXAM: CT CHEST/ABDOMEN/PELVIS W CONTRAST  LOCATION: [facility]  DATE/TIME: 7/11/2022 1:25 PM                                                                   IMPRESSION:  1.  Sliver of ascites in the upper abdomen has decreased since interval debulking surgery.  2.  There is a punctate nodule in the gastrosplenic ligament which is minimally more plump relative to the preop  exam. This will need to be followed.  3.  Minimal nodular changes to the left of the midline scar in the subcutaneous fat will have to be followed as well. Unclear if this simply reflects postoperative scarring or could reflect an early incisional recurrence.  4.  No other sites to suggest recurrent tumor. Vaginal cuff is normal.  5.  Extensive distal colonic diverticulosis.  6.  Other noncritical findings as noted above.      9/16/22  98     1/30/23 CT CAP:    IMPRESSION:  1.  Multiple new, hypoattenuating lesions in the liver, suspicious for hepatic metastatic disease.  2.  Necrotic mario hepatic lymphadenopathy, concerning for richard metastatic disease.  3.  There is a new or increasingly conspicuous 6 mm soft tissue nodule in the right lower quadrant. This is indeterminate.  4.  There is a new 3 mm solid nodule in the right upper lobe, indeterminate.  5.  Stable approximate 4 mm punctate nodule along the gastrosplenic ligament.  6.  Similar area of linear free fluid in the upper abdomen anterior to the stomach.    Plan: Paclitaxel 175 mg/m2, Carboplatin AUC 6, bevacizumab 7.5 mg/kg    3/1/23: Cycle #1 Paclitaxel 175 mg/m2, Carboplatin AUC 6 (C7), bevacizumab 7.5 mg/kg.  1,196  3/24/23: Cycle #2 Paclitaxel 150 mg/m2, Carboplatin AUC 5 (C8), bevacizumab 15 mg/kg.  558    3/29/23: ER for dehydration and hypotension. Normotensive in ER. IV fluids given. Discharged.     4/14/23: Cycle #3 Paclitaxel 150 mg/m2, Carboplatin AUC 5 (C9), bevacizumab 15 mg/kg deferred neutropenia ANC 0.3   273  4/21/23: treatment deferred ANC 0.8  4/28/23: Cycle #3 Paclitaxel 150 mg/m2, Carboplatin AUC 5 (C9), bevacizumab 15 mg/kg, + Neulasta deferred ANC 1.0,  297  5/5/23: Cycle #3 Paclitaxel 150 mg/m2, Carboplatin AUC 5 (C9), bevacizumab 15 mg/kg, + Neulasta deferred ANC 1.0,  401    5/22/23 CT CAP: IMPRESSION:   1. Decreased size of hepatic metastases and mario hepatis lymphadenopathy.   2. Right upper  lobe pulmonary nodule has resolved and other nodules bilaterally are unchanged. New small region of groundglass opacity in the right lower lobe may represent a low-grade infectious or inflammatory process.   3. New 4 mm sclerotic focus of the T8 vertebral body is indeterminate.     5/26/23: Cycle #4  Paclitaxel 150 mg/m2, Carboplatin AUC 5 (C10), bevacizumab 15 mg/kg, + Neulasta, deferred ANC 0.6  188. No hematology consult per MD.     6/2/23: Cycle #4 Paclitaxel 150 mg/m2, Carboplatin AUC 5 (C9), bevacizumab 15 mg/kg, + Neulasta   6/23/23: Cycle #5 deferred neutropenia ANC 0.5 thrombocytopenia platelets 85    6/26/2023 reduce both Carboplatin and Paclitaxel due to thrombocytopenia and persistent neutropenia. Refer to Hematology.      7/3/23 plan: continue with 2 more cycles with carboplatin AUC of 4, Taxol at 135 mg per metered square, bevacizumab at 15 mg/kg as well as neulasta for bone marrow support.     7/3/23: Cycle #5 Paclitaxel 135 mg/m2, Carboplatin AUC 4, bevacizumab 15 mg/kg, +Neulasta.  375    7/11/23: per chart review Dr. Cogan with Hematology plan one more cycle of chemotherapy then maintenance Avastin    7/28/23: Cycle #6 Paclitaxel 135 mg/m2, Carboplatin AUC 4, bevacizumab 15 mg/kg, +Neulasta.  370               Continues to have tingling in the toes to mid-foot. Improves with walking. Worse with sitting. This has increased some since 7/3/23. No pain in the feet. No neuropathy in the hands.     Reports no further bone pain with Cycle 5. No fevers or chills. Feeling well. Eating and drinking well. Pt feels like her appetite comes and goes. Poor appetite but she eating. No nausea or vomiting.     She denies any vaginal bleeding, no changes in her bowel or bladder habits, no nausea/emesis, no lower extremity edema, and no difficulties eating or sleeping. She denies any abdominal discomfort/bloating, no fevers or chills, and no chest pain or shortness of breath.    Pt continues on  Linda for prior PE.   Reports migraines that are controlled with Imitrex.              Review of Systems:    Systemic           no weight changes; no fever; no chills; no night sweats; no appetite changes  Skin           no rashes, or lesions  Eye           no irritation; no changes in vision  Allen-Laryngeal           no dysphagia; no hoarseness   Pulmonary    no cough; no shortness of breath  Cardiovascular    no chest pain; no palpitations  Gastrointestinal    no diarrhea; no constipation; no abdominal pain; no changes in bowel  habits; no blood in stool  Genitourinary   no urinary frequency; no urinary urgency; no dysuria; no pain; no abnormal vaginal discharge; no abnormal vaginal bleeding  Breast   no breast discharge; no breast changes; no breast pain  Musculoskeletal    + myalgias; no arthralgias; no back pain  Psychiatric           no depressed mood; no anxiety    Hematologic           no tender lymph nodes; no noticeable swellings or lumps   Endocrine    no hot flashes; no heat/cold intolerance         Neurological   no tremor; + numbness and tingling; no headaches; no difficulty sleeping      Past Medical History:    Past Medical History:   Diagnosis Date     Atrial fibrillation with rapid ventricular response (H)      History of cold sores       of LASIK 12/11/2017     Insomnia      Migraine      Osteopenia      Pelvic mass      Peritoneal carcinomatosis (H)      Restless legs syndrome (RLS)          Past Surgical History:    Past Surgical History:   Procedure Laterality Date     APPENDECTOMY       ARTHROSCPY KNEE SURGICAL DEBRIDEMENT SHAVING ARTICULAR CARTILAGE Right      BIOPSY  January 2021    Biopsy to confirm ovarian cancer     DEBRIDEMENT LEFT UPPER EXTREMITY  2016     HYSTERECTOMY TOTAL ABD, LUISITO SALPINGO-OOPHORECTOMY, NODE DISSECTION, TUMOR DEBULKING, COMBINED Bilateral 4/19/2021    Procedure: HYSTERECTOMY, TOTAL, ABDOMINAL, WITH BILATERAL SALPINGO-OOPHORECTOMY, omentectomy, NEOPLASM  DEBULKING,Proctoscocy, RO, Resection of liver nodules, diaphragm stripping, immobilization of liver and colon;  Surgeon: Bolivar Juarez MD;  Location: [facility] OR     LAPAROSCOPY DIAGNOSTIC (GYN) Bilateral 1/7/2021    Procedure: Diagnsotic laparoscopy, biopsies;  Surgeon: Bolivar Juarez MD;  Location: [facility] OR     LASIK       TUBAL LIGATION           Health Maintenance Due   Topic Date Due     DEXA  Never done     ADVANCE CARE PLANNING  Never done     COLORECTAL CANCER SCREENING  Never done     HEPATITIS C SCREENING  Never done     LIPID  Never done     MEDICARE ANNUAL WELLNESS VISIT  11/11/2021     COVID-19 Vaccine (4 - Booster for Pfizer series) 11/15/2021     Pneumococcal Vaccine: 65+ Years (2 - PPSV23 if available, else PCV20) 04/05/2022     PHQ-2 (once per calendar year)  01/01/2023       Current Medications:     Current Outpatient Medications   Medication Sig Dispense Refill     amitriptyline (ELAVIL) 100 MG tablet Take 1 tablet (100 mg) by mouth At Bedtime 90 tablet 0     dexamethasone (DECADRON) 4 MG tablet Take 2 tablets (8 mg) by mouth daily Take for 3 days, starting the day after chemo. Take with food. (Patient not taking: Reported on 6/22/2023) 6 tablet 5     fluticasone (FLONASE) 50 MCG/ACT nasal spray        gabapentin (NEURONTIN) 600 MG tablet Take 1 tablet (600 mg) by mouth At Bedtime (Patient not taking: Reported on 6/22/2023) 90 tablet 0     HYDROmorphone (DILAUDID) 2 MG tablet Take 1 tablet (2 mg) by mouth every 6 hours as needed for pain 10 tablet 0     meclizine (ANTIVERT) 25 MG tablet Take 1 tablet (25 mg) by mouth 3 times daily as needed for dizziness or nausea 15 tablet 0     ondansetron (ZOFRAN) 4 MG tablet Take by mouth every 8 hours as needed for nausea       ondansetron (ZOFRAN) 8 MG tablet Take 1 tablet (8 mg) by mouth every 8 hours as needed for nausea (vomiting) 30 tablet 2     oxyCODONE (ROXICODONE) 5 MG tablet Take 1 tablet (5 mg) by mouth every 12 hours 10 tablet 0      prochlorperazine (COMPAZINE) 10 MG tablet Take 1 tablet (10 mg) by mouth every 6 hours as needed for nausea or vomiting 30 tablet 2     rivaroxaban ANTICOAGULANT (XARELTO ANTICOAGULANT) 20 MG TABS tablet Take 1 tablet (20 mg) by mouth every morning 90 tablet 1     SUMAtriptan (IMITREX) 100 MG tablet Take 1 tablet (100 mg) by mouth at onset of headache for migraine 15 tablet 0     valACYclovir (VALTREX) 1000 mg tablet Take 1,000 mg by mouth as needed       zolpidem (AMBIEN) 10 MG tablet Take 10 mg by mouth           Allergies:      No Known Allergies     Social History:     Social History     Tobacco Use     Smoking status: Former     Packs/day: 0.50     Years: 40.00     Pack years: 20.00     Types: Cigarettes     Quit date:      Years since quittin.5     Smokeless tobacco: Never   Substance Use Topics     Alcohol use: Not Currently       History   Drug Use Unknown         Family History:     The patient's family history is notable for     Family History   Adopted: Yes   Problem Relation Age of Onset     Cancer Mother 36     Other Cancer Mother         Bio mother  of  a female cancer  at 36     Factor V Leiden deficiency Daughter      Deep Vein Thrombosis Daughter      Diabetes Type 1 Daughter      Diabetes Daughter      Hypertension Daughter      Anesthesia Reaction No family hx of          Physical Exam:     Ht 1.829 m (6')   Wt 77.1 kg (170 lb)   BMI 23.06 kg/m    Body mass index is 23.06 kg/m .    General Appearance:  healthy and alert, no distress     Eyes:  Eyes grossly normal to inspection.  No discharge or erythema, or obvious scleral/conjunctival abnormalities.     Respiratory:     No audible wheeze, cough, or visible cyanosis.  No visible retractions or increased work of breathing.      Musculoskeletal:         extremities non tender and without edema     Skin:    no lesions or rashes on visible skin     Neurological:   normal gait, no gross defects                Psychiatric:      appropriate  mood and affect. 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 She is here today for a disease management visit by video.     30 minutes spent on the date of the encounter doing chart review, history and exam, documentation, and further activities as noted above.         Plan:     1.)   Recurrent metastatic ovarian high grade serous carcinoma. Ok to proceed with treatment with ANC 1 with Neulasta and decreased chemo dosing per MD. Encouraged 5-6 small high protein meals a day and discussed nausea management and control. Encouraged 64 oz of fluids per day. Discussed neutropenic precautions and rita period. Reviewed signs and symptoms for when she should contact the clinic or seek additional care. Patient to contact the clinic with any questions or concerns in the interim.  Pt has labs at [facility] 2-3 days prior to infusion.    - Pt adamant that she needs more chemo and not avastin only. She needs her next cycle with Larry dutta. Her 8/18 would need pushing to the following week that he is back to review a treatment plan with her.Message sent to ISRRAEL Trinh.       Disease/Treatment related SE:     - Neutropenia and thrombocytopenia: pt has had chemotherapy reduced (carbo auc 4 and taxol 135 mg/m2 + neulasta). Pt has had consultation with hematology. Plan for this to be the last chemotherapy and for pt to start Avastin only however pt would like more chemo. Message sent to BETH Meyers to attempt to schedule pt with Dr. Juarez to review treatment plan at next cycle. Plan follow up with Hematology early October.   - Bone pain:  Reviewed ok to take Claritin prior to and a few days after treatment to prevent/treat myalgias. Encouraged APAP/Ibuprofen prn. Pt has Dilaudid at  home to use prn.   - Neuropathy: tingling and numbness without pain. Reviewed supportive measures, massage and acupuncture. Reviewed reasoning behind not prescribing Cymbalta or Gabapentin as pt does not have neuropathy pain.      2.) Genetic risk factors were assessed and she is POSITIVE for a BRCA1 mutation.  This mutation is called [id number]. Referral for Waleska Zamorano with Cancer Risk management placed 5/2022.     3.) Labs and/or tests ordered include: CBC, protein urine, CMP, Mag,  reviewed today from Care Everywhere with patient     4.) Health maintenance issues addressed today include annual health maintenance and non-gynecologic issues with PCP.    5)        PE: pt continues on Xarelto              JERICHO Chin WHNP-BC  Women's Health Nurse Practitioner  Division of Gynecologic Oncology  [facility]        CC  Patient Care Team:  Bolivar Juarez MD as PCP - General (Gynecologic Oncology)  Marta Okeefe APRN CNP as Assigned PCP  Marta Lao APRN CNP as Assigned Cancer Care Provider  Cogan, Jacob, MD as Physician (Hematology & Oncology)  SELF, REFERRED      Again, thank you for allowing me to participate in the care of your patient.        Sincerely,        JERICHO Kitchen CNP

## 2023-07-27 NOTE — NURSING NOTE
Is the patient currently in the state of [place]? YES    Visit mode:VIDEO    If the visit is dropped, the patient can be reconnected by: VIDEO VISIT: Text to cell phone: [phone]    Will anyone else be joining the visit? NO      How would you like to obtain your AVS? MyChart    Are changes needed to the allergy or medication list? NO    Reason for visit: KONG Crane VF

## 2023-07-28 NOTE — PROGRESS NOTES
Infusion Nursing Note:  Taran Regalado presents today for C6D1 Carbo/Taxol/Avastin.    Patient seen by provider today: No. Marta CORTEZ CNP on 7/27   present during visit today: Not Applicable.    Note: Pt denies any new medical concerns. The pt reports tolerating their treatments well. Patient requests IV benadryl vs PO.      Intravenous Access:  Peripheral IV placed.    Treatment Conditions:  ANC 1.0    Blood pressure 125/81  Urine <6.8.    Results reviewed, labs did NOT meet treatment parameters: ANC of 1.0, per Matra CORTEZ CNP's note from 7/27, okay to proceed with treatment. Onpro planned for today.       Post Infusion Assessment:  Patient tolerated infusion without incident.  Blood return noted pre and post infusion.  Site patent and intact, free from redness, edema or discomfort.  No evidence of extravasations.  Access discontinued per protocol.     ONPRO  Was placed on patient's: back of right arm.    Was placed at 2:00 PM    Podpal used: Yes    ONPRO injector device Lot number: [id number]    Patient education included: what patient can expect after application, what colored lights mean on the device, when to remove device, when and where to call with questions or issues, all patients questions answered, and that Neulasta administration will occur at 5:00 PM on 7/29.    Patient tolerated administration well.      Discharge Plan:   Patient and/or family verbalized understanding of discharge instructions and all questions answered.  AVS to patient via [employer].  Patient will return 8/18 for next appointment. Future appts have been reviewed and crosschecked with appt note and plan.  Patient discharged in stable condition accompanied by: self, daughter drove.  Departure Mode: Ambulatory.      Cherelle Raygoza RN

## 2023-08-21 NOTE — PROGRESS NOTES
Virtual Visit Details    Type of service:  Video Visit   40 minutes    Originating Location (pt. Location): Home    Distant Location (provider location):  On-site  Platform used for Video Visit: Wanda                Follow Up Notes on Referred Patient    Date: 2023       Dr. Ye Vaughn MD  No address on file       RE: Taran Regalado  : 1955  GRACY: 2023    Taran Regalado is a 67 year old woman with a diagnosis of metastatic ovarian high grade serous carcinoma. She is here today for follow up and disease management. She has been tolerating chemotherapy well overall.  However she has had some issues with neutropenia requiring additional Neulasta at this reduction in place.  We will continue manage accordingly, however if persistent consider hematology referral. She is eating an drinking well, no nausea vomiting, fever or chills, normal urinary and bowel function.        Oncology History:  12/3/2020: US Pelvic: IMPRESSION: Limited examination due to acoustic windows. Possible left adnexal mass. A CT scan of the abdomen and pelvis with contrast is recommended for further assessment.     2020: CT A/P:   IMPRESSION:    Peritoneal carcinomatosis with masslike peritoneal thickening in the lower pelvis which may indicate an adnexal or ovarian primary malignancy. Large volume ascites. Bilateral pleural effusions. There is potential subtle pleural nodularity in the right hemithorax which could indicate metastatic disease.  Indeterminate 1 cm lesion in the right hepatic lobe suspicious for a metastatic lesion.      2020: Presented to [facility] with abdominal distention, 25lb weight loss, and CTAP with carcinomatosis, elevated  3098.     2020: CT Chest: IMPRESSION:   1. There are few scattered small sub-6 mm pulmonary nodules which are indeterminate without prior comparisons available. There are a few  slightly larger perifissural nodules which are technically  indeterminate in the  setting of malignancy although presumed lymphatic in nature and of unlikely clinical significance. Attention on follow-up is recommended.  2. Small to moderate left and small right pleural effusions are increased in size from prior. No convincing evidence for pleural nodularity.  3. Partially visualized large volume ascites and peritoneal nodularity in the upper abdomen similar to 12/4/2020 outside CT      12/26/2020: ED for abdominal distension; 3 L ascites drained with paracentesis    Pelvic US: Findings: Free fluid present in LLQ      12/31/2020: US Paracentesis: 900 mL ascites drained     1/7/2021: Diagnotic laparoscopy, biopsies  Pathology: FINAL DIAGNOSIS:   A. PERITONEUM, BIOPSIES:   - Positive for high grade carcinoma, consistent with metastatic carcinoma of Mullerian origin.     1/10-1/13/2021: Hospital admission for postoperative non-intractable vomiting and nausea.      1/10/2021: CT A/P: IMPRESSION: Extensive ascites which is probably malignant. Scattered liver hypodensities of indeterminate etiology comment cannot exclude metastatic disease. Diverticulosis. Fluid-filled adnexal masses and irregular appearance of uterus, which may represent primary neoplasm. Multiple peritoneal nodules. Large amount of fecal material in the colon with no evidence of small bowel obstruction.     Plan: Paclitaxel 175 mg/m2 and carboplatin AUC 6 x 3 cycles followed by a CT CAP and visit with Dr. Juarez.     1/12/2021: Cycle 1 paclitaxel and carboplatin while inpatient     1/13/2021: CT Head: Impression:  1. Chronic sinusitis of the right maxillary and right sphenoid sinuses.  2. Incidental presumed calcified meningioma in the right frontal  convexity without significant mass effect.  3. No suspicious intracranial enhancing lesion.     2/1/2021: Cycle 2 paclitaxel and carboplatin.  936.     2/3-2/5/21: Admission [facility] for afib w/ RVR and new PE     2/26/21: Cycle 3 paclitaxel and carboplatin planned.  Deferred due  to thrombocytopenia.  pending.     3/15/21: Cycle 3 paclitaxel and carboplatin given     4/19/21: HYSTERECTOMY, TOTAL, ABDOMINAL, WITH BILATERAL SALPINGO-OOPHORECTOMY, omentectomy, NEOPLASM DEBULKING,Proctoscocy, RO, Resection of liver nodules, diaphragm stripping, immobilization of liver and colon  FINAL DIAGNOSIS:   A. OMENTUM, BIOPSY:   - Omental adipose tissue with rare viable cells of metastatic high grade   serous carcinoma   - One reactive lymph node, negative for malignancy (0/1)   B. NODULE, SIGMOID, EXCISION:   - Calcified necrotic adipose tissue   - Negative for malignancy   C. NODULE, SMALL BOWEL MESENTERY, EXCISION:   - Fibroadipose tissue, positive for metastatic high grade serous carcinoma   D. UTERUS, CERVIX, BILATERAL FALLOPIAN TUBES AND OVARIES, HYSTERECTOMY   WITH BILATERAL SALPINGO-OOPHORECTOMY:   - Atrophic endometrium   - Uterine serosa with rare viable cells consistent with high grade serous   carcinoma   - Cervix with atrophic changes   - Viable cells consistent with high grade serous carcinoma present in the   right ovary, serosa of right   fallopian tube and right periadnexal soft tissue   - Left ovary with atrophic changes   - Left fallopian tube with a rare focus of serous tubal in-situ carcinoma   (STIC)   E. NODULES, SMALL BOWEL MESENTRY, EXCISION:   - Fibroadipose tissue with rare viable cells of metastatic high grade   serous carcinoma   F. NODULE, SPLENIC FLEXURE TRANSVERSE COLON, EXCISION:   - Fibroadipose tissue with rare viable cells of metastatic high grade   serous carcinoma   - Accessory splenule, negative for malignancy   G. OMENTUM, OMENTECTOMY:   - Omental adipose tissue with rare viable cells of metastatic high grade   serous carcinoma   H. NODULE, PERITONEAL PANCREATIC, EXCISION:   - Fibrous adhesions with inflammation   - Negative for malignancy   I. RIGHT HEMIDIAPHRAGM PERITONEUM, EXCISION:   - Fibrous adhesions with inflammation   - Negative for malignancy   J.  RIGHT LIVER SURFACE NODULE:   - Fibrous adhesions with benign inclusion glands   - Negative for malignancy   K. LEFT LOWER LIVER EDGE, BIOPSY:   - Cauterized hepatic parenchyma and capsule   - Negative for malignancy   L. NODULE, SMALL BOWEL MESENTERIC #3, EXCISION:   - Fibroadipose tissue with rare viable cells of metastatic high grade   serous carcinoma       Plan: Carboplatin AUC 6 + Taxol 175 mg/m2 x 3 cycles, then transition to Parp inhibitor for maintenance therapy given her BRCA1 germline mutation.      5/21/21: Cycle 4 carboplatin and paclitaxel.   172.  6/11/21: Cycle 5 carboplatin and paclitaxel.   61.  7/2/21: Cycle 6 carboplatin and paclitaxel.   20.        7/28/21 plan: Olaparib 300mg bid as starting dose,  14  8/20/21: start date olaparib 300 mg BID,  12  9/13/21:  22  10/4/21:  23  11/1/21:  26     11/02/2021: PET CT: IMPRESSION:   Findings compatible with interval surgery and posttreatment change.  No gross definitive FDG avid disease.  Potential foci of tumor deposits along the anterior dome of the liver and midline abdominal wall surgical scar.  Colonic activity is not necessarily abnormal, however, given the previous carcinomatosis the colonic activity is indeterminant.         12/1/21:  23  1/3/22:  21  2/1/22:  20  3/1/22:  21  4/1/22:  23  5/4/22:  28     EXAM: CT CHEST/ABDOMEN/PELVIS W CONTRAST  LOCATION: [facility]  DATE/TIME: 7/11/2022 1:25 PM     INDICATION: Stage III B high-grade ovarian carcinoma diagnosed Jan 2021. Posttreatment surveillance.  COMPARISON: CTA AP 04/23/2021, CT CAP 04/02/2021  TECHNIQUE: CT scan of the chest, abdomen, and pelvis was performed following injection of IV contrast. Multiplanar reformats were obtained. Dose reduction techniques were used.   CONTRAST: isovue 370 105mL IV; 50mL omni 140 oral     FINDINGS:   LUNGS AND PLEURA: No suspicious pulmonary  nodules. Minimal right apical pleural thickening and a few punctate tiny nodules 2 of which are subpleural on the right are stable. No pleural effusions.     MEDIASTINUM/AXILLAE: No adenopathy. No central pulmonary emboli.     CORONARY ARTERY CALCIFICATION: Cannot evaluate.     HEPATOBILIARY: Normal.     PANCREAS: Normal.     SPLEEN: Normal.     ADRENAL GLANDS: Normal.     KIDNEYS/BLADDER: Normal.     BOWEL: Sliver of ascites adjacent to the spleen noted, decreased from prior study. There is a 4 mm nodule in the gastrosplenic ligament (image 352). On the preop study it appears as a smaller punctate nodule (prior image 341). Sliver of ascites in the   gastrohepatic ligament also noted. No peritoneal tumor nodules. No bowel obstruction. Quite redundant colon with mild large stool burden. Extensive distal colonic diverticulosis.     LYMPH NODES: Normal.     VASCULATURE: Mild arterial calcifications. Circumaortic left renal vein.     PELVIC ORGANS: Hysterectomy. Vaginal cuff is normal.     MUSCULOSKELETAL: Diastases of the midline rectus sheath above the umbilicus. Minimal nodular scarring to the left of midline in the midabdomen (image 419). There is a shallow broad-based supraumbilical ventral hernia containing only fat. No implants   within the hernia or abdominal incision. No suspicious bone lesions.                                                                      IMPRESSION:  1.  Sliver of ascites in the upper abdomen has decreased since interval debulking surgery.  2.  There is a punctate nodule in the gastrosplenic ligament which is minimally more plump relative to the preop exam. This will need to be followed.  3.  Minimal nodular changes to the left of the midline scar in the subcutaneous fat will have to be followed as well. Unclear if this simply reflects postoperative scarring or could reflect an early incisional recurrence.  4.  No other sites to suggest recurrent tumor. Vaginal cuff is normal.  5.   Extensive distal colonic diverticulosis.  6.  Other noncritical findings as noted above.      9/16/22  98     1/30/23 CT CAP:    IMPRESSION:  1.  Multiple new, hypoattenuating lesions in the liver, suspicious for hepatic metastatic disease.  2.  Necrotic mario hepatic lymphadenopathy, concerning for richard metastatic disease.  3.  There is a new or increasingly conspicuous 6 mm soft tissue nodule in the right lower quadrant. This is indeterminate.  4.  There is a new 3 mm solid nodule in the right upper lobe, indeterminate.  5.  Stable approximate 4 mm punctate nodule along the gastrosplenic ligament.  6.  Similar area of linear free fluid in the upper abdomen anterior to the stomach.     Plan: Paclitaxel 175 mg/m2, Carboplatin AUC 6, bevacizumab 7.5 mg/kg     3/1/23: Cycle #1 Paclitaxel 175 mg/m2, Carboplatin AUC 6 (C7), bevacizumab 7.5 mg/kg.  1,196  3/24/23: Cycle #2 Paclitaxel 150 mg/m2, Carboplatin AUC 5 (C8), bevacizumab 15 mg/kg.  558     3/29/23: ER for dehydration and hypotension. Normotensive in ER. IV fluids given. Discharged.      4/14/23: Cycle #3 Paclitaxel 150 mg/m2, Carboplatin AUC 5 (C9), bevacizumab 15 mg/kg deferred neutropenia ANC 0.3   273  4/21/23: treatment deferred ANC 0.8  4/28/23: Cycle #3 Paclitaxel 150 mg/m2, Carboplatin AUC 5 (C9), bevacizumab 15 mg/kg, + Neulasta deferred ANC 1.0,  297     5/26/23: Cycle #4  Paclitaxel 150 mg/m2, Carboplatin AUC 5 (C10), bevacizumab 15 mg/kg, + Neulasta, deferred ANC 0.6  188. No hematology consult per MD.      6/2/23: Cycle #4 Paclitaxel 150 mg/m2, Carboplatin AUC 5 (C9), bevacizumab 15 mg/kg, + Neulasta   6/23/23: Cycle #5 deferred neutropenia ANC 0.5 thrombocytopenia platelets 85     6/26/2023 Addendum: Reduce both Carboplatin and Paclitaxel due to thrombocytopenia and persistent neutropenia. REFER to Hematology to rule out MDS. Message sent to pt. Orders placed.     7/3/23 plan: continue with 2 more cycles with  carboplatin AUC of 4, Taxol at 135 mg per metered square, bevacizumab at 15 mg/kg as well as neulasta for bone marrow support.      7/3/23: Cycle #5 Paclitaxel 135 mg/m2, Carboplatin AUC 4, bevacizumab 15 mg/kg, +Neulasta.  375     7/11/23: per chart review Dr. Cogan with Hematology plan one more cycle of chemotherapy then maintenance Avastin     7/28/23: Cycle #6 Paclitaxel 135 mg/m2, Carboplatin AUC 4, bevacizumab 15 mg/kg, +Neulasta.  370    Past Medical History:    Past Medical History:   Diagnosis Date    Atrial fibrillation with rapid ventricular response (H)     History of cold sores     Hx of LASIK 12/11/2017    Insomnia     Migraine     Osteopenia     Pelvic mass     Peritoneal carcinomatosis (H)     Restless legs syndrome (RLS)          Past Surgical History:    Past Surgical History:   Procedure Laterality Date    APPENDECTOMY      ARTHROSCPY KNEE SURGICAL DEBRIDEMENT SHAVING ARTICULAR CARTILAGE Right     BIOPSY  January 2021    Biopsy to confirm ovarian cancer    DEBRIDEMENT LEFT UPPER EXTREMITY  2016    HYSTERECTOMY TOTAL ABD, LUISITO SALPINGO-OOPHORECTOMY, NODE DISSECTION, TUMOR DEBULKING, COMBINED Bilateral 4/19/2021    Procedure: HYSTERECTOMY, TOTAL, ABDOMINAL, WITH BILATERAL SALPINGO-OOPHORECTOMY, omentectomy, NEOPLASM DEBULKING,Proctoscocy, RO, Resection of liver nodules, diaphragm stripping, immobilization of liver and colon;  Surgeon: Bolivar Juarez MD;  Location: [facility] OR    LAPAROSCOPY DIAGNOSTIC (GYN) Bilateral 1/7/2021    Procedure: Diagnsotic laparoscopy, biopsies;  Surgeon: Bolivar Juarez MD;  Location:  OR    [facility]      TUBAL LIGATION           Health Maintenance Due   Topic Date Due    DEXA  Never done    ADVANCE CARE PLANNING  Never done    COLORECTAL CANCER SCREENING  Never done    HEPATITIS C SCREENING  Never done    LIPID  Never done    MEDICARE ANNUAL WELLNESS VISIT  11/11/2021    COVID-19 Vaccine (4 - Pfizer risk series) 11/15/2021    Pneumococcal Vaccine: 65+  Years (2 - PPSV23 or PCV20) 2022    PHQ-2 (once per calendar year)  2023    INFLUENZA VACCINE (1) Never done       Current Medications:     Current Outpatient Medications   Medication Sig Dispense Refill    amitriptyline (ELAVIL) 100 MG tablet Take 1 tablet (100 mg) by mouth At Bedtime 90 tablet 0    fluticasone (FLONASE) 50 MCG/ACT nasal spray       HYDROmorphone (DILAUDID) 2 MG tablet Take 1 tablet (2 mg) by mouth every 6 hours as needed for pain 10 tablet 0    meclizine (ANTIVERT) 25 MG tablet Take 1 tablet (25 mg) by mouth 3 times daily as needed for dizziness or nausea 15 tablet 0    ondansetron (ZOFRAN) 4 MG tablet Take by mouth every 8 hours as needed for nausea      oxyCODONE (ROXICODONE) 5 MG tablet Take 1 tablet (5 mg) by mouth every 12 hours 10 tablet 0    rivaroxaban ANTICOAGULANT (XARELTO ANTICOAGULANT) 20 MG TABS tablet Take 1 tablet (20 mg) by mouth every morning 90 tablet 1    SUMAtriptan (IMITREX) 100 MG tablet Take 1 tablet (100 mg) by mouth at onset of headache for migraine 15 tablet 0    valACYclovir (VALTREX) 1000 mg tablet Take 1,000 mg by mouth as needed      zolpidem (AMBIEN) 10 MG tablet Take 10 mg by mouth      gabapentin (NEURONTIN) 600 MG tablet Take 1 tablet (600 mg) by mouth At Bedtime (Patient not taking: Reported on 2023) 90 tablet 0         Allergies:      No Known Allergies     Social History:     Social History     Tobacco Use    Smoking status: Former     Packs/day: 0.50     Years: 40.00     Pack years: 20.00     Types: Cigarettes     Quit date:      Years since quittin.7    Smokeless tobacco: Never   Substance Use Topics    Alcohol use: Not Currently       History   Drug Use Unknown         Family History:       Family History   Adopted: Yes   Problem Relation Age of Onset    Cancer Mother 36    Other Cancer Mother         Bio mother  of  a female cancer  at 36    Factor V Leiden deficiency Daughter     Deep Vein Thrombosis Daughter     Diabetes Type  1 Daughter     Diabetes Daughter     Hypertension Daughter     Anesthesia Reaction No family hx of          Physical Exam:     /80   Ht 1.829 m (6')   Wt 77.1 kg (170 lb)   BMI 23.06 kg/m    Body mass index is 23.06 kg/m .    General Appearance:  healthy and alert, no distress                 Psychiatric:      appropriate mood and affect                                     Assessment:     Taran Regalado is a 67 year old woman with recurrent ovarian high grade serous carcinoma.     40 minute visit with 30 minutes counseling.        1.  Recurrent high grade serous ovarian cancer.   2.  BRCA 1 germline mutation.   3.  [facility]  4.  PE resolved on Lovenox (prophy)  5.  Left ankle injury  6.   370      We did discuss the scan is consistent with stable disease. We will switch her to 4 cycles of bavacizmab maintenace at 15mg/kg followed by a CT scan. Again if she is persistent of neutropenia we have referred he to hematology to rule out MDS.  Side effects risk alternatives were discussed.  She has gotten an echocardiogram in the past and is asymptomatic from a cardiac standpoint.  Patient and her family agree with this plan of action for care all questions were answered.           Bolivar Juarez MD, MS    Department of Obstetrics and Gynecology   Division of Gynecologic Oncology   [facility]  Phone: [phone]      CC  Patient Care Team:  Bolivar Juarez MD as PCP - General (Gynecologic Oncology)  Marta Okeefe APRN CNP as Assigned PCP  Marta Lao APRN CNP as Assigned Cancer Care Provider  Cogan, Jacob, MD as Physician (Hematology & Oncology)  SELF, REFERRED

## 2023-08-21 NOTE — PATIENT INSTRUCTIONS
Plan:  4 Cycles of Avastin   CT scan about 2 weeks after 4th cycle     Referral placed for local Oncology team ([facility])

## 2023-08-21 NOTE — NURSING NOTE
Is the patient currently in the state of [place]? YES    Visit mode:VIDEO    If the visit is dropped, the patient can be reconnected by: VIDEO VISIT: Send to e-mail at: [email or web address]    Will anyone else be joining the visit? YES: How would they like to receive their invitation? Text to cell phone: [phone]  (If patient encounters technical issues they should call [phone]    How would you like to obtain your AVS? MyChart    Are changes needed to the allergy or medication list? No    Reason for visit: RECHECK    Priscilla FOWLER

## 2023-10-19 NOTE — PROGRESS NOTES
Patient local Oncologist reached out due to new scan results     New liver lesions and growing disease     Dr Juarez was updated and will reach out to local MD and patient will be scheduled for follow up with Dr Juarez next week    Patient aware of the plan     Shawna Hinojosa RN

## 2023-10-25 NOTE — NURSING NOTE
Is the patient currently in the state of [place]? YES    Visit mode:VIDEO    If the visit is dropped, the patient can be reconnected by: VIDEO VISIT: Text to cell phone:   Telephone Information:   Mobile [phone]       Will anyone else be joining the visit? Yes, pt's daughter Edie will be joining.   (If patient encounters technical issues they should call [phone]    How would you like to obtain your AVS? MyChart    Are changes needed to the allergy or medication list? No    Reason for visit: RECHECK    Pt completed echeck-in an states medications and allergies are correct.     Jacky TOSCANOF

## 2023-10-25 NOTE — PROGRESS NOTES
Virtual Visit Details    Type of service:  Video Visit   40 minutes    Originating Location (pt. Location): Home    Distant Location (provider location):  On-site  Platform used for Video Visit: Wanda                Follow Up Notes on Referred Patient    Date: 10/25/2023       Dr. Ye Vaughn MD  No address on file       RE: Taran Regalado  : 1955  GRACY: 10/25/2023    Taran Regalado is a 68 year old woman with a diagnosis of metastatic ovarian high grade serous carcinoma. She is here today for follow up and disease management. She has been tolerating chemotherapy well overall.  However she has had some issues with neutropenia requiring additional Neulasta at this reduction in place.  We will continue manage accordingly, however if persistent consider hematology referral. She is eating an drinking well, no nausea vomiting, fever or chills, normal urinary and bowel function.        Oncology History:  12/3/2020: US Pelvic: IMPRESSION: Limited examination due to acoustic windows. Possible left adnexal mass. A CT scan of the abdomen and pelvis with contrast is recommended for further assessment.     2020: CT A/P:   IMPRESSION:    Peritoneal carcinomatosis with masslike peritoneal thickening in the lower pelvis which may indicate an adnexal or ovarian primary malignancy. Large volume ascites. Bilateral pleural effusions. There is potential subtle pleural nodularity in the right hemithorax which could indicate metastatic disease.  Indeterminate 1 cm lesion in the right hepatic lobe suspicious for a metastatic lesion.      2020: Presented to [facility] with abdominal distention, 25lb weight loss, and CTAP with carcinomatosis, elevated  3098.     2020: CT Chest: IMPRESSION:   1. There are few scattered small sub-6 mm pulmonary nodules which are indeterminate without prior comparisons available. There are a few  slightly larger perifissural nodules which are technically  indeterminate in the  setting of malignancy although presumed lymphatic in nature and of unlikely clinical significance. Attention on follow-up is recommended.  2. Small to moderate left and small right pleural effusions are increased in size from prior. No convincing evidence for pleural nodularity.  3. Partially visualized large volume ascites and peritoneal nodularity in the upper abdomen similar to 12/4/2020 outside CT      12/26/2020: ED for abdominal distension; 3 L ascites drained with paracentesis    Pelvic US: Findings: Free fluid present in LLQ      12/31/2020: US Paracentesis: 900 mL ascites drained     1/7/2021: Diagnotic laparoscopy, biopsies  Pathology: FINAL DIAGNOSIS:   A. PERITONEUM, BIOPSIES:   - Positive for high grade carcinoma, consistent with metastatic carcinoma of Mullerian origin.     1/10-1/13/2021: Hospital admission for postoperative non-intractable vomiting and nausea.      1/10/2021: CT A/P: IMPRESSION: Extensive ascites which is probably malignant. Scattered liver hypodensities of indeterminate etiology comment cannot exclude metastatic disease. Diverticulosis. Fluid-filled adnexal masses and irregular appearance of uterus, which may represent primary neoplasm. Multiple peritoneal nodules. Large amount of fecal material in the colon with no evidence of small bowel obstruction.     Plan: Paclitaxel 175 mg/m2 and carboplatin AUC 6 x 3 cycles followed by a CT CAP and visit with Dr. Juarez.     1/12/2021: Cycle 1 paclitaxel and carboplatin while inpatient     1/13/2021: CT Head: Impression:  1. Chronic sinusitis of the right maxillary and right sphenoid sinuses.  2. Incidental presumed calcified meningioma in the right frontal  convexity without significant mass effect.  3. No suspicious intracranial enhancing lesion.     2/1/2021: Cycle 2 paclitaxel and carboplatin.  936.     2/3-2/5/21: Admission [facility] for afib w/ RVR and new PE     2/26/21: Cycle 3 paclitaxel and carboplatin planned.  Deferred due  to thrombocytopenia.  pending.     3/15/21: Cycle 3 paclitaxel and carboplatin given     4/19/21: HYSTERECTOMY, TOTAL, ABDOMINAL, WITH BILATERAL SALPINGO-OOPHORECTOMY, omentectomy, NEOPLASM DEBULKING,Proctoscocy, RO, Resection of liver nodules, diaphragm stripping, immobilization of liver and colon  FINAL DIAGNOSIS:   A. OMENTUM, BIOPSY:   - Omental adipose tissue with rare viable cells of metastatic high grade   serous carcinoma   - One reactive lymph node, negative for malignancy (0/1)   B. NODULE, SIGMOID, EXCISION:   - Calcified necrotic adipose tissue   - Negative for malignancy   C. NODULE, SMALL BOWEL MESENTERY, EXCISION:   - Fibroadipose tissue, positive for metastatic high grade serous carcinoma   D. UTERUS, CERVIX, BILATERAL FALLOPIAN TUBES AND OVARIES, HYSTERECTOMY   WITH BILATERAL SALPINGO-OOPHORECTOMY:   - Atrophic endometrium   - Uterine serosa with rare viable cells consistent with high grade serous   carcinoma   - Cervix with atrophic changes   - Viable cells consistent with high grade serous carcinoma present in the   right ovary, serosa of right   fallopian tube and right periadnexal soft tissue   - Left ovary with atrophic changes   - Left fallopian tube with a rare focus of serous tubal in-situ carcinoma   (STIC)   E. NODULES, SMALL BOWEL MESENTRY, EXCISION:   - Fibroadipose tissue with rare viable cells of metastatic high grade   serous carcinoma   F. NODULE, SPLENIC FLEXURE TRANSVERSE COLON, EXCISION:   - Fibroadipose tissue with rare viable cells of metastatic high grade   serous carcinoma   - Accessory splenule, negative for malignancy   G. OMENTUM, OMENTECTOMY:   - Omental adipose tissue with rare viable cells of metastatic high grade   serous carcinoma   H. NODULE, PERITONEAL PANCREATIC, EXCISION:   - Fibrous adhesions with inflammation   - Negative for malignancy   I. RIGHT HEMIDIAPHRAGM PERITONEUM, EXCISION:   - Fibrous adhesions with inflammation   - Negative for malignancy   J.  RIGHT LIVER SURFACE NODULE:   - Fibrous adhesions with benign inclusion glands   - Negative for malignancy   K. LEFT LOWER LIVER EDGE, BIOPSY:   - Cauterized hepatic parenchyma and capsule   - Negative for malignancy   L. NODULE, SMALL BOWEL MESENTERIC #3, EXCISION:   - Fibroadipose tissue with rare viable cells of metastatic high grade   serous carcinoma       Plan: Carboplatin AUC 6 + Taxol 175 mg/m2 x 3 cycles, then transition to Parp inhibitor for maintenance therapy given her BRCA1 germline mutation.      5/21/21: Cycle 4 carboplatin and paclitaxel.   172.  6/11/21: Cycle 5 carboplatin and paclitaxel.   61.  7/2/21: Cycle 6 carboplatin and paclitaxel.   20.        7/28/21 plan: Olaparib 300mg bid as starting dose,  14  8/20/21: start date olaparib 300 mg BID,  12  9/13/21:  22  10/4/21:  23  11/1/21:  26     11/02/2021: PET CT: IMPRESSION:   Findings compatible with interval surgery and posttreatment change.  No gross definitive FDG avid disease.  Potential foci of tumor deposits along the anterior dome of the liver and midline abdominal wall surgical scar.  Colonic activity is not necessarily abnormal, however, given the previous carcinomatosis the colonic activity is indeterminant.         12/1/21:  23  1/3/22:  21  2/1/22:  20  3/1/22:  21  4/1/22:  23  5/4/22:  28     EXAM: CT CHEST/ABDOMEN/PELVIS W CONTRAST  LOCATION: [facility]  DATE/TIME: 7/11/2022 1:25 PM     INDICATION: Stage III B high-grade ovarian carcinoma diagnosed Jan 2021. Posttreatment surveillance.  COMPARISON: CTA AP 04/23/2021, CT CAP 04/02/2021  TECHNIQUE: CT scan of the chest, abdomen, and pelvis was performed following injection of IV contrast. Multiplanar reformats were obtained. Dose reduction techniques were used.   CONTRAST: isovue 370 105mL IV; 50mL omni 140 oral     FINDINGS:   LUNGS AND PLEURA: No suspicious pulmonary  nodules. Minimal right apical pleural thickening and a few punctate tiny nodules 2 of which are subpleural on the right are stable. No pleural effusions.     MEDIASTINUM/AXILLAE: No adenopathy. No central pulmonary emboli.     CORONARY ARTERY CALCIFICATION: Cannot evaluate.     HEPATOBILIARY: Normal.     PANCREAS: Normal.     SPLEEN: Normal.     ADRENAL GLANDS: Normal.     KIDNEYS/BLADDER: Normal.     BOWEL: Sliver of ascites adjacent to the spleen noted, decreased from prior study. There is a 4 mm nodule in the gastrosplenic ligament (image 352). On the preop study it appears as a smaller punctate nodule (prior image 341). Sliver of ascites in the   gastrohepatic ligament also noted. No peritoneal tumor nodules. No bowel obstruction. Quite redundant colon with mild large stool burden. Extensive distal colonic diverticulosis.     LYMPH NODES: Normal.     VASCULATURE: Mild arterial calcifications. Circumaortic left renal vein.     PELVIC ORGANS: Hysterectomy. Vaginal cuff is normal.     MUSCULOSKELETAL: Diastases of the midline rectus sheath above the umbilicus. Minimal nodular scarring to the left of midline in the midabdomen (image 419). There is a shallow broad-based supraumbilical ventral hernia containing only fat. No implants   within the hernia or abdominal incision. No suspicious bone lesions.                                                                      IMPRESSION:  1.  Sliver of ascites in the upper abdomen has decreased since interval debulking surgery.  2.  There is a punctate nodule in the gastrosplenic ligament which is minimally more plump relative to the preop exam. This will need to be followed.  3.  Minimal nodular changes to the left of the midline scar in the subcutaneous fat will have to be followed as well. Unclear if this simply reflects postoperative scarring or could reflect an early incisional recurrence.  4.  No other sites to suggest recurrent tumor. Vaginal cuff is normal.  5.   Extensive distal colonic diverticulosis.  6.  Other noncritical findings as noted above.      9/16/22  98     1/30/23 CT CAP:    IMPRESSION:  1.  Multiple new, hypoattenuating lesions in the liver, suspicious for hepatic metastatic disease.  2.  Necrotic mario hepatic lymphadenopathy, concerning for richard metastatic disease.  3.  There is a new or increasingly conspicuous 6 mm soft tissue nodule in the right lower quadrant. This is indeterminate.  4.  There is a new 3 mm solid nodule in the right upper lobe, indeterminate.  5.  Stable approximate 4 mm punctate nodule along the gastrosplenic ligament.  6.  Similar area of linear free fluid in the upper abdomen anterior to the stomach.     Plan: Paclitaxel 175 mg/m2, Carboplatin AUC 6, bevacizumab 7.5 mg/kg     3/1/23: Cycle #1 Paclitaxel 175 mg/m2, Carboplatin AUC 6 (C7), bevacizumab 7.5 mg/kg.  1,196  3/24/23: Cycle #2 Paclitaxel 150 mg/m2, Carboplatin AUC 5 (C8), bevacizumab 15 mg/kg.  558     3/29/23: ER for dehydration and hypotension. Normotensive in ER. IV fluids given. Discharged.      4/14/23: Cycle #3 Paclitaxel 150 mg/m2, Carboplatin AUC 5 (C9), bevacizumab 15 mg/kg deferred neutropenia ANC 0.3   273  4/21/23: treatment deferred ANC 0.8  4/28/23: Cycle #3 Paclitaxel 150 mg/m2, Carboplatin AUC 5 (C9), bevacizumab 15 mg/kg, + Neulasta deferred ANC 1.0,  297     5/26/23: Cycle #4  Paclitaxel 150 mg/m2, Carboplatin AUC 5 (C10), bevacizumab 15 mg/kg, + Neulasta, deferred ANC 0.6  188. No hematology consult per MD.      6/2/23: Cycle #4 Paclitaxel 150 mg/m2, Carboplatin AUC 5 (C9), bevacizumab 15 mg/kg, + Neulasta   6/23/23: Cycle #5 deferred neutropenia ANC 0.5 thrombocytopenia platelets 85     6/26/2023 Addendum: Reduce both Carboplatin and Paclitaxel due to thrombocytopenia and persistent neutropenia. REFER to Hematology to rule out MDS. Message sent to pt. Orders placed.      7/3/23 plan: continue with 2 more cycles with  carboplatin AUC of 4, Taxol at 135 mg per metered square, bevacizumab at 15 mg/kg as well as neulasta for bone marrow support.      7/3/23: Cycle #5 Paclitaxel 135 mg/m2, Carboplatin AUC 4, bevacizumab 15 mg/kg, +Neulasta.  375     7/11/23: per chart review Dr. Cogan with Hematology plan one more cycle of chemotherapy then maintenance Avastin     7/28/23: Cycle #6 Paclitaxel 135 mg/m2, Carboplatin AUC 4, bevacizumab 15 mg/kg, +Neulasta.  370    8/17/2023: CT CAP: Stable bilateral pulmonary micronodules. Multiple subcentimeter hypodense hepatic lesions, a few are slightly less conspicuous. Necrotic lymph nodes in mario hepatis are stable. A few small peritoneal nodules in the left upper quadrant. Anterior to the pylorus is an oval 2.4 cm hypodense soft tissue lesion which is stable.    8/17/2023: started maintenance bevacizumab q3 weeks.  370.    8/25/2023:  450.    9/19/2023:  1056. Alk phos 221, ALT 86, AST 63.    9/21/2023: Transferred her care from [facility] to [facility] to be closer to home. Plan is maintenance bevacizumab 15 mg/kg every 3 weeks to give her a treatment break from myelosuppressive chemotherapy.    10/10/2023:  1889, alk phos 388, , AST 83  10/12/2023: Bevacizumab held for elevated LFTs, symptoms of new back pain, cough, sinusitis symptoms.    10/18/2023: CT CAP: Progressive liver metastasis. Small nonspecific right lower lobe pulmonary nodule. Inflammatory process or a metastatic nodule remain possible. This does not have the characteristic appearance of metastasis, however, remains indeterminant.      Past Medical History:    Past Medical History:   Diagnosis Date    Atrial fibrillation with rapid ventricular response (H)     History of cold sores     Hx of LASIK 12/11/2017    Insomnia     Migraine     Osteopenia     Pelvic mass     Peritoneal carcinomatosis (H)     Restless legs syndrome (RLS)          Past Surgical History:    Past Surgical  History:   Procedure Laterality Date    APPENDECTOMY      ARTHROSCPY KNEE SURGICAL DEBRIDEMENT SHAVING ARTICULAR CARTILAGE Right     BIOPSY  January 2021    Biopsy to confirm ovarian cancer    DEBRIDEMENT LEFT UPPER EXTREMITY  2016    HYSTERECTOMY TOTAL ABD, LUISITO SALPINGO-OOPHORECTOMY, NODE DISSECTION, TUMOR DEBULKING, COMBINED Bilateral 4/19/2021    Procedure: HYSTERECTOMY, TOTAL, ABDOMINAL, WITH BILATERAL SALPINGO-OOPHORECTOMY, omentectomy, NEOPLASM DEBULKING,Proctoscocy, RO, Resection of liver nodules, diaphragm stripping, immobilization of liver and colon;  Surgeon: Bolivar Juarez MD;  Location: [facility] OR    LAPAROSCOPY DIAGNOSTIC (GYN) Bilateral 1/7/2021    Procedure: Diagnsotic laparoscopy, biopsies;  Surgeon: Bolivar Juarez MD;  Location: [facility] OR    LASIK      TUBAL LIGATION           Health Maintenance Due   Topic Date Due    DEXA  Never done    ADVANCE CARE PLANNING  Never done    COLORECTAL CANCER SCREENING  Never done    HEPATITIS C SCREENING  Never done    LIPID  Never done    RSV VACCINE (Pregnancy & 60+) (1 - 1-dose 60+ series) Never done    MEDICARE ANNUAL WELLNESS VISIT  11/11/2021    Pneumococcal Vaccine: 65+ Years (2 - PPSV23 or PCV20) 04/05/2022    PHQ-2 (once per calendar year)  01/01/2023    INFLUENZA VACCINE (1) Never done    COVID-19 Vaccine (4 - 2023-24 season) 09/01/2023       Current Medications:     Current Outpatient Medications   Medication Sig Dispense Refill    amitriptyline (ELAVIL) 100 MG tablet Take 1 tablet (100 mg) by mouth At Bedtime 90 tablet 0    fluticasone (FLONASE) 50 MCG/ACT nasal spray       gabapentin (NEURONTIN) 600 MG tablet Take 1 tablet (600 mg) by mouth At Bedtime (Patient not taking: Reported on 6/22/2023) 90 tablet 0    HYDROmorphone (DILAUDID) 2 MG tablet Take 1 tablet (2 mg) by mouth every 6 hours as needed for pain 10 tablet 0    meclizine (ANTIVERT) 25 MG tablet Take 1 tablet (25 mg) by mouth 3 times daily as needed for dizziness or nausea 15 tablet 0     ondansetron (ZOFRAN) 4 MG tablet Take 2 tablets (8 mg) by mouth every 8 hours as needed for nausea 30 tablet 3    oxyCODONE (ROXICODONE) 5 MG tablet Take 1 tablet (5 mg) by mouth every 12 hours 10 tablet 0    rivaroxaban ANTICOAGULANT (XARELTO ANTICOAGULANT) 20 MG TABS tablet Take 1 tablet (20 mg) by mouth every morning 90 tablet 1    SUMAtriptan (IMITREX) 100 MG tablet Take 1 tablet (100 mg) by mouth at onset of headache for migraine 15 tablet 0    valACYclovir (VALTREX) 1000 mg tablet Take 1,000 mg by mouth as needed      zolpidem (AMBIEN) 10 MG tablet Take 10 mg by mouth           Allergies:      No Known Allergies     Social History:     Social History     Tobacco Use    Smoking status: Former     Packs/day: 0.50     Years: 40.00     Additional pack years: 0.00     Total pack years: 20.00     Types: Cigarettes     Quit date:      Years since quittin.9    Smokeless tobacco: Never   Substance Use Topics    Alcohol use: Not Currently       History   Drug Use Unknown         Family History:     Family History   Adopted: Yes   Problem Relation Age of Onset    Cancer Mother 36    Other Cancer Mother         Bio mother  of  a female cancer  at 36    Factor V Leiden deficiency Daughter     Deep Vein Thrombosis Daughter     Diabetes Type 1 Daughter     Diabetes Daughter     Hypertension Daughter     Anesthesia Reaction No family hx of          Physical Exam:     Ht 1.829 m (6')   Wt 77.1 kg (170 lb)   BMI 23.06 kg/m    Body mass index is 23.06 kg/m .    General Appearance:  healthy and alert, no distress                 Psychiatric:      appropriate mood and affect                                     Assessment:     Taran Regalado is a 68 year old woman with recurrent ovarian high grade serous carcinoma.     40 minute visit with 30 minutes counseling.        1.  Recurrent high grade serous ovarian cancer.   2.  BRCA 1 germline mutation.   3.  [facility]  4.  PE resolved on Lovenox  (prophy)  5.  Left ankle injury  6.   1829      We did discuss the scan is consistent with progressive disease.  We discussed to screen her for the TORL 1-23 trial.  Will give a treatment break until then.  Patient and her family agree with this plan of action for care all questions were answered.           Bolivar Juarez MD, MS    Department of Obstetrics and Gynecology   Division of Gynecologic Oncology   [facility]  Phone: [phone]    CC  Patient Care Team:  Bolivar Juarez MD as PCP - General (Gynecologic Oncology)  Marta Okeefe APRN CNP as Assigned PCP  Marta Lao APRN CNP as Assigned Cancer Care Provider  Cogan, Jacob, MD as Physician (Hematology & Oncology)  SELF, REFERRED

## 2023-11-08 NOTE — PROGRESS NOTES
Virtual Visit Details    Type of service:  Video Visit   Video Start Time: 2:10 PM  Video End Time: 2:20    Originating Location (pt. Location): Home    Distant Location (provider location):  On-site  Platform used for Video Visit: [facility] Hematology Consultation    Outpatient Visit Note:    Patient: Taran Regalado  MRN: [id number]  : 1955  GRACY: Nov 10, 2023    History of Present Illness:  Ms. Taran Regalado is a 67 year old  female referred by Dr. Juarez for the persisent thrombocytopenia and neutropenia on chemotherapy for recurrent Metastatic ovarian cancer. 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 She has since undergone treatment with 6 cycles of Carboplatin & Paclitaxel and was transitioned to maintenance Olaparib due to BRCA1 germline mutation with improvement in tumor markers. She later was found to have disease progression and recently started on Carboplatin, Paclitaxel and Bevacizumab on 3/1/23.      23, Cycle 3 of Paclitaxel 150 mg/m2, Carboplatin AUC 5, bevacizumab 15 mg/kg deferred due to neutropenia ANC 0.3.  23 treatment deferred a second time due to ANC 0.8.   23 treatment was deferred for the third time due to ANC 1.0.   23: treatment deferred due to ANC 1.0,   2023 Cycle 4 day 1 treatment was deferred due to ANC of 0.6.  2023 Received Cycle #4 Paclitaxel 150 mg/m2, Carboplatin AUC 5 (C9), bevacizumab 15 mg/kg, + Neulasta   23: Cycle #5 deferred neutropenia ANC 0.5 thrombocytopenia platelets 85  7/3/2023: Received treatment with carbo calculated. ANC 4 and Taxol reduced also, ANC 0.9. Parameters updated and Neulasta.     Patient referred to us for persistent thrombocytopenia and neutropenia with concern for MDS.     Patient reports feeling fine otherwise. Has her usual fatigue but no recent weight loss, night sweats, or fevers. She does not know  her family history as she is adopted. No recent infections or bleeding. She normally bruises since childhood. Uses alcohol very rarely. Quit smoking several years ago. Is currently on Xarelto for Afib and a PE.     November 10, 2023: Most recent labs from mid October with a white count of 3.1, ANC 1400, hemoglobin 12.8, , platelet count 140.  Visit with oncologist at that time noted imaging with progressive disease, worsening liver mets, rising tumor markers.  Peripheral NGS guardant testing sent.  Bevacizumab stopped.  Per oncology notes, her last cycle of chemotherapy was in July.  CT in August showed subcentimeter hepatic lesions, necrotic lymph nodes.  Started on bevacizumab at that time, her  was 370 at that time.  Over the subsequent months her Ca125 john steadily, to a most recent value in mid October 1900.  CT scan in October showed progressive liver mets.  In July her white count was 2.8, hemoglobin was 11.2 and platelet count was 100.  In the subsequent months her counts improved to where the white count and hemoglobin were normal in early October and the platelet count was 120.  More recently the white count has begun to fall again, with a current ANC of 1400/mcL, though the platelet count is now normal.    Medications:  Current Outpatient Medications   Medication Sig Dispense Refill    amitriptyline (ELAVIL) 100 MG tablet Take 1 tablet (100 mg) by mouth At Bedtime 90 tablet 0    fluticasone (FLONASE) 50 MCG/ACT nasal spray       gabapentin (NEURONTIN) 600 MG tablet Take 1 tablet (600 mg) by mouth At Bedtime (Patient not taking: Reported on 6/22/2023) 90 tablet 0    HYDROmorphone (DILAUDID) 2 MG tablet Take 1 tablet (2 mg) by mouth every 6 hours as needed for pain 10 tablet 0    meclizine (ANTIVERT) 25 MG tablet Take 1 tablet (25 mg) by mouth 3 times daily as needed for dizziness or nausea 15 tablet 0    ondansetron (ZOFRAN) 4 MG tablet Take by mouth every 8 hours as needed for nausea       oxyCODONE (ROXICODONE) 5 MG tablet Take 1 tablet (5 mg) by mouth every 12 hours 10 tablet 0    rivaroxaban ANTICOAGULANT (XARELTO ANTICOAGULANT) 20 MG TABS tablet Take 1 tablet (20 mg) by mouth every morning 90 tablet 1    SUMAtriptan (IMITREX) 100 MG tablet Take 1 tablet (100 mg) by mouth at onset of headache for migraine 15 tablet 0    valACYclovir (VALTREX) 1000 mg tablet Take 1,000 mg by mouth as needed      zolpidem (AMBIEN) 10 MG tablet Take 10 mg by mouth          Objectives:  N/a, video visit    Assessment:  Taran Regalado is a 67 year old woman with recurrent metastatic ovarian cancer and pancytopenia likely due to malignant marrow infiltration.    The initial differential for her pancytopenia was chemotherapy-induced marrow suppression versus malignant marrow infiltration versus MDS or another primary marrow process.  Given the improvement in her counts since stopping chemotherapy in July, and now the recurrent neutropenia as she has imaging and laboratory evidence of disease progression, I primarily suspect malignant marrow infiltration.  I do not think a bone marrow biopsy is necessary at this time, and the patient and her family are not inclined to pursue this.  I would recommend treatment of the underlying malignancy as best able, with growth factor support of the counts during any additional treatment.  I conveyed that we do not need to have a dedicated follow-up appointment after this, but that I am available to her oncology treatment team to reassess the case at any time or to see the patient again if she would like.    Patient understands and agrees with the above plan and recommendation.    Jacob Cogan, MD  Hematology    30 minutes spent by me on the date of the encounter doing chart review, review of outside records, review of test results, interpretation of tests, patient visit, documentation, and discussion with family

## 2023-11-10 NOTE — LETTER
11/10/2023         RE: Taran Regalado  [address]  [place] [address]        Dear Colleague,    Thank you for referring your patient, Taran Regalado, to the [facility]. Please see a copy of my visit note below.    Virtual Visit Details    Type of service:  Video Visit   Video Start Time: 2:10 PM  Video End Time: 2:20    Originating Location (pt. Location): Home    Distant Location (provider location):  On-site  Platform used for Video Visit: [facility] Hematology Consultation    Outpatient Visit Note:    Patient: Taran Regalado  MRN: [id number]  : 1955  GRACY: Nov 10, 2023    History of Present Illness:  Ms. Taran Regalado is a 67 year old  female referred by Dr. Juarez for the persisent thrombocytopenia and neutropenia on chemotherapy for recurrent Metastatic ovarian cancer. 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 She has since undergone treatment with 6 cycles of Carboplatin & Paclitaxel and was transitioned to maintenance Olaparib due to BRCA1 germline mutation with improvement in tumor markers. She later was found to have disease progression and recently started on Carboplatin, Paclitaxel and Bevacizumab on 3/1/23.      23, Cycle 3 of Paclitaxel 150 mg/m2, Carboplatin AUC 5, bevacizumab 15 mg/kg deferred due to neutropenia ANC 0.3.  23 treatment deferred a second time due to ANC 0.8.   23 treatment was deferred for the third time due to ANC 1.0.   23: treatment deferred due to ANC 1.0,   2023 Cycle 4 day 1 treatment was deferred due to ANC of 0.6.  2023 Received Cycle #4 Paclitaxel 150 mg/m2, Carboplatin AUC 5 (C9), bevacizumab 15 mg/kg, + Neulasta   23: Cycle #5 deferred neutropenia ANC 0.5 thrombocytopenia platelets 85  7/3/2023: Received treatment with carbo calculated. ANC 4 and Taxol reduced also, ANC 0.9. Parameters  updated and Neulasta.     Patient referred to us for persistent thrombocytopenia and neutropenia with concern for MDS.     Patient reports feeling fine otherwise. Has her usual fatigue but no recent weight loss, night sweats, or fevers. She does not know her family history as she is adopted. No recent infections or bleeding. She normally bruises since childhood. Uses alcohol very rarely. Quit smoking several years ago. Is currently on Xarelto for Afib and a PE.     November 10, 2023: Most recent labs from mid October with a white count of 3.1, ANC 1400, hemoglobin 12.8, , platelet count 140.  Visit with oncologist at that time noted imaging with progressive disease, worsening liver mets, rising tumor markers.  Peripheral NGS guardant testing sent.  Bevacizumab stopped.  Per oncology notes, her last cycle of chemotherapy was in July.  CT in August showed subcentimeter hepatic lesions, necrotic lymph nodes.  Started on bevacizumab at that time, her  was 370 at that time.  Over the subsequent months her Ca125 john steadily, to a most recent value in mid October 1900.  CT scan in October showed progressive liver mets.  In July her white count was 2.8, hemoglobin was 11.2 and platelet count was 100.  In the subsequent months her counts improved to where the white count and hemoglobin were normal in early October and the platelet count was 120.  More recently the white count has begun to fall again, with a current ANC of 1400/mcL, though the platelet count is now normal.    Medications:  Current Outpatient Medications   Medication Sig Dispense Refill    amitriptyline (ELAVIL) 100 MG tablet Take 1 tablet (100 mg) by mouth At Bedtime 90 tablet 0    fluticasone (FLONASE) 50 MCG/ACT nasal spray       gabapentin (NEURONTIN) 600 MG tablet Take 1 tablet (600 mg) by mouth At Bedtime (Patient not taking: Reported on 6/22/2023) 90 tablet 0    HYDROmorphone (DILAUDID) 2 MG tablet Take 1 tablet (2 mg) by mouth every 6  hours as needed for pain 10 tablet 0    meclizine (ANTIVERT) 25 MG tablet Take 1 tablet (25 mg) by mouth 3 times daily as needed for dizziness or nausea 15 tablet 0    ondansetron (ZOFRAN) 4 MG tablet Take by mouth every 8 hours as needed for nausea      oxyCODONE (ROXICODONE) 5 MG tablet Take 1 tablet (5 mg) by mouth every 12 hours 10 tablet 0    rivaroxaban ANTICOAGULANT (XARELTO ANTICOAGULANT) 20 MG TABS tablet Take 1 tablet (20 mg) by mouth every morning 90 tablet 1    SUMAtriptan (IMITREX) 100 MG tablet Take 1 tablet (100 mg) by mouth at onset of headache for migraine 15 tablet 0    valACYclovir (VALTREX) 1000 mg tablet Take 1,000 mg by mouth as needed      zolpidem (AMBIEN) 10 MG tablet Take 10 mg by mouth          Objectives:  N/a, video visit    Assessment:  Taran Regalado is a 67 year old woman with recurrent metastatic ovarian cancer and pancytopenia likely due to malignant marrow infiltration.    The initial differential for her pancytopenia was chemotherapy-induced marrow suppression versus malignant marrow infiltration versus MDS or another primary marrow process.  Given the improvement in her counts since stopping chemotherapy in July, and now the recurrent neutropenia as she has imaging and laboratory evidence of disease progression, I primarily suspect malignant marrow infiltration.  I do not think a bone marrow biopsy is necessary at this time, and the patient and her family are not inclined to pursue this.  I would recommend treatment of the underlying malignancy as best able, with growth factor support of the counts during any additional treatment.  I conveyed that we do not need to have a dedicated follow-up appointment after this, but that I am available to her oncology treatment team to reassess the case at any time or to see the patient again if she would like.    Patient understands and agrees with the above plan and recommendation.    Jacob Cogan, MD  Hematology    30 minutes spent by me on  the date of the encounter doing chart review, review of outside records, review of test results, interpretation of tests, patient visit, documentation, and discussion with family

## 2023-11-10 NOTE — NURSING NOTE
Is the patient currently in the state of [place]? YES    Visit mode:VIDEO    If the visit is dropped, the patient can be reconnected by: VIDEO VISIT: Text to cell phone:   Telephone Information:   Mobile [phone]       Will anyone else be joining the visit? NO  (If patient encounters technical issues they should call [phone]    How would you like to obtain your AVS? MyChart    Are changes needed to the allergy or medication list? Pt stated no changes to allergies and Pt stated no med changes    Reason for visit: KONG Van LPN

## 2023-11-16 NOTE — PROGRESS NOTES
CLINICAL TRIAL VISIT NOTE       Date of Visit: 11/16/2023     Study: [id number]     [facility]#: [id number]  CTSI#: [id number]    DeTamannaamee Regalado was contacted by Marleni Meza, Clinical Research Coordinator - RN, to undergo phone consenting for TORL-1-23. The consent form was provided for review via email on 11/15/23.     The consent form, including purpose, risks and benefits, was reviewed with Annamay MARTINES Fabricio by telephone today, 11/16/23, and all questions were answered before she signed the consent form. The patient understands that the study involves an active treatment phase as well as a post-treatment follow up phase.     Present during the discussion were Justen Regalado and Marleni Meza RN.     DeTamannaamee Fabricio signed the consent on 11/16/23. I received her signed consent via email and signed it on 11/16/23. A copy of the consent signed by both of us was provided to Justen on 11/17/23.    No procedures specific to this study were performed prior to the patient signing the consent form.    Consent Version Date: 03/14/23  Consent obtained via telephone by: Marleni Meza RN   Date of telephone consent: 11/16/23    Date consent signed by Justen Regalado: 11/16/23  Date consent signed by Marleni Meza RN: 11/16/23  Date copy of fully signed consent was returned to Justen Regalado: 11/17/23  HIPAA authorization signed?: 11/16/23  HIPAA authorization version date: 04/08/22    Marleni Meza RN  Clinical Research Coordinator RN  [facility], [facility]   Clinical Trials Office  Phone: [phone]   Pager: [phone]

## 2023-11-22 NOTE — PROGRESS NOTES
Virtual Visit Details    Type of service:  Video Visit   22  minutes    Originating Location (pt. Location): Home    Distant Location (provider location):  On-site  Platform used for Video Visit: Wanda                  Follow Up Notes on Referred Patient    Date: 2023       Dr. Livingston referring provider defined for this encounter.       RE: Taran Regalado  : 1955  GRACY: 2023    Taran Regalado is a 68 year old woman with a diagnosis of metastatic ovarian high grade serous carcinoma. She is here today for follow up and disease management. She has been tolerating chemotherapy well overall.  Since we have seen her recently she unfortunately was emergency months for nausea and vomiting.  Of note also her liver enzymes have increased to 3 times the upper limit of normal.  Which at this point would have to make her no qualified for the clinical trial.       Oncology History:  12/3/2020: US Pelvic: IMPRESSION: Limited examination due to acoustic windows. Possible left adnexal mass. A CT scan of the abdomen and pelvis with contrast is recommended for further assessment.     2020: CT A/P:   IMPRESSION:    Peritoneal carcinomatosis with masslike peritoneal thickening in the lower pelvis which may indicate an adnexal or ovarian primary malignancy. Large volume ascites. Bilateral pleural effusions. There is potential subtle pleural nodularity in the right hemithorax which could indicate metastatic disease.  Indeterminate 1 cm lesion in the right hepatic lobe suspicious for a metastatic lesion.      2020: Presented to [facility] with abdominal distention, 25lb weight loss, and CTAP with carcinomatosis, elevated  3098.     2020: CT Chest: IMPRESSION:   1. There are few scattered small sub-6 mm pulmonary nodules which are indeterminate without prior comparisons available. There are a few  slightly larger perifissural nodules which are technically  indeterminate in the setting of malignancy  although presumed lymphatic in nature and of unlikely clinical significance. Attention on follow-up is recommended.  2. Small to moderate left and small right pleural effusions are increased in size from prior. No convincing evidence for pleural nodularity.  3. Partially visualized large volume ascites and peritoneal nodularity in the upper abdomen similar to 12/4/2020 outside CT      12/26/2020: ED for abdominal distension; 3 L ascites drained with paracentesis    Pelvic US: Findings: Free fluid present in LLQ      12/31/2020: US Paracentesis: 900 mL ascites drained     1/7/2021: Diagnotic laparoscopy, biopsies  Pathology: FINAL DIAGNOSIS:   A. PERITONEUM, BIOPSIES:   - Positive for high grade carcinoma, consistent with metastatic carcinoma of Mullerian origin.     1/10-1/13/2021: Hospital admission for postoperative non-intractable vomiting and nausea.      1/10/2021: CT A/P: IMPRESSION: Extensive ascites which is probably malignant. Scattered liver hypodensities of indeterminate etiology comment cannot exclude metastatic disease. Diverticulosis. Fluid-filled adnexal masses and irregular appearance of uterus, which may represent primary neoplasm. Multiple peritoneal nodules. Large amount of fecal material in the colon with no evidence of small bowel obstruction.     Plan: Paclitaxel 175 mg/m2 and carboplatin AUC 6 x 3 cycles followed by a CT CAP and visit with Dr. Juarez.     1/12/2021: Cycle 1 paclitaxel and carboplatin while inpatient     1/13/2021: CT Head: Impression:  1. Chronic sinusitis of the right maxillary and right sphenoid sinuses.  2. Incidental presumed calcified meningioma in the right frontal  convexity without significant mass effect.  3. No suspicious intracranial enhancing lesion.     2/1/2021: Cycle 2 paclitaxel and carboplatin.  936.     2/3-2/5/21: Admission [facility] for afib w/ RVR and new PE     2/26/21: Cycle 3 paclitaxel and carboplatin planned.  Deferred due to thrombocytopenia. CA  125 pending.     3/15/21: Cycle 3 paclitaxel and carboplatin given     4/19/21: HYSTERECTOMY, TOTAL, ABDOMINAL, WITH BILATERAL SALPINGO-OOPHORECTOMY, omentectomy, NEOPLASM DEBULKING,Proctoscocy, RO, Resection of liver nodules, diaphragm stripping, immobilization of liver and colon  FINAL DIAGNOSIS:   A. OMENTUM, BIOPSY:   - Omental adipose tissue with rare viable cells of metastatic high grade   serous carcinoma   - One reactive lymph node, negative for malignancy (0/1)   B. NODULE, SIGMOID, EXCISION:   - Calcified necrotic adipose tissue   - Negative for malignancy   C. NODULE, SMALL BOWEL MESENTERY, EXCISION:   - Fibroadipose tissue, positive for metastatic high grade serous carcinoma   D. UTERUS, CERVIX, BILATERAL FALLOPIAN TUBES AND OVARIES, HYSTERECTOMY   WITH BILATERAL SALPINGO-OOPHORECTOMY:   - Atrophic endometrium   - Uterine serosa with rare viable cells consistent with high grade serous   carcinoma   - Cervix with atrophic changes   - Viable cells consistent with high grade serous carcinoma present in the   right ovary, serosa of right   fallopian tube and right periadnexal soft tissue   - Left ovary with atrophic changes   - Left fallopian tube with a rare focus of serous tubal in-situ carcinoma   (STIC)   E. NODULES, SMALL BOWEL MESENTRY, EXCISION:   - Fibroadipose tissue with rare viable cells of metastatic high grade   serous carcinoma   F. NODULE, SPLENIC FLEXURE TRANSVERSE COLON, EXCISION:   - Fibroadipose tissue with rare viable cells of metastatic high grade   serous carcinoma   - Accessory splenule, negative for malignancy   G. OMENTUM, OMENTECTOMY:   - Omental adipose tissue with rare viable cells of metastatic high grade   serous carcinoma   H. NODULE, PERITONEAL PANCREATIC, EXCISION:   - Fibrous adhesions with inflammation   - Negative for malignancy   I. RIGHT HEMIDIAPHRAGM PERITONEUM, EXCISION:   - Fibrous adhesions with inflammation   - Negative for malignancy   J. RIGHT LIVER SURFACE  NODULE:   - Fibrous adhesions with benign inclusion glands   - Negative for malignancy   K. LEFT LOWER LIVER EDGE, BIOPSY:   - Cauterized hepatic parenchyma and capsule   - Negative for malignancy   L. NODULE, SMALL BOWEL MESENTERIC #3, EXCISION:   - Fibroadipose tissue with rare viable cells of metastatic high grade   serous carcinoma       Plan: Carboplatin AUC 6 + Taxol 175 mg/m2 x 3 cycles, then transition to Parp inhibitor for maintenance therapy given her BRCA1 germline mutation.      5/21/21: Cycle 4 carboplatin and paclitaxel.   172.  6/11/21: Cycle 5 carboplatin and paclitaxel.   61.  7/2/21: Cycle 6 carboplatin and paclitaxel.   20.        7/28/21 plan: Olaparib 300mg bid as starting dose,  14  8/20/21: start date olaparib 300 mg BID,  12  9/13/21:  22  10/4/21:  23  11/1/21:  26     11/02/2021: PET CT: IMPRESSION:   Findings compatible with interval surgery and posttreatment change.  No gross definitive FDG avid disease.  Potential foci of tumor deposits along the anterior dome of the liver and midline abdominal wall surgical scar.  Colonic activity is not necessarily abnormal, however, given the previous carcinomatosis the colonic activity is indeterminant.         12/1/21:  23  1/3/22:  21  2/1/22:  20  3/1/22:  21  4/1/22:  23  5/4/22:  28     EXAM: CT CHEST/ABDOMEN/PELVIS W CONTRAST  LOCATION: [facility]  DATE/TIME: 7/11/2022 1:25 PM     INDICATION: Stage III B high-grade ovarian carcinoma diagnosed Jan 2021. Posttreatment surveillance.  COMPARISON: CTA AP 04/23/2021, CT CAP 04/02/2021  TECHNIQUE: CT scan of the chest, abdomen, and pelvis was performed following injection of IV contrast. Multiplanar reformats were obtained. Dose reduction techniques were used.   CONTRAST: isovue 370 105mL IV; 50mL omni 140 oral     FINDINGS:   LUNGS AND PLEURA: No suspicious pulmonary nodules. Minimal right  apical pleural thickening and a few punctate tiny nodules 2 of which are subpleural on the right are stable. No pleural effusions.     MEDIASTINUM/AXILLAE: No adenopathy. No central pulmonary emboli.     CORONARY ARTERY CALCIFICATION: Cannot evaluate.     HEPATOBILIARY: Normal.     PANCREAS: Normal.     SPLEEN: Normal.     ADRENAL GLANDS: Normal.     KIDNEYS/BLADDER: Normal.     BOWEL: Sliver of ascites adjacent to the spleen noted, decreased from prior study. There is a 4 mm nodule in the gastrosplenic ligament (image 352). On the preop study it appears as a smaller punctate nodule (prior image 341). Sliver of ascites in the   gastrohepatic ligament also noted. No peritoneal tumor nodules. No bowel obstruction. Quite redundant colon with mild large stool burden. Extensive distal colonic diverticulosis.     LYMPH NODES: Normal.     VASCULATURE: Mild arterial calcifications. Circumaortic left renal vein.     PELVIC ORGANS: Hysterectomy. Vaginal cuff is normal.     MUSCULOSKELETAL: Diastases of the midline rectus sheath above the umbilicus. Minimal nodular scarring to the left of midline in the midabdomen (image 419). There is a shallow broad-based supraumbilical ventral hernia containing only fat. No implants   within the hernia or abdominal incision. No suspicious bone lesions.                                                                      IMPRESSION:  1.  Sliver of ascites in the upper abdomen has decreased since interval debulking surgery.  2.  There is a punctate nodule in the gastrosplenic ligament which is minimally more plump relative to the preop exam. This will need to be followed.  3.  Minimal nodular changes to the left of the midline scar in the subcutaneous fat will have to be followed as well. Unclear if this simply reflects postoperative scarring or could reflect an early incisional recurrence.  4.  No other sites to suggest recurrent tumor. Vaginal cuff is normal.  5.  Extensive distal colonic  diverticulosis.  6.  Other noncritical findings as noted above.      9/16/22  98     1/30/23 CT CAP:    IMPRESSION:  1.  Multiple new, hypoattenuating lesions in the liver, suspicious for hepatic metastatic disease.  2.  Necrotic mario hepatic lymphadenopathy, concerning for richard metastatic disease.  3.  There is a new or increasingly conspicuous 6 mm soft tissue nodule in the right lower quadrant. This is indeterminate.  4.  There is a new 3 mm solid nodule in the right upper lobe, indeterminate.  5.  Stable approximate 4 mm punctate nodule along the gastrosplenic ligament.  6.  Similar area of linear free fluid in the upper abdomen anterior to the stomach.     Plan: Paclitaxel 175 mg/m2, Carboplatin AUC 6, bevacizumab 7.5 mg/kg     3/1/23: Cycle #1 Paclitaxel 175 mg/m2, Carboplatin AUC 6 (C7), bevacizumab 7.5 mg/kg.  1,196  3/24/23: Cycle #2 Paclitaxel 150 mg/m2, Carboplatin AUC 5 (C8), bevacizumab 15 mg/kg.  558     3/29/23: ER for dehydration and hypotension. Normotensive in ER. IV fluids given. Discharged.      4/14/23: Cycle #3 Paclitaxel 150 mg/m2, Carboplatin AUC 5 (C9), bevacizumab 15 mg/kg deferred neutropenia ANC 0.3   273  4/21/23: treatment deferred ANC 0.8  4/28/23: Cycle #3 Paclitaxel 150 mg/m2, Carboplatin AUC 5 (C9), bevacizumab 15 mg/kg, + Neulasta deferred ANC 1.0,  297     5/26/23: Cycle #4  Paclitaxel 150 mg/m2, Carboplatin AUC 5 (C10), bevacizumab 15 mg/kg, + Neulasta, deferred ANC 0.6  188. No hematology consult per MD.      6/2/23: Cycle #4 Paclitaxel 150 mg/m2, Carboplatin AUC 5 (C9), bevacizumab 15 mg/kg, + Neulasta   6/23/23: Cycle #5 deferred neutropenia ANC 0.5 thrombocytopenia platelets 85     6/26/2023 Addendum: Reduce both Carboplatin and Paclitaxel due to thrombocytopenia and persistent neutropenia. REFER to Hematology to rule out MDS. Message sent to pt. Orders placed.      7/3/23 plan: continue with 2 more cycles with carboplatin AUC of 4, Taxol  at 135 mg per metered square, bevacizumab at 15 mg/kg as well as neulasta for bone marrow support.      7/3/23: Cycle #5 Paclitaxel 135 mg/m2, Carboplatin AUC 4, bevacizumab 15 mg/kg, +Neulasta.  375     7/11/23: per chart review Dr. Cogan with Hematology plan one more cycle of chemotherapy then maintenance Avastin     7/28/23: Cycle #6 Paclitaxel 135 mg/m2, Carboplatin AUC 4, bevacizumab 15 mg/kg, +Neulasta.  370     8/17/2023: CT CAP: Stable bilateral pulmonary micronodules. Multiple subcentimeter hypodense hepatic lesions, a few are slightly less conspicuous. Necrotic lymph nodes in mario hepatis are stable. A few small peritoneal nodules in the left upper quadrant. Anterior to the pylorus is an oval 2.4 cm hypodense soft tissue lesion which is stable.    8/17/2023: started maintenance bevacizumab q3 weeks.  370.    8/25/2023:  450.    9/19/2023:  1056. Alk phos 221, ALT 86, AST 63.    9/21/2023: Transferred her care from [facility] to [facility] to be closer to home. Plan is maintenance bevacizumab 15 mg/kg every 3 weeks to give her a treatment break from myelosuppressive chemotherapy.    10/10/2023:  1889, alk phos 388, , AST 83  10/12/2023: Bevacizumab held for elevated LFTs, symptoms of new back pain, cough, sinusitis symptoms.    10/18/2023: CT CAP: Progressive liver metastasis. Small nonspecific right lower lobe pulmonary nodule. Inflammatory process or a metastatic nodule remain possible. This does not have the characteristic appearance of metastasis, however, remains indeterminant.    Past Medical History:    Past Medical History:   Diagnosis Date    Atrial fibrillation with rapid ventricular response (H)     History of cold sores     Hx of LASIK 12/11/2017    Insomnia     Migraine     Osteopenia     Pelvic mass     Peritoneal carcinomatosis (H)     Restless legs syndrome (RLS)          Past Surgical History:    Past Surgical History:   Procedure Laterality  Date    APPENDECTOMY      ARTHROSCPY KNEE SURGICAL DEBRIDEMENT SHAVING ARTICULAR CARTILAGE Right     BIOPSY  January 2021    Biopsy to confirm ovarian cancer    DEBRIDEMENT LEFT UPPER EXTREMITY  2016    ENDOSCOPIC RETROGRADE CHOLANGIOPANCREATOGRAM N/A 12/15/2023    Procedure: ENDOSCOPIC RETROGRADE CHOLANGIOPANCREATOGRAPHY, with pancreatic stent placement, biliary duct dilation, biliary stent placement, and biliary sphincterotomy;  Surgeon: Fabian Simpson MD;  Location: [facility] OR    HYSTERECTOMY TOTAL ABD, LUISITO SALPINGO-OOPHORECTOMY, NODE DISSECTION, TUMOR DEBULKING, COMBINED Bilateral 4/19/2021    Procedure: HYSTERECTOMY, TOTAL, ABDOMINAL, WITH BILATERAL SALPINGO-OOPHORECTOMY, omentectomy, NEOPLASM DEBULKING,Proctoscocy, RO, Resection of liver nodules, diaphragm stripping, immobilization of liver and colon;  Surgeon: Bolivar Juarez MD;  Location: [facility] OR    LAPAROSCOPY DIAGNOSTIC (GYN) Bilateral 1/7/2021    Procedure: Diagnsotic laparoscopy, biopsies;  Surgeon: Bolivar Juarez MD;  Location: [facility] OR    LASIK      TUBAL LIGATION           Health Maintenance Due   Topic Date Due    DEXA  Never done    ADVANCE CARE PLANNING  Never done    COLORECTAL CANCER SCREENING  Never done    HEPATITIS C SCREENING  Never done    LIPID  Never done    RSV VACCINE (Pregnancy & 60+) (1 - 1-dose 60+ series) Never done    MEDICARE ANNUAL WELLNESS VISIT  11/11/2021    Pneumococcal Vaccine: 65+ Years (2 of 2 - PPSV23 or PCV20) 04/05/2022    PHQ-2 (once per calendar year)  01/01/2023    INFLUENZA VACCINE (1) Never done    COVID-19 Vaccine (4 - 2023-24 season) 09/01/2023       Current Medications:     Current Outpatient Medications   Medication Sig Dispense Refill    ondansetron (ZOFRAN) 4 MG tablet Take 2 tablets (8 mg) by mouth every 8 hours as needed for nausea 30 tablet 3    BEVACIZUMAB, AVASTIN, INJECTION 2.5MG Every other week (Tuesday)      cyclobenzaprine (FLEXERIL) 5 MG tablet Take 1 tablet (5 mg) by mouth 3 times daily as needed  for muscle spasms 10 tablet 0    fluticasone (FLONASE) 50 MCG/ACT nasal spray Spray 1 spray into both nostrils as needed      HYDROmorphone (DILAUDID) 2 MG tablet Take 1 tablet (2 mg) by mouth every 6 hours as needed for pain 10 tablet 0    meclizine (ANTIVERT) 25 MG tablet Take 1 tablet (25 mg) by mouth 3 times daily as needed for dizziness or nausea 15 tablet 0    metoclopramide (REGLAN) 10 MG tablet Take 1 tablet (10 mg) by mouth 3 times a day as needed for nausea      metoclopramide (REGLAN) 5 MG tablet Take 1 tablet (5 mg) by mouth 3 times daily as needed (nausea and vomiting) 30 tablet 0    prochlorperazine (COMPAZINE) 10 MG tablet Take 1 tablet (10 mg) by mouth 4 times a day as needed for nausea or vomiting      rivaroxaban ANTICOAGULANT (XARELTO ANTICOAGULANT) 20 MG TABS tablet Take 1 tablet (20 mg) by mouth every morning 90 tablet 1    SUMAtriptan (IMITREX) 100 MG tablet Take 1 tablet (100 mg) by mouth at onset of headache for migraine 15 tablet 0    traMADol (ULTRAM) 50 MG tablet take 1 tablet by mouth every 6 hours as needed for moderate pain      valACYclovir (VALTREX) 1000 mg tablet Take 1,000 mg by mouth as needed      zolpidem (AMBIEN) 10 MG tablet Take 10 mg by mouth every evening           Allergies:      No Known Allergies     Social History:     Social History     Tobacco Use    Smoking status: Former     Packs/day: 0.50     Years: 40.00     Additional pack years: 0.00     Total pack years: 20.00     Types: Cigarettes     Quit date:      Years since quittin.9    Smokeless tobacco: Never   Substance Use Topics    Alcohol use: Not Currently       History   Drug Use Unknown         Family History:       Family History   Adopted: Yes   Problem Relation Age of Onset    Cancer Mother 36    Other Cancer Mother         Bio mother  of  a female cancer  at 36    Factor V Leiden deficiency Daughter     Deep Vein Thrombosis Daughter     Diabetes Type 1 Daughter     Diabetes Daughter      Hypertension Daughter     Anesthesia Reaction No family hx of          Physical Exam:     Ht 1.829 m (6')   Wt 78 kg (172 lb)   BMI 23.33 kg/m    Body mass index is 23.33 kg/m .    General Appearance:  healthy and alert, no distress                 Psychiatric:      appropriate mood and affect                                     Assessment:     Taran Regalado is a 68 year old woman with recurrent ovarian high grade serous carcinoma.     30 minute visit with review of her record and counseling.        1.  Recurrent high grade serous ovarian cancer.   2.  BRCA 1 germline mutation.   3.  [facility] Program  4.  PE resolved on Lovenox (prophy)  5.  Left ankle injury  6.   5231  7.  Elevated liver enzymes      We did discuss the scan and increased nausea as well as elevated liver enzymes is consistent with progressive disease.  We discussed she does not currently qualify for  the TORL 1-23 trial.  I would recommend to proceed with 3 cycles of weekly Taxol and dose of 80 mg/m .  In addition we will plan to add bevacizumab at 10 mg/kg every 2 weeks.  1 cycle is 28 days long.  Taxol will be administered at week 1 2 and 3 and not week 4.  This will help her to recover in between.  We will plan a follow-up CT scan after 3 cycles.  And then possible proceeding with a clinical trial.  Patient and her family agree with this plan of action for care all questions were answered.           Bolivar Juarez MD, MS    Department of Obstetrics and Gynecology   Division of Gynecologic Oncology   [facility]  Phone: [phone]       CC  Patient Care Team:  Bolivar Juarez MD as PCP - General (Gynecologic Oncology)  Marta Okeefe APRN CNP as Assigned PCP  Marta Lao APRN CNP as Assigned Cancer Care Provider  Cogan, Jacob, MD as Physician (Hematology & Oncology)

## 2023-11-22 NOTE — NURSING NOTE
No other vitals to report today      Is the patient currently in the state of [place]? YES    Visit mode:VIDEO    If the visit is dropped, the patient can be reconnected by: VIDEO VISIT: Text to cell phone:   Telephone Information:   Mobile [phone]       Will anyone else be joining the visit? Possibly pts daughter Edie  (If patient encounters technical issues they should call [phone]    How would you like to obtain your AVS? MyChart    Are changes needed to the allergy or medication list? N/A    Patient declined individual allergy and medication review by support staff because patient denies any changes since echeck-in completion and states all information entered during echeck-in remains accurate.    Reason for visit: KONG Mendez MA VVF

## 2023-11-29 NOTE — PROGRESS NOTES
Follow Up Notes on Referred Patient    Date: 2023     Phone visit 30 minutes.    Dr. Livingston referring provider defined for this encounter.       RE: Taran Regalado  : 1955  GRACY: 2023    Taran Regalado is a 68 year old woman with a diagnosis of metastatic ovarian high grade serous carcinoma. She is here today for follow up and disease management. She has been tolerating chemotherapy well overall.  Since we have seen her recently she unfortunately was emergency months for nausea and vomiting.  Of note also her liver enzymes have increased to 3 times the upper limit of normal.  Which at this point would have to make her no qualified for the clinical trial.      Oncology History:  12/3/2020: US Pelvic: IMPRESSION: Limited examination due to acoustic windows. Possible left adnexal mass. A CT scan of the abdomen and pelvis with contrast is recommended for further assessment.     2020: CT A/P:   IMPRESSION:    Peritoneal carcinomatosis with masslike peritoneal thickening in the lower pelvis which may indicate an adnexal or ovarian primary malignancy. Large volume ascites. Bilateral pleural effusions. There is potential subtle pleural nodularity in the right hemithorax which could indicate metastatic disease.  Indeterminate 1 cm lesion in the right hepatic lobe suspicious for a metastatic lesion.      2020: Presented to [facility] with abdominal distention, 25lb weight loss, and CTAP with carcinomatosis, elevated  3098.     2020: CT Chest: IMPRESSION:   1. There are few scattered small sub-6 mm pulmonary nodules which are indeterminate without prior comparisons available. There are a few  slightly larger perifissural nodules which are technically  indeterminate in the setting of malignancy although presumed lymphatic in nature and of unlikely clinical significance. Attention on follow-up is recommended.  2. Small to moderate left and small right pleural effusions are  increased in size from prior. No convincing evidence for pleural nodularity.  3. Partially visualized large volume ascites and peritoneal nodularity in the upper abdomen similar to 12/4/2020 outside CT      12/26/2020: ED for abdominal distension; 3 L ascites drained with paracentesis    Pelvic US: Findings: Free fluid present in LLQ      12/31/2020: US Paracentesis: 900 mL ascites drained     1/7/2021: Diagnotic laparoscopy, biopsies  Pathology: FINAL DIAGNOSIS:   A. PERITONEUM, BIOPSIES:   - Positive for high grade carcinoma, consistent with metastatic carcinoma of Mullerian origin.     1/10-1/13/2021: Hospital admission for postoperative non-intractable vomiting and nausea.      1/10/2021: CT A/P: IMPRESSION: Extensive ascites which is probably malignant. Scattered liver hypodensities of indeterminate etiology comment cannot exclude metastatic disease. Diverticulosis. Fluid-filled adnexal masses and irregular appearance of uterus, which may represent primary neoplasm. Multiple peritoneal nodules. Large amount of fecal material in the colon with no evidence of small bowel obstruction.     Plan: Paclitaxel 175 mg/m2 and carboplatin AUC 6 x 3 cycles followed by a CT CAP and visit with Dr. Juarez.     1/12/2021: Cycle 1 paclitaxel and carboplatin while inpatient     1/13/2021: CT Head: Impression:  1. Chronic sinusitis of the right maxillary and right sphenoid sinuses.  2. Incidental presumed calcified meningioma in the right frontal  convexity without significant mass effect.  3. No suspicious intracranial enhancing lesion.     2/1/2021: Cycle 2 paclitaxel and carboplatin.  936.     2/3-2/5/21: Admission [facility] for afib w/ RVR and new PE     2/26/21: Cycle 3 paclitaxel and carboplatin planned.  Deferred due to thrombocytopenia.  pending.     3/15/21: Cycle 3 paclitaxel and carboplatin given     4/19/21: HYSTERECTOMY, TOTAL, ABDOMINAL, WITH BILATERAL SALPINGO-OOPHORECTOMY, omentectomy, NEOPLASM  DEBULKING,Proctoscocy, RO, Resection of liver nodules, diaphragm stripping, immobilization of liver and colon  FINAL DIAGNOSIS:   A. OMENTUM, BIOPSY:   - Omental adipose tissue with rare viable cells of metastatic high grade   serous carcinoma   - One reactive lymph node, negative for malignancy (0/1)   B. NODULE, SIGMOID, EXCISION:   - Calcified necrotic adipose tissue   - Negative for malignancy   C. NODULE, SMALL BOWEL MESENTERY, EXCISION:   - Fibroadipose tissue, positive for metastatic high grade serous carcinoma   D. UTERUS, CERVIX, BILATERAL FALLOPIAN TUBES AND OVARIES, HYSTERECTOMY   WITH BILATERAL SALPINGO-OOPHORECTOMY:   - Atrophic endometrium   - Uterine serosa with rare viable cells consistent with high grade serous   carcinoma   - Cervix with atrophic changes   - Viable cells consistent with high grade serous carcinoma present in the   right ovary, serosa of right   fallopian tube and right periadnexal soft tissue   - Left ovary with atrophic changes   - Left fallopian tube with a rare focus of serous tubal in-situ carcinoma   (STIC)   E. NODULES, SMALL BOWEL MESENTRY, EXCISION:   - Fibroadipose tissue with rare viable cells of metastatic high grade   serous carcinoma   F. NODULE, SPLENIC FLEXURE TRANSVERSE COLON, EXCISION:   - Fibroadipose tissue with rare viable cells of metastatic high grade   serous carcinoma   - Accessory splenule, negative for malignancy   G. OMENTUM, OMENTECTOMY:   - Omental adipose tissue with rare viable cells of metastatic high grade   serous carcinoma   H. NODULE, PERITONEAL PANCREATIC, EXCISION:   - Fibrous adhesions with inflammation   - Negative for malignancy   I. RIGHT HEMIDIAPHRAGM PERITONEUM, EXCISION:   - Fibrous adhesions with inflammation   - Negative for malignancy   J. RIGHT LIVER SURFACE NODULE:   - Fibrous adhesions with benign inclusion glands   - Negative for malignancy   K. LEFT LOWER LIVER EDGE, BIOPSY:   - Cauterized hepatic parenchyma and capsule   -  Negative for malignancy   L. NODULE, SMALL BOWEL MESENTERIC #3, EXCISION:   - Fibroadipose tissue with rare viable cells of metastatic high grade   serous carcinoma       Plan: Carboplatin AUC 6 + Taxol 175 mg/m2 x 3 cycles, then transition to Parp inhibitor for maintenance therapy given her BRCA1 germline mutation.      5/21/21: Cycle 4 carboplatin and paclitaxel.   172.  6/11/21: Cycle 5 carboplatin and paclitaxel.   61.  7/2/21: Cycle 6 carboplatin and paclitaxel.   20.        7/28/21 plan: Olaparib 300mg bid as starting dose,  14  8/20/21: start date olaparib 300 mg BID,  12  9/13/21:  22  10/4/21:  23  11/1/21:  26     11/02/2021: PET CT: IMPRESSION:   Findings compatible with interval surgery and posttreatment change.  No gross definitive FDG avid disease.  Potential foci of tumor deposits along the anterior dome of the liver and midline abdominal wall surgical scar.  Colonic activity is not necessarily abnormal, however, given the previous carcinomatosis the colonic activity is indeterminant.         12/1/21:  23  1/3/22:  21  2/1/22:  20  3/1/22:  21  4/1/22:  23  5/4/22:  28     EXAM: CT CHEST/ABDOMEN/PELVIS W CONTRAST  LOCATION: [facility]  DATE/TIME: 7/11/2022 1:25 PM     INDICATION: Stage III B high-grade ovarian carcinoma diagnosed Jan 2021. Posttreatment surveillance.  COMPARISON: CTA AP 04/23/2021, CT CAP 04/02/2021  TECHNIQUE: CT scan of the chest, abdomen, and pelvis was performed following injection of IV contrast. Multiplanar reformats were obtained. Dose reduction techniques were used.   CONTRAST: isovue 370 105mL IV; 50mL omni 140 oral     FINDINGS:   LUNGS AND PLEURA: No suspicious pulmonary nodules. Minimal right apical pleural thickening and a few punctate tiny nodules 2 of which are subpleural on the right are stable. No pleural effusions.     MEDIASTINUM/AXILLAE: No adenopathy. No  central pulmonary emboli.     CORONARY ARTERY CALCIFICATION: Cannot evaluate.     HEPATOBILIARY: Normal.     PANCREAS: Normal.     SPLEEN: Normal.     ADRENAL GLANDS: Normal.     KIDNEYS/BLADDER: Normal.     BOWEL: Sliver of ascites adjacent to the spleen noted, decreased from prior study. There is a 4 mm nodule in the gastrosplenic ligament (image 352). On the preop study it appears as a smaller punctate nodule (prior image 341). Sliver of ascites in the   gastrohepatic ligament also noted. No peritoneal tumor nodules. No bowel obstruction. Quite redundant colon with mild large stool burden. Extensive distal colonic diverticulosis.     LYMPH NODES: Normal.     VASCULATURE: Mild arterial calcifications. Circumaortic left renal vein.     PELVIC ORGANS: Hysterectomy. Vaginal cuff is normal.     MUSCULOSKELETAL: Diastases of the midline rectus sheath above the umbilicus. Minimal nodular scarring to the left of midline in the midabdomen (image 419). There is a shallow broad-based supraumbilical ventral hernia containing only fat. No implants   within the hernia or abdominal incision. No suspicious bone lesions.                                                                      IMPRESSION:  1.  Sliver of ascites in the upper abdomen has decreased since interval debulking surgery.  2.  There is a punctate nodule in the gastrosplenic ligament which is minimally more plump relative to the preop exam. This will need to be followed.  3.  Minimal nodular changes to the left of the midline scar in the subcutaneous fat will have to be followed as well. Unclear if this simply reflects postoperative scarring or could reflect an early incisional recurrence.  4.  No other sites to suggest recurrent tumor. Vaginal cuff is normal.  5.  Extensive distal colonic diverticulosis.  6.  Other noncritical findings as noted above.      9/16/22  98     1/30/23 CT CAP:    IMPRESSION:  1.  Multiple new, hypoattenuating lesions in the  liver, suspicious for hepatic metastatic disease.  2.  Necrotic mario hepatic lymphadenopathy, concerning for richard metastatic disease.  3.  There is a new or increasingly conspicuous 6 mm soft tissue nodule in the right lower quadrant. This is indeterminate.  4.  There is a new 3 mm solid nodule in the right upper lobe, indeterminate.  5.  Stable approximate 4 mm punctate nodule along the gastrosplenic ligament.  6.  Similar area of linear free fluid in the upper abdomen anterior to the stomach.     Plan: Paclitaxel 175 mg/m2, Carboplatin AUC 6, bevacizumab 7.5 mg/kg     3/1/23: Cycle #1 Paclitaxel 175 mg/m2, Carboplatin AUC 6 (C7), bevacizumab 7.5 mg/kg.  1,196  3/24/23: Cycle #2 Paclitaxel 150 mg/m2, Carboplatin AUC 5 (C8), bevacizumab 15 mg/kg.  558     3/29/23: ER for dehydration and hypotension. Normotensive in ER. IV fluids given. Discharged.      4/14/23: Cycle #3 Paclitaxel 150 mg/m2, Carboplatin AUC 5 (C9), bevacizumab 15 mg/kg deferred neutropenia ANC 0.3   273  4/21/23: treatment deferred ANC 0.8  4/28/23: Cycle #3 Paclitaxel 150 mg/m2, Carboplatin AUC 5 (C9), bevacizumab 15 mg/kg, + Neulasta deferred ANC 1.0,  297     5/26/23: Cycle #4  Paclitaxel 150 mg/m2, Carboplatin AUC 5 (C10), bevacizumab 15 mg/kg, + Neulasta, deferred ANC 0.6  188. No hematology consult per MD.      6/2/23: Cycle #4 Paclitaxel 150 mg/m2, Carboplatin AUC 5 (C9), bevacizumab 15 mg/kg, + Neulasta   6/23/23: Cycle #5 deferred neutropenia ANC 0.5 thrombocytopenia platelets 85     6/26/2023 Addendum: Reduce both Carboplatin and Paclitaxel due to thrombocytopenia and persistent neutropenia. REFER to Hematology to rule out MDS. Message sent to pt. Orders placed.      7/3/23 plan: continue with 2 more cycles with carboplatin AUC of 4, Taxol at 135 mg per metered square, bevacizumab at 15 mg/kg as well as neulasta for bone marrow support.      7/3/23: Cycle #5 Paclitaxel 135 mg/m2, Carboplatin AUC 4,  bevacizumab 15 mg/kg, +Neulasta.  375     7/11/23: per chart review Dr. Cogan with Hematology plan one more cycle of chemotherapy then maintenance Avastin     7/28/23: Cycle #6 Paclitaxel 135 mg/m2, Carboplatin AUC 4, bevacizumab 15 mg/kg, +Neulasta.  370     8/17/2023: CT CAP: Stable bilateral pulmonary micronodules. Multiple subcentimeter hypodense hepatic lesions, a few are slightly less conspicuous. Necrotic lymph nodes in mario hepatis are stable. A few small peritoneal nodules in the left upper quadrant. Anterior to the pylorus is an oval 2.4 cm hypodense soft tissue lesion which is stable.    8/17/2023: started maintenance bevacizumab q3 weeks.  370.    8/25/2023:  450.    9/19/2023:  1056. Alk phos 221, ALT 86, AST 63.    9/21/2023: Transferred her care from [facility] to [facility] to be closer to home. Plan is maintenance bevacizumab 15 mg/kg every 3 weeks to give her a treatment break from myelosuppressive chemotherapy.    10/10/2023:  1889, alk phos 388, , AST 83  10/12/2023: Bevacizumab held for elevated LFTs, symptoms of new back pain, cough, sinusitis symptoms.    10/18/2023: CT CAP: Progressive liver metastasis. Small nonspecific right lower lobe pulmonary nodule. Inflammatory process or a metastatic nodule remain possible. This does not have the characteristic appearance of metastasis, however, remains indeterminant.       Past Medical History:    Past Medical History:   Diagnosis Date    Atrial fibrillation with rapid ventricular response (H)     History of cold sores     Hx of LASIK 12/11/2017    Insomnia     Migraine     Osteopenia     Pelvic mass     Peritoneal carcinomatosis (H)     Restless legs syndrome (RLS)          Past Surgical History:    Past Surgical History:   Procedure Laterality Date    APPENDECTOMY      ARTHROSCPY KNEE SURGICAL DEBRIDEMENT SHAVING ARTICULAR CARTILAGE Right     BIOPSY  January 2021    Biopsy to confirm ovarian cancer     DEBRIDEMENT LEFT UPPER EXTREMITY  2016    HYSTERECTOMY TOTAL ABD, LUISITO SALPINGO-OOPHORECTOMY, NODE DISSECTION, TUMOR DEBULKING, COMBINED Bilateral 4/19/2021    Procedure: HYSTERECTOMY, TOTAL, ABDOMINAL, WITH BILATERAL SALPINGO-OOPHORECTOMY, omentectomy, NEOPLASM DEBULKING,Proctoscocy, RO, Resection of liver nodules, diaphragm stripping, immobilization of liver and colon;  Surgeon: Bolivar Juarez MD;  Location: [facility] OR    LAPAROSCOPY DIAGNOSTIC (GYN) Bilateral 1/7/2021    Procedure: Diagnsotic laparoscopy, biopsies;  Surgeon: Bolivar Juarez MD;  Location: [facility] OR    LASIK      TUBAL LIGATION           Health Maintenance Due   Topic Date Due    DEXA  Never done    ADVANCE CARE PLANNING  Never done    COLORECTAL CANCER SCREENING  Never done    HEPATITIS C SCREENING  Never done    LIPID  Never done    RSV VACCINE (Pregnancy & 60+) (1 - 1-dose 60+ series) Never done    MEDICARE ANNUAL WELLNESS VISIT  11/11/2021    Pneumococcal Vaccine: 65+ Years (2 - PPSV23 or PCV20) 04/05/2022    PHQ-2 (once per calendar year)  01/01/2023    INFLUENZA VACCINE (1) Never done    COVID-19 Vaccine (4 - 2023-24 season) 09/01/2023       Current Medications:     Current Outpatient Medications   Medication Sig Dispense Refill    amitriptyline (ELAVIL) 100 MG tablet Take 1 tablet (100 mg) by mouth At Bedtime 90 tablet 0    fluticasone (FLONASE) 50 MCG/ACT nasal spray       gabapentin (NEURONTIN) 600 MG tablet Take 1 tablet (600 mg) by mouth At Bedtime (Patient not taking: Reported on 6/22/2023) 90 tablet 0    HYDROmorphone (DILAUDID) 2 MG tablet Take 1 tablet (2 mg) by mouth every 6 hours as needed for pain 10 tablet 0    meclizine (ANTIVERT) 25 MG tablet Take 1 tablet (25 mg) by mouth 3 times daily as needed for dizziness or nausea 15 tablet 0    ondansetron (ZOFRAN) 4 MG tablet Take 2 tablets (8 mg) by mouth every 8 hours as needed for nausea 30 tablet 3    oxyCODONE (ROXICODONE) 5 MG tablet Take 1 tablet (5 mg) by mouth every 12  hours 10 tablet 0    rivaroxaban ANTICOAGULANT (XARELTO ANTICOAGULANT) 20 MG TABS tablet Take 1 tablet (20 mg) by mouth every morning 90 tablet 1    SUMAtriptan (IMITREX) 100 MG tablet Take 1 tablet (100 mg) by mouth at onset of headache for migraine 15 tablet 0    valACYclovir (VALTREX) 1000 mg tablet Take 1,000 mg by mouth as needed      zolpidem (AMBIEN) 10 MG tablet Take 10 mg by mouth           Allergies:      No Known Allergies     Social History:     Social History     Tobacco Use    Smoking status: Former     Packs/day: 0.50     Years: 40.00     Additional pack years: 0.00     Total pack years: 20.00     Types: Cigarettes     Quit date:      Years since quittin.9    Smokeless tobacco: Never   Substance Use Topics    Alcohol use: Not Currently       History   Drug Use Unknown         Family History:       Family History   Adopted: Yes   Problem Relation Age of Onset    Cancer Mother 36    Other Cancer Mother         Bio mother  of  a female cancer  at 36    Factor V Leiden deficiency Daughter     Deep Vein Thrombosis Daughter     Diabetes Type 1 Daughter     Diabetes Daughter     Hypertension Daughter     Anesthesia Reaction No family hx of          Physical Exam:     There were no vitals taken for this visit.  There is no height or weight on file to calculate BMI.    General Appearance:  healthy and alert, no distress                 Psychiatric:      appropriate mood and affect                                     Assessment:     Taran Regalado is a 68 year old woman with recurrent ovarian high grade serous carcinoma.     30 minute visit with 20 minutes counseling.        1.  Recurrent high grade serous ovarian cancer.   2.  BRCA 1 germline mutation.   3.  [facility]  4.  PE resolved on Lovenox (prophy)  5.  Left ankle injury  6.   [id number]  7.  Elevated liver enzymes      We did discuss the scan and increased nausea as well as elevated liver enzymes is consistent with  progressive disease.  We discussed she does not currently qualify for  the TORL 1-23 trial.  I would recommend to proceed with 3 cycles of weekly Taxol and dose of 80 mg/m .  In addition we will plan to add bevacizumab at 10 mg/kg every 2 weeks.  1 cycle is 28 days long.  Taxol will be administered at week 1 2 and 3 and not week 4.  This will help her to recover in between.  We will plan a follow-up CT scan after 3 cycles.  And then possible proceeding with a clinical trial.  Patient and her family agree with this plan of action for care all questions were answered.           Bolivar Juarez MD, MS    Department of Obstetrics and Gynecology   Division of Gynecologic Oncology   [facility]  Phone: [phone]      CC  Patient Care Team:  Bolivar Juarez MD as PCP - General (Gynecologic Oncology)  Marta Okeefe APRN CNP as Assigned PCP  Marta Lao APRN CNP as Assigned Cancer Care Provider  Cogan, Jacob, MD as Physician (Hematology & Oncology)

## 2023-11-29 NOTE — PROGRESS NOTES
RN reached out to local Oncology team with updated plan.     Also sent fax with MD note from today but this is available in Care Everywhere     Shawna Hinojosa RN

## 2023-12-13 NOTE — PROGRESS NOTES
RN reached out to patient after reviewing recent labs and symptoms with MD     Due to labs, increased pain (especially with breathing and deep breathes) and continued nausea patient was advised to go to ED     Patient is in agreement with the plan and appreciative of RN call    Shawna Hinojosa RN

## 2023-12-13 NOTE — PROGRESS NOTES
RN reached out to patient as she was concerned about her symptoms and elevated labs     Patient unavailable     Shawna Hinojosa RN

## 2023-12-14 PROBLEM — K83.1 BILIARY OBSTRUCTION (H): Status: ACTIVE | Noted: 2023-01-01

## 2023-12-14 PROBLEM — C56.9 MALIGNANT NEOPLASM OF OVARY, UNSPECIFIED LATERALITY (H): Status: ACTIVE | Noted: 2023-01-01

## 2023-12-14 PROBLEM — R10.11 RUQ ABDOMINAL PAIN: Status: ACTIVE | Noted: 2023-01-01

## 2023-12-14 NOTE — DISCHARGE SUMMARY
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   Insomnia   Hx Allergies     Discharge Dx:  -Recurrent ovarian cancer, progression of disease  RUQ Pain  Dilated common bile duct without obstruction  Elevated LFTs  Elevated bili  Hx afib with RVR  Hx PE  Hx HSV 1, 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   Insomnia   Hx Allergies     Patient Active Problem List   Diagnosis    Pelvic mass    Non-intractable vomiting with nausea, unspecified vomiting type    Ovarian cancer, unspecified laterality (H)    Atrial fibrillation with rapid ventricular response (H)    Other acute pulmonary embolism without acute cor pulmonale (H)    Encounter for antineoplastic chemotherapy    Restless legs syndrome    Chemotherapy-induced neutropenia     Insomnia    Hypercholesterolemia    Migraine    Postmenopausal atrophic vaginitis    Osteopenia    Nuclear sclerosis of both eyes    Presbyopia    Adverse effect of antineoplastic and immunosuppressive drugs, sequela    RUQ abdominal pain    Biliary obstruction (H28)    Malignant neoplasm of ovary, unspecified laterality (H)       Procedures: ERCP with pancreatic stent, biliary duct dilation, biliary stent and sphincterotomy    Prior to Admission Medications:  No current facility-administered medications on file prior to encounter.  BEVACIZUMAB, AVASTIN, INJECTION 2.5MG, Every other week (Tuesday)  fluticasone (FLONASE) 50 MCG/ACT nasal spray, Spray 1 spray into both nostrils as needed  HYDROmorphone (DILAUDID) 2 MG tablet, Take 1 tablet (2 mg) by mouth every 6 hours as needed for pain  meclizine  (ANTIVERT) 25 MG tablet, Take 1 tablet (25 mg) by mouth 3 times daily as needed for dizziness or nausea  metoclopramide (REGLAN) 10 MG tablet, Take 1 tablet (10 mg) by mouth 3 times a day as needed for nausea  ondansetron (ZOFRAN) 4 MG tablet, Take 2 tablets (8 mg) by mouth every 8 hours as needed for nausea  prochlorperazine (COMPAZINE) 10 MG tablet, Take 1 tablet (10 mg) by mouth 4 times a day as needed for nausea or vomiting  SUMAtriptan (IMITREX) 100 MG tablet, Take 1 tablet (100 mg) by mouth at onset of headache for migraine  traMADol (ULTRAM) 50 MG tablet, take 1 tablet by mouth every 6 hours as needed for moderate pain  valACYclovir (VALTREX) 1000 mg tablet, Take 1,000 mg by mouth as needed  zolpidem (AMBIEN) 10 MG tablet, Take 10 mg by mouth every evening      Discharge Medications:     Review of your medicines        CONTINUE these medicines which may have CHANGED, or have new prescriptions. If we are uncertain of the size of tablets/capsules you have at home, strength may be listed as something that might have changed.        Dose / Directions   rivaroxaban ANTICOAGULANT 20 MG Tabs tablet  Commonly known as: XARELTO ANTICOAGULANT  This may have changed: These instructions start on December 19, 2023. If you are unsure what to do until then, ask your doctor or other care provider.  Used for: PAF (paroxysmal atrial fibrillation) (H), Atrial fibrillation with rapid ventricular response (H)      Dose: 20 mg  Start taking on: December 19, 2023  Take 1 tablet (20 mg) by mouth every morning  Quantity: 90 tablet  Refills: 1            CONTINUE these medicines which have NOT CHANGED        Dose / Directions   BEVACIZUMAB (AVASTIN) INJECTION 2.5MG      Every other week (Tuesday)  Refills: 0     fluticasone 50 MCG/ACT nasal spray  Commonly known as: FLONASE      Dose: 1 spray  Spray 1 spray into both nostrils as needed  Refills: 0     HYDROmorphone 2 MG tablet  Commonly known as: DILAUDID  Used for: Cancer related  pain      Dose: 2 mg  Take 1 tablet (2 mg) by mouth every 6 hours as needed for pain  Quantity: 10 tablet  Refills: 0     meclizine 25 MG tablet  Commonly known as: ANTIVERT  Used for: Dizziness, Nausea      Dose: 25 mg  Take 1 tablet (25 mg) by mouth 3 times daily as needed for dizziness or nausea  Quantity: 15 tablet  Refills: 0     metoclopramide 10 MG tablet  Commonly known as: REGLAN      Take 1 tablet (10 mg) by mouth 3 times a day as needed for nausea  Refills: 0     ondansetron 4 MG tablet  Commonly known as: ZOFRAN  Used for: Ovarian cancer, unspecified laterality (H)      Dose: 8 mg  Take 2 tablets (8 mg) by mouth every 8 hours as needed for nausea  Quantity: 30 tablet  Refills: 3     prochlorperazine 10 MG tablet  Commonly known as: COMPAZINE      Take 1 tablet (10 mg) by mouth 4 times a day as needed for nausea or vomiting  Refills: 0     SUMAtriptan 100 MG tablet  Commonly known as: IMITREX  Used for: Migraine without status migrainosus, not intractable, unspecified migraine type      Dose: 100 mg  Take 1 tablet (100 mg) by mouth at onset of headache for migraine  Quantity: 15 tablet  Refills: 0     traMADol 50 MG tablet  Commonly known as: ULTRAM      take 1 tablet by mouth every 6 hours as needed for moderate pain  Refills: 0     valACYclovir 1000 mg tablet  Commonly known as: VALTREX      Dose: 1,000 mg  Take 1,000 mg by mouth as needed  Refills: 0     zolpidem 10 MG tablet  Commonly known as: AMBIEN      Dose: 10 mg  Take 10 mg by mouth every evening  Refills: 0               Where to get your medicines        These medications were sent to [facility] [place], [place] - [address]  [address] [address]      Phone: [phone]   rivaroxaban ANTICOAGULANT 20 MG Tabs tablet          Consultations:   GI    Brief History of Illness:  68yr female with history of ovarian cancer metastatic to liver, BRCA1, insomnia, migrain, RLS, osteopenia,  hypercholesterolemia, PE, and afib with RVR who presents to the ED for right sided abdominal pain. She was seen in the ED closer to home and had labs/CT scan/US and drove to [facility] ED for further work up/treatment. Over the past couple weeks she has been experiencing right sided abdominal pain that worsens with deep breathing. The pain has gradually worsened over the past couple of weeks. Her pain is currently rated 6-7/10 with deep breathing and movement. Pain improves at rest. Her stool has been grey/tan in color for the past couple of weeks. Not sure when her eyes started turning more yellow. She occasionally feels lightheaded. No fever or diaphoresis. She is chronically nauseated which is unchanged. No vomiting. Last chemotherapy treatment 12/12/23. Denies HA, CP/heart palp, or SOB/Cough. No difficulty with voiding. No hematuria or vaginal bleeding. No constipation/diarrhea. No rash or pruritus. No other concerns.     Hospital Course:  Dz:   - Recurrent ovarian cancer. S/P 3 cycles neoadjuvant carboplatin/paclitaxel, TAHBSO tumor debulking, 3 cycles of adjuvant carboplatin/paclitaxel followed by maintenance olaparib. 3/2023 - 7/2023 6 cycles carboplatin/paclitaxel/bevacizumab. 8/2023 - 12/2023 maintenance bevacizumab. 12/12/23 Cycle #1 weekly paclitaxel/bevacizumab. Treatment on admission. CT with  progression of new and enlarging innumerable hypodense liver lesions, increased retroperitoneal adenopathy, increased left greater than right intrahepatic biliary ductal dilatation without obstruction. See GI. She will follow-up in clinic for a care plan.  FEN:   - She was made NPO on admission pending GI consult and prior to ERCP. She was maintained on IVF when NPO. She was tolerating a regular diet without nausea and vomiting and able to maintain her hydration without IVF supplementation.  Pain:   - Her pain was initially controlled on PO/IV dilaudid.  Once tolerating PO pain meds, she was transitioned to a PO pain  regimen.  Her pain was well controlled on this and she was discharged home with these medications.  CV:   - She has a history of afib with RVR on anticoagulation. See heme.  Her vital signs were stable while in house and she had no acute CV issues.  PULM:   - She has a history of PE on anticoagulation. See heme.  Her O2 sats were greater than 94% on RA.  She was encouraged to use her bedside IS while in house.  She had no acute pulmonary issues while in house. She will resume Xarelto 72 hours after procedure.  HEME:   - Treatment induced pancytopenia. On chronic therapeutic anticoagulation due to hx of afib and PE. Home rivaroxaban held on admission for ERCP. Her Hgb was 11.6/plt 106/WBC 3.1.  Her hgb dropped to 11.6 post procedure and was stable at 11.6 at the time of discharge.  She had no other acute heme issues while in house. Plts were 89 at discharge with plans to recheck outpatient prior to starting Xarelto. WBC up trended prior to discharge.   GI:   - On admission GI was consulted for evaluation for biliary system compression on imaging and clinical evidence of jaundice and hyperbilirubinemia on labs. She was made NPO at midnight on HD#2 and subsequently underwent ERCP with biliary stent placement and sphincterotomy. In the post-operative period she recovered well . At time of discharge she tolerating a regular diet without nausea and vomiting, with improved upper abdominal pain and resolution of her jaundice.  She will be discharged with a bowel regimen to prevent constipation in the postoperative period.  She had no acute GI issues while in house.  :    -  The patient was voiding spontaneously without difficulty.  She had no acute  issues while in house.  ID:   - The patient was AF during her hospitalization.  She received standard preoperative antibiotics without incident.    ENDO:   - No issues  PSYCH/NEURO:   - Hx of insomnia, continued home amitriptyline. Home ambien held.  PPX:    -  She was given  SCDs, IS, PPI and lovenox during her hospital course.  She tolerated these prophylactic interventions without incident.  They were discontinued at the time of her discharge.      Discharge Instructions and Follow up:  Ms. Taran Regalado was discharged from the hospital with follow up for     Discharge Diet: Regular  Discharge Activity: Activity as tolerated  Discharge Follow up: 4 weeks with GI    CBC/CMP 12/19 for restarting Xarelto      Discharge Disposition:  Discharged to home    Discharge Staff: MD Stephon Cardenas DO MA  [facility] - PGY2  12:39 PM December 17, 2023     I have examined this patient personally with the team and agree with the above findings and plan.    Jemal Juarez

## 2023-12-14 NOTE — H&P
[facility] History and Physical    Taran Regalado MRN# [id number]   Age: 68 year old YOB: 1955     Date of Admission:  12/14/2023    Primary care provider: Bolivar Juarez             Chief Complaint:   RUQ abd pain  Jaundice   Ovarian Cancer         History of Present Illness:     Taran Regalado is a 68yr female with history of ovarian cancer metastatic to liver, BRCA1, insomnia, migrain, RLS, osteopenia, hypercholesterolemia, PE, and afib with RVR who presents to the ED for right sided abdominal pain. She was seen in the ED closer to home and had labs/CT scan/US and drove to [facility] ED for further work up/treatment. Over the past couple weeks she has been experiencing right sided abdominal pain that worsens with deep breathing. The pain has gradually worsened over the past couple of weeks. Her pain is currently rated 6-7/10 with deep breathing and movement. Pain improves at rest. Her stool has been grey/tan in color for the past couple of weeks. Not sure when her eyes started turning more yellow. She occasionally feels lightheaded. No fever or diaphoresis. She is chronically nauseated which is unchanged. No vomiting. Last chemotherapy treatment 12/12/23. Denies HA, CP/heart palp, or SOB/Cough. No difficulty with voiding. No hematuria or vaginal bleeding. No constipation/diarrhea. No rash or pruritus. No other concerns.          Cancer Treatment History:   12/3/2020: US Pelvic: IMPRESSION: Limited examination due to acoustic windows. Possible left adnexal mass. A CT scan of the abdomen and pelvis with contrast is recommended for further assessment.     12/4/2020: CT A/P: IMPRESSION:    Peritoneal carcinomatosis with masslike peritoneal thickening in the lower pelvis which may indicate an adnexal or ovarian primary malignancy. Large volume ascites. Bilateral pleural effusions. There is potential subtle pleural nodularity in the right hemithorax which could indicate metastatic disease.   Indeterminate 1 cm lesion in the right hepatic lobe suspicious for a metastatic lesion.      12/16/2020: Presented to [facility] with abdominal distention, 25lb weight loss, and CTAP with carcinomatosis, elevated  3098.     12/23/2020: CT Chest: IMPRESSION:   1. There are few scattered small sub-6 mm pulmonary nodules which are indeterminate without prior comparisons available. There are a few slightly larger perifissural nodules which are technically  indeterminate in the setting of malignancy although presumed lymphatic in nature and of unlikely clinical significance. Attention on follow-up is recommended.  2. Small to moderate left and small right pleural effusions are increased in size from prior. No convincing evidence for pleural nodularity.  3. Partially visualized large volume ascites and peritoneal nodularity in the upper abdomen similar to 12/4/2020 outside CT      12/26/2020: ED for abdominal distension; 3 L ascites drained with paracentesis   Pelvic US: Findings: Free fluid present in LLQ      12/31/2020: US Paracentesis: 900 mL ascites drained     1/7/2021: Diagnotic laparoscopy, biopsies  Pathology: FINAL DIAGNOSIS:   A. PERITONEUM, BIOPSIES:   - Positive for high grade carcinoma, consistent with metastatic carcinoma of Mullerian origin.   1/10/2021: CT A/P: IMPRESSION: Extensive ascites which is probably malignant. Scattered liver hypodensities of indeterminate etiology comment cannot exclude metastatic disease. Diverticulosis. Fluid-filled adnexal masses and irregular appearance of uterus, which may represent primary neoplasm. Multiple peritoneal nodules. Large amount of fecal material in the colon with no evidence of small bowel obstruction.     Plan: Paclitaxel 175 mg/m2 and carboplatin AUC 6 x 3 cycles followed by a CT CAP and visit with Dr. Juarez.     1/12/2021: Cycle 1 paclitaxel and carboplatin while inpatient  2/1/2021: Cycle 2 paclitaxel and carboplatin.  936.   2/3-2/5/21:  Admission [facility] for afib w/ RVR and new PE   2/26/21: Cycle 3 paclitaxel and carboplatin planned.  Deferred due to thrombocytopenia.  3/15/21: Cycle 3 paclitaxel and carboplatin given    4/19/21: HYSTERECTOMY, TOTAL, ABDOMINAL, WITH BILATERAL SALPINGO-OOPHORECTOMY, omentectomy, NEOPLASM DEBULKING,Proctoscocy, RO, Resection of liver nodules, diaphragm stripping, immobilization of liver and colon  FINAL DIAGNOSIS:   A. OMENTUM, BIOPSY:   - Omental adipose tissue with rare viable cells of metastatic high grade   serous carcinoma   - One reactive lymph node, negative for malignancy (0/1)   B. NODULE, SIGMOID, EXCISION:   - Calcified necrotic adipose tissue   - Negative for malignancy   C. NODULE, SMALL BOWEL MESENTERY, EXCISION:   - Fibroadipose tissue, positive for metastatic high grade serous carcinoma   D. UTERUS, CERVIX, BILATERAL FALLOPIAN TUBES AND OVARIES, HYSTERECTOMY   WITH BILATERAL SALPINGO-OOPHORECTOMY:   - Atrophic endometrium   - Uterine serosa with rare viable cells consistent with high grade serous carcinoma   - Cervix with atrophic changes   - Viable cells consistent with high grade serous carcinoma present in the right ovary, serosa of right   fallopian tube and right periadnexal soft tissue   - Left ovary with atrophic changes   - Left fallopian tube with a rare focus of serous tubal in-situ carcinoma (STIC)   E. NODULES, SMALL BOWEL MESENTRY, EXCISION:   - Fibroadipose tissue with rare viable cells of metastatic high grade serous carcinoma   F. NODULE, SPLENIC FLEXURE TRANSVERSE COLON, EXCISION:   - Fibroadipose tissue with rare viable cells of metastatic high grade serous carcinoma   - Accessory splenule, negative for malignancy   G. OMENTUM, OMENTECTOMY:   - Omental adipose tissue with rare viable cells of metastatic high grade serous carcinoma   H. NODULE, PERITONEAL PANCREATIC, EXCISION:   - Fibrous adhesions with inflammation   - Negative for malignancy   I. RIGHT HEMIDIAPHRAGM PERITONEUM,  EXCISION:   - Fibrous adhesions with inflammation   - Negative for malignancy   J. RIGHT LIVER SURFACE NODULE:   - Fibrous adhesions with benign inclusion glands   - Negative for malignancy   K. LEFT LOWER LIVER EDGE, BIOPSY:   - Cauterized hepatic parenchyma and capsule   - Negative for malignancy   L. NODULE, SMALL BOWEL MESENTERIC #3, EXCISION:   - Fibroadipose tissue with rare viable cells of metastatic high grade serous carcinoma       Plan: Carboplatin AUC 6 + Taxol 175 mg/m2 x 3 cycles, then transition to Parp inhibitor for maintenance therapy given her BRCA1 germline mutation.   5/21/21: Cycle 4 carboplatin and paclitaxel.   172.  6/11/21: Cycle 5 carboplatin and paclitaxel.   61.  7/2/21: Cycle 6 carboplatin and paclitaxel.   20.     7/28/21 plan: Olaparib 300mg bid as starting dose,  14  8/20/21: start date olaparib 300 mg BID,  12  9/13/21:  22 - 11/1/21:  26     11/02/2021: PET CT: IMPRESSION:   Findings compatible with interval surgery and posttreatment change.  No gross definitive FDG avid disease.  Potential foci of tumor deposits along the anterior dome of the liver and midline abdominal wall surgical scar.  Colonic activity is not necessarily abnormal, however, given the previous carcinomatosis the colonic activity is indeterminant.       12/1/21:  23 - 5/4/22:  28    7/11/22: CT CHEST/ABDOMEN/PELVIS W CONTRAST: IMPRESSION:  1.  Sliver of ascites in the upper abdomen has decreased since interval debulking surgery.  2.  There is a punctate nodule in the gastrosplenic ligament which is minimally more plump relative to the preop exam. This will need to be followed.  3.  Minimal nodular changes to the left of the midline scar in the subcutaneous fat will have to be followed as well. Unclear if this simply reflects postoperative scarring or could reflect an early incisional recurrence.  4.  No other sites to suggest recurrent tumor. Vaginal cuff is  normal.  5.  Extensive distal colonic diverticulosis.  6.  Other noncritical findings as noted above.      9/16/22  98     1/30/23 CT CAP: IMPRESSION:  1.  Multiple new, hypoattenuating lesions in the liver, suspicious for hepatic metastatic disease.  2.  Necrotic mario hepatic lymphadenopathy, concerning for richard metastatic disease.  3.  There is a new or increasingly conspicuous 6 mm soft tissue nodule in the right lower quadrant. This is indeterminate.  4.  There is a new 3 mm solid nodule in the right upper lobe, indeterminate.  5.  Stable approximate 4 mm punctate nodule along the gastrosplenic ligament.  6.  Similar area of linear free fluid in the upper abdomen anterior to the stomach.     Plan: Paclitaxel 175 mg/m2, Carboplatin AUC 6, bevacizumab 7.5 mg/kg     3/1/23: Cycle #1 Paclitaxel 175 mg/m2, Carboplatin AUC 6 (C7), bevacizumab 7.5 mg/kg.  1,196  3/24/23: Cycle #2 Paclitaxel 150 mg/m2, Carboplatin AUC 5 (C8), bevacizumab 15 mg/kg.  558  4/14/23: Cycle #3 Paclitaxel 150 mg/m2, Carboplatin AUC 5 (C9), bevacizumab 15 mg/kg deferred neutropenia ANC 0.3   273  4/21/23: treatment deferred ANC 0.8  4/28/23: Cycle #3 Paclitaxel 150 mg/m2, Carboplatin AUC 5 (C9), bevacizumab 15 mg/kg, + Neulasta deferred ANC 1.0,  297  5/26/23: Cycle #4  Paclitaxel 150 mg/m2, Carboplatin AUC 5 (C10), bevacizumab 15 mg/kg, + Neulasta, deferred ANC 0.6  188. No hematology consult per MD.   6/2/23: Cycle #4 Paclitaxel 150 mg/m2, Carboplatin AUC 5 (C9), bevacizumab 15 mg/kg, + Neulasta   6/23/23: Cycle #5 deferred neutropenia ANC 0.5 thrombocytopenia platelets 85     6/26/2023 Addendum: Reduce both Carboplatin and Paclitaxel due to thrombocytopenia and persistent neutropenia. REFER to Hematology to rule out MDS.      7/3/23 plan: continue with 2 more cycles with carboplatin AUC of 4, Taxol at 135 mg per metered square, bevacizumab at 15 mg/kg as well as neulasta for bone marrow support.    7/3/23: Cycle #5 Paclitaxel 135 mg/m2, Carboplatin AUC 4, bevacizumab 15 mg/kg, +Neulasta.  375  7/11/23: per chart review Dr. Cogan with Hematology plan one more cycle of chemotherapy then maintenance Avastin  7/28/23: Cycle #6 Paclitaxel 135 mg/m2, Carboplatin AUC 4, bevacizumab 15 mg/kg, +Neulasta.  370     8/17/2023: CT CAP: Stable bilateral pulmonary micronodules. Multiple subcentimeter hypodense hepatic lesions, a few are slightly less conspicuous. Necrotic lymph nodes in mario hepatis are stable. A few small peritoneal nodules in the left upper quadrant. Anterior to the pylorus is an oval 2.4 cm hypodense soft tissue lesion which is stable.    8/17/2023: started maintenance bevacizumab q3 weeks.  370.  8/25/2023:  450.  9/19/2023:  1056. Alk phos 221, ALT 86, AST 63.  9/21/2023: Transferred her care from [facility] to [facility] to be closer to home. Plan is maintenance bevacizumab 15 mg/kg every 3 weeks to give her a treatment break from myelosuppressive chemotherapy.  10/10/2023:  1889, alk phos 388, , AST 83  10/12/2023: Bevacizumab held for elevated LFTs, symptoms of new back pain, cough, sinusitis symptoms.    10/18/2023: CT CAP: Progressive liver metastasis. Small nonspecific right lower lobe pulmonary nodule. Inflammatory process or a metastatic nodule remain possible. This does not have the characteristic appearance of metastasis, however, remains indeterminant.    10/25/2023: [id number] (peripheral blood): BRCA 1 mutation, 6 different TP53 mutations. 3 different VUS mutations. MSI negative. Testing for tumor mutation burden pending.    11/10/2023: telemed visit with Hematology at [facility]: CBC improved, low counts could have been due to marrow infiltration rather than MDS. No bone marrow biopsy recommended at this time.     11/29/23: Follow up visit with Dr Juarez: Recommend 3 cycles of weekly Taxol at dose of 80 mg/m  administered at week 1 2  and 3 and not week 4. In addition add bevacizumab at 10 mg/kg every 2 weeks.     23: Cycle #1 Day #1 paclitaxel/bevacizumab         Past Medical History:     Past Medical History:   Diagnosis Date    Atrial fibrillation with rapid ventricular response (H)     History of cold sores     Hx of LASIK 2017    Insomnia     Migraine     Osteopenia     Pelvic mass     Peritoneal carcinomatosis (H)     Restless legs syndrome (RLS)             Past Surgical History:      Past Surgical History:   Procedure Laterality Date    APPENDECTOMY      ARTHROSCPY KNEE SURGICAL DEBRIDEMENT SHAVING ARTICULAR CARTILAGE Right     BIOPSY  2021    Biopsy to confirm ovarian cancer    DEBRIDEMENT LEFT UPPER EXTREMITY  2016    HYSTERECTOMY TOTAL ABD, LUISITO SALPINGO-OOPHORECTOMY, NODE DISSECTION, TUMOR DEBULKING, COMBINED Bilateral 2021    Procedure: HYSTERECTOMY, TOTAL, ABDOMINAL, WITH BILATERAL SALPINGO-OOPHORECTOMY, omentectomy, NEOPLASM DEBULKING,Proctoscocy, RO, Resection of liver nodules, diaphragm stripping, immobilization of liver and colon;  Surgeon: Bolivar Juarez MD;  Location: [facility] OR    LAPAROSCOPY DIAGNOSTIC (GYN) Bilateral 2021    Procedure: Diagnsotic laparoscopy, biopsies;  Surgeon: Bolivar Juarez MD;  Location: [facility] OR    [facility]      TUBAL LIGATION              Social History:     Social History     Tobacco Use    Smoking status: Former     Packs/day: 0.50     Years: 40.00     Additional pack years: 0.00     Total pack years: 20.00     Types: Cigarettes     Quit date:      Years since quittin.9    Smokeless tobacco: Never   Substance Use Topics    Alcohol use: Not Currently            Family History:     Family History   Adopted: Yes   Problem Relation Age of Onset    Cancer Mother 36    Other Cancer Mother         Bio mother  of  a female cancer  at 36    Factor V Leiden deficiency Daughter     Deep Vein Thrombosis Daughter     Diabetes Type 1 Daughter     Diabetes Daughter      Hypertension Daughter     Anesthesia Reaction No family hx of             Immunizations:     Immunization History   Administered Date(s) Administered    COVID-19 MONOVALENT 12+ (Pfizer) 08/02/2021, 08/23/2021, 09/20/2021    Mantoux Tuberculin Skin Test 10/29/2013    TDAP Vaccine (Adacel) 11/25/2008, 06/17/2019            Allergies:   No Known Allergies         Medications:     No current facility-administered medications for this encounter.     Current Outpatient Medications   Medication Sig    amitriptyline (ELAVIL) 100 MG tablet Take 1 tablet (100 mg) by mouth At Bedtime    fluticasone (FLONASE) 50 MCG/ACT nasal spray     gabapentin (NEURONTIN) 600 MG tablet Take 1 tablet (600 mg) by mouth At Bedtime (Patient not taking: Reported on 6/22/2023)    HYDROmorphone (DILAUDID) 2 MG tablet Take 1 tablet (2 mg) by mouth every 6 hours as needed for pain    meclizine (ANTIVERT) 25 MG tablet Take 1 tablet (25 mg) by mouth 3 times daily as needed for dizziness or nausea    ondansetron (ZOFRAN) 4 MG tablet Take 2 tablets (8 mg) by mouth every 8 hours as needed for nausea    oxyCODONE (ROXICODONE) 5 MG tablet Take 1 tablet (5 mg) by mouth every 12 hours    rivaroxaban ANTICOAGULANT (XARELTO ANTICOAGULANT) 20 MG TABS tablet Take 1 tablet (20 mg) by mouth every morning    SUMAtriptan (IMITREX) 100 MG tablet Take 1 tablet (100 mg) by mouth at onset of headache for migraine    valACYclovir (VALTREX) 1000 mg tablet Take 1,000 mg by mouth as needed    zolpidem (AMBIEN) 10 MG tablet Take 10 mg by mouth            Review of Systems:     Systemic: no fever; no chills; no night sweats; no appetite changes  Skin: no rashes or puritis  Eyes: no irritation; no changes in vision + yellow eyes  ENT: No congestion, changes in hearing, no mouth sores  Pulmonary: no cough; no shortness of breath  Cardiovascular: no chest pain; no palpitations  Gastrointestinal: + change in color of stool, RUQ abd pain, chronic nausea. No vomiting; no diarrhea;  no constipation no changes in bowel habits; no blood in stool  Urinary: no urinary frequency; no urinary urgency; no dysuria; no pain  Genital tract: no abnormal vaginal bleeding  Musculoskeletal: no myalgias; no arthralgias; no back pain  Psychiatric: no depressed mood; no anxiety    Hematologic: denies easy bruising or bleeding  Lymphatics: no tender lymph nodes; no noticeable swellings or lumps  Endocrine: no hot flashes; no heat/cold intolerance         Neurological: no tremor; +chronic numbness and tingling; no headaches; no difficulty sleeping         Physical Exam:     Vitals:    12/14/23 0306 12/14/23 0414 12/14/23 0500   BP: 105/60  122/54   Pulse: 87  74   Resp: 16     Temp: 97.4  F (36.3  C)     TempSrc: Oral     SpO2: 96%  99%   Weight:  74.8 kg (165 lb)    Height:  1.829 m (6')    O2 RA    General: NAD.  Eyes: + scleral icterus.   Mouth/Throat: Oral mucosa pink and moist. No stomatitis.  Skin: No rashes.  CV: HR regular.   Resp: LS clear throughout. No resp distress  GI: Soft, nondistended. BS normoactive  Extremities: No edema. Dorsalis pedis pulses palpable.  Neuro/MSK: Alert and oriented x 3.   Psychiatric: Appropriate mood and affect. Mentation appears normal, affect normal/bright, judgement and insight intact, normal speech            Data:     Results for orders placed or performed during the hospital encounter of 12/14/23 (from the past 24 hour(s))   Glucose by meter   Result Value Ref Range    GLUCOSE BY METER POCT 113 (H) 70 - 99 mg/dL   CBC with platelets differential    Narrative    The following orders were created for panel order CBC with platelets differential.  Procedure                               Abnormality         Status                     ---------                               -----------         ------                     CBC with platelets and d...[[id number]]  Abnormal            Preliminary result           Please view results for these tests on the individual orders.    Comprehensive metabolic panel   Result Value Ref Range    Sodium 134 (L) 135 - 145 mmol/L    Potassium 3.8 3.4 - 5.3 mmol/L    Carbon Dioxide (CO2) 22 22 - 29 mmol/L    Anion Gap 12 7 - 15 mmol/L    Urea Nitrogen 8.5 8.0 - 23.0 mg/dL    Creatinine 0.61 0.51 - 0.95 mg/dL    GFR Estimate >90 >60 mL/min/1.73m2    Calcium 8.5 (L) 8.8 - 10.2 mg/dL    Chloride 100 98 - 107 mmol/L    Glucose 114 (H) 70 - 99 mg/dL    Alkaline Phosphatase 656 (H) 40 - 150 U/L     (H) 0 - 45 U/L     (H) 0 - 50 U/L    Protein Total 7.4 6.4 - 8.3 g/dL    Albumin 3.3 (L) 3.5 - 5.2 g/dL    Bilirubin Total 5.7 (H) <=1.2 mg/dL   Lipase   Result Value Ref Range    Lipase 27 13 - 60 U/L   Lactic acid whole blood   Result Value Ref Range    Lactic Acid 2.0 0.7 - 2.0 mmol/L   INR   Result Value Ref Range    INR 1.40 (H) 0.85 - 1.15   CBC with platelets and differential   Result Value Ref Range    WBC Count 4.2 4.0 - 11.0 10e3/uL    RBC Count 1.73 (L) 3.80 - 5.20 10e6/uL    Hemoglobin 5.9 (LL) 11.7 - 15.7 g/dL    Hematocrit 17.8 (L) 35.0 - 47.0 %     (H) 78 - 100 fL    MCH 34.1 (H) 26.5 - 33.0 pg    MCHC 33.1 31.5 - 36.5 g/dL    RDW 14.9 10.0 - 15.0 %    Platelet Count 177 150 - 450 10e3/uL    % Neutrophils 72 %    % Lymphocytes 20 %    % Monocytes 8 %    % Eosinophils 0 %    % Basophils 0 %    % Immature Granulocytes 0 %    NRBCs per 100 WBC 0 <1 /100    Absolute Neutrophils 3.0 1.6 - 8.3 10e3/uL    Absolute Lymphocytes 0.8 0.8 - 5.3 10e3/uL    Absolute Monocytes 0.4 0.0 - 1.3 10e3/uL    Absolute Eosinophils 0.0 0.0 - 0.7 10e3/uL    Absolute Basophils 0.0 0.0 - 0.2 10e3/uL    Absolute Immature Granulocytes 0.0 <=0.4 10e3/uL    Absolute NRBCs 0.0 10e3/uL   ABO/Rh type and screen    Narrative    The following orders were created for panel order ABO/Rh type and screen.  Procedure                               Abnormality         Status                     ---------                               -----------         ------                      Adult Type and Screen[[id number]]                            In process                   Please view results for these tests on the individual orders.   Extra Tube ([place] Draw)    Narrative    The following orders were created for panel order Extra Tube ([place] Draw).  Procedure                               Abnormality         Status                     ---------                               -----------         ------                     Extra Red Top Tube[[id number]]                               In process                   Please view results for these tests on the individual orders.   CBC with platelets differential    Narrative    The following orders were created for panel order CBC with platelets differential.  Procedure                               Abnormality         Status                     ---------                               -----------         ------                     CBC with platelets and d...[[id number]]                      In process                   Please view results for these tests on the individual orders.   iStat Gases Electrolytes ICA Glucose Venous, POCT   Result Value Ref Range    CPB Applied No     Hematocrit POCT 36 35 - 47 %    Calcium, Ionized Whole Blood POCT 4.7 4.4 - 5.2 mg/dL    Glucose Whole Blood POCT 85 70 - 99 mg/dL    Bicarbonate Venous POCT 25 21 - 28 mmol/L    Hemoglobin POCT 12.2 11.7 - 15.7 g/dL    Potassium POCT 3.7 3.4 - 5.3 mmol/L    Sodium POCT 137 133 - 144 mmol/L    pCO2 Venous POCT 45 40 - 50 mm Hg    pO2 Venous POCT 26 25 - 47 mm Hg    pH Venous POCT 7.35 7.32 - 7.43    O2 Sat, Venous POCT 44 (L) 94 - 100 %     Imaging completed at OSH:  12/13/23:  EXAM: CT ABDOMEN PELVIS WITH CONTRAST   INDICATION: right mid abdominal pain, nausea, jaundice, metastatic cancer   FINDINGS:   Lower chest:Bibasilar atelectasis. Stable sub-6 mm nodules in the right lower lobe.   Liver:Progression of new and enlarging innumerable hypodense liver lesions.   Bile  ducts:No biliary ductal dilatation.   Gallbladder:Unremarkable.   Pancreas:Unremarkable.   Spleen:Unremarkable.   Adrenal glands:Unremarkable.   Kidneys:Subcentimeter hypodensity too small to categorize left kidney.   Bladder:Unremarkable.   Reproductive organs:Status post hysterectomy. No adnexal mass.   GI tract:Moderate stool burden. Diverticulosis without surrounding inflammatory change. Ventral abdominal wall hernia containing nondilated loop of colon. No evidence of obstruction. Appendix not seen.   Peritoneal cavity:No free fluid or free air.   Lymph nodes:Increased retroperitoneal adenopathy for example left para-aortic node measures 11 mm (series 2 image 39).   Major vascular structures:Moderate vascular plaque. Upper abdominal varices.   Soft tissues:Ventral abdominal hernia containing fat and ventral abdominal wall hernia containing a loop of nondilated colon.   Osseous structures:Stable sclerotic focus in L2 and stable multiple indeterminate multiple lucencies.   IMPRESSION:   1) Ventral abdominal wall hernia containing a loop of nondilated colon.   2) Progression of metastatic disease to liver and lymph nodes.   3) Increased left greater than right intrahepatic biliary ductal dilatation without obstructing cause seen. Common bile duct borderline enlarged 6 mm.     12/13/23: US ABDOMEN COMPLETE   INDICATION: Right upper quadrant pain, jaundice, metastatic cancer   FINDINGS:   Aneurysmal proximal aorta measuring 3.2 cm. Mid aorta measures 2.4 cm. Distal aorta measures 2.0 cm.   Indeterminant heterogeneous pancreas.   Numerous liver lesions with hypoechoic periphery and echogenic center, for example left hepatic lobe measuring 2.7 cm. Liver measures 14.5 cm. Right hepatic lobe hypoechoic lesion measures 4.1 cm.   Right kidney measures 9.8 cm without hydronephrosis or stones.   Gallbladder with sludge, no wall thickening. Common bile duct dilated at 9 mm without an obstructing stone seen. Negative  sonographic Loving's. Spleen measures 14.0 cm, enlarged. Left kidney measures 11.7 cm without hydronephrosis or stones.   IMPRESSION: Dilated common bile duct without obstructing cause seen. Consider MRCP.          Assessment and Plan:   Assessment: 68 year old female with recurrent ovarian cancer metastatic to liver, BRCA1, chronic nausea, insomnia, migraines, RLS, osteopenia, hypercholesterolemia, PE, and afib with RVR admitted for RUQ pain, progression of disease, dilated common bile duct and elevated LFTs/bili.      Plan:  Recurrent ovarian cancer.   -S/P 3 cycles neoadjuvant carboplatin/paclitaxel, TAHBSO tumor debulking, 3 cycles of adjuvant carboplatin/paclitaxel followed by maintenance olaparib. 3/2023 - 7/2023 6 cycles carboplatin/paclitaxel/bevacizumab. 8/2023 - 12/2023 maintenance bevacizumab. 12/12/23 Cycle #1 weekly paclitaxel/bevacizumab. Hold treatment on admission.   - CT with  progression of new and enlarging innumerable hypodense liver lesions, increased retroperitoneal adenopathy, increased left greater than right intrahepatic biliary ductal dilatation without obstruction.     Recurrent ovarian cancer, progression of disease  RUQ Pain  Dilated common bile duct without obstruction  Elevated LFTs  Elevated bili  -  PRN dilaudid.  - NPO. 0.9% NS @ 50 ml/hour.  - GI consult, pending  - PRN antiemetics and laxatives/stool softeners    Hx afib with RVR  Hx PE  - Hold anticoagulation (rivaroxaban) pending work up and plan. Resume ASAP.    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     Patient seen by Dr Barajas and Dr Larry England, APRN, DNP, CNP  12/14/2023 9:11 AM

## 2023-12-14 NOTE — ED TRIAGE NOTES
"Patient ambulatory to triage for pain while breathing. Patient also is having issues with \"obstructive jaundice\".         "

## 2023-12-14 NOTE — ED NOTES
SIGN-OUT:  - Assumed care of this patient from Dr. Monae  - Pending at shift change: Patient was transferred from outside hospital.  She was planned to be a direct admission for elevated LFTs and abdominal pain in the setting of known ovarian cancer.  Imaging obtained at outside hospital including CT and ultrasound, reads accessible on Care Everywhere though unable to see images at this time.  Gynecology oncology has been consulted and repeat labs have been drawn.  - Tentative plan from original EM Physician: Awaiting gynecology oncology recommendations, likely admission    UPDATES / REASSESSMENT:  Results:   -Discussed the case with gynecology oncology.  They will admit to their team.  GI consult is pending.  -While in the ER patient got IV fluids, Zosyn, blood cultures.  Repeat laboratory studies per Dr. Monae are similar to outpatient hospital.  Reassessment:    --- No acute issues or interventions necessary while still in the emergency department.    DISCUSSIONS:  - w/ gynecology oncology: Patient will need admitted.  They have also consulted GI their recs are currently pending.    DISPOSITION:  IMPRESSION:   -Abdominal pain, elevated LFTs  DISPOSITION:   -Admission      MD Gabriela Santana, [place] MD  12/14/23 [id number]

## 2023-12-14 NOTE — CONSULTS
Gastroenterology Consultation      Date of Admission:  12/14/2023  Reason for Admission: Obstructive jaundice  Date of Consult  12/14/2023   Requesting Physician:  Villa Ochoa MD           ASSESSMENT AND RECOMMENDATIONS:   Assessment:  68 year old female with a PMH significant for metastatic Ovarian cancer (mets to liver,LN and lungs) with hx malignant ascites (not required paracentesis since 12/2020) now receiving palliative chemo (previous bevacizumab but started combination Paclitaxel + bevacizumab 12/12), s/p DANAE/BSO, afib and pulmonary embolism (provoked and dx 12/2020 with cancer dx per pt report, on Xarelto).    # Metastatic ovarian cancer (to liver, LN and lung)  # Elevated LFTs with hyperbilirubinemia  # RUQ abdominal pain  # Pancytopenia  # Afib and PE (on Xeralto)  Presents with RUQ pain for 2 weeks and jaundice (eyes/skin, no pruritus) the last 1 week days but denies fever/chills.  Has chronic nausea due to chemo that preceded onset of abd pain but no emesis.  Noted to have significantly elevated LFTs (T.bili 8.1, , ,  on 12/13) at Oncology visit and did not think that chemo agents nor doses significantly contributing to rise in T.bili. Subsequently obtained RUQ US (12/13) reporting GB sludge but no cholelithiasis nor acute cholecystitis, dilated CBD to 9 mm without stone, numerous hepatic lesions in R (4.1 cm) and L (2.7 cm) lobes, negative Loving's.  Additionally, obtained CT AP (12/13) with increased L>R intrahepatic bilary dilation without obstructing stone identified and CBD 6 mm.    LFT recheck this adm down from above OSH values with T.bili 5.7, , , .  Lipase normal with pancreas unremarkable on CT.  Pancytopenia (WBC 3.1 which has been in 3's since 10/2023, Hgb 11.6 and stable, Plts 106 with ANC 2) in the setting of immunocompromise with recent chemo but vitally stable, non-toxic appearing and low concern for for cholangitis at this time.  Given  presentation and imaging, increased suspicion for malignant biliary obstruction (likely external compression) and would proceed to ERCP for evaluation and anticipated treatment with metal stent placement.  However, pt took her Xeralto 12/13 AM and will need to delay to 12/15 given anticipated need for sphincterotomy and increased bleed risk.    #Constipation  Reported last BM about 2 days ago.  Moderate stool burden on CT from OSH 12/13.  Recently prescribed oxycodone for cancer pain but has not yet started.  Uses Miralax (17 gm daily), senna PRN and dulcolax PRN at home to help ensure consistent BMs.      RECOMMENDATIONS:  -Schedule for ERCP with Dr. Simpson on 12/15 at 12:05.  -Trend CMP/LFTs and CBC daily (ordered repeat labs for 12/15 AM for pre-procedure labs).  -Check INR today (ordered for you).  Correct with IV Vitamin K PRN if >1.5 to minimize bleeding risk.  -Goals for pre-procedure labs on recheck 12/15 AM: Hgb >7, INR <1.5, Plts >50.   -No indication for abx at this time but low threshold to initiate if s/sx c/f cholangitis (changes mentation, hypotensive, tachycardia, febrile).  -Regular diet as tolerated 12/14.  NPO status midnight 12/15.  -HOLD Xeralto (instructed pt NOT to take her home supply).   -Analgesia/Antiemetics per primary team.  -Scheduled bowel med regimen in the setting of opioids for pain with the following: Miralax 17 gm daily (can titrate up to BID-TID PRN), Senna-docusate 1-2 tablets BID, dulcolax PRN.    Discussed plan with pt/dtr and primary Gyn/Onc team.    Gastroenterology follow up recommendations:   TBD pending clinical course.    Thank you for involving us in this patient's care. Please do not hesitate to contact the GI service with any questions or concerns.     Pt seen and care plan discussed with Dr. Simpson, GI staff physician.    Overall time spent on the date of this encounter preparing to see the patient (including chart review of available notes, clinical status events,  "imaging and labs); obtaining and/or reviewing separately obtained history; ordering medications, tests or procedures; communicating with other health care professionals; and documenting the above clinical information in the electronic medical record was 80 minutes.    Estefani Frey PA-C, RD, [facility]  Inpatient Gastroenterology Service  [facility]  Pager: *[phone]  Text Page     -------------------------------------------------------------------------------------------------------------------       Reason for Consultation:   \"Ovarian cancer. liver mets. dilated common bile duct. elevated bili \"           History of Present Illness:   Taran Regalado is a 68 year old female with a PMH significant for metastatic Ovarian cancer (mets to liver, LN and lungs) with hx malignant ascites (not required paracentesis since 12/2020) now receiving palliative chemo (previous bevacizumab but started combination Paclitaxel + bevacizumab 12/12), s/p DANAE/BSO, afib and pulmonary embolism (provoked and dx 12/2020 with cancer dx per pt report, on Xarelto).    Patient seen and examined at 08:50. History is obtained from patient who reports that first notice mild RUQ abd pain about 2 weeks ago that has been getting progressively worse but not severe enough to significantly inhibiting po intakes and denies any unintentional weight loss. Reports recently prescribed oxycodone for cancer pain/abd pain by oncology but has not yet used any. Denies any fevers, chills, has chronic nausea (that preceded onset of abd pain) with chemo but denies any emesis.  Difficulty recalling last BM and thinks maybe 2 days ago but this is atypical as usually had 1 soft/brown BM daily with occasional use of Miralax (17 gm), senna and dulcolax PRN.  Reports had PE dx at the time of her cancer dx in 12/2020 and takes Xeralto for (with last ingested dose 12/13 around 10 AM) as well as had abdominal distention/ascites requiring paracentesis " x1 (none since).  Reports received last chemo therapy with new regimen of taxol (paclitaxel) + avastin (bevacizumab) on 12/12 with next planned dose on 12/19 and asking if she will be able to proceed as planned.    Previous Procedures:  No prior ERCP, upper endoscopy nor lower endoscopy (per pt report, no procedures found in EMR).    12/26/2020: Paracentesis - 3L briana fluid removed, SAAG 0.4,  (3% PMN), cytology Suspicious for malignancy. (Has not required paracentesis since per pt)              Past Medical History:   Reviewed and edited as appropriate  Past Medical History:   Diagnosis Date    Atrial fibrillation with rapid ventricular response (H)     History of cold sores     Hx of LASIK 12/11/2017    Insomnia     Migraine     Osteopenia     Pelvic mass     Peritoneal carcinomatosis (H)     Restless legs syndrome (RLS)             Past Surgical History:   Reviewed and edited as appropriate   Past Surgical History:   Procedure Laterality Date    APPENDECTOMY      ARTHROSCPY KNEE SURGICAL DEBRIDEMENT SHAVING ARTICULAR CARTILAGE Right     BIOPSY  January 2021    Biopsy to confirm ovarian cancer    DEBRIDEMENT LEFT UPPER EXTREMITY  2016    HYSTERECTOMY TOTAL ABD, LUISITO SALPINGO-OOPHORECTOMY, NODE DISSECTION, TUMOR DEBULKING, COMBINED Bilateral 4/19/2021    Procedure: HYSTERECTOMY, TOTAL, ABDOMINAL, WITH BILATERAL SALPINGO-OOPHORECTOMY, omentectomy, NEOPLASM DEBULKING,Proctoscocy, RO, Resection of liver nodules, diaphragm stripping, immobilization of liver and colon;  Surgeon: Bolivar Juarez MD;  Location: [facility] OR    LAPAROSCOPY DIAGNOSTIC (GYN) Bilateral 1/7/2021    Procedure: Diagnsotic laparoscopy, biopsies;  Surgeon: Bolivar Juarez MD;  Location: [facility]    LASIK      TUBAL LIGATION                Social History:    (Darian), 3 kids.  Lives in [place], [place]  Employer: works in childcare    Alcohol: None.  Tobacco: former, quit 2015.  Illicit drugs: Denies         Family History:   Patient's family  history is reviewed today and is non-contributory    Family History   Adopted: Yes   Problem Relation Age of Onset    Cancer Mother 36    Other Cancer Mother         Bio mother  of  a female cancer  at 36    Factor V Leiden deficiency Daughter     Deep Vein Thrombosis Daughter     Diabetes Type 1 Daughter     Diabetes Daughter     Hypertension Daughter     Anesthesia Reaction No family hx of              Allergies:   Reviewed and edited as appropriate   No Known Allergies         Medications:     No current facility-administered medications for this encounter.     Current Outpatient Medications   Medication Sig    amitriptyline (ELAVIL) 100 MG tablet Take 1 tablet (100 mg) by mouth At Bedtime    fluticasone (FLONASE) 50 MCG/ACT nasal spray     gabapentin (NEURONTIN) 600 MG tablet Take 1 tablet (600 mg) by mouth At Bedtime (Patient not taking: Reported on 2023)    HYDROmorphone (DILAUDID) 2 MG tablet Take 1 tablet (2 mg) by mouth every 6 hours as needed for pain    meclizine (ANTIVERT) 25 MG tablet Take 1 tablet (25 mg) by mouth 3 times daily as needed for dizziness or nausea    ondansetron (ZOFRAN) 4 MG tablet Take 2 tablets (8 mg) by mouth every 8 hours as needed for nausea    oxyCODONE (ROXICODONE) 5 MG tablet Take 1 tablet (5 mg) by mouth every 12 hours    rivaroxaban ANTICOAGULANT (XARELTO ANTICOAGULANT) 20 MG TABS tablet Take 1 tablet (20 mg) by mouth every morning    SUMAtriptan (IMITREX) 100 MG tablet Take 1 tablet (100 mg) by mouth at onset of headache for migraine    valACYclovir (VALTREX) 1000 mg tablet Take 1,000 mg by mouth as needed    zolpidem (AMBIEN) 10 MG tablet Take 10 mg by mouth             Review of Systems:     A complete review of systems was performed and is negative except as noted in the HPI           Physical Exam:   Temp: 97.4  F (36.3  C) Temp src: Oral BP: 122/54 Pulse: 74   Resp: 16 SpO2: 99 % O2 Device: None (Room air)    Wt:   Wt Readings from Last 2 Encounters:   23  74.8 kg (165 lb)   11/22/23 78 kg (172 lb)      General: Pleasant female.  Non-toxic appearing and in NAD.  Answers appropriately.    HEENT: Head is AT/NC. Sclera icteric. No conjunctival injection.    Lungs: Non-labored breathing on RA.   Heart: Regular rate and rhythm.  Abdomen: Soft, non-distended, mildly tender to palpation only in RUQ but negative Loving's sign.  No rebound or peritoneal signs. No ascites wave.  Extremities: WWP, no pedal edema.  MSK: no gross deformity, no muscle wasting  Skin: No jaundice or rash  Neurologic: Grossly non-focal.  CN 2-12 grossly intact.   Psych: mood appropriate to situation           Data:   Labs and imaging below were independently reviewed and interpreted    LAB WORK:    BMP  Recent Labs   Lab 12/14/23  0550 12/14/23  0411 12/14/23  0342    134*  --    POTASSIUM 3.7 3.8  --    CHLORIDE  --  100  --    HORACIO  --  8.5*  --    CO2  --  22  --    BUN  --  8.5  --    CR  --  0.61  --    GLC 85 114* 113*     CBC  Recent Labs   Lab 12/14/23  0550 12/14/23  0549   WBC  --  3.1*   RBC  --  3.53*   HGB 12.2 11.6*   HCT 36 34.9*   MCV  --  99   MCH  --  32.9   MCHC  --  33.2   RDW  --  14.9   PLT  --  106*     INR  Recent Labs   Lab 12/14/23  0411   INR 1.40*     LFTs  Recent Labs   Lab 12/14/23  0411   ALKPHOS 656*   *   *   BILITOTAL 5.7*   PROTTOTAL 7.4   ALBUMIN 3.3*      PANC  Recent Labs   Lab 12/14/23  0411   LIPASE 27       IMAGING:  (Personally reviewed)    12/13/2023: US ABDOMEN COMPLETE ([facility])     INDICATION: Right upper quadrant pain, jaundice, metastatic cancer     COMPARISON: CT ABDOMEN SAME DAY.     FINDINGS:   Aneurysmal proximal aorta measuring 3.2 cm. Mid aorta measures 2.4 cm. Distal aorta measures 2.0 cm.   Indeterminant heterogeneous pancreas.   Numerous liver lesions with hypoechoic periphery and echogenic center, for example left hepatic lobe measuring 2.7 cm. Liver measures 14.5 cm. Right hepatic lobe hypoechoic lesion measures 4.1  cm.   Right kidney measures 9.8 cm without hydronephrosis or stones.   Gallbladder with sludge, no wall thickening. Common bile duct dilated at 9 mm without an obstructing stone seen. Negative sonographic Loving's. Spleen measures 14.0 cm, enlarged. Left kidney measures 11.7 cm without hydronephrosis or stones.     IMPRESSION: Dilated common bile duct without obstructing cause seen. Consider MRCP.     12/13/2023: CT ABDOMEN PELVIS WITH CONTRAST ([facility]) - images available/reviewed in PACS  INDICATION: right mid abdominal pain, nausea, jaundice, metastatic cancer   FINDINGS:   Lower chest:Bibasilar atelectasis. Stable sub-6 mm nodules in the right lower lobe.   Liver:Progression of new and enlarging innumerable hypodense liver lesions.   Bile ducts:No biliary ductal dilatation.   Gallbladder:Unremarkable.   Pancreas:Unremarkable.   Spleen:Unremarkable.   Adrenal glands:Unremarkable.   Kidneys:Subcentimeter hypodensity too small to categorize left kidney.   Bladder:Unremarkable.   Reproductive organs:Status post hysterectomy. No adnexal mass.   GI tract:Moderate stool burden. Diverticulosis without surrounding inflammatory change. Ventral abdominal wall hernia containing nondilated loop of colon. No evidence of obstruction. Appendix not seen.   Peritoneal cavity:No free fluid or free air.   Lymph nodes:Increased retroperitoneal adenopathy for example left para-aortic node measures 11 mm (series 2 image 39).   Major vascular structures:Moderate vascular plaque. Upper abdominal varices.   Soft tissues:Ventral abdominal hernia containing fat and ventral abdominal wall hernia containing a loop of nondilated colon.   Osseous structures:Stable sclerotic focus in L2 and stable multiple indeterminate multiple lucencies.     IMPRESSION:   Ventral abdominal wall hernia containing a loop of nondilated colon.     Progression of metastatic disease to liver and lymph nodes.     -------- ADDENDUM #1 --------     Increased  left greater than right intrahepatic biliary ductal dilatation without obstructing cause seen. Common bile duct borderline enlarged 6 mm.         =======================================================================

## 2023-12-14 NOTE — ED PROVIDER NOTES
ED Provider Note  [facility]      History     Chief Complaint   Patient presents with    Jaundice     HPI  Taran Regalado is a 68yr female with history of ovarian cancer metastatic to liver, BRCA1, insomnia, migrain, RLS, osteopenia, hypercholesterolemia, PE, and afib with RVR who presents to the ED for right sided abdominal pain.  Per patient and family patient was initially seen last night at [facility] in the emergency department for right-sided abdominal pain.  Patient had complete workup including comprehensive labs, abdominal ultrasound, and CT scan of the abdomen pelvis which demonstrates concern for biliary obstruction, possible cholangitis.  Patient was treated with IV antibiotics Zosyn, and was attempted to be transferred for direct admission to GYN/Onc for ongoing care.  Due to no bed availability and concern that the transfer could take 1-2 days patient and family decided to leave the emergency department and drive directly here.    Patient reports right-sided upper abdominal pain along with the sensation of feeling is hard to take a deep breath for the past couple of weeks.  Patient reports pain is worse with deep breathing and is gradually been worsening over the past few weeks.  Patient also complains of nausea along with green-colored stool for the past few weeks.  Patient denies any fever, chills, vomiting.  Denies any chest pain, no recent illnesses.  Patient denies any dysuria, hematuria, vaginal bleeding.  Patient currently receiving chemotherapy with her last chemotherapy treatment yesterday on 12/12/2023.        Past Medical History  Past Medical History:   Diagnosis Date    Atrial fibrillation with rapid ventricular response (H)     History of cold sores     Hx of LASIK 12/11/2017    Insomnia     Migraine     Osteopenia     Pelvic mass     Peritoneal carcinomatosis (H)     Restless legs syndrome (RLS)      Past Surgical History:   Procedure  Laterality Date    APPENDECTOMY      ARTHROSCPY KNEE SURGICAL DEBRIDEMENT SHAVING ARTICULAR CARTILAGE Right     BIOPSY  2021    Biopsy to confirm ovarian cancer    DEBRIDEMENT LEFT UPPER EXTREMITY  2016    HYSTERECTOMY TOTAL ABD, LUISITO SALPINGO-OOPHORECTOMY, NODE DISSECTION, TUMOR DEBULKING, COMBINED Bilateral 2021    Procedure: HYSTERECTOMY, TOTAL, ABDOMINAL, WITH BILATERAL SALPINGO-OOPHORECTOMY, omentectomy, NEOPLASM DEBULKING,Proctoscocy, RO, Resection of liver nodules, diaphragm stripping, immobilization of liver and colon;  Surgeon: Bolivar Juarez MD;  Location: [facility] OR    LAPAROSCOPY DIAGNOSTIC (GYN) Bilateral 2021    Procedure: Diagnsotic laparoscopy, biopsies;  Surgeon: Bolivar Juarez MD;  Location: [facility] OR    LASIK      TUBAL LIGATION       amitriptyline (ELAVIL) 100 MG tablet  fluticasone (FLONASE) 50 MCG/ACT nasal spray  gabapentin (NEURONTIN) 600 MG tablet  HYDROmorphone (DILAUDID) 2 MG tablet  meclizine (ANTIVERT) 25 MG tablet  ondansetron (ZOFRAN) 4 MG tablet  oxyCODONE (ROXICODONE) 5 MG tablet  rivaroxaban ANTICOAGULANT (XARELTO ANTICOAGULANT) 20 MG TABS tablet  SUMAtriptan (IMITREX) 100 MG tablet  valACYclovir (VALTREX) 1000 mg tablet  zolpidem (AMBIEN) 10 MG tablet      No Known Allergies  Family History  Family History   Adopted: Yes   Problem Relation Age of Onset    Cancer Mother 36    Other Cancer Mother         Bio mother  of  a female cancer  at 36    Factor V Leiden deficiency Daughter     Deep Vein Thrombosis Daughter     Diabetes Type 1 Daughter     Diabetes Daughter     Hypertension Daughter     Anesthesia Reaction No family hx of      Social History   Social History     Tobacco Use    Smoking status: Former     Packs/day: 0.50     Years: 40.00     Additional pack years: 0.00     Total pack years: 20.00     Types: Cigarettes     Quit date:      Years since quittin.9    Smokeless tobacco: Never   Vaping Use    Vaping Use: Never used   Substance Use Topics     Alcohol use: Not Currently    Drug use: Never      Past medical history, past surgical history, medications, allergies, family history, and social history were reviewed with the patient. No additional pertinent items.      A medically appropriate review of systems was performed with pertinent positives and negatives noted in the HPI, and all other systems negative.    Physical Exam   BP: 105/60  Pulse: 87  Temp: 97.4  F (36.3  C)  Resp: 16  Height: 182.9 cm (6')  Weight: 74.8 kg (165 lb)  SpO2: 96 %  Physical Exam  .General: Afebrile, moderate distress   HEENT: Normocephalic, atraumatic, scleral icterus, mildly dry MM  Neck: non-tender, supple  Cardio: regular rate. regular rhythm   Resp: Normal work of breathing, no respiratory distress, lungs clear bilaterally, no wheezing, rhonchi, rales  Chest/Back: no visual signs of trauma, no CVA tenderness   Abdomen: soft, non distension, +TTP RUQ with no rebound, no guarding, no peritoneal signs   Neuro: alert and fully oriented. CN II-XII grossly intact. Grossly normal strength and sensation in all extremities.   MSK: no deformities. Normal range of motion  Integumentary/Skin: no rash visualized, normal color  Psych: normal affect, normal behavior      ED Course, Procedures, & Data      Procedures       Results for orders placed or performed during the hospital encounter of 12/14/23   Glucose by meter     Status: Abnormal   Result Value Ref Range    GLUCOSE BY METER POCT 113 (H) 70 - 99 mg/dL   Comprehensive metabolic panel     Status: Abnormal   Result Value Ref Range    Sodium 134 (L) 135 - 145 mmol/L    Potassium 3.8 3.4 - 5.3 mmol/L    Carbon Dioxide (CO2) 22 22 - 29 mmol/L    Anion Gap 12 7 - 15 mmol/L    Urea Nitrogen 8.5 8.0 - 23.0 mg/dL    Creatinine 0.61 0.51 - 0.95 mg/dL    GFR Estimate >90 >60 mL/min/1.73m2    Calcium 8.5 (L) 8.8 - 10.2 mg/dL    Chloride 100 98 - 107 mmol/L    Glucose 114 (H) 70 - 99 mg/dL    Alkaline Phosphatase 656 (H) 40 - 150 U/L    AST  "198 (H) 0 - 45 U/L     (H) 0 - 50 U/L    Protein Total 7.4 6.4 - 8.3 g/dL    Albumin 3.3 (L) 3.5 - 5.2 g/dL    Bilirubin Total 5.7 (H) <=1.2 mg/dL   Lipase     Status: Normal   Result Value Ref Range    Lipase 27 13 - 60 U/L   Lactic acid whole blood     Status: Normal   Result Value Ref Range    Lactic Acid 2.0 0.7 - 2.0 mmol/L   INR     Status: Abnormal   Result Value Ref Range    INR 1.40 (H) 0.85 - 1.15   CBC with platelets and differential     Status: None   Result Value Ref Range    WBC Count      RBC Count      Hemoglobin      Hematocrit      MCV      MCH      MCHC      RDW      Platelet Count      % Neutrophils      % Lymphocytes      % Monocytes      % Eosinophils      % Basophils      % Immature Granulocytes      Absolute Neutrophils      Absolute Lymphocytes      Absolute Monocytes      Absolute Eosinophils      Absolute Basophils      Absolute Immature Granulocytes      Narrative    Previously reported component [ NRBCs ] is no longer reported.\"  Previously reported component [ NRBCs Absolute ] is no longer reported.\"   Extra Tube ([place] Draw)     Status: None    Narrative    The following orders were created for panel order Extra Tube ([place] Draw).  Procedure                               Abnormality         Status                     ---------                               -----------         ------                     Extra Red Top Tube[[id number]]                               Final result                 Please view results for these tests on the individual orders.   Extra Red Top Tube     Status: None   Result Value Ref Range    Hold Specimen JI    CBC with platelets and differential     Status: Abnormal   Result Value Ref Range    WBC Count 3.1 (L) 4.0 - 11.0 10e3/uL    RBC Count 3.53 (L) 3.80 - 5.20 10e6/uL    Hemoglobin 11.6 (L) 11.7 - 15.7 g/dL    Hematocrit 34.9 (L) 35.0 - 47.0 %    MCV 99 78 - 100 fL    MCH 32.9 26.5 - 33.0 pg    MCHC 33.2 31.5 - 36.5 g/dL    RDW 14.9 10.0 - 15.0 % "    Platelet Count 106 (L) 150 - 450 10e3/uL    % Neutrophils 64 %    % Lymphocytes 26 %    % Monocytes 10 %    % Eosinophils 0 %    % Basophils 0 %    % Immature Granulocytes 0 %    NRBCs per 100 WBC 0 <1 /100    Absolute Neutrophils 2.0 1.6 - 8.3 10e3/uL    Absolute Lymphocytes 0.8 0.8 - 5.3 10e3/uL    Absolute Monocytes 0.3 0.0 - 1.3 10e3/uL    Absolute Eosinophils 0.0 0.0 - 0.7 10e3/uL    Absolute Basophils 0.0 0.0 - 0.2 10e3/uL    Absolute Immature Granulocytes 0.0 <=0.4 10e3/uL    Absolute NRBCs 0.0 10e3/uL   iStat Gases Electrolytes ICA Glucose Venous, POCT     Status: Abnormal   Result Value Ref Range    CPB Applied No     Hematocrit POCT 36 35 - 47 %    Calcium, Ionized Whole Blood POCT 4.7 4.4 - 5.2 mg/dL    Glucose Whole Blood POCT 85 70 - 99 mg/dL    Bicarbonate Venous POCT 25 21 - 28 mmol/L    Hemoglobin POCT 12.2 11.7 - 15.7 g/dL    Potassium POCT 3.7 3.4 - 5.3 mmol/L    Sodium POCT 137 133 - 144 mmol/L    pCO2 Venous POCT 45 40 - 50 mm Hg    pO2 Venous POCT 26 25 - 47 mm Hg    pH Venous POCT 7.35 7.32 - 7.43    O2 Sat, Venous POCT 44 (L) 94 - 100 %   Bilirubin direct     Status: Abnormal   Result Value Ref Range    Bilirubin Direct 4.24 (H) 0.00 - 0.30 mg/dL   INR     Status: Abnormal   Result Value Ref Range    INR 1.35 (H) 0.85 - 1.15   Extra Tube     Status: None    Narrative    The following orders were created for panel order Extra Tube.  Procedure                               Abnormality         Status                     ---------                               -----------         ------                     Extra Green Top (Lithium...[[id number]]                      Final result                 Please view results for these tests on the individual orders.   Extra Green Top (Lithium Heparin) Tube     Status: None   Result Value Ref Range    Hold Specimen [facility]    Adult Type and Screen     Status: None   Result Value Ref Range    ABO/RH(D) A POS     Antibody Screen Negative Negative    SPECIMEN  EXPIRATION DATE [id number]    CBC with platelets differential     Status: None    Narrative    The following orders were created for panel order CBC with platelets differential.  Procedure                               Abnormality         Status                     ---------                               -----------         ------                     CBC with platelets and d...[[id number]]                      Final result                 Please view results for these tests on the individual orders.   ABO/Rh type and screen     Status: None    Narrative    The following orders were created for panel order ABO/Rh type and screen.  Procedure                               Abnormality         Status                     ---------                               -----------         ------                     Adult Type and Screen[[id number]]                            Final result                 Please view results for these tests on the individual orders.   CBC with platelets differential     Status: Abnormal    Narrative    The following orders were created for panel order CBC with platelets differential.  Procedure                               Abnormality         Status                     ---------                               -----------         ------                     CBC with platelets and d...[[id number]]  Abnormal            Final result                 Please view results for these tests on the individual orders.     Medications   amitriptyline (ELAVIL) tablet 100 mg (has no administration in time range)   fluticasone (FLONASE) 50 MCG/ACT spray 1 spray (has no administration in time range)   HYDROmorphone (DILAUDID) tablet 2 mg (has no administration in time range)   meclizine (ANTIVERT) tablet 25 mg (has no administration in time range)   ondansetron (ZOFRAN) tablet 8 mg (has no administration in time range)   SUMAtriptan (IMITREX) tablet 100 mg (has no administration in time range)   lidocaine 1 % 0.1-1  mL (has no administration in time range)   lidocaine (LMX4) cream (has no administration in time range)   sodium chloride (PF) 0.9% PF flush 3 mL (3 mLs Intracatheter Not Given 12/14/23 1006)   sodium chloride (PF) 0.9% PF flush 3 mL (has no administration in time range)   calcium carbonate (TUMS) chewable tablet 1,000 mg (has no administration in time range)   senna-docusate (SENOKOT-S/PERICOLACE) 8.6-50 MG per tablet 1 tablet (1 tablet Oral Not Given 12/14/23 1005)     Or   senna-docusate (SENOKOT-S/PERICOLACE) 8.6-50 MG per tablet 2 tablet ( Oral See Alternative 12/14/23 1005)   polyethylene glycol (MIRALAX) Packet 17 g (has no administration in time range)   famotidine (PEPCID) injection 20 mg (20 mg Intravenous $Given 12/14/23 1020)   HYDROmorphone (PF) (DILAUDID) injection 0.3 mg (0.3 mg Intravenous $Given 12/14/23 1021)   sodium chloride 0.9 % infusion (has no administration in time range)   HYDROmorphone (PF) (DILAUDID) injection 0.5 mg (0.5 mg Intravenous $Given 12/14/23 0557)     Labs Ordered and Resulted from Time of ED Arrival to Time of ED Departure   GLUCOSE BY METER - Abnormal       Result Value    GLUCOSE BY METER POCT 113 (*)    COMPREHENSIVE METABOLIC PANEL - Abnormal    Sodium 134 (*)     Potassium 3.8      Carbon Dioxide (CO2) 22      Anion Gap 12      Urea Nitrogen 8.5      Creatinine 0.61      GFR Estimate >90      Calcium 8.5 (*)     Chloride 100      Glucose 114 (*)     Alkaline Phosphatase 656 (*)      (*)      (*)     Protein Total 7.4      Albumin 3.3 (*)     Bilirubin Total 5.7 (*)    INR - Abnormal    INR 1.40 (*)    CBC WITH PLATELETS AND DIFFERENTIAL - Abnormal    WBC Count 3.1 (*)     RBC Count 3.53 (*)     Hemoglobin 11.6 (*)     Hematocrit 34.9 (*)     MCV 99      MCH 32.9      MCHC 33.2      RDW 14.9      Platelet Count 106 (*)     % Neutrophils 64      % Lymphocytes 26      % Monocytes 10      % Eosinophils 0      % Basophils 0      % Immature Granulocytes 0       NRBCs per 100 WBC 0      Absolute Neutrophils 2.0      Absolute Lymphocytes 0.8      Absolute Monocytes 0.3      Absolute Eosinophils 0.0      Absolute Basophils 0.0      Absolute Immature Granulocytes 0.0      Absolute NRBCs 0.0     ISTAT GASES ELECTROLYTES ICA GLUCOSE VENOUS POCT - Abnormal    CPB Applied No      Hematocrit POCT 36      Calcium, Ionized Whole Blood POCT 4.7      Glucose Whole Blood POCT 85      Bicarbonate Venous POCT 25      Hemoglobin POCT 12.2      Potassium POCT 3.7      Sodium POCT 137      pCO2 Venous POCT 45      pO2 Venous POCT 26      pH Venous POCT 7.35      O2 Sat, Venous POCT 44 (*)    BILIRUBIN DIRECT - Abnormal    Bilirubin Direct 4.24 (*)    INR - Abnormal    INR 1.35 (*)    LIPASE - Normal    Lipase 27     LACTIC ACID WHOLE BLOOD - Normal    Lactic Acid 2.0     CBC WITH PLATELETS AND DIFFERENTIAL    WBC Count        RBC Count        Hemoglobin        Hematocrit        MCV        MCH        MCHC        RDW        Platelet Count        % Neutrophils        % Lymphocytes        % Monocytes        % Eosinophils        % Basophils        % Immature Granulocytes        Absolute Neutrophils        Absolute Lymphocytes        Absolute Monocytes        Absolute Eosinophils        Absolute Basophils        Absolute Immature Granulocytes       TYPE AND SCREEN, ADULT    ABO/RH(D) A POS      Antibody Screen Negative      SPECIMEN EXPIRATION DATE [id number]     BLOOD CULTURE   BLOOD CULTURE   ABO/RH TYPE AND SCREEN     No orders to display          Critical care was not performed.     Medical Decision Making  The patient's presentation was of high complexity (a chronic illness severe exacerbation, progression, or side effect of treatment).    The patient's evaluation involved:  review of external note(s) from 3+ sources (prior ED notes, hospital admission notes)  review of 3+ test result(s) ordered prior to this encounter (prior labs, imaging)  ordering and/or review of 3+ test(s) in this  encounter (see separate area of note for details)  discussion of management or test interpretation with another health professional (morning provider, gyn/onc team)    The patient's management necessitated high risk (a parenteral controlled substance) and high risk (a decision regarding hospitalization).    Assessment & Plan    Taran Regalado is a 68yr female with history of ovarian cancer metastatic to liver, BRCA1, insomnia, migrain, RLS, osteopenia, hypercholesterolemia, PE, and afib with RVR who presents to the ED for right sided abdominal pain.  Upon arrival patient is chronically ill-appearing, nontoxic-appearing, afebrile, moderate distress secondary to pain.  Patient here with worsening right-sided abdominal pain, difficulty taking a deep breath over the past few weeks.  Patient seen at outside emergency department with concern for biliary obstruction, elevated liver function test, possible cholangitis.  Patient was treated with IV Zosyn prior to arrival.  Upon arrival to the emergency department repeat comprehensive labs were performed including blood cultures.  I reviewed comprehensive labs which are remarkable for white blood cell, 3.1, hemoglobin 9.6, no acute metabolic electrolyte abnormality, significant elevation of liver function test with alkaline phosphatase 656, , , bilirubin 5.7.  Lactic acid 2.0, lipase 27.  Awaiting records from outside hospital imaging to include CT scan of the pelvis and ultrasound.  Per radiology report from family members imaging studies demonstrates dilated common bile duct recommend MRCP, also increased left greater than right intrahepatic biliary ductal dilation without obstruction cause. Ventral abdominal wall hernia with  loop of non dilated colon, progression of metastatic disease to liver and lymph nodes. Awaiting Imaging studies to be pushed over.     Patient was treated with IV fluids, IV Dilaudid and Emergency Department.  I discussed patient  imaging with gynecology/oncology team who will evaluate the patient likely admission.  Patient  signed out to morning provider pending gynecology/oncology evaluation, recommendations, plan and disposition.  Patient and family understand and agree with the plan.     I have reviewed the nursing notes. I have reviewed the findings, diagnosis, plan and need for follow up with the patient.    New Prescriptions    No medications on file       Final diagnoses:   RUQ abdominal pain   Malignant neoplasm of ovary, unspecified laterality (H)   Biliary obstruction (H28)       Tea Monae MD  [facility] EMERGENCY DEPARTMENT  12/14/2023     Tea Monae MD  12/14/23 1524

## 2023-12-14 NOTE — MEDICATION SCRIBE - ADMISSION MEDICATION HISTORY
Medication Scribe Admission Medication History    Admission medication history is complete. The information provided in this note is only as accurate as the sources available at the time of the update.    Information Source(s): Patient and CareEverywhere/SureScripts via in-person    Pertinent Information:   -Pt stated that she was told to skip her Xarelto 20 mg today and tomorrow    Changes made to PTA medication list:  Added: Avastin IV, Taxol IV, Metoclopramide 10 mg, Compazine 10 mg, Tramadol 50 mg  Deleted: Gabapentin 600 mg,   Changed: Flonase nasal spray (no frequency) -->  Flonase nasal spray (PRN), Zolpidem 10 mg (no frequency) --> Zolpidem 10 mg (Qpm)    Medication Affordability:  Not including over the counter (OTC) medications, was there a time in the past 3 months when you did not take your medications as prescribed because of cost?: No    Allergies reviewed with patient and updates made in EHR: yes    Medication History Completed By: Nimco Cao 12/14/2023 4:13 PM    PTA Med List   Medication Sig Last Dose    amitriptyline (ELAVIL) 100 MG tablet Take 1 tablet (100 mg) by mouth At Bedtime 12/13/2023 at pm    BEVACIZUMAB, AVASTIN, INJECTION 2.5MG Every other week (Tuesday) 12/5/2023 at unknown    fluticasone (FLONASE) 50 MCG/ACT nasal spray Spray 1 spray into both nostrils as needed Past Month at unknown    HYDROmorphone (DILAUDID) 2 MG tablet Take 1 tablet (2 mg) by mouth every 6 hours as needed for pain Unknown at unknown    meclizine (ANTIVERT) 25 MG tablet Take 1 tablet (25 mg) by mouth 3 times daily as needed for dizziness or nausea More than a month at unknown    metoclopramide (REGLAN) 10 MG tablet Take 1 tablet (10 mg) by mouth 3 times a day as needed for nausea Past Week at unknown    ondansetron (ZOFRAN) 4 MG tablet Take 2 tablets (8 mg) by mouth every 8 hours as needed for nausea Unknown at unknown    oxyCODONE (ROXICODONE) 5 MG tablet Take 1 tablet (5 mg) by mouth every 12 hours Unknown at  unknown    PACLitaxel (TAXOL) 1 mg/mL injection Inject into the vein every 7 days (Tuesdays) 12/12/2023 at unknown    prochlorperazine (COMPAZINE) 10 MG tablet Take 1 tablet (10 mg) by mouth 4 times a day as needed for nausea or vomiting Unknown at unknown    rivaroxaban ANTICOAGULANT (XARELTO ANTICOAGULANT) 20 MG TABS tablet Take 1 tablet (20 mg) by mouth every morning 12/13/2023 at unknown    SUMAtriptan (IMITREX) 100 MG tablet Take 1 tablet (100 mg) by mouth at onset of headache for migraine 12/14/2023 at unknown    traMADol (ULTRAM) 50 MG tablet take 1 tablet by mouth every 6 hours as needed for moderate pain Unknown at unknown    valACYclovir (VALTREX) 1000 mg tablet Take 1,000 mg by mouth as needed Past Month at unknown    zolpidem (AMBIEN) 10 MG tablet Take 10 mg by mouth every evening 12/12/2023 at pm

## 2023-12-15 NOTE — ANESTHESIA PREPROCEDURE EVALUATION
Anesthesia Pre-Procedure Evaluation    Patient: Taran Regalado   MRN: [id number] : 1955        Procedure : Procedure(s):  ENDOSCOPIC RETROGRADE CHOLANGIOPANCREATOGRAPHY, WITH TUBE OR STENT INSERTION          Past Medical History:   Diagnosis Date    Atrial fibrillation with rapid ventricular response (H)     History of cold sores     Hx of LASIK 2017    Insomnia     Migraine     Osteopenia     Pelvic mass     Peritoneal carcinomatosis (H)     Restless legs syndrome (RLS)       Past Surgical History:   Procedure Laterality Date    APPENDECTOMY      ARTHROSCPY KNEE SURGICAL DEBRIDEMENT SHAVING ARTICULAR CARTILAGE Right     BIOPSY  2021    Biopsy to confirm ovarian cancer    DEBRIDEMENT LEFT UPPER EXTREMITY      HYSTERECTOMY TOTAL ABD, LUISITO SALPINGO-OOPHORECTOMY, NODE DISSECTION, TUMOR DEBULKING, COMBINED Bilateral 2021    Procedure: HYSTERECTOMY, TOTAL, ABDOMINAL, WITH BILATERAL SALPINGO-OOPHORECTOMY, omentectomy, NEOPLASM DEBULKING,Proctoscocy, RO, Resection of liver nodules, diaphragm stripping, immobilization of liver and colon;  Surgeon: Bolivar Juarez MD;  Location: [facility] OR    LAPAROSCOPY DIAGNOSTIC (GYN) Bilateral 2021    Procedure: Diagnsotic laparoscopy, biopsies;  Surgeon: Bolivar Juarez MD;  Location: [facility] OR    LASIK      TUBAL LIGATION        No Known Allergies   Social History     Tobacco Use    Smoking status: Former     Packs/day: 0.50     Years: 40.00     Additional pack years: 0.00     Total pack years: 20.00     Types: Cigarettes     Quit date:      Years since quittin.9    Smokeless tobacco: Never   Substance Use Topics    Alcohol use: Not Currently      Wt Readings from Last 1 Encounters:   23 74.8 kg (165 lb)        Anesthesia Evaluation   Pt has had prior anesthetic. Type: General.    No history of anesthetic complications       ROS/MED HX  ENT/Pulmonary:    (-) tobacco use   Neurologic: Comment: RLS    (+)      migraines,                           Cardiovascular: Comment: Paroxysmal atrial fibrillation     Interpretation Summary  Global and regional left ventricular function is normal with an EF of 55-60%.  Global right ventricular function is normal. The right ventricle is normal  size.  No significant valvular abnormalities.      (+)  - -   -  - -   Taking blood thinners                              Previous cardiac testing   Echo: Date: 2/2021 Results:    Stress Test:  Date: Results:    ECG Reviewed:  Date: 2/2021 Results:    Cath:  Date: Results:      METS/Exercise Tolerance: 4 - Raking leaves, gardening Comment: Goes for walks, can ascend >1 flight of stairs without LOPEZ   Hematologic:     (+) History of blood clots,            (-) history of blood transfusion   Musculoskeletal:  - neg musculoskeletal ROS     GI/Hepatic:  - neg GI/hepatic ROS     Renal/Genitourinary: Comment: Probable ovarian cancer      Endo:  - neg endo ROS     Psychiatric/Substance Use: Comment: insomnia      Infectious Disease:  - neg infectious disease ROS     Malignancy:   (+) Malignancy, History of Other.Other CA metastatic high grade serous carcinoma, likely ovarian Active status post Chemo.    Other:            Physical Exam    Airway        Mallampati: I   TM distance: > 3 FB   Neck ROM: full   Mouth opening: > 3 cm    Respiratory Devices and Support         Dental       (+) Minor Abnormalities - some fillings, tiny chips      Cardiovascular   cardiovascular exam normal          Pulmonary   pulmonary exam normal                OUTSIDE LABS:  CBC:   Lab Results   Component Value Date    WBC 2.9 (L) 12/15/2023    WBC 3.1 (L) 12/14/2023    HGB 11.4 (L) 12/15/2023    HGB 12.2 12/14/2023    HCT 33.6 (L) 12/15/2023    HCT 36 12/14/2023     (L) 12/15/2023     (L) 12/14/2023     BMP:   Lab Results   Component Value Date     (L) 12/15/2023     12/14/2023    POTASSIUM 4.0 12/15/2023    POTASSIUM 3.7 12/14/2023    CHLORIDE 101 12/15/2023    CHLORIDE 100  12/14/2023    CO2 23 12/15/2023    CO2 22 12/14/2023    BUN 6.7 (L) 12/15/2023    BUN 8.5 12/14/2023    CR 0.51 12/15/2023    CR 0.61 12/14/2023    GLC 97 12/15/2023    GLC 85 12/14/2023     COAGS:   Lab Results   Component Value Date    PTT 30 02/03/2021    INR 1.35 (H) 12/14/2023    FIBR 413 04/23/2021     POC:   Lab Results   Component Value Date    BGM 88 04/19/2021     HEPATIC:   Lab Results   Component Value Date    ALBUMIN 2.8 (L) 12/15/2023    PROTTOTAL 6.5 12/15/2023    ALT 88 (H) 12/15/2023     (H) 12/15/2023    ALKPHOS 558 (H) 12/15/2023    BILITOTAL 5.8 (H) 12/15/2023     OTHER:   Lab Results   Component Value Date    PH 7.39 04/19/2021    LACT 2.0 12/14/2023    HORACIO 8.2 (L) 12/15/2023    MAG 1.8 05/26/2023    LIPASE 27 12/14/2023    TSH 1.03 02/04/2021       Anesthesia Plan    ASA Status:  3    NPO Status:  NPO Appropriate    Anesthesia Type: General.     - Airway: ETT   Induction: Intravenous.   Maintenance: Balanced.        Consents    Anesthesia Plan(s) and associated risks, benefits, and realistic alternatives discussed. Questions answered and patient/representative(s) expressed understanding.     - Discussed: Risks, Benefits and Alternatives for BOTH SEDATION and the PROCEDURE were discussed     - Discussed with:  Patient      - Extended Intubation/Ventilatory Support Discussed: No.      - Patient is DNR/DNI Status: No     Use of blood products discussed: No .     Postoperative Care    Pain management: IV analgesics.   PONV prophylaxis: Ondansetron (or other 5HT-3)     Comments:               Shankar Cortes MD    I have reviewed the pertinent notes and labs in the chart from the past 30 days and (re)examined the patient.  Any updates or changes from those notes are reflected in this note.

## 2023-12-15 NOTE — ANESTHESIA PROCEDURE NOTES
Airway       Patient location during procedure: OR       Procedure Start/Stop Times: 12/15/2023 2:24 PM  Staff -        CRNA: Ella Farias APRN CRNA       Performed By: CRNA  Consent for Airway        Urgency: elective  Indications and Patient Condition       Indications for airway management: angelica-procedural       Induction type:intravenous       Mask difficulty assessment: 1 - vent by mask    Final Airway Details       Final airway type: endotracheal airway       Successful airway: ETT - single and Oral  Endotracheal Airway Details        ETT size (mm): 7.0       Cuffed: yes       Cuff volume (mL): 8       Successful intubation technique: direct laryngoscopy       DL Blade Type: Costa 2       Adjucts: stylet       Position: Right       Measured from: lips       Secured at (cm): 22       Bite block used: None    Post intubation assessment        Placement verified by: capnometry, equal breath sounds and chest rise        Number of attempts at approach: 1       Number of other approaches attempted: 0       Secured with: pink tape       Ease of procedure: easy       Dentition: Intact and Unchanged    Medication(s) Administered   Medication Administration Time: 12/15/2023 2:24 PM

## 2023-12-15 NOTE — PROGRESS NOTES
Gynecology Oncology Progress Note  December 15, 2023    24 hour events:   - Per GI: Plan for ERCP w/ poss stent/spincterotomy @ 1200 12/15  - Reg diet then NPO with mIVF @ 0000  - Continue to hold Xarelto   - Direct bilirubin 4.24, total 5.7; albumin 3.3     Subjective: Pain somewhat controlled. Requesting medication as hasn't had any tonight. Dry mouth due to NPO status. Intermittent nausea overnight. Voiding without issue. Doesn't want to have a BM in a public restroom. Notes chills overnight but temperature wasn't taken so unsure if it was a fever. Denies chest pain, SOB. No other questions or concerns.    Objective:   /75   Pulse 78   Temp 98.2  F (36.8  C) (Oral)   Resp 16   Ht 1.829 m (6')   Wt 74.8 kg (165 lb)   SpO2 98%   BMI 22.38 kg/m      General: NAD  CV: RRR, well perfused  Resp: No increased work of breathing  Abdomen: soft, mildly tender, nondistended  Extremities: nontender, no edema    I/Os - NR    New labs/imaging-  AM CBC, CMP pending      Assessment/Plan: 68 year old female with recurrent ovarian cancer metastatic to liver, BRCA1, chronic nausea, insomnia, migraines, RLS, osteopenia, hypercholesterolemia, PE, and afib with RVR admitted now HD#2 for RUQ pain, progression of disease, dilated common bile duct and elevated LFTs/bili.     # Recurrent ovarian cancer   - S/p 3 cycles neoadjuvant carboplatin/paclitaxel, TAHBSO tumor debulking, 3 cycles of adjuvant carboplatin/paclitaxel followed by maintenance olaparib. 3/2023 - 7/2023 6 cycles carboplatin/paclitaxel/bevacizumab. 8/2023 - 12/2023 maintenance bevacizumab. 12/12/23 Cycle #1 weekly paclitaxel/bevacizumab. Hold treatment on admission.   - CT with  progression of new and enlarging innumerable hypodense liver lesions, increased retroperitoneal adenopathy, increased left greater than right intrahepatic biliary ductal dilatation without obstruction.      # Recurrent ovarian cancer, progression of disease  # RUQ Pain  # Dilated  common bile duct without obstruction  # Elevated LFTs  # Elevated bili  - PRN dilaudid.  - NPO w/ 0.9% NS @ 50 ml/hour.  - GI consult, recommending ERCP today  - PRN antiemetics and laxatives/stool softeners     # Hx afib with RVR  # Hx PE  - Hold anticoagulation (rivaroxaban) pending work up and plan. Resume ASAP.     # Hx HSV 1  - No acute issue   - Hold valacyclovir     # Pancytopenia  - Monitor labs     # Insomnia  - Continue home amitriptyline  - Hold zolpidem     # Hx Allergies  - Continue PRN fluticasone     Dispo:  Hospital admission for further work up and treatment planning.     Yann Ospina MD MPH  Gynecologic Oncology, PGY-3   Pager [phone]    I have examined this patient personally with the team and agree with the above findings and plan.    Jemal Juarez

## 2023-12-15 NOTE — OP NOTE
Component Collected Lab   ERCP 12/15/2023 12:28 PM Babar Williamson   [address] [place] [address] [phone]     Endoscopy Department  _______________________________________________________________________________  Patient Name: Taran Regalado            Procedure Date: 12/15/2023 12:28 PM  MRN: [id number]                       Account Number: [id number]  YOB: 1955             Admit Type: Inpatient  Age: 68                               Room: [place] [id number]  Gender: Female                        Note Status: Finalized  Attending MD: FADY POLANCO MD,      Pause for the Cause: time out performed  Total Sedation Time:                    _______________________________________________________________________________     Procedure:             ERCP  Indications:           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                         Presenting with jaundice to to multiple liver mets  Providers:             FADY POLANCO MD, Rachel Quinn  Referring MD:            Requesting Provider:   CHIVO BRITT MD  Medicines:             General Anesthesia, Indomethacin 100 mg KS, Levaquin                         500 mg IV  Complications:         No immediate complications. Estimated blood loss:                         Minimal.  _______________________________________________________________________________  Procedure:             Pre-Anesthesia Assessment:                         - Prior to the procedure, a History and Physical was                         performed, and patient  medications and allergies were                         reviewed. The patient is competent. The risks and                          benefits of the procedure and the sedation options and                         risks were discussed with the patient. All questions                         were answered and informed consent was obtained.                         Patient identification and proposed procedure were                         verified by the physician, the nurse, the                         anesthesiologist and the anesthetist in the procedure                         room. Mental Status Examination: alert and oriented.                         Airway Examination: normal oropharyngeal airway and                         neck mobility. Respiratory Examination: clear to                         auscultation. CV Examination: normal. Prophylactic                         Antibiotics: The patient requires prophylactic                         antibiotics for the planned ERCP in an obstructed bile                         duct. The patient received antibiotic therapy before                         the procedure. Prior Anticoagulants: The patient has                         taken Xarelto (rivaroxaban), last dose was 2 days                         prior to procedure. ASA Grade Assessment: III - A                         patient with severe systemic disease. After reviewing                         the risks and benefits, the patient was deemed in                         satisfactory condition to undergo the procedure. The                         anesthesia plan was to use general anesthesia.                         Immediately prior to administration of medications,                         the patient was re-assessed for adequacy to receive                         sedatives. The heart rate, respiratory rate, oxygen                         saturations, blood pressure, adequacy of pulmonary                         ventilation,  and response to care were monitored                         throughout the procedure. The physical status of the                         patient was re-assessed after the procedure.                         After obtaining informed consent, the scope was passed                         under direct vision. Throughout the procedure, the                         patient's blood pressure, pulse, and oxygen                         saturations were monitored continuously. The                         Duodenoscope was introduced through the mouth, and                         used to inject contrast into and used to locate the                         major papilla. The ERCP was accomplished without                         difficulty. The patient tolerated the procedure well.                                                                                   Findings:       The  film was normal. The esophagus was successfully intubated       under direct vision. The scope was advanced from the mouth to the       duodenum. The pharynx, larynx and associated structures, as well as the       upper GI tract, were normal. The major papilla was normal. The ventral       pancreatic duct was inadvertently cannulated with the short-nosed       traction sphincterotome. Novagold wire passed successfully into the tail       of the pancreatic duct from the major papilla. The bile duct was deeply       cannulated with the short-nosed traction sphincterotome. Contrast was       injected. I personally interpreted the bile duct images. There was brisk       flow of contrast through the ducts. Image quality was excellent.       Contrast extended to the entire biliary tree. The left and right hepatic       ducts with secondary or tertiary branches of the intrahepatic ducts       (Bismuth IV) contained multiple stenoses. Visiglide wire was passed into       the biliary tree. One 4 Fr by 11 cm temporary stent with a single       external pigtail  "and no internal flaps was placed 11 cm into the ventral       pancreatic duct. Clear fluid flowed through the stent. The stent was in       good position. A 7 mm biliary sphincterotomy was made with a       monofilament traction (standard) sphincterotome using ERBE       electrocautery. There was no post-sphincterotomy bleeding. Dilation of       the left main hepatic duct and the right main hepatic duct with a 4 mm       balloon dilator was successful. One 10 mm by 8 cm intraductal uncovered       metal stent was placed 8 cm into the left hepatic duct. Bile flowed       through the stent. The stent was in good position. One 10 mm by 8 cm       intraductal uncovered metal stent was placed 8 cm into the right hepatic       duct. Bile flowed through the stent. The stent was in good position.                                                                                   Impression:            - The major papilla appeared normal.                         - Double wire technique used. Ventral pancreatic duct                         prophylactically stenting with temporary 4 Fr x 11 cm                         SPT Velasquez stent.                         - Biliary sphincterotomy                         - Cholangiogram showed multifocal biliary stricture                         (Bismuth IV). Left main and right anterior systems                         selectively accessed.                         - 4 mm balloon dilation for intrahepatic and CHD                         strictures                         - Two 10 mm x 80 mm uncovered metal Zilver stents                         placed in overlapping \"Y\" configuration intraductally                         into the left main and right anterior intrahepatic                         - Metal stents post-dilated to 4 mm at the                         intrahepatics and 8 mm in the common duct  Recommendation:        - [facility] patient to [facility] for [facility] care.                     "     - Hold anticoagulation for next 72 hours                         - Monitor for clinical and LFT improvement                         - If normalization of LFTs, no plans for repeat ERCP.                         Can be referred back if recurrence of biliary                         obstruction                         - Abdominal X-ray in 4 weeks to ensure PD stent passage                         - Panc-bili team to continue following                                                                                     Fabian Simpson MD

## 2023-12-15 NOTE — PROGRESS NOTES
Pt is alert and oriented but very rude. Pt was frustrated over night for not getting faster care. This morning, when this writer came to room to establish care. Pt barely speak or look at this writer. Daughter said she is ignoring you bc she is frustrated. Gyn-Onc was paged to request additional anti nausea med since pt said Zofran does not work.

## 2023-12-15 NOTE — ED NOTES
At about 12MN pt requested to be left alone without staff waking her up because she was tired and wanted to sleep through the night with her daughter at the bed side for support. She slept all night without any issue, she has being NPO since 12MN for procedure this morning.

## 2023-12-15 NOTE — ANESTHESIA POSTPROCEDURE EVALUATION
Patient: Taran Regalado    Procedure: Procedure(s):  ENDOSCOPIC RETROGRADE CHOLANGIOPANCREATOGRAPHY, with pancreatic stent placement, biliary duct dilation, biliary stent placement, and biliary sphincterotomy       Anesthesia Type:  General    Note:  Disposition: Inpatient   Postop Pain Control: Uneventful            Sign Out: Well controlled pain   PONV: No   Neuro/Psych: Uneventful            Sign Out: Acceptable/Baseline neuro status   Airway/Respiratory: Uneventful            Sign Out: Acceptable/Baseline resp. status   CV/Hemodynamics: Uneventful            Sign Out: Acceptable CV status; No obvious hypovolemia; No obvious fluid overload   Other NRE: NONE   DID A NON-ROUTINE EVENT OCCUR? No           Last vitals:  Vitals Value Taken Time   /89 12/15/23 1715   Temp 36  C (96.8  F) 12/15/23 1615   Pulse 91 12/15/23 1722   Resp 26 12/15/23 1722   SpO2 100 % 12/15/23 1710   Vitals shown include unfiled device data.    Electronically Signed By: Aubrey Wick MD  December 15, 2023  5:31 PM

## 2023-12-15 NOTE — ANESTHESIA CARE TRANSFER NOTE
Patient: Taran Regalado    Procedure: Procedure(s):  ENDOSCOPIC RETROGRADE CHOLANGIOPANCREATOGRAPHY, with pancreatic stent placement, biliary duct dilation, biliary stent placement, and biliary sphincterotomy       Diagnosis: RUQ abdominal pain [R10.11]  Biliary obstruction (H28) [K83.1]  Diagnosis Additional Information: No value filed.    Anesthesia Type:   General     Note:    Oropharynx: oropharynx clear of all foreign objects and spontaneously breathing  Level of Consciousness: drowsy  Oxygen Supplementation: face mask  Level of Supplemental Oxygen (L/min / FiO2): 6 LPM  Independent Airway: airway patency satisfactory and stable  Dentition: dentition unchanged  Vital Signs Stable: post-procedure vital signs reviewed and stable  Report to RN Given: handoff report given  Patient transferred to: PACU    Handoff Report: Identifed the Patient, Identified the Reponsible Provider, Reviewed the pertinent medical history, Discussed the surgical course, Reviewed Intra-OP anesthesia mangement and issues during anesthesia, Set expectations for post-procedure period and Allowed opportunity for questions and acknowledgement of understanding      Vitals:  Vitals Value Taken Time   /100 12/15/23 1615   Temp     Pulse 72 12/15/23 1618   Resp 11 12/15/23 1618   SpO2 100 % 12/15/23 1618   Vitals shown include unfiled device data.    Electronically Signed By: JERICHO King CRNA  December 15, 2023  4:19 PM

## 2023-12-16 NOTE — PROGRESS NOTES
Admitted/transferred from: PACU  2 RN full   skin assessment completed by Venkata Holt, RN and Lashon Aguirre RN.  Skin assessment finding: skin intact, no problems   Interventions/actions: other N/A      Will continue to monitor.

## 2023-12-16 NOTE — PLAN OF CARE
Goal Outcome Evaluation:    [id number]    VSS. Alert and oriented. PRN dilaudid given for abdominal pain. On capno overnight. Up SBA. No acute events, continue with POC.

## 2023-12-16 NOTE — PROGRESS NOTES
GASTROENTEROLOGY PROGRESS NOTE    Date: 12/16/2023     ASSESSMENT:  Taran Regalado is a 68 year old female with a PMH significant for metastatic Ovarian cancer (mets to liver,LN and lungs) with hx malignant ascites (not required paracentesis since 12/2020) now receiving palliative chemo (previous bevacizumab but started combination Paclitaxel + bevacizumab 12/12), s/p DANAE/BSO, afib and pulmonary embolism (provoked and dx 12/2020 with cancer dx per pt report, on Xarelto).       # Elevated LFTs with hyperbilirubinemia 2/2 metastatic biliary  obstruction  # S/p ERCP with Y metal biliary stents   # RUQ abdominal pain  ERCP on 12/14 showed multifocal biliary strictures.  Metal stents in the Y configuration were placed into the left main and right anterior biliary system.  T. bili remains unchanged on 12/15.  It may take some time for appropriate biliary draining after relief of obstruction.  Alternatively, the current stents may need to be adjusted.  Will continue to monitor LFT for now.         RECOMMENDATIONS:  -Daily LFT  -Hold anticoagulation for 3 days from ERCP (until 12/18 noon) to prevent post-sphincterotomy bleeding  -Panc/bili will follow up on AXR in 4 weeks to ensure passage of PD stent          Thank you for involving us in this patient's care. Please do not hesitate to contact the GI service with any questions or concerns.      Pt care plan discussed with Dr. Simpson, GI staff physician.      Merna Portillo MD, PhD  Gastroenterology Fellow  Division of Gastroenterology, Hepatology and Nutrition  [facility]  Pager: [phone]  _______________________________________________________________      Subjective:  Doing well. No epigastric pain. Some back discomfort. Daughter at bedside.     Objective:  Blood pressure 100/55, pulse 96, temperature 98.3  F (36.8  C), temperature source Oral, resp. rate 16, height 1.829 m (6'), weight 74.8 kg (165 lb), SpO2 97%, not currently breastfeeding.    Gen: A&Ox3,  NAD  HEENT: sclera anicteric  CV: RRR  Lungs: breathing comfortably on room air  Abd:  soft, nontender, nondistended, bowel sounds present  Skin: no jaundice, no stigmata of chronic liver disease  MS: appropriate muscle mass for age  Neuro: non focal       LABS:  BMP  Recent Labs   Lab 12/16/23  0545 12/15/23  0658 12/14/23  0550 12/14/23  0411   * 132* 137 134*   POTASSIUM 4.2 4.0 3.7 3.8   CHLORIDE 105 101  --  100   HORACIO 8.2* 8.2*  --  8.5*   CO2 22 23  --  22   BUN 10.0 6.7*  --  8.5   CR 0.57 0.51  --  0.61   GLC 78 97 85 114*     CBC  Recent Labs   Lab 12/16/23  0545 12/15/23  0658 12/14/23  0550 12/14/23  0549   WBC 2.6* 2.9*  --  3.1*   RBC 3.09* 3.40*  --  3.53*   HGB 10.4* 11.4* 12.2 11.6*   HCT 32.1* 33.6* 36 34.9*   * 99  --  99   MCH 33.7* 33.5*  --  32.9   MCHC 32.4 33.9  --  33.2   RDW 15.1* 15.0  --  14.9   PLT 93* 103*  --  106*     INR  Recent Labs   Lab 12/14/23  1041 12/14/23  0411   INR 1.35* 1.40*     LFTs  Recent Labs   Lab 12/16/23  0545 12/15/23  0658 12/14/23  0411   ALKPHOS 509* 558* 656*   * 144* 198*   ALT 68* 88* 115*   BILITOTAL 5.3* 5.8* 5.7*   PROTTOTAL 6.0* 6.5 7.4   ALBUMIN 2.5* 2.8* 3.3*      PANC  Recent Labs   Lab 12/14/23  0411   LIPASE 27       IMAGING:  Reviewed

## 2023-12-16 NOTE — ANESTHESIA POSTPROCEDURE EVALUATION
Patient: Taran Regalado    Procedure: Procedure(s):  ENDOSCOPIC RETROGRADE CHOLANGIOPANCREATOGRAPHY, with pancreatic stent placement, biliary duct dilation, biliary stent placement, and biliary sphincterotomy       Anesthesia Type:  General    Note:  Disposition: Inpatient   Postop Pain Control: Uneventful            Sign Out: Well controlled pain   PONV: No   Neuro/Psych: Uneventful            Sign Out: Acceptable/Baseline neuro status   Airway/Respiratory: Uneventful            Sign Out: Acceptable/Baseline resp. status   CV/Hemodynamics: Uneventful            Sign Out: Acceptable CV status; No obvious hypovolemia; No obvious fluid overload   Other NRE:    DID A NON-ROUTINE EVENT OCCUR? No           Last vitals:  Vitals Value Taken Time   /89 12/15/23 1715   Temp 36  C (96.8  F) 12/15/23 1615   Pulse 91 12/15/23 1722   Resp 26 12/15/23 1722   SpO2 100 % 12/15/23 1710   Vitals shown include unfiled device data.    Electronically Signed By: Senthil Lemon MD  December 15, 2023  6:30 PM

## 2023-12-16 NOTE — PROGRESS NOTES
Gynecology Oncology Progress Note  December 16, 2023    24 hour events:   - ERCP procedure     Subjective: Pain controlled. Continues to have baseline nausea, no vomiting. Voiding without issue. Denies chest pain, SOB. No other questions or concerns.    Objective:   /66 (BP Location: Left arm)   Pulse 84   Temp 98.1  F (36.7  C) (Oral)   Resp 16   Ht 1.829 m (6')   Wt 74.8 kg (165 lb)   SpO2 96%   BMI 22.38 kg/m      General: NAD  CV: RRR, well perfused  Resp: No increased work of breathing  Abdomen: soft, mildly tender, nondistended  Extremities: nontender, no edema    I/Os - NR    New labs/imaging-  AM CBC, CMP pending    Assessment/Plan: 68 year old female with recurrent ovarian cancer metastatic to liver, BRCA1, chronic nausea, insomnia, migraines, RLS, osteopenia, hypercholesterolemia, PE, and afib with RVR admitted now HD#3 for RUQ pain, progression of disease, dilated common bile duct and elevated LFTs/bili, POD#1 ERCP with stent placement.    # Recurrent ovarian cancer   - S/p 3 cycles neoadjuvant carboplatin/paclitaxel, TAHBSO tumor debulking, 3 cycles of adjuvant carboplatin/paclitaxel followed by maintenance olaparib. 3/2023 - 7/2023 6 cycles carboplatin/paclitaxel/bevacizumab. 8/2023 - 12/2023 maintenance bevacizumab. 12/12/23 Cycle #1 weekly paclitaxel/bevacizumab. Hold treatment on admission.   - CT with  progression of new and enlarging innumerable hypodense liver lesions, increased retroperitoneal adenopathy, increased left greater than right intrahepatic biliary ductal dilatation without obstruction.      # Recurrent ovarian cancer, progression of disease  # RUQ Pain  # Dilated common bile duct without obstruction  # Elevated LFTs  # Elevated bili  - PRN dilaudid.  - Full liquid diet w/ 0.9% NS @ 50 ml/hour, ADAT later today  - S/p ERCP with stent placement, appreciate recommendations   - GI recommendations following procedure:                         - Hold anticoagulation for next 72  hours                         - Monitor for clinical and LFT improvement                         - If normalization of LFTs, no plans for repeat ERCP.                         Can be referred back if recurrence of biliary                         obstruction                         - Abdominal X-ray in 4 weeks to ensure PD stent passage  - PRN antiemetics and laxatives/stool softeners     # Hx afib with RVR  # Hx PE  - Hold anticoagulation (rivaroxaban) pending work up and plan. Resume ASAP.     # Hx HSV 1  - No acute issue   - Hold valacyclovir     # Pancytopenia  - Monitor labs     # Insomnia  - Continue home amitriptyline  - Hold zolpidem     # Hx Allergies  - Continue PRN fluticasone     Dispo:  Pending clinical improvement.    Yann Ospina MD MPH  Gynecologic Oncology, PGY-3   Pager [phone]    I have examined this patient personally with the team and agree with the above findings and plan.  S/P ERCP and feeling reasonably well.  Will ADAT, ambulate.  Possible discontinue today or tomorrow.    Jemal Juarez

## 2023-12-16 NOTE — PLAN OF CARE
VSS. Pt up with SBA. Voids spont with adequate UOP. No gas, no BM. Pt endorses intermittent nausea not relieved by reglan or zofran. Pain controlled with IV dilaudid X2. MIV infusing through PIV. Tolerating clear liquids. Cont. POC.

## 2023-12-17 NOTE — PLAN OF CARE
[id number] A&Ox4, VSS on RA. IV dilaudid given one time. Pt reported 8/10 pain. Refused to take oral dilaudid because it is not effective. Paged provider for alternative oral pain medication options, flexeril and advil ordered. Pt refused both of these meds as she said she can take them at home but wants IV dilaudid here. Pt ended up falling asleep before asking for more dilaudid. Reporting constipation and having hypoactive bowel sounds. Up SBA in room.

## 2023-12-17 NOTE — PROGRESS NOTES
Gynecology Oncology Progress Note  December 16, 2023    24 hour events:   - Diet advanced from CLD to Reg    Subjective: She is feeling well this morning.  Notes that her pain is well-controlled overall.  She states that she continues to have her baseline nausea without any vomiting.  She is voiding without complication.  She denies any chest pain, shortness of breath.  She notes that she would like to discharged home.    Objective:   /69   Pulse 102   Temp 98.5  F (36.9  C) (Oral)   Resp 16   Ht 1.829 m (6')   Wt 74.8 kg (165 lb)   SpO2 96%   BMI 22.38 kg/m      General: NAD  CV: RRR, well perfused  Resp: No increased work of breathing  Abdomen: soft, non-tender, nondistended  Extremities: nontender, no edema    I/Os -   PO: 480 // 0    // 0   UOP 1200 // 0     New labs/imaging-  AM CBC, CMP, Hepatic Panel, Mg, Phos pending    Assessment/Plan: 68 year old female with recurrent ovarian cancer metastatic to liver, BRCA1, chronic nausea, insomnia, migraines, RLS, osteopenia, hypercholesterolemia, PE, and afib with RVR admitted now HD#4 for RUQ pain, progression of disease, dilated common bile duct and elevated LFTs/bili, POD#2 ERCP with stent placement.    # Recurrent ovarian cancer, progression of disease  # RUQ Pain  # Dilated common bile duct without obstruction  # Elevated LFTs  # Elevated bili  - PRN dilaudid. Transition to oral regimen today with addition of ibuprofen  - Regular diet  - S/p ERCP with stent placement, appreciate recommendations   - GI recommendations following procedure:                         - Hold anticoagulation for 72 hours                         - Monitor for clinical and LFT improvement                         - If normalization of LFTs, no plans for repeat ERCP.                         Can be referred back if recurrence of biliary                         obstruction                         - Abdominal X-ray in 4 weeks to ensure PD stent passage  - PRN antiemetics and  laxatives/stool softeners  - LFTs pending this morning    # Recurrent ovarian cancer   - S/p 3 cycles neoadjuvant carboplatin/paclitaxel, TAHBSO tumor debulking, 3 cycles of adjuvant carboplatin/paclitaxel followed by maintenance olaparib. 3/2023 - 7/2023 6 cycles carboplatin/paclitaxel/bevacizumab. 8/2023 - 12/2023 maintenance bevacizumab. 12/12/23 Cycle #1 weekly paclitaxel/bevacizumab. Hold treatment on admission.   - CT with  progression of new and enlarging innumerable hypodense liver lesions, increased retroperitoneal adenopathy, increased left greater than right intrahepatic biliary ductal dilatation without obstruction.      # Hx afib with RVR  # Hx PE  - Hold anticoagulation (rivaroxaban) pending work up and plan. Resume 72 hours postop per GI recommendations (12/18).     # Hx HSV 1  - No acute issue   - Hold valacyclovir     # Pancytopenia  - Monitor labs     # Insomnia  - Continue home amitriptyline and zolpidem     # Hx Allergies  - Continue PRN fluticasone    # Migraines  - Home sumatriptan ordered     Dispo:  Pending clinical improvement.    Peter Michelle MD  OBGYN PGY-4  December 17, 2023 6:46 AM    I, Juan Barajas MD, saw this patient with the resident, NP, or medical student and agree with the findings and plan of care as documented in the note. I personally reviewed vital signs, medications, and labs. Any corrections were made as noted.     68 year old female with recurrent ovarian cancer metastatic to liver who is POD#2 ERCP with stent placement. Doing well postoperatively. Pain is well controlled. LFT's stable, bilirubin down trending. Platelets down trending but stable. Patient will get labs drawn in 24 hrs for chemotherapy. Plan to restart anticoagulation Tuesday morning per GI after stent placement and following evaluation of platelets. Plan for discharge today.      Juan Barajas MD  December 17, 2023  10:01 AM

## 2023-12-17 NOTE — PLAN OF CARE
[id number]  /55 (BP Location: Right arm)   Pulse 96   Temp 98.3  F (36.8  C) (Oral)   Resp 16   Ht 1.829 m (6')   Wt 74.8 kg (165 lb)   SpO2 97%   BMI 22.38 kg/m      Goal Outcome Evaluation:      Plan of Care Reviewed With: patient    Overall Patient Progress: no changeOverall Patient Progress: no change    Outcome Evaluation: A&Ox4, calls appropriately. VSS on room air. Up with SBA. FLD this AM, advanced to regular diet this evening, poor appetite d/t nausea and gas discomfort. PRN Simethicone requested and ordered, passed along to next RN to give. IV Reglan given x1 for nausea this AM. Q2H IV Dilaudid given per MAR with some relief per pt, PO PRN Dilaudid given x1 today with no relief per pt. Voids spontaneously w/out difficulty. +bowel sounds, no BM today, not passing flatus. PRN Miralax given x2, scheduled senna. R PIV saline locked.

## 2023-12-17 NOTE — PROVIDER NOTIFICATION
Patient discharged home following hospital stay for: Biliary obstruction s/p ERCP     Vitals: BP (!) 140/88 (BP Location: Left arm)   Pulse 107   Temp 97.8  F (36.6  C) (Oral)   Resp 16   Ht 1.829 m (6')   Wt 74.8 kg (165 lb)   SpO2 97%   BMI 22.38 kg/m    Oxygen status: room air  Patient tolerating diet: regular  Transport: pt left via wheelchair with daughter and granddaughter, refused transportation services     Belongings sent with patient. IV site discontinued. Discharge meds to discharge pharmacy. AVS and education gone over with patient, daughter, and granddaughter. Pt to follow up as outpatient and with PCP. Pt verbalized understanding of all education and information.

## 2023-12-17 NOTE — DISCHARGE INSTRUCTIONS
START XARELTO ON 12/19/23 (72 hours after procedure)    Please get lab work done either tomorrow PM or 12/19    Please follow up with clinic in the morning for confirmation on infusion site change. Your provider has been messaged      Gynecology Cancer Clinic  Cancer Clinic ([facility])  Clinics and Surgery Center  [address]oor [address]  [address] [place] [address]    Appointments: [phone]  Information: [phone]    If you have any other questions please call [phone] or [phone] on nights or weekends.

## 2023-12-18 NOTE — PROGRESS NOTES
Clinic Care Coordination Contact  Community Health Worker Initial Outreach    Patient accepts CC: No, Pt declined needs for extra support.     Clinic Care Coordination Contact  [facility]: Post-Discharge Note  SITUATION                                                      Admission:    Admission Date: 12/14/23   Reason for Admission: Admission Dx:   -Recurrent ovarian cancer, progression of disease  RUQ Pain  Dilated common bile duct without obstruction  Elevated LFTs  Elevated bili  Hx afib with RVR  Hx PE  Anemia/Thrombocytopenia/Leukopenia   Hx HSV 1, 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   Insomnia   Hx Allergies      Discharge Dx:  -Recurrent ovarian cancer, progression of disease  RUQ Pain  Dilated common bile duct without obstruction  Elevated LFTs  Elevated bili  Hx afib with RVR  Hx PE  Anemia/Thrombocytopenia/Leukopenia   Hx HSV 1, 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   Insomnia   Hx Allergies  Discharge:   Discharge Date: 12/17/23  Discharge Diagnosis: Admission Dx:   -Recurrent ovarian cancer, progression of disease  RUQ Pain  Dilated common bile duct without obstruction  Elevated LFTs  Elevated bili  Hx afib with RVR  Hx PE  Anemia/Thrombocytopenia/Leukopenia   Hx HSV 1, 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   Insomnia   Hx Allergies      Discharge Dx:  -Recurrent ovarian cancer, progression of disease  RUQ Pain  Dilated common bile duct without obstruction  Elevated LFTs  Elevated bili  Hx afib with RVR  Hx PE  Anemia/Thrombocytopenia/Leukopenia   Hx HSV 1, 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   Insomnia   Hx Allergies    BACKGROUND                                                      Per hospital discharge summary and inpatient provider notes:    68yr female with history of ovarian cancer metastatic to liver, BRCA1, insomnia, migrain, RLS, osteopenia, hypercholesterolemia, PE, and afib with RVR who presents to the ED for right sided abdominal pain. She was seen in the ED closer to home and had labs/CT scan/US and drove to [facility] ED for further work up/treatment. Over the past couple  weeks she has been experiencing right sided abdominal pain that worsens with deep breathing. The pain has gradually worsened over the past couple of weeks.     ASSESSMENT           Discharge Assessment  How are you doing now that you are home?: doing well  How are your symptoms? (Red Flag symptoms escalate to triage hotline per guidelines): Improved  Do you feel your condition is stable enough to be safe at home until your provider visit?: Yes  Does the patient have their discharge instructions? : Yes  Does the patient have questions regarding their discharge instructions? : No  Were you started on any new medications or were there changes to any of your previous medications? : Yes  Does the patient have all of their medications?: Yes  Do you have questions regarding any of your medications? : No  Do you have all of your needed medical supplies or equipment (DME)?  (i.e. oxygen tank, CPAP, cane, etc.): Yes  Discharge follow-up appointment scheduled within 14 calendar days? : No (Pt is waiting call from clinic)    Post-op ([facility] CTA Only)  If the patient had a surgery or procedure, do they have any questions for a nurse?: No         PLAN                                                      Outpatient Plan:    No future appointments.    Follow Up and recommended labs and tests  Follow up 12/18 PM or 12/19 AM for lab work. Clinic has been messaged for changing infusion site to [place]. Please  mychart message or call clinic to confirm tomorrow.    For any urgent concerns, please contact our 24 hour nurse triage line: [phone]       [place], CHW  [phone]  [facility]

## 2023-12-19 NOTE — PROGRESS NOTES
Local Oncologist reached out with update and request     Patient will receive her full dose of Taxol/Avastin. However, while her liver labs are improved her ANC and platelets are dropping. [place] the cities [place] treatment.    Local Oncologist would like patient to get Neupogen Injection tomorrow/Thursday or Thursday/Friday. Patient is requesting this in [place]     RN reached out to NP for assistance and review as MD is in the OR     RN will request these appts once orders are placed     Shawna Hinojosa RN

## 2023-12-21 NOTE — TELEPHONE ENCOUNTER
Patient/daughter reached out requesting a refill of flexeril as this is the only thing truly helping take the edge off of the pain. This and the Oxycodone help patient function through the day and allow her to sleep    Patient has not taken the Dilaudid that was prescribed and states this does not work     Patient has enough Oxycodone to get her to her next Infusion and knows her local team will have to complete this refill     RN reviewed after this refill of flexeril she will have to have local team do this or she will need to re-establish care with palliative care as this is not a long term medication we manage     Patient/daughter gave verbal understanding and will review with local team     Request forwarded to NP for review and approval     Elva Heredia in [place] as patient staying at [facility] until Monday     Shawna Hinojosa RN

## 2023-12-21 NOTE — PROGRESS NOTES
Infusion Nursing Note:  Taran Regalado presents today for Neupogen.    Patient seen by provider today: No   present during visit today: Not Applicable.    Note: Pt voices no new concerns today-has had neupogen injections in the past.      Intravenous Access:  No Intravenous access/labs at this visit.    Treatment Conditions:  Lab Results   Component Value Date    HGB 11.6 (L) 12/17/2023    WBC 2.2 (L) 12/17/2023    ANEU 18.3 (H) 07/07/2023    ANEUTAUTO 2.1 12/15/2023    PLT 89 (L) 12/17/2023        Results reviewed, labs MET treatment parameters, ok to proceed with treatment.      Post Infusion Assessment:  Patient tolerated injection without incident.  Site patent and intact, free from redness, edema or discomfort.       Discharge Plan:   AVS to patient via MYCHART.  Patient will return 12/22/23 for next appointment. Future appts have been reviewed and crosschecked with appt note and plan.   Patient discharged in stable condition accompanied by: self.  Departure Mode: Ambulatory.      Destinee Valenzuela RN

## 2023-12-22 NOTE — PROGRESS NOTES
Infusion Nursing Note:  Defanny Regalado presents today for Nivestym.    Patient seen by provider today: No   present during visit today: Not Applicable.    Note:Assessment performed by BETH Fenton prior to injection today. .      Intravenous Access:  No Intravenous access/labs at this visit.    Treatment Conditions:  Not Applicable.      Post Infusion Assessment:  Patient tolerated injection without incident.  Site patent and intact, free from redness, edema or discomfort.       Discharge Plan:   Departure Mode: Ambulatory.      Lori Zepeda MA

## 2024-01-01 ENCOUNTER — TELEPHONE (OUTPATIENT)
Dept: PALLIATIVE CARE | Facility: CLINIC | Age: 69
End: 2024-01-01
Payer: COMMERCIAL

## 2024-01-01 ENCOUNTER — INFUSION THERAPY VISIT (OUTPATIENT)
Dept: INFUSION THERAPY | Facility: CLINIC | Age: 69
End: 2024-01-01
Attending: NURSE PRACTITIONER
Payer: COMMERCIAL

## 2024-01-01 ENCOUNTER — HOSPITAL ENCOUNTER (OUTPATIENT)
Dept: ULTRASOUND IMAGING | Facility: CLINIC | Age: 69
Discharge: HOME | End: 2024-04-12
Attending: NURSE PRACTITIONER | Admitting: OBSTETRICS & GYNECOLOGY
Payer: COMMERCIAL

## 2024-01-01 ENCOUNTER — VIRTUAL VISIT (OUTPATIENT)
Dept: ONCOLOGY | Facility: HOSPITAL | Age: 69
End: 2024-01-01
Attending: OBSTETRICS & GYNECOLOGY
Payer: COMMERCIAL

## 2024-01-01 ENCOUNTER — NURSE TRIAGE (OUTPATIENT)
Dept: NURSING | Facility: CLINIC | Age: 69
End: 2024-01-01
Payer: COMMERCIAL

## 2024-01-01 ENCOUNTER — PATIENT OUTREACH (OUTPATIENT)
Dept: PALLIATIVE CARE | Facility: CLINIC | Age: 69
End: 2024-01-01

## 2024-01-01 ENCOUNTER — LAB (OUTPATIENT)
Dept: INFUSION THERAPY | Facility: CLINIC | Age: 69
End: 2024-01-01
Attending: OBSTETRICS & GYNECOLOGY
Payer: COMMERCIAL

## 2024-01-01 ENCOUNTER — TELEPHONE (OUTPATIENT)
Dept: ONCOLOGY | Facility: CLINIC | Age: 69
End: 2024-01-01

## 2024-01-01 ENCOUNTER — PATIENT OUTREACH (OUTPATIENT)
Dept: ONCOLOGY | Facility: CLINIC | Age: 69
End: 2024-01-01
Payer: COMMERCIAL

## 2024-01-01 ENCOUNTER — APPOINTMENT (OUTPATIENT)
Dept: CT IMAGING | Facility: CLINIC | Age: 69
DRG: 056 | End: 2024-01-01
Payer: COMMERCIAL

## 2024-01-01 ENCOUNTER — HOSPITAL ENCOUNTER (EMERGENCY)
Facility: CLINIC | Age: 69
Discharge: HOME | End: 2024-04-30
Attending: EMERGENCY MEDICINE | Admitting: EMERGENCY MEDICINE
Payer: COMMERCIAL

## 2024-01-01 ENCOUNTER — VIRTUAL VISIT (OUTPATIENT)
Dept: PALLIATIVE CARE | Facility: CLINIC | Age: 69
End: 2024-01-01
Attending: INTERNAL MEDICINE
Payer: COMMERCIAL

## 2024-01-01 ENCOUNTER — OFFICE VISIT (OUTPATIENT)
Dept: URGENT CARE | Facility: URGENT CARE | Age: 69
End: 2024-01-01
Payer: COMMERCIAL

## 2024-01-01 ENCOUNTER — APPOINTMENT (OUTPATIENT)
Dept: PHYSICAL THERAPY | Facility: CLINIC | Age: 69
DRG: 056 | End: 2024-01-01
Payer: COMMERCIAL

## 2024-01-01 ENCOUNTER — APPOINTMENT (OUTPATIENT)
Dept: OCCUPATIONAL THERAPY | Facility: CLINIC | Age: 69
DRG: 056 | End: 2024-01-01
Payer: COMMERCIAL

## 2024-01-01 ENCOUNTER — LAB (OUTPATIENT)
Dept: INFUSION THERAPY | Facility: CLINIC | Age: 69
End: 2024-01-01
Attending: INTERNAL MEDICINE
Payer: COMMERCIAL

## 2024-01-01 ENCOUNTER — HOSPITAL ENCOUNTER (OUTPATIENT)
Dept: ULTRASOUND IMAGING | Facility: CLINIC | Age: 69
Discharge: HOME | End: 2024-04-05
Attending: OBSTETRICS & GYNECOLOGY | Admitting: OBSTETRICS & GYNECOLOGY
Payer: COMMERCIAL

## 2024-01-01 ENCOUNTER — PATIENT OUTREACH (OUTPATIENT)
Dept: CARE COORDINATION | Facility: CLINIC | Age: 69
End: 2024-01-01

## 2024-01-01 ENCOUNTER — TELEPHONE (OUTPATIENT)
Dept: ONCOLOGY | Facility: CLINIC | Age: 69
End: 2024-01-01
Payer: COMMERCIAL

## 2024-01-01 ENCOUNTER — MYC MEDICAL ADVICE (OUTPATIENT)
Dept: INTERVENTIONAL RADIOLOGY/VASCULAR | Facility: CLINIC | Age: 69
End: 2024-01-01
Payer: COMMERCIAL

## 2024-01-01 ENCOUNTER — HOSPITAL ENCOUNTER (EMERGENCY)
Facility: CLINIC | Age: 69
Discharge: HOME | End: 2024-05-06
Attending: FAMILY MEDICINE | Admitting: FAMILY MEDICINE
Payer: COMMERCIAL

## 2024-01-01 ENCOUNTER — MYC MEDICAL ADVICE (OUTPATIENT)
Dept: PALLIATIVE CARE | Facility: CLINIC | Age: 69
End: 2024-01-01
Payer: COMMERCIAL

## 2024-01-01 ENCOUNTER — APPOINTMENT (OUTPATIENT)
Dept: CT IMAGING | Facility: CLINIC | Age: 69
DRG: 056 | End: 2024-01-01
Attending: EMERGENCY MEDICINE
Payer: COMMERCIAL

## 2024-01-01 ENCOUNTER — PATIENT OUTREACH (OUTPATIENT)
Dept: PALLIATIVE CARE | Facility: CLINIC | Age: 69
End: 2024-01-01
Payer: COMMERCIAL

## 2024-01-01 ENCOUNTER — VIRTUAL VISIT (OUTPATIENT)
Dept: ONCOLOGY | Facility: HOSPITAL | Age: 69
End: 2024-01-01
Attending: NURSE PRACTITIONER
Payer: COMMERCIAL

## 2024-01-01 ENCOUNTER — HOSPITAL ENCOUNTER (INPATIENT)
Facility: CLINIC | Age: 69
LOS: 2 days | Discharge: HOME HEALTH | DRG: 056 | End: 2024-05-17
Attending: EMERGENCY MEDICINE | Admitting: OBSTETRICS & GYNECOLOGY
Payer: COMMERCIAL

## 2024-01-01 ENCOUNTER — VIRTUAL VISIT (OUTPATIENT)
Dept: ONCOLOGY | Facility: CLINIC | Age: 69
End: 2024-01-01
Attending: NURSE PRACTITIONER
Payer: COMMERCIAL

## 2024-01-01 ENCOUNTER — APPOINTMENT (OUTPATIENT)
Dept: MRI IMAGING | Facility: CLINIC | Age: 69
DRG: 056 | End: 2024-01-01
Attending: OBSTETRICS & GYNECOLOGY
Payer: COMMERCIAL

## 2024-01-01 ENCOUNTER — TELEPHONE (OUTPATIENT)
Dept: ONCOLOGY | Facility: HOSPITAL | Age: 69
End: 2024-01-01
Payer: COMMERCIAL

## 2024-01-01 ENCOUNTER — HOSPITAL ENCOUNTER (OUTPATIENT)
Facility: CLINIC | Age: 69
Setting detail: OBSERVATION
Discharge: HOME | DRG: 056 | End: 2024-05-13
Attending: EMERGENCY MEDICINE | Admitting: OBSTETRICS & GYNECOLOGY
Payer: COMMERCIAL

## 2024-01-01 ENCOUNTER — HEALTH MAINTENANCE LETTER (OUTPATIENT)
Age: 69
End: 2024-01-01

## 2024-01-01 ENCOUNTER — DOCUMENTATION ONLY (OUTPATIENT)
Dept: ONCOLOGY | Facility: CLINIC | Age: 69
End: 2024-01-01

## 2024-01-01 ENCOUNTER — APPOINTMENT (OUTPATIENT)
Dept: CT IMAGING | Facility: CLINIC | Age: 69
End: 2024-01-01
Attending: FAMILY MEDICINE
Payer: COMMERCIAL

## 2024-01-01 ENCOUNTER — HOSPITAL ENCOUNTER (OUTPATIENT)
Dept: ULTRASOUND IMAGING | Facility: CLINIC | Age: 69
Discharge: HOME | End: 2024-05-30
Attending: OBSTETRICS & GYNECOLOGY | Admitting: OBSTETRICS & GYNECOLOGY
Payer: COMMERCIAL

## 2024-01-01 ENCOUNTER — APPOINTMENT (OUTPATIENT)
Dept: GENERAL RADIOLOGY | Facility: CLINIC | Age: 69
DRG: 056 | End: 2024-01-01
Attending: OBSTETRICS & GYNECOLOGY
Payer: COMMERCIAL

## 2024-01-01 ENCOUNTER — MYC REFILL (OUTPATIENT)
Dept: ONCOLOGY | Facility: HOSPITAL | Age: 69
End: 2024-01-01

## 2024-01-01 ENCOUNTER — APPOINTMENT (OUTPATIENT)
Dept: MRI IMAGING | Facility: CLINIC | Age: 69
DRG: 056 | End: 2024-01-01
Payer: COMMERCIAL

## 2024-01-01 ENCOUNTER — ANCILLARY PROCEDURE (OUTPATIENT)
Dept: CT IMAGING | Facility: CLINIC | Age: 69
End: 2024-01-01
Attending: OBSTETRICS & GYNECOLOGY
Payer: COMMERCIAL

## 2024-01-01 ENCOUNTER — PATIENT OUTREACH (OUTPATIENT)
Dept: ONCOLOGY | Facility: CLINIC | Age: 69
End: 2024-01-01

## 2024-01-01 ENCOUNTER — NURSE TRIAGE (OUTPATIENT)
Dept: NURSING | Facility: CLINIC | Age: 69
End: 2024-01-01

## 2024-01-01 ENCOUNTER — HOSPITAL ENCOUNTER (OUTPATIENT)
Facility: CLINIC | Age: 69
Discharge: HOME | End: 2024-04-12
Admitting: RADIOLOGY
Payer: COMMERCIAL

## 2024-01-01 ENCOUNTER — ANCILLARY PROCEDURE (OUTPATIENT)
Dept: GENERAL RADIOLOGY | Facility: CLINIC | Age: 69
End: 2024-01-01
Attending: INTERNAL MEDICINE
Payer: COMMERCIAL

## 2024-01-01 ENCOUNTER — APPOINTMENT (OUTPATIENT)
Dept: INTERVENTIONAL RADIOLOGY/VASCULAR | Facility: CLINIC | Age: 69
End: 2024-01-01
Attending: OBSTETRICS & GYNECOLOGY
Payer: COMMERCIAL

## 2024-01-01 ENCOUNTER — APPOINTMENT (OUTPATIENT)
Dept: CT IMAGING | Facility: CLINIC | Age: 69
DRG: 056 | End: 2024-01-01
Attending: NURSE PRACTITIONER
Payer: COMMERCIAL

## 2024-01-01 ENCOUNTER — APPOINTMENT (OUTPATIENT)
Dept: CT IMAGING | Facility: CLINIC | Age: 69
End: 2024-01-01
Attending: EMERGENCY MEDICINE
Payer: COMMERCIAL

## 2024-01-01 ENCOUNTER — HOSPITAL ENCOUNTER (OUTPATIENT)
Dept: ULTRASOUND IMAGING | Facility: CLINIC | Age: 69
Discharge: HOME | End: 2024-05-23
Attending: OBSTETRICS & GYNECOLOGY | Admitting: OBSTETRICS & GYNECOLOGY
Payer: COMMERCIAL

## 2024-01-01 ENCOUNTER — ANCILLARY PROCEDURE (OUTPATIENT)
Dept: MRI IMAGING | Facility: CLINIC | Age: 69
End: 2024-01-01
Attending: OBSTETRICS & GYNECOLOGY
Payer: COMMERCIAL

## 2024-01-01 ENCOUNTER — HOSPITAL ENCOUNTER (OUTPATIENT)
Dept: ULTRASOUND IMAGING | Facility: CLINIC | Age: 69
Discharge: HOME | End: 2024-05-06
Attending: OBSTETRICS & GYNECOLOGY | Admitting: OBSTETRICS & GYNECOLOGY
Payer: COMMERCIAL

## 2024-01-01 ENCOUNTER — INFUSION THERAPY VISIT (OUTPATIENT)
Dept: TRANSPLANT | Facility: CLINIC | Age: 69
End: 2024-01-01
Attending: OBSTETRICS & GYNECOLOGY
Payer: COMMERCIAL

## 2024-01-01 VITALS
RESPIRATION RATE: 18 BRPM | SYSTOLIC BLOOD PRESSURE: 104 MMHG | DIASTOLIC BLOOD PRESSURE: 73 MMHG | WEIGHT: 171.5 LBS | OXYGEN SATURATION: 97 % | TEMPERATURE: 98 F | HEIGHT: 71 IN | HEART RATE: 82 BPM | BODY MASS INDEX: 24.01 KG/M2

## 2024-01-01 VITALS
DIASTOLIC BLOOD PRESSURE: 64 MMHG | SYSTOLIC BLOOD PRESSURE: 94 MMHG | BODY MASS INDEX: 22.62 KG/M2 | WEIGHT: 167 LBS | HEIGHT: 72 IN

## 2024-01-01 VITALS
RESPIRATION RATE: 16 BRPM | WEIGHT: 170.7 LBS | HEART RATE: 97 BPM | HEIGHT: 71 IN | TEMPERATURE: 98.2 F | SYSTOLIC BLOOD PRESSURE: 107 MMHG | DIASTOLIC BLOOD PRESSURE: 67 MMHG | BODY MASS INDEX: 23.9 KG/M2

## 2024-01-01 VITALS
SYSTOLIC BLOOD PRESSURE: 119 MMHG | RESPIRATION RATE: 16 BRPM | OXYGEN SATURATION: 98 % | DIASTOLIC BLOOD PRESSURE: 69 MMHG | TEMPERATURE: 97.4 F | HEART RATE: 84 BPM

## 2024-01-01 VITALS — WEIGHT: 165 LBS | BODY MASS INDEX: 23.1 KG/M2 | HEIGHT: 71 IN

## 2024-01-01 VITALS
DIASTOLIC BLOOD PRESSURE: 66 MMHG | SYSTOLIC BLOOD PRESSURE: 101 MMHG | RESPIRATION RATE: 16 BRPM | TEMPERATURE: 98.2 F | OXYGEN SATURATION: 96 % | HEART RATE: 92 BPM

## 2024-01-01 VITALS
WEIGHT: 165 LBS | HEIGHT: 71 IN | BODY MASS INDEX: 23.1 KG/M2 | RESPIRATION RATE: 16 BRPM | TEMPERATURE: 98.4 F | DIASTOLIC BLOOD PRESSURE: 66 MMHG | OXYGEN SATURATION: 97 % | HEART RATE: 83 BPM | SYSTOLIC BLOOD PRESSURE: 114 MMHG

## 2024-01-01 VITALS — HEIGHT: 72 IN | WEIGHT: 167 LBS | BODY MASS INDEX: 22.62 KG/M2

## 2024-01-01 VITALS — BODY MASS INDEX: 23.1 KG/M2 | HEIGHT: 71 IN | WEIGHT: 165 LBS

## 2024-01-01 VITALS
TEMPERATURE: 97.9 F | DIASTOLIC BLOOD PRESSURE: 75 MMHG | RESPIRATION RATE: 14 BRPM | WEIGHT: 167 LBS | BODY MASS INDEX: 22.65 KG/M2 | OXYGEN SATURATION: 98 % | SYSTOLIC BLOOD PRESSURE: 110 MMHG | HEART RATE: 91 BPM

## 2024-01-01 VITALS
WEIGHT: 167.5 LBS | OXYGEN SATURATION: 100 % | BODY MASS INDEX: 23.45 KG/M2 | HEART RATE: 74 BPM | RESPIRATION RATE: 16 BRPM | HEIGHT: 71 IN | DIASTOLIC BLOOD PRESSURE: 71 MMHG | TEMPERATURE: 97.4 F | SYSTOLIC BLOOD PRESSURE: 108 MMHG

## 2024-01-01 VITALS
HEART RATE: 101 BPM | OXYGEN SATURATION: 98 % | TEMPERATURE: 97.7 F | DIASTOLIC BLOOD PRESSURE: 64 MMHG | WEIGHT: 167.2 LBS | SYSTOLIC BLOOD PRESSURE: 94 MMHG | RESPIRATION RATE: 16 BRPM | BODY MASS INDEX: 22.68 KG/M2

## 2024-01-01 VITALS — HEIGHT: 71 IN | BODY MASS INDEX: 23.1 KG/M2 | WEIGHT: 165 LBS

## 2024-01-01 VITALS — DIASTOLIC BLOOD PRESSURE: 64 MMHG | SYSTOLIC BLOOD PRESSURE: 107 MMHG

## 2024-01-01 VITALS
WEIGHT: 167.8 LBS | DIASTOLIC BLOOD PRESSURE: 80 MMHG | HEART RATE: 96 BPM | BODY MASS INDEX: 23.4 KG/M2 | TEMPERATURE: 97.5 F | RESPIRATION RATE: 16 BRPM | SYSTOLIC BLOOD PRESSURE: 111 MMHG | OXYGEN SATURATION: 99 %

## 2024-01-01 VITALS
WEIGHT: 167.6 LBS | DIASTOLIC BLOOD PRESSURE: 75 MMHG | BODY MASS INDEX: 23.38 KG/M2 | TEMPERATURE: 97.1 F | OXYGEN SATURATION: 99 % | HEART RATE: 95 BPM | SYSTOLIC BLOOD PRESSURE: 103 MMHG

## 2024-01-01 VITALS
HEART RATE: 100 BPM | DIASTOLIC BLOOD PRESSURE: 71 MMHG | RESPIRATION RATE: 16 BRPM | OXYGEN SATURATION: 98 % | BODY MASS INDEX: 23.01 KG/M2 | WEIGHT: 165 LBS | SYSTOLIC BLOOD PRESSURE: 103 MMHG | TEMPERATURE: 97.4 F

## 2024-01-01 VITALS
RESPIRATION RATE: 18 BRPM | HEART RATE: 77 BPM | HEIGHT: 71 IN | DIASTOLIC BLOOD PRESSURE: 65 MMHG | SYSTOLIC BLOOD PRESSURE: 105 MMHG | OXYGEN SATURATION: 93 % | TEMPERATURE: 98.2 F | WEIGHT: 165 LBS | BODY MASS INDEX: 23.1 KG/M2

## 2024-01-01 VITALS
DIASTOLIC BLOOD PRESSURE: 77 MMHG | OXYGEN SATURATION: 98 % | HEART RATE: 95 BPM | RESPIRATION RATE: 24 BRPM | SYSTOLIC BLOOD PRESSURE: 103 MMHG | TEMPERATURE: 99.2 F

## 2024-01-01 VITALS
BODY MASS INDEX: 22.87 KG/M2 | OXYGEN SATURATION: 99 % | WEIGHT: 168.6 LBS | DIASTOLIC BLOOD PRESSURE: 77 MMHG | HEART RATE: 89 BPM | SYSTOLIC BLOOD PRESSURE: 105 MMHG | TEMPERATURE: 98.5 F | RESPIRATION RATE: 16 BRPM

## 2024-01-01 VITALS
BODY MASS INDEX: 23.52 KG/M2 | HEIGHT: 71 IN | DIASTOLIC BLOOD PRESSURE: 74 MMHG | OXYGEN SATURATION: 96 % | HEART RATE: 92 BPM | TEMPERATURE: 98 F | WEIGHT: 168 LBS | SYSTOLIC BLOOD PRESSURE: 106 MMHG

## 2024-01-01 VITALS
RESPIRATION RATE: 16 BRPM | HEART RATE: 97 BPM | TEMPERATURE: 97.6 F | SYSTOLIC BLOOD PRESSURE: 113 MMHG | DIASTOLIC BLOOD PRESSURE: 70 MMHG

## 2024-01-01 VITALS
BODY MASS INDEX: 24.63 KG/M2 | RESPIRATION RATE: 18 BRPM | TEMPERATURE: 98.3 F | HEART RATE: 85 BPM | SYSTOLIC BLOOD PRESSURE: 99 MMHG | DIASTOLIC BLOOD PRESSURE: 56 MMHG | WEIGHT: 175.9 LBS | OXYGEN SATURATION: 99 % | HEIGHT: 71 IN

## 2024-01-01 VITALS
SYSTOLIC BLOOD PRESSURE: 107 MMHG | OXYGEN SATURATION: 96 % | RESPIRATION RATE: 16 BRPM | TEMPERATURE: 98 F | DIASTOLIC BLOOD PRESSURE: 72 MMHG | HEART RATE: 76 BPM

## 2024-01-01 VITALS — BODY MASS INDEX: 22.35 KG/M2 | HEIGHT: 72 IN | WEIGHT: 165 LBS

## 2024-01-01 VITALS — SYSTOLIC BLOOD PRESSURE: 108 MMHG | HEART RATE: 93 BPM | DIASTOLIC BLOOD PRESSURE: 67 MMHG | RESPIRATION RATE: 17 BRPM

## 2024-01-01 VITALS — WEIGHT: 175 LBS | BODY MASS INDEX: 24.5 KG/M2 | HEIGHT: 71 IN

## 2024-01-01 DIAGNOSIS — C56.1 OVARIAN CANCER, RIGHT (H): ICD-10-CM
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
## 2024-01-01 DIAGNOSIS — C56.9 MALIGNANT NEOPLASM OF OVARY, UNSPECIFIED LATERALITY (H): ICD-10-CM

## 2024-01-01 DIAGNOSIS — Z86.711 HISTORY OF PULMONARY EMBOLISM: ICD-10-CM

## 2024-01-01 DIAGNOSIS — Z96.89 PRESENCE OF PANCREATIC DUCT STENT: ICD-10-CM

## 2024-01-01 DIAGNOSIS — C56.9 MALIGNANT NEOPLASM OF OVARY, UNSPECIFIED LATERALITY (H): Primary | ICD-10-CM

## 2024-01-01 DIAGNOSIS — C56.9 OVARIAN CANCER, UNSPECIFIED LATERALITY (H): ICD-10-CM

## 2024-01-01 DIAGNOSIS — T45.1X5A CHEMOTHERAPY-INDUCED NEUTROPENIA (H): ICD-10-CM

## 2024-01-01 DIAGNOSIS — T45.1X5A MOUTH SORE SECONDARY TO CHEMOTHERAPY: ICD-10-CM

## 2024-01-01 DIAGNOSIS — T45.1X5A CHEMOTHERAPY-INDUCED PERIPHERAL NEUROPATHY (H): ICD-10-CM

## 2024-01-01 DIAGNOSIS — D70.1 CHEMOTHERAPY-INDUCED NEUTROPENIA (H): ICD-10-CM

## 2024-01-01 DIAGNOSIS — E44.0 MODERATE MALNUTRITION (H): Primary | ICD-10-CM

## 2024-01-01 DIAGNOSIS — C56.9 OVARIAN CANCER, UNSPECIFIED LATERALITY (H): Primary | ICD-10-CM

## 2024-01-01 DIAGNOSIS — N30.00 ACUTE CYSTITIS WITHOUT HEMATURIA: ICD-10-CM

## 2024-01-01 DIAGNOSIS — C79.51 MALIGNANT NEOPLASM METASTATIC TO BONE (H): ICD-10-CM

## 2024-01-01 DIAGNOSIS — R74.01 TRANSAMINITIS: ICD-10-CM

## 2024-01-01 DIAGNOSIS — R45.1 RESTLESSNESS: ICD-10-CM

## 2024-01-01 DIAGNOSIS — C56.1 OVARIAN CANCER, RIGHT (H): Primary | ICD-10-CM

## 2024-01-01 DIAGNOSIS — R18.8 OTHER ASCITES: Primary | ICD-10-CM

## 2024-01-01 DIAGNOSIS — T45.1X5A CHEMOTHERAPY-INDUCED NAUSEA: ICD-10-CM

## 2024-01-01 DIAGNOSIS — N30.01 ACUTE CYSTITIS WITH HEMATURIA: ICD-10-CM

## 2024-01-01 DIAGNOSIS — T45.1X5S ADVERSE EFFECT OF ANTINEOPLASTIC AND IMMUNOSUPPRESSIVE DRUGS, SEQUELA: Primary | ICD-10-CM

## 2024-01-01 DIAGNOSIS — R11.0 CHEMOTHERAPY-INDUCED NAUSEA: ICD-10-CM

## 2024-01-01 DIAGNOSIS — G89.3 NEOPLASM RELATED PAIN: Primary | ICD-10-CM

## 2024-01-01 DIAGNOSIS — C78.7 CANCER, METASTATIC TO LIVER (H): ICD-10-CM

## 2024-01-01 DIAGNOSIS — K13.79 MOUTH SORE SECONDARY TO CHEMOTHERAPY: ICD-10-CM

## 2024-01-01 DIAGNOSIS — R10.11 RUQ ABDOMINAL PAIN: ICD-10-CM

## 2024-01-01 DIAGNOSIS — T50.905A ACIDIFYING AGENTS CAUSING ADVERSE EFFECT IN THERAPEUTIC USE: ICD-10-CM

## 2024-01-01 DIAGNOSIS — G62.0 CHEMOTHERAPY-INDUCED PERIPHERAL NEUROPATHY (H): ICD-10-CM

## 2024-01-01 DIAGNOSIS — R30.0 DYSURIA: ICD-10-CM

## 2024-01-01 DIAGNOSIS — H25.13 NUCLEAR SCLEROSIS OF BOTH EYES: Primary | ICD-10-CM

## 2024-01-01 DIAGNOSIS — C79.9 METASTATIC MALIGNANT NEOPLASM, UNSPECIFIED SITE (H): ICD-10-CM

## 2024-01-01 DIAGNOSIS — Z51.11 ENCOUNTER FOR ANTINEOPLASTIC CHEMOTHERAPY: Primary | ICD-10-CM

## 2024-01-01 DIAGNOSIS — R41.82 ALTERED MENTAL STATUS, UNSPECIFIED ALTERED MENTAL STATUS TYPE: ICD-10-CM

## 2024-01-01 DIAGNOSIS — K12.30 STOMATITIS AND MUCOSITIS: ICD-10-CM
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
## 2024-01-01 DIAGNOSIS — R30.0 DYSURIA: Primary | ICD-10-CM

## 2024-01-01 DIAGNOSIS — K12.1 STOMATITIS AND MUCOSITIS: ICD-10-CM

## 2024-01-01 DIAGNOSIS — I95.1 ORTHOSTATIC HYPOTENSION: ICD-10-CM

## 2024-01-01 DIAGNOSIS — K59.03 DRUG-INDUCED CONSTIPATION: ICD-10-CM

## 2024-01-01 DIAGNOSIS — R29.6 RECURRENT FALLS: ICD-10-CM

## 2024-01-01 DIAGNOSIS — R29.6 UNEXPLAINED FALLS: ICD-10-CM

## 2024-01-01 DIAGNOSIS — R11.0 CHEMOTHERAPY-INDUCED NAUSEA: Primary | ICD-10-CM

## 2024-01-01 DIAGNOSIS — I95.1 ORTHOSTATIC HYPOTENSION: Primary | ICD-10-CM
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
## 2024-01-01 DIAGNOSIS — G89.18 JOINT PAIN FOLLOWING CHEMOTHERAPY: ICD-10-CM

## 2024-01-01 DIAGNOSIS — G25.81 RESTLESS LEGS SYNDROME: Primary | ICD-10-CM
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
## 2024-01-01 DIAGNOSIS — Z79.899 NEED FOR PROPHYLACTIC CHEMOTHERAPY: ICD-10-CM

## 2024-01-01 DIAGNOSIS — G89.3 NEOPLASM RELATED PAIN: ICD-10-CM

## 2024-01-01 DIAGNOSIS — T45.1X5S ADVERSE EFFECT OF ANTINEOPLASTIC AND IMMUNOSUPPRESSIVE DRUGS, SEQUELA: ICD-10-CM

## 2024-01-01 DIAGNOSIS — R63.0 ANOREXIA SYMPTOM: ICD-10-CM

## 2024-01-01 DIAGNOSIS — G62.0 POLYNEUROPATHY DUE TO DRUG (H): ICD-10-CM

## 2024-01-01 DIAGNOSIS — M25.50 JOINT PAIN FOLLOWING CHEMOTHERAPY: ICD-10-CM

## 2024-01-01 DIAGNOSIS — R45.86 MOOD AND AFFECT DISTURBANCE: ICD-10-CM

## 2024-01-01 DIAGNOSIS — M54.50 LEFT-SIDED LOW BACK PAIN WITHOUT SCIATICA, UNSPECIFIED CHRONICITY: ICD-10-CM
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
## 2024-01-01 DIAGNOSIS — T45.1X5A CHEMOTHERAPY-INDUCED NAUSEA: Primary | ICD-10-CM

## 2024-01-01 DIAGNOSIS — D70.1 AGRANULOCYTOSIS SECONDARY TO CANCER CHEMOTHERAPY (CODE) (H): ICD-10-CM

## 2024-01-01 DIAGNOSIS — Z71.89 ADVANCE CARE PLANNING: ICD-10-CM

## 2024-01-01 DIAGNOSIS — R10.9 LEFT FLANK PAIN: ICD-10-CM

## 2024-01-01 DIAGNOSIS — Z51.11 ENCOUNTER FOR ANTINEOPLASTIC CHEMOTHERAPY: ICD-10-CM

## 2024-01-01 LAB
ACANTHOCYTES BLD QL SMEAR: ABNORMAL
ALBUMIN SERPL BCG-MCNC: 2.4 G/DL (ref 3.5–5.2)
ALBUMIN SERPL BCG-MCNC: 2.7 G/DL (ref 3.5–5.2)
ALBUMIN SERPL BCG-MCNC: 2.7 G/DL (ref 3.5–5.2)
ALBUMIN SERPL BCG-MCNC: 2.8 G/DL (ref 3.5–5.2)
ALBUMIN SERPL BCG-MCNC: 2.8 G/DL (ref 3.5–5.2)
ALBUMIN SERPL BCG-MCNC: 3 G/DL (ref 3.5–5.2)
ALBUMIN SERPL BCG-MCNC: 3.1 G/DL (ref 3.5–5.2)
ALBUMIN SERPL BCG-MCNC: 3.2 G/DL (ref 3.5–5.2)
ALBUMIN SERPL BCG-MCNC: 3.3 G/DL (ref 3.5–5.2)
ALBUMIN SERPL BCG-MCNC: 3.6 G/DL (ref 3.5–5.2)
ALBUMIN SERPL BCG-MCNC: 3.7 G/DL (ref 3.5–5.2)
ALBUMIN SERPL BCG-MCNC: 3.8 G/DL (ref 3.5–5.2)
ALBUMIN UR-MCNC: 20 MG/DL
ALBUMIN UR-MCNC: 50 MG/DL
ALBUMIN UR-MCNC: ABNORMAL MG/DL
ALBUMIN UR-MCNC: NEGATIVE MG/DL
ALP SERPL-CCNC: 367 U/L (ref 40–150)
ALP SERPL-CCNC: 374 U/L (ref 40–150)
ALP SERPL-CCNC: 399 U/L (ref 40–150)
ALP SERPL-CCNC: 407 U/L (ref 40–150)
ALP SERPL-CCNC: 420 U/L (ref 40–150)
ALP SERPL-CCNC: 423 U/L (ref 40–150)
ALP SERPL-CCNC: 436 U/L (ref 40–150)
ALP SERPL-CCNC: 436 U/L (ref 40–150)
ALP SERPL-CCNC: 495 U/L (ref 40–150)
ALP SERPL-CCNC: 500 U/L (ref 40–150)
ALP SERPL-CCNC: 575 U/L (ref 40–150)
ALP SERPL-CCNC: 607 U/L (ref 40–150)
ALP SERPL-CCNC: 623 U/L (ref 40–150)
ALP SERPL-CCNC: 651 U/L (ref 40–150)
ALT SERPL W P-5'-P-CCNC: 24 U/L (ref 0–50)
ALT SERPL W P-5'-P-CCNC: 25 U/L (ref 0–50)
ALT SERPL W P-5'-P-CCNC: 27 U/L (ref 0–50)
ALT SERPL W P-5'-P-CCNC: 28 U/L (ref 0–50)
ALT SERPL W P-5'-P-CCNC: 30 U/L (ref 0–50)
ALT SERPL W P-5'-P-CCNC: 34 U/L (ref 0–50)
ALT SERPL W P-5'-P-CCNC: 36 U/L (ref 0–50)
ALT SERPL W P-5'-P-CCNC: 39 U/L (ref 0–50)
ALT SERPL W P-5'-P-CCNC: 39 U/L (ref 0–50)
ALT SERPL W P-5'-P-CCNC: 43 U/L (ref 0–50)
ALT SERPL W P-5'-P-CCNC: 47 U/L (ref 0–50)
ALT SERPL W P-5'-P-CCNC: 59 U/L (ref 0–50)
ALT SERPL W P-5'-P-CCNC: 61 U/L (ref 0–50)
ALT SERPL W P-5'-P-CCNC: 88 U/L (ref 0–50)
AMMONIA PLAS-SCNC: 36 UMOL/L (ref 11–51)
AMPHETAMINES UR QL SCN: NORMAL
ANION GAP SERPL CALCULATED.3IONS-SCNC: 10 MMOL/L (ref 7–15)
ANION GAP SERPL CALCULATED.3IONS-SCNC: 10 MMOL/L (ref 7–15)
ANION GAP SERPL CALCULATED.3IONS-SCNC: 12 MMOL/L (ref 7–15)
ANION GAP SERPL CALCULATED.3IONS-SCNC: 13 MMOL/L (ref 7–15)
ANION GAP SERPL CALCULATED.3IONS-SCNC: 7 MMOL/L (ref 7–15)
ANION GAP SERPL CALCULATED.3IONS-SCNC: 8 MMOL/L (ref 7–15)
ANION GAP SERPL CALCULATED.3IONS-SCNC: 9 MMOL/L (ref 7–15)
APPEARANCE UR: ABNORMAL
APPEARANCE UR: CLEAR
APTT PPP: 32 SECONDS (ref 22–38)
AST SERPL W P-5'-P-CCNC: 111 U/L (ref 0–45)
AST SERPL W P-5'-P-CCNC: 162 U/L (ref 0–45)
AST SERPL W P-5'-P-CCNC: 51 U/L (ref 0–45)
AST SERPL W P-5'-P-CCNC: 53 U/L (ref 0–45)
AST SERPL W P-5'-P-CCNC: 54 U/L (ref 0–45)
AST SERPL W P-5'-P-CCNC: 56 U/L (ref 0–45)
AST SERPL W P-5'-P-CCNC: 57 U/L (ref 0–45)
AST SERPL W P-5'-P-CCNC: 63 U/L (ref 0–45)
AST SERPL W P-5'-P-CCNC: 63 U/L (ref 0–45)
AST SERPL W P-5'-P-CCNC: 67 U/L (ref 0–45)
AST SERPL W P-5'-P-CCNC: 69 U/L (ref 0–45)
AST SERPL W P-5'-P-CCNC: 86 U/L (ref 0–45)
AST SERPL W P-5'-P-CCNC: 91 U/L (ref 0–45)
AST SERPL W P-5'-P-CCNC: 99 U/L (ref 0–45)
AUER BODIES BLD QL SMEAR: ABNORMAL
BACTERIA #/AREA URNS HPF: ABNORMAL /HPF
BACTERIA BLD CULT: NO GROWTH
BACTERIA BLD CULT: NO GROWTH
BACTERIA UR CULT: ABNORMAL
BACTERIA UR CULT: NO GROWTH
BACTERIA UR CULT: NORMAL
BARBITURATES UR QL SCN: NORMAL
BASO STIPL BLD QL SMEAR: ABNORMAL
BASOPHILS # BLD AUTO: 0 10E3/UL (ref 0–0.2)
BASOPHILS # BLD AUTO: 0.1 10E3/UL (ref 0–0.2)
BASOPHILS # BLD AUTO: 0.1 10E3/UL (ref 0–0.2)
BASOPHILS # BLD AUTO: ABNORMAL 10*3/UL
BASOPHILS # BLD MANUAL: 0 10E3/UL (ref 0–0.2)
BASOPHILS # BLD MANUAL: 0.1 10E3/UL (ref 0–0.2)
BASOPHILS NFR BLD AUTO: 0 %
BASOPHILS NFR BLD AUTO: 1 %
BASOPHILS NFR BLD AUTO: 2 %
BASOPHILS NFR BLD AUTO: ABNORMAL %
BASOPHILS NFR BLD MANUAL: 0 %
BASOPHILS NFR BLD MANUAL: 1 %
BENZODIAZ UR QL SCN: NORMAL
BILIRUB SERPL-MCNC: 0.5 MG/DL
BILIRUB SERPL-MCNC: 0.5 MG/DL
BILIRUB SERPL-MCNC: 0.6 MG/DL
BILIRUB SERPL-MCNC: 0.7 MG/DL
BILIRUB SERPL-MCNC: 0.8 MG/DL
BILIRUB SERPL-MCNC: 0.9 MG/DL
BILIRUB SERPL-MCNC: 0.9 MG/DL
BILIRUB SERPL-MCNC: 1.3 MG/DL
BILIRUB SERPL-MCNC: 1.3 MG/DL
BILIRUB UR QL STRIP: NEGATIVE
BITE CELLS BLD QL SMEAR: ABNORMAL
BLISTER CELLS BLD QL SMEAR: ABNORMAL
BUN SERPL-MCNC: 10.2 MG/DL (ref 8–23)
BUN SERPL-MCNC: 10.3 MG/DL (ref 8–23)
BUN SERPL-MCNC: 10.7 MG/DL (ref 8–23)
BUN SERPL-MCNC: 10.8 MG/DL (ref 8–23)
BUN SERPL-MCNC: 12.5 MG/DL (ref 8–23)
BUN SERPL-MCNC: 12.8 MG/DL (ref 8–23)
BUN SERPL-MCNC: 16.7 MG/DL (ref 8–23)
BUN SERPL-MCNC: 17.8 MG/DL (ref 8–23)
BUN SERPL-MCNC: 8.2 MG/DL (ref 8–23)
BUN SERPL-MCNC: 8.6 MG/DL (ref 8–23)
BUN SERPL-MCNC: 8.7 MG/DL (ref 8–23)
BUN SERPL-MCNC: 9 MG/DL (ref 8–23)
BUN SERPL-MCNC: 9.1 MG/DL (ref 8–23)
BUN SERPL-MCNC: 9.8 MG/DL (ref 8–23)
BURR CELLS BLD QL SMEAR: ABNORMAL
BZE UR QL SCN: NORMAL
CALCIUM SERPL-MCNC: 7.6 MG/DL (ref 8.8–10.2)
CALCIUM SERPL-MCNC: 8.1 MG/DL (ref 8.8–10.2)
CALCIUM SERPL-MCNC: 8.2 MG/DL (ref 8.8–10.2)
CALCIUM SERPL-MCNC: 8.4 MG/DL (ref 8.8–10.2)
CALCIUM SERPL-MCNC: 8.4 MG/DL (ref 8.8–10.2)
CALCIUM SERPL-MCNC: 8.5 MG/DL (ref 8.8–10.2)
CALCIUM SERPL-MCNC: 8.7 MG/DL (ref 8.8–10.2)
CALCIUM SERPL-MCNC: 8.7 MG/DL (ref 8.8–10.2)
CALCIUM SERPL-MCNC: 8.8 MG/DL (ref 8.8–10.2)
CALCIUM SERPL-MCNC: 8.8 MG/DL (ref 8.8–10.2)
CALCIUM SERPL-MCNC: 9.4 MG/DL (ref 8.8–10.2)
CALCIUM SERPL-MCNC: 9.4 MG/DL (ref 8.8–10.2)
CALCIUM SERPL-MCNC: 9.5 MG/DL (ref 8.8–10.2)
CALCIUM SERPL-MCNC: 9.5 MG/DL (ref 8.8–10.2)
CANCER AG125 SERPL-ACNC: 3521 U/ML
CANCER AG125 SERPL-ACNC: 4444 U/ML
CANCER AG125 SERPL-ACNC: 5381 U/ML
CANCER AG125 SERPL-ACNC: 5864 U/ML
CANNABINOIDS UR QL SCN: NORMAL
CHLORIDE SERPL-SCNC: 100 MMOL/L (ref 98–107)
CHLORIDE SERPL-SCNC: 101 MMOL/L (ref 98–107)
CHLORIDE SERPL-SCNC: 102 MMOL/L (ref 98–107)
CHLORIDE SERPL-SCNC: 104 MMOL/L (ref 98–107)
CHLORIDE SERPL-SCNC: 105 MMOL/L (ref 98–107)
CHLORIDE SERPL-SCNC: 106 MMOL/L (ref 98–107)
CHLORIDE SERPL-SCNC: 107 MMOL/L (ref 98–107)
CHLORIDE SERPL-SCNC: 109 MMOL/L (ref 98–107)
CHLORIDE SERPL-SCNC: 109 MMOL/L (ref 98–107)
CHOLEST SERPL-MCNC: 140 MG/DL
CK SERPL-CCNC: 72 U/L (ref 26–192)
COLOR UR AUTO: ABNORMAL
COLOR UR AUTO: YELLOW
CREAT BLD-MCNC: 0.6 MG/DL (ref 0.5–1)
CREAT BLD-MCNC: 0.6 MG/DL (ref 0.5–1.2)
CREAT SERPL-MCNC: 0.48 MG/DL (ref 0.51–0.95)
CREAT SERPL-MCNC: 0.5 MG/DL (ref 0.51–0.95)
CREAT SERPL-MCNC: 0.5 MG/DL (ref 0.51–0.95)
CREAT SERPL-MCNC: 0.51 MG/DL (ref 0.51–0.95)
CREAT SERPL-MCNC: 0.53 MG/DL (ref 0.51–0.95)
CREAT SERPL-MCNC: 0.53 MG/DL (ref 0.51–0.95)
CREAT SERPL-MCNC: 0.56 MG/DL (ref 0.51–0.95)
CREAT SERPL-MCNC: 0.57 MG/DL (ref 0.51–0.95)
CREAT SERPL-MCNC: 0.65 MG/DL (ref 0.51–0.95)
CREAT SERPL-MCNC: 0.68 MG/DL (ref 0.51–0.95)
CREAT SERPL-MCNC: 0.69 MG/DL (ref 0.51–0.95)
CREAT SERPL-MCNC: 0.69 MG/DL (ref 0.51–0.95)
CREAT SERPL-MCNC: 0.79 MG/DL (ref 0.51–0.95)
CREAT SERPL-MCNC: 0.83 MG/DL (ref 0.51–0.95)
CRP SERPL-MCNC: 25.7 MG/L
DACRYOCYTES BLD QL SMEAR: ABNORMAL
DEPRECATED HCO3 PLAS-SCNC: 20 MMOL/L (ref 22–29)
DEPRECATED HCO3 PLAS-SCNC: 20 MMOL/L (ref 22–29)
DEPRECATED HCO3 PLAS-SCNC: 21 MMOL/L (ref 22–29)
DEPRECATED HCO3 PLAS-SCNC: 21 MMOL/L (ref 22–29)
DEPRECATED HCO3 PLAS-SCNC: 22 MMOL/L (ref 22–29)
DEPRECATED HCO3 PLAS-SCNC: 22 MMOL/L (ref 22–29)
DEPRECATED HCO3 PLAS-SCNC: 23 MMOL/L (ref 22–29)
DEPRECATED HCO3 PLAS-SCNC: 24 MMOL/L (ref 22–29)
DEPRECATED HCO3 PLAS-SCNC: 24 MMOL/L (ref 22–29)
DEPRECATED HCO3 PLAS-SCNC: 25 MMOL/L (ref 22–29)
DEPRECATED HCO3 PLAS-SCNC: 25 MMOL/L (ref 22–29)
DEPRECATED HCO3 PLAS-SCNC: 26 MMOL/L (ref 22–29)
EGFRCR SERPLBLD CKD-EPI 2021: 77 ML/MIN/1.73M2
EGFRCR SERPLBLD CKD-EPI 2021: 82 ML/MIN/1.73M2
EGFRCR SERPLBLD CKD-EPI 2021: >60 ML/MIN/1.73M2
EGFRCR SERPLBLD CKD-EPI 2021: >90 ML/MIN/1.73M2
ELLIPTOCYTES BLD QL SMEAR: ABNORMAL
EOSINOPHIL # BLD AUTO: 0 10E3/UL (ref 0–0.7)
EOSINOPHIL # BLD AUTO: 0.1 10E3/UL (ref 0–0.7)
EOSINOPHIL # BLD AUTO: ABNORMAL 10*3/UL
EOSINOPHIL # BLD MANUAL: 0 10E3/UL (ref 0–0.7)
EOSINOPHIL # BLD MANUAL: 0 10E3/UL (ref 0–0.7)
EOSINOPHIL NFR BLD AUTO: 0 %
EOSINOPHIL NFR BLD AUTO: 1 %
EOSINOPHIL NFR BLD AUTO: 2 %
EOSINOPHIL NFR BLD AUTO: 3 %
EOSINOPHIL NFR BLD AUTO: ABNORMAL %
EOSINOPHIL NFR BLD MANUAL: 0 %
EOSINOPHIL NFR BLD MANUAL: 0 %
ERYTHROCYTE [DISTWIDTH] IN BLOOD BY AUTOMATED COUNT: 16.1 % (ref 10–15)
ERYTHROCYTE [DISTWIDTH] IN BLOOD BY AUTOMATED COUNT: 16.3 % (ref 10–15)
ERYTHROCYTE [DISTWIDTH] IN BLOOD BY AUTOMATED COUNT: 16.4 % (ref 10–15)
ERYTHROCYTE [DISTWIDTH] IN BLOOD BY AUTOMATED COUNT: 16.5 % (ref 10–15)
ERYTHROCYTE [DISTWIDTH] IN BLOOD BY AUTOMATED COUNT: 16.7 % (ref 10–15)
ERYTHROCYTE [DISTWIDTH] IN BLOOD BY AUTOMATED COUNT: 17 % (ref 10–15)
ERYTHROCYTE [DISTWIDTH] IN BLOOD BY AUTOMATED COUNT: 19.7 % (ref 10–15)
ERYTHROCYTE [DISTWIDTH] IN BLOOD BY AUTOMATED COUNT: 20 % (ref 10–15)
ERYTHROCYTE [DISTWIDTH] IN BLOOD BY AUTOMATED COUNT: 20.2 % (ref 10–15)
ERYTHROCYTE [DISTWIDTH] IN BLOOD BY AUTOMATED COUNT: 21.1 % (ref 10–15)
ERYTHROCYTE [DISTWIDTH] IN BLOOD BY AUTOMATED COUNT: 21.1 % (ref 10–15)
ERYTHROCYTE [DISTWIDTH] IN BLOOD BY AUTOMATED COUNT: 21.3 % (ref 10–15)
ERYTHROCYTE [DISTWIDTH] IN BLOOD BY AUTOMATED COUNT: 21.4 % (ref 10–15)
ERYTHROCYTE [DISTWIDTH] IN BLOOD BY AUTOMATED COUNT: 21.8 % (ref 10–15)
ERYTHROCYTE [DISTWIDTH] IN BLOOD BY AUTOMATED COUNT: 22.2 % (ref 10–15)
FENTANYL UR QL: NORMAL
FERRITIN SERPL-MCNC: 417 NG/ML (ref 11–328)
FLUAV RNA SPEC QL NAA+PROBE: NEGATIVE
FLUBV RNA RESP QL NAA+PROBE: NEGATIVE
FOLATE SERPL-MCNC: 14.8 NG/ML (ref 4.6–34.8)
FRAGMENTS BLD QL SMEAR: ABNORMAL
GFR SERPL CREATININE-BSD FRML MDRD: NORMAL ML/MIN/{1.73_M2}
GIANT PLATELETS BLD QL SMEAR: ABNORMAL
GLUCOSE SERPL-MCNC: 100 MG/DL (ref 70–99)
GLUCOSE SERPL-MCNC: 104 MG/DL (ref 70–99)
GLUCOSE SERPL-MCNC: 106 MG/DL (ref 70–99)
GLUCOSE SERPL-MCNC: 108 MG/DL (ref 70–99)
GLUCOSE SERPL-MCNC: 112 MG/DL (ref 70–99)
GLUCOSE SERPL-MCNC: 116 MG/DL (ref 70–99)
GLUCOSE SERPL-MCNC: 119 MG/DL (ref 70–99)
GLUCOSE SERPL-MCNC: 120 MG/DL (ref 70–99)
GLUCOSE SERPL-MCNC: 133 MG/DL (ref 70–99)
GLUCOSE SERPL-MCNC: 141 MG/DL (ref 70–99)
GLUCOSE SERPL-MCNC: 88 MG/DL (ref 70–99)
GLUCOSE SERPL-MCNC: 93 MG/DL (ref 70–99)
GLUCOSE SERPL-MCNC: 94 MG/DL (ref 70–99)
GLUCOSE SERPL-MCNC: 99 MG/DL (ref 70–99)
GLUCOSE UR STRIP-MCNC: 30 MG/DL
GLUCOSE UR STRIP-MCNC: NEGATIVE MG/DL
HBA1C MFR BLD: 5.2 %
HCT VFR BLD AUTO: 25.3 % (ref 35–47)
HCT VFR BLD AUTO: 25.7 % (ref 35–47)
HCT VFR BLD AUTO: 25.9 % (ref 35–47)
HCT VFR BLD AUTO: 27.4 % (ref 35–47)
HCT VFR BLD AUTO: 27.6 % (ref 35–47)
HCT VFR BLD AUTO: 30.9 % (ref 35–47)
HCT VFR BLD AUTO: 31.3 % (ref 35–47)
HCT VFR BLD AUTO: 32.8 % (ref 35–47)
HCT VFR BLD AUTO: 34 % (ref 35–47)
HCT VFR BLD AUTO: 34.9 % (ref 35–47)
HCT VFR BLD AUTO: 36.2 % (ref 35–47)
HCT VFR BLD AUTO: 36.3 % (ref 35–47)
HCT VFR BLD AUTO: 36.5 % (ref 35–47)
HCT VFR BLD AUTO: 37.8 % (ref 35–47)
HCT VFR BLD AUTO: 38.2 % (ref 35–47)
HCT VFR BLD AUTO: 38.3 % (ref 35–47)
HCT VFR BLD AUTO: 38.5 % (ref 35–47)
HDLC SERPL-MCNC: 33 MG/DL
HGB BLD-MCNC: 10.3 G/DL (ref 11.7–15.7)
HGB BLD-MCNC: 10.6 G/DL (ref 11.7–15.7)
HGB BLD-MCNC: 11.2 G/DL (ref 11.7–15.7)
HGB BLD-MCNC: 11.5 G/DL (ref 11.7–15.7)
HGB BLD-MCNC: 11.7 G/DL (ref 11.7–15.7)
HGB BLD-MCNC: 12 G/DL (ref 11.7–15.7)
HGB BLD-MCNC: 12.1 G/DL (ref 11.7–15.7)
HGB BLD-MCNC: 12.1 G/DL (ref 11.7–15.7)
HGB BLD-MCNC: 12.2 G/DL (ref 11.7–15.7)
HGB BLD-MCNC: 8.2 G/DL (ref 11.7–15.7)
HGB BLD-MCNC: 8.3 G/DL (ref 11.7–15.7)
HGB BLD-MCNC: 8.4 G/DL (ref 11.7–15.7)
HGB BLD-MCNC: 8.8 G/DL (ref 11.7–15.7)
HGB BLD-MCNC: 8.8 G/DL (ref 11.7–15.7)
HGB BLD-MCNC: 9.9 G/DL (ref 11.7–15.7)
HGB C CRYSTALS: ABNORMAL
HGB UR QL STRIP: ABNORMAL
HGB UR QL STRIP: NEGATIVE
HOLD SPECIMEN: NORMAL
HOWELL-JOLLY BOD BLD QL SMEAR: ABNORMAL
HYALINE CASTS: 58 /LPF
HYALINE CASTS: 9 /LPF
IMM GRANULOCYTES # BLD: 0 10E3/UL
IMM GRANULOCYTES # BLD: 0.1 10E3/UL
IMM GRANULOCYTES # BLD: 0.1 10E3/UL
IMM GRANULOCYTES # BLD: ABNORMAL 10*3/UL
IMM GRANULOCYTES NFR BLD: 0 %
IMM GRANULOCYTES NFR BLD: 1 %
IMM GRANULOCYTES NFR BLD: ABNORMAL %
INR PPP: 1.34 (ref 0.85–1.15)
INR PPP: 1.44 (ref 0.85–1.15)
INR PPP: 1.47 (ref 0.85–1.15)
KETONES UR STRIP-MCNC: NEGATIVE MG/DL
LACTATE SERPL-SCNC: 1.6 MMOL/L (ref 0.7–2)
LACTATE SERPL-SCNC: 1.8 MMOL/L (ref 0.7–2)
LACTATE SERPL-SCNC: 2 MMOL/L (ref 0.7–2)
LACTATE SERPL-SCNC: 2.1 MMOL/L (ref 0.7–2)
LACTATE SERPL-SCNC: 2.5 MMOL/L (ref 0.7–2)
LACTATE SERPL-SCNC: 3.2 MMOL/L (ref 0.7–2)
LDLC SERPL CALC-MCNC: 92 MG/DL
LEUKOCYTE ESTERASE UR QL STRIP: ABNORMAL
LEUKOCYTE ESTERASE UR QL STRIP: NEGATIVE
LIPASE SERPL-CCNC: 11 U/L (ref 13–60)
LIPASE SERPL-CCNC: 11 U/L (ref 13–60)
LYMPHOCYTES # BLD AUTO: 0.7 10E3/UL (ref 0.8–5.3)
LYMPHOCYTES # BLD AUTO: 0.9 10E3/UL (ref 0.8–5.3)
LYMPHOCYTES # BLD AUTO: 0.9 10E3/UL (ref 0.8–5.3)
LYMPHOCYTES # BLD AUTO: 1 10E3/UL (ref 0.8–5.3)
LYMPHOCYTES # BLD AUTO: 1.1 10E3/UL (ref 0.8–5.3)
LYMPHOCYTES # BLD AUTO: 1.2 10E3/UL (ref 0.8–5.3)
LYMPHOCYTES # BLD AUTO: 1.4 10E3/UL (ref 0–5.3)
LYMPHOCYTES # BLD AUTO: 1.6 10E3/UL (ref 0.8–5.3)
LYMPHOCYTES # BLD AUTO: 1.7 10E3/UL (ref 0.8–5.3)
LYMPHOCYTES # BLD AUTO: 1.8 10E3/UL (ref 0–5.3)
LYMPHOCYTES # BLD AUTO: ABNORMAL 10*3/UL
LYMPHOCYTES # BLD MANUAL: 0.7 10E3/UL (ref 0.8–5.3)
LYMPHOCYTES # BLD MANUAL: 3.2 10E3/UL (ref 0.8–5.3)
LYMPHOCYTES NFR BLD AUTO: 13 %
LYMPHOCYTES NFR BLD AUTO: 13 %
LYMPHOCYTES NFR BLD AUTO: 16 %
LYMPHOCYTES NFR BLD AUTO: 16 %
LYMPHOCYTES NFR BLD AUTO: 19 %
LYMPHOCYTES NFR BLD AUTO: 20 %
LYMPHOCYTES NFR BLD AUTO: 28 %
LYMPHOCYTES NFR BLD AUTO: 28 %
LYMPHOCYTES NFR BLD AUTO: 30 %
LYMPHOCYTES NFR BLD AUTO: 32 %
LYMPHOCYTES NFR BLD AUTO: 36 %
LYMPHOCYTES NFR BLD AUTO: 37 %
LYMPHOCYTES NFR BLD AUTO: 47 %
LYMPHOCYTES NFR BLD AUTO: 50 %
LYMPHOCYTES NFR BLD AUTO: ABNORMAL %
LYMPHOCYTES NFR BLD MANUAL: 34 %
LYMPHOCYTES NFR BLD MANUAL: 60 %
MAGNESIUM SERPL-MCNC: 1.6 MG/DL (ref 1.7–2.3)
MAGNESIUM SERPL-MCNC: 1.7 MG/DL (ref 1.7–2.3)
MAGNESIUM SERPL-MCNC: 1.8 MG/DL (ref 1.7–2.3)
MAGNESIUM SERPL-MCNC: 1.9 MG/DL (ref 1.7–2.3)
MCH RBC QN AUTO: 32 PG (ref 26.5–33)
MCH RBC QN AUTO: 32.1 PG (ref 26.5–33)
MCH RBC QN AUTO: 32.4 PG (ref 26.5–33)
MCH RBC QN AUTO: 32.5 PG (ref 26.5–33)
MCH RBC QN AUTO: 32.7 PG (ref 26.5–33)
MCH RBC QN AUTO: 32.7 PG (ref 26.5–33)
MCH RBC QN AUTO: 32.8 PG (ref 26.5–33)
MCH RBC QN AUTO: 32.8 PG (ref 26.5–33)
MCH RBC QN AUTO: 33 PG (ref 26.5–33)
MCH RBC QN AUTO: 33.1 PG (ref 26.5–33)
MCH RBC QN AUTO: 33.3 PG (ref 26.5–33)
MCH RBC QN AUTO: 33.5 PG (ref 26.5–33)
MCHC RBC AUTO-ENTMCNC: 31.2 G/DL (ref 31.5–36.5)
MCHC RBC AUTO-ENTMCNC: 31.6 G/DL (ref 31.5–36.5)
MCHC RBC AUTO-ENTMCNC: 31.7 G/DL (ref 31.5–36.5)
MCHC RBC AUTO-ENTMCNC: 31.9 G/DL (ref 31.5–36.5)
MCHC RBC AUTO-ENTMCNC: 32 G/DL (ref 31.5–36.5)
MCHC RBC AUTO-ENTMCNC: 32.1 G/DL (ref 31.5–36.5)
MCHC RBC AUTO-ENTMCNC: 32.1 G/DL (ref 31.5–36.5)
MCHC RBC AUTO-ENTMCNC: 32.2 G/DL (ref 31.5–36.5)
MCHC RBC AUTO-ENTMCNC: 32.3 G/DL (ref 31.5–36.5)
MCHC RBC AUTO-ENTMCNC: 32.4 G/DL (ref 31.5–36.5)
MCHC RBC AUTO-ENTMCNC: 32.4 G/DL (ref 31.5–36.5)
MCHC RBC AUTO-ENTMCNC: 32.9 G/DL (ref 31.5–36.5)
MCHC RBC AUTO-ENTMCNC: 32.9 G/DL (ref 31.5–36.5)
MCHC RBC AUTO-ENTMCNC: 33 G/DL (ref 31.5–36.5)
MCV RBC AUTO: 100 FL (ref 78–100)
MCV RBC AUTO: 101 FL (ref 78–100)
MCV RBC AUTO: 101 FL (ref 78–100)
MCV RBC AUTO: 102 FL (ref 78–100)
MCV RBC AUTO: 102 FL (ref 78–100)
MCV RBC AUTO: 103 FL (ref 78–100)
MCV RBC AUTO: 104 FL (ref 78–100)
MCV RBC AUTO: 105 FL (ref 78–100)
MCV RBC AUTO: 97 FL (ref 78–100)
MCV RBC AUTO: 99 FL (ref 78–100)
MONOCYTES # BLD AUTO: 0.2 10E3/UL (ref 0–1.3)
MONOCYTES # BLD AUTO: 0.3 10E3/UL (ref 0–1.3)
MONOCYTES # BLD AUTO: 0.4 10E3/UL (ref 0–1.3)
MONOCYTES # BLD AUTO: 0.5 10E3/UL (ref 0–1.3)
MONOCYTES # BLD AUTO: 0.6 10E3/UL (ref 0–1.3)
MONOCYTES # BLD AUTO: 0.7 10E3/UL (ref 0–1.3)
MONOCYTES # BLD AUTO: 0.8 10E3/UL (ref 0–1.3)
MONOCYTES # BLD AUTO: ABNORMAL 10*3/UL
MONOCYTES # BLD MANUAL: 0.1 10E3/UL (ref 0–1.3)
MONOCYTES # BLD MANUAL: 0.5 10E3/UL (ref 0–1.3)
MONOCYTES NFR BLD AUTO: 10 %
MONOCYTES NFR BLD AUTO: 11 %
MONOCYTES NFR BLD AUTO: 12 %
MONOCYTES NFR BLD AUTO: 13 %
MONOCYTES NFR BLD AUTO: 14 %
MONOCYTES NFR BLD AUTO: 16 %
MONOCYTES NFR BLD AUTO: 20 %
MONOCYTES NFR BLD AUTO: 5 %
MONOCYTES NFR BLD AUTO: 6 %
MONOCYTES NFR BLD AUTO: 6 %
MONOCYTES NFR BLD AUTO: 8 %
MONOCYTES NFR BLD AUTO: 9 %
MONOCYTES NFR BLD AUTO: ABNORMAL %
MONOCYTES NFR BLD MANUAL: 10 %
MONOCYTES NFR BLD MANUAL: 5 %
MUCOUS THREADS #/AREA URNS LPF: PRESENT /LPF
NEUTROPHILS # BLD AUTO: 0.9 10E3/UL (ref 1.6–8.3)
NEUTROPHILS # BLD AUTO: 1.3 10E3/UL (ref 1.6–8.3)
NEUTROPHILS # BLD AUTO: 1.6 10E3/UL (ref 1.6–8.3)
NEUTROPHILS # BLD AUTO: 1.8 10E3/UL (ref 1.6–8.3)
NEUTROPHILS # BLD AUTO: 1.9 10E3/UL (ref 1.6–8.3)
NEUTROPHILS # BLD AUTO: 2.5 10E3/UL (ref 1.6–8.3)
NEUTROPHILS # BLD AUTO: 2.5 10E3/UL (ref 1.6–8.3)
NEUTROPHILS # BLD AUTO: 3.2 10E3/UL (ref 1.6–8.3)
NEUTROPHILS # BLD AUTO: 3.3 10E3/UL (ref 1.6–8.3)
NEUTROPHILS # BLD AUTO: 3.9 10E3/UL (ref 1.6–8.3)
NEUTROPHILS # BLD AUTO: 4.4 10E3/UL (ref 1.6–8.3)
NEUTROPHILS # BLD AUTO: 4.8 10E3/UL (ref 1.6–8.3)
NEUTROPHILS # BLD AUTO: 5.8 10E3/UL (ref 1.6–8.3)
NEUTROPHILS # BLD AUTO: 7.8 10E3/UL (ref 1.6–8.3)
NEUTROPHILS # BLD AUTO: ABNORMAL 10*3/UL
NEUTROPHILS # BLD MANUAL: 0.3 10E3/UL (ref 1.6–8.3)
NEUTROPHILS # BLD MANUAL: 5.6 10E3/UL (ref 1.6–8.3)
NEUTROPHILS NFR BLD AUTO: 28 %
NEUTROPHILS NFR BLD AUTO: 43 %
NEUTROPHILS NFR BLD AUTO: 50 %
NEUTROPHILS NFR BLD AUTO: 50 %
NEUTROPHILS NFR BLD AUTO: 52 %
NEUTROPHILS NFR BLD AUTO: 53 %
NEUTROPHILS NFR BLD AUTO: 57 %
NEUTROPHILS NFR BLD AUTO: 60 %
NEUTROPHILS NFR BLD AUTO: 69 %
NEUTROPHILS NFR BLD AUTO: 69 %
NEUTROPHILS NFR BLD AUTO: 71 %
NEUTROPHILS NFR BLD AUTO: 74 %
NEUTROPHILS NFR BLD AUTO: 80 %
NEUTROPHILS NFR BLD AUTO: 82 %
NEUTROPHILS NFR BLD AUTO: ABNORMAL %
NEUTROPHILS NFR BLD MANUAL: 30 %
NEUTROPHILS NFR BLD MANUAL: 60 %
NEUTS HYPERSEG BLD QL SMEAR: ABNORMAL
NITRATE UR QL: NEGATIVE
NITRATE UR QL: POSITIVE
NONHDLC SERPL-MCNC: 107 MG/DL
NRBC # BLD AUTO: 0 10E3/UL
NRBC BLD AUTO-RTO: 0 /100
OPIATES UR QL SCN: NORMAL
PATH REV: ABNORMAL
PCP QUAL URINE (ROCHE): NORMAL
PH UR STRIP: 5.5 [PH] (ref 5–7)
PH UR STRIP: 6 [PH] (ref 5–7)
PH UR STRIP: 6.5 [PH] (ref 5–7)
PH UR STRIP: 7 [PH] (ref 5–7)
PHOSPHATE SERPL-MCNC: 2.7 MG/DL (ref 2.5–4.5)
PLAT MORPH BLD: ABNORMAL
PLATELET # BLD AUTO: 121 10E3/UL (ref 150–450)
PLATELET # BLD AUTO: 126 10E3/UL (ref 150–450)
PLATELET # BLD AUTO: 131 10E3/UL (ref 150–450)
PLATELET # BLD AUTO: 139 10E3/UL (ref 150–450)
PLATELET # BLD AUTO: 159 10E3/UL (ref 150–450)
PLATELET # BLD AUTO: 160 10E3/UL (ref 150–450)
PLATELET # BLD AUTO: 162 10E3/UL (ref 150–450)
PLATELET # BLD AUTO: 171 10E3/UL (ref 150–450)
PLATELET # BLD AUTO: 183 10E3/UL (ref 150–450)
PLATELET # BLD AUTO: 197 10E3/UL (ref 150–450)
PLATELET # BLD AUTO: 52 10E3/UL (ref 150–450)
PLATELET # BLD AUTO: 53 10E3/UL (ref 150–450)
PLATELET # BLD AUTO: 54 10E3/UL (ref 150–450)
PLATELET # BLD AUTO: 61 10E3/UL (ref 150–450)
PLATELET # BLD AUTO: 62 10E3/UL (ref 150–450)
POLYCHROMASIA BLD QL SMEAR: ABNORMAL
POTASSIUM SERPL-SCNC: 3.5 MMOL/L (ref 3.4–5.3)
POTASSIUM SERPL-SCNC: 3.6 MMOL/L (ref 3.4–5.3)
POTASSIUM SERPL-SCNC: 3.7 MMOL/L (ref 3.4–5.3)
POTASSIUM SERPL-SCNC: 3.7 MMOL/L (ref 3.4–5.3)
POTASSIUM SERPL-SCNC: 3.8 MMOL/L (ref 3.4–5.3)
POTASSIUM SERPL-SCNC: 3.8 MMOL/L (ref 3.4–5.3)
POTASSIUM SERPL-SCNC: 3.9 MMOL/L (ref 3.4–5.3)
POTASSIUM SERPL-SCNC: 3.9 MMOL/L (ref 3.4–5.3)
POTASSIUM SERPL-SCNC: 4 MMOL/L (ref 3.4–5.3)
POTASSIUM SERPL-SCNC: 4.3 MMOL/L (ref 3.4–5.3)
POTASSIUM SERPL-SCNC: 4.5 MMOL/L (ref 3.4–5.3)
POTASSIUM SERPL-SCNC: 4.6 MMOL/L (ref 3.4–5.3)
POTASSIUM SERPL-SCNC: 5.3 MMOL/L (ref 3.4–5.3)
PROT SERPL-MCNC: 5.6 G/DL (ref 6.4–8.3)
PROT SERPL-MCNC: 6 G/DL (ref 6.4–8.3)
PROT SERPL-MCNC: 6.1 G/DL (ref 6.4–8.3)
PROT SERPL-MCNC: 6.2 G/DL (ref 6.4–8.3)
PROT SERPL-MCNC: 6.3 G/DL (ref 6.4–8.3)
PROT SERPL-MCNC: 6.5 G/DL (ref 6.4–8.3)
PROT SERPL-MCNC: 6.7 G/DL (ref 6.4–8.3)
PROT SERPL-MCNC: 6.9 G/DL (ref 6.4–8.3)
PROT SERPL-MCNC: 7.1 G/DL (ref 6.4–8.3)
PROT SERPL-MCNC: 7.2 G/DL (ref 6.4–8.3)
PROT SERPL-MCNC: 7.2 G/DL (ref 6.4–8.3)
PROT SERPL-MCNC: 7.5 G/DL (ref 6.4–8.3)
PROT SERPL-MCNC: 7.6 G/DL (ref 6.4–8.3)
PROT SERPL-MCNC: 7.9 G/DL (ref 6.4–8.3)
RADIOLOGIST FLAGS: ABNORMAL
RBC # BLD AUTO: 2.45 10E6/UL (ref 3.8–5.2)
RBC # BLD AUTO: 2.51 10E6/UL (ref 3.8–5.2)
RBC # BLD AUTO: 2.52 10E6/UL (ref 3.8–5.2)
RBC # BLD AUTO: 2.67 10E6/UL (ref 3.8–5.2)
RBC # BLD AUTO: 2.68 10E6/UL (ref 3.8–5.2)
RBC # BLD AUTO: 3.03 10E6/UL (ref 3.8–5.2)
RBC # BLD AUTO: 3.09 10E6/UL (ref 3.8–5.2)
RBC # BLD AUTO: 3.26 10E6/UL (ref 3.8–5.2)
RBC # BLD AUTO: 3.43 10E6/UL (ref 3.8–5.2)
RBC # BLD AUTO: 3.51 10E6/UL (ref 3.8–5.2)
RBC # BLD AUTO: 3.53 10E6/UL (ref 3.8–5.2)
RBC # BLD AUTO: 3.59 10E6/UL (ref 3.8–5.2)
RBC # BLD AUTO: 3.65 10E6/UL (ref 3.8–5.2)
RBC # BLD AUTO: 3.66 10E6/UL (ref 3.8–5.2)
RBC # BLD AUTO: 3.73 10E6/UL (ref 3.8–5.2)
RBC #/AREA URNS AUTO: ABNORMAL /HPF
RBC AGGLUT BLD QL: ABNORMAL
RBC MORPH BLD: ABNORMAL
RBC URINE: 0 /HPF
RBC URINE: 1 /HPF
RBC URINE: 2 /HPF
ROULEAUX BLD QL SMEAR: ABNORMAL
RSV RNA SPEC NAA+PROBE: NEGATIVE
SARS-COV-2 RNA RESP QL NAA+PROBE: NEGATIVE
SICKLE CELLS BLD QL SMEAR: ABNORMAL
SMUDGE CELLS BLD QL SMEAR: ABNORMAL
SODIUM SERPL-SCNC: 133 MMOL/L (ref 135–145)
SODIUM SERPL-SCNC: 133 MMOL/L (ref 135–145)
SODIUM SERPL-SCNC: 135 MMOL/L (ref 135–145)
SODIUM SERPL-SCNC: 136 MMOL/L (ref 135–145)
SODIUM SERPL-SCNC: 137 MMOL/L (ref 135–145)
SODIUM SERPL-SCNC: 138 MMOL/L (ref 135–145)
SP GR UR STRIP: 1 (ref 1–1.03)
SP GR UR STRIP: 1.01 (ref 1–1.03)
SP GR UR STRIP: 1.02 (ref 1–1.03)
SP GR UR STRIP: 1.03 (ref 1–1.03)
SP GR UR STRIP: 1.03 (ref 1–1.03)
SP GR UR STRIP: <=1.005 (ref 1–1.03)
SP GR UR STRIP: <=1.005 (ref 1–1.03)
SPHEROCYTES BLD QL SMEAR: ABNORMAL
SQUAMOUS #/AREA URNS AUTO: ABNORMAL /LPF
SQUAMOUS EPITHELIAL: 1 /HPF
STOMATOCYTES BLD QL SMEAR: ABNORMAL
TARGETS BLD QL SMEAR: ABNORMAL
TOXIC GRANULES BLD QL SMEAR: PRESENT
TOXIC GRANULES BLD QL SMEAR: PRESENT
TRANS CELLS #/AREA URNS HPF: ABNORMAL /HPF
TRANSFERRIN SERPL-MCNC: 169 MG/DL (ref 200–360)
TRANSITIONAL EPI: <1 /HPF
TRANSITIONAL EPI: <1 /HPF
TRIGL SERPL-MCNC: 77 MG/DL
TSH SERPL DL<=0.005 MIU/L-ACNC: 3.09 UIU/ML (ref 0.3–4.2)
UROBILINOGEN UR STRIP-ACNC: 0.2 E.U./DL
UROBILINOGEN UR STRIP-MCNC: 2 MG/DL
UROBILINOGEN UR STRIP-MCNC: 3 MG/DL
UROBILINOGEN UR STRIP-MCNC: NORMAL MG/DL
UROBILINOGEN UR STRIP-MCNC: NORMAL MG/DL
VARIANT LYMPHS BLD QL SMEAR: ABNORMAL
VIT B1 PYROPHOSHATE BLD-SCNC: 77 NMOL/L
VIT B12 SERPL-MCNC: >4000 PG/ML (ref 232–1245)
WBC # BLD AUTO: 1.1 10E3/UL (ref 4–11)
WBC # BLD AUTO: 11.8 10E3/UL (ref 4–11)
WBC # BLD AUTO: 2.9 10E3/UL (ref 4–11)
WBC # BLD AUTO: 3.3 10E3/UL (ref 4–11)
WBC # BLD AUTO: 3.3 10E3/UL (ref 4–11)
WBC # BLD AUTO: 3.5 10E3/UL (ref 4–11)
WBC # BLD AUTO: 3.6 10E3/UL (ref 4–11)
WBC # BLD AUTO: 4.4 10E3/UL (ref 4–11)
WBC # BLD AUTO: 4.4 10E3/UL (ref 4–11)
WBC # BLD AUTO: 4.6 10E3/UL (ref 4–11)
WBC # BLD AUTO: 4.9 10E3/UL (ref 4–11)
WBC # BLD AUTO: 6.3 10E3/UL (ref 4–11)
WBC # BLD AUTO: 6.5 10E3/UL (ref 4–11)
WBC # BLD AUTO: 6.9 10E3/UL (ref 4–11)
WBC # BLD AUTO: 7.2 10E3/UL (ref 4–11)
WBC # BLD AUTO: 9.4 10E3/UL (ref 4–11)
WBC # BLD AUTO: 9.6 10E3/UL (ref 4–11)
WBC # BLD AUTO: NORMAL 10*3/UL
WBC #/AREA URNS AUTO: ABNORMAL /HPF
WBC CLUMPS #/AREA URNS HPF: PRESENT /HPF
WBC URINE: 0 /HPF
WBC URINE: 26 /HPF
WBC URINE: 5 /HPF

## 2024-01-01 PROCEDURE — 97535 SELF CARE MNGMENT TRAINING: CPT | Mod: GO | Performed by: OCCUPATIONAL THERAPIST

## 2024-01-01 PROCEDURE — 250N000013 HC RX MED GY IP 250 OP 250 PS 637

## 2024-01-01 PROCEDURE — 81003 URINALYSIS AUTO W/O SCOPE: CPT | Performed by: OBSTETRICS & GYNECOLOGY

## 2024-01-01 PROCEDURE — 258N000003 HC RX IP 258 OP 636: Performed by: FAMILY MEDICINE

## 2024-01-01 PROCEDURE — 84425 ASSAY OF VITAMIN B-1: CPT | Performed by: OBSTETRICS & GYNECOLOGY

## 2024-01-01 PROCEDURE — 82040 ASSAY OF SERUM ALBUMIN: CPT | Performed by: NURSE PRACTITIONER

## 2024-01-01 PROCEDURE — 83735 ASSAY OF MAGNESIUM: CPT | Performed by: OBSTETRICS & GYNECOLOGY

## 2024-01-01 PROCEDURE — 99285 EMERGENCY DEPT VISIT HI MDM: CPT | Performed by: EMERGENCY MEDICINE

## 2024-01-01 PROCEDURE — 83735 ASSAY OF MAGNESIUM: CPT | Performed by: NURSE PRACTITIONER

## 2024-01-01 PROCEDURE — 96365 THER/PROPH/DIAG IV INF INIT: CPT | Performed by: EMERGENCY MEDICINE

## 2024-01-01 PROCEDURE — 99213 OFFICE O/P EST LOW 20 MIN: CPT | Performed by: PHYSICIAN ASSISTANT

## 2024-01-01 PROCEDURE — 96413 CHEMO IV INFUSION 1 HR: CPT

## 2024-01-01 PROCEDURE — 81001 URINALYSIS AUTO W/SCOPE: CPT | Performed by: PHYSICIAN ASSISTANT

## 2024-01-01 PROCEDURE — 250N000013 HC RX MED GY IP 250 OP 250 PS 637: Performed by: EMERGENCY MEDICINE

## 2024-01-01 PROCEDURE — 99214 OFFICE O/P EST MOD 30 MIN: CPT | Mod: 95 | Performed by: NURSE PRACTITIONER

## 2024-01-01 PROCEDURE — 96377 APPLICATON ON-BODY INJECTOR: CPT | Mod: 59 | Performed by: OBSTETRICS & GYNECOLOGY

## 2024-01-01 PROCEDURE — 99205 OFFICE O/P NEW HI 60 MIN: CPT | Mod: 95 | Performed by: INTERNAL MEDICINE

## 2024-01-01 PROCEDURE — 81001 URINALYSIS AUTO W/SCOPE: CPT | Performed by: EMERGENCY MEDICINE

## 2024-01-01 PROCEDURE — 85025 COMPLETE CBC W/AUTO DIFF WBC: CPT | Performed by: OBSTETRICS & GYNECOLOGY

## 2024-01-01 PROCEDURE — 96375 TX/PRO/DX INJ NEW DRUG ADDON: CPT

## 2024-01-01 PROCEDURE — 250N000011 HC RX IP 250 OP 636: Performed by: FAMILY MEDICINE

## 2024-01-01 PROCEDURE — 258N000003 HC RX IP 258 OP 636: Performed by: NURSE PRACTITIONER

## 2024-01-01 PROCEDURE — 96372 THER/PROPH/DIAG INJ SC/IM: CPT | Performed by: NURSE PRACTITIONER

## 2024-01-01 PROCEDURE — A9585 GADOBUTROL INJECTION: HCPCS | Performed by: OBSTETRICS & GYNECOLOGY

## 2024-01-01 PROCEDURE — 83036 HEMOGLOBIN GLYCOSYLATED A1C: CPT

## 2024-01-01 PROCEDURE — 83690 ASSAY OF LIPASE: CPT | Performed by: EMERGENCY MEDICINE

## 2024-01-01 PROCEDURE — 96417 CHEMO IV INFUS EACH ADDL SEQ: CPT

## 2024-01-01 PROCEDURE — A9585 GADOBUTROL INJECTION: HCPCS | Performed by: RADIOLOGY

## 2024-01-01 PROCEDURE — 250N000011 HC RX IP 250 OP 636: Performed by: OBSTETRICS & GYNECOLOGY

## 2024-01-01 PROCEDURE — 74177 CT ABD & PELVIS W/CONTRAST: CPT | Mod: 26 | Performed by: RADIOLOGY

## 2024-01-01 PROCEDURE — 36415 COLL VENOUS BLD VENIPUNCTURE: CPT

## 2024-01-01 PROCEDURE — 258N000003 HC RX IP 258 OP 636: Performed by: OBSTETRICS & GYNECOLOGY

## 2024-01-01 PROCEDURE — 99207 PR NO CHARGE LOS: CPT

## 2024-01-01 PROCEDURE — 250N000011 HC RX IP 250 OP 636: Performed by: EMERGENCY MEDICINE

## 2024-01-01 PROCEDURE — 85007 BL SMEAR W/DIFF WBC COUNT: CPT | Performed by: FAMILY MEDICINE

## 2024-01-01 PROCEDURE — 70450 CT HEAD/BRAIN W/O DYE: CPT

## 2024-01-01 PROCEDURE — 96367 TX/PROPH/DG ADDL SEQ IV INF: CPT

## 2024-01-01 PROCEDURE — 99284 EMERGENCY DEPT VISIT MOD MDM: CPT | Performed by: EMERGENCY MEDICINE

## 2024-01-01 PROCEDURE — 250N000011 HC RX IP 250 OP 636: Performed by: NURSE PRACTITIONER

## 2024-01-01 PROCEDURE — 87186 SC STD MICRODIL/AGAR DIL: CPT | Performed by: FAMILY MEDICINE

## 2024-01-01 PROCEDURE — 86304 IMMUNOASSAY TUMOR CA 125: CPT

## 2024-01-01 PROCEDURE — 72131 CT LUMBAR SPINE W/O DYE: CPT | Mod: 26 | Performed by: RADIOLOGY

## 2024-01-01 PROCEDURE — 36591 DRAW BLOOD OFF VENOUS DEVICE: CPT

## 2024-01-01 PROCEDURE — 99239 HOSP IP/OBS DSCHRG MGMT >30: CPT | Mod: GC | Performed by: OBSTETRICS & GYNECOLOGY

## 2024-01-01 PROCEDURE — 86304 IMMUNOASSAY TUMOR CA 125: CPT | Performed by: NURSE PRACTITIONER

## 2024-01-01 PROCEDURE — 36415 COLL VENOUS BLD VENIPUNCTURE: CPT | Performed by: EMERGENCY MEDICINE

## 2024-01-01 PROCEDURE — 250N000011 HC RX IP 250 OP 636: Performed by: RADIOLOGY

## 2024-01-01 PROCEDURE — 99222 1ST HOSP IP/OBS MODERATE 55: CPT | Mod: AI | Performed by: OBSTETRICS & GYNECOLOGY

## 2024-01-01 PROCEDURE — 96372 THER/PROPH/DIAG INJ SC/IM: CPT | Performed by: OBSTETRICS & GYNECOLOGY

## 2024-01-01 PROCEDURE — 49083 ABD PARACENTESIS W/IMAGING: CPT

## 2024-01-01 PROCEDURE — 83605 ASSAY OF LACTIC ACID: CPT | Performed by: EMERGENCY MEDICINE

## 2024-01-01 PROCEDURE — 70450 CT HEAD/BRAIN W/O DYE: CPT | Mod: 26 | Performed by: RADIOLOGY

## 2024-01-01 PROCEDURE — 80053 COMPREHEN METABOLIC PANEL: CPT | Performed by: EMERGENCY MEDICINE

## 2024-01-01 PROCEDURE — 80307 DRUG TEST PRSMV CHEM ANLYZR: CPT | Performed by: STUDENT IN AN ORGANIZED HEALTH CARE EDUCATION/TRAINING PROGRAM

## 2024-01-01 PROCEDURE — 87086 URINE CULTURE/COLONY COUNT: CPT | Performed by: OBSTETRICS & GYNECOLOGY

## 2024-01-01 PROCEDURE — 87086 URINE CULTURE/COLONY COUNT: CPT | Performed by: FAMILY MEDICINE

## 2024-01-01 PROCEDURE — 70553 MRI BRAIN STEM W/O & W/DYE: CPT

## 2024-01-01 PROCEDURE — 36415 COLL VENOUS BLD VENIPUNCTURE: CPT | Performed by: NURSE PRACTITIONER

## 2024-01-01 PROCEDURE — 99215 OFFICE O/P EST HI 40 MIN: CPT | Mod: 95 | Performed by: OBSTETRICS & GYNECOLOGY

## 2024-01-01 PROCEDURE — 250N000013 HC RX MED GY IP 250 OP 250 PS 637: Performed by: OBSTETRICS & GYNECOLOGY

## 2024-01-01 PROCEDURE — 74177 CT ABD & PELVIS W/CONTRAST: CPT

## 2024-01-01 PROCEDURE — 85004 AUTOMATED DIFF WBC COUNT: CPT

## 2024-01-01 PROCEDURE — 258N000003 HC RX IP 258 OP 636

## 2024-01-01 PROCEDURE — 87186 SC STD MICRODIL/AGAR DIL: CPT | Mod: GT,95 | Performed by: INTERNAL MEDICINE

## 2024-01-01 PROCEDURE — 84132 ASSAY OF SERUM POTASSIUM: CPT | Performed by: OBSTETRICS & GYNECOLOGY

## 2024-01-01 PROCEDURE — 83735 ASSAY OF MAGNESIUM: CPT

## 2024-01-01 PROCEDURE — 87186 SC STD MICRODIL/AGAR DIL: CPT | Performed by: PHYSICIAN ASSISTANT

## 2024-01-01 PROCEDURE — 87086 URINE CULTURE/COLONY COUNT: CPT | Performed by: PHYSICIAN ASSISTANT

## 2024-01-01 PROCEDURE — 96375 TX/PRO/DX INJ NEW DRUG ADDON: CPT | Performed by: EMERGENCY MEDICINE

## 2024-01-01 PROCEDURE — 74177 CT ABD & PELVIS W/CONTRAST: CPT | Mod: 26 | Performed by: STUDENT IN AN ORGANIZED HEALTH CARE EDUCATION/TRAINING PROGRAM

## 2024-01-01 PROCEDURE — 99223 1ST HOSP IP/OBS HIGH 75: CPT | Performed by: STUDENT IN AN ORGANIZED HEALTH CARE EDUCATION/TRAINING PROGRAM

## 2024-01-01 PROCEDURE — 80053 COMPREHEN METABOLIC PANEL: CPT | Performed by: OBSTETRICS & GYNECOLOGY

## 2024-01-01 PROCEDURE — 74177 CT ABD & PELVIS W/CONTRAST: CPT | Performed by: RADIOLOGY

## 2024-01-01 PROCEDURE — 70553 MRI BRAIN STEM W/O & W/DYE: CPT | Mod: 26 | Performed by: STUDENT IN AN ORGANIZED HEALTH CARE EDUCATION/TRAINING PROGRAM

## 2024-01-01 PROCEDURE — 85025 COMPLETE CBC W/AUTO DIFF WBC: CPT | Performed by: NURSE PRACTITIONER

## 2024-01-01 PROCEDURE — 97161 PT EVAL LOW COMPLEX 20 MIN: CPT | Mod: GP

## 2024-01-01 PROCEDURE — 85610 PROTHROMBIN TIME: CPT | Performed by: EMERGENCY MEDICINE

## 2024-01-01 PROCEDURE — 999N000163 HC STATISTIC SIMPLE TUBE INSERTION/CHARGE, PORT, CATH, FISTULOGRAM

## 2024-01-01 PROCEDURE — 250N000011 HC RX IP 250 OP 636: Mod: JZ

## 2024-01-01 PROCEDURE — 36415 COLL VENOUS BLD VENIPUNCTURE: CPT | Performed by: OBSTETRICS & GYNECOLOGY

## 2024-01-01 PROCEDURE — P9047 ALBUMIN (HUMAN), 25%, 50ML: HCPCS | Performed by: RADIOLOGY

## 2024-01-01 PROCEDURE — 97530 THERAPEUTIC ACTIVITIES: CPT | Mod: GP

## 2024-01-01 PROCEDURE — 83605 ASSAY OF LACTIC ACID: CPT | Performed by: OBSTETRICS & GYNECOLOGY

## 2024-01-01 PROCEDURE — 99284 EMERGENCY DEPT VISIT MOD MDM: CPT | Performed by: FAMILY MEDICINE

## 2024-01-01 PROCEDURE — 250N000013 HC RX MED GY IP 250 OP 250 PS 637: Performed by: STUDENT IN AN ORGANIZED HEALTH CARE EDUCATION/TRAINING PROGRAM

## 2024-01-01 PROCEDURE — G2211 COMPLEX E/M VISIT ADD ON: HCPCS | Mod: 95 | Performed by: NURSE PRACTITIONER

## 2024-01-01 PROCEDURE — 99285 EMERGENCY DEPT VISIT HI MDM: CPT | Mod: 25 | Performed by: EMERGENCY MEDICINE

## 2024-01-01 PROCEDURE — 81003 URINALYSIS AUTO W/O SCOPE: CPT | Performed by: FAMILY MEDICINE

## 2024-01-01 PROCEDURE — G0378 HOSPITAL OBSERVATION PER HR: HCPCS

## 2024-01-01 PROCEDURE — 85025 COMPLETE CBC W/AUTO DIFF WBC: CPT

## 2024-01-01 PROCEDURE — 36415 COLL VENOUS BLD VENIPUNCTURE: CPT | Performed by: FAMILY MEDICINE

## 2024-01-01 PROCEDURE — 250N000011 HC RX IP 250 OP 636: Performed by: STUDENT IN AN ORGANIZED HEALTH CARE EDUCATION/TRAINING PROGRAM

## 2024-01-01 PROCEDURE — 70553 MRI BRAIN STEM W/O & W/DYE: CPT | Mod: GC | Performed by: RADIOLOGY

## 2024-01-01 PROCEDURE — 272N000196 HC ACCESSORY CR5

## 2024-01-01 PROCEDURE — 96375 TX/PRO/DX INJ NEW DRUG ADDON: CPT | Mod: 59 | Performed by: EMERGENCY MEDICINE

## 2024-01-01 PROCEDURE — 250N000011 HC RX IP 250 OP 636

## 2024-01-01 PROCEDURE — 999N000111 HC STATISTIC OT IP EVAL DEFER

## 2024-01-01 PROCEDURE — 82040 ASSAY OF SERUM ALBUMIN: CPT

## 2024-01-01 PROCEDURE — 82247 BILIRUBIN TOTAL: CPT

## 2024-01-01 PROCEDURE — 99417 PROLNG OP E/M EACH 15 MIN: CPT | Mod: 95 | Performed by: INTERNAL MEDICINE

## 2024-01-01 PROCEDURE — 250N000009 HC RX 250: Performed by: NURSE PRACTITIONER

## 2024-01-01 PROCEDURE — 99222 1ST HOSP IP/OBS MODERATE 55: CPT | Mod: GC | Performed by: OBSTETRICS & GYNECOLOGY

## 2024-01-01 PROCEDURE — 84466 ASSAY OF TRANSFERRIN: CPT | Performed by: OBSTETRICS & GYNECOLOGY

## 2024-01-01 PROCEDURE — 71260 CT THORAX DX C+: CPT | Performed by: RADIOLOGY

## 2024-01-01 PROCEDURE — 99285 EMERGENCY DEPT VISIT HI MDM: CPT | Mod: 25 | Performed by: FAMILY MEDICINE

## 2024-01-01 PROCEDURE — 82607 VITAMIN B-12: CPT | Performed by: OBSTETRICS & GYNECOLOGY

## 2024-01-01 PROCEDURE — 250N000009 HC RX 250: Performed by: OBSTETRICS & GYNECOLOGY

## 2024-01-01 PROCEDURE — 84443 ASSAY THYROID STIM HORMONE: CPT | Performed by: EMERGENCY MEDICINE

## 2024-01-01 PROCEDURE — 80053 COMPREHEN METABOLIC PANEL: CPT

## 2024-01-01 PROCEDURE — 85027 COMPLETE CBC AUTOMATED: CPT | Performed by: OBSTETRICS & GYNECOLOGY

## 2024-01-01 PROCEDURE — 82728 ASSAY OF FERRITIN: CPT | Performed by: OBSTETRICS & GYNECOLOGY

## 2024-01-01 PROCEDURE — 81003 URINALYSIS AUTO W/O SCOPE: CPT | Performed by: NURSE PRACTITIONER

## 2024-01-01 PROCEDURE — 85041 AUTOMATED RBC COUNT: CPT | Performed by: NURSE PRACTITIONER

## 2024-01-01 PROCEDURE — 99233 SBSQ HOSP IP/OBS HIGH 50: CPT | Performed by: STUDENT IN AN ORGANIZED HEALTH CARE EDUCATION/TRAINING PROGRAM

## 2024-01-01 PROCEDURE — 96377 APPLICATON ON-BODY INJECTOR: CPT | Mod: XS | Performed by: OBSTETRICS & GYNECOLOGY

## 2024-01-01 PROCEDURE — 97166 OT EVAL MOD COMPLEX 45 MIN: CPT | Mod: GO | Performed by: OCCUPATIONAL THERAPIST

## 2024-01-01 PROCEDURE — 258N000003 HC RX IP 258 OP 636: Performed by: EMERGENCY MEDICINE

## 2024-01-01 PROCEDURE — 82746 ASSAY OF FOLIC ACID SERUM: CPT | Performed by: OBSTETRICS & GYNECOLOGY

## 2024-01-01 PROCEDURE — 99232 SBSQ HOSP IP/OBS MODERATE 35: CPT | Mod: GC | Performed by: OBSTETRICS & GYNECOLOGY

## 2024-01-01 PROCEDURE — 72158 MRI LUMBAR SPINE W/O & W/DYE: CPT | Mod: 26 | Performed by: RADIOLOGY

## 2024-01-01 PROCEDURE — 97116 GAIT TRAINING THERAPY: CPT | Mod: GP

## 2024-01-01 PROCEDURE — 96361 HYDRATE IV INFUSION ADD-ON: CPT | Performed by: EMERGENCY MEDICINE

## 2024-01-01 PROCEDURE — 76705 ECHO EXAM OF ABDOMEN: CPT

## 2024-01-01 PROCEDURE — 72131 CT LUMBAR SPINE W/O DYE: CPT

## 2024-01-01 PROCEDURE — 96374 THER/PROPH/DIAG INJ IV PUSH: CPT | Mod: 59 | Performed by: EMERGENCY MEDICINE

## 2024-01-01 PROCEDURE — 250N000009 HC RX 250: Performed by: RADIOLOGY

## 2024-01-01 PROCEDURE — 258N000002 HC RX IP 258 OP 250: Performed by: NURSE PRACTITIONER

## 2024-01-01 PROCEDURE — 84132 ASSAY OF SERUM POTASSIUM: CPT | Performed by: NURSE PRACTITIONER

## 2024-01-01 PROCEDURE — 85025 COMPLETE CBC W/AUTO DIFF WBC: CPT | Performed by: EMERGENCY MEDICINE

## 2024-01-01 PROCEDURE — 99152 MOD SED SAME PHYS/QHP 5/>YRS: CPT

## 2024-01-01 PROCEDURE — 120N000002 HC R&B MED SURG/OB UMMC

## 2024-01-01 PROCEDURE — 85004 AUTOMATED DIFF WBC COUNT: CPT | Performed by: NURSE PRACTITIONER

## 2024-01-01 PROCEDURE — 96377 APPLICATON ON-BODY INJECTOR: CPT | Mod: XS | Performed by: NURSE PRACTITIONER

## 2024-01-01 PROCEDURE — C1788 PORT, INDWELLING, IMP: HCPCS

## 2024-01-01 PROCEDURE — 85004 AUTOMATED DIFF WBC COUNT: CPT | Performed by: FAMILY MEDICINE

## 2024-01-01 PROCEDURE — 85610 PROTHROMBIN TIME: CPT

## 2024-01-01 PROCEDURE — 99222 1ST HOSP IP/OBS MODERATE 55: CPT | Performed by: INTERNAL MEDICINE

## 2024-01-01 PROCEDURE — 255N000002 HC RX 255 OP 636: Performed by: OBSTETRICS & GYNECOLOGY

## 2024-01-01 PROCEDURE — 99233 SBSQ HOSP IP/OBS HIGH 50: CPT | Mod: GC | Performed by: STUDENT IN AN ORGANIZED HEALTH CARE EDUCATION/TRAINING PROGRAM

## 2024-01-01 PROCEDURE — 96360 HYDRATION IV INFUSION INIT: CPT

## 2024-01-01 PROCEDURE — 83735 ASSAY OF MAGNESIUM: CPT | Performed by: EMERGENCY MEDICINE

## 2024-01-01 PROCEDURE — 84295 ASSAY OF SERUM SODIUM: CPT

## 2024-01-01 PROCEDURE — 99238 HOSP IP/OBS DSCHRG MGMT 30/<: CPT | Mod: GC | Performed by: OBSTETRICS & GYNECOLOGY

## 2024-01-01 PROCEDURE — 85730 THROMBOPLASTIN TIME PARTIAL: CPT | Performed by: EMERGENCY MEDICINE

## 2024-01-01 PROCEDURE — 87637 SARSCOV2&INF A&B&RSV AMP PRB: CPT | Performed by: OBSTETRICS & GYNECOLOGY

## 2024-01-01 PROCEDURE — 99222 1ST HOSP IP/OBS MODERATE 55: CPT | Mod: GC | Performed by: STUDENT IN AN ORGANIZED HEALTH CARE EDUCATION/TRAINING PROGRAM

## 2024-01-01 PROCEDURE — 99153 MOD SED SAME PHYS/QHP EA: CPT

## 2024-01-01 PROCEDURE — 83605 ASSAY OF LACTIC ACID: CPT

## 2024-01-01 PROCEDURE — 86140 C-REACTIVE PROTEIN: CPT | Performed by: OBSTETRICS & GYNECOLOGY

## 2024-01-01 PROCEDURE — 99215 OFFICE O/P EST HI 40 MIN: CPT | Mod: 95 | Performed by: NURSE PRACTITIONER

## 2024-01-01 PROCEDURE — 96376 TX/PRO/DX INJ SAME DRUG ADON: CPT | Performed by: FAMILY MEDICINE

## 2024-01-01 PROCEDURE — 84295 ASSAY OF SERUM SODIUM: CPT | Performed by: FAMILY MEDICINE

## 2024-01-01 PROCEDURE — 99215 OFFICE O/P EST HI 40 MIN: CPT | Performed by: INTERNAL MEDICINE

## 2024-01-01 PROCEDURE — 96377 APPLICATON ON-BODY INJECTOR: CPT | Mod: XS

## 2024-01-01 PROCEDURE — 80061 LIPID PANEL: CPT

## 2024-01-01 PROCEDURE — 85007 BL SMEAR W/DIFF WBC COUNT: CPT | Performed by: OBSTETRICS & GYNECOLOGY

## 2024-01-01 PROCEDURE — 74019 RADEX ABDOMEN 2 VIEWS: CPT | Performed by: RADIOLOGY

## 2024-01-01 PROCEDURE — 87086 URINE CULTURE/COLONY COUNT: CPT | Mod: GT,95 | Performed by: INTERNAL MEDICINE

## 2024-01-01 PROCEDURE — 0W9G3ZZ DRAINAGE OF PERITONEAL CAVITY, PERCUTANEOUS APPROACH: ICD-10-PCS | Performed by: STUDENT IN AN ORGANIZED HEALTH CARE EDUCATION/TRAINING PROGRAM

## 2024-01-01 PROCEDURE — 96361 HYDRATE IV INFUSION ADD-ON: CPT | Mod: 59 | Performed by: FAMILY MEDICINE

## 2024-01-01 PROCEDURE — 87086 URINE CULTURE/COLONY COUNT: CPT | Performed by: EMERGENCY MEDICINE

## 2024-01-01 PROCEDURE — 71046 X-RAY EXAM CHEST 2 VIEWS: CPT | Mod: 26 | Performed by: RADIOLOGY

## 2024-01-01 PROCEDURE — 70496 CT ANGIOGRAPHY HEAD: CPT

## 2024-01-01 PROCEDURE — 81001 URINALYSIS AUTO W/SCOPE: CPT | Performed by: FAMILY MEDICINE

## 2024-01-01 PROCEDURE — 84100 ASSAY OF PHOSPHORUS: CPT | Performed by: EMERGENCY MEDICINE

## 2024-01-01 PROCEDURE — 96411 CHEMO IV PUSH ADDL DRUG: CPT

## 2024-01-01 PROCEDURE — 36561 INSERT TUNNELED CV CATH: CPT

## 2024-01-01 PROCEDURE — 82374 ASSAY BLOOD CARBON DIOXIDE: CPT | Performed by: FAMILY MEDICINE

## 2024-01-01 PROCEDURE — 72158 MRI LUMBAR SPINE W/O & W/DYE: CPT

## 2024-01-01 PROCEDURE — 49083 ABD PARACENTESIS W/IMAGING: CPT | Performed by: STUDENT IN AN ORGANIZED HEALTH CARE EDUCATION/TRAINING PROGRAM

## 2024-01-01 PROCEDURE — 250N000011 HC RX IP 250 OP 636: Mod: JZ | Performed by: OBSTETRICS & GYNECOLOGY

## 2024-01-01 PROCEDURE — 96374 THER/PROPH/DIAG INJ IV PUSH: CPT | Mod: 59 | Performed by: FAMILY MEDICINE

## 2024-01-01 PROCEDURE — 250N000009 HC RX 250: Performed by: FAMILY MEDICINE

## 2024-01-01 PROCEDURE — 97530 THERAPEUTIC ACTIVITIES: CPT | Mod: GO | Performed by: OCCUPATIONAL THERAPIST

## 2024-01-01 PROCEDURE — 82565 ASSAY OF CREATININE: CPT

## 2024-01-01 PROCEDURE — 96415 CHEMO IV INFUSION ADDL HR: CPT

## 2024-01-01 PROCEDURE — 99215 OFFICE O/P EST HI 40 MIN: CPT | Mod: 95 | Performed by: INTERNAL MEDICINE

## 2024-01-01 PROCEDURE — 84450 TRANSFERASE (AST) (SGOT): CPT

## 2024-01-01 PROCEDURE — 70496 CT ANGIOGRAPHY HEAD: CPT | Mod: 26 | Performed by: RADIOLOGY

## 2024-01-01 PROCEDURE — 80053 COMPREHEN METABOLIC PANEL: CPT | Performed by: NURSE PRACTITIONER

## 2024-01-01 PROCEDURE — 99213 OFFICE O/P EST LOW 20 MIN: CPT | Performed by: FAMILY MEDICINE

## 2024-01-01 PROCEDURE — 82565 ASSAY OF CREATININE: CPT | Performed by: EMERGENCY MEDICINE

## 2024-01-01 PROCEDURE — 71046 X-RAY EXAM CHEST 2 VIEWS: CPT

## 2024-01-01 PROCEDURE — 87040 BLOOD CULTURE FOR BACTERIA: CPT | Performed by: EMERGENCY MEDICINE

## 2024-01-01 PROCEDURE — 70498 CT ANGIOGRAPHY NECK: CPT | Mod: 26 | Performed by: RADIOLOGY

## 2024-01-01 PROCEDURE — 81001 URINALYSIS AUTO W/SCOPE: CPT | Mod: GT,95 | Performed by: INTERNAL MEDICINE

## 2024-01-01 PROCEDURE — 85004 AUTOMATED DIFF WBC COUNT: CPT | Performed by: EMERGENCY MEDICINE

## 2024-01-01 PROCEDURE — 96375 TX/PRO/DX INJ NEW DRUG ADDON: CPT | Performed by: FAMILY MEDICINE

## 2024-01-01 PROCEDURE — 82550 ASSAY OF CK (CPK): CPT | Performed by: EMERGENCY MEDICINE

## 2024-01-01 PROCEDURE — 82140 ASSAY OF AMMONIA: CPT | Performed by: EMERGENCY MEDICINE

## 2024-01-01 RX ORDER — MEPERIDINE HYDROCHLORIDE 25 MG/ML
25 INJECTION INTRAMUSCULAR; INTRAVENOUS; SUBCUTANEOUS EVERY 30 MIN PRN
Status: CANCELLED | OUTPATIENT
Start: 2024-01-01

## 2024-01-01 RX ORDER — ACETAMINOPHEN 325 MG/1
325 TABLET ORAL EVERY 4 HOURS PRN
Status: DISCONTINUED | OUTPATIENT
Start: 2024-01-01 | End: 2024-01-01

## 2024-01-01 RX ORDER — GADOBUTROL 604.72 MG/ML
8 INJECTION INTRAVENOUS ONCE
Status: COMPLETED | OUTPATIENT
Start: 2024-01-01 | End: 2024-01-01

## 2024-01-01 RX ORDER — IBUPROFEN 200 MG
200 TABLET ORAL EVERY 6 HOURS PRN
Status: DISCONTINUED | OUTPATIENT
Start: 2024-01-01 | End: 2024-01-01

## 2024-01-01 RX ORDER — OLANZAPINE 5 MG/1
5 TABLET ORAL AT BEDTIME
Qty: 30 TABLET | Refills: 1 | Status: SHIPPED | OUTPATIENT
Start: 2024-01-01 | End: 2024-01-01

## 2024-01-01 RX ORDER — EPINEPHRINE 1 MG/ML
0.3 INJECTION, SOLUTION, CONCENTRATE INTRAVENOUS EVERY 5 MIN PRN
Status: CANCELLED | OUTPATIENT
Start: 2024-01-01

## 2024-01-01 RX ORDER — OXYCODONE HYDROCHLORIDE 10 MG/1
10 TABLET ORAL 2 TIMES DAILY PRN
Qty: 60 TABLET | Refills: 0 | Status: SHIPPED | OUTPATIENT
Start: 2024-01-01 | End: 2024-01-01

## 2024-01-01 RX ORDER — NALOXONE HYDROCHLORIDE 0.4 MG/ML
0.2 INJECTION, SOLUTION INTRAMUSCULAR; INTRAVENOUS; SUBCUTANEOUS
Status: DISCONTINUED | OUTPATIENT
Start: 2024-01-01 | End: 2024-01-01 | Stop reason: HOSPADM

## 2024-01-01 RX ORDER — SULFAMETHOXAZOLE/TRIMETHOPRIM 800-160 MG
1 TABLET ORAL 2 TIMES DAILY
Qty: 14 TABLET | Refills: 0 | Status: SHIPPED | OUTPATIENT
Start: 2024-01-01 | End: 2024-01-01

## 2024-01-01 RX ORDER — METHYLPREDNISOLONE SODIUM SUCCINATE 125 MG/2ML
125 INJECTION, POWDER, LYOPHILIZED, FOR SOLUTION INTRAMUSCULAR; INTRAVENOUS
Start: 2024-01-01

## 2024-01-01 RX ORDER — DIPHENHYDRAMINE HYDROCHLORIDE 50 MG/ML
50 INJECTION INTRAMUSCULAR; INTRAVENOUS
Status: CANCELLED
Start: 2024-01-01

## 2024-01-01 RX ORDER — PALONOSETRON 0.05 MG/ML
0.25 INJECTION, SOLUTION INTRAVENOUS ONCE
Status: CANCELLED | OUTPATIENT
Start: 2024-01-01

## 2024-01-01 RX ORDER — HYDROMORPHONE HYDROCHLORIDE 2 MG/1
2 TABLET ORAL EVERY 6 HOURS PRN
Qty: 10 TABLET | Refills: 0 | Status: SHIPPED | OUTPATIENT
Start: 2024-01-01 | End: 2024-01-01

## 2024-01-01 RX ORDER — HYDROMORPHONE HYDROCHLORIDE 1 MG/ML
0.5 INJECTION, SOLUTION INTRAMUSCULAR; INTRAVENOUS; SUBCUTANEOUS
Status: DISCONTINUED | OUTPATIENT
Start: 2024-01-01 | End: 2024-01-01 | Stop reason: HOSPADM

## 2024-01-01 RX ORDER — HEPARIN SODIUM (PORCINE) LOCK FLUSH IV SOLN 100 UNIT/ML 100 UNIT/ML
500 SOLUTION INTRAVENOUS
Status: DISCONTINUED | OUTPATIENT
Start: 2024-01-01 | End: 2024-01-01 | Stop reason: HOSPADM

## 2024-01-01 RX ORDER — LORAZEPAM 2 MG/ML
0.5 INJECTION INTRAMUSCULAR EVERY 4 HOURS PRN
Status: CANCELLED | OUTPATIENT
Start: 2024-01-01

## 2024-01-01 RX ORDER — ONDANSETRON 2 MG/ML
8 INJECTION INTRAMUSCULAR; INTRAVENOUS ONCE
Status: COMPLETED | OUTPATIENT
Start: 2024-01-01 | End: 2024-01-01

## 2024-01-01 RX ORDER — HALOPERIDOL 5 MG/ML
2 INJECTION INTRAMUSCULAR EVERY 6 HOURS PRN
Status: DISCONTINUED | OUTPATIENT
Start: 2024-01-01 | End: 2024-01-01

## 2024-01-01 RX ORDER — ONDANSETRON 2 MG/ML
8 INJECTION INTRAMUSCULAR; INTRAVENOUS ONCE
Status: CANCELLED
Start: 2024-01-01 | End: 2024-01-01

## 2024-01-01 RX ORDER — HEPARIN SODIUM (PORCINE) LOCK FLUSH IV SOLN 100 UNIT/ML 100 UNIT/ML
5 SOLUTION INTRAVENOUS
Status: CANCELLED | OUTPATIENT
Start: 2024-01-01

## 2024-01-01 RX ORDER — OXYCODONE HYDROCHLORIDE 5 MG/1
5 TABLET ORAL EVERY 6 HOURS PRN
Qty: 30 TABLET | Refills: 0 | Status: SHIPPED | OUTPATIENT
Start: 2024-01-01 | End: 2024-01-01

## 2024-01-01 RX ORDER — MAGNESIUM OXIDE 400 MG/1
400 TABLET ORAL EVERY 4 HOURS
Status: COMPLETED | OUTPATIENT
Start: 2024-01-01 | End: 2024-01-01

## 2024-01-01 RX ORDER — HYDROMORPHONE HYDROCHLORIDE 1 MG/ML
0.3 INJECTION, SOLUTION INTRAMUSCULAR; INTRAVENOUS; SUBCUTANEOUS
Status: DISCONTINUED | OUTPATIENT
Start: 2024-01-01 | End: 2024-01-01

## 2024-01-01 RX ORDER — MAGNESIUM SULFATE HEPTAHYDRATE 40 MG/ML
2 INJECTION, SOLUTION INTRAVENOUS ONCE
Status: COMPLETED | OUTPATIENT
Start: 2024-01-01 | End: 2024-01-01

## 2024-01-01 RX ORDER — ALBUTEROL SULFATE 0.83 MG/ML
2.5 SOLUTION RESPIRATORY (INHALATION)
Status: CANCELLED | OUTPATIENT
Start: 2024-01-01

## 2024-01-01 RX ORDER — NALOXONE HYDROCHLORIDE 0.4 MG/ML
0.4 INJECTION, SOLUTION INTRAMUSCULAR; INTRAVENOUS; SUBCUTANEOUS
Status: DISCONTINUED | OUTPATIENT
Start: 2024-01-01 | End: 2024-01-01 | Stop reason: HOSPADM

## 2024-01-01 RX ORDER — HYDROMORPHONE HYDROCHLORIDE 1 MG/ML
0.5 INJECTION, SOLUTION INTRAMUSCULAR; INTRAVENOUS; SUBCUTANEOUS ONCE
Status: COMPLETED | OUTPATIENT
Start: 2024-01-01 | End: 2024-01-01

## 2024-01-01 RX ORDER — AMITRIPTYLINE HYDROCHLORIDE 100 MG/1
100 TABLET ORAL AT BEDTIME
Status: ON HOLD | COMMUNITY
End: 2024-01-01

## 2024-01-01 RX ORDER — LIDOCAINE 40 MG/G
CREAM TOPICAL DAILY PRN
Status: COMPLETED | OUTPATIENT
Start: 2024-01-01 | End: 2024-01-01

## 2024-01-01 RX ORDER — MEPERIDINE HYDROCHLORIDE 25 MG/ML
25 INJECTION INTRAMUSCULAR; INTRAVENOUS; SUBCUTANEOUS EVERY 30 MIN PRN
OUTPATIENT
Start: 2024-01-01

## 2024-01-01 RX ORDER — AMOXICILLIN 250 MG
1 CAPSULE ORAL 2 TIMES DAILY PRN
Status: DISCONTINUED | OUTPATIENT
Start: 2024-01-01 | End: 2024-01-01

## 2024-01-01 RX ORDER — ACETAMINOPHEN 325 MG/1
650 TABLET ORAL EVERY 6 HOURS PRN
Status: DISCONTINUED | OUTPATIENT
Start: 2024-01-01 | End: 2024-01-01 | Stop reason: HOSPADM

## 2024-01-01 RX ORDER — HEPARIN SODIUM,PORCINE 10 UNIT/ML
5-10 VIAL (ML) INTRAVENOUS EVERY 24 HOURS
Status: DISCONTINUED | OUTPATIENT
Start: 2024-01-01 | End: 2024-01-01 | Stop reason: HOSPADM

## 2024-01-01 RX ORDER — IOPAMIDOL 755 MG/ML
103 INJECTION, SOLUTION INTRAVASCULAR ONCE
Status: COMPLETED | OUTPATIENT
Start: 2024-01-01 | End: 2024-01-01

## 2024-01-01 RX ORDER — HEPARIN SODIUM,PORCINE 10 UNIT/ML
5-20 VIAL (ML) INTRAVENOUS DAILY PRN
Status: CANCELLED | OUTPATIENT
Start: 2024-01-01

## 2024-01-01 RX ORDER — METHYLPREDNISOLONE SODIUM SUCCINATE 125 MG/2ML
125 INJECTION, POWDER, LYOPHILIZED, FOR SOLUTION INTRAMUSCULAR; INTRAVENOUS
Status: CANCELLED
Start: 2024-01-01

## 2024-01-01 RX ORDER — ZOLPIDEM TARTRATE 10 MG/1
10 TABLET ORAL EVERY EVENING
Qty: 10 TABLET | Refills: 0 | Status: ON HOLD | OUTPATIENT
Start: 2024-01-01 | End: 2024-01-01

## 2024-01-01 RX ORDER — BUPRENORPHINE 10 UG/H
1 PATCH TRANSDERMAL WEEKLY
Status: DISCONTINUED | OUTPATIENT
Start: 2024-01-01 | End: 2024-01-01 | Stop reason: HOSPADM

## 2024-01-01 RX ORDER — CALCIUM CARBONATE 500 MG/1
1000 TABLET, CHEWABLE ORAL 4 TIMES DAILY PRN
Status: DISCONTINUED | OUTPATIENT
Start: 2024-01-01 | End: 2024-01-01 | Stop reason: HOSPADM

## 2024-01-01 RX ORDER — ALBUTEROL SULFATE 90 UG/1
1-2 AEROSOL, METERED RESPIRATORY (INHALATION)
Status: CANCELLED
Start: 2024-01-01

## 2024-01-01 RX ORDER — ALBUMIN (HUMAN) 12.5 G/50ML
25-50 SOLUTION INTRAVENOUS ONCE
Status: CANCELLED | OUTPATIENT
Start: 2024-01-01 | End: 2024-01-01

## 2024-01-01 RX ORDER — ZOLPIDEM TARTRATE 10 MG/1
5 TABLET ORAL
COMMUNITY
Start: 2024-01-01

## 2024-01-01 RX ORDER — EPINEPHRINE 1 MG/ML
0.3 INJECTION, SOLUTION INTRAMUSCULAR; SUBCUTANEOUS EVERY 5 MIN PRN
Status: CANCELLED | OUTPATIENT
Start: 2024-01-01

## 2024-01-01 RX ORDER — OXYCODONE HYDROCHLORIDE 10 MG/1
10 TABLET ORAL ONCE
Status: COMPLETED | OUTPATIENT
Start: 2024-01-01 | End: 2024-01-01

## 2024-01-01 RX ORDER — LORAZEPAM 2 MG/ML
0.5 INJECTION INTRAMUSCULAR ONCE
Status: COMPLETED | OUTPATIENT
Start: 2024-01-01 | End: 2024-01-01

## 2024-01-01 RX ORDER — IOPAMIDOL 755 MG/ML
92 INJECTION, SOLUTION INTRAVASCULAR ONCE
Status: COMPLETED | OUTPATIENT
Start: 2024-01-01 | End: 2024-01-01

## 2024-01-01 RX ORDER — HYDROMORPHONE HYDROCHLORIDE 2 MG/1
2 TABLET ORAL EVERY 4 HOURS PRN
Status: DISCONTINUED | OUTPATIENT
Start: 2024-01-01 | End: 2024-01-01 | Stop reason: HOSPADM

## 2024-01-01 RX ORDER — ZOLPIDEM TARTRATE 5 MG/1
5 TABLET ORAL AT BEDTIME
Status: DISCONTINUED | OUTPATIENT
Start: 2024-01-01 | End: 2024-01-01 | Stop reason: HOSPADM

## 2024-01-01 RX ORDER — NITROFURANTOIN 25; 75 MG/1; MG/1
100 CAPSULE ORAL 2 TIMES DAILY
Qty: 14 CAPSULE | Refills: 0 | Status: ON HOLD | OUTPATIENT
Start: 2024-01-01 | End: 2024-01-01

## 2024-01-01 RX ORDER — ZOLPIDEM TARTRATE 5 MG/1
5 TABLET ORAL
Status: DISCONTINUED | OUTPATIENT
Start: 2024-01-01 | End: 2024-01-01 | Stop reason: HOSPADM

## 2024-01-01 RX ORDER — OLANZAPINE 5 MG/1
5 TABLET ORAL AT BEDTIME
Qty: 30 TABLET | Refills: 1 | OUTPATIENT
Start: 2024-01-01

## 2024-01-01 RX ORDER — ONDANSETRON 2 MG/ML
4 INJECTION INTRAMUSCULAR; INTRAVENOUS ONCE
Status: COMPLETED | OUTPATIENT
Start: 2024-01-01 | End: 2024-01-01

## 2024-01-01 RX ORDER — DIPHENHYDRAMINE HCL 50 MG
50 CAPSULE ORAL ONCE
Status: CANCELLED
Start: 2024-01-01

## 2024-01-01 RX ORDER — HEPARIN SODIUM,PORCINE 10 UNIT/ML
5-10 VIAL (ML) INTRAVENOUS
Status: DISCONTINUED | OUTPATIENT
Start: 2024-01-01 | End: 2024-01-01 | Stop reason: HOSPADM

## 2024-01-01 RX ORDER — ZOLPIDEM TARTRATE 10 MG/1
10 TABLET ORAL AT BEDTIME
Status: DISCONTINUED | OUTPATIENT
Start: 2024-01-01 | End: 2024-01-01

## 2024-01-01 RX ORDER — AMOXICILLIN 250 MG
2 CAPSULE ORAL 2 TIMES DAILY PRN
Status: DISCONTINUED | OUTPATIENT
Start: 2024-01-01 | End: 2024-01-01 | Stop reason: HOSPADM

## 2024-01-01 RX ORDER — NITROFURANTOIN 25; 75 MG/1; MG/1
100 CAPSULE ORAL 2 TIMES DAILY
Status: DISCONTINUED | OUTPATIENT
Start: 2024-01-01 | End: 2024-01-01

## 2024-01-01 RX ORDER — IBUPROFEN 200 MG
400 TABLET ORAL EVERY 6 HOURS
Status: DISCONTINUED | OUTPATIENT
Start: 2024-01-01 | End: 2024-01-01 | Stop reason: HOSPADM

## 2024-01-01 RX ORDER — LIDOCAINE/PRILOCAINE 2.5 %-2.5%
CREAM (GRAM) TOPICAL PRN
Qty: 30 G | Refills: 1 | Status: SHIPPED | OUTPATIENT
Start: 2024-01-01

## 2024-01-01 RX ORDER — SODIUM CHLORIDE 450 MG/100ML
INJECTION, SOLUTION INTRAVENOUS CONTINUOUS
Status: DISCONTINUED | OUTPATIENT
Start: 2024-01-01 | End: 2024-01-01

## 2024-01-01 RX ORDER — AMOXICILLIN 250 MG
2 CAPSULE ORAL 2 TIMES DAILY PRN
Qty: 60 TABLET | Refills: 0 | Status: SHIPPED | OUTPATIENT
Start: 2024-01-01

## 2024-01-01 RX ORDER — ACETAMINOPHEN 325 MG/1
650 TABLET ORAL
Status: DISCONTINUED | OUTPATIENT
Start: 2024-01-01 | End: 2024-01-01 | Stop reason: HOSPADM

## 2024-01-01 RX ORDER — HEPARIN SODIUM (PORCINE) LOCK FLUSH IV SOLN 100 UNIT/ML 100 UNIT/ML
SOLUTION INTRAVENOUS
Status: COMPLETED
Start: 2024-01-01 | End: 2024-01-01

## 2024-01-01 RX ORDER — DOXEPIN HYDROCHLORIDE 10 MG/ML
25 SOLUTION ORAL EVERY 4 HOURS PRN
Qty: 118 ML | Refills: 1 | Status: ON HOLD | OUTPATIENT
Start: 2024-01-01 | End: 2024-01-01

## 2024-01-01 RX ORDER — GABAPENTIN 250 MG/5ML
300-600 SOLUTION ORAL AT BEDTIME
Qty: 720 ML | Refills: 0 | Status: SHIPPED | OUTPATIENT
Start: 2024-01-01 | End: 2024-07-20

## 2024-01-01 RX ORDER — LIDOCAINE 40 MG/G
CREAM TOPICAL
Status: DISCONTINUED | OUTPATIENT
Start: 2024-01-01 | End: 2024-01-01 | Stop reason: HOSPADM

## 2024-01-01 RX ORDER — ONDANSETRON 8 MG/1
8 TABLET, FILM COATED ORAL EVERY 8 HOURS PRN
Status: DISCONTINUED | OUTPATIENT
Start: 2024-01-01 | End: 2024-01-01 | Stop reason: HOSPADM

## 2024-01-01 RX ORDER — IBUPROFEN 200 MG
400 TABLET ORAL EVERY 6 HOURS PRN
Status: DISCONTINUED | OUTPATIENT
Start: 2024-01-01 | End: 2024-01-01 | Stop reason: HOSPADM

## 2024-01-01 RX ORDER — SODIUM CHLORIDE 9 MG/ML
INJECTION, SOLUTION INTRAVENOUS CONTINUOUS
Status: DISCONTINUED | OUTPATIENT
Start: 2024-01-01 | End: 2024-01-01

## 2024-01-01 RX ORDER — HEPARIN SODIUM (PORCINE) LOCK FLUSH IV SOLN 100 UNIT/ML 100 UNIT/ML
5-10 SOLUTION INTRAVENOUS
Status: DISCONTINUED | OUTPATIENT
Start: 2024-01-01 | End: 2024-01-01 | Stop reason: HOSPADM

## 2024-01-01 RX ORDER — GABAPENTIN 100 MG/1
200 CAPSULE ORAL 3 TIMES DAILY PRN
Status: DISCONTINUED | OUTPATIENT
Start: 2024-01-01 | End: 2024-01-01

## 2024-01-01 RX ORDER — IOPAMIDOL 755 MG/ML
105 INJECTION, SOLUTION INTRAVASCULAR ONCE
Status: COMPLETED | OUTPATIENT
Start: 2024-01-01 | End: 2024-01-01

## 2024-01-01 RX ORDER — GADOBUTROL 604.72 MG/ML
7.5 INJECTION INTRAVENOUS ONCE
Status: COMPLETED | OUTPATIENT
Start: 2024-01-01 | End: 2024-01-01

## 2024-01-01 RX ORDER — CEFTRIAXONE 1 G/1
1 INJECTION, POWDER, FOR SOLUTION INTRAMUSCULAR; INTRAVENOUS ONCE
Qty: 10 ML | Refills: 0 | Status: COMPLETED | OUTPATIENT
Start: 2024-01-01 | End: 2024-01-01

## 2024-01-01 RX ORDER — PALONOSETRON 0.05 MG/ML
0.25 INJECTION, SOLUTION INTRAVENOUS ONCE
Status: COMPLETED | OUTPATIENT
Start: 2024-01-01 | End: 2024-01-01

## 2024-01-01 RX ORDER — DIPHENHYDRAMINE HCL 25 MG
50 CAPSULE ORAL ONCE
Status: CANCELLED
Start: 2024-01-01

## 2024-01-01 RX ORDER — FLUMAZENIL 0.1 MG/ML
0.2 INJECTION, SOLUTION INTRAVENOUS
Status: DISCONTINUED | OUTPATIENT
Start: 2024-01-01 | End: 2024-01-01 | Stop reason: HOSPADM

## 2024-01-01 RX ORDER — LIDOCAINE HYDROCHLORIDE 10 MG/ML
20 INJECTION, SOLUTION EPIDURAL; INFILTRATION; INTRACAUDAL; PERINEURAL ONCE
Status: CANCELLED | OUTPATIENT
Start: 2024-01-01 | End: 2024-01-01

## 2024-01-01 RX ORDER — LIDOCAINE 50 MG/G
1 PATCH TOPICAL EVERY 24 HOURS
Qty: 10 PATCH | Refills: 0 | Status: SHIPPED | OUTPATIENT
Start: 2024-01-01 | End: 2024-01-01

## 2024-01-01 RX ORDER — IOPAMIDOL 755 MG/ML
67 INJECTION, SOLUTION INTRAVASCULAR ONCE
Status: COMPLETED | OUTPATIENT
Start: 2024-01-01 | End: 2024-01-01

## 2024-01-01 RX ORDER — DEXAMETHASONE 4 MG/1
TABLET ORAL
Qty: 6 TABLET | Refills: 5 | Status: SHIPPED | OUTPATIENT
Start: 2024-01-01

## 2024-01-01 RX ORDER — LIDOCAINE 40 MG/G
CREAM TOPICAL DAILY PRN
Status: DISCONTINUED | OUTPATIENT
Start: 2024-01-01 | End: 2024-01-01 | Stop reason: HOSPADM

## 2024-01-01 RX ORDER — AMOXICILLIN 250 MG
4 CAPSULE ORAL 2 TIMES DAILY PRN
Status: DISCONTINUED | OUTPATIENT
Start: 2024-01-01 | End: 2024-01-01 | Stop reason: HOSPADM

## 2024-01-01 RX ORDER — ALBUTEROL SULFATE 90 UG/1
1-2 AEROSOL, METERED RESPIRATORY (INHALATION)
Start: 2024-01-01

## 2024-01-01 RX ORDER — ROPINIROLE 0.25 MG/1
0.25 TABLET, FILM COATED ORAL 3 TIMES DAILY
Status: DISCONTINUED | OUTPATIENT
Start: 2024-01-01 | End: 2024-01-01

## 2024-01-01 RX ORDER — ONDANSETRON 4 MG/1
8 TABLET, FILM COATED ORAL EVERY 8 HOURS PRN
Qty: 30 TABLET | Refills: 3 | Status: SHIPPED | OUTPATIENT
Start: 2024-01-01 | End: 2024-01-01

## 2024-01-01 RX ORDER — ZOLPIDEM TARTRATE 5 MG/1
5 TABLET ORAL
Status: DISCONTINUED | OUTPATIENT
Start: 2024-01-01 | End: 2024-01-01

## 2024-01-01 RX ORDER — CEFAZOLIN SODIUM 2 G/100ML
2 INJECTION, SOLUTION INTRAVENOUS
Status: COMPLETED | OUTPATIENT
Start: 2024-01-01 | End: 2024-01-01

## 2024-01-01 RX ORDER — AMOXICILLIN 250 MG
1 CAPSULE ORAL 2 TIMES DAILY PRN
Status: DISCONTINUED | OUTPATIENT
Start: 2024-01-01 | End: 2024-01-01 | Stop reason: HOSPADM

## 2024-01-01 RX ORDER — HEPARIN SODIUM,PORCINE 10 UNIT/ML
VIAL (ML) INTRAVENOUS
Status: DISCONTINUED
Start: 2024-01-01 | End: 2024-01-01 | Stop reason: HOSPADM

## 2024-01-01 RX ORDER — PROCHLORPERAZINE MALEATE 5 MG
10 TABLET ORAL EVERY 6 HOURS PRN
Status: DISCONTINUED | OUTPATIENT
Start: 2024-01-01 | End: 2024-01-01 | Stop reason: HOSPADM

## 2024-01-01 RX ORDER — OXYCODONE HYDROCHLORIDE 5 MG/1
5 TABLET ORAL EVERY 4 HOURS PRN
Status: DISCONTINUED | OUTPATIENT
Start: 2024-01-01 | End: 2024-01-01

## 2024-01-01 RX ORDER — GABAPENTIN 100 MG/1
200 CAPSULE ORAL 3 TIMES DAILY
Status: DISCONTINUED | OUTPATIENT
Start: 2024-01-01 | End: 2024-01-01

## 2024-01-01 RX ORDER — CYCLOBENZAPRINE HCL 5 MG
5 TABLET ORAL 3 TIMES DAILY PRN
Status: DISCONTINUED | OUTPATIENT
Start: 2024-01-01 | End: 2024-01-01 | Stop reason: HOSPADM

## 2024-01-01 RX ORDER — METOCLOPRAMIDE HYDROCHLORIDE 5 MG/ML
5 INJECTION INTRAMUSCULAR; INTRAVENOUS ONCE
Status: COMPLETED | OUTPATIENT
Start: 2024-01-01 | End: 2024-01-01

## 2024-01-01 RX ORDER — HEPARIN SODIUM,PORCINE 10 UNIT/ML
3 VIAL (ML) INTRAVENOUS
Status: DISCONTINUED | OUTPATIENT
Start: 2024-01-01 | End: 2024-01-01 | Stop reason: HOSPADM

## 2024-01-01 RX ORDER — ONDANSETRON 2 MG/ML
4 INJECTION INTRAMUSCULAR; INTRAVENOUS EVERY 6 HOURS PRN
Status: DISCONTINUED | OUTPATIENT
Start: 2024-01-01 | End: 2024-01-01 | Stop reason: HOSPADM

## 2024-01-01 RX ORDER — ONDANSETRON 8 MG/1
8 TABLET, FILM COATED ORAL EVERY 8 HOURS PRN
Qty: 30 TABLET | Refills: 1 | Status: SHIPPED | OUTPATIENT
Start: 2024-01-01 | End: 2024-01-01

## 2024-01-01 RX ORDER — HYDROMORPHONE HYDROCHLORIDE 2 MG/1
1-2 TABLET ORAL EVERY 4 HOURS PRN
Qty: 10 TABLET | Refills: 0 | Status: SHIPPED | OUTPATIENT
Start: 2024-01-01 | End: 2024-01-01

## 2024-01-01 RX ORDER — CEFTRIAXONE 1 G/1
1 INJECTION, POWDER, FOR SOLUTION INTRAMUSCULAR; INTRAVENOUS EVERY 24 HOURS
Status: DISCONTINUED | OUTPATIENT
Start: 2024-01-01 | End: 2024-01-01

## 2024-01-01 RX ORDER — PROCHLORPERAZINE MALEATE 10 MG
10 TABLET ORAL EVERY 6 HOURS PRN
Qty: 30 TABLET | Refills: 2 | Status: SHIPPED | OUTPATIENT
Start: 2024-01-01 | End: 2024-01-01

## 2024-01-01 RX ORDER — OXYCODONE HYDROCHLORIDE 5 MG/1
5 TABLET ORAL EVERY 4 HOURS PRN
Qty: 15 TABLET | Refills: 0 | Status: ON HOLD | OUTPATIENT
Start: 2024-01-01 | End: 2024-01-01

## 2024-01-01 RX ORDER — BUPRENORPHINE 10 UG/H
1 PATCH TRANSDERMAL WEEKLY
Qty: 4 PATCH | Refills: 0 | Status: SHIPPED | OUTPATIENT
Start: 2024-01-01

## 2024-01-01 RX ORDER — FENTANYL CITRATE 50 UG/ML
25-50 INJECTION, SOLUTION INTRAMUSCULAR; INTRAVENOUS EVERY 5 MIN PRN
Status: DISCONTINUED | OUTPATIENT
Start: 2024-01-01 | End: 2024-01-01 | Stop reason: HOSPADM

## 2024-01-01 RX ORDER — ZOLPIDEM TARTRATE 5 MG/1
5 TABLET ORAL AT BEDTIME
Status: DISCONTINUED | OUTPATIENT
Start: 2024-01-01 | End: 2024-01-01

## 2024-01-01 RX ORDER — HEPARIN SODIUM (PORCINE) LOCK FLUSH IV SOLN 100 UNIT/ML 100 UNIT/ML
5 SOLUTION INTRAVENOUS
Status: DISCONTINUED | OUTPATIENT
Start: 2024-01-01 | End: 2024-01-01 | Stop reason: HOSPADM

## 2024-01-01 RX ORDER — ALBUTEROL SULFATE 0.83 MG/ML
2.5 SOLUTION RESPIRATORY (INHALATION)
OUTPATIENT
Start: 2024-01-01

## 2024-01-01 RX ORDER — GABAPENTIN 100 MG/1
100 CAPSULE ORAL 3 TIMES DAILY PRN
Qty: 20 CAPSULE | Refills: 0 | Status: ON HOLD | OUTPATIENT
Start: 2024-01-01 | End: 2024-01-01

## 2024-01-01 RX ORDER — AMITRIPTYLINE HYDROCHLORIDE 75 MG/1
75 TABLET ORAL AT BEDTIME
Qty: 13 TABLET | Refills: 0 | Status: SHIPPED | OUTPATIENT
Start: 2024-01-01 | End: 2024-01-01

## 2024-01-01 RX ORDER — HEPARIN SODIUM (PORCINE) LOCK FLUSH IV SOLN 100 UNIT/ML 100 UNIT/ML
500 SOLUTION INTRAVENOUS ONCE
Status: COMPLETED | OUTPATIENT
Start: 2024-01-01 | End: 2024-01-01

## 2024-01-01 RX ORDER — IOPAMIDOL 755 MG/ML
80 INJECTION, SOLUTION INTRAVASCULAR ONCE
Status: COMPLETED | OUTPATIENT
Start: 2024-01-01 | End: 2024-01-01

## 2024-01-01 RX ORDER — GABAPENTIN 300 MG/1
300 CAPSULE ORAL AT BEDTIME
Status: DISCONTINUED | OUTPATIENT
Start: 2024-01-01 | End: 2024-01-01 | Stop reason: HOSPADM

## 2024-01-01 RX ORDER — LIDOCAINE 40 MG/G
CREAM TOPICAL
Status: CANCELLED | OUTPATIENT
Start: 2024-01-01

## 2024-01-01 RX ORDER — DIPHENHYDRAMINE HYDROCHLORIDE 50 MG/ML
50 INJECTION INTRAMUSCULAR; INTRAVENOUS
Start: 2024-01-01

## 2024-01-01 RX ORDER — PROCHLORPERAZINE MALEATE 10 MG
10 TABLET ORAL EVERY 6 HOURS PRN
Qty: 30 TABLET | Refills: 2 | Status: SHIPPED | OUTPATIENT
Start: 2024-01-01

## 2024-01-01 RX ORDER — SODIUM CHLORIDE, SODIUM LACTATE, POTASSIUM CHLORIDE, CALCIUM CHLORIDE 600; 310; 30; 20 MG/100ML; MG/100ML; MG/100ML; MG/100ML
INJECTION, SOLUTION INTRAVENOUS CONTINUOUS
Status: DISCONTINUED | OUTPATIENT
Start: 2024-01-01 | End: 2024-01-01

## 2024-01-01 RX ORDER — MAGNESIUM SULFATE HEPTAHYDRATE 40 MG/ML
2 INJECTION, SOLUTION INTRAVENOUS ONCE
Qty: 50 ML | Refills: 0 | Status: COMPLETED | OUTPATIENT
Start: 2024-01-01 | End: 2024-01-01

## 2024-01-01 RX ORDER — POLYETHYLENE GLYCOL 3350 17 G/17G
17 POWDER, FOR SOLUTION ORAL DAILY
Status: DISCONTINUED | OUTPATIENT
Start: 2024-01-01 | End: 2024-01-01 | Stop reason: HOSPADM

## 2024-01-01 RX ORDER — HYDROMORPHONE HYDROCHLORIDE 4 MG/1
2 TABLET ORAL EVERY 6 HOURS PRN
Qty: 30 TABLET | Refills: 0 | Status: ON HOLD | OUTPATIENT
Start: 2024-01-01 | End: 2024-01-01

## 2024-01-01 RX ORDER — AMOXICILLIN 250 MG
2 CAPSULE ORAL 2 TIMES DAILY PRN
Status: DISCONTINUED | OUTPATIENT
Start: 2024-01-01 | End: 2024-01-01

## 2024-01-01 RX ORDER — OXYCODONE HYDROCHLORIDE 5 MG/1
5 TABLET ORAL EVERY 4 HOURS PRN
Qty: 15 TABLET | Refills: 0 | Status: SHIPPED | OUTPATIENT
Start: 2024-01-01 | End: 2024-01-01

## 2024-01-01 RX ORDER — NITROFURANTOIN 25; 75 MG/1; MG/1
100 CAPSULE ORAL 2 TIMES DAILY
Qty: 9 CAPSULE | Refills: 0 | Status: DISCONTINUED | OUTPATIENT
Start: 2024-01-01 | End: 2024-01-01 | Stop reason: HOSPADM

## 2024-01-01 RX ORDER — DIPHENHYDRAMINE HYDROCHLORIDE 50 MG/ML
25 INJECTION INTRAMUSCULAR; INTRAVENOUS ONCE
Status: DISCONTINUED | OUTPATIENT
Start: 2024-01-01 | End: 2024-01-01

## 2024-01-01 RX ORDER — ONDANSETRON 4 MG/1
4 TABLET, ORALLY DISINTEGRATING ORAL EVERY 6 HOURS PRN
Status: DISCONTINUED | OUTPATIENT
Start: 2024-01-01 | End: 2024-01-01 | Stop reason: HOSPADM

## 2024-01-01 RX ORDER — ALBUMIN (HUMAN) 12.5 G/50ML
25-50 SOLUTION INTRAVENOUS ONCE
Qty: 200 ML | Refills: 0 | Status: COMPLETED | OUTPATIENT
Start: 2024-01-01 | End: 2024-01-01

## 2024-01-01 RX ORDER — EPINEPHRINE 1 MG/ML
0.3 INJECTION, SOLUTION INTRAMUSCULAR; SUBCUTANEOUS EVERY 5 MIN PRN
OUTPATIENT
Start: 2024-01-01

## 2024-01-01 RX ORDER — BUPRENORPHINE 10 UG/H
1 PATCH TRANSDERMAL WEEKLY
Qty: 1 PATCH | Refills: 0 | Status: SHIPPED | OUTPATIENT
Start: 2024-01-01 | End: 2024-01-01

## 2024-01-01 RX ORDER — LIDOCAINE 4 G/G
1 PATCH TOPICAL ONCE
Status: COMPLETED | OUTPATIENT
Start: 2024-01-01 | End: 2024-01-01

## 2024-01-01 RX ORDER — ONDANSETRON 8 MG/1
8 TABLET, FILM COATED ORAL EVERY 8 HOURS PRN
Qty: 30 TABLET | Refills: 2 | Status: SHIPPED | OUTPATIENT
Start: 2024-01-01

## 2024-01-01 RX ORDER — GABAPENTIN 300 MG/1
300 CAPSULE ORAL AT BEDTIME
Qty: 30 CAPSULE | Refills: 0 | Status: SHIPPED | OUTPATIENT
Start: 2024-01-01 | End: 2024-01-01

## 2024-01-01 RX ORDER — LIDOCAINE 40 MG/G
CREAM TOPICAL AT BEDTIME
Status: COMPLETED | OUTPATIENT
Start: 2024-01-01 | End: 2024-01-01

## 2024-01-01 RX ORDER — LORAZEPAM 0.5 MG/1
.5-1 TABLET ORAL
Status: DISCONTINUED | OUTPATIENT
Start: 2024-01-01 | End: 2024-01-01

## 2024-01-01 RX ORDER — GADOBUTROL 604.72 MG/ML
0.1 INJECTION INTRAVENOUS ONCE
Status: COMPLETED | OUTPATIENT
Start: 2024-01-01 | End: 2024-01-01

## 2024-01-01 RX ORDER — OXYCODONE HYDROCHLORIDE 5 MG/1
5 TABLET ORAL EVERY 6 HOURS PRN
COMMUNITY
End: 2024-01-01

## 2024-01-01 RX ORDER — ROPINIROLE 0.5 MG/1
0.5 TABLET, FILM COATED ORAL AT BEDTIME
Qty: 30 TABLET | Refills: 0 | Status: SHIPPED | OUTPATIENT
Start: 2024-01-01 | End: 2024-01-01

## 2024-01-01 RX ORDER — ATORVASTATIN CALCIUM 40 MG/1
40 TABLET, FILM COATED ORAL EVERY EVENING
Status: DISCONTINUED | OUTPATIENT
Start: 2024-01-01 | End: 2024-01-01 | Stop reason: HOSPADM

## 2024-01-01 RX ORDER — TRAZODONE HYDROCHLORIDE 100 MG/1
100 TABLET ORAL ONCE
Qty: 1 TABLET | Refills: 0 | Status: COMPLETED | OUTPATIENT
Start: 2024-01-01 | End: 2024-01-01

## 2024-01-01 RX ORDER — NITROFURANTOIN 25; 75 MG/1; MG/1
100 CAPSULE ORAL 2 TIMES DAILY
Qty: 10 CAPSULE | Refills: 0 | Status: SHIPPED | OUTPATIENT
Start: 2024-01-01 | End: 2024-01-01

## 2024-01-01 RX ORDER — IOPAMIDOL 755 MG/ML
101 INJECTION, SOLUTION INTRAVASCULAR ONCE
Status: COMPLETED | OUTPATIENT
Start: 2024-01-01 | End: 2024-01-01

## 2024-01-01 RX ORDER — ROPINIROLE 0.5 MG/1
0.5 TABLET, FILM COATED ORAL AT BEDTIME
Status: DISCONTINUED | OUTPATIENT
Start: 2024-01-01 | End: 2024-01-01 | Stop reason: HOSPADM

## 2024-01-01 RX ADMIN — DEXAMETHASONE SODIUM PHOSPHATE: 10 INJECTION, SOLUTION INTRAMUSCULAR; INTRAVENOUS at 14:49

## 2024-01-01 RX ADMIN — ONDANSETRON 4 MG: 2 INJECTION INTRAMUSCULAR; INTRAVENOUS at 15:57

## 2024-01-01 RX ADMIN — HYDROMORPHONE HYDROCHLORIDE 0.5 MG: 1 INJECTION, SOLUTION INTRAMUSCULAR; INTRAVENOUS; SUBCUTANEOUS at 15:46

## 2024-01-01 RX ADMIN — SODIUM CHLORIDE 1100 MG: 9 INJECTION, SOLUTION INTRAVENOUS at 16:06

## 2024-01-01 RX ADMIN — SODIUM CHLORIDE, POTASSIUM CHLORIDE, SODIUM LACTATE AND CALCIUM CHLORIDE: 600; 310; 30; 20 INJECTION, SOLUTION INTRAVENOUS at 04:19

## 2024-01-01 RX ADMIN — FENTANYL CITRATE 50 MCG: 50 INJECTION, SOLUTION INTRAMUSCULAR; INTRAVENOUS at 13:40

## 2024-01-01 RX ADMIN — LIDOCAINE HYDROCHLORIDE 5 ML: 10 INJECTION, SOLUTION EPIDURAL; INFILTRATION; INTRACAUDAL; PERINEURAL at 10:35

## 2024-01-01 RX ADMIN — PALONOSETRON HYDROCHLORIDE 0.25 MG: 0.25 INJECTION INTRAVENOUS at 11:28

## 2024-01-01 RX ADMIN — PALONOSETRON HYDROCHLORIDE 0.25 MG: 0.25 INJECTION INTRAVENOUS at 11:18

## 2024-01-01 RX ADMIN — IOPAMIDOL 67 ML: 755 INJECTION, SOLUTION INTRAVENOUS at 16:41

## 2024-01-01 RX ADMIN — DEXAMETHASONE SODIUM PHOSPHATE: 10 INJECTION, SOLUTION INTRAMUSCULAR; INTRAVENOUS at 11:43

## 2024-01-01 RX ADMIN — ACETAMINOPHEN 650 MG: 325 TABLET, FILM COATED ORAL at 19:35

## 2024-01-01 RX ADMIN — FOSAPREPITANT DIMEGLUMINE: 150 INJECTION, POWDER, LYOPHILIZED, FOR SOLUTION INTRAVENOUS at 11:40

## 2024-01-01 RX ADMIN — SODIUM CHLORIDE 800 MG: 9 INJECTION, SOLUTION INTRAVENOUS at 14:20

## 2024-01-01 RX ADMIN — ROPINIROLE HYDROCHLORIDE 0.25 MG: 0.25 TABLET, FILM COATED ORAL at 16:19

## 2024-01-01 RX ADMIN — IOPAMIDOL 101 ML: 755 INJECTION, SOLUTION INTRAVENOUS at 14:27

## 2024-01-01 RX ADMIN — PEGFILGRASTIM 6 MG: KIT SUBCUTANEOUS at 15:55

## 2024-01-01 RX ADMIN — ONDANSETRON 8 MG: 2 INJECTION INTRAMUSCULAR; INTRAVENOUS at 11:32

## 2024-01-01 RX ADMIN — Medication 5 ML: at 19:26

## 2024-01-01 RX ADMIN — MAGNESIUM OXIDE TAB 400 MG (241.3 MG ELEMENTAL MG) 400 MG: 400 (241.3 MG) TAB at 09:37

## 2024-01-01 RX ADMIN — SODIUM CHLORIDE 1000 ML: 9 INJECTION, SOLUTION INTRAVENOUS at 12:34

## 2024-01-01 RX ADMIN — METOCLOPRAMIDE 5 MG: 5 INJECTION, SOLUTION INTRAMUSCULAR; INTRAVENOUS at 15:37

## 2024-01-01 RX ADMIN — ALBUMIN HUMAN 25 G: 0.25 SOLUTION INTRAVENOUS at 11:34

## 2024-01-01 RX ADMIN — MAGNESIUM OXIDE TAB 400 MG (241.3 MG ELEMENTAL MG) 400 MG: 400 (241.3 MG) TAB at 14:29

## 2024-01-01 RX ADMIN — DEXAMETHASONE SODIUM PHOSPHATE: 10 INJECTION, SOLUTION INTRAMUSCULAR; INTRAVENOUS at 15:03

## 2024-01-01 RX ADMIN — HALOPERIDOL LACTATE 2 MG: 5 INJECTION, SOLUTION INTRAMUSCULAR at 22:44

## 2024-01-01 RX ADMIN — SODIUM CHLORIDE 250 ML: 9 INJECTION, SOLUTION INTRAVENOUS at 14:44

## 2024-01-01 RX ADMIN — SODIUM CHLORIDE 250 ML: 9 INJECTION, SOLUTION INTRAVENOUS at 11:25

## 2024-01-01 RX ADMIN — DIPHENHYDRAMINE HYDROCHLORIDE 50 MG: 50 INJECTION, SOLUTION INTRAMUSCULAR; INTRAVENOUS at 10:30

## 2024-01-01 RX ADMIN — SODIUM CHLORIDE 1000 ML: 9 INJECTION, SOLUTION INTRAVENOUS at 12:27

## 2024-01-01 RX ADMIN — SODIUM CHLORIDE 157 MG: 9 INJECTION, SOLUTION INTRAVENOUS at 13:15

## 2024-01-01 RX ADMIN — ROPINIROLE 0.5 MG: 0.5 TABLET, FILM COATED ORAL at 21:26

## 2024-01-01 RX ADMIN — LIDOCAINE HYDROCHLORIDE 6 ML: 10 INJECTION, SOLUTION EPIDURAL; INFILTRATION; INTRACAUDAL; PERINEURAL at 13:20

## 2024-01-01 RX ADMIN — LIDOCAINE HYDROCHLORIDE 6 ML: 10 INJECTION, SOLUTION INFILTRATION; PERINEURAL at 13:44

## 2024-01-01 RX ADMIN — APIXABAN 5 MG: 5 TABLET, FILM COATED ORAL at 21:22

## 2024-01-01 RX ADMIN — SODIUM CHLORIDE 157 MG: 9 INJECTION, SOLUTION INTRAVENOUS at 12:17

## 2024-01-01 RX ADMIN — GABAPENTIN 200 MG: 100 CAPSULE ORAL at 05:21

## 2024-01-01 RX ADMIN — SODIUM CHLORIDE 1000 ML: 9 INJECTION, SOLUTION INTRAVENOUS at 11:28

## 2024-01-01 RX ADMIN — GABAPENTIN 300 MG: 300 CAPSULE ORAL at 22:47

## 2024-01-01 RX ADMIN — GABAPENTIN 200 MG: 100 CAPSULE ORAL at 18:30

## 2024-01-01 RX ADMIN — ZOLPIDEM TARTRATE 10 MG: 10 TABLET, FILM COATED ORAL at 21:22

## 2024-01-01 RX ADMIN — APIXABAN 5 MG: 5 TABLET, FILM COATED ORAL at 08:39

## 2024-01-01 RX ADMIN — Medication 500 UNITS: at 09:27

## 2024-01-01 RX ADMIN — ATORVASTATIN CALCIUM 40 MG: 40 TABLET, FILM COATED ORAL at 19:36

## 2024-01-01 RX ADMIN — ONDANSETRON 8 MG: 2 INJECTION INTRAMUSCULAR; INTRAVENOUS at 12:27

## 2024-01-01 RX ADMIN — SODIUM CHLORIDE 157 MG: 9 INJECTION, SOLUTION INTRAVENOUS at 15:29

## 2024-01-01 RX ADMIN — AMITRIPTYLINE HYDROCHLORIDE 100 MG: 100 TABLET, FILM COATED ORAL at 21:22

## 2024-01-01 RX ADMIN — SODIUM CHLORIDE 800 MG: 9 INJECTION, SOLUTION INTRAVENOUS at 11:50

## 2024-01-01 RX ADMIN — MIDAZOLAM 1 MG: 1 INJECTION INTRAMUSCULAR; INTRAVENOUS at 13:35

## 2024-01-01 RX ADMIN — FOSAPREPITANT 150 MG: 150 INJECTION, POWDER, LYOPHILIZED, FOR SOLUTION INTRAVENOUS at 15:25

## 2024-01-01 RX ADMIN — IBUPROFEN 200 MG: 200 TABLET, FILM COATED ORAL at 22:35

## 2024-01-01 RX ADMIN — SODIUM CHLORIDE 800 MG: 9 INJECTION, SOLUTION INTRAVENOUS at 13:24

## 2024-01-01 RX ADMIN — AMITRIPTYLINE HYDROCHLORIDE 100 MG: 25 TABLET, FILM COATED ORAL at 22:34

## 2024-01-01 RX ADMIN — LORAZEPAM 0.5 MG: 2 INJECTION INTRAMUSCULAR; INTRAVENOUS at 23:21

## 2024-01-01 RX ADMIN — SODIUM CHLORIDE 250 ML: 9 INJECTION, SOLUTION INTRAVENOUS at 12:26

## 2024-01-01 RX ADMIN — DEXAMETHASONE SODIUM PHOSPHATE: 10 INJECTION, SOLUTION INTRAMUSCULAR; INTRAVENOUS at 10:07

## 2024-01-01 RX ADMIN — HYDROMORPHONE HYDROCHLORIDE 1 MG: 2 TABLET ORAL at 15:02

## 2024-01-01 RX ADMIN — CEFAZOLIN SODIUM 2 G: 2 INJECTION, SOLUTION INTRAVENOUS at 12:36

## 2024-01-01 RX ADMIN — SODIUM CHLORIDE 1000 ML: 9 INJECTION, SOLUTION INTRAVENOUS at 14:54

## 2024-01-01 RX ADMIN — LIDOCAINE HYDROCHLORIDE 10 ML: 10 INJECTION, SOLUTION EPIDURAL; INFILTRATION; INTRACAUDAL; PERINEURAL at 08:17

## 2024-01-01 RX ADMIN — ZOLPIDEM TARTRATE 5 MG: 5 TABLET ORAL at 21:26

## 2024-01-01 RX ADMIN — DIPHENHYDRAMINE HYDROCHLORIDE 50 MG: 50 INJECTION, SOLUTION INTRAMUSCULAR; INTRAVENOUS at 12:57

## 2024-01-01 RX ADMIN — OXYCODONE HYDROCHLORIDE 10 MG: 10 TABLET ORAL at 17:35

## 2024-01-01 RX ADMIN — MIDAZOLAM 1 MG: 1 INJECTION INTRAMUSCULAR; INTRAVENOUS at 13:19

## 2024-01-01 RX ADMIN — MAGNESIUM SULFATE HEPTAHYDRATE 2 G: 2 INJECTION, SOLUTION INTRAVENOUS at 00:11

## 2024-01-01 RX ADMIN — APIXABAN 5 MG: 5 TABLET, FILM COATED ORAL at 09:28

## 2024-01-01 RX ADMIN — HYDROMORPHONE HYDROCHLORIDE 0.5 MG: 1 INJECTION, SOLUTION INTRAMUSCULAR; INTRAVENOUS; SUBCUTANEOUS at 18:33

## 2024-01-01 RX ADMIN — SODIUM CHLORIDE 157 MG: 9 INJECTION, SOLUTION INTRAVENOUS at 15:47

## 2024-01-01 RX ADMIN — BUPRENORPHINE 1 PATCH: 10 PATCH, EXTENDED RELEASE TRANSDERMAL at 15:03

## 2024-01-01 RX ADMIN — DEXAMETHASONE SODIUM PHOSPHATE: 10 INJECTION, SOLUTION INTRAMUSCULAR; INTRAVENOUS at 11:50

## 2024-01-01 RX ADMIN — LORAZEPAM 0.5 MG: 2 INJECTION INTRAMUSCULAR; INTRAVENOUS at 14:25

## 2024-01-01 RX ADMIN — IOPAMIDOL 80 ML: 755 INJECTION, SOLUTION INTRAVENOUS at 16:19

## 2024-01-01 RX ADMIN — IOPAMIDOL 103 ML: 755 INJECTION, SOLUTION INTRAVENOUS at 19:22

## 2024-01-01 RX ADMIN — SODIUM CHLORIDE 1000 ML: 9 INJECTION, SOLUTION INTRAVENOUS at 02:12

## 2024-01-01 RX ADMIN — LORAZEPAM 0.5 MG: 2 INJECTION INTRAMUSCULAR; INTRAVENOUS at 12:02

## 2024-01-01 RX ADMIN — LIDOCAINE: 40 CREAM TOPICAL at 19:54

## 2024-01-01 RX ADMIN — PACLITAXEL 157 MG: 6 INJECTION, SOLUTION, CONCENTRATE INTRAVENOUS at 10:46

## 2024-01-01 RX ADMIN — IOPAMIDOL 105 ML: 755 INJECTION, SOLUTION INTRAVENOUS at 09:27

## 2024-01-01 RX ADMIN — Medication 5 ML: at 16:52

## 2024-01-01 RX ADMIN — DIPHENHYDRAMINE HYDROCHLORIDE 50 MG: 50 INJECTION, SOLUTION INTRAMUSCULAR; INTRAVENOUS at 11:26

## 2024-01-01 RX ADMIN — DIPHENHYDRAMINE HYDROCHLORIDE 50 MG: 50 INJECTION, SOLUTION INTRAMUSCULAR; INTRAVENOUS at 11:36

## 2024-01-01 RX ADMIN — IBUPROFEN 400 MG: 200 TABLET, FILM COATED ORAL at 13:07

## 2024-01-01 RX ADMIN — LIDOCAINE 1 PATCH: 4 PATCH TOPICAL at 16:49

## 2024-01-01 RX ADMIN — FAMOTIDINE 20 MG: 10 INJECTION, SOLUTION INTRAVENOUS at 15:03

## 2024-01-01 RX ADMIN — IBUPROFEN 400 MG: 200 TABLET, FILM COATED ORAL at 19:34

## 2024-01-01 RX ADMIN — CEFTRIAXONE SODIUM 1 G: 1 INJECTION, POWDER, FOR SOLUTION INTRAMUSCULAR; INTRAVENOUS at 02:07

## 2024-01-01 RX ADMIN — ONDANSETRON 8 MG: 2 INJECTION INTRAMUSCULAR; INTRAVENOUS at 15:00

## 2024-01-01 RX ADMIN — NITROFURANTOIN (MONOHYDRATE/MACROCRYSTALS) 100 MG: 75; 25 CAPSULE ORAL at 19:34

## 2024-01-01 RX ADMIN — Medication 5 ML: at 13:10

## 2024-01-01 RX ADMIN — SODIUM CHLORIDE 1 BAG: 9 INJECTION, SOLUTION INTRAVENOUS at 13:20

## 2024-01-01 RX ADMIN — GADOBUTROL 7.5 ML: 604.72 INJECTION INTRAVENOUS at 17:33

## 2024-01-01 RX ADMIN — SODIUM CHLORIDE: 4.5 INJECTION, SOLUTION INTRAVENOUS at 08:20

## 2024-01-01 RX ADMIN — LIDOCAINE HYDROCHLORIDE 20 ML: 10; .005 INJECTION, SOLUTION EPIDURAL; INFILTRATION; INTRACAUDAL; PERINEURAL at 13:46

## 2024-01-01 RX ADMIN — MAGNESIUM SULFATE HEPTAHYDRATE 2 G: 2 INJECTION, SOLUTION INTRAVENOUS at 09:11

## 2024-01-01 RX ADMIN — DIPHENHYDRAMINE HYDROCHLORIDE 50 MG: 50 INJECTION, SOLUTION INTRAMUSCULAR; INTRAVENOUS at 14:48

## 2024-01-01 RX ADMIN — SODIUM CHLORIDE 1100 MG: 9 INJECTION, SOLUTION INTRAVENOUS at 16:07

## 2024-01-01 RX ADMIN — FAMOTIDINE 20 MG: 10 INJECTION, SOLUTION INTRAVENOUS at 14:45

## 2024-01-01 RX ADMIN — PEGFILGRASTIM 6 MG: KIT SUBCUTANEOUS at 15:22

## 2024-01-01 RX ADMIN — SODIUM CHLORIDE, PRESERVATIVE FREE 5 ML: 5 INJECTION INTRAVENOUS at 21:09

## 2024-01-01 RX ADMIN — FENTANYL CITRATE 50 MCG: 50 INJECTION, SOLUTION INTRAMUSCULAR; INTRAVENOUS at 13:35

## 2024-01-01 RX ADMIN — IBUPROFEN 400 MG: 200 TABLET, FILM COATED ORAL at 07:03

## 2024-01-01 RX ADMIN — FOSAPREPITANT: 150 INJECTION, POWDER, LYOPHILIZED, FOR SOLUTION INTRAVENOUS at 12:33

## 2024-01-01 RX ADMIN — FAMOTIDINE 20 MG: 10 INJECTION, SOLUTION INTRAVENOUS at 12:30

## 2024-01-01 RX ADMIN — GADOBUTROL 7.4 ML: 604.72 INJECTION INTRAVENOUS at 21:00

## 2024-01-01 RX ADMIN — HYDROMORPHONE HYDROCHLORIDE 0.5 MG: 1 INJECTION, SOLUTION INTRAMUSCULAR; INTRAVENOUS; SUBCUTANEOUS at 15:58

## 2024-01-01 RX ADMIN — HYDROMORPHONE HYDROCHLORIDE 0.3 MG: 1 INJECTION, SOLUTION INTRAMUSCULAR; INTRAVENOUS; SUBCUTANEOUS at 02:33

## 2024-01-01 RX ADMIN — ROPINIROLE HYDROCHLORIDE 0.25 MG: 0.25 TABLET, FILM COATED ORAL at 09:28

## 2024-01-01 RX ADMIN — APIXABAN 5 MG: 5 TABLET, FILM COATED ORAL at 20:25

## 2024-01-01 RX ADMIN — SODIUM CHLORIDE 1000 ML: 9 INJECTION, SOLUTION INTRAVENOUS at 15:35

## 2024-01-01 RX ADMIN — CARBOPLATIN 500 MG: 10 INJECTION, SOLUTION INTRAVENOUS at 15:16

## 2024-01-01 RX ADMIN — Medication 3 ML: at 05:45

## 2024-01-01 RX ADMIN — SODIUM CHLORIDE 500 ML: 9 INJECTION, SOLUTION INTRAVENOUS at 23:17

## 2024-01-01 RX ADMIN — NITROFURANTOIN (MONOHYDRATE/MACROCRYSTALS) 100 MG: 75; 25 CAPSULE ORAL at 09:24

## 2024-01-01 RX ADMIN — CARBOPLATIN 600 MG: 10 INJECTION, SOLUTION INTRAVENOUS at 15:14

## 2024-01-01 RX ADMIN — SODIUM CHLORIDE 1000 ML: 9 INJECTION, SOLUTION INTRAVENOUS at 11:10

## 2024-01-01 RX ADMIN — DEXAMETHASONE SODIUM PHOSPHATE: 10 INJECTION, SOLUTION INTRAMUSCULAR; INTRAVENOUS at 15:22

## 2024-01-01 RX ADMIN — FAMOTIDINE 20 MG: 10 INJECTION, SOLUTION INTRAVENOUS at 14:44

## 2024-01-01 RX ADMIN — SODIUM CHLORIDE 157 MG: 9 INJECTION, SOLUTION INTRAVENOUS at 15:51

## 2024-01-01 RX ADMIN — FAMOTIDINE 20 MG: 10 INJECTION, SOLUTION INTRAVENOUS at 11:23

## 2024-01-01 RX ADMIN — MAGNESIUM OXIDE TAB 400 MG (241.3 MG ELEMENTAL MG) 400 MG: 400 (241.3 MG) TAB at 15:03

## 2024-01-01 RX ADMIN — SODIUM CHLORIDE 250 ML: 9 INJECTION, SOLUTION INTRAVENOUS at 10:01

## 2024-01-01 RX ADMIN — PACLITAXEL 265 MG: 6 INJECTION INTRAVENOUS at 12:10

## 2024-01-01 RX ADMIN — HEPARIN SODIUM (PORCINE) LOCK FLUSH IV SOLN 100 UNIT/ML 500 UNITS: 100 SOLUTION at 13:42

## 2024-01-01 RX ADMIN — ACETAMINOPHEN 650 MG: 325 TABLET, FILM COATED ORAL at 11:30

## 2024-01-01 RX ADMIN — APIXABAN 5 MG: 5 TABLET, FILM COATED ORAL at 11:40

## 2024-01-01 RX ADMIN — SODIUM CHLORIDE 1000 ML: 9 INJECTION, SOLUTION INTRAVENOUS at 15:37

## 2024-01-01 RX ADMIN — DIPHENHYDRAMINE HYDROCHLORIDE 50 MG: 50 INJECTION, SOLUTION INTRAMUSCULAR; INTRAVENOUS at 15:06

## 2024-01-01 RX ADMIN — SODIUM CHLORIDE 1000 ML: 9 INJECTION, SOLUTION INTRAVENOUS at 14:41

## 2024-01-01 RX ADMIN — DOCUSATE SODIUM 50 MG AND SENNOSIDES 8.6 MG 2 TABLET: 8.6; 5 TABLET, FILM COATED ORAL at 10:46

## 2024-01-01 RX ADMIN — GADOBUTROL 8 ML: 604.72 INJECTION INTRAVENOUS at 16:09

## 2024-01-01 RX ADMIN — ONDANSETRON 8 MG: 2 INJECTION INTRAMUSCULAR; INTRAVENOUS at 14:45

## 2024-01-01 RX ADMIN — Medication 5 ML: at 11:57

## 2024-01-01 RX ADMIN — HYDROMORPHONE HYDROCHLORIDE 1 MG: 2 TABLET ORAL at 23:59

## 2024-01-01 RX ADMIN — ZOLPIDEM TARTRATE 5 MG: 5 TABLET ORAL at 22:35

## 2024-01-01 RX ADMIN — PEGFILGRASTIM 6 MG: KIT SUBCUTANEOUS at 16:14

## 2024-01-01 RX ADMIN — HEPARIN SODIUM (PORCINE) LOCK FLUSH IV SOLN 100 UNIT/ML 5 ML: 100 SOLUTION at 13:10

## 2024-01-01 RX ADMIN — AMITRIPTYLINE HYDROCHLORIDE 75 MG: 25 TABLET, FILM COATED ORAL at 21:26

## 2024-01-01 RX ADMIN — Medication 5 ML: at 17:13

## 2024-01-01 RX ADMIN — SODIUM CHLORIDE 1000 ML: 9 INJECTION, SOLUTION INTRAVENOUS at 15:58

## 2024-01-01 RX ADMIN — HYDROMORPHONE HYDROCHLORIDE 1 MG: 2 TABLET ORAL at 20:06

## 2024-01-01 RX ADMIN — NITROFURANTOIN (MONOHYDRATE/MACROCRYSTALS) 100 MG: 75; 25 CAPSULE ORAL at 19:38

## 2024-01-01 RX ADMIN — APIXABAN 5 MG: 5 TABLET, FILM COATED ORAL at 22:40

## 2024-01-01 RX ADMIN — FAMOTIDINE 20 MG: 10 INJECTION, SOLUTION INTRAVENOUS at 11:29

## 2024-01-01 RX ADMIN — DIPHENHYDRAMINE HYDROCHLORIDE 50 MG: 50 INJECTION, SOLUTION INTRAMUSCULAR; INTRAVENOUS at 11:30

## 2024-01-01 RX ADMIN — CYCLOBENZAPRINE HYDROCHLORIDE 5 MG: 5 TABLET, FILM COATED ORAL at 22:34

## 2024-01-01 RX ADMIN — POLYETHYLENE GLYCOL 3350 17 G: 17 POWDER, FOR SOLUTION ORAL at 08:39

## 2024-01-01 RX ADMIN — PEGFILGRASTIM 6 MG: KIT SUBCUTANEOUS at 15:56

## 2024-01-01 RX ADMIN — SODIUM CHLORIDE 60 ML: 9 INJECTION, SOLUTION INTRAVENOUS at 16:19

## 2024-01-01 RX ADMIN — FAMOTIDINE 20 MG: 10 INJECTION, SOLUTION INTRAVENOUS at 10:05

## 2024-01-01 RX ADMIN — ONDANSETRON 4 MG: 2 INJECTION INTRAMUSCULAR; INTRAVENOUS at 14:00

## 2024-01-01 RX ADMIN — SODIUM CHLORIDE, POTASSIUM CHLORIDE, SODIUM LACTATE AND CALCIUM CHLORIDE: 600; 310; 30; 20 INJECTION, SOLUTION INTRAVENOUS at 07:02

## 2024-01-01 RX ADMIN — PACLITAXEL 265 MG: 6 INJECTION, SOLUTION, CONCENTRATE INTRAVENOUS at 12:07

## 2024-01-01 RX ADMIN — FAMOTIDINE 20 MG: 10 INJECTION, SOLUTION INTRAVENOUS at 11:26

## 2024-01-01 RX ADMIN — ACETAMINOPHEN 650 MG: 325 TABLET, FILM COATED ORAL at 19:34

## 2024-01-01 RX ADMIN — DIPHENHYDRAMINE HYDROCHLORIDE 50 MG: 50 INJECTION, SOLUTION INTRAMUSCULAR; INTRAVENOUS at 15:12

## 2024-01-01 RX ADMIN — SODIUM CHLORIDE 800 MG: 9 INJECTION, SOLUTION INTRAVENOUS at 16:32

## 2024-01-01 RX ADMIN — IOPAMIDOL 92 ML: 755 INJECTION, SOLUTION INTRAVASCULAR at 11:25

## 2024-01-01 RX ADMIN — DOCUSATE SODIUM 50 MG AND SENNOSIDES 8.6 MG 2 TABLET: 8.6; 5 TABLET, FILM COATED ORAL at 22:35

## 2024-01-01 RX ADMIN — MAGNESIUM OXIDE TAB 400 MG (241.3 MG ELEMENTAL MG) 400 MG: 400 (241.3 MG) TAB at 11:30

## 2024-01-01 ASSESSMENT — ACTIVITIES OF DAILY LIVING (ADL)
ADLS_ACUITY_SCORE: 38
ADLS_ACUITY_SCORE: 37
ADLS_ACUITY_SCORE: 42
ADLS_ACUITY_SCORE: 42
ADLS_ACUITY_SCORE: 37
ADLS_ACUITY_SCORE: 40
ADLS_ACUITY_SCORE: 46
ADLS_ACUITY_SCORE: 40
ADLS_ACUITY_SCORE: 42
ADLS_ACUITY_SCORE: 31
ADLS_ACUITY_SCORE: 39
ADLS_ACUITY_SCORE: 40
ADLS_ACUITY_SCORE: 40
ADLS_ACUITY_SCORE: 38
ADLS_ACUITY_SCORE: 38
ADLS_ACUITY_SCORE: 37
ADLS_ACUITY_SCORE: 38
ADLS_ACUITY_SCORE: 37
ADLS_ACUITY_SCORE: 46
ADLS_ACUITY_SCORE: 38
ADLS_ACUITY_SCORE: 44
ADLS_ACUITY_SCORE: 40
ADLS_ACUITY_SCORE: 38
ADLS_ACUITY_SCORE: 44
ADLS_ACUITY_SCORE: 38
ADLS_ACUITY_SCORE: 37
ADLS_ACUITY_SCORE: 42
ADLS_ACUITY_SCORE: 36
ADLS_ACUITY_SCORE: 31
ADLS_ACUITY_SCORE: 43
ADLS_ACUITY_SCORE: 43
ADLS_ACUITY_SCORE: 35
ADLS_ACUITY_SCORE: 38
ADLS_ACUITY_SCORE: 42
ADLS_ACUITY_SCORE: 38
ADLS_ACUITY_SCORE: 40
ADLS_ACUITY_SCORE: 46
ADLS_ACUITY_SCORE: 37
ADLS_ACUITY_SCORE: 42
ADLS_ACUITY_SCORE: 37
ADLS_ACUITY_SCORE: 46
ADLS_ACUITY_SCORE: 42
ADLS_ACUITY_SCORE: 38
ADLS_ACUITY_SCORE: 42
ADLS_ACUITY_SCORE: 43
ADLS_ACUITY_SCORE: 46
ADLS_ACUITY_SCORE: 40
ADLS_ACUITY_SCORE: 38
ADLS_ACUITY_SCORE: 31
ADLS_ACUITY_SCORE: 38
ADLS_ACUITY_SCORE: 39
ADLS_ACUITY_SCORE: 38
ADLS_ACUITY_SCORE: 38
ADLS_ACUITY_SCORE: 37
ADLS_ACUITY_SCORE: 31
ADLS_ACUITY_SCORE: 35
ADLS_ACUITY_SCORE: 31
ADLS_ACUITY_SCORE: 38
ADLS_ACUITY_SCORE: 37
ADLS_ACUITY_SCORE: 46
ADLS_ACUITY_SCORE: 31
ADLS_ACUITY_SCORE: 37
ADLS_ACUITY_SCORE: 31
ADLS_ACUITY_SCORE: 37
ADLS_ACUITY_SCORE: 38
ADLS_ACUITY_SCORE: 38
ADLS_ACUITY_SCORE: 37
ADLS_ACUITY_SCORE: 40
ADLS_ACUITY_SCORE: 35
ADLS_ACUITY_SCORE: 46
ADLS_ACUITY_SCORE: 31
ADLS_ACUITY_SCORE: 37
ADLS_ACUITY_SCORE: 31
ADLS_ACUITY_SCORE: 37
ADLS_ACUITY_SCORE: 38
ADLS_ACUITY_SCORE: 38
ADLS_ACUITY_SCORE: 44
ADLS_ACUITY_SCORE: 39
ADLS_ACUITY_SCORE: 42
ADLS_ACUITY_SCORE: 31
ADLS_ACUITY_SCORE: 38
ADLS_ACUITY_SCORE: 37
ADLS_ACUITY_SCORE: 43
ADLS_ACUITY_SCORE: 37
DEPENDENT_IADLS:: INDEPENDENT
ADLS_ACUITY_SCORE: 40
ADLS_ACUITY_SCORE: 40
ADLS_ACUITY_SCORE: 31
ADLS_ACUITY_SCORE: 31
ADLS_ACUITY_SCORE: 42
ADLS_ACUITY_SCORE: 31
ADLS_ACUITY_SCORE: 31
ADLS_ACUITY_SCORE: 40
ADLS_ACUITY_SCORE: 38
ADLS_ACUITY_SCORE: 31
ADLS_ACUITY_SCORE: 38
ADLS_ACUITY_SCORE: 46
ADLS_ACUITY_SCORE: 31
ADLS_ACUITY_SCORE: 44
ADLS_ACUITY_SCORE: 39
ADLS_ACUITY_SCORE: 46
ADLS_ACUITY_SCORE: 43
ADLS_ACUITY_SCORE: 38

## 2024-01-01 ASSESSMENT — PAIN SCALES - GENERAL
PAINLEVEL: NO PAIN (0)
PAINLEVEL: MILD PAIN (2)
PAINLEVEL: NO PAIN (0)
PAINLEVEL: MODERATE PAIN (5)
PAINLEVEL: NO PAIN (0)

## 2024-01-01 ASSESSMENT — COLUMBIA-SUICIDE SEVERITY RATING SCALE - C-SSRS
1. IN THE PAST MONTH, HAVE YOU WISHED YOU WERE DEAD OR WISHED YOU COULD GO TO SLEEP AND NOT WAKE UP?: NO
2. HAVE YOU ACTUALLY HAD ANY THOUGHTS OF KILLING YOURSELF IN THE PAST MONTH?: NO
1. IN THE PAST MONTH, HAVE YOU WISHED YOU WERE DEAD OR WISHED YOU COULD GO TO SLEEP AND NOT WAKE UP?: NO
2. HAVE YOU ACTUALLY HAD ANY THOUGHTS OF KILLING YOURSELF IN THE PAST MONTH?: NO
2. HAVE YOU ACTUALLY HAD ANY THOUGHTS OF KILLING YOURSELF IN THE PAST MONTH?: NO
6. HAVE YOU EVER DONE ANYTHING, STARTED TO DO ANYTHING, OR PREPARED TO DO ANYTHING TO END YOUR LIFE?: NO
1. IN THE PAST MONTH, HAVE YOU WISHED YOU WERE DEAD OR WISHED YOU COULD GO TO SLEEP AND NOT WAKE UP?: NO
2. HAVE YOU ACTUALLY HAD ANY THOUGHTS OF KILLING YOURSELF IN THE PAST MONTH?: NO
6. HAVE YOU EVER DONE ANYTHING, STARTED TO DO ANYTHING, OR PREPARED TO DO ANYTHING TO END YOUR LIFE?: NO
1. IN THE PAST MONTH, HAVE YOU WISHED YOU WERE DEAD OR WISHED YOU COULD GO TO SLEEP AND NOT WAKE UP?: NO
6. HAVE YOU EVER DONE ANYTHING, STARTED TO DO ANYTHING, OR PREPARED TO DO ANYTHING TO END YOUR LIFE?: NO

## 2024-01-05 NOTE — PROGRESS NOTES
Patient reached out as she would potential like to switch her chemotherapy back to [place]     Patient has family things that she would like to remain close to here in the [facility]     RN requested MD to place orders and sent urgent scheduling request to see if we can accommodate     Patient aware this is being worked on     Shawna Hinojosa RN

## 2024-01-09 PROBLEM — C56.9 MALIGNANT NEOPLASM OF OVARY, UNSPECIFIED LATERALITY (H): Status: RESOLVED | Noted: 2023-01-01 | Resolved: 2024-01-01

## 2024-01-09 PROBLEM — C56.1 OVARIAN CANCER, RIGHT (H): Status: ACTIVE | Noted: 2021-01-12

## 2024-01-09 NOTE — NURSING NOTE
Is the patient currently in the state of [place]? YES    Visit mode:VIDEO    If the visit is dropped, the patient can be reconnected by: VIDEO VISIT: Text to cell phone:   Telephone Information:   Mobile [phone]       Will anyone else be joining the visit? NO  (If patient encounters technical issues they should call [phone])    How would you like to obtain your AVS? MyChart    Are changes needed to the allergy or medication list? No    Reason for visit: RECHECK    America FOWLER

## 2024-01-09 NOTE — PROGRESS NOTES
Virtual Visit Details    Type of service:  Video Visit   Video Start Time:  2:37 pm  Video End Time: 2:52 pm    Originating Location (pt. 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 She presents today for follow up and disease management by video.     Oncology History:  12/3/2020: US Pelvic: IMPRESSION: Limited examination due to acoustic windows. Possible left adnexal mass. A CT scan of the abdomen and pelvis with contrast is recommended for further assessment.     2020: CT A/P:   IMPRESSION:    Peritoneal carcinomatosis with masslike peritoneal thickening in the lower pelvis which may indicate an adnexal or ovarian primary malignancy. Large volume ascites. Bilateral pleural effusions. There is potential subtle pleural nodularity in the right hemithorax which could indicate metastatic disease.  Indeterminate 1 cm lesion in the right hepatic lobe suspicious for a metastatic lesion.      2020: Presented to [facility] with abdominal distention, 25lb weight loss, and CTAP with carcinomatosis, elevated  3098.     2020: CT Chest: IMPRESSION:   1. There are few scattered small sub-6 mm pulmonary nodules which are indeterminate without prior comparisons available. There are a few  slightly larger perifissural nodules which are technically  indeterminate in the setting of malignancy although presumed lymphatic in nature and of unlikely clinical significance. Attention on follow-up is recommended.  2. Small to moderate left and small right pleural effusions are increased in size from prior. No convincing evidence for pleural  nodularity.  3. Partially visualized large volume ascites and peritoneal nodularity in the upper abdomen similar to 12/4/2020 outside CT      12/26/2020: ED for abdominal distension; 3 L ascites drained with paracentesis    Pelvic US: Findings: Free fluid present in LLQ      12/31/2020: US Paracentesis: 900 mL ascites drained     1/7/2021: Diagnotic laparoscopy, biopsies  Pathology: FINAL DIAGNOSIS:   A. PERITONEUM, BIOPSIES:   - Positive for high grade carcinoma, consistent with metastatic carcinoma of Mullerian origin.     1/10-1/13/2021: Hospital admission for postoperative non-intractable vomiting and nausea.      1/10/2021: CT A/P: IMPRESSION: Extensive ascites which is probably malignant. Scattered liver hypodensities of indeterminate etiology comment cannot exclude metastatic disease. Diverticulosis. Fluid-filled adnexal masses and irregular appearance of uterus, which may represent primary neoplasm. Multiple peritoneal nodules. Large amount of fecal material in the colon with no evidence of small bowel obstruction.     Plan: Paclitaxel 175 mg/m2 and carboplatin AUC 6 x 3 cycles followed by a CT CAP and visit with Dr. Juarez.     1/12/2021: Cycle 1 paclitaxel and carboplatin while inpatient     1/13/2021: CT Head: Impression:  1. Chronic sinusitis of the right maxillary and right sphenoid sinuses.  2. Incidental presumed calcified meningioma in the right frontal  convexity without significant mass effect.  3. No suspicious intracranial enhancing lesion.     2/1/2021: Cycle 2 paclitaxel and carboplatin.  936.     2/3-2/5/21: Admission [facility] for afib w/ RVR and new PE     2/26/21: Cycle 3 paclitaxel and carboplatin planned.  Deferred due to thrombocytopenia.  Deferred 3/12/21 due to neutropenia.  Given on 3/15/21 after Filgrastim injection x 2.  Add Pegfilgrastim to day 2 of treatment plan.   129.      4/2/21: CT CAP  IMPRESSION:   In this patient with a history of metastatic serous ovarian  cancer,  status post diagnostic laparoscopy and neoadjuvant chemotherapy:  1. Significant improvement in peritoneal carcinomatosis as discussed  above.  2. Resolution of previously seen scattered small hypoattenuating  lesions in the liver. This raises the concern for these lesions  representing metastatic disease, noting excellent response elsewhere  in abdomen and pelvis.  3. Bilateral pleural effusions have resolved.  4. Several small pulmonary nodules in the right lung measuring up to 5  mm. Indeterminate, but likely benign in the setting of their stability  with excellent response elsewhere. Continued attention on follow-up.     4/19/21: HYSTERECTOMY, TOTAL, ABDOMINAL, WITH BILATERAL SALPINGO-OOPHORECTOMY, omentectomy, NEOPLASM DEBULKING,Proctoscocy, RO, Resection of liver nodules, diaphragm stripping, immobilization of liver and colon  FINAL DIAGNOSIS:   A. OMENTUM, BIOPSY:   - Omental adipose tissue with rare viable cells of metastatic high grade   serous carcinoma   - One reactive lymph node, negative for malignancy (0/1)   B. NODULE, SIGMOID, EXCISION:   - Calcified necrotic adipose tissue   - Negative for malignancy   C. NODULE, SMALL BOWEL MESENTERY, EXCISION:   - Fibroadipose tissue, positive for metastatic high grade serous carcinoma   D. UTERUS, CERVIX, BILATERAL FALLOPIAN TUBES AND OVARIES, HYSTERECTOMY   WITH BILATERAL SALPINGO-OOPHORECTOMY:   - Atrophic endometrium   - Uterine serosa with rare viable cells consistent with high grade serous   carcinoma   - Cervix with atrophic changes   - Viable cells consistent with high grade serous carcinoma present in the   right ovary, serosa of right   fallopian tube and right periadnexal soft tissue   - Left ovary with atrophic changes   - Left fallopian tube with a rare focus of serous tubal in-situ carcinoma   (STIC)   E. NODULES, SMALL BOWEL MESENTRY, EXCISION:   - Fibroadipose tissue with rare viable cells of metastatic high grade   serous carcinoma   F.  NODULE, SPLENIC FLEXURE TRANSVERSE COLON, EXCISION:   - Fibroadipose tissue with rare viable cells of metastatic high grade   serous carcinoma   - Accessory splenule, negative for malignancy   G. OMENTUM, OMENTECTOMY:   - Omental adipose tissue with rare viable cells of metastatic high grade   serous carcinoma   H. NODULE, PERITONEAL PANCREATIC, EXCISION:   - Fibrous adhesions with inflammation   - Negative for malignancy   I. RIGHT HEMIDIAPHRAGM PERITONEUM, EXCISION:   - Fibrous adhesions with inflammation   - Negative for malignancy   J. RIGHT LIVER SURFACE NODULE:   - Fibrous adhesions with benign inclusion glands   - Negative for malignancy   K. LEFT LOWER LIVER EDGE, BIOPSY:   - Cauterized hepatic parenchyma and capsule   - Negative for malignancy   L. NODULE, SMALL BOWEL MESENTERIC #3, EXCISION:   - Fibroadipose tissue with rare viable cells of metastatic high grade   serous carcinoma       Plan: Carboplatin AUC 6 + Taxol 175 mg/m2 x 3 cycles, then transition to Parp inhibitor for maintenance therapy given her BRCA1 germline mutation.      5/21/21: Cycle 4 carboplatin and paclitaxel.   172.  6/11/21: Cycle 5 carboplatin and paclitaxel.   61.  7/2/21: Cycle 6 carboplatin and paclitaxel.   20.        7/28/21 plan: Olaparib 300mg bid as starting dose,  14  8/20/21: start date olaparib 300 mg BID,  12  9/13/21:  22  10/4/21:  23  11/1/21:  26     11/02/2021: PET CT: IMPRESSION:   Findings compatible with interval surgery and posttreatment change.  No gross definitive FDG avid disease.  Potential foci of tumor deposits along the anterior dome of the liver and midline abdominal wall surgical scar.  Colonic activity is not necessarily abnormal, however, given the previous carcinomatosis the colonic activity is indeterminant.         12/1/21:  23  1/3/22:  21  2/1/22:  20  3/1/22:  21  4/1/22:  23  5/4/22:  28     7/11/2022: CT CAP                                        IMPRESSION:  1.  Sliver of ascites in the upper abdomen has decreased since interval debulking surgery.  2.  There is a punctate nodule in the gastrosplenic ligament which is minimally more plump relative to the preop exam. This will need to be followed.  3.  Minimal nodular changes to the left of the midline scar in the subcutaneous fat will have to be followed as well. Unclear if this simply reflects postoperative scarring or could reflect an early incisional recurrence.  4.  No other sites to suggest recurrent tumor. Vaginal cuff is normal.  5.  Extensive distal colonic diverticulosis.  6.  Other noncritical findings as noted above.      9/16/22  98     1/30/23 CT CAP:    IMPRESSION:  1.  Multiple new, hypoattenuating lesions in the liver, suspicious for hepatic metastatic disease.  2.  Necrotic mario hepatic lymphadenopathy, concerning for richard metastatic disease.  3.  There is a new or increasingly conspicuous 6 mm soft tissue nodule in the right lower quadrant. This is indeterminate.  4.  There is a new 3 mm solid nodule in the right upper lobe, indeterminate.  5.  Stable approximate 4 mm punctate nodule along the gastrosplenic ligament.  6.  Similar area of linear free fluid in the upper abdomen anterior to the stomach.     Plan: Paclitaxel 175 mg/m2, Carboplatin AUC 6, bevacizumab 7.5 mg/kg     3/1/23: Cycle #1 Paclitaxel 175 mg/m2, Carboplatin AUC 6 (C7), bevacizumab 7.5 mg/kg.  1,196  3/24/23: Cycle #2 Paclitaxel 150 mg/m2, Carboplatin AUC 5 (C8), bevacizumab 15 mg/kg.  558     3/29/23: ER for dehydration and hypotension. Normotensive in ER. IV fluids given. Discharged.      4/14/23: Cycle #3 Paclitaxel 150 mg/m2, Carboplatin AUC 5 (C9), bevacizumab 15 mg/kg deferred neutropenia ANC 0.3   273  4/21/23: treatment deferred ANC 0.8  4/28/23: Cycle #3 Paclitaxel 150 mg/m2, Carboplatin AUC 5 (C9), bevacizumab 15 mg/kg, + Neulasta deferred ANC 1.0, CA  125 297     5/26/23: Cycle #4  Paclitaxel 150 mg/m2, Carboplatin AUC 5 (C10), bevacizumab 15 mg/kg, + Neulasta, deferred ANC 0.6  188. No hematology consult per MD.      6/2/23: Cycle #4 Paclitaxel 150 mg/m2, Carboplatin AUC 5 (C9), bevacizumab 15 mg/kg, + Neulasta   6/23/23: Cycle #5 deferred neutropenia ANC 0.5 thrombocytopenia platelets 85     6/26/2023 Addendum: Reduce both Carboplatin and Paclitaxel due to thrombocytopenia and persistent neutropenia. REFER to Hematology to rule out MDS. Message sent to pt. Orders placed.      7/3/23 plan: continue with 2 more cycles with carboplatin AUC of 4, Taxol at 135 mg per metered square, bevacizumab at 15 mg/kg as well as neulasta for bone marrow support.      7/3/23: Cycle #5 Paclitaxel 135 mg/m2, Carboplatin AUC 4, bevacizumab 15 mg/kg, +Neulasta.  375     7/11/23: per chart review Dr. Cogan with Hematology plan one more cycle of chemotherapy then maintenance Avastin     7/28/23: Cycle #6 Paclitaxel 135 mg/m2, Carboplatin AUC 4, bevacizumab 15 mg/kg, +Neulasta.  370     8/17/2023: CT CAP: Stable bilateral pulmonary micronodules. Multiple subcentimeter hypodense hepatic lesions, a few are slightly less conspicuous. Necrotic lymph nodes in mario hepatis are stable. A few small peritoneal nodules in the left upper quadrant. Anterior to the pylorus is an oval 2.4 cm hypodense soft tissue lesion which is stable.    8/17/2023: started maintenance bevacizumab q3 weeks.  370.    8/25/2023:  450.    9/19/2023:  1056. Alk phos 221, ALT 86, AST 63.    9/21/2023: Transferred her care from [facility] to [facility] to be closer to home. Plan is maintenance bevacizumab 15 mg/kg every 3 weeks to give her a treatment break from myelosuppressive chemotherapy.    10/10/2023:  1889, alk phos 388, , AST 83  10/12/2023: Bevacizumab held for elevated LFTs, symptoms of new back pain, cough, sinusitis symptoms.    10/18/2023: CT CAP:  Progressive liver metastasis. Small nonspecific right lower lobe pulmonary nodule. Inflammatory process or a metastatic nodule remain possible. This does not have the characteristic appearance of metastasis, however, remains indeterminant.    Plan: Weekly paclitaxel 80 mg/m2 day 1, 8, 15, and bevacizumab 10 mg/kg day 1 and day 15 of a 28 day cycle x 3 cycles followed by CT CAP and follow up with Dr. Juarez in [place].    12/8/2023:  >10,000. Bilirubin 4.2, LFTs elevated.    12/12/2023: C1D1 paclitaxel and bevacizumab. ([place])    12/13/2023: Presented to the ED in [place] with worsening RUQ pain and jaundice x 2 weeks. Bilirubin 8.1. Waitlisted for transfer to [facility]. Elected to leave the ED.  12/13/2023: CT abdomen pelvis ([place]): New and enlarging innumerable hypodense liver lesions. Increased retroperitoneal adenopathy. Increased left >right intrahepatic biliary ductal dilatation without a resting cause noted. Common bile duct borderline enlarged 6 mm. Ventral hernia containing colon.  12/13/2023: Abdominal US ([place]): Numerous liver lesions. Common bile duct dilated at 9 mm, no obstructing stone. Spleen 14 cm, enlarged.    12/15/2023: ERCP ([facility]): Ventral pancreatic duct prophylactically stented. Biliary sphincterotomy. Cholangiogram showed multifocal biliary stricture. Intrahepatic and common hepatic duct strictures dilated with a balloon. 2 metal stents placed into the left main and right anterior intrahepatic duct    12/19/2023: C1D8 paclitaxel administered. Bilirubin 3.5, LFTs improving. WBC 2.2, ANC 1.0, platelets 90. ([place])  12/22/23: Neupogen for ANC 1.0.  12/28/2023: C1D15 paclitaxel + bevacizumab. Bilirubin 1.9, LFT's improving, ANC 1.0, platelets 174. ([place])    Plan: Transfer back to [facility] with weekly paclitaxel 80 mg/m2 day 1, 8, 15, and bevacizumab 10 mg/kg day 1 and day 15 of a 28 day cycle x 2 additional cycles followed by CT CAP and follow up with   Larry.    1/12/24: Cycle 1 (2) paclitaxel/bevacizumab.   pending.          Today she reports;    -BP: Occasionally checks at home, no hx HTN, appears to be running normally in clinic  -Headaches: No  -Vision changes: No  -Nausea or vomiting: No  -Appetite:  Doesn't always feel like eating but eating and drinking without difficulty  -Weight loss:  Yes, 10 lbs weight loss since starting this regimen  -Diarrhea/Constipation:  Constipation, taking  senna and miralax daily which is helpful  -Neuropathy symptoms: Yes, having some numbness across the soles of her  feet that seems to be getting better, she can feel the ground when she walks, not impacting function  -Leg swelling: No  -Fevers: No  -Chest pain: No  -SOB:  No SOB at rest, she sometimes feels winded when walking long distances  -Having an xray to check her stent placement this week and see if stent has passed-Dr. Simpson with GI placed it.  No follow up scheduled with him.  -One of her daughters passed away recent from sepsis of unknown etiology.              Review of Systems:    See HPI      Past Medical History:    Past Medical History:   Diagnosis Date    Atrial fibrillation with rapid ventricular response (H)     History of cold sores     Hx of LASIK 12/11/2017    Insomnia     Migraine     Osteopenia     Pelvic mass     Peritoneal carcinomatosis (H)     Restless legs syndrome (RLS)          Past Surgical History:    Past Surgical History:   Procedure Laterality Date    APPENDECTOMY      ARTHROSCPY KNEE SURGICAL DEBRIDEMENT SHAVING ARTICULAR CARTILAGE Right     BIOPSY  January 2021    Biopsy to confirm ovarian cancer    DEBRIDEMENT LEFT UPPER EXTREMITY  2016    ENDOSCOPIC RETROGRADE CHOLANGIOPANCREATOGRAM N/A 12/15/2023    Procedure: ENDOSCOPIC RETROGRADE CHOLANGIOPANCREATOGRAPHY, with pancreatic stent placement, biliary duct dilation, biliary stent placement, and biliary sphincterotomy;  Surgeon: Fabian Simpson MD;  Location: [facility] OR    HYSTERECTOMY  TOTAL ABD, LUISITO SALPINGO-OOPHORECTOMY, NODE DISSECTION, TUMOR DEBULKING, COMBINED Bilateral 4/19/2021    Procedure: HYSTERECTOMY, TOTAL, ABDOMINAL, WITH BILATERAL SALPINGO-OOPHORECTOMY, omentectomy, NEOPLASM DEBULKING,Proctoscocy, RO, Resection of liver nodules, diaphragm stripping, immobilization of liver and colon;  Surgeon: Bolivar Juarez MD;  Location: [facility] OR    LAPAROSCOPY DIAGNOSTIC (GYN) Bilateral 1/7/2021    Procedure: Diagnsotic laparoscopy, biopsies;  Surgeon: Bolivar Juarez MD;  Location: [facility] OR    LASIK      TUBAL LIGATION           Health Maintenance Due   Topic Date Due    DEXA  Never done    ADVANCE CARE PLANNING  Never done    COLORECTAL CANCER SCREENING  Never done    HEPATITIS C SCREENING  Never done    LIPID  Never done    RSV VACCINE (Pregnancy & 60+) (1 - 1-dose 60+ series) Never done    MEDICARE ANNUAL WELLNESS VISIT  11/11/2021    Pneumococcal Vaccine: 65+ Years (2 of 2 - PPSV23 or PCV20) 04/05/2022    INFLUENZA VACCINE (1) Never done    COVID-19 Vaccine (4 - 2023-24 season) 09/01/2023    PHQ-2 (once per calendar year)  01/01/2024       Current Medications:     Current Outpatient Medications   Medication Sig Dispense Refill    BEVACIZUMAB, AVASTIN, INJECTION 2.5MG Every other week (Tuesday)      cyclobenzaprine (FLEXERIL) 5 MG tablet Take 1 tablet (5 mg) by mouth 3 times daily as needed for muscle spasms 10 tablet 0    fluticasone (FLONASE) 50 MCG/ACT nasal spray Spray 1 spray into both nostrils as needed      HYDROmorphone (DILAUDID) 2 MG tablet Take 1 tablet (2 mg) by mouth every 6 hours as needed for pain 10 tablet 0    meclizine (ANTIVERT) 25 MG tablet Take 1 tablet (25 mg) by mouth 3 times daily as needed for dizziness or nausea 15 tablet 0    metoclopramide (REGLAN) 10 MG tablet Take 1 tablet (10 mg) by mouth 3 times a day as needed for nausea      metoclopramide (REGLAN) 5 MG tablet Take 1 tablet (5 mg) by mouth 3 times daily as needed (nausea and vomiting) 30 tablet 0     ondansetron (ZOFRAN) 4 MG tablet Take 2 tablets (8 mg) by mouth every 8 hours as needed for nausea 30 tablet 3    prochlorperazine (COMPAZINE) 10 MG tablet Take 1 tablet (10 mg) by mouth 4 times a day as needed for nausea or vomiting      rivaroxaban ANTICOAGULANT (XARELTO ANTICOAGULANT) 20 MG TABS tablet Take 1 tablet (20 mg) by mouth every morning 90 tablet 1    SUMAtriptan (IMITREX) 100 MG tablet Take 1 tablet (100 mg) by mouth at onset of headache for migraine 15 tablet 0    traMADol (ULTRAM) 50 MG tablet take 1 tablet by mouth every 6 hours as needed for moderate pain      valACYclovir (VALTREX) 1000 mg tablet Take 1,000 mg by mouth as needed      zolpidem (AMBIEN) 10 MG tablet Take 10 mg by mouth every evening           Allergies:      No Known Allergies     Social History:     Social History     Tobacco Use    Smoking status: Former     Packs/day: 0.50     Years: 40.00     Additional pack years: 0.00     Total pack years: 20.00     Types: Cigarettes     Quit date:      Years since quittin.0    Smokeless tobacco: Never   Substance Use Topics    Alcohol use: Not Currently       History   Drug Use Unknown         Family History:     The patient's family history is notable for:    Family History   Adopted: Yes   Problem Relation Age of Onset    Cancer Mother 36    Other Cancer Mother         Bio mother  of  a female cancer  at 36    Factor V Leiden deficiency Daughter     Deep Vein Thrombosis Daughter     Diabetes Type 1 Daughter     Diabetes Daughter     Hypertension Daughter     Anesthesia Reaction No family hx of          Physical Exam:     Ht 1.829 m (6')   Wt 74.8 kg (165 lb)   BMI 22.38 kg/m    Body mass index is 22.38 kg/m .    General Appearance: alert, no distress    Eyes:  Eyes grossly normal to inspection.  No discharge or erythema, or obvious scleral/conjunctival abnormalities.    Respiratory: No audible wheeze, cough, or visible cyanosis.  No visible retractions or increased work of  breathing.     Musculoskeletal: extremities non tender and without edema    Skin: no lesions or rashes on visible skin    Neurological: normal gait, no gross defects     Psychiatric: appropriate mood and affect. Mentation appears normal, affect normal/bright, judgement and insight intact, normal speech and appearance well-groomed                            The rest of a comprehensive physical examination is deferred due to video visit restrictions.     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 She presents today for follow up and disease management by video.     40 minutes spent on the date of the encounter doing chart review, history and exam, documentation, and further activities as noted above.      Plan:     1.) Ovarian cancer:  OK to proceed with planned treatment Friday if labs and BP are WDL.  RTC in 4 weeks for labs, provider visit, and next cycle; this is already scheduled.  Plan for CT CAP and follow up with Dr. Juarez after cycle 3 (overall).  Reviewed signs and symptoms for when she should contact the clinic or seek additional care.  Patient to contact the clinic with any questions or concerns in the interim.      Genetic risk factors were assessed and she is POSITIVE for a BRCA1 mutation.  This mutation is called [id number]. Referral for Waleska Zamorano with Cancer Risk management placed 5/2022.     Labs and/or tests ordered include:  CBC. CMP. Mag. Urine protein. .     2.) Health maintenance:  Issues addressed today include following up with PCP for annual health maintenance and non-gynecologic issues.     3.) Weight loss: She reports about a 10 lbs weight loss over the last month since starting current regimen.  She was also admitted with elevated bilirubin due to numerous liver lesions requiring stent placement during this time.  Of note her daughter also passed away.  Encouraged continuing PO intake of  small frequent meals.  She is currently staying with her family as her other daughter had a baby.      Ella Murillo, DNP, APRN, FNP-C, AOCNP  Oncology Nurse Practitioner  Division of Gynecologic Oncology  Pager: [phone]     CC  Patient Care Team:  Bolivar Juarez MD as PCP - General (Gynecologic Oncology)  Marta Okeefe APRN CNP as Assigned PCP  Cogan, Jacob, MD as Physician (Hematology & Oncology)  Bolivar Juarez MD as Assigned Cancer Care Provider  Marta Okeefe APRN CNP as Assigned Pain Medication Provider  SELF, REFERRED

## 2024-01-12 NOTE — PROGRESS NOTES
Infusion Nursing Note:  Taran Regalado presents today for what is really C2D1 Taxol, Avastin. Pt did C1D1 in [place].   Patient seen by provider today: No-VV with Ella Murillo yesterday.   present during visit today: Not Applicable.    Note: Pt not new to infusion center, but she typically does most of her treatments up in [place]. Pt states she has tolerated this regimen and has no questions. Pt is here with her daughter. Pt has compazine, zofran, and reglan at home for as needed nausea. Pt wondering about getting Onpro each time, so Dr. Juarez was paged and it was discussed with pharmacy that p will receive Onpro on Day 8 of the cycle which will be her next treatment. Treatment plan updated per pharmacy.    Intravenous Access:  Peripheral IV placed.    Treatment Conditions:  Lab Results   Component Value Date    HGB 11.4 (L) 01/12/2024    WBC 4.0 01/12/2024    ANEU 18.3 (H) 07/07/2023    ANEUTAUTO 2.1 01/12/2024     01/12/2024        Lab Results   Component Value Date     01/12/2024    POTASSIUM 4.0 01/12/2024    MAG 1.9 01/12/2024    CR 0.72 01/12/2024    HORACIO 9.5 01/12/2024    BILITOTAL 1.0 01/12/2024    ALBUMIN 3.7 01/12/2024    ALT 42 01/12/2024    AST 63 (H) 01/12/2024       Results reviewed, labs MET treatment parameters, ok to proceed with treatment.      Post Infusion Assessment:  Patient tolerated infusion without incident.  Blood return noted pre and post infusion.  Site patent and intact, free from redness, edema or discomfort.  No evidence of extravasations.  Access discontinued per protocol.       Discharge Plan:   AVS to patient via [facility].  Patient will return 1/19/24 for next appointment. Future appts have been reviewed and crosschecked with appt note and plan.   Patient discharged in stable condition accompanied by: daughter.  Departure Mode: Ambulatory.      Destinee Valenzuela RN

## 2024-01-19 NOTE — TELEPHONE ENCOUNTER
Prior Authorization Retail Medication Request    Medication/Dose: Olanzapine  Diagnosis and ICD code (if different than what is on RX):    New/renewal/insurance change PA/secondary ins. PA:  Previously Tried and Failed:    Rationale:      Insurance   Primary: [employer] part D  Insurance ID:  [id number]    Secondary (if applicable):  Insurance ID:      Pharmacy Information (if different than what is on RX)  Name:  [facility]  Phone:  [phone]  Fax:

## 2024-01-19 NOTE — TELEPHONE ENCOUNTER
Retail Pharmacy Prior Authorization Team   Phone: [phone]    PA Initiation    Medication: OLANZAPINE 5 MG PO TABS  Insurance Company: [employer] - Phone [phone] Fax [phone]  Pharmacy Filling the Rx: [place], [place] - [address] [address] [place], [place] [address], [address]  Filling Pharmacy Phone: [phone]  Filling Pharmacy Fax:    Start Date: 1/19/2024

## 2024-01-19 NOTE — PROGRESS NOTES
Infusion Nursing Note:  Taran Regalado presents today for C1D8 Taxol.    Patient seen by provider today: No   present during visit today: Not Applicable.    Note: Pt c/o ongoing fatigue, decreased appetite with N/V despite trying reglan last cycle. Encouraged pt to take ondansetron scheduled and compazine prn. Message sent to Ella Murillo to ask for a script for lorazepam. See flow sheet for assessment.  Ella added Emend to the pre-meds prior to taxol and sent a script to  pharmacy for zyprexa at bedtime. Pt aware.      Intravenous Access:  Peripheral IV placed.    Treatment Conditions:  Lab Results   Component Value Date    HGB 11.7 01/19/2024    WBC 3.3 (L) 01/19/2024    ANEU 18.3 (H) 07/07/2023    ANEUTAUTO 1.6 01/19/2024     01/19/2024        Results reviewed, labs MET treatment parameters, ok to proceed with treatment.      Post Infusion Assessment:  Patient tolerated infusion without incident.  Blood return noted pre and post infusion.  Site patent and intact, free from redness, edema or discomfort.  No evidence of extravasations.  Access discontinued per protocol.  ONPRO  Was placed on patient's: back of left arm.    Was placed at 4:00 PM    Podpal used: No    ONPRO injector device Lot number: [id number]    Patient education included: what patient can expect after application, what colored lights mean on the device, when to remove device, when and where to call with questions or issues, all patients questions answered, and that Neulasta administration will occur at 1/20/24 at 7pm.    Patient tolerated administration well.  .       Discharge Plan:   Patient discharged in stable condition accompanied by: daughter.  Departure Mode: Ambulatory.  Pt will RTC 1/26/24 for C1D15 Taxol/Zirabev. Appts verified with plan of care and pt aware.      Scar Cardenas RN

## 2024-01-25 NOTE — TELEPHONE ENCOUNTER
Prior Authorization Approval    Medication: OLANZAPINE 5 MG PO TABS  Authorization Effective Date: 1/1/2024  Authorization Expiration Date: 1/19/2025  Approved Dose/Quantity:   Reference #:     Insurance Company: [employer] - Phone [phone] Fax [phone]  Expected CoPay: $    CoPay Card Available:      Financial Assistance Needed:   Which Pharmacy is filling the prescription: [facility] - [place], [place] - [address], [address]  Pharmacy Notified: Yes  Patient Notified:

## 2024-01-26 NOTE — PROGRESS NOTES
RN reached out to patient to further review daughters concerns     Patient feels she is eating and drinking well but truly is struggling with energy     Patient finds it exhausting to do tasks and really could sleep all day     Patient denies any other signs and symptoms of concern     Patient feels maybe she is fighting a cold or something like that     RN and patient did review the effects of undergoing chemo along with having received chemo prior    RN and patient reviewed potential tips     RN offered cancer rehab but patient declined at this time     Patient again denied signs and symptoms of concern (including SOB)    Patient will reach out if any signs and symptoms of concerns arise     Shawna Hinojosa RN

## 2024-01-26 NOTE — PROGRESS NOTES
Infusion Nursing Note:  Taran Regalado presents today for C1D15 Zirabev/Taxol.    Patient seen by provider today: No   present during visit today: Not Applicable.    Note: Patient reports eating and drinking well in the last week. States she has been taking Zofran scheduled as recommended with last visit but in turn is going through her refills quicker, asking today for bigger quantity of medication with each refill. IB sent to care team to assess.    Intravenous Access:  Peripheral IV placed.    Treatment Conditions:  Lab Results   Component Value Date    HGB 11.7 01/26/2024    WBC 9.4 01/26/2024    ANEU 5.6 01/26/2024    ANEUTAUTO 1.6 01/19/2024     (L) 01/26/2024        Lab Results   Component Value Date     01/26/2024    POTASSIUM 3.7 01/26/2024    MAG 1.8 01/26/2024    CR 0.83 01/26/2024    HORACIO 9.4 01/26/2024    BILITOTAL 0.7 01/26/2024    ALBUMIN 3.8 01/26/2024    ALT 39 01/26/2024    AST 57 (H) 01/26/2024       Urine protein negative.  BP< 160/100.  Results reviewed, labs MET treatment parameters, ok to proceed with treatment.    Post Infusion Assessment:  Patient tolerated infusion without incident.  Blood return noted pre and post infusion.  Site patent and intact, free from redness, edema or discomfort.  No evidence of extravasations.  Access discontinued per protocol.     Discharge Plan:   Future appts have been reviewed and crosschecked with appt note and plan.  AVS to patient via [employer].  Patient will return 2/9/24 for next appointment.   Patient discharged in stable condition accompanied by: self.  Departure Mode: Ambulatory.      Christa Kevin RN BSN [facility]

## 2024-02-01 NOTE — TELEPHONE ENCOUNTER
RN reached out to daughter to address MyChart message     RN reached out to MD with patient/family concern     MD stated either option they were thinking of would be OK     Options:   Complete the scheduled 2/9 Infusion, break for vacation, restart when returns   Chemo break until return from vacation     Austynt sent back to patient/family with update     Shawna Hinojosa RN

## 2024-02-05 NOTE — PROGRESS NOTES
Virtual Visit Details    Type of service:  Video Visit   Video Start Time:  3:05 pm  Video End Time: 3:20 pm    Originating Location (pt. Location): Home    Distant Location (provider location):  On-site  Platform used for Video Visit: 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 She presents today for follow up and disease management by video.     Oncology History:  12/3/2020: US Pelvic: IMPRESSION: Limited examination due to acoustic windows. Possible left adnexal mass. A CT scan of the abdomen and pelvis with contrast is recommended for further assessment.     2020: CT A/P:   IMPRESSION:    Peritoneal carcinomatosis with masslike peritoneal thickening in the lower pelvis which may indicate an adnexal or ovarian primary malignancy. Large volume ascites. Bilateral pleural effusions. There is potential subtle pleural nodularity in the right hemithorax which could indicate metastatic disease.  Indeterminate 1 cm lesion in the right hepatic lobe suspicious for a metastatic lesion.      2020: Presented to [facility] with abdominal distention, 25lb weight loss, and CTAP with carcinomatosis, elevated  3098.     2020: CT Chest: IMPRESSION:   1. There are few scattered small sub-6 mm pulmonary nodules which are indeterminate without prior comparisons available. There are a few  slightly larger perifissural nodules which are technically  indeterminate in the setting of malignancy although presumed lymphatic in nature and of unlikely clinical significance. Attention on follow-up is recommended.  2. Small to moderate left and small right pleural effusions are increased in size from prior. No convincing evidence for pleural  nodularity.  3. Partially visualized large volume ascites and peritoneal nodularity in the upper abdomen similar to 12/4/2020 outside CT      12/26/2020: ED for abdominal distension; 3 L ascites drained with paracentesis    Pelvic US: Findings: Free fluid present in LLQ      12/31/2020: US Paracentesis: 900 mL ascites drained     1/7/2021: Diagnotic laparoscopy, biopsies  Pathology: FINAL DIAGNOSIS:   A. PERITONEUM, BIOPSIES:   - Positive for high grade carcinoma, consistent with metastatic carcinoma of Mullerian origin.     1/10-1/13/2021: Hospital admission for postoperative non-intractable vomiting and nausea.      1/10/2021: CT A/P: IMPRESSION: Extensive ascites which is probably malignant. Scattered liver hypodensities of indeterminate etiology comment cannot exclude metastatic disease. Diverticulosis. Fluid-filled adnexal masses and irregular appearance of uterus, which may represent primary neoplasm. Multiple peritoneal nodules. Large amount of fecal material in the colon with no evidence of small bowel obstruction.     Plan: Paclitaxel 175 mg/m2 and carboplatin AUC 6 x 3 cycles followed by a CT CAP and visit with Dr. Juarez.     1/12/2021: Cycle 1 paclitaxel and carboplatin while inpatient     1/13/2021: CT Head: Impression:  1. Chronic sinusitis of the right maxillary and right sphenoid sinuses.  2. Incidental presumed calcified meningioma in the right frontal  convexity without significant mass effect.  3. No suspicious intracranial enhancing lesion.     2/1/2021: Cycle 2 paclitaxel and carboplatin.  936.     2/3-2/5/21: Admission [facility] for afib w/ RVR and new PE     2/26/21: Cycle 3 paclitaxel and carboplatin planned.  Deferred due to thrombocytopenia.  Deferred 3/12/21 due to neutropenia.  Given on 3/15/21 after Filgrastim injection x 2.  Add Pegfilgrastim to day 2 of treatment plan.   129.      4/2/21: CT CAP  IMPRESSION:   In this patient with a history of metastatic serous ovarian  cancer,  status post diagnostic laparoscopy and neoadjuvant chemotherapy:  1. Significant improvement in peritoneal carcinomatosis as discussed  above.  2. Resolution of previously seen scattered small hypoattenuating  lesions in the liver. This raises the concern for these lesions  representing metastatic disease, noting excellent response elsewhere  in abdomen and pelvis.  3. Bilateral pleural effusions have resolved.  4. Several small pulmonary nodules in the right lung measuring up to 5  mm. Indeterminate, but likely benign in the setting of their stability  with excellent response elsewhere. Continued attention on follow-up.     4/19/21: HYSTERECTOMY, TOTAL, ABDOMINAL, WITH BILATERAL SALPINGO-OOPHORECTOMY, omentectomy, NEOPLASM DEBULKING,Proctoscocy, RO, Resection of liver nodules, diaphragm stripping, immobilization of liver and colon  FINAL DIAGNOSIS:   A. OMENTUM, BIOPSY:   - Omental adipose tissue with rare viable cells of metastatic high grade   serous carcinoma   - One reactive lymph node, negative for malignancy (0/1)   B. NODULE, SIGMOID, EXCISION:   - Calcified necrotic adipose tissue   - Negative for malignancy   C. NODULE, SMALL BOWEL MESENTERY, EXCISION:   - Fibroadipose tissue, positive for metastatic high grade serous carcinoma   D. UTERUS, CERVIX, BILATERAL FALLOPIAN TUBES AND OVARIES, HYSTERECTOMY   WITH BILATERAL SALPINGO-OOPHORECTOMY:   - Atrophic endometrium   - Uterine serosa with rare viable cells consistent with high grade serous   carcinoma   - Cervix with atrophic changes   - Viable cells consistent with high grade serous carcinoma present in the   right ovary, serosa of right   fallopian tube and right periadnexal soft tissue   - Left ovary with atrophic changes   - Left fallopian tube with a rare focus of serous tubal in-situ carcinoma   (STIC)   E. NODULES, SMALL BOWEL MESENTRY, EXCISION:   - Fibroadipose tissue with rare viable cells of metastatic high grade   serous carcinoma   F.  NODULE, SPLENIC FLEXURE TRANSVERSE COLON, EXCISION:   - Fibroadipose tissue with rare viable cells of metastatic high grade   serous carcinoma   - Accessory splenule, negative for malignancy   G. OMENTUM, OMENTECTOMY:   - Omental adipose tissue with rare viable cells of metastatic high grade   serous carcinoma   H. NODULE, PERITONEAL PANCREATIC, EXCISION:   - Fibrous adhesions with inflammation   - Negative for malignancy   I. RIGHT HEMIDIAPHRAGM PERITONEUM, EXCISION:   - Fibrous adhesions with inflammation   - Negative for malignancy   J. RIGHT LIVER SURFACE NODULE:   - Fibrous adhesions with benign inclusion glands   - Negative for malignancy   K. LEFT LOWER LIVER EDGE, BIOPSY:   - Cauterized hepatic parenchyma and capsule   - Negative for malignancy   L. NODULE, SMALL BOWEL MESENTERIC #3, EXCISION:   - Fibroadipose tissue with rare viable cells of metastatic high grade   serous carcinoma       Plan: Carboplatin AUC 6 + Taxol 175 mg/m2 x 3 cycles, then transition to Parp inhibitor for maintenance therapy given her BRCA1 germline mutation.      5/21/21: Cycle 4 carboplatin and paclitaxel.   172.  6/11/21: Cycle 5 carboplatin and paclitaxel.   61.  7/2/21: Cycle 6 carboplatin and paclitaxel.   20.        7/28/21 plan: Olaparib 300mg bid as starting dose,  14  8/20/21: start date olaparib 300 mg BID,  12  9/13/21:  22  10/4/21:  23  11/1/21:  26     11/02/2021: PET CT: IMPRESSION:   Findings compatible with interval surgery and posttreatment change.  No gross definitive FDG avid disease.  Potential foci of tumor deposits along the anterior dome of the liver and midline abdominal wall surgical scar.  Colonic activity is not necessarily abnormal, however, given the previous carcinomatosis the colonic activity is indeterminant.         12/1/21:  23  1/3/22:  21  2/1/22:  20  3/1/22:  21  4/1/22:  23  5/4/22:  28     7/11/2022: CT CAP                                        IMPRESSION:  1.  Sliver of ascites in the upper abdomen has decreased since interval debulking surgery.  2.  There is a punctate nodule in the gastrosplenic ligament which is minimally more plump relative to the preop exam. This will need to be followed.  3.  Minimal nodular changes to the left of the midline scar in the subcutaneous fat will have to be followed as well. Unclear if this simply reflects postoperative scarring or could reflect an early incisional recurrence.  4.  No other sites to suggest recurrent tumor. Vaginal cuff is normal.  5.  Extensive distal colonic diverticulosis.  6.  Other noncritical findings as noted above.      9/16/22  98     1/30/23 CT CAP:    IMPRESSION:  1.  Multiple new, hypoattenuating lesions in the liver, suspicious for hepatic metastatic disease.  2.  Necrotic mario hepatic lymphadenopathy, concerning for richard metastatic disease.  3.  There is a new or increasingly conspicuous 6 mm soft tissue nodule in the right lower quadrant. This is indeterminate.  4.  There is a new 3 mm solid nodule in the right upper lobe, indeterminate.  5.  Stable approximate 4 mm punctate nodule along the gastrosplenic ligament.  6.  Similar area of linear free fluid in the upper abdomen anterior to the stomach.     Plan: Paclitaxel 175 mg/m2, Carboplatin AUC 6, bevacizumab 7.5 mg/kg     3/1/23: Cycle #1 Paclitaxel 175 mg/m2, Carboplatin AUC 6 (C7), bevacizumab 7.5 mg/kg.  1,196  3/24/23: Cycle #2 Paclitaxel 150 mg/m2, Carboplatin AUC 5 (C8), bevacizumab 15 mg/kg.  558     3/29/23: ER for dehydration and hypotension. Normotensive in ER. IV fluids given. Discharged.      4/14/23: Cycle #3 Paclitaxel 150 mg/m2, Carboplatin AUC 5 (C9), bevacizumab 15 mg/kg deferred neutropenia ANC 0.3   273  4/21/23: treatment deferred ANC 0.8  4/28/23: Cycle #3 Paclitaxel 150 mg/m2, Carboplatin AUC 5 (C9), bevacizumab 15 mg/kg, + Neulasta deferred ANC 1.0, CA  125 297     5/26/23: Cycle #4  Paclitaxel 150 mg/m2, Carboplatin AUC 5 (C10), bevacizumab 15 mg/kg, + Neulasta, deferred ANC 0.6  188. No hematology consult per MD.      6/2/23: Cycle #4 Paclitaxel 150 mg/m2, Carboplatin AUC 5 (C9), bevacizumab 15 mg/kg, + Neulasta   6/23/23: Cycle #5 deferred neutropenia ANC 0.5 thrombocytopenia platelets 85     6/26/2023 Addendum: Reduce both Carboplatin and Paclitaxel due to thrombocytopenia and persistent neutropenia. REFER to Hematology to rule out MDS. Message sent to pt. Orders placed.      7/3/23 plan: continue with 2 more cycles with carboplatin AUC of 4, Taxol at 135 mg per metered square, bevacizumab at 15 mg/kg as well as neulasta for bone marrow support.      7/3/23: Cycle #5 Paclitaxel 135 mg/m2, Carboplatin AUC 4, bevacizumab 15 mg/kg, +Neulasta.  375     7/11/23: per chart review Dr. Cogan with Hematology plan one more cycle of chemotherapy then maintenance Avastin     7/28/23: Cycle #6 Paclitaxel 135 mg/m2, Carboplatin AUC 4, bevacizumab 15 mg/kg, +Neulasta.  370     8/17/2023: CT CAP: Stable bilateral pulmonary micronodules. Multiple subcentimeter hypodense hepatic lesions, a few are slightly less conspicuous. Necrotic lymph nodes in mario hepatis are stable. A few small peritoneal nodules in the left upper quadrant. Anterior to the pylorus is an oval 2.4 cm hypodense soft tissue lesion which is stable.    8/17/2023: started maintenance bevacizumab q3 weeks.  370.    8/25/2023:  450.    9/19/2023:  1056. Alk phos 221, ALT 86, AST 63.    9/21/2023: Transferred her care from [facility] to [facility] to be closer to home. Plan is maintenance bevacizumab 15 mg/kg every 3 weeks to give her a treatment break from myelosuppressive chemotherapy.    10/10/2023:  1889, alk phos 388, , AST 83  10/12/2023: Bevacizumab held for elevated LFTs, symptoms of new back pain, cough, sinusitis symptoms.    10/18/2023: CT CAP:  Progressive liver metastasis. Small nonspecific right lower lobe pulmonary nodule. Inflammatory process or a metastatic nodule remain possible. This does not have the characteristic appearance of metastasis, however, remains indeterminant.     Plan: Weekly paclitaxel 80 mg/m2 day 1, 8, 15, and bevacizumab 10 mg/kg day 1 and day 15 of a 28 day cycle x 3 cycles followed by CT CAP and follow up with Dr. Juarez in [place].     12/8/2023:  >10,000. Bilirubin 4.2, LFTs elevated.    12/12/2023: C1D1 paclitaxel and bevacizumab. ([place])    12/13/2023: Presented to the ED in [place] with worsening RUQ pain and jaundice x 2 weeks. Bilirubin 8.1. Waitlisted for transfer to [facility]. Elected to leave the ED.  12/13/2023: CT abdomen pelvis ([place]): New and enlarging innumerable hypodense liver lesions. Increased retroperitoneal adenopathy. Increased left >right intrahepatic biliary ductal dilatation without a resting cause noted. Common bile duct borderline enlarged 6 mm. Ventral hernia containing colon.  12/13/2023: Abdominal US ([place]): Numerous liver lesions. Common bile duct dilated at 9 mm, no obstructing stone. Spleen 14 cm, enlarged.    12/15/2023: ERCP ([facility]): Ventral pancreatic duct prophylactically stented. Biliary sphincterotomy. Cholangiogram showed multifocal biliary stricture. Intrahepatic and common hepatic duct strictures dilated with a balloon. 2 metal stents placed into the left main and right anterior intrahepatic duct    12/19/2023: C1D8 paclitaxel administered. Bilirubin 3.5, LFTs improving. WBC 2.2, ANC 1.0, platelets 90. ([place])  12/22/23: Neupogen for ANC 1.0.  12/28/2023: C1D15 paclitaxel + bevacizumab. Bilirubin 1.9, LFT's improving, ANC 1.0, platelets 174. ([place])     Plan: Transfer back to [facility] with weekly paclitaxel 80 mg/m2 day 1, 8, 15, and bevacizumab 10 mg/kg day 1 and day 15 of a 28 day cycle x 2 additional cycles followed by CT CAP and follow up with Dr. Juarez.      1/12/24: Cycle 1 (2) paclitaxel/bevacizumab.   2113.    Delaying cycle 2 (3) for vacation.              Today she reports;    -BP: She does not check her BP at home and it has always been normal in clinic  -Headaches: No  -Vision changes: Due for an eye exam  -Nausea or vomiting: She was been having a lot of nausea with her last cycle improved with zofran 8 mg BID. She is worried she will not have enough zofran for her trip.  She is not taking the prescribed olanzapine.  Nausea is better and she is not sure if she is continuing to have nausea but has been continuing the zofran.  -Appetite:  Decreased appetite but eating and drinking without difficulty  -Weight loss: Weight stable  -Diarrhea/Constipation: She is having constipation taking miralax once per day and docusate 2 in the morning/3 at night  -Neuropathy symptoms: She is continuing to have some numbness across the soles of her feet that seems to a little worse, she can feel the ground when she walks, not impacting function   -Leg swelling: No  -Fevers: No  -Chest pain: No  -SOB:  No  Very fatigued, wiped out, tired, weak  She is going on vacation to [facility] and will be back 2/26.                Review of Systems:    See HPI      Past Medical History:    Past Medical History:   Diagnosis Date    Atrial fibrillation with rapid ventricular response (H)     History of cold sores     Hx of LASIK 12/11/2017    Insomnia     Migraine     Osteopenia     Pelvic mass     Peritoneal carcinomatosis (H)     Restless legs syndrome (RLS)          Past Surgical History:    Past Surgical History:   Procedure Laterality Date    APPENDECTOMY      ARTHROSCPY KNEE SURGICAL DEBRIDEMENT SHAVING ARTICULAR CARTILAGE Right     BIOPSY  January 2021    Biopsy to confirm ovarian cancer    DEBRIDEMENT LEFT UPPER EXTREMITY  2016    ENDOSCOPIC RETROGRADE CHOLANGIOPANCREATOGRAM N/A 12/15/2023    Procedure: ENDOSCOPIC RETROGRADE CHOLANGIOPANCREATOGRAPHY, with pancreatic stent  placement, biliary duct dilation, biliary stent placement, and biliary sphincterotomy;  Surgeon: Fabian Simpson MD;  Location: [facility] OR    HYSTERECTOMY TOTAL ABD, LUISITO SALPINGO-OOPHORECTOMY, NODE DISSECTION, TUMOR DEBULKING, COMBINED Bilateral 4/19/2021    Procedure: HYSTERECTOMY, TOTAL, ABDOMINAL, WITH BILATERAL SALPINGO-OOPHORECTOMY, omentectomy, NEOPLASM DEBULKING,Proctoscocy, RO, Resection of liver nodules, diaphragm stripping, immobilization of liver and colon;  Surgeon: Bolivar Juarez MD;  Location: [facility] OR    LAPAROSCOPY DIAGNOSTIC (GYN) Bilateral 1/7/2021    Procedure: Diagnsotic laparoscopy, biopsies;  Surgeon: Bolivar Juarez MD;  Location: [facility] OR    LASIK      TUBAL LIGATION           Health Maintenance Due   Topic Date Due    DEXA  Never done    ADVANCE CARE PLANNING  Never done    COLORECTAL CANCER SCREENING  Never done    HEPATITIS C SCREENING  Never done    LIPID  Never done    RSV VACCINE (Pregnancy & 60+) (1 - 1-dose 60+ series) Never done    MEDICARE ANNUAL WELLNESS VISIT  11/11/2021    Pneumococcal Vaccine: 65+ Years (2 of 2 - PPSV23 or PCV20) 04/05/2022    INFLUENZA VACCINE (1) Never done    COVID-19 Vaccine (4 - 2023-24 season) 09/01/2023    PHQ-2 (once per calendar year)  01/01/2024       Current Medications:     Current Outpatient Medications   Medication Sig Dispense Refill    amitriptyline (ELAVIL) 100 MG tablet Take 100 mg by mouth at bedtime      BEVACIZUMAB, AVASTIN, INJECTION 2.5MG Every other week (Tuesday)      ondansetron (ZOFRAN) 4 MG tablet Take 2 tablets (8 mg) by mouth every 8 hours as needed for nausea 30 tablet 3    ondansetron (ZOFRAN) 8 MG tablet Take 1 tablet (8 mg) by mouth every 8 hours as needed for nausea 30 tablet 1    rivaroxaban ANTICOAGULANT (XARELTO ANTICOAGULANT) 20 MG TABS tablet Take 1 tablet (20 mg) by mouth every morning 90 tablet 1    SUMAtriptan (IMITREX) 100 MG tablet Take 1 tablet (100 mg) by mouth at onset of headache for migraine 15 tablet 0     zolpidem (AMBIEN) 10 MG tablet Take 10 mg by mouth every evening      cyclobenzaprine (FLEXERIL) 5 MG tablet Take 1 tablet (5 mg) by mouth 3 times daily as needed for muscle spasms (Patient not taking: Reported on 1/12/2024) 10 tablet 0    fluticasone (FLONASE) 50 MCG/ACT nasal spray Spray 1 spray into both nostrils as needed (Patient not taking: Reported on 1/12/2024)      HYDROmorphone (DILAUDID) 2 MG tablet Take 1 tablet (2 mg) by mouth every 6 hours as needed for pain (Patient not taking: Reported on 1/12/2024) 10 tablet 0    meclizine (ANTIVERT) 25 MG tablet Take 1 tablet (25 mg) by mouth 3 times daily as needed for dizziness or nausea (Patient not taking: Reported on 1/12/2024) 15 tablet 0    metoclopramide (REGLAN) 10 MG tablet Take 1 tablet (10 mg) by mouth 3 times a day as needed for nausea (Patient not taking: Reported on 1/12/2024)      metoclopramide (REGLAN) 5 MG tablet Take 1 tablet (5 mg) by mouth 3 times daily as needed (nausea and vomiting) (Patient not taking: Reported on 1/26/2024) 30 tablet 0    OLANZapine (ZYPREXA) 5 MG tablet Take 1 tablet (5 mg) by mouth at bedtime (Patient not taking: Reported on 1/26/2024) 30 tablet 1    oxyCODONE (ROXICODONE) 5 MG tablet Take 5 mg by mouth every 6 hours as needed for severe pain or pain (Patient not taking: Reported on 2/6/2024)      prochlorperazine (COMPAZINE) 10 MG tablet Take 1 tablet (10 mg) by mouth every 6 hours as needed for nausea or vomiting (Patient not taking: Reported on 1/19/2024) 30 tablet 2    prochlorperazine (COMPAZINE) 10 MG tablet Take 1 tablet (10 mg) by mouth 4 times a day as needed for nausea or vomiting (Patient not taking: Reported on 1/12/2024)      traMADol (ULTRAM) 50 MG tablet take 1 tablet by mouth every 6 hours as needed for moderate pain (Patient not taking: Reported on 1/12/2024)      valACYclovir (VALTREX) 1000 mg tablet Take 1,000 mg by mouth as needed (Patient not taking: Reported on 1/26/2024)           Allergies:       No Known Allergies     Social History:     Social History     Tobacco Use    Smoking status: Former     Packs/day: 0.50     Years: 40.00     Additional pack years: 0.00     Total pack years: 20.00     Types: Cigarettes     Quit date:      Years since quittin.1     Passive exposure: Never    Smokeless tobacco: Never   Substance Use Topics    Alcohol use: Not Currently       History   Drug Use Unknown         Family History:     The patient's family history is notable for:    Family History   Adopted: Yes   Problem Relation Age of Onset    Cancer Mother 36    Other Cancer Mother         Bio mother  of  a female cancer  at 36    Factor V Leiden deficiency Daughter     Deep Vein Thrombosis Daughter     Diabetes Type 1 Daughter     Diabetes Daughter     Hypertension Daughter     Anesthesia Reaction No family hx of          Physical Exam:     Ht 1.829 m (6')   Wt 75.8 kg (167 lb)   BMI 22.65 kg/m    Body mass index is 22.65 kg/m .    General Appearance: alert, no distress    Eyes:  Eyes grossly normal to inspection.  No discharge or erythema, or obvious scleral/conjunctival abnormalities.    Respiratory: No audible wheeze, cough, or visible cyanosis.  No visible retractions or increased work of breathing.     Musculoskeletal: extremities non tender and without edema    Skin: no lesions or rashes on visible skin    Neurological: normal gait, no gross defects     Psychiatric: appropriate mood and affect. Mentation appears normal, affect normal/bright, judgement and insight intact, normal speech and appearance well-groomed                            The rest of a comprehensive physical examination is deferred due to video visit restrictions.       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 She presents today for follow up and disease management by video.    30 minutes spent on the date of the encounter doing chart review,  history and exam, documentation, and further activities as noted above.      Plan:     1.)        Ovarian cancer:  Holding treatment until she returns from her trip.  RTC as scheduled 2/26 for labs, provider visit, and next cycle; this is already scheduled.  Plan for CT CAP and follow up with Dr. Juarez after cycle 3 (overall).  Reviewed signs and symptoms for when she should contact the clinic or seek additional care.  Patient to contact the clinic with any questions or concerns in the interim.      Genetic risk factors were assessed and she is POSITIVE for a BRCA1 mutation.  This mutation is called [id number]. Referral for Waleska Zamorano with Cancer Risk management placed 5/2022.      Labs and/or tests ordered include:  None.                2.)        Health maintenance:  Issues addressed today include following up with PCP for annual health maintenance and non-gynecologic issues.     3.) Chemotherapy induced nausea: Worsening nausea this cycle improved with zofran 8 mg BID.  Discussed that often quantity and dose can be limited by her insurance company.  Will attempt to send in a prescription for 8 mg tablets (instead of 4 mg).  Did discuss that as she is not getting chemo until she comes back from vacation she should be able to stop the zofran prior to her trip.    4.) Constipation: Having worsening constipation presumably from increased use of zofran.  Discussed that weaning the zofran down may be the most helpful.  OK to take miralax once or twice daily and senna/docusate up to 4 tablets BID, as needed.  Titrate bowel medication based on symptoms.    Ella Murillo, DNP, APRN, FNP-C, AOCNP  Oncology Nurse Practitioner  Division of Gynecologic Oncology  Pager: [phone]     CC  Patient Care Team:  Bolivar Juarez MD as PCP - General (Gynecologic Oncology)  Cogan, Jacob, MD as Physician (Hematology & Oncology)  Bolivar Juarez MD as Assigned Cancer Care Provider  Clinic - [facility]  [place] as Assigned PCP  SELF, REFERRED

## 2024-02-06 NOTE — NURSING NOTE
Is the patient currently in the state of [place]? YES    Visit mode:VIDEO    If the visit is dropped, the patient can be reconnected by: VIDEO VISIT: Text to cell phone:   Telephone Information:   Mobile [phone]       Will anyone else be joining the visit? NO  (If patient encounters technical issues they should call [phone]    How would you like to obtain your AVS? MyChart    Are changes needed to the allergy or medication list? Pt stated no changes to allergies and Pt stated no med changes    Reason for visit: KONG Crane VVF

## 2024-02-26 NOTE — LETTER
2024         RE: Taran Regalado  [address]  [place] [address]        Dear Colleague,    Thank you for referring your patient, Taran Regalado, to the [facility]. Please see a copy of my visit note below.      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 She presents today for follow up and disease management by video.     Oncology History:  12/3/2020: US Pelvic: IMPRESSION: Limited examination due to acoustic windows. Possible left adnexal mass. A CT scan of the abdomen and pelvis with contrast is recommended for further assessment.     2020: CT A/P:   IMPRESSION:    Peritoneal carcinomatosis with masslike peritoneal thickening in the lower pelvis which may indicate an adnexal or ovarian primary malignancy. Large volume ascites. Bilateral pleural effusions. There is potential subtle pleural nodularity in the right hemithorax which could indicate metastatic disease.  Indeterminate 1 cm lesion in the right hepatic lobe suspicious for a metastatic lesion.      2020: Presented to [facility] with abdominal distention, 25lb weight loss, and CTAP with carcinomatosis, elevated  3098.     2020: CT Chest: IMPRESSION:   1. There are few scattered small sub-6 mm pulmonary nodules which are indeterminate without prior comparisons available. There are a few  slightly larger perifissural nodules which are technically  indeterminate in the setting of malignancy although presumed lymphatic in nature and of unlikely clinical significance. Attention on follow-up is recommended.  2. Small to moderate left and small right pleural effusions are increased in size from prior. No convincing evidence for pleural  nodularity.  3. Partially visualized large volume ascites and peritoneal nodularity in the upper abdomen similar to 12/4/2020 outside CT      12/26/2020: ED for abdominal distension; 3 L ascites drained with paracentesis    Pelvic US: Findings: Free fluid present in LLQ      12/31/2020: US Paracentesis: 900 mL ascites drained     1/7/2021: Diagnotic laparoscopy, biopsies  Pathology: FINAL DIAGNOSIS:   A. PERITONEUM, BIOPSIES:   - Positive for high grade carcinoma, consistent with metastatic carcinoma of Mullerian origin.     1/10-1/13/2021: Hospital admission for postoperative non-intractable vomiting and nausea.      1/10/2021: CT A/P: IMPRESSION: Extensive ascites which is probably malignant. Scattered liver hypodensities of indeterminate etiology comment cannot exclude metastatic disease. Diverticulosis. Fluid-filled adnexal masses and irregular appearance of uterus, which may represent primary neoplasm. Multiple peritoneal nodules. Large amount of fecal material in the colon with no evidence of small bowel obstruction.     Plan: Paclitaxel 175 mg/m2 and carboplatin AUC 6 x 3 cycles followed by a CT CAP and visit with Dr. Juarez.     1/12/2021: Cycle 1 paclitaxel and carboplatin while inpatient     1/13/2021: CT Head: Impression:  1. Chronic sinusitis of the right maxillary and right sphenoid sinuses.  2. Incidental presumed calcified meningioma in the right frontal  convexity without significant mass effect.  3. No suspicious intracranial enhancing lesion.     2/1/2021: Cycle 2 paclitaxel and carboplatin.  936.     2/3-2/5/21: Admission [facility] for afib w/ RVR and new PE     2/26/21: Cycle 3 paclitaxel and carboplatin planned.  Deferred due to thrombocytopenia.  Deferred 3/12/21 due to neutropenia.  Given on 3/15/21 after Filgrastim injection x 2.  Add Pegfilgrastim to day 2 of treatment plan.   129.      4/2/21: CT CAP  IMPRESSION:   In this patient with a history of metastatic serous ovarian  cancer,  status post diagnostic laparoscopy and neoadjuvant chemotherapy:  1. Significant improvement in peritoneal carcinomatosis as discussed  above.  2. Resolution of previously seen scattered small hypoattenuating  lesions in the liver. This raises the concern for these lesions  representing metastatic disease, noting excellent response elsewhere  in abdomen and pelvis.  3. Bilateral pleural effusions have resolved.  4. Several small pulmonary nodules in the right lung measuring up to 5  mm. Indeterminate, but likely benign in the setting of their stability  with excellent response elsewhere. Continued attention on follow-up.     4/19/21: HYSTERECTOMY, TOTAL, ABDOMINAL, WITH BILATERAL SALPINGO-OOPHORECTOMY, omentectomy, NEOPLASM DEBULKING,Proctoscocy, RO, Resection of liver nodules, diaphragm stripping, immobilization of liver and colon  FINAL DIAGNOSIS:   A. OMENTUM, BIOPSY:   - Omental adipose tissue with rare viable cells of metastatic high grade   serous carcinoma   - One reactive lymph node, negative for malignancy (0/1)   B. NODULE, SIGMOID, EXCISION:   - Calcified necrotic adipose tissue   - Negative for malignancy   C. NODULE, SMALL BOWEL MESENTERY, EXCISION:   - Fibroadipose tissue, positive for metastatic high grade serous carcinoma   D. UTERUS, CERVIX, BILATERAL FALLOPIAN TUBES AND OVARIES, HYSTERECTOMY   WITH BILATERAL SALPINGO-OOPHORECTOMY:   - Atrophic endometrium   - Uterine serosa with rare viable cells consistent with high grade serous   carcinoma   - Cervix with atrophic changes   - Viable cells consistent with high grade serous carcinoma present in the   right ovary, serosa of right   fallopian tube and right periadnexal soft tissue   - Left ovary with atrophic changes   - Left fallopian tube with a rare focus of serous tubal in-situ carcinoma   (STIC)   E. NODULES, SMALL BOWEL MESENTRY, EXCISION:   - Fibroadipose tissue with rare viable cells of metastatic high grade   serous carcinoma   F.  NODULE, SPLENIC FLEXURE TRANSVERSE COLON, EXCISION:   - Fibroadipose tissue with rare viable cells of metastatic high grade   serous carcinoma   - Accessory splenule, negative for malignancy   G. OMENTUM, OMENTECTOMY:   - Omental adipose tissue with rare viable cells of metastatic high grade   serous carcinoma   H. NODULE, PERITONEAL PANCREATIC, EXCISION:   - Fibrous adhesions with inflammation   - Negative for malignancy   I. RIGHT HEMIDIAPHRAGM PERITONEUM, EXCISION:   - Fibrous adhesions with inflammation   - Negative for malignancy   J. RIGHT LIVER SURFACE NODULE:   - Fibrous adhesions with benign inclusion glands   - Negative for malignancy   K. LEFT LOWER LIVER EDGE, BIOPSY:   - Cauterized hepatic parenchyma and capsule   - Negative for malignancy   L. NODULE, SMALL BOWEL MESENTERIC #3, EXCISION:   - Fibroadipose tissue with rare viable cells of metastatic high grade   serous carcinoma       Plan: Carboplatin AUC 6 + Taxol 175 mg/m2 x 3 cycles, then transition to Parp inhibitor for maintenance therapy given her BRCA1 germline mutation.      5/21/21: Cycle 4 carboplatin and paclitaxel.   172.  6/11/21: Cycle 5 carboplatin and paclitaxel.   61.  7/2/21: Cycle 6 carboplatin and paclitaxel.   20.        7/28/21 plan: Olaparib 300mg bid as starting dose,  14  8/20/21: start date olaparib 300 mg BID,  12  9/13/21:  22  10/4/21:  23  11/1/21:  26     11/02/2021: PET CT: IMPRESSION:   Findings compatible with interval surgery and posttreatment change.  No gross definitive FDG avid disease.  Potential foci of tumor deposits along the anterior dome of the liver and midline abdominal wall surgical scar.  Colonic activity is not necessarily abnormal, however, given the previous carcinomatosis the colonic activity is indeterminant.         12/1/21:  23  1/3/22:  21  2/1/22:  20  3/1/22:  21  4/1/22:  23  5/4/22:  28     7/11/2022: CT CAP                                        IMPRESSION:  1.  Sliver of ascites in the upper abdomen has decreased since interval debulking surgery.  2.  There is a punctate nodule in the gastrosplenic ligament which is minimally more plump relative to the preop exam. This will need to be followed.  3.  Minimal nodular changes to the left of the midline scar in the subcutaneous fat will have to be followed as well. Unclear if this simply reflects postoperative scarring or could reflect an early incisional recurrence.  4.  No other sites to suggest recurrent tumor. Vaginal cuff is normal.  5.  Extensive distal colonic diverticulosis.  6.  Other noncritical findings as noted above.      9/16/22  98     1/30/23 CT CAP:    IMPRESSION:  1.  Multiple new, hypoattenuating lesions in the liver, suspicious for hepatic metastatic disease.  2.  Necrotic mario hepatic lymphadenopathy, concerning for richard metastatic disease.  3.  There is a new or increasingly conspicuous 6 mm soft tissue nodule in the right lower quadrant. This is indeterminate.  4.  There is a new 3 mm solid nodule in the right upper lobe, indeterminate.  5.  Stable approximate 4 mm punctate nodule along the gastrosplenic ligament.  6.  Similar area of linear free fluid in the upper abdomen anterior to the stomach.     Plan: Paclitaxel 175 mg/m2, Carboplatin AUC 6, bevacizumab 7.5 mg/kg     3/1/23: Cycle #1 Paclitaxel 175 mg/m2, Carboplatin AUC 6 (C7), bevacizumab 7.5 mg/kg.  1,196  3/24/23: Cycle #2 Paclitaxel 150 mg/m2, Carboplatin AUC 5 (C8), bevacizumab 15 mg/kg.  558     3/29/23: ER for dehydration and hypotension. Normotensive in ER. IV fluids given. Discharged.      4/14/23: Cycle #3 Paclitaxel 150 mg/m2, Carboplatin AUC 5 (C9), bevacizumab 15 mg/kg deferred neutropenia ANC 0.3   273  4/21/23: treatment deferred ANC 0.8  4/28/23: Cycle #3 Paclitaxel 150 mg/m2, Carboplatin AUC 5 (C9), bevacizumab 15 mg/kg, + Neulasta deferred ANC 1.0, CA  125 297     5/26/23: Cycle #4  Paclitaxel 150 mg/m2, Carboplatin AUC 5 (C10), bevacizumab 15 mg/kg, + Neulasta, deferred ANC 0.6  188. No hematology consult per MD.      6/2/23: Cycle #4 Paclitaxel 150 mg/m2, Carboplatin AUC 5 (C9), bevacizumab 15 mg/kg, + Neulasta   6/23/23: Cycle #5 deferred neutropenia ANC 0.5 thrombocytopenia platelets 85     6/26/2023 Addendum: Reduce both Carboplatin and Paclitaxel due to thrombocytopenia and persistent neutropenia. REFER to Hematology to rule out MDS. Message sent to pt. Orders placed.      7/3/23 plan: continue with 2 more cycles with carboplatin AUC of 4, Taxol at 135 mg per metered square, bevacizumab at 15 mg/kg as well as neulasta for bone marrow support.      7/3/23: Cycle #5 Paclitaxel 135 mg/m2, Carboplatin AUC 4, bevacizumab 15 mg/kg, +Neulasta.  375     7/11/23: per chart review Dr. Cogan with Hematology plan one more cycle of chemotherapy then maintenance Avastin     7/28/23: Cycle #6 Paclitaxel 135 mg/m2, Carboplatin AUC 4, bevacizumab 15 mg/kg, +Neulasta.  370     8/17/2023: CT CAP: Stable bilateral pulmonary micronodules. Multiple subcentimeter hypodense hepatic lesions, a few are slightly less conspicuous. Necrotic lymph nodes in mario hepatis are stable. A few small peritoneal nodules in the left upper quadrant. Anterior to the pylorus is an oval 2.4 cm hypodense soft tissue lesion which is stable.    8/17/2023: started maintenance bevacizumab q3 weeks.  370.    8/25/2023:  450.    9/19/2023:  1056. Alk phos 221, ALT 86, AST 63.    9/21/2023: Transferred [facility] care from [facility] to [facility] to be closer to home. Plan is maintenance bevacizumab 15 mg/kg every 3 weeks to give her a treatment break from myelosuppressive chemotherapy.    10/10/2023:  1889, alk phos 388, , AST 83  10/12/2023: Bevacizumab held for elevated LFTs, symptoms of new back pain, cough, sinusitis symptoms.    10/18/2023: CT CAP:  Progressive liver metastasis. Small nonspecific right lower lobe pulmonary nodule. Inflammatory process or a metastatic nodule remain possible. This does not have the characteristic appearance of metastasis, however, remains indeterminant.     Plan: Weekly paclitaxel 80 mg/m2 day 1, 8, 15, and bevacizumab 10 mg/kg day 1 and day 15 of a 28 day cycle x 3 cycles followed by CT CAP and follow up with Dr. Juarez in [place].     12/8/2023:  >10,000. Bilirubin 4.2, LFTs elevated.    12/12/2023: C1D1 paclitaxel and bevacizumab. ([place])    12/13/2023: Presented to the ED in [place] with worsening RUQ pain and jaundice x 2 weeks. Bilirubin 8.1. Waitlisted for transfer to [facility]. Elected to leave the ED.  12/13/2023: CT abdomen pelvis ([place]): New and enlarging innumerable hypodense liver lesions. Increased retroperitoneal adenopathy. Increased left >right intrahepatic biliary ductal dilatation without a resting cause noted. Common bile duct borderline enlarged 6 mm. Ventral hernia containing colon.  12/13/2023: Abdominal US ([place]): Numerous liver lesions. Common bile duct dilated at 9 mm, no obstructing stone. Spleen 14 cm, enlarged.    12/15/2023: ERCP ([facility]): Ventral pancreatic duct prophylactically stented. Biliary sphincterotomy. Cholangiogram showed multifocal biliary stricture. Intrahepatic and common hepatic duct strictures dilated with a balloon. 2 metal stents placed into the left main and right anterior intrahepatic duct    12/19/2023: C1D8 paclitaxel administered. Bilirubin 3.5, LFTs improving. WBC 2.2, ANC 1.0, platelets 90. ([place])  12/22/23: Neupogen for ANC 1.0.  12/28/2023: C1D15 paclitaxel + bevacizumab. Bilirubin 1.9, LFT's improving, ANC 1.0, platelets 174. ([place])     Plan: Transfer back to [facility] with weekly paclitaxel 80 mg/m2 day 1, 8, 15, and bevacizumab 10 mg/kg day 1 and day 15 of a 28 day cycle x 2 additional cycles followed by CT CAP and follow up with Dr. Juarez.    "  1/12/24: Cycle 1 (2) paclitaxel/bevacizumab.   2113.   Delaying cycle 2 (3) for vacation.  2/26/24: Cycle 2 (3) paclitaxel/bevacizumab.   pending.          Today she reports;    -Abdominal pain: She has having some intermittent \"nagging\" abdominal pain, tried oxycodone which was helpful.  -BP: She does not check her blood pressures at home.  -Headaches: No  -Vision changes: She has had some vision changes but thinks she just needs new glasses.    -Nausea or vomiting: She is having some nausea, no vomiting, taking zyprexa am and pm then will take zofran as needed during the day  -Appetite: No real appetite but does make herself eat.  She endorses not drinking enough fluids requesting 1 L NS today with her treatment.  -Weight loss: She thinks she may have lost weight but has not weighed herself recently  -Diarrhea/Constipation: She has some constipation and has been using MiraLAX and docusate.  She has not found senna/docusate at the pharmacy  -Leg swelling: No  -Fevers: No  -Chest pain: No  -SOB: She does have some LOPEZ, no SOB with rest  -She will have some blood in her mucus when she blows her nose in the morning                Review of Systems:    See HPI    Past Medical History:    Past Medical History:   Diagnosis Date    Atrial fibrillation with rapid ventricular response (H)     History of cold sores     Hx of LASIK 12/11/2017    Insomnia     Migraine     Osteopenia     Pelvic mass     Peritoneal carcinomatosis (H)     Restless legs syndrome (RLS)          Past Surgical History:    Past Surgical History:   Procedure Laterality Date    APPENDECTOMY      ARTHROSCPY KNEE SURGICAL DEBRIDEMENT SHAVING ARTICULAR CARTILAGE Right     BIOPSY  January 2021    Biopsy to confirm ovarian cancer    DEBRIDEMENT LEFT UPPER EXTREMITY  2016    ENDOSCOPIC RETROGRADE CHOLANGIOPANCREATOGRAM N/A 12/15/2023    Procedure: ENDOSCOPIC RETROGRADE CHOLANGIOPANCREATOGRAPHY, with pancreatic stent placement, biliary duct " dilation, biliary stent placement, and biliary sphincterotomy;  Surgeon: Fabian Simpson MD;  Location: [facility] OR    HYSTERECTOMY TOTAL ABD, LUISITO SALPINGO-OOPHORECTOMY, NODE DISSECTION, TUMOR DEBULKING, COMBINED Bilateral 4/19/2021    Procedure: HYSTERECTOMY, TOTAL, ABDOMINAL, WITH BILATERAL SALPINGO-OOPHORECTOMY, omentectomy, NEOPLASM DEBULKING,Proctoscocy, RO, Resection of liver nodules, diaphragm stripping, immobilization of liver and colon;  Surgeon: Bolivar Juarez MD;  Location: [facility] OR    LAPAROSCOPY DIAGNOSTIC (GYN) Bilateral 1/7/2021    Procedure: Diagnsotic laparoscopy, biopsies;  Surgeon: Bolivar Juarez MD;  Location: [facility] OR    LASIK      TUBAL LIGATION           Health Maintenance Due   Topic Date Due    DEXA  Never done    ADVANCE CARE PLANNING  Never done    COLORECTAL CANCER SCREENING  Never done    HEPATITIS C SCREENING  Never done    LIPID  Never done    RSV VACCINE (Pregnancy & 60+) (1 - 1-dose 60+ series) Never done    MEDICARE ANNUAL WELLNESS VISIT  11/11/2021    Pneumococcal Vaccine: 65+ Years (2 of 2 - PPSV23 or PCV20) 04/05/2022    INFLUENZA VACCINE (1) Never done    COVID-19 Vaccine (4 - 2023-24 season) 09/01/2023    PHQ-2 (once per calendar year)  01/01/2024       Current Medications:     Current Outpatient Medications   Medication Sig Dispense Refill    amitriptyline (ELAVIL) 100 MG tablet Take 100 mg by mouth at bedtime      BEVACIZUMAB, AVASTIN, INJECTION 2.5MG Every other week (Tuesday)      OLANZapine (ZYPREXA) 5 MG tablet Take 1 tablet (5 mg) by mouth at bedtime 30 tablet 1    ondansetron (ZOFRAN) 4 MG tablet Take 2 tablets (8 mg) by mouth every 8 hours as needed for nausea 30 tablet 3    ondansetron (ZOFRAN) 8 MG tablet Take 1 tablet (8 mg) by mouth every 8 hours as needed for nausea 30 tablet 1    rivaroxaban ANTICOAGULANT (XARELTO ANTICOAGULANT) 20 MG TABS tablet Take 1 tablet (20 mg) by mouth every morning 90 tablet 1    SUMAtriptan (IMITREX) 100 MG tablet Take 1 tablet (100 mg)  by mouth at onset of headache for migraine 15 tablet 0    zolpidem (AMBIEN) 10 MG tablet Take 10 mg by mouth every evening      cyclobenzaprine (FLEXERIL) 5 MG tablet Take 1 tablet (5 mg) by mouth 3 times daily as needed for muscle spasms (Patient not taking: Reported on 1/12/2024) 10 tablet 0    fluticasone (FLONASE) 50 MCG/ACT nasal spray Spray 1 spray into both nostrils as needed (Patient not taking: Reported on 1/12/2024)      HYDROmorphone (DILAUDID) 2 MG tablet Take 1 tablet (2 mg) by mouth every 6 hours as needed for pain (Patient not taking: Reported on 1/12/2024) 10 tablet 0    meclizine (ANTIVERT) 25 MG tablet Take 1 tablet (25 mg) by mouth 3 times daily as needed for dizziness or nausea (Patient not taking: Reported on 1/12/2024) 15 tablet 0    metoclopramide (REGLAN) 10 MG tablet Take 1 tablet (10 mg) by mouth 3 times a day as needed for nausea (Patient not taking: Reported on 1/12/2024)      metoclopramide (REGLAN) 5 MG tablet Take 1 tablet (5 mg) by mouth 3 times daily as needed (nausea and vomiting) (Patient not taking: Reported on 1/26/2024) 30 tablet 0    oxyCODONE (ROXICODONE) 5 MG tablet Take 5 mg by mouth every 6 hours as needed for severe pain or pain (Patient not taking: Reported on 2/6/2024)      prochlorperazine (COMPAZINE) 10 MG tablet Take 1 tablet (10 mg) by mouth every 6 hours as needed for nausea or vomiting (Patient not taking: Reported on 1/19/2024) 30 tablet 2    prochlorperazine (COMPAZINE) 10 MG tablet Take 1 tablet (10 mg) by mouth 4 times a day as needed for nausea or vomiting (Patient not taking: Reported on 1/12/2024)      traMADol (ULTRAM) 50 MG tablet take 1 tablet by mouth every 6 hours as needed for moderate pain (Patient not taking: Reported on 1/12/2024)      valACYclovir (VALTREX) 1000 mg tablet Take 1,000 mg by mouth as needed (Patient not taking: Reported on 1/26/2024)           Allergies:      No Known Allergies     Social History:     Social History     Tobacco Use     Smoking status: Former     Packs/day: 0.50     Years: 40.00     Additional pack years: 0.00     Total pack years: 20.00     Types: Cigarettes     Quit date:      Years since quittin.1     Passive exposure: Never    Smokeless tobacco: Never   Substance Use Topics    Alcohol use: Not Currently       History   Drug Use Unknown         Family History:     The patient's family history is notable for:    Family History   Adopted: Yes   Problem Relation Age of Onset    Cancer Mother 36    Other Cancer Mother         Bio mother  of  a female cancer  at 36    Factor V Leiden deficiency Daughter     Deep Vein Thrombosis Daughter     Diabetes Type 1 Daughter     Diabetes Daughter     Hypertension Daughter     Anesthesia Reaction No family hx of          Physical Exam:     Ht 1.829 m (6')   Wt 74.8 kg (165 lb)   BMI 22.38 kg/m    Body mass index is 22.38 kg/m .    General Appearance: alert, no distress    Eyes:  Eyes grossly normal to inspection.  No discharge or erythema, or obvious scleral/conjunctival abnormalities.    Respiratory: No audible wheeze, cough, or visible cyanosis.  No visible retractions or increased work of breathing.     Musculoskeletal: extremities non tender and without edema    Skin: no lesions or rashes on visible skin    Neurological: normal gait, no gross defects     Psychiatric: appropriate mood and affect. Mentation appears normal, affect normal/bright, judgement and insight intact, normal speech and appearance well-groomed                            The rest of a comprehensive physical examination is deferred due to video visit restrictions.     Lab Results   Component Value Date    WBC 4.9 2024    WBC 2.7 2021     Lab Results   Component Value Date    RBC 3.66 2024    RBC 3.34 2021     Lab Results   Component Value Date    HGB 12.1 2024    HGB 10.4 2021     Lab Results   Component Value Date    HCT 38.2 2024    HCT 32.3 2021     No  "components found for: \"MCT\"  Lab Results   Component Value Date     02/26/2024    MCV 97 07/09/2021     Lab Results   Component Value Date    MCH 33.1 02/26/2024    MCH 31.1 07/09/2021     Lab Results   Component Value Date    MCHC 31.7 02/26/2024    MCHC 32.2 07/09/2021     Lab Results   Component Value Date    RDW 16.5 02/26/2024    RDW 20.9 07/09/2021     Lab Results   Component Value Date     02/26/2024     07/09/2021     % Neutrophils   Date Value Ref Range Status   02/26/2024 50 % Final   07/09/2021 34.0 % Final     % Lymphocytes   Date Value Ref Range Status   02/26/2024 36 % Final   07/09/2021 49.0 % Final     % Monocytes   Date Value Ref Range Status   02/26/2024 11 % Final   07/09/2021 16.0 % Final     % Eosinophils   Date Value Ref Range Status   02/26/2024 2 % Final   07/09/2021 1.0 % Final     % Basophils   Date Value Ref Range Status   02/26/2024 1 % Final   06/11/2021 1.0 % Final     % Metamyelocytes   Date Value Ref Range Status   07/07/2023 2 % Final     % Immature Granulocytes   Date Value Ref Range Status   05/21/2021 0.0 % Final     Absolute Neutrophil   Date Value Ref Range Status   07/09/2021 0.9 (L) 1.6 - 8.3 10e9/L Final     Absolute Neutrophils   Date Value Ref Range Status   01/26/2024 5.6 1.6 - 8.3 10e3/uL Final     Absolute Lymphocytes   Date Value Ref Range Status   01/26/2024 3.2 0.8 - 5.3 10e3/uL Final   07/09/2021 1.4 0.8 - 5.3 10e9/L Final     Absolute Monocytes   Date Value Ref Range Status   01/26/2024 0.5 0.0 - 1.3 10e3/uL Final   07/09/2021 0.4 0.0 - 1.3 10e9/L Final     Absolute Eosinophils   Date Value Ref Range Status   01/26/2024 0.0 0.0 - 0.7 10e3/uL Final   07/09/2021 0.0 0.0 - 0.7 10e9/L Final     Absolute Basophils   Date Value Ref Range Status   01/26/2024 0.1 0.0 - 0.2 10e3/uL Final   06/11/2021 0.0 0.0 - 0.2 10e9/L Final     Absolute Metamyelocytes   Date Value Ref Range Status   07/07/2023 0.4 (H) <=0.0 10e3/uL Final     Abs Immature Granulocytes "   Date Value Ref Range Status   05/21/2021 0.0 0 - 0.4 10e9/L Final     Absolute Immature Granulocytes   Date Value Ref Range Status   02/26/2024 0.0 <=0.4 10e3/uL Final     Nucleated RBCs   Date Value Ref Range Status   04/27/2021 0 0 /100 Final     Absolute Nucleated RBC   Date Value Ref Range Status   04/27/2021 0.0  Final     Last Comprehensive Metabolic Panel:  Sodium   Date Value Ref Range Status   02/26/2024 138 135 - 145 mmol/L Final     Comment:     Reference intervals for this test were updated on 09/26/2023 to more accurately reflect our healthy population. There may be differences in the flagging of prior results with similar values performed with this method. Interpretation of those prior results can be made in the context of the updated reference intervals.    07/09/2021 140 133 - 144 mmol/L Final     Potassium   Date Value Ref Range Status   02/26/2024 3.8 3.4 - 5.3 mmol/L Final   03/01/2023 4.4 3.4 - 5.3 mmol/L Final   07/09/2021 4.1 3.4 - 5.3 mmol/L Final     Potassium POCT   Date Value Ref Range Status   12/14/2023 3.7 3.4 - 5.3 mmol/L Final     Chloride   Date Value Ref Range Status   02/26/2024 102 98 - 107 mmol/L Final   03/01/2023 108 94 - 109 mmol/L Final   07/09/2021 107 94 - 109 mmol/L Final     Carbon Dioxide   Date Value Ref Range Status   07/09/2021 29 20 - 32 mmol/L Final     Carbon Dioxide (CO2)   Date Value Ref Range Status   02/26/2024 23 22 - 29 mmol/L Final   03/01/2023 29 20 - 32 mmol/L Final     Anion Gap   Date Value Ref Range Status   02/26/2024 13 7 - 15 mmol/L Final   03/01/2023 3 3 - 14 mmol/L Final   07/09/2021 4 3 - 14 mmol/L Final     Glucose   Date Value Ref Range Status   02/26/2024 133 (H) 70 - 99 mg/dL Final   03/01/2023 110 (H) 70 - 99 mg/dL Final   07/09/2021 94 70 - 99 mg/dL Final     GLUCOSE BY METER POCT   Date Value Ref Range Status   12/14/2023 113 (H) 70 - 99 mg/dL Final     Glucose Whole Blood POCT   Date Value Ref Range Status   12/14/2023 85 70 - 99 mg/dL  Final     Urea Nitrogen   Date Value Ref Range Status   02/26/2024 10.2 8.0 - 23.0 mg/dL Final   03/01/2023 14 7 - 30 mg/dL Final   07/09/2021 13 7 - 30 mg/dL Final     Creatinine   Date Value Ref Range Status   02/26/2024 0.69 0.51 - 0.95 mg/dL Final   07/09/2021 0.86 0.52 - 1.04 mg/dL Final     GFR Estimate   Date Value Ref Range Status   02/26/2024 >90 >60 mL/min/1.73m2 Final   07/09/2021 71 >60 mL/min/[1.73_m2] Final     Comment:     Non  GFR Calc  Starting 12/18/2018, serum creatinine based estimated GFR (eGFR) will be   calculated using the Chronic Kidney Disease Epidemiology Collaboration   (CKD-EPI) equation.       GFR, ESTIMATED POCT   Date Value Ref Range Status   01/30/2023 >60 >60 mL/min/1.73m2 Final     Calcium   Date Value Ref Range Status   02/26/2024 9.5 8.8 - 10.2 mg/dL Final   07/09/2021 8.8 8.5 - 10.1 mg/dL Final     Bilirubin Total   Date Value Ref Range Status   02/26/2024 0.8 <=1.2 mg/dL Final   07/09/2021 0.4 0.2 - 1.3 mg/dL Final     Alkaline Phosphatase   Date Value Ref Range Status   02/26/2024 495 (H) 40 - 150 U/L Final     Comment:     Reference intervals for this test were updated on 11/14/2023 to more accurately reflect our healthy population. There may be differences in the flagging of prior results with similar values performed with this method. Interpretation of those prior results can be made in the context of the updated reference intervals.   07/09/2021 168 (H) 40 - 150 U/L Final     ALT   Date Value Ref Range Status   02/26/2024 61 (H) 0 - 50 U/L Final     Comment:     Reference intervals for this test were updated on 6/12/2023 to more accurately reflect our healthy population. There may be differences in the flagging of prior results with similar values performed with this method. Interpretation of those prior results can be made in the context of the updated reference intervals.     07/09/2021 26 0 - 50 U/L Final     AST   Date Value Ref Range Status    02/26/2024 91 (H) 0 - 45 U/L Final     Comment:     Reference intervals for this test were updated on 6/12/2023 to more accurately reflect our healthy population. There may be differences in the flagging of prior results with similar values performed with this method. Interpretation of those prior results can be made in the context of the updated reference intervals.   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 She presents today for follow up and disease management by video.     30 minutes spent on the date of the encounter doing chart review, history and exam, documentation, and further activities as noted above.    The longitudinal plan of care for the diagnosis(es)/condition(s) as documented were addressed during this visit. Due to the added complexity in care, I will continue to support Taran in the subsequent management and with ongoing continuity of care.        Plan:     1.)        Ovarian cancer: OK to proceed with planned treatment today if labs and BP are WDL.  RTC in 3 weeks for labs, provider visit, and next cycle.  Message sent for scheduling for the remainder of cycle 3 and for scheduling of cycle 4.  Message sent to Dr. Juarez inquiring on timing of CT and follow-up with as he has no openings on a schedule until mid April.  1 L NS added to her treatment plan for today and with remaining days of this cycle.  Will reevaluate need for  additional fluids with treatment next cycle.  Reviewed signs and symptoms for when she should contact the clinic or seek additional care.  Patient to contact the clinic with any questions or concerns in the interim.      Genetic risk factors were assessed and she is POSITIVE for a BRCA1 mutation.  This mutation is called [id number]. Referral for Waleska Zamorano with Cancer Risk management placed 5/2022.      Labs and/or tests ordered include: CBC.  CMP.  Mag.  Urine protein.  .                2.)        Health maintenance:  Issues addressed today include following up with PCP for annual health maintenance and non-gynecologic issues.      3.)        Chemotherapy induced nausea: Persistent nausea currently taking Zyprexa 5 mg twice daily and Zofran 8 mg as needed.  No vomiting.  Decreased appetite but continuing to eat and drink.  Unsure current weight but daughter suspects some weight loss.  Encouraged continuing to eat small frequent meals high in calories and protein.  Continue antiemetics as she has been.  If she continues to struggle with nausea/vomiting/weight loss would recommend palliative care referral for additional recommendations and management.     4.)        Constipation: Constipation improved since she Zofran use decreased.  She has been taking miralax and docusate.  Again discussed senna/docusate up to 4 tablets BID, as needed.  Titrate bowel medication based on symptoms.          Again, thank you for allowing me to participate in the care of your patient.      Sincerely,    JERICHO Don CNP

## 2024-02-26 NOTE — NURSING NOTE
Is the patient currently in the state of [place]? YES    Visit mode:VIDEO    If the visit is dropped, the patient can be reconnected by: VIDEO VISIT: Text to cell phone:   Telephone Information:   Mobile [phone]       Will anyone else be joining the visit? NO  (If patient encounters technical issues they should call [phone]    How would you like to obtain your AVS? MyChart    Are changes needed to the allergy or medication list? No    Reason for visit: KONG FOWLER

## 2024-02-26 NOTE — PROGRESS NOTES
Infusion Nursing Note:  Taran Regalado presents today for C2D1 Taxol, Zirabev and IV fluids.    Patient seen by provider today: Yes: Ella Murillo   present during visit today: Not Applicable.    Note:   Patient states she returned from [facility] and enjoyed her time there.    Patient states she prefers her Benadryl premed to be given IV, thus Benadryl was given IV today.    Intravenous Access:  Peripheral IV placed.    Treatment Conditions:  BP </=160/100  Urine protein <30  Lab Results   Component Value Date    HGB 12.1 02/26/2024    WBC 4.9 02/26/2024    ANEU 5.6 01/26/2024    ANEUTAUTO 2.5 02/26/2024     02/26/2024        Lab Results   Component Value Date     02/26/2024    POTASSIUM 3.8 02/26/2024    MAG 1.8 02/26/2024    CR 0.69 02/26/2024    HORACIO 9.5 02/26/2024    BILITOTAL 0.8 02/26/2024    ALBUMIN 3.7 02/26/2024    ALT 61 (H) 02/26/2024    AST 91 (H) 02/26/2024       Results reviewed, labs MET treatment parameters, ok to proceed with treatment.      Post Infusion Assessment:  Patient tolerated infusion without incident.  Blood return noted pre and post infusion.  Site patent and intact, free from redness, edema or discomfort.  No evidence of extravasations.  Access discontinued per protocol.       Discharge Plan:   [facility]  Patient will return 3/4/24 for next appointment. Sent message to charge nurse, to adjust 3/4/24's appointment time to allow an hour for liter of fluid that was added to treatment plan.  Patient discharged in stable condition accompanied by: daughter.  Departure Mode: Ambulatory.      Frances Quintana RN

## 2024-02-26 NOTE — PROGRESS NOTES
Virtual Visit Details    Type of service:  Video Visit   Video Start Time:  10:26 pm  Video End Time: 10:38 pm    Originating Location (pt. Location): Home    Distant Location (provider location):  On-site  Platform used for Video Visit: 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 She presents today for follow up and disease management by video.     Oncology History:  12/3/2020: US Pelvic: IMPRESSION: Limited examination due to acoustic windows. Possible left adnexal mass. A CT scan of the abdomen and pelvis with contrast is recommended for further assessment.     2020: CT A/P:   IMPRESSION:    Peritoneal carcinomatosis with masslike peritoneal thickening in the lower pelvis which may indicate an adnexal or ovarian primary malignancy. Large volume ascites. Bilateral pleural effusions. There is potential subtle pleural nodularity in the right hemithorax which could indicate metastatic disease.  Indeterminate 1 cm lesion in the right hepatic lobe suspicious for a metastatic lesion.      2020: Presented to [facility] with abdominal distention, 25lb weight loss, and CTAP with carcinomatosis, elevated  3098.     2020: CT Chest: IMPRESSION:   1. There are few scattered small sub-6 mm pulmonary nodules which are indeterminate without prior comparisons available. There are a few  slightly larger perifissural nodules which are technically  indeterminate in the setting of malignancy although presumed lymphatic in nature and of unlikely clinical significance. Attention on follow-up is recommended.  2. Small to moderate left and small right pleural effusions are increased in size from prior. No convincing evidence for pleural  nodularity.  3. Partially visualized large volume ascites and peritoneal nodularity in the upper abdomen similar to 12/4/2020 outside CT      12/26/2020: ED for abdominal distension; 3 L ascites drained with paracentesis    Pelvic US: Findings: Free fluid present in LLQ      12/31/2020: US Paracentesis: 900 mL ascites drained     1/7/2021: Diagnotic laparoscopy, biopsies  Pathology: FINAL DIAGNOSIS:   A. PERITONEUM, BIOPSIES:   - Positive for high grade carcinoma, consistent with metastatic carcinoma of Mullerian origin.     1/10-1/13/2021: Hospital admission for postoperative non-intractable vomiting and nausea.      1/10/2021: CT A/P: IMPRESSION: Extensive ascites which is probably malignant. Scattered liver hypodensities of indeterminate etiology comment cannot exclude metastatic disease. Diverticulosis. Fluid-filled adnexal masses and irregular appearance of uterus, which may represent primary neoplasm. Multiple peritoneal nodules. Large amount of fecal material in the colon with no evidence of small bowel obstruction.     Plan: Paclitaxel 175 mg/m2 and carboplatin AUC 6 x 3 cycles followed by a CT CAP and visit with Dr. Juarez.     1/12/2021: Cycle 1 paclitaxel and carboplatin while inpatient     1/13/2021: CT Head: Impression:  1. Chronic sinusitis of the right maxillary and right sphenoid sinuses.  2. Incidental presumed calcified meningioma in the right frontal  convexity without significant mass effect.  3. No suspicious intracranial enhancing lesion.     2/1/2021: Cycle 2 paclitaxel and carboplatin.  936.     2/3-2/5/21: Admission [facility] for afib w/ RVR and new PE     2/26/21: Cycle 3 paclitaxel and carboplatin planned.  Deferred due to thrombocytopenia.  Deferred 3/12/21 due to neutropenia.  Given on 3/15/21 after Filgrastim injection x 2.  Add Pegfilgrastim to day 2 of treatment plan.   129.      4/2/21: CT CAP  IMPRESSION:   In this patient with a history of metastatic serous ovarian  cancer,  status post diagnostic laparoscopy and neoadjuvant chemotherapy:  1. Significant improvement in peritoneal carcinomatosis as discussed  above.  2. Resolution of previously seen scattered small hypoattenuating  lesions in the liver. This raises the concern for these lesions  representing metastatic disease, noting excellent response elsewhere  in abdomen and pelvis.  3. Bilateral pleural effusions have resolved.  4. Several small pulmonary nodules in the right lung measuring up to 5  mm. Indeterminate, but likely benign in the setting of their stability  with excellent response elsewhere. Continued attention on follow-up.     4/19/21: HYSTERECTOMY, TOTAL, ABDOMINAL, WITH BILATERAL SALPINGO-OOPHORECTOMY, omentectomy, NEOPLASM DEBULKING,Proctoscocy, RO, Resection of liver nodules, diaphragm stripping, immobilization of liver and colon  FINAL DIAGNOSIS:   A. OMENTUM, BIOPSY:   - Omental adipose tissue with rare viable cells of metastatic high grade   serous carcinoma   - One reactive lymph node, negative for malignancy (0/1)   B. NODULE, SIGMOID, EXCISION:   - Calcified necrotic adipose tissue   - Negative for malignancy   C. NODULE, SMALL BOWEL MESENTERY, EXCISION:   - Fibroadipose tissue, positive for metastatic high grade serous carcinoma   D. UTERUS, CERVIX, BILATERAL FALLOPIAN TUBES AND OVARIES, HYSTERECTOMY   WITH BILATERAL SALPINGO-OOPHORECTOMY:   - Atrophic endometrium   - Uterine serosa with rare viable cells consistent with high grade serous   carcinoma   - Cervix with atrophic changes   - Viable cells consistent with high grade serous carcinoma present in the   right ovary, serosa of right   fallopian tube and right periadnexal soft tissue   - Left ovary with atrophic changes   - Left fallopian tube with a rare focus of serous tubal in-situ carcinoma   (STIC)   E. NODULES, SMALL BOWEL MESENTRY, EXCISION:   - Fibroadipose tissue with rare viable cells of metastatic high grade   serous carcinoma   F.  NODULE, SPLENIC FLEXURE TRANSVERSE COLON, EXCISION:   - Fibroadipose tissue with rare viable cells of metastatic high grade   serous carcinoma   - Accessory splenule, negative for malignancy   G. OMENTUM, OMENTECTOMY:   - Omental adipose tissue with rare viable cells of metastatic high grade   serous carcinoma   H. NODULE, PERITONEAL PANCREATIC, EXCISION:   - Fibrous adhesions with inflammation   - Negative for malignancy   I. RIGHT HEMIDIAPHRAGM PERITONEUM, EXCISION:   - Fibrous adhesions with inflammation   - Negative for malignancy   J. RIGHT LIVER SURFACE NODULE:   - Fibrous adhesions with benign inclusion glands   - Negative for malignancy   K. LEFT LOWER LIVER EDGE, BIOPSY:   - Cauterized hepatic parenchyma and capsule   - Negative for malignancy   L. NODULE, SMALL BOWEL MESENTERIC #3, EXCISION:   - Fibroadipose tissue with rare viable cells of metastatic high grade   serous carcinoma       Plan: Carboplatin AUC 6 + Taxol 175 mg/m2 x 3 cycles, then transition to Parp inhibitor for maintenance therapy given her BRCA1 germline mutation.      5/21/21: Cycle 4 carboplatin and paclitaxel.   172.  6/11/21: Cycle 5 carboplatin and paclitaxel.   61.  7/2/21: Cycle 6 carboplatin and paclitaxel.   20.        7/28/21 plan: Olaparib 300mg bid as starting dose,  14  8/20/21: start date olaparib 300 mg BID,  12  9/13/21:  22  10/4/21:  23  11/1/21:  26     11/02/2021: PET CT: IMPRESSION:   Findings compatible with interval surgery and posttreatment change.  No gross definitive FDG avid disease.  Potential foci of tumor deposits along the anterior dome of the liver and midline abdominal wall surgical scar.  Colonic activity is not necessarily abnormal, however, given the previous carcinomatosis the colonic activity is indeterminant.         12/1/21:  23  1/3/22:  21  2/1/22:  20  3/1/22:  21  4/1/22:  23  5/4/22:  28     7/11/2022: CT CAP                                        IMPRESSION:  1.  Sliver of ascites in the upper abdomen has decreased since interval debulking surgery.  2.  There is a punctate nodule in the gastrosplenic ligament which is minimally more plump relative to the preop exam. This will need to be followed.  3.  Minimal nodular changes to the left of the midline scar in the subcutaneous fat will have to be followed as well. Unclear if this simply reflects postoperative scarring or could reflect an early incisional recurrence.  4.  No other sites to suggest recurrent tumor. Vaginal cuff is normal.  5.  Extensive distal colonic diverticulosis.  6.  Other noncritical findings as noted above.      9/16/22  98     1/30/23 CT CAP:    IMPRESSION:  1.  Multiple new, hypoattenuating lesions in the liver, suspicious for hepatic metastatic disease.  2.  Necrotic mario hepatic lymphadenopathy, concerning for richard metastatic disease.  3.  There is a new or increasingly conspicuous 6 mm soft tissue nodule in the right lower quadrant. This is indeterminate.  4.  There is a new 3 mm solid nodule in the right upper lobe, indeterminate.  5.  Stable approximate 4 mm punctate nodule along the gastrosplenic ligament.  6.  Similar area of linear free fluid in the upper abdomen anterior to the stomach.     Plan: Paclitaxel 175 mg/m2, Carboplatin AUC 6, bevacizumab 7.5 mg/kg     3/1/23: Cycle #1 Paclitaxel 175 mg/m2, Carboplatin AUC 6 (C7), bevacizumab 7.5 mg/kg.  1,196  3/24/23: Cycle #2 Paclitaxel 150 mg/m2, Carboplatin AUC 5 (C8), bevacizumab 15 mg/kg.  558     3/29/23: ER for dehydration and hypotension. Normotensive in ER. IV fluids given. Discharged.      4/14/23: Cycle #3 Paclitaxel 150 mg/m2, Carboplatin AUC 5 (C9), bevacizumab 15 mg/kg deferred neutropenia ANC 0.3   273  4/21/23: treatment deferred ANC 0.8  4/28/23: Cycle #3 Paclitaxel 150 mg/m2, Carboplatin AUC 5 (C9), bevacizumab 15 mg/kg, + Neulasta deferred ANC 1.0, CA  125 297     5/26/23: Cycle #4  Paclitaxel 150 mg/m2, Carboplatin AUC 5 (C10), bevacizumab 15 mg/kg, + Neulasta, deferred ANC 0.6  188. No hematology consult per MD.      6/2/23: Cycle #4 Paclitaxel 150 mg/m2, Carboplatin AUC 5 (C9), bevacizumab 15 mg/kg, + Neulasta   6/23/23: Cycle #5 deferred neutropenia ANC 0.5 thrombocytopenia platelets 85     6/26/2023 Addendum: Reduce both Carboplatin and Paclitaxel due to thrombocytopenia and persistent neutropenia. REFER to Hematology to rule out MDS. Message sent to pt. Orders placed.      7/3/23 plan: continue with 2 more cycles with carboplatin AUC of 4, Taxol at 135 mg per metered square, bevacizumab at 15 mg/kg as well as neulasta for bone marrow support.      7/3/23: Cycle #5 Paclitaxel 135 mg/m2, Carboplatin AUC 4, bevacizumab 15 mg/kg, +Neulasta.  375     7/11/23: per chart review Dr. Cogan with Hematology plan one more cycle of chemotherapy then maintenance Avastin     7/28/23: Cycle #6 Paclitaxel 135 mg/m2, Carboplatin AUC 4, bevacizumab 15 mg/kg, +Neulasta.  370     8/17/2023: CT CAP: Stable bilateral pulmonary micronodules. Multiple subcentimeter hypodense hepatic lesions, a few are slightly less conspicuous. Necrotic lymph nodes in mario hepatis are stable. A few small peritoneal nodules in the left upper quadrant. Anterior to the pylorus is an oval 2.4 cm hypodense soft tissue lesion which is stable.    8/17/2023: started maintenance bevacizumab q3 weeks.  370.    8/25/2023:  450.    9/19/2023:  1056. Alk phos 221, ALT 86, AST 63.    9/21/2023: Transferred her care from [facility] to [facility] to be closer to home. Plan is maintenance bevacizumab 15 mg/kg every 3 weeks to give her a treatment break from myelosuppressive chemotherapy.    10/10/2023:  1889, alk phos 388, , AST 83  10/12/2023: Bevacizumab held for elevated LFTs, symptoms of new back pain, cough, sinusitis symptoms.    10/18/2023: CT CAP:  Progressive liver metastasis. Small nonspecific right lower lobe pulmonary nodule. Inflammatory process or a metastatic nodule remain possible. This does not have the characteristic appearance of metastasis, however, remains indeterminant.     Plan: Weekly paclitaxel 80 mg/m2 day 1, 8, 15, and bevacizumab 10 mg/kg day 1 and day 15 of a 28 day cycle x 3 cycles followed by CT CAP and follow up with Dr. Juarez in [place].     12/8/2023:  >10,000. Bilirubin 4.2, LFTs elevated.    12/12/2023: C1D1 paclitaxel and bevacizumab. ([place])    12/13/2023: Presented to the ED in [place] with worsening RUQ pain and jaundice x 2 weeks. Bilirubin 8.1. Waitlisted for transfer to [facility]. Elected to leave the ED.  12/13/2023: CT abdomen pelvis ([place]): New and enlarging innumerable hypodense liver lesions. Increased retroperitoneal adenopathy. Increased left >right intrahepatic biliary ductal dilatation without a resting cause noted. Common bile duct borderline enlarged 6 mm. Ventral hernia containing colon.  12/13/2023: Abdominal US ([place]): Numerous liver lesions. Common bile duct dilated at 9 mm, no obstructing stone. Spleen 14 cm, enlarged.    12/15/2023: ERCP ([facility]): Ventral pancreatic duct prophylactically stented. Biliary sphincterotomy. Cholangiogram showed multifocal biliary stricture. Intrahepatic and common hepatic duct strictures dilated with a balloon. 2 metal stents placed into the left main and right anterior intrahepatic duct    12/19/2023: C1D8 paclitaxel administered. Bilirubin 3.5, LFTs improving. WBC 2.2, ANC 1.0, platelets 90. ([place])  12/22/23: Neupogen for ANC 1.0.  12/28/2023: C1D15 paclitaxel + bevacizumab. Bilirubin 1.9, LFT's improving, ANC 1.0, platelets 174. ([place])     Plan: Transfer back to [facility] with weekly paclitaxel 80 mg/m2 day 1, 8, 15, and bevacizumab 10 mg/kg day 1 and day 15 of a 28 day cycle x 2 additional cycles followed by CT CAP and follow up with Dr. Juarez.    "  1/12/24: Cycle 1 (2) paclitaxel/bevacizumab.   2113.   Delaying cycle 2 (3) for vacation.  2/26/24: Cycle 2 (3) paclitaxel/bevacizumab.   pending.          Today she reports;    -Abdominal pain: She has having some intermittent \"nagging\" abdominal pain, tried oxycodone which was helpful.  -BP: She does not check her blood pressures at home.  -Headaches: No  -Vision changes: She has had some vision changes but thinks she just needs new glasses.    -Nausea or vomiting: She is having some nausea, no vomiting, taking zyprexa am and pm then will take zofran as needed during the day  -Appetite: No real appetite but does make herself eat.  She endorses not drinking enough fluids requesting 1 L NS today with her treatment.  -Weight loss: She thinks she may have lost weight but has not weighed herself recently  -Diarrhea/Constipation: She has some constipation and has been using MiraLAX and docusate.  She has not found senna/docusate at the pharmacy  -Leg swelling: No  -Fevers: No  -Chest pain: No  -SOB: She does have some LOPEZ, no SOB with rest  -She will have some blood in her mucus when she blows her nose in the morning                Review of Systems:    See HPI    Past Medical History:    Past Medical History:   Diagnosis Date    Atrial fibrillation with rapid ventricular response (H)     History of cold sores     Hx of LASIK 12/11/2017    Insomnia     Migraine     Osteopenia     Pelvic mass     Peritoneal carcinomatosis (H)     Restless legs syndrome (RLS)          Past Surgical History:    Past Surgical History:   Procedure Laterality Date    APPENDECTOMY      ARTHROSCPY KNEE SURGICAL DEBRIDEMENT SHAVING ARTICULAR CARTILAGE Right     BIOPSY  January 2021    Biopsy to confirm ovarian cancer    DEBRIDEMENT LEFT UPPER EXTREMITY  2016    ENDOSCOPIC RETROGRADE CHOLANGIOPANCREATOGRAM N/A 12/15/2023    Procedure: ENDOSCOPIC RETROGRADE CHOLANGIOPANCREATOGRAPHY, with pancreatic stent placement, biliary duct " dilation, biliary stent placement, and biliary sphincterotomy;  Surgeon: Fabian Simpson MD;  Location: [facility] OR    HYSTERECTOMY TOTAL ABD, LUISITO SALPINGO-OOPHORECTOMY, NODE DISSECTION, TUMOR DEBULKING, COMBINED Bilateral 4/19/2021    Procedure: HYSTERECTOMY, TOTAL, ABDOMINAL, WITH BILATERAL SALPINGO-OOPHORECTOMY, omentectomy, NEOPLASM DEBULKING,Proctoscocy, RO, Resection of liver nodules, diaphragm stripping, immobilization of liver and colon;  Surgeon: Bolivar Juarez MD;  Location: [facility] OR    LAPAROSCOPY DIAGNOSTIC (GYN) Bilateral 1/7/2021    Procedure: Diagnsotic laparoscopy, biopsies;  Surgeon: Bolivar Juarez MD;  Location: [facility] OR    LASIK      TUBAL LIGATION           Health Maintenance Due   Topic Date Due    DEXA  Never done    ADVANCE CARE PLANNING  Never done    COLORECTAL CANCER SCREENING  Never done    HEPATITIS C SCREENING  Never done    LIPID  Never done    RSV VACCINE (Pregnancy & 60+) (1 - 1-dose 60+ series) Never done    MEDICARE ANNUAL WELLNESS VISIT  11/11/2021    Pneumococcal Vaccine: 65+ Years (2 of 2 - PPSV23 or PCV20) 04/05/2022    INFLUENZA VACCINE (1) Never done    COVID-19 Vaccine (4 - 2023-24 season) 09/01/2023    PHQ-2 (once per calendar year)  01/01/2024       Current Medications:     Current Outpatient Medications   Medication Sig Dispense Refill    amitriptyline (ELAVIL) 100 MG tablet Take 100 mg by mouth at bedtime      BEVACIZUMAB, AVASTIN, INJECTION 2.5MG Every other week (Tuesday)      OLANZapine (ZYPREXA) 5 MG tablet Take 1 tablet (5 mg) by mouth at bedtime 30 tablet 1    ondansetron (ZOFRAN) 4 MG tablet Take 2 tablets (8 mg) by mouth every 8 hours as needed for nausea 30 tablet 3    ondansetron (ZOFRAN) 8 MG tablet Take 1 tablet (8 mg) by mouth every 8 hours as needed for nausea 30 tablet 1    rivaroxaban ANTICOAGULANT (XARELTO ANTICOAGULANT) 20 MG TABS tablet Take 1 tablet (20 mg) by mouth every morning 90 tablet 1    SUMAtriptan (IMITREX) 100 MG tablet Take 1 tablet (100 mg)  by mouth at onset of headache for migraine 15 tablet 0    zolpidem (AMBIEN) 10 MG tablet Take 10 mg by mouth every evening      cyclobenzaprine (FLEXERIL) 5 MG tablet Take 1 tablet (5 mg) by mouth 3 times daily as needed for muscle spasms (Patient not taking: Reported on 1/12/2024) 10 tablet 0    fluticasone (FLONASE) 50 MCG/ACT nasal spray Spray 1 spray into both nostrils as needed (Patient not taking: Reported on 1/12/2024)      HYDROmorphone (DILAUDID) 2 MG tablet Take 1 tablet (2 mg) by mouth every 6 hours as needed for pain (Patient not taking: Reported on 1/12/2024) 10 tablet 0    meclizine (ANTIVERT) 25 MG tablet Take 1 tablet (25 mg) by mouth 3 times daily as needed for dizziness or nausea (Patient not taking: Reported on 1/12/2024) 15 tablet 0    metoclopramide (REGLAN) 10 MG tablet Take 1 tablet (10 mg) by mouth 3 times a day as needed for nausea (Patient not taking: Reported on 1/12/2024)      metoclopramide (REGLAN) 5 MG tablet Take 1 tablet (5 mg) by mouth 3 times daily as needed (nausea and vomiting) (Patient not taking: Reported on 1/26/2024) 30 tablet 0    oxyCODONE (ROXICODONE) 5 MG tablet Take 5 mg by mouth every 6 hours as needed for severe pain or pain (Patient not taking: Reported on 2/6/2024)      prochlorperazine (COMPAZINE) 10 MG tablet Take 1 tablet (10 mg) by mouth every 6 hours as needed for nausea or vomiting (Patient not taking: Reported on 1/19/2024) 30 tablet 2    prochlorperazine (COMPAZINE) 10 MG tablet Take 1 tablet (10 mg) by mouth 4 times a day as needed for nausea or vomiting (Patient not taking: Reported on 1/12/2024)      traMADol (ULTRAM) 50 MG tablet take 1 tablet by mouth every 6 hours as needed for moderate pain (Patient not taking: Reported on 1/12/2024)      valACYclovir (VALTREX) 1000 mg tablet Take 1,000 mg by mouth as needed (Patient not taking: Reported on 1/26/2024)           Allergies:      No Known Allergies     Social History:     Social History     Tobacco Use     Smoking status: Former     Packs/day: 0.50     Years: 40.00     Additional pack years: 0.00     Total pack years: 20.00     Types: Cigarettes     Quit date:      Years since quittin.1     Passive exposure: Never    Smokeless tobacco: Never   Substance Use Topics    Alcohol use: Not Currently       History   Drug Use Unknown         Family History:     The patient's family history is notable for:    Family History   Adopted: Yes   Problem Relation Age of Onset    Cancer Mother 36    Other Cancer Mother         Bio mother  of  a female cancer  at 36    Factor V Leiden deficiency Daughter     Deep Vein Thrombosis Daughter     Diabetes Type 1 Daughter     Diabetes Daughter     Hypertension Daughter     Anesthesia Reaction No family hx of          Physical Exam:     Ht 1.829 m (6')   Wt 74.8 kg (165 lb)   BMI 22.38 kg/m    Body mass index is 22.38 kg/m .    General Appearance: alert, no distress    Eyes:  Eyes grossly normal to inspection.  No discharge or erythema, or obvious scleral/conjunctival abnormalities.    Respiratory: No audible wheeze, cough, or visible cyanosis.  No visible retractions or increased work of breathing.     Musculoskeletal: extremities non tender and without edema    Skin: no lesions or rashes on visible skin    Neurological: normal gait, no gross defects     Psychiatric: appropriate mood and affect. Mentation appears normal, affect normal/bright, judgement and insight intact, normal speech and appearance well-groomed                            The rest of a comprehensive physical examination is deferred due to video visit restrictions.     Lab Results   Component Value Date    WBC 4.9 2024    WBC 2.7 2021     Lab Results   Component Value Date    RBC 3.66 2024    RBC 3.34 2021     Lab Results   Component Value Date    HGB 12.1 2024    HGB 10.4 2021     Lab Results   Component Value Date    HCT 38.2 2024    HCT 32.3 2021     No  "components found for: \"MCT\"  Lab Results   Component Value Date     02/26/2024    MCV 97 07/09/2021     Lab Results   Component Value Date    MCH 33.1 02/26/2024    MCH 31.1 07/09/2021     Lab Results   Component Value Date    MCHC 31.7 02/26/2024    MCHC 32.2 07/09/2021     Lab Results   Component Value Date    RDW 16.5 02/26/2024    RDW 20.9 07/09/2021     Lab Results   Component Value Date     02/26/2024     07/09/2021     % Neutrophils   Date Value Ref Range Status   02/26/2024 50 % Final   07/09/2021 34.0 % Final     % Lymphocytes   Date Value Ref Range Status   02/26/2024 36 % Final   07/09/2021 49.0 % Final     % Monocytes   Date Value Ref Range Status   02/26/2024 11 % Final   07/09/2021 16.0 % Final     % Eosinophils   Date Value Ref Range Status   02/26/2024 2 % Final   07/09/2021 1.0 % Final     % Basophils   Date Value Ref Range Status   02/26/2024 1 % Final   06/11/2021 1.0 % Final     % Metamyelocytes   Date Value Ref Range Status   07/07/2023 2 % Final     % Immature Granulocytes   Date Value Ref Range Status   05/21/2021 0.0 % Final     Absolute Neutrophil   Date Value Ref Range Status   07/09/2021 0.9 (L) 1.6 - 8.3 10e9/L Final     Absolute Neutrophils   Date Value Ref Range Status   01/26/2024 5.6 1.6 - 8.3 10e3/uL Final     Absolute Lymphocytes   Date Value Ref Range Status   01/26/2024 3.2 0.8 - 5.3 10e3/uL Final   07/09/2021 1.4 0.8 - 5.3 10e9/L Final     Absolute Monocytes   Date Value Ref Range Status   01/26/2024 0.5 0.0 - 1.3 10e3/uL Final   07/09/2021 0.4 0.0 - 1.3 10e9/L Final     Absolute Eosinophils   Date Value Ref Range Status   01/26/2024 0.0 0.0 - 0.7 10e3/uL Final   07/09/2021 0.0 0.0 - 0.7 10e9/L Final     Absolute Basophils   Date Value Ref Range Status   01/26/2024 0.1 0.0 - 0.2 10e3/uL Final   06/11/2021 0.0 0.0 - 0.2 10e9/L Final     Absolute Metamyelocytes   Date Value Ref Range Status   07/07/2023 0.4 (H) <=0.0 10e3/uL Final     Abs Immature Granulocytes "   Date Value Ref Range Status   05/21/2021 0.0 0 - 0.4 10e9/L Final     Absolute Immature Granulocytes   Date Value Ref Range Status   02/26/2024 0.0 <=0.4 10e3/uL Final     Nucleated RBCs   Date Value Ref Range Status   04/27/2021 0 0 /100 Final     Absolute Nucleated RBC   Date Value Ref Range Status   04/27/2021 0.0  Final     Last Comprehensive Metabolic Panel:  Sodium   Date Value Ref Range Status   02/26/2024 138 135 - 145 mmol/L Final     Comment:     Reference intervals for this test were updated on 09/26/2023 to more accurately reflect our healthy population. There may be differences in the flagging of prior results with similar values performed with this method. Interpretation of those prior results can be made in the context of the updated reference intervals.    07/09/2021 140 133 - 144 mmol/L Final     Potassium   Date Value Ref Range Status   02/26/2024 3.8 3.4 - 5.3 mmol/L Final   03/01/2023 4.4 3.4 - 5.3 mmol/L Final   07/09/2021 4.1 3.4 - 5.3 mmol/L Final     Potassium POCT   Date Value Ref Range Status   12/14/2023 3.7 3.4 - 5.3 mmol/L Final     Chloride   Date Value Ref Range Status   02/26/2024 102 98 - 107 mmol/L Final   03/01/2023 108 94 - 109 mmol/L Final   07/09/2021 107 94 - 109 mmol/L Final     Carbon Dioxide   Date Value Ref Range Status   07/09/2021 29 20 - 32 mmol/L Final     Carbon Dioxide (CO2)   Date Value Ref Range Status   02/26/2024 23 22 - 29 mmol/L Final   03/01/2023 29 20 - 32 mmol/L Final     Anion Gap   Date Value Ref Range Status   02/26/2024 13 7 - 15 mmol/L Final   03/01/2023 3 3 - 14 mmol/L Final   07/09/2021 4 3 - 14 mmol/L Final     Glucose   Date Value Ref Range Status   02/26/2024 133 (H) 70 - 99 mg/dL Final   03/01/2023 110 (H) 70 - 99 mg/dL Final   07/09/2021 94 70 - 99 mg/dL Final     GLUCOSE BY METER POCT   Date Value Ref Range Status   12/14/2023 113 (H) 70 - 99 mg/dL Final     Glucose Whole Blood POCT   Date Value Ref Range Status   12/14/2023 85 70 - 99 mg/dL  Final     Urea Nitrogen   Date Value Ref Range Status   02/26/2024 10.2 8.0 - 23.0 mg/dL Final   03/01/2023 14 7 - 30 mg/dL Final   07/09/2021 13 7 - 30 mg/dL Final     Creatinine   Date Value Ref Range Status   02/26/2024 0.69 0.51 - 0.95 mg/dL Final   07/09/2021 0.86 0.52 - 1.04 mg/dL Final     GFR Estimate   Date Value Ref Range Status   02/26/2024 >90 >60 mL/min/1.73m2 Final   07/09/2021 71 >60 mL/min/[1.73_m2] Final     Comment:     Non  GFR Calc  Starting 12/18/2018, serum creatinine based estimated GFR (eGFR) will be   calculated using the Chronic Kidney Disease Epidemiology Collaboration   (CKD-EPI) equation.       GFR, ESTIMATED POCT   Date Value Ref Range Status   01/30/2023 >60 >60 mL/min/1.73m2 Final     Calcium   Date Value Ref Range Status   02/26/2024 9.5 8.8 - 10.2 mg/dL Final   07/09/2021 8.8 8.5 - 10.1 mg/dL Final     Bilirubin Total   Date Value Ref Range Status   02/26/2024 0.8 <=1.2 mg/dL Final   07/09/2021 0.4 0.2 - 1.3 mg/dL Final     Alkaline Phosphatase   Date Value Ref Range Status   02/26/2024 495 (H) 40 - 150 U/L Final     Comment:     Reference intervals for this test were updated on 11/14/2023 to more accurately reflect our healthy population. There may be differences in the flagging of prior results with similar values performed with this method. Interpretation of those prior results can be made in the context of the updated reference intervals.   07/09/2021 168 (H) 40 - 150 U/L Final     ALT   Date Value Ref Range Status   02/26/2024 61 (H) 0 - 50 U/L Final     Comment:     Reference intervals for this test were updated on 6/12/2023 to more accurately reflect our healthy population. There may be differences in the flagging of prior results with similar values performed with this method. Interpretation of those prior results can be made in the context of the updated reference intervals.     07/09/2021 26 0 - 50 U/L Final     AST   Date Value Ref Range Status    02/26/2024 91 (H) 0 - 45 U/L Final     Comment:     Reference intervals for this test were updated on 6/12/2023 to more accurately reflect our healthy population. There may be differences in the flagging of prior results with similar values performed with this method. Interpretation of those prior results can be made in the context of the updated reference intervals.   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 She presents today for follow up and disease management by video.     30 minutes spent on the date of the encounter doing chart review, history and exam, documentation, and further activities as noted above.    The longitudinal plan of care for the diagnosis(es)/condition(s) as documented were addressed during this visit. Due to the added complexity in care, I will continue to support Taran in the subsequent management and with ongoing continuity of care.        Plan:     1.)        Ovarian cancer: OK to proceed with planned treatment today if labs and BP are WDL.  RTC in 3 weeks for labs, provider visit, and next cycle.  Message sent for scheduling for the remainder of cycle 3 and for scheduling of cycle 4.  Message sent to Dr. Juarez inquiring on timing of CT and follow-up with as he has no openings on a schedule until mid April.  1 L NS added to her treatment plan for today and with remaining days of this cycle.  Will reevaluate need for  additional fluids with treatment next cycle.  Reviewed signs and symptoms for when she should contact the clinic or seek additional care.  Patient to contact the clinic with any questions or concerns in the interim.      Genetic risk factors were assessed and she is POSITIVE for a BRCA1 mutation.  This mutation is called [id number]. Referral for Waleska Lona with Cancer Risk management placed 5/2022.      Labs and/or tests ordered include: CBC.  CMP.  Mag.  Urine protein.  .                2.)        Health maintenance:  Issues addressed today include following up with PCP for annual health maintenance and non-gynecologic issues.      3.)        Chemotherapy induced nausea: Persistent nausea currently taking Zyprexa 5 mg twice daily and Zofran 8 mg as needed.  No vomiting.  Decreased appetite but continuing to eat and drink.  Unsure current weight but daughter suspects some weight loss.  Encouraged continuing to eat small frequent meals high in calories and protein.  Continue antiemetics as she has been.  If she continues to struggle with nausea/vomiting/weight loss would recommend palliative care referral for additional recommendations and management.     4.)        Constipation: Constipation improved since she Zofran use decreased.  She has been taking miralax and docusate.  Again discussed senna/docusate up to 4 tablets BID, as needed.  Titrate bowel medication based on symptoms.    Ella Murillo, DNP, APRN, FNP-C, AOCNP  Oncology Nurse Practitioner  Division of Gynecologic Oncology  Pager: [phone]     CC  Patient Care Team:  Bolivar Juarez MD as PCP - General (Gynecologic Oncology)  Cogan, Jacob, MD as Physician (Hematology & Oncology)  Bolivar Juarez MD as Assigned Cancer Care Provider  Clinic - [facility] as Assigned PCP  SELF, REFERRED

## 2024-03-04 NOTE — PROGRESS NOTES
Infusion Nursing Note:  Taran Regalado presents today for C2D8 Taxol, onpro IVF.    Patient seen by provider today: No, 2/26   present during visit today: Not Applicable.    Note: Patient reports tolerating previous infusions. She feels her fatigue has gotten worse but she is still able to take care her ADLs. Patient has had Onpro in the past and reports she had no complications with it.     ONPRO  Was placed on patient's: right side of abdomen.    Was placed at 4:00 PM    Podpal used: Yes    ONPRO injector device Lot number: [id number]    Patient education included: what patient can expect after application, what colored lights mean on the device, when to remove device, when and where to call with questions or issues, all patients questions answered, that Neulasta administration will occur at 7PM 3/5    Patient tolerated administration well.  Intravenous Access:  Peripheral IV placed.    Treatment Conditions:  Lab Results   Component Value Date    HGB 12.0 03/04/2024    WBC 2.9 (L) 03/04/2024    ANEU 5.6 01/26/2024    ANEUTAUTO 1.3 (L) 03/04/2024     (L) 03/04/2024        Lab Results   Component Value Date     02/26/2024    POTASSIUM 4.3 03/04/2024    MAG 1.9 03/04/2024    CR 0.69 02/26/2024    HORACIO 9.5 02/26/2024    BILITOTAL 0.8 02/26/2024    ALBUMIN 3.7 02/26/2024    ALT 61 (H) 02/26/2024    AST 91 (H) 02/26/2024       Results reviewed, labs MET treatment parameters, ok to proceed with treatment.      Post Infusion Assessment:  Patient tolerated infusion without incident.  Blood return noted pre and post infusion.  Site patent and intact, free from redness, edema or discomfort.  No evidence of extravasations.  Access discontinued per protocol.       [facility]charge Plan:   [facility]  Patient will return 3/11 for next appointment.   Patient discharged in stable condition accompanied by: self.  Departure Mode: Ambulatory.      Akila Tyler RN

## 2024-03-09 NOTE — PROGRESS NOTES
SUBJECTIVE:   Taran Regalado is a 68 year old female presenting with a chief complaint of   Chief Complaint   Patient presents with    Urgent Care     Present for pressure after urination - no other symptoms, per pt.        She is an established patient of [facility].  Patient presents with complaints of urinary pressure since today.  NO back pain or fevers.          Review of Systems    Past Medical History:   Diagnosis Date    Atrial fibrillation with rapid ventricular response (H)     History of cold sores     Hx of LASIK 2017    Insomnia     Migraine     Osteopenia     Pelvic mass     Peritoneal carcinomatosis (H)     Restless legs syndrome (RLS)      Family History   Adopted: Yes   Problem Relation Age of Onset    Cancer Mother 36    Other Cancer Mother         Bio mother  of  a female cancer  at 36    Factor V Leiden deficiency Daughter     Deep Vein Thrombosis Daughter     Diabetes Type 1 Daughter     Diabetes Daughter     Hypertension Daughter     Anesthesia Reaction No family hx of      Current Outpatient Medications   Medication Sig Dispense Refill    amitriptyline (ELAVIL) 100 MG tablet Take 100 mg by mouth at bedtime      BEVACIZUMAB, AVASTIN, INJECTION 2.5MG Every other week (Tuesday)      cyclobenzaprine (FLEXERIL) 5 MG tablet Take 1 tablet (5 mg) by mouth 3 times daily as needed for muscle spasms 10 tablet 0    fluticasone (FLONASE) 50 MCG/ACT nasal spray Spray 1 spray into both nostrils as needed      HYDROmorphone (DILAUDID) 2 MG tablet Take 1 tablet (2 mg) by mouth every 6 hours as needed for pain 10 tablet 0    meclizine (ANTIVERT) 25 MG tablet Take 1 tablet (25 mg) by mouth 3 times daily as needed for dizziness or nausea 15 tablet 0    OLANZapine (ZYPREXA) 5 MG tablet Take 1 tablet (5 mg) by mouth at bedtime 30 tablet 1    ondansetron (ZOFRAN) 8 MG tablet Take 1 tablet (8 mg) by mouth every 8 hours as needed for nausea 30 tablet 1    oxyCODONE (ROXICODONE) 5 MG tablet Take 5 mg by mouth  every 6 hours as needed for severe pain or pain      prochlorperazine (COMPAZINE) 10 MG tablet Take 1 tablet (10 mg) by mouth every 6 hours as needed for nausea or vomiting 30 tablet 2    prochlorperazine (COMPAZINE) 10 MG tablet       rivaroxaban ANTICOAGULANT (XARELTO ANTICOAGULANT) 20 MG TABS tablet Take 1 tablet (20 mg) by mouth every morning 90 tablet 1    sulfamethoxazole-trimethoprim (BACTRIM DS) 800-160 MG tablet Take 1 tablet by mouth 2 times daily 14 tablet 0    SUMAtriptan (IMITREX) 100 MG tablet Take 1 tablet (100 mg) by mouth at onset of headache for migraine 15 tablet 0    traMADol (ULTRAM) 50 MG tablet       valACYclovir (VALTREX) 1000 mg tablet Take 1,000 mg by mouth as needed      zolpidem (AMBIEN) 10 MG tablet Take 10 mg by mouth every evening      ondansetron (ZOFRAN) 4 MG tablet Take 2 tablets (8 mg) by mouth every 8 hours as needed for nausea 30 tablet 3     Social History     Tobacco Use    Smoking status: Former     Packs/day: 0.50     Years: 40.00     Additional pack years: 0.00     Total pack years: 20.00     Types: Cigarettes     Quit date:      Years since quittin.1     Passive exposure: Never    Smokeless tobacco: Never   Substance Use Topics    Alcohol use: Not Currently       OBJECTIVE  /77   Pulse 95   Temp 99.2  F (37.3  C) (Tympanic)   Resp 24   SpO2 98%     Physical Exam  Vitals and nursing note reviewed.   Constitutional:       Appearance: Normal appearance. She is normal weight.   Eyes:      Extraocular Movements: Extraocular movements intact.      Conjunctiva/sclera: Conjunctivae normal.   Cardiovascular:      Rate and Rhythm: Normal rate and regular rhythm.      Pulses: Normal pulses.      Heart sounds: Normal heart sounds.   Pulmonary:      Effort: Pulmonary effort is normal.      Breath sounds: Normal breath sounds.   Abdominal:      Tenderness: There is no right CVA tenderness or left CVA tenderness.   Skin:     General: Skin is warm and dry.   Neurological:       General: No focal deficit present.      Mental Status: She is alert.   Psychiatric:         Mood and Affect: Mood normal.         Behavior: Behavior normal.         Labs:  Results for orders placed or performed in visit on 03/09/24 (from the past 24 hour(s))   UA Macroscopic with reflex to Microscopic and Culture    Specimen: Urine, Clean Catch   Result Value Ref Range    Color Urine Yellow Colorless, Straw, Light Yellow, Yellow    Appearance Urine Slightly Cloudy (A) Clear    Glucose Urine Negative Negative mg/dL    Bilirubin Urine Negative Negative    Ketones Urine Negative Negative mg/dL    Specific Gravity Urine <=1.005 1.003 - 1.035    Blood Urine Trace (A) Negative    pH Urine 6.5 5.0 - 7.0    Protein Albumin Urine Negative Negative mg/dL    Urobilinogen Urine 0.2 0.2, 1.0 E.U./dL    Nitrite Urine Negative Negative    Leukocyte Esterase Urine Moderate (A) Negative   Urine Microscopic Exam   Result Value Ref Range    Bacteria Urine Moderate (A) None Seen /HPF    RBC Urine 0-2 0-2 /HPF /HPF    WBC Urine 25-50 (A) 0-5 /HPF /HPF       ASSESSMENT:      ICD-10-CM    1. Dysuria  R30.0 UA Macroscopic with reflex to Microscopic and Culture     Urine Microscopic Exam     Urine Culture      2. Acute cystitis with hematuria  N30.01 sulfamethoxazole-trimethoprim (BACTRIM DS) 800-160 MG tablet     DISCONTINUED: sulfamethoxazole-trimethoprim (BACTRIM DS) 800-160 MG tablet           Medical Decision Making:    Differential Diagnosis:  UTI: UTI, Dysuria, Pyelonephritis, and Urethritis    Serious Comorbid Conditions:  Adult:   reviewed    PLAN:    Rx for bactrim.  Urine culture pending.  Increase fluid intake. Discussed reasons to seek immediate medical attention - specifically, fevers or vomiting.  Finish all medications.        Followup:    If not improving or if condition worsens, follow up with your Primary Care Provider, If not improving or if conditions worsens over the next 12-24 hours, go to the Emergency  Department    There are no Patient Instructions on file for this visit.

## 2024-03-10 NOTE — TELEPHONE ENCOUNTER
Verbal consent received to speak with daughter America. Patient requesting her prescription be transferred to 24 hour [facility] in [place].  Done as requested.  Susan Madden RN on 3/9/2024 at 6:35 PM    Reason for Disposition    Caller with prescription and triager answers question    Protocols used: Medication Refill and Renewal Call-A-AH

## 2024-03-11 NOTE — PROGRESS NOTES
Infusion Nursing Note:  Taran Regalado presents today for C2D15 Taxol/Avastin.    Patient seen by provider today: No   present during visit today: Not Applicable.    Note: N/A.    Intravenous Access:  Peripheral IV placed.    Treatment Conditions:  Lab Results   Component Value Date    HGB 12.2 03/11/2024    WBC 6.5 03/11/2024    ANEU 5.6 01/26/2024    ANEUTAUTO 3.9 03/11/2024     (L) 03/11/2024        Lab Results   Component Value Date     02/26/2024    POTASSIUM 4.5 03/11/2024    MAG 1.8 03/11/2024    CR 0.69 02/26/2024    HORACIO 9.5 02/26/2024    BILITOTAL 0.8 02/26/2024    ALBUMIN 3.7 02/26/2024    ALT 61 (H) 02/26/2024    AST 91 (H) 02/26/2024       Results reviewed, labs MET treatment parameters, ok to proceed with treatment.  Urine Protein negartie.    Post Infusion Assessment:  Patient tolerated infusion without incident.  Blood return noted pre and post infusion.  Site patent and intact, free from redness, edema or discomfort.  No evidence of extravasations.  Access discontinued per protocol.     Discharge Plan:   Patient will return 3/25/2024 for next appointment.   Future appts have been reviewed and crosschecked with appt note and plan.  Patient discharged in stable condition accompanied by: daughter.  Departure Mode: Ambulatory.    Liz Mcfarland, RN-BSN, PHN, OCN  [facility]

## 2024-03-12 NOTE — RESULT ENCOUNTER NOTE
Justen    Urine culture indicates you are currently take correct antibiotic and should be feeling better.     Ankita Zhang APRN-CNP

## 2024-04-01 PROBLEM — R18.8 OTHER ASCITES: Status: ACTIVE | Noted: 2024-01-01

## 2024-04-01 NOTE — NURSING NOTE
Is the patient currently in the state of [place]? YES    Visit mode:VIDEO    If the visit is dropped, the patient can be reconnected by: VIDEO VISIT: Text to cell phone:   Telephone Information:   Mobile [phone]       Will anyone else be joining the visit? NO  (If patient encounters technical issues they should call [phone])    How would you like to obtain your AVS? MyChart    Are changes needed to the allergy or medication list? No    Reason for visit: RECHECK    America FOWLER

## 2024-04-01 NOTE — PATIENT INSTRUCTIONS
ASAP Paracentesis   Palliative care appointment (next available) Referral placed     New treatment:  Re-start Carbo/taxol/Avastin     Cancel Today's Infusion and we will get Infusion rescheduled

## 2024-04-01 NOTE — PROGRESS NOTES
Virtual Visit Details    Type of service:  Video Visit   21 min.    Originating Location (pt. Location): Home    Distant Location (provider location):  On-site  Platform used for Video Visit: Wanda                Follow Up Notes on Referred Patient    Date: 2024       Dr. Livingston referring provider defined for this encounter.       RE: Taran Regalado  : 1955  GRACY: 2024    Taran Regalado is a 68 year old woman with a diagnosis of metastatic ovarian high grade serous carcinoma. She is here today for follow up and disease management. She has been tolerating chemotherapy well overall, despite problems with neutropenia.  She has recently traveled to [place] on medication.  She has noticed some more fatigue throughout the day.  Her appetite has decreased.  She has regular bowel movements but does start with constipation and occasional loose stools.  She has no fevers or chills.  Overall her weight has been stable.  She is little more than half the day.  Denies any vaginal bleeding or discharge.  No shortness of breath or chest pain.  Some increased abdominal distention.     Oncology History:  12/3/2020: US Pelvic: IMPRESSION: Limited examination due to acoustic windows. Possible left adnexal mass. A CT scan of the abdomen and pelvis with contrast is recommended for further assessment.     2020: CT A/P:   IMPRESSION:    Peritoneal carcinomatosis with masslike peritoneal thickening in the lower pelvis which may indicate an adnexal or ovarian primary malignancy. Large volume ascites. Bilateral pleural effusions. There is potential subtle pleural nodularity in the right hemithorax which could indicate metastatic disease.  Indeterminate 1 cm lesion in the right hepatic lobe suspicious for a metastatic lesion.      2020: Presented to [facility] with abdominal distention, 25lb weight loss, and CTAP with carcinomatosis, elevated  3098.     2020: CT Chest: IMPRESSION:   1. There are few scattered  small sub-6 mm pulmonary nodules which are indeterminate without prior comparisons available. There are a few  slightly larger perifissural nodules which are technically  indeterminate in the setting of malignancy although presumed lymphatic in nature and of unlikely clinical significance. Attention on follow-up is recommended.  2. Small to moderate left and small right pleural effusions are increased in size from prior. No convincing evidence for pleural nodularity.  3. Partially visualized large volume ascites and peritoneal nodularity in the upper abdomen similar to 12/4/2020 outside CT      12/26/2020: ED for abdominal distension; 3 L ascites drained with paracentesis    Pelvic US: Findings: Free fluid present in LLQ      12/31/2020: US Paracentesis: 900 mL ascites drained     1/7/2021: Diagnotic laparoscopy, biopsies  Pathology: FINAL DIAGNOSIS:   A. PERITONEUM, BIOPSIES:   - Positive for high grade carcinoma, consistent with metastatic carcinoma of Mullerian origin.     1/10-1/13/2021: Hospital admission for postoperative non-intractable vomiting and nausea.      1/10/2021: CT A/P: IMPRESSION: Extensive ascites which is probably malignant. Scattered liver hypodensities of indeterminate etiology comment cannot exclude metastatic disease. Diverticulosis. Fluid-filled adnexal masses and irregular appearance of uterus, which may represent primary neoplasm. Multiple peritoneal nodules. Large amount of fecal material in the colon with no evidence of small bowel obstruction.     Plan: Paclitaxel 175 mg/m2 and carboplatin AUC 6 x 3 cycles followed by a CT CAP and visit with Dr. Juarez.     1/12/2021: Cycle 1 paclitaxel and carboplatin while inpatient     1/13/2021: CT Head: Impression:  1. Chronic sinusitis of the right maxillary and right sphenoid sinuses.  2. Incidental presumed calcified meningioma in the right frontal  convexity without significant mass effect.  3. No suspicious intracranial enhancing  lesion.     2/1/2021: Cycle 2 paclitaxel and carboplatin.  936.     2/3-2/5/21: Admission [facility] for afib w/ RVR and new PE     2/26/21: Cycle 3 paclitaxel and carboplatin planned.  Deferred due to thrombocytopenia.  pending.     3/15/21: Cycle 3 paclitaxel and carboplatin given     4/19/21: HYSTERECTOMY, TOTAL, ABDOMINAL, WITH BILATERAL SALPINGO-OOPHORECTOMY, omentectomy, NEOPLASM DEBULKING,Proctoscocy, RO, Resection of liver nodules, diaphragm stripping, immobilization of liver and colon  FINAL DIAGNOSIS:   A. OMENTUM, BIOPSY:   - Omental adipose tissue with rare viable cells of metastatic high grade   serous carcinoma   - One reactive lymph node, negative for malignancy (0/1)   B. NODULE, SIGMOID, EXCISION:   - Calcified necrotic adipose tissue   - Negative for malignancy   C. NODULE, SMALL BOWEL MESENTERY, EXCISION:   - Fibroadipose tissue, positive for metastatic high grade serous carcinoma   D. UTERUS, CERVIX, BILATERAL FALLOPIAN TUBES AND OVARIES, HYSTERECTOMY   WITH BILATERAL SALPINGO-OOPHORECTOMY:   - Atrophic endometrium   - Uterine serosa with rare viable cells consistent with high grade serous   carcinoma   - Cervix with atrophic changes   - Viable cells consistent with high grade serous carcinoma present in the   right ovary, serosa of right   fallopian tube and right periadnexal soft tissue   - Left ovary with atrophic changes   - Left fallopian tube with a rare focus of serous tubal in-situ carcinoma   (STIC)   E. NODULES, SMALL BOWEL MESENTRY, EXCISION:   - Fibroadipose tissue with rare viable cells of metastatic high grade   serous carcinoma   F. NODULE, SPLENIC FLEXURE TRANSVERSE COLON, EXCISION:   - Fibroadipose tissue with rare viable cells of metastatic high grade   serous carcinoma   - Accessory splenule, negative for malignancy   G. OMENTUM, OMENTECTOMY:   - Omental adipose tissue with rare viable cells of metastatic high grade   serous carcinoma   H. NODULE, PERITONEAL PANCREATIC,  EXCISION:   - Fibrous adhesions with inflammation   - Negative for malignancy   I. RIGHT HEMIDIAPHRAGM PERITONEUM, EXCISION:   - Fibrous adhesions with inflammation   - Negative for malignancy   J. RIGHT LIVER SURFACE NODULE:   - Fibrous adhesions with benign inclusion glands   - Negative for malignancy   K. LEFT LOWER LIVER EDGE, BIOPSY:   - Cauterized hepatic parenchyma and capsule   - Negative for malignancy   L. NODULE, SMALL BOWEL MESENTERIC #3, EXCISION:   - Fibroadipose tissue with rare viable cells of metastatic high grade   serous carcinoma       Plan: Carboplatin AUC 6 + Taxol 175 mg/m2 x 3 cycles, then transition to Parp inhibitor for maintenance therapy given her BRCA1 germline mutation.      5/21/21: Cycle 4 carboplatin and paclitaxel.   172.  6/11/21: Cycle 5 carboplatin and paclitaxel.   61.  7/2/21: Cycle 6 carboplatin and paclitaxel.   20.        7/28/21 plan: Olaparib 300mg bid as starting dose,  14  8/20/21: start date olaparib 300 mg BID,  12  9/13/21:  22  10/4/21:  23  11/1/21:  26     11/02/2021: PET CT: IMPRESSION:   Findings compatible with interval surgery and posttreatment change.  No gross definitive FDG avid disease.  Potential foci of tumor deposits along the anterior dome of the liver and midline abdominal wall surgical scar.  Colonic activity is not necessarily abnormal, however, given the previous carcinomatosis the colonic activity is indeterminant.         12/1/21:  23  1/3/22:  21  2/1/22:  20  3/1/22:  21  4/1/22:  23  5/4/22:  28     EXAM: CT CHEST/ABDOMEN/PELVIS W CONTRAST  LOCATION: [facility]  DATE/TIME: 7/11/2022 1:25 PM     INDICATION: Stage III B high-grade ovarian carcinoma diagnosed Jan 2021. Posttreatment surveillance.  COMPARISON: CTA AP 04/23/2021, CT CAP 04/02/2021  TECHNIQUE: CT scan of the chest, abdomen, and pelvis was performed following injection of IV  contrast. Multiplanar reformats were obtained. Dose reduction techniques were used.   CONTRAST: isovue 370 105mL IV; 50mL omni 140 oral     FINDINGS:   LUNGS AND PLEURA: No suspicious pulmonary nodules. Minimal right apical pleural thickening and a few punctate tiny nodules 2 of which are subpleural on the right are stable. No pleural effusions.     MEDIASTINUM/AXILLAE: No adenopathy. No central pulmonary emboli.     CORONARY ARTERY CALCIFICATION: Cannot evaluate.     HEPATOBILIARY: Normal.     PANCREAS: Normal.     SPLEEN: Normal.     ADRENAL GLANDS: Normal.     KIDNEYS/BLADDER: Normal.     BOWEL: Sliver of ascites adjacent to the spleen noted, decreased from prior study. There is a 4 mm nodule in the gastrosplenic ligament (image 352). On the preop study it appears as a smaller punctate nodule (prior image 341). Sliver of ascites in the   gastrohepatic ligament also noted. No peritoneal tumor nodules. No bowel obstruction. Quite redundant colon with mild large stool burden. Extensive distal colonic diverticulosis.     LYMPH NODES: Normal.     VASCULATURE: Mild arterial calcifications. Circumaortic left renal vein.     PELVIC ORGANS: Hysterectomy. Vaginal cuff is normal.     MUSCULOSKELETAL: Diastases of the midline rectus sheath above the umbilicus. Minimal nodular scarring to the left of midline in the midabdomen (image 419). There is a shallow broad-based supraumbilical ventral hernia containing only fat. No implants   within the hernia or abdominal incision. No suspicious bone lesions.                                                                      IMPRESSION:  1.  Sliver of ascites in the upper abdomen has decreased since interval debulking surgery.  2.  There is a punctate nodule in the gastrosplenic ligament which is minimally more plump relative to the preop exam. This will need to be followed.  3.  Minimal nodular changes to the left of the midline scar in the subcutaneous fat will have to be followed  as well. Unclear if this simply reflects postoperative scarring or could reflect an early incisional recurrence.  4.  No other sites to suggest recurrent tumor. Vaginal cuff is normal.  5.  Extensive distal colonic diverticulosis.  6.  Other noncritical findings as noted above.      9/16/22  98     1/30/23 CT CAP:    IMPRESSION:  1.  Multiple new, hypoattenuating lesions in the liver, suspicious for hepatic metastatic disease.  2.  Necrotic mario hepatic lymphadenopathy, concerning for richard metastatic disease.  3.  There is a new or increasingly conspicuous 6 mm soft tissue nodule in the right lower quadrant. This is indeterminate.  4.  There is a new 3 mm solid nodule in the right upper lobe, indeterminate.  5.  Stable approximate 4 mm punctate nodule along the gastrosplenic ligament.  6.  Similar area of linear free fluid in the upper abdomen anterior to the stomach.     Plan: Paclitaxel 175 mg/m2, Carboplatin AUC 6, bevacizumab 7.5 mg/kg     3/1/23: Cycle #1 Paclitaxel 175 mg/m2, Carboplatin AUC 6 (C7), bevacizumab 7.5 mg/kg.  1,196  3/24/23: Cycle #2 Paclitaxel 150 mg/m2, Carboplatin AUC 5 (C8), bevacizumab 15 mg/kg.  558     3/29/23: ER for dehydration and hypotension. Normotensive in ER. IV fluids given. Discharged.      4/14/23: Cycle #3 Paclitaxel 150 mg/m2, Carboplatin AUC 5 (C9), bevacizumab 15 mg/kg deferred neutropenia ANC 0.3   273  4/21/23: treatment deferred ANC 0.8  4/28/23: Cycle #3 Paclitaxel 150 mg/m2, Carboplatin AUC 5 (C9), bevacizumab 15 mg/kg, + Neulasta deferred ANC 1.0,  297     5/26/23: Cycle #4  Paclitaxel 150 mg/m2, Carboplatin AUC 5 (C10), bevacizumab 15 mg/kg, + Neulasta, deferred ANC 0.6  188. No hematology consult per MD.      6/2/23: Cycle #4 Paclitaxel 150 mg/m2, Carboplatin AUC 5 (C9), bevacizumab 15 mg/kg, + Neulasta   6/23/23: Cycle #5 deferred neutropenia ANC 0.5 thrombocytopenia platelets 85     6/26/2023 Addendum: Reduce both Carboplatin  and Paclitaxel due to thrombocytopenia and persistent neutropenia. REFER to Hematology to rule out MDS. Message sent to pt. Orders placed.      7/3/23 plan: continue with 2 more cycles with carboplatin AUC of 4, Taxol at 135 mg per metered square, bevacizumab at 15 mg/kg as well as neulasta for bone marrow support.      7/3/23: Cycle #5 Paclitaxel 135 mg/m2, Carboplatin AUC 4, bevacizumab 15 mg/kg, +Neulasta.  375     7/11/23: per chart review Dr. Cogan with Hematology plan one more cycle of chemotherapy then maintenance Avastin     7/28/23: Cycle #6 Paclitaxel 135 mg/m2, Carboplatin AUC 4, bevacizumab 15 mg/kg, +Neulasta.  370     8/17/2023: CT CAP: Stable bilateral pulmonary micronodules. Multiple subcentimeter hypodense hepatic lesions, a few are slightly less conspicuous. Necrotic lymph nodes in mario hepatis are stable. A few small peritoneal nodules in the left upper quadrant. Anterior to the pylorus is an oval 2.4 cm hypodense soft tissue lesion which is stable.    8/17/2023: started maintenance bevacizumab q3 weeks.  370.    8/25/2023:  450.    9/19/2023:  1056. Alk phos 221, ALT 86, AST 63.    9/21/2023: Transferred her care from [facility] to [facility] to be closer to home. Plan is maintenance bevacizumab 15 mg/kg every 3 weeks to give her a treatment break from myelosuppressive chemotherapy.    10/10/2023:  1889, alk phos 388, , AST 83  10/12/2023: Bevacizumab held for elevated LFTs, symptoms of new back pain, cough, sinusitis symptoms.    10/18/2023: CT CAP: Progressive liver metastasis. Small nonspecific right lower lobe pulmonary nodule. Inflammatory process or a metastatic nodule remain possible. This does not have the characteristic appearance of metastasis, however, remains indeterminant.    Plan: Weekly paclitaxel 80 mg/m2 day 1, 8, 15, and bevacizumab 10 mg/kg day 1 and day 15 of a 28 day cycle x 3 cycles followed by CT CAP and follow up with   Larry in [place].     12/8/2023:  >10,000. Bilirubin 4.2, LFTs elevated.    12/12/2023: C1D1 paclitaxel and bevacizumab. ([place])    12/13/2023: Presented to the ED in [place] with worsening RUQ pain and jaundice x 2 weeks. Bilirubin 8.1. Waitlisted for transfer to [facility]. Elected to leave the ED.  12/13/2023: CT abdomen pelvis ([place]): New and enlarging innumerable hypodense liver lesions. Increased retroperitoneal adenopathy. Increased left >right intrahepatic biliary ductal dilatation without a resting cause noted. Common bile duct borderline enlarged 6 mm. Ventral hernia containing colon.  12/13/2023: Abdominal US ([place]): Numerous liver lesions. Common bile duct dilated at 9 mm, no obstructing stone. Spleen 14 cm, enlarged.    12/15/2023: ERCP ([facility]): Ventral pancreatic duct prophylactically stented. Biliary sphincterotomy. Cholangiogram showed multifocal biliary stricture. Intrahepatic and common hepatic duct strictures dilated with a balloon. 2 metal stents placed into the left main and right anterior intrahepatic duct    12/19/2023: C1D8 paclitaxel administered. Bilirubin 3.5, LFTs improving. WBC 2.2, ANC 1.0, platelets 90. ([place])  12/22/23: Neupogen for ANC 1.0.  12/28/2023: C1D15 paclitaxel + bevacizumab. Bilirubin 1.9, LFT's improving, ANC 1.0, platelets 174. ([place])     Plan: Transfer back to [facility] with weekly paclitaxel 80 mg/m2 day 1, 8, 15, and bevacizumab 10 mg/kg day 1 and day 15 of a 28 day cycle x 2 additional cycles followed by CT CAP and follow up with Dr. Juarez.     1/12/24: Cycle 1 (2) paclitaxel/bevacizumab.   2113.   Delaying cycle 2 (3) for vacation.  2/26/24: Cycle 2 (3) paclitaxel/bevacizumab.   5381.    Past Medical History:    Past Medical History:   Diagnosis Date    Atrial fibrillation with rapid ventricular response (H)     History of cold sores     Hx of LASIK 12/11/2017    Insomnia     Migraine     Osteopenia     Pelvic mass     Peritoneal  carcinomatosis (H)     Restless legs syndrome (RLS)          Past Surgical History:    Past Surgical History:   Procedure Laterality Date    APPENDECTOMY      ARTHROSCPY KNEE SURGICAL DEBRIDEMENT SHAVING ARTICULAR CARTILAGE Right     BIOPSY  January 2021    Biopsy to confirm ovarian cancer    DEBRIDEMENT LEFT UPPER EXTREMITY  2016    ENDOSCOPIC RETROGRADE CHOLANGIOPANCREATOGRAM N/A 12/15/2023    Procedure: ENDOSCOPIC RETROGRADE CHOLANGIOPANCREATOGRAPHY, with pancreatic stent placement, biliary duct dilation, biliary stent placement, and biliary sphincterotomy;  Surgeon: Fabian Simpson MD;  Location: [facility] OR    HYSTERECTOMY TOTAL ABD, LUISITO SALPINGO-OOPHORECTOMY, NODE DISSECTION, TUMOR DEBULKING, COMBINED Bilateral 4/19/2021    Procedure: HYSTERECTOMY, TOTAL, ABDOMINAL, WITH BILATERAL SALPINGO-OOPHORECTOMY, omentectomy, NEOPLASM DEBULKING,Proctoscocy, RO, Resection of liver nodules, diaphragm stripping, immobilization of liver and colon;  Surgeon: Bolivar Juarez MD;  Location: [facility] OR    LAPAROSCOPY DIAGNOSTIC (GYN) Bilateral 1/7/2021    Procedure: Diagnsotic laparoscopy, biopsies;  Surgeon: Bolivar Juarez MD;  Location: [facility] OR    LASIK      TUBAL LIGATION           Health Maintenance Due   Topic Date Due    DEXA  Never done    LIPID  Never done    ADVANCE CARE PLANNING  Never done    COLORECTAL CANCER SCREENING  Never done    HEPATITIS C SCREENING  Never done    RSV VACCINE (Pregnancy & 60+) (1 - 1-dose 60+ series) Never done    MEDICARE ANNUAL WELLNESS VISIT  11/11/2021    Pneumococcal Vaccine: 65+ Years (2 of 2 - PPSV23 or PCV20) 04/05/2022    INFLUENZA VACCINE (1) Never done    COVID-19 Vaccine (4 - 2023-24 season) 09/01/2023    PHQ-2 (once per calendar year)  01/01/2024       Current Medications:     Current Outpatient Medications   Medication Sig Dispense Refill    amitriptyline (ELAVIL) 100 MG tablet Take 100 mg by mouth at bedtime      BEVACIZUMAB, AVASTIN, INJECTION 2.5MG Every other week (Tuesday)       cyclobenzaprine (FLEXERIL) 5 MG tablet Take 1 tablet (5 mg) by mouth 3 times daily as needed for muscle spasms 10 tablet 0    fluticasone (FLONASE) 50 MCG/ACT nasal spray Spray 1 spray into both nostrils as needed      HYDROmorphone (DILAUDID) 2 MG tablet Take 1 tablet (2 mg) by mouth every 6 hours as needed for pain 10 tablet 0    meclizine (ANTIVERT) 25 MG tablet Take 1 tablet (25 mg) by mouth 3 times daily as needed for dizziness or nausea 15 tablet 0    OLANZapine (ZYPREXA) 5 MG tablet Take 1 tablet (5 mg) by mouth at bedtime 30 tablet 1    ondansetron (ZOFRAN) 4 MG tablet Take 2 tablets (8 mg) by mouth every 8 hours as needed for nausea 30 tablet 3    ondansetron (ZOFRAN) 8 MG tablet Take 1 tablet (8 mg) by mouth every 8 hours as needed for nausea 30 tablet 1    oxyCODONE (ROXICODONE) 5 MG tablet Take 5 mg by mouth every 6 hours as needed for severe pain or pain      prochlorperazine (COMPAZINE) 10 MG tablet       rivaroxaban ANTICOAGULANT (XARELTO ANTICOAGULANT) 20 MG TABS tablet Take 1 tablet (20 mg) by mouth every morning 90 tablet 1    sulfamethoxazole-trimethoprim (BACTRIM DS) 800-160 MG tablet Take 1 tablet by mouth 2 times daily 14 tablet 0    SUMAtriptan (IMITREX) 100 MG tablet Take 1 tablet (100 mg) by mouth at onset of headache for migraine 15 tablet 0    traMADol (ULTRAM) 50 MG tablet       valACYclovir (VALTREX) 1000 mg tablet Take 1,000 mg by mouth as needed      zolpidem (AMBIEN) 10 MG tablet Take 10 mg by mouth every evening           Allergies:      No Known Allergies     Social History:     Social History     Tobacco Use    Smoking status: Former     Packs/day: 0.50     Years: 40.00     Additional pack years: 0.00     Total pack years: 20.00     Types: Cigarettes     Quit date:      Years since quittin.2     Passive exposure: Never    Smokeless tobacco: Never   Substance Use Topics    Alcohol use: Not Currently       History   Drug Use Unknown         Family History:       Family  History   Adopted: Yes   Problem Relation Age of Onset    Cancer Mother 36    Other Cancer Mother         Bio mother  of  a female cancer  at 36    Factor V Leiden deficiency Daughter     Deep Vein Thrombosis Daughter     Diabetes Type 1 Daughter     Diabetes Daughter     Hypertension Daughter     Anesthesia Reaction No family hx of          Physical Exam:     BP 94/64   Ht 1.829 m (6')   Wt 75.8 kg (167 lb)   BMI 22.65 kg/m    Body mass index is 22.65 kg/m .    General Appearance:  healthy and alert, no distress                 Psychiatric:      appropriate mood and affect                                     Assessment:     Taran Regalado is a 68 year old woman with recurrent ovarian high grade serous carcinoma.     42 minute visit, chart review, documentation, coordination of care, in counseling.        1.  Recurrent high grade serous ovarian cancer.   2.  BRCA 1 germline mutation.   3.  [facility]  4.  PE resolved on Lovenox (prophy)  5.  Left ankle injury  6.   3521  7.  Elevated liver enzymes   8.  ECOG 0-1     We did discuss the scan and increased nausea is consistent with progressive disease. I would recommend to proceed with 3 cycles of carboplatin AUC of 4 and paclitaxel at 135 mg/m  every 3 weeks and Neulasta on pro. We will plan a follow-up CT scan after 3 cycles.  And then possible proceeding with a clinical trial.  Patient and her family agree with this plan of action for care all questions were answered.           Bolivar Juarez MD, MS    Department of Obstetrics and Gynecology   Division of Gynecologic Oncology   [facility]  Phone: [phone]       CC  Patient Care Team:  Bolivar Juarez MD as PCP - General (Gynecologic Oncology)  Cogan, Jacob, MD as Physician (Hematology & Oncology)  Bolivar Juarez MD as Assigned Cancer Care Provider

## 2024-04-04 NOTE — PROGRESS NOTES
RN reached out due to concern over MyChart message     Patient had a bowel movement yesterday and things feel improved. Patient had some tea that helped the constipation     Patient did vomit once shortly after the bowel movement but denies any more episodes after that and has been able to eat and drink. Patient states she just can't eat big heavy meals.     Overall patient states she is feeling well and denies any signs and symptoms of concern    RN and patient reviewed when to seek more urgent medical care and when to reach out to clinic     RN reviewed signs and symptoms of concerns     Patient was appreciative of the call but states she is fine and will go in if needed      Shawna Hinojosa RN

## 2024-04-08 NOTE — TELEPHONE ENCOUNTER
Medication requested: Olanzapine 5 mg tablet    Last prescribing provider: Ella Murillo CNP on 2/12/24    Last clinic visit date: 4/1/24 with Dr. Juarez  Recommendations for requested medication (if none, N/A): NA    Any other pertinent information (if none, N/A): NA    Refilled: Y/N, if NO, why? Not needed.    Pended and Routed to Dr. Bolivar Juarez

## 2024-04-08 NOTE — PROGRESS NOTES
Infusion Nursing Note:  Taran Regalado presents today for C1D1 Taxol, Carboplatin, and Avastin.    Patient seen by provider today: No   present during visit today: Not Applicable.    Note: The patient is not new to chemo and has had Taxol, Carboplatin, and Avastin previously. She declined the need for additional education. The patient reports abdominal bloating and pain and back pain at a 5/10. She notes the back pain can flare up to a 9/10 when it is severe.    The patient's daughter requested the patient get IVF while here. The daughter was also concerned regarding the Bilirubin of 1.3 and asked if this should be checked before the patient's next infusion appt as the patient has a history of needing a stent due to . Writer reached out to Shawna Hinojosa, [facility] regarding the above and she stated she would reach to Dr. Juarez to ask about this and Dr. Juarez stated no additional IVF for today and that the patient does not need her bilirubin rechecked prior to her next infusion appointment.    The patient notes decreased appetite and difficulty drinking liquids. Nutrition consult placed by [facility] per family request. Clear ensures provided to patient.     The patient also had questions regarding port placement. Questions answered to patient and family satisfaction. The patients daughter reached out to [facility] to ask about getting this ordered.     Intravenous Access:  Peripheral IV placed.    Treatment Conditions:  Lab Results   Component Value Date    HGB 11.7 04/08/2024    WBC 6.3 04/08/2024    ANEU 5.6 01/26/2024    ANEUTAUTO 4.4 04/08/2024     04/08/2024        Lab Results   Component Value Date     04/08/2024    POTASSIUM 4.6 04/08/2024    MAG 1.8 04/08/2024    CR 0.65 04/08/2024    HORACIO 9.4 04/08/2024    BILITOTAL 1.3 (H) 04/08/2024    ALBUMIN 3.3 (L) 04/08/2024    ALT 39 04/08/2024    AST 99 (H) 04/08/2024       Results reviewed, labs MET treatment parameters, ok to proceed with  treatment.  Urine Protein Negative.  Mag 1.8.    ONPRO  Was placed on patient's: left arm.    Was placed at 3:30 PM    Podpal used: Yes    ONPRO injector device Lot number: [id number]    Patient education included: what patient can expect after application, what colored lights mean on the device, when to remove device, when and where to call with questions or issues, all patients questions answered, and that Neulasta administration will occur at 1930.    Patient tolerated administration well.     AVS to patient via [employer].  Patient will return 4/29/24 for next appointment. Future appts have been reviewed and crosschecked with appt note and plan.     End of shift report given to infusion RN for completion of care.      Estefani Ricks RN

## 2024-04-08 NOTE — PROGRESS NOTES
Infusion RN reached out to RN/MD per patient request     Patient/daughter would like to know if patient can get extra fluids today and have labs re-drawn soon to check on Bilirubin     MD did not want extra fluids today as Creatinine was good. Also no need for re-draw of labs. MD is aware of all abnormal values.     Plan:  MD recommended that patient see NP virtually next Monday for symptoms check in     Infusion RN updated     Scheduling request sent     Shawna Hinojosa RN

## 2024-04-11 NOTE — TELEPHONE ENCOUNTER
Received call from patient asking if she needs to stop taking blood thinners. States she has a port placement procedure scheduled for Friday, and she is taking Xarelto every morning. Advised her to call the clinic in the morning to talk to care team regarding this prior to taking her Xarelto in the morning. Patient had no further questions and plans to follow advice.    Reason for Disposition   [1] Caller has NON-URGENT medicine question about med that PCP prescribed AND [2] triager unable to answer question    Protocols used: Medication Question Call-A-AH

## 2024-04-11 NOTE — PROGRESS NOTES
"Virtual Visit Details    Type of service:  Video Visit   Video Start Time: 10:58 AM  Video End Time:11:31 AM  Originating Location (pt. Location): Home  Distant Location (provider location):  Off-site  Platform used for Video Visit: [facility]            Gynecologic Oncology Follow Up Note    RE: Taran Regalado   MRN: [id number]  : 1955  GRACY: 04/15/24  GYNECOLOGIC ONCOLOGIST: Bolivar Juarez MD    CC: Taran Regalado is a 68 year old female with recurrent ovarian cancer presenting for disease management    INTERVAL HISTORY:  Taran is being evaluated today virtually for a one week toxicity check after starting palliative intent treatment for recurrent ovarian cancer with paclitaxel, carboplatin, and bevacizumab. Received first cycle on 24. She is accompanied by her daughter.    Taran initially had arthralgias and RUQ pain that wrapped around to her back the first several days following her infusion- had been taking oxycodone 10mg with only minimal improvement in her symptoms. Does have known hepatic metastases and a biliary stent in place- RUQ US obtained 24 which demonstrated stent remained in place, gallbladder wall thickened but stable compared to previous imaging.  Given her elevated liver enzymes, oxycodone was recommended to be dropped to 5mg and naproxen was added in BID. She states that her pain has significantly improved over the past two days and has not needed any oxycodone yesterday or today.    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 Can feel the ground under feet, no pain, no falls. Does feel slightly \"wobbly\" on her feet at times.    Her daughter reports Taran is not eating well- states yesterday she had some chicken breast, pork, some vegetables, popsicles, water, pedialyte. Will not eat unless something is placed in her hand per her daughter's report. Taran states that she is just not very hungry- denies significant nausea, " fullness, or dysphagia. States her weight is stable.    No respiratory concerns. Some mild LOPEZ with too much activity. No chest pain or pressure.     No severe headaches or double vision.    Does feel more wobbly and short of breath with activity.    Developed canker sores to the inner bottom lip on 4/13/24, managing with Orajel. Able to eat and drink.     On rivaroxaban for hx of PE- does have some mild epistaxes that resolve with pressure.    Denies fevers or chills, vomiting, chest pain, difficulty breathing at rest, bowel concerns, uncontrolled bleeding concerns.    ONCOLOGY HISTORY:  12/3/2020: US Pelvic: IMPRESSION: Limited examination due to acoustic windows. Possible left adnexal mass. A CT scan of the abdomen and pelvis with contrast is recommended for further assessment.     12/4/2020: CT A/P:   IMPRESSION:    Peritoneal carcinomatosis with masslike peritoneal thickening in the lower pelvis which may indicate an adnexal or ovarian primary malignancy. Large volume ascites. Bilateral pleural effusions. There is potential subtle pleural nodularity in the right hemithorax which could indicate metastatic disease.  Indeterminate 1 cm lesion in the right hepatic lobe suspicious for a metastatic lesion.      12/16/2020: Presented to [facility] with abdominal distention, 25lb weight loss, and CTAP with carcinomatosis, elevated  3098.     12/23/2020: CT Chest: IMPRESSION:   1. There are few scattered small sub-6 mm pulmonary nodules which are indeterminate without prior comparisons available. There are a few  slightly larger perifissural nodules which are technically  indeterminate in the setting of malignancy although presumed lymphatic in nature and of unlikely clinical significance. Attention on follow-up is recommended.  2. Small to moderate left and small right pleural effusions are increased in size from prior. No convincing evidence for pleural nodularity.  3. Partially visualized large volume ascites and  peritoneal nodularity in the upper abdomen similar to 12/4/2020 outside CT      12/26/2020: ED for abdominal distension; 3 L ascites drained with paracentesis    Pelvic US: Findings: Free fluid present in LLQ      12/31/2020: US Paracentesis: 900 mL ascites drained     1/7/2021: Diagnotic laparoscopy, biopsies  Pathology: FINAL DIAGNOSIS:   A. PERITONEUM, BIOPSIES:   - Positive for high grade carcinoma, consistent with metastatic carcinoma of Mullerian origin.     1/10-1/13/2021: Hospital admission for postoperative non-intractable vomiting and nausea.      1/10/2021: CT A/P: IMPRESSION: Extensive ascites which is probably malignant. Scattered liver hypodensities of indeterminate etiology comment cannot exclude metastatic disease. Diverticulosis. Fluid-filled adnexal masses and irregular appearance of uterus, which may represent primary neoplasm. Multiple peritoneal nodules. Large amount of fecal material in the colon with no evidence of small bowel obstruction.     Plan: Paclitaxel 175 mg/m2 and carboplatin AUC 6 x 3 cycles followed by a CT CAP and visit with Dr. Juarez.     1/12/2021: Cycle 1 paclitaxel and carboplatin while inpatient     1/13/2021: CT Head: Impression:  1. Chronic sinusitis of the right maxillary and right sphenoid sinuses.  2. Incidental presumed calcified meningioma in the right frontal  convexity without significant mass effect.  3. No suspicious intracranial enhancing lesion.     2/1/2021: Cycle 2 paclitaxel and carboplatin.  936.     2/3-2/5/21: Admission [facility] for afib w/ RVR and new PE     2/26/21: Cycle 3 paclitaxel and carboplatin planned.  Deferred due to thrombocytopenia.  pending.     3/15/21: Cycle 3 paclitaxel and carboplatin given     4/19/21: HYSTERECTOMY, TOTAL, ABDOMINAL, WITH BILATERAL SALPINGO-OOPHORECTOMY, omentectomy, NEOPLASM DEBULKING,Proctoscocy, RO, Resection of liver nodules, diaphragm stripping, immobilization of liver and colon  FINAL DIAGNOSIS:   A.  OMENTUM, BIOPSY:   - Omental adipose tissue with rare viable cells of metastatic high grade   serous carcinoma   - One reactive lymph node, negative for malignancy (0/1)   B. NODULE, SIGMOID, EXCISION:   - Calcified necrotic adipose tissue   - Negative for malignancy   C. NODULE, SMALL BOWEL MESENTERY, EXCISION:   - Fibroadipose tissue, positive for metastatic high grade serous carcinoma   D. UTERUS, CERVIX, BILATERAL FALLOPIAN TUBES AND OVARIES, HYSTERECTOMY   WITH BILATERAL SALPINGO-OOPHORECTOMY:   - Atrophic endometrium   - Uterine serosa with rare viable cells consistent with high grade serous   carcinoma   - Cervix with atrophic changes   - Viable cells consistent with high grade serous carcinoma present in the   right ovary, serosa of right   fallopian tube and right periadnexal soft tissue   - Left ovary with atrophic changes   - Left fallopian tube with a rare focus of serous tubal in-situ carcinoma   (STIC)   E. NODULES, SMALL BOWEL MESENTRY, EXCISION:   - Fibroadipose tissue with rare viable cells of metastatic high grade   serous carcinoma   F. NODULE, SPLENIC FLEXURE TRANSVERSE COLON, EXCISION:   - Fibroadipose tissue with rare viable cells of metastatic high grade   serous carcinoma   - Accessory splenule, negative for malignancy   G. OMENTUM, OMENTECTOMY:   - Omental adipose tissue with rare viable cells of metastatic high grade   serous carcinoma   H. NODULE, PERITONEAL PANCREATIC, EXCISION:   - Fibrous adhesions with inflammation   - Negative for malignancy   I. RIGHT HEMIDIAPHRAGM PERITONEUM, EXCISION:   - Fibrous adhesions with inflammation   - Negative for malignancy   J. RIGHT LIVER SURFACE NODULE:   - Fibrous adhesions with benign inclusion glands   - Negative for malignancy   K. LEFT LOWER LIVER EDGE, BIOPSY:   - Cauterized hepatic parenchyma and capsule   - Negative for malignancy   L. NODULE, SMALL BOWEL MESENTERIC #3, EXCISION:   - Fibroadipose tissue with rare viable cells of metastatic  high grade   serous carcinoma       Plan: Carboplatin AUC 6 + Taxol 175 mg/m2 x 3 cycles, then transition to Parp inhibitor for maintenance therapy given her BRCA1 germline mutation.      5/21/21: Cycle 4 carboplatin and paclitaxel.   172.  6/11/21: Cycle 5 carboplatin and paclitaxel.   61.  7/2/21: Cycle 6 carboplatin and paclitaxel.   20.        7/28/21 plan: Olaparib 300mg bid as starting dose,  14  8/20/21: start date olaparib 300 mg BID,  12  9/13/21:  22  10/4/21:  23  11/1/21:  26     11/02/2021: PET CT: IMPRESSION:   Findings compatible with interval surgery and posttreatment change.  No gross definitive FDG avid disease.  Potential foci of tumor deposits along the anterior dome of the liver and midline abdominal wall surgical scar.  Colonic activity is not necessarily abnormal, however, given the previous carcinomatosis the colonic activity is indeterminant.         12/1/21:  23  1/3/22:  21  2/1/22:  20  3/1/22:  21  4/1/22:  23  5/4/22:  28     EXAM: CT CHEST/ABDOMEN/PELVIS W CONTRAST  LOCATION: [facility]  DATE/TIME: 7/11/2022 1:25 PM     INDICATION: Stage III B high-grade ovarian carcinoma diagnosed Jan 2021. Posttreatment surveillance.  COMPARISON: CTA AP 04/23/2021, CT CAP 04/02/2021  TECHNIQUE: CT scan of the chest, abdomen, and pelvis was performed following injection of IV contrast. Multiplanar reformats were obtained. Dose reduction techniques were used.   CONTRAST: isovue 370 105mL IV; 50mL omni 140 oral     FINDINGS:   LUNGS AND PLEURA: No suspicious pulmonary nodules. Minimal right apical pleural thickening and a few punctate tiny nodules 2 of which are subpleural on the right are stable. No pleural effusions.     MEDIASTINUM/AXILLAE: No adenopathy. No central pulmonary emboli.     CORONARY ARTERY CALCIFICATION: Cannot evaluate.     HEPATOBILIARY: Normal.     PANCREAS: Normal.      SPLEEN: Normal.     ADRENAL GLANDS: Normal.     KIDNEYS/BLADDER: Normal.     BOWEL: Sliver of ascites adjacent to the spleen noted, decreased from prior study. There is a 4 mm nodule in the gastrosplenic ligament (image 352). On the preop study it appears as a smaller punctate nodule (prior image 341). Sliver of ascites in the   gastrohepatic ligament also noted. No peritoneal tumor nodules. No bowel obstruction. Quite redundant colon with mild large stool burden. Extensive distal colonic diverticulosis.     LYMPH NODES: Normal.     VASCULATURE: Mild arterial calcifications. Circumaortic left renal vein.     PELVIC ORGANS: Hysterectomy. Vaginal cuff is normal.     MUSCULOSKELETAL: Diastases of the midline rectus sheath above the umbilicus. Minimal nodular scarring to the left of midline in the midabdomen (image 419). There is a shallow broad-based supraumbilical ventral hernia containing only fat. No implants   within the hernia or abdominal incision. No suspicious bone lesions.                                                                      IMPRESSION:  1.  Sliver of ascites in the upper abdomen has decreased since interval debulking surgery.  2.  There is a punctate nodule in the gastrosplenic ligament which is minimally more plump relative to the preop exam. This will need to be followed.  3.  Minimal nodular changes to the left of the midline scar in the subcutaneous fat will have to be followed as well. Unclear if this simply reflects postoperative scarring or could reflect an early incisional recurrence.  4.  No other sites to suggest recurrent tumor. Vaginal cuff is normal.  5.  Extensive distal colonic diverticulosis.  6.  Other noncritical findings as noted above.      9/16/22  98     1/30/23 CT CAP:    IMPRESSION:  1.  Multiple new, hypoattenuating lesions in the liver, suspicious for hepatic metastatic disease.  2.  Necrotic mario hepatic lymphadenopathy, concerning for richard metastatic  disease.  3.  There is a new or increasingly conspicuous 6 mm soft tissue nodule in the right lower quadrant. This is indeterminate.  4.  There is a new 3 mm solid nodule in the right upper lobe, indeterminate.  5.  Stable approximate 4 mm punctate nodule along the gastrosplenic ligament.  6.  Similar area of linear free fluid in the upper abdomen anterior to the stomach.     Plan: Paclitaxel 175 mg/m2, Carboplatin AUC 6, bevacizumab 7.5 mg/kg     3/1/23: Cycle #1 Paclitaxel 175 mg/m2, Carboplatin AUC 6 (C7), bevacizumab 7.5 mg/kg.  1,196  3/24/23: Cycle #2 Paclitaxel 150 mg/m2, Carboplatin AUC 5 (C8), bevacizumab 15 mg/kg.  558     3/29/23: ER for dehydration and hypotension. Normotensive in ER. IV fluids given. Discharged.      4/14/23: Cycle #3 Paclitaxel 150 mg/m2, Carboplatin AUC 5 (C9), bevacizumab 15 mg/kg deferred neutropenia ANC 0.3   273  4/21/23: treatment deferred ANC 0.8  4/28/23: Cycle #3 Paclitaxel 150 mg/m2, Carboplatin AUC 5 (C9), bevacizumab 15 mg/kg, + Neulasta deferred ANC 1.0,  297     5/26/23: Cycle #4  Paclitaxel 150 mg/m2, Carboplatin AUC 5 (C10), bevacizumab 15 mg/kg, + Neulasta, deferred ANC 0.6  188. No hematology consult per MD.      6/2/23: Cycle #4 Paclitaxel 150 mg/m2, Carboplatin AUC 5 (C9), bevacizumab 15 mg/kg, + Neulasta   6/23/23: Cycle #5 deferred neutropenia ANC 0.5 thrombocytopenia platelets 85     6/26/2023 Addendum: Reduce both Carboplatin and Paclitaxel due to thrombocytopenia and persistent neutropenia. REFER to Hematology to rule out MDS. Message sent to pt. Orders placed.      7/3/23 plan: continue with 2 more cycles with carboplatin AUC of 4, Taxol at 135 mg per metered square, bevacizumab at 15 mg/kg as well as neulasta for bone marrow support.      7/3/23: Cycle #5 Paclitaxel 135 mg/m2, Carboplatin AUC 4, bevacizumab 15 mg/kg, +Neulasta.  375     7/11/23: per chart review Dr. Cogan with Hematology plan one more cycle of chemotherapy  then maintenance Avastin     7/28/23: Cycle #6 Paclitaxel 135 mg/m2, Carboplatin AUC 4, bevacizumab 15 mg/kg, +Neulasta.  370     8/17/2023: CT CAP: Stable bilateral pulmonary micronodules. Multiple subcentimeter hypodense hepatic lesions, a few are slightly less conspicuous. Necrotic lymph nodes in mario hepatis are stable. A few small peritoneal nodules in the left upper quadrant. Anterior to the pylorus is an oval 2.4 cm hypodense soft tissue lesion which is stable.    8/17/2023: started maintenance bevacizumab q3 weeks.  370.    8/25/2023:  450.    9/19/2023:  1056. Alk phos 221, ALT 86, AST 63.    9/21/2023: Transferred her care from [facility] to [facility] to be closer to home. Plan is maintenance bevacizumab 15 mg/kg every 3 weeks to give her a treatment break from myelosuppressive chemotherapy.    10/10/2023:  1889, alk phos 388, , AST 83  10/12/2023: Bevacizumab held for elevated LFTs, symptoms of new back pain, cough, sinusitis symptoms.    10/18/2023: CT CAP: Progressive liver metastasis. Small nonspecific right lower lobe pulmonary nodule. Inflammatory process or a metastatic nodule remain possible. This does not have the characteristic appearance of metastasis, however, remains indeterminant.     Plan: Weekly paclitaxel 80 mg/m2 day 1, 8, 15, and bevacizumab 10 mg/kg day 1 and day 15 of a 28 day cycle x 3 cycles followed by CT CAP and follow up with Dr. Juarez in [place].     12/8/2023:  >10,000. Bilirubin 4.2, LFTs elevated.    12/12/2023: C1D1 paclitaxel and bevacizumab. ([place])    12/13/2023: Presented to the ED in [place] with worsening RUQ pain and jaundice x 2 weeks. Bilirubin 8.1. Waitlisted for transfer to [facility]. Elected to leave the ED.  12/13/2023: CT abdomen pelvis ([place]): New and enlarging innumerable hypodense liver lesions. Increased retroperitoneal adenopathy. Increased left >right intrahepatic biliary ductal dilatation without a  resting cause noted. Common bile duct borderline enlarged 6 mm. Ventral hernia containing colon.  12/13/2023: Abdominal US ([place]): Numerous liver lesions. Common bile duct dilated at 9 mm, no obstructing stone. Spleen 14 cm, enlarged.    12/15/2023: ERCP ([facility]): Ventral pancreatic duct prophylactically stented. Biliary sphincterotomy. Cholangiogram showed multifocal biliary stricture. Intrahepatic and common hepatic duct strictures dilated with a balloon. 2 metal stents placed into the left main and right anterior intrahepatic duct    12/19/2023: C1D8 paclitaxel administered. Bilirubin 3.5, LFTs improving. WBC 2.2, ANC 1.0, platelets 90. ([place])  12/22/23: Neupogen for ANC 1.0.  12/28/2023: C1D15 paclitaxel + bevacizumab. Bilirubin 1.9, LFT's improving, ANC 1.0, platelets 174. ([place])     Plan: Transfer back to [facility] with weekly paclitaxel 80 mg/m2 day 1, 8, 15, and bevacizumab 10 mg/kg day 1 and day 15 of a 28 day cycle x 2 additional cycles followed by CT CAP and follow up with Dr. Juarez.     1/12/24: Cycle 1 paclitaxel/bevacizumab.   2113.   2/26/24: Cycle 2 paclitaxel/bevacizumab.   5381. (Delayed due to vacation)    3/25/24: CT CAP impression   1. Evidence of disease progression with enlargement of multifocal  hepatic metastases compared to 12/13/2023 CT.  2. New small volume ascites. Increased irregular thickening of the  peritoneum along the paracolic spaces and omentum is concerning for  developing carcinomatosis.  3. Several new subcentimeter bilateral solid pulmonary nodules are  likely metastatic.  4. Interval increased conspicuity of multiple sclerotic lesions in the  visualized axial and appendicular skeleton, likely representing  worsening osseous metastases.    4/1/24: Follow up with Dr. Juarez. Concern for disease progression. Per Dr. Juarez, plan to switch to carboplatin/paclitaxel/bevacizumab with GCSF q21d x3 cycles followed by imaging and follow up with   Larry    24: C1 carboplatin AUC 4, paclitaxel 135mg/m2, bevacizumab 15mg/kg + GCSF.  5824      Past Medical History:   Diagnosis Date    Atrial fibrillation with rapid ventricular response (H)     History of cold sores     Hx of LASIK 2017    Insomnia     Migraine     Osteopenia     Pelvic mass     Peritoneal carcinomatosis (H)     Restless legs syndrome (RLS)       Past Surgical History:   Procedure Laterality Date    APPENDECTOMY      ARTHROSCPY KNEE SURGICAL DEBRIDEMENT SHAVING ARTICULAR CARTILAGE Right     BIOPSY  2021    Biopsy to confirm ovarian cancer    DEBRIDEMENT LEFT UPPER EXTREMITY  2016    ENDOSCOPIC RETROGRADE CHOLANGIOPANCREATOGRAM N/A 12/15/2023    Procedure: ENDOSCOPIC RETROGRADE CHOLANGIOPANCREATOGRAPHY, with pancreatic stent placement, biliary duct dilation, biliary stent placement, and biliary sphincterotomy;  Surgeon: Fabian Simpson MD;  Location: [facility] OR    HYSTERECTOMY TOTAL ABD, LUISITO SALPINGO-OOPHORECTOMY, NODE DISSECTION, TUMOR DEBULKING, COMBINED Bilateral 2021    Procedure: HYSTERECTOMY, TOTAL, ABDOMINAL, WITH BILATERAL SALPINGO-OOPHORECTOMY, omentectomy, NEOPLASM DEBULKING,Proctoscocy, RO, Resection of liver nodules, diaphragm stripping, immobilization of liver and colon;  Surgeon: Bolivar Juarez MD;  Location: [facility] OR    LAPAROSCOPY DIAGNOSTIC (GYN) Bilateral 2021    Procedure: Diagnsotic laparoscopy, biopsies;  Surgeon: Bolivar Juarez MD;  Location: [facility] OR    LASIK      TUBAL LIGATION       Social History     Socioeconomic History    Marital status:    Tobacco Use    Smoking status: Former     Current packs/day: 0.00     Average packs/day: 0.5 packs/day for 40.0 years (20.0 ttl pk-yrs)     Types: Cigarettes     Start date:      Quit date: 2018     Years since quittin.2     Passive exposure: Never    Smokeless tobacco: Never   Vaping Use    Vaping status: Never Used   Substance and Sexual Activity    Alcohol use: Not Currently     Drug use: Never    Sexual activity: Not Currently     Partners: Male   Other Topics Concern    Parent/sibling w/ CABG, MI or angioplasty before 65F 55M? No     Social Determinants of Health     Interpersonal Safety: Not At Risk (3/30/2024)    [facility] and [facility]    Intimate Partner Violence     Are you in a relationship where you are physically hurt, threatened and/or made to feel afraid?: No      Family History   Adopted: Yes   Problem Relation Age of Onset    Cancer Mother 36    Other Cancer Mother         Bio mother  of  a female cancer  at 36    Factor V Leiden deficiency Daughter     Deep Vein Thrombosis Daughter     Diabetes Type 1 Daughter     Diabetes Daughter     Hypertension Daughter     Anesthesia Reaction No family hx of       Current Outpatient Medications   Medication Sig Dispense Refill    amitriptyline (ELAVIL) 100 MG tablet Take 100 mg by mouth at bedtime      BEVACIZUMAB, AVASTIN, INJECTION 2.5MG Every other week (Tuesday)      cyclobenzaprine (FLEXERIL) 5 MG tablet Take 1 tablet (5 mg) by mouth 3 times daily as needed for muscle spasms 10 tablet 0    dexAMETHasone (DECADRON) 4 MG tablet Take 2 tablets (8 mg) by mouth daily for 3 days, starting the day after chemotherapy. 6 tablet 5    meclizine (ANTIVERT) 25 MG tablet Take 1 tablet (25 mg) by mouth 3 times daily as needed for dizziness or nausea 15 tablet 0    OLANZapine (ZYPREXA) 5 MG tablet Take 1 tablet (5 mg) by mouth at bedtime 30 tablet 1    ondansetron (ZOFRAN) 8 MG tablet Take 1 tablet (8 mg) by mouth every 8 hours as needed for nausea (vomiting) 30 tablet 2    ondansetron (ZOFRAN) 8 MG tablet Take 1 tablet (8 mg) by mouth every 8 hours as needed for nausea 30 tablet 1    oxyCODONE (ROXICODONE) 5 MG tablet Take 1 tablet (5 mg) by mouth every 6 hours as needed for severe pain or pain 30 tablet 0    prochlorperazine (COMPAZINE) 10 MG tablet Take 1 tablet (10 mg) by mouth every 6 hours as needed for nausea or  vomiting 30 tablet 2    rivaroxaban ANTICOAGULANT (XARELTO ANTICOAGULANT) 20 MG TABS tablet Take 1 tablet (20 mg) by mouth every morning 90 tablet 1    SUMAtriptan (IMITREX) 100 MG tablet Take 1 tablet (100 mg) by mouth at onset of headache for migraine 15 tablet 0    valACYclovir (VALTREX) 1000 mg tablet Take 1,000 mg by mouth as needed      zolpidem (AMBIEN) 10 MG tablet Take 1 tablet (10 mg) by mouth every evening 10 tablet 0     No current facility-administered medications for this visit.      No Known Allergies       OBJECTIVE:    PHYSICAL EXAM:  No vitals taken- virtual visit  Constitutional: Alert and oriented non-toxic appearing female in no acute distress  HEENT: No periorbital edema or perioral cyanosis  Resp: Respirations unlabored, speaking in full sentences without apparent dyspnea, wheezing, or cough  Psych: Pleasant and interactive, affect euthymic, makes appropriate eye contact, answers questions appropriately, thought content logical        DATA:   Latest Reference Range & Units 04/08/24 10:06   Sodium 135 - 145 mmol/L 136   Potassium 3.4 - 5.3 mmol/L 4.6   Chloride 98 - 107 mmol/L 101   Carbon Dioxide (CO2) 22 - 29 mmol/L 25   Urea Nitrogen 8.0 - 23.0 mg/dL 12.8   Creatinine 0.51 - 0.95 mg/dL 0.65   GFR Estimate >60 mL/min/1.73m2 >90   Calcium 8.8 - 10.2 mg/dL 9.4   Anion Gap 7 - 15 mmol/L 10   Magnesium 1.7 - 2.3 mg/dL 1.8   Albumin 3.5 - 5.2 g/dL 3.3 (L)   Protein Total 6.4 - 8.3 g/dL 7.5   Alkaline Phosphatase 40 - 150 U/L 607 (H)   ALT 0 - 50 U/L 39   AST 0 - 45 U/L 99 (H)   Bilirubin Total <=1.2 mg/dL 1.3 (H)   Glucose 70 - 99 mg/dL 106 (H)   WBC 4.0 - 11.0 10e3/uL 6.3   Hemoglobin 11.7 - 15.7 g/dL 11.7   Hematocrit 35.0 - 47.0 % 36.5   Platelet Count 150 - 450 10e3/uL 197   RBC Count 3.80 - 5.20 10e6/uL 3.65 (L)   MCV 78 - 100 fL 100   MCH 26.5 - 33.0 pg 32.1   MCHC 31.5 - 36.5 g/dL 32.1   RDW 10.0 - 15.0 % 17.0 (H)   % Neutrophils % 69   % Lymphocytes % 19   % Monocytes % 10   % Eosinophils  % 1   % Basophils % 1   Absolute Basophils 0.0 - 0.2 10e3/uL 0.0   Absolute Eosinophils 0.0 - 0.7 10e3/uL 0.1   Absolute Immature Granulocytes <=0.4 10e3/uL 0.0   Absolute Lymphocytes 0.8 - 5.3 10e3/uL 1.2   Absolute Monocytes 0.0 - 1.3 10e3/uL 0.6   % Immature Granulocytes % 0   Absolute Neutrophils 1.6 - 8.3 10e3/uL 4.4   Absolute NRBCs 10e3/uL 0.0   NRBCs per 100 WBC <1 /100 0   (L): Data is abnormally low  (H): Data is abnormally high       Date Value Ref Range Status   04/08/2024 5,864 (H) <=38 U/mL Final   03/25/2024 3,521 (H) <=38 U/mL Final   02/26/2024 5,381 (H) <=38 U/mL Final   01/12/2024 2,113 (H) <=38 U/mL Final   05/26/2023 188 (H) <=38 U/mL Final   03/01/2023 1,196 (H) <=38 U/mL Final   08/20/2021 12 0 - 30 U/mL Final   07/30/2021 14 0 - 30 U/mL Final   07/02/2021 20 0 - 30 U/mL Final     Comment:     Assay Method:  Chemiluminescence using Siemens Centaur XP   06/11/2021 61 (H) 0 - 30 U/mL Final     Comment:     Assay Method:  Chemiluminescence using Siemens Centaur XP   05/21/2021 172 (H) 0 - 30 U/mL Final     Comment:     Assay Method:  Chemiluminescence using Siemens Centaur XP   02/26/2021 129 (H) 0 - 30 U/mL Final     Comment:     Assay Method:  Chemiluminescence using Siemens Centaur XP   02/01/2021 936 (H) 0 - 30 U/mL Final     Comment:     Assay Method:  Chemiluminescence using Siemens Centaur XP            WEIGHT:  Wt Readings from Last 5 Encounters:   04/08/24 77.4 kg (170 lb 11.2 oz)   04/01/24 75.8 kg (167 lb)   03/11/24 75.8 kg (167 lb 3.2 oz)   03/04/24 76.5 kg (168 lb 9.6 oz)   02/26/24 75.8 kg (167 lb)          ASSESSMENT/PLAN:    1) Recurrent ovarian cancer: Currently on treatment with carboplatin AUC 4, paclitaxel 135mg/m2, and bevacizumab 15mg/kg. Initial difficulty with arthralgias, nausea, and abdominal pain- reports these are all improving to minimal now. See below for side effects requiring further management. Counseled on side effect management. C2 due 4/29/24, to have  virtual visit with me prior. Reviewed alarm signs and symptoms and when to seek further care.   2) Treatment/disease related effects:  Mouth sores: Likely related to chemotherapy. Able to eat and drink. Pain controlled with Orajel. Discussed salt and soda swishes, could consider Magic Mouthwash if symptoms worsen or fail to improve. Expect this will improve after rita.  Arthralgias: Likely related to taxane as well as GCSF. Resolved now. Can consider loratadine beginning three days prior to her next infusion.  RUQ pain: Improving. Continues on naproxen. Has initial appt with palliative medicine physician, Dr. Bermeo, tomorrow.   Decreased appetite: Counseled on importance of small, frequent meals that are nutritionally dense. Has referral for dietitian- she is awaiting discussion with palliative medicine and plans to discuss dietitian referral with insurance company.  Transaminitis: Likely related to disease burden. Adjusting anticoagulation for hepatic impairment, see below. Continue to monitor.  3) Hx of PE: Dx in 2021, has been on rivaroxaban. Given hepatic impairment, will switch from rivaroxaban to apixaban 5mg PO BID after discussion with clinical pharmacist.  4) Bony metastases: No evidence of spinal cord compression. Discussed risks and benefits of adding in zoledronic acid vs denosumab. Given her significant arthralgias this past cycle and concerns with treatment tolerability and quality of life, will hold off on adding in the aforementioned agents at this time given their known side effect profile as well as her recent dental work. Will continue to discuss.  5) Genetics: POSITIVE for a BRCA1 mutation. This mutation is called [id number]   6) Patient verbalized understanding of and agreement with plan    MDM:  41 minutes spent on date of service on visit, including chart review, face to face visit, documentation, and coordination of care.    JERICHO Ventura, CNP (she/her)  Division of  Gynecologic Oncology

## 2024-04-12 NOTE — PROGRESS NOTES
Care Suites Post Procedure Note    Patient Information  Name: Taran Regalado  Age: 68 year old    Post Procedure  Time patient returned to Care Suites: 1400  Concerns/abnormal assessment: none  If abnormal assessment, provider notified: N/A  Plan/Other: discharge at 1500     Kirk Buckner RN

## 2024-04-12 NOTE — IR NOTE
Interventional Radiology Intra-procedural Nursing Note    Patient Name: Taran Regalado  Medical Record Number: [id number]  Today's Date: April 12, 2024    Procedure: Port a catheter placement with moderate sedation     Start time: 1335  End time: 1350  Report provided to:  CARO Rg RN       Note: Patient entered Interventional Radiology Suite number 1 via cart. Patient awake, alert and oriented. Assisted onto procedural table in Supine position. Prepped and draped.  Dr. Potts in room. Time out and procedure started. Vital signs stable. Telemetry reading NSR.    Procedure well tolerated by patient without complications. Procedure end with debrief by Dr. Potts.  Pressure held until hemostasis achieved. Suture, Dermabond, SteriStrips,  Gauze dressing applied to Rt Port Site.    Administered medication totals:  2G Ancef Anbx.. IV   Lidocaine 1% 6 mL Intradermal  Lidocaine with Epinephrine 20 mL Intradermal  Heparin 500 Units ...HepLock   Versed 2 mg IVP  Fentanyl 100 mcg IVP    Last dose of sedation administered at 1335.

## 2024-04-12 NOTE — DISCHARGE INSTRUCTIONS
Port Insertion Discharge Instructions     After you go home:    Have an adult stay with you for the first 6 hours  You may resume your normal diet       For 24 hours - due to the sedation you received:  Relax and take it easy  Do NOT make any important or legal decisions  Do NOT drive or operate machines at home or at work  Do NOT drink alcohol    Care of Incision sites:    You have a liquid adhesive covering on your incisions. Do not remove the adhesive residue. It will come off on it's own.  You may take a shower 24-48 hours after your procedure. Do not scrub site.  No submerging site for 2 weeks or until the incision is completely healed.  You may cover the wound with a bandaid if needed for comfort.      Activity     Avoid heavy lifting (greater than 10 pounds) or the overuse of your shoulder for 48-72 hrs.    Bleeding:    If you start bleeding from the incision sites in your chest or neck - or have swelling in your neck, sit down and press on the site for 5-10 minutes.   If bleeding has not stopped after 10 minutes, call your provider.        Call 911 right away if you have heavy bleeding or bleeding that does not stop.      Medicines:    You may resume all medications  Resume your Warfarin/Coumadin at your regular dose today. Follow up with your provider to have your INR rechecked  Resume your Platelet Inhibitors and Aspirin tomorrow at your regular dose  For minor pain, you may take Acetaminophen (Tylenol) or Ibuprofen (Advil)    Call the provider who ordered this procedure if:    You have swelling in your neck or over your port site  The incision area is red, swollen, hot or tender  You have chills or a fever greater than 101 F (38 C)  Any questions or concerns    Call  911 or go to the Emergency Room if you have:    Severe chest pain or trouble breathing  Bleeding that you cannot control    Additional Information:    Your port may be accessed right away.   You will need to have your port flushed every 30  days or after each use.      If you have questions call:          [facility] Radiology Dept @ [phone]      The provider who performed your procedure was Dr. Potts.

## 2024-04-12 NOTE — TELEPHONE ENCOUNTER
Called Justen after discussing results with Dr. Juarez- recommend to continue monitoring symptoms, ED only if symptoms worsen.  Per Dr. Juarez, likely related to known malignancy. Justen verbalized understanding of and agreement with plan.    JERICHO Ventura, CNP (she/her)  Division of Gynecologic Oncology

## 2024-04-12 NOTE — TELEPHONE ENCOUNTER
Called Justen to review her imaging and lab results- she had her port placed earlier today, went well per her report.  VS flowsheet reviewed from her port placement- afebrile, normotensive, HR 84.    Called to discuss gallbladder distention and wall thickening on imaging, worsening liver enzymes. Concern is that acalculous cholecystitis cannot be ruled out on US, but impression does state that distention appears similar to prior CT.    Mild leukocytosis, but she did just receive Neulasta on 4/9/24 and this is to be expected.    Mildly worsening alk phos with worsening ALT and AST (still grade 1 elevations in ALT and AST)- question whether this is due to known liver mets vs paclitaxel vs hepatobiliary issue.    She states she has not been having any fevers, is not vomiting. Her biggest issue is the worsening pain that she's had for the past few days, but able to manage with oxycodone. We reviewed s/sxs of worsening infection/disease and when to seek emergency care.    She confirmed she is staying locally over the weekend and if symptoms worsen, to go to the ED.    Message has been sent to Dr. Juarez and I will call her back with an update after I speak with him.    Also notes that oxycodone/naloxone needs to be sent to [facility] [address] in [place] this weekend and NOT in [place]- these orders were discontinued and resent to [place].    We reviewed alarm s/sxs and when to seek further care.    JERICHO Ventura, CNP (she/her)  Division of Gynecologic Oncology  Pager: [phone]

## 2024-04-12 NOTE — PRE-PROCEDURE
GENERAL PRE-PROCEDURE:   Procedure:  Port a catheter placement with moderate sedation  Date/Time:  4/12/2024 12:50 PM    Written consent obtained?: Yes    Risks and benefits: Risks, benefits and alternatives were discussed    Consent given by:  Patient  Patient states understanding of procedure being performed: Yes    Patient's understanding of procedure matches consent: Yes    Procedure consent matches procedure scheduled: Yes    Expected level of sedation:  Moderate  Appropriately NPO:  Yes  ASA Class:  3  Mallampati  :  Grade 2- soft palate, base of uvula, tonsillar pillars, and portion of posterior pharyngeal wall visible  Lungs:  Lungs clear with good breath sounds bilaterally  Heart:  Normal heart sounds and rate and systolic murmur  History & Physical reviewed:  History and physical reviewed and no updates needed  Statement of review:  I have reviewed the lab findings, diagnostic data, medications, and the plan for sedation    Total time: 20 minutes    [facility] Interventional Radiology CNP ([phone]) (phone [phone])

## 2024-04-12 NOTE — PROGRESS NOTES
Care Suites Admission Nursing Note    Patient Information  Name: Taran Regalado  Age: 68 year old  Reason for admission: port placement  Care Suites arrival time: 1130    Visitor Information  Name: Laure talavera     Patient Admission/Assessment   Pre-procedure assessment complete: Yes  If abnormal assessment/labs, provider notified: N/A  NPO: Yes  Medications held per instructions/orders: N/A  Consent: obtained  If applicable, pregnancy test status: deferred  Patient oriented to room: Yes  Education/questions answered: Yes  Plan/other: port placement    Discharge Planning  Discharge name/phone number: Laure [phone]   Overnight post sedation caregiver: above  Discharge location: home    Terence Bethea RN

## 2024-04-12 NOTE — TELEPHONE ENCOUNTER
Spoke with Justen regarding MyChart request for oxycodone and discussion of worsening pain.    Justen states that the pain she is experiencing is an acute worsening of RUQ pain that wraps around to her back- intermittent, but when it comes on is severe. Nausea is stable, no significant vomiting. No jaundice or pruritus. Feels bloated, but states she recently was evaluated for paracentesis and did not have enough ascites to be drained.    Denies focal bony pain, saddle anesthesia, bowel/bladder disturbance, weakness, or radicular symptoms. Denies infectious symptoms.    Taking oxycodone 10mg as needed with moderate control. Not taking Tylenol or ibuprofen. Does have elevated liver enzymes, not noting increased sedation with this dose of oxycodone. Is taking olanzapine for nausea, but not finding this very helpful, and is okay stopping this.    Has appt scheduled for port placement today at [facility].    Plan for CMP/INR draw with her CBC today, will add on abdominal US to eval for stent patency and ascites.    Script for oxycodone 5mg q4h, #15, no refills sent to her pharmacy along with naloxone- reviewed rationale. Will have her add naproxen BID on a scheduled basis over the weekend (platelets and renal function have been WNL). Reviewed rationale behind limiting the dose of oxycodone in the setting of liver dysfunction. Stop olanzapine if not helpful given the risk of CNS depression and QT prolongation with her other medication.    I will contact her regarding these results. Today is a Friday and we have follow up scheduled together on Monday- reviewed that if any symptoms are worsening over the weekend, I would recommend she seek emergency care.    JERICHO Ventura, CNP (she/her)  Division of Gynecologic Oncology

## 2024-04-12 NOTE — PROGRESS NOTES
Care Suites Discharge Nursing Note    Patient Information  Name: Taran Regalado  Age: 68 year old    Discharge Education:  Discharge instructions reviewed: Yes  Additional education/resources provided: port pamphlet with IDs given to pt  Patient/patient representative verbalizes understanding: Yes  Patient discharging on new medications: No  Medication education completed: Yes    Discharge Plans:   Discharge location: home  Discharge ride contacted: Yes  Approximate discharge time: 1510    Discharge Criteria:  Discharge criteria met and vital signs stable: Yes, port and neck site remain soft with no bleeding, denies any pain, taking PO food and fluids well, VSS, up in room per self. Discharged to home per w/c with daughter in stable condition.    Patient Belongs:  Patient belongings returned to patient: Yes    Marleni Clements RN

## 2024-04-15 NOTE — PATIENT INSTRUCTIONS
Your visit today was with Marylin Hancock CNP for follow up after C1 carboplatin/paclitaxel/bevacizumab.    Plan:  You seem to be doing pretty well today- work on eating. Every bite counts.  Stop the Xarelto and START the apixaban (Eliquis) twice daily (first dose of Eliquis would be due in the morning instead of Xarelto)  See Dr. Bermeo as scheduled tomorrow  Return to clinic in 2 week(s) with Marylin Hancock CNP for  C2 carboplatin/paclitaxel/bevacizumab      For urgent questions or concerns, please call rather than send a medical message.   M-F 8-4:30: [phone]  After hours, weekends, and holidays: [phone] and ask to speak to the gynecologic oncology resident on call

## 2024-04-15 NOTE — LETTER
"    4/15/2024         RE: Taran Regalado  [address]  [place] [address]        Dear Colleague,    Thank you for referring your patient, Taran Regalado, to the [facility]. Please see a copy of my visit note below.    Virtual Visit Details    Type of service:  Video Visit   Video Start Time: 10:58 AM  Video End Time:11:31 AM  Originating Location (pt. Location): Home  Distant Location (provider location):  Off-site  Platform used for Video Visit: [facility]            Gynecologic Oncology Follow Up Note    RE: Taran Regalado   MRN: [id number]  : 1955  GRACY: 04/15/24  GYNECOLOGIC ONCOLOGIST: Bolivar Juarez MD    CC: Taran Regalado is a 68 year old female with recurrent ovarian cancer presenting for disease management    INTERVAL HISTORY:  Taran is being evaluated today virtually for a one week toxicity check after starting palliative intent treatment for recurrent ovarian cancer with paclitaxel, carboplatin, and bevacizumab. Received first cycle on 24. She is accompanied by her daughter.    Taran initially had arthralgias and RUQ pain that wrapped around to her back the first several days following her infusion- had been taking oxycodone 10mg with only minimal improvement in her symptoms. Does have known hepatic metastases and a biliary stent in place- RUQ US obtained 24 which demonstrated stent remained in place, gallbladder wall thickened but stable compared to previous imaging.  Given her elevated liver enzymes, oxycodone was recommended to be dropped to 5mg and naproxen was added in BID. She states that her pain has significantly improved over the past two days and has not needed any oxycodone yesterday or today.    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 Can feel the ground under feet, no pain, no falls. Does feel slightly \"wobbly\" on her feet at times.    Her daughter reports Taran is not eating well- " states yesterday she had some chicken breast, pork, some vegetables, popsicles, water, pedialyte. Will not eat unless something is placed in her hand per her daughter's report. Justen states that she is just not very hungry- denies significant nausea, fullness, or dysphagia. States her weight is stable.    No respiratory concerns. Some mild LOPEZ with too much activity. No chest pain or pressure.     No severe headaches or double vision.    Does feel more wobbly and short of breath with activity.    Developed canker sores to the inner bottom lip on 4/13/24, managing with Orajel. Able to eat and drink.     On rivaroxaban for hx of PE- does have some mild epistaxes that resolve with pressure.    Denies fevers or chills, vomiting, chest pain, difficulty breathing at rest, bowel concerns, uncontrolled bleeding concerns.    ONCOLOGY HISTORY:  12/3/2020: US Pelvic: IMPRESSION: Limited examination due to acoustic windows. Possible left adnexal mass. A CT scan of the abdomen and pelvis with contrast is recommended for further assessment.     12/4/2020: CT A/P:   IMPRESSION:    Peritoneal carcinomatosis with masslike peritoneal thickening in the lower pelvis which may indicate an adnexal or ovarian primary malignancy. Large volume ascites. Bilateral pleural effusions. There is potential subtle pleural nodularity in the right hemithorax which could indicate metastatic disease.  Indeterminate 1 cm lesion in the right hepatic lobe suspicious for a metastatic lesion.      12/16/2020: Presented to [facility] with abdominal distention, 25lb weight loss, and CTAP with carcinomatosis, elevated  3098.     12/23/2020: CT Chest: IMPRESSION:   1. There are few scattered small sub-6 mm pulmonary nodules which are indeterminate without prior comparisons available. There are a few  slightly larger perifissural nodules which are technically  indeterminate in the setting of malignancy although presumed lymphatic in nature and of unlikely  clinical significance. Attention on follow-up is recommended.  2. Small to moderate left and small right pleural effusions are increased in size from prior. No convincing evidence for pleural nodularity.  3. Partially visualized large volume ascites and peritoneal nodularity in the upper abdomen similar to 12/4/2020 outside CT      12/26/2020: ED for abdominal distension; 3 L ascites drained with paracentesis    Pelvic US: Findings: Free fluid present in LLQ      12/31/2020: US Paracentesis: 900 mL ascites drained     1/7/2021: Diagnotic laparoscopy, biopsies  Pathology: FINAL DIAGNOSIS:   A. PERITONEUM, BIOPSIES:   - Positive for high grade carcinoma, consistent with metastatic carcinoma of Mullerian origin.     1/10-1/13/2021: Hospital admission for postoperative non-intractable vomiting and nausea.      1/10/2021: CT A/P: IMPRESSION: Extensive ascites which is probably malignant. Scattered liver hypodensities of indeterminate etiology comment cannot exclude metastatic disease. Diverticulosis. Fluid-filled adnexal masses and irregular appearance of uterus, which may represent primary neoplasm. Multiple peritoneal nodules. Large amount of fecal material in the colon with no evidence of small bowel obstruction.     Plan: Paclitaxel 175 mg/m2 and carboplatin AUC 6 x 3 cycles followed by a CT CAP and visit with Dr. Juarez.     1/12/2021: Cycle 1 paclitaxel and carboplatin while inpatient     1/13/2021: CT Head: Impression:  1. Chronic sinusitis of the right maxillary and right sphenoid sinuses.  2. Incidental presumed calcified meningioma in the right frontal  convexity without significant mass effect.  3. No suspicious intracranial enhancing lesion.     2/1/2021: Cycle 2 paclitaxel and carboplatin.  936.     2/3-2/5/21: Admission [facility] for afib w/ RVR and new PE     2/26/21: Cycle 3 paclitaxel and carboplatin planned.  Deferred due to thrombocytopenia.  pending.     3/15/21: Cycle 3 paclitaxel and  carboplatin given     4/19/21: HYSTERECTOMY, TOTAL, ABDOMINAL, WITH BILATERAL SALPINGO-OOPHORECTOMY, omentectomy, NEOPLASM DEBULKING,Proctoscocy, RO, Resection of liver nodules, diaphragm stripping, immobilization of liver and colon  FINAL DIAGNOSIS:   A. OMENTUM, BIOPSY:   - Omental adipose tissue with rare viable cells of metastatic high grade   serous carcinoma   - One reactive lymph node, negative for malignancy (0/1)   B. NODULE, SIGMOID, EXCISION:   - Calcified necrotic adipose tissue   - Negative for malignancy   C. NODULE, SMALL BOWEL MESENTERY, EXCISION:   - Fibroadipose tissue, positive for metastatic high grade serous carcinoma   D. UTERUS, CERVIX, BILATERAL FALLOPIAN TUBES AND OVARIES, HYSTERECTOMY   WITH BILATERAL SALPINGO-OOPHORECTOMY:   - Atrophic endometrium   - Uterine serosa with rare viable cells consistent with high grade serous   carcinoma   - Cervix with atrophic changes   - Viable cells consistent with high grade serous carcinoma present in the   right ovary, serosa of right   fallopian tube and right periadnexal soft tissue   - Left ovary with atrophic changes   - Left fallopian tube with a rare focus of serous tubal in-situ carcinoma   (STIC)   E. NODULES, SMALL BOWEL MESENTRY, EXCISION:   - Fibroadipose tissue with rare viable cells of metastatic high grade   serous carcinoma   F. NODULE, SPLENIC FLEXURE TRANSVERSE COLON, EXCISION:   - Fibroadipose tissue with rare viable cells of metastatic high grade   serous carcinoma   - Accessory splenule, negative for malignancy   G. OMENTUM, OMENTECTOMY:   - Omental adipose tissue with rare viable cells of metastatic high grade   serous carcinoma   H. NODULE, PERITONEAL PANCREATIC, EXCISION:   - Fibrous adhesions with inflammation   - Negative for malignancy   I. RIGHT HEMIDIAPHRAGM PERITONEUM, EXCISION:   - Fibrous adhesions with inflammation   - Negative for malignancy   J. RIGHT LIVER SURFACE NODULE:   - Fibrous adhesions with benign inclusion  glands   - Negative for malignancy   K. LEFT LOWER LIVER EDGE, BIOPSY:   - Cauterized hepatic parenchyma and capsule   - Negative for malignancy   L. NODULE, SMALL BOWEL MESENTERIC #3, EXCISION:   - Fibroadipose tissue with rare viable cells of metastatic high grade   serous carcinoma       Plan: Carboplatin AUC 6 + Taxol 175 mg/m2 x 3 cycles, then transition to Parp inhibitor for maintenance therapy given her BRCA1 germline mutation.      5/21/21: Cycle 4 carboplatin and paclitaxel.   172.  6/11/21: Cycle 5 carboplatin and paclitaxel.   61.  7/2/21: Cycle 6 carboplatin and paclitaxel.   20.        7/28/21 plan: Olaparib 300mg bid as starting dose,  14  8/20/21: start date olaparib 300 mg BID,  12  9/13/21:  22  10/4/21:  23  11/1/21:  26     11/02/2021: PET CT: IMPRESSION:   Findings compatible with interval surgery and posttreatment change.  No gross definitive FDG avid disease.  Potential foci of tumor deposits along the anterior dome of the liver and midline abdominal wall surgical scar.  Colonic activity is not necessarily abnormal, however, given the previous carcinomatosis the colonic activity is indeterminant.         12/1/21:  23  1/3/22:  21  2/1/22:  20  3/1/22:  21  4/1/22:  23  5/4/22:  28     EXAM: CT CHEST/ABDOMEN/PELVIS W CONTRAST  LOCATION: [facility]  DATE/TIME: 7/11/2022 1:25 PM     INDICATION: Stage III B high-grade ovarian carcinoma diagnosed Jan 2021. Posttreatment surveillance.  COMPARISON: CTA AP 04/23/2021, CT CAP 04/02/2021  TECHNIQUE: CT scan of the chest, abdomen, and pelvis was performed following injection of IV contrast. Multiplanar reformats were obtained. Dose reduction techniques were used.   CONTRAST: isovue 370 105mL IV; 50mL omni 140 oral     FINDINGS:   LUNGS AND PLEURA: No suspicious pulmonary nodules. Minimal right apical pleural thickening and a few punctate tiny  nodules 2 of which are subpleural on the right are stable. No pleural effusions.     MEDIASTINUM/AXILLAE: No adenopathy. No central pulmonary emboli.     CORONARY ARTERY CALCIFICATION: Cannot evaluate.     HEPATOBILIARY: Normal.     PANCREAS: Normal.     SPLEEN: Normal.     ADRENAL GLANDS: Normal.     KIDNEYS/BLADDER: Normal.     BOWEL: Sliver of ascites adjacent to the spleen noted, decreased from prior study. There is a 4 mm nodule in the gastrosplenic ligament (image 352). On the preop study it appears as a smaller punctate nodule (prior image 341). Sliver of ascites in the   gastrohepatic ligament also noted. No peritoneal tumor nodules. No bowel obstruction. Quite redundant colon with mild large stool burden. Extensive distal colonic diverticulosis.     LYMPH NODES: Normal.     VASCULATURE: Mild arterial calcifications. Circumaortic left renal vein.     PELVIC ORGANS: Hysterectomy. Vaginal cuff is normal.     MUSCULOSKELETAL: Diastases of the midline rectus sheath above the umbilicus. Minimal nodular scarring to the left of midline in the midabdomen (image 419). There is a shallow broad-based supraumbilical ventral hernia containing only fat. No implants   within the hernia or abdominal incision. No suspicious bone lesions.                                                                      IMPRESSION:  1.  Sliver of ascites in the upper abdomen has decreased since interval debulking surgery.  2.  There is a punctate nodule in the gastrosplenic ligament which is minimally more plump relative to the preop exam. This will need to be followed.  3.  Minimal nodular changes to the left of the midline scar in the subcutaneous fat will have to be followed as well. Unclear if this simply reflects postoperative scarring or could reflect an early incisional recurrence.  4.  No other sites to suggest recurrent tumor. Vaginal cuff is normal.  5.  Extensive distal colonic diverticulosis.  6.  Other noncritical findings as  noted above.      9/16/22  98     1/30/23 CT CAP:    IMPRESSION:  1.  Multiple new, hypoattenuating lesions in the liver, suspicious for hepatic metastatic disease.  2.  Necrotic mario hepatic lymphadenopathy, concerning for richard metastatic disease.  3.  There is a new or increasingly conspicuous 6 mm soft tissue nodule in the right lower quadrant. This is indeterminate.  4.  There is a new 3 mm solid nodule in the right upper lobe, indeterminate.  5.  Stable approximate 4 mm punctate nodule along the gastrosplenic ligament.  6.  Similar area of linear free fluid in the upper abdomen anterior to the stomach.     Plan: Paclitaxel 175 mg/m2, Carboplatin AUC 6, bevacizumab 7.5 mg/kg     3/1/23: Cycle #1 Paclitaxel 175 mg/m2, Carboplatin AUC 6 (C7), bevacizumab 7.5 mg/kg.  1,196  3/24/23: Cycle #2 Paclitaxel 150 mg/m2, Carboplatin AUC 5 (C8), bevacizumab 15 mg/kg.  558     3/29/23: ER for dehydration and hypotension. Normotensive in ER. IV fluids given. Discharged.      4/14/23: Cycle #3 Paclitaxel 150 mg/m2, Carboplatin AUC 5 (C9), bevacizumab 15 mg/kg deferred neutropenia ANC 0.3   273  4/21/23: treatment deferred ANC 0.8  4/28/23: Cycle #3 Paclitaxel 150 mg/m2, Carboplatin AUC 5 (C9), bevacizumab 15 mg/kg, + Neulasta deferred ANC 1.0,  297     5/26/23: Cycle #4  Paclitaxel 150 mg/m2, Carboplatin AUC 5 (C10), bevacizumab 15 mg/kg, + Neulasta, deferred ANC 0.6  188. No hematology consult per MD.      6/2/23: Cycle #4 Paclitaxel 150 mg/m2, Carboplatin AUC 5 (C9), bevacizumab 15 mg/kg, + Neulasta   6/23/23: Cycle #5 deferred neutropenia ANC 0.5 thrombocytopenia platelets 85     6/26/2023 Addendum: Reduce both Carboplatin and Paclitaxel due to thrombocytopenia and persistent neutropenia. REFER to Hematology to rule out MDS. Message sent to pt. Orders placed.      7/3/23 plan: continue with 2 more cycles with carboplatin AUC of 4, Taxol at 135 mg per metered square, bevacizumab at 15  mg/kg as well as neulasta for bone marrow support.      7/3/23: Cycle #5 Paclitaxel 135 mg/m2, Carboplatin AUC 4, bevacizumab 15 mg/kg, +Neulasta.  375     7/11/23: per chart review Dr. Cogan with Hematology plan one more cycle of chemotherapy then maintenance Avastin     7/28/23: Cycle #6 Paclitaxel 135 mg/m2, Carboplatin AUC 4, bevacizumab 15 mg/kg, +Neulasta.  370     8/17/2023: CT CAP: Stable bilateral pulmonary micronodules. Multiple subcentimeter hypodense hepatic lesions, a few are slightly less conspicuous. Necrotic lymph nodes in mario hepatis are stable. A few small peritoneal nodules in the left upper quadrant. Anterior to the pylorus is an oval 2.4 cm hypodense soft tissue lesion which is stable.    8/17/2023: started maintenance bevacizumab q3 weeks.  370.    8/25/2023:  450.    9/19/2023:  1056. Alk phos 221, ALT 86, AST 63.    9/21/2023: Transferred her care from [facility] to [facility] to be closer to home. Plan is maintenance bevacizumab 15 mg/kg every 3 weeks to give her a treatment break from myelosuppressive chemotherapy.    10/10/2023:  1889, alk phos 388, , AST 83  10/12/2023: Bevacizumab held for elevated LFTs, symptoms of new back pain, cough, sinusitis symptoms.    10/18/2023: CT CAP: Progressive liver metastasis. Small nonspecific right lower lobe pulmonary nodule. Inflammatory process or a metastatic nodule remain possible. This does not have the characteristic appearance of metastasis, however, remains indeterminant.     Plan: Weekly paclitaxel 80 mg/m2 day 1, 8, 15, and bevacizumab 10 mg/kg day 1 and day 15 of a 28 day cycle x 3 cycles followed by CT CAP and follow up with Dr. Juarez in [place].     12/8/2023:  >10,000. Bilirubin 4.2, LFTs elevated.    12/12/2023: C1D1 paclitaxel and bevacizumab. ([place])    12/13/2023: Presented to the ED in [place] with worsening RUQ pain and jaundice x 2 weeks. Bilirubin 8.1. Waitlisted for  transfer to [facility]. Elected to leave the ED.  12/13/2023: CT abdomen pelvis ([place]): New and enlarging innumerable hypodense liver lesions. Increased retroperitoneal adenopathy. Increased left >right intrahepatic biliary ductal dilatation without a resting cause noted. Common bile duct borderline enlarged 6 mm. Ventral hernia containing colon.  12/13/2023: Abdominal US ([place]): Numerous liver lesions. Common bile duct dilated at 9 mm, no obstructing stone. Spleen 14 cm, enlarged.    12/15/2023: ERCP ([facility]): Ventral pancreatic duct prophylactically stented. Biliary sphincterotomy. Cholangiogram showed multifocal biliary stricture. Intrahepatic and common hepatic duct strictures dilated with a balloon. 2 metal stents placed into the left main and right anterior intrahepatic duct    12/19/2023: C1D8 paclitaxel administered. Bilirubin 3.5, LFTs improving. WBC 2.2, ANC 1.0, platelets 90. ([place])  12/22/23: Neupogen for ANC 1.0.  12/28/2023: C1D15 paclitaxel + bevacizumab. Bilirubin 1.9, LFT's improving, ANC 1.0, platelets 174. ([place])     Plan: Transfer back to [facility] with weekly paclitaxel 80 mg/m2 day 1, 8, 15, and bevacizumab 10 mg/kg day 1 and day 15 of a 28 day cycle x 2 additional cycles followed by CT CAP and follow up with Dr. Juarez.     1/12/24: Cycle 1 paclitaxel/bevacizumab.   2113.   2/26/24: Cycle 2 paclitaxel/bevacizumab.   5381. (Delayed due to vacation)    3/25/24: CT CAP impression   1. Evidence of disease progression with enlargement of multifocal  hepatic metastases compared to 12/13/2023 CT.  2. New small volume ascites. Increased irregular thickening of the  peritoneum along the paracolic spaces and omentum is concerning for  developing carcinomatosis.  3. Several new subcentimeter bilateral solid pulmonary nodules are  likely metastatic.  4. Interval increased conspicuity of multiple sclerotic lesions in the  visualized axial and appendicular skeleton, likely  representing  worsening osseous metastases.    4/1/24: Follow up with Dr. Juarez. Concern for disease progression. Per Dr. Juarez, plan to switch to carboplatin/paclitaxel/bevacizumab with GCSF q21d x3 cycles followed by imaging and follow up with Dr. Juarez    4/8/24: C1 carboplatin AUC 4, paclitaxel 135mg/m2, bevacizumab 15mg/kg + GCSF.  5824      Past Medical History:   Diagnosis Date     Atrial fibrillation with rapid ventricular response (H)      History of cold sores      Hx of LASIK 12/11/2017     Insomnia      Migraine      Osteopenia      Pelvic mass      Peritoneal carcinomatosis (H)      Restless legs syndrome (RLS)       Past Surgical History:   Procedure Laterality Date     APPENDECTOMY       ARTHROSCPY KNEE SURGICAL DEBRIDEMENT SHAVING ARTICULAR CARTILAGE Right      BIOPSY  January 2021    Biopsy to confirm ovarian cancer     DEBRIDEMENT LEFT UPPER EXTREMITY  2016     ENDOSCOPIC RETROGRADE CHOLANGIOPANCREATOGRAM N/A 12/15/2023    Procedure: ENDOSCOPIC RETROGRADE CHOLANGIOPANCREATOGRAPHY, with pancreatic stent placement, biliary duct dilation, biliary stent placement, and biliary sphincterotomy;  Surgeon: Fabian Simpson MD;  Location: [facility] OR     HYSTERECTOMY TOTAL ABD, LUISITO SALPINGO-OOPHORECTOMY, NODE DISSECTION, TUMOR DEBULKING, COMBINED Bilateral 4/19/2021    Procedure: HYSTERECTOMY, TOTAL, ABDOMINAL, WITH BILATERAL SALPINGO-OOPHORECTOMY, omentectomy, NEOPLASM DEBULKING,Proctoscocy, RO, Resection of liver nodules, diaphragm stripping, immobilization of liver and colon;  Surgeon: Bolivar Juarez MD;  Location: [facility] OR     LAPAROSCOPY DIAGNOSTIC (GYN) Bilateral 1/7/2021    Procedure: Diagnsotic laparoscopy, biopsies;  Surgeon: Bolivar Juarez MD;  Location: [facility] OR     LASIK       TUBAL LIGATION       Social History     Socioeconomic History     Marital status:    Tobacco Use     Smoking status: Former     Current packs/day: 0.00     Average packs/day: 0.5 packs/day for 40.0  years (20.0 ttl pk-yrs)     Types: Cigarettes     Start date:      Quit date: 2018     Years since quittin.2     Passive exposure: Never     Smokeless tobacco: Never   Vaping Use     Vaping status: Never Used   Substance and Sexual Activity     Alcohol use: Not Currently     Drug use: Never     Sexual activity: Not Currently     Partners: Male   Other Topics Concern     Parent/sibling w/ CABG, MI or angioplasty before 65F 55M? No     Social Determinants of Health     Interpersonal Safety: Not At Risk (3/30/2024)    [facility]    Intimate Partner Violence      Are you in a relationship where you are physically hurt, threatened and/or made to feel afraid?: No      Family History   Adopted: Yes   Problem Relation Age of Onset     Cancer Mother 36     Other Cancer Mother         Bio mother  of  a female cancer  at 36     Factor V Leiden deficiency Daughter      Deep Vein Thrombosis Daughter      Diabetes Type 1 Daughter      Diabetes Daughter      Hypertension Daughter      Anesthesia Reaction No family hx of       Current Outpatient Medications   Medication Sig Dispense Refill     amitriptyline (ELAVIL) 100 MG tablet Take 100 mg by mouth at bedtime       BEVACIZUMAB, AVASTIN, INJECTION 2.5MG Every other week (Tuesday)       cyclobenzaprine (FLEXERIL) 5 MG tablet Take 1 tablet (5 mg) by mouth 3 times daily as needed for muscle spasms 10 tablet 0     dexAMETHasone (DECADRON) 4 MG tablet Take 2 tablets (8 mg) by mouth daily for 3 days, starting the day after chemotherapy. 6 tablet 5     meclizine (ANTIVERT) 25 MG tablet Take 1 tablet (25 mg) by mouth 3 times daily as needed for dizziness or nausea 15 tablet 0     OLANZapine (ZYPREXA) 5 MG tablet Take 1 tablet (5 mg) by mouth at bedtime 30 tablet 1     ondansetron (ZOFRAN) 8 MG tablet Take 1 tablet (8 mg) by mouth every 8 hours as needed for nausea (vomiting) 30 tablet 2     ondansetron (ZOFRAN) 8 MG tablet Take 1 tablet (8  mg) by mouth every 8 hours as needed for nausea 30 tablet 1     oxyCODONE (ROXICODONE) 5 MG tablet Take 1 tablet (5 mg) by mouth every 6 hours as needed for severe pain or pain 30 tablet 0     prochlorperazine (COMPAZINE) 10 MG tablet Take 1 tablet (10 mg) by mouth every 6 hours as needed for nausea or vomiting 30 tablet 2     rivaroxaban ANTICOAGULANT (XARELTO ANTICOAGULANT) 20 MG TABS tablet Take 1 tablet (20 mg) by mouth every morning 90 tablet 1     SUMAtriptan (IMITREX) 100 MG tablet Take 1 tablet (100 mg) by mouth at onset of headache for migraine 15 tablet 0     valACYclovir (VALTREX) 1000 mg tablet Take 1,000 mg by mouth as needed       zolpidem (AMBIEN) 10 MG tablet Take 1 tablet (10 mg) by mouth every evening 10 tablet 0     No current facility-administered medications for this visit.      No Known Allergies       OBJECTIVE:    PHYSICAL EXAM:  No vitals taken- virtual visit  Constitutional: Alert and oriented non-toxic appearing female in no acute distress  HEENT: No periorbital edema or perioral cyanosis  Resp: Respirations unlabored, speaking in full sentences without apparent dyspnea, wheezing, or cough  Psych: Pleasant and interactive, affect euthymic, makes appropriate eye contact, answers questions appropriately, thought content logical        DATA:   Latest Reference Range & Units 04/08/24 10:06   Sodium 135 - 145 mmol/L 136   Potassium 3.4 - 5.3 mmol/L 4.6   Chloride 98 - 107 mmol/L 101   Carbon Dioxide (CO2) 22 - 29 mmol/L 25   Urea Nitrogen 8.0 - 23.0 mg/dL 12.8   Creatinine 0.51 - 0.95 mg/dL 0.65   GFR Estimate >60 mL/min/1.73m2 >90   Calcium 8.8 - 10.2 mg/dL 9.4   Anion Gap 7 - 15 mmol/L 10   Magnesium 1.7 - 2.3 mg/dL 1.8   Albumin 3.5 - 5.2 g/dL 3.3 (L)   Protein Total 6.4 - 8.3 g/dL 7.5   Alkaline Phosphatase 40 - 150 U/L 607 (H)   ALT 0 - 50 U/L 39   AST 0 - 45 U/L 99 (H)   Bilirubin Total <=1.2 mg/dL 1.3 (H)   Glucose 70 - 99 mg/dL 106 (H)   WBC 4.0 - 11.0 10e3/uL 6.3   Hemoglobin 11.7 -  15.7 g/dL 11.7   Hematocrit 35.0 - 47.0 % 36.5   Platelet Count 150 - 450 10e3/uL 197   RBC Count 3.80 - 5.20 10e6/uL 3.65 (L)   MCV 78 - 100 fL 100   MCH 26.5 - 33.0 pg 32.1   MCHC 31.5 - 36.5 g/dL 32.1   RDW 10.0 - 15.0 % 17.0 (H)   % Neutrophils % 69   % Lymphocytes % 19   % Monocytes % 10   % Eosinophils % 1   % Basophils % 1   Absolute Basophils 0.0 - 0.2 10e3/uL 0.0   Absolute Eosinophils 0.0 - 0.7 10e3/uL 0.1   Absolute Immature Granulocytes <=0.4 10e3/uL 0.0   Absolute Lymphocytes 0.8 - 5.3 10e3/uL 1.2   Absolute Monocytes 0.0 - 1.3 10e3/uL 0.6   % Immature Granulocytes % 0   Absolute Neutrophils 1.6 - 8.3 10e3/uL 4.4   Absolute NRBCs 10e3/uL 0.0   NRBCs per 100 WBC <1 /100 0   (L): Data is abnormally low  (H): Data is abnormally high       Date Value Ref Range Status   04/08/2024 5,864 (H) <=38 U/mL Final   03/25/2024 3,521 (H) <=38 U/mL Final   02/26/2024 5,381 (H) <=38 U/mL Final   01/12/2024 2,113 (H) <=38 U/mL Final   05/26/2023 188 (H) <=38 U/mL Final   03/01/2023 1,196 (H) <=38 U/mL Final   08/20/2021 12 0 - 30 U/mL Final   07/30/2021 14 0 - 30 U/mL Final   07/02/2021 20 0 - 30 U/mL Final     Comment:     Assay Method:  Chemiluminescence using Siemens Centaur XP   06/11/2021 61 (H) 0 - 30 U/mL Final     Comment:     Assay Method:  Chemiluminescence using Siemens Centaur XP   05/21/2021 172 (H) 0 - 30 U/mL Final     Comment:     Assay Method:  Chemiluminescence using Siemens Centaur XP   02/26/2021 129 (H) 0 - 30 U/mL Final     Comment:     Assay Method:  Chemiluminescence using Siemens Centaur XP   02/01/2021 936 (H) 0 - 30 U/mL Final     Comment:     Assay Method:  Chemiluminescence using Siemens Centaur XP            WEIGHT:  Wt Readings from Last 5 Encounters:   04/08/24 77.4 kg (170 lb 11.2 oz)   04/01/24 75.8 kg (167 lb)   03/11/24 75.8 kg (167 lb 3.2 oz)   03/04/24 76.5 kg (168 lb 9.6 oz)   02/26/24 75.8 kg (167 lb)          ASSESSMENT/PLAN:    1) Recurrent ovarian cancer: Currently on  treatment with carboplatin AUC 4, paclitaxel 135mg/m2, and bevacizumab 15mg/kg. Initial difficulty with arthralgias, nausea, and abdominal pain- reports these are all improving to minimal now. See below for side effects requiring further management. Counseled on side effect management. C2 due 4/29/24, to have virtual visit with me prior. Reviewed alarm signs and symptoms and when to seek further care.   2) Treatment/disease related effects:  Mouth sores: Likely related to chemotherapy. Able to eat and drink. Pain controlled with Orajel. Discussed salt and soda swishes, could consider Magic Mouthwash if symptoms worsen or fail to improve. Expect this will improve after rita.  Arthralgias: Likely related to taxane as well as GCSF. Resolved now. Can consider loratadine beginning three days prior to her next infusion.  RUQ pain: Improving. Continues on naproxen. Has initial appt with palliative medicine physician, Dr. Bermeo, tomorrow.   Decreased appetite: Counseled on importance of small, frequent meals that are nutritionally dense. Has referral for dietitian- she is awaiting discussion with palliative medicine and plans to discuss dietitian referral with insurance company.  Transaminitis: Likely related to disease burden. Adjusting anticoagulation for hepatic impairment, see below. Continue to monitor.  3) Hx of PE: Dx in 2021, has been on rivaroxaban. Given hepatic impairment, will switch from rivaroxaban to apixaban 5mg PO BID after discussion with clinical pharmacist.  4) Bony metastases: No evidence of spinal cord compression. Discussed risks and benefits of adding in zoledronic acid vs denosumab. Given her significant arthralgias this past cycle and concerns with treatment tolerability and quality of life, will hold off on adding in the aforementioned agents at this time given their known side effect profile as well as her recent dental work. Will continue to discuss.  5) Genetics: POSITIVE for a BRCA1  mutation. This mutation is called [id number]   6) Patient verbalized understanding of and agreement with plan    MDM:  41 minutes spent on date of service on visit, including chart review, face to face visit, documentation, and coordination of care.    JERICHO Ventura, CNP (she/her)  Division of Gynecologic Oncology        Again, thank you for allowing me to participate in the care of your patient.        Sincerely,        JERICHO Ventura CNP

## 2024-04-15 NOTE — NURSING NOTE
Is the patient currently in the state of [place]? YES    Visit mode:VIDEO    If the visit is dropped, the patient can be reconnected by: VIDEO VISIT: Text to cell phone:   Telephone Information:   Mobile [phone]       Will anyone else be joining the visit? NO  (If patient encounters technical issues they should call [phone]    How would you like to obtain your AVS? MyChart    Are changes needed to the allergy or medication list? Pt stated no changes to allergies and Pt stated no med changes    Are refills needed on medications prescribed by this physician? NO    Reason for visit: KONG Van LPN

## 2024-04-16 NOTE — LETTER
"2024       RE: Taran Regalado  [address]  [facility] [address]     Dear Colleague,    Thank you for referring your patient, Taran Regalado, [facility]. Please see a copy of my visit note below.    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 Started chemo then 2021 DANAE BSO omentectomy and debulking, resection of liver nodules, followed by adjuvant chemo including olaparib (BRCA1 mutated). 3/2023 carbo/paclitaxel/beva through 2023, then beva. Course complicated by PE.  2024 carbo/paclitaxel/bevacizumab due to progression. Bone metastases.    Social - lives in [place] with ; stays in [place] with daughter Edie for treatments. 3 kids; one  2023,  of sepsis.    Care Planning -   \"Cancer has moved into my liver and bone.\" Has not talked about prognosis and doesn't want to.  No HCD; verbally states would want Edie to be her decision-maker; we discussed completing HCD 2024.    Opioid Safety -   Discussed safety basics 2024. No CARLOS EDUARDO hx; average risk. +Naloxone.    History:  History gathered today from: patient, family/loved ones, medical chart  Edie is with her    I reviewed the patient's medical history in the chart and confirmed key details today with them; summarized above.  I reviewed with them the various roles of our palliative care program; qol support, sx management, mood/coping/counseling, and care planning.  The patient was referred with a focus on symptoms.   We followed her a bit back in , but it's been over 3 years since we saw her until now.    Pain  RUQ. Has L hernia which isn't painful.  Sleeps well.  Oxycodone works well; taking 10 mg mostly once a day; occasionally twice.  Occasional Tylenol  Also takes naproxyn bid.  Constipation; 2 " "senna am 3 senna pm; PEG daily; dulocolax prn    Appetite  \"I don't eat a lot\"--feels full, can't get anymore in; early satiety. Gets hungry, fills up quickly.  No emesis.  Weight stable. Frequent small meals.  Was on olanzapine--nausea--didn't help for that or appetite--stopped it    Mouth sores started with recent chemo  Plans to start S&S  Orajel helps a little    Nose bleeds  Marylin T just switched ACs  I suggested humidifier    Sleep  Uses amitriptyline + Ambien---works well    Dry mouth  Xylimelts at night  We discussed during day and biotene    Mood  Edie says her mood is low  She won't do counseling (I asked).    Oxycodone 5 mg  Ambien 10 mg  Flexeril 5 mg  Meclizine 25 mg  Ondansetron 8 mg  Amitriptyline 100 mg    PE: Ht 1.803 m (5' 11\")   Wt 74.8 kg (165 lb)   BMI 23.01 kg/m     Wt Readings from Last 3 Encounters:   04/16/24 74.8 kg (165 lb)   04/15/24 74.8 kg (165 lb)   04/12/24 74.8 kg (165 lb)     Alert NAD    Data reviewed:  I reviewed recent labs and imaging, my comments:  LFTs elevated    5800  Plt 131  Cr 0.5    CT March                                                                 IMPRESSION:   1. Evidence of disease progression with enlargement of multifocal  hepatic metastases compared to 12/13/2023 CT.  2. New small volume ascites. Increased irregular thickening of the  peritoneum along the paracolic spaces and omentum is concerning for  developing carcinomatosis.  3. Several new subcentimeter bilateral solid pulmonary nodules are  likely metastatic.  4. Interval increased conspicuity of multiple sclerotic lesions in the  visualized axial and appendicular skeleton, likely representing  worsening osseous metastases.       [facility] database reviewed: y      Impression & Recommendations:  67 yo with recurrent and metastatic high grade serous ovarian carcinoma on palliative chemotherapy.  We discussed a variety of supportive care matters today    Pain  Ok to continue 10 mg oxyIR prn; I think " she'll do better if she takes it at least bid and we disucssed that.       Stomatitis  Let's try doxepin oral rinse    OIC  Ok to increase senna to 4 bid if needed; continue PEG; and dulcolax prn.    Anorexia  She's keeping her weight up with much effort. Olanzapine didn't help. D/w her in her situation I usually don't recommend anything since the other orexigens have side effects and often aren't very helpful. Edie asks about THC and I d/w them if she tries it to start with no more than 2.5 mg of d9 THC. Cautioned usually people need at least mildly intoxicating doses of THC  for any appetite effect    Mood, grief  Discussed with her. She does not want to do anything about this. Offered counseling.    Discussed care planning basics. Rec'd HCD completion in particular as she'd want Edie making medical decisions for her. We'll send her a form.     Over 75 minutes spent on the date of the encounter doing chart review, history and exam, patient education & counseling, documentation and other activities as noted above.    Thank you for involving us in the patient's care.   Ben Bermeo MD / Palliative Medicine / Text me via 2Duc[employer]  This note may have been composed with voice recognition software and there may be mistranscriptions.        Again, thank you for allowing me to participate in the care of your patient.      Sincerely,    Ben Bermeo MD

## 2024-04-16 NOTE — NURSING NOTE
Is the patient currently in the state of [place]? YES    Visit mode:VIDEO    If the visit is dropped, the patient can be reconnected by: TELEPHONE VISIT: Phone number: [phone]    Will anyone else be joining the visit? Yes,daughter Edie will be there with. (If patient encounters technical issues they should call [phone])    How would you like to obtain your AVS? MyChart    Are changes needed to the allergy or medication list? No    Are refills needed on medications prescribed by this physician? NO    Reason for visit: Consult    Leia FOWLER

## 2024-04-16 NOTE — PROGRESS NOTES
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 Started chemo then 2021 DANAE BSO omentectomy and debulking, resection of liver nodules, followed by adjuvant chemo including olaparib (BRCA1 mutated). 3/2023 carbo/paclitaxel/beva through 2023, then beva. Course complicated by PE.  2024 carbo/paclitaxel/bevacizumab due to progression. Bone metastases.    Social - lives in [place] with ; stays in [place] with daughter Edie for treatments. 3 kids; one  2023,  of sepsis.    Care Planning -   \"Cancer has moved into my liver and bone.\" Has not talked about prognosis and doesn't want to.  No HCD; verbally states would want Edie to be her decision-maker; we discussed completing HCD 2024.    Opioid Safety -   Discussed safety basics 2024. No CARLOS EDUARDO hx; average risk. +Naloxone.    History:  History gathered today from: patient, family/loved ones, medical chart  Edie is with her    I reviewed the patient's medical history in the chart and confirmed key details today with them; summarized above.  I reviewed with them the various roles of our palliative care program; qol support, sx management, mood/coping/counseling, and care planning.  The patient was referred with a focus on symptoms.   We followed her a bit back in , but it's been over 3 years since we saw her until now.    Pain  RUQ. Has L hernia which isn't painful.  Sleeps well.  Oxycodone works well; taking 10 mg mostly once a day; occasionally twice.  Occasional Tylenol  Also takes naproxyn bid.  Constipation; 2 senna am 3 senna pm; PEG daily; dulocolax prn    Appetite  \"I don't eat a lot\"--feels full, can't get anymore in; early satiety. Gets hungry, fills up quickly.  No emesis.  Weight stable. Frequent small meals.  Was on olanzapine--nausea--didn't help for that or appetite--stopped it    Mouth sores " "started with recent chemo  Plans to start S&S  Orajel helps a little    Nose bleeds  Marylin T just switched ACs  I suggested humidifier    Sleep  Uses amitriptyline + Ambien---works well    Dry mouth  Xylimelts at night  We discussed during day and biotene    Mood  Edie says her mood is low  She won't do counseling (I asked).    Oxycodone 5 mg  Ambien 10 mg  Flexeril 5 mg  Meclizine 25 mg  Ondansetron 8 mg  Amitriptyline 100 mg    PE: Ht 1.803 m (5' 11\")   Wt 74.8 kg (165 lb)   BMI 23.01 kg/m     Wt Readings from Last 3 Encounters:   04/16/24 74.8 kg (165 lb)   04/15/24 74.8 kg (165 lb)   04/12/24 74.8 kg (165 lb)     Alert NAD    Data reviewed:  I reviewed recent labs and imaging, my comments:  LFTs elevated    5800  Plt 131  Cr 0.5    CT March                                                                 IMPRESSION:   1. Evidence of disease progression with enlargement of multifocal  hepatic metastases compared to 12/13/2023 CT.  2. New small volume ascites. Increased irregular thickening of the  peritoneum along the paracolic spaces and omentum is concerning for  developing carcinomatosis.  3. Several new subcentimeter bilateral solid pulmonary nodules are  likely metastatic.  4. Interval increased conspicuity of multiple sclerotic lesions in the  visualized axial and appendicular skeleton, likely representing  worsening osseous metastases.       [facility] database reviewed: y      Impression & Recommendations:  67 yo with recurrent and metastatic high grade serous ovarian carcinoma on palliative chemotherapy.  We discussed a variety of supportive care matters today    Pain  Ok to continue 10 mg oxyIR prn; I think she'll do better if she takes it at least bid and we disucssed that.       Stomatitis  Let's try doxepin oral rinse    OIC  Ok to increase senna to 4 bid if needed; continue PEG; and dulcolax prn.    Anorexia  She's keeping her weight up with much effort. Olanzapine didn't help. D/w her in her " situation I usually don't recommend anything since the other orexigens have side effects and often aren't very helpful. Edie asks about THC and I d/w them if she tries it to start with no more than 2.5 mg of d9 THC. Cautioned usually people need at least mildly intoxicating doses of THC  for any appetite effect    Mood, grief  Discussed with her. She does not want to do anything about this. Offered counseling.    Discussed care planning basics. Rec'd HCD completion in particular as she'd want Edie making medical decisions for her. We'll send her a form.     Over 75 minutes spent on the date of the encounter doing chart review, history and exam, patient education & counseling, documentation and other activities as noted above.    Thank you for involving us in the patient's care.   Ben Bermeo MD / Palliative Medicine / Text me via Dovetail  This note may have been composed with voice recognition software and there may be mistranscriptions.    Video-Visit Details  Video Start Time: 8:06 AM  Video End Time:9:01 AM  Originating Location (pt. Location): Home  Distant Location (provider location):  Off-site  Platform used for Video Visit: Wanda

## 2024-04-16 NOTE — PATIENT INSTRUCTIONS
Thank you for meeting with us in the [facility].    How to get a hold of us:  MyChart is good for concerns that are not urgent (questions that can take a couple days to answer).    For more urgent matters, or if you prefer not to use MyChart, call our clinic nurse Edelmira Rogers RN at [phone].     We have an on-call number for evenings and weekends. Please call this only if you are having uncontrolled symptoms or serious side effects from your medicines: [phone].     For scheduling, call [phone]    For refills, please give us a week (5 working days) notice. We don't always have providers available everyday to do refills. If you call the day you run out of your medicine, we may not be able to refill it in time, so call 5 days in advance!      [facility] Palliative [facility]   The [facility] Palliative Care Team is committed to treating your pain and other symptoms. This handout is for all patients treated with opioid medications in our clinics.     What are opioid medicines?   Opioid medications are used to ease some types of pain and shortness of breath. They are sometimes called narcotics. They include: Morphine (MS Contin, Roxanol), Oxycodone (OxyContin, OxyFast, Percocet), Hydrocodone (Vicodin, Norco), Hydromorphone (Dilaudid), Fentanyl (Duragesic), Methadone (Dolophine).   Are opioid medicines safe to take?   Opioid medicines are safe when you follow the directions from your doctor or medical provider.  Opioids can cause serious side effects and become unsafe if you do not follow your instructions.   To make sure you are taking opioid medicines safely, we may ask you to bring in your pill bottles or to allow us to test your urine. Our goal is to keep you safe and to improve your ability to function & be active. If we don t think opioids are safely doing that, we will work with you to taper you off of them.   Do not drive unless  your medical provider tells you it is safe.   Do not take opioids with alcohol or anxiety/sleep medicines unless your doctor tells you it is ok to do so.   Are opioids addictive?   Addiction means you crave a drug and have trouble limiting the amount you use.   Addiction is more likely to happen if you take opioids to relieve stress or to feel altered. If you or your loved one feels this way when taking opioid medicines, let your medical provider know. We may refer you for additional assessment or services if this is a concern.   Your body will get used to the opioid medicines if you take them for more than two weeks in a row. This means if you stop them suddenly, you may have withdrawal. If this occurs, you will feel uncomfortable (nausea, diarrhea, increased pain). This does not mean you are addicted. Never stop taking your pain medicines all at once unless your medical provider tells you to. If you think you need less medicine, your medical provider will help you to safely lower your dose.   What is the safest way to store opioids?   Keep your medicines in a safe place away from children, teens, pets, and visitors. Be careful about who knows that you have these medicines.   How do I get rid of my old opioids?   Some pharmacies or counties (for example, [facility]) have drop boxes for disposal, or you can purchase a disposal kit from your local pharmacy. If neither of these options is available, the Food and Drug Administration recommends that you flush unused opioids down the toilet.   Do not share or sell your pain medicines. This is illegal and very dangerous. We cannot prescribe opioid pain medicines to you if you do this.   How do I get refills?   Opioid medicine prescriptions have special safety features. This means it may take longer to refill than other medicines. Our clinic cannot prescribe them on weekends or at night.     Give us one week s notice when requesting a refill. This gives us the time  we need to get it signed and back to you. It may be possible to mail prescriptions to you.

## 2024-04-18 NOTE — PROGRESS NOTES
Long form healthcare directive mailed to patient's home.    Edelmira Rogers RN on 4/18/2024 at 9:10 AM

## 2024-04-25 NOTE — TELEPHONE ENCOUNTER
Received [employer] message from patient requesting refill of oxycodone.     Last refill: 4/12/2024 (#60)  Last office visit: 4/16/2024  Scheduled for follow up 5/21/2024     Will route request to MD for review.     Reviewed [place]  Report.

## 2024-04-25 NOTE — TELEPHONE ENCOUNTER
Retail Pharmacy Prior Authorization Team   Phone: [phone]    PA Initiation    Medication: LIDOCAINE-PRILOCAINE 2.5-2.5 % EX CREA  Insurance Company: [employer] - Phone [phone] Fax [phone]  Pharmacy Filling the Rx: [id number] [place] [facility] - [place], [place] - [address]  Filling Pharmacy Phone: [phone]  Filling Pharmacy Fax: [phone]  Start Date: 4/25/2024

## 2024-04-27 NOTE — PROGRESS NOTES
Assessment & Plan     Dysuria  Differentials discussed in detail including.  UA findings reviewed and Macrobid prescribed for possible bladder infection.  Recommended well hydration and urine culture sent.  Suggested to follow-up with PCP in 5 to 7 days or earlier if needed.  All questions answered.  - UA Macroscopic with reflex to Microscopic and Culture - Clinic Collect  - Urine Microscopic Exam  - Urine Culture      Kirt Campuzano is a 68 year old, presenting for the following health issues:  UTI (Sx Started This Morning - Frequency and Pressure )    HPI     Concern -   Onset: yesterday  Description: Urinary frequency, pelvic pressure  Intensity: moderate  Progression of Symptoms:  same  Accompanying Signs & Symptoms: No fever, chills or other relevant systemic symptoms, currently receiving treatment for ovarian cancer  Therapies tried and outcome: None      Review of Systems  Constitutional, HEENT, cardiovascular, pulmonary, gi and gu systems are negative, except as otherwise noted.      Objective    /71   Pulse 100   Temp 97.4  F (36.3  C)   Resp 16   Wt 74.8 kg (165 lb)   SpO2 98%   BMI 23.01 kg/m    Body mass index is 23.01 kg/m .  Physical Exam   GENERAL: alert and no distress  HEENT: Normal oropharynx  RESP: no wheezes  ABDOMEN: soft, nontender  MS: no gross musculoskeletal defects noted, no edema  NEURO: Normal strength and tone, mentation intact and speech normal  PSYCH: mentation appears normal, affect normal/bright    Results for orders placed or performed in visit on 04/27/24   UA Macroscopic with reflex to Microscopic and Culture - Clinic Collect     Status: Abnormal    Specimen: Urine, Clean Catch   Result Value Ref Range    Color Urine Yellow Colorless, Straw, Light Yellow, Yellow    Appearance Urine Clear Clear    Glucose Urine Negative Negative mg/dL    Bilirubin Urine Negative Negative    Ketones Urine Negative Negative mg/dL    Specific Gravity Urine <=1.005 1.003 - 1.035     Blood Urine Trace (A) Negative    pH Urine 6.0 5.0 - 7.0    Protein Albumin Urine Negative Negative mg/dL    Urobilinogen Urine 0.2 0.2, 1.0 E.U./dL    Nitrite Urine Negative Negative    Leukocyte Esterase Urine Large (A) Negative   Urine Microscopic Exam     Status: Abnormal   Result Value Ref Range    Bacteria Urine Few (A) None Seen /HPF    RBC Urine 0-2 0-2 /HPF /HPF    WBC Urine 25-50 (A) 0-5 /HPF /HPF    Squamous Epithelials Urine Few (A) None Seen /LPF    WBC Clumps Urine Present (A) None Seen /HPF    Transitional Epithelials Urine Few (A) None Seen /HPF         Signed Electronically by: Leonardo Alcocer MD

## 2024-04-29 NOTE — PROGRESS NOTES
Port site scrubbed with Chloraprep for 30 seconds. Accessed port using sterile technique. Two green and purple tubes drawn. See documentation flowsheet. Port needle left accessed for treatment. Tolerated port access and draw without complaint. Frances Quintana RN on 4/29/2024 at 9:38 AM

## 2024-04-29 NOTE — NURSING NOTE
"Oncology Rooming Note    April 29, 2024 9:50 AM   Taran Regalado is a 68 year old female who presents for:    Chief Complaint   Patient presents with    Oncology Clinic Visit     Prior to tx     Initial Vitals: /74 (BP Location: Right arm, Patient Position: Chair, Cuff Size: Adult Regular)   Pulse 92   Temp 98  F (36.7  C) (Oral)   Ht 1.803 m (5' 11\")   Wt 76.2 kg (168 lb)   SpO2 96%   BMI 23.43 kg/m   Estimated body mass index is 23.43 kg/m  as calculated from the following:    Height as of this encounter: 1.803 m (5' 11\").    Weight as of this encounter: 76.2 kg (168 lb). Body surface area is 1.95 meters squared.  No Pain (0) Comment: Data Unavailable   No LMP recorded. Patient is postmenopausal.  Allergies reviewed: Yes  Medications reviewed: Yes    Medications: Medication refills not needed today.  Pharmacy name entered into [facility]:    [id number] [place], [place] - [address] [facility] [id number] [place], [place] - [address]    Frailty Screening:   Is the patient here for a new oncology consult visit in cancer care? 2. No      Clinical concerns: NO       Myriam Rodriguez CMA              "

## 2024-04-29 NOTE — LETTER
2024         RE: Taran Regalado  [address]  [place] [address]        Dear Colleague,    Thank you for referring your patient, Taran Regalado, to the [facility]. Please see a copy of my visit note below.    Virtual Visit Details    Type of service:  Video Visit   Video Start Time: 09:56 AM  Video End Time: 10:30 AM  Originating Location (pt. Location): Home  Distant Location (provider location):  Off-site  Platform used for Video Visit: [facility]            Gynecologic Oncology Follow Up Note    RE: Taran Regalado   MRN: [id number]  : 1955  GRACY: 2024   GYNECOLOGIC ONCOLOGIST: Bolivar Juarez MD    CC: Taran Regalado is a 68 year old female with recurrent ovarian cancer presenting for disease management    INTERVAL HISTORY:  Taran is being evaluated today virtually for a toxicity check prior to cycle two of paclitaxel, carboplatin, and bevacizumab. Treatment is considered palliative intent. Received first cycle on 24. She is accompanied by her daughter.    Taran feels okay today- she was in urgent care two days ago for pelvic pressure and dysuria, diagnosed with UTI, started on nitrofurantoin and is feeling much improved. No fevers or chills. Urinary symptoms resolved.    The pain she had in her RUQ has largely resolved.    Still feeling nauseated on a regular basis- taking ondansetron which is somewhat helpful, but feels oxycodone is actually the most helpful for nausea. She does not personally feel her nausea is caused by anxiety or pain. Her daughter questions if nausea is secondary to anxiety. Has not been taking prochlorperazine due to thoughts that this could not be taken with ondansetron. Feels constipated with this regimen, taking laxatives and stool softeners.    Feeling lightheaded with standing- her daughter notes that her blood pressure drops going from sitting to standing. Admits she does not drink much fluid because she can either eat  or she can drink, but not both. Denies significant swelling in the legs or varicosities. Would like a liter of IV fluids today.    Trying to eat, but has lack of appetite and gets full pretty easily. Has started to eat cereal with a protein drink instead of milk, adding in protein powder to ice cream.    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 Can feel the ground under feet, no pain, no falls.     No respiratory concerns. Some mild LOPEZ with too much activity. No chest pain or pressure.     No severe headaches or double vision.    Developed stomatitis last cycle, was prescribed doxepin rinses per palliative medicine. Not currently present.    Switched from rivaroxaban to apixaban given elevated liver enzymes and increased epistaxes- tolerating this well, epistaxes now minimal.    Denies fevers or chills, vomiting, chest pain, difficulty breathing at rest, or uncontrolled bleeding concerns.    Has discussed healthcare directive with palliative medicine, needs to be sent to her current address- living in [place] right now with her daughter.     ONCOLOGY HISTORY:  12/3/2020: US Pelvic: IMPRESSION: Limited examination due to acoustic windows. Possible left adnexal mass. A CT scan of the abdomen and pelvis with contrast is recommended for further assessment.     12/4/2020: CT A/P:   IMPRESSION:    Peritoneal carcinomatosis with masslike peritoneal thickening in the lower pelvis which may indicate an adnexal or ovarian primary malignancy. Large volume ascites. Bilateral pleural effusions. There is potential subtle pleural nodularity in the right hemithorax which could indicate metastatic disease.  Indeterminate 1 cm lesion in the right hepatic lobe suspicious for a metastatic lesion.      12/16/2020: Presented to [facility] with abdominal distention, 25lb weight loss, and CTAP with carcinomatosis, elevated  3098.     12/23/2020: CT Chest: IMPRESSION:    1. There are few scattered small sub-6 mm pulmonary nodules which are indeterminate without prior comparisons available. There are a few  slightly larger perifissural nodules which are technically  indeterminate in the setting of malignancy although presumed lymphatic in nature and of unlikely clinical significance. Attention on follow-up is recommended.  2. Small to moderate left and small right pleural effusions are increased in size from prior. No convincing evidence for pleural nodularity.  3. Partially visualized large volume ascites and peritoneal nodularity in the upper abdomen similar to 12/4/2020 outside CT      12/26/2020: ED for abdominal distension; 3 L ascites drained with paracentesis    Pelvic US: Findings: Free fluid present in LLQ      12/31/2020: US Paracentesis: 900 mL ascites drained     1/7/2021: Diagnotic laparoscopy, biopsies  Pathology: FINAL DIAGNOSIS:   A. PERITONEUM, BIOPSIES:   - Positive for high grade carcinoma, consistent with metastatic carcinoma of Mullerian origin.     1/10-1/13/2021: Hospital admission for postoperative non-intractable vomiting and nausea.      1/10/2021: CT A/P: IMPRESSION: Extensive ascites which is probably malignant. Scattered liver hypodensities of indeterminate etiology comment cannot exclude metastatic disease. Diverticulosis. Fluid-filled adnexal masses and irregular appearance of uterus, which may represent primary neoplasm. Multiple peritoneal nodules. Large amount of fecal material in the colon with no evidence of small bowel obstruction.     Plan: Paclitaxel 175 mg/m2 and carboplatin AUC 6 x 3 cycles followed by a CT CAP and visit with Dr. Juarez.     1/12/2021: Cycle 1 paclitaxel and carboplatin while inpatient     1/13/2021: CT Head: Impression:  1. Chronic sinusitis of the right maxillary and right sphenoid sinuses.  2. Incidental presumed calcified meningioma in the right frontal  convexity without significant mass effect.  3. No suspicious  intracranial enhancing lesion.     2/1/2021: Cycle 2 paclitaxel and carboplatin.  936.     2/3-2/5/21: Admission [facility] for afib w/ RVR and new PE     2/26/21: Cycle 3 paclitaxel and carboplatin planned.  Deferred due to thrombocytopenia.  pending.     3/15/21: Cycle 3 paclitaxel and carboplatin given     4/19/21: HYSTERECTOMY, TOTAL, ABDOMINAL, WITH BILATERAL SALPINGO-OOPHORECTOMY, omentectomy, NEOPLASM DEBULKING,Proctoscocy, RO, Resection of liver nodules, diaphragm stripping, immobilization of liver and colon  FINAL DIAGNOSIS:   A. OMENTUM, BIOPSY:   - Omental adipose tissue with rare viable cells of metastatic high grade   serous carcinoma   - One reactive lymph node, negative for malignancy (0/1)   B. NODULE, SIGMOID, EXCISION:   - Calcified necrotic adipose tissue   - Negative for malignancy   C. NODULE, SMALL BOWEL MESENTERY, EXCISION:   - Fibroadipose tissue, positive for metastatic high grade serous carcinoma   D. UTERUS, CERVIX, BILATERAL FALLOPIAN TUBES AND OVARIES, HYSTERECTOMY   WITH BILATERAL SALPINGO-OOPHORECTOMY:   - Atrophic endometrium   - Uterine serosa with rare viable cells consistent with high grade serous   carcinoma   - Cervix with atrophic changes   - Viable cells consistent with high grade serous carcinoma present in the   right ovary, serosa of right   fallopian tube and right periadnexal soft tissue   - Left ovary with atrophic changes   - Left fallopian tube with a rare focus of serous tubal in-situ carcinoma   (STIC)   E. NODULES, SMALL BOWEL MESENTRY, EXCISION:   - Fibroadipose tissue with rare viable cells of metastatic high grade   serous carcinoma   F. NODULE, SPLENIC FLEXURE TRANSVERSE COLON, EXCISION:   - Fibroadipose tissue with rare viable cells of metastatic high grade   serous carcinoma   - Accessory splenule, negative for malignancy   G. OMENTUM, OMENTECTOMY:   - Omental adipose tissue with rare viable cells of metastatic high grade   serous carcinoma   H. NODULE,  PERITONEAL PANCREATIC, EXCISION:   - Fibrous adhesions with inflammation   - Negative for malignancy   I. RIGHT HEMIDIAPHRAGM PERITONEUM, EXCISION:   - Fibrous adhesions with inflammation   - Negative for malignancy   J. RIGHT LIVER SURFACE NODULE:   - Fibrous adhesions with benign inclusion glands   - Negative for malignancy   K. LEFT LOWER LIVER EDGE, BIOPSY:   - Cauterized hepatic parenchyma and capsule   - Negative for malignancy   L. NODULE, SMALL BOWEL MESENTERIC #3, EXCISION:   - Fibroadipose tissue with rare viable cells of metastatic high grade   serous carcinoma       Plan: Carboplatin AUC 6 + Taxol 175 mg/m2 x 3 cycles, then transition to Parp inhibitor for maintenance therapy given her BRCA1 germline mutation.      5/21/21: Cycle 4 carboplatin and paclitaxel.   172.  6/11/21: Cycle 5 carboplatin and paclitaxel.   61.  7/2/21: Cycle 6 carboplatin and paclitaxel.   20.        7/28/21 plan: Olaparib 300mg bid as starting dose,  14  8/20/21: start date olaparib 300 mg BID,  12  9/13/21:  22  10/4/21:  23  11/1/21:  26     11/02/2021: PET CT: IMPRESSION:   Findings compatible with interval surgery and posttreatment change.  No gross definitive FDG avid disease.  Potential foci of tumor deposits along the anterior dome of the liver and midline abdominal wall surgical scar.  Colonic activity is not necessarily abnormal, however, given the previous carcinomatosis the colonic activity is indeterminant.         12/1/21:  23  1/3/22:  21  2/1/22:  20  3/1/22:  21  4/1/22:  23  5/4/22:  28     EXAM: CT CHEST/ABDOMEN/PELVIS W CONTRAST  LOCATION: [facility]  DATE/TIME: 7/11/2022 1:25 PM     INDICATION: Stage III B high-grade ovarian carcinoma diagnosed Jan 2021. Posttreatment surveillance.  COMPARISON: CTA AP 04/23/2021, CT CAP 04/02/2021  TECHNIQUE: CT scan of the chest, abdomen, and pelvis was performed  following injection of IV contrast. Multiplanar reformats were obtained. Dose reduction techniques were used.   CONTRAST: isovue 370 105mL IV; 50mL omni 140 oral     FINDINGS:   LUNGS AND PLEURA: No suspicious pulmonary nodules. Minimal right apical pleural thickening and a few punctate tiny nodules 2 of which are subpleural on the right are stable. No pleural effusions.     MEDIASTINUM/AXILLAE: No adenopathy. No central pulmonary emboli.     CORONARY ARTERY CALCIFICATION: Cannot evaluate.     HEPATOBILIARY: Normal.     PANCREAS: Normal.     SPLEEN: Normal.     ADRENAL GLANDS: Normal.     KIDNEYS/BLADDER: Normal.     BOWEL: Sliver of ascites adjacent to the spleen noted, decreased from prior study. There is a 4 mm nodule in the gastrosplenic ligament (image 352). On the preop study it appears as a smaller punctate nodule (prior image 341). Sliver of ascites in the   gastrohepatic ligament also noted. No peritoneal tumor nodules. No bowel obstruction. Quite redundant colon with mild large stool burden. Extensive distal colonic diverticulosis.     LYMPH NODES: Normal.     VASCULATURE: Mild arterial calcifications. Circumaortic left renal vein.     PELVIC ORGANS: Hysterectomy. Vaginal cuff is normal.     MUSCULOSKELETAL: Diastases of the midline rectus sheath above the umbilicus. Minimal nodular scarring to the left of midline in the midabdomen (image 419). There is a shallow broad-based supraumbilical ventral hernia containing only fat. No implants   within the hernia or abdominal incision. No suspicious bone lesions.                                                                      IMPRESSION:  1.  Sliver of ascites in the upper abdomen has decreased since interval debulking surgery.  2.  There is a punctate nodule in the gastrosplenic ligament which is minimally more plump relative to the preop exam. This will need to be followed.  3.  Minimal nodular changes to the left of the midline scar in the subcutaneous  fat will have to be followed as well. Unclear if this simply reflects postoperative scarring or could reflect an early incisional recurrence.  4.  No other sites to suggest recurrent tumor. Vaginal cuff is normal.  5.  Extensive distal colonic diverticulosis.  6.  Other noncritical findings as noted above.      9/16/22  98     1/30/23 CT CAP:    IMPRESSION:  1.  Multiple new, hypoattenuating lesions in the liver, suspicious for hepatic metastatic disease.  2.  Necrotic mario hepatic lymphadenopathy, concerning for richard metastatic disease.  3.  There is a new or increasingly conspicuous 6 mm soft tissue nodule in the right lower quadrant. This is indeterminate.  4.  There is a new 3 mm solid nodule in the right upper lobe, indeterminate.  5.  Stable approximate 4 mm punctate nodule along the gastrosplenic ligament.  6.  Similar area of linear free fluid in the upper abdomen anterior to the stomach.     Plan: Paclitaxel 175 mg/m2, Carboplatin AUC 6, bevacizumab 7.5 mg/kg     3/1/23: Cycle #1 Paclitaxel 175 mg/m2, Carboplatin AUC 6 (C7), bevacizumab 7.5 mg/kg.  1,196  3/24/23: Cycle #2 Paclitaxel 150 mg/m2, Carboplatin AUC 5 (C8), bevacizumab 15 mg/kg.  558     3/29/23: ER for dehydration and hypotension. Normotensive in ER. IV fluids given. Discharged.      4/14/23: Cycle #3 Paclitaxel 150 mg/m2, Carboplatin AUC 5 (C9), bevacizumab 15 mg/kg deferred neutropenia ANC 0.3   273  4/21/23: treatment deferred ANC 0.8  4/28/23: Cycle #3 Paclitaxel 150 mg/m2, Carboplatin AUC 5 (C9), bevacizumab 15 mg/kg, + Neulasta deferred ANC 1.0,  297     5/26/23: Cycle #4  Paclitaxel 150 mg/m2, Carboplatin AUC 5 (C10), bevacizumab 15 mg/kg, + Neulasta, deferred ANC 0.6  188. No hematology consult per MD.      6/2/23: Cycle #4 Paclitaxel 150 mg/m2, Carboplatin AUC 5 (C9), bevacizumab 15 mg/kg, + Neulasta   6/23/23: Cycle #5 deferred neutropenia ANC 0.5 thrombocytopenia platelets 85     6/26/2023  Addendum: Reduce both Carboplatin and Paclitaxel due to thrombocytopenia and persistent neutropenia. REFER to Hematology to rule out MDS. Message sent to pt. Orders placed.      7/3/23 plan: continue with 2 more cycles with carboplatin AUC of 4, Taxol at 135 mg per metered square, bevacizumab at 15 mg/kg as well as neulasta for bone marrow support.      7/3/23: Cycle #5 Paclitaxel 135 mg/m2, Carboplatin AUC 4, bevacizumab 15 mg/kg, +Neulasta.  375     7/11/23: per chart review Dr. Cogan with Hematology plan one more cycle of chemotherapy then maintenance Avastin     7/28/23: Cycle #6 Paclitaxel 135 mg/m2, Carboplatin AUC 4, bevacizumab 15 mg/kg, +Neulasta.  370     8/17/2023: CT CAP: Stable bilateral pulmonary micronodules. Multiple subcentimeter hypodense hepatic lesions, a few are slightly less conspicuous. Necrotic lymph nodes in mario hepatis are stable. A few small peritoneal nodules in the left upper quadrant. Anterior to the pylorus is an oval 2.4 cm hypodense soft tissue lesion which is stable.    8/17/2023: started maintenance bevacizumab q3 weeks.  370.    8/25/2023:  450.    9/19/2023:  1056. Alk phos 221, ALT 86, AST 63.    9/21/2023: Transferred her care from [facility] to [facility] to be closer to home. Plan is maintenance bevacizumab 15 mg/kg every 3 weeks to give her a treatment break from myelosuppressive chemotherapy.    10/10/2023:  1889, alk phos 388, , AST 83  10/12/2023: Bevacizumab held for elevated LFTs, symptoms of new back pain, cough, sinusitis symptoms.    10/18/2023: CT CAP: Progressive liver metastasis. Small nonspecific right lower lobe pulmonary nodule. Inflammatory process or a metastatic nodule remain possible. This does not have the characteristic appearance of metastasis, however, remains indeterminant.     Plan: Weekly paclitaxel 80 mg/m2 day 1, 8, 15, and bevacizumab 10 mg/kg day 1 and day 15 of a 28 day cycle x 3 cycles followed  by CT CAP and follow up with Dr. Juarez in [place].     12/8/2023:  >10,000. Bilirubin 4.2, LFTs elevated.    12/12/2023: C1D1 paclitaxel and bevacizumab. ([place])    12/13/2023: Presented to the ED in [place] with worsening RUQ pain and jaundice x 2 weeks. Bilirubin 8.1. Waitlisted for transfer to [facility]. Elected to leave the ED.  12/13/2023: CT abdomen pelvis ([place]): New and enlarging innumerable hypodense liver lesions. Increased retroperitoneal adenopathy. Increased left >right intrahepatic biliary ductal dilatation without a resting cause noted. Common bile duct borderline enlarged 6 mm. Ventral hernia containing colon.  12/13/2023: Abdominal US ([place]): Numerous liver lesions. Common bile duct dilated at 9 mm, no obstructing stone. Spleen 14 cm, enlarged.    12/15/2023: ERCP ([facility]): Ventral pancreatic duct prophylactically stented. Biliary sphincterotomy. Cholangiogram showed multifocal biliary stricture. Intrahepatic and common hepatic duct strictures dilated with a balloon. 2 metal stents placed into the left main and right anterior intrahepatic duct    12/19/2023: C1D8 paclitaxel administered. Bilirubin 3.5, LFTs improving. WBC 2.2, ANC 1.0, platelets 90. ([place])  12/22/23: Neupogen for ANC 1.0.  12/28/2023: C1D15 paclitaxel + bevacizumab. Bilirubin 1.9, LFT's improving, ANC 1.0, platelets 174. ([place])     Plan: Transfer back to [facility] with weekly paclitaxel 80 mg/m2 day 1, 8, 15, and bevacizumab 10 mg/kg day 1 and day 15 of a 28 day cycle x 2 additional cycles followed by CT CAP and follow up with Dr. Juarez.     1/12/24: Cycle 1 paclitaxel/bevacizumab.   2113.   2/26/24: Cycle 2 paclitaxel/bevacizumab.   5381. (Delayed due to vacation)    3/25/24: CT CAP impression   1. Evidence of disease progression with enlargement of multifocal  hepatic metastases compared to 12/13/2023 CT.  2. New small volume ascites. Increased irregular thickening of the  peritoneum along the  paracolic spaces and omentum is concerning for  developing carcinomatosis.  3. Several new subcentimeter bilateral solid pulmonary nodules are  likely metastatic.  4. Interval increased conspicuity of multiple sclerotic lesions in the  visualized axial and appendicular skeleton, likely representing  worsening osseous metastases.    4/1/24: Follow up with Dr. Juarez. Concern for disease progression. Per Dr. Juarez, plan to switch to carboplatin/paclitaxel/bevacizumab with GCSF q21d x3 cycles followed by imaging and follow up with Dr. Juarez    4/8/24: C1 carboplatin AUC 4, paclitaxel 135mg/m2, bevacizumab 15mg/kg + GCSF.  5824    4/29/24: C2 carboplatin AUC 4, paclitaxel 135mg/m2, bevacizumab 15mg/kg + GCSF.  pending.      Past Medical History:   Diagnosis Date     Atrial fibrillation with rapid ventricular response (H)      History of cold sores      Hx of LASIK 12/11/2017     Insomnia      Migraine      Osteopenia      Pelvic mass      Peritoneal carcinomatosis (H)      Restless legs syndrome (RLS)       Past Surgical History:   Procedure Laterality Date     APPENDECTOMY       ARTHROSCPY KNEE SURGICAL DEBRIDEMENT SHAVING ARTICULAR CARTILAGE Right      BIOPSY  January 2021    Biopsy to confirm ovarian cancer     DEBRIDEMENT LEFT UPPER EXTREMITY  2016     ENDOSCOPIC RETROGRADE CHOLANGIOPANCREATOGRAM N/A 12/15/2023    Procedure: ENDOSCOPIC RETROGRADE CHOLANGIOPANCREATOGRAPHY, with pancreatic stent placement, biliary duct dilation, biliary stent placement, and biliary sphincterotomy;  Surgeon: Fabian Simpson MD;  Location: [facility] OR     HYSTERECTOMY TOTAL ABD, LUISITO SALPINGO-OOPHORECTOMY, NODE DISSECTION, TUMOR DEBULKING, COMBINED Bilateral 4/19/2021    Procedure: HYSTERECTOMY, TOTAL, ABDOMINAL, WITH BILATERAL SALPINGO-OOPHORECTOMY, omentectomy, NEOPLASM DEBULKING,Proctoscocy, RO, Resection of liver nodules, diaphragm stripping, immobilization of liver and colon;  Surgeon: Bolivar Juarez MD;   Location: [facility] OR     IR CHEST PORT PLACEMENT > 5 YRS OF AGE  2024     LAPAROSCOPY DIAGNOSTIC (GYN) Bilateral 2021    Procedure: Diagnsotic laparoscopy, biopsies;  Surgeon: Bolivar Juarez MD;  Location: [facility] OR     LASIK       TUBAL LIGATION       Social History     Socioeconomic History     Marital status:    Tobacco Use     Smoking status: Former     Current packs/day: 0.00     Average packs/day: 0.5 packs/day for 40.0 years (20.0 ttl pk-yrs)     Types: Cigarettes     Start date:      Quit date: 2018     Years since quittin.2     Passive exposure: Never     Smokeless tobacco: Never   Vaping Use     Vaping status: Never Used   Substance and Sexual Activity     Alcohol use: Not Currently     Drug use: Never     Sexual activity: Not Currently     Partners: Male   Other Topics Concern     Parent/sibling w/ CABG, MI or angioplasty before 65F 55M? No     Social Determinants of Health     Interpersonal Safety: Not At Risk (3/30/2024)    [facility]    Intimate Partner Violence      Are you in a relationship where you are physically hurt, threatened and/or made to feel afraid?: No      Family History   Adopted: Yes   Problem Relation Age of Onset     Cancer Mother 36     Other Cancer Mother         Bio mother  of  a female cancer  at 36     Factor V Leiden deficiency Daughter      Deep Vein Thrombosis Daughter      Diabetes Type 1 Daughter      Diabetes Daughter      Hypertension Daughter      Anesthesia Reaction No family hx of       Current Outpatient Medications   Medication Sig Dispense Refill     amitriptyline (ELAVIL) 100 MG tablet Take 100 mg by mouth at bedtime       apixaban ANTICOAGULANT (ELIQUIS) 5 MG tablet Take 1 tablet (5 mg) by mouth 2 times daily 180 tablet 3     BEVACIZUMAB, AVASTIN, INJECTION 2.5MG Every other week (Tuesday)       cyclobenzaprine (FLEXERIL) 5 MG tablet Take 1 tablet (5 mg) by mouth 3 times daily as needed for muscle spasms  10 tablet 0     dexAMETHasone (DECADRON) 4 MG tablet Take 2 tablets (8 mg) by mouth daily for 3 days, starting the day after chemotherapy. 6 tablet 5     doxepin (SINEQUAN) 10 MG/ML (HIGH CONC) solution Take 2.5 mLs (25 mg) by mouth every 4 hours as needed for other (. Mix with equal amount water. Swish and hold in mouth for 1 min then spit out.) 118 mL 1     lidocaine-prilocaine (EMLA) 2.5-2.5 % external cream Apply topically as needed for moderate pain (30min prior to port access) 30 g 1     meclizine (ANTIVERT) 25 MG tablet Take 1 tablet (25 mg) by mouth 3 times daily as needed for dizziness or nausea 15 tablet 0     naloxone (NARCAN) 4 MG/0.1ML nasal spray Spray 1 spray (4 mg) into one nostril alternating nostrils as needed for opioid reversal every 2-3 minutes until assistance arrives 0.2 mL 1     nitroFURantoin macrocrystal-monohydrate (MACROBID) 100 MG capsule Take 1 capsule (100 mg) by mouth 2 times daily for 5 days 10 capsule 0     ondansetron (ZOFRAN) 8 MG tablet Take 1 tablet (8 mg) by mouth every 8 hours as needed for nausea (vomiting) 30 tablet 2     ondansetron (ZOFRAN) 8 MG tablet Take 1 tablet (8 mg) by mouth every 8 hours as needed for nausea 30 tablet 1     oxyCODONE (ROXICODONE) 10 MG tablet Take 1 tablet (10 mg) by mouth 2 times daily as needed for severe pain . NOTE THESE ARE 10 MG PILLS--ONLY TAKE ONE AT A TIME! 60 tablet 0     oxyCODONE (ROXICODONE) 5 MG tablet Take 1 tablet (5 mg) by mouth every 6 hours as needed for severe pain or pain 30 tablet 0     prochlorperazine (COMPAZINE) 10 MG tablet Take 1 tablet (10 mg) by mouth every 6 hours as needed for nausea or vomiting 30 tablet 2     SUMAtriptan (IMITREX) 100 MG tablet Take 1 tablet (100 mg) by mouth at onset of headache for migraine 15 tablet 0     valACYclovir (VALTREX) 1000 mg tablet Take 1,000 mg by mouth as needed       zolpidem (AMBIEN) 10 MG tablet Take 1 tablet (10 mg) by mouth every evening 10 tablet 0     No current  "facility-administered medications for this visit.     Facility-Administered Medications Ordered in Other Visits   Medication Dose Route Frequency Provider Last Rate Last Admin     heparin 100 unit/mL injection 500 Units  500 Units Intracatheter Q1H PRN Marylin Hancock APRN CNP   500 Units at 04/29/24 0927     sodium chloride (PF) 0.9% PF flush 10 mL  10 mL Intracatheter Q1H PRN Marylin Hancock APRN CNP   10 mL at 04/29/24 0927      No Known Allergies       OBJECTIVE:    PHYSICAL EXAM:  /74 (BP Location: Right arm, Patient Position: Chair, Cuff Size: Adult Regular)   Pulse 92   Temp 98  F (36.7  C) (Oral)   Ht 1.803 m (5' 11\")   Wt 76.2 kg (168 lb)   SpO2 96%   BMI 23.43 kg/m      Vitals obtained in clinic at [facility] on date of service    Constitutional: Alert and oriented non-toxic appearing female in no acute distress  HEENT: No periorbital edema or perioral cyanosis  Resp: Respirations unlabored, speaking in full sentences without apparent dyspnea, wheezing, or cough  Psych: Pleasant and interactive, affect euthymic, makes appropriate eye contact, answers questions appropriately, thought content logical        DATA:    CBC RESULTS:   Recent Labs   Lab Test 04/29/24 0928   WBC 4.4   RBC 3.26*   HGB 10.6*   HCT 32.8*   *   MCH 32.5   MCHC 32.3   RDW 20.0*        Last Comprehensive Metabolic Panel:  Lab Results   Component Value Date     04/29/2024    POTASSIUM 3.9 04/29/2024    CHLORIDE 102 04/29/2024    CO2 24 04/29/2024    ANIONGAP 9 04/29/2024    GLC 99 04/29/2024    BUN 9.0 04/29/2024    CR 0.53 04/29/2024    GFRESTIMATED >90 04/29/2024    HORACIO 8.7 (L) 04/29/2024       Lab Results   Component Value Date    AST 86 04/29/2024     Lab Results   Component Value Date    ALT 47 04/29/2024     Lab Results   Component Value Date    BILITOTAL 0.8 04/29/2024     Lab Results   Component Value Date    ALBUMIN 3.2 04/29/2024     Lab Results   Component Value Date    PROTTOTAL 7.2 " 04/29/2024      Lab Results   Component Value Date    ALKPHOS 623 04/29/2024           pending        WEIGHT:  Wt Readings from Last 5 Encounters:   04/29/24 76.2 kg (168 lb)   04/27/24 74.8 kg (165 lb)   04/16/24 74.8 kg (165 lb)   04/15/24 74.8 kg (165 lb)   04/12/24 74.8 kg (165 lb)          ASSESSMENT/PLAN:    1) Recurrent ovarian cancer: Currently receiving palliative intent treatment with carboplatin AUC 4, paclitaxel 135mg/m2, and bevacizumab 15mg/kg with GCSF. No dose limiting toxicities, proceed with C2 of treatment as scheduled. Return to clinic in three weeks for consideration of C3 with labs and provider visit. Will need imaging and visit with Dr. Juarez prior to C4. Reviewed alarm signs and symptoms and when to seek further care.   2) Treatment/disease related effects:  Nausea: Persistent mild nausea. To stop oxycodone for nausea- this could be contributing to nausea and constipation. Trial of prochlorperazine, reviewed that she can use this even if she is taking ondansetron.  Mouth sores: Resolved. Likely related to chemotherapy- reviewed salt and soda rinses. Has doxepin solution if needed.  Arthralgias: Resolved- occurred last cycle, likely related to taxane as well as GCSF. Suspect this will occur again this cycle- consider loratadine 10mg PO BID the first week of treatment.  Decreased appetite: Counseled on importance of small, frequent meals that are nutritionally dense. Discussed options to maintain intake, nutrition, weight.  Orthostatic hypotension: Likely source of her lightheadedness- adding in NS 1L IV x1 today, recommend drinking at least one liter of fluids daily, adding in salt should she tolerate this, compression stockings if needed, ankle pumps, slow position changes.  Transaminitis: Significantly improving. Likely related to disease burden. Suspect elevated alk phos secondary to bony metastatic disease.  Anemia: Grade 1, likely due to chemotherapy. Continue to  monitor.  Recurrent disease: We reviewed that her treatment is palliative intent, the meaning of this, and started to discuss goals of care. Will ask palliative RN to send advanced directive to either her MyChart or her daughter's address. At this time, Justen wants to continue treatment.  3) Hx of PE: Dx in 2021, tolerating apixaban 5mg PO BID with decrease in epistaxis  4) Bony metastases: No evidence of spinal cord compression. Previously reviewed risks and benefits of zoledronic acid vs denosumab with decision to hold off at this time given the arthralgias she had been experiencing- could consider this in the future, will reassess at next visit  5) Genetics: POSITIVE for a BRCA1 mutation. This mutation is called [id number]   6) Patient verbalized understanding of and agreement with plan    MDM:  45 minutes spent on date of service on visit, including chart review, face to face visit, documentation, and coordination of care.    JERICHO Ventura, CNP (she/her)  Division of Gynecologic Oncology        Again, thank you for allowing me to participate in the care of your patient.        Sincerely,        JERICHO Ventura CNP

## 2024-04-29 NOTE — PATIENT INSTRUCTIONS
CT CAP, follow up with Dr. Juaerz the week of 6/4/24 (I have sent this request to scheduling)  C4 infusion will likely be 6/10  IV fluids today  Drink more- 1 liter daily, can add in things with more salt  Can trial compression stockings, ankle pumps, etc  Salt and soda rinses- one liter of water with 1/2tsp salt and 1/2 tsp baking soda, mix together, can leave in fridge for 72 hours- please try this 3-4 times daily  STOP oxycodone for nausea- take prochlorperazine instead. You can take this with the Zofran.  Claritin (loratadine) twice daily the next week- this can help with the bone aches  Ask palliative to send advanced directive to either [facility] or mail to Edie's home- I believe they just sent this to your MyChart today

## 2024-04-29 NOTE — PROGRESS NOTES
Infusion Nursing Note:  Taran Regalado presents today for C2D1 Taxol, Carbo, Avastin.    Patient seen by provider today: Yes, vv Marylin Hancock   present during visit today: Not Applicable.    Note: Patient reports visit went well. No new concerns today. Iv benadryl premedication administered per patient preference.   Shortly after benadryl administration, patient's legs became restless. Advised patient and daughter walking the halls can be helpful. They tried walking without much help. IV Ativan administered which was helpful. Patient able to rest during infusion.    Intravenous Access:  Implanted Port.    Treatment Conditions:  Lab Results   Component Value Date    HGB 10.6 (L) 04/29/2024    WBC 4.4 04/29/2024    ANEU 5.6 01/26/2024    ANEUTAUTO 2.5 04/29/2024     04/29/2024        Lab Results   Component Value Date     04/29/2024    POTASSIUM 3.9 04/29/2024    MAG 1.7 04/29/2024    CR 0.53 04/29/2024    HORACIO 8.7 (L) 04/29/2024    BILITOTAL 0.8 04/29/2024    ALBUMIN 3.2 (L) 04/29/2024    ALT 47 04/29/2024    AST 86 (H) 04/29/2024       Results reviewed, labs MET treatment parameters, ok to proceed with treatment.  Urine negative.  BP: 106/74        Post Infusion Assessment:  Patient tolerated infusion without incident.  Blood return noted pre and post infusion.  Site patent and intact, free from redness, edema or discomfort.  No evidence of extravasations.  Access discontinued per protocol.       Discharge Plan:   AVS to patient via MYCHART.  Patient will return 5/20 for next appointment.   Patient discharged in stable condition accompanied by: daughter.  Departure Mode: Ambulatory.      Akila Tyler RN

## 2024-04-29 NOTE — PROGRESS NOTES
Virtual Visit Details    Type of service:  Video Visit   Video Start Time: 09:56 AM  Video End Time: 10:30 AM  Originating Location (pt. Location): Home  Distant Location (provider location):  Off-site  Platform used for Video Visit: [facility]            Gynecologic Oncology Follow Up Note    RE: Taran Regalado   MRN: [id number]  : 1955  GRACY: 2024   GYNECOLOGIC ONCOLOGIST: Bolivar Juarez MD    CC: Taran Regalado is a 68 year old female with recurrent ovarian cancer presenting for disease management    INTERVAL HISTORY:  Taran is being evaluated today virtually for a toxicity check prior to cycle two of paclitaxel, carboplatin, and bevacizumab. Treatment is considered palliative intent. Received first cycle on 24. She is accompanied by her daughter.    Taran feels okay today- she was in urgent care two days ago for pelvic pressure and dysuria, diagnosed with UTI, started on nitrofurantoin and is feeling much improved. No fevers or chills. Urinary symptoms resolved.    The pain she had in her RUQ has largely resolved.    Still feeling nauseated on a regular basis- taking ondansetron which is somewhat helpful, but feels oxycodone is actually the most helpful for nausea. She does not personally feel her nausea is caused by anxiety or pain. Her daughter questions if nausea is secondary to anxiety. Has not been taking prochlorperazine due to thoughts that this could not be taken with ondansetron. Feels constipated with this regimen, taking laxatives and stool softeners.    Feeling lightheaded with standing- her daughter notes that her blood pressure drops going from sitting to standing. Admits she does not drink much fluid because she can either eat or she can drink, but not both. Denies significant swelling in the legs or varicosities. Would like a liter of IV fluids today.    Trying to eat, but has lack of appetite and gets full pretty easily. Has started to eat cereal with a protein drink instead  of milk, adding in protein powder to ice cream.    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 Can feel the ground under feet, no pain, no falls.     No respiratory concerns. Some mild LOPEZ with too much activity. No chest pain or pressure.     No severe headaches or double vision.    Developed stomatitis last cycle, was prescribed doxepin rinses per palliative medicine. Not currently present.    Switched from rivaroxaban to apixaban given elevated liver enzymes and increased epistaxes- tolerating this well, epistaxes now minimal.    Denies fevers or chills, vomiting, chest pain, difficulty breathing at rest, or uncontrolled bleeding concerns.    Has discussed healthcare directive with palliative medicine, needs to be sent to her current address- living in [place] right now with her daughter.     ONCOLOGY HISTORY:  12/3/2020: US Pelvic: IMPRESSION: Limited examination due to acoustic windows. Possible left adnexal mass. A CT scan of the abdomen and pelvis with contrast is recommended for further assessment.     12/4/2020: CT A/P:   IMPRESSION:    Peritoneal carcinomatosis with masslike peritoneal thickening in the lower pelvis which may indicate an adnexal or ovarian primary malignancy. Large volume ascites. Bilateral pleural effusions. There is potential subtle pleural nodularity in the right hemithorax which could indicate metastatic disease.  Indeterminate 1 cm lesion in the right hepatic lobe suspicious for a metastatic lesion.      12/16/2020: Presented to [facility] with abdominal distention, 25lb weight loss, and CTAP with carcinomatosis, elevated  3098.     12/23/2020: CT Chest: IMPRESSION:   1. There are few scattered small sub-6 mm pulmonary nodules which are indeterminate without prior comparisons available. There are a few  slightly larger perifissural nodules which are technically  indeterminate in the setting of malignancy although  presumed lymphatic in nature and of unlikely clinical significance. Attention on follow-up is recommended.  2. Small to moderate left and small right pleural effusions are increased in size from prior. No convincing evidence for pleural nodularity.  3. Partially visualized large volume ascites and peritoneal nodularity in the upper abdomen similar to 12/4/2020 outside CT      12/26/2020: ED for abdominal distension; 3 L ascites drained with paracentesis    Pelvic US: Findings: Free fluid present in LLQ      12/31/2020: US Paracentesis: 900 mL ascites drained     1/7/2021: Diagnotic laparoscopy, biopsies  Pathology: FINAL DIAGNOSIS:   A. PERITONEUM, BIOPSIES:   - Positive for high grade carcinoma, consistent with metastatic carcinoma of Mullerian origin.     1/10-1/13/2021: Hospital admission for postoperative non-intractable vomiting and nausea.      1/10/2021: CT A/P: IMPRESSION: Extensive ascites which is probably malignant. Scattered liver hypodensities of indeterminate etiology comment cannot exclude metastatic disease. Diverticulosis. Fluid-filled adnexal masses and irregular appearance of uterus, which may represent primary neoplasm. Multiple peritoneal nodules. Large amount of fecal material in the colon with no evidence of small bowel obstruction.     Plan: Paclitaxel 175 mg/m2 and carboplatin AUC 6 x 3 cycles followed by a CT CAP and visit with Dr. Juarez.     1/12/2021: Cycle 1 paclitaxel and carboplatin while inpatient     1/13/2021: CT Head: Impression:  1. Chronic sinusitis of the right maxillary and right sphenoid sinuses.  2. Incidental presumed calcified meningioma in the right frontal  convexity without significant mass effect.  3. No suspicious intracranial enhancing lesion.     2/1/2021: Cycle 2 paclitaxel and carboplatin.  936.     2/3-2/5/21: Admission [facility] for afib w/ RVR and new PE     2/26/21: Cycle 3 paclitaxel and carboplatin planned.  Deferred due to thrombocytopenia.   pending.     3/15/21: Cycle 3 paclitaxel and carboplatin given     4/19/21: HYSTERECTOMY, TOTAL, ABDOMINAL, WITH BILATERAL SALPINGO-OOPHORECTOMY, omentectomy, NEOPLASM DEBULKING,Proctoscocy, RO, Resection of liver nodules, diaphragm stripping, immobilization of liver and colon  FINAL DIAGNOSIS:   A. OMENTUM, BIOPSY:   - Omental adipose tissue with rare viable cells of metastatic high grade   serous carcinoma   - One reactive lymph node, negative for malignancy (0/1)   B. NODULE, SIGMOID, EXCISION:   - Calcified necrotic adipose tissue   - Negative for malignancy   C. NODULE, SMALL BOWEL MESENTERY, EXCISION:   - Fibroadipose tissue, positive for metastatic high grade serous carcinoma   D. UTERUS, CERVIX, BILATERAL FALLOPIAN TUBES AND OVARIES, HYSTERECTOMY   WITH BILATERAL SALPINGO-OOPHORECTOMY:   - Atrophic endometrium   - Uterine serosa with rare viable cells consistent with high grade serous   carcinoma   - Cervix with atrophic changes   - Viable cells consistent with high grade serous carcinoma present in the   right ovary, serosa of right   fallopian tube and right periadnexal soft tissue   - Left ovary with atrophic changes   - Left fallopian tube with a rare focus of serous tubal in-situ carcinoma   (STIC)   E. NODULES, SMALL BOWEL MESENTRY, EXCISION:   - Fibroadipose tissue with rare viable cells of metastatic high grade   serous carcinoma   F. NODULE, SPLENIC FLEXURE TRANSVERSE COLON, EXCISION:   - Fibroadipose tissue with rare viable cells of metastatic high grade   serous carcinoma   - Accessory splenule, negative for malignancy   G. OMENTUM, OMENTECTOMY:   - Omental adipose tissue with rare viable cells of metastatic high grade   serous carcinoma   H. NODULE, PERITONEAL PANCREATIC, EXCISION:   - Fibrous adhesions with inflammation   - Negative for malignancy   I. RIGHT HEMIDIAPHRAGM PERITONEUM, EXCISION:   - Fibrous adhesions with inflammation   - Negative for malignancy   J. RIGHT LIVER SURFACE NODULE:    - Fibrous adhesions with benign inclusion glands   - Negative for malignancy   K. LEFT LOWER LIVER EDGE, BIOPSY:   - Cauterized hepatic parenchyma and capsule   - Negative for malignancy   L. NODULE, SMALL BOWEL MESENTERIC #3, EXCISION:   - Fibroadipose tissue with rare viable cells of metastatic high grade   serous carcinoma       Plan: Carboplatin AUC 6 + Taxol 175 mg/m2 x 3 cycles, then transition to Parp inhibitor for maintenance therapy given her BRCA1 germline mutation.      5/21/21: Cycle 4 carboplatin and paclitaxel.   172.  6/11/21: Cycle 5 carboplatin and paclitaxel.   61.  7/2/21: Cycle 6 carboplatin and paclitaxel.   20.        7/28/21 plan: Olaparib 300mg bid as starting dose,  14  8/20/21: start date olaparib 300 mg BID,  12  9/13/21:  22  10/4/21:  23  11/1/21:  26     11/02/2021: PET CT: IMPRESSION:   Findings compatible with interval surgery and posttreatment change.  No gross definitive FDG avid disease.  Potential foci of tumor deposits along the anterior dome of the liver and midline abdominal wall surgical scar.  Colonic activity is not necessarily abnormal, however, given the previous carcinomatosis the colonic activity is indeterminant.         12/1/21:  23  1/3/22:  21  2/1/22:  20  3/1/22:  21  4/1/22:  23  5/4/22:  28     EXAM: CT CHEST/ABDOMEN/PELVIS W CONTRAST  LOCATION: [facility]  DATE/TIME: 7/11/2022 1:25 PM     INDICATION: Stage III B high-grade ovarian carcinoma diagnosed Jan 2021. Posttreatment surveillance.  COMPARISON: CTA AP 04/23/2021, CT CAP 04/02/2021  TECHNIQUE: CT scan of the chest, abdomen, and pelvis was performed following injection of IV contrast. Multiplanar reformats were obtained. Dose reduction techniques were used.   CONTRAST: isovue 370 105mL IV; 50mL omni 140 oral     FINDINGS:   LUNGS AND PLEURA: No suspicious pulmonary nodules. Minimal right apical  pleural thickening and a few punctate tiny nodules 2 of which are subpleural on the right are stable. No pleural effusions.     MEDIASTINUM/AXILLAE: No adenopathy. No central pulmonary emboli.     CORONARY ARTERY CALCIFICATION: Cannot evaluate.     HEPATOBILIARY: Normal.     PANCREAS: Normal.     SPLEEN: Normal.     ADRENAL GLANDS: Normal.     KIDNEYS/BLADDER: Normal.     BOWEL: Sliver of ascites adjacent to the spleen noted, decreased from prior study. There is a 4 mm nodule in the gastrosplenic ligament (image 352). On the preop study it appears as a smaller punctate nodule (prior image 341). Sliver of ascites in the   gastrohepatic ligament also noted. No peritoneal tumor nodules. No bowel obstruction. Quite redundant colon with mild large stool burden. Extensive distal colonic diverticulosis.     LYMPH NODES: Normal.     VASCULATURE: Mild arterial calcifications. Circumaortic left renal vein.     PELVIC ORGANS: Hysterectomy. Vaginal cuff is normal.     MUSCULOSKELETAL: Diastases of the midline rectus sheath above the umbilicus. Minimal nodular scarring to the left of midline in the midabdomen (image 419). There is a shallow broad-based supraumbilical ventral hernia containing only fat. No implants   within the hernia or abdominal incision. No suspicious bone lesions.                                                                      IMPRESSION:  1.  Sliver of ascites in the upper abdomen has decreased since interval debulking surgery.  2.  There is a punctate nodule in the gastrosplenic ligament which is minimally more plump relative to the preop exam. This will need to be followed.  3.  Minimal nodular changes to the left of the midline scar in the subcutaneous fat will have to be followed as well. Unclear if this simply reflects postoperative scarring or could reflect an early incisional recurrence.  4.  No other sites to suggest recurrent tumor. Vaginal cuff is normal.  5.  Extensive distal colonic  diverticulosis.  6.  Other noncritical findings as noted above.      9/16/22  98     1/30/23 CT CAP:    IMPRESSION:  1.  Multiple new, hypoattenuating lesions in the liver, suspicious for hepatic metastatic disease.  2.  Necrotic mario hepatic lymphadenopathy, concerning for richard metastatic disease.  3.  There is a new or increasingly conspicuous 6 mm soft tissue nodule in the right lower quadrant. This is indeterminate.  4.  There is a new 3 mm solid nodule in the right upper lobe, indeterminate.  5.  Stable approximate 4 mm punctate nodule along the gastrosplenic ligament.  6.  Similar area of linear free fluid in the upper abdomen anterior to the stomach.     Plan: Paclitaxel 175 mg/m2, Carboplatin AUC 6, bevacizumab 7.5 mg/kg     3/1/23: Cycle #1 Paclitaxel 175 mg/m2, Carboplatin AUC 6 (C7), bevacizumab 7.5 mg/kg.  1,196  3/24/23: Cycle #2 Paclitaxel 150 mg/m2, Carboplatin AUC 5 (C8), bevacizumab 15 mg/kg.  558     3/29/23: ER for dehydration and hypotension. Normotensive in ER. IV fluids given. Discharged.      4/14/23: Cycle #3 Paclitaxel 150 mg/m2, Carboplatin AUC 5 (C9), bevacizumab 15 mg/kg deferred neutropenia ANC 0.3   273  4/21/23: treatment deferred ANC 0.8  4/28/23: Cycle #3 Paclitaxel 150 mg/m2, Carboplatin AUC 5 (C9), bevacizumab 15 mg/kg, + Neulasta deferred ANC 1.0,  297     5/26/23: Cycle #4  Paclitaxel 150 mg/m2, Carboplatin AUC 5 (C10), bevacizumab 15 mg/kg, + Neulasta, deferred ANC 0.6  188. No hematology consult per MD.      6/2/23: Cycle #4 Paclitaxel 150 mg/m2, Carboplatin AUC 5 (C9), bevacizumab 15 mg/kg, + Neulasta   6/23/23: Cycle #5 deferred neutropenia ANC 0.5 thrombocytopenia platelets 85     6/26/2023 Addendum: Reduce both Carboplatin and Paclitaxel due to thrombocytopenia and persistent neutropenia. REFER to Hematology to rule out MDS. Message sent to pt. Orders placed.      7/3/23 plan: continue with 2 more cycles with carboplatin AUC of 4, Taxol  at 135 mg per metered square, bevacizumab at 15 mg/kg as well as neulasta for bone marrow support.      7/3/23: Cycle #5 Paclitaxel 135 mg/m2, Carboplatin AUC 4, bevacizumab 15 mg/kg, +Neulasta.  375     7/11/23: per chart review Dr. Cogan with Hematology plan one more cycle of chemotherapy then maintenance Avastin     7/28/23: Cycle #6 Paclitaxel 135 mg/m2, Carboplatin AUC 4, bevacizumab 15 mg/kg, +Neulasta.  370     8/17/2023: CT CAP: Stable bilateral pulmonary micronodules. Multiple subcentimeter hypodense hepatic lesions, a few are slightly less conspicuous. Necrotic lymph nodes in mario hepatis are stable. A few small peritoneal nodules in the left upper quadrant. Anterior to the pylorus is an oval 2.4 cm hypodense soft tissue lesion which is stable.    8/17/2023: started maintenance bevacizumab q3 weeks.  370.    8/25/2023:  450.    9/19/2023:  1056. Alk phos 221, ALT 86, AST 63.    9/21/2023: Transferred her care from [facility] to [facility] to be closer to home. Plan is maintenance bevacizumab 15 mg/kg every 3 weeks to give her a treatment break from myelosuppressive chemotherapy.    10/10/2023:  1889, alk phos 388, , AST 83  10/12/2023: Bevacizumab held for elevated LFTs, symptoms of new back pain, cough, sinusitis symptoms.    10/18/2023: CT CAP: Progressive liver metastasis. Small nonspecific right lower lobe pulmonary nodule. Inflammatory process or a metastatic nodule remain possible. This does not have the characteristic appearance of metastasis, however, remains indeterminant.     Plan: Weekly paclitaxel 80 mg/m2 day 1, 8, 15, and bevacizumab 10 mg/kg day 1 and day 15 of a 28 day cycle x 3 cycles followed by CT CAP and follow up with Dr. Juarez in [place].     12/8/2023:  >10,000. Bilirubin 4.2, LFTs elevated.    12/12/2023: C1D1 paclitaxel and bevacizumab. ([place])    12/13/2023: Presented to the ED in [place] with worsening RUQ pain and  jaundice x 2 weeks. Bilirubin 8.1. Waitlisted for transfer to [facility]. Elected to leave the ED.  12/13/2023: CT abdomen pelvis ([place]): New and enlarging innumerable hypodense liver lesions. Increased retroperitoneal adenopathy. Increased left >right intrahepatic biliary ductal dilatation without a resting cause noted. Common bile duct borderline enlarged 6 mm. Ventral hernia containing colon.  12/13/2023: Abdominal US ([place]): Numerous liver lesions. Common bile duct dilated at 9 mm, no obstructing stone. Spleen 14 cm, enlarged.    12/15/2023: ERCP ([facility]): Ventral pancreatic duct prophylactically stented. Biliary sphincterotomy. Cholangiogram showed multifocal biliary stricture. Intrahepatic and common hepatic duct strictures dilated with a balloon. 2 metal stents placed into the left main and right anterior intrahepatic duct    12/19/2023: C1D8 paclitaxel administered. Bilirubin 3.5, LFTs improving. WBC 2.2, ANC 1.0, platelets 90. ([place])  12/22/23: Neupogen for ANC 1.0.  12/28/2023: C1D15 paclitaxel + bevacizumab. Bilirubin 1.9, LFT's improving, ANC 1.0, platelets 174. ([place])     Plan: Transfer back to [facility] with weekly paclitaxel 80 mg/m2 day 1, 8, 15, and bevacizumab 10 mg/kg day 1 and day 15 of a 28 day cycle x 2 additional cycles followed by CT CAP and follow up with Dr. Juarez.     1/12/24: Cycle 1 paclitaxel/bevacizumab.   2113.   2/26/24: Cycle 2 paclitaxel/bevacizumab.   5381. (Delayed due to vacation)    3/25/24: CT CAP impression   1. Evidence of disease progression with enlargement of multifocal  hepatic metastases compared to 12/13/2023 CT.  2. New small volume ascites. Increased irregular thickening of the  peritoneum along the paracolic spaces and omentum is concerning for  developing carcinomatosis.  3. Several new subcentimeter bilateral solid pulmonary nodules are  likely metastatic.  4. Interval increased conspicuity of multiple sclerotic lesions in the  visualized  axial and appendicular skeleton, likely representing  worsening osseous metastases.    4/1/24: Follow up with Dr. Juarez. Concern for disease progression. Per Dr. Juarez, plan to switch to carboplatin/paclitaxel/bevacizumab with GCSF q21d x3 cycles followed by imaging and follow up with Dr. Juarez    4/8/24: C1 carboplatin AUC 4, paclitaxel 135mg/m2, bevacizumab 15mg/kg + GCSF.  5824    4/29/24: C2 carboplatin AUC 4, paclitaxel 135mg/m2, bevacizumab 15mg/kg + GCSF.  pending.      Past Medical History:   Diagnosis Date    Atrial fibrillation with rapid ventricular response (H)     History of cold sores     Hx of LASIK 12/11/2017    Insomnia     Migraine     Osteopenia     Pelvic mass     Peritoneal carcinomatosis (H)     Restless legs syndrome (RLS)       Past Surgical History:   Procedure Laterality Date    APPENDECTOMY      ARTHROSCPY KNEE SURGICAL DEBRIDEMENT SHAVING ARTICULAR CARTILAGE Right     BIOPSY  January 2021    Biopsy to confirm ovarian cancer    DEBRIDEMENT LEFT UPPER EXTREMITY  2016    ENDOSCOPIC RETROGRADE CHOLANGIOPANCREATOGRAM N/A 12/15/2023    Procedure: ENDOSCOPIC RETROGRADE CHOLANGIOPANCREATOGRAPHY, with pancreatic stent placement, biliary duct dilation, biliary stent placement, and biliary sphincterotomy;  Surgeon: Fabian Simpson MD;  Location: [facility] OR    HYSTERECTOMY TOTAL ABD, LUISITO SALPINGO-OOPHORECTOMY, NODE DISSECTION, TUMOR DEBULKING, COMBINED Bilateral 4/19/2021    Procedure: HYSTERECTOMY, TOTAL, ABDOMINAL, WITH BILATERAL SALPINGO-OOPHORECTOMY, omentectomy, NEOPLASM DEBULKING,Proctoscocy, RO, Resection of liver nodules, diaphragm stripping, immobilization of liver and colon;  Surgeon: Bolivar Juarez MD;  Location: [facility] OR    IR CHEST PORT PLACEMENT > 5 YRS OF AGE  4/12/2024    LAPAROSCOPY DIAGNOSTIC (GYN) Bilateral 1/7/2021    Procedure: Diagnsotic laparoscopy, biopsies;  Surgeon: Bolivar Juarez MD;  Location: [facility] OR    LASIK      TUBAL LIGATION       Social  History     Socioeconomic History    Marital status:    Tobacco Use    Smoking status: Former     Current packs/day: 0.00     Average packs/day: 0.5 packs/day for 40.0 years (20.0 ttl pk-yrs)     Types: Cigarettes     Start date:      Quit date: 2018     Years since quittin.2     Passive exposure: Never    Smokeless tobacco: Never   Vaping Use    Vaping status: Never Used   Substance and Sexual Activity    Alcohol use: Not Currently    Drug use: Never    Sexual activity: Not Currently     Partners: Male   Other Topics Concern    Parent/sibling w/ CABG, MI or angioplasty before 65F 55M? No     Social Determinants of Health     Interpersonal Safety: Not At Risk (3/30/2024)    [facility] and [facility]    Intimate Partner Violence     Are you in a relationship where you are physically hurt, threatened and/or made to feel afraid?: No      Family History   Adopted: Yes   Problem Relation Age of Onset    Cancer Mother 36    Other Cancer Mother         Bio mother  of  a female cancer  at 36    Factor V Leiden deficiency Daughter     Deep Vein Thrombosis Daughter     Diabetes Type 1 Daughter     Diabetes Daughter     Hypertension Daughter     Anesthesia Reaction No family hx of       Current Outpatient Medications   Medication Sig Dispense Refill    amitriptyline (ELAVIL) 100 MG tablet Take 100 mg by mouth at bedtime      apixaban ANTICOAGULANT (ELIQUIS) 5 MG tablet Take 1 tablet (5 mg) by mouth 2 times daily 180 tablet 3    BEVACIZUMAB, AVASTIN, INJECTION 2.5MG Every other week (Tuesday)      cyclobenzaprine (FLEXERIL) 5 MG tablet Take 1 tablet (5 mg) by mouth 3 times daily as needed for muscle spasms 10 tablet 0    dexAMETHasone (DECADRON) 4 MG tablet Take 2 tablets (8 mg) by mouth daily for 3 days, starting the day after chemotherapy. 6 tablet 5    doxepin (SINEQUAN) 10 MG/ML (HIGH CONC) solution Take 2.5 mLs (25 mg) by mouth every 4 hours as needed for other (. Mix with equal  amount water. Swish and hold in mouth for 1 min then spit out.) 118 mL 1    lidocaine-prilocaine (EMLA) 2.5-2.5 % external cream Apply topically as needed for moderate pain (30min prior to port access) 30 g 1    meclizine (ANTIVERT) 25 MG tablet Take 1 tablet (25 mg) by mouth 3 times daily as needed for dizziness or nausea 15 tablet 0    naloxone (NARCAN) 4 MG/0.1ML nasal spray Spray 1 spray (4 mg) into one nostril alternating nostrils as needed for opioid reversal every 2-3 minutes until assistance arrives 0.2 mL 1    nitroFURantoin macrocrystal-monohydrate (MACROBID) 100 MG capsule Take 1 capsule (100 mg) by mouth 2 times daily for 5 days 10 capsule 0    ondansetron (ZOFRAN) 8 MG tablet Take 1 tablet (8 mg) by mouth every 8 hours as needed for nausea (vomiting) 30 tablet 2    ondansetron (ZOFRAN) 8 MG tablet Take 1 tablet (8 mg) by mouth every 8 hours as needed for nausea 30 tablet 1    oxyCODONE (ROXICODONE) 10 MG tablet Take 1 tablet (10 mg) by mouth 2 times daily as needed for severe pain . NOTE THESE ARE 10 MG PILLS--ONLY TAKE ONE AT A TIME! 60 tablet 0    oxyCODONE (ROXICODONE) 5 MG tablet Take 1 tablet (5 mg) by mouth every 6 hours as needed for severe pain or pain 30 tablet 0    prochlorperazine (COMPAZINE) 10 MG tablet Take 1 tablet (10 mg) by mouth every 6 hours as needed for nausea or vomiting 30 tablet 2    SUMAtriptan (IMITREX) 100 MG tablet Take 1 tablet (100 mg) by mouth at onset of headache for migraine 15 tablet 0    valACYclovir (VALTREX) 1000 mg tablet Take 1,000 mg by mouth as needed      zolpidem (AMBIEN) 10 MG tablet Take 1 tablet (10 mg) by mouth every evening 10 tablet 0     No current facility-administered medications for this visit.     Facility-Administered Medications Ordered in Other Visits   Medication Dose Route Frequency Provider Last Rate Last Admin    heparin 100 unit/mL injection 500 Units  500 Units Intracatheter Q1H PRN Marylin Hancock APRN CNP   500 Units at 04/29/24 0485     "sodium chloride (PF) 0.9% PF flush 10 mL  10 mL Intracatheter Q1H PRN Marylin Hancock APRN CNP   10 mL at 04/29/24 0927      No Known Allergies       OBJECTIVE:    PHYSICAL EXAM:  /74 (BP Location: Right arm, Patient Position: Chair, Cuff Size: Adult Regular)   Pulse 92   Temp 98  F (36.7  C) (Oral)   Ht 1.803 m (5' 11\")   Wt 76.2 kg (168 lb)   SpO2 96%   BMI 23.43 kg/m      Vitals obtained in clinic at [place] on date of service    Constitutional: Alert and oriented non-toxic appearing female in no acute distress  HEENT: No periorbital edema or perioral cyanosis  Resp: Respirations unlabored, speaking in full sentences without apparent dyspnea, wheezing, or cough  Psych: Pleasant and interactive, affect euthymic, makes appropriate eye contact, answers questions appropriately, thought content logical        DATA:    CBC RESULTS:   Recent Labs   Lab Test 04/29/24  0928   WBC 4.4   RBC 3.26*   HGB 10.6*   HCT 32.8*   *   MCH 32.5   MCHC 32.3   RDW 20.0*        Last Comprehensive Metabolic Panel:  Lab Results   Component Value Date     04/29/2024    POTASSIUM 3.9 04/29/2024    CHLORIDE 102 04/29/2024    CO2 24 04/29/2024    ANIONGAP 9 04/29/2024    GLC 99 04/29/2024    BUN 9.0 04/29/2024    CR 0.53 04/29/2024    GFRESTIMATED >90 04/29/2024    HORACIO 8.7 (L) 04/29/2024       Lab Results   Component Value Date    AST 86 04/29/2024     Lab Results   Component Value Date    ALT 47 04/29/2024     Lab Results   Component Value Date    BILITOTAL 0.8 04/29/2024     Lab Results   Component Value Date    ALBUMIN 3.2 04/29/2024     Lab Results   Component Value Date    PROTTOTAL 7.2 04/29/2024      Lab Results   Component Value Date    ALKPHOS 623 04/29/2024           pending        WEIGHT:  Wt Readings from Last 5 Encounters:   04/29/24 76.2 kg (168 lb)   04/27/24 74.8 kg (165 lb)   04/16/24 74.8 kg (165 lb)   04/15/24 74.8 kg (165 lb)   04/12/24 74.8 kg (165 lb)    "       ASSESSMENT/PLAN:    1) Recurrent ovarian cancer: Currently receiving palliative intent treatment with carboplatin AUC 4, paclitaxel 135mg/m2, and bevacizumab 15mg/kg with GCSF. No dose limiting toxicities, proceed with C2 of treatment as scheduled. Return to clinic in three weeks for consideration of C3 with labs and provider visit. Will need imaging and visit with Dr. Juarez prior to C4. Reviewed alarm signs and symptoms and when to seek further care.   2) Treatment/disease related effects:  Nausea: Persistent mild nausea. To stop oxycodone for nausea- this could be contributing to nausea and constipation. Trial of prochlorperazine, reviewed that she can use this even if she is taking ondansetron.  Mouth sores: Resolved. Likely related to chemotherapy- reviewed salt and soda rinses. Has doxepin solution if needed.  Arthralgias: Resolved- occurred last cycle, likely related to taxane as well as GCSF. Suspect this will occur again this cycle- consider loratadine 10mg PO BID the first week of treatment.  Decreased appetite: Counseled on importance of small, frequent meals that are nutritionally dense. Discussed options to maintain intake, nutrition, weight.  Orthostatic hypotension: Likely source of her lightheadedness- adding in NS 1L IV x1 today, recommend drinking at least one liter of fluids daily, adding in salt should she tolerate this, compression stockings if needed, ankle pumps, slow position changes.  Transaminitis: Significantly improving. Likely related to disease burden. Suspect elevated alk phos secondary to bony metastatic disease.  Anemia: Grade 1, likely due to chemotherapy. Continue to monitor.  Recurrent disease: We reviewed that her treatment is palliative intent, the meaning of this, and started to discuss goals of care. Will ask palliative RN to send advanced directive to either her [facility] or her daughter's address. At this time, Justen wants to continue treatment.  3) Hx of PE: Dx in  2021, tolerating apixaban 5mg PO BID with decrease in epistaxis  4) Bony metastases: No evidence of spinal cord compression. Previously reviewed risks and benefits of zoledronic acid vs denosumab with decision to hold off at this time given the arthralgias she had been experiencing- could consider this in the future, will reassess at next visit  5) Genetics: POSITIVE for a BRCA1 mutation. This mutation is called [id number]   6) Patient verbalized understanding of and agreement with plan    MDM:  45 minutes spent on date of service on visit, including chart review, face to face visit, documentation, and coordination of care.    JERICHO Ventura, CNP (she/her)  Division of Gynecologic Oncology

## 2024-04-30 NOTE — ED PROVIDER NOTES
History     Chief Complaint   Patient presents with    Fall     HPI  Taran Regalado is a 68 year old female with a past medical history of atrial fibrillation with RVR (on Eliquis), ovarian cancer (on chemotherapy), migraine, osteopenia, peritoneal carcinomatosis who presents to the emergency department with a chief complaint of fall.  The patient reports that she fell in her bathroom last night.  She slipped while getting up from the toilet and landed on her left side against the side of her bathtub.  She denies any head trauma or pain.  She now complains of back/left flank and hip pain.  No head or neck pain.  No loss of consciousness.  Patient has been able to ambulate since this occurred.  No pain with range of motion of left hip, though the pain does radiate down into this area.    I have reviewed the Medications, Allergies, Past Medical and Surgical History, and Social History in the [facility] system.    Past Medical History:   Diagnosis Date    Atrial fibrillation with rapid ventricular response (H)     History of cold sores     Hx of LASIK 12/11/2017    Insomnia     Migraine     Osteopenia     Pelvic mass     Peritoneal carcinomatosis (H)     Restless legs syndrome (RLS)      Past Surgical History:   Procedure Laterality Date    APPENDECTOMY      ARTHROSCPY KNEE SURGICAL DEBRIDEMENT SHAVING ARTICULAR CARTILAGE Right     BIOPSY  January 2021    Biopsy to confirm ovarian cancer    DEBRIDEMENT LEFT UPPER EXTREMITY  2016    ENDOSCOPIC RETROGRADE CHOLANGIOPANCREATOGRAM N/A 12/15/2023    Procedure: ENDOSCOPIC RETROGRADE CHOLANGIOPANCREATOGRAPHY, with pancreatic stent placement, biliary duct dilation, biliary stent placement, and biliary sphincterotomy;  Surgeon: Fabian Simpson MD;  Location: [facility] OR    HYSTERECTOMY TOTAL ABD, LUISITO SALPINGO-OOPHORECTOMY, NODE DISSECTION, TUMOR DEBULKING, COMBINED Bilateral 4/19/2021    Procedure: HYSTERECTOMY, TOTAL, ABDOMINAL, WITH BILATERAL SALPINGO-OOPHORECTOMY, omentectomy, NEOPLASM  DEBULKING,Proctoscocy, RO, Resection of liver nodules, diaphragm stripping, immobilization of liver and colon;  Surgeon: Bolivar Juarez MD;  Location: [facility] OR    IR CHEST PORT PLACEMENT > 5 YRS OF AGE  4/12/2024    LAPAROSCOPY DIAGNOSTIC (GYN) Bilateral 1/7/2021    Procedure: Diagnsotic laparoscopy, biopsies;  Surgeon: Bolivar Juarez MD;  Location: [facility] OR    LASIK      TUBAL LIGATION       No current facility-administered medications for this encounter.     Current Outpatient Medications   Medication Sig Dispense Refill    amitriptyline (ELAVIL) 100 MG tablet Take 100 mg by mouth at bedtime      apixaban ANTICOAGULANT (ELIQUIS) 5 MG tablet Take 1 tablet (5 mg) by mouth 2 times daily 180 tablet 3    BEVACIZUMAB, AVASTIN, INJECTION 2.5MG Every other week (Tuesday)      cyclobenzaprine (FLEXERIL) 5 MG tablet Take 1 tablet (5 mg) by mouth 3 times daily as needed for muscle spasms 10 tablet 0    dexAMETHasone (DECADRON) 4 MG tablet Take 2 tablets (8 mg) by mouth daily for 3 days, starting the day after chemotherapy. 6 tablet 5    doxepin (SINEQUAN) 10 MG/ML (HIGH CONC) solution Take 2.5 mLs (25 mg) by mouth every 4 hours as needed for other (. Mix with equal amount water. Swish and hold in mouth for 1 min then spit out.) 118 mL 1    lidocaine-prilocaine (EMLA) 2.5-2.5 % external cream Apply topically as needed for moderate pain (30min prior to port access) 30 g 1    meclizine (ANTIVERT) 25 MG tablet Take 1 tablet (25 mg) by mouth 3 times daily as needed for dizziness or nausea 15 tablet 0    naloxone (NARCAN) 4 MG/0.1ML nasal spray Spray 1 spray (4 mg) into one nostril alternating nostrils as needed for opioid reversal every 2-3 minutes until assistance arrives 0.2 mL 1    nitroFURantoin macrocrystal-monohydrate (MACROBID) 100 MG capsule Take 1 capsule (100 mg) by mouth 2 times daily for 5 days 10 capsule 0    ondansetron (ZOFRAN) 8 MG tablet Take 1 tablet (8 mg) by mouth every 8 hours as needed for nausea  (vomiting) 30 tablet 2    ondansetron (ZOFRAN) 8 MG tablet Take 1 tablet (8 mg) by mouth every 8 hours as needed for nausea 30 tablet 1    oxyCODONE (ROXICODONE) 10 MG tablet Take 1 tablet (10 mg) by mouth 2 times daily as needed for severe pain . NOTE THESE ARE 10 MG PILLS--ONLY TAKE ONE AT A TIME! 60 tablet 0    oxyCODONE (ROXICODONE) 5 MG tablet Take 1 tablet (5 mg) by mouth every 6 hours as needed for severe pain or pain 30 tablet 0    prochlorperazine (COMPAZINE) 10 MG tablet Take 1 tablet (10 mg) by mouth every 6 hours as needed for nausea or vomiting 30 tablet 2    SUMAtriptan (IMITREX) 100 MG tablet Take 1 tablet (100 mg) by mouth at onset of headache for migraine 15 tablet 0    valACYclovir (VALTREX) 1000 mg tablet Take 1,000 mg by mouth as needed      zolpidem (AMBIEN) 10 MG tablet Take 1 tablet (10 mg) by mouth every evening 10 tablet 0     No Known Allergies  Past medical history, past surgical history, medications, and allergies were reviewed with the patient. Additional pertinent items: None    Social History     Socioeconomic History    Marital status:      Spouse name: Not on file    Number of children: Not on file    Years of education: Not on file    Highest education level: Not on file   Occupational History    Not on file   Tobacco Use    Smoking status: Former     Current packs/day: 0.00     Average packs/day: 0.5 packs/day for 40.0 years (20.0 ttl pk-yrs)     Types: Cigarettes     Start date:      Quit date: 2018     Years since quittin.3     Passive exposure: Never    Smokeless tobacco: Never   Vaping Use    Vaping status: Never Used   Substance and Sexual Activity    Alcohol use: Not Currently    Drug use: Never    Sexual activity: Not Currently     Partners: Male   Other Topics Concern    Parent/sibling w/ CABG, MI or angioplasty before 65F 55M? No   Social History Narrative    Not on file     Social Determinants of Health     Financial Resource Strain: Not on file   Food  Insecurity: Not on file   Transportation Needs: Not on file   Physical Activity: Not on file   Stress: Not on file   Social Connections: Not on file   Interpersonal Safety: Not At Risk (3/30/2024)    [facility]    Intimate Partner Violence     Are you in a relationship where you are physically hurt, threatened and/or made to feel afraid?: No   Housing Stability: Not on file     Social history was reviewed with the patient. Additional pertinent items: None    Review of Systems  A medically appropriate review of systems was performed with pertinent positives and negatives noted in the HPI, and all other systems negative.    Physical Exam   BP: 97/65  Pulse: 88  Temp: 98  F (36.7  C)  Resp: 18  SpO2: 96 %      General: Well nourished, well developed, NAD  HEENT: EOMI, anicteric. NCAT, MMM  Neck: no jugular venous distension, supple, nl ROM  Cardiac: Regular rate and rhythm. No murmurs, rubs, or gallops. Normal S1, S2.  Intact peripheral pulses.  No rib tenderness  Pulm: CTAB, no stridor, wheezes, rales, rhonchi  Abd: Soft, nontender, nondistended.  No masses palpated.  Tenderness to palpation over the left CVA  Skin: Warm and dry to the touch.  No rash  Extremities: No LE edema, no cyanosis, w/w/p, no pain with range of motion of left hip, no deformity, distally NV intact  Neuro: A&Ox3, no gross focal deficits    ED Course        Procedures                        Labs Ordered and Resulted from Time of ED Arrival to Time of ED Departure   INR - Abnormal       Result Value    INR 1.44 (*)    COMPREHENSIVE METABOLIC PANEL - Abnormal    Sodium 137      Potassium 3.8      Carbon Dioxide (CO2) 22      Anion Gap 9      Urea Nitrogen 12.5      Creatinine 0.57      GFR Estimate >90      Calcium 8.8      Chloride 106      Glucose 104 (*)     Alkaline Phosphatase 575 (*)      (*)     ALT 59 (*)     Protein Total 6.9      Albumin 3.0 (*)     Bilirubin  Total 0.9     LIPASE - Abnormal    Lipase 11 (*)    ROUTINE UA WITH MICROSCOPIC REFLEX TO CULTURE - Abnormal    Color Urine Yellow      Appearance Urine Clear      Glucose Urine Negative      Bilirubin Urine Negative      Ketones Urine Negative      Specific Gravity Urine 1.023      Blood Urine Negative      pH Urine 6.0      Protein Albumin Urine 20 (*)     Urobilinogen Urine 3.0 (*)     Nitrite Urine Negative      Leukocyte Esterase Urine Negative      Mucus Urine Present (*)     RBC Urine 1      WBC Urine 5      Transitional Epithelials Urine <1      Hyaline Casts Urine 9 (*)    CBC WITH PLATELETS AND DIFFERENTIAL - Abnormal    WBC Count 4.4      RBC Count 3.09 (*)     Hemoglobin 10.3 (*)     Hematocrit 31.3 (*)      (*)     MCH 33.3 (*)     MCHC 32.9      RDW 20.2 (*)     Platelet Count 162      % Neutrophils 74      % Lymphocytes 16      % Monocytes 8      % Eosinophils 0      % Basophils 1      % Immature Granulocytes 1      NRBCs per 100 WBC 0      Absolute Neutrophils 3.3      Absolute Lymphocytes 0.7 (*)     Absolute Monocytes 0.4      Absolute Eosinophils 0.0      Absolute Basophils 0.0      Absolute Immature Granulocytes 0.0      Absolute NRBCs 0.0     PARTIAL THROMBOPLASTIN TIME - Normal    aPTT 32       Narrative & Impression   EXAMINATION: CT ABDOMEN PELVIS W CONTRAST, 4/30/2024 7:49 PM     INDICATION: fall, left flank pain/left hip pain     COMPARISON STUDY: CT 3/25/2024     TECHNIQUE: CT scan of the abdomen and pelvis was performed on  multidetector CT scanner using volumetric acquisition technique and  images were reconstructed in multiple planes with variable thickness  and reviewed on dedicated workstations.      CONTRAST: Isovue 370 injected IV without oral contrast     CT scan radiation dose is optimized to minimum requisite dose using  automated dose modulation techniques.     FINDINGS:     Lower thorax: Scattered subcentimeter nodules in the lung bases are  unchanged from prior.      Liver: Numerous large hypoenhancing hepatic metastatic lesions are  grossly unchanged from prior, for example:  - Index lesion in hepatic segment 8/7 measuring 1.8 x 2.9 cm (series 4  image 58), previously 2.8 x 1.7 cm.  -Index lesion in hepatic segment 8 measuring 3.1 x 2.5 cm (series 4  image 77), previously 3.2 x 2.8 cm.     Biliary System: Similar distended gallbladder without evidence of  acute cholecystitis. Biliary stent remains in place spanning the  central intrahepatic ducts and the common bile duct. No intra or  extrahepatic biliary dilatation.     Pancreas: No mass or pancreatic ductal dilation.     Adrenal glands: No mass or nodules     Spleen: Normal.     Kidneys: Unchanged too small to characterize cortical hypodensities,  likely renal cysts. No suspicious mass, obstructing calculus or  hydronephrosis.     Gastrointestinal tract: No abnormally dilated loops of bowel. Colonic  diverticulosis. Large amount of stool throughout the colon.     Mesentery/peritoneum/retroperitoneum: No free air. Small to moderate  volume ascites, increased from prior.  Continued diffuse peritoneal  thickening and hyperenhancement.     Lymph nodes: No significant lymphadenopathy.     Vasculature: Patent major abdominal vasculature.  Scattered  atherosclerotic calcification of abdominal aorta with no aneurysmal  dilation.      Pelvis: Urinary bladder is decompressed.     Osseous structures: Unchanged scattered sclerotic lesions throughout  the skeleton. No acute fracture.      Soft tissues: Small periumbilical hernia containing fluid and a  knuckle of nonobstructed colon.                                                                      IMPRESSION:   1. No new displaced fracture.  2. Moderate stool burden especially left-sided stool burden.  3. No significant change in multifocal hepatic and osseous metastases  compared to prior CT from 3/25/2024. Continued peritoneal thickening  and hyperenhancement, suspicious for  peritoneal carcinomatosis.  4. Increased small to moderate volume ascites.   This result has not been signed. Information might be incomplete.          No results found for this or any previous visit (from the past 24 hour(s)).    Labs, vital signs, and imaging studies were reviewed by me.    Medications   oxyCODONE IR (ROXICODONE) tablet 10 mg (10 mg Oral $Given 4/30/24 8344)   iopamidol (ISOVUE-370) solution 103 mL (103 mLs Intravenous $Given 4/30/24 1922)   sodium chloride (PF) 0.9% PF flush 76 mL (76 mLs Intravenous $Given 4/30/24 1922)       Assessments & Plan (with Medical Decision Making)   Taran Regalado is a 68 year old female who presents after a fall.  Differential diagnosis includes hip fracture, dislocation, contusion, sprain, intra-abdominal bleeding, vertebral fracture or subluxation.  Labs, UA and CT ordered to further evaluate the patient in the emergency department.  Medications were ordered for symptomatic relief in the emergency department as well.    Laboratory workup is remarkable for hemoglobin 10.3, elevated LFTs, INR 1.44.    CT shows no acute traumatic injuries.    Critical care was not performed.     Medical Decision Making  The patient's presentation was of high complexity (an acute health issue posing potential threat to life or bodily function).    The patient's evaluation involved:  ordering and/or review of 3+ test(s) in this encounter (see separate area of note for details)  independent interpretation of testing performed by another health professional (CT)    The patient's management necessitated moderate risk (prescription drug management including medications given in the ED).    CT images were personally reviewed by me, I agree with the radiology reads.    I have reviewed the nursing notes.    I have reviewed the findings, diagnosis, plan and need for follow up with the patient.    Patient to be discharged home. Advised to follow up with PCP within 1 week. To return to ER  immediately with any new/worsening symptoms. Plan of care discussed with patient who expresses understanding and agrees with plan of care.    Discharge Medication List as of 4/30/2024  9:04 PM          Final diagnoses:   Fall at home, initial encounter       MARQUISE STRONG MD  4/30/2024   [facility] EMERGENCY DEPARTMENT       Marquise Strong MD  05/02/24 4881

## 2024-04-30 NOTE — DISCHARGE INSTRUCTIONS
TODAY'S VISIT:  You were seen today for fall  -   - If you had any labs or imaging/radiology tests performed today, you should also discuss these tests with your usual provider.     FOLLOW-UP:  Please make an appointment to follow up with:  - Your Primary Care Provider. If you do not have a PCP, please call the Primary Care Center (phone: [phone] for an appointment    - Have your provider review the results from today's visit with you again to make sure no further follow-up or additional testing is needed based on those results.     RETURN TO THE EMERGENCY DEPARTMENT  Return to the Emergency Department at any time for any new or worsening symptoms or any concerns.

## 2024-04-30 NOTE — ED TRIAGE NOTES
Fall in bathroom last night  Slipped and landed on L side, no head strike  Back, hip pain  On eliquis, chemo for ovarian ca     Triage Assessment (Adult)       Row Name 04/30/24 1900          Triage Assessment    Airway WDL WDL        Respiratory WDL    Respiratory WDL WDL        Skin Circulation/Temperature WDL    Skin Circulation/Temperature WDL WDL        Cardiac WDL    Cardiac WDL WDL        Peripheral/Neurovascular WDL    Peripheral Neurovascular WDL WDL        Cognitive/Neuro/Behavioral WDL    Cognitive/Neuro/Behavioral WDL WDL

## 2024-05-06 NOTE — DISCHARGE INSTRUCTIONS
Radiology  Discharge Instructions for Abdominal Paracentesis    You have had an abdominal paracentesis procedure today.  A needle or catheter was put into your abdomen to remove fluid for laboratory studies and/or to remove the extra fluid from your abdomen.    AFTER YOU ARE HOME:  Rest at home today.  Limit physical activity such as lifting, straining, or exercise for 48 hours.  You may resume normal activity in 24 hours.  Resume previous diet and medications.  You may remove bandage in 24 hours.    CALL YOUR PRIMARY PROVIDER IF:  You develop temperature over 101o F, or redness at the drainage site.  You have any other questions or concerns.    AFTER HOURS CALL [facility] NURSE ADVISORS AT [phone]    COME TO EMERGENCY ROOM IF:  You develop severe abdominal pain, swelling the size of a baseball or larger, continuous oozing from puncture site longer than 24 hours, bleeding that saturates a gauze dressing even after you have put direct pressure on the site for 15 minutes, severe lightheadedness or fainting.  DO NOT DRIVE YOURSELF.                
denies

## 2024-05-06 NOTE — ED TRIAGE NOTES
Pt reports she fell about 1 week ago in the bathroom, pt was seen after fall and did imaging, pt was sent home on pain medication. Pt reports pain is not getting any better. Pt is having difficulty moving around, getting dressed.      Triage Assessment (Adult)       Row Name 05/06/24 1410          Triage Assessment    Airway WDL WDL        Respiratory WDL    Respiratory WDL WDL        Peripheral/Neurovascular WDL    Peripheral Neurovascular WDL WDL

## 2024-05-06 NOTE — ED PROVIDER NOTES
History     Chief Complaint   Patient presents with    Fall     Pt reports she fell about 1 week ago in the bathroom, pt was seen after fall and did imaging, pt was sent home on pain medication. Pt reports pain is not getting any better. Pt is having difficulty moving around, getting dressed.     Back Pain     HPI  Taran Regalado is a 68 year old female, past medical history is significant for biliary obstruction, chemotherapy-induced neutropenia, restless leg syndrome, atrial fibrillation, PE, ovarian cancer, hypercholesterolemia, osteopenia, atrophic vaginitis, insomnia, migraine, presents to the emergency department with concerns of fall 1 week ago that was seen after the episode occurred and presents now with concerns the pain is not getting any better.  History is obtained from the patient who presents with her daughter after just having had a scheduled abdominal paracentesis performed in our imaging department.  The patient states that she fell accidentally a week ago onto her left CVA area primarily.  She was evaluated at the [facility] with imaging which I have reviewed in the EHR at the time of her presentation.  No acute traumatic findings were identified.  The patient is actively receiving chemotherapy the last of which was 3 weeks ago.  Both the patient and her daughter state that since the time of falling her pain has gotten worse rather than getting better and they are concerned that something was missed at the evaluation a week ago.  The patient has been using a combination of Tylenol and oxycodone which she states does absolutely nothing for her pain.  There is been low-grade nausea along with anorexia which the patient feels as part of her chemotherapy.  Nothing new since the trauma.  Has not noticed any change in bowel habits which is decreased to begin with, no change in urination and specifically no hematuria since the time of trauma.      Allergies:  No Known Allergies    Problem List:     Patient Active Problem List    Diagnosis Date Noted    Other ascites 04/01/2024     Priority: Medium    RUQ abdominal pain 12/14/2023     Priority: Medium    Biliary obstruction (H28) 12/14/2023     Priority: Medium    Adverse effect of antineoplastic and immunosuppressive drugs, sequela 04/13/2023     Priority: Medium    Presbyopia 06/15/2021     Priority: Medium    Chemotherapy-induced neutropenia (H24) 03/12/2021     Priority: Medium    Restless legs syndrome 02/18/2021     Priority: Medium    Encounter for antineoplastic chemotherapy 02/08/2021     Priority: Medium    Atrial fibrillation with rapid ventricular response (H) 02/03/2021     Priority: Medium    Other acute pulmonary embolism without acute cor pulmonale (H) 02/03/2021     Priority: Medium    Ovarian cancer, right (H) 01/12/2021     Priority: Medium    Non-intractable vomiting with nausea, unspecified vomiting type 01/10/2021     Priority: Medium    Pelvic mass 12/23/2020     Priority: Medium     Added automatically from request for surgery [id number]      Nuclear sclerosis of both eyes 12/11/2017     Priority: Medium    Hypercholesterolemia 10/24/2014     Priority: Medium    Osteopenia 10/23/2013     Priority: Medium     Formatting of this note might be different from the original.  DEXA 2010      Postmenopausal atrophic vaginitis 02/13/2012     Priority: Medium    Insomnia 04/12/2010     Priority: Medium    Migraine 04/12/2010     Priority: Medium        Past Medical History:    Past Medical History:   Diagnosis Date    Atrial fibrillation with rapid ventricular response (H)     History of cold sores     Hx of LASIK 12/11/2017    Insomnia     Migraine     Osteopenia     Pelvic mass     Peritoneal carcinomatosis (H)     Restless legs syndrome (RLS)        Past Surgical History:    Past Surgical History:   Procedure Laterality Date    APPENDECTOMY      ARTHROSCPY KNEE SURGICAL DEBRIDEMENT SHAVING ARTICULAR CARTILAGE Right     BIOPSY  January 2021     Biopsy to confirm ovarian cancer    DEBRIDEMENT LEFT UPPER EXTREMITY      ENDOSCOPIC RETROGRADE CHOLANGIOPANCREATOGRAM N/A 12/15/2023    Procedure: ENDOSCOPIC RETROGRADE CHOLANGIOPANCREATOGRAPHY, with pancreatic stent placement, biliary duct dilation, biliary stent placement, and biliary sphincterotomy;  Surgeon: Fabian Simpson MD;  Location: [facility] OR    HYSTERECTOMY TOTAL ABD, LUISITO SALPINGO-OOPHORECTOMY, NODE DISSECTION, TUMOR DEBULKING, COMBINED Bilateral 2021    Procedure: HYSTERECTOMY, TOTAL, ABDOMINAL, WITH BILATERAL SALPINGO-OOPHORECTOMY, omentectomy, NEOPLASM DEBULKING,Proctoscocy, RO, Resection of liver nodules, diaphragm stripping, immobilization of liver and colon;  Surgeon: Bolivar Juarez MD;  Location: [facility] OR    IR CHEST PORT PLACEMENT > 5 YRS OF AGE  2024    LAPAROSCOPY DIAGNOSTIC (GYN) Bilateral 2021    Procedure: Diagnsotic laparoscopy, biopsies;  Surgeon: Bolivar Juarez MD;  Location: [facility] OR    LASIK      TUBAL LIGATION         Family History:    Family History   Adopted: Yes   Problem Relation Age of Onset    Cancer Mother 36    Other Cancer Mother         Bio mother  of  a female cancer  at 36    Factor V Leiden deficiency Daughter     Deep Vein Thrombosis Daughter     Diabetes Type 1 Daughter     Diabetes Daughter     Hypertension Daughter     Anesthesia Reaction No family hx of        Social History:  Marital Status:   [2]  Social History     Tobacco Use    Smoking status: Former     Current packs/day: 0.00     Average packs/day: 0.5 packs/day for 40.0 years (20.0 ttl pk-yrs)     Types: Cigarettes     Start date:      Quit date: 2018     Years since quittin.3     Passive exposure: Never    Smokeless tobacco: Never   Vaping Use    Vaping status: Never Used   Substance Use Topics    Alcohol use: Not Currently    Drug use: Never        Medications:    HYDROmorphone (DILAUDID) 2 MG tablet  amitriptyline (ELAVIL) 100 MG tablet  apixaban ANTICOAGULANT  "(ELIQUIS) 5 MG tablet  BEVACIZUMAB, AVASTIN, INJECTION 2.5MG  cyclobenzaprine (FLEXERIL) 5 MG tablet  dexAMETHasone (DECADRON) 4 MG tablet  doxepin (SINEQUAN) 10 MG/ML (HIGH CONC) solution  lidocaine-prilocaine (EMLA) 2.5-2.5 % external cream  meclizine (ANTIVERT) 25 MG tablet  naloxone (NARCAN) 4 MG/0.1ML nasal spray  ondansetron (ZOFRAN) 8 MG tablet  ondansetron (ZOFRAN) 8 MG tablet  oxyCODONE (ROXICODONE) 10 MG tablet  oxyCODONE (ROXICODONE) 5 MG tablet  prochlorperazine (COMPAZINE) 10 MG tablet  SUMAtriptan (IMITREX) 100 MG tablet  valACYclovir (VALTREX) 1000 mg tablet  zolpidem (AMBIEN) 10 MG tablet          Review of Systems   All other systems reviewed and are negative.      Physical Exam   BP: 99/67  Pulse: 91  Temp: 98.2  F (36.8  C)  Resp: 18  Height: 180.3 cm (5' 11\")  Weight: 74.8 kg (165 lb)  SpO2: 97 %      Physical Exam  Vitals and nursing note reviewed.   Constitutional:       General: She is not in acute distress.     Appearance: Normal appearance. She is normal weight. She is not ill-appearing.   HENT:      Head: Normocephalic and atraumatic.      Right Ear: Tympanic membrane, ear canal and external ear normal.      Left Ear: Tympanic membrane, ear canal and external ear normal.      Nose: Nose normal.      Mouth/Throat:      Mouth: Mucous membranes are dry.      Pharynx: Oropharynx is clear.   Eyes:      Extraocular Movements: Extraocular movements intact.      Conjunctiva/sclera: Conjunctivae normal.      Pupils: Pupils are equal, round, and reactive to light.   Cardiovascular:      Rate and Rhythm: Normal rate and regular rhythm.      Pulses: Normal pulses.      Heart sounds: Normal heart sounds.   Pulmonary:      Effort: Pulmonary effort is normal.      Breath sounds: Normal breath sounds.   Abdominal:      General: Bowel sounds are normal.      Palpations: Abdomen is soft.   Musculoskeletal:        Arms:       Cervical back: Normal range of motion.   Skin:     General: Skin is warm and dry.    "   Capillary Refill: Capillary refill takes less than 2 seconds.   Neurological:      General: No focal deficit present.      Mental Status: She is alert and oriented to person, place, and time.   Psychiatric:         Mood and Affect: Mood normal.         Behavior: Behavior normal.         ED Course        Procedures                Results for orders placed or performed during the hospital encounter of 05/06/24 (from the past 24 hour(s))   CBC with platelets, differential    Narrative    The following orders were created for panel order CBC with platelets, differential.  Procedure                               Abnormality         Status                     ---------                               -----------         ------                     CBC with platelets and d...[[id number]]  Abnormal            Final result               RBC and Platelet Morphology[[id number]]                                                 Manual Differential[[id number]]          Abnormal            Final result                 Please view results for these tests on the individual orders.   Comprehensive metabolic panel   Result Value Ref Range    Sodium 133 (L) 135 - 145 mmol/L    Potassium 4.3 3.4 - 5.3 mmol/L    Carbon Dioxide (CO2) 23 22 - 29 mmol/L    Anion Gap 9 7 - 15 mmol/L    Urea Nitrogen 17.8 8.0 - 23.0 mg/dL    Creatinine 0.48 (L) 0.51 - 0.95 mg/dL    GFR Estimate >90 >60 mL/min/1.73m2    Calcium 8.5 (L) 8.8 - 10.2 mg/dL    Chloride 101 98 - 107 mmol/L    Glucose 108 (H) 70 - 99 mg/dL    Alkaline Phosphatase 407 (H) 40 - 150 U/L    AST 67 (H) 0 - 45 U/L    ALT 43 0 - 50 U/L    Protein Total 6.3 (L) 6.4 - 8.3 g/dL    Albumin 2.7 (L) 3.5 - 5.2 g/dL    Bilirubin Total 0.9 <=1.2 mg/dL   CBC with platelets and differential   Result Value Ref Range    WBC Count 1.1 (L) 4.0 - 11.0 10e3/uL    RBC Count 3.43 (L) 3.80 - 5.20 10e6/uL    Hemoglobin 11.2 (L) 11.7 - 15.7 g/dL    Hematocrit 34.0 (L) 35.0 - 47.0 %    MCV 99 78 - 100 fL    MCH 32.7  26.5 - 33.0 pg    MCHC 32.9 31.5 - 36.5 g/dL    RDW 19.7 (H) 10.0 - 15.0 %    Platelet Count 53 (L) 150 - 450 10e3/uL    NRBCs per 100 WBC 0 <1 /100    Absolute NRBCs 0.0 10e3/uL   Manual Differential   Result Value Ref Range    % Neutrophils 30 %    % Lymphocytes 60 %    % Monocytes 10 %    % Eosinophils 0 %    % Basophils 0 %    Absolute Neutrophils 0.3 (LL) 1.6 - 8.3 10e3/uL    Absolute Lymphocytes 0.7 (L) 0.8 - 5.3 10e3/uL    Absolute Monocytes 0.1 0.0 - 1.3 10e3/uL    Absolute Eosinophils 0.0 0.0 - 0.7 10e3/uL    Absolute Basophils 0.0 0.0 - 0.2 10e3/uL    RBC Morphology Confirmed RBC Indices     Platelet Assessment (A) Automated Count Confirmed. Platelet morphology is normal.     Automated Count Confirmed. Giant platelets are present.   CT Abdomen Pelvis w Contrast    Narrative    EXAM: CT ABDOMEN PELVIS W CONTRAST  LOCATION: [facility]  DATE: 5/6/2024    INDICATION: Left flank pain post trauma 1 week ago.  COMPARISON: CT 4/30/2024.  TECHNIQUE: CT scan of the abdomen and pelvis was performed following injection of IV contrast. Multiplanar reformats were obtained. Dose reduction techniques were used.  CONTRAST: 80 mL Isovue 370    FINDINGS:   LOWER CHEST: Normal.    HEPATOBILIARY: Multifocal nodular masses throughout the right and left liver again identified. These are stable in the short interval. Intra and extrahepatic biliary stents appear stable in position. Distended gallbladder appears stable in appearance.   Some mild wall thickening of the gallbladder.    PANCREAS: No visible focal pancreas lesion.    SPLEEN: No new focal lesion. Previously noted tiny hypodensities are difficult to visualize. Craniocaudal length of spleen is 12.7 cm, stable.    ADRENAL GLANDS: Normal.    KIDNEYS/BLADDER: No significant mass, stones, or hydronephrosis. There are simple or benign cysts. No follow up is needed.    BOWEL: No bowel obstruction. No convincing acute inflammation. Moderate stool  within the colon. A portion of herniated transverse colon at the ventral abdominal wall appears stable without complete obstruction. Small herniated fluid again noted. The   hernia sac in transverse plane is approximately 7 x 2.2 cm (series 3, image 101). Colonic diverticulosis noted distally again.    LYMPH NODES: Ill-defined soft tissue at the upper abdomen suggesting adenopathy appears similar to prior. One of these medial to the abdominal IVC is 1.5 cm, stable (series 3, image 55). No areas of new adenopathy. Small ascites appears stable.   Thickening of the peritoneal reflection appears stable. Some small nodularity along the mesenteric vascularity appears stable.    VASCULATURE: No acute abnormality. Scattered vascular calcifications.    PELVIC ORGANS: Uterus not seen. No new pelvic lesion.    MUSCULOSKELETAL: Spine degenerative changes. Stable distribution of multifocal sclerotic bone lesions. No acute displaced fracture.      Impression    IMPRESSION:   1.  No acute traumatic abnormality identified.  2.  Stable multifocal nodular masses in the liver are consistent with metastatic disease. Stable sclerotic bone lesions suggesting metastases.  3.  Stable splenomegaly.  4.  Stable ascites and nodular thickening of the peritoneal reflections that may represent carcinomatosis.  5.  Stable enlarged indeterminate lymph nodes at the upper abdomen.  6.  Stable ventral abdominal wall hernia containing a herniated portion of the transverse colon without bowel obstruction.   UA Macroscopic with reflex to Microscopic and Culture    Specimen: Urine, Midstream   Result Value Ref Range    Color Urine Yellow Colorless, Straw, Light Yellow, Yellow    Appearance Urine Clear Clear    Glucose Urine Negative Negative mg/dL    Bilirubin Urine Negative Negative    Ketones Urine Negative Negative mg/dL    Specific Gravity Urine 1.029 1.003 - 1.035    Blood Urine Negative Negative    pH Urine 6.0 5.0 - 7.0    Protein Albumin Urine  Negative Negative mg/dL    Urobilinogen Urine Normal Normal, 2.0 mg/dL    Nitrite Urine Negative Negative    Leukocyte Esterase Urine Negative Negative    Narrative    Microscopic not indicated       Medications   HYDROmorphone (PF) (DILAUDID) injection 0.5 mg (0.5 mg Intravenous $Given 5/6/24 1833)   sodium chloride 0.9% BOLUS 1,000 mL (0 mLs Intravenous Stopped 5/6/24 1833)   ondansetron (ZOFRAN) injection 4 mg (4 mg Intravenous $Given 5/6/24 4127)   iopamidol (ISOVUE-370) solution 80 mL (80 mLs Intravenous $Given 5/6/24 1619)   sodium chloride 0.9 % bag 500mL for CT scan flush use (60 mLs As instructed $Given 5/6/24 1619)   7:18 PM  The Dilaudid was effective in controlling the patient's pain.  She feels much better and much more relaxed in the area of trauma with ecchymosis.  No acute traumatic abnormality identified on CT abdomen pelvis with IV and water oral contrast.  Neutropenia.  I discussed all of the above with the patient in the room with her daughter.  I have asked that she follow-up closely with her oncologist with her current plan for chemotherapy on the 20th and be extremely cautious about exposures to persons with infectious type symptoms at the present time.  Criteria for return to the emergency department especially in the context of neutropenia were discussed.  Disposition is to home.  I will send the patient home with a prescription for Dilaudid sent into [facility] in [place] at her request.      Assessments & Plan (with Medical Decision Making)   Assessments and plan with medical decision making at the time stamp above.      Disclaimer: This note consists of symbols derived from keyboarding, dictation and/or voice recognition software. As a result, there may be errors in the script that have gone undetected. Please consider this when interpreting information found in this chart.      I have reviewed the nursing notes.    I have reviewed the findings, diagnosis, plan and need for follow up with  the patient.      New Prescriptions    HYDROMORPHONE (DILAUDID) 2 MG TABLET    Take 1 tablet (2 mg) by mouth every 6 hours as needed for pain       Final diagnoses:   Fall, initial encounter   Left flank pain   Chemotherapy-induced neutropenia (H24)       5/6/2024   [facility] EMERGENCY DEPT       Nils Dyson MD  05/06/24 [id number]

## 2024-05-06 NOTE — PROGRESS NOTES
RLQ paracentesis performed by radiologist. 900 Ml clear yellow fluid removed. Pressure held at site after catheter removed. No bleeding noted. Skin glue applied. No Albumin given per protocol.

## 2024-05-07 NOTE — DISCHARGE INSTRUCTIONS
Warm pack to the area of concern as needed.  Dilaudid as needed for pain that has not responded to your pain medication thus far.  Please follow-up with your oncologist as planned.  Return to the emergency department if worse or changes.

## 2024-05-10 NOTE — LETTER
"5/10/2024       RE: Taran Regalado  [address]  [facility] [address]     Dear Colleague,    Thank you for referring your patient, Taran Regalado[facility]. Please see a copy of my visit note below.    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 Started chemo then 2021 DANAE BSO omentectomy and debulking, resection of liver nodules, followed by adjuvant chemo including olaparib (BRCA1 mutated). 3/2023 carbo/paclitaxel/beva through 2023, then beva. Course complicated by PE; large ventral abd hernia.  2024 carbo/paclitaxel/bevacizumab due to progression. Bone, liver metastases. Ascites.     Social - lives in [place] with ; stays in [place] with daughter Edie for treatments. 3 kids; one  2023,  of sepsis.     Care Planning -   \"Cancer has moved into my liver and bone.\" Has not talked about prognosis and doesn't want to.  No HCD; verbally states would want Edie to be her decision-maker; we discussed completing HCD 2024.     Opioid Safety -   Discussed safety basics 2024. No CARLOS EDUARDO hx; average risk. +Naloxone.      History:  History gathered today from: patient, family/loved ones, medical chart    Last visit with discussed pain, constipation, appetite, mucositis, epistaxis, insomnia, xerostomia, and mood. Encouraged her to use oxyIR a bit more; try doxepin oral rinse; she may try THC edibles for appetite. Offered counseling for mood.    End of Apr fell and had severe acute pain--L flank. Seen in ED. Trauma eval showed nothing acute.   Seen in ED twice for fall acute pain, it worsened after initial ED eval .  PE showed bruising L flank; no hematoma or anything on CT though. She was neutropenic.    sent home from ED with dilaudid PO 2mg q6h prn. "   Paracentesis 5/6.    Fell again yesterday, tripped and fell by her bed when she got out of it.  Lightheaded: often feels this first thing in the morning. Happens when she gets out of chairs too. Can't really answer how long the lightheadedness lasts. All this started with chemo.  Spends most of her time in bed.    Dilaudid very helpful 2mg once a day mostly.  Gets slightly fuzzy from it. Mostly takes in evening.  More constipated; 3 senna bid still and PEG once.    Pressure, and frequency in urine  UTI end of April  UA 'clear' 5/6.      PE: There were no vitals taken for this visit.  BP reviewed  Wt Readings from Last 3 Encounters:   05/06/24 74.8 kg (165 lb)   04/29/24 76.2 kg (168 lb)   04/27/24 74.8 kg (165 lb)     Alert NAD      Data reviewed:  I reviewed recent labs and imaging, my comments:  Cr 0.4  AlkP 407  Hgb 11.2  Plt 53   5/6    CT 5/6 showed nothing acute/trauma related. Known metastatic lesions.      database reviewed: y      Impression & Recommendations:  69 yo with recurrent ovarian cancer recently started chemotherapy    Worsening orthostatic hypotension  She has a hard time giving a clear history but overall this sounds like orthostatic hypotension.   Discussed safety basics: wait 60 seconds after you stand before she starts walking/moves away from a chair or bed she can rest on/sit back down on.  Cancer PT referral  Consider IVF    OIC  Increase to 4 senna bid + PEG    Pain  Ok to continue 2 mg dilaudid prn    Dysuria/frequency  UA clear 5/6 after tx for a UTI end of April  Ordered another today    D/w gyn onc today in clinic    Over 61 minutes spent on the date of the encounter doing chart review, history and exam, patient education & counseling, documentation and other activities as noted above.    Thank you for involving us in the patient's care.   Ben Bermeo MD / Palliative Medicine / Text me via Patient Feed  This note may have been composed with voice recognition software and  there may be mistranscriptions.    Amwell/she is at home/ I am on site      Again, thank you for allowing me to participate in the care of your patient.      Sincerely,    Ben Bermeo MD

## 2024-05-10 NOTE — PROGRESS NOTES
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 Started chemo then 2021 DANAE BSO omentectomy and debulking, resection of liver nodules, followed by adjuvant chemo including olaparib (BRCA1 mutated). 3/2023 carbo/paclitaxel/beva through 2023, then beva. Course complicated by PE; large ventral abd hernia.  2024 carbo/paclitaxel/bevacizumab due to progression. Bone, liver metastases. Ascites.     Social - lives in [place] with ; stays in [place] with daughter Edie for treatments. 3 kids; one  2023,  of sepsis.     Care Planning -   \"Cancer has moved into my liver and bone.\" Has not talked about prognosis and doesn't want to.  No HCD; verbally states would want Edie to be her decision-maker; we discussed completing HCD 2024.     Opioid Safety -   Discussed safety basics 2024. No CARLOS EDUARDO hx; average risk. +Naloxone.      History:  History gathered today from: patient, family/loved ones, medical chart    Last visit with discussed pain, constipation, appetite, mucositis, epistaxis, insomnia, xerostomia, and mood. Encouraged her to use oxyIR a bit more; try doxepin oral rinse; she may try THC edibles for appetite. Offered counseling for mood.    End of Apr fell and had severe acute pain--L flank. Seen in ED. Trauma eval showed nothing acute.   Seen in ED twice for fall acute pain, it worsened after initial ED eval .  PE showed bruising L flank; no hematoma or anything on CT though. She was neutropenic.    sent home from ED with dilaudid PO 2mg q6h prn.   Paracentesis .    Fell again yesterday, tripped and fell by her bed when she got out of it.  Lightheaded: often feels this first thing in the morning. Happens when she gets out of chairs too. Can't really answer how long the lightheadedness lasts. All this started with chemo.  Spends most of her time in " bed.    Dilaudid very helpful 2mg once a day mostly.  Gets slightly fuzzy from it. Mostly takes in evening.  More constipated; 3 senna bid still and PEG once.    Pressure, and frequency in urine  UTI end of April  UA 'clear' 5/6.      PE: There were no vitals taken for this visit.  BP reviewed  Wt Readings from Last 3 Encounters:   05/06/24 74.8 kg (165 lb)   04/29/24 76.2 kg (168 lb)   04/27/24 74.8 kg (165 lb)     Alert NAD      Data reviewed:  I reviewed recent labs and imaging, my comments:  Cr 0.4  AlkP 407  Hgb 11.2  Plt 53   5/6    CT 5/6 showed nothing acute/trauma related. Known metastatic lesions.   UA today pyuria     database reviewed: y      Impression & Recommendations:  67 yo with recurrent ovarian cancer recently started chemotherapy    Worsening orthostatic hypotension  She has a hard time giving a clear history but overall this sounds like orthostatic hypotension.   Discussed safety basics: wait 60 seconds after you stand before she starts walking/moves away from a chair or bed she can rest on/sit back down on.  Cancer PT referral  Consider IVF    OIC  Increase to 4 senna bid + PEG    Pain  Ok to continue 2 mg dilaudid prn    Dysuria/frequency  UA clear 5/6 after tx for a UTI end of April  Ordered another today: it shows pyuria; I started Macrobid    D/w gyn onc today in clinic    Over 61 minutes spent on the date of the encounter doing chart review, history and exam, patient education & counseling, documentation and other activities as noted above.    Thank you for involving us in the patient's care.   Ben Bermeo MD / Palliative Medicine / Text me via "[employer]  This note may have been composed with voice recognition software and there may be mistranscriptions.    Video visit  Start 1108f  Stop 1411r  Amwell/she is at home/ I am on site

## 2024-05-10 NOTE — NURSING NOTE
Is the patient currently in the state of [place]? YES    Visit mode:TELEPHONE    If the visit is dropped, the patient can be reconnected by: VIDEO VISIT: Text to cell phone:   Telephone Information:   Mobile [phone]       Will anyone else be joining the visit? YES- Daughters   (If patient encounters technical issues they should call [phone]    How would you like to obtain your AVS? MyChart    Are changes needed to the allergy or medication list? Pt stated no changes to allergies and Pt stated no med changes    Are refills needed on medications prescribed by this physician? NO    Reason for visit: KONG FOWLER
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
## 2024-05-12 PROBLEM — C56.9 MALIGNANT NEOPLASM OF OVARY, UNSPECIFIED LATERALITY (H): Status: ACTIVE | Noted: 2024-01-01

## 2024-05-12 PROBLEM — M54.50 LEFT-SIDED LOW BACK PAIN WITHOUT SCIATICA, UNSPECIFIED CHRONICITY: Status: ACTIVE | Noted: 2024-01-01

## 2024-05-12 NOTE — ED PROVIDER NOTES
ED Provider Note  [facility]      History     Chief Complaint   Patient presents with    Fall     HPI  Taran Regalado is a 68 year old female PMH of PE, ovarian cancer on chemotherapy (next 5/20), atrial fibrillation who presents to the ER for evaluation of pain after a fall.    Patient states that two weeks ago she fell and hit her back onto the bathtub. She has had severe back pain for the last two weeks and has not gotten much better. She has recurrent UTIs and was told by the nurseline that she might have a kidney injury due to positive urine cultures. She can walk with help. After this first fall she had two negative CT scans and a paracentesis. She started Macrobid on Friday after meeting with her palliative care doctor. She is not drinking much liquid and also only uses the bathroom twice a day. She fell a second time a week ago and has not been seen for the pain since than. She is on dilaudid for her pain but her daughters state she is still in severe pain. She states most of the pain is in the L flank and she has no fevers. Her daughter is also concerned that her hernia has grown in size even.  Social: Here with her 2 daughters        Past Medical History  Past Medical History:   Diagnosis Date    Atrial fibrillation with rapid ventricular response (H)     History of cold sores     Hx of LASIK 12/11/2017    Insomnia     Migraine     Osteopenia     Pelvic mass     Peritoneal carcinomatosis (H)     Restless legs syndrome (RLS)      Past Surgical History:   Procedure Laterality Date    APPENDECTOMY      ARTHROSCPY KNEE SURGICAL DEBRIDEMENT SHAVING ARTICULAR CARTILAGE Right     BIOPSY  January 2021    Biopsy to confirm ovarian cancer    DEBRIDEMENT LEFT UPPER EXTREMITY  2016    ENDOSCOPIC RETROGRADE CHOLANGIOPANCREATOGRAM N/A 12/15/2023    Procedure: ENDOSCOPIC RETROGRADE CHOLANGIOPANCREATOGRAPHY, with pancreatic stent placement, biliary duct dilation, biliary stent placement, and  biliary sphincterotomy;  Surgeon: Fabian Simpson MD;  Location: [facility] OR    HYSTERECTOMY TOTAL ABD, LUISITO SALPINGO-OOPHORECTOMY, NODE DISSECTION, TUMOR DEBULKING, COMBINED Bilateral 2021    Procedure: HYSTERECTOMY, TOTAL, ABDOMINAL, WITH BILATERAL SALPINGO-OOPHORECTOMY, omentectomy, NEOPLASM DEBULKING,Proctoscocy, RO, Resection of liver nodules, diaphragm stripping, immobilization of liver and colon;  Surgeon: Bolivar Juarez MD;  Location: [facility] OR    IR CHEST PORT PLACEMENT > 5 YRS OF AGE  2024    LAPAROSCOPY DIAGNOSTIC (GYN) Bilateral 2021    Procedure: Diagnsotic laparoscopy, biopsies;  Surgeon: Bolivar Juarez MD;  Location: [facility] OR    LASIK      TUBAL LIGATION       lidocaine (LIDODERM) 5 % patch  amitriptyline (ELAVIL) 100 MG tablet  apixaban ANTICOAGULANT (ELIQUIS) 5 MG tablet  BEVACIZUMAB, AVASTIN, INJECTION 2.5MG  cyclobenzaprine (FLEXERIL) 5 MG tablet  dexAMETHasone (DECADRON) 4 MG tablet  doxepin (SINEQUAN) 10 MG/ML (HIGH CONC) solution  HYDROmorphone (DILAUDID) 4 MG tablet  lidocaine-prilocaine (EMLA) 2.5-2.5 % external cream  meclizine (ANTIVERT) 25 MG tablet  naloxone (NARCAN) 4 MG/0.1ML nasal spray  nitroFURantoin macrocrystal-monohydrate (MACROBID) 100 MG capsule  ondansetron (ZOFRAN) 8 MG tablet  ondansetron (ZOFRAN) 8 MG tablet  prochlorperazine (COMPAZINE) 10 MG tablet  SUMAtriptan (IMITREX) 100 MG tablet  valACYclovir (VALTREX) 1000 mg tablet  zolpidem (AMBIEN) 10 MG tablet      No Known Allergies  Family History  Family History   Adopted: Yes   Problem Relation Age of Onset    Cancer Mother 36    Other Cancer Mother         Bio mother  of  a female cancer  at 36    Factor V Leiden deficiency Daughter     Deep Vein Thrombosis Daughter     Diabetes Type 1 Daughter     Diabetes Daughter     Hypertension Daughter     Anesthesia Reaction No family hx of      Social History   Social History     Tobacco Use    Smoking status: Former     Current packs/day: 0.00     Average packs/day:  "0.5 packs/day for 40.0 years (20.0 ttl pk-yrs)     Types: Cigarettes     Start date:      Quit date: 2018     Years since quittin.3     Passive exposure: Never    Smokeless tobacco: Never   Vaping Use    Vaping status: Never Used   Substance Use Topics    Alcohol use: Not Currently    Drug use: Never         A medically appropriate review of systems was performed with pertinent positives and negatives noted in the HPI, and all other systems negative.    Physical Exam   BP: (!) 85/61  Pulse: 97  Temp: 98.1  F (36.7  C)  Resp: 16  Height: 180.3 cm (5' 11\")  SpO2: 96 %  Physical Exam  Musculoskeletal:      Lumbar back: Signs of trauma and tenderness present. No swelling, edema, deformity, lacerations, spasms or bony tenderness. Normal range of motion. No scoliosis.        Back:      Physical Exam   Constitutional:   well nourished, well developed, lying flat without respiratory difficulty  HENT:   Head: Normocephalic and atraumatic.   Eyes: Conjunctivae are normal. Pupils are equal, round, and reactive to light.   Neck:   no adenopathy, no bony tenderness  Cardiovascular: regular rate and rhythm without murmurs or gallops  Pulmonary/Chest: Clear to auscultation bilaterally, with no wheezes or retractions. No respiratory distress.  GI: Soft with good bowel sounds.  Patient wearing a tight abdominal binder for abdominal heater  Back: Patient tender to the left lateral back--see diagram above, no bony tenderness, no appreciable deformity or ecchymoses  Musculoskeletal:  no edema  Skin: Skin is warm and dry. No rash noted.   Neurological: alert and oriented to person, place, and time. Nonfocal exam  Psychiatric:  normal mood and affect.      ED Course, Procedures, & Data      Procedures       The Lactic acid level is elevated due to cancer and dehydration, at this time there is no sign of severe sepsis or septic shock.        Results for orders placed or performed during the hospital encounter of 24   CT " Abdomen Pelvis w Contrast     Status: None    Narrative    CT ABDOMEN PELVIS W CONTRAST 5/12/2024 2:59 PM    CLINICAL HISTORY: Left paraspinal back pain inpatient status post fall  with a history of ovarian cancer, also abdominal hernia    TECHNIQUE: CT scan of the abdomen and pelvis was performed following  injection of IV contrast. Multiplanar reformats were obtained. Dose  reduction techniques were used.  CONTRAST: [id number]:101mL    COMPARISON: CT 5/26/2024    FINDINGS:   LOWER CHEST: Trace bilateral pleural effusions with adjacent  atelectasis, left greater than right.    HEPATOBILIARY: Diffuse hepatic metastases similar to prior. Stable  metallic intra and extrahepatic biliary stents. Distended gallbladder  without calcified stones or wall thickening.    PANCREAS: No significant mass, duct dilatation, or inflammatory  change.    SPLEEN: Mildly enlarged measuring 13.2 cm craniocaudal.    ADRENAL GLANDS: No significant nodules.    KIDNEYS/BLADDER: No significant mass, stones, or hydronephrosis. There  are simple or benign cysts. No follow up is needed.    BOWEL: Decreased caliber of the small bowel compared to prior. No  pneumatosis or portal venous gas. Decreased amount of bowel within the  paraumbilical hernia and in stable portion of the transverse colon  within the midline ventral supraumbilical hernia. No evidence of  obstruction. Small hiatal hernia. Colonic diverticulosis.    PERITONEUM: Slightly increased moderate volume ascites. Mild  associated peritoneal thickening and enhancement.    LYMPH NODES: Stable mild upper abdominal lymphadenopathy.    PELVIC ORGANS: Hysterectomy. No pelvic masses.    MUSCULOSKELETAL: Unchanged diffuse sclerotic metastases. No evidence  of pathologic fracture in the field-of-view.      Impression    IMPRESSION:   1. Diffuse hepatic and osseous metastases similar to prior.  2. No evidence of acute or pathologic fracture.  3. Stable splenomegaly without evidence of acute  splenic injury.  4. Slightly increased moderate volume ascites with associated  peritoneal thickening and enhancement which could represent peritoneal  carcinomatosis.  5. New trace bilateral pleural effusions with adjacent atelectasis,  left greater than right.    I have personally reviewed the examination and initial interpretation  and I agree with the findings.    CHEYENNE PEACE DO         SYSTEM ID:  [id number]   CT Lumbar Spine w/o Contrast     Status: None    Narrative    EXAM: Lumbar spine CT without contrast 5/12/2024 2:59 PM     HISTORY: fall left lateral low back pain; Low back pain; Trauma and/or  suspected fracture; Mild/moderate trauma; None the following:  Spondyloarthropathy, or lumbar x-ray with questionable finding or  inadequate anatomic coverage.    COMPARISON: CT abdomen and pelvis 5/6/2024, 4/30/2024.    TECHNIQUE: Using multidetector thin collimation helical acquisition  technique, axial, sagittal and coronal 3 mm thickness CT  reconstructions were obtained through the lumbar spine without  intravenous contrast. Images were viewed in bone and soft tissue  windows.    FINDINGS:  There are 5 lumbar type vertebrae. Stepwise retrolisthesis from L2  through S1, most prominently at L4-5, with disc space narrowing  greatest at L4-L5. Intradiscal gas at L4-L5. Scattered endplate  osteophytic spurring and facet arthropathy. No acute fracture or  traumatic subluxation appreciated. Scattered bone islands within the  lumbar spine and sacrum.    Findings on a level by level basis are as follows:    L1-L2: Circumferential disc bulge. No significant spinal canal or  neural foraminal stenosis.    L2-L3: Circumferential disc bulge. Mild spinal canal and mild left  neural foraminal stenosis. Right neural foramen is patent.    L3-L4: Circumferential disc bulge. Left greater than right facet  arthropathy. Ligamentum flavum thickening. Mild spinal canal stenosis.  No significant neural foraminal stenosis.    L4-L5:  Circumferential disc bulge. Facet arthropathy bilaterally.  Ligamentum flavum thickening. Mild spinal canal stenosis, and mild to  moderate bilateral neural foraminal stenosis right greater than left.    L5-S1: Circumferential disc bulge. No significant spinal canal or  neural foraminal stenosis.    The visualized adjacent paraspinous tissues are grossly within normal  limits Scattered atherosclerotic plaques within the visualized  abdominal aorta. Small atelectasis or consolidation along the inferior  left lower lobe.      Impression    IMPRESSION:   1. No acute fracture or traumatic subluxation.   2. Multilevel lumbar spondylosis, most pronounced at L4-5 with mild to  moderate bilateral neural foraminal stenosis.     I have personally reviewed the examination and initial interpretation  and I agree with the findings.    GHASSAN MARX MD         SYSTEM ID:  [id number]   Comprehensive metabolic panel     Status: Abnormal   Result Value Ref Range    Sodium 133 (L) 135 - 145 mmol/L    Potassium 4.0 3.4 - 5.3 mmol/L    Carbon Dioxide (CO2) 21 (L) 22 - 29 mmol/L    Anion Gap 12 7 - 15 mmol/L    Urea Nitrogen 10.3 8.0 - 23.0 mg/dL    Creatinine 0.69 0.51 - 0.95 mg/dL    GFR Estimate >90 >60 mL/min/1.73m2    Calcium 8.7 (L) 8.8 - 10.2 mg/dL    Chloride 100 98 - 107 mmol/L    Glucose 120 (H) 70 - 99 mg/dL    Alkaline Phosphatase 500 (H) 40 - 150 U/L    AST 63 (H) 0 - 45 U/L    ALT 36 0 - 50 U/L    Protein Total 6.7 6.4 - 8.3 g/dL    Albumin 3.0 (L) 3.5 - 5.2 g/dL    Bilirubin Total 0.7 <=1.2 mg/dL   Lipase     Status: Abnormal   Result Value Ref Range    Lipase 11 (L) 13 - 60 U/L   Lactic acid whole blood     Status: Abnormal   Result Value Ref Range    Lactic Acid 2.5 (H) 0.7 - 2.0 mmol/L   UA with Microscopic reflex to Culture     Status: Abnormal    Specimen: Urine, Midstream   Result Value Ref Range    Color Urine Dark Yellow (A) Colorless, Straw, Light Yellow, Yellow    Appearance Urine Slightly Cloudy (A) Clear     Glucose Urine 30 (A) Negative mg/dL    Bilirubin Urine Negative Negative    Ketones Urine Negative Negative mg/dL    Specific Gravity Urine 1.028 1.003 - 1.035    Blood Urine Negative Negative    pH Urine 5.5 5.0 - 7.0    Protein Albumin Urine 50 (A) Negative mg/dL    Urobilinogen Urine 2.0 Normal, 2.0 mg/dL    Nitrite Urine Negative Negative    Leukocyte Esterase Urine Negative Negative    Mucus Urine Present (A) None Seen /LPF    RBC Urine 2 <=2 /HPF    WBC Urine 26 (H) <=5 /HPF    Squamous Epithelials Urine 1 <=1 /HPF    Transitional Epithelials Urine <1 <=1 /HPF    Hyaline Casts Urine 58 (H) <=2 /LPF    Narrative    Urine Culture ordered based on laboratory criteria   CBC with platelets and differential     Status: Abnormal   Result Value Ref Range    WBC Count 6.9 4.0 - 11.0 10e3/uL    RBC Count 3.03 (L) 3.80 - 5.20 10e6/uL    Hemoglobin 9.9 (L) 11.7 - 15.7 g/dL    Hematocrit 30.9 (L) 35.0 - 47.0 %     (H) 78 - 100 fL    MCH 32.7 26.5 - 33.0 pg    MCHC 32.0 31.5 - 36.5 g/dL    RDW 21.1 (H) 10.0 - 15.0 %    Platelet Count 62 (L) 150 - 450 10e3/uL    % Neutrophils 71 %    % Lymphocytes 16 %    % Monocytes 12 %    % Eosinophils 0 %    % Basophils 0 %    % Immature Granulocytes 1 %    NRBCs per 100 WBC 0 <1 /100    Absolute Neutrophils 4.8 1.6 - 8.3 10e3/uL    Absolute Lymphocytes 1.1 0.8 - 5.3 10e3/uL    Absolute Monocytes 0.8 0.0 - 1.3 10e3/uL    Absolute Eosinophils 0.0 0.0 - 0.7 10e3/uL    Absolute Basophils 0.0 0.0 - 0.2 10e3/uL    Absolute Immature Granulocytes 0.1 <=0.4 10e3/uL    Absolute NRBCs 0.0 10e3/uL   Creatinine POCT     Status: Normal   Result Value Ref Range    Creatinine POCT 0.6 0.5 - 1.0 mg/dL    GFR, ESTIMATED POCT >60 >60 mL/min/1.73m2   Lactic acid whole blood     Status: Abnormal   Result Value Ref Range    Lactic Acid 2.1 (H) 0.7 - 2.0 mmol/L   iStat Creatinine, POCT     Status: Normal   Result Value Ref Range    Creatinine 0.6 0.5 - 1.2 mg/dl    GFR na >60   CBC with platelets  differential     Status: Abnormal    Narrative    The following orders were created for panel order CBC with platelets differential.  Procedure                               Abnormality         Status                     ---------                               -----------         ------                     CBC with platelets and d...[[id number]]  Abnormal            Final result                 Please view results for these tests on the individual orders.     Medications   Lidocaine (LIDOCARE) 4 % Patch 1 patch (1 patch Transdermal $Patch/Med Applied 5/12/24 4179)   ondansetron (ZOFRAN) injection 4 mg (4 mg Intravenous $Given 5/12/24 1400)   iopamidol (ISOVUE-370) solution 101 mL (101 mLs Intravenous $Given 5/12/24 1427)   sodium chloride (PF) 0.9% PF flush 75 mL (75 mLs Intravenous $Given 5/12/24 1428)   metoclopramide (REGLAN) injection 5 mg (5 mg Intravenous $Given 5/12/24 1537)   sodium chloride 0.9% BOLUS 1,000 mL (0 mLs Intravenous Stopped 5/12/24 1704)   HYDROmorphone (PF) (DILAUDID) injection 0.5 mg (0.5 mg Intravenous $Given 5/12/24 1546)     Labs Ordered and Resulted from Time of ED Arrival to Time of ED Departure   COMPREHENSIVE METABOLIC PANEL - Abnormal       Result Value    Sodium 133 (*)     Potassium 4.0      Carbon Dioxide (CO2) 21 (*)     Anion Gap 12      Urea Nitrogen 10.3      Creatinine 0.69      GFR Estimate >90      Calcium 8.7 (*)     Chloride 100      Glucose 120 (*)     Alkaline Phosphatase 500 (*)     AST 63 (*)     ALT 36      Protein Total 6.7      Albumin 3.0 (*)     Bilirubin Total 0.7     LIPASE - Abnormal    Lipase 11 (*)    LACTIC ACID WHOLE BLOOD - Abnormal    Lactic Acid 2.5 (*)    ROUTINE UA WITH MICROSCOPIC REFLEX TO CULTURE - Abnormal    Color Urine Dark Yellow (*)     Appearance Urine Slightly Cloudy (*)     Glucose Urine 30 (*)     Bilirubin Urine Negative      Ketones Urine Negative      Specific Gravity Urine 1.028      Blood Urine Negative      pH Urine 5.5      Protein  Albumin Urine 50 (*)     Urobilinogen Urine 2.0      Nitrite Urine Negative      Leukocyte Esterase Urine Negative      Mucus Urine Present (*)     RBC Urine 2      WBC Urine 26 (*)     Squamous Epithelials Urine 1      Transitional Epithelials Urine <1      Hyaline Casts Urine 58 (*)    CBC WITH PLATELETS AND DIFFERENTIAL - Abnormal    WBC Count 6.9      RBC Count 3.03 (*)     Hemoglobin 9.9 (*)     Hematocrit 30.9 (*)      (*)     MCH 32.7      MCHC 32.0      RDW 21.1 (*)     Platelet Count 62 (*)     % Neutrophils 71      % Lymphocytes 16      % Monocytes 12      % Eosinophils 0      % Basophils 0      % Immature Granulocytes 1      NRBCs per 100 WBC 0      Absolute Neutrophils 4.8      Absolute Lymphocytes 1.1      Absolute Monocytes 0.8      Absolute Eosinophils 0.0      Absolute Basophils 0.0      Absolute Immature Granulocytes 0.1      Absolute NRBCs 0.0     LACTIC ACID WHOLE BLOOD - Abnormal    Lactic Acid 2.1 (*)    ISTAT CREATININE POCT - Normal    Creatinine POCT 0.6      GFR, ESTIMATED POCT >60     ISTAT CREATININE POCT - Normal    Creatinine 0.6      GFR na     ISTAT CREATININE POCT   URINE CULTURE     CT Abdomen Pelvis w Contrast   Final Result   IMPRESSION:    1. Diffuse hepatic and osseous metastases similar to prior.   2. No evidence of acute or pathologic fracture.   3. Stable splenomegaly without evidence of acute splenic injury.   4. Slightly increased moderate volume ascites with associated   peritoneal thickening and enhancement which could represent peritoneal   carcinomatosis.   5. New trace bilateral pleural effusions with adjacent atelectasis,   left greater than right.      I have personally reviewed the examination and initial interpretation   and I agree with the findings.      CHEYENNE PEACE,             SYSTEM ID:  [id number]      CT Lumbar Spine w/o Contrast   Final Result   IMPRESSION:    1. No acute fracture or traumatic subluxation.    2. Multilevel lumbar spondylosis, most  pronounced at L4-5 with mild to   moderate bilateral neural foraminal stenosis.       I have personally reviewed the examination and initial interpretation   and I agree with the findings.      GHASSAN MARX MD            SYSTEM ID:  [id number]             Critical care was not performed.     Medical Decision Making  The patient's presentation was of high complexity (a chronic illness severe exacerbation, progression, or side effect of treatment).    The patient's evaluation involved:  review of 3+ test result(s) ordered prior to this encounter (prior imaging and laboratory studies)  ordering and/or review of 3+ test(s) in this encounter (see separate area of note for details)  independent interpretation of testing performed by another health professional (I independently reviewed the imaging)  discussion of management or test interpretation with another health professional (gyn/onc)    The patient's management necessitated moderate risk (prescription drug management including medications given in the ED), high risk (a parenteral controlled substance), and high risk (a decision regarding hospitalization).    Assessment & Plan        I have reviewed the nursing notes.   Emergency Department course:  The patient was seen and examined at 1307 pm in room 26. I  treated her with Dilaudid IV for pain and Zofran IV.    Chart review shows that patient had a CT abdomen pelvis on 5/6 with below results:  IMPRESSION:   1.  No acute traumatic abnormality identified.  2.  Stable multifocal nodular masses in the liver are consistent with metastatic disease. Stable sclerotic bone lesions suggesting metastases.  3.  Stable splenomegaly.  4.  Stable ascites and nodular thickening of the peritoneal reflections that may represent carcinomatosis.  5.  Stable enlarged indeterminate lymph nodes at the upper abdomen.  6.  Stable ventral abdominal wall hernia containing a herniated portion of the transverse colon without bowel  obstruction.    Laboratory studies show hyponatremia, with a sodium of 133.  AST is elevated at 63 and alkaline phosphatase elevated at 500.  Lipase is low at 11.  CBC shows a normal WBC of 6.9.  She is anemic, with a hemoglobin of 9.  9 and thrombocytopenia, with a platelet count of 62  UA shows 26 WBCs with hyaline casts but is nitrite and LCE negative.  Lactate is elevated at 2.5.  I do not suspect sepsis at this time but believe the lactate is likely elevated due to the patient's history of cancer and recent dehydration.  Patient is currently on Macrobid  for UTI.    CT of the abdomen pelvis done with IV contrast shows:  IMPRESSION:   1. Diffuse hepatic and osseous metastases similar to prior.  2. No evidence of acute or pathologic fracture.  3. Stable splenomegaly without evidence of acute splenic injury.  4. Unchanged small volume ascites with associated peritoneal  thickening and enhancement which could represent peritoneal  carcinomatosis.    CT lumbar spine shows IMPRESSION:   1. No acute fracture or traumatic subluxation.   2. Multilevel lumbar spondylosis, most pronounced at L4-5 with mild to  moderate bilateral neural foraminal stenosis.     I treated the patient with a normal saline bolus IV, Zofran IV and Dilaudid IV.  She continued to experience nausea and I then tried Reglan IV.  Repeat lactate is improved at 2.1 improved from initial value of 2.5.  I consulted gynecology/oncology regarding the patient.  Gyn/onc recommends putting a Lidoderm patch on the patient's back.    Taran Regalado is a 68 year old female with a history of ovarian cancer on chemotherapy who presents with 2 falls and continuing low back pain.  She has an elevated lactate of unclear etiology creatinine and GFR are within normal limits and do not show a kidney injury.  She is on Macrobid for UTI and urine culture is pending.  She  was evaluated by the gynecology oncology team  Abdominal and pelvic CT is stable.  CT of the lumbar  spine shows multilevel lumbar spondylosis.  Gynecology/oncology prefers to admit the patient and her elevated lactate and back pain but she would prefer to be discharged home as today is Mother's Day. .  However, after further discussion, the patient agreed to stay.  Will be admitted to the gyn/onc service under the care of Dr. Teri Stahl    I have reviewed the findings, diagnosis, plan and need for follow up with the patient.    New Prescriptions    LIDOCAINE (LIDODERM) 5 % PATCH    Place 1 patch onto the skin every 24 hours for 10 days Place one patch on back for 12 hours/  then remove for 12 hours       Final diagnoses:   Left-sided low back pain without sciatica, unspecified chronicity   Fall, subsequent encounter   Malignant neoplasm of ovary, unspecified laterality (H)     I, Laure Norwood, am serving as a trained medical scribe to document services personally performed by Socorro Coles MD, based on the provider's statements to me.     ISocorro MD, was physically present and have reviewed and verified the accuracy of this note documented by Laure Norwood.  This note was created in part by the use of Dragon voice recognition dictation system. Inadvertent grammatical errors and typographical errors may still exist.  MD Socorro Ashby MD  [facility] EMERGENCY DEPARTMENT  5/12/2024     Socorro Coles MD  05/12/24 [id number]

## 2024-05-12 NOTE — ED TRIAGE NOTES
Pt c/o severe pain in back after fall this AM in bathroom. Had has two other falls in the last couple weeks  Known UTI. New pain in flank area. On chemo for ovarian cancer. On eliquis. Denies hitting head or LOC.

## 2024-05-12 NOTE — TELEPHONE ENCOUNTER
"Nurse Triage SBAR    Is this a 2nd Level Triage? NO    Situation: Flank Pain    Background: Pt recently diagnosed with UTI and on Macrobid, now reports severe flank pain. Was also in hospital recently for falls, however does state this pain is severe, breaking through opioid pain medication and is located on the lower back on both sides. Notes this feels more intense and different then the location of previous pain from fall    Assessment: Spoke with Pts daughter after verbal consent given. Daughter reports Pt is too weak to stand without \"a team\" of people around her to help lift/transfer and walk with her. Does also state previous episodes of loss of consciousness that led to fall/recent ED visits     Protocol Recommended Disposition:   Call  Now    Recommendation: Recommended to call 911 and be transported to ED via EMS. Pt and daughter hesitant to use EMS, but willing to be seen in ED. Emphasized EMS to be safest plan due to fall risk. Daughter will consider       Does the patient meet one of the following criteria for ADS visit consideration? 16+ years old, with an [facility] PCP     TIP  Providers, please consider if this condition is appropriate for management at one of our Acute and Diagnostic Services sites.     If patient is a good candidate, please use dotphrase <dot>triageresponse and select Refer to ADS to document.      Reason for Disposition   Shock suspected (e.g., cold/pale/clammy skin, too weak to stand, low BP, rapid pulse)    Additional Information   Negative: Passed out (i.e., lost consciousness, collapsed and was not responding)   Negative: Difficult to awaken or acting confused (e.g., disoriented, slurred speech)   Negative: Sounds like a life-threatening emergency to the triager    Protocols used: Flank Pain-A-AH    "

## 2024-05-12 NOTE — PROGRESS NOTES
[employer] paged GYN ONC resident     ED 27. M. D. 1955  Pt does not have pain meds. Can you please order some? Thanks. Natalya,  [phone]    Response: order placed.

## 2024-05-12 NOTE — LETTER
Taran Regalado MRN# [id number]   YOB: 1955 Age: 68 year old     Date of Admission:  5/12/24  Date of Discharge:  5/13/2024  Admitting Physician:  Teri Macias MD  Discharging Physician: Teri Macias MD   Discharging Service:  Gynecology / Oncology  Hospitalization Status: Inpatient     Primary Care Clinic:  [facility]  Primary Care Provider: Bolivar Juarez     Dear Dr. Juarez:            You have been identified as the Primary Care Provider for Taran Regalado, who was recently admitted to the [facility].  Thank you for the referral to our hospital.  It is our goal to provide the highest quality of care for our patients, including planning for seamless continuity of care by providing you with timely, accurate and concise information.  After reviewing the following combined discharge summary and final progress note, please contact us if you have any remaining questions.  The Discharging Physician will be the best informed, with their contact information listed above.  If unable to reach them, or if you have received this letter in error, please call [phone] and someone will try to help you.

## 2024-05-12 NOTE — DISCHARGE INSTRUCTIONS
Place 1 patch onto your back for 12 hours and then remove for 12 hours.  Heat and/or ice to the sore areas may help with pain.  Gentle massage may help.  Please follow-up with your outpatient providers within 1 to 2 weeks.      You have been accepted for home nursing, home physical therapy, home occupational therapy with:     [facility]      Address   [address] [address] [address]             Contact Information    [phone]

## 2024-05-12 NOTE — H&P
"[facility] History and Physical    Taran Regalado MRN# [id number]   Age: 68 year old YOB: 1955     Date of Admission:  5/12/2024    Primary care provider: Bolivar Juarez             Chief Complaint:   Back pain         History of Present Illness:   This patient is a 68 year old female with metastatic ovarian high-grade serous carcinoma currently on Cycle 2 of Carbo/Taxol/Hien who presents with persistent back pain after a fall 2 weeks ago. Patient states 2 weeks ago she was getting up from the toilet and fell backwards and hit her left back on the edge of her bathtub. She was evaluated in the ED after her fall, where a CT scan was done which showed no acute injuries. She was discharged with oxycodone which she said barely helped her symptoms. She fell again last week but denies any trauma from the fall, did not seek further evaluation at that time. Denies feeling dizzy prior to falling. Patient presents today as she is concerned that her pain has not gotten better, though notes it also has not gotten significantly worse. Pain currently rated at 6/10. Per her daughters, at times pain is severe enough to affect ambulation and movement in bed. No complaints of incontinence. Denies burning with urination, denies hematuria. Denies changes in bowel habits; last bowel movement was this morning. Reports chronic neuropathy in feet, denies any significant weakness. She additionally reports just 2 episodes of urination per day, which has been ongoing her \"whole life\". States most recently she has had very poor appetite and intermittent nausea and has therefore had very little to eat or drink. She states the \"amount of water they want me to drink is too much\", and though she feels thirsty she has no drive for p.o. intake. No other acute complaints.         Cancer Treatment History:   12/3/2020: US Pelvic: IMPRESSION: Limited examination due to acoustic windows. Possible left adnexal mass. A CT scan of " the abdomen and pelvis with contrast is recommended for further assessment.     12/4/2020: CT A/P:   IMPRESSION:    Peritoneal carcinomatosis with masslike peritoneal thickening in the lower pelvis which may indicate an adnexal or ovarian primary malignancy. Large volume ascites. Bilateral pleural effusions. There is potential subtle pleural nodularity in the right hemithorax which could indicate metastatic disease.  Indeterminate 1 cm lesion in the right hepatic lobe suspicious for a metastatic lesion.      12/16/2020: [facility] with abdominal distention, 25lb weight loss, and CTAP with carcinomatosis, elevated  3098.     12/23/2020: CT Chest: IMPRESSION:   1. There are few scattered small sub-6 mm pulmonary nodules which are indeterminate without prior comparisons available. There are a few  slightly larger perifissural nodules which are technically  indeterminate in the setting of malignancy although presumed lymphatic in nature and of unlikely clinical significance. Attention on follow-up is recommended.  2. Small to moderate left and small right pleural effusions are increased in size from prior. No convincing evidence for pleural nodularity.  3. Partially visualized large volume ascites and peritoneal nodularity in the upper abdomen similar to 12/4/2020 outside CT      12/26/2020: ED for abdominal distension; 3 L ascites drained with paracentesis    Pelvic US: Findings: Free fluid present in LLQ      12/31/2020: US Paracentesis: 900 mL ascites drained     1/7/2021: Diagnotic laparoscopy, biopsies  Pathology: FINAL DIAGNOSIS:   A. PERITONEUM, BIOPSIES:   - Positive for high grade carcinoma, consistent with metastatic carcinoma of Mullerian origin.     1/10-1/13/2021: Hospital admission for postoperative non-intractable vomiting and nausea.      1/10/2021: CT A/P: IMPRESSION: Extensive ascites which is probably malignant. Scattered liver hypodensities of indeterminate etiology comment cannot  exclude metastatic disease. Diverticulosis. Fluid-filled adnexal masses and irregular appearance of uterus, which may represent primary neoplasm. Multiple peritoneal nodules. Large amount of fecal material in the colon with no evidence of small bowel obstruction.     Plan: Paclitaxel 175 mg/m2 and carboplatin AUC 6 x 3 cycles followed by a CT CAP and visit with Dr. Juarez.     1/12/2021: Cycle 1 paclitaxel and carboplatin while inpatient     1/13/2021: CT Head: Impression:  1. Chronic sinusitis of the right maxillary and right sphenoid sinuses.  2. Incidental presumed calcified meningioma in the right frontal  convexity without significant mass effect.  3. No suspicious intracranial enhancing lesion.     2/1/2021: Cycle 2 paclitaxel and carboplatin.  936.     2/3-2/5/21: Admission [facility] for afib w/ RVR and new PE     2/26/21: Cycle 3 paclitaxel and carboplatin planned.  Deferred due to thrombocytopenia.  pending.     3/15/21: Cycle 3 paclitaxel and carboplatin given     4/19/21: HYSTERECTOMY, TOTAL, ABDOMINAL, WITH BILATERAL SALPINGO-OOPHORECTOMY, omentectomy, NEOPLASM DEBULKING,Proctoscocy, RO, Resection of liver nodules, diaphragm stripping, immobilization of liver and colon  FINAL DIAGNOSIS:   A. OMENTUM, BIOPSY:   - Omental adipose tissue with rare viable cells of metastatic high grade   serous carcinoma   - One reactive lymph node, negative for malignancy (0/1)   B. NODULE, SIGMOID, EXCISION:   - Calcified necrotic adipose tissue   - Negative for malignancy   C. NODULE, SMALL BOWEL MESENTERY, EXCISION:   - Fibroadipose tissue, positive for metastatic high grade serous carcinoma   D. UTERUS, CERVIX, BILATERAL FALLOPIAN TUBES AND OVARIES, HYSTERECTOMY   WITH BILATERAL SALPINGO-OOPHORECTOMY:   - Atrophic endometrium   - Uterine serosa with rare viable cells consistent with high grade serous   carcinoma   - Cervix with atrophic changes   - Viable cells consistent with high grade serous carcinoma  present in the   right ovary, serosa of right   fallopian tube and right periadnexal soft tissue   - Left ovary with atrophic changes   - Left fallopian tube with a rare focus of serous tubal in-situ carcinoma   (STIC)   E. NODULES, SMALL BOWEL MESENTRY, EXCISION:   - Fibroadipose tissue with rare viable cells of metastatic high grade   serous carcinoma   F. NODULE, SPLENIC FLEXURE TRANSVERSE COLON, EXCISION:   - Fibroadipose tissue with rare viable cells of metastatic high grade   serous carcinoma   - Accessory splenule, negative for malignancy   G. OMENTUM, OMENTECTOMY:   - Omental adipose tissue with rare viable cells of metastatic high grade   serous carcinoma   H. NODULE, PERITONEAL PANCREATIC, EXCISION:   - Fibrous adhesions with inflammation   - Negative for malignancy   I. RIGHT HEMIDIAPHRAGM PERITONEUM, EXCISION:   - Fibrous adhesions with inflammation   - Negative for malignancy   J. RIGHT LIVER SURFACE NODULE:   - Fibrous adhesions with benign inclusion glands   - Negative for malignancy   K. LEFT LOWER LIVER EDGE, BIOPSY:   - Cauterized hepatic parenchyma and capsule   - Negative for malignancy   L. NODULE, SMALL BOWEL MESENTERIC #3, EXCISION:   - Fibroadipose tissue with rare viable cells of metastatic high grade   serous carcinoma       Plan: Carboplatin AUC 6 + Taxol 175 mg/m2 x 3 cycles, then transition to Parp inhibitor for maintenance therapy given her BRCA1 germline mutation.      5/21/21: Cycle 4 carboplatin and paclitaxel.   172.  6/11/21: Cycle 5 carboplatin and paclitaxel.   61.  7/2/21: Cycle 6 carboplatin and paclitaxel.   20.        7/28/21 plan: Olaparib 300mg bid as starting dose,  14  8/20/21: start date olaparib 300 mg BID,  12  9/13/21:  22  10/4/21:  23  11/1/21:  26     11/02/2021: PET CT: IMPRESSION:   Findings compatible with interval surgery and posttreatment change.  No gross definitive FDG avid disease.  Potential foci of tumor  deposits along the anterior dome of the liver and midline abdominal wall surgical scar.  Colonic activity is not necessarily abnormal, however, given the previous carcinomatosis the colonic activity is indeterminant.         12/1/21:  23  1/3/22:  21  2/1/22:  20  3/1/22:  21  4/1/22:  23  5/4/22:  28     EXAM: CT CHEST/ABDOMEN/PELVIS W CONTRAST  LOCATION: [facility]  DATE/TIME: 7/11/2022 1:25 PM     INDICATION: Stage III B high-grade ovarian carcinoma diagnosed Jan 2021. Posttreatment surveillance.  COMPARISON: CTA AP 04/23/2021, CT CAP 04/02/2021  TECHNIQUE: CT scan of the chest, abdomen, and pelvis was performed following injection of IV contrast. Multiplanar reformats were obtained. Dose reduction techniques were used.   CONTRAST: isovue 370 105mL IV; 50mL omni 140 oral     FINDINGS:   LUNGS AND PLEURA: No suspicious pulmonary nodules. Minimal right apical pleural thickening and a few punctate tiny nodules 2 of which are subpleural on the right are stable. No pleural effusions.     MEDIASTINUM/AXILLAE: No adenopathy. No central pulmonary emboli.     CORONARY ARTERY CALCIFICATION: Cannot evaluate.     HEPATOBILIARY: Normal.     PANCREAS: Normal.     SPLEEN: Normal.     ADRENAL GLANDS: Normal.     KIDNEYS/BLADDER: Normal.     BOWEL: Sliver of ascites adjacent to the spleen noted, decreased from prior study. There is a 4 mm nodule in the gastrosplenic ligament (image 352). On the preop study it appears as a smaller punctate nodule (prior image 341). Sliver of ascites in the   gastrohepatic ligament also noted. No peritoneal tumor nodules. No bowel obstruction. Quite redundant colon with mild large stool burden. Extensive distal colonic diverticulosis.     LYMPH NODES: Normal.     VASCULATURE: Mild arterial calcifications. Circumaortic left renal vein.     PELVIC ORGANS: Hysterectomy. Vaginal cuff is normal.     MUSCULOSKELETAL: Diastases of the midline  rectus sheath above the umbilicus. Minimal nodular scarring to the left of midline in the midabdomen (image 419). There is a shallow broad-based supraumbilical ventral hernia containing only fat. No implants   within the hernia or abdominal incision. No suspicious bone lesions.                                                                      IMPRESSION:  1.  Sliver of ascites in the upper abdomen has decreased since interval debulking surgery.  2.  There is a punctate nodule in the gastrosplenic ligament which is minimally more plump relative to the preop exam. This will need to be followed.  3.  Minimal nodular changes to the left of the midline scar in the subcutaneous fat will have to be followed as well. Unclear if this simply reflects postoperative scarring or could reflect an early incisional recurrence.  4.  No other sites to suggest recurrent tumor. Vaginal cuff is normal.  5.  Extensive distal colonic diverticulosis.  6.  Other noncritical findings as noted above.      9/16/22  98     1/30/23 CT CAP:    IMPRESSION:  1.  Multiple new, hypoattenuating lesions in the liver, suspicious for hepatic metastatic disease.  2.  Necrotic mario hepatic lymphadenopathy, concerning for richard metastatic disease.  3.  There is a new or increasingly conspicuous 6 mm soft tissue nodule in the right lower quadrant. This is indeterminate.  4.  There is a new 3 mm solid nodule in the right upper lobe, indeterminate.  5.  Stable approximate 4 mm punctate nodule along the gastrosplenic ligament.  6.  Similar area of linear free fluid in the upper abdomen anterior to the stomach.     Plan: Paclitaxel 175 mg/m2, Carboplatin AUC 6, bevacizumab 7.5 mg/kg     3/1/23: Cycle #1 Paclitaxel 175 mg/m2, Carboplatin AUC 6 (C7), bevacizumab 7.5 mg/kg.  1,196  3/24/23: Cycle #2 Paclitaxel 150 mg/m2, Carboplatin AUC 5 (C8), bevacizumab 15 mg/kg.  558     3/29/23: ER for dehydration and hypotension. Normotensive in ER. IV  fluids given. Discharged.      4/14/23: Cycle #3 Paclitaxel 150 mg/m2, Carboplatin AUC 5 (C9), bevacizumab 15 mg/kg deferred neutropenia ANC 0.3   273  4/21/23: treatment deferred ANC 0.8  4/28/23: Cycle #3 Paclitaxel 150 mg/m2, Carboplatin AUC 5 (C9), bevacizumab 15 mg/kg, + Neulasta deferred ANC 1.0,  297     5/26/23: Cycle #4  Paclitaxel 150 mg/m2, Carboplatin AUC 5 (C10), bevacizumab 15 mg/kg, + Neulasta, deferred ANC 0.6  188. No hematology consult per MD.      6/2/23: Cycle #4 Paclitaxel 150 mg/m2, Carboplatin AUC 5 (C9), bevacizumab 15 mg/kg, + Neulasta   6/23/23: Cycle #5 deferred neutropenia ANC 0.5 thrombocytopenia platelets 85     6/26/2023 Addendum: Reduce both Carboplatin and Paclitaxel due to thrombocytopenia and persistent neutropenia. REFER to Hematology to rule out MDS. Message sent to pt. Orders placed.      7/3/23 plan: continue with 2 more cycles with carboplatin AUC of 4, Taxol at 135 mg per metered square, bevacizumab at 15 mg/kg as well as neulasta for bone marrow support.      7/3/23: Cycle #5 Paclitaxel 135 mg/m2, Carboplatin AUC 4, bevacizumab 15 mg/kg, +Neulasta.  375     7/11/23: per chart review Dr. Cogan with Hematology plan one more cycle of chemotherapy then maintenance Avastin     7/28/23: Cycle #6 Paclitaxel 135 mg/m2, Carboplatin AUC 4, bevacizumab 15 mg/kg, +Neulasta.  370     8/17/2023: CT CAP: Stable bilateral pulmonary micronodules. Multiple subcentimeter hypodense hepatic lesions, a few are slightly less conspicuous. Necrotic lymph nodes in mario hepatis are stable. A few small peritoneal nodules in the left upper quadrant. Anterior to the pylorus is an oval 2.4 cm hypodense soft tissue lesion which is stable.    8/17/2023: started maintenance bevacizumab q3 weeks.  370.    8/25/2023:  450.    9/19/2023:  1056. Alk phos 221, ALT 86, AST 63.    9/21/2023: Transferred her care from [facility] to [facility] to be closer to  home. Plan is maintenance bevacizumab 15 mg/kg every 3 weeks to give her a treatment break from myelosuppressive chemotherapy.    10/10/2023:  1889, alk phos 388, , AST 83  10/12/2023: Bevacizumab held for elevated LFTs, symptoms of new back pain, cough, sinusitis symptoms.    10/18/2023: CT CAP: Progressive liver metastasis. Small nonspecific right lower lobe pulmonary nodule. Inflammatory process or a metastatic nodule remain possible. This does not have the characteristic appearance of metastasis, however, remains indeterminant.     Plan: Weekly paclitaxel 80 mg/m2 day 1, 8, 15, and bevacizumab 10 mg/kg day 1 and day 15 of a 28 day cycle x 3 cycles followed by CT CAP and follow up with Dr. Juarez in [place].     12/8/2023:  >10,000. Bilirubin 4.2, LFTs elevated.    12/12/2023: C1D1 paclitaxel and bevacizumab. ([place])    12/13/2023: Presented to the ED in [place] with worsening RUQ pain and jaundice x 2 weeks. Bilirubin 8.1. Waitlisted for transfer to [facility]. Elected to leave the ED.  12/13/2023: CT abdomen pelvis ([place]): New and enlarging innumerable hypodense liver lesions. Increased retroperitoneal adenopathy. Increased left >right intrahepatic biliary ductal dilatation without a resting cause noted. Common bile duct borderline enlarged 6 mm. Ventral hernia containing colon.  12/13/2023: Abdominal US ([place]): Numerous liver lesions. Common bile duct dilated at 9 mm, no obstructing stone. Spleen 14 cm, enlarged.    12/15/2023: ERCP ([facility]): Ventral pancreatic duct prophylactically stented. Biliary sphincterotomy. Cholangiogram showed multifocal biliary stricture. Intrahepatic and common hepatic duct strictures dilated with a balloon. 2 metal stents placed into the left main and right anterior intrahepatic duct    12/19/2023: C1D8 paclitaxel administered. Bilirubin 3.5, LFTs improving. WBC 2.2, ANC 1.0, platelets 90. ([place])  12/22/23: Neupogen for ANC 1.0.  12/28/2023: C1D15  paclitaxel + bevacizumab. Bilirubin 1.9, LFT's improving, ANC 1.0, platelets 174. ([place])     Plan: Transfer back to [facility] with weekly paclitaxel 80 mg/m2 day 1, 8, 15, and bevacizumab 10 mg/kg day 1 and day 15 of a 28 day cycle x 2 additional cycles followed by CT CAP and follow up with Dr. Juarez.     1/12/24: Cycle 1 (2) paclitaxel/bevacizumab.   2113.   Delaying cycle 2 (3) for vacation.  2/26/24: Cycle 2 (3) paclitaxel/bevacizumab.   5381.  4/8/24: Cycle 1: carboplatin/paclitaxel/bevacizumab.  5864.  4/29/24: Cycle 2: carboplatin/paclitaxel/bevacizumab.  4444.         Past Medical History:     Past Medical History:   Diagnosis Date    Atrial fibrillation with rapid ventricular response (H)     History of cold sores     Hx of LASIK 12/11/2017    Insomnia     Migraine     Osteopenia     Pelvic mass     Peritoneal carcinomatosis (H)     Restless legs syndrome (RLS)             Past Surgical History:      Past Surgical History:   Procedure Laterality Date    APPENDECTOMY      ARTHROSCPY KNEE SURGICAL DEBRIDEMENT SHAVING ARTICULAR CARTILAGE Right     BIOPSY  January 2021    Biopsy to confirm ovarian cancer    DEBRIDEMENT LEFT UPPER EXTREMITY  2016    ENDOSCOPIC RETROGRADE CHOLANGIOPANCREATOGRAM N/A 12/15/2023    Procedure: ENDOSCOPIC RETROGRADE CHOLANGIOPANCREATOGRAPHY, with pancreatic stent placement, biliary duct dilation, biliary stent placement, and biliary sphincterotomy;  Surgeon: Fabian Simpson MD;  Location: [facility] OR    HYSTERECTOMY TOTAL ABD, LUISITO SALPINGO-OOPHORECTOMY, NODE DISSECTION, TUMOR DEBULKING, COMBINED Bilateral 4/19/2021    Procedure: HYSTERECTOMY, TOTAL, ABDOMINAL, WITH BILATERAL SALPINGO-OOPHORECTOMY, omentectomy, NEOPLASM DEBULKING,Proctoscocy, RO, Resection of liver nodules, diaphragm stripping, immobilization of liver and colon;  Surgeon: Bolivar Juarez MD;  Location: [facility] OR    IR CHEST PORT PLACEMENT > 5 YRS OF AGE  4/12/2024    LAPAROSCOPY DIAGNOSTIC (GYN)  Bilateral 2021    Procedure: Diagnsotic laparoscopy, biopsies;  Surgeon: Bolivar Juarez MD;  Location: [facility] OR    LASIK      TUBAL LIGATION              Social History:     Social History     Tobacco Use    Smoking status: Former     Current packs/day: 0.00     Average packs/day: 0.5 packs/day for 40.0 years (20.0 ttl pk-yrs)     Types: Cigarettes     Start date:      Quit date:      Years since quittin.3     Passive exposure: Never    Smokeless tobacco: Never   Substance Use Topics    Alcohol use: Not Currently            Family History:     Family History   Adopted: Yes   Problem Relation Age of Onset    Cancer Mother 36    Other Cancer Mother         Bio mother  of  a female cancer  at 36    Factor V Leiden deficiency Daughter     Deep Vein Thrombosis Daughter     Diabetes Type 1 Daughter     Diabetes Daughter     Hypertension Daughter     Anesthesia Reaction No family hx of             Immunizations:     Immunization History   Administered Date(s) Administered    COVID-19 MONOVALENT 12+ (Pfizer) 2021, 2021, 2021    Mantoux Tuberculin Skin Test 10/29/2013    TDAP Vaccine (Adacel) 2008, 2019            Allergies:   No Known Allergies         Medications:     Current Facility-Administered Medications   Medication Dose Route Frequency Provider Last Rate Last Admin    acetaminophen (TYLENOL) tablet 325 mg  325 mg Oral Q4H PRN Mariaelena Vieyra MD        calcium carbonate (TUMS) chewable tablet 1,000 mg  1,000 mg Oral 4x Daily PRN Mariaelena Vieyra MD        cyclobenzaprine (FLEXERIL) tablet 5 mg  5 mg Oral TID PRN Mariaelena Vieyra MD        ibuprofen (ADVIL/MOTRIN) tablet 200 mg  200 mg Oral Q6H PRN Mariaelena Vieyra MD        Lidocaine (LIDOCARE) 4 % Patch 1 patch  1 patch Transdermal Once Socorro Coles MD   1 patch at 24 1649    lidocaine (LMX4) cream   Topical Q1H PRN Mariaelena Vieyra MD        lidocaine 1 % 0.1-1 mL  0.1-1 mL Other Q1H PRN Jarrell  MD Mariaelena        nitroFURantoin macrocrystal-monohydrate (MACROBID) capsule 100 mg  100 mg Oral BID Mariaelena Vieyra MD        ondansetron (ZOFRAN) tablet 8 mg  8 mg Oral Q8H PRN Mariaelena Vieyra MD        oxyCODONE (ROXICODONE) tablet 5 mg  5 mg Oral Q4H PRN Mariaelena Vieyra MD        prochlorperazine (COMPAZINE) tablet 10 mg  10 mg Oral Q6H PRN Mariaelena Vieyra MD        senna-docusate (SENOKOT-S/PERICOLACE) 8.6-50 MG per tablet 1 tablet  1 tablet Oral BID PRN Mariaelena Vieyra MD        Or    senna-docusate (SENOKOT-S/PERICOLACE) 8.6-50 MG per tablet 2 tablet  2 tablet Oral BID PRN Mariaelena Vieyra MD        sodium chloride (PF) 0.9% PF flush 3 mL  3 mL Intracatheter Q8H Mraiaelena Vieyra MD        sodium chloride (PF) 0.9% PF flush 3 mL  3 mL Intracatheter q1 min prn Mariaelena Vieyra MD         Current Outpatient Medications   Medication Sig Dispense Refill    lidocaine (LIDODERM) 5 % patch Place 1 patch onto the skin every 24 hours for 10 days Place one patch on back for 12 hours/  then remove for 12 hours 10 patch 0    amitriptyline (ELAVIL) 100 MG tablet Take 100 mg by mouth at bedtime      apixaban ANTICOAGULANT (ELIQUIS) 5 MG tablet Take 1 tablet (5 mg) by mouth 2 times daily 180 tablet 3    BEVACIZUMAB, AVASTIN, INJECTION 2.5MG Every other week (Tuesday)      cyclobenzaprine (FLEXERIL) 5 MG tablet Take 1 tablet (5 mg) by mouth 3 times daily as needed for muscle spasms 10 tablet 0    dexAMETHasone (DECADRON) 4 MG tablet Take 2 tablets (8 mg) by mouth daily for 3 days, starting the day after chemotherapy. 6 tablet 5    doxepin (SINEQUAN) 10 MG/ML (HIGH CONC) solution Take 2.5 mLs (25 mg) by mouth every 4 hours as needed for other (. Mix with equal amount water. Swish and hold in mouth for 1 min then spit out.) 118 mL 1    HYDROmorphone (DILAUDID) 4 MG tablet Take 0.5 tablets (2 mg) by mouth every 6 hours as needed for pain 30 tablet 0    lidocaine-prilocaine (EMLA) 2.5-2.5 % external cream Apply topically as  needed for moderate pain (30min prior to port access) 30 g 1    meclizine (ANTIVERT) 25 MG tablet Take 1 tablet (25 mg) by mouth 3 times daily as needed for dizziness or nausea 15 tablet 0    naloxone (NARCAN) 4 MG/0.1ML nasal spray Spray 1 spray (4 mg) into one nostril alternating nostrils as needed for opioid reversal every 2-3 minutes until assistance arrives 0.2 mL 1    nitroFURantoin macrocrystal-monohydrate (MACROBID) 100 MG capsule Take 1 capsule (100 mg) by mouth 2 times daily 14 capsule 0    ondansetron (ZOFRAN) 8 MG tablet Take 1 tablet (8 mg) by mouth every 8 hours as needed for nausea (vomiting) 30 tablet 2    ondansetron (ZOFRAN) 8 MG tablet Take 1 tablet (8 mg) by mouth every 8 hours as needed for nausea 30 tablet 1    prochlorperazine (COMPAZINE) 10 MG tablet Take 1 tablet (10 mg) by mouth every 6 hours as needed for nausea or vomiting 30 tablet 2    SUMAtriptan (IMITREX) 100 MG tablet Take 1 tablet (100 mg) by mouth at onset of headache for migraine 15 tablet 0    valACYclovir (VALTREX) 1000 mg tablet Take 1,000 mg by mouth as needed      zolpidem (AMBIEN) 10 MG tablet Take 1 tablet (10 mg) by mouth every evening 10 tablet 0            Review of Systems:   Systemic: no weight changes; no fever; no chills; no night sweats; appetite changes  Skin: No rashes  Pulmonary: no cough; no shortness of breath  Cardiovascular: no chest pain; no palpitations  Gastrointestinal: no diarrhea; no constipation; no abdominal pain; no changes in bowel  habits; no blood in stool  Urinary: no urinary frequency; no urinary urgency; no dysuria; no pain  Musculoskeletal: no myalgias; no arthralgias;    Neurological: no tremor; chronic numbness and tingling in feet; no new limb weakness         Physical Exam:     Vitals:    05/12/24 1254 05/12/24 1400 05/12/24 1756   BP: (!) 85/61 104/74 118/66   BP Location:   Left arm   Patient Position:   Semi-Rebolledo's   Pulse: 97  73   Resp: 16  16   Temp: 98.1  F (36.7  C)  98.3  F  "(36.8  C)   TempSrc: Oral  Oral   SpO2: 96%  95%   Height: 1.803 m (5' 11\")       General: Well appearing, NAD.  Mouth/Throat: Appears slightly dry  Skin: Old bruises noted on left lower back. No rashes noted on back.  CV: HR regular. No murmur, rubs, or gallops  Resp: Mild crackles to bilateral lung bases, otherwise clear to auscultation  GI: Soft, mildly distended, ventral hernias noted.  Extremities: No edema, cyanosis or clubbing. Radial and dorsalis pedis pulses palpable.  Neuro/MSK: Alert and oriented x 3. Normal strength in bilateral upper extremities. Right lower extremity somewhat weaker than left. Normal sensation of bilateral upper and lower extremities.  Psychiatric: Appropriate mood and affect. Mentation appears normal, affect normal/bright, judgement and insight intact, normal speech            Data:     Results for orders placed or performed during the hospital encounter of 05/12/24 (from the past 24 hour(s))   CBC with platelets differential    Narrative    The following orders were created for panel order CBC with platelets differential.  Procedure                               Abnormality         Status                     ---------                               -----------         ------                     CBC with platelets and d...[[id number]]  Abnormal            Final result                 Please view results for these tests on the individual orders.   Comprehensive metabolic panel   Result Value Ref Range    Sodium 133 (L) 135 - 145 mmol/L    Potassium 4.0 3.4 - 5.3 mmol/L    Carbon Dioxide (CO2) 21 (L) 22 - 29 mmol/L    Anion Gap 12 7 - 15 mmol/L    Urea Nitrogen 10.3 8.0 - 23.0 mg/dL    Creatinine 0.69 0.51 - 0.95 mg/dL    GFR Estimate >90 >60 mL/min/1.73m2    Calcium 8.7 (L) 8.8 - 10.2 mg/dL    Chloride 100 98 - 107 mmol/L    Glucose 120 (H) 70 - 99 mg/dL    Alkaline Phosphatase 500 (H) 40 - 150 U/L    AST 63 (H) 0 - 45 U/L    ALT 36 0 - 50 U/L    Protein Total 6.7 6.4 - 8.3 g/dL    " Albumin 3.0 (L) 3.5 - 5.2 g/dL    Bilirubin Total 0.7 <=1.2 mg/dL   Lipase   Result Value Ref Range    Lipase 11 (L) 13 - 60 U/L   Lactic acid whole blood   Result Value Ref Range    Lactic Acid 2.5 (H) 0.7 - 2.0 mmol/L   CBC with platelets and differential   Result Value Ref Range    WBC Count 6.9 4.0 - 11.0 10e3/uL    RBC Count 3.03 (L) 3.80 - 5.20 10e6/uL    Hemoglobin 9.9 (L) 11.7 - 15.7 g/dL    Hematocrit 30.9 (L) 35.0 - 47.0 %     (H) 78 - 100 fL    MCH 32.7 26.5 - 33.0 pg    MCHC 32.0 31.5 - 36.5 g/dL    RDW 21.1 (H) 10.0 - 15.0 %    Platelet Count 62 (L) 150 - 450 10e3/uL    % Neutrophils 71 %    % Lymphocytes 16 %    % Monocytes 12 %    % Eosinophils 0 %    % Basophils 0 %    % Immature Granulocytes 1 %    NRBCs per 100 WBC 0 <1 /100    Absolute Neutrophils 4.8 1.6 - 8.3 10e3/uL    Absolute Lymphocytes 1.1 0.8 - 5.3 10e3/uL    Absolute Monocytes 0.8 0.0 - 1.3 10e3/uL    Absolute Eosinophils 0.0 0.0 - 0.7 10e3/uL    Absolute Basophils 0.0 0.0 - 0.2 10e3/uL    Absolute Immature Granulocytes 0.1 <=0.4 10e3/uL    Absolute NRBCs 0.0 10e3/uL   UA with Microscopic reflex to Culture    Specimen: Urine, Midstream   Result Value Ref Range    Color Urine Dark Yellow (A) Colorless, Straw, Light Yellow, Yellow    Appearance Urine Slightly Cloudy (A) Clear    Glucose Urine 30 (A) Negative mg/dL    Bilirubin Urine Negative Negative    Ketones Urine Negative Negative mg/dL    Specific Gravity Urine 1.028 1.003 - 1.035    Blood Urine Negative Negative    pH Urine 5.5 5.0 - 7.0    Protein Albumin Urine 50 (A) Negative mg/dL    Urobilinogen Urine 2.0 Normal, 2.0 mg/dL    Nitrite Urine Negative Negative    Leukocyte Esterase Urine Negative Negative    Mucus Urine Present (A) None Seen /LPF    RBC Urine 2 <=2 /HPF    WBC Urine 26 (H) <=5 /HPF    Squamous Epithelials Urine 1 <=1 /HPF    Transitional Epithelials Urine <1 <=1 /HPF    Hyaline Casts Urine 58 (H) <=2 /LPF    Narrative    Urine Culture ordered based on  laboratory criteria   Creatinine POCT   Result Value Ref Range    Creatinine POCT 0.6 0.5 - 1.0 mg/dL    GFR, ESTIMATED POCT >60 >60 mL/min/1.73m2   iStat Creatinine, POCT   Result Value Ref Range    Creatinine 0.6 0.5 - 1.2 mg/dl    GFR na >60   CT Lumbar Spine w/o Contrast    Narrative    EXAM: Lumbar spine CT without contrast 5/12/2024 2:59 PM     HISTORY: fall left lateral low back pain; Low back pain; Trauma and/or  suspected fracture; Mild/moderate trauma; None the following:  Spondyloarthropathy, or lumbar x-ray with questionable finding or  inadequate anatomic coverage.    COMPARISON: CT abdomen and pelvis 5/6/2024, 4/30/2024.    TECHNIQUE: Using multidetector thin collimation helical acquisition  technique, axial, sagittal and coronal 3 mm thickness CT  reconstructions were obtained through the lumbar spine without  intravenous contrast. Images were viewed in bone and soft tissue  windows.    FINDINGS:  There are 5 lumbar type vertebrae. Stepwise retrolisthesis from L2  through S1, most prominently at L4-5, with disc space narrowing  greatest at L4-L5. Intradiscal gas at L4-L5. Scattered endplate  osteophytic spurring and facet arthropathy. No acute fracture or  traumatic subluxation appreciated. Scattered bone islands within the  lumbar spine and sacrum.    Findings on a level by level basis are as follows:    L1-L2: Circumferential disc bulge. No significant spinal canal or  neural foraminal stenosis.    L2-L3: Circumferential disc bulge. Mild spinal canal and mild left  neural foraminal stenosis. Right neural foramen is patent.    L3-L4: Circumferential disc bulge. Left greater than right facet  arthropathy. Ligamentum flavum thickening. Mild spinal canal stenosis.  No significant neural foraminal stenosis.    L4-L5: Circumferential disc bulge. Facet arthropathy bilaterally.  Ligamentum flavum thickening. Mild spinal canal stenosis, and mild to  moderate bilateral neural foraminal stenosis right greater  than left.    L5-S1: Circumferential disc bulge. No significant spinal canal or  neural foraminal stenosis.    The visualized adjacent paraspinous tissues are grossly within normal  limits Scattered atherosclerotic plaques within the visualized  abdominal aorta. Small atelectasis or consolidation along the inferior  left lower lobe.      Impression    IMPRESSION:   1. No acute fracture or traumatic subluxation.   2. Multilevel lumbar spondylosis, most pronounced at L4-5 with mild to  moderate bilateral neural foraminal stenosis.     I have personally reviewed the examination and initial interpretation  and I agree with the findings.    GHASSAN MARX MD         SYSTEM ID:  [id number]   CT Abdomen Pelvis w Contrast    Narrative    CT ABDOMEN PELVIS W CONTRAST 5/12/2024 2:59 PM    CLINICAL HISTORY: Left paraspinal back pain inpatient status post fall  with a history of ovarian cancer, also abdominal hernia    TECHNIQUE: CT scan of the abdomen and pelvis was performed following  injection of IV contrast. Multiplanar reformats were obtained. Dose  reduction techniques were used.  CONTRAST: MinBye934:101mL    COMPARISON: CT 5/26/2024    FINDINGS:   LOWER CHEST: Trace bilateral pleural effusions with adjacent  atelectasis, left greater than right.    HEPATOBILIARY: Diffuse hepatic metastases similar to prior. Stable  metallic intra and extrahepatic biliary stents. Distended gallbladder  without calcified stones or wall thickening.    PANCREAS: No significant mass, duct dilatation, or inflammatory  change.    SPLEEN: Mildly enlarged measuring 13.2 cm craniocaudal.    ADRENAL GLANDS: No significant nodules.    KIDNEYS/BLADDER: No significant mass, stones, or hydronephrosis. There  are simple or benign cysts. No follow up is needed.    BOWEL: Decreased caliber of the small bowel compared to prior. No  pneumatosis or portal venous gas. Decreased amount of bowel within the  paraumbilical hernia and in stable portion of the  transverse colon  within the midline ventral supraumbilical hernia. No evidence of  obstruction. Small hiatal hernia. Colonic diverticulosis.    PERITONEUM: Slightly increased moderate volume ascites. Mild  associated peritoneal thickening and enhancement.    LYMPH NODES: Stable mild upper abdominal lymphadenopathy.    PELVIC ORGANS: Hysterectomy. No pelvic masses.    MUSCULOSKELETAL: Unchanged diffuse sclerotic metastases. No evidence  of pathologic fracture in the field-of-view.      Impression    IMPRESSION:   1. Diffuse hepatic and osseous metastases similar to prior.  2. No evidence of acute or pathologic fracture.  3. Stable splenomegaly without evidence of acute splenic injury.  4. Slightly increased moderate volume ascites with associated  peritoneal thickening and enhancement which could represent peritoneal  carcinomatosis.  5. New trace bilateral pleural effusions with adjacent atelectasis,  left greater than right.    I have personally reviewed the examination and initial interpretation  and I agree with the findings.    CHEYENNE PEACE DO         SYSTEM ID:  [id number]   Lactic acid whole blood   Result Value Ref Range    Lactic Acid 2.1 (H) 0.7 - 2.0 mmol/L     CT Lumbar Spine 5/12/24:    IMPRESSION:   1. No acute fracture or traumatic subluxation.   2. Multilevel lumbar spondylosis, most pronounced at L4-5 with mild to  moderate bilateral neural foraminal stenosis.     CT Abdomen/ Pelvis with Contrast 5/12/24:    Preliminary Read:    IMPRESSION:   1. Diffuse hepatic and osseous metastases similar to prior.  2. No evidence of acute or pathologic fracture.  3. Stable splenomegaly without evidence of acute splenic injury.  4. Slightly increased moderate volume ascites with associated  peritoneal thickening and enhancement which could represent peritoneal  carcinomatosis.  5. New trace bilateral pleural effusions with adjacent atelectasis,  left greater than right.          Assessment and Plan:    Assessment: 68 year old female with metastatic ovarian high-grade serous carcinoma currently on Cycle 2 of Carbo/Taxol/Hien who presents with persistent back pain after a fall 2 weeks ago. Differential for back pain includes but is not limited to trauma, muscle sprain, pyelonephritis, nephrolithiasis, shingles. CT scan did not show evidence of acute fracture. There is also no evidence of renal stones, low concern for nephrolithiasis. Low concern for pyelonephritis given normal urinalysis. However, patient currently on Macrobid in the setting of  E coli positive urine cultures. No rashes noted on patient's back to suggest shingles. Symptoms likely due to muscle strain and/or fracture from trauma due to fall. Labs notable for lactic acidosis to 2.5, likely in the setting of dehydration.      Plan:    # Admission  # Back Pain  - MRI Lumbar ordered  - PRN oxycodone, tylenol, flexeril. Lidocaine patches  - Heat therapy as needed  - Regular Diet, Ensure supplements  - Maintenance fluids    # Falls  - Plan OT/PT evaluation    # Lactic Acidosis  - Like due to dehydration  - IV hydration  - Recheck lactic acid in the morning    # Atrial Fibrillation  - Continue PTA Eliquis    # E. Coli Positive Urine culture  - Complete macrobid course      Dispo:  Admit for further management    Mariaelena Vieyra MD  Ob/Gyn Resident, PGY-2  5/12/2024 6:37 PM     Iagree with the findings and plan of care as documented in the note.  I personally reviewed vital signs, medications, and labs. The above note has been edited by me    Comments: observation admission for ongoing pain and further workup with MRI to r/o other causes of pain and weakness. LA likely due to dehydration, low suspicion for sepsis, repeat LA after IVF resuscitation.    Emili Lilly MD  Gynecology Oncology Fellow          I saw and evaluated the patient with the resident.  I edited and reviewed the above note. I have reviewed all pertinent imaging and labs on this  patient.    Eladia Arriaga MD  Professor  Department of Ob/Gyn and Women's Health  Division of Gynecologic Oncology  [facility]  [phone]

## 2024-05-13 PROBLEM — H25.13 NUCLEAR SCLEROSIS OF BOTH EYES: Status: ACTIVE | Noted: 2017-12-11

## 2024-05-13 PROBLEM — T45.1X5A CHEMOTHERAPY-INDUCED PERIPHERAL NEUROPATHY (H): Status: ACTIVE | Noted: 2024-01-01

## 2024-05-13 PROBLEM — I95.1 ORTHOSTATIC HYPOTENSION: Status: ACTIVE | Noted: 2024-01-01

## 2024-05-13 PROBLEM — G62.0 CHEMOTHERAPY-INDUCED PERIPHERAL NEUROPATHY (H): Status: ACTIVE | Noted: 2024-01-01

## 2024-05-13 NOTE — PROGRESS NOTES
Quick Note:    Not going to give patient a TCU list until we know if patient is being listed as Observation (needing the O TCU contracted list) or if she stays Inpatient. Will reassess tomorrow to see if she has changed in status to then be able to provide the correct list/information.         SAYRA Logan, [facility]   Covering 5A/C   Ph: [phone]

## 2024-05-13 NOTE — UTILIZATION REVIEW
"Admission Status; Secondary Review Determination     Under the authority of the Utilization Management Committee, the utilization review process indicated a secondary review on the above patient.  The review outcome is based on review of the medical records, discussions with staff, and applying clinical experience noted on the date of the review.          (x) Observation Status Appropriate - This patient does not meet hospital inpatient criteria and is placed in observation status. If this patient's primary payer is Medicare and was admitted as an inpatient, Condition Code 44 should be used and patient status changed to \"observation\".     RATIONALE FOR DETERMINATION   68-year-old female with metastatic ovarian high-gradeSerous carcinoma who presented with persistent back pain after a fall approximately 2 weeks prior to 5/12/2024.  She was evaluated at the ED shortly after that, CT scan showing no acute injuries and she was discharged with oral oxycodone.    CT of the lumbar spine was repeated and again shows no fracture or traumatic subluxation.  MRI of the lumbar spine showed no acute abnormality.  No significant spinal canal or neural foraminal stenosis was identified.    Pain is controlled on an oral regimen of hydromorphone and ibuprofen.  Palliative care consultation requested to assist with symptom management.  Physical therapy evaluation in process and Occupational Therapy evaluation recommends transitional care unit due to impaired functional capacity below her baseline.  May be able to go home with assistance.    The severity of illness, intensity of service provided, expected LOS and risk for adverse outcome make the care appropriate for further observation; however, doesn't meet criteria for hospital inpatient admission. Dr. Twyla Rodriguez notified of this determination via [facility].    This document was produced using voice recognition software.      The information on this document is developed by the " utilization review team in order for the business office to ensure compliance.  This only denotes the appropriateness of proper admission status and does not reflect the quality of care rendered.         The definitions of Inpatient Status and Observation Status used in making the determination above are those provided in the CMS Coverage Manual, Chapter 1 and Chapter 6, section 70.4.      Sincerely,  Eladia Soto MD  Utilization Review  Physician Advisor  [facility].

## 2024-05-13 NOTE — PROGRESS NOTES
Notified Gyn Onc Resident  MD at 22:28 PM regarding consult.  Adama Regalado room 2552 the patient is taking senna 4 tablets BID at home for constipation, could you please adjust dosing to reflect home medication? could you please order PRN heparin IV flush since patient has port? Thanks.    Provider response received, new orders placed.

## 2024-05-13 NOTE — PLAN OF CARE
"Assumed cares 0313-3419     /73 (BP Location: Right arm)   Pulse 82   Temp 98  F (36.7  C) (Oral)   Resp 18   Ht 1.8 m (5' 10.87\")   Wt 77.8 kg (171 lb 8 oz)   SpO2 97%   BMI 24.01 kg/m       Pain: Patient denied pain.   Neuro: A&Ox4, forgetful at times.  Respiratory: Lungs clear and equal, on RA.   Cardiac/Neurovascular: HR and pulse WDL. Baseline numbness/tingling reported (baseline since chemo).   GI/: Adequate urine output. No BM reported.   Nutrition: Regular diet.   Activity: up with assist of 1 with walker.   Skin: No concerns.   Lines: Right chest port (SL).   Events this shift: PT/OT consults today. Nutrition and palliative also consulted. Orders placed for an MRI of her head/brain. Eliquis was held today. Pt ended up wanting to discharge home. MD placed orders and care coordination was able to set up home care. Family provided transportation home.      Plan: Continue with plan of care. Discharged at 1830    Goal Outcome Evaluation:      Plan of Care Reviewed With: patient    Overall Patient Progress: no changeOverall Patient Progress: no change    Outcome Evaluation: Continue with plan of care.      "

## 2024-05-13 NOTE — PROGRESS NOTES
"Care Management Discharge Note    Discharge Date: 05/15/2024       Discharge Disposition:  home with family    Discharge Services:  home nursing, home PT, home OT    Discharge DME:  none    Discharge Transportation: family or friend will provide    Private pay costs discussed: Not applicable    Does the patient's insurance plan have a 3 day qualifying hospital stay waiver?  No    PAS Confirmation Code:  n/a  Patient/family educated on Medicare website which has current facility and service quality ratings:  yes    Education Provided on the Discharge Plan:  yes  Persons Notified of Discharge Plans: [facility] Liaison  Patient/Family in Agreement with the Plan:  yes    Handoff Referral Completed: Yes    Additional Information:   4:58 PM-- Carlita, gyn/onc provider approached writer in work room stating patient wants to discharge \"now\", and refuses to go to TCU.  Writer called patient's daughter, Edie, as patient is staying with her daughter in [place] currently.      Discharge address:   [address] [place] [address]     Writer placed home care referral, indicating STAT referral and discharge address.  [facility] was able to accept patient for home nursing, home PT, home OT.  Writer called patient's daughter, Edie, and updated her that patient has been accepted for home care as above.  Writer updated Edie that home care phone number will be included in patient's discharge instructions.      Writer called Eun Zepeda to update her regarding this sudden referral was accepted for RN, PT, OT, and indicated discharge address.        [facility] Transition Home Care Liaison  Eun Zepeda RN BSN   Cell:   [phone]  Email: [email or web address]      [facility]      Address   [address] [address] [address]             Contact Information    [phone]          Helga Henderson RN, BSN  RN Care Coordinator    5A Medical " Oncology/5C non-BMT  5A beds [id number]  5C beds 3073-5987 (non-BMT)  [address] [address]  [email or web address]  Gender pronouns: she/her  Units: 5A Onc Vocera & 5C Vocera Pager: [phone]

## 2024-05-13 NOTE — CONSULTS
Palliative Care Consultation Note  [facility]      Patient: Taran Regalado  Date of Admission:  5/12/2024    Requesting Clinician / Team: Gyn Onc  Reason for consult: Symptom management       Recommendations & Counseling     MEDICAL MANAGEMENT:     Chemotherapy-induced peripheral neuropathy (CIPN):   Pt describes a very clear history of a new onset peripheral neuropathy that began after chemotherapy started (she can't tell me exactly how long symptoms have been present) and is worsening with subsequent rounds of chemotherapy. She is on amitriptyline for sleep from her PCP and notes she has been on this for years, it is not for neuropathy and it is not helping her neuropathy noticeably. Pain is located on the soles of her feet bilaterally, feels like tingling when she's sitting or lying down and less tingling and more overtly painful when she stands. She finds it hard to walk with her neuropathy, but can't tell me why (it's not numbness, proprioceptive challenges, or pain itself that is making walking challenging); question if any component of evolving motor neuropathy in addition sensory neuropathy that is present.    Recommend rotation off amitriptyline to a different neuropathic adjunct, both to more effectively target CIPN symptoms as well as with the side effect profile of amitriptyline (adverse effects include bone marrow suppression, thrombocytopenia and agranulocytosis) in a patient with cancer on cancer-directed therapies who may already face challenges with bone marrow suppression and thrombocytopenia. Please note that alternate neuropathic adjuncts like pregabalin, gabapentin, lamotrigine and nortiptyline have not shown efficacy in CIPN in controlled trials.     Would favor using duloxetine for CIPN instead of amitriptyline. Pt wants to think more about this and her daughter wants to do research on duloxetine as a medication before they make any changes and I  said our team could come by again tomorrow to see if they wish to initiate this here or in Palliative Care clinic.     --Our team will stop by again tomorrow (I am not here tomorrow but I will ask one of my Palliative Care colleagues to see)  --If/when patient ready to transition off amitriptyline and start duloxetine: would begin by decreasing amitriptyline to 50mg PO daily for 7 days then decrease to amitriptyline 25mg PO daily for 7 days and then stop. Once amitriptyline dose is at 25mg PO daily, okay to start duloxetine 30mg PO daily. Duloxetine can be increased to 60mg PO daily after 7 days of duloxetine 30mg PO daily AND patient is off amitriptyline. This was discussed with unit pharmacist as well.     Low mood:  On interview today, concern for low mood with restricted mood/affect, avoidance of eye contact, some psychomotor slowing, paucity of spontaneous speech. While duloxetine is being chosen as agent to target CIPN as discussed above, this might also assist with mood symptoms as well.     Additional recs:   Taran is agreeable to seeing us in Palliative Care clinic, I will generate a referral for you in the Discharge Navigator.    Palliative Care will continue to follow. Thank you for the consult and allowing us to aid in the care of Taran Regalado.    These recommendations have been discussed with primary team via note, phone.    Sheryl Rodriguez MD  Securely message with [employer] (more info)  Text page via [facility] Paging/Directory       Assessment      Taran Regalado is a 68 year old female with a past medical history that includes metastatic high grade serous ovarian cancer currently on cycle 2 of carbo/taxol/amaris. Pt admitted after falls at home for back pain persisting x2 weeks (per primary team notes, suspicion is for MSK etiology). Being treated for UTI as well. Palliative Care consult to assist with peripheral neuropathy.     Today, the patient was seen for:  metastatic high grade serous ovarian  cancer currently on cycle 2 of carbo/taxol/amaris  Peripheral neuropathy, chemotherapy-induced  Assessement for symptoms    Palliative Care Summary:   Met with patient and daughter at bedside.   I introduced our role as an extra layer of support and how we help patients and families dealing with serious, potentially life-limiting illnesses. I explained the composition of the palliative care team.  Palliative care helps patients and families navigate their care while focusing on the whole person; providing emotional, social and spiritual support  Palliative care often assists with symptom management, information sharing about what to expect from the illness, available treatment options and what effect those options may have on the disease course, and provide effective communication and caring support.    Prognosis, Goals, & Planning:    Functional Status just prior to this current hospitalization:  Living in the community but having multiple falls at home    Prognosis, Goals, and/or Advance Care Planning:  See above    Code Status was addressed today:   No      Patient's decision making preferences: unable to assess        Patient has decision-making capacity today for complex decisions: Limited ability to fully assess complex decision-making capacity today as limited engagement from patient during interview.            Coping, Meaning, & Spirituality:   Mood, coping, and/or meaning in the context of serious illness were addressed today: Yes    Social:   Living situation:lives with family    Medications:  I have reviewed this patient's medication profile and medications from this hospitalization.     ROS:  Comprehensive ROS not completed as patient with limited engagement in interview.    Physical Exam   Vital Signs with Ranges  Temp:  [98.1  F (36.7  C)-98.4  F (36.9  C)] 98.3  F (36.8  C)  Pulse:  [73-97] 79  Resp:  [16-18] 18  BP: ()/(60-74) 97/60  SpO2:  [95 %-96 %] 96 %  171 lbs 8 oz    PHYSICAL EXAM:  Gen:  NAD, chronically ill-appearing, lying comfortably in be  HEENT: normocephalic, no scleral icterus, no perioral lesions  CV: no mottling on UE bilaterally  Pulm: no increased work of breathing  LE: shoes in place while in bed  Skin: no rash or jaundice on limited exam  Neuro: Alert, orientation not fully assessed due to patient's limited engagement in interview, no tremor appreciated  Psych: restricted mood and affect, avoidance of eye contact, paucity of spontaneous speech, some psychomotor slowing noted      Data reviewed:  Last Comprehensive Metabolic Panel:  Lab Results   Component Value Date     05/13/2024    POTASSIUM 3.9 05/13/2024    CHLORIDE 104 05/13/2024    CO2 23 05/13/2024    ANIONGAP 8 05/13/2024    GLC 94 05/13/2024    BUN 10.7 05/13/2024    CR 0.68 05/13/2024    GFRESTIMATED >90 05/13/2024    HORACIO 8.4 (L) 05/13/2024       CBC RESULTS:   Recent Labs   Lab Test 05/13/24  0546   WBC 3.6*   RBC 2.68*   HGB 8.8*   HCT 27.6*   *   MCH 32.8   MCHC 31.9   RDW 21.1*   PLT 54*         Medical Decision Making       50 MINUTES SPENT BY ME on the date of service doing chart review, history, exam, documentation & further activities per the note.

## 2024-05-13 NOTE — PROGRESS NOTES
Nursing Focus: Admission    D: Patient admitted/transferred from Emergency Department via wheelchair for 5A oncology  inpatient admission.      I: Upon arrival to the unit patient was oriented to room, unit, and call light. Patient s height, weight, and vital signs were obtained. Allergies reviewed and allergy band applied. MD notified of patient s arrival on the unit. Adult AVS completed. Head to toe assessment completed. Education assessment completed. Care plan initiated.     A: Vital signs stable upon admission. Patient rates pain at 4/10. Two RN skin assessment completed Yes. Second RN was Julita Claros RN. Significant Skin Findings include thoracentesis site RLQ, bruising bilateral lower legs, . [facility] Nurse Consult Ordered No. Bed Algorithm can be found in PCS flow sheets (Support Surface Algorithm) and on IP [facility] NURSE RESOURCE TAB, was this used during this assessment? No.     P: Continue to monitor patient s vitals and intervene as needed. Continue with plan of care. Notify MD with any concerns or changes in patient status.

## 2024-05-13 NOTE — PROGRESS NOTES
[employer] paged GYN ONC resident      ED 27. M. D. 1955   Pt is requesting dilaudid. Offered oxycodone, she refused. Reported oxycodone does not work for her. Thanks. BETH Hoang [phone]

## 2024-05-13 NOTE — PROGRESS NOTES
"GYN ONC PROGRESS NOTE  05/12/24    HD#2 back pain, falls  Disease: recurrent stage IIIB high grade ovarian ca    24 hour events:   - admission       SUBJECTIVE:   Patient is feeling okay, very sleepy this morning.  Reports no pain while lying in bed, states its typically worse when she is moving around. Ate some potatoes last night for dinner. Not feeling nauseous this morning and nausea did not keep her from sleeping last night, dtr does report that this was a significant issue that brought her into the hospital. Does not have an appetite this morning as she just woke up. No chest pain or shortness of breath. Did not get up to go to the bathroom overnight.     OBJECTIVE:   Patient Vitals for the past 24 hrs:   BP Temp Temp src Pulse Resp SpO2 Height Weight   05/13/24 0451 97/60 98.3  F (36.8  C) Oral 79 18 96 % -- --   05/12/24 2158 105/62 98.4  F (36.9  C) Oral -- 18 95 % 1.8 m (5' 10.87\") 78.5 kg (173 lb)   05/12/24 1756 118/66 98.3  F (36.8  C) Oral 73 16 95 % -- --   05/12/24 1400 104/74 -- -- -- -- -- -- --   05/12/24 1254 (!) 85/61 98.1  F (36.7  C) Oral 97 16 96 % 1.803 m (5' 11\") --     Gen- Laying in bed, NAD  CV- regular rate, warm and well perfused   Pulm- NWOB  Abd- soft, nontender, non-distended  Extr- no edema, nontender  Lines/drains- PIV, port    I/Os  (Yesterday // Since Midnight)  PO 200cc // --cc  IVF not recorded  Urine not recorded    New Labs/Imaging-   MR Lumbar Spine 5/12/2024   1.  No acute abnormality of the lumbar spine.  2.  Diffuse low T1 signal throughout the bone marrow, can be seen in setting of chronic anemia or a diffuse marrow replacing process.  3.  Mild lumbar spondylosis as above. No significant spinal canal or neural foraminal stenosis    ASSESSMENT:   68 year old female with metastatic ovarian high-grade serous carcinoma most recently s/p 2c carbo/taxol/amaris (4/24) who presents with persistent back pain after a fall 2 weeks prior to admission. Pt noted to have a lactic " acidosis to 2.5 on arrival suspect related to dehydration/poor PO intake at home. She has recently started a course of macrobid for culture proven E coli UTI. Differential for back pain includes but is not limited to trauma, muscle sprain, pyelonephritis, nephrolithiasis; most likely musculoskeletal in origin given her recent fall. She is vitally normal. CT showed no evidence of fracture but given duration of pain sx, decision was made to pursue MRI which showed no acute abnormality, diffuse low T1 signal through the bone marrow which can be seen in chronic anemia. Lactate normalized with IVF. Planning for PT/OT consults while inpatient to ensure safety for ADLs at discharge given decreased functional status.    PLAN:    # Admission  # Back Pain  # Falls  - scheduled ibuprofen, tylenol. Prn PO dilaudid, flexeril.   - Heat/ice therapy as needed  - Regular Diet, Ensure supplements  - Plan OT/PT evaluation today  - Prn anti-emetics, bowel regimen      # Lactic Acidosis, normalized  - Like due to dehydration  - IV hydration     # Atrial Fibrillation  - Continue PTA Eliquis     # E. Coli Positive Urine culture  - Complete macrobid course, D#3    # MDD  - PTA amitriptyline    # Insomnia  - PTA ambien    Disposition: pending PT/OT assessments    Discussed with Dr. Bart Rodriguez MD  OB/GYN PGY-2  5/13/2024 6:51 AM     I saw and evaluated the patient with the resident.  I edited and reviewed the above note. I have reviewed all pertinent imaging and labs on this patient.  Plan for MRI brain due to recent falls, concern re neuropathy and that this may be cause of her falls. PT/OT to see patient. Plan palliative care consult as patient has seen them as outpatient to discuss treatment for CIPN.    Eladia Arriaga MD  Professor  Department of Ob/Gyn and Women's Health  Division of Gynecologic Oncology  [facility]  [phone]

## 2024-05-13 NOTE — PLAN OF CARE
"/66 (BP Location: Left arm, Patient Position: Semi-Rebolledo's)   Pulse 73   Temp 98.3  F (36.8  C) (Oral)   Resp 16   Ht 1.803 m (5' 11\")   SpO2 95%   BMI 23.01 kg/m      VSS on RA. Reported L. Flank pain & back. Offered oxycodone. Pt refused. Requested dilaudid, team notified. AxO x4. Regular diet. R. Check port- SL. Assist x1 with gait belt & walker. Continue plan of care.     Goal Outcome Evaluation:  Overall Patient Progress: no change    "

## 2024-05-13 NOTE — DISCHARGE SUMMARY
Gynecologic Oncology Discharge Summary    Taran Regalado  [id number]    Admit Date: 5/12/2024  Discharge Date: 5/13/2024   Admitting Provider: Teri Macias MD   Discharge Provider: Eladia Arriaga MD     Admission Dx:   - Back pain  - Fall  - Lactic acidosis  - Failure to thrive  - Afib  - UTI w E coli positive Ucx  - Chemotherapy induced peripheral neuropathy    Discharge Dx:  - Back pain  - Fall  - Lactic acidosis, resolved   - Failure to thrive  - Afib  - UTI w E coli positive Ucx  - Chemotherapy induced peripheral neuropathy    Patient Active Problem List   Diagnosis    Pelvic mass    Non-intractable vomiting with nausea, unspecified vomiting type    Ovarian cancer, right (H)    Atrial fibrillation with rapid ventricular response (H)    Other acute pulmonary embolism without acute cor pulmonale (H)    Encounter for antineoplastic chemotherapy    Restless legs syndrome    Chemotherapy-induced neutropenia (H24)    Insomnia    Hypercholesterolemia    Migraine    Postmenopausal atrophic vaginitis    Osteopenia    Nuclear sclerosis of both eyes    Presbyopia    Adverse effect of antineoplastic and immunosuppressive drugs, sequela    RUQ abdominal pain    Biliary obstruction (H28)    Other ascites    Fall, subsequent encounter    Malignant neoplasm of ovary, unspecified laterality (H)    Left-sided low back pain without sciatica, unspecified chronicity    Orthostatic hypotension    Chemotherapy-induced peripheral neuropathy (H24)       Procedures:   - None    Prior to Admission Medications:  Medications Prior to Admission   Medication Sig Dispense Refill Last Dose    amitriptyline (ELAVIL) 100 MG tablet Take 100 mg by mouth at bedtime   5/11/2024 at pm    BEVACIZUMAB, AVASTIN, INJECTION 2.5MG Every 3 weeks   4/29/2024 at unknown    cyclobenzaprine (FLEXERIL) 5 MG tablet Take 1 tablet (5 mg) by mouth 3 times daily as needed for muscle spasms 10 tablet 0 Unknown at unknown    dexAMETHasone (DECADRON) 4 MG tablet Take  2 tablets (8 mg) by mouth daily for 3 days, starting the day after chemotherapy. 6 tablet 5 Unknown at unknown    doxepin (SINEQUAN) 10 MG/ML (HIGH CONC) solution Take 2.5 mLs (25 mg) by mouth every 4 hours as needed for other (. Mix with equal amount water. Swish and hold in mouth for 1 min then spit out.) 118 mL 1 Past Week at unknown    HYDROmorphone (DILAUDID) 4 MG tablet Take 0.5 tablets (2 mg) by mouth every 6 hours as needed for pain 30 tablet 0 Past Week at unknown    lidocaine-prilocaine (EMLA) 2.5-2.5 % external cream Apply topically as needed for moderate pain (30min prior to port access) 30 g 1 5/12/2024 at unknown    meclizine (ANTIVERT) 25 MG tablet Take 1 tablet (25 mg) by mouth 3 times daily as needed for dizziness or nausea 15 tablet 0 Unknown at unknown    naloxone (NARCAN) 4 MG/0.1ML nasal spray Spray 1 spray (4 mg) into one nostril alternating nostrils as needed for opioid reversal every 2-3 minutes until assistance arrives 0.2 mL 1     nitroFURantoin macrocrystal-monohydrate (MACROBID) 100 MG capsule Take 1 capsule (100 mg) by mouth 2 times daily 14 capsule 0 5/12/2024 at am    ondansetron (ZOFRAN) 8 MG tablet Take 1 tablet (8 mg) by mouth every 8 hours as needed for nausea (vomiting) 30 tablet 2 5/12/2024 at unknown    prochlorperazine (COMPAZINE) 10 MG tablet Take 1 tablet (10 mg) by mouth every 6 hours as needed for nausea or vomiting 30 tablet 2 5/12/2024 at unknown    SUMAtriptan (IMITREX) 100 MG tablet Take 1 tablet (100 mg) by mouth at onset of headache for migraine 15 tablet 0 Unknown at unknown    valACYclovir (VALTREX) 1000 mg tablet Take 1,000 mg by mouth as needed   Unknown at unknown    Xylitol (XYLIMELTS MT) Place 1 tablet on Gums at bedtime   5/11/2024 at pm    zolpidem (AMBIEN) 10 MG tablet Take 1 tablet (10 mg) by mouth every evening 10 tablet 0 Past Week at unknown    [DISCONTINUED] apixaban ANTICOAGULANT (ELIQUIS) 5 MG tablet Take 1 tablet (5 mg) by mouth 2 times daily 180  tablet 3 5/12/2024 at am       Discharge Medications:     Review of your medicines        START taking        Dose / Directions   lidocaine 5 % patch  Commonly known as: Lidoderm      Dose: 1 patch  Place 1 patch onto the skin every 24 hours for 10 days Place one patch on back for 12 hours/  then remove for 12 hours  Quantity: 10 patch  Refills: 0            CONTINUE these medicines which have NOT CHANGED        Dose / Directions   amitriptyline 100 MG tablet  Commonly known as: ELAVIL      Dose: 100 mg  Take 100 mg by mouth at bedtime  Refills: 0     BEVACIZUMAB (AVASTIN) INJECTION 2.5MG      Every 3 weeks  Refills: 0     cyclobenzaprine 5 MG tablet  Commonly known as: FLEXERIL  Used for: Adverse effect of antineoplastic and immunosuppressive drugs, sequela      Dose: 5 mg  Take 1 tablet (5 mg) by mouth 3 times daily as needed for muscle spasms  Quantity: 10 tablet  Refills: 0     dexAMETHasone 4 MG tablet  Commonly known as: DECADRON  Used for: Ovarian cancer, right (H), Adverse effect of antineoplastic and immunosuppressive drugs, sequela      Take 2 tablets (8 mg) by mouth daily for 3 days, starting the day after chemotherapy.  Quantity: 6 tablet  Refills: 5     doxepin 10 MG/ML (HIGH CONC) solution  Commonly known as: SINEquan  Used for: Ovarian cancer, right (H), Stomatitis and mucositis      Dose: 25 mg  Take 2.5 mLs (25 mg) by mouth every 4 hours as needed for other (. Mix with equal amount water. Swish and hold in mouth for 1 min then spit out.)  Quantity: 118 mL  Refills: 1     HYDROmorphone 4 MG tablet  Commonly known as: DILAUDID  Used for: Neoplasm related pain, Ovarian cancer, unspecified laterality (H)      Dose: 2 mg  Take 0.5 tablets (2 mg) by mouth every 6 hours as needed for pain  Quantity: 30 tablet  Refills: 0     lidocaine-prilocaine 2.5-2.5 % external cream  Commonly known as: EMLA  Used for: Ovarian cancer, right (H)      Apply topically as needed for moderate pain (30min prior to port  access)  Quantity: 30 g  Refills: 1     meclizine 25 MG tablet  Commonly known as: ANTIVERT  Used for: Dizziness, Nausea      Dose: 25 mg  Take 1 tablet (25 mg) by mouth 3 times daily as needed for dizziness or nausea  Quantity: 15 tablet  Refills: 0     naloxone 4 MG/0.1ML nasal spray  Commonly known as: NARCAN  Used for: RUQ abdominal pain, Ovarian cancer, unspecified laterality (H), Cancer, metastatic to liver (H)      Dose: 4 mg  Spray 1 spray (4 mg) into one nostril alternating nostrils as needed for opioid reversal every 2-3 minutes until assistance arrives  Quantity: 0.2 mL  Refills: 1     nitroFURantoin macrocrystal-monohydrate 100 MG capsule  Commonly known as: Macrobid  Used for: Acute cystitis without hematuria      Dose: 100 mg  Take 1 capsule (100 mg) by mouth 2 times daily  Quantity: 14 capsule  Refills: 0     ondansetron 8 MG tablet  Commonly known as: ZOFRAN  Used for: Ovarian cancer, right (H), Adverse effect of antineoplastic and immunosuppressive drugs, sequela      Dose: 8 mg  Take 1 tablet (8 mg) by mouth every 8 hours as needed for nausea (vomiting)  Quantity: 30 tablet  Refills: 2     prochlorperazine 10 MG tablet  Commonly known as: COMPAZINE  Used for: Ovarian cancer, right (H), Adverse effect of antineoplastic and immunosuppressive drugs, sequela      Dose: 10 mg  Take 1 tablet (10 mg) by mouth every 6 hours as needed for nausea or vomiting  Quantity: 30 tablet  Refills: 2     SUMAtriptan 100 MG tablet  Commonly known as: IMITREX  Used for: Migraine without status migrainosus, not intractable, unspecified migraine type      Dose: 100 mg  Take 1 tablet (100 mg) by mouth at onset of headache for migraine  Quantity: 15 tablet  Refills: 0     valACYclovir 1000 mg tablet  Commonly known as: VALTREX      Dose: 1,000 mg  Take 1,000 mg by mouth as needed  Refills: 0     XYLIMELTS MT      Place 1 tablet on Gums at bedtime  Refills: 0     zolpidem 10 MG tablet  Commonly known as: AMBIEN  Used for:  Ovarian cancer, right (H)      Dose: 10 mg  Take 1 tablet (10 mg) by mouth every evening  Quantity: 10 tablet  Refills: 0            STOP taking      apixaban ANTICOAGULANT 5 MG tablet  Commonly known as: ELIQUIS                  Where to get your medicines        These medications were sent to [facility] [id number] [facility] - [place], [place] - [address]  [address], [facility] [address]      Phone: [phone]   lidocaine 5 % patch         Consultations:  - PT/OT  - Palliative     Brief History of Illness:  From H&P: 68 year old female with metastatic ovarian high-grade serous carcinoma currently on Cycle 2 of Carbo/Taxol/Hien who presents with persistent back pain after a fall 2 weeks ago. Differential for back pain includes but is not limited to trauma, muscle sprain, pyelonephritis, nephrolithiasis, shingles. CT scan did not show evidence of acute fracture. There is also no evidence of renal stones, low concern for nephrolithiasis. Low concern for pyelonephritis given normal urinalysis. However, patient currently on Macrobid in the setting of  E coli positive urine cultures. No rashes noted on patient's back to suggest shingles. Symptoms likely due to muscle strain and/or fracture from trauma due to fall. Labs notable for lactic acidosis to 2.5, likely in the setting of dehydration.     Hospital Course:  Dz:   - Recurrent stage IIIB high grade ovarian ca. Neoadjuvant carbo/taxol> DANAE, BSO, omentectomy, debulking 4/21. Olaparib (8/21). Carbo/taxol/hien (7/23). Progressive dz, hepatic mets; ERCP, intrahepatic stents 12/23. Taxol/hien (2/24). Most recently s/p 2c carbo/taxol/hien (4/24)   FEN:   - She was initially maintained on mIVF and a regular diet. Her lactic acidosis improved from 2.5> 1.8 after IVF. At the time of discharge she was tolerating a regular diet and maintaining PO hydration.   Pain:   - Her pain was controlled with PO pain medication. She was discharged home with her PTA pain medication  regimen.  CV:   - She has a history of atrial fibrillation. Her PTA eliquis was held while in house given her recent h/o falls and thrombocytopenia on admission. It was recommended that she continue to hold this medication at discharge until her follow up with Dr Juarez. Her vital signs were stable while in house and she had no acute CV issues.  PULM:   - She has no history of pulmonary issues. She was encouraged to use her bedside IS while in house.  She had no acute pulmonary issues while in house.  HEME:   -   Labs were notable for pancytopenia, attributed to her recent chemotherapy infusion. Her eliquis was held given her TCP and recent falls as above. She had no acute heme issues while in house.  GI:   - Her PTA bowel regimen was continued on admission. She did endorse frequent nausea on admission. Given her nausea and recent falls at home a brain MRI was recommended to assess for disease spread to the brain. Pt desired to leave prior to completion of this work up. She was continued on her home anti-emetic regimen while in house. At the time of discharge, she was tolerating a regular diet without nausea and vomiting.  She had no acute GI issues while in house.   :    - Pt had been diagnosed with E coli UTI 2 days prior to admission. Her PTA bactrim was continued on admission and it was recommended that she complete her prescribed course at discharge. She had no other acute  issues while in house.    ID:   - The patient was AF during her hospitalization.  She received bactrim for previously diagnosed UTI as above. She had no other acute ID issues in house.   ENDO:   - No issues  PSYCH/NEURO/MSK:   - Pt presented with back pain after a fall 2w prior to admission. MR of the lumbar spine showed no evidence of fracture, metastatic disease to the spine or soft tissue abnormalities. She also reported recent nausea; given this and her recent falls, a mickie MRI was ordered to assess for progression of disease  but was not completed prior to discharge (see below). PT/OT were consulted and recommended TCU. The patient declined TCU placement and requested discharge home. It was strongly recommended that she stay in the hospital for optimization of her sx, to complete work up (brain MRI still pending at time of discharge) and to ensure a safe disposition plan. The patient declined to remain hospitalized. Home RN care and OT was organized prior to her discharge from the hospital. The patient has a history of chemotherapy induced neuropathy for which she was continued on her PTA amitriptyline. Palliative care was consulted and recommended transition from amitriptyline to duloxetine. The patient was not ready to transition to a different medication and so her PTA amitriptyline was continued at the time of discharge. She should follow up with the palliative care team as an outpatient (referral placed by their team).   PPX:    -  She was given SCDs  during her hospital course.  She tolerated these prophylactic interventions without incident.  They were discontinued at the time of her discharge.      Discharge Instructions and Follow up:  Ms. Taran Regalado was discharged from the hospital with follow up for outpatient brain MRI, with her primary oncologist Dr Juarez and with the palliative care team.    Her eliquis was held at the time of discharge given recent falls and thrombocytopenia, resumption of this medication to be discussed with Dr Juarez on an outpatient basis. She should have a CBC prior to her next clinic appointment to assess her pancytopenia so that she and Dr Juarez can discuss r/b/a to re-starting eliquis at that time.     She declined to remain inpatient for complete work up of her recent falls and nausea with a brain MRI; this was ordered to be completed on an outpatient basis; she should follow up with Dr Juarez to discuss the results of this imaging.     Discharge Diet: Regular  Discharge  Activity: As tolerated   Discharge Follow up: to follow up with Dr Juarez later this week (message sent to clinic to schedule this appointment).     Discharge Disposition:  Discharged to home with home care       Twyla Rodriguez MD  OB/GYN PGY-2  5/13/2024 5:56 PM     I saw and evaluated the patient with the resident.  I edited and reviewed the above note. I have reviewed all pertinent imaging and labs on this patient. Patient refusing to stay despite our strong recommendation that she not leave the hospital. Discussed risk to her health if work up not completed. She refuses to stay and states will complete brain MRI as outpatient. Daughter present for this conversation and patient will be discharged in care of her daughter.     Eladia Arriaga MD  Professor  Department of Ob/Gyn and Women's Health  Division of Gynecologic Oncology  [facility]  [phone]

## 2024-05-13 NOTE — MEDICATION SCRIBE - ADMISSION MEDICATION HISTORY
Medication Scribe Admission Medication History    Admission medication history is complete. The information provided in this note is only as accurate as the sources available at the time of the update.    Information Source(s): Patient, Family member, and CareEverywhere/SureScripts via in-person    Pertinent Information:   -pt's daughter answered some questions about medications  -Daughter stated Pt is due for her Avastin 2.5 mg inj on May 20th    Changes made to PTA medication list:  Added: Xylimelts  Deleted: Ondansetron 8 mg,   Changed: Avastin 2.5 mg inj (every other week) --> Avastin 2.5 mg inj (every 3 weeks)    Allergies reviewed with patient and updates made in EHR: yes    Medication History Completed By: Nimco Cao 5/12/2024 8:30 PM    PTA Med List   Medication Sig Last Dose    amitriptyline (ELAVIL) 100 MG tablet Take 100 mg by mouth at bedtime 5/11/2024 at pm    apixaban ANTICOAGULANT (ELIQUIS) 5 MG tablet Take 1 tablet (5 mg) by mouth 2 times daily 5/12/2024 at am    BEVACIZUMAB, AVASTIN, INJECTION 2.5MG Every 3 weeks 4/29/2024 at unknown    cyclobenzaprine (FLEXERIL) 5 MG tablet Take 1 tablet (5 mg) by mouth 3 times daily as needed for muscle spasms Unknown at unknown    dexAMETHasone (DECADRON) 4 MG tablet Take 2 tablets (8 mg) by mouth daily for 3 days, starting the day after chemotherapy. Unknown at unknown    doxepin (SINEQUAN) 10 MG/ML (HIGH CONC) solution Take 2.5 mLs (25 mg) by mouth every 4 hours as needed for other (. Mix with equal amount water. Swish and hold in mouth for 1 min then spit out.) Past Week at unknown    HYDROmorphone (DILAUDID) 4 MG tablet Take 0.5 tablets (2 mg) by mouth every 6 hours as needed for pain Past Week at unknown    lidocaine (LIDODERM) 5 % patch Place 1 patch onto the skin every 24 hours for 10 days Place one patch on back for 12 hours/  then remove for 12 hours     lidocaine-prilocaine (EMLA) 2.5-2.5 % external cream Apply topically as needed for moderate pain  (30min prior to port access) 5/12/2024 at unknown    meclizine (ANTIVERT) 25 MG tablet Take 1 tablet (25 mg) by mouth 3 times daily as needed for dizziness or nausea Unknown at unknown    naloxone (NARCAN) 4 MG/0.1ML nasal spray Spray 1 spray (4 mg) into one nostril alternating nostrils as needed for opioid reversal every 2-3 minutes until assistance arrives     nitroFURantoin macrocrystal-monohydrate (MACROBID) 100 MG capsule Take 1 capsule (100 mg) by mouth 2 times daily 5/12/2024 at am    ondansetron (ZOFRAN) 8 MG tablet Take 1 tablet (8 mg) by mouth every 8 hours as needed for nausea (vomiting) 5/12/2024 at unknown    prochlorperazine (COMPAZINE) 10 MG tablet Take 1 tablet (10 mg) by mouth every 6 hours as needed for nausea or vomiting 5/12/2024 at unknown    SUMAtriptan (IMITREX) 100 MG tablet Take 1 tablet (100 mg) by mouth at onset of headache for migraine Unknown at unknown    valACYclovir (VALTREX) 1000 mg tablet Take 1,000 mg by mouth as needed Unknown at unknown    Xylitol (XYLIMELTS MT) Place 1 tablet on Gums at bedtime 5/11/2024 at pm    zolpidem (AMBIEN) 10 MG tablet Take 1 tablet (10 mg) by mouth every evening Past Week at unknown

## 2024-05-13 NOTE — TELEPHONE ENCOUNTER
Late encounter entry. Called pt on 5/10 to pass along Dr. Bermeo's message. Notified pt she could  the prescription and get started with the first dose as soon as she picked it up.    FAUSTO WeinbergN, RN  Palliative Care Nurse Clinician    [phone] (Direct)  [phone] (Main)  [phone] (Appointment Scheduling)

## 2024-05-13 NOTE — TELEPHONE ENCOUNTER
----- Message from Ben Bermeo MD sent at 5/10/2024  3:34 PM CDT -----  Regarding: can you call her today?  She does have a uti and I rx'd macrobid for her  Please thank her for submitting urine

## 2024-05-13 NOTE — PROGRESS NOTES
05/13/24 1003   Appointment Info   Signing Clinician's Name / Credentials (OT) Twyla Pineda OTR/L   Living Environment   People in Home child(montez), adult;grandchild(montez)   Current Living Arrangements house   Home Accessibility stairs to enter home;stairs within home   Number of Stairs, Main Entrance 2   Stair Railings, Main Entrance none   Number of Stairs, Within Home, Primary ten   Stair Railings, Within Home, Primary railings on both sides of stairs   Transportation Anticipated family or friend will provide   Living Environment Comments Pt from [place] but living with dtr and family in a house in [place]. 2 HIMA from garage. Once inside, pt has to complete 10 stairs to get to a bathroom and an additional 10 stairs to get to her bedroom. Must do 10 stairs to get to bathroom from bedroom. Dtr she is living with is not able to provide assistance. Other dtr just moved in with mother and sister to provide 24/7 support. Will be able to provide 24/7 support as needed but will not be able to provide long term.   Self-Care   Usual Activity Tolerance moderate   Current Activity Tolerance fair   Regular Exercise No   Equipment Currently Used at Home none  (dtr just purchased grab bar, shower chair, bath mat)   Fall history within last six months yes   Number of times patient has fallen within last six months 2   Activity/Exercise/Self-Care Comment Pt IND at baseline, recently been receiving assist with ADLs and stairs. Does not use or own AD. Has tub shower.   Instrumental Activities of Daily Living (IADL)   IADL Comments family has been completing   General Information   Onset of Illness/Injury or Date of Surgery 05/12/24   Referring Physician Twyla Rodriguez MD   Patient/Family Therapy Goal Statement (OT) prefers home   Additional Occupational Profile Info/Pertinent History of Current Problem per chart: 68 year old female with metastatic ovarian high-grade serous carcinoma most recently s/p 2c carbo/taxol/amaris (4/24) who  "presents with persistent back pain after a fall 2 weeks prior to admission.   General Observations and Info activity order: up with assist prn   Cognitive Status Examination   Orientation Status person;place   Affect/Mental Status (Cognitive) WFL;confused   Follows Commands follows one-step commands;75-90% accuracy   Cognitive Status Comments pt with poor insight to recent deficits, reports some increased difficulty with STM due to \"chemo brain\"; dtr confirms pt has been more forgetful recently   Sensory   Sensory Comments neuropathy B feet   Pain Assessment   Patient Currently in Pain Yes, see Vital Sign flowsheet   Posture   Posture protracted shoulders   Range of Motion Comprehensive   Comment, General Range of Motion BUE WFL   Strength Comprehensive (MMT)   Comment, General Manual Muscle Testing (MMT) Assessment able to lift all extremities against gravity, did not MMT due to increased pain with this previously   Coordination   Upper Extremity Coordination No deficits were identified   Bed Mobility   Bed Mobility sit-supine;scooting/bridging   Scooting/Bridging [place] (Bed Mobility) supervision   Sit-Supine [place] (Bed Mobility) contact guard   Assistive Device (Bed Mobility) bed rails   Transfers   Transfers bed-chair transfer;sit-stand transfer;toilet transfer;shower transfer   Transfer Skill: Bed to Chair/Chair to Bed   Bed-Chair [place] (Transfers) contact guard   Sit-Stand Transfer   Sit-Stand [place] (Transfers) contact guard   Shower Transfer   [place] Level (Shower Transfer) minimum assist (75% patient effort)   Shower Transfer Comments per clinical judgement; tub/shower, just purchased chair and grab bar   Toilet Transfer   [place] Level (Toilet Transfer) contact guard   Balance   Balance Comments good seated; unsteady standing   Activities of Daily Living   BADL Assessment/Intervention bathing;lower body dressing;grooming;toileting   Bathing Assessment/Intervention "   [place] Level (Bathing) minimum assist (75% patient effort)   Lower Body Dressing Assessment/Training   [place] Level (Lower Body Dressing) contact guard assist;verbal cues   Grooming Assessment/Training   [place] Level (Grooming) contact guard assist;verbal cues   Toileting   [place] Level (Toileting) contact guard assist   Clinical Impression   Criteria for Skilled Therapeutic Interventions Met (OT) Yes, treatment indicated   OT Diagnosis impaired ADLs   OT Problem List-Impairments impacting ADL problems related to;activity tolerance impaired;balance;cognition;strength;pain;sensation   Assessment of Occupational Performance 3-5 Performance Deficits   Identified Performance Deficits dressing, toileting, bathing   Planned Therapy Interventions (OT) ADL retraining;cognition;strengthening;transfer training;home program guidelines   Clinical Decision Making Complexity (OT) detailed assessment/moderate complexity   Risk & Benefits of therapy have been explained evaluation/treatment results reviewed;patient;daughter   OT Total Evaluation Time   OT Eval, Moderate Complexity Minutes (02207) 8   OT Goals   Therapy Frequency (OT) 5 times/week   OT Predicted Duration/Target Date for Goal Attainment 05/20/24   OT Goals Hygiene/Grooming;Lower Body Dressing;Toilet Transfer/Toileting;Lower Body Bathing;Cognition;OT Goal 1   OT: Hygiene/Grooming supervision/stand-by assist;while standing   OT: Lower Body Dressing Modified independent;using adaptive equipment;including set-up/clothing retrieval   OT: Lower Body Bathing Modified independent;using adaptive equipment   OT: Toilet Transfer/Toileting Modified independent;toilet transfer;cleaning and garment management   OT: Cognitive Patient/caregiver will verbalize understanding of cognitive assessment results/recommendations as needed for safe discharge planning   OT: Goal 1 SBA tub/shower transfer using DME prn   Interventions   Interventions Quick Adds  "Therapeutic Activity   Self-Care/Home Management   Self-Care/Home Mgmt/ADL, Compensatory, Meal Prep Minutes (72804) 11   Treatment Detail/Skilled Intervention Pt ed in conservative back precautions, to assist with LBP management. Ed in limiting bending/lifting>10#/twisting. Ed in using figure-4 pattern for LE dressing, and pt able to complete tech for managing foot wear.  Ed in keeping upright for toilet transfer, pt completes CGA for transfer and clothing management following.  Pt encouraged to complete oral cares standing, pt completes SBA standing at sink. Pt oriented to month/year but not date; pt able to answer simple cognitive questions, (\"911\", number of quarters in $1.75), but giving unsafe answer to safety questions (dropped glass bowl of veggies, pt states she would clean up glass with picking up pieces and vacuum, but then states she would wash off the veggies and eat them).   Therapeutic Activities   Therapeutic Activity Minutes (93078) 10   Symptoms noted during/after treatment fatigue   Treatment Detail/Skilled Intervention Pt ed in backing up fully to chair prior to sitting, pt needs cues to complete this next 2x in session.  Pt ed in walker safety with ambulation (2 hands), and STS (1 hand on chair/bed), and demo rationale for same.  Facilitated hallway ambulation with 2WW to promote endurance for ADLs, and pt walks ~75'x2 with CGA during session, declining further due to fatigue. Pt unsteady taking a few steps without AD. Family asking about the shooting pain in pt's RLE with MMT in ER, and answered within OT scope, encouraging back HEP with PT.  Ed pt/dtr on rationale for tcu, pt declining but open to HH services at [place]. Dtr deferring to pt's wishes. PT needs to eval stairs yet, which pt needs to complete for access to bed/bath.   OT Discharge Planning   OT Plan tub/shower transfer, TB dressing w/retrieval   OT Discharge Recommendation (ND Rec) Transitional Care Facility  (pending PT eval for " stairs, may update dc rec to home with A)   OT Rationale for DC Rec Pt below baseline, limited by pain, decreased balance, weakness, impaired cognition.  She would benefit from continued therapy in tcu setting to maximize safety and independence with ADLs. Pt declining tcu, and pending PT eval of stairs, pt may be able to dc to home with HHOT and Assist for all I/ADLs. Family currently able to provide 24/7 assist.   OT Brief overview of current status CGA toilet transfer, Ax1 w/2WW, please encourage walks 4x daily   Total Session Time   Timed Code Treatment Minutes 21   Total Session Time (sum of timed and untimed services) 29

## 2024-05-14 NOTE — PLAN OF CARE
Occupational Therapy Discharge Summary    Reason for therapy discharge:    Discharged to home with home therapy.    Progress towards therapy goal(s). See goals on Care Plan in [facility] electronic health record for goal details.  Pt. Not seen by d/cing therapist. Per chart pt. Min/SBA with BADLs and mob.    Therapy recommendation(s):    Continued therapy is recommended.  Rationale/Recommendations:  continue with home OT to max. Safety/indep. With ADLs/mobility in home/community setting.

## 2024-05-15 PROBLEM — R45.1 RESTLESSNESS: Status: ACTIVE | Noted: 2024-01-01

## 2024-05-15 PROBLEM — R41.82 ALTERED MENTAL STATUS, UNSPECIFIED ALTERED MENTAL STATUS TYPE: Status: ACTIVE | Noted: 2024-01-01

## 2024-05-15 NOTE — PROGRESS NOTES
"GYN ONC PROGRESS NOTE  05/16/24    HD#2  Disease: stage IIIB metastatic HGSOC    24 hour events:   - MRI w subacute infarct, no evidence of metastatic dz  - UDS negative  - stable TCP, eliquis re-started  - COVID, flu, CXR w/o evidence of infectious etiology  - CT A/P w/ diffuse bowel edema c/f possible enteritis     SUBJECTIVE:   Patient is feeling better after sleeping.  Last night, her RLS was severe, and she was unable to sleep with amitriptyline, Ambien, and Haldol.  She was given IV Dilaudid, which helped her rest and she was able to sleep for about 4 hours last night.  This morning, the patient was awake and answering yes/no questions, though her daughter was the one who provided the most information about events of last night. There was some question about whether the patient was visually hallucinating a \"cooler of beer in the corner.\" .  No appetite, but able to eat/drink without nausea/vomiting after one episode of emesis yesterday. Due to limited PO intake, voiding has been minimal.     OBJECTIVE:   Patient Vitals for the past 24 hrs:   BP Temp Temp src Pulse SpO2   05/16/24 0957 110/62 99.3  F (37.4  C) Oral 80 --   05/15/24 2248 117/75 -- -- -- --   05/15/24 2013 98/51 99  F (37.2  C) Oral 75 97 %     Gen- Laying in bed, somnolent  CV- well-perfused  Pulm- NWOB  Abd- soft, nontender  Lines/drains- PICC    I/Os - limited monitoring in the ED  (Yesterday // Since Midnight)  PO not measured   cc // NM  Urine NM // NM    New Labs/Imaging-     Results for orders placed or performed during the hospital encounter of 05/14/24   CT Head w/o Contrast     Status: None    Narrative    EXAM: CT HEAD W/O CONTRAST  LOCATION: [facility]  DATE: 5/14/2024    INDICATION: Altered mental status. Ovarian cancer. Confusion.  COMPARISON: CT head dated 1/13/2021.  TECHNIQUE: Routine CT Head without IV contrast. Multiplanar reformats. Dose reduction techniques were " used.    FINDINGS:  INTRACRANIAL CONTENTS: No acute hemorrhage or extra-axial fluid collection identified. Again seen is a stable calcified meningioma along the high right anterior frontal convexity without significant local mass effect or vasogenic edema. This lesion   measures approximately 2.5 cm in AP dimensions, 1.7 cm in transverse dimensions and 1.6 cm in craniocaudal dimensions, previously 2.6 cm in AP dimensions, 1.7 cm in transverse dimensions and 1.7 cm in cranial caudal dimensions. No CT evidence of acute   infarct. Mild presumed chronic small vessel ischemic changes. Minimal generalized volume loss. No hydrocephalus.     VISUALIZED ORBITS/SINUSES/MASTOIDS: No intraorbital abnormality. Mild mucosal thickening scattered about the paranasal sinuses. No middle ear or mastoid effusion.    BONES/SOFT TISSUES: No acute abnormality.      Impression    IMPRESSION:  1.  No CT evidence for acute intracranial process. If the patient is experiencing an acute, focal or ongoing neurologic deficit, an MRI may be indicated.  2.  Stable calcified meningioma along the high right anterior frontal cerebral convexity.  3.  Brain atrophy and presumed chronic microvascular ischemic changes as above.     MR Brain w/o & w Contrast     Status: None    Narrative    EXAM: MR BRAIN W/O & W CONTRAST  5/15/2024 4:09 PM     HISTORY:  hx stage III ovarian cancer, recent falls, nausea and now  presenting with delirium/AMS, r/o brain metastases       COMPARISON:  Same day head CT    TECHNIQUE: Sagittal and axial T1-weighted, axial diffusion,  multiplanar T2 FLAIR with fat saturation images were obtained without  intravenous contrast. Following intravenous gadolinium-based contrast  administration, axial T2-weighted, axial susceptibility, and  axial/coronal T1-weighted images were obtained.    CONTRAST: gadobutrol (GADAVIST) injection 8 mL.    FINDINGS: There is no mass effect, midline shift, or intracranial  hemorrhage. The ventricles are  proportionate to the cerebral sulci. No  acute infarct by diffusion-weighted imaging, however there is single  focal area of hyperintensity on DWI in the left occipital/parietal  (series 12 image 13), isointense on ADC and enhancing on post contrast  imaging. Major intracranial vascular structures are within normal  limits.    Scattered FLAIR hyperintensities in the subcortical and  periventricular white matter consistent with small vessel ischemic  disease.    Again seen right parasagittal calcified meningioma along the right  vertex.    No suspicious abnormality of the skull marrow signal. Clear paranasal  sinuses. Mastoid air cells are clear. Intraocular contents is  unremarkable.      Impression    IMPRESSION:  1. Single punctate enhancing focus in the left occipital/parietal  region, most likely represents a subacute infarct.  No acute infarct.  Metastases cannot be completely excluded but considered much less  likely. Consider follow-up MRI in 2-4 weeks to demonstrate resolution  of imaging findings.  2.  Chronic small vessel ischemic disease.    I have personally reviewed the examination and initial interpretation  and I agree with the findings.    RONI ATKINSON MD         SYSTEM ID:  [id number]   XR Chest 2 Views     Status: None    Narrative    EXAM: XR CHEST 2 VIEWS  LOCATION: [facility]  DATE: 5/15/2024    INDICATION: rising white count and new delerium, r o pulmonary infxn  COMPARISON: Prior CT dated 3/25/2024      Impression    IMPRESSION: The heart is normal in size. There is discoid atelectasis seen in the left lung base and implanted port is seen in the right chest wall with the tip in the mid SVC.   CT Abdomen Pelvis w Contrast     Status: None    Narrative    EXAMINATION: CT ABDOMEN PELVIS W CONTRAST, 5/15/2024 9:42 AM    TECHNIQUE:  Helical CT images from the lung bases through the  symphysis pubis were obtained with IV contrast. Contrast  dose:  iopamidol (ISOVUE-370) solution 104 mL    COMPARISON: 5/12/2024    HISTORY: ovarian cancer. Lactic acidosis on admission. N/V/D.    FINDINGS:    Abdomen and pelvis: No significant change in numerous hypoenhancing  hepatic metastases. Stable position of metallic left and right hepatic  duct and extrahepatic biliary tree stents. Trace associated  pneumobilia. No new biliary dilation. Stable distention of the  gallbladder. Normal pancreas. Stable splenomegaly, measuring 13.4 cm  in craniocaudal dimension. Normal adrenal glands. Stable fluid  attenuation cyst in the midpole of the left kidney. No suspicious  renal cortical lesion. No hydronephrosis, hydroureter, or urinary  tract stone. Normal bladder. Surgically absent uterus and ovaries.  Slightly increased moderate volume ascites with continued mild  parietal peritoneal thickening. No free air. No dilated small or large  bowel. Colonic diverticulosis. Mild diffuse edematous gastric, small  bowel, and colonic wall thickening. The major abdominal vasculature is  patent. Circumaortic left renal vein. Moderate aortoiliac  atherosclerotic calcification without aneurysmal dilation. No  abdominal or pelvic lymphadenopathy.    Lung bases/lower chest:  Trace bilateral pleural effusions. Fine  detail of the lungs is obscured by motion artifact.    Bones and soft tissues: Stable sclerotic osseous metastases in the  spine and pelvic bones. No acute osseous abnormality. Lower lumbar  predominance of facet arthropathy. Mild soft tissue anasarca.  Minimally changed bowel and ascites containing supraumbilical midline  ventral abdominal wall hernias.      Impression    IMPRESSION:   1. Mild diffuse edematous gastric, small bowel, and colonic wall  thickening, which may be related to anasarca/volume status, though  gastritis/enteritis/colitis could have a similar appearance.  2. Stable hepatic and osseous metastases.  3. Slightly increased moderate volume ascites, which may be  malignant.  4. Stable mild splenomegaly.  5. Slightly increased trace bilateral pleural effusions.     CHEYENNE HOLDEN NATA,          SYSTEM ID:  [id number]   Comprehensive metabolic panel     Status: Abnormal   Result Value Ref Range    Sodium 137 135 - 145 mmol/L    Potassium 3.5 3.4 - 5.3 mmol/L    Carbon Dioxide (CO2) 20 (L) 22 - 29 mmol/L    Anion Gap 12 7 - 15 mmol/L    Urea Nitrogen 8.7 8.0 - 23.0 mg/dL    Creatinine 0.56 0.51 - 0.95 mg/dL    GFR Estimate >90 >60 mL/min/1.73m2    Calcium 8.4 (L) 8.8 - 10.2 mg/dL    Chloride 105 98 - 107 mmol/L    Glucose 112 (H) 70 - 99 mg/dL    Alkaline Phosphatase 436 (H) 40 - 150 U/L    AST 54 (H) 0 - 45 U/L    ALT 27 0 - 50 U/L    Protein Total 6.5 6.4 - 8.3 g/dL    Albumin 3.0 (L) 3.5 - 5.2 g/dL    Bilirubin Total 0.8 <=1.2 mg/dL   Lactic acid whole blood     Status: Abnormal   Result Value Ref Range    Lactic Acid 3.2 (H) 0.7 - 2.0 mmol/L   Magnesium     Status: Abnormal   Result Value Ref Range    Magnesium 1.6 (L) 1.7 - 2.3 mg/dL   Phosphorus     Status: Normal   Result Value Ref Range    Phosphorus 2.7 2.5 - 4.5 mg/dL   CBC with platelets and differential     Status: Abnormal   Result Value Ref Range    WBC Count 9.6 4.0 - 11.0 10e3/uL    RBC Count 2.67 (L) 3.80 - 5.20 10e6/uL    Hemoglobin 8.8 (L) 11.7 - 15.7 g/dL    Hematocrit 27.4 (L) 35.0 - 47.0 %     (H) 78 - 100 fL    MCH 33.0 26.5 - 33.0 pg    MCHC 32.1 31.5 - 36.5 g/dL    RDW 21.3 (H) 10.0 - 15.0 %    Platelet Count 61 (L) 150 - 450 10e3/uL    % Neutrophils 82 %    % Lymphocytes 13 %    % Monocytes 5 %    % Eosinophils 0 %    % Basophils 0 %    % Immature Granulocytes 0 %    NRBCs per 100 WBC 0 <1 /100    Absolute Neutrophils 7.8 1.6 - 8.3 10e3/uL    Absolute Lymphocytes 1.2 0.8 - 5.3 10e3/uL    Absolute Monocytes 0.5 0.0 - 1.3 10e3/uL    Absolute Eosinophils 0.0 0.0 - 0.7 10e3/uL    Absolute Basophils 0.0 0.0 - 0.2 10e3/uL    Absolute Immature Granulocytes 0.0 <=0.4 10e3/uL    Absolute NRBCs 0.0 10e3/uL    CK total     Status: Normal   Result Value Ref Range    CK 72 26 - 192 U/L   UA with Microscopic reflex to Culture     Status: Normal    Specimen: Urine, Midstream   Result Value Ref Range    Color Urine Yellow Colorless, Straw, Light Yellow, Yellow    Appearance Urine Clear Clear    Glucose Urine Negative Negative mg/dL    Bilirubin Urine Negative Negative    Ketones Urine Negative Negative mg/dL    Specific Gravity Urine 1.005 1.003 - 1.035    Blood Urine Negative Negative    pH Urine 7.0 5.0 - 7.0    Protein Albumin Urine Negative Negative mg/dL    Urobilinogen Urine Normal Normal, 2.0 mg/dL    Nitrite Urine Negative Negative    Leukocyte Esterase Urine Negative Negative    RBC Urine 0 <=2 /HPF    WBC Urine 0 <=5 /HPF    Narrative    Urine Culture not indicated   TSH with free T4 reflex     Status: Normal   Result Value Ref Range    TSH 3.09 0.30 - 4.20 uIU/mL   Ammonia     Status: Normal   Result Value Ref Range    Ammonia 36 11 - 51 umol/L   Lactic acid whole blood     Status: Normal   Result Value Ref Range    Lactic Acid 2.0 0.7 - 2.0 mmol/L   Asymptomatic Influenza A/B, RSV, & SARS-CoV2 PCR (COVID-19) Nose     Status: Normal    Specimen: Nose; Swab   Result Value Ref Range    Influenza A PCR Negative Negative    Influenza B PCR Negative Negative    RSV PCR Negative Negative    SARS CoV2 PCR Negative Negative    Narrative    Testing was performed using the Xpert Xpress CoV2/Flu/RSV Assay on the Cepheid GeneXpert Instrument. This test should be ordered for the detection of SARS-CoV-2, influenza, and RSV viruses in individuals who meet clinical and/or epidemiological criteria. Test performance is unknown in asymptomatic patients. This test is for in vitro diagnostic use under the FDA EUA for laboratories certified under CLIA to perform high or moderate complexity testing. This test has not been FDA cleared or approved. A negative result does not rule out the presence of PCR inhibitors in the specimen or  target RNA in concentration below the limit of detection for the assay. If only one viral target is positive but coinfection with multiple targets is suspected, the sample should be re-tested with another FDA cleared, approved, or authorized test, if coinfection would change clinical management. This test was validated by the [facility]. These laboratories are certified under the Clinical Laboratory Improvement Amendments of 1988 (CLIA-88) as qualified to perform high complexity laboratory testing.   CRP inflammation     Status: Abnormal   Result Value Ref Range    CRP Inflammation 25.70 (H) <5.00 mg/L   Vitamin B12     Status: Abnormal   Result Value Ref Range    Vitamin B12 >4,000 (H) 232 - 1,245 pg/mL   Folate     Status: Normal   Result Value Ref Range    Folic Acid 14.8 4.6 - 34.8 ng/mL   Ferritin     Status: Abnormal   Result Value Ref Range    Ferritin 417 (H) 11 - 328 ng/mL   Transferrin     Status: Abnormal   Result Value Ref Range    Transferrin 169.0 (L) 200.0 - 360.0 mg/dL   Comprehensive metabolic panel     Status: Abnormal   Result Value Ref Range    Sodium 138 135 - 145 mmol/L    Potassium 3.6 3.4 - 5.3 mmol/L    Carbon Dioxide (CO2) 20 (L) 22 - 29 mmol/L    Anion Gap 9 7 - 15 mmol/L    Urea Nitrogen 8.2 8.0 - 23.0 mg/dL    Creatinine 0.50 (L) 0.51 - 0.95 mg/dL    GFR Estimate >90 >60 mL/min/1.73m2    Calcium 8.2 (L) 8.8 - 10.2 mg/dL    Chloride 109 (H) 98 - 107 mmol/L    Glucose 88 70 - 99 mg/dL    Alkaline Phosphatase 399 (H) 40 - 150 U/L    AST 51 (H) 0 - 45 U/L    ALT 24 0 - 50 U/L    Protein Total 6.2 (L) 6.4 - 8.3 g/dL    Albumin 2.8 (L) 3.5 - 5.2 g/dL    Bilirubin Total 0.7 <=1.2 mg/dL   Lactic acid whole blood     Status: Normal   Result Value Ref Range    Lactic Acid 1.6 0.7 - 2.0 mmol/L   CBC with platelets and differential     Status: Abnormal   Result Value Ref Range    WBC Count 7.2 4.0 - 11.0 10e3/uL    RBC Count 2.45 (L) 3.80 - 5.20 10e6/uL    Hemoglobin 8.2 (L) 11.7  - 15.7 g/dL    Hematocrit 25.3 (L) 35.0 - 47.0 %     (H) 78 - 100 fL    MCH 33.5 (H) 26.5 - 33.0 pg    MCHC 32.4 31.5 - 36.5 g/dL    RDW 21.4 (H) 10.0 - 15.0 %    Platelet Count 52 (L) 150 - 450 10e3/uL    % Neutrophils 80 %    % Lymphocytes 13 %    % Monocytes 6 %    % Eosinophils 0 %    % Basophils 0 %    % Immature Granulocytes 1 %    NRBCs per 100 WBC 0 <1 /100    Absolute Neutrophils 5.8 1.6 - 8.3 10e3/uL    Absolute Lymphocytes 0.9 0.8 - 5.3 10e3/uL    Absolute Monocytes 0.5 0.0 - 1.3 10e3/uL    Absolute Eosinophils 0.0 0.0 - 0.7 10e3/uL    Absolute Basophils 0.0 0.0 - 0.2 10e3/uL    Absolute Immature Granulocytes 0.0 <=0.4 10e3/uL    Absolute NRBCs 0.0 10e3/uL   Urine Drug Screen Panel     Status: Normal   Result Value Ref Range    Amphetamines Urine Screen Negative Screen Negative    Barbituates Urine Screen Negative Screen Negative    Benzodiazepine Urine Screen Negative Screen Negative    Cannabinoids Urine Screen Negative Screen Negative    Cocaine Urine Screen Negative Screen Negative    Fentanyl Qual Urine Screen Negative Screen Negative    Opiates Urine Screen Negative Screen Negative    PCP Urine Screen Negative Screen Negative   Comprehensive metabolic panel     Status: Abnormal   Result Value Ref Range    Sodium 137 135 - 145 mmol/L    Potassium 3.7 3.4 - 5.3 mmol/L    Carbon Dioxide (CO2) 21 (L) 22 - 29 mmol/L    Anion Gap 9 7 - 15 mmol/L    Urea Nitrogen 8.6 8.0 - 23.0 mg/dL    Creatinine 0.53 0.51 - 0.95 mg/dL    GFR Estimate >90 >60 mL/min/1.73m2    Calcium 8.1 (L) 8.8 - 10.2 mg/dL    Chloride 107 98 - 107 mmol/L    Glucose 119 (H) 70 - 99 mg/dL    Alkaline Phosphatase 420 (H) 40 - 150 U/L    AST 63 (H) 0 - 45 U/L    ALT 28 0 - 50 U/L    Protein Total 6.0 (L) 6.4 - 8.3 g/dL    Albumin 2.7 (L) 3.5 - 5.2 g/dL    Bilirubin Total 0.6 <=1.2 mg/dL   Magnesium     Status: Normal   Result Value Ref Range    Magnesium 1.9 1.7 - 2.3 mg/dL   CBC with platelets and differential     Status: Abnormal    Result Value Ref Range    WBC Count 4.6 4.0 - 11.0 10e3/uL    RBC Count 2.52 (L) 3.80 - 5.20 10e6/uL    Hemoglobin 8.4 (L) 11.7 - 15.7 g/dL    Hematocrit 25.9 (L) 35.0 - 47.0 %     (H) 78 - 100 fL    MCH 33.3 (H) 26.5 - 33.0 pg    MCHC 32.4 31.5 - 36.5 g/dL    RDW 21.8 (H) 10.0 - 15.0 %    Platelet Count 53 (L) 150 - 450 10e3/uL    % Neutrophils 69 %    % Lymphocytes 20 %    % Monocytes 10 %    % Eosinophils 0 %    % Basophils 1 %    % Immature Granulocytes 0 %    NRBCs per 100 WBC 0 <1 /100    Absolute Neutrophils 3.2 1.6 - 8.3 10e3/uL    Absolute Lymphocytes 0.9 0.8 - 5.3 10e3/uL    Absolute Monocytes 0.5 0.0 - 1.3 10e3/uL    Absolute Eosinophils 0.0 0.0 - 0.7 10e3/uL    Absolute Basophils 0.0 0.0 - 0.2 10e3/uL    Absolute Immature Granulocytes 0.0 <=0.4 10e3/uL    Absolute NRBCs 0.0 10e3/uL   Blood Culture Peripheral Blood     Status: Normal (Preliminary result)    Specimen: Peripheral Blood   Result Value Ref Range    Culture No growth after 1 day    Blood Culture Peripheral Blood     Status: Normal (Preliminary result)    Specimen: Peripheral Blood   Result Value Ref Range    Culture No growth after 1 day    CBC with platelets differential     Status: Abnormal    Narrative    The following orders were created for panel order CBC with platelets differential.  Procedure                               Abnormality         Status                     ---------                               -----------         ------                     CBC with platelets and d...[[id number]]  Abnormal            Final result                 Please view results for these tests on the individual orders.   CBC with platelets differential     Status: Abnormal    Narrative    The following orders were created for panel order CBC with platelets differential.  Procedure                               Abnormality         Status                     ---------                               -----------         ------                      CBC with platelets and d...[[id number]]  Abnormal            Final result                 Please view results for these tests on the individual orders.   Urine Drug Screen *Canceled*     Status: None ()    Narrative    The following orders were created for panel order Urine Drug Screen.  Procedure                               Abnormality         Status                     ---------                               -----------         ------                       Please view results for these tests on the individual orders.   Urine Drug Screen     Status: Normal    Narrative    The following orders were created for panel order Urine Drug Screen.  Procedure                               Abnormality         Status                     ---------                               -----------         ------                     Urine Drug Screen Panel[[id number]]      Normal              Final result                 Please view results for these tests on the individual orders.   CBC with Platelets & Differential     Status: Abnormal    Narrative    The following orders were created for panel order CBC with Platelets & Differential.  Procedure                               Abnormality         Status                     ---------                               -----------         ------                     CBC with platelets and d...[[id number]]  Abnormal            Final result                 Please view results for these tests on the individual orders.     Pending cultures (date obtained):   Bcx (5/14): NGTD  Ucx: (5/15): NGTD     ASSESSMENT:   68 year old female with metastatic ovarian high-grade serous carcinoma admitted for altered mental status/delirium and restless legs. Pt recently admitted for back pain and recent falls; PT/OT recommended TCU at discharge however patient declined and elected to discharge to home. Brain MRI was pending during that admission with plan at the time of discharge to complete this on an outpatient  basis. She returned to the hospital with new restlessness, disorientation, slurred speech x2d. Laboratory work up notable for lactic acidosis with LA 3.2 which resolved with IV fluids, hypomagnesemia with mg 1.6, stable TCP and anemia. WBC 9.6 from 3 last admission c/f possible infection; she has had some diarrhea and n/v. CT A/P showed edematous bowel c/w possible gastroenteritis; C diff and enteric panel pending. Remainder of work up for infection has been unremarkable with negative CXR, covid, flu, UA. BCX and UCX pending.  Other work up for her delirium has included ammonia and TSH which are wnl, brain MRI w subacute infarct but no new metastatic neurologic dz. Ddx for delirium/AMS still  includes infection, polypharmacy, paraneoplastic sx, seizure. Neurology following. Overnight patient had difficulty with insomnia 2/2 restless legs. PTA amytriptyline, ambien were tried along with haldol, which were not effective. Dilaudid was eventually administered which was effective.    PLAN:   # AMS  # delirium, improving  - brain MR w/ subacute infarct, cannot completely exclude metastases. Repeat MRI 2-4 weeks.   -CTA head & neck for further vessel imaging   -- TTE with Bubble Study   - f/up BCX, UCX  - COVID, flu neg, f/up C diff, enteric panel   - f/up b1 (ord per neuro recommendations)  - f/up neurology recommendations      # Restless legs   - continue w/ sx management (topical lidocaine, ice packs)  - low dose gabapentin prn   - f/up with Palliative recommendations    # Insomnia  - PTA ambien, amitriptyline  - May consider quetiapine or xyprexa      # Lactic acidosis, resolved    - suspect d/t recent diarrhea and poor PO intake   - improved      # E. Coli Positive Urine culture  - repeat UA, Ucx  - Completed 6/7 days of Macrobid   - received 1 dose ceftriaxone in the ED  - UA w/o evidence of infection on admission       # HypoMg  - Mg 1.9 today   - Continue to monitor     # Recent falls  # Back pain  - PRN tylenol,  ibuprofen  - Ice/Heat therapy as needed     # Atrial Fibrillation  - PTA eliquis re-started      # High grade serous ovarian cancer   - MRI w subacute infarct, no evidence of metastatic dz  - regular diet, ensure supplements   - ant-emetics prn   - bowel regimen/imodium prn      # Neuropathy  - PTA amitriptyline ordered      Disposition: pending course    Discussed with Dr. Lilly and Dr. Arriaga.    Jean Gutierrez MD  Obstetrics and Gynecology, PGY-1  05/16/24     I saw and evaluated the patient with the resident.  I edited and reviewed the above note. I have reviewed all pertinent imaging and labs on this patient.  Restless legs improved, patient  answers questions but seems distracted. Seems improved over last nights events. MRI shows subacute infarct. Plan TTE with bubble study and CTA head and neck.    Eladia Arriaga MD  Professor  Department of Ob/Gyn and Women's Health  Division of Gynecologic Oncology  [facility]  [phone]

## 2024-05-15 NOTE — MEDICATION SCRIBE - ADMISSION MEDICATION HISTORY
Medication Scribe Admission Medication History    Admission medication history is complete. The information provided in this note is only as accurate as the sources available at the time of the update.    Information Source(s): Hospital records, Facility ([facility]/) medication list/MAR, and CareEverywhere/SureScripts via in-person    Pertinent Information: Patient was recently seen on 05/12/2024-05/13/2024 and her medication hx was completed by Medication Scribe on 05/12/2024. Please see note for updates. No new medications added/ deleted by writer. Writer did not find any new outside medications.     Changes made to PTA medication list:  Added: None  Deleted: None  Changed: None    Allergies reviewed with patient and updates made in EHR: no    Medication History Completed By: Suki Ye 5/15/2024 9:07 AM    PTA Med List   Medication Sig Last Dose    amitriptyline (ELAVIL) 100 MG tablet Take 100 mg by mouth at bedtime Past Week    BEVACIZUMAB, AVASTIN, INJECTION 2.5MG Every 3 weeks Past Week    cyclobenzaprine (FLEXERIL) 5 MG tablet Take 1 tablet (5 mg) by mouth 3 times daily as needed for muscle spasms Past Week    dexAMETHasone (DECADRON) 4 MG tablet Take 2 tablets (8 mg) by mouth daily for 3 days, starting the day after chemotherapy. Past Week    doxepin (SINEQUAN) 10 MG/ML (HIGH CONC) solution Take 2.5 mLs (25 mg) by mouth every 4 hours as needed for other (. Mix with equal amount water. Swish and hold in mouth for 1 min then spit out.) Past Week    gabapentin (NEURONTIN) 100 MG capsule Take 1 capsule (100 mg) by mouth 3 times daily as needed for other (restles legs) Past Week    HYDROmorphone (DILAUDID) 4 MG tablet Take 0.5 tablets (2 mg) by mouth every 6 hours as needed for pain Past Week    lidocaine (LIDODERM) 5 % patch Place 1 patch onto the skin every 24 hours for 10 days Place one patch on back for 12 hours/  then remove for 12 hours Past Week    lidocaine-prilocaine (EMLA) 2.5-2.5 % external cream  Apply topically as needed for moderate pain (30min prior to port access) Past Week    meclizine (ANTIVERT) 25 MG tablet Take 1 tablet (25 mg) by mouth 3 times daily as needed for dizziness or nausea Past Week    naloxone (NARCAN) 4 MG/0.1ML nasal spray Spray 1 spray (4 mg) into one nostril alternating nostrils as needed for opioid reversal every 2-3 minutes until assistance arrives Unknown    nitroFURantoin macrocrystal-monohydrate (MACROBID) 100 MG capsule Take 1 capsule (100 mg) by mouth 2 times daily Past Week    ondansetron (ZOFRAN) 8 MG tablet Take 1 tablet (8 mg) by mouth every 8 hours as needed for nausea (vomiting) Past Week    prochlorperazine (COMPAZINE) 10 MG tablet Take 1 tablet (10 mg) by mouth every 6 hours as needed for nausea or vomiting Past Week    SUMAtriptan (IMITREX) 100 MG tablet Take 1 tablet (100 mg) by mouth at onset of headache for migraine Past Week    valACYclovir (VALTREX) 1000 mg tablet Take 1,000 mg by mouth as needed Past Week    Xylitol (XYLIMELTS MT) Place 1 tablet on Gums at bedtime Past Week    zolpidem (AMBIEN) 10 MG tablet Take 1 tablet (10 mg) by mouth every evening Past Week

## 2024-05-15 NOTE — ED TRIAGE NOTES
Pt to ED for confusion and restlessness. Daughter reports she has become increasingly confused since around 12pm today. Pt with hx of RLS, however she has been experiencing tingling all over. Daughter states she is currently on abx for a UTI, just discharged from the hospital yesterday. On active chemo for ovarian cancer.      Triage Assessment (Adult)       Row Name 05/14/24 2313          Triage Assessment    Airway WDL WDL        Respiratory WDL    Respiratory WDL WDL        Skin Circulation/Temperature WDL    Skin Circulation/Temperature WDL WDL        Cognitive/Neuro/Behavioral WDL    Cognitive/Neuro/Behavioral WDL X     Level of Consciousness confused        [place] Coma Scale    Best Eye Response 4-->(E4) spontaneous     Best Motor Response 6-->(M6) obeys commands     Best Verbal Response 4-->(V4) confused     [place] Coma Scale Score 14

## 2024-05-15 NOTE — CONSULTS
"[facility]  Neurology Consultation    Patient Name:  Taran Regalado  MRN:  [id number]    :  1955  Date of Service:  May 15, 2024  Primary care provider:  Bolivar Juarez      Neurology consultation service was asked to see Taran Regalado by Dr. Arriaga to evaluate for new onset confusion and balance issues.    Chief Complaint:  Confusion, Balance Issues, Worsening RLS    History of Present Illness:   Taran Regalado is a 68 year old female w/ PMHx ovarian cancer with hepatic/omentum mets s/p intrahepatic stents () + omentectomy () + hysterectomy/salpingo-oophorectomy () on Carboplatin/Taxol/Bevacizumab with chest port in place, biliary obstruction, migraines, chemo-induced peripheral neuropathy, RLS, Afib not on AC, hx PE, and insomnia who presented on 2024 with complaints of acute worsening of RLS symptoms and new onset confusion as well as a few weeks of balance issues. Neurology was consulted talon aid in further assessment/management    Given the patient's current confusion, the majority of the following was take from chart review as well as the patient's daughter, present at bedside. The patient's daughter reports that at baseline her mother has no deficits and there has never been concern for cognitive issues in the past. However, things started to change 2-3 weeks ago when the patient's balance began to decline. At this point, she is unable to stand unassisted more so due to imbalance rather than weakness, but her daughter has not noticed her listing to any particular side. Her balance issues also led to a few falls that are presumed to be mechanical as the patient never complained of any dizziness or other issues besides simply \"losing her balance\". One of these falls resulted in persistent back pain for which the patient was recently hospitalized from  - 2024. During that hospitalization, CT and MRI L-spine were unremarkable save for " low T1 signal throughout the bone marrow suggestive of chronic anemia vs a diffuse bone marrow process. She also completed a CT Ab/Pelvis which revealed previously known hepatic/osseous mets + splenomegaly alongside new increased ascites and peritoneal thickening/enhancement potentially concerning for peritoneal carcinomatosis. She was advised to complete an MRI Brain, but did not want to wait in the hospital that long and so this was deferred to outpatient, but has yet to be completed. Basic labs were otherwise notable for a pancytopenia (WBC 3.6, Hgb 8.8, Plt 54) which was somewhat expected from her chemo as well as an elevated lactic acid that improved with fluids (2.5 -> 1.8). The patient was treated with Flexeril and Dilaudid which cleared up her back pain and she was discharged to home after declining initial recommendation of TCU. Of note, during this admission Palliative Care also recommended switching her Amitriptyline to Duloxetine for neuropathy, but the patient declined. The only other medication change was that the patient's Eliquis was halted given her recurrent falls and thrombocytopenia. Also, the patient had been diagnosed with an E coli UTI 2d prior and so was on Macrobid    On the day of discharge, the patient's daughter reports that the patient had been doing quite well with no signs of confusion or discomfort. However, a few hours after getting home, the patient began to feel abnormal. She became restless and was complaining of increased RLS symptoms. Typically, these are present only at night in her legs, but they were not present throughout her body and she was constantly wanting to move; saying that it felt as if bugs were crawling over her. She also started becoming increasingly confused, not able to follow a conversation and oftentimes speaking about things that didn't make sense (ex thinking she was at her daughter's house while at the hospital, mentioning an auction, etc). She may also  "have been suffering visual hallucinations as she was pointing at a wall at one point and speaking about needing to fix the tree house \"over there\" or mentioning seeing people that were not there. She has never experienced symptoms such as these in the past, even with prior UTI's. Her daughter states that she is still able to answer simple questions when effort is made to truly grab her attention but more complicated conversations are beyond her currently. Her daughter also reports that her mother's speech is now garbled and she doesn't appear to be \"using her lips right\". And when she has tried to put on chap stick, she keeps missing her lips and instead lands on her chin. Finally, she has been suffering some diarrhea recently (unclear if this is new as she commonly fluctuates between constipation and diarrhea) and also vomited twice today with nausea. The patient denies any abdominal pain as well as HA's, dizziness, vision/hearing changes, and overall appears to have a poor understanding of her current deficits. Daughter denies any abdominal distension    Since her arrival back in the ED, the patient has been afebrile and hemodynamically stable. Work-up to this point has included a CTH which returned unremarkable. Basic labs including CBC, CMP, ammonia, lactic acid, CK, TSH, and iron studies are notable for a rise in her WBC (3.6 -> 9.6) with stable Hgb and Plt. She does have a few electrolyte imbalances including minor hypocalcemia (8.4) and hypomagnesemia (1.6) as well as elevated Alk Phos (436) and AST (50) which appears stable if not improved from prior. Her Ferritin is 417 and she again has a lactic acidosis (3.2). She has so far received fluids    ROS  A comprehensive ROS was performed and pertinent findings were included in HPI.     PMH  Past Medical History:   Diagnosis Date    Atrial fibrillation with rapid ventricular response (H)     History of cold sores     Hx of LASIK 12/11/2017    Insomnia     " Migraine     Osteopenia     Pelvic mass     Peritoneal carcinomatosis (H)     Restless legs syndrome (RLS)      Past Surgical History:   Procedure Laterality Date    APPENDECTOMY      ARTHROSCPY KNEE SURGICAL DEBRIDEMENT SHAVING ARTICULAR CARTILAGE Right     BIOPSY  January 2021    Biopsy to confirm ovarian cancer    DEBRIDEMENT LEFT UPPER EXTREMITY  2016    ENDOSCOPIC RETROGRADE CHOLANGIOPANCREATOGRAM N/A 12/15/2023    Procedure: ENDOSCOPIC RETROGRADE CHOLANGIOPANCREATOGRAPHY, with pancreatic stent placement, biliary duct dilation, biliary stent placement, and biliary sphincterotomy;  Surgeon: Fabian Simpson MD;  Location: [facility] OR    HYSTERECTOMY TOTAL ABD, LUISITO SALPINGO-OOPHORECTOMY, NODE DISSECTION, TUMOR DEBULKING, COMBINED Bilateral 4/19/2021    Procedure: HYSTERECTOMY, TOTAL, ABDOMINAL, WITH BILATERAL SALPINGO-OOPHORECTOMY, omentectomy, NEOPLASM DEBULKING,Proctoscocy, RO, Resection of liver nodules, diaphragm stripping, immobilization of liver and colon;  Surgeon: Bolivar Juarez MD;  Location: [facility] OR    IR CHEST PORT PLACEMENT > 5 YRS OF AGE  4/12/2024    LAPAROSCOPY DIAGNOSTIC (GYN) Bilateral 1/7/2021    Procedure: Diagnsotic laparoscopy, biopsies;  Surgeon: Bolivar Juarez MD;  Location: [facility] OR    LASIK      TUBAL LIGATION         Medications   I have personally reviewed the patient's medication list.     Allergies  I have personally reviewed the patient's allergy list.     Social History  - Former Tobacco Use (quit 2018)  - Denies EtOH or drug use    Family History    - No pertinent FHx      Physical Examination   Vitals: /79   Pulse 76   Temp 97.6  F (36.4  C) (Oral)   Resp 15   SpO2 100%   General: Lying in bed, NAD  HEENT: NC/AT, no icterus  Cardiac: Appears well-perfused, no peripheral edema noted   Respiratory: Non-labored on RA  GI: Soft, non-tender, distended abdomen (but not more than normal per daughter) with chronic hernia present and vertical scar  Skin: No rash or lesion on  "exposed skin  Neuro:  Mental status: Awake, alert. Is easily distracted and does not engage in conversation unless effort is made to catch her attention directly and ask her a simple question. She is able to answer only basic questions and largely responds with 1-2 word answers though is more commonly mumbling to herself, frequently makes mention of topics that are not related to the conversation. Oriented to person, age, place, and year. Unclear if oriented to situation as reports she is at the hospital to \"take care of all this\". Naming intact. Able to state the days of the week forward but unable to do so backwards, though poor effort at this point. Able to perform basic arithmetic but cannot perform calculations using money. Registration 3/3 but recall 0/3, able to get one word with categorical clues. Unable to follow 3-step crossed commands. Unable to copy meaningless hand gestures. Able to read. Speech is clear and fluid, no concern for aphasia. No concern for neglect. Does not appear to be floridly hallucinating  Cranial nerves: PERRL, conjugate gaze, EOMI w/o nystagmus, blinks to threat bilaterally, facial sensation intact, no facial asymmetry noted, hearing intact to conversation, mild dysarthria, tongue is midline  Motor: Normal bulk and tone. No abnormal movements including asterixis. Strength is 5/5 in all extremities   Reflexes: 2+ and symmetric reflexes in the bilateral biceps and brachioradialis. Absent reflexes in the knees and ankles. Mute toes bilaterally, no clonus  Sensory: Decreased sensation to light touch in the bilateral feet though unable to get the patient to describe her sensory loss more than this. Pos Romberg  Coordination: FNF intact bilaterally. No truncal ataxia when sitting at edge of bed  Gait: Patient was able to rise largely under her own power but could not maintain balance without significant suppor and was oftentimes leaning backwards. Too unsteady to perform gait " exam    Investigations   I have personally reviewed pertinent labs, tests, and radiological imaging. Discussion of notable findings is included under Impression.     Was patient transferred from outside hospital?   No    Impression  Taran Regalado is a 68 year old female w/ PMHx ovarian cancer with hepatic/omentum mets s/p intrahepatic stents (2023) + omentectomy (2021) + hysterectomy/salpingo-oophorectomy (2021) on Carboplatin/Taxol/Bevacizumab with chest port in place, biliary obstruction, migraines, chemo-induced peripheral neuropathy, RLS, Afib not on AC, hx PE, and insomnia who presented on 5/14/2024 with complaints of acute worsening of RLS symptoms and new onset confusion as well as a few weeks of balance issues. Neurology was consulted to aid in further assessment/management.    Etiology of the patient's various complaints remains unclear at this time and given the difference in timing of onset between her balance issues and now more acute onset confusion, ?hallucinations, dysarthria, and worsening RLS it is possible that two separate conditions are at play. Concerning her more recent presentation, the patient is potentially delirious. This could stem from polypharmacy as the patient has quite the number of CNS-acting medications on her home list, but would also hold concern for infection beyond her previously diagnosed UTI as her WBC has nearly tripled over the course of a single day. Considering more serious conditions, I am not able to rule out recurrent seizure at this time, though I feel this is less likely. Unclear focal deficits also makes a vascular insult less likely. Would consider the possibility of mets to her brain or potentially an inflammatory/paraneoplastic condition as well. For now, would recommend a basic encephalopathy work-up including an MRI Brain w/ and w/o contrast. Pending patient's course, may also consider an LP +/- EEG down the line    As to her balance issues, these could stem  from her known chemo-induced peripheral neuropathy though this would have had to have gotten much worse. The patient's difficulty in reporting her sensory deficits makes further assessment difficult and so would also consider central causes, though no UMN signs were noted on exam. MRI Brain will aid with further assessment, but may consider further imaging depending on how her exam changes. Polypharmacy could also again be at play    Recommendations  -Agree with admission by Oncology   -Neurochecks Q4h  -MRI Brain w/ and w/o contrast  -Will defer infectious work-up to Oncology  -Vit B1, Vit V12, UDS  -Ongoing correction of metabolic derangements  -Limit CNS-acting medications as much as possible  -Delirium Precautions  -For treatment of RLS-type symptoms, would try to avoid CNS-acting medications if at all possible though most all treatments for RLS are CNS-acting. May try cold packs and lidocaine cream. Otherwise, may consider low-dose Gabapentin  -Neurology will continue to follow    Thank you for involving Neurology in the care of Taran Regalado.  Please do not hesitate to call with questions/concerns (consult pager [phone]).      Patient to be staffed with General Neurology team in the AM    Natali Alonzo MD  Neurology PGY4    Day Team Addendum  Overall, patient is improved this AM, lethargic and frequently sleeping but oriented, able to answer questions appropriately, perform calculations. No other major changes to her neurologic exam today. Given waxing and waning course, continue to feel delirium is the largest  of her current symptoms likely in the setting of polypharmacy, recent infection, and lack of sleep given worsening RLS symptoms. Of note, her RLS symptoms are currently under good control and would like to avoid centrally-mediated agents currently given delirium. Awaiting MRI brain to rule out malignancy and evaluate for signs of autoimmune/paraneoplastic process. For her falls, still appears that  neuropathy may be the largest component that is again exacerbated by polypharmacy and potentially frailty from recent poor PO intake 2/2 chemotherapy-induced dysgeusia and nausea. Denies symptoms of orthostasis. Her daughters describe some description of gait instability, could consider central process like cerebellar insult, will follow-up brain MRI.     This patient's care was discussed with attending physician, Dr. Son.     Kristen Moreno MD  PGY-3  Internal Medicine

## 2024-05-15 NOTE — UTILIZATION REVIEW
Admission Status; Secondary Review Determination       Under the authority of the Utilization Management Committee, the utilization review process indicated a secondary review on the above patient. The review outcome is based on review of the medical records, discussions with staff, and applying clinical experience noted on the date of the review.     (x) Inpatient Status Appropriate - This patient's medical care is consistent with medical management for inpatient care and reasonable inpatient medical practice.     RATIONALE FOR DETERMINATION:  68-year-old female with metastatic ovarian high-grade serous carcinoma who presented with persistent back pain was recently discharged and returned quickly back to the ED with worsening condition/confusion.  She has failed attempted discharge/outpatient treatment.  She is also having more balance issues.   Etiology remains unclear but she remains significantly altered.   Patient has ongoing confusional state (eg, disorientation, difficulty following commands, deficit in attention) that is persisting.      At the time of admission with the information available to the attending physician more than 2 nights Hospital complex care was anticipated, based on patient risk of adverse outcome if treated as outpatient and complex care required. Inpatient admission is appropriate based on the Medicare guidelines.   The information on this document is developed by the utilization review team in order for the business office to ensure compliance. This only denotes the appropriateness of proper admission status and does not reflect the quality of care rendered.   The definitions of Inpatient Status and Observation Status used in making the determination above are those provided in the CMS Coverage Manual, Chapter 1 and Chapter 6, section 70.4.     Sincerely,     Adryan Raymond DO, [facility]  Utilization Review  Physician Advisor

## 2024-05-15 NOTE — PROVIDER NOTIFICATION
Text paged Gyn Onc:    Pt is requesting for iv Dilaudid for her leg restless pain. Pt stated she take Dilaudid po 4 mg at home, but now her pain is 8/10 and they need a breakthrough pain. I just gave her prn Gabapentin. Thank you.

## 2024-05-15 NOTE — PROGRESS NOTES
Pt daughter reported that pt is having audible/visual hallucinations and reports seeing children she knows from her  sitting on one of the chairs in the room.  While I was in the room the pt commented about a painting in the room that had pickles on it (no painting like that in the room).    Admitting team paged and notified about this.

## 2024-05-15 NOTE — TELEPHONE ENCOUNTER
Received page from pts daughter Laure.     Called pts number and daughter Laure answered. She reports pt was discharged from the hospital yesterday and since her discharge, has had terrible sx of restless leg syndrome. Feels she cannot stop moving her extremities, legs and arms. Has tried pramipexole at home which was previously prescribed to her (do not see this on pts current med list) without relief. No recent ferritin/transferrin that I can see. Pts daughter states they were walking around the block at midnight last night d/t severity of symptoms. Per review of her chart, pt discharged from the hospital yesterday after an admission for recent falls and back pain. TCU was recommended at discharge after assessment by PT/OT and pt declined. It was recommended that patient remain in the hospital for ongoing discussion with palliative care and management of sx until a safe dispo plan could be determined however pt declined to remain inpatient. She has a history of metastatic high grade serous ovarian cancer currently receiving carbo/taxol/amaris. D/w pts daughter first line management of restless legs is behavioral interventions; regular exercise, mental distraction (eg crossword puzzles, sudoku), soaking extremities (warm bath). Daughter reports these interventions have been attempted and are not providing relief. Pt unable to sleep.  Pt is taking amitriptyline for management of neuropathy which may also be contributing to her sx; palliative care had recommended transitioning to duloxetine instead during recent admission but pt was not interested in making that medication change at that time.Discussed that we would recommend testing of ferritin, transferrin to determine whether iron deficiency playing a role; pt has lab draw coming up where these labs may be added on. In the interim, pts daughter requesting something else for relief of symptoms. Offered that we could try short course of low dose gabapentin with the  caveat that this medication can be sedating and even cause ataxia in some cases; pt has h/o recent falls so would use this medication with caution. Pts daughter states someone is with her at all times throughout the day and sleeping with her at night. Will send Rx for short course low dose (100 mg) gabapentin to try at bedtime to help with sleep. If improvement of sx may slowly up titrate dose to bid, tid. Recommend pt discuss sx further with her primary oncologist Dr Juarez; follow up already scheduled for early next week to discuss current medications which may be exacerbating symptoms and other pharmacotherapy or adjunctive interventions for management of these bothersome sx. D/w pts daughter that she does not feel pt is safe at home she should be evaluated in the emergency department. Rx sent to [facility] in [place].    Twyla Rodriguez MD  OB/GYN PGY-2  5/14/2024 8:32 PM

## 2024-05-15 NOTE — TELEPHONE ENCOUNTER
"Nurse Triage SBAR    Is this a 2nd Level Triage? NO    Situation: Restless leg flare up    Background: Pts daughter (verbal consent given for communication) reports that Pt has had increased nausea, but it having worsening restless leg syndrome. She is \"tweaking out\" and has taken her prescribed flexeril without benefit    Assessment: Stating the insomnia/jerking movements is interfering with ADLs and mobility. Unable to rest at all    Protocol Recommended Disposition:   See PCP Within 3 Days    Recommendation: Recommended visit in three days, however did offer answering service for provider within oncology speciality. Transferred for further assistance         Does the patient meet one of the following criteria for ADS visit consideration? 16+ years old, with an [facility] PCP     TIP  Providers, please consider if this condition is appropriate for management at one of our Acute and Diagnostic Services sites.     If patient is a good candidate, please use dotphrase <dot>triageresponse and select Refer to ADS to document.      Reason for Disposition   Insomnia interferes with work or school    Additional Information   Negative: [1] Insomnia persists > 1 week AND [2] no improvement after using Care Advice    Protocols used: Insomnia-A-AH    "

## 2024-05-15 NOTE — H&P
"[facility] History and Physical    Taran Regalado MRN# [id number]   Age: 68 year old YOB: 1955     Date of Admission:  5/14/2024    Primary care provider: Bolivar Juarez             Chief Complaint:   Altered mental status   Restless legs          History of Present Illness:   This patient is a 68 year old female with metastatic ovarian high-grade serous carcinoma  who presents with altered mental status and RLS.     Pt was recently admitted from 5/12-5/13 with back pain and recent falls at home. Imaging of her spine did not show any acute fracture or metastases. Her pain was treated and she was evaluated by PT/OT who recommended TCU at discharge. Pt declined TCU. It was recommended she remain inpatient for brain MRI and safe dispo planning however pt elected to discharge to home with outpatient follow up.     Pt returns tonight with her daughter Laure. Laure reports that since 2 hours after her discharge on 5/13, pt has had restlessness, intermittent incoherent speech, slurring of words, and confusion. Pt has a h/o RLS but has never had symptoms this severe in the past. Reports both arms and legs feel need to be constantly moving, as if there are bugs crawling over them per her daughters report. She has gotten a few hours of sleep since her discharge, otherwise has been constantly moving all extremities, needs someone by her side 24/7 d/t constant movement.  Laure notes that the pt is frequently speaking about things that don't make sense (talking about an auction, a man with a horse on facebook, the doctor with \"the boots,\" etc) and has been often slurring her words. On arrival to the hospital she asked to go to her room downstairs (thinking she was at her daughters house). Daughter reports 3 episodes of diarrhea / day since discharge. No fever or chills at home. Pt continues to be nauseous and vomited twice today. She denies any pain. Reports the back pain that she came in with 2 days ago has " completely resolved. Pt is unsure if she has ongoing dysuria, has been taking macrobid since last Friday for an E coli UTI.  No cough or cold symptoms.          Cancer Treatment History:   12/3/2020: US Pelvic: IMPRESSION: Limited examination due to acoustic windows. Possible left adnexal mass. A CT scan of the abdomen and pelvis with contrast is recommended for further assessment.     12/4/2020: CT A/P:   IMPRESSION:    Peritoneal carcinomatosis with masslike peritoneal thickening in the lower pelvis which may indicate an adnexal or ovarian primary malignancy. Large volume ascites. Bilateral pleural effusions. There is potential subtle pleural nodularity in the right hemithorax which could indicate metastatic disease.  Indeterminate 1 cm lesion in the right hepatic lobe suspicious for a metastatic lesion.      12/16/2020: Presented to [facility] with abdominal distention, 25lb weight loss, and CTAP with carcinomatosis, elevated  3098.     12/23/2020: CT Chest: IMPRESSION:   1. There are few scattered small sub-6 mm pulmonary nodules which are indeterminate without prior comparisons available. There are a few  slightly larger perifissural nodules which are technically  indeterminate in the setting of malignancy although presumed lymphatic in nature and of unlikely clinical significance. Attention on follow-up is recommended.  2. Small to moderate left and small right pleural effusions are increased in size from prior. No convincing evidence for pleural nodularity.  3. Partially visualized large volume ascites and peritoneal nodularity in the upper abdomen similar to 12/4/2020 outside CT      12/26/2020: ED for abdominal distension; 3 L ascites drained with paracentesis    Pelvic US: Findings: Free fluid present in LLQ      12/31/2020: US Paracentesis: 900 mL ascites drained     1/7/2021: Diagnotic laparoscopy, biopsies  Pathology: FINAL DIAGNOSIS:   A. PERITONEUM, BIOPSIES:   - Positive for high grade carcinoma,  consistent with metastatic carcinoma of Mullerian origin.     1/10-1/13/2021: Hospital admission for postoperative non-intractable vomiting and nausea.      1/10/2021: CT A/P: IMPRESSION: Extensive ascites which is probably malignant. Scattered liver hypodensities of indeterminate etiology comment cannot exclude metastatic disease. Diverticulosis. Fluid-filled adnexal masses and irregular appearance of uterus, which may represent primary neoplasm. Multiple peritoneal nodules. Large amount of fecal material in the colon with no evidence of small bowel obstruction.     Plan: Paclitaxel 175 mg/m2 and carboplatin AUC 6 x 3 cycles followed by a CT CAP and visit with Dr. Juarez.     1/12/2021: Cycle 1 paclitaxel and carboplatin while inpatient     1/13/2021: CT Head: Impression:  1. Chronic sinusitis of the right maxillary and right sphenoid sinuses.  2. Incidental presumed calcified meningioma in the right frontal  convexity without significant mass effect.  3. No suspicious intracranial enhancing lesion.     2/1/2021: Cycle 2 paclitaxel and carboplatin.  936.     2/3-2/5/21: Admission [facility] for afib w/ RVR and new PE     2/26/21: Cycle 3 paclitaxel and carboplatin planned.  Deferred due to thrombocytopenia.  pending.     3/15/21: Cycle 3 paclitaxel and carboplatin given     4/19/21: HYSTERECTOMY, TOTAL, ABDOMINAL, WITH BILATERAL SALPINGO-OOPHORECTOMY, omentectomy, NEOPLASM DEBULKING,Proctoscocy, RO, Resection of liver nodules, diaphragm stripping, immobilization of liver and colon  FINAL DIAGNOSIS:   A. OMENTUM, BIOPSY:   - Omental adipose tissue with rare viable cells of metastatic high grade   serous carcinoma   - One reactive lymph node, negative for malignancy (0/1)   B. NODULE, SIGMOID, EXCISION:   - Calcified necrotic adipose tissue   - Negative for malignancy   C. NODULE, SMALL BOWEL MESENTERY, EXCISION:   - Fibroadipose tissue, positive for metastatic high grade serous carcinoma   D. UTERUS,  CERVIX, BILATERAL FALLOPIAN TUBES AND OVARIES, HYSTERECTOMY   WITH BILATERAL SALPINGO-OOPHORECTOMY:   - Atrophic endometrium   - Uterine serosa with rare viable cells consistent with high grade serous   carcinoma   - Cervix with atrophic changes   - Viable cells consistent with high grade serous carcinoma present in the   right ovary, serosa of right   fallopian tube and right periadnexal soft tissue   - Left ovary with atrophic changes   - Left fallopian tube with a rare focus of serous tubal in-situ carcinoma   (STIC)   E. NODULES, SMALL BOWEL MESENTRY, EXCISION:   - Fibroadipose tissue with rare viable cells of metastatic high grade   serous carcinoma   F. NODULE, SPLENIC FLEXURE TRANSVERSE COLON, EXCISION:   - Fibroadipose tissue with rare viable cells of metastatic high grade   serous carcinoma   - Accessory splenule, negative for malignancy   G. OMENTUM, OMENTECTOMY:   - Omental adipose tissue with rare viable cells of metastatic high grade   serous carcinoma   H. NODULE, PERITONEAL PANCREATIC, EXCISION:   - Fibrous adhesions with inflammation   - Negative for malignancy   I. RIGHT HEMIDIAPHRAGM PERITONEUM, EXCISION:   - Fibrous adhesions with inflammation   - Negative for malignancy   J. RIGHT LIVER SURFACE NODULE:   - Fibrous adhesions with benign inclusion glands   - Negative for malignancy   K. LEFT LOWER LIVER EDGE, BIOPSY:   - Cauterized hepatic parenchyma and capsule   - Negative for malignancy   L. NODULE, SMALL BOWEL MESENTERIC #3, EXCISION:   - Fibroadipose tissue with rare viable cells of metastatic high grade   serous carcinoma       Plan: Carboplatin AUC 6 + Taxol 175 mg/m2 x 3 cycles, then transition to Parp inhibitor for maintenance therapy given her BRCA1 germline mutation.      5/21/21: Cycle 4 carboplatin and paclitaxel.   172.  6/11/21: Cycle 5 carboplatin and paclitaxel.   61.  7/2/21: Cycle 6 carboplatin and paclitaxel.   20.        7/28/21 plan: Olaparib 300mg bid as  starting dose,  14  8/20/21: start date olaparib 300 mg BID,  12  9/13/21:  22  10/4/21:  23  11/1/21:  26     11/02/2021: PET CT: IMPRESSION:   Findings compatible with interval surgery and posttreatment change.  No gross definitive FDG avid disease.  Potential foci of tumor deposits along the anterior dome of the liver and midline abdominal wall surgical scar.  Colonic activity is not necessarily abnormal, however, given the previous carcinomatosis the colonic activity is indeterminant.         12/1/21:  23  1/3/22:  21  2/1/22:  20  3/1/22:  21  4/1/22:  23  5/4/22:  28     EXAM: CT CHEST/ABDOMEN/PELVIS W CONTRAST  LOCATION: [facility]  DATE/TIME: 7/11/2022 1:25 PM     INDICATION: Stage III B high-grade ovarian carcinoma diagnosed Jan 2021. Posttreatment surveillance.  COMPARISON: CTA AP 04/23/2021, CT CAP 04/02/2021  TECHNIQUE: CT scan of the chest, abdomen, and pelvis was performed following injection of IV contrast. Multiplanar reformats were obtained. Dose reduction techniques were used.   CONTRAST: isovue 370 105mL IV; 50mL omni 140 oral     FINDINGS:   LUNGS AND PLEURA: No suspicious pulmonary nodules. Minimal right apical pleural thickening and a few punctate tiny nodules 2 of which are subpleural on the right are stable. No pleural effusions.     MEDIASTINUM/AXILLAE: No adenopathy. No central pulmonary emboli.     CORONARY ARTERY CALCIFICATION: Cannot evaluate.     HEPATOBILIARY: Normal.     PANCREAS: Normal.     SPLEEN: Normal.     ADRENAL GLANDS: Normal.     KIDNEYS/BLADDER: Normal.     BOWEL: Sliver of ascites adjacent to the spleen noted, decreased from prior study. There is a 4 mm nodule in the gastrosplenic ligament (image 352). On the preop study it appears as a smaller punctate nodule (prior image 341). Sliver of ascites in the   gastrohepatic ligament also noted. No peritoneal tumor nodules. No bowel  obstruction. Quite redundant colon with mild large stool burden. Extensive distal colonic diverticulosis.     LYMPH NODES: Normal.     VASCULATURE: Mild arterial calcifications. Circumaortic left renal vein.     PELVIC ORGANS: Hysterectomy. Vaginal cuff is normal.     MUSCULOSKELETAL: Diastases of the midline rectus sheath above the umbilicus. Minimal nodular scarring to the left of midline in the midabdomen (image 419). There is a shallow broad-based supraumbilical ventral hernia containing only fat. No implants   within the hernia or abdominal incision. No suspicious bone lesions.                                                                      IMPRESSION:  1.  Sliver of ascites in the upper abdomen has decreased since interval debulking surgery.  2.  There is a punctate nodule in the gastrosplenic ligament which is minimally more plump relative to the preop exam. This will need to be followed.  3.  Minimal nodular changes to the left of the midline scar in the subcutaneous fat will have to be followed as well. Unclear if this simply reflects postoperative scarring or could reflect an early incisional recurrence.  4.  No other sites to suggest recurrent tumor. Vaginal cuff is normal.  5.  Extensive distal colonic diverticulosis.  6.  Other noncritical findings as noted above.      9/16/22  98     1/30/23 CT CAP:    IMPRESSION:  1.  Multiple new, hypoattenuating lesions in the liver, suspicious for hepatic metastatic disease.  2.  Necrotic mario hepatic lymphadenopathy, concerning for richard metastatic disease.  3.  There is a new or increasingly conspicuous 6 mm soft tissue nodule in the right lower quadrant. This is indeterminate.  4.  There is a new 3 mm solid nodule in the right upper lobe, indeterminate.  5.  Stable approximate 4 mm punctate nodule along the gastrosplenic ligament.  6.  Similar area of linear free fluid in the upper abdomen anterior to the stomach.     Plan: Paclitaxel 175 mg/m2,  Carboplatin AUC 6, bevacizumab 7.5 mg/kg     3/1/23: Cycle #1 Paclitaxel 175 mg/m2, Carboplatin AUC 6 (C7), bevacizumab 7.5 mg/kg.  1,196  3/24/23: Cycle #2 Paclitaxel 150 mg/m2, Carboplatin AUC 5 (C8), bevacizumab 15 mg/kg.  558     3/29/23: ER for dehydration and hypotension. Normotensive in ER. IV fluids given. Discharged.      4/14/23: Cycle #3 Paclitaxel 150 mg/m2, Carboplatin AUC 5 (C9), bevacizumab 15 mg/kg deferred neutropenia ANC 0.3   273  4/21/23: treatment deferred ANC 0.8  4/28/23: Cycle #3 Paclitaxel 150 mg/m2, Carboplatin AUC 5 (C9), bevacizumab 15 mg/kg, + Neulasta deferred ANC 1.0,  297     5/26/23: Cycle #4  Paclitaxel 150 mg/m2, Carboplatin AUC 5 (C10), bevacizumab 15 mg/kg, + Neulasta, deferred ANC 0.6  188. No hematology consult per MD.      6/2/23: Cycle #4 Paclitaxel 150 mg/m2, Carboplatin AUC 5 (C9), bevacizumab 15 mg/kg, + Neulasta   6/23/23: Cycle #5 deferred neutropenia ANC 0.5 thrombocytopenia platelets 85     6/26/2023 Addendum: Reduce both Carboplatin and Paclitaxel due to thrombocytopenia and persistent neutropenia. REFER to Hematology to rule out MDS. Message sent to pt. Orders placed.      7/3/23 plan: continue with 2 more cycles with carboplatin AUC of 4, Taxol at 135 mg per metered square, bevacizumab at 15 mg/kg as well as neulasta for bone marrow support.      7/3/23: Cycle #5 Paclitaxel 135 mg/m2, Carboplatin AUC 4, bevacizumab 15 mg/kg, +Neulasta.  375     7/11/23: per chart review Dr. Cogan with Hematology plan one more cycle of chemotherapy then maintenance Avastin     7/28/23: Cycle #6 Paclitaxel 135 mg/m2, Carboplatin AUC 4, bevacizumab 15 mg/kg, +Neulasta.  370     8/17/2023: CT CAP: Stable bilateral pulmonary micronodules. Multiple subcentimeter hypodense hepatic lesions, a few are slightly less conspicuous. Necrotic lymph nodes in mario hepatis are stable. A few small peritoneal nodules in the left upper quadrant. Anterior to the  pylorus is an oval 2.4 cm hypodense soft tissue lesion which is stable.    8/17/2023: started maintenance bevacizumab q3 weeks.  370.    8/25/2023:  450.    9/19/2023:  1056. Alk phos 221, ALT 86, AST 63.    9/21/2023: Transferred her care from [facility] to [facility] to be closer to home. Plan is maintenance bevacizumab 15 mg/kg every 3 weeks to give her a treatment break from myelosuppressive chemotherapy.    10/10/2023:  1889, alk phos 388, , AST 83  10/12/2023: Bevacizumab held for elevated LFTs, symptoms of new back pain, cough, sinusitis symptoms.    10/18/2023: CT CAP: Progressive liver metastasis. Small nonspecific right lower lobe pulmonary nodule. Inflammatory process or a metastatic nodule remain possible. This does not have the characteristic appearance of metastasis, however, remains indeterminant.     Plan: Weekly paclitaxel 80 mg/m2 day 1, 8, 15, and bevacizumab 10 mg/kg day 1 and day 15 of a 28 day cycle x 3 cycles followed by CT CAP and follow up with Dr. Juarez in [place].     12/8/2023:  >10,000. Bilirubin 4.2, LFTs elevated.    12/12/2023: C1D1 paclitaxel and bevacizumab. ([place])    12/13/2023: Presented to the ED in [place] with worsening RUQ pain and jaundice x 2 weeks. Bilirubin 8.1. Waitlisted for transfer to [facility]. Elected to leave the ED.  12/13/2023: CT abdomen pelvis ([place]): New and enlarging innumerable hypodense liver lesions. Increased retroperitoneal adenopathy. Increased left >right intrahepatic biliary ductal dilatation without a resting cause noted. Common bile duct borderline enlarged 6 mm. Ventral hernia containing colon.  12/13/2023: Abdominal US ([place]): Numerous liver lesions. Common bile duct dilated at 9 mm, no obstructing stone. Spleen 14 cm, enlarged.    12/15/2023: ERCP ([facility]): Ventral pancreatic duct prophylactically stented. Biliary sphincterotomy. Cholangiogram showed multifocal biliary stricture. Intrahepatic  and common hepatic duct strictures dilated with a balloon. 2 metal stents placed into the left main and right anterior intrahepatic duct    12/19/2023: C1D8 paclitaxel administered. Bilirubin 3.5, LFTs improving. WBC 2.2, ANC 1.0, platelets 90. ([place])  12/22/23: Neupogen for ANC 1.0.  12/28/2023: C1D15 paclitaxel + bevacizumab. Bilirubin 1.9, LFT's improving, ANC 1.0, platelets 174. ([place])     Plan: Transfer back to [facility] with weekly paclitaxel 80 mg/m2 day 1, 8, 15, and bevacizumab 10 mg/kg day 1 and day 15 of a 28 day cycle x 2 additional cycles followed by CT CAP and follow up with Dr. Juarez.     1/12/24: Cycle 1 (2) paclitaxel/bevacizumab.   2113.   Delaying cycle 2 (3) for vacation.  2/26/24: Cycle 2 (3) paclitaxel/bevacizumab.   5381.  4/8/24: Cycle 1: carboplatin/paclitaxel/bevacizumab.  5864.  4/29/24: Cycle 2: carboplatin/paclitaxel/bevacizumab.  4444.         Past Medical History:     Past Medical History:   Diagnosis Date    Atrial fibrillation with rapid ventricular response (H)     History of cold sores     Hx of LASIK 12/11/2017    Insomnia     Migraine     Osteopenia     Pelvic mass     Peritoneal carcinomatosis (H)     Restless legs syndrome (RLS)             Past Surgical History:      Past Surgical History:   Procedure Laterality Date    APPENDECTOMY      ARTHROSCPY KNEE SURGICAL DEBRIDEMENT SHAVING ARTICULAR CARTILAGE Right     BIOPSY  January 2021    Biopsy to confirm ovarian cancer    DEBRIDEMENT LEFT UPPER EXTREMITY  2016    ENDOSCOPIC RETROGRADE CHOLANGIOPANCREATOGRAM N/A 12/15/2023    Procedure: ENDOSCOPIC RETROGRADE CHOLANGIOPANCREATOGRAPHY, with pancreatic stent placement, biliary duct dilation, biliary stent placement, and biliary sphincterotomy;  Surgeon: Fabian Simpson MD;  Location: [facility] OR    HYSTERECTOMY TOTAL ABD, LUISITO SALPINGO-OOPHORECTOMY, NODE DISSECTION, TUMOR DEBULKING, COMBINED Bilateral 4/19/2021    Procedure: HYSTERECTOMY, TOTAL, ABDOMINAL,  WITH BILATERAL SALPINGO-OOPHORECTOMY, omentectomy, NEOPLASM DEBULKING,Proctoscocy, RO, Resection of liver nodules, diaphragm stripping, immobilization of liver and colon;  Surgeon: Bolivar Juarez MD;  Location: [facility] OR    IR CHEST PORT PLACEMENT > 5 YRS OF AGE  2024    LAPAROSCOPY DIAGNOSTIC (GYN) Bilateral 2021    Procedure: Diagnsotic laparoscopy, biopsies;  Surgeon: Bolivar Juarez MD;  Location: [facility] OR    LASIK      TUBAL LIGATION              Social History:     Social History     Tobacco Use    Smoking status: Former     Current packs/day: 0.00     Average packs/day: 0.5 packs/day for 40.0 years (20.0 ttl pk-yrs)     Types: Cigarettes     Start date:      Quit date:      Years since quittin.3     Passive exposure: Never    Smokeless tobacco: Never   Substance Use Topics    Alcohol use: Not Currently            Family History:     Family History   Adopted: Yes   Problem Relation Age of Onset    Cancer Mother 36    Other Cancer Mother         Bio mother  of  a female cancer  at 36    Factor V Leiden deficiency Daughter     Deep Vein Thrombosis Daughter     Diabetes Type 1 Daughter     Diabetes Daughter     Hypertension Daughter     Anesthesia Reaction No family hx of             Allergies:   No Known Allergies         Medications:     Current Facility-Administered Medications   Medication Dose Route Frequency Provider Last Rate Last Admin    sodium chloride 0.9% BOLUS 1,000 mL  1,000 mL Intravenous Once Bharath Navarrete DO 1,000 mL/hr at 05/15/24 0212 1,000 mL at 05/15/24 0212     Current Outpatient Medications   Medication Sig Dispense Refill    amitriptyline (ELAVIL) 100 MG tablet Take 100 mg by mouth at bedtime      BEVACIZUMAB, AVASTIN, INJECTION 2.5MG Every 3 weeks      cyclobenzaprine (FLEXERIL) 5 MG tablet Take 1 tablet (5 mg) by mouth 3 times daily as needed for muscle spasms 10 tablet 0    dexAMETHasone (DECADRON) 4 MG tablet Take 2 tablets (8 mg) by mouth daily  for 3 days, starting the day after chemotherapy. 6 tablet 5    doxepin (SINEQUAN) 10 MG/ML (HIGH CONC) solution Take 2.5 mLs (25 mg) by mouth every 4 hours as needed for other (. Mix with equal amount water. Swish and hold in mouth for 1 min then spit out.) 118 mL 1    gabapentin (NEURONTIN) 100 MG capsule Take 1 capsule (100 mg) by mouth 3 times daily as needed for other (restles legs) 20 capsule 0    HYDROmorphone (DILAUDID) 4 MG tablet Take 0.5 tablets (2 mg) by mouth every 6 hours as needed for pain 30 tablet 0    lidocaine (LIDODERM) 5 % patch Place 1 patch onto the skin every 24 hours for 10 days Place one patch on back for 12 hours/  then remove for 12 hours 10 patch 0    lidocaine-prilocaine (EMLA) 2.5-2.5 % external cream Apply topically as needed for moderate pain (30min prior to port access) 30 g 1    meclizine (ANTIVERT) 25 MG tablet Take 1 tablet (25 mg) by mouth 3 times daily as needed for dizziness or nausea 15 tablet 0    naloxone (NARCAN) 4 MG/0.1ML nasal spray Spray 1 spray (4 mg) into one nostril alternating nostrils as needed for opioid reversal every 2-3 minutes until assistance arrives 0.2 mL 1    nitroFURantoin macrocrystal-monohydrate (MACROBID) 100 MG capsule Take 1 capsule (100 mg) by mouth 2 times daily 14 capsule 0    ondansetron (ZOFRAN) 8 MG tablet Take 1 tablet (8 mg) by mouth every 8 hours as needed for nausea (vomiting) 30 tablet 2    prochlorperazine (COMPAZINE) 10 MG tablet Take 1 tablet (10 mg) by mouth every 6 hours as needed for nausea or vomiting 30 tablet 2    SUMAtriptan (IMITREX) 100 MG tablet Take 1 tablet (100 mg) by mouth at onset of headache for migraine 15 tablet 0    valACYclovir (VALTREX) 1000 mg tablet Take 1,000 mg by mouth as needed      Xylitol (XYLIMELTS MT) Place 1 tablet on Gums at bedtime      zolpidem (AMBIEN) 10 MG tablet Take 1 tablet (10 mg) by mouth every evening 10 tablet 0            Review of Systems:     ROS negative except as stated in HPI          Physical Exam:     Vitals:    05/14/24 2216 05/14/24 2222 05/14/24 2304 05/15/24 0200   BP: 114/63  (!) 123/96 (!) 108/95   Pulse: 111   81   Resp: 18  18    Temp:  97.6  F (36.4  C)     TempSrc:  Oral     SpO2: 97%  97% 95%     Constitutional: laying in bed, constantly moving all extremities with difficulty sitting still, frequently trying to get up out of the bed without a specific destination   HEENT: Normal appearance  Cardiovascular: Regular rate and rhythm without murmurs, clicks, gallops or rub  Respiratory: Clear to auscultation bilaterally without crackles or wheeze  Gastrointestinal: Abdomen soft, non-tender. No masses  Neuro: A&O x3, follows commands, 5/5 strength b/l upper and lower extremities, sensation to light touch intact all 4 extremities, face, EOMI, symmetric smile, speech intermittently slurred and incongruent  Extremities: warm to touch, no edema   Skin: No suspicious lesions or rashes  Lines: port         Data:     Results for orders placed or performed during the hospital encounter of 05/14/24 (from the past 24 hour(s))   CBC with platelets differential    Narrative    The following orders were created for panel order CBC with platelets differential.  Procedure                               Abnormality         Status                     ---------                               -----------         ------                     CBC with platelets and d...[[id number]]  Abnormal            Final result                 Please view results for these tests on the individual orders.   Comprehensive metabolic panel   Result Value Ref Range    Sodium 137 135 - 145 mmol/L    Potassium 3.5 3.4 - 5.3 mmol/L    Carbon Dioxide (CO2) 20 (L) 22 - 29 mmol/L    Anion Gap 12 7 - 15 mmol/L    Urea Nitrogen 8.7 8.0 - 23.0 mg/dL    Creatinine 0.56 0.51 - 0.95 mg/dL    GFR Estimate >90 >60 mL/min/1.73m2    Calcium 8.4 (L) 8.8 - 10.2 mg/dL    Chloride 105 98 - 107 mmol/L    Glucose 112 (H) 70 - 99 mg/dL    Alkaline  Phosphatase 436 (H) 40 - 150 U/L    AST 54 (H) 0 - 45 U/L    ALT 27 0 - 50 U/L    Protein Total 6.5 6.4 - 8.3 g/dL    Albumin 3.0 (L) 3.5 - 5.2 g/dL    Bilirubin Total 0.8 <=1.2 mg/dL   Lactic acid whole blood   Result Value Ref Range    Lactic Acid 3.2 (H) 0.7 - 2.0 mmol/L   Magnesium   Result Value Ref Range    Magnesium 1.6 (L) 1.7 - 2.3 mg/dL   Phosphorus   Result Value Ref Range    Phosphorus 2.7 2.5 - 4.5 mg/dL   CBC with platelets and differential   Result Value Ref Range    WBC Count 9.6 4.0 - 11.0 10e3/uL    RBC Count 2.67 (L) 3.80 - 5.20 10e6/uL    Hemoglobin 8.8 (L) 11.7 - 15.7 g/dL    Hematocrit 27.4 (L) 35.0 - 47.0 %     (H) 78 - 100 fL    MCH 33.0 26.5 - 33.0 pg    MCHC 32.1 31.5 - 36.5 g/dL    RDW 21.3 (H) 10.0 - 15.0 %    Platelet Count 61 (L) 150 - 450 10e3/uL    % Neutrophils 82 %    % Lymphocytes 13 %    % Monocytes 5 %    % Eosinophils 0 %    % Basophils 0 %    % Immature Granulocytes 0 %    NRBCs per 100 WBC 0 <1 /100    Absolute Neutrophils 7.8 1.6 - 8.3 10e3/uL    Absolute Lymphocytes 1.2 0.8 - 5.3 10e3/uL    Absolute Monocytes 0.5 0.0 - 1.3 10e3/uL    Absolute Eosinophils 0.0 0.0 - 0.7 10e3/uL    Absolute Basophils 0.0 0.0 - 0.2 10e3/uL    Absolute Immature Granulocytes 0.0 <=0.4 10e3/uL    Absolute NRBCs 0.0 10e3/uL   CK total   Result Value Ref Range    CK 72 26 - 192 U/L   TSH with free T4 reflex   Result Value Ref Range    TSH 3.09 0.30 - 4.20 uIU/mL   CT Head w/o Contrast    Narrative    EXAM: CT HEAD W/O CONTRAST  LOCATION: [facility]  DATE: 5/14/2024    INDICATION: Altered mental status. Ovarian cancer. Confusion.  COMPARISON: CT head dated 1/13/2021.  TECHNIQUE: Routine CT Head without IV contrast. Multiplanar reformats. Dose reduction techniques were used.    FINDINGS:  INTRACRANIAL CONTENTS: No acute hemorrhage or extra-axial fluid collection identified. Again seen is a stable calcified meningioma along the high right anterior  frontal convexity without significant local mass effect or vasogenic edema. This lesion   measures approximately 2.5 cm in AP dimensions, 1.7 cm in transverse dimensions and 1.6 cm in craniocaudal dimensions, previously 2.6 cm in AP dimensions, 1.7 cm in transverse dimensions and 1.7 cm in cranial caudal dimensions. No CT evidence of acute   infarct. Mild presumed chronic small vessel ischemic changes. Minimal generalized volume loss. No hydrocephalus.     VISUALIZED ORBITS/SINUSES/MASTOIDS: No intraorbital abnormality. Mild mucosal thickening scattered about the paranasal sinuses. No middle ear or mastoid effusion.    BONES/SOFT TISSUES: No acute abnormality.      Impression    IMPRESSION:  1.  No CT evidence for acute intracranial process. If the patient is experiencing an acute, focal or ongoing neurologic deficit, an MRI may be indicated.  2.  Stable calcified meningioma along the high right anterior frontal cerebral convexity.  3.  Brain atrophy and presumed chronic microvascular ischemic changes as above.     Ammonia   Result Value Ref Range    Ammonia 36 11 - 51 umol/L   Lactic acid whole blood   Result Value Ref Range    Lactic Acid 2.0 0.7 - 2.0 mmol/L       Imaging  CT Head 5/14   1.  No CT evidence for acute intracranial process. If the patient is experiencing an acute, focal or ongoing neurologic deficit, an MRI may be indicated.  2.  Stable calcified meningioma along the high right anterior frontal cerebral convexity.  3.  Brain atrophy and presumed chronic microvascular ischemic changes as above.           Assessment and Plan:   Assessment: 68 year old female with  metastatic ovarian high-grade serous carcinoma admitted for altered mental status. Pt recently admitted for back pain and recent falls; PT/OT recommended TCU at discharge however patient declined and elected to discharge to home. Brain MRI was pending during that admission with plan at the time of discharge to complete this on an outpatient  basis. She returns to the hospital with new restlessness, disorientation, slurred speech which onset 2 hours after their discharge from the hospital and has been persistent until presentation here. Laboratory work up notable for lactic acidosis with LA 3.2, hypomagnesemia with mg 1.6, thrombocytopenia w plt 61 (stable), WBC 9.6 from 3.6 on recent discharge and stable anemia with hgb 8.8.  Work up for her delirium has included ammonia and TSH which are wnl. Blood and urine cultures are pending. CT of the head showed no evidence of acute intracranial process. Planning MRI however pt currently unable to stay still to sit for an adequate image. Ddx for delirium/AMS includes infection, new brain metastasis, polypharmacy, paraneoplastic sx, seizure. D/w neurology who agree with broadening infectious work up and further brain imaging (MRI) when feasible. Will hold majority of pts home medications, many of which may have neuro side effects (eg ambien, amitriptyline, pramipexole) to ensure medications are not playing a role.      # AMS  # delerium  - brain MR when when feasible (pt currently unable to maintain adequate stillness for MRI), may require sedation   - f/up Bcx, UCX  - broaden infectious work up with CXR, covid, flu, C diff, enteric panel   - f/up CRP   - add on b1, b12 per neuro recommendations  - neurology will continue to follow, considering LP, EEG prn     # Restless legs   - start w/ sx management (topical lidocaine, ice packs) tonight  - if no improvement, consider low dose gabapentin     # Lactic acidosis   - suspect d/t recent diarrhea and poor PO intake   - continue mIVF, repeat LA in AM     # E. Coli Positive Urine culture  - repeat UA, Ucx  - pt is s/p 5/7d macrobid  - received 1 dose ceftriaxone in the ED  - continue tx prn pending above work up     # HypoMg  - Mg 1.6> ERP     # Recent falls  # Back pain  - PRN tylenol, ibuprofen  - holding PO dilaudid, flexeril given mental status changes and pt  reporting no pain on admission   - Ice/Heat therapy as needed  - Brain MRI when able as above     # Atrial Fibrillation  - hold PTA eliquis given thrombocytopenia, recent falls      # High grade serous ovarian cancer   - NPO for possible MRI with sedation in AM   - after imaging, planning regular diet, ensure supplements   - ant-emetics prn   - bowel regimen/imodium prn     # MDD  - PTA amitriptyline held    # Insomnia  - PTA ambien held     Code status: full code per discussion with pts family     Discussed with gyn/onc fellow Dr Jenn Rodriguez MD  OB/GYN PGY-2  5/15/2024 2:54 AM        I saw and evaluated the patient with the resident.  I edited and reviewed the above note. I have reviewed all pertinent imaging and labs on this patient. Patient left hospital despite our recommendation to stay. Returns with symptoms as described above. Plan MRI brain, restart her eliquis now that plts >50,000. Monitor lactate, started macrobid prior to discharge. Patient denies substance abuse, no ETOH use history.    Eladia Arriaga MD  Professor  Department of Ob/Gyn and Women's Health  Division of Gynecologic Oncology  [facility]  [phone]

## 2024-05-15 NOTE — PROGRESS NOTES
Brief Gyn/Onc Progress Note    Received page from RN stating pt/family requesting IV dilaudid for restless leg pain.     Called RN to discuss pain mgmt plan. Pt has received gabapentin but has not yet received any other oral pain medications. Our team is trying to limit CNS-acting medications given pt presented with AMS and delirium and already takes a number of CNS-acting medications at home. Tylenol and ibuprofen have been ordered for pt prn since admission but have not been given. Recommended to RN that we start with these PO pain medications in addition to the gabapentin. If ongoing pain despite these medications, please let our team know. May consider addition of PO dilaudid in this scenario. Will work to avoid IV narcotics given reason for admission, and will not start using these medications before PO pain regimen has been attempted and proven unsuccessful.     For restless legs, pt also has topical lidocaine available. Recommend placing icepacks around legs and using SCDs for continuous leg massage as these interventions may be helpful as well.     Call for questions or ongoing concerns. Gyn/Onc pager [phone].     Twyla Rodriguez MD  OB/GYN PGY-2  5/15/2024 6:56 PM       Addendum: went to evaluate pt in emergency department after informed by RN that pts daughters declining above interventions. Pt laying prone in bed on my arrival, feet constantly moving. Daughters report pt is miserable, has had no relief all day, has received nothing helpful for her restlessness. They feel her mentation is waxing and waning, still frequently altered. Pt is also frustrated at lack of relief of her restlessness. Pts daughters requesting IV dilaudid for sedation so that patient can get rest. Discussed our goal to avoid CNS-acting medications given her altered mentation and delirium, especially before trying other interventions first. Daughters frustrated that other interventions have not been offered in the emergency  department. State they don't know why she is here when could be doing the same thing on their couch at home. Voiced understanding of their frustration. Offered my recommendation would be to start with PO medications and adjuncts for RLS (topical lidocaine, ice packs, SCDs) and that I would work on ensuring these things are offered. If no relief, will discuss alternative options to help the pt become more comfortable/get sleep overnight. Daughters in agreement.     D/w RN in person who will provide topical lidocaine, ice packs. SCD machine has been ordered x2 but has not been delivered. RN states she will go to retrieve one herself if she has time. Will re-evaluate in 1-2hours.     Twyla Rodriguez MD  OB/GYN PGY-2  5/15/2024 7:38 PM

## 2024-05-15 NOTE — ED PROVIDER NOTES
[place] EMERGENCY DEPARTMENT (Formerly [facility])    24       ED PROVIDER NOTE  ED 27    History     Chief Complaint   Patient presents with    Altered Mental Status     HPI  Taran Regalado is a 68 year old female with past medical history notable for metastatic ovarian high-grade serous carcinoma currently on cycle 2 of chemo, chemo induced neutropenia and peripheral neuropathy, and A-fib who presents to the ED with altered mental status.  Patient was recently admitted to the hospital.  Shortly after discharge, developed severe uncontrollable shaking of her lower extremities which has now progressed to involve her upper extremities as well.  Family member also notes that she has been intermittently confused and has referenced her recently  daughter.  UTI symptoms have improved.  Denies any abdominal pain, dysuria, or other infectious symptoms.  No fevers at home.  No chest pain or shortness of breath.  No report of new falls or other traumatic injuries.    Of note, she was recently admitted - with lactic acidosis, failure to thrive, UTI with E coli positive culture.       Past Medical History  Past Medical History:   Diagnosis Date    Atrial fibrillation with rapid ventricular response (H)     History of cold sores     Hx of LASIK 2017    Insomnia     Migraine     Osteopenia     Pelvic mass     Peritoneal carcinomatosis (H)     Restless legs syndrome (RLS)      Past Surgical History:   Procedure Laterality Date    APPENDECTOMY      ARTHROSCPY KNEE SURGICAL DEBRIDEMENT SHAVING ARTICULAR CARTILAGE Right     BIOPSY  2021    Biopsy to confirm ovarian cancer    DEBRIDEMENT LEFT UPPER EXTREMITY  2016    ENDOSCOPIC RETROGRADE CHOLANGIOPANCREATOGRAM N/A 12/15/2023    Procedure: ENDOSCOPIC RETROGRADE CHOLANGIOPANCREATOGRAPHY, with pancreatic stent placement, biliary duct dilation, biliary stent placement, and biliary sphincterotomy;  Surgeon: Fabian Simpson MD;  Location:  OR     HYSTERECTOMY TOTAL ABD, LUISITO SALPINGO-OOPHORECTOMY, NODE DISSECTION, TUMOR DEBULKING, COMBINED Bilateral 2021    Procedure: HYSTERECTOMY, TOTAL, ABDOMINAL, WITH BILATERAL SALPINGO-OOPHORECTOMY, omentectomy, NEOPLASM DEBULKING,Proctoscocy, RO, Resection of liver nodules, diaphragm stripping, immobilization of liver and colon;  Surgeon: Bolivar Juarez MD;  Location: [facility] OR    IR CHEST PORT PLACEMENT > 5 YRS OF AGE  2024    LAPAROSCOPY DIAGNOSTIC (GYN) Bilateral 2021    Procedure: Diagnsotic laparoscopy, biopsies;  Surgeon: Bolivar Juarez MD;  Location: [facility] OR    LASIK      TUBAL LIGATION       amitriptyline (ELAVIL) 100 MG tablet  BEVACIZUMAB, AVASTIN, INJECTION 2.5MG  cyclobenzaprine (FLEXERIL) 5 MG tablet  dexAMETHasone (DECADRON) 4 MG tablet  doxepin (SINEQUAN) 10 MG/ML (HIGH CONC) solution  gabapentin (NEURONTIN) 100 MG capsule  HYDROmorphone (DILAUDID) 4 MG tablet  lidocaine (LIDODERM) 5 % patch  lidocaine-prilocaine (EMLA) 2.5-2.5 % external cream  meclizine (ANTIVERT) 25 MG tablet  naloxone (NARCAN) 4 MG/0.1ML nasal spray  nitroFURantoin macrocrystal-monohydrate (MACROBID) 100 MG capsule  ondansetron (ZOFRAN) 8 MG tablet  prochlorperazine (COMPAZINE) 10 MG tablet  SUMAtriptan (IMITREX) 100 MG tablet  valACYclovir (VALTREX) 1000 mg tablet  Xylitol (XYLIMELTS MT)  zolpidem (AMBIEN) 10 MG tablet      No Known Allergies  Family History  Family History   Adopted: Yes   Problem Relation Age of Onset    Cancer Mother 36    Other Cancer Mother         Bio mother  of  a female cancer  at 36    Factor V Leiden deficiency Daughter     Deep Vein Thrombosis Daughter     Diabetes Type 1 Daughter     Diabetes Daughter     Hypertension Daughter     Anesthesia Reaction No family hx of      Social History   Social History     Tobacco Use    Smoking status: Former     Current packs/day: 0.00     Average packs/day: 0.5 packs/day for 40.0 years (20.0 ttl pk-yrs)     Types: Cigarettes     Start date:       Quit date: 2018     Years since quittin.3     Passive exposure: Never    Smokeless tobacco: Never   Vaping Use    Vaping status: Never Used   Substance Use Topics    Alcohol use: Not Currently    Drug use: Never         A medically appropriate review of systems was performed with pertinent positives and negatives noted in the HPI, and all other systems negative.     Physical Exam   BP: 114/63  Pulse: 111  Temp: 97.6  F (36.4  C)  Resp: 18  SpO2: 97 %      Physical Exam  Vitals and nursing note reviewed.   Constitutional:       General: She is not in acute distress.     Appearance: Normal appearance.   HENT:      Head: Normocephalic.      Nose: Nose normal.   Eyes:      Pupils: Pupils are equal, round, and reactive to light.   Cardiovascular:      Rate and Rhythm: Normal rate and regular rhythm.   Pulmonary:      Effort: Pulmonary effort is normal.   Abdominal:      General: There is no distension.   Musculoskeletal:         General: No deformity. Normal range of motion.      Cervical back: Normal range of motion.   Skin:     General: Skin is warm.   Neurological:      Mental Status: She is alert and oriented to person, place, and time.   Psychiatric:         Mood and Affect: Mood normal.         ED Results and Procedures     Results for orders placed or performed during the hospital encounter of 24 (from the past 24 hour(s))   CBC with platelets differential    Narrative    The following orders were created for panel order CBC with platelets differential.  Procedure                               Abnormality         Status                     ---------                               -----------         ------                     CBC with platelets and d...[[id number]]  Abnormal            Final result                 Please view results for these tests on the individual orders.   Comprehensive metabolic panel   Result Value Ref Range    Sodium 137 135 - 145 mmol/L    Potassium 3.5 3.4 - 5.3 mmol/L     Carbon Dioxide (CO2) 20 (L) 22 - 29 mmol/L    Anion Gap 12 7 - 15 mmol/L    Urea Nitrogen 8.7 8.0 - 23.0 mg/dL    Creatinine 0.56 0.51 - 0.95 mg/dL    GFR Estimate >90 >60 mL/min/1.73m2    Calcium 8.4 (L) 8.8 - 10.2 mg/dL    Chloride 105 98 - 107 mmol/L    Glucose 112 (H) 70 - 99 mg/dL    Alkaline Phosphatase 436 (H) 40 - 150 U/L    AST 54 (H) 0 - 45 U/L    ALT 27 0 - 50 U/L    Protein Total 6.5 6.4 - 8.3 g/dL    Albumin 3.0 (L) 3.5 - 5.2 g/dL    Bilirubin Total 0.8 <=1.2 mg/dL   Lactic acid whole blood   Result Value Ref Range    Lactic Acid 3.2 (H) 0.7 - 2.0 mmol/L   Magnesium   Result Value Ref Range    Magnesium 1.6 (L) 1.7 - 2.3 mg/dL   Phosphorus   Result Value Ref Range    Phosphorus 2.7 2.5 - 4.5 mg/dL   CBC with platelets and differential   Result Value Ref Range    WBC Count 9.6 4.0 - 11.0 10e3/uL    RBC Count 2.67 (L) 3.80 - 5.20 10e6/uL    Hemoglobin 8.8 (L) 11.7 - 15.7 g/dL    Hematocrit 27.4 (L) 35.0 - 47.0 %     (H) 78 - 100 fL    MCH 33.0 26.5 - 33.0 pg    MCHC 32.1 31.5 - 36.5 g/dL    RDW 21.3 (H) 10.0 - 15.0 %    Platelet Count 61 (L) 150 - 450 10e3/uL    % Neutrophils 82 %    % Lymphocytes 13 %    % Monocytes 5 %    % Eosinophils 0 %    % Basophils 0 %    % Immature Granulocytes 0 %    NRBCs per 100 WBC 0 <1 /100    Absolute Neutrophils 7.8 1.6 - 8.3 10e3/uL    Absolute Lymphocytes 1.2 0.8 - 5.3 10e3/uL    Absolute Monocytes 0.5 0.0 - 1.3 10e3/uL    Absolute Eosinophils 0.0 0.0 - 0.7 10e3/uL    Absolute Basophils 0.0 0.0 - 0.2 10e3/uL    Absolute Immature Granulocytes 0.0 <=0.4 10e3/uL    Absolute NRBCs 0.0 10e3/uL   CK total   Result Value Ref Range    CK 72 26 - 192 U/L   TSH with free T4 reflex   Result Value Ref Range    TSH 3.09 0.30 - 4.20 uIU/mL   CT Head w/o Contrast    Narrative    EXAM: CT HEAD W/O CONTRAST  LOCATION: [facility]  DATE: 5/14/2024    INDICATION: Altered mental status. Ovarian cancer. Confusion.  COMPARISON: CT head dated  1/13/2021.  TECHNIQUE: Routine CT Head without IV contrast. Multiplanar reformats. Dose reduction techniques were used.    FINDINGS:  INTRACRANIAL CONTENTS: No acute hemorrhage or extra-axial fluid collection identified. Again seen is a stable calcified meningioma along the high right anterior frontal convexity without significant local mass effect or vasogenic edema. This lesion   measures approximately 2.5 cm in AP dimensions, 1.7 cm in transverse dimensions and 1.6 cm in craniocaudal dimensions, previously 2.6 cm in AP dimensions, 1.7 cm in transverse dimensions and 1.7 cm in cranial caudal dimensions. No CT evidence of acute   infarct. Mild presumed chronic small vessel ischemic changes. Minimal generalized volume loss. No hydrocephalus.     VISUALIZED ORBITS/SINUSES/MASTOIDS: No intraorbital abnormality. Mild mucosal thickening scattered about the paranasal sinuses. No middle ear or mastoid effusion.    BONES/SOFT TISSUES: No acute abnormality.      Impression    IMPRESSION:  1.  No CT evidence for acute intracranial process. If the patient is experiencing an acute, focal or ongoing neurologic deficit, an MRI may be indicated.  2.  Stable calcified meningioma along the high right anterior frontal cerebral convexity.  3.  Brain atrophy and presumed chronic microvascular ischemic changes as above.       Medications   cefTRIAXone (ROCEPHIN) 1 g vial to attach to  mL bag for ADULTS or NS 50 mL bag for PEDS (1 g Intravenous $New Bag 5/15/24 0207)   sodium chloride 0.9% BOLUS 1,000 mL (has no administration in time range)   sodium chloride 0.9% BOLUS 500 mL (500 mLs Intravenous $New Bag 5/14/24 2317)   LORazepam (ATIVAN) injection 0.5 mg (0.5 mg Intravenous $Given 5/14/24 2321)   magnesium sulfate 2 g in 50 mL sterile water intermittent infusion (2 g Intravenous $New Bag 5/15/24 0011)             ED Course/Assessments & Plan (with Medical Decision Making)   Patient presents to the ED for evaluation of restless  legs and upper extremities.  Also has been increasingly confused since recent discharge from the hospital.  Currently on Macrobid for UTI.    On arrival, patient slightly tachycardic, otherwise has normal vital signs.  She has uncontrollable shaking of her upper and lower extremities and is intermittently confused.  She is oriented x 3 when prompted but did make some bizarre statements such as forgetting that her daughter was recently .  Exam without evidence of trauma or obvious clinical toxidrome.  She is to be moving all extremities, low suspicion for ischemic CVA.  CT head was ordered which is negative for intracranial hemorrhage, metastasis, or other acute pathology to explain patient's symptoms.  Patient is currently unable to lay flat for an MRI.  Was treated medically with IV Ativan.  Also considered dystonic reaction, however, patient states she is unable to take Benadryl.  Could consider Cogentin or gabapentin if no improvement with Ativan.      Labs notable for a white blood cell count of 9.6 which is increased slightly from yesterday when it was 3.6.  Hemoglobin stable at 8.8.  Thrombocytopenia is unchanged and this 61 today, 54 yesterday.  [place] panel shows hypomagnesemia with a magnesium of 1.6 which was repleted in the ED with 2 g IV magnesium.  Remainder of electrolytes are unremarkable.  Lactic acid is 3.2.  Patient was given 1.5 L of IV normal saline.  Recheck is pending.  Patient was given 1 g IV ceftriaxone for presumed UTI.    The exact cause of patient's symptoms is unclear at this time but will undoubtedly need to be readmitted to the hospital.  The gynecology/oncology team was consulted as well as the neurology team.  All recommendations are pending.  Will sign out to the overnight provider plan to follow-up on the recs and admit to the hospital.    I have reviewed the nursing notes.    I have reviewed the findings, diagnosis, plan and need for follow up with the patient.    Critical  care was not performed.     Medical Decision Making  The patient's presentation was of high complexity (an acute health issue posing potential threat to life or bodily function).    The patient's evaluation involved:  review of 3+ test result(s) ordered prior to this encounter (see separate area of note for details)  ordering and/or review of 3+ test(s) in this encounter (see separate area of note for details)    The patient's management necessitated moderate risk (prescription drug management including medications given in the ED) and high risk (a decision regarding hospitalization).    New Prescriptions    No medications on file       Final diagnoses:   Altered mental status, unspecified altered mental status type   Restlessness       BHARATH ORTIZ DO    [facility] EMERGENCY DEPARTMENT      5/14/2024            Bharath Ortiz DO  05/16/24 0028

## 2024-05-15 NOTE — DISCHARGE SUMMARY
Gynecologic Oncology Discharge Summary    Taran Regalado  [id number]    Admit Date: 5/14/2024  Discharge Date: 05/17/24   Admitting Provider: Eladia Arriaga MD  Discharge Provider: Zainab Fontanez MD    Admission Dx:   -Altered mental status  -Restless legs  -Metastatic ovarian high-grade serous carcinoma   -Chemotherapy induced peripheral neuropathy  -Tactile hallucinations  -Lactic acidosis  -E. coli positive urine culture  -Recent falls  -Back pain  -Atrial fibrillation  -Failure to thrive  -Major depressive disorder  -Insomnia    Discharge Dx:  -Same    Patient Active Problem List   Diagnosis    Pelvic mass    Non-intractable vomiting with nausea, unspecified vomiting type    Ovarian cancer, right (H)    Atrial fibrillation with rapid ventricular response (H)    Other acute pulmonary embolism without acute cor pulmonale (H)    Encounter for antineoplastic chemotherapy    Restless legs syndrome    Chemotherapy-induced neutropenia (H24)    Insomnia    Hypercholesterolemia    Migraine    Postmenopausal atrophic vaginitis    Osteopenia    Nuclear sclerosis of both eyes    Presbyopia    Adverse effect of antineoplastic and immunosuppressive drugs, sequela    RUQ abdominal pain    Biliary obstruction (H28)    Other ascites    Fall, subsequent encounter    Malignant neoplasm of ovary, unspecified laterality (H)    Left-sided low back pain without sciatica, unspecified chronicity    Orthostatic hypotension    Chemotherapy-induced peripheral neuropathy (H24)    Restlessness    Altered mental status, unspecified altered mental status type       Procedures:   - Therapeutic paracentesis    Prior to Admission Medications:  Medications Prior to Admission   Medication Sig Dispense Refill Last Dose    BEVACIZUMAB, AVASTIN, INJECTION 2.5MG Every 3 weeks   Past Week    cyclobenzaprine (FLEXERIL) 5 MG tablet Take 1 tablet (5 mg) by mouth 3 times daily as needed for muscle spasms 10 tablet 0 Past Week    dexAMETHasone  (DECADRON) 4 MG tablet Take 2 tablets (8 mg) by mouth daily for 3 days, starting the day after chemotherapy. 6 tablet 5 Past Week    lidocaine (LIDODERM) 5 % patch Place 1 patch onto the skin every 24 hours for 10 days Place one patch on back for 12 hours/  then remove for 12 hours 10 patch 0 Past Week    lidocaine-prilocaine (EMLA) 2.5-2.5 % external cream Apply topically as needed for moderate pain (30min prior to port access) 30 g 1 Past Week    meclizine (ANTIVERT) 25 MG tablet Take 1 tablet (25 mg) by mouth 3 times daily as needed for dizziness or nausea 15 tablet 0 Past Week    naloxone (NARCAN) 4 MG/0.1ML nasal spray Spray 1 spray (4 mg) into one nostril alternating nostrils as needed for opioid reversal every 2-3 minutes until assistance arrives 0.2 mL 1 Unknown    ondansetron (ZOFRAN) 8 MG tablet Take 1 tablet (8 mg) by mouth every 8 hours as needed for nausea (vomiting) 30 tablet 2 Past Week    prochlorperazine (COMPAZINE) 10 MG tablet Take 1 tablet (10 mg) by mouth every 6 hours as needed for nausea or vomiting 30 tablet 2 Past Week    SUMAtriptan (IMITREX) 100 MG tablet Take 1 tablet (100 mg) by mouth at onset of headache for migraine 15 tablet 0 Past Week    valACYclovir (VALTREX) 1000 mg tablet Take 1,000 mg by mouth as needed   Past Week    Xylitol (XYLIMELTS MT) Place 1 tablet on Gums at bedtime   Past Week    [DISCONTINUED] amitriptyline (ELAVIL) 100 MG tablet Take 100 mg by mouth at bedtime   Past Week    [DISCONTINUED] doxepin (SINEQUAN) 10 MG/ML (HIGH CONC) solution Take 2.5 mLs (25 mg) by mouth every 4 hours as needed for other (. Mix with equal amount water. Swish and hold in mouth for 1 min then spit out.) 118 mL 1 Past Week    [DISCONTINUED] gabapentin (NEURONTIN) 100 MG capsule Take 1 capsule (100 mg) by mouth 3 times daily as needed for other (restles legs) 20 capsule 0 Past Week    [DISCONTINUED] HYDROmorphone (DILAUDID) 4 MG tablet Take 0.5 tablets (2 mg) by mouth every 6 hours as needed  for pain 30 tablet 0 Past Week    [DISCONTINUED] nitroFURantoin macrocrystal-monohydrate (MACROBID) 100 MG capsule Take 1 capsule (100 mg) by mouth 2 times daily 14 capsule 0 Past Week    [DISCONTINUED] zolpidem (AMBIEN) 10 MG tablet Take 1 tablet (10 mg) by mouth every evening 10 tablet 0 Past Week       Discharge Medications:     Review of your medicines        START taking        Dose / Directions   apixaban ANTICOAGULANT 5 MG tablet  Commonly known as: ELIQUIS  Indication: Atrial Fibrillation Not Caused By A Heart Valve Problem      Dose: 5 mg  Take 1 tablet (5 mg) by mouth 2 times daily  Refills: 0     buprenorphine 10 MCG/HR WK patch  Commonly known as: BUTRANS  Used for: Malignant neoplasm of ovary, unspecified laterality (H)      Dose: 1 patch  Start taking on: May 23, 2024  Place 1 patch onto the skin once a week Reminder: Remove previous patch before applying new patch.  Quantity: 1 patch  Refills: 0     rOPINIRole 0.5 MG tablet  Commonly known as: REQUIP      Dose: 0.5 mg  Take 1 tablet (0.5 mg) by mouth at bedtime  Quantity: 30 tablet  Refills: 0     senna-docusate 8.6-50 MG tablet  Commonly known as: SENOKOT-S/PERICOLACE  Used for: Malignant neoplasm of ovary, unspecified laterality (H)      Dose: 2 tablet  Take 2 tablets by mouth 2 times daily as needed for constipation  Quantity: 60 tablet  Refills: 0            CONTINUE these medicines which may have CHANGED, or have new prescriptions. If we are uncertain of the size of tablets/capsules you have at home, strength may be listed as something that might have changed.        Dose / Directions   amitriptyline 75 MG tablet  Commonly known as: ELAVIL  This may have changed:   medication strength  how much to take  Used for: Malignant neoplasm of ovary, unspecified laterality (H)      Dose: 75 mg  Take 1 tablet (75 mg) by mouth at bedtime for 13 days  Quantity: 13 tablet  Refills: 0     gabapentin 300 MG capsule  Commonly known as: NEURONTIN  This may have  changed:   medication strength  how much to take  when to take this  reasons to take this  Used for: Chemotherapy-induced peripheral neuropathy (H24)      Dose: 300 mg  Take 1 capsule (300 mg) by mouth at bedtime  Quantity: 30 capsule  Refills: 0     HYDROmorphone 2 MG tablet  Commonly known as: DILAUDID  This may have changed:   medication strength  how much to take  when to take this  reasons to take this  Used for: Malignant neoplasm of ovary, unspecified laterality (H)      Dose: 1-2 mg  Take 0.5-1 tablets (1-2 mg) by mouth every 4 hours as needed for moderate to severe pain  Quantity: 10 tablet  Refills: 0     zolpidem 10 MG tablet  Commonly known as: AMBIEN  This may have changed:   how much to take  when to take this  reasons to take this  Used for: Ovarian cancer, right (H)      Dose: 5 mg  Take 0.5 tablets (5 mg) by mouth nightly as needed for sleep  Refills: 0            CONTINUE these medicines which have NOT CHANGED        Dose / Directions   BEVACIZUMAB (AVASTIN) INJECTION 2.5MG      Every 3 weeks  Refills: 0     cyclobenzaprine 5 MG tablet  Commonly known as: FLEXERIL  Used for: Adverse effect of antineoplastic and immunosuppressive drugs, sequela      Dose: 5 mg  Take 1 tablet (5 mg) by mouth 3 times daily as needed for muscle spasms  Quantity: 10 tablet  Refills: 0     dexAMETHasone 4 MG tablet  Commonly known as: DECADRON  Used for: Ovarian cancer, right (H), Adverse effect of antineoplastic and immunosuppressive drugs, sequela      Take 2 tablets (8 mg) by mouth daily for 3 days, starting the day after chemotherapy.  Quantity: 6 tablet  Refills: 5     lidocaine 5 % patch  Commonly known as: Lidoderm      Dose: 1 patch  Place 1 patch onto the skin every 24 hours for 10 days Place one patch on back for 12 hours/  then remove for 12 hours  Quantity: 10 patch  Refills: 0     lidocaine-prilocaine 2.5-2.5 % external cream  Commonly known as: EMLA  Used for: Ovarian cancer, right (H)      Apply topically  as needed for moderate pain (30min prior to port access)  Quantity: 30 g  Refills: 1     meclizine 25 MG tablet  Commonly known as: ANTIVERT  Used for: Dizziness, Nausea      Dose: 25 mg  Take 1 tablet (25 mg) by mouth 3 times daily as needed for dizziness or nausea  Quantity: 15 tablet  Refills: 0     naloxone 4 MG/0.1ML nasal spray  Commonly known as: NARCAN  Used for: RUQ abdominal pain, Ovarian cancer, unspecified laterality (H), Cancer, metastatic to liver (H)      Dose: 4 mg  Spray 1 spray (4 mg) into one nostril alternating nostrils as needed for opioid reversal every 2-3 minutes until assistance arrives  Quantity: 0.2 mL  Refills: 1     ondansetron 8 MG tablet  Commonly known as: ZOFRAN  Used for: Ovarian cancer, right (H), Adverse effect of antineoplastic and immunosuppressive drugs, sequela      Dose: 8 mg  Take 1 tablet (8 mg) by mouth every 8 hours as needed for nausea (vomiting)  Quantity: 30 tablet  Refills: 2     prochlorperazine 10 MG tablet  Commonly known as: COMPAZINE  Used for: Ovarian cancer, right (H), Adverse effect of antineoplastic and immunosuppressive drugs, sequela      Dose: 10 mg  Take 1 tablet (10 mg) by mouth every 6 hours as needed for nausea or vomiting  Quantity: 30 tablet  Refills: 2     SUMAtriptan 100 MG tablet  Commonly known as: IMITREX  Used for: Migraine without status migrainosus, not intractable, unspecified migraine type      Dose: 100 mg  Take 1 tablet (100 mg) by mouth at onset of headache for migraine  Quantity: 15 tablet  Refills: 0     valACYclovir 1000 mg tablet  Commonly known as: VALTREX      Dose: 1,000 mg  Take 1,000 mg by mouth as needed  Refills: 0     XYLIMELTS MT      Place 1 tablet on Gums at bedtime  Refills: 0            STOP taking      doxepin 10 MG/ML (HIGH CONC) solution  Commonly known as: SINEquan        nitroFURantoin macrocrystal-monohydrate 100 MG capsule  Commonly known as: Macrobid                  Where to get your medicines        These  "medications were sent to [facility] - [place], [place] - [address]  [address], [facility] [address]      Phone: [phone]   amitriptyline 75 MG tablet  buprenorphine 10 MCG/HR WK patch  gabapentin 300 MG capsule  HYDROmorphone 2 MG tablet  rOPINIRole 0.5 MG tablet  senna-docusate 8.6-50 MG tablet         Consultations:  -Neurology    Brief History of Illness:  From H&P: This patient is a 68 year old female with metastatic ovarian high-grade serous carcinoma  who presents with altered mental status and RLS.      Pt was recently admitted from 5/12-5/13 with back pain and recent falls at home. Imaging of her spine did not show any acute fracture or metastases. Her pain was treated and she was evaluated by PT/OT who recommended TCU at discharge. Pt declined TCU. It was recommended she remain inpatient for brain MRI and safe dispo planning however pt elected to discharge to home with outpatient follow up.      Pt returns tonight with her daughter Laure. Laure reports that since 2 hours after her discharge on 5/13, pt has had restlessness, intermittent incoherent speech, slurring of words, and confusion. Pt has a h/o RLS but has never had symptoms this severe in the past. Reports both arms and legs feel need to be constantly moving, as if there are bugs crawling over them per her daughters report. She has gotten a few hours of sleep since her discharge, otherwise has been constantly moving all extremities, needs someone by her side 24/7 d/t constant movement.  Laure notes that the pt is frequently speaking about things that don't make sense (talking about an auction, a man with a horse on facebook, the doctor with \"the boots,\" etc) and has been often slurring her words. On arrival to the hospital she asked to go to her room downstairs (thinking she was at her daughters house). Daughter reports 3 episodes of diarrhea / day since discharge. No fever or chills at home. Pt continues to " be nauseous and vomited twice today. She denies any pain. Reports the back pain that she came in with 2 days ago has completely resolved. Pt is unsure if she has ongoing dysuria, has been taking macrobid since last 5/10 for an E coli UTI.  No cough or cold symptoms.     Hospital Course:  Dz:   - Recurrent stage IIIB high grade ovarian ca. Neoadjuvant carbo/taxol> DANAE, BSO, omentectomy, debulking 4/21. Olaparib (8/21). Carbo/taxol/amaris (7/23). Progressive dz, hepatic mets; ERCP, intrahepatic stents 12/23. Taxol/amaris (2/24). Most recently s/p 2c carbo/taxol/amaris (4/24)   FEN:   - The patient's appetite had been significantly decreased since going on chemo, and she hadn't been eating or drinking much (usually 2-3 Ensure shakes/day), which then decreased her UOp. The patient was encouraged to drink Ensure, and was restarted on mIVF on HD#2 to improve her hydration status. Those were discontinued when she was able to maintain her PO status. By discharge, she was tolerating a regular diet without nausea and vomiting and able to maintain her hydration without IVF supplementation.  Pain:   - Her pain/discomfort from RLS was initially only controlled with IV dilaudid. Palliative care was consulted, and recommended Butrans and dilaudid 1mg PO for pain, and Requip and gabapentin at night for her RLS. She was transitioned over to a PO pain regimen. Her pain was well controlled on this and she was discharged home with these medications.  CV:  - She has a history of atrial fibrillation. After her last hospitalization between 5/12-5/13, Eliquis was held until her follow-up appointment on 5/20; however it was restarted during this admission with a platelet count of >50,000. Her vital signs were stable while in house and she had no acute CV issues.   PULM:  - She has no history of pulmonary issues. She was encouraged to use her bedside IS while in house. She had no acute pulmonary issues while in house.   HEME:  -  Labs were notable  "for pancytopenia, attributed to her recent chemotherapy infusion. She had no acute heme issues while in house.   GI:   - Due to the patient's diarrhea, her PTA bowel regimen was not continued on admission. She did endorse frequent nausea on admission. The patient has known hepatic spread with progressive liver mets and history of an ERCP with biliary sphincterotomy and intrahepatic stents she has had therapeutic paracenteses in the and during this her CT scan showed moderate amount of ascites, and the patient reported \"feeling fluid\" due to this, a bedside paracentesis was performed with approximately 2000 mL collected. The patient reported symptomatic relief.  :    - Pt had been diagnosed with an E coli UTI 5 days prior to admission. Her PTA bactrim was continued on admission but was discontinued after HD#1 due to a clean UA on admission and resolved symptoms. She had no other acute  issues while in house   ID:   - The patient's WBC count increased from 3.6 to 9.6 in the 24 hours between her admissions 8, and decreased back to her baseline of 3.5 by discharge. There was initially suspicion for an infectious etiology, and work-up included a lactic acid, which was initially elevated to 2.1, but resolved with IVF and was 1.1 at discharge. The patient was afebrile during her stay inpatient.  ENDO:   - No issues  PSYCH/NEURO:   - The patient initially presented for altered mental status, delirium, hallucinations, recent falls, and RLS type symptoms throughout her entire body. The delirium and falls were worked up with a head CT and MRI to assess for disease spread to the brain. The MRI showed a left occipital parietal subacute infarct which could not completely exclude mets. Patient ammonia was within normal limits, her TSH was normal at 3.09.  Her UDS was also negative.  Throughout her stay, the patient's mental status improved until she was alert and oriented x 4 and able to engage in conversation on discharge.  " Neurology was consulted for management for the restless legs and the subacute infarct found on MRI.  Palliative care recommended Butrans, topical lidocaine, and p.o. Dilaudid as needed.  Neurology additionally recommended gabapentin at night only to reduce risk of falls. she is tapering down on her amitriptyline and Ambien, and will be starting duloxetine outpatient with palliative care. This regimen of medications were successful in treating the patient's symptoms and allowing her sleep.  PPX:    - The patient was given SCDs and an IS during her hospital course. She was on therapeutic Eliquis due to her atrial fibrillation history. SCDs and the IS were tolerated during her stay, and discontinued at the time of her discharge.      Discharge Instructions and Follow up:  Ms. Taran Regalado was discharged from the hospital with follow up on 5/20 with Dr. Juarez, and 5/21 with Dr. Bermeo of Palliative Care.    Discharge Diet: Regular  Discharge Activity: As tolerated  Discharge Follow up: 5/20/24 with Dr. Juarez    Discharge Disposition:  Discharged to home    Discharge Staff: MD Jean Herman MD  Gynecologic-Oncology service  Obstetrics and Gynecology Resident, PGY-1  May 17, 2024, 10:54 AM     The patient was seen and examined with the resident, the labs and vital signs were reviewed.The discharge care plan outlined above was provided under my directives and direct supervision. Patient ready for discharge.     Total time of 40 minutes in discharge planning and coordination of follow-up    Zainab Fontanez MD, MPH  Gynecologic Oncology

## 2024-05-15 NOTE — PROGRESS NOTES
CTA contacted patient on 05/14/24 and left a voicemail.   Clinic Care Coordination Contact      [facility]    Background: Transitional Care Management program identified per system criteria and reviewed by [facility] team for possible outreach.    Assessment: Upon chart review, [facility] Team member will not proceed with patient outreach related to this episode of Transitional Care Management program due to reason below:    Patient has presented to Emergency Department, been readmitted to hospital, or transferred to another hospital.    Plan: Transitional Care Management episode addressed appropriately per reason noted above.      Zoila Al  [facility] [place], [facility]    *Connected Care Resource Team does NOT follow patient ongoing. Referrals are identified based on internal discharge reports and the outreach is to ensure patient has an understanding of their discharge instructions.

## 2024-05-15 NOTE — ED NOTES
Was accepted at shift change signout with a plan for me to follow-up primarily on the gynecology/oncology recommendations.  They will admit the patient.  They are adding additional infectious evaluation type labs.  They reported that they spoke with neurology about the patient as well.  She may need MRI at some point.  Bed request was placed.    Dictation Disclaimer: Some of this Note has been completed with voice-recognition dictation software. Although errors are generally corrected real-time, there is the potential for a rare error to be present in the completed chart.       Eladia Shen MD  05/15/24 [id number]

## 2024-05-16 NOTE — PROGRESS NOTES
Brief Gyn/Onc Note     Went to re- evaluate patient after interventions for RLS (see my most recent note). Pt reports not relief with ice, topical lidocaine, ibuprofen, tylenol. RN still working on getting SCDs in the room. Pt reports she is exhausted, has barely slept in close to 48 hours. Oriented to self, location, daughter; daughter reports her mental status has been improved in the last 2-3 hours, much less flight of ideas/abnormal speech. Not currently having visual hallucinations. Pt did recently have an episode of emesis, reported to daughter she felt like pills were getting stuck in her throat. Daughter expresses frustration that it is hard to get quick assistance in the ED when things like this (emesis) happen.     BP 98/51 (BP Location: Right arm)   Pulse 75   Temp 99  F (37.2  C) (Oral)   Resp 15   SpO2 97%    Gen: resting in bed, frequently moving feet/legs   CV: warm and well perfused   Pulm: breathing comfortably on room air   Abd: soft, non-tender   Ext: sensation intact BLE, no edema, ice packs attached to BLE  Neuro: A&O x3     Pt continues to have significant restlessness and inability to stay still or sleep despite attempted interventions for RLS. Overall, mentation seems improved (compared to my interactions with pt last night at time of admission). Currently oriented with linear speech patterns and thought processes, daughter has noticed more stability in her mental status in the past few hours as well. Pt continues to be quite uncomfortable with RLS and strongly desires to get some rest. D/w pt and daughter risk of sedating medications in that they may worsen her mental status/agitation especially given hospital environment. They express that they are willing to take on this risk for the possibility of getting some relief from her symptoms. Daughter plans to be w pt through the night tonight; discussed if she needs a break we can order 1:1; she is not interested in this option, will stay  with her mom. After discussion w gyn/onc fellow Dr Barajas will plan to re-order pts PTA ambien. She will receive this as well as her PTA amitriptyline this evening. If still no relief/inability to get rest, will make IV haldol available x2 doses overnight. Pt and family aware of risks of this medication. Daughter asks about decision to re-start eliquis; discussed given stability of her plt count and risks of holding anticoagulation in the setting of afib and hx PE, feel benefit of re-starting this medications outweighs risks, she is in agreement and will plan to take this medication tonight (had previously declined PM dose d/t not having talked with team about reasoning for re-starting this medication). Daughter asks about timeline for a bed outside the emergency department/possibility of transfer to a different hospital to get out of the ED. Let her know I will do my best to look into bed availability on the floor.   - Ambien and amitriptyline now   - If still no relief of sx, offer haldol IV; this has been made available x2 doses overnight  - continue ice packs, prn topical lidocaine, SCDs when available overnight for tx of RLS  - D/w RN use of pulse ox overnight as able (pt has been restless and frequently removing it)   - Recommended to daughter and RN that in the setting of these sedating medications, should work to achieve greatest likelihood of sleep by turning TV off, lights off, closing curtains and shutting door; she voiced understanding   - Plan to avoid sedating/CNS-impacting medications during daytime hours as much as possible   - RN working on SCDs for patient   - D/w charge RNs in the ED and on 5A; bed being cleaned on 5A currently, if staffing available, pt likely first on the list for transfer to this unit overnight santana Rodriguez MD  OB/GYN PGY-2  5/15/2024 9:32 PM     Gyn/onc pager [phone]

## 2024-05-16 NOTE — CONSULTS
"Palliative Care Consultation Note  [facility]      Patient: Taran Regalado  Date of Admission:  5/14/2024    Requesting Clinician / Team: Jean Gutierrez MD  Reason for consult: Symptom management > \"Severe RLS, insomnia\"       Recommendations & Counseling     GOALS OF CARE:   Plan of care - Life-prolonging without limits   Noted on CT ab/pelvis concern for progressive metastatic with peritoneal carcinomatosis.   Family also noted that her appetite has been decreasing especially prior to admission.  Likely would benefit from further goals of care but will defer to Gyn Onc as to when would be the best timing.    ADVANCE CARE PLANNING:  No health care directive on file. Per  informed consent policy, next of kin should be involved if patient becomes unable.  Surrogate -  Darian  Mias - daughters Edie and Laure  There is no POLST form on file, defer to patient and/or next of kin for decisions   Code status: Full Code    MEDICAL MANAGEMENT:   #CIPN  #RLS  #Delirium  #Cancer-related pain  Medications for insomnia, RLS, and CIPN are often at odds with each other. Some may cause worsening of some symptoms while improving others. At this point, I think it is best to deprescribe and use the most minimal and benign medications possible. Opioids have been used to refractory CIPN and RLS at times so it is reasonable to use them especially in context of likely predicted future worsening cancer pain. Doubt her symptoms are from opioid withdrawal as UDS was negative. Uncertain if she received her PTA medications during the day or two she was out...could be withdrawal from those medications?  Defer to neuro for Requip recs for RLS  START Butrans 10 mcg/hr q weekly patch (done for you)  Spoke with patient and daughters about placement, expectations, and disposal  Appreciate pharmacy liaison consult for buprenorphine (patch and buccal film) as this would have the lease " potential for adverse effects. Butrans 10 mcg/hr patch costs $1.55 and belbuca 75 mcg film costs $4.20.   Trial Dilaudid 1 mg if needed then escalate to 2 mg total if symptoms are still uncontrolled.  Of note, Laure stated that 2 mg sedated her and 4 mg caused her to be paradoxically hyperactive.   DECREASE amitriptyline from 100 to 75mg PO daily for 14 days (done for you) then decrease to amitriptyline 50mg PO daily for 14 days and then 25mg for 14 days then stop. Once amitriptyline dose is at 25mg PO daily, okay to start duloxetine 30mg PO daily. Duloxetine can be increased to 60mg PO daily after 14 days of duloxetine 30mg PO daily AND patient is off amitriptyline.   Changed wean to slower schedule due to patient concerns  DECREASE zolpidem from 10 mg to 5 mg at bedtime sheeba and 5 mg at bedtime prn for 14 days (done for you) - would favor weaning off amitriptyline over zolpidem  STOP gabapentin (done for you). Likely not effective for CIPN and could cause higher fall risk.  Would keep the follow-up for Dr. Bermeo for the palliative care clinic on 5/21    #Nausea/vomiting  Resolved. Ate a good amount for breakfast this AM  Zofran 4 mg IV/ODT q6h prn     #Hx of OIC  LBM 5/16  Senna-docusate 1-2 tabs BID prn    PSYCHOSOCIAL/SPIRITUAL SUPPORT:  Family  and daughters  [place] community: [employer]     Palliative will follow.    Total time spent was 80 minutes regarding support and symptom control on the date of the encounter. These recommendations were given to the primary team via this note/in-person.    Nico Rose DO / Internal and Palliative Medicine   Securely message with the Vocera Web Console (learn more here)   Text page via [facility] Paging/Directory         Assessment      Taran Regalado is a 68 year old female with a past medical history of metastatic high grade serous ovarian cancer currently on cycle 2 of carbo/taxol/amaris. She was initially admitted admitted 5/12 after falls at home for back pain persisting  "x2 weeks (per primary team notes, suspicion is for MSK etiology). Treated for an E coli UTI. Palliative Care consult to assist with peripheral neuropathy. She was discharged 5/13 as she declined TCU. She presented again 5/14 and was readmitted 5/15 for altered mental status, N/V, diarrhea, and uncontrolled RLS and CIPN symptoms.  Palliative was consulted again 5/16 for symptom control.    Today, the patient was seen for:  Neoplasm related pain  RLS  Insomnia  CIPN  N/V  Diarrhea  Goals of care  Support  Encounter for palliative care      Palliative Care Summary:   Met with Taran and daughter Matias at bedside. They are familiar with palliative care having seen us on 5/13 and having seen us in the clinic as well.    Per chart review and visit today, Taran was struggling with restlessness, incoherent speech, slurring of words, and confusion. Both arms and legs felt the urge to be moving and noted it was similar to \"bugs crawling over\". Had not gotten much sleep and was having visual hallucinations. Also noted history of CIPN. PTA, takes Dilaudid 2 mg once daily per OP palliative notes.    Recommended to wean off amitriptyline due to fall risk and concern for other bone marrow effects and to start Cymbalta but declined to do so 5/13. Also noted to be on Ambien 10 mg at bedtime PTA.     RLS symptoms could also be exacerbated by her mood as when I was speaking to Taran about weaning off her amitriptyline, she looked anxious and noted her legs were \"flaring up again\".     Trazodone did not help overnight nor did Ambien. Dilaudid IV 0.5 mg x1 reportedly was the only thing that helped Jannie rest overnight. As a result, she was more oriented this AM as well.     Prognosis, Goals, & Planning:    Functional Status just prior to this current hospitalization:  Outpatient Palliative Performance Score (PPS) 60%  Significant disease.  Normal or reduced intake. Normal LOC or confusion. Reduced ambulation, some " assist w/self-care, unable to work/do housework.      Prognosis, Goals, and/or Advance Care Planning:  As above    Code Status was addressed today:   No      Patient's decision making preferences: shared with support from loved ones        Patient has decision-making capacity today for complex decisions: Intact            Coping, Meaning, & Spirituality:   Mood, coping, and/or meaning in the context of serious illness were addressed today: Yes    Social:   Living situation:lives with significant other/spouse  Important relationships/caregivers:family ( and daughters)    Medications:  I have reviewed this patient's medication profile and medications from this hospitalization.     ROS:  Comprehensive ROS is reviewed and is negative except per HPI    Physical Exam   Vital Signs with Ranges  Temp:  [99  F (37.2  C)-99.3  F (37.4  C)] 99.3  F (37.4  C)  Pulse:  [75-80] 80  BP: ()/(51-75) 110/62  SpO2:  [97 %] 97 %  0 lbs 0 oz  General: Not in acute distress. Tired.  Head: Atraumatic. Normocephalic.   Eyes: Anicteric without injection. Eyes conjugate. Extraocular movements intact.  Ear, Nose, and Throat: Mouth pink and moist without lesions. Neck without overt masses.  Pulmonary: Unlabored. Speaking in full sentences. On room air.  Cardiovascular: Perfusing.   Abdomen: Slightly distended.   Skin: Warm.  Musculoskeletal: Joints of hand normal. Muscle bulk decreased. Tone normal in UE and LE.  Neuro: Speech fluent. Face symmetric. No focal neurologic deficit noted. Alert and oriented x4.  Psych: Normal mood and affect. Sensorium, gross memory, thought processes, and fund of knowledge intact.      Data reviewed:  Results for orders placed or performed during the hospital encounter of 05/14/24 (from the past 24 hour(s))   MR Brain w/o & w Contrast    Narrative    EXAM: MR BRAIN W/O & W CONTRAST  5/15/2024 4:09 PM     HISTORY:  hx stage III ovarian cancer, recent falls, nausea and now  presenting with  delirium/AMS, r/o brain metastases       COMPARISON:  Same day head CT    TECHNIQUE: Sagittal and axial T1-weighted, axial diffusion,  multiplanar T2 FLAIR with fat saturation images were obtained without  intravenous contrast. Following intravenous gadolinium-based contrast  administration, axial T2-weighted, axial susceptibility, and  axial/coronal T1-weighted images were obtained.    CONTRAST: gadobutrol (GADAVIST) injection 8 mL.    FINDINGS: There is no mass effect, midline shift, or intracranial  hemorrhage. The ventricles are proportionate to the cerebral sulci. No  acute infarct by diffusion-weighted imaging, however there is single  focal area of hyperintensity on DWI in the left occipital/parietal  (series 12 image 13), isointense on ADC and enhancing on post contrast  imaging. Major intracranial vascular structures are within normal  limits.    Scattered FLAIR hyperintensities in the subcortical and  periventricular white matter consistent with small vessel ischemic  disease.    Again seen right parasagittal calcified meningioma along the right  vertex.    No suspicious abnormality of the skull marrow signal. Clear paranasal  sinuses. Mastoid air cells are clear. Intraocular contents is  unremarkable.      Impression    IMPRESSION:  1. Single punctate enhancing focus in the left occipital/parietal  region, most likely represents a subacute infarct.  No acute infarct.  Metastases cannot be completely excluded but considered much less  likely. Consider follow-up MRI in 2-4 weeks to demonstrate resolution  of imaging findings.  2.  Chronic small vessel ischemic disease.    I have personally reviewed the examination and initial interpretation  and I agree with the findings.    RONI ATKINSON MD         SYSTEM ID:  [id number]   CBC with Platelets & Differential    Narrative    The following orders were created for panel order CBC with Platelets & Differential.  Procedure                               Abnormality          Status                     ---------                               -----------         ------                     CBC with platelets and d...[[id number]]  Abnormal            Final result                 Please view results for these tests on the individual orders.   Comprehensive metabolic panel   Result Value Ref Range    Sodium 137 135 - 145 mmol/L    Potassium 3.7 3.4 - 5.3 mmol/L    Carbon Dioxide (CO2) 21 (L) 22 - 29 mmol/L    Anion Gap 9 7 - 15 mmol/L    Urea Nitrogen 8.6 8.0 - 23.0 mg/dL    Creatinine 0.53 0.51 - 0.95 mg/dL    GFR Estimate >90 >60 mL/min/1.73m2    Calcium 8.1 (L) 8.8 - 10.2 mg/dL    Chloride 107 98 - 107 mmol/L    Glucose 119 (H) 70 - 99 mg/dL    Alkaline Phosphatase 420 (H) 40 - 150 U/L    AST 63 (H) 0 - 45 U/L    ALT 28 0 - 50 U/L    Protein Total 6.0 (L) 6.4 - 8.3 g/dL    Albumin 2.7 (L) 3.5 - 5.2 g/dL    Bilirubin Total 0.6 <=1.2 mg/dL   Magnesium   Result Value Ref Range    Magnesium 1.9 1.7 - 2.3 mg/dL   CBC with platelets and differential   Result Value Ref Range    WBC Count 4.6 4.0 - 11.0 10e3/uL    RBC Count 2.52 (L) 3.80 - 5.20 10e6/uL    Hemoglobin 8.4 (L) 11.7 - 15.7 g/dL    Hematocrit 25.9 (L) 35.0 - 47.0 %     (H) 78 - 100 fL    MCH 33.3 (H) 26.5 - 33.0 pg    MCHC 32.4 31.5 - 36.5 g/dL    RDW 21.8 (H) 10.0 - 15.0 %    Platelet Count 53 (L) 150 - 450 10e3/uL    % Neutrophils 69 %    % Lymphocytes 20 %    % Monocytes 10 %    % Eosinophils 0 %    % Basophils 1 %    % Immature Granulocytes 0 %    NRBCs per 100 WBC 0 <1 /100    Absolute Neutrophils 3.2 1.6 - 8.3 10e3/uL    Absolute Lymphocytes 0.9 0.8 - 5.3 10e3/uL    Absolute Monocytes 0.5 0.0 - 1.3 10e3/uL    Absolute Eosinophils 0.0 0.0 - 0.7 10e3/uL    Absolute Basophils 0.0 0.0 - 0.2 10e3/uL    Absolute Immature Granulocytes 0.0 <=0.4 10e3/uL    Absolute NRBCs 0.0 10e3/uL     Recent Results (from the past 24 hour(s))   MR Brain w/o & w Contrast    Narrative    EXAM: MR BRAIN W/O & W CONTRAST  5/15/2024 4:09 PM      HISTORY:  hx stage III ovarian cancer, recent falls, nausea and now  presenting with delirium/AMS, r/o brain metastases       COMPARISON:  Same day head CT    TECHNIQUE: Sagittal and axial T1-weighted, axial diffusion,  multiplanar T2 FLAIR with fat saturation images were obtained without  intravenous contrast. Following intravenous gadolinium-based contrast  administration, axial T2-weighted, axial susceptibility, and  axial/coronal T1-weighted images were obtained.    CONTRAST: gadobutrol (GADAVIST) injection 8 mL.    FINDINGS: There is no mass effect, midline shift, or intracranial  hemorrhage. The ventricles are proportionate to the cerebral sulci. No  acute infarct by diffusion-weighted imaging, however there is single  focal area of hyperintensity on DWI in the left occipital/parietal  (series 12 image 13), isointense on ADC and enhancing on post contrast  imaging. Major intracranial vascular structures are within normal  limits.    Scattered FLAIR hyperintensities in the subcortical and  periventricular white matter consistent with small vessel ischemic  disease.    Again seen right parasagittal calcified meningioma along the right  vertex.    No suspicious abnormality of the skull marrow signal. Clear paranasal  sinuses. Mastoid air cells are clear. Intraocular contents is  unremarkable.      Impression    IMPRESSION:  1. Single punctate enhancing focus in the left occipital/parietal  region, most likely represents a subacute infarct.  No acute infarct.  Metastases cannot be completely excluded but considered much less  likely. Consider follow-up MRI in 2-4 weeks to demonstrate resolution  of imaging findings.  2.  Chronic small vessel ischemic disease.    I have personally reviewed the examination and initial interpretation  and I agree with the findings.    RONI ATKINSON MD         SYSTEM ID:  [id number]       Medical Decision Making

## 2024-05-16 NOTE — PLAN OF CARE
Goal Outcome Evaluation:      Plan of Care Reviewed With: patient, family    Overall Patient Progress: improving    A&O x4, VSS, port infusing 75ml/hr NS, assist of 1 with walker, 1 BM today, voiding spontaneously, neuropathy in feet; report given to bedside nurse.  Awaiting transport.

## 2024-05-16 NOTE — PROVIDER NOTIFICATION
Ariella Rodriguez MD:    Pt daugther state her mom leg restless is getting worsen. I have given Ambien, Haldol but she said that did not work? Any further intervention?

## 2024-05-16 NOTE — PROGRESS NOTES
"Brief progress note    S:  Presented to the room to see Justen and her daughter and discuss the recommendations from palliative care and neurology. Reports significant RLS type pain. The patient remains discouraged about this, especially since some of it has returned since talking with palliative care. Over the course of this conversation, she asked for Dilaudid, and was quite frustrated when only p.o. Dilaudid was offered.    O:  Vital signs:  Temp: 98  F (36.7  C) Temp src: Oral BP: 115/76 Pulse: 81   Resp: 18 SpO2: 99 % O2 Device: None (Room air)   Height: 180.3 cm (5' 11\") Weight: 79.8 kg (175 lb 14.4 oz)  Estimated body mass index is 24.53 kg/m  as calculated from the following:    Height as of this encounter: 1.803 m (5' 11\").    Weight as of this encounter: 79.8 kg (175 lb 14.4 oz).    Gen: Lying on her side, moving her legs back-and-forth in bed.  Responding with single sentences    A/P:  Goals of care were broached while discussing the various extensive recommended tests to workup stroke, including another CT of the head, continuous telemetry, and a TTE.  Discussed the utility of gathering this information, and whether or not the information gained from these tests would change the course of the patient's treatment. The patient was moderately engaged in conversation, and the daughter supported much of this conversation.  The daughter shared that she and her family rented a [employer] in [place] in June, and they would love to go and enjoy that. Her daughter is questioning whether to proceed with further diagnostic treatment, and is interested in pursuing primarily symptom management, but ultimately defers to her mother. The patient throughout the conversation stated that her priority was for her legs to stop hurting and stop moving.  When asked about whether she would want a TTE or the CTA of the head/neck, she stated that she would be fine with it, but that she may need IV Dilaudid to stop her from " "moving.  When asked about the TTE, she wavered and decided that she would discuss it at a later time.  She and her daughters are going to discuss different aspects of her care, and the patient would additionally like to talk to Dr. Juarez.    The daughter shared that her mother told her she feels \"full of fluid\" and asked about the potential of therapeutic paracentesis. The patient and her mother expressed their concern that her Dilaudid is p.o. and not IV.  They acknowledge that in order to discharge home, they need to be on p.o. medication and also expressed their frustration that nothing p.o. seems to work.    Jean Gutierrez MD  Obstetrics and Gynecology, PGY-1  05/16/24 5:04 PM   "

## 2024-05-16 NOTE — PROGRESS NOTES
Prior Authorization Approval    Medication: BELBUCA 75 MCG BU FILM  PA Initiated: 5/16/2024  PA Type: Clinical    Insurance: [facility] - Phone [phone] Fax [phone]  [facility] Key / Reference #: [id number] / [id number]   Authorization Effective Dates: 2/16/2024 - 5/16/2025    Expected CoPay: $ 4.20  CoPay Card Eligible: No    Filling Pharmacy: [facility] - [address]  Comments:  Proactive PA. Please send a script to the discharge pharmacy.    Laure Miranda  [facility] Liaison  Ph: [phone]  Fax: [phone]  Available on Vocera (learn more here)

## 2024-05-16 NOTE — PLAN OF CARE
"Blood pressure 108/67, pulse 80, temperature 97.6  F (36.4  C), temperature source Oral, resp. rate 18, height 1.803 m (5' 11\"), weight 79.8 kg (175 lb 14.4 oz), SpO2 99%, not currently breastfeeding.  Goal Outcome Evaluation:  A&Ox4 c/o back pain 5-6/10 ice pack applied and oral dilaudid 1 mg given,Butrans patch applied per order.port  infusing  NS 75 ml's/hr ,daughter at bedside.                        "

## 2024-05-16 NOTE — PROVIDER NOTIFICATION
Text paged to Gyn Onc:    Family is refusing the po Tylenol and Ibuprofen. They will be glad to talk to you if you have time. Thank you Filomena

## 2024-05-16 NOTE — PROGRESS NOTES
"[facility]  General Neurology - Progress Note    Patient Name:  Taran Regalado  Date of Service:  May 16, 2024    Subjective:    Overnight was noted to have worsening movements thought to be RLS for which the patient was given dilaudid. Following dilaudid the patient did sleep.    Objective:    Vitals: /75 (BP Location: Right arm)   Pulse 75   Temp 99  F (37.2  C) (Oral)   Resp 15   SpO2 97%   General: Lying in bed, NAD  HEENT: NC/AT, no icterus  Cardiac: Appears well-perfused, no peripheral edema noted   Respiratory: Non-labored on RA  GI: Soft, non-tender, distended abdomen (but not more than normal per daughter) with chronic hernia present and vertical scar  Skin: No rash or lesion on exposed skin  Neuro:  Mental status: Awake, alert. Is easily distracted and does not engage in conversation unless effort is made to catch her attention directly and ask her a simple question. She is able to answer only basic questions and largely responds with 1-2 word answers though is more commonly mumbling to herself, frequently makes mention of topics that are not related to the conversation. Oriented to person, age, place, and year. Unclear if oriented to situation as reports she is at the hospital to \"take care of all this\". Naming intact. Able to state the days of the week forward but unable to do so backwards, though poor effort at this point. Able to perform basic arithmetic but cannot perform calculations using money. Registration 3/3 but recall 0/3, able to get one word with categorical clues. Unable to follow 3-step crossed commands. Unable to copy meaningless hand gestures. Able to read. Speech is clear and fluid, no concern for aphasia. No concern for neglect. Does not appear to be floridly hallucinating  Cranial nerves: PERRL, conjugate gaze, EOMI w/o nystagmus, blinks to threat bilaterally, facial sensation intact, no facial asymmetry noted, hearing intact to " conversation, mild dysarthria, tongue is midline  Motor: Normal bulk and tone. No abnormal movements including asterixis. Strength is 5/5 in all extremities   Reflexes: 2+ and symmetric reflexes in the bilateral biceps and brachioradialis. Absent reflexes in the knees and ankles. Mute toes bilaterally, no clonus  Sensory: Decreased sensation to light touch in the bilateral feet though unable to get the patient to describe her sensory loss more than this. Pos Romberg  Coordination: FNF intact bilaterally. No truncal ataxia when sitting at edge of bed  Gait: Patient was able to rise largely under her own power but could not maintain balance without significant suppor and was oftentimes leaning backwards. Too unsteady to perform gait exam    Pertinent Investigations:    I have personally reviewed most recent and pertinent labs, tests, and radiological images.     MRI Brain 5/16/24  1. Single punctate enhancing focus in the left occipital/parietal region, most likely represents a subacute infarct.  No acute infarct. Metastases cannot be completely excluded but considered much less likely. Consider follow-up MRI in 2-4 weeks to demonstrate resolution of imaging findings.  2.  Chronic small vessel ischemic disease.    Assessment:  Taran Regalado is a 68 year old female w/ PMHx ovarian cancer with hepatic/omentum mets s/p intrahepatic stents (2023) + omentectomy (2021) + hysterectomy/salpingo-oophorectomy (2021) on Carboplatin/Taxol/Bevacizumab with chest port in place, biliary obstruction, migraines, chemo-induced peripheral neuropathy, RLS, Afib not on AC, hx PE, and insomnia who presented on 5/14/2024 with complaints of acute worsening of RLS symptoms and new onset confusion as well as 3 weeks of balance issues. Neurology was consulted to aid in further assessment/management.    # Toxic metabolic encephalopathy, delirium, in setting of recent UTI, altered sleep wake cycle  # Recurrent falls, likely mechanical in setting  of neuropathy, delirium  Neurological exam was drowsy, waxing and waxing level of alertness, able to follow 1 step commands but goes back to sleep, moving all extremities anti gravity and symmetrically. Suspect that her falls are likely mechanical in setting of peripheral neuropathy and recent UTI. As to her balance issues, these could stem from her known chemo-induced peripheral neuropathy though this would have had to have gotten much worse. The patient's difficulty in reporting her sensory deficits makes further assessment difficult and so initially also considered central causes, though no UMN signs were noted on exam and MRI was significant for punctate CVA that is likely asymptomatic (would have presented with visual field changes, potentially sensory loss).     With her waxing and waxing symptoms suspect she has delirium largely due to altered sleep wake cycle as she has been sleeping during the day, worsening movements leading to inability to sleep, exacerbated by recent UTI. She did not have cogwheel rigidity, resting tremors, although was unable to follow commands to assess bradykinesia formally. Her family did not describe memory loss, anosmia, freezing gait, incontinence, apraxia that would be suggestive or a neurodegenerative process such as parkinson's plus syndromes. She did describe seeing little children from her childhood, mice on skateboards in the hallways, visual hallucinations of furry animals and little people can be seen in lewy body but would ideally need to follow up when she is not in delirium.  Polypharmacy could also again be at play.    -Neurochecks Q4h  -MRI Brain w/ and w/o contrast  -Will defer infectious work-up to Oncology  -Vit B1 pending, ammonia wnl, TSH 3.09,  Vit V12 > 4000, UDS -ve  -Ongoing correction of metabolic derangements  -Limit CNS-acting medications as much as possible  -Delirium Precautions    # possible RLS, likely exacerbation in acute illness  Per chart review  started after chemotherapy. Ferritin in 417. She has tried amitriptyline, trazodone for her symptoms that did not help. Requip was added, dose was increased but also did not help her symptoms. Pramipexole was tried in the past as well per chart review that did not help. Could try gabapentin but may worsen confusion in acute illness, pt says she previously tried with weight gain. Rotigotine patch could be tried as well. However, given patient has tried so many agents without relief of her symptoms, query whether she truly has RLS or if this is more her neuropathy that is driving her symptoms and worsened with delirium.     -Ropinarole 0.50 mg at bedtime,   - If ineffective, would resume gabapentin 300 mg at bedtime, increasing to 600 at bedtime over 3 days to assess for response    # Incidental punctate subacute infarct L occipital/parietal region likely cardio embolic in setting of afib and off AC, cannot rule out hypercoagulability of malignancy  Incidentally found L punctate subacute occipital/parietal infarct. Suspect most likely etiology cardioembolic, age of infarct might be older than when her DOAC was stopped for thrombocytopenia and falls. Hypercoagulability of malignancy cannot be ruled out. Will complete stroke work up. Will discuss with Stroke team.    - CTA head & neck for further vessel imaging   - Eliquis 5 mg BID  - Statin: atorvastatin 40 mg once daily  - TTE with Bubble Study  - Telemetry, EKG  - Bedside Glucose Monitoring  - Aim normotension  - A1c, Lipid Panel  - PT/OT/SLP  - Stroke Education  - Depression Screen  - Apnea Screen  - Euthermia, Euglycemia    # Calcified meningioma  Stable on imaging    -Neurology will continue to follow    Patient was seen and discussed with Dr. Son.    Maksim Delong MD  Neurology PGY-3  &  Kristen Moreno MD  PGY-3  Internal Medicine

## 2024-05-16 NOTE — PLAN OF CARE
Goal Outcome Evaluation:    [id number]  /75 (BP Location: Right arm)   Pulse 75   Temp 99  F (37.2  C) (Oral)   Resp 15   SpO2 97%     Pt A&O, VSS, on 6 L NC, baseline is 3 L NC  Up independently to commode  Denies pain and SOB  Abd port, infusing tko/abx  Tolerating reg with good appetite  . Sliding scale insulin given and carb coverage given. Pt. Constantly refused the full dose of the sliding scale and the carb coverage. Pt do her own calculation for how much insuline she needed. MD notified.  No acute change, continue with poc

## 2024-05-16 NOTE — PROVIDER NOTIFICATION
Text paged Twyla GONZALES):    Pt 2nd daughter is here and like to see you. Thank you.    -Provider came down immediately, talk with them and ordered iv Dilaudid to be given

## 2024-05-16 NOTE — PROGRESS NOTES
Brief Gyn/Onc Note    Pt seen at 0215 when second daughter arrived at bedside. Pt continued to feel very restless. Family elected to remain in the hospital and try IV dilaudid for RLS rather than leaving AMA. On return to bedside at 0545 this morning pt sleeping soundly. Per daughter, she was able to fall asleep about 10 minutes after administration of dilaudid has been sleeping soundly without restless legs since that time. Continuous pulse ox in place. Elected not to wake patient up this morning given recent lack of rest and extreme difficulty getting to sleep overnight. Will update daytime gyn/onc team who will round and formally document POC on patient later this morning. Reach out to gyn/onc team for new questions/concerns before that time (pager [phone]).    Twyla Rodriguez MD  OB/GYN PGY-2  5/16/2024 6:21 AM

## 2024-05-16 NOTE — CONSULTS
Discharge Pharmacy Test Claim    Belbuca film and buprenorphine patches both require prior authorization through patient's BCBS Medicare advantage plan. Pharmacy liaison will work on the PA requests.    Addendum 1:06 pm: belbuca film and buprenorphine patches approved. Expected monthly copays listed below.    Test Claim Copay   belbuca film 4.20   buprenorphine patch 1.55   butrans patch not covered     Laure Miranda  [facility] Pharmacy Liaison  Ph: [phone]  Fax [phone]  Available on Vocera (learn more here)

## 2024-05-16 NOTE — PLAN OF CARE
Goal Outcome Evaluation:    3920-3800  /75 (BP Location: Right arm)   Pulse 75   Temp 99  F (37.2  C) (Oral)   Resp 15   SpO2 97%     Pt confused, only alert to self.   Chest port, SL  Up with SBA to bathroom  Reg diet with poor appetite  C/o of restless leg pain.     Pt and daughters wanted iv Dilaudid for pain control. Pt takes po Dilaudid 4 mg at home and the daughters states that does not help. I paged the provider and she called me. She suggested they try the non narcotic first since pt mental status is altered and this might caused delirium. The daughters didn't want  that so I have to page the provider to come down and talk to them. Later on the daughter states her  mom condition is getting worse. I paged the provider and she immediately came down. She ordered Trazodone and Ropinirole. They didn't want the medication anymore, they want to leave AMA. Again I paged the provider and  she came down. She had a discussion with them and decided to order iv Dilaudid. The daughter who is staying with the mom is waiting for another daughter coming from home. They will let the provider know their decision if they decide to  stay or leave AMA with their mom. The sister came and I paged the provider again. She came down to talk with them and finally they decided to try the iv Dilaudid and if that doesn't work, they will leave in the morning.

## 2024-05-16 NOTE — PROVIDER NOTIFICATION
Text paged to Gyn Onc     Pt daughters want you to come down and explain to them why you restarted their mom Eliquis. Thank you.

## 2024-05-16 NOTE — PROGRESS NOTES
Prior Authorization Approval    Medication: BUPRENORPHINE 10 MCG/HR TD PTWK  PA Initiated: 5/16/2024  PA Type: Clinical    Insurance: [facility] - Phone [phone] Fax [phone]  [facility] Key / Reference #: [id number] / [id number]   Authorization Effective Dates: 2/16/2024 - 5/16/2025    Expected CoPay: $ 1.55  CoPay Card Eligible: No    Filling Pharmacy: [facility] - [address]  Comments:  Proactive PA. Please send a script to the discharge pharmacy.      Laure Miranda  [facility] Pharmacy Liaison  Ph: [phone]  Fax: [phone]  Available on Vocera (learn more here)

## 2024-05-16 NOTE — PROGRESS NOTES
Admitted/transferred from:   2 RN full   skin assessment completed by Elisabet Eli RN and Maty WILSON  Skin assessment finding: issues found generalized bruises    Interventions/actions: other continue to monitor.      Will continue to monitor.

## 2024-05-16 NOTE — PROVIDER NOTIFICATION
Text paged MD Twyla Rodriguez:    FYI Pt daughter want to leave AMA with her mom. I will deassess the port and let her fill the necessary form.  Thank you.    -Provider came down immediately to talk with daughter.

## 2024-05-16 NOTE — PROGRESS NOTES
Brief Gyn/Onc Note    Returned to pts bedside after informed by RN that pt continuing to struggle with restlessness and inability to sleep.     See my previous documentation for interventions attempted to this point. Pt has received PM dose of PTA amitriptyline and ambien as well as haldol >90 minutes prior to my arrival at the bedside. She still has not had any sleep and reports 0 relief of her symptoms. D/w neurology team who recommend avoiding ambien and haldol. Recommended addition of ropinerole for RLS and trazodone for sleep and to avoid narcotics as able given her presentation with altered mentation, though states narcotics can be helpful in treating RLS sx. Pts daughter remains very frustrated by pts ongoing symtpoms and that pt remains in the emergency department where it is challenging to get rest. D/w charge RN on 5A who has been unable to check into bed availability d/t acute events happening on the floor but continues to state that pt likely to have a bed on the floor tonight or tomorrow at the latest. They are interested in trying trazodone, ropinerole for now. Asked RN to administer as soon as available and let team know if pt continues to have ongoing sx.     Twyla Rodriguez MD  OB/GYN PGY-2  5/16/2024 1:00 AM      Addendum:     Paged by RN that pt requesting to leave AMA. On my arrival to pts bedside, belongings are all packed. Pt sitting up in chair. Has not yet received trazodone or ropinerole. Per RN, these medications just arrived from pharmacy. Pts daughter reports that after discussion w her sister they realized that pt has trazodone at home, has taken it in the past and it gives her terrible nightmares. Reports they also have ropinerole at home and tried this prior to presenting without any relief. Not interested in trying these medications and would like to go home instead. Given they are not interested in trying trazodone and ropinerole, offered that we could try a dose of IV dilaudid to see if  this relieves her symptoms. Discussed that this is not a long term solution and that this medication is quick onset but short acting and will not provide long term relief. Similar to the other medications we have tried, may negatively impact her mentation as well.      Expressed my concern about Justen's safety should they leave the hospital and that doing this would be against our medical recommendation. Justen stood up from the chair to move to the bed while I was in the room and nearly slid off the edge of the bed on sitting down. Concerned especially for her risk of falls if she were to discharge but also d/t her confusion and altered mentation which puts her at higher risk of injury should she leave. Expressed understanding at their frustration with being in the emergency department for 24 hours awaiting a bed and that interventions thus far have not been successful. Encouraged them to consider remaining in the hospital so that we can continue working towards a better understanding of the source of her symptoms and symptom management. Should they leave, encouraged them to seek care in a timely fashion should things progress or new concerns arise at home. Pts daughter voiced understanding of this and reports she or her sister will continue to be with pt 24/7 at discharge.  Pt would like to discuss with her other daughter who is on her way into the hospital right now before making a decision re: staying and trying diluadid tonight versus discharge home AMA. She has already signed AMA paperwork. D/w RN who will page me when pts daughter has arrived and a decision is made.    Twyla Rodriguez MD  OB/GYN PGY-2  5/16/2024 2:05 AM

## 2024-05-17 NOTE — PLAN OF CARE
5A/B OT: DEFER    Occupational Therapy: Orders received. Chart reviewed and discussed with care team.? Occupational Therapy not indicated due to lack of acute IP OT needs at this time. Per discussion with PT, requiring CGA with slight unsteadiness on feet, and will address IP. Has 24/7 assist available if needed for ADL, however, near baseline IND. May benefit from ongoing HH PT/OT vs OP, per PT recommendation.? Defer discharge recommendations to PT and medical team.? Will complete orders.

## 2024-05-17 NOTE — PROGRESS NOTES
"[facility]  General Neurology - Progress Note    Patient Name:  Taran Regalado  Date of Service:  May 16, 2024    Subjective:    Overnight got dilaudid, ropinirole, gabapentin. Restless overnight, ropinirole increased to 0.5 mg TID, gabapentin 300 mg at bedtime. Seroquel PRN if needed.    Objective:    Vitals: /67 (BP Location: Right arm)   Pulse 85   Temp 98  F (36.7  C) (Oral)   Resp 18   Ht 1.803 m (5' 11\")   Wt 79.8 kg (175 lb 14.4 oz)   SpO2 94%   BMI 24.53 kg/m    General: Lying in bed, NAD  HEENT: NC/AT, no icterus  Cardiac: Appears well-perfused, no peripheral edema noted   Respiratory: Non-labored on RA  GI: Soft, non-tender, distended abdomen (but not more than normal per daughter) with chronic hernia present and vertical scar  Skin: No rash or lesion on exposed skin  Neuro:  Mental status: Awake, alert. Can answer orientation and attention questions without difficulty 2.25$, days of the week backwards, WORLD backwards, 3/3 recall after distraction.   Speech is clear and fluid, no concern for aphasia. No concern for neglect.   Cranial nerves: PERRL, conjugate gaze, EOMI w/o nystagmus, VFF intact, facial sensation intact, no facial asymmetry noted, hearing intact to conversation, no dysarthria, tongue is midline  Motor: Normal bulk and tone. No abnormal movements including asterixis. Strength is 5/5 in all extremities   Reflexes: 2+ and symmetric reflexes in the bilateral biceps and brachioradialis. Absent reflexes in the knees and ankles. Mute toes bilaterally, no clonus  Sensory: Decreased sensation to light touch in the bilateral feet, reduced vibratory sense 4-5 seconds, abnormal proprioception big toes  Coordination: FNF intact bilaterally. HS intact  Gait: Patient was able to rise largely under her own power but could not maintain balance without significant suppor and was often times leaning backwards. Too unsteady to perform gait exam    Pertinent " Investigations:    I have personally reviewed most recent and pertinent labs, tests, and radiological images.     MRI Brain 5/16/24  1. Single punctate enhancing focus in the left occipital/parietal region, most likely represents a subacute infarct.  No acute infarct. Metastases cannot be completely excluded but considered much less likely. Consider follow-up MRI in 2-4 weeks to demonstrate resolution of imaging findings.  2.  Chronic small vessel ischemic disease.    Assessment:  Taran Regalado is a 68 year old female w/ PMHx ovarian cancer with hepatic/omentum mets s/p intrahepatic stents (2023) + omentectomy (2021) + hysterectomy/salpingo-oophorectomy (2021) on Carboplatin/Taxol/Bevacizumab with chest port in place, biliary obstruction, migraines, chemo-induced peripheral neuropathy, RLS, Afib not on AC, hx PE, and insomnia who presented on 5/14/2024 with complaints of acute worsening of RLS symptoms and new onset confusion as well as 3 weeks of balance issues. Neurology was consulted to aid in further assessment/management.    # Toxic metabolic encephalopathy, delirium, in setting of recent UTI, altered sleep wake cycle  # Recurrent falls, likely mechanical in setting of neuropathy, delirium, resolved  Neurological exam was drowsy, waxing and waxing level of alertness, able to follow 1 step commands but goes back to sleep, moving all extremities anti gravity and symmetrically that resolved. Suspect that her falls are likely mechanical in setting of peripheral neuropathy and recent UTI with encephalopathy. As to her balance issues, these could stem from her known chemo-induced peripheral neuropathy likely sensory.     With her waxing and waxing symptoms suspect she has delirium largely due to altered sleep wake cycle as she has been sleeping during the day, worsening movements leading to inability to sleep, exacerbated by recent UTI. She did not have cogwheel rigidity, resting tremors, although was unable to  follow commands to assess bradykinesia formally. Her family did not describe memory loss, anosmia, freezing gait, incontinence, apraxia that would be suggestive or a neurodegenerative process such as parkinson's plus syndromes. She did describe seeing little children from her childhood, mice on skateboards in the hallways, visual hallucinations of furry animals and little people can be seen in lewy body but would ideally need to follow up when she is not in delirium. Polypharmacy could also again be at play.    -Will defer infectious work-up to Oncology  -Vit B1 pending, ammonia wnl, TSH 3.09,  Vit V12 > 4000, UDS -ve  - Treat underlying infection, correct metabolic derangements as able.  - Avoid CNS acting drugs (including opiates, benzodiazepines, anti-  cholinergics); consider local measures or scheduled pain regimens that minimize opioids for pain  - Supportive medical cares, including correction of any electrolyte  abnormalities  - Delirium precautions (frequent reorientation, normal day night cycles (blinds up and awake during day, sleeping at night), minimize frequent interruptions, clutter and loud noises, encourage family visitations, consider melatonin at 1900 nightly)  - Avoid sensory deprivation (provide patient with eyeglasses or hearing aids if using)      # crawling sensation in feet, hands, likely 2/2 neuropathy  # low concern for RLS  Per chart review symptoms started after chemotherapy. Ferritin in 417. She has tried amitriptyline, trazodone for her symptoms that did not help. Requip was added, dose was increased but also did not help her symptoms. Pramipexole was tried in the past as well per chart review that did not help. Could try gabapentin but may worsen confusion in acute illness, pt says she previously tried with weight gain. Rotigotine patch could be tried as well. However, given patient has tried so many agents without relief of her symptoms, I am questioning if she truly has RLS or if  this is more her neuropathy that is driving her symptoms and worsened with delirium. Speaking with her she says that her symptoms are not an intense urge to move her legs and her symptoms do not improve with walking or moving. They stay and she like to move but to no relief at all. We discussed that she may have neuropathy symptoms that historically have not benefitted from RLS medications. We discussed that gabapentin would be appropriate for both neuropathy symptoms as well as if this is RLS (less likely)    -Ropinarole discontinued  -Gabapentin 600 mg at bedtime, increasing to 900 mg at bedtime over 3 days, can go up to 1200 mg at night afterwards      # Incidental punctate subacute infarct L occipital/parietal region likely cardio embolic in setting of afib and off AC, cannot rule out hypercoagulability of malignancy  Incidentally found L punctate subacute occipital/parietal infarct. Suspect most likely etiology cardioembolic, age of infarct might be older than when her DOAC was stopped for thrombocytopenia and falls. Hypercoagulability of malignancy cannot be ruled out. Vessel imaging without LVO, significant stenosis. LDL 92, HgbA1C 5.2.  Discussed with stroke team, recommend anti coagulation for stroke prevention. Discuss with oncology if lovenox would be preferred. Rivaraoxaban would also be an adequate agent considering she was likely off medication     - Eliquis 5 mg BID  - Statin: atorvastatin 40 mg once daily  - TTE recent without LVO, can be repeated out patient  - Telemetry, EKG  - Bedside Glucose Monitoring  - Aim normotension  - Stroke Education  - Depression Screen  - Apnea Screen  - Euthermia, Euglycemia    # Calcified meningioma  Stable on imaging    -Neurology will sign off, please feel free to call or page for any questions or concerns    Patient was seen and discussed with Dr. Son.    Maksim Delong MD  Neurology PGY-3

## 2024-05-17 NOTE — PROGRESS NOTES
"Brief Gyn/Onc Note     To bedside for PM check in     Patient resting in bed w daughter Casey at bedside on my arrival. Both state today was \"so much better\" than the past few days. Since her four hours of sleep early this morning after receiving IV dilaudid, she has had only one bout of serious restlessness, mentation has been much less altered (per daughter casey) and symptoms overall feel much more manageable throughout the day today. Pt also was able to eat today and had a good appetite for the first time in days. Had a bit of nausea at one point but no vomiting, tolerated multiple meals without issue. She continues to void spontaneously. She is passing flatus. She has had some return of pain in her low back and does endorse feeling more \"full\" in her abdomen, inquires about the possibility of repeat paracentesis while they are in the hospital. Amenable to gabapentin at nighttime alone. Had questions about decrease in amitriptyline dose which they were not expecting tonight but ultimately are agreeable to. Pt is hoping to limit medications as much as possible so would like to avoid other medication to help with sleep tonight which was offered, but will let us know if that changes. Understands that IV narcotics will not be our first line management for return of symptoms.     /76 (BP Location: Left arm)   Pulse 81   Temp 98  F (36.7  C) (Oral)   Resp 18   Ht 1.803 m (5' 11\")   Wt 79.8 kg (175 lb 14.4 oz)   SpO2 99%   BMI 24.53 kg/m     Gen: resting comfortably in bed, NAD  Neuro: A&O x3, linear speech, face symmetric, EOMI   CV: warm and well perfused   Pulm: breathing comfortably on room air   Ext: not actively moving during our conversation, non-tender to palpation     Pt is a 68 year old female with metastatic ovarian high-grade serous carcinoma admitted for altered mental status/delirium and restless legs. Symptom management has been a challenge since admission but pt finally feeling more like " herself today. D/w patient and daughter plan going into the evening. Dose of ropinirole will be increased to 0.5 TID per neurology recommendations starting tonight, pt amenable. Palliative team had recommended discontinuing gabapentin due to fall risk, per my conversation w neurology tonight, they feel gabapentin is often essential in the tx of difficult to control restless legs and recommended its continued use. D/w pt and family who agree to continue gabapentin at bedtime only when pt less likely to be frequently out of bed. Gabapentin 300 mg at bedtime to start this evening. Per palliative recommendations, pts amitriptyline dose decreased from 100 to 75 starting today; pt and daughter had not wanted this dose to be decreased d/t c/f w/d side effects. Discussed plan for slow down taper and they are amenable to the lower dose starting this evening. Pt has butrans patch in place ad discussed that PO dilaudid is available prn as well. Offered that if they are concerned about her ability to sleep again tonight, the next medication we would try is seroquel (discussed this with neurology team as well who would prefer this medication over something like zyprexa). Pt declines trazodone d/t h/o nightmares with this medication, had haldol last night without relief of sx. Pt and daughter report overall they would like to limit medications. Since she is feeling so much better now, they are hesitant to add another medication that may alter her mentation tonight. We discussed that seroquel does have a longer time to onset of action so taking it earlier in the evening will be more effective. They would like to defer taking this medication right now and I feel this is reasonable given how improved pt appears tonight compared to our previous interactions. We did discuss once again that while IV narcotic medications were effective at allowing pt to get some rest last night, our goal will be to continue working to find an oral  regimen which manages her symptoms and avoiding IV narcotics as much as possible unless oral options are proven to be ineffective in managing her symptoms.   - ropinirole 0.5 mg TID starting this evening   - gabapentin 300 mg at bedtime starting tonight  - butrans in place  - PO dilaudid available per palliative recommendations   - can consider seroquel if worsening restlessness/agitation     Twyla Rodriguez MD  OB/GYN PGY-2  5/16/2024 10:23 PM

## 2024-05-17 NOTE — PROGRESS NOTES
"GYN ONC PROGRESS NOTE  05/17/24    HD#3  Disease: stage IIIB metastatic HGSOC    24 hour events:   - ropinirole dose increased  - gabapentin stopped and ultimately re-started at bedtime alone to decrease fall risk   - CTA head/neck with patent major intracranial and cervical arteries   - Butrans, PO dilaudid added per palliative recommendations, amitriptyline decreased to 75 mg   - stopped macrobid  - Ucx no growth    SUBJECTIVE:   Doing well this morning. Had a bit of trouble falling asleep last night but ultimately did and subsequently slept through much of the night without issue. Has had no issues with restless legs overnight. Has some back pain this morning, thinks this is related to the hospital bed. Denies lightheadedness or dizziness. Ate yesterday and denies new nausea or vomiting overnight. Voiding spontaneously. Last BM was yesterday morning, no longer having diarrhea.     OBJECTIVE:   Patient Vitals for the past 24 hrs:   BP Temp Temp src Pulse Resp SpO2 Height Weight   05/17/24 0505 120/67 98  F (36.7  C) Oral -- 18 -- -- --   05/17/24 0455 112/67 98.6  F (37  C) Oral 83 16 94 % -- --   05/17/24 0045 -- -- -- 78 -- 95 % -- --   05/16/24 2227 99/50 98.2  F (36.8  C) Oral 80 17 -- -- --   05/16/24 1553 115/76 98  F (36.7  C) Oral 81 18 99 % -- --   05/16/24 1308 108/67 97.6  F (36.4  C) Oral -- 18 99 % 1.803 m (5' 11\") 79.8 kg (175 lb 14.4 oz)   05/16/24 0957 110/62 99.3  F (37.4  C) Oral 80 -- -- -- --     Gen- Laying in bed, somnolent  CV- well-perfused  Pulm- NWOB  Abd- soft, nontender  Lines/drains- PICC    I/Os - limited monitoring in the ED  (Yesterday // Since Midnight)  PO not measured  IVF 600cc // -- cc  Urine 150 cc // -- ml      Intake/Output Summary (Last 24 hours) at 5/17/2024 0636  Last data filed at 5/16/2024 2259  Gross per 24 hour   Intake 840 ml   Output 150 ml   Net 690 ml        CTA Head/Neck 5/16/2024   1. Head CTA demonstrates patent major intracranial arteries. No  aneurysm or " hemodynamically significant stenosis.   2. Neck CTA demonstrates patent major cervical arteries. No  hemodynamically significant stenosis.  3. Multiple sclerotic vertebral body lesions, may represent metastatic  disease, however are incompletely evaluated. No acute pathologic  fracture demonstrated.  4. Lytic appearance of the occipital calvarium may represent arachnoid  granulations, given CSF characteristics on prior MR. Metastasis is  thought less likely.    Pending cultures (date obtained):   Bcx (5/14): NGTD  Ucx: (5/15): no growth     ASSESSMENT:   68 year old female with metastatic ovarian high-grade serous carcinoma admitted for altered mental status/delirium and restless legs. Pt recently admitted for back pain and recent falls; PT/OT recommended TCU at discharge however patient declined and elected to discharge to home. Brain MRI was pending during that admission with plan at the time of discharge to complete this on an outpatient basis. She returned to the hospital with new restlessness, disorientation, slurred speech x2d. Laboratory work up notable for lactic acidosis with LA 3.2 which resolved with IV fluids, hypomagnesemia with mg 1.6, stable TCP and anemia. WBC 9.6 from 3 last admission c/f possible infection. She had some recent diarrhea CT A/P showed edematous bowel c/w possible gastroenteritis however diarrhea has since improved. Remainder of work up for infection has been unremarkable with negative CXR, covid, flu, UA. BCX and UCX without growth.  Other work up for her delirium has included ammonia and TSH which are wnl, brain MRI w subacute infarct but no new metastatic neurologic dz. Neurology recommends complete stroke w/up with CTA which showed patent intracranial and cervical arteries, as well as TTE which family elects to defer for now.  Ddx for delirium/AMS still  includes infection, polypharmacy, paraneoplastic sx, seizure however exact source remains unclear; overall mentation much  improved in the past 24 hours. Working to manage bothersome RLS symptoms with assistance of neurology and palliative teams.     PLAN:   # AMS  # delirium, improving  # subacute infarct  - brain MR w/ subacute infarct, cannot completely exclude metastases. Repeat MRI 2-4 weeks.   - CTA head & neck shows patent major intracranial and cervical arteries   - TTE with Bubble Study recommended per neurology as part of complete stroke work up, pt and family would like to defer right now per their discussion with the team yesterday  - Bcx, ucx without growth   - COVID, flu neg  - Diarrhea improving, will discontinue enteric panel/C diff   - f/up b1 (ord per neuro recommendations)  - appreciate assistance of palliative and neurology teams      # Restless legs   - continue w/ sx management (topical lidocaine, ice packs)  - ropinirole uptitration per neuro recommendations, currently using 0.5 mg TID   - gabapentin 300 at bedtime only (avoiding during daytime d/t fall risk and sx seem to be worst at night)   - continue to f/up palliative and neuro recommendations, appreciate assistance w sx management     # Insomnia, improving   - working to decrease PTA ambien dose given c/f polypharmacy and CNS impact, received 5 mg yesterday evening   - consider seroquel for bothersome RLS/agitation preventing sleep; pt declined this medication last night and had no issues with sleep      # Lactic acidosis, resolved    - suspect d/t recent diarrhea and poor PO intake   - improved      # E. Coli Positive Urine culture  - received 1 dose ceftriaxone in the ED  - UA w/o evidence of infection on admission, ucx with no growth  - Completed 6/7 days of Macrobid, will not re-start given possible CNS impact and after CTX, UTI has been adequately treated      # HypoMg  - normalized to 1.9 yesterday (5/15)   - Continue to monitor     # Recent falls  # Back pain  - PRN tylenol, ibuprofen, PO dilaudid  - Ice/Heat therapy as needed     # Atrial  Fibrillation  - continue PTA eliquis     # High grade serous ovarian cancer   - MRI w subacute infarct, no evidence of metastatic dz  - regular diet, ensure supplements   - ant-emetics prn   - bowel regimen/imodium prn      # Neuropathy  - PTA amitriptyline decreased to 75 mg at bedtime per palliative recommendations      Disposition: pending course    Discussed with Dr. Lilly and Dr. Huseyin Rodriguez MD  OB/GYN PGY-2  5/17/2024 6:41 AM     The patient was seen and examined with the resident, the labs and vital signs were reviewed. The medical care plan outlined above was provided under my directives and direct supervision.     Zainab Fontanez MD, MPH  Gynecologic Oncology

## 2024-05-17 NOTE — PROGRESS NOTES
"[facility]  Palliative Care Daily Progress Note       Recommendations & Counseling     GOALS OF CARE:   Plan of care - Life-prolonging without limits   Noted on CT ab/pelvis concern for progressive metastatic with peritoneal carcinomatosis.   Family also noted that her appetite has been decreasing especially prior to admission.  Per Gyn Onc noted from 5/16 - \"Family rented a beach house in [place] in June, and they would love to go and enjoy that. Her daughter is questioning whether to proceed with further diagnostic treatment, and is interested in pursuing primarily symptom management, but ultimately defers to her mother. The patient throughout the conversation stated that her priority was for her legs to stop hurting and stop moving.\" Likely would benefit from further goals of care but will defer to Gyn Onc as to when would be the best timing. Has follow-up with Dr. Juarez on 5/20 and then Dr. Bermeo on 5/21 so this may be a good time to consider readdressing goals of care at these times.   Paracentesis today 5/17 per CAPS team     ADVANCE CARE PLANNING:  No health care directive on file. Per  informed consent policy, next of kin should be involved if patient becomes unable.  Surrogate -  Darian  Alternates - daughters Edie and Laure  There is no POLST form on file, defer to patient and/or next of kin for decisions   Code status: Full Code     MEDICAL MANAGEMENT:   #CIPN  #RLS  #Delirium  #Cancer-related pain  Medications for insomnia, RLS, and CIPN are often at odds with each other. Some may cause worsening of some symptoms while improving others. At this point, I think it is best to deprescribe and use the most minimal and benign medications possible. Opioids have been used to refractory CIPN and RLS at times so it is reasonable to use them especially in context of likely predicted future worsening cancer pain. Doubt her symptoms are from opioid " withdrawal as UDS was negative. Uncertain if she received her PTA medications during the day or two she was out...could be withdrawal from those medications?  Over all, feeling better and tolerated med changes yesterday and slept well. No new med recs today other than thoughts noted below.  Butrans 10 mcg/hr q weekly patch (started 5/16)  Spoke with patient and daughters about placement, expectations, and disposal  Appreciate pharmacy liaison consult for buprenorphine (patch and buccal film) as this would have the lease potential for adverse effects. Butrans 10 mcg/hr patch costs $1.55 and belbuca 75 mcg film costs $4.20.   Dilaudid 1 or 2 mg q4h prn   Of note, Laure stated that 2 mg sedated her and 4 mg caused her to be paradoxically hyperactive.   Amitriptyline 75mg PO daily for 14 days then decrease to amitriptyline 50mg PO daily for 14 days and then 25mg for 14 days then stop. Once amitriptyline dose is at 25mg PO daily, okay to start duloxetine 30mg PO daily. Duloxetine can be increased to 60mg PO daily after 14 days of duloxetine 30mg PO daily AND patient is off amitriptyline.   Changed wean to slower schedule compared to prior recs from 5/13 due to patient concerns  Zolpidem 5 mg at bedtime sheeba and 5 mg at bedtime prn for 14 days - would favor weaning off amitriptyline over zolpidem if she needed something for sleep  Neuro recs  Rec Seroquel over Zyprexa for sleep if needed. Concern that both are dopamine antagonists and could worsen RLS and moreover, this is contradictory as Requip is a dopamine AGONIST. Would AVOID antipsychotics.   Keep gabapentin at 300 mg at bedtime for RLS (questionable whether she has this or not?) Likely not effective for CIPN and could cause higher fall risk.  Requip 0.5 mg at bedtime   Would keep the follow-up for Dr. Bermeo for the palliative care clinic on 5/21     #Nausea/vomiting - Resolved  Zofran 4 mg IV/ODT q6h prn      #Hx of OIC  LBM 5/16 per report. Typically has BM  weekly. Noted that normal BM ranges from every other day to 2-3 BMs per day. Concerned about having diarrhea and stops laxatives but then gets constipated again. Noted that senna or Miralax is fine and up to her preference. Recommended rather than stopping laxatives to wean down on dose until she finds a balance as OIC is a really concern when using opioids. Less risk with buprenorphine but still and issue.  Senna-docusate 1-2 tabs BID prn     PSYCHOSOCIAL/SPIRITUAL SUPPORT:  Family  and daughters  [place] community: Nondenominational      Palliative will follow.     Total time spent was 50 minutes regarding support and symptom control on the date of the encounter. These recommendations were given to the primary team via this note/in-person/via phone.     Nico Rose DO / Internal and Palliative Medicine   Securely message with the Vocera Web Console (learn more here)   Text page via [facility] Paging/Directory       Assessments          Taran Regalado is a 68 year old female with a past medical history of metastatic high grade serous ovarian cancer currently on cycle 2 of carbo/taxol/amaris. She was initially admitted admitted 5/12 after falls at home for back pain persisting x2 weeks (per primary team notes, suspicion is for MSK etiology). Treated for an E coli UTI. Palliative Care consult to assist with peripheral neuropathy. She was discharged 5/13 as she declined TCU. She presented again 5/14 and was readmitted 5/15 for altered mental status, N/V, diarrhea, and uncontrolled RLS and CIPN symptoms.  Palliative was consulted again 5/16 for symptom control.     Today, the patient was seen for:  Neoplasm related pain  RLS  Insomnia  CIPN  N/V  Diarrhea  Goals of care  Support  Encounter for palliative care            Interval History:     Chart review/discussion with unit or clinical team members:   Received APAP 650 mg x2 and Dilaudid 1 mg po x2 from 0700 to 0700 along with scheduled medications.     Per patient or  family/caregivers today:  Seen and examined at bedside. Daughter Laure present.            Review of Systems:     Besides above, >4 ROS was reviewed and is unremarkable          Medications:     I have reviewed this patient's medication profile and medications during this hospitalization.           Physical Exam:   Vitals were reviewed  Temp: 98.3  F (36.8  C) Temp src: Oral BP: 99/56 Pulse: 85   Resp: 18 SpO2: 99 % O2 Device: None (Room air)    General: Not in acute distress.   Head: Atraumatic. Normocephalic.   Eyes: Anicteric without injection. Eyes conjugate. Extraocular movements intact.  Ear, Nose, and Throat: Mouth pink and moist without lesions. Neck without overt masses.  Pulmonary: Unlabored. Speaking in full sentences. On room air.  Cardiovascular: Perfusing.   Abdomen: Slightly distended.   Skin: Warm.  Musculoskeletal: Joints of hand normal. Muscle bulk decreased. Tone normal in UE and LE.  Neuro: Speech fluent. Face symmetric. No focal neurologic deficit noted. Alert and oriented x4.  Psych: Normal mood and affect. Sensorium, gross memory, thought processes, and fund of knowledge intact.             Data Reviewed:     Reviewed recent pertinent imaging, comments:   EXAM: CTA HEAD NECK W CONTRAST 5/16/2024 5:25 PM   IMPRESSION:    1. Head CTA demonstrates patent major intracranial arteries. No aneurysm or hemodynamically significant stenosis.   2. Neck CTA demonstrates patent major cervical arteries. No hemodynamically significant stenosis.  3. Multiple sclerotic vertebral body lesions, may represent metastatic disease, however are incompletely evaluated. No acute pathologic fracture demonstrated.  4. Lytic appearance of the occipital calvarium may represent arachnoid granulations, given CSF characteristics on prior MR. Metastasis is thought less likely.    Reviewed recent labs, comments:   [facility]  Recent Labs   Lab 05/17/24  0659 05/16/24  0659 05/15/24  0538 05/14/24  2304    137 138 137   POTASSIUM  4.0 3.7 3.6 3.5   CHLORIDE 109* 107 109* 105   CO2 22 21* 20* 20*   ANIONGAP 7 9 9 12   GLC 93 119* 88 112*   BUN 9.1 8.6 8.2 8.7   CR 0.51 0.53 0.50* 0.56   GFRESTIMATED >90 >90 >90 >90   HORACIO 7.6* 8.1* 8.2* 8.4*   MAG 1.8 1.9  --  1.6*   PHOS  --   --   --  2.7   PROTTOTAL 5.6* 6.0* 6.2* 6.5   ALBUMIN 2.4* 2.7* 2.8* 3.0*   BILITOTAL 0.5 0.6 0.7 0.8   ALKPHOS 367* 420* 399* 436*   AST 56* 63* 51* 54*   ALT 25 28 24 27     CBC  Recent Labs   Lab 05/17/24  0659 05/16/24  0659 05/15/24  0538 05/14/24  2304   WBC 3.5* 4.6 7.2 9.6   RBC 2.51* 2.52* 2.45* 2.67*   HGB 8.3* 8.4* 8.2* 8.8*   HCT 25.7* 25.9* 25.3* 27.4*   * 103* 103* 103*   MCH 33.1* 33.3* 33.5* 33.0   MCHC 32.3 32.4 32.4 32.1   RDW 22.2* 21.8* 21.4* 21.3*   PLT 53* 53* 52* 61*     INR  Recent Labs   Lab 05/17/24  0659   INR 1.47*     Arterial Blood GasNo lab results found in last 7 days.

## 2024-05-17 NOTE — PLAN OF CARE
"Pt is alert and oriented is able to make needs known. Rested between cares during the night. Pt up with SBA in room, is able to shift weight in bed to maintain skin integrity. Daughter in room, and supportive. Pt reported generalized back pain, PRN oral Dilaudid given x1 with effective relief. Remains on RA, /67 (BP Location: Right arm)   Pulse 83   Temp 98  F (36.7  C) (Oral)   Resp 18   Ht 1.803 m (5' 11\")   Wt 79.8 kg (175 lb 14.4 oz)   SpO2 94%   BMI 24.53 kg/m  . Q4 Neuro's intact. Chest port SL, after AM lab draws. No acute changes during the night. Will discharge when medically cleared.     Problem: Adult Inpatient Plan of Care  Goal: Optimal Comfort and Wellbeing  Intervention: Monitor Pain and Promote Comfort     Problem: Adult Inpatient Plan of Care  Goal: Absence of Hospital-Acquired Illness or Injury  Intervention: Prevent Skin Injury       Goal Outcome Evaluation:      Plan of Care Reviewed With: patient, family    Overall Patient Progress: improving  "

## 2024-05-17 NOTE — PLAN OF CARE
Assumed nursing care from 0152-2899. Patient alert, oriented, and up with assist x 1. Vital signs stable on room air. Cardiac monitoring ON. Neuro checks WNL. Pain/restless legs managed with Butrans patch (R arm) and scheduled ropinirole and gabapentin, and PRN Tylenol and Dilaudid. Tolerating regular diet; denies nausea. Voiding spontaneously. Reports small BM this AM before admitting to unit. R upper chest port infusing IV fluids. Family bedside all shift and daughter, Laure, approved to stay overnight.

## 2024-05-17 NOTE — PROVIDER NOTIFICATION
Paged gyn onc resident re: 4U [id number] DM Patient takes 100 mg amitriptyline at  home; current order is 75 mg; states taper not to happen until discharge. Please advise. Thank you. Monique HOPKINS -[phone]

## 2024-05-17 NOTE — PROCEDURES
[facility]  CAPS PROCEDURE NOTE  Date of Admission:  5/14/2024  Consult Requested by: Medicine  Reason for Consult: ----------Paracentesis------------    Indication/HPI: 68 year old female with history of metastatic ovarian high-grade serous carcinoma with abdominal ascites requiring therapeutic paracentesis    Pre-Procedure Diagnosis: Ascites    Post-Procedure Diagnosis: Ascites    Risk Assessment: Average risk, Technically straightforward; patient's anticoagulation has been held according to guidelines based on the agent and platelets and coags are within guidelines    Procedure Outcome:  Diagnostic paracentesis performed and Therapeutic paracentesis performed with 1.8 liters of ascites removed.   See additional procedure details below.    The primary covering service should follow up and address any lab results as appropriate.    Judy Wayne DO  [facility]  Securely message with Vocera (more info)  Text page via [employer] Paging/Directory   See signed in provider for up to date coverage information      [facility]    Paracentesis    Date/Time: 5/17/2024 10:30 AM    Performed by: Judy Wayne MD  Authorized by: Sam Ospina MD    PRE-PROCEDURE DETAILS  Initial or subsequent exam: initial  Procedure purpose: therapeutic        UNIVERSAL PROTOCOL   Site Marked: Yes  Prior Images Obtained and Reviewed:  Yes  Required items: Required blood products, implants, devices and special equipment available    Patient identity confirmed:  Verbally with patient  Patient was reevaluated immediately before administering moderate or deep sedation or anesthesia  Confirmation Checklist:  Patient's identity using two indicators  Time out: Immediately prior to the procedure a time out was called    Universal Protocol: the [facility] Universal Protocol was  followed    Preparation: Patient was prepped and draped in usual sterile fashion       ANESTHESIA    Local Anesthetic:  Lidocaine 1% without epinephrine      SEDATION    Patient Sedated: No    POST PROCEDURE DETAILS  Needle gauge: 22  Ultrasound guidance: yes  Puncture site: left lower quadrant  Fluid appearance: cloudy and serous  Dressing: 4x4 sterile gauze        PROCEDURE    Length of time physician/provider present for 1:1 monitoring during sedation: 0        No results found for this or any previous visit from the past 1 day.

## 2024-05-17 NOTE — CONSULTS
Care Management Initial Consult    General Information  Assessment completed with: Patient,    Type of CM/SW Visit: Initial Assessment  Primary Care Provider verified and updated as needed: Yes   Readmission within the last 30 days: previous discharge plan unsuccessful   Return Category: Medically unsuccessful treatment plan  Reason for Consult: discharge planning  Advance Care Planning: Advance Care Planning Reviewed: no concerns identified        Communication Assessment  Patient's communication style: spoken language (English or Bilingual)        Cognitive  Cognitive/Neuro/Behavioral: WDL  Level of Consciousness: alert  Arousal Level: opens eyes spontaneously  Orientation: oriented x 4  Mood/Behavior: calm, cooperative  Best Language: 0 - No aphasia  Speech: clear, spontaneous    Living Environment:   People in home: child(montez), adult     Current living Arrangements: house      Able to return to prior arrangements: yes     Family/Social Support:  Care provided by: self, child(montez)  Provides care for: no one, unable/limited ability to care for self  Marital Status:   Children          Description of Support System: Supportive    Support Assessment: Adequate family and caregiver support, Adequate social supports    Current Resources:   Patient receiving home care services: No  Community Resources: None  Equipment currently used at home: walker, standard  Supplies currently used at home: None    Employment/Financial:  Employment Status: retired     Financial Concerns: none   Referral to Financial Worker: No    Does the patient's insurance plan have a 3 day qualifying hospital stay waiver?  No    Lifestyle & Psychosocial Needs:(pulled from chart)  Social Determinants of Health     Food Insecurity: Not on file   Depression: Not at risk (12/19/2023)    [facility] from [facility]    PHQ-2     PHQ-2 Total : 0   Housing Stability: Not on file   Tobacco Use: Medium  Risk (5/12/2024)    Patient History     Smoking Tobacco Use: Former     Smokeless Tobacco Use: Never     Passive Exposure: Never   Financial Resource Strain: Not on file   Alcohol Use: Not At Risk (12/3/2020)    [facility]    AUDIT-C     Frequency of Alcohol Consumption: Monthly or less     Average Number of Drinks: 1 or 2     Frequency of Binge Drinking: Never   Transportation Needs: Not on file   Physical Activity: Not on file   Interpersonal Safety: Not At Risk (3/30/2024)    [facility]    Intimate Partner Violence     Are you in a relationship where you are physically hurt, threatened and/or made to feel afraid?: No   Stress: Not on file   Social Connections: Not on file   Health Literacy: Not on file       Functional Status:  Prior to admission patient needed assistance:   Dependent ADLs:: Independent  Dependent IADLs:: Independent       Mental Health Status:  Mental Health Status: No Current Concerns       Chemical Dependency Status:  Chemical Dependency Status: No Current Concerns             Values/Beliefs:  Spiritual, Cultural Beliefs, Amish Practices, Values that affect care: yes             Care Management Discharge Note    Discharge Date: 05/17/2024  Discharge Disposition: Home,   Discharge Services: Home Care  Discharge DME: Walker  Discharge Transportation: family or friend will provide  Private pay costs discussed: Not applicable  Does the patient's insurance plan have a 3 day qualifying hospital stay waiver?  No  PAS Confirmation Code:  NA  Patient/family educated on Medicare website which has current facility and service quality ratings: yes  Education Provided on the Discharge Plan: Yes  Persons Notified of Discharge Plans: Patient  Patient/Family in Agreement with the Plan: yes    Handoff Referral Completed: Yes    Additional Information:  Pt is a 68 year old  female with metastatic ovarian high-grade serous carcinoma who presents with altered mental status and RLS.     RNCC completed CM assessment due to elevated risk score and need for discharge planning. RNCC met with pt and daughter Laure at bedside and introduced RNCC role. Pt states she is currently living in her daughter Edie's home in [place], [place]. Pt was fully independent with ADLs and IADLs before recent hospitalizations and hopes to return to that states. Currently pt's daughter Laure is unemployed and staying with her at Edie's house to care for pt. Laure states she helps pt with everything as pt has dizziness that prevents her to be safe ambulating independently. There are also 2 steps to get into home and then 8 steps down to get to bathroom and another 8 steps down in order to reach pt's bedroom. There is no area on the main level that can be converted for pt use. Pt is currently using a walker at home but no other assistive devices. Pt did get set up with [facility] but they did not do a SOC before pt was readmitted. Pt is retired and denies any financial concerns. Pt's family will provide discharge transportation when she is medically ready.     RNCC confirmed pt was open to [facility]. RNCC ensured home care orders are in for SN, PT, and OT. RNCC updated [facility] liaison, regarding discharge. No other discharge needs identified.    Ike Paiz, RN BSN  5A RN Care Coordinator   Ph: [phone]  Pager: [phone]

## 2024-05-17 NOTE — PLAN OF CARE
Discharge orders received; discharge education reviewed with pt and family. Port deaccessed. Pt reminded to take belongings and  meds in pharmacy. Will continue plan of care.

## 2024-05-17 NOTE — PROGRESS NOTES
"   05/17/24 4419   Appointment Info   Signing Clinician's Name / Credentials (PT) KELSEY Richards   Student Supervision Direct supervision provided;Therapy services provided with the co-signing licensed therapist guiding and directing the services, and providing the skilled judgement and assessment throughout the session   Rehab Comments (PT) FWW order placed 5/17 for dc, PT only   Living Environment   People in Home child(montez), adult;grandchild(montez)   Current Living Arrangements house   Home Accessibility stairs to enter home;stairs within home   Number of Stairs, Main Entrance 2   Stair Railings, Main Entrance none   Number of Stairs, Within Home, Primary ten   Stair Railings, Within Home, Primary railings on both sides of stairs   Transportation Anticipated family or friend will provide   Living Environment Comments Per prior eval \"Pt from [place] but living with dtr and family in a house in [place]. 2 HIMA from garage. Once inside, pt has to complete 10 stairs to get to a bathroom and an additional 10 stairs to get to her bedroom. Must do 10 stairs to get to bathroom from bedroom. Dtr she is living with is not able to provide assistance. Other dtr just moved in with mother and sister to provide 24/7 support. Will be able to provide 24/7 support as needed but will not be able to provide long term.\"   Self-Care   Usual Activity Tolerance moderate   Current Activity Tolerance fair   Regular Exercise No   Equipment Currently Used at Home none   Fall history within last six months yes   Number of times patient has fallen within last six months 2   Activity/Exercise/Self-Care Comment Pt IND at baseline, recently been receiving assist with mobility and ADLs. Gait limited to household distances in last few weeks. Does not use AD at baseline. Just bought FWW to use at home, will have FWW on each floor. Has tub shower with shower chair and grab bars.   General Information   Onset of Illness/Injury or Date of " "Surgery 05/14/24   Referring Physician Jean Gutierrez MD   Patient/Family Therapy Goals Statement (PT) Return home   Pertinent History of Current Problem (include personal factors and/or comorbidities that impact the POC) Per EMR \"68 year old female with metastatic ovarian high-grade serous carcinoma  who presents with altered mental status and RLS\"   Existing Precautions/Restrictions fall   Weight-Bearing Status - LUE full weight-bearing   Weight-Bearing Status - RUE full weight-bearing   Weight-Bearing Status - LLE full weight-bearing   Weight-Bearing Status - RLE full weight-bearing   Cognition   Affect/Mental Status (Cognition) flat/blunted affect   Orientation Status (Cognition) oriented x 4   Follows Commands (Cognition) verbal cues/prompting required;repetition of directions required   Pain Assessment   Patient Currently in Pain No   Posture    Posture Forward head position;Protracted shoulders   Range of Motion (ROM)   Range of Motion ROM deficits secondary to pain   Strength (Manual Muscle Testing)   Strength (Manual Muscle Testing) Deficits observed during functional mobility   Strength Comments Unable to formally assess due to pain   Bed Mobility   Comment, (Bed Mobility) SBA supine to sit, HOB elevated   Transfers   Transfers sit-stand transfer   Sit-Stand Transfer   Sit-Stand [place] (Transfers) contact guard   Gait/Stairs (Locomotion)   [place] Level (Gait) contact guard   Distance in Feet (Gait) 5   Comment, (Gait/Stairs) CGA, no AD, fast pace, swing through L, step to R   Balance   Balance other (describe)   Balance Comments Static standing and dynamic balance deficits due to neuropathy in B feet   Sensory Examination   Sensory Perception patient reports no sensory changes   Sensory Perception Comments Neuropathy in B feet at baseline   Clinical Impression   Criteria for Skilled Therapeutic Intervention Yes, treatment indicated   PT Diagnosis (PT) Impaired mobility   Influenced by " the following impairments Pain, decreased balance, strength, ROM   Functional limitations due to impairments Bed mobility, gait, transfers, activity tolerance   Clinical Presentation (PT Evaluation Complexity) stable   Clinical Presentation Rationale Clinical judgement   Clinical Decision Making (Complexity) low complexity   Planned Therapy Interventions (PT) balance training;bed mobility training;gait training;neuromuscular re-education;ROM (range of motion);strengthening;stair training;transfer training;patient/family education   Risk & Benefits of therapy have been explained evaluation/treatment results reviewed;care plan/treatment goals reviewed;risks/benefits reviewed;current/potential barriers reviewed;participants voiced agreement with care plan;participants included;patient;daughter   PT Total Evaluation Time   PT Eval, Low Complexity Minutes (17656) 10   Physical Therapy Goals   PT Frequency 5x/week   PT Predicted Duration/Target Date for Goal Attainment 05/24/24   PT Goals Bed Mobility;Transfers;Gait;Stairs   PT: Bed Mobility Supervision/stand-by assist   PT: Transfers Supervision/stand-by assist;Assistive device  (FWW)   PT: Gait Supervision/stand-by assist;Standard walker   PT: Stairs Supervision/stand-by assist;10 stairs;Rail on both sides   Therapeutic Activity   Therapeutic Activities: dynamic activities to improve functional performance Minutes (92618) 8   Symptoms Noted During/After Treatment Increased pain   Treatment Detail/Skilled Intervention Pt supine in bed upon arrival, family present, agreeable to therapy with encouragement, RN ok'd mobility. To improve functional independence, pt completed bed mobility and transfers. Supine to sit SBA with HOB elevated and use of hand rail. STS from EOB CGA, no AD. Pt reports she will be using FWW at home, educated on walker saftey with transfers, verbalized understanding. Stand to sit CGA with verbal cues to reach back when sitting, slightly uncontrolled  sit. Sit to supine SBA with HOB elevated. Pt educated on energy conservation, verbalized understanding. PT, pt and family discussed discharge rec of home with assist and HHPT, pt and family agreeable. Family reports comfortable providing level of assist needed. PT answered questions about DME needs, FWW ordered for dc. Pt left supine in bed, family present, call light in reach, all needs met.   Gait Training   Gait Training Minutes (97808) 12   Symptoms Noted During/After Treatment (Gait Training) fatigue   Treatment Detail/Skilled Intervention To improve functional mobility, pt ambulated in room and completed stairs. Ambulated ~20' in room CGA, no AD, verbal cues to slow down and focus on safe stepping. Poor dynamic balance but no LOB. Swing through L LE, step to R LE. Completed 10 steps ups CGA with L rail, R HHA, on aerobic step to simulate stair navigation. Educated on stair safety and using stronger leg up, verbalized understanding but needed repeated verbal cues to continue using strong leg. Pt and family report no concerns with completing stairs at home with assist. Family educated on body positioning on stairs, verbalized understanding. Pt left supine in bed, family present, call light in reach, all needs met.   PT Discharge Planning   PT Plan Transers, gait with FWW   PT Discharge Recommendation (DC Rec) home with assist;home with home care physical therapy   PT Rationale for DC Rec Pt currently mobilizing up to CGA. Recommend home with assist and HHPT to progress strength, balance, and activity tolerance. Pt lives with supportive family who is able to provide 24/7 assist.   PT Brief overview of current status Ax1 FWW   PT Equipment Needed at Discharge walker, standard   Total Session Time   Timed Code Treatment Minutes 20   Total Session Time (sum of timed and untimed services) 30

## 2024-05-20 NOTE — PROGRESS NOTES
Virtual Visit Details    Type of service:  Video Visit   33 minutes     Originating Location (pt. Location): Home    Distant Location (provider location):  On-site  Platform used for Video Visit: Wanda                Follow Up Notes on Referred Patient    Date: 2024       Dr. Marylin Hancock, APRN CNP  [address] [place] [address]       RE: Taran Regalado  : 1955  GRACY: 2024    Taran Regalado is a 68 year old female with recurrent ovarian cancer presenting for disease management     INTERVAL HISTORY:  Taran is being evaluated today virtually for a follow-up hospitalization for altered mental status urosepsis failure as well as depression.  She is accompanied by her daughter.     Taran feels better since discharge. No fevers or chills. Urinary symptoms resolved.     The pain she had in her RUQ has largely resolved.     Still feeling nauseated on a regular basis- taking ondansetron which is somewhat helpful, but feels oxycodone is actually the most helpful for nausea. She does not personally feel her nausea is caused by anxiety or pain. Her daughter questions if nausea is secondary to anxiety. Has not been taking prochlorperazine due to thoughts that this could not be taken with ondansetron. Feels constipated with this regimen, taking laxatives and stool softeners.     Feeling lightheaded with standing- her daughter notes that her blood pressure drops going from sitting to standing. Admits she does not drink much fluid because she can either eat or she can drink, but not both. Denies significant swelling in the legs or varicosities.      Trying to eat, but has lack of appetite and gets full pretty easily. Has started to eat cereal with a protein drink instead of milk, adding in protein powder to ice cream.     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 Can feel the ground under feet, no pain, no falls.      No  respiratory concerns. Some mild LOPEZ with too much activity. No chest pain or pressure.      No severe headaches or double vision.     Developed stomatitis last cycle, was prescribed doxepin rinses per palliative medicine. Not currently present.     Switched from rivaroxaban to apixaban given elevated liver enzymes and increased epistaxes- tolerating this well, epistaxes now minimal.     Denies fevers or chills, vomiting, chest pain, difficulty breathing at rest, or uncontrolled bleeding concerns.     Has discussed healthcare directive with palliative medicine, needs to be sent to her current address- living in [place] right now with her daughter.      ONCOLOGY HISTORY:  12/3/2020: US Pelvic: IMPRESSION: Limited examination due to acoustic windows. Possible left adnexal mass. A CT scan of the abdomen and pelvis with contrast is recommended for further assessment.     12/4/2020: CT A/P:   IMPRESSION:    Peritoneal carcinomatosis with masslike peritoneal thickening in the lower pelvis which may indicate an adnexal or ovarian primary malignancy. Large volume ascites. Bilateral pleural effusions. There is potential subtle pleural nodularity in the right hemithorax which could indicate metastatic disease.  Indeterminate 1 cm lesion in the right hepatic lobe suspicious for a metastatic lesion.      12/16/2020: Presented to [facility] with abdominal distention, 25lb weight loss, and CTAP with carcinomatosis, elevated  3098.     12/23/2020: CT Chest: IMPRESSION:   1. There are few scattered small sub-6 mm pulmonary nodules which are indeterminate without prior comparisons available. There are a few  slightly larger perifissural nodules which are technically  indeterminate in the setting of malignancy although presumed lymphatic in nature and of unlikely clinical significance. Attention on follow-up is recommended.  2. Small to moderate left and small right pleural effusions are increased in size from prior. No convincing  evidence for pleural nodularity.  3. Partially visualized large volume ascites and peritoneal nodularity in the upper abdomen similar to 12/4/2020 outside CT      12/26/2020: ED for abdominal distension; 3 L ascites drained with paracentesis    Pelvic US: Findings: Free fluid present in LLQ      12/31/2020: US Paracentesis: 900 mL ascites drained     1/7/2021: Diagnotic laparoscopy, biopsies  Pathology: FINAL DIAGNOSIS:   A. PERITONEUM, BIOPSIES:   - Positive for high grade carcinoma, consistent with metastatic carcinoma of Mullerian origin.     1/10-1/13/2021: Hospital admission for postoperative non-intractable vomiting and nausea.      1/10/2021: CT A/P: IMPRESSION: Extensive ascites which is probably malignant. Scattered liver hypodensities of indeterminate etiology comment cannot exclude metastatic disease. Diverticulosis. Fluid-filled adnexal masses and irregular appearance of uterus, which may represent primary neoplasm. Multiple peritoneal nodules. Large amount of fecal material in the colon with no evidence of small bowel obstruction.     Plan: Paclitaxel 175 mg/m2 and carboplatin AUC 6 x 3 cycles followed by a CT CAP and visit with Dr. Juarez.     1/12/2021: Cycle 1 paclitaxel and carboplatin while inpatient     1/13/2021: CT Head: Impression:  1. Chronic sinusitis of the right maxillary and right sphenoid sinuses.  2. Incidental presumed calcified meningioma in the right frontal  convexity without significant mass effect.  3. No suspicious intracranial enhancing lesion.     2/1/2021: Cycle 2 paclitaxel and carboplatin.  936.     2/3-2/5/21: Admission [facility] for afib w/ RVR and new PE     2/26/21: Cycle 3 paclitaxel and carboplatin planned.  Deferred due to thrombocytopenia.  pending.     3/15/21: Cycle 3 paclitaxel and carboplatin given     4/19/21: HYSTERECTOMY, TOTAL, ABDOMINAL, WITH BILATERAL SALPINGO-OOPHORECTOMY, omentectomy, NEOPLASM DEBULKING,Proctoscocy, RO, Resection of liver  nodules, diaphragm stripping, immobilization of liver and colon  FINAL DIAGNOSIS:   A. OMENTUM, BIOPSY:   - Omental adipose tissue with rare viable cells of metastatic high grade   serous carcinoma   - One reactive lymph node, negative for malignancy (0/1)   B. NODULE, SIGMOID, EXCISION:   - Calcified necrotic adipose tissue   - Negative for malignancy   C. NODULE, SMALL BOWEL MESENTERY, EXCISION:   - Fibroadipose tissue, positive for metastatic high grade serous carcinoma   D. UTERUS, CERVIX, BILATERAL FALLOPIAN TUBES AND OVARIES, HYSTERECTOMY   WITH BILATERAL SALPINGO-OOPHORECTOMY:   - Atrophic endometrium   - Uterine serosa with rare viable cells consistent with high grade serous   carcinoma   - Cervix with atrophic changes   - Viable cells consistent with high grade serous carcinoma present in the   right ovary, serosa of right   fallopian tube and right periadnexal soft tissue   - Left ovary with atrophic changes   - Left fallopian tube with a rare focus of serous tubal in-situ carcinoma   (STIC)   E. NODULES, SMALL BOWEL MESENTRY, EXCISION:   - Fibroadipose tissue with rare viable cells of metastatic high grade   serous carcinoma   F. NODULE, SPLENIC FLEXURE TRANSVERSE COLON, EXCISION:   - Fibroadipose tissue with rare viable cells of metastatic high grade   serous carcinoma   - Accessory splenule, negative for malignancy   G. OMENTUM, OMENTECTOMY:   - Omental adipose tissue with rare viable cells of metastatic high grade   serous carcinoma   H. NODULE, PERITONEAL PANCREATIC, EXCISION:   - Fibrous adhesions with inflammation   - Negative for malignancy   I. RIGHT HEMIDIAPHRAGM PERITONEUM, EXCISION:   - Fibrous adhesions with inflammation   - Negative for malignancy   J. RIGHT LIVER SURFACE NODULE:   - Fibrous adhesions with benign inclusion glands   - Negative for malignancy   K. LEFT LOWER LIVER EDGE, BIOPSY:   - Cauterized hepatic parenchyma and capsule   - Negative for malignancy   L. NODULE, SMALL BOWEL  MESENTERIC #3, EXCISION:   - Fibroadipose tissue with rare viable cells of metastatic high grade   serous carcinoma       Plan: Carboplatin AUC 6 + Taxol 175 mg/m2 x 3 cycles, then transition to Parp inhibitor for maintenance therapy given her BRCA1 germline mutation.      5/21/21: Cycle 4 carboplatin and paclitaxel.   172.  6/11/21: Cycle 5 carboplatin and paclitaxel.   61.  7/2/21: Cycle 6 carboplatin and paclitaxel.   20.        7/28/21 plan: Olaparib 300mg bid as starting dose,  14  8/20/21: start date olaparib 300 mg BID,  12  9/13/21:  22  10/4/21:  23  11/1/21:  26     11/02/2021: PET CT: IMPRESSION:   Findings compatible with interval surgery and posttreatment change.  No gross definitive FDG avid disease.  Potential foci of tumor deposits along the anterior dome of the liver and midline abdominal wall surgical scar.  Colonic activity is not necessarily abnormal, however, given the previous carcinomatosis the colonic activity is indeterminant.         12/1/21:  23  1/3/22:  21  2/1/22:  20  3/1/22:  21  4/1/22:  23  5/4/22:  28     EXAM: CT CHEST/ABDOMEN/PELVIS W CONTRAST  LOCATION: [facility]  DATE/TIME: 7/11/2022 1:25 PM     INDICATION: Stage III B high-grade ovarian carcinoma diagnosed Jan 2021. Posttreatment surveillance.  COMPARISON: CTA AP 04/23/2021, CT CAP 04/02/2021  TECHNIQUE: CT scan of the chest, abdomen, and pelvis was performed following injection of IV contrast. Multiplanar reformats were obtained. Dose reduction techniques were used.   CONTRAST: isovue 370 105mL IV; 50mL omni 140 oral     FINDINGS:   LUNGS AND PLEURA: No suspicious pulmonary nodules. Minimal right apical pleural thickening and a few punctate tiny nodules 2 of which are subpleural on the right are stable. No pleural effusions.     MEDIASTINUM/AXILLAE: No adenopathy. No central pulmonary emboli.     CORONARY ARTERY  CALCIFICATION: Cannot evaluate.     HEPATOBILIARY: Normal.     PANCREAS: Normal.     SPLEEN: Normal.     ADRENAL GLANDS: Normal.     KIDNEYS/BLADDER: Normal.     BOWEL: Sliver of ascites adjacent to the spleen noted, decreased from prior study. There is a 4 mm nodule in the gastrosplenic ligament (image 352). On the preop study it appears as a smaller punctate nodule (prior image 341). Sliver of ascites in the   gastrohepatic ligament also noted. No peritoneal tumor nodules. No bowel obstruction. Quite redundant colon with mild large stool burden. Extensive distal colonic diverticulosis.     LYMPH NODES: Normal.     VASCULATURE: Mild arterial calcifications. Circumaortic left renal vein.     PELVIC ORGANS: Hysterectomy. Vaginal cuff is normal.     MUSCULOSKELETAL: Diastases of the midline rectus sheath above the umbilicus. Minimal nodular scarring to the left of midline in the midabdomen (image 419). There is a shallow broad-based supraumbilical ventral hernia containing only fat. No implants   within the hernia or abdominal incision. No suspicious bone lesions.                                                                      IMPRESSION:  1.  Sliver of ascites in the upper abdomen has decreased since interval debulking surgery.  2.  There is a punctate nodule in the gastrosplenic ligament which is minimally more plump relative to the preop exam. This will need to be followed.  3.  Minimal nodular changes to the left of the midline scar in the subcutaneous fat will have to be followed as well. Unclear if this simply reflects postoperative scarring or could reflect an early incisional recurrence.  4.  No other sites to suggest recurrent tumor. Vaginal cuff is normal.  5.  Extensive distal colonic diverticulosis.  6.  Other noncritical findings as noted above.      9/16/22  98     1/30/23 CT CAP:    IMPRESSION:  1.  Multiple new, hypoattenuating lesions in the liver, suspicious for hepatic metastatic  disease.  2.  Necrotic mario hepatic lymphadenopathy, concerning for richard metastatic disease.  3.  There is a new or increasingly conspicuous 6 mm soft tissue nodule in the right lower quadrant. This is indeterminate.  4.  There is a new 3 mm solid nodule in the right upper lobe, indeterminate.  5.  Stable approximate 4 mm punctate nodule along the gastrosplenic ligament.  6.  Similar area of linear free fluid in the upper abdomen anterior to the stomach.     Plan: Paclitaxel 175 mg/m2, Carboplatin AUC 6, bevacizumab 7.5 mg/kg     3/1/23: Cycle #1 Paclitaxel 175 mg/m2, Carboplatin AUC 6 (C7), bevacizumab 7.5 mg/kg.  1,196  3/24/23: Cycle #2 Paclitaxel 150 mg/m2, Carboplatin AUC 5 (C8), bevacizumab 15 mg/kg.  558     3/29/23: ER for dehydration and hypotension. Normotensive in ER. IV fluids given. Discharged.      4/14/23: Cycle #3 Paclitaxel 150 mg/m2, Carboplatin AUC 5 (C9), bevacizumab 15 mg/kg deferred neutropenia ANC 0.3   273  4/21/23: treatment deferred ANC 0.8  4/28/23: Cycle #3 Paclitaxel 150 mg/m2, Carboplatin AUC 5 (C9), bevacizumab 15 mg/kg, + Neulasta deferred ANC 1.0,  297     5/26/23: Cycle #4  Paclitaxel 150 mg/m2, Carboplatin AUC 5 (C10), bevacizumab 15 mg/kg, + Neulasta, deferred ANC 0.6  188. No hematology consult per MD.      6/2/23: Cycle #4 Paclitaxel 150 mg/m2, Carboplatin AUC 5 (C9), bevacizumab 15 mg/kg, + Neulasta   6/23/23: Cycle #5 deferred neutropenia ANC 0.5 thrombocytopenia platelets 85     6/26/2023 Addendum: Reduce both Carboplatin and Paclitaxel due to thrombocytopenia and persistent neutropenia. REFER to Hematology to rule out MDS. Message sent to pt. Orders placed.      7/3/23 plan: continue with 2 more cycles with carboplatin AUC of 4, Taxol at 135 mg per metered square, bevacizumab at 15 mg/kg as well as neulasta for bone marrow support.      7/3/23: Cycle #5 Paclitaxel 135 mg/m2, Carboplatin AUC 4, bevacizumab 15 mg/kg, +Neulasta.  375      7/11/23: per chart review Dr. Cogan with Hematology plan one more cycle of chemotherapy then maintenance Avastin     7/28/23: Cycle #6 Paclitaxel 135 mg/m2, Carboplatin AUC 4, bevacizumab 15 mg/kg, +Neulasta.  370     8/17/2023: CT CAP: Stable bilateral pulmonary micronodules. Multiple subcentimeter hypodense hepatic lesions, a few are slightly less conspicuous. Necrotic lymph nodes in mario hepatis are stable. A few small peritoneal nodules in the left upper quadrant. Anterior to the pylorus is an oval 2.4 cm hypodense soft tissue lesion which is stable.    8/17/2023: started maintenance bevacizumab q3 weeks.  370.    8/25/2023:  450.    9/19/2023:  1056. Alk phos 221, ALT 86, AST 63.    9/21/2023: Transferred her care from [facility] to [facility] to be closer to home. Plan is maintenance bevacizumab 15 mg/kg every 3 weeks to give her a treatment break from myelosuppressive chemotherapy.    10/10/2023:  1889, alk phos 388, , AST 83  10/12/2023: Bevacizumab held for elevated LFTs, symptoms of new back pain, cough, sinusitis symptoms.    10/18/2023: CT CAP: Progressive liver metastasis. Small nonspecific right lower lobe pulmonary nodule. Inflammatory process or a metastatic nodule remain possible. This does not have the characteristic appearance of metastasis, however, remains indeterminant.     Plan: Weekly paclitaxel 80 mg/m2 day 1, 8, 15, and bevacizumab 10 mg/kg day 1 and day 15 of a 28 day cycle x 3 cycles followed by CT CAP and follow up with Dr. Juarez in [place].     12/8/2023:  >10,000. Bilirubin 4.2, LFTs elevated.    12/12/2023: C1D1 paclitaxel and bevacizumab. ([place])    12/13/2023: Presented to the ED in [place] with worsening RUQ pain and jaundice x 2 weeks. Bilirubin 8.1. Waitlisted for transfer to [facility]. Elected to leave the ED.  12/13/2023: CT abdomen pelvis ([place]): New and enlarging innumerable hypodense liver lesions. Increased  retroperitoneal adenopathy. Increased left >right intrahepatic biliary ductal dilatation without a resting cause noted. Common bile duct borderline enlarged 6 mm. Ventral hernia containing colon.  12/13/2023: Abdominal US ([place]): Numerous liver lesions. Common bile duct dilated at 9 mm, no obstructing stone. Spleen 14 cm, enlarged.    12/15/2023: ERCP ([facility]): Ventral pancreatic duct prophylactically stented. Biliary sphincterotomy. Cholangiogram showed multifocal biliary stricture. Intrahepatic and common hepatic duct strictures dilated with a balloon. 2 metal stents placed into the left main and right anterior intrahepatic duct    12/19/2023: C1D8 paclitaxel administered. Bilirubin 3.5, LFTs improving. WBC 2.2, ANC 1.0, platelets 90. ([place])  12/22/23: Neupogen for ANC 1.0.  12/28/2023: C1D15 paclitaxel + bevacizumab. Bilirubin 1.9, LFT's improving, ANC 1.0, platelets 174. ([place])     Plan: Transfer back to [facility] with weekly paclitaxel 80 mg/m2 day 1, 8, 15, and bevacizumab 10 mg/kg day 1 and day 15 of a 28 day cycle x 2 additional cycles followed by CT CAP and follow up with Dr. Juarez.     1/12/24: Cycle 1 paclitaxel/bevacizumab.   2113.   2/26/24: Cycle 2 paclitaxel/bevacizumab.   5381. (Delayed due to vacation)     3/25/24: CT CAP impression   1. Evidence of disease progression with enlargement of multifocal  hepatic metastases compared to 12/13/2023 CT.  2. New small volume ascites. Increased irregular thickening of the  peritoneum along the paracolic spaces and omentum is concerning for  developing carcinomatosis.  3. Several new subcentimeter bilateral solid pulmonary nodules are  likely metastatic.  4. Interval increased conspicuity of multiple sclerotic lesions in the  visualized axial and appendicular skeleton, likely representing  worsening osseous metastases.     4/1/24: Follow up with Dr. Juarez. Concern for disease progression. Per Dr. Juarez, plan to switch to  carboplatin/paclitaxel/bevacizumab with GCSF q21d x3 cycles followed by imaging and follow up with Dr. Juarez     4/8/24: C1 carboplatin AUC 4, paclitaxel 135mg/m2, bevacizumab 15mg/kg + GCSF.  5824     4/29/24: C2 carboplatin AUC 4, paclitaxel 135mg/m2, bevacizumab 15mg/kg + GCSF.  pending.       Past Medical History:    Past Medical History:   Diagnosis Date    Atrial fibrillation with rapid ventricular response (H)     History of cold sores     Hx of LASIK 12/11/2017    Insomnia     Migraine     Osteopenia     Pelvic mass     Peritoneal carcinomatosis (H)     Restless legs syndrome (RLS)          Past Surgical History:    Past Surgical History:   Procedure Laterality Date    APPENDECTOMY      ARTHROSCPY KNEE SURGICAL DEBRIDEMENT SHAVING ARTICULAR CARTILAGE Right     BIOPSY  January 2021    Biopsy to confirm ovarian cancer    DEBRIDEMENT LEFT UPPER EXTREMITY  2016    ENDOSCOPIC RETROGRADE CHOLANGIOPANCREATOGRAM N/A 12/15/2023    Procedure: ENDOSCOPIC RETROGRADE CHOLANGIOPANCREATOGRAPHY, with pancreatic stent placement, biliary duct dilation, biliary stent placement, and biliary sphincterotomy;  Surgeon: Fabian Simpson MD;  Location: [facility] OR    HYSTERECTOMY TOTAL ABD, LUISITO SALPINGO-OOPHORECTOMY, NODE DISSECTION, TUMOR DEBULKING, COMBINED Bilateral 4/19/2021    Procedure: HYSTERECTOMY, TOTAL, ABDOMINAL, WITH BILATERAL SALPINGO-OOPHORECTOMY, omentectomy, NEOPLASM DEBULKING,Proctoscocy, RO, Resection of liver nodules, diaphragm stripping, immobilization of liver and colon;  Surgeon: Bolivar Juarez MD;  Location: [facility] OR    IR CHEST PORT PLACEMENT > 5 YRS OF AGE  4/12/2024    LAPAROSCOPY DIAGNOSTIC (GYN) Bilateral 1/7/2021    Procedure: Diagnsotic laparoscopy, biopsies;  Surgeon: Bolivar Juarez MD;  Location:  OR    LASIK      TUBAL LIGATION           Health Maintenance Due   Topic Date Due    DEXA  Never done    COLORECTAL CANCER SCREENING  Never done    HEPATITIS C SCREENING  Never done    RSV  VACCINE (Pregnancy & 60+) (1 - 1-dose 60+ series) Never done    MEDICARE ANNUAL WELLNESS VISIT  11/11/2021    Pneumococcal Vaccine: 65+ Years (2 of 2 - PPSV23 or PCV20) 04/05/2022    COVID-19 Vaccine (4 - 2023-24 season) 09/01/2023    PHQ-2 (once per calendar year)  01/01/2024       Current Medications:     Current Outpatient Medications   Medication Sig Dispense Refill    amitriptyline (ELAVIL) 75 MG tablet Take 1 tablet (75 mg) by mouth at bedtime for 13 days 13 tablet 0    apixaban ANTICOAGULANT (ELIQUIS) 5 MG tablet Take 1 tablet (5 mg) by mouth 2 times daily      BEVACIZUMAB, AVASTIN, INJECTION 2.5MG Every 3 weeks      buprenorphine (BUTRANS) 10 MCG/HR WK patch Place 1 patch onto the skin once a week Reminder: Remove previous patch before applying new patch. 4 patch 0    buprenorphine (BUTRANS) 20 MCG/HR WK patch Place 1 patch onto the skin every 7 days 4 patch 0    cyclobenzaprine (FLEXERIL) 5 MG tablet Take 1 tablet (5 mg) by mouth 3 times daily as needed for muscle spasms 10 tablet 0    dexAMETHasone (DECADRON) 4 MG tablet Take 2 tablets (8 mg) by mouth daily for 3 days, starting the day after chemotherapy. 6 tablet 5    gabapentin (NEURONTIN) 250 MG/5ML solution Take 6-12 mLs (300-600 mg) by mouth at bedtime for 60 doses 720 mL 0    gabapentin (NEURONTIN) 300 MG capsule Take 1-2 capsules (300-600 mg) by mouth at bedtime 60 capsule 2    HYDROmorphone (DILAUDID) 2 MG tablet Take 0.5-1 tablets (1-2 mg) by mouth every 4 hours as needed for moderate to severe pain 30 tablet 0    lidocaine-prilocaine (EMLA) 2.5-2.5 % external cream Apply topically as needed for moderate pain (30min prior to port access) 30 g 1    meclizine (ANTIVERT) 25 MG tablet Take 1 tablet (25 mg) by mouth 3 times daily as needed for dizziness or nausea 15 tablet 0    naloxone (NARCAN) 4 MG/0.1ML nasal spray Spray 1 spray (4 mg) into one nostril alternating nostrils as needed for opioid reversal every 2-3 minutes until assistance arrives 0.2  "mL 1    ondansetron (ZOFRAN) 8 MG tablet Take 1 tablet (8 mg) by mouth every 8 hours as needed for nausea (vomiting) 30 tablet 2    prochlorperazine (COMPAZINE) 10 MG tablet Take 1 tablet (10 mg) by mouth every 6 hours as needed for nausea or vomiting 30 tablet 2    senna-docusate (SENOKOT-S/PERICOLACE) 8.6-50 MG tablet Take 2 tablets by mouth 2 times daily as needed for constipation 60 tablet 0    SUMAtriptan (IMITREX) 100 MG tablet Take 1 tablet (100 mg) by mouth at onset of headache for migraine 15 tablet 0    valACYclovir (VALTREX) 1000 mg tablet Take 1,000 mg by mouth as needed      Xylitol (XYLIMELTS MT) Place 1 tablet on Gums at bedtime      zolpidem (AMBIEN) 10 MG tablet Take 0.5 tablets (5 mg) by mouth nightly as needed for sleep           Allergies:      No Known Allergies     Social History:     Social History     Tobacco Use    Smoking status: Former     Current packs/day: 0.00     Average packs/day: 0.5 packs/day for 40.0 years (20.0 ttl pk-yrs)     Types: Cigarettes     Start date:      Quit date:      Years since quittin.4     Passive exposure: Never    Smokeless tobacco: Never   Substance Use Topics    Alcohol use: Not Currently       History   Drug Use Unknown         Family History:       Family History   Adopted: Yes   Problem Relation Age of Onset    Cancer Mother 36    Other Cancer Mother         Bio mother  of  a female cancer  at 36    Factor V Leiden deficiency Daughter     Deep Vein Thrombosis Daughter     Diabetes Type 1 Daughter     Diabetes Daughter     Hypertension Daughter     Anesthesia Reaction No family hx of          Physical Exam:     Ht 1.803 m (5' 11\")   Wt 74.8 kg (165 lb)   BMI 23.01 kg/m    Body mass index is 23.01 kg/m .    General Appearance:  healthy and alert, no distress                 Psychiatric:      appropriate mood and affect                                     Assessment:     Taran Regalado is a 68 year old woman with recurrent ovarian high " grade serous carcinoma.     43 minute visit, chart review, documentation, coordination of care, in counseling.        1.  Recurrent high grade serous ovarian cancer.   2.  BRCA 1 germline mutation.   3.  [facility]  4.  PE resolved on Lovenox (prophy)  5.  Left ankle injury  6.   3521  7.  Elevated liver enzymes   8.  ECOG 1-2 and recent hospitalization she does not be a candidate.  We did discuss if this changes down the line we will possibly consider this.     We did discuss the scan showed stable disease last  was 4444.  We will continue with paracentesis for symptomatic relief.  We will give her a several week treatment break to recover from recent hospitalization. I would recommend to proceed with 3 additional cycles of carboplatin AUC of 4 and paclitaxel at 135 mg/m  every 3 weeks and Neulasta on pro, if she recovers from recent hospitalization.  She is not able to recover in order to tolerate additional systemic chemotherapy we did discuss to switch to best supportive care with likely hospice.  She will follow-up with my colleagues in palliative medicine. We will plan a follow-up CT scan after 3 cycles.  We did discuss possible prior clinical trials given current status patient and her family agree with this plan of action for care all questions were answered.           Bolivar Juarez MD, MS    Department of Obstetrics and Gynecology   Division of Gynecologic Oncology   [facility]  Phone: [phone]    CC  Patient Care Team:  Bolivar Juarez MD as PCP - General (Gynecologic Oncology)  Cogan, Jacob, MD as Physician (Hematology & Oncology)  Bolivar Juarez MD as Assigned Cancer Care Provider  Ben Bermeo MD as MD (Hospice And Palliative Care)  Ben Bermeo MD as Assigned Palliative Care Provider  Marylin Hancock, APRN CNP as Assigned OBGYN Provider  MARYLIN HANCOCK

## 2024-05-20 NOTE — NURSING NOTE
Is the patient currently in the state of [place]? YES    Visit mode:VIDEO    If the visit is dropped, the patient can be reconnected by: VIDEO VISIT: Text to cell phone:   Telephone Information:   Mobile [phone]       Will anyone else be joining the visit? Yes, Pt's daughter is with the pt and will be joining the video visit per pt  (If patient encounters technical issues they should call [phone]    How would you like to obtain your AVS? MyChart    Are changes needed to the allergy or medication list? Pt stated no med changes    Are refills needed on medications prescribed by this physician? NO    Reason for visit: RECHECK    No other vitals to report per pt    Lisbeth TOSCANOF

## 2024-05-21 NOTE — NURSING NOTE
Is the patient currently in the state of [place]? YES    Visit mode:VIDEO    If the visit is dropped, the patient can be reconnected by: VIDEO VISIT: Text to cell phone:   Telephone Information:   Mobile [phone]       Will anyone else be joining the visit? NO  (If patient encounters technical issues they should call [phone]    How would you like to obtain your AVS? MyChart    Are changes needed to the allergy or medication list? Pt stated no changes to allergies and Pt stated no med changes    Are refills needed on medications prescribed by this physician? NO    Reason for visit: KONG Van LPN

## 2024-05-21 NOTE — TELEPHONE ENCOUNTER
Wheelchair referral, Dr. Bermeo's last office visit note and demographic sheet faxed to [facility].  Will update patient.    Edelmira Rogers RN  Palliative Care Nurse Clinician    [phone] (Direct)  [phone] (Main)  [phone] (Appointment Scheduling)

## 2024-05-21 NOTE — PROGRESS NOTES
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 Started chemo then 2021 DANAE BSO omentectomy and debulking, resection of liver nodules, followed by adjuvant chemo including olaparib (BRCA1 mutated). 3/2023 carbo/paclitaxel/beva through 2023, then beva. Course complicated by PE; large ventral abd hernia.  2024 carbo/paclitaxel/bevacizumab due to progression. Bone, liver metastases. Ascites.  2024 hospitalized with fall/AMS. New L parietal punctate lesion ?subacute infarct ?met. Unclear what caused delirium.     Social - lives in [place] with ; stays in [place] with daughter Edie for treatments. 3 kids; one  2023,  of sepsis.  Starting homecare and PT 2024     Care Planning -   \"Cancer has moved into my liver and bone.\" Has not talked about prognosis and doesn't want to.  No HCD; verbally states would want Edie to be her decision-maker; we discussed completing HCD 2024.     Opioid Safety -   Discussed safety basics 2024. No CARLOS EDUARDO hx; average risk. +Naloxone.      History:  History gathered today from: patient, family/loved ones, medical chart  Laure and Edie are with her.    Admitted last week with ongoing falls and AMS. MRI as above. Laure describes trembling and ?akathisia. Was not just RLS but all 4 limb restlessness and twitching?  Unclear what's going on. Delirium improved seemingly spontaneously. It's really unclear to me what happened last week talking with them about this. Her chemo is on pause; follow-up MRI is .    Severe nausea last night.    Discharged on 10 mcg/hr butrans, gabapentin for RLS, and ropinirole.   She is not taking ropinirole.    She does not think she is constipated any longer  OIC 4 senna bid and PEG daily. Stool is pudding soft.  2 mg dilaudid: 1-2 mg; haven't used since home from hospital.  Tapering off amitryptline  Now " on gabapentin 300-600 mg at night for RLS, sleep.      Continues to get paracenteses  Feels she needs a new one; I suggested she call radiology to schedule    She is very weak and unable to walk on her own.  She was rec'd to go to a TCU but they declined. She was supposed to start PT    PE: There were no vitals taken for this visit.   Wt Readings from Last 3 Encounters:   05/20/24 74.8 kg (165 lb)   05/16/24 79.8 kg (175 lb 14.4 oz)   05/13/24 77.8 kg (171 lb 8 oz)     Alert NAD  Clear sensorium    Data reviewed:  I reviewed recent labs and imaging, my comments:  Cr 0.5  Ca 7.6   4444  Plt 53    CT  IMPRESSION:   1. Mild diffuse edematous gastric, small bowel, and colonic wall  thickening, which may be related to anasarca/volume status, though  gastritis/enteritis/colitis could have a similar appearance.  2. Stable hepatic and osseous metastases.  3. Slightly increased moderate volume ascites, which may be malignant.  4. Stable mild splenomegaly.  5. Slightly increased trace bilateral pleural effusions.        Brain MRI 5/15 ?subacute punctate infarct     database reviewed: y      Impression & Recommendations:  F68 yo with metastatic ovarian cancer    Recent episode of falls, ?akathisia--RLS like symptoms, AMS. Unclear what exactly this is. She had a UTI. It seems possible she had a small stroke. Polypharmacy is a possibility. She is doing better at the moment. However she remains profoundly weak and debilitated.    PT; apparently homecare is arranging this. I suggested they call to make sure this is happening.    She cannot stand or transfer or walk without assistance; I ordered a wheelchair    They ask about orexigens: they are all CNS active and I wouldn't rec right now  She's tapering off amitryptline and we may need to reintroduce another antidepressant but for now lets' see how she does.    Ok to continue Butrans, gabapentin  Follow-up 2 weeks    Over 40 minutes spent on the date of the encounter  doing chart review, history and exam, patient education & counseling, documentation and other activities as noted above.    Thank you for involving us in the patient's care.   Ben Bermeo MD / Palliative Medicine / Jaycee velásquez via [employer]  This note may have been composed with voice recognition software and there may be mistranscriptions.    Video-Visit Details  Video Start Time: 3:04 PM  Video End Time:3:40 PM  Originating Location (pt. Location): Home  Distant Location (provider location):  Off-site  Platform used for Video Visit: Wanda

## 2024-05-21 NOTE — LETTER
"2024       RE: Taran Regalado  [address]  [facility] [address]       Dear Colleague,    Thank you for referring your patient, [facility] Regalado[facility]. Please see a copy of my visit note below.    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 Started chemo then 2021 DANAE BSO omentectomy and debulking, resection of liver nodules, followed by adjuvant chemo including olaparib (BRCA1 mutated). 3/2023 carbo/paclitaxel/beva through 2023, then beva. Course complicated by PE; large ventral abd hernia.  2024 carbo/paclitaxel/bevacizumab due to progression. Bone, liver metastases. Ascites.  2024 hospitalized with fall/AMS. New L parietal punctate lesion ?subacute infarct ?met. Unclear what caused delirium.     Social - lives in [place] with ; stays in [place] with daughter Edie for treatments. 3 kids; one  2023,  of sepsis.  Starting homecare and PT 2024     Care Planning -   \"Cancer has moved into my liver and bone.\" Has not talked about prognosis and doesn't want to.  No HCD; verbally states would want Edie to be her decision-maker; we discussed completing HCD 2024.     Opioid Safety -   Discussed safety basics 2024. No CARLOS EDUARDO hx; average risk. +Naloxone.      History:  History gathered today from: patient, family/loved ones, medical chart  Laure and Edie are with her.    Admitted last week with ongoing falls and AMS. MRI as above. Laure describes trembling and ?akathisia. Was not just RLS but all 4 limb restlessness and twitching?  Unclear what's going on. Delirium improved seemingly spontaneously. It's really unclear to me what happened last week talking with them about this. Her chemo is on pause; follow-up MRI is .    Severe nausea last " night.    Discharged on 10 mcg/hr butrans, gabapentin for RLS, and ropinirole.   She is not taking ropinirole.    She does not think she is constipated any longer  OIC 4 senna bid and PEG daily. Stool is pudding soft.  2 mg dilaudid: 1-2 mg; haven't used since home from hospital.  Tapering off amitryptline  Now on gabapentin 300-600 mg at night for RLS, sleep.      Continues to get paracenteses  Feels she needs a new one; I suggested she call radiology to schedule    She is very weak and unable to walk on her own.  She was rec'd to go to a TCU but they declined. She was supposed to start PT    PE: There were no vitals taken for this visit.   Wt Readings from Last 3 Encounters:   05/20/24 74.8 kg (165 lb)   05/16/24 79.8 kg (175 lb 14.4 oz)   05/13/24 77.8 kg (171 lb 8 oz)     Alert NAD  Clear sensorium    Data reviewed:  I reviewed recent labs and imaging, my comments:  Cr 0.5  Ca 7.6   4444  Plt 53    CT  IMPRESSION:   1. Mild diffuse edematous gastric, small bowel, and colonic wall  thickening, which may be related to anasarca/volume status, though  gastritis/enteritis/colitis could have a similar appearance.  2. Stable hepatic and osseous metastases.  3. Slightly increased moderate volume ascites, which may be malignant.  4. Stable mild splenomegaly.  5. Slightly increased trace bilateral pleural effusions.        Brain MRI 5/15 ?subacute punctate infarct     database reviewed: y      Impression & Recommendations:  F68 yo with metastatic ovarian cancer    Recent episode of falls, ?akathisia--RLS like symptoms, AMS. Unclear what exactly this is. She had a UTI. It seems possible she had a small stroke. Polypharmacy is a possibility. She is doing better at the moment. However she remains profoundly weak and debilitated.    PT; apparently homecare is arranging this. I suggested they call to make sure this is happening.    She cannot stand or transfer or walk without assistance; I ordered a wheelchair    They  ask about orexigens: they are all CNS active and I wouldn't rec right now  She's tapering off amitryptline and we may need to reintroduce another antidepressant but for now lets' see how she does.    Ok to continue Butrans, gabapentin  Follow-up 2 weeks    Over 40 minutes spent on the date of the encounter doing chart review, history and exam, patient education & counseling, documentation and other activities as noted above.      Thank you for involving us in the patient's care.   Ben Bermeo MD / Palliative Medicine / Text me via [employer]  This note may have been composed with voice recognition software and there may be mistranscriptions.        Again, thank you for allowing me to participate in the care of your patient.      Sincerely,    Ben Bermeo MD

## 2024-05-22 NOTE — PROGRESS NOTES
RN reached out to patient/daughter in regards to MyChart message     Patient is nauseated and has vomited (spitting up). Patient is having a hard time eating and drinking. IS keeping something down but has a base line of nausea and needing to hold a bucket    Patient feels her stomach has grown in the last two days and now is stretched out and hard. Very uncomfortable.     Patient has not been taking any medications because she is tired of taking them and just doesn't want to    Patient absolutely does not want to go to the ED     Patient has a paracentesis tomorrow and would like to see about getting fluids if possible around the same time at the same location    Patient is passing gas and does state that she is having bowel movement     Daughter confirms that patient just had some apple sauce with zofran in hopes to help the nausea     Daughter denies any other new symptoms or fever/chills     RN and daughter reviewed when to go to ED (even if the patient says she is fine) and when we can wait for outpatient things     RN answered all questions to the best of her ability     Shawna Hinojosa RN

## 2024-05-23 NOTE — DISCHARGE INSTRUCTIONS
[facility] :  [phone]                     Radiology  Discharge Instructions for Abdominal Paracentesis    You have had an abdominal paracentesis procedure today.  A needle or catheter was put into your abdomen to remove fluid for laboratory studies and/or to remove the extra fluid from your abdomen.    AFTER YOU ARE HOME:  Rest at home today.  Limit physical activity such as lifting, straining, or exercise for 48 hours.  You may resume normal activity in 24 hours.  Resume previous diet and medications.  You may remove bandage in 24 hours.    CALL YOUR PRIMARY PROVIDER IF:  You develop temperature over 101o F, or redness at the drainage site.  You have any other questions or concerns.    AFTER HOURS CALL [facility] NURSE ADVISORS AT [phone]    COME TO EMERGENCY ROOM IF:  You develop severe abdominal pain, swelling the size of a baseball or larger, continuous oozing from puncture site longer than 24 hours, bleeding that saturates a gauze dressing even after you have put direct pressure on the site for 15 minutes, severe lightheadedness or fainting.  DO NOT DRIVE YOURSELF.

## 2024-05-23 NOTE — PROGRESS NOTES
L side paracentesis performed by radiologist.  5000 ml clear yellow fluid removed. Pressure held at site after catheter removed. No bleeding noted. Given Albumin 25 g per protocol.  Patient having abdominal discomfort 8/10 prior to paracentesis.  Continues to have abdominal pain 7/10 after procedure.  Daughter, Laure, present with patient, aware of discomfort.  Patient brought to Infusion for fluids.  Report given to BETH De Paz.

## 2024-05-23 NOTE — PROGRESS NOTES
Patient in Infusion Dept for fluids.  Infusion RN called with complaint from patient, that she feels like her abdomen is filling with fluid again.  Has swollen areas in mid abdomen that appear as hernias.  Abdomen soft.  Discomfort in abdomen has continued after paracentesis, though not worse.  Patient continues to have some nausea. Does have nausea medicine at home.  Discussed situation with Dr Paulino, patient back to Ultrasound to be checked.  Abdomen soft, still appears distended, as it did before and after procedure.  Difficult to see it distention has increased.  Dr Paulino down to talk with patient.  Has abdominal binder at home that she uses for comfort.  Encouraged to get checked in ED if swollen soft areas in abdomen become hard or painful.  Home with daughter.

## 2024-05-23 NOTE — PROGRESS NOTES
Infusion Nursing Note:  Taran Regalado presents today for IVF.    Patient seen by provider today: No   present during visit today: Not Applicable.    Note: Pt is complaining of abd pain. Pt states that she feels like her abd is filling back up. Talked to Jacinda from Radiology. She came to get the pt to bring her back for a US and for the radiologist to look at her abd.      Intravenous Access:  Implanted Port.    Treatment Conditions:  Not Applicable.      Post Infusion Assessment:  Patient tolerated infusion without incident.  Site patent and intact, free from redness, edema or discomfort.  No evidence of extravasations.  Access discontinued per protocol.       Discharge Plan:   Discharge instructions reviewed with: Patient.  Patient discharged in stable condition accompanied by: self.  Departure Mode: Ambulatory.      Kristen Santos RN

## 2024-05-29 NOTE — PROGRESS NOTES
Infusion Nursing Note:  Taran Regalado presents today for add on IVF.    Patient seen by provider today: No   present during visit today: Not Applicable.    Note: No labs/no provider visit today. Patient states she is feeling well, assessment benign other than on going weakness. 1L NS bolus given over 1 hour per therapy plan.    Intravenous Access:  Peripheral IV placed using ultrasound  Left in place for MRI after IVF.  +BR noted pre and post infusion    Treatment Conditions:  Not Applicable.      Post Infusion Assessment:  Patient tolerated infusion without incident.       Discharge Plan:   Patient discharged in stable condition accompanied by: daughter.  Departure Mode: Wheelchair.      Shauna Villasenor RN

## 2024-05-29 NOTE — DISCHARGE INSTRUCTIONS
MRI Contrast Discharge Instructions    The IV contrast you received today will pass out of your body in your  urine. This will happen in the next 24 hours. You will not feel this process.  Your urine will not change color.    Drink at least 4 extra glasses of water or juice today (unless your doctor  has restricted your fluids). This reduces the stress on your kidneys.  You may take your regular medicines.    If you are on dialysis: It is best to have dialysis today.    If you have a reaction: Most reactions happen right away. If you have  any new symptoms after leaving the hospital (such as hives or swelling),  call your hospital at the correct number below. Or call your family doctor.  If you have breathing distress or wheezing, call 911.    Special instructions: ***    I have read and understand the above information.    Signature:______________________________________ Date:___________    Staff:__________________________________________ Date:___________     Time:__________    [facility] Departments:    ___[place]: [phone]  ___[facility]: [phone]  ___[place]: [phone] ___[facility]: [phone]  ___[facility]: [phone]  ___[facility]: [phone]  ___[phone]  ___[facility]: [phone]  ___[phone]

## 2024-05-30 NOTE — DISCHARGE INSTRUCTIONS
Radiology  Discharge Instructions for Abdominal Paracentesis    You have had an abdominal paracentesis procedure today.  A needle or catheter was put into your abdomen to remove fluid for laboratory studies and/or to remove the extra fluid from your abdomen.    AFTER YOU ARE HOME:  Rest at home today.  Limit physical activity such as lifting, straining, or exercise for 48 hours.  You may resume normal activity in 24 hours.  Resume previous diet and medications.  You may remove bandage in 24 hours.    CALL YOUR PRIMARY PROVIDER IF:  You develop temperature over 101o F, or redness at the drainage site.  You have any other questions or concerns.    AFTER HOURS CALL [facility] NURSE ADVISORS AT [phone]    COME TO EMERGENCY ROOM IF:  You develop severe abdominal pain, swelling the size of a baseball or larger, continuous oozing from puncture site longer than 24 hours, bleeding that saturates a gauze dressing even after you have put direct pressure on the site for 15 minutes, severe lightheadedness or fainting.  DO NOT DRIVE YOURSELF.

## 2024-05-30 NOTE — TELEPHONE ENCOUNTER
Spoke with home care nurse Zeb MATTHEWS  Pt is leaving today for [place], gone for a week wants verbal order to hold home care.  Gave orders to hold home care until 6/8/24

## 2024-05-30 NOTE — TELEPHONE ENCOUNTER
I received a phone call today from Zeb with [facility] home care.  She states Justen[place]  She needs a verbal okay to hold home care for the next 8 days.  Zeb would like a phone call back at [phone].  It is okay to leave a detailed voice message as her voicemail is a secured line.     Yulia Rain RN on 5/30/2024 at 12:49 PM

## 2024-05-30 NOTE — PROGRESS NOTES
LLQ paracentesis performed by radiologist.    3900 Ml clear yellow fluid removed. Pressure held at site after catheter removed. No bleeding noted. Skin glue applied. No Albumin given, per protocol.

## 2024-06-10 NOTE — PROGRESS NOTES
Patient daughter called with update.   Patient made it home from [place] but is now in the ICU at [facility] Everywhere updated     We will keep Dr Barnett appt until closer to Wednesday. If patient is still inpatient we will reschedule appointment     Shawna Hinojosa RN

## 2024-06-11 NOTE — PROGRESS NOTES
RN reached out to patient daughters in regards to Mychart     Patient had decided that she is too tired and doesn't want to talk with RN or Dr Juarez at this time     Daughters will update RN tomorrow if patient would like to talk then     Shawna Hinojosa RN

## 2024-06-17 NOTE — PROGRESS NOTES
RN was contacted with the following: [facility] reached out regarding this patient and they have some questions. [facility], hospice admission RN is wondering if you or someone from the team would reach out     Her # is [phone]     RN had also been in contact with the hospice nurse that will be caring for the patient     RN reached out to [facility] and answered questions     Shawna Hinojosa RN

## 2024-09-29 NOTE — RESULT ENCOUNTER NOTE
reviewed.     Marta Lao, MSN, [facility]  Women's Health Nurse Practitioner  Division of Gynecologic Oncology  [facility]  
Unable to assess due to medical condition

## (undated) DEVICE — ENDO CATH BALLOON DILATION HURRICANE 08MMX4X180CM M00545940

## (undated) DEVICE — CATH RETRIEVAL BALLOON EXTRACTOR PRO RX-S INJ ABOVE 9-12MM

## (undated) DEVICE — SOL NACL 0.9% IRRIG 1000ML BOTTLE 2F7124

## (undated) DEVICE — WIRE GUIDE 0.025"X450CM ANG VISIGLIDE G-240-2545A

## (undated) DEVICE — BLADE CLIPPER SGL USE 9680

## (undated) DEVICE — SOL WATER IRRIG 1000ML BOTTLE 2F7114

## (undated) DEVICE — SU VICRYL 0 CT-1 CR 8X27" UND JJ41G

## (undated) DEVICE — PREP CHLORAPREP 26ML TINTED ORANGE  260815

## (undated) DEVICE — NDL INSUFFLATION 13GA 120MM C2201

## (undated) DEVICE — ENDO FUSION OMNI-TOME G31903

## (undated) DEVICE — PANTIES MESH LG/XLG 2PK 706M2

## (undated) DEVICE — GLOVE PROTEXIS BLUE W/NEU-THERA 8.0  2D73EB80

## (undated) DEVICE — WIPES FOLEY CARE SURESTEP PROVON DFC100

## (undated) DEVICE — WIPE PREMOIST CLEANSING WASHCLOTHS 7988

## (undated) DEVICE — SU VICRYL 0 CT-1 36" J346H

## (undated) DEVICE — BIOPSY VALVE BIOSHIELD 00711135

## (undated) DEVICE — DRAPE LAVH/LAPAROSCOPY W/POUCH 29474

## (undated) DEVICE — ESU PENCIL W/COATED BLADE E2450H

## (undated) DEVICE — JELLY LUBRICATING SURGILUBE 2OZ TUBE

## (undated) DEVICE — ENDO TROCAR FIRST ENTRY KII FIOS ADV FIX 12X100MM CFF73

## (undated) DEVICE — ENDO CATH BALLOON DILATION HURRICANE 04MMX4X180CM M00545900

## (undated) DEVICE — Device

## (undated) DEVICE — SPONGE LAP 18X18" X8435

## (undated) DEVICE — ENDO TROCAR SLEEVE KII ADV FIXATION 05X100MM CFS02

## (undated) DEVICE — ENDO BITE BLOCK ADULT OMNI-BLOC

## (undated) DEVICE — ESU GROUND PAD ADULT W/CORD E7507

## (undated) DEVICE — SU VICRYL 3-0 SH 27" UND J416H

## (undated) DEVICE — SU MONOCRYL 3-0 PS-1 27" Y936H

## (undated) DEVICE — PREP TECHNI-CARE CHLOROXYLENOL 3% 4OZ BOTTLE C222-4ZWO

## (undated) DEVICE — SPONGE KITTNER 30-101

## (undated) DEVICE — LINEN TOWEL PACK X30 5481

## (undated) DEVICE — ENDO TROCAR BLUNT TIP KII BALLOON 12X100MM C0R47

## (undated) DEVICE — SOL ADH LIQUID BENZOIN SWAB 0.6ML C1544

## (undated) DEVICE — LINEN TOWEL PACK X6 WHITE 5487

## (undated) DEVICE — SUCTION MANIFOLD NEPTUNE 2 SYS 4 PORT 0702-020-000

## (undated) DEVICE — SYR BULB IRRIG 50ML LATEX FREE 0035280

## (undated) DEVICE — PROTECTOR ARM ONE-STEP TRENDELENBURG 40418

## (undated) DEVICE — ENDO FUSION OMNI-TOME 21 FS-OMNI-21 G48675

## (undated) DEVICE — DRAPE LEGGINGS CLEAR 8430

## (undated) DEVICE — SU SILK 3-0 SH CR 8X18" C013D

## (undated) DEVICE — DRSG STERI STRIP 1/2X4" R1547

## (undated) DEVICE — KIT ENDO FIRST STEP DISINFECTANT 200ML W/POUCH EP-4

## (undated) DEVICE — ESU LIGASURE IMPACT OPEN SEALER/DVDR CVD LG JAW LF4418

## (undated) DEVICE — ENDO TUBING CO2 SMARTCAP STERILE DISP 100145CO2EXT

## (undated) DEVICE — SU VICRYL 0 UR-6 27" J603H

## (undated) DEVICE — KIT PATIENT POSITIONING PIGAZZI LATEX FREE 40580

## (undated) DEVICE — PACK ENDOSCOPY GI CUSTOM UMMC

## (undated) DEVICE — ESU CLEANER TIP 31142717

## (undated) DEVICE — ESU ENDO SCISSORS 5MM CVD 5DCS

## (undated) DEVICE — GUIDEWIRE NOVAGOLD .018X260CM STR TIP M00552000

## (undated) DEVICE — DRSG PRIMAPORE 03 1/8X6" 66000318

## (undated) DEVICE — SU VICRYL 0 TIE 54" J608H

## (undated) DEVICE — GLOVE PROTEXIS MICRO 7.5  2D73PM75

## (undated) DEVICE — SOL NACL 0.9% INJ 1000ML BAG 2B1324X

## (undated) DEVICE — SU PDS II 0 TP-1 60" Z991G

## (undated) DEVICE — ENDO DEVICE LOCKING AND BIOPSY CAP M00545261

## (undated) DEVICE — SPECIMEN TRAP MUCOUS 40ML LUKI C30200A

## (undated) DEVICE — DRSG TELFA 3X8" 1238

## (undated) DEVICE — PREP POVIDONE IODINE SOLUTION 10% 4OZ

## (undated) DEVICE — PACK AB HYST II

## (undated) DEVICE — KIT CONNECTOR FOR OLYMPUS ENDOSCOPES DEFENDO 100310

## (undated) DEVICE — PAD PERI INDIV WRAP 11" 2022A

## (undated) DEVICE — ENDO SCOPE WARMER SEAL  C3101

## (undated) DEVICE — ENDO TROCAR FIRST ENTRY KII FIOS ADV FIX 05X100MM CFF03

## (undated) RX ORDER — GLYCOPYRROLATE 0.2 MG/ML
INJECTION, SOLUTION INTRAMUSCULAR; INTRAVENOUS
Status: DISPENSED
Start: 2023-01-01

## (undated) RX ORDER — FENTANYL CITRATE 50 UG/ML
INJECTION, SOLUTION INTRAMUSCULAR; INTRAVENOUS
Status: DISPENSED
Start: 2021-04-19

## (undated) RX ORDER — HYDROMORPHONE HYDROCHLORIDE 1 MG/ML
INJECTION, SOLUTION INTRAMUSCULAR; INTRAVENOUS; SUBCUTANEOUS
Status: DISPENSED
Start: 2023-01-01

## (undated) RX ORDER — HYDROCODONE BITARTRATE AND ACETAMINOPHEN 5; 325 MG/1; MG/1
TABLET ORAL
Status: DISPENSED
Start: 2021-01-07

## (undated) RX ORDER — KETOROLAC TROMETHAMINE 30 MG/ML
INJECTION, SOLUTION INTRAMUSCULAR; INTRAVENOUS
Status: DISPENSED
Start: 2021-04-19

## (undated) RX ORDER — EPHEDRINE SULFATE 50 MG/ML
INJECTION, SOLUTION INTRAMUSCULAR; INTRAVENOUS; SUBCUTANEOUS
Status: DISPENSED
Start: 2021-01-07

## (undated) RX ORDER — ALBUMIN, HUMAN INJ 5% 5 %
SOLUTION INTRAVENOUS
Status: DISPENSED
Start: 2021-04-19

## (undated) RX ORDER — HEPARIN SODIUM 5000 [USP'U]/.5ML
INJECTION, SOLUTION INTRAVENOUS; SUBCUTANEOUS
Status: DISPENSED
Start: 2021-04-19

## (undated) RX ORDER — FENTANYL CITRATE 50 UG/ML
INJECTION, SOLUTION INTRAMUSCULAR; INTRAVENOUS
Status: DISPENSED
Start: 2021-01-07

## (undated) RX ORDER — CEFAZOLIN SODIUM 2 G/100ML
INJECTION, SOLUTION INTRAVENOUS
Status: DISPENSED
Start: 2024-01-01

## (undated) RX ORDER — ONDANSETRON 2 MG/ML
INJECTION INTRAMUSCULAR; INTRAVENOUS
Status: DISPENSED
Start: 2023-01-01

## (undated) RX ORDER — HEPARIN SODIUM (PORCINE) LOCK FLUSH IV SOLN 100 UNIT/ML 100 UNIT/ML
SOLUTION INTRAVENOUS
Status: DISPENSED
Start: 2024-01-01

## (undated) RX ORDER — LIDOCAINE HYDROCHLORIDE 20 MG/ML
INJECTION, SOLUTION EPIDURAL; INFILTRATION; INTRACAUDAL; PERINEURAL
Status: DISPENSED
Start: 2021-01-07

## (undated) RX ORDER — CEFAZOLIN SODIUM 2 G/100ML
INJECTION, SOLUTION INTRAVENOUS
Status: DISPENSED
Start: 2021-04-19

## (undated) RX ORDER — ACETAMINOPHEN 325 MG/1
TABLET ORAL
Status: DISPENSED
Start: 2021-01-07

## (undated) RX ORDER — HYDROMORPHONE HYDROCHLORIDE 1 MG/ML
INJECTION, SOLUTION INTRAMUSCULAR; INTRAVENOUS; SUBCUTANEOUS
Status: DISPENSED
Start: 2021-04-19

## (undated) RX ORDER — DEXAMETHASONE SODIUM PHOSPHATE 4 MG/ML
INJECTION, SOLUTION INTRA-ARTICULAR; INTRALESIONAL; INTRAMUSCULAR; INTRAVENOUS; SOFT TISSUE
Status: DISPENSED
Start: 2021-04-19

## (undated) RX ORDER — CEFAZOLIN SODIUM 1 G/3ML
INJECTION, POWDER, FOR SOLUTION INTRAMUSCULAR; INTRAVENOUS
Status: DISPENSED
Start: 2021-01-07

## (undated) RX ORDER — FENTANYL CITRATE-0.9 % NACL/PF 10 MCG/ML
PLASTIC BAG, INJECTION (ML) INTRAVENOUS
Status: DISPENSED
Start: 2021-01-07

## (undated) RX ORDER — ONDANSETRON 2 MG/ML
INJECTION INTRAMUSCULAR; INTRAVENOUS
Status: DISPENSED
Start: 2021-01-07

## (undated) RX ORDER — PROPOFOL 10 MG/ML
INJECTION, EMULSION INTRAVENOUS
Status: DISPENSED
Start: 2021-04-19

## (undated) RX ORDER — FENTANYL CITRATE 50 UG/ML
INJECTION, SOLUTION INTRAMUSCULAR; INTRAVENOUS
Status: DISPENSED
Start: 2024-01-01

## (undated) RX ORDER — FENTANYL CITRATE-0.9 % NACL/PF 10 MCG/ML
PLASTIC BAG, INJECTION (ML) INTRAVENOUS
Status: DISPENSED
Start: 2023-01-01

## (undated) RX ORDER — HYDROMORPHONE HYDROCHLORIDE 1 MG/ML
INJECTION, SOLUTION INTRAMUSCULAR; INTRAVENOUS; SUBCUTANEOUS
Status: DISPENSED
Start: 2021-01-07

## (undated) RX ORDER — CEFAZOLIN SODIUM 2 G/100ML
INJECTION, SOLUTION INTRAVENOUS
Status: DISPENSED
Start: 2021-01-07

## (undated) RX ORDER — CEFAZOLIN SODIUM 1 G/3ML
INJECTION, POWDER, FOR SOLUTION INTRAMUSCULAR; INTRAVENOUS
Status: DISPENSED
Start: 2021-04-19

## (undated) RX ORDER — FENTANYL CITRATE 50 UG/ML
INJECTION, SOLUTION INTRAMUSCULAR; INTRAVENOUS
Status: DISPENSED
Start: 2023-01-01

## (undated) RX ORDER — DEXAMETHASONE SODIUM PHOSPHATE 4 MG/ML
INJECTION, SOLUTION INTRA-ARTICULAR; INTRALESIONAL; INTRAMUSCULAR; INTRAVENOUS; SOFT TISSUE
Status: DISPENSED
Start: 2021-01-07

## (undated) RX ORDER — LEVOFLOXACIN 5 MG/ML
INJECTION, SOLUTION INTRAVENOUS
Status: DISPENSED
Start: 2023-01-01

## (undated) RX ORDER — SODIUM CHLORIDE, SODIUM LACTATE, POTASSIUM CHLORIDE, CALCIUM CHLORIDE 600; 310; 30; 20 MG/100ML; MG/100ML; MG/100ML; MG/100ML
INJECTION, SOLUTION INTRAVENOUS
Status: DISPENSED
Start: 2021-04-19

## (undated) RX ORDER — ONDANSETRON 2 MG/ML
INJECTION INTRAMUSCULAR; INTRAVENOUS
Status: DISPENSED
Start: 2021-04-19

## (undated) RX ORDER — ALBUMIN (HUMAN) 12.5 G/50ML
SOLUTION INTRAVENOUS
Status: DISPENSED
Start: 2024-01-01

## (undated) RX ORDER — PHENAZOPYRIDINE HYDROCHLORIDE 200 MG/1
TABLET, FILM COATED ORAL
Status: DISPENSED
Start: 2021-04-19

## (undated) RX ORDER — PROPOFOL 10 MG/ML
INJECTION, EMULSION INTRAVENOUS
Status: DISPENSED
Start: 2023-01-01

## (undated) RX ORDER — LIDOCAINE HYDROCHLORIDE 10 MG/ML
INJECTION, SOLUTION INFILTRATION; PERINEURAL
Status: DISPENSED
Start: 2024-01-01

## (undated) RX ORDER — IBUPROFEN 200 MG
TABLET ORAL
Status: DISPENSED
Start: 2021-01-07

## (undated) RX ORDER — KETOROLAC TROMETHAMINE 30 MG/ML
INJECTION, SOLUTION INTRAMUSCULAR; INTRAVENOUS
Status: DISPENSED
Start: 2021-01-07

## (undated) RX ORDER — PROPOFOL 10 MG/ML
INJECTION, EMULSION INTRAVENOUS
Status: DISPENSED
Start: 2021-01-07

## (undated) RX ORDER — LIDOCAINE HYDROCHLORIDE 20 MG/ML
INJECTION, SOLUTION EPIDURAL; INFILTRATION; INTRACAUDAL; PERINEURAL
Status: DISPENSED
Start: 2021-04-19